# Patient Record
Sex: MALE | Race: WHITE | NOT HISPANIC OR LATINO | Employment: OTHER | URBAN - METROPOLITAN AREA
[De-identification: names, ages, dates, MRNs, and addresses within clinical notes are randomized per-mention and may not be internally consistent; named-entity substitution may affect disease eponyms.]

---

## 2017-01-18 ENCOUNTER — OFFICE VISIT (OUTPATIENT)
Dept: FAMILY MEDICINE | Facility: CLINIC | Age: 82
End: 2017-01-18
Payer: COMMERCIAL

## 2017-01-18 ENCOUNTER — ANTICOAGULATION THERAPY VISIT (OUTPATIENT)
Dept: NURSING | Facility: CLINIC | Age: 82
End: 2017-01-18
Payer: COMMERCIAL

## 2017-01-18 VITALS
HEART RATE: 50 BPM | RESPIRATION RATE: 18 BRPM | TEMPERATURE: 98 F | DIASTOLIC BLOOD PRESSURE: 70 MMHG | SYSTOLIC BLOOD PRESSURE: 120 MMHG | BODY MASS INDEX: 36 KG/M2 | WEIGHT: 258 LBS

## 2017-01-18 DIAGNOSIS — M1A.0710 CHRONIC IDIOPATHIC GOUT INVOLVING TOE OF RIGHT FOOT WITHOUT TOPHUS: ICD-10-CM

## 2017-01-18 DIAGNOSIS — I10 HYPERTENSION GOAL BP (BLOOD PRESSURE) < 140/90: Primary | ICD-10-CM

## 2017-01-18 DIAGNOSIS — Z12.5 SCREENING FOR PROSTATE CANCER: ICD-10-CM

## 2017-01-18 DIAGNOSIS — E78.2 MIXED HYPERLIPIDEMIA: ICD-10-CM

## 2017-01-18 DIAGNOSIS — D62 ANEMIA DUE TO BLOOD LOSS, ACUTE: ICD-10-CM

## 2017-01-18 LAB — INR POINT OF CARE: 2.8 (ref 2–3)

## 2017-01-18 PROCEDURE — 99207 ZZC NO CHARGE NURSE ONLY: CPT

## 2017-01-18 PROCEDURE — 99213 OFFICE O/P EST LOW 20 MIN: CPT | Performed by: FAMILY MEDICINE

## 2017-01-18 PROCEDURE — 36416 COLLJ CAPILLARY BLOOD SPEC: CPT

## 2017-01-18 PROCEDURE — 85610 PROTHROMBIN TIME: CPT | Mod: QW

## 2017-01-18 ASSESSMENT — ANXIETY QUESTIONNAIRES
GAD7 TOTAL SCORE: 0
3. WORRYING TOO MUCH ABOUT DIFFERENT THINGS: NOT AT ALL
1. FEELING NERVOUS, ANXIOUS, OR ON EDGE: NOT AT ALL
6. BECOMING EASILY ANNOYED OR IRRITABLE: NOT AT ALL
IF YOU CHECKED OFF ANY PROBLEMS ON THIS QUESTIONNAIRE, HOW DIFFICULT HAVE THESE PROBLEMS MADE IT FOR YOU TO DO YOUR WORK, TAKE CARE OF THINGS AT HOME, OR GET ALONG WITH OTHER PEOPLE: NOT DIFFICULT AT ALL
2. NOT BEING ABLE TO STOP OR CONTROL WORRYING: NOT AT ALL
7. FEELING AFRAID AS IF SOMETHING AWFUL MIGHT HAPPEN: NOT AT ALL
5. BEING SO RESTLESS THAT IT IS HARD TO SIT STILL: NOT AT ALL

## 2017-01-18 ASSESSMENT — PATIENT HEALTH QUESTIONNAIRE - PHQ9: 5. POOR APPETITE OR OVEREATING: NOT AT ALL

## 2017-01-18 NOTE — PROGRESS NOTES
ANTICOAGULATION FOLLOW-UP CLINIC VISIT    Patient Name:  Alessio Teixeira  Date:  1/18/2017  Contact Type:  Face to Face    SUBJECTIVE:     Patient Findings     Positives No Problem Findings           OBJECTIVE    INR PROTIME   Date Value Ref Range Status   01/18/2017 2.8 2.0 - 3.0 Final       ASSESSMENT / PLAN  INR assessment THER    Recheck INR In: 6 WEEKS    INR Location Clinic      Anticoagulation Summary as of 1/18/2017     INR goal 2.0-3.0   Selected INR 2.8 (1/18/2017)   Maintenance plan 7 mg (2 mg x 3.5) every day   Full instructions 7 mg every day   Weekly total 49 mg   No change documented Anastasiia Pham, RN   Plan last modified Kerrie Morgan RN (6/3/2016)   Next INR check 3/1/2017   Target end date Indefinite    Indications   Chronic atrial fibrillation (H) [I48.2]  Long-term (current) use of anticoagulants [Z79.01] [Z79.01]         Anticoagulation Episode Summary     INR check location [facility]    Preferred lab     Send INR reminders to [facility] INR/PROTIME    Comments       Anticoagulation Care Providers     Provider Role Specialty Phone number    Zac Salgado MD [facility] [phone]            See the Encounter Report to view Anticoagulation Flowsheet and Dosing Calendar (Go to Encounters tab in chart review, and find the Anticoagulation Therapy Visit)        Anastasiia Pham, RN

## 2017-01-18 NOTE — PATIENT INSTRUCTIONS
The patient misunderstood the instructions from last visit.  He will make an appointment at his convenience in the next month or so for a physical.  He will get his blood tests done within a day or 2 before the exam date.  He has not had any problems with gout since stopping his Uloric.  He will make his appointment after returning from [place] where he will be seeing his new grandchild.  He does not need any new medications today.  He will get his INR done today.

## 2017-01-18 NOTE — PROGRESS NOTES
SUBJECTIVE:                                                    Alessio Teixeira is a 81 year old male who presents to clinic today for the following health issues:      Medication Followup of Uloric    Taking Medication as prescribed: NO-pt discontinued medication     Side Effects:  None    Medication Helping Symptoms:  yes       Hyperlipidemia Follow-Up      Rate your low fat/cholesterol diet?: fair    Taking statin?  Yes, no muscle aches from statin    Other lipid medications/supplements?:  none     Hypertension Follow-up      Outpatient blood pressures are not being checked.    Low Salt Diet: no added salt       Problem list and histories reviewed & adjusted, as indicated.  Additional history: as documented    Problem list, Medication list, Allergies, and Medical/Social/Surgical histories reviewed in EPIC and updated as appropriate.    ROS:  Constitutional, HEENT, cardiovascular, pulmonary, gi and gu systems are negative, except as otherwise noted.    OBJECTIVE:                                                    /70 mmHg  Pulse 50  Temp(Src) 98  F (36.7  C) (Tympanic)  Resp 18  Wt 258 lb (117.028 kg)  Body mass index is 36 kg/(m^2).  GENERAL APPEARANCE: healthy, alert, no distress and over weight         ASSESSMENT/PLAN:                                                        ICD-10-CM    1. Hypertension goal BP (blood pressure) < 140/90 I10 UA with Microscopic     CBC with platelets differential     Comprehensive metabolic panel   2. Chronic 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 without tophus [id number].0710 Uric acid   3. Anemia due to blood loss, acute D62 CBC with platelets differential   4. Screening for prostate cancer Z12.5 Prostate spec antigen screen   5. Mixed hyperlipidemia E78.2 Lipid Profile       Patient Instructions   The patient misunderstood the instructions from last visit.  He will make an appointment at his convenience in the next month or so for a physical.  He will get his  blood tests done within a day or 2 before the exam date.  He has not had any problems with gout since stopping his Uloric.  He will make his appointment after returning from [place] where he will be seeing his new grandchild.  He does not need any new medications today.  He will get his INR done today.        Zac Salgado MD  [facility]

## 2017-01-18 NOTE — NURSING NOTE
"Chief Complaint   Patient presents with     Arthritis     gout       Initial /70 mmHg  Pulse 50  Temp(Src) 98  F (36.7  C) (Tympanic)  Resp 18  Wt 258 lb (117.028 kg) Estimated body mass index is 36 kg/(m^2) as calculated from the following:    Height as of 11/9/16: 5' 11\" (1.803 m).    Weight as of this encounter: 258 lb (117.028 kg).  BP completed using cuff size: mary Mondragon CMA      "

## 2017-01-19 ASSESSMENT — ANXIETY QUESTIONNAIRES: GAD7 TOTAL SCORE: 0

## 2017-01-19 ASSESSMENT — PATIENT HEALTH QUESTIONNAIRE - PHQ9: SUM OF ALL RESPONSES TO PHQ QUESTIONS 1-9: 2

## 2017-01-24 DIAGNOSIS — I10 HYPERTENSION GOAL BP (BLOOD PRESSURE) < 140/90: Primary | ICD-10-CM

## 2017-01-25 RX ORDER — POTASSIUM CHLORIDE 1500 MG/1
TABLET, EXTENDED RELEASE ORAL
Qty: 90 TABLET | Refills: 0 | Status: SHIPPED | OUTPATIENT
Start: 2017-01-25 | End: 2017-04-28

## 2017-01-25 NOTE — TELEPHONE ENCOUNTER
Prescription approved per [facility] Refill Protocol.  Alisia Hatfield RN- Triage [employer]

## 2017-01-25 NOTE — TELEPHONE ENCOUNTER
POTASSIUM CL ER 20 MEQ      Last Written Prescription Date: 7/20/16  Last Fill Quantity: 90, # refills: 1  Last Office Visit with [facility], [facility] or [facility] prescribing provider: 1/18/17   Next 5 appointments (look out 90 days)     Feb 08, 2017 10:00 AM   PHYSICAL with Zac Salgado MD   [facility]    [address] [place] [address] [id number] [phone]                   POTASSIUM   Date Value Ref Range Status   04/28/2016 4.0 3.4 - 5.3 mmol/L Final     CREATININE   Date Value Ref Range Status   04/28/2016 0.87 0.66 - 1.25 mg/dL Final     BP Readings from Last 3 Encounters:   01/18/17 120/70   11/09/16 128/60   09/07/16 129/71

## 2017-02-07 ENCOUNTER — OFFICE VISIT (OUTPATIENT)
Dept: FAMILY MEDICINE | Facility: CLINIC | Age: 82
End: 2017-02-07
Payer: COMMERCIAL

## 2017-02-07 VITALS
HEART RATE: 75 BPM | WEIGHT: 254 LBS | OXYGEN SATURATION: 97 % | SYSTOLIC BLOOD PRESSURE: 138 MMHG | DIASTOLIC BLOOD PRESSURE: 80 MMHG | BODY MASS INDEX: 35.44 KG/M2 | TEMPERATURE: 98.8 F | RESPIRATION RATE: 17 BRPM

## 2017-02-07 DIAGNOSIS — M1A.0710 CHRONIC IDIOPATHIC GOUT INVOLVING TOE OF RIGHT FOOT WITHOUT TOPHUS: ICD-10-CM

## 2017-02-07 DIAGNOSIS — E78.2 MIXED HYPERLIPIDEMIA: ICD-10-CM

## 2017-02-07 DIAGNOSIS — I10 HYPERTENSION GOAL BP (BLOOD PRESSURE) < 140/90: ICD-10-CM

## 2017-02-07 DIAGNOSIS — Z12.5 SCREENING FOR PROSTATE CANCER: ICD-10-CM

## 2017-02-07 DIAGNOSIS — J20.9 ACUTE BRONCHITIS, UNSPECIFIED ORGANISM: Primary | ICD-10-CM

## 2017-02-07 DIAGNOSIS — D62 ANEMIA DUE TO BLOOD LOSS, ACUTE: ICD-10-CM

## 2017-02-07 LAB
ALBUMIN SERPL-MCNC: 2.6 G/DL (ref 3.4–5)
ALBUMIN UR-MCNC: 30 MG/DL
ALP SERPL-CCNC: 64 U/L (ref 40–150)
ALT SERPL W P-5'-P-CCNC: 76 U/L (ref 0–70)
AMORPH CRY #/AREA URNS HPF: ABNORMAL /HPF
ANION GAP SERPL CALCULATED.3IONS-SCNC: 7 MMOL/L (ref 3–14)
APPEARANCE UR: CLEAR
AST SERPL W P-5'-P-CCNC: 45 U/L (ref 0–45)
BASOPHILS # BLD AUTO: 0 10E9/L (ref 0–0.2)
BASOPHILS NFR BLD AUTO: 0.2 %
BILIRUB SERPL-MCNC: 0.5 MG/DL (ref 0.2–1.3)
BILIRUB UR QL STRIP: ABNORMAL
BUN SERPL-MCNC: 13 MG/DL (ref 7–30)
CALCIUM SERPL-MCNC: 8.6 MG/DL (ref 8.5–10.1)
CHLORIDE SERPL-SCNC: 105 MMOL/L (ref 94–109)
CHOLEST SERPL-MCNC: 106 MG/DL
CO2 SERPL-SCNC: 30 MMOL/L (ref 20–32)
COLOR UR AUTO: YELLOW
CREAT SERPL-MCNC: 1.02 MG/DL (ref 0.66–1.25)
DIFFERENTIAL METHOD BLD: ABNORMAL
EOSINOPHIL # BLD AUTO: 0.2 10E9/L (ref 0–0.7)
EOSINOPHIL NFR BLD AUTO: 1.7 %
ERYTHROCYTE [DISTWIDTH] IN BLOOD BY AUTOMATED COUNT: 14.6 % (ref 10–15)
GFR SERPL CREATININE-BSD FRML MDRD: 70 ML/MIN/1.7M2
GLUCOSE SERPL-MCNC: 93 MG/DL (ref 70–99)
GLUCOSE UR STRIP-MCNC: NEGATIVE MG/DL
HCT VFR BLD AUTO: 43 % (ref 40–53)
HDLC SERPL-MCNC: 26 MG/DL
HGB BLD-MCNC: 13.9 G/DL (ref 13.3–17.7)
HGB UR QL STRIP: NEGATIVE
KETONES UR STRIP-MCNC: NEGATIVE MG/DL
LDLC SERPL CALC-MCNC: 61 MG/DL
LEUKOCYTE ESTERASE UR QL STRIP: NEGATIVE
LYMPHOCYTES # BLD AUTO: 1.5 10E9/L (ref 0.8–5.3)
LYMPHOCYTES NFR BLD AUTO: 12.5 %
MCH RBC QN AUTO: 30 PG (ref 26.5–33)
MCHC RBC AUTO-ENTMCNC: 32.3 G/DL (ref 31.5–36.5)
MCV RBC AUTO: 93 FL (ref 78–100)
MONOCYTES # BLD AUTO: 0.8 10E9/L (ref 0–1.3)
MONOCYTES NFR BLD AUTO: 6.3 %
MUCOUS THREADS #/AREA URNS LPF: PRESENT /LPF
NEUTROPHILS # BLD AUTO: 9.6 10E9/L (ref 1.6–8.3)
NEUTROPHILS NFR BLD AUTO: 79.3 %
NITRATE UR QL: NEGATIVE
NONHDLC SERPL-MCNC: 80 MG/DL
PH UR STRIP: 7 PH (ref 5–7)
PLATELET # BLD AUTO: 330 10E9/L (ref 150–450)
POTASSIUM SERPL-SCNC: 3.9 MMOL/L (ref 3.4–5.3)
PROT SERPL-MCNC: 6.5 G/DL (ref 6.8–8.8)
PSA SERPL-ACNC: 6.01 UG/L (ref 0–4)
RBC # BLD AUTO: 4.63 10E12/L (ref 4.4–5.9)
RBC #/AREA URNS AUTO: ABNORMAL /HPF (ref 0–2)
SODIUM SERPL-SCNC: 142 MMOL/L (ref 133–144)
SP GR UR STRIP: 1.01 (ref 1–1.03)
TRIGL SERPL-MCNC: 94 MG/DL
URATE SERPL-MCNC: 5 MG/DL (ref 3.5–7.2)
URN SPEC COLLECT METH UR: ABNORMAL
UROBILINOGEN UR STRIP-ACNC: 1 EU/DL (ref 0.2–1)
WBC # BLD AUTO: 12.1 10E9/L (ref 4–11)
WBC #/AREA URNS AUTO: ABNORMAL /HPF (ref 0–2)

## 2017-02-07 PROCEDURE — 80061 LIPID PANEL: CPT | Performed by: FAMILY MEDICINE

## 2017-02-07 PROCEDURE — G0103 PSA SCREENING: HCPCS | Performed by: FAMILY MEDICINE

## 2017-02-07 PROCEDURE — 80053 COMPREHEN METABOLIC PANEL: CPT | Performed by: FAMILY MEDICINE

## 2017-02-07 PROCEDURE — 99213 OFFICE O/P EST LOW 20 MIN: CPT | Performed by: PHYSICIAN ASSISTANT

## 2017-02-07 PROCEDURE — 81001 URINALYSIS AUTO W/SCOPE: CPT | Performed by: FAMILY MEDICINE

## 2017-02-07 PROCEDURE — 84550 ASSAY OF BLOOD/URIC ACID: CPT | Performed by: FAMILY MEDICINE

## 2017-02-07 PROCEDURE — 36415 COLL VENOUS BLD VENIPUNCTURE: CPT | Performed by: FAMILY MEDICINE

## 2017-02-07 PROCEDURE — 85025 COMPLETE CBC W/AUTO DIFF WBC: CPT | Performed by: FAMILY MEDICINE

## 2017-02-07 RX ORDER — AZITHROMYCIN 250 MG/1
TABLET, FILM COATED ORAL
Qty: 6 TABLET | Refills: 0 | Status: SHIPPED | OUTPATIENT
Start: 2017-02-07 | End: 2017-08-07

## 2017-02-07 NOTE — PATIENT INSTRUCTIONS
What Is Acute Bronchitis?    Acute or short-term bronchitis last for days or weeks. It occurs when the bronchial tubes (airways in the lungs) are irritated by a virus, bacteria, or allergen. This causes a cough that produces yellow or greenish mucus.  Inside Healthy Lungs  Air travels in and out of the lungs through the airways. The linings of these airways produce sticky mucus. This mucus traps particles that enter the lungs. Tiny structures called cilia then sweep the particles out of the airways.    Healthy Airway: Airways are normally open. Air moves in and out easily.   Healthy Cilia: Tiny, hairlike cilia sweep mucus and particles up and out of the airways.   Lings with Bronchitis  Bronchitis often occurs when a cold or the flu virus. The airways become inflamed (red and swollen). There is a deep  hacking  cough from the extra mucus. Other symptoms may include:    Wheezin    Chest discomfort    Shortness of breath    Mild fever  A second infection, this time due to bacteria, may then occur. And, airways irritated by allergens or smoke are more likely to get infected.    Inflamed Airway: Inflammation and excess mucus narrow the airway, causing shortness of breath.   Impaired Cilia: Excess mucus impairs cilia, causing congestion and wheezing. Smoking worsens the problem.   Making a Diagnosis  A physical exam, medical history, and certain tests help your health care provider make the diagnosis.  Medical History  Your health care provider will ask you about your symptoms.  The Exam  Your provider listens to your chest for signs of congestion. He or she may also check your ears, nose, and throat.  Possible Tests    A sputum test for bacteria. This requires a sample of mucus from the lungs.    A nasal or throat swab for bacterial infection.    A chest X-ray if your health care provider suspects pneumonia.    Tests to check for an underlying condition, such as allergies, asthma, or COPD. You may be referred to a  specialist for further lung function testing.  Treating a Cough  The main treatment for bronchitis is easing symptoms. Avoiding smoke, allergens, and other things that trigger coughing can often help. If the infection is bacterial, antibiotics may be used. During the illness, it's important to get plenty of sleep. To ease symptoms:    Don t smoke, and avoid secondhand smoke.    Use a humidifier, or breathe in steam from a hot shower. This may help loosen mucus.    Drink a lot of water and juice. They can soothe the throat and may help thin mucus.    Sit up or use extra pillows when in bed to help lessen coughing and congestion.    Ask your provider about using cough medicine, pain and fever medication, or a decongestant.  Antibiotics  Most cases of bronchitis are caused by cold or flu viruses. Antibiotics don t treat viral illness. Taking antibiotics when they are not needed increases your risk of getting an infection later that is antibiotic-resistant. Your provider will prescribe antibiotics if the infection is caused by bacteria. If they are prescribed:    Take the medication until it is used up, even if symptoms have improved. If you don t, the bronchitis may come back.    Take them as directed. For instance, some medications should be taken with food.    Ask your provider or pharmacist what side effects are common, and what to do about them.  Follow-Up  You should go see your provider again in 2-3 weeks. By this time, symptoms should have improved. An infection that lasts longer may signal a more serious problem.  Prevention    Avoid tobacco smoke. If you smoke, quit. Stay away from smoky places. Ask friends and family not to smoke around you, or in your home or car.    Get checked for allergies.    Ask your provider about getting a yearly flu shot, and possibly a pneumoccocal or pneumonia shot.    Wash your hands often. This helps reduce the chance of picking up viruses that cause colds and flu.  Call Your  Health Care Provider If:    Symptoms worsen, or new symptoms develop.    Breathing problems worsen or  become severe.    Symptoms don t improve within a week, or within 3 days of taking antibiotics.        2924-5214 [employer]. [address] [place] [address], [place] [address]. All rights reserved. This information is not intended as a substitute for professional medical care. Always follow your healthcare professional's instructions.

## 2017-02-07 NOTE — NURSING NOTE
"Chief Complaint   Patient presents with     Shortness of Breath       Initial /80 mmHg  Pulse 75  Temp(Src) 98.8  F (37.1  C) (Tympanic)  Resp 17  Wt 254 lb (115.214 kg)  SpO2 97% Estimated body mass index is 35.44 kg/(m^2) as calculated from the following:    Height as of 11/9/16: 5' 11\" (1.803 m).    Weight as of this encounter: 254 lb (115.214 kg).  Medication Reconciliation: complete    "

## 2017-02-07 NOTE — PROGRESS NOTES
SUBJECTIVE:                                                    Alessio Teixeira is a 81 year old male who presents to clinic today for the following health issues:      Shortness of breath      Duration: ongoing for about 2 weeks.    Description (location/character/radiation): patient states that he recently has been having a lot of s.o.b, with coughing. He states that he also feels like his ribs shattered-very painful, hard to sleep    Intensity:  moderate, severe    Accompanying signs and symptoms: see above-hard to take a deep breath without having pain    History (similar episodes/previous evaluation): None    Precipitating or alleviating factors: None    Therapies tried and outcome: nyquil with mild improvement     Additional complaints: None    HPI additional notes: Alessio presents today with   Chief Complaint   Patient presents with     Shortness of Breath   No significant history of tobacco abuse.  No history of asthma.         ROS:  C: Negative for fever, chills, recent change in weight  Skin: Negative for worrisome rashes or lesions  ENT/MOUTH:POSITIVE for congestion, ear pain in right when flying and sore throat.  Negative for sinus pressure.  Resp: POSITIVE for cough non-productive with SOB and wheezing  MS: Positive for rib pain secondary to coughing.  NEURO: Negative  for headaches or dizziness.  P: Negative for changes in mood or affect  ROS otherwise negative.    Chart Review:  History   Smoking status     Former Smoker -- 1.00 packs/day for 3 years     Types: Cigarettes     Quit date: 01/01/1963   Smokeless tobacco     Never Used     PHQ-9 SCORE 1/18/2017   Total Score 2     JAMES-7 SCORE 1/18/2017   Total Score 0     Patient Active Problem List   Diagnosis     Hyperlipidemia LDL goal <100     Edema of both legs     Varicose veins of legs     BMI > 35     Stasis dermatitis     Chronic atrial fibrillation (H)     Chronic 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     Vitamin D deficiency disease      Hypertension goal BP (blood pressure) < 140/90     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)     Diplopia     MARTHA (obstructive sleep apnea)     Long-term (current) use of anticoagulants [Z79.01]     ACP (advance care planning)     Degenerative localized arthritis of hip     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 by Miguel Johnson MD     Aftercare following right hip joint replacement surgery     Bradycardia     History of pulmonary embolism- bilateral in 2007      Atrial fibrillation (H)     Anemia due to blood loss, acute     Myasthenia gravis (H)     Edema of lower extremity, unspecified laterality     Chronic left-sided thoracic back pain     Past Surgical History   Procedure Laterality Date     Genitourinary surgery       vasectomy     Cataract iol, rt/lt       Knee surgery  2010     left knee replacement     C rad resec tonsil/pillars       Appendectomy       Brain surgery  2007     partial resection meningioma     Rectal surgery  1985     Phacoemulsification clear cornea with toric intraocular lens implant  7/30/2012     Procedure: PHACOEMULSIFICATION CLEAR CORNEA WITH TORIC INTRAOCULAR LENS IMPLANT;  COMPLEX RIGHT PHACOEMULSIFICATION CLEAR CORNEA WITH TORIC INTRAOCULAR LENS IMPLANT WITH MALYUGIN RING;  Surgeon: Ronald Cortez MD;  Location:  [facility]     Colectomy  2007     bowel perforation due to steroids     Orthopedic surgery       left TKR     Head & neck surgery  2007     meningioma removed     Arthroplasty hip anterior Right 4/26/2016     Procedure: ARTHROPLASTY HIP ANTERIOR;  Surgeon: Miguel Johnson MD;  Location:  OR     Problem list, Medication list, Allergies, Medical/Social/Surg hx reviewed in [facility], updated as appropriate.   OBJECTIVE:                                                    /80 mmHg  Pulse 75  Temp(Src) 98.8  F (37.1  C) (Tympanic)  Resp 17  Wt 254 lb (115.214 kg)  SpO2 97%  Body mass index is 35.44 kg/(m^2).  GENERAL:  healthy, alert, in no acute distress  EYES: Grossly normal to inspection, EOMI, PERRL  HENT: Ear canals normal bilateral. TM dull gray  bulging with serous effusion bilateral.  Nasal mucosa mildly edematous with clear rhinorrhea.  Mouth- mucous membranes moist, no lesions or ulcerations. Pharynx pink. and No tonsillary hypertrophy. Uvula midline, no  post-nasal drainage.  Maxillary and frontal sinuses nontender to palpation bilaterally.  NECK: Non-tender, no adenopathy.  RESP: no rales or rhonchi, expiratory wheezes bilateral, prolonged expiratory phase and cough with deep inspiration   CV: regular rate and rhythm, normal S1 S2.  No peripheral edema.  SKIN: no suspicious lesions, no rashes  PSYCH: Alert and oriented times 3;  Able to articulate logical thoughts. Affect is normal.    Diagnostic test results: none      ASSESSMENT/PLAN:                                                          ICD-10-CM    1. Acute bronchitis, unspecified organism J20.9 azithromycin (ZITHROMAX) 250 MG tablet     Continue OTC medications.  Discussed antibiotic may alter his INR.  I do not feel he needs prednisone or an inhaler at this time but he can request one if his breathing is getting worse.    Please see patient instructions for treatment details.    Follow up in 7-10 days if not improving as anticipated, sooner PRN.    Mariza Burrell PA-C  [facility]

## 2017-02-08 ENCOUNTER — OFFICE VISIT (OUTPATIENT)
Dept: FAMILY MEDICINE | Facility: CLINIC | Age: 82
End: 2017-02-08
Payer: COMMERCIAL

## 2017-02-08 VITALS
TEMPERATURE: 98.7 F | WEIGHT: 255.5 LBS | BODY MASS INDEX: 35.77 KG/M2 | DIASTOLIC BLOOD PRESSURE: 62 MMHG | HEART RATE: 87 BPM | HEIGHT: 71 IN | RESPIRATION RATE: 16 BRPM | SYSTOLIC BLOOD PRESSURE: 132 MMHG | OXYGEN SATURATION: 94 %

## 2017-02-08 DIAGNOSIS — E55.9 VITAMIN D DEFICIENCY DISEASE: ICD-10-CM

## 2017-02-08 DIAGNOSIS — I48.20 CHRONIC ATRIAL FIBRILLATION (H): ICD-10-CM

## 2017-02-08 DIAGNOSIS — Z00.00 ROUTINE MEDICAL EXAM: Primary | ICD-10-CM

## 2017-02-08 DIAGNOSIS — E78.5 HYPERLIPIDEMIA LDL GOAL <100: ICD-10-CM

## 2017-02-08 DIAGNOSIS — M16.9 DEGENERATIVE LOCALIZED ARTHRITIS OF HIP: ICD-10-CM

## 2017-02-08 DIAGNOSIS — G47.33 OSA (OBSTRUCTIVE SLEEP APNEA): ICD-10-CM

## 2017-02-08 DIAGNOSIS — I10 HYPERTENSION GOAL BP (BLOOD PRESSURE) < 140/90: ICD-10-CM

## 2017-02-08 PROCEDURE — G0439 PPPS, SUBSEQ VISIT: HCPCS | Performed by: FAMILY MEDICINE

## 2017-02-08 NOTE — NURSING NOTE
"Chief Complaint   Patient presents with     Medicare Visit     81 year old presents for annual wellness exam       Initial /62 mmHg  Pulse 87  Temp(Src) 98.7  F (37.1  C) (Tympanic)  Resp 16  Ht 5' 11\" (1.803 m)  Wt 255 lb 8 oz (115.894 kg)  BMI 35.65 kg/m2  SpO2 94% Estimated body mass index is 35.65 kg/(m^2) as calculated from the following:    Height as of this encounter: 5' 11\" (1.803 m).    Weight as of this encounter: 255 lb 8 oz (115.894 kg).  Medication Reconciliation: complete     Olivia Paredes LPN  "

## 2017-02-08 NOTE — PROGRESS NOTES
SUBJECTIVE:                                                            Alessio Teixeira is a 81 year old male who presents for Preventive Visit.  click delete button to remove this line now  click delete button to remove this line now  Are you in the first 12 months of your Medicare Part B coverage?  No    Healthy Habits:    Do you get at least three servings of calcium containing foods daily (dairy, green leafy vegetables, etc.)? yes    Amount of exercise or daily activities, outside of work: 2 day(s) per week    Problems taking medications regularly No    Medication side effects: No    Have you had an eye exam in the past two years? no    Do you see a dentist twice per year? yes    Do you have sleep apnea, excessive snoring or daytime drowsiness?yes--use C-Pap    COGNITIVE SCREEN  1) Repeat 3 items (Banana, Sunrise, Chair)      2) Clock draw: NORMAL  3) 3 item recall: Recalls 2 objects   Results: NORMAL clock, 1-2 items recalled: COGNITIVE IMPAIRMENT LESS LIKELY    Mini-CogTM Copyright EVE Awan. Licensed by the author for use in [facility]; reprinted with permission (swati@[facility]). All rights reserved.                All Histories reviewed and updated in [facility] as appropriate.  Social History   Substance Use Topics     Smoking status: Former Smoker -- 1.00 packs/day for 3 years     Types: Cigarettes     Quit date: 01/01/1963     Smokeless tobacco: Never Used     Alcohol Use: Yes      Comment: 2-4 glasses of wine per week       The patient does not drink >3 drinks per day nor >7 drinks per week.    Today's PHQ-2 Score:   PHQ-2 ( 1999 Pfizer) 2/8/2017 2/7/2017   Q1: Little interest or pleasure in doing things 0 0   Q2: Feeling down, depressed or hopeless 0 0   PHQ-2 Score 0 0       Do you feel safe in your environment - Yes    Do you have a Health Care Directive?: Yes: Advance Directive has been received and scanned.    Current providers sharing in care for this patient include:   Patient Care  "Team:  Zac Salgado MD as PCP - General (Family Practice)  Joleen Perez, RN as Nurse Coordinator (Neurosurgery)      Hearing impairment: No    Ability to successfully perform activities of daily living: Yes, no assistance needed     Fall risk:  Fallen 2 or more times in the past year?: Yes  Any fall with injury in the past year?: Yes    Home safety:  none identified  click delete button to remove this line now    The following health maintenance items are reviewed in Epic and correct as of today:  Health Maintenance   Topic Date Due     OP ANNUAL INR REFERRAL  07/08/2016     INFLUENZA VACCINE (SYSTEM ASSIGNED)  09/01/2016     FALL RISK ASSESSMENT  01/18/2018     JAMES QUESTIONNAIRE 1 YEAR  01/18/2018     PHQ-9 Q1YR (NO INBASKET)  01/18/2018     TETANUS IMMUNIZATION (SYSTEM ASSIGNED)  01/07/2020     ADVANCE DIRECTIVE PLANNING Q5 YRS (NO INBASKET)  04/19/2021     PNEUMOCOCCAL  Completed              ROS:  C: NEGATIVE for fever, chills, change in weight  I: NEGATIVE for worrisome rashes, moles or lesions  E: NEGATIVE for vision changes or irritation  E/M: NEGATIVE for ear, mouth and throat problems  RESP:POSITIVE for cough-productive  B: NEGATIVE for masses, tenderness or discharge  CV: NEGATIVE for chest pain, palpitations or peripheral edema  GI: NEGATIVE for nausea, abdominal pain, heartburn, or change in bowel habits  : NEGATIVE for frequency, dysuria, or hematuria  M: NEGATIVE for significant arthralgias or myalgia  N: NEGATIVE for weakness, dizziness or paresthesias  E: NEGATIVE for temperature intolerance, skin/hair changes  H: NEGATIVE for bleeding problems  P: NEGATIVE for changes in mood or affect    Problem list, Medication list, Allergies, and Medical/Social/Surgical histories reviewed in [facility] and updated as appropriate.  OBJECTIVE:                                                            /62 mmHg  Pulse 87  Temp(Src) 98.7  F (37.1  C) (Tympanic)  Resp 16  Ht 5' 11\" (1.803 m)  Wt " "255 lb 8 oz (115.894 kg)  BMI 35.65 kg/m2  SpO2 94% Estimated body mass index is 35.65 kg/(m^2) as calculated from the following:    Height as of this encounter: 5' 11\" (1.803 m).    Weight as of this encounter: 255 lb 8 oz (115.894 kg).  EXAM:   GENERAL: healthy, alert and no distress  EYES: Eyes grossly normal to inspection, PERRL and conjunctivae and sclerae normal  HENT: ear canals and TM's normal, nose and mouth without ulcers or lesions  NECK: no adenopathy, no asymmetry, masses, or scars and thyroid normal to palpation  RESP: lungs clear to auscultation - no rales, rhonchi or wheezes  CV: regular rate and rhythm, normal S1 S2, no S3 or S4, no murmur, click or rub, no peripheral edema and peripheral pulses strong  ABDOMEN: soft, nontender, no hepatosplenomegaly, no masses and bowel sounds normal   (male): normal male genitalia without lesions or urethral discharge, no hernia  RECTAL: normal sphincter tone, no rectal masses, prostate normal size, smooth, nontender without nodules or masses  MS: no gross musculoskeletal defects noted, no edema  SKIN: no suspicious lesions or rashes  NEURO: Normal strength and tone, mentation intact and speech normal  PSYCH: mentation appears normal, affect normal/bright  LYMPH: no cervical, supraclavicular, axillary, or inguinal adenopathy    ASSESSMENT / PLAN:                                                                ICD-10-CM    1. Routine medical exam Z00.00    2. Hyperlipidemia LDL goal <100 E78.5    3. Chronic atrial fibrillation (H) I48.2    4. Vitamin D deficiency disease E55.9    5. Hypertension goal BP (blood pressure) < 140/90 I10    6. Degenerative localized arthritis of hip M16.9    7. MARTHA (obstructive sleep apnea) G47.33        End of Life Planning:  Patient currently has an advanced directive: Yes.  Practitioner is supportive of decision.    COUNSELING:  Reviewed preventive health counseling, as reflected in patient instructions        Estimated body mass " "index is 35.65 kg/(m^2) as calculated from the following:    Height as of this encounter: 5' 11\" (1.803 m).    Weight as of this encounter: 255 lb 8 oz (115.894 kg).  Weight management plan: Discussed healthy diet and exercise guidelines and patient will follow up in 6 months in clinic to re-evaluate.   reports that he quit smoking about 54 years ago. His smoking use included Cigarettes. He has a 3 pack-year smoking history. He has never used smokeless tobacco.      Appropriate preventive services were discussed with this patient, including applicable screening as appropriate for cardiovascular disease, diabetes, osteopenia/osteoporosis, and glaucoma.  As appropriate for age/gender, discussed screening for colorectal cancer, prostate cancer, breast cancer, and cervical cancer. Checklist reviewing preventive services available has been given to the patient.    Reviewed patients plan of care and provided an AVS. The Basic Care Plan (routine screening as documented in Health Maintenance) for Alessio meets the Care Plan requirement. This Care Plan has been established and reviewed with the Patient.    Counseling Resources:  ATP IV Guidelines  Pooled Cohorts Equation Calculator  Breast Cancer Risk Calculator  FRAX Risk Assessment  ICSI Preventive Guidelines  Dietary Guidelines for Americans, 2010  USDA's MyPlate  ASA Prophylaxis  Lung CA Screening    Zac Salgado MD  [facility]  "

## 2017-02-26 DIAGNOSIS — Z86.711 HISTORY OF PULMONARY EMBOLISM: ICD-10-CM

## 2017-02-26 DIAGNOSIS — E78.5 HYPERLIPIDEMIA LDL GOAL <100: Primary | ICD-10-CM

## 2017-02-26 DIAGNOSIS — I48.0 PAROXYSMAL ATRIAL FIBRILLATION (H): ICD-10-CM

## 2017-02-28 RX ORDER — LOVASTATIN 40 MG
TABLET ORAL
Qty: 180 TABLET | Refills: 3 | Status: SHIPPED | OUTPATIENT
Start: 2017-02-28 | End: 2017-04-28

## 2017-02-28 RX ORDER — WARFARIN SODIUM 2 MG/1
TABLET ORAL
Qty: 360 TABLET | Refills: 1 | Status: SHIPPED | OUTPATIENT
Start: 2017-02-28 | End: 2017-09-14

## 2017-02-28 NOTE — TELEPHONE ENCOUNTER
Lovastatin 40 mg     Last Written Prescription Date: 2/9/16  Last Fill Quantity: 180, # refills: 3  [place] [facility] [place] [facility] or [facility] prescribing provider: 2/8/17  This medication was reported by the patient       Lab Results   Component Value Date    CHOL 106 02/07/2017     Lab Results   Component Value Date    HDL 26 02/07/2017     Lab Results   Component Value Date    LDL 61 02/07/2017    LDL 78.2 08/24/2012     Lab Results   Component Value Date    TRIG 94 02/07/2017     Lab Results   Component Value Date    CHOLHDLRATIO 3.5 01/07/2015         Warfarin 2 mg    Last Written Prescription Date: 8/15/16  Last Fill Qty: 360, # refills: 1  [place] [facility] [place] [facility] [place] [facility] prescribing provider: 2/8/17       Date and Result of Last PT/INR:   Lab Results   Component Value Date    INR 2.8 01/18/2017    INR 3.1 12/09/2016    INR 1.89 05/06/2016    INR 1.74 05/02/2016    PT 18.7 08/24/2012    PT 20.1 07/25/2012

## 2017-02-28 NOTE — TELEPHONE ENCOUNTER
Prescription approved per [facility], [facility] or [facility] refill protocol.  Patient Nasreen was old order on med list.  Patient has completed therapy.  Peggy Oseguera RN  [employer]

## 2017-03-01 ENCOUNTER — ANTICOAGULATION THERAPY VISIT (OUTPATIENT)
Dept: NURSING | Facility: CLINIC | Age: 82
End: 2017-03-01
Payer: COMMERCIAL

## 2017-03-01 DIAGNOSIS — Z79.01 LONG-TERM (CURRENT) USE OF ANTICOAGULANTS: ICD-10-CM

## 2017-03-01 DIAGNOSIS — I48.20 CHRONIC ATRIAL FIBRILLATION (H): ICD-10-CM

## 2017-03-01 LAB — INR POINT OF CARE: 2.1 (ref 0.86–1.14)

## 2017-03-01 PROCEDURE — 99207 ZZC NO CHARGE NURSE ONLY: CPT

## 2017-03-01 PROCEDURE — 36416 COLLJ CAPILLARY BLOOD SPEC: CPT

## 2017-03-01 PROCEDURE — 85610 PROTHROMBIN TIME: CPT | Mod: QW

## 2017-03-01 NOTE — MR AVS SNAPSHOT
Alessio Teixeira   3/1/2017 9:30 AM   Anticoagulation Therapy Visit    Description:  81 year old male   Provider:  [facility]   Department:  Bx Nurse           INR as of 3/1/2017     Today's INR 2.1      Anticoagulation Summary as of 3/1/2017     INR goal 2.0-3.0   Today's INR 2.1   Full instructions 7 mg every day   Next INR check 4/12/2017    Indications   Chronic atrial fibrillation (H) [I48.2]  Long-term (current) use of anticoagulants [Z79.01] [Z79.01]         Contact Numbers     [facility]  Please call  [phone] to cancel and/or reschedule your appointment   The direct line to the anticoagulant nurse is [phone] on Monday, Wednesday, and Friday. On Thursday, the anticoagulant nurse can be reached directly at [phone].         March 2017 Details    Sun Mon Tue Wed Thu Fri Sat        1      7 mg   See details      2      7 mg         3      7 mg         4      7 mg           5      7 mg         6      7 mg         7      7 mg         8      7 mg         9      7 mg         10      7 mg         11      7 mg           12      7 mg         13      7 mg         14      7 mg         15      7 mg         16      7 mg         17      7 mg         18      7 mg           19      7 mg         20      7 mg         21      7 mg         22      7 mg         23      7 mg         24      7 mg         25      7 mg           26      7 mg         27      7 mg         28      7 mg         29      7 mg         30      7 mg         31      7 mg           Date Details   03/01 This INR check               How to take your warfarin dose     To take:  7 mg Take 3.5 of the 2 mg tablets.           April 2017 Details    Sun Mon Tue Wed Thu Fri Sat           1      7 mg           2      7 mg         3      7 mg         4      7 mg         5      7 mg         6      7 mg         7      7 mg         8      7 mg           9      7 mg         10      7 mg         11      7 mg         12            13                14               15                 16               17               18               19               20               21               22                 23               24               25               26               27               28               29                 30                      Date Details   No additional details    Date of next INR:  4/12/2017         How to take your warfarin dose     To take:  7 mg Take 3.5 of the 2 mg tablets.

## 2017-03-01 NOTE — PROGRESS NOTES
ANTICOAGULATION FOLLOW-UP CLINIC VISIT    Patient Name:  Alessio Teixeira  Date:  3/1/2017  Contact Type:  Face to Face    SUBJECTIVE:     Patient Findings     Positives No Problem Findings           OBJECTIVE    INR Protime   Date Value Ref Range Status   03/01/2017 2.1 (A) 0.86 - 1.14 Final       ASSESSMENT / PLAN  INR assessment THER    Recheck INR In: 6 WEEKS    INR Location Clinic      Anticoagulation Summary as of 3/1/2017     INR goal 2.0-3.0   Today's INR 2.1   Maintenance plan 7 mg (2 mg x 3.5) every day   Full instructions 7 mg every day   Weekly total 49 mg   Plan last modified Kerrie Morgan RN (6/3/2016)   Next INR check 4/12/2017   Target end date Indefinite    Indications   Chronic atrial fibrillation (H) [I48.2]  Long-term (current) use of anticoagulants [Z79.01] [Z79.01]         Anticoagulation Episode Summary     [facility]    Preferred lab     Send INR reminders to [facility] INR/PROTIME    Comments       Anticoagulation Care Providers     Provider Role Specialty Phone number    Zac Salgado MD [facility] [phone]            See the Encounter Report to view Anticoagulation Flowsheet and Dosing Calendar (Go to Encounters tab in chart review, and find the Anticoagulation Therapy Visit)        Kerrie Morgan RN

## 2017-04-12 ENCOUNTER — ANTICOAGULATION THERAPY VISIT (OUTPATIENT)
Dept: NURSING | Facility: CLINIC | Age: 82
End: 2017-04-12
Payer: COMMERCIAL

## 2017-04-12 DIAGNOSIS — Z79.01 LONG-TERM (CURRENT) USE OF ANTICOAGULANTS: ICD-10-CM

## 2017-04-12 DIAGNOSIS — I48.20 CHRONIC ATRIAL FIBRILLATION (H): ICD-10-CM

## 2017-04-12 LAB — INR POINT OF CARE: 2.8 (ref 2–3)

## 2017-04-12 PROCEDURE — 36416 COLLJ CAPILLARY BLOOD SPEC: CPT

## 2017-04-12 PROCEDURE — 85610 PROTHROMBIN TIME: CPT | Mod: QW

## 2017-04-12 PROCEDURE — 99207 ZZC NO CHARGE NURSE ONLY: CPT

## 2017-04-12 NOTE — MR AVS SNAPSHOT
Alessio Teixeira   4/12/2017 10:30 AM   Anticoagulation Therapy Visit    Description:  81 year old male   Provider:   ANTICOAGULATION CLINIC   Department:  Bx Nurse           INR as of 4/12/2017     Today's INR 2.8      Anticoagulation Summary as of 4/12/2017     INR goal 2.0-3.0   Today's INR 2.8   Full instructions 7 mg every day   Next INR check 5/25/2017    Indications   Chronic atrial fibrillation (H) [I48.2]  Long-term (current) use of anticoagulants [Z79.01] [Z79.01]         Your next Anticoagulation Clinic appointment(s)     Apr 12, 2017 10:30 AM CDT   Anticoagulation Visit with  ANTICOAGULATION CLINIC   [facility]    [address] [place] [id number] [phone]            May 25, 2017  1:00 PM CDT   Anticoagulation Visit with  ANTICOAGULATION CLINIC   [facility]    [address] [place] [id number] [phone]              Contact Numbers     [facility]  Please call  [phone] to cancel and/or reschedule your appointment   The direct line to the anticoagulant nurse is [phone] on Monday, Wednesday, and Friday. On Thursday, the anticoagulant nurse can be reached directly at [phone].         April 2017 Details    Sun Mon Tue Wed Thu Fri Sat           1                 2               3               4               5               6               7               8                 9               10               11               12      7 mg   See details      13      7 mg         14      7 mg         15      7 mg           16      7 mg         17      7 mg         18      7 mg         19      7 mg         20      7 mg         21      7 mg         22      7 mg           23      7 mg         24      7 mg         25      7 mg         26      7 mg         27      7 mg          28      7 mg         29      7 mg           30      7 mg                Date Details   04/12 This INR check               How to take your warfarin dose     To take:  7 mg Take 3.5 of the 2 mg tablets.           May 2017 Details    Sun Mon Tue Wed Thu Fri Sat      1      7 mg         2      7 mg         3      7 mg         4      7 mg         5      7 mg         6      7 mg           7      7 mg         8      7 mg         9      7 mg         10      7 mg         11      7 mg         12      7 mg         13      7 mg           14      7 mg         15      7 mg         16      7 mg         17      7 mg         18      7 mg         19      7 mg         20      7 mg           21      7 mg         22      7 mg         23      7 mg         24      7 mg         25            26               27                 28               29               30               31                   Date Details   No additional details    Date of next INR:  5/25/2017         How to take your warfarin dose     To take:  7 mg Take 3.5 of the 2 mg tablets.

## 2017-04-12 NOTE — PROGRESS NOTES
ANTICOAGULATION FOLLOW-UP CLINIC VISIT    Patient Name:  Alessio Teixeira  Date:  4/12/2017  Contact Type:  Face to Face    SUBJECTIVE:     Patient Findings     Positives No Problem Findings           OBJECTIVE    INR Protime   Date Value Ref Range Status   04/12/2017 2.8 2.0 - 3.0 Final       ASSESSMENT / PLAN  INR assessment THER    Recheck INR In: 6 WEEKS    INR Location Clinic      Anticoagulation Summary as of 4/12/2017     INR goal 2.0-3.0   Today's INR 2.8   Maintenance plan 7 mg (2 mg x 3.5) every day   Full instructions 7 mg every day   Weekly total 49 mg   No change documented Anastasiia Pham, RN   Plan last modified Kerrie Morgan RN (6/3/2016)   Next INR check 5/25/2017   Target end date Indefinite    Indications   Chronic atrial fibrillation (H) [I48.2]  Long-term (current) use of anticoagulants [Z79.01] [Z79.01]         Anticoagulation Episode Summary     IN[facility] check [facility]    Preferred lab     Send INR reminders to [facility] INR/PROTIME    Comments       Anticoagulation Care Providers     Provider Role Specialty Phone number    Zac Salgado MD [facility] [phone]            See the Encounter Report to view Anticoagulation Flowsheet and Dosing Calendar (Go to Encounters tab in chart review, and find the Anticoagulation Therapy Visit)        Anastasiia Pham RN

## 2017-04-28 ENCOUNTER — TELEPHONE (OUTPATIENT)
Dept: FAMILY MEDICINE | Facility: CLINIC | Age: 82
End: 2017-04-28

## 2017-04-28 DIAGNOSIS — I10 HYPERTENSION GOAL BP (BLOOD PRESSURE) < 140/90: ICD-10-CM

## 2017-04-28 DIAGNOSIS — E78.5 HYPERLIPIDEMIA LDL GOAL <100: ICD-10-CM

## 2017-04-28 DIAGNOSIS — H91.13 PRESBYACUSIS, BILATERAL: Primary | ICD-10-CM

## 2017-04-28 NOTE — TELEPHONE ENCOUNTER
I completed a referral to [facility] [place].  I sent him to the [place] office.  I discussed this with the patient.

## 2017-04-28 NOTE — TELEPHONE ENCOUNTER
lovastatin (MEVACOR) 40 MG tablet   Last Written Prescription Date: 2/28/17  Last Fill Quantity: 180, # refills: 3  Last Office Visit with [facility] or [facility] prescribing provider: 2/8/17       Lab Results   Component Value Date    CHOL 106 02/07/2017     Lab Results   Component Value Date    HDL 26 02/07/2017     Lab Results   Component Value Date    LDL 61 02/07/2017     Lab Results   Component Value Date    TRIG 94 02/07/2017     Lab Results   Component Value Date    CHOLHDLRATIO 3.5 01/07/2015

## 2017-04-28 NOTE — TELEPHONE ENCOUNTER
Reason for Call:  Medication or medication refill:  Do you use a [facility]?  Name of the pharmacy and phone number for the current request:  [facility] [address] [place] [address]  Name of the medication requested: lovastatin (MEVACOR) 40 MG tablet  Other request: none  Can we leave a detailed message on this number? YES  Phone number patient can be reached at: Home number on file [phone] (home)  Best Time: any  Call taken on 4/28/2017 at 8:27 AM by ADOLPH MURILLO

## 2017-05-01 RX ORDER — LOVASTATIN 40 MG
80 TABLET ORAL AT BEDTIME
Qty: 180 TABLET | Refills: 2 | Status: SHIPPED | OUTPATIENT
Start: 2017-05-01 | End: 2017-12-01

## 2017-05-01 RX ORDER — POTASSIUM CHLORIDE 1500 MG/1
TABLET, EXTENDED RELEASE ORAL
Qty: 90 TABLET | Refills: 2 | Status: SHIPPED | OUTPATIENT
Start: 2017-05-01 | End: 2017-12-05

## 2017-05-11 ENCOUNTER — TRANSFERRED RECORDS (OUTPATIENT)
Dept: HEALTH INFORMATION MANAGEMENT | Facility: CLINIC | Age: 82
End: 2017-05-11

## 2017-05-16 DIAGNOSIS — I10 HYPERTENSION GOAL BP (BLOOD PRESSURE) < 140/90: ICD-10-CM

## 2017-05-16 NOTE — TELEPHONE ENCOUNTER
Furosemide 20 mg      Last Written Prescription Date: 11/8/16  Last Fill Quantity: 90, # refills: 1  Last Office Visit with [facility], [facility] or [facility] prescribing provider: 2/8/17  Next 5 appointments (look out 90 days)     Aug 07, 2017  9:30 AM CDT   SHORT with Zac Salgado MD   [facility] ([facility]    [address] [place] [id number]   [phone]                   Potassium   Date Value Ref Range Status   02/07/2017 3.9 3.4 - 5.3 mmol/L Final     Creatinine   Date Value Ref Range Status   02/07/2017 1.02 0.66 - 1.25 mg/dL Final     BP Readings from Last 3 Encounters:   02/08/17 132/62   02/07/17 138/80   01/18/17 120/70

## 2017-05-17 RX ORDER — FUROSEMIDE 20 MG
TABLET ORAL
Qty: 90 TABLET | Refills: 2 | Status: SHIPPED | OUTPATIENT
Start: 2017-05-17 | End: 2017-11-16

## 2017-05-25 ENCOUNTER — ANTICOAGULATION THERAPY VISIT (OUTPATIENT)
Dept: NURSING | Facility: CLINIC | Age: 82
End: 2017-05-25
Payer: COMMERCIAL

## 2017-05-25 DIAGNOSIS — Z79.01 LONG-TERM (CURRENT) USE OF ANTICOAGULANTS: ICD-10-CM

## 2017-05-25 DIAGNOSIS — I48.20 CHRONIC ATRIAL FIBRILLATION (H): ICD-10-CM

## 2017-05-25 LAB — INR POINT OF CARE: 2.6 (ref 2–3)

## 2017-05-25 PROCEDURE — 99207 ZZC NO CHARGE NURSE ONLY: CPT

## 2017-05-25 PROCEDURE — 85610 PROTHROMBIN TIME: CPT | Mod: QW

## 2017-05-25 PROCEDURE — 36416 COLLJ CAPILLARY BLOOD SPEC: CPT

## 2017-05-25 NOTE — PROGRESS NOTES
ANTICOAGULATION FOLLOW-UP CLINIC VISIT    Patient Name:  Alessio Teixeira  Date:  5/25/2017  Contact Type:  Face to Face    SUBJECTIVE:     Patient Findings     Positives No Problem Findings           OBJECTIVE    INR Protime   Date Value Ref Range Status   05/25/2017 2.6 2.0 - 3.0 Final       ASSESSMENT / PLAN  INR assessment THER    Recheck INR In: 6 WEEKS    INR Location Clinic      Anticoagulation Summary as of 5/25/2017     INR goal 2.0-3.0   Today's INR 2.6   Maintenance plan 7 mg (2 mg x 3.5) every day   Full instructions 7 mg every day   Weekly total 49 mg   No change documented Anastasiia Pham, RN   Plan last modified Kerrie Morgan RN (6/3/2016)   Next INR check 7/12/2017   Target end date Indefinite    Indications   Chronic atrial fibrillation (H) [I48.2]  Long-term (current) use of anticoagulants [Z79.01] [Z79.01]         Anticoagulation Episode Summary     INR check location [facility]    Preferred lab     Send INR reminders to [facility] INR/PROTIME    Comments       Anticoagulation Care Providers     Provider Role Specialty Phone number    Zac Salgado MD [facility] [phone]            See the Encounter Report to view Anticoagulation Flowsheet and Dosing Calendar (Go to Encounters tab in chart review, and find the Anticoagulation Therapy Visit)        Anastasiia Pham RN

## 2017-05-25 NOTE — MR AVS SNAPSHOT
Alessio Teixeira   5/25/2017 1:00 PM   Anticoagulation Therapy Visit    Description:  81 year old male   Provider:   ANTICOAGULATION CLINIC   Department:  Bx Nurse           INR as of 5/25/2017     Today's INR 2.6      Anticoagulation Summary as of 5/25/2017     INR goal 2.0-3.0   Today's INR 2.6   Full instructions 7 mg every day   Next INR check 7/12/2017    Indications   Chronic atrial fibrillation (H) [I48.2]  Long-term (current) use of anticoagulants [Z79.01] [Z79.01]         Your next Anticoagulation Clinic appointment(s)     May 25, 2017  1:00 PM CDT   Anticoagulation Visit with  ANTICOAGULATION CLINIC   [facility] ([facility]    [address] [place] [id number] [phone]            Jul 12, 2017  9:45 AM CDT   Anticoagulation Visit with  ANTICOAGULATION CLINIC   [facility] ([facility])    [address] [place] [id number] [phone]              Contact Numbers     [facility]  Please call  [phone] to cancel and/or reschedule your appointment   The direct line to the anticoagulant nurse is [phone] on Monday, Wednesday, and Friday. On Thursday, the anticoagulant nurse can be reached directly at [phone].         May 2017 Details    Sun Mon Tue Wed Thu Fri Sat      1               2               3               4               5               6                 7               8               9               10               11               12               13                 14               15               16               17               18               19               20                 21               22               23               24               25      7 mg   See details      26      7 mg         27      7 mg           28      7 mg         29      7 mg         30      7  mg         31      7 mg             Date Details   05/25 This INR check               How to take your warfarin dose     To take:  7 mg Take 3.5 of the 2 mg tablets.           June 2017 Details    Sun Mon Tue Wed Thu Fri Sat         1      7 mg         2      7 mg         3      7 mg           4      7 mg         5      7 mg         6      7 mg         7      7 mg         8      7 mg         9      7 mg         10      7 mg           11      7 mg         12      7 mg         13      7 mg         14      7 mg         15      7 mg         16      7 mg         17      7 mg           18      7 mg         19      7 mg         20      7 mg         21      7 mg         22      7 mg         23      7 mg         24      7 mg           25      7 mg         26      7 mg         27      7 mg         28      7 mg         29      7 mg         30      7 mg           Date Details   No additional details            How to take your warfarin dose     To take:  7 mg Take 3.5 of the 2 mg tablets.           July 2017 Details    Sun Mon Tue Wed Thu Fri Sat           1      7 mg           2      7 mg         3      7 mg         4      7 mg         5      7 mg         6      7 mg         7      7 mg         8      7 mg           9      7 mg         10      7 mg         11      7 mg         12            13               14               15                 16               17               18               19               20               21               22                 23               24               25               26               27               28               29                 30               31                     Date Details   No additional details    Date of next INR:  7/12/2017         How to take your warfarin dose     To take:  7 mg Take 3.5 of the 2 mg tablets.

## 2017-07-12 ENCOUNTER — ANTICOAGULATION THERAPY VISIT (OUTPATIENT)
Dept: NURSING | Facility: CLINIC | Age: 82
End: 2017-07-12
Payer: COMMERCIAL

## 2017-07-12 DIAGNOSIS — Z79.01 LONG-TERM (CURRENT) USE OF ANTICOAGULANTS: ICD-10-CM

## 2017-07-12 DIAGNOSIS — I48.20 CHRONIC ATRIAL FIBRILLATION (H): ICD-10-CM

## 2017-07-12 LAB — INR POINT OF CARE: 3.1 (ref 2–3)

## 2017-07-12 PROCEDURE — 99207 ZZC NO CHARGE NURSE ONLY: CPT

## 2017-07-12 PROCEDURE — 36416 COLLJ CAPILLARY BLOOD SPEC: CPT

## 2017-07-12 PROCEDURE — 85610 PROTHROMBIN TIME: CPT | Mod: QW

## 2017-07-12 NOTE — MR AVS SNAPSHOT
Alessio Teixeira   7/12/2017 9:45 AM   Anticoagulation Therapy Visit    Description:  81 year old male   Provider:   ANTICOAGULATION CLINIC   Department:  Bx Nurse           INR as of 7/12/2017     Today's INR 3.1!      Anticoagulation Summary as of 7/12/2017     INR goal 2.0-3.0   Today's INR 3.1!   Full instructions 7 mg every day   Next INR check 9/7/2017    Indications   Chronic atrial fibrillation (H) [I48.2]  Long-term (current) use of anticoagulants [Z79.01] [Z79.01]         Your next Anticoagulation Clinic appointment(s)     Sep 07, 2017  1:00 PM CDT   Anticoagulation Visit with  ANTICOAGULATION CLINIC   [facility]    [address] [place] [id number] [phone]              Contact Numbers     [facility]  Please call  [phone] to cancel and/or reschedule your appointment   The direct line to the anticoagulant nurse is [phone] on Monday, Wednesday, and Friday. On Thursday, the anticoagulant nurse can be reached directly at [phone].         July 2017 Details    Sun Mon Tue Wed Thu Fri Sat           1                 2               3               4               5               6               7               8                 9               10               11               12      7 mg   See details      13      7 mg         14      7 mg         15      7 mg           16      7 mg         17      7 mg         18      7 mg         19      7 mg         20      7 mg         21      7 mg         22      7 mg           23      7 mg         24      7 mg         25      7 mg         26      7 mg         27      7 mg         28      7 mg         29      7 mg           30      7 mg         31      7 mg               Date Details   07/12 This INR check               How to take your warfarin dose     To take:  7 mg Take 3.5 of the 2 mg tablets.           August 2017 Details     Sun Mon Tue Wed Thu Fri Sat       1      7 mg         2      7 mg         3      7 mg         4      7 mg         5      7 mg           6      7 mg         7      7 mg         8      7 mg         9      7 mg         10      7 mg         11      7 mg         12      7 mg           13      7 mg         14      7 mg         15      7 mg         16      7 mg         17      7 mg         18      7 mg         19      7 mg           20      7 mg         21      7 mg         22      7 mg         23      7 mg         24      7 mg         25      7 mg         26      7 mg           27      7 mg         28      7 mg         29      7 mg         30      7 mg         31      7 mg            Date Details   No additional details            How to take your warfarin dose     To take:  7 mg Take 3.5 of the 2 mg tablets.           September 2017 Details    Sun Mon Tue Wed Thu Fri Sat          1      7 mg         2      7 mg           3      7 mg         4      7 mg         5      7 mg         6      7 mg         7            8               9                 10               11               12               13               14               15               16                 17               18               19               20               21               22               23                 24               25               26               27               28               29               30                Date Details   No additional details    Date of next INR:  9/7/2017         How to take your warfarin dose     To take:  7 mg Take 3.5 of the 2 mg tablets.

## 2017-07-12 NOTE — PROGRESS NOTES
ANTICOAGULATION FOLLOW-UP CLINIC VISIT    Patient Name:  Alessio Teixeira  Date:  7/12/2017  Contact Type:  Face to Face    SUBJECTIVE:     Patient Findings     Positives No Problem Findings           OBJECTIVE    INR Protime   Date Value Ref Range Status   07/12/2017 3.1 (A) 2.0 - 3.0 Final       ASSESSMENT / PLAN  INR assessment THER    Recheck INR In: 6 WEEKS    INR Location Clinic      Anticoagulation Summary as of 7/12/2017     INR goal 2.0-3.0   Today's INR 3.1!   Maintenance plan 7 mg (2 mg x 3.5) every day   Full instructions 7 mg every day   Weekly total 49 mg   No change documented Anastasiia Pham, RN   Plan last modified Kerrie Morgan RN (6/3/2016)   Next INR check 9/7/2017   Target end date Indefinite    Indications   Chronic atrial fibrillation (H) [I48.2]  Long-term (current) use of anticoagulants [Z79.01] [Z79.01]         Anticoagulation Episode Summary     INR check location [facility]    Preferred lab     Send INR reminders to [facility] INR/PROTIME    Comments       Anticoagulation Care Providers     Provider Role Specialty Phone number    Zac Salgado MD [facility] [phone]            See the Encounter Report to view Anticoagulation Flowsheet and Dosing Calendar (Go to Encounters tab in chart review, and find the Anticoagulation Therapy Visit)        Anastasiia Pham, RN

## 2017-08-07 ENCOUNTER — OFFICE VISIT (OUTPATIENT)
Dept: FAMILY MEDICINE | Facility: CLINIC | Age: 82
End: 2017-08-07
Payer: COMMERCIAL

## 2017-08-07 VITALS
TEMPERATURE: 97 F | DIASTOLIC BLOOD PRESSURE: 70 MMHG | RESPIRATION RATE: 16 BRPM | HEART RATE: 60 BPM | BODY MASS INDEX: 35.7 KG/M2 | WEIGHT: 256 LBS | SYSTOLIC BLOOD PRESSURE: 126 MMHG

## 2017-08-07 DIAGNOSIS — R97.20 ELEVATED PROSTATE SPECIFIC ANTIGEN (PSA): ICD-10-CM

## 2017-08-07 DIAGNOSIS — E78.2 MIXED HYPERLIPIDEMIA: ICD-10-CM

## 2017-08-07 DIAGNOSIS — I10 HYPERTENSION GOAL BP (BLOOD PRESSURE) < 140/90: Primary | ICD-10-CM

## 2017-08-07 DIAGNOSIS — R60.0 EDEMA OF BOTH LEGS: ICD-10-CM

## 2017-08-07 LAB
ANION GAP SERPL CALCULATED.3IONS-SCNC: 8 MMOL/L (ref 3–14)
BUN SERPL-MCNC: 18 MG/DL (ref 7–30)
CALCIUM SERPL-MCNC: 8.8 MG/DL (ref 8.5–10.1)
CHLORIDE SERPL-SCNC: 105 MMOL/L (ref 94–109)
CO2 SERPL-SCNC: 29 MMOL/L (ref 20–32)
CREAT SERPL-MCNC: 1.02 MG/DL (ref 0.66–1.25)
GFR SERPL CREATININE-BSD FRML MDRD: 70 ML/MIN/1.7M2
GLUCOSE SERPL-MCNC: 90 MG/DL (ref 70–99)
POTASSIUM SERPL-SCNC: 3.8 MMOL/L (ref 3.4–5.3)
SODIUM SERPL-SCNC: 142 MMOL/L (ref 133–144)

## 2017-08-07 PROCEDURE — 80048 BASIC METABOLIC PNL TOTAL CA: CPT | Performed by: FAMILY MEDICINE

## 2017-08-07 PROCEDURE — 99214 OFFICE O/P EST MOD 30 MIN: CPT | Performed by: FAMILY MEDICINE

## 2017-08-07 PROCEDURE — 36415 COLL VENOUS BLD VENIPUNCTURE: CPT | Performed by: FAMILY MEDICINE

## 2017-08-07 NOTE — NURSING NOTE
"Chief Complaint   Patient presents with     Lipids     Hypertension       Initial /70  Pulse 60  Temp 97  F (36.1  C) (Tympanic)  Resp 16  Wt 256 lb (116.1 kg)  BMI 35.7 kg/m2 Estimated body mass index is 35.7 kg/(m^2) as calculated from the following:    Height as of 2/8/17: 5' 11\" (1.803 m).    Weight as of this encounter: 256 lb (116.1 kg).  Medication Reconciliation: complete     Kirsty Mondragon CMA      "

## 2017-08-07 NOTE — PROGRESS NOTES
SUBJECTIVE:                                                    Alessio Teixeira is a 81 year old male who presents to clinic today for the following health issues:      Hyperlipidemia Follow-Up      Rate your low fat/cholesterol diet?: good    Taking statin?  Yes, no muscle aches from statin    Other lipid medications/supplements?:  none    Hypertension Follow-up      Outpatient blood pressures are not being checked.    Low Salt Diet: no added salt          Amount of exercise or physical activity: None    Problems taking medications regularly: No    Medication side effects: none  Diet: regular (no restrictions)      Elevated PSA      Duration: 6 months    Description (location/character/radiation): N/A    Intensity:  mild    Accompanying signs and symptoms: none    History (similar episodes/previous evaluation): None    Precipitating or alleviating factors: None    Therapies tried and outcome: None         Problem list and histories reviewed & adjusted, as indicated.  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 by Miguel Johnson MD     Aftercare following right hip joint replacement surgery     Bradycardia     History of pulmonary embolism- bilateral in 2007      Atrial fibrillation (H)     Anemia due to blood loss, acute     Edema of lower extremity, unspecified laterality     Chronic left-sided thoracic back pain     Presbyacusis, bilateral     Mixed  hyperlipidemia     Past Surgical History:   Procedure Laterality Date     APPENDECTOMY       ARTHROPLASTY HIP ANTERIOR Right 4/26/2016    Procedure: ARTHROPLASTY HIP ANTERIOR;  Surgeon: Miguel Johnson MD;  Location:  OR     BRAIN SURGERY  2007    partial resection meningioma     C RAD RESEC TONSIL/PILLARS       CATARACT IOL, RT/LT       COLECTOMY  2007    bowel perforation due to steroids     GENITOURINARY SURGERY      vasectomy     HEAD & NECK SURGERY  2007    meningioma removed     KNEE SURGERY  2010    left knee replacement     ORTHOPEDIC SURGERY      left TKR     PHACOEMULSIFICATION CLEAR CORNEA WITH TORIC INTRAOCULAR LENS IMPLANT  7/30/2012    Procedure: PHACOEMULSIFICATION CLEAR CORNEA WITH TORIC INTRAOCULAR LENS IMPLANT;  COMPLEX RIGHT PHACOEMULSIFICATION CLEAR CORNEA WITH TORIC INTRAOCULAR LENS IMPLANT WITH MALYUGIN RING;  Surgeon: Ronald Cortez MD;  Location:  [facility]     RECTAL SURGERY  1985       Social History   Substance Use Topics     Smoking status: Former Smoker     Packs/day: 1.00     Years: 3.00     Types: Cigarettes     Quit date: 1/1/1963     Smokeless tobacco: Never Used     Alcohol use Yes      Comment: 2-4 glasses of wine per week     Family History   Problem Relation Age of Onset     Glaucoma Father      Macular Degeneration No family hx of              Reviewed and updated as needed this visit by clinical staffTobacco  Allergies  Meds  Med Hx  Surg Hx  Fam Hx  Soc Hx      Reviewed and updated as needed this visit by Provider         ROS:  Constitutional, HEENT, cardiovascular, pulmonary, gi and gu systems are negative, except as otherwise noted.  CONSTITUTIONAL:NEGATIVE for fever, chills, change in weight  RESP:NEGATIVE for significant cough or SOB  CV: NEGATIVE for chest pain, palpitations or peripheral edema  : negative for dysuria, hematuria, decreased urinary stream, erectile dysfunction      OBJECTIVE:                                                    /70  Pulse  60  Temp 97  F (36.1  C) (Tympanic)  Resp 16  Wt 256 lb (116.1 kg)  BMI 35.7 kg/m2  Body mass index is 35.7 kg/(m^2).  GENERAL APPEARANCE: healthy, alert and no distress  CV: no edema today         ASSESSMENT/PLAN:                                                        ICD-10-CM    1. Hypertension goal BP (blood pressure) < 140/90 I10 Basic metabolic panel     CBC with platelets   2. Mixed hyperlipidemia E78.2 AST     ALT     Lipid panel reflex to direct LDL   3. Edema of both legs R60.0    4. Elevated prostate specific antigen (PSA) R97.20 Prostate spec antigen screen       Patient Instructions   We'll follow-up his abnormal lab tests today.  Tests as noted.  Follow-up will be set up pending review of those lab tests.  Patient is otherwise feeling well at present.  Medications are as noted.  No refills are needed today.      Zac Salgado MD  [facility]

## 2017-08-07 NOTE — MR AVS SNAPSHOT
After Visit Summary   8/7/2017    Alessio Teixeira    MRN: [id number]           Patient Information     Date Of Birth          1935        Visit Information        Provider Department      8/7/2017 9:30 AM Zac Salgado MD [facility]        Today's Diagnoses     Hypertension goal BP (blood pressure) < 140/90    -  1    Mixed hyperlipidemia        Edema of both legs        Elevated prostate specific antigen (PSA)          Care Instructions    We'll follow-up his abnormal lab tests today.  Tests as noted.  Follow-up will be set up pending review of those lab tests.  Patient is otherwise feeling well at present.  Medications are as noted.  No refills are needed today.          Follow-ups after your visit        Your next 10 appointments already scheduled     Sep 07, 2017  1:00 PM CDT   Anticoagulation Visit with [facility]   [facility] ([facility])    [address] [place] [id number] [phone]              Future tests that were ordered for you today     Open Future Orders        Priority Expected Expires Ordered    AST Routine  11/5/2017 8/7/2017    ALT Routine  11/5/2017 8/7/2017    Lipid panel reflex to direct LDL Routine  11/5/2017 8/7/2017    Prostate spec antigen screen Routine  11/5/2017 8/7/2017    CBC with platelets Routine  11/5/2017 8/7/2017            Who to contact     If you have questions or need follow up information about today's clinic visit or your schedule please contact [facility] directly at [phone].  Normal or non-critical lab and imaging results will be communicated to you by MyChart, letter or phone within 4 business days after the clinic has received the results. If you do not hear from us within 7 days, please contact the clinic through MyChart or phone. If you have a critical or abnormal  lab result, we will notify you by phone as soon as possible.  Submit refill requests through [facility] or call your pharmacy and they will forward the refill request to us. Please allow 3 business days for your refill to be completed.          Additional Information About Your Visit        [facility] Information     [facility] gives you secure access to your electronic health record. If you see a primary care provider, you can also send messages to your care team and make appointments. If you have questions, please call your primary care clinic.  If you do not have a primary care provider, please call [phone] and they will assist you.        Care EveryWhere ID     This is your Care EveryWhere ID. This could be used by other organizations to access your [facility] medical records  [id number]        Your Vitals Were     Pulse Temperature Respirations BMI (Body Mass Index)          60 97  F (36.1  C) (Tympanic) 16 35.7 kg/m2         Blood Pressure from Last 3 Encounters:   08/07/17 126/70   02/08/17 132/62   02/07/17 138/80    Weight from Last 3 Encounters:   08/07/17 256 lb (116.1 kg)   02/08/17 255 lb 8 oz (115.9 kg)   02/07/17 254 lb (115.2 kg)              We Performed the Following     Basic metabolic panel        Primary Care Provider Office Phone # Fax #    Zac Salgado [phone]       [facility] [address]  [facility] [address]        Equal Access to Services     ORLIN HYLTON : Hadii aad ku brisao Sorick, waaxda luqadaha, qaybta kaalmada adeegyada, anabella willoughby. So [facility] [phone].    ATENCIÓN: Si habla español, tiene a mathew disposición servicios gratuitos de asistencia lingüística. Llame al [phone].    We comply with applicable federal civil rights laws and [place] laws. We do not discriminate on the basis of race, color, national origin, age, disability sex, sexual orientation or gender identity.            Thank you!      Thank you for choosing [facility]  for your care. Our goal is always to provide you with excellent care. Hearing back from our patients is one way we can continue to improve our services. Please take a few minutes to complete the written survey that you may receive in the mail after your visit with us. Thank you!             Your Updated Medication List - Protect others around you: Learn how to safely use, store and throw away your medicines at www.disposemymeds.org.          This list is accurate as of: 8/7/17  9:55 AM.  Always use your most recent med list.                   Brand Name Dispense Instructions for use Diagnosis    CALCIUM PO      Take 600 mg by mouth 2 times daily        furosemide 20 MG tablet    LASIX    90 tablet    TAKE ONE TABLET BY MOUTH ONE TIME DAILY    Hypertension goal BP (blood pressure) < 140/90       lovastatin 40 MG tablet    MEVACOR    180 tablet    Take 2 tablets (80 mg) by mouth At Bedtime    Hyperlipidemia LDL goal <100       multivitamin, therapeutic with minerals Tabs tablet      Take 1 tablet by mouth daily        NIACIN CR PO      Take 500 mg by mouth 2 times daily (with meals) 2 x 500 mg slow niacin        order for DME     1 Device    CPAP supplies with variable pressure control    MARTHA (obstructive sleep apnea)       potassium chloride SA 20 MEQ CR tablet    K-DUR/KLOR-CON M    90 tablet    TAKE ONE TABLET BY MOUTH ONE TIME DAILY    Hypertension goal BP (blood pressure) < 140/90       VITAMIN D (CHOLECALCIFEROL) PO      Take 1,000 Units by mouth daily.        warfarin 2 MG tablet    COUMADIN    360 tablet    TAKE 3.5 TABLETS (7 MG) BY MOUTH DAILY    History of pulmonary embolism, Paroxysmal atrial fibrillation (H)

## 2017-08-07 NOTE — PATIENT INSTRUCTIONS
We'll follow-up his abnormal lab tests today.  Tests as noted.  Follow-up will be set up pending review of those lab tests.  Patient is otherwise feeling well at present.  Medications are as noted.  No refills are needed today.

## 2017-08-07 NOTE — LETTER
[facility]  [address]  [place] [address]  [phone]        November 17, 2017    Alessio Teixeira  [address]  [place]  [place] [address]              Dear Alessio Teixeira    This is to remind you that you have multiple labs due.    You may call our office at [phone] to schedule a lab appointment or please do when you come in for your next INR.    Please disregard this notice if you have already had your labs drawn or made an appointment.        Sincerely,        Zac Salgado MD

## 2017-08-10 NOTE — PROGRESS NOTES
Dear Alessio,    Your tests were all normal. A copy of your tests are available in My Chart.    Glad to see you at your appointment.  If you have any questions feel free to call.      Sincerely,      BECKY Mcelroy.

## 2017-09-07 ENCOUNTER — ANTICOAGULATION THERAPY VISIT (OUTPATIENT)
Dept: NURSING | Facility: CLINIC | Age: 82
End: 2017-09-07
Payer: COMMERCIAL

## 2017-09-07 DIAGNOSIS — Z79.01 LONG-TERM (CURRENT) USE OF ANTICOAGULANTS: ICD-10-CM

## 2017-09-07 DIAGNOSIS — I48.20 CHRONIC ATRIAL FIBRILLATION (H): ICD-10-CM

## 2017-09-07 LAB — INR POINT OF CARE: 4 (ref 2–3)

## 2017-09-07 PROCEDURE — 85610 PROTHROMBIN TIME: CPT | Mod: QW

## 2017-09-07 PROCEDURE — 99207 ZZC NO CHARGE NURSE ONLY: CPT

## 2017-09-07 PROCEDURE — 36416 COLLJ CAPILLARY BLOOD SPEC: CPT

## 2017-09-07 NOTE — MR AVS SNAPSHOT
Alessio Teixeira   9/7/2017 9:45 AM   Anticoagulation Therapy Visit    Description:  81 year old male   Provider:  [facility]   Department:  Bx Nurse           INR as of 9/7/2017     Today's INR 4.0!      Anticoagulation Summary as of 9/7/2017     INR goal 2.0-3.0   Today's INR 4.0!   Full instructions 9/7: Hold; Otherwise 7 mg every day   Next INR check 10/12/2017    Indications   Chronic atrial fibrillation (H) [I48.2]  Long-term (current) use of anticoagulants [Z79.01] [Z79.01]         Your next Anticoagulation Clinic appointment(s)     Oct 12, 2017  1:00 PM CDT   Anticoagulation Visit with  ANTICOAGULATION CLINIC   [facility] ([facility]    [address] [place] [id number] [phone]              Contact Numbers     [facility]  Please call  [phone] to cancel and/or reschedule your appointment   The direct line to the anticoagulant nurse is [phone] on Monday, Wednesday, and Friday. On Thursday, the anticoagulant nurse can be reached directly at [phone].         September 2017 Details    Sun Mon Tue Wed Thu Fri Sat          1               2                 3               4               5               6               7      Hold   See details      8      7 mg         9      7 mg           10      7 mg         11      7 mg         12      7 mg         13      7 mg         14      7 mg         15      7 mg         16      7 mg           17      7 mg         18      7 mg         19      7 mg         20      7 mg         21      7 mg         22      7 mg         23      7 mg           24      7 mg         25      7 mg         26      7 mg         27      7 mg         28      7 mg         29      7 mg         30      7 mg          Date Details   09/07 This INR check               How to take your warfarin dose     To take:  7 mg Take 3.5 of the 2 mg tablets.    Hold Do not  take your warfarin dose. See the Details table to the right for additional instructions.                October 2017 Details    Sun Mon Tue Wed Thu Fri Sat     1      7 mg         2      7 mg         3      7 mg         4      7 mg         5      7 mg         6      7 mg         7      7 mg           8      7 mg         9      7 mg         10      7 mg         11      7 mg         12            13               14                 15               16               17               18               19               20               21                 22               23               24               25               26               27               28                 29               30               31                    Date Details   No additional details    Date of next INR:  10/12/2017         How to take your warfarin dose     To take:  7 mg Take 3.5 of the 2 mg tablets.

## 2017-09-07 NOTE — PROGRESS NOTES
ANTICOAGULATION FOLLOW-UP CLINIC VISIT    Patient Name:  Alessio Teixeira  Date:  9/7/2017  Contact Type:  Face to Face    SUBJECTIVE:     Patient Findings     Positives Change in diet/appetite    Comments Advised to recheck in 2 wks patient totally against that will come back in 6 wks.Advised to come in sooner if noted increased bruising,dizziness,lightheadedness.           OBJECTIVE    INR Protime   Date Value Ref Range Status   09/07/2017 4.0 (A) 2.0 - 3.0 Final       ASSESSMENT / PLAN  INR assessment SUPRA    Recheck INR In: 6 WEEKS    INR Location Clinic      Anticoagulation Summary as of 9/7/2017     INR goal 2.0-3.0   Today's INR 4.0!   Maintenance plan 7 mg (2 mg x 3.5) every day   Full instructions 9/7: Hold; Otherwise 7 mg every day   Weekly total 49 mg   Plan last modified Kerrie Morgan RN (6/3/2016)   Next INR check 10/12/2017   Target end date Indefinite    Indications   Chronic atrial fibrillation (H) [I48.2]  Long-term (current) use of anticoagulants [Z79.01] [Z79.01]         Anticoagulation Episode Summary     INR check location [facility]    Preferred lab     Send INR reminders to [facility] INR/PROTIME    Comments       Anticoagulation Care Providers     Provider Role Specialty Phone number    Zac Salgado MD [facility] [phone]            See the Encounter Report to view Anticoagulation Flowsheet and Dosing Calendar (Go to Encounters tab in chart review, and find the Anticoagulation Therapy Visit)        Anastasiia Pham RN

## 2017-09-14 DIAGNOSIS — Z86.711 HISTORY OF PULMONARY EMBOLISM: ICD-10-CM

## 2017-09-14 DIAGNOSIS — I48.0 PAROXYSMAL ATRIAL FIBRILLATION (H): ICD-10-CM

## 2017-09-14 RX ORDER — WARFARIN SODIUM 2 MG/1
TABLET ORAL
Qty: 360 TABLET | Refills: 1 | Status: SHIPPED | OUTPATIENT
Start: 2017-09-14 | End: 2018-04-06

## 2017-09-14 NOTE — TELEPHONE ENCOUNTER
Warfarin 2 mg    Last Written Prescription Date: 2/28/17  Last Fill Qty: 360, # refills: 1  Last Office Visit with G, P or [facility] prescribing provider: 8/7/17       Date and Result of Last PT/INR:   Lab Results   Component Value Date    INR 4.0 09/07/2017    INR 3.1 07/12/2017    INR 1.89 05/06/2016    INR 1.74 05/02/2016    PT 18.7 08/24/2012    PT 20.1 07/25/2012

## 2017-09-28 ENCOUNTER — TRANSFERRED RECORDS (OUTPATIENT)
Dept: HEALTH INFORMATION MANAGEMENT | Facility: CLINIC | Age: 82
End: 2017-09-28

## 2017-10-12 ENCOUNTER — ANTICOAGULATION THERAPY VISIT (OUTPATIENT)
Dept: NURSING | Facility: CLINIC | Age: 82
End: 2017-10-12
Payer: COMMERCIAL

## 2017-10-12 DIAGNOSIS — Z79.01 LONG-TERM (CURRENT) USE OF ANTICOAGULANTS: ICD-10-CM

## 2017-10-12 DIAGNOSIS — I48.20 CHRONIC ATRIAL FIBRILLATION (H): ICD-10-CM

## 2017-10-12 LAB — INR POINT OF CARE: 3.4 (ref 2–3)

## 2017-10-12 PROCEDURE — 36416 COLLJ CAPILLARY BLOOD SPEC: CPT

## 2017-10-12 PROCEDURE — 99207 ZZC NO CHARGE NURSE ONLY: CPT

## 2017-10-12 PROCEDURE — 85610 PROTHROMBIN TIME: CPT | Mod: QW

## 2017-10-12 NOTE — PROGRESS NOTES
ANTICOAGULATION FOLLOW-UP CLINIC VISIT    Patient Name:  Alessio Teixeira  Date:  10/12/2017  Contact Type:  Face to Face    SUBJECTIVE:     Patient Findings     Positives No Problem Findings           OBJECTIVE    INR Protime   Date Value Ref Range Status   10/12/2017 3.4 (A) 2.0 - 3.0 Final       ASSESSMENT / PLAN  INR assessment SUPRA    Recheck INR In: 5 WEEKS    INR Location Clinic      Anticoagulation Summary as of 10/12/2017     INR goal 2.0-3.0   Today's INR 3.4!   Maintenance plan 7 mg (2 mg x 3.5) every day   Full instructions 10/12: 5 mg; Otherwise 7 mg every day   Weekly total 49 mg   Plan last modified Kerrie Morgan RN (6/3/2016)   Next INR check 11/16/2017   Target end date Indefinite    Indications   Chronic atrial fibrillation (H) [I48.2]  Long-term (current) use of anticoagulants [Z79.01] [Z79.01]         Anticoagulation Episode Summary     INR check location [facility]    Preferred lab     Send INR reminders to [facility] INR/PROTIME    Comments       Anticoagulation Care Providers     Provider Role Specialty Phone number    Zac Salgado MD [facility] [phone]            See the Encounter Report to view Anticoagulation Flowsheet and Dosing Calendar (Go to Encounters tab in chart review, and find the Anticoagulation Therapy Visit)        Anastasiia Pham RN

## 2017-10-12 NOTE — MR AVS SNAPSHOT
Alessio Teixeira   10/12/2017 1:00 PM   Anticoagulation Therapy Visit    Description:  81 year old male   Provider:  [facility]   Department:  Bx Nurse           INR as of 10/12/2017     Today's INR 3.4!      Anticoagulation Summary as of 10/12/2017     INR goal 2.0-3.0   Today's INR 3.4!   Full instructions 10/12: 5 mg; Otherwise 7 mg every day   Next INR check 11/16/2017    Indications   Chronic atrial fibrillation (H) [I48.2]  Long-term (current) use of anticoagulants [Z79.01] [Z79.01]         Your next Anticoagulation Clinic appointment(s)     Oct 12, 2017  1:00 PM CDT   Anticoagulation Visit with  ANTICOAGULATION CLINIC   [facility]    [address] [place] [id number] [phone]            Nov 16, 2017  1:00 PM CST   Anticoagulation Visit with  ANTICOAGULATION CLINIC   [facility] ([facility])    [address] [place] [id number] [phone]              Contact Numbers     [facility]  Please call  [phone] to cancel and/or reschedule your appointment   The direct line to the anticoagulant nurse is [phone] on Monday, Wednesday, and Friday. On Thursday, the anticoagulant nurse can be reached directly at [phone].         October 2017 Details    Sun Mon Tue Wed Thu Fri Sat     1               2               3               4               5               6               7                 8               9               10               11               12      5 mg   See details      13      7 mg         14      7 mg           15      7 mg         16      7 mg         17      7 mg         18      7 mg         19      7 mg         20      7 mg         21      7 mg           22      7 mg         23      7 mg         24      7 mg         25      7 mg         26      7 mg          27      7 mg         28      7 mg           29      7 mg         30      7 mg         31      7 mg              Date Details   10/12 This INR check               How to take your warfarin dose     To take:  5 mg Take 2.5 of the 2 mg tablets.    To take:  7 mg Take 3.5 of the 2 mg tablets.           November 2017 Details    Sun Mon Tue Wed Thu Fri Sat        1      7 mg         2      7 mg         3      7 mg         4      7 mg           5      7 mg         6      7 mg         7      7 mg         8      7 mg         9      7 mg         10      7 mg         11      7 mg           12      7 mg         13      7 mg         14      7 mg         15      7 mg         16            17               18                 19               20               21               22               23               24               25                 26               27               28               29               30                  Date Details   No additional details    Date of next INR:  11/16/2017         How to take your warfarin dose     To take:  7 mg Take 3.5 of the 2 mg tablets.

## 2017-11-16 ENCOUNTER — ANTICOAGULATION THERAPY VISIT (OUTPATIENT)
Dept: NURSING | Facility: CLINIC | Age: 82
End: 2017-11-16
Payer: COMMERCIAL

## 2017-11-16 DIAGNOSIS — I48.20 CHRONIC ATRIAL FIBRILLATION (H): ICD-10-CM

## 2017-11-16 DIAGNOSIS — I10 HYPERTENSION GOAL BP (BLOOD PRESSURE) < 140/90: ICD-10-CM

## 2017-11-16 DIAGNOSIS — Z79.01 LONG-TERM (CURRENT) USE OF ANTICOAGULANTS: ICD-10-CM

## 2017-11-16 LAB — INR POINT OF CARE: 2.3 (ref 2–3)

## 2017-11-16 PROCEDURE — 36416 COLLJ CAPILLARY BLOOD SPEC: CPT

## 2017-11-16 PROCEDURE — 99207 ZZC NO CHARGE NURSE ONLY: CPT

## 2017-11-16 PROCEDURE — 85610 PROTHROMBIN TIME: CPT | Mod: QW

## 2017-11-16 RX ORDER — FUROSEMIDE 20 MG
20 TABLET ORAL DAILY
Qty: 90 TABLET | Refills: 2 | Status: SHIPPED | OUTPATIENT
Start: 2017-11-16 | End: 2018-08-15

## 2017-11-16 NOTE — PROGRESS NOTES
ANTICOAGULATION FOLLOW-UP CLINIC VISIT    Patient Name:  Alessio Teixeira  Date:  11/16/2017  Contact Type:  Face to Face    SUBJECTIVE:     Patient Findings     Positives No Problem Findings           OBJECTIVE    INR Protime   Date Value Ref Range Status   11/16/2017 2.3 2.0 - 3.0 Final       ASSESSMENT / PLAN  INR assessment THER    Recheck INR In: 6 WEEKS    INR Location Clinic      Anticoagulation Summary as of 11/16/2017     INR goal 2.0-3.0   Today's INR 2.3   Maintenance plan 7 mg (2 mg x 3.5) every day   Full instructions 7 mg every day   Weekly total 49 mg   No change documented Anastasiia Pham, RN   Plan last modified Kerrie Morgan RN (6/3/2016)   Next INR check 1/4/2018   Target end date Indefinite    Indications   Chronic atrial fibrillation (H) [I48.2]  Long-term (current) use of anticoagulants [Z79.01] [Z79.01]         Anticoagulation Episode Summary     INR check location [facility]    Preferred lab     Send INR reminders to [facility] INR/PROTIME    Comments       Anticoagulation Care Providers     Provider Role Specialty Phone number    Zac Salgado MD [facility] [phone]            See the Encounter Report to view Anticoagulation Flowsheet and Dosing Calendar (Go to Encounters tab in chart review, and find the Anticoagulation Therapy Visit)        Anastasiia Pham RN

## 2017-11-16 NOTE — MR AVS SNAPSHOT
Alessio Teixeira   11/16/2017 1:00 PM   Anticoagulation Therapy Visit    Description:  81 year old male   Provider:   ANTICOAGULATION CLINIC   Department:  Bx Nurse           INR as of 11/16/2017     Today's INR 2.3      Anticoagulation Summary as of 11/16/2017     INR goal 2.0-3.0   Today's INR 2.3   Full instructions 7 mg every day   Next INR check 1/4/2018    Indications   Chronic atrial fibrillation (H) [I48.2]  Long-term (current) use of anticoagulants [Z79.01] [Z79.01]         Your next Anticoagulation Clinic appointment(s)     Jan 04, 2018  1:00 PM CST   Anticoagulation Visit with  ANTICOAGULATION CLINIC   [facility] ([facility])    [address] [place] [address] [phone]              Contact Numbers     [facility]  Please call  [phone] to cancel and/or reschedule your appointment   The direct line to the anticoagulant nurse is [phone] on Monday, Wednesday, and Friday. On Thursday, the anticoagulant nurse can be reached directly at [phone].         November 2017 Details    Sun Mon Tue Wed Thu Fri Sat        1               2               3               4                 5               6               7               8               9               10               11                 12               13               14               15               16      7 mg   See details      17      7 mg         18      7 mg           19      7 mg         20      7 mg         21      7 mg         22      7 mg         23      7 mg         24      7 mg         25      7 mg           26      7 mg         27      7 mg         28      7 mg         29      7 mg         30      7 mg            Date Details   11/16 This INR check               How to take your warfarin dose     To take:  7 mg Take 3.5 of the 2 mg tablets.           December 2017 Details    Sun Mon Tue Wed Thu Fri Sat           1      7 mg         2      7 mg           3      7 mg         4      7 mg         5      7 mg         6      7 mg         7      7 mg         8      7 mg         9      7 mg           10      7 mg         11      7 mg         12      7 mg         13      7 mg         14      7 mg         15      7 mg         16      7 mg           17      7 mg         18      7 mg         19      7 mg         20      7 mg         21      7 mg         22      7 mg         23      7 mg           24      7 mg         25      7 mg         26      7 mg         27      7 mg         28      7 mg         29      7 mg         30      7 mg           31      7 mg                Date Details   No additional details            How to take your warfarin dose     To take:  7 mg Take 3.5 of the 2 mg tablets.           January 2018 Details    Sun Mon Tue Wed Thu Fri Sat      1      7 mg         2      7 mg         3      7 mg         4            5               6                 7               8               9               10               11               12               13                 14               15               16               17               18               19               20                 21               22               23               24               25               26               27                 28               29               30               31                   Date Details   No additional details    Date of next INR:  1/4/2018         How to take your warfarin dose     To take:  7 mg Take 3.5 of the 2 mg tablets.

## 2017-12-01 DIAGNOSIS — E78.5 HYPERLIPIDEMIA LDL GOAL <100: ICD-10-CM

## 2017-12-01 NOTE — TELEPHONE ENCOUNTER
Reason for Call:  Medication or medication refill:  Do you use a [facility]?  Name of the pharmacy and phone number for the current request:  [id number] - [place], [place] - [address]  Name of the medication requested: lovastatin (MEVACOR) 40 MG tablet  Other request: none  Can we leave a detailed message on this number? YES  Phone number patient can be reached at: Home number on file [phone] (home)  Best Time: any  Call taken on 12/1/2017 at 11:41 AM by ADOLPH MURILLO

## 2017-12-05 DIAGNOSIS — I10 HYPERTENSION GOAL BP (BLOOD PRESSURE) < 140/90: ICD-10-CM

## 2017-12-06 RX ORDER — POTASSIUM CHLORIDE 1500 MG/1
20 TABLET, EXTENDED RELEASE ORAL DAILY
Qty: 90 TABLET | Refills: 2 | Status: SHIPPED | OUTPATIENT
Start: 2017-12-06 | End: 2018-10-30

## 2017-12-06 RX ORDER — LOVASTATIN 40 MG
80 TABLET ORAL AT BEDTIME
Qty: 180 TABLET | Refills: 0 | Status: SHIPPED | OUTPATIENT
Start: 2017-12-06 | End: 2018-02-24

## 2017-12-06 NOTE — TELEPHONE ENCOUNTER
8-7-17 OV  Requested Prescriptions   Pending Prescriptions Disp Refills     lovastatin (MEVACOR) 40 MG tablet 180 tablet 2     Sig: Take 2 tablets (80 mg) by mouth At Bedtime    Statins Protocol Passed    12/1/2017 11:42 AM       Passed - LDL on file in past 12 months    Recent Labs   Lab Test  02/07/17   0953   LDL  61            Passed - No abnormal creatine kinase in past 12 months    No lab results found.         Passed - Recent or future visit with authorizing provider    Patient had office visit in the last year or has a visit in the next 30 days with authorizing provider.  See chart review.              Passed - Patient is age 18 or older        Prescription approved per [facility].

## 2018-01-05 ENCOUNTER — ANTICOAGULATION THERAPY VISIT (OUTPATIENT)
Dept: NURSING | Facility: CLINIC | Age: 83
End: 2018-01-05
Payer: COMMERCIAL

## 2018-01-05 DIAGNOSIS — I48.20 CHRONIC ATRIAL FIBRILLATION (H): ICD-10-CM

## 2018-01-05 DIAGNOSIS — E78.2 MIXED HYPERLIPIDEMIA: ICD-10-CM

## 2018-01-05 DIAGNOSIS — R97.20 ELEVATED PROSTATE SPECIFIC ANTIGEN (PSA): ICD-10-CM

## 2018-01-05 DIAGNOSIS — Z79.01 LONG-TERM (CURRENT) USE OF ANTICOAGULANTS: ICD-10-CM

## 2018-01-05 DIAGNOSIS — I10 HYPERTENSION GOAL BP (BLOOD PRESSURE) < 140/90: ICD-10-CM

## 2018-01-05 LAB
ALT SERPL W P-5'-P-CCNC: 27 U/L (ref 0–70)
AST SERPL W P-5'-P-CCNC: 27 U/L (ref 0–45)
CHOLEST SERPL-MCNC: 135 MG/DL
ERYTHROCYTE [DISTWIDTH] IN BLOOD BY AUTOMATED COUNT: 15.6 % (ref 10–15)
HCT VFR BLD AUTO: 47.9 % (ref 40–53)
HDLC SERPL-MCNC: 44 MG/DL
HGB BLD-MCNC: 15.7 G/DL (ref 13.3–17.7)
INR POINT OF CARE: 3.5 (ref 0.86–1.14)
LDLC SERPL CALC-MCNC: 69 MG/DL
MCH RBC QN AUTO: 30.5 PG (ref 26.5–33)
MCHC RBC AUTO-ENTMCNC: 32.8 G/DL (ref 31.5–36.5)
MCV RBC AUTO: 93 FL (ref 78–100)
NONHDLC SERPL-MCNC: 91 MG/DL
PLATELET # BLD AUTO: 253 10E9/L (ref 150–450)
PSA SERPL-ACNC: 4.67 UG/L (ref 0–4)
RBC # BLD AUTO: 5.15 10E12/L (ref 4.4–5.9)
TRIGL SERPL-MCNC: 108 MG/DL
WBC # BLD AUTO: 8.1 10E9/L (ref 4–11)

## 2018-01-05 PROCEDURE — 84460 ALANINE AMINO (ALT) (SGPT): CPT | Performed by: FAMILY MEDICINE

## 2018-01-05 PROCEDURE — 85027 COMPLETE CBC AUTOMATED: CPT | Performed by: FAMILY MEDICINE

## 2018-01-05 PROCEDURE — 85610 PROTHROMBIN TIME: CPT | Mod: QW

## 2018-01-05 PROCEDURE — G0103 PSA SCREENING: HCPCS | Performed by: FAMILY MEDICINE

## 2018-01-05 PROCEDURE — 99207 ZZC NO CHARGE NURSE ONLY: CPT

## 2018-01-05 PROCEDURE — 84450 TRANSFERASE (AST) (SGOT): CPT | Performed by: FAMILY MEDICINE

## 2018-01-05 PROCEDURE — 36416 COLLJ CAPILLARY BLOOD SPEC: CPT

## 2018-01-05 PROCEDURE — 80061 LIPID PANEL: CPT | Performed by: FAMILY MEDICINE

## 2018-01-05 NOTE — PROGRESS NOTES
ANTICOAGULATION FOLLOW-UP CLINIC VISIT    Patient Name:  Alessio Teixeira  Date:  1/5/2018  Contact Type:  Face to Face    SUBJECTIVE:     Patient Findings     Positives No Problem Findings           OBJECTIVE    INR Protime   Date Value Ref Range Status   01/05/2018 3.5 (A) 0.86 - 1.14 Final       ASSESSMENT / PLAN  INR assessment SUPRA    Recheck INR In: 6 WEEKS    INR Location Clinic      Anticoagulation Summary as of 1/5/2018     INR goal 2.0-3.0   Today's INR 3.5!   Maintenance plan 7 mg (2 mg x 3.5) every day   Full instructions 7 mg every day   Weekly total 49 mg   Plan last modified Kerrie Morgan RN (6/3/2016)   Next INR check 2/16/2018   Target end date Indefinite    Indications   Chronic atrial fibrillation (H) [I48.2]  Long-term (current) use of anticoagulants [Z79.01] [Z79.01]         Anticoagulation Episode Summary     [facility] check [facility]    Preferred lab     Send INR reminders to [facility] INR/PROTIME    Comments       Anticoagulation Care Providers     Provider Role Specialty Phone number    Zac Salgado MD [facility] [phone]            See the Encounter Report to view Anticoagulation Flowsheet and Dosing Calendar (Go to Encounters tab in chart review, and find the Anticoagulation Therapy Visit)        Kerrie Morgan, RN

## 2018-01-05 NOTE — MR AVS SNAPSHOT
Alessio Teixeira   1/5/2018 9:00 AM   Anticoagulation Therapy Visit    Description:  82 year old male   Provider:   ANTICOAGULATION CLINIC   Department:  Bx Nurse           INR as of 1/5/2018     Today's INR 3.5!      Anticoagulation Summary as of 1/5/2018     INR goal 2.0-3.0   Today's INR 3.5!   Full instructions 7 mg every day   Next INR check 2/16/2018    Indications   Chronic atrial fibrillation (H) [I48.2]  Long-term (current) use of anticoagulants [Z79.01] [Z79.01]         Your next Anticoagulation Clinic appointment(s)     Feb 16, 2018  9:45 AM CST   Anticoagulation Visit with  ANTICOAGULATION CLINIC   [facility]    [address] [place] [address] [phone]              Contact Numbers     [facility]  Please call  [phone] to cancel and/or reschedule your appointment   The direct line to the anticoagulant nurse is [phone] on Monday, Wednesday, and Friday. On Thursday, the anticoagulant nurse can be reached directly at [phone].         January 2018 Details    Sun Mon Tue Wed Thu Fri Sat      1               2               3               4               5      7 mg   See details      6      7 mg           7      7 mg         8      7 mg         9      7 mg         10      7 mg         11      7 mg         12      7 mg         13      7 mg           14      7 mg         15      7 mg         16      7 mg         17      7 mg         18      7 mg         19      7 mg         20      7 mg           21      7 mg         22      7 mg         23      7 mg         24      7 mg         25      7 mg         26      7 mg         27      7 mg           28      7 mg         29      7 mg         30      7 mg         31      7 mg             Date Details   01/05 This INR check               How to take your warfarin dose     To take:  7 mg Take 3.5 of the 2 mg tablets.            February 2018 Details    Sun Mon Tue Wed Thu Fri Sat         1      7 mg         2      7 mg         3      7 mg           4      7 mg         5      7 mg         6      7 mg         7      7 mg         8      7 mg         9      7 mg         10      7 mg           11      7 mg         12      7 mg         13      7 mg         14      7 mg         15      7 mg         16            17                 18               19               20               21               22               23               24                 25               26               27               28                   Date Details   No additional details    Date of next INR:  2/16/2018         How to take your warfarin dose     To take:  7 mg Take 3.5 of the 2 mg tablets.

## 2018-01-07 NOTE — PROGRESS NOTES
Dear Alessio,    Your tests were all normal. A copy of your tests are available in My Chart.    I should see you in February.  If you have any questions feel free to call.      Sincerely,      BECKY Mcelroy.

## 2018-02-16 ENCOUNTER — ANTICOAGULATION THERAPY VISIT (OUTPATIENT)
Dept: NURSING | Facility: CLINIC | Age: 83
End: 2018-02-16
Payer: COMMERCIAL

## 2018-02-16 DIAGNOSIS — Z79.01 LONG-TERM (CURRENT) USE OF ANTICOAGULANTS: ICD-10-CM

## 2018-02-16 DIAGNOSIS — I48.20 CHRONIC ATRIAL FIBRILLATION (H): ICD-10-CM

## 2018-02-16 LAB — INR POINT OF CARE: 2.3 (ref 0.86–1.14)

## 2018-02-16 PROCEDURE — 85610 PROTHROMBIN TIME: CPT | Mod: QW

## 2018-02-16 PROCEDURE — 99207 ZZC NO CHARGE NURSE ONLY: CPT

## 2018-02-16 PROCEDURE — 36416 COLLJ CAPILLARY BLOOD SPEC: CPT

## 2018-02-16 NOTE — PROGRESS NOTES
ANTICOAGULATION FOLLOW-UP CLINIC VISIT    Patient Name:  Alessio Teixeira  Date:  2/16/2018  Contact Type:  Face to Face    SUBJECTIVE:     Patient Findings     Positives No Problem Findings           OBJECTIVE    INR Protime   Date Value Ref Range Status   02/16/2018 2.3 (A) 0.86 - 1.14 Final       ASSESSMENT / PLAN  INR assessment THER    Recheck INR In: 6 WEEKS    INR Location Clinic      Anticoagulation Summary as of 2/16/2018     INR goal 2.0-3.0   Today's INR 2.3   Maintenance plan 7 mg (2 mg x 3.5) every day   Full instructions 7 mg every day   Weekly total 49 mg   No change documented Kerrie Morgan RN   Plan last modified Kerrie Morgan RN (6/3/2016)   Next INR check 3/30/2018   Target end date Indefinite    Indications   Chronic atrial fibrillation (H) [I48.2]  Long-term (current) use of anticoagulants [Z79.01] [Z79.01]         Anticoagulation Episode Summary     INR check location [facility]    Preferred lab     Send INR reminders to [facility] INR/PROTIME    Comments       Anticoagulation Care Providers     Provider Role Specialty Phone number    Zac Salgado MD [facility] [phone]            See the Encounter Report to view Anticoagulation Flowsheet and Dosing Calendar (Go to Encounters tab in chart review, and find the Anticoagulation Therapy Visit)        Kerrie Morgan RN

## 2018-02-16 NOTE — MR AVS SNAPSHOT
Alessio Teixeira   2/16/2018 9:45 AM   Anticoagulation Therapy Visit    Description:  82 year old male   Provider:   ANTICOAGULATION CLINIC   Department:  Bx Nurse           INR as of 2/16/2018     Today's INR 2.3      Anticoagulation Summary as of 2/16/2018     INR goal 2.0-3.0   Today's INR 2.3   Full instructions 7 mg every day   Next INR check 3/30/2018    Indications   Chronic atrial fibrillation (H) [I48.2]  Long-term (current) use of anticoagulants [Z79.01] [Z79.01]         Your next Anticoagulation Clinic appointment(s)     Mar 30, 2018  9:45 AM CDT   Anticoagulation Visit with  ANTICOAGULATION CLINIC   [facility]    [address] [place] [address] [phone]              Contact Numbers     [facility]  Please call  [phone] to cancel and/or reschedule your appointment   The direct line to the anticoagulant nurse is [phone] on Monday, Wednesday, and Friday. On Thursday, the anticoagulant nurse can be reached directly at [phone].         February 2018 Details    Sun Mon Tue Wed Thu Fri Sat         1               2               3                 4               5               6               7               8               9               10                 11               12               13               14               15               16      7 mg   See details      17      7 mg           18      7 mg         19      7 mg         20      7 mg         21      7 mg         22      7 mg         23      7 mg         24      7 mg           25      7 mg         26      7 mg         27      7 mg         28      7 mg             Date Details   02/16 This INR check               How to take your warfarin dose     To take:  7 mg Take 3.5 of the 2 mg tablets.           March 2018 Details    Sun Mon Tue Wed Thu Fri Sat         1      7 mg         2      7 mg         3       7 mg           4      7 mg         5      7 mg         6      7 mg         7      7 mg         8      7 mg         9      7 mg         10      7 mg           11      7 mg         12      7 mg         13      7 mg         14      7 mg         15      7 mg         16      7 mg         17      7 mg           18      7 mg         19      7 mg         20      7 mg         21      7 mg         22      7 mg         23      7 mg         24      7 mg           25      7 mg         26      7 mg         27      7 mg         28      7 mg         29      7 mg         30            31                Date Details   No additional details    Date of next INR:  3/30/2018         How to take your warfarin dose     To take:  7 mg Take 3.5 of the 2 mg tablets.

## 2018-03-15 ENCOUNTER — TRANSFERRED RECORDS (OUTPATIENT)
Dept: HEALTH INFORMATION MANAGEMENT | Facility: CLINIC | Age: 83
End: 2018-03-15

## 2018-03-30 ENCOUNTER — ANTICOAGULATION THERAPY VISIT (OUTPATIENT)
Dept: NURSING | Facility: CLINIC | Age: 83
End: 2018-03-30
Payer: COMMERCIAL

## 2018-03-30 DIAGNOSIS — I48.20 CHRONIC ATRIAL FIBRILLATION (H): ICD-10-CM

## 2018-03-30 DIAGNOSIS — Z79.01 LONG-TERM (CURRENT) USE OF ANTICOAGULANTS: ICD-10-CM

## 2018-03-30 LAB — INR POINT OF CARE: 2.5 (ref 2–3)

## 2018-03-30 PROCEDURE — 36416 COLLJ CAPILLARY BLOOD SPEC: CPT

## 2018-03-30 PROCEDURE — 85610 PROTHROMBIN TIME: CPT | Mod: QW

## 2018-03-30 PROCEDURE — 99207 ZZC NO CHARGE NURSE ONLY: CPT

## 2018-03-30 NOTE — MR AVS SNAPSHOT
Alessio Teixeira   3/30/2018 9:45 AM   Anticoagulation Therapy Visit    Description:  82 year old male   Provider:   ANTICOAGULATION CLINIC   Department:  Bx Nurse           INR as of 3/30/2018     Today's INR 2.5      Anticoagulation Summary as of 3/30/2018     INR goal 2.0-3.0   Today's INR 2.5   Full instructions 7 mg every day   Next INR check 5/11/2018    Indications   Chronic atrial fibrillation (H) [I48.2]  Long-term (current) use of anticoagulants [Z79.01] [Z79.01]         Your next Anticoagulation Clinic appointment(s)     May 09, 2018 11:45 AM CDT   Anticoagulation Visit with  ANTICOAGULATION CLINIC   [facility]    [address] [place] [address] [phone]              Contact Numbers     [facility]  Please call  [phone] to cancel and/or reschedule your appointment   The direct line to the anticoagulant nurse is [phone] on Monday, Wednesday, and Friday. On Thursday, the anticoagulant nurse can be reached directly at [phone].         March 2018 Details    Sun Mon Tue Wed Thu Fri Sat         1               2               3                 4               5               6               7               8               9               10                 11               12               13               14               15               16               17                 18               19               20               21               22               23               24                 25               26               27               28               29               30      7 mg   See details      31      7 mg          Date Details   03/30 This INR check               How to take your warfarin dose     To take:  7 mg Take 3.5 of the 2 mg tablets.           April 2018 Details    Sun Mon Tue Wed Thu Fri Sat     1      7 mg         2      7 mg         3      7  mg         4      7 mg         5      7 mg         6      7 mg         7      7 mg           8      7 mg         9      7 mg         10      7 mg         11      7 mg         12      7 mg         13      7 mg         14      7 mg           15      7 mg         16      7 mg         17      7 mg         18      7 mg         19      7 mg         20      7 mg         21      7 mg           22      7 mg         23      7 mg         24      7 mg         25      7 mg         26      7 mg         27      7 mg         28      7 mg           29      7 mg         30      7 mg               Date Details   No additional details            How to take your warfarin dose     To take:  7 mg Take 3.5 of the 2 mg tablets.           May 2018 Details    Sun Mon Tue Wed Thu Fri Sat       1      7 mg         2      7 mg         3      7 mg         4      7 mg         5      7 mg           6      7 mg         7      7 mg         8      7 mg         9      7 mg         10      7 mg         11            12                 13               14               15               16               17               18               19                 20               21               22               23               24               25               26                 27               28               29               30               31                  Date Details   No additional details    Date of next INR:  5/11/2018         How to take your warfarin dose     To take:  7 mg Take 3.5 of the 2 mg tablets.

## 2018-03-30 NOTE — PROGRESS NOTES
ANTICOAGULATION FOLLOW-UP CLINIC VISIT    Patient Name:  Alessio Teixeira  Date:  3/30/2018  Contact Type:  Face to Face    SUBJECTIVE:        OBJECTIVE    INR Protime   Date Value Ref Range Status   03/30/2018 2.5 2 - 3 Final       ASSESSMENT / PLAN  INR assessment THER    Recheck INR In: 6 WEEKS    INR Location Clinic      Anticoagulation Summary as of 3/30/2018     INR goal 2.0-3.0   Today's INR 2.5   Maintenance plan 7 mg (2 mg x 3.5) every day   Full instructions 7 mg every day   Weekly total 49 mg   No change documented Eileen Ma RN   Plan last modified Kerrie Morgan RN (6/3/2016)   Next INR check 5/11/2018   Target end date Indefinite    Indications   Chronic atrial fibrillation (H) [I48.2]  Long-term (current) use of anticoagulants [Z79.01] [Z79.01]         Anticoagulation Episode Summary     IN[facility] check location [facility]    Preferred lab     Send INR reminders to [facility] INR/PROTIME    Comments       Anticoagulation Care Providers     Provider Role Specialty Phone number    DamianZac weiss MD [facility] [phone]            See the Encounter Report to view Anticoagulation Flowsheet and Dosing Calendar (Go to Encounters tab in chart review, and find the Anticoagulation Therapy Visit)    Therapeutic, recheck in 6 weeks. patient aware if signs of clotting (pain, tenderness, swelling, color change in leg or arm, SOB) and bleeding occur (blood in stool, urine, large bruising, bleeding gums, nosebleeds) to have INR check sooner. If sx severe report to ER or concerned for stroke call 911. If general questions or concerns arise, call clinic.         Eileen Ma RN

## 2018-05-09 ENCOUNTER — ANTICOAGULATION THERAPY VISIT (OUTPATIENT)
Dept: NURSING | Facility: CLINIC | Age: 83
End: 2018-05-09
Payer: COMMERCIAL

## 2018-05-09 DIAGNOSIS — I48.20 CHRONIC ATRIAL FIBRILLATION (H): ICD-10-CM

## 2018-05-09 DIAGNOSIS — Z79.01 LONG-TERM (CURRENT) USE OF ANTICOAGULANTS: ICD-10-CM

## 2018-05-09 LAB — INR POINT OF CARE: 3.3 (ref 2–3)

## 2018-05-09 PROCEDURE — 36416 COLLJ CAPILLARY BLOOD SPEC: CPT

## 2018-05-09 PROCEDURE — 99207 ZZC NO CHARGE NURSE ONLY: CPT

## 2018-05-09 PROCEDURE — 85610 PROTHROMBIN TIME: CPT | Mod: QW

## 2018-05-09 NOTE — PROGRESS NOTES
ANTICOAGULATION FOLLOW-UP CLINIC VISIT    Patient Name:  Alessio Teixeira  Date:  5/9/2018  Contact Type:  Face to Face    SUBJECTIVE:        OBJECTIVE    INR Protime   Date Value Ref Range Status   05/09/2018 3.3 (A) 2.0 - 3.0 Final       ASSESSMENT / PLAN  INR assessment THER    Recheck INR In: 6 WEEKS    INR Location Clinic      Anticoagulation Summary as of 5/9/2018     INR goal 2.0-3.0   Today's INR 3.3!   Maintenance plan 7 mg (2 mg x 3.5) every day   Full instructions 7 mg every day   Weekly total 49 mg   No change documented Anastasiia Pham, RN   Plan last modified Kerrie Morgan RN (6/3/2016)   Next INR check 6/20/2018   Target end date Indefinite    Indications   Chronic atrial fibrillation (H) [I48.2]  Long-term (current) use of anticoagulants [Z79.01] [Z79.01]         Anticoagulation Episode Summary     [facility]    Preferred lab     Send INR reminders to [facility] INR/PROTIME    Comments       Anticoagulation Care Providers     Provider Role Specialty Phone number    Zac Salgado MD [facility] [phone]            See the Encounter Report to view Anticoagulation Flowsheet and Dosing Calendar (Go to Encounters tab in chart review, and find the Anticoagulation Therapy Visit)        Anastasiia Pham RN

## 2018-05-09 NOTE — MR AVS SNAPSHOT
Alessio Teixeira   5/9/2018 11:45 AM   Anticoagulation Therapy Visit    Description:  82 year old male   Provider:   ANTICOAGULATION CLINIC   Department:  Bx Nurse           INR as of 5/9/2018     Today's INR 3.3!      Anticoagulation Summary as of 5/9/2018     INR goal 2.0-3.0   Today's INR 3.3!   Full instructions 7 mg every day   Next INR check 6/20/2018    Indications   Chronic atrial fibrillation (H) [I48.2]  Long-term (current) use of anticoagulants [Z79.01] [Z79.01]         Your next Anticoagulation Clinic appointment(s)     Jun 20, 2018 11:45 AM CDT   Anticoagulation Visit with  ANTICOAGULATION CLINIC   [facility]    [address] [place] [address] [phone]              Contact Numbers     [facility]  Please call  [phone] to cancel and/or reschedule your appointment   The direct line to the anticoagulant nurse is [phone] on Monday, Wednesday, and Friday. On Thursday, the anticoagulant nurse can be reached directly at [phone].         May 2018 Details    Sun Mon Tue Wed Thu Fri Sat       1               2               3               4               5                 6               7               8               9      7 mg   See details      10      7 mg         11      7 mg         12      7 mg           13      7 mg         14      7 mg         15      7 mg         16      7 mg         17      7 mg         18      7 mg         19      7 mg           20      7 mg         21      7 mg         22      7 mg         23      7 mg         24      7 mg         25      7 mg         26      7 mg           27      7 mg         28      7 mg         29      7 mg         30      7 mg         31      7 mg            Date Details   05/09 This INR check               How to take your warfarin dose     To take:  7 mg Take 3.5 of the 2 mg tablets.           June 2018 Details     Sun Mon Tue Wed Thu Fri Sat          1      7 mg         2      7 mg           3      7 mg         4      7 mg         5      7 mg         6      7 mg         7      7 mg         8      7 mg         9      7 mg           10      7 mg         11      7 mg         12      7 mg         13      7 mg         14      7 mg         15      7 mg         16      7 mg           17      7 mg         18      7 mg         19      7 mg         20            21               22               23                 24               25               26               27               28               29               30                Date Details   No additional details    Date of next INR:  6/20/2018         How to take your warfarin dose     To take:  7 mg Take 3.5 of the 2 mg tablets.

## 2018-06-20 ENCOUNTER — ANTICOAGULATION THERAPY VISIT (OUTPATIENT)
Dept: NURSING | Facility: CLINIC | Age: 83
End: 2018-06-20
Payer: COMMERCIAL

## 2018-06-20 DIAGNOSIS — Z79.01 LONG-TERM (CURRENT) USE OF ANTICOAGULANTS: ICD-10-CM

## 2018-06-20 DIAGNOSIS — I48.20 CHRONIC ATRIAL FIBRILLATION (H): ICD-10-CM

## 2018-06-20 LAB — INR POINT OF CARE: 2.9 (ref 2–3)

## 2018-06-20 PROCEDURE — 36416 COLLJ CAPILLARY BLOOD SPEC: CPT

## 2018-06-20 PROCEDURE — 85610 PROTHROMBIN TIME: CPT | Mod: QW

## 2018-06-20 PROCEDURE — 99207 ZZC NO CHARGE NURSE ONLY: CPT

## 2018-06-20 NOTE — MR AVS SNAPSHOT
Alessio Teixeira   6/20/2018 11:45 AM   Anticoagulation Therapy Visit    Description:  82 year old male   Provider:   ANTICOAGULATION CLINIC   Department:  Bx Nurse           INR as of 6/20/2018     Today's INR 2.9      Anticoagulation Summary as of 6/20/2018     INR goal 2.0-3.0   Today's INR 2.9   Full warfarin instructions 7 mg every day   Next INR check 8/1/2018    Indications   Chronic atrial fibrillation (H) [I48.2]  Long-term (current) use of anticoagulants [Z79.01] [Z79.01]         Your next Anticoagulation Clinic appointment(s)     Aug 01, 2018 11:45 AM CDT   Anticoagulation [facility] with  [facility] CLINIC   [facility]    [address] [place] [address] [phone]              Contact Numbers     [facility]  Please call  [phone] to cancel and/or reschedule your appointment   The direct line to the anticoagulant nurse is [phone] on Monday, Wednesday, and Friday. On Thursday, the anticoagulant nurse can be reached directly at [phone].         June 2018 Details    Sun Mon Tue Wed Thu Fri Sat          1               2                 3               4               5               6               7               8               9                 10               11               12               13               14               15               16                 17               18               19               20      7 mg   See details      21      7 mg         22      7 mg         23      7 mg           24      7 mg         25      7 mg         26      7 mg         27      7 mg         28      7 mg         29      7 mg         30      7 mg          Date Details   06/20 This INR check               How to take your warfarin dose     To take:  7 mg Take 3.5 of the 2 mg tablets.           July 2018 Details    Sun Mon Tue Wed Thu Fri Sat     1      7 mg         2       7 mg         3      7 mg         4      7 mg         5      7 mg         6      7 mg         7      7 mg           8      7 mg         9      7 mg         10      7 mg         11      7 mg         12      7 mg         13      7 mg         14      7 mg           15      7 mg         16      7 mg         17      7 mg         18      7 mg         19      7 mg         20      7 mg         21      7 mg           22      7 mg         23      7 mg         24      7 mg         25      7 mg         26      7 mg         27      7 mg         28      7 mg           29      7 mg         30      7 mg         31      7 mg              Date Details   No additional details            How to take your warfarin dose     To take:  7 mg Take 3.5 of the 2 mg tablets.           August 2018 Details    Sun Mon Tue Wed Thu Fri Sat        1            2               3               4                 5               6               7               8               9               10               11                 12               13               14               15               16               17               18                 19               20               21               22               23               24               25                 26               27               28               29               30               31                 Date Details   No additional details    Date of next INR:  8/1/2018         How to take your warfarin dose     To take:  7 mg Take 3.5 of the 2 mg tablets.

## 2018-06-20 NOTE — PROGRESS NOTES
ANTICOAGULATION FOLLOW-UP CLINIC VISIT    Patient Name:  Alessio Teixeira  Date:  6/20/2018  Contact Type:  Face to Face    SUBJECTIVE:     Patient Findings     Positives Bruising           OBJECTIVE    INR Protime   Date Value Ref Range Status   06/20/2018 2.9 2.0 - 3.0 Final       ASSESSMENT / PLAN  INR assessment THER    Recheck INR In: 6 WEEKS    INR Location Clinic      Anticoagulation Summary as of 6/20/2018     INR goal 2.0-3.0   Today's INR 2.9   Warfarin maintenance plan 7 mg (2 mg x 3.5) every day   Full warfarin instructions 7 mg every day   Weekly warfarin total 49 mg   No change documented Anastasiia Pham, RN   Plan last modified Kerrie Morgan RN (6/3/2016)   Next INR check 8/1/2018   Target end date Indefinite    Indications   Chronic atrial fibrillation (H) [I48.2]  Long-term (current) use of anticoagulants [Z79.01] [Z79.01]         Anticoagulation Episode Summary     [facility] check location [facility]    Preferred lab     Send INR reminders to [facility] INR/PROTIME    Comments       Anticoagulation Care Providers     Provider Role Specialty Phone number    Zac Salgado MD [facility] [phone]            See the Encounter Report to view Anticoagulation Flowsheet and Dosing Calendar (Go to Encounters tab in chart review, and find the Anticoagulation Therapy Visit)        Anastasiia Pham RN

## 2018-07-17 DIAGNOSIS — Z86.711 HISTORY OF PULMONARY EMBOLISM: ICD-10-CM

## 2018-07-17 DIAGNOSIS — I48.0 PAROXYSMAL ATRIAL FIBRILLATION (H): ICD-10-CM

## 2018-07-17 NOTE — TELEPHONE ENCOUNTER
"Requested Prescriptions   Pending Prescriptions Disp Refills     warfarin (COUMADIN) 2 MG tablet [Pharmacy Med Name: Warfarin Sodium Oral Tablet 2 MG]  Last Written Prescription Date:  4/6/2018  Last Fill Quantity: 315 tablet,  # refills: 0   Last Office Visit  8/7/2017        with  [facility], P or [facility] prescribing provider:     Future Office Visit:        315 tablet 0     Sig: TAKE 3 AND 1/2 TABLETS (7MG) BY MOUTH DAILY    Vitamin K Antagonists Failed    7/17/2018  2:02 PM       Failed - INR is within goal in the past 6 weeks    Confirm INR is within goal in the past 6 weeks.     Recent Labs   Lab Test 06/20/18   INR  2.9              Passed - Recent (12 mo) or future (30 days) visit within the authorizing provider's specialty    Patient had office visit in the last 12 months or has a visit in the next 30 days with authorizing provider or within the authorizing provider's specialty.  See \"Patient Info\" tab in inbasket, or \"Choose Columns\" in Meds & Orders section of the refill encounter.           Passed - Patient is 18 years of age or older          "

## 2018-07-17 NOTE — TELEPHONE ENCOUNTER
"Requested Prescriptions   Pending Prescriptions Disp Refills     warfarin (COUMADIN) 2 MG tablet [Pharmacy Med Name: Warfarin Sodium Oral Tablet 2 MG] 315 tablet 0     Sig: TAKE 3 AND 1/2 TABLETS (7MG) BY MOUTH DAILY    Vitamin K Antagonists Failed    7/17/2018  2:08 PM       Failed - INR is within goal in the past 6 weeks    Confirm INR is within goal in the past 6 weeks.     Recent Labs   Lab Test 06/20/18   INR  2.9                      Passed - Recent (12 mo) or future (30 days) visit within the authorizing provider's specialty    Patient had office visit in the last 12 months or has a visit in the next 30 days with authorizing provider or within the authorizing provider's specialty.  See \"Patient Info\" tab in inbasket, or \"Choose Columns\" in Meds & Orders section of the refill encounter.           Passed - Patient is 18 years of age or older      Warfarin 2mg      Last Written Prescription Date: 4/6/2018  Last Fill Quantity: 315,   # refills: 0  Last Office Visit: 8/7/2017  Future Office visit:  8/1/2018 INR appt.         "

## 2018-07-18 RX ORDER — WARFARIN SODIUM 2 MG/1
TABLET ORAL
Qty: 315 TABLET | Refills: 1 | Status: ON HOLD | OUTPATIENT
Start: 2018-07-18 | End: 2018-12-24

## 2018-08-01 ENCOUNTER — ANTICOAGULATION THERAPY VISIT (OUTPATIENT)
Dept: NURSING | Facility: CLINIC | Age: 83
End: 2018-08-01
Payer: COMMERCIAL

## 2018-08-01 LAB — INR POINT OF CARE: 3.3 (ref 0.86–1.14)

## 2018-08-01 PROCEDURE — 85610 PROTHROMBIN TIME: CPT | Mod: QW

## 2018-08-01 PROCEDURE — 36416 COLLJ CAPILLARY BLOOD SPEC: CPT

## 2018-08-01 PROCEDURE — 99207 ZZC NO CHARGE NURSE ONLY: CPT

## 2018-08-01 NOTE — PROGRESS NOTES
ANTICOAGULATION FOLLOW-UP CLINIC VISIT    Patient Name:  Alessio Teixeira  Date:  8/1/2018  Contact Type:  Face to Face    SUBJECTIVE:     Patient Findings     Positives Change in diet/appetite (Has been eating out more lately and eating less greens, plans to eat more ), Bruising (several open bruises on forearms, well healed )           OBJECTIVE    INR Protime   Date Value Ref Range Status   08/01/2018 3.3 (A) 0.86 - 1.14 Final       ASSESSMENT / PLAN  INR assessment SUPRA    Recheck INR In: 6 WEEKS    INR Location Clinic      Anticoagulation Summary as of 8/1/2018     INR goal 2.0-3.0   Today's INR 3.3!   Warfarin maintenance plan 7 mg (2 mg x 3.5) every day   Full warfarin instructions 7 mg every day   Weekly warfarin total 49 mg   No change documented Maco Fontanez RN   Plan last modified Kerrie Morgan RN (6/3/2016)   Next INR check 9/12/2018   Target end date Indefinite    Indications   Chronic atrial fibrillation (H) [I48.2]  Long-term (current) use of anticoagulants [Z79.01] [Z79.01]         Anticoagulation Episode Summary     [facility] check location [facility]    Preferred lab     Send INR reminders to [facility] INR/PROTIME    Comments       Anticoagulation Care Providers     Provider Role Specialty Phone number    Zac Salgado MD [facility] [phone]            See the Encounter Report to view Anticoagulation Flowsheet and Dosing Calendar (Go to Encounters tab in chart review, and find the Anticoagulation Therapy Visit)    Dosage adjustment made based on physician directed care plan.    Maco Fontanez RN

## 2018-08-01 NOTE — MR AVS SNAPSHOT
Alessio Teixeira   8/1/2018 11:45 AM   Anticoagulation Therapy Visit    Description:  82 year old male   Provider:   ANTICOAGULATION CLINIC   Department:  Bx Nurse           INR as of 8/1/2018     Today's INR 3.3!      Anticoagulation Summary as of 8/1/2018     INR goal 2.0-3.0   Today's INR 3.3!   Full warfarin instructions 7 mg every day   Next INR check 9/12/2018    Indications   Chronic atrial fibrillation (H) [I48.2]  Long-term (current) use of anticoagulants [Z79.01] [Z79.01]         Your next Anticoagulation Clinic appointment(s)     Sep 12, 2018 11:45 AM CDT   Anticoagulation Visit with  ANTICOAGULATION CLINIC   [facility]    [address] [place] [address] [phone]              Contact Numbers     [facility]  Please call  [phone] to cancel and/or reschedule your appointment   The direct line to the anticoagulant nurse is [phone] on Monday, Wednesday, and Friday. On Thursday, the anticoagulant nurse can be reached directly at [phone].         August 2018 Details    Sun Mon Tue Wed Thu Fri Sat        1      7 mg   See details      2      7 mg         3      7 mg         4      7 mg           5      7 mg         6      7 mg         7      7 mg         8      7 mg         9      7 mg         10      7 mg         11      7 mg           12      7 mg         13      7 mg         14      7 mg         15      7 mg         16      7 mg         17      7 mg         18      7 mg           19      7 mg         20      7 mg         21      7 mg         22      7 mg         23      7 mg         24      7 mg         25      7 mg           26      7 mg         27      7 mg         28      7 mg         29      7 mg         30      7 mg         31      7 mg           Date Details   08/01 This INR check               How to take your warfarin dose     To take:  7 mg Take 3.5 of the  2 mg tablets.           September 2018 Details    Sun Mon Tue Wed Thu Fri Sat           1      7 mg           2      7 mg         3      7 mg         4      7 mg         5      7 mg         6      7 mg         7      7 mg         8      7 mg           9      7 mg         10      7 mg         11      7 mg         12            13               14               15                 16               17               18               19               20               21               22                 23               24               25               26               27               28               29                 30                      Date Details   No additional details    Date of next INR:  9/12/2018         How to take your warfarin dose     To take:  7 mg Take 3.5 of the 2 mg tablets.

## 2018-08-15 DIAGNOSIS — I10 HYPERTENSION GOAL BP (BLOOD PRESSURE) < 140/90: ICD-10-CM

## 2018-08-15 NOTE — TELEPHONE ENCOUNTER
"Requested Prescriptions   Pending Prescriptions Disp Refills     furosemide (LASIX) 20 MG tablet [Pharmacy Med Name: Furosemide Oral Tablet 20 MG] 90 tablet 1    Last Written Prescription Date:  11/16/2017  Last Fill Quantity: 90,  # refills: 2   Last office visit: 8/7/17 with prescribing provider:  Dr. Salgado   Future Office Visit:   Sig: Take 1 tablet (20 mg) by mouth daily    Diuretics (Including Combos) Protocol Failed    8/15/2018  7:00 AM       Failed - Blood pressure under 140/90 in past 12 months    BP Readings from Last 3 Encounters:   08/07/17 126/70   02/08/17 132/62   02/07/17 138/80                Failed - Normal serum creatinine on file in past 12 months    Recent Labs   Lab Test  08/07/17   0959   CR  1.02             Failed - Normal serum potassium on file in past 12 months    Recent Labs   Lab Test  08/07/17   0959   POTASSIUM  3.8                   Failed - Normal serum sodium on file in past 12 months    Recent Labs   Lab Test  08/07/17   0959   NA  142             Passed - Recent (12 mo) or future (30 days) visit within the authorizing provider's specialty    Patient had office visit in the last 12 months or has a visit in the next 30 days with authorizing provider or within the authorizing provider's specialty.  See \"Patient Info\" tab in inbasket, or \"Choose Columns\" in Meds & Orders section of the refill encounter.           Passed - Patient is age 18 or older        "

## 2018-08-16 RX ORDER — FUROSEMIDE 20 MG
TABLET ORAL
Qty: 90 TABLET | Refills: 0 | Status: SHIPPED | OUTPATIENT
Start: 2018-08-16 | End: 2018-11-14

## 2018-09-12 ENCOUNTER — ANTICOAGULATION THERAPY VISIT (OUTPATIENT)
Dept: NURSING | Facility: CLINIC | Age: 83
End: 2018-09-12
Payer: COMMERCIAL

## 2018-09-12 LAB — INR POINT OF CARE: 2.2 (ref 2–3)

## 2018-09-12 PROCEDURE — 36416 COLLJ CAPILLARY BLOOD SPEC: CPT

## 2018-09-12 PROCEDURE — 99207 ZZC NO CHARGE NURSE ONLY: CPT

## 2018-09-12 PROCEDURE — 85610 PROTHROMBIN TIME: CPT | Mod: QW

## 2018-09-12 NOTE — MR AVS SNAPSHOT
Alessio Teixeira   9/12/2018 11:45 AM   Anticoagulation Therapy Visit    Description:  82 year old male   Provider:   ANTICOAGULATION CLINIC   Department:  Bx Nurse           INR as of 9/12/2018     Today's INR 2.2      Anticoagulation Summary as of 9/12/2018     INR goal 2.0-3.0   Today's INR 2.2   Full warfarin instructions 7 mg every day   Next INR check 10/24/2018    Indications   Chronic atrial fibrillation (H) [I48.2]  Long-term (current) use of anticoagulants [Z79.01] [Z79.01]         Your next Anticoagulation Clinic appointment(s)     Oct 24, 2018 10:30 AM CDT   Anticoagulation Visit with  ANTICOAGULATION CLINIC   [facility] ([facility])    [address] [place] [address] [phone]              Contact Numbers     [facility]  Please call  [phone] to cancel and/or reschedule your appointment   The direct line to the anticoagulant nurse is [phone] on Monday, Wednesday, and Friday. On Thursday, the anticoagulant nurse can be reached directly at [phone].         September 2018 Details    Sun Mon Tue Wed Thu Fri Sat           1                 2               3               4               5               6               7               8                 9               10               11               12      7 mg   See details      13      7 mg         14      7 mg         15      7 mg           16      7 mg         17      7 mg         18      7 mg         19      7 mg         20      7 mg         21      7 mg         22      7 mg           23      7 mg         24      7 mg         25      7 mg         26      7 mg         27      7 mg         28      7 mg         29      7 mg           30      7 mg                Date Details   09/12 This INR check               How to take your warfarin dose     To take:  7 mg Take 3.5 of the 2 mg tablets.           October 2018 Details     Sun Mon Tue Wed Thu Fri Sat      1      7 mg         2      7 mg         3      7 mg         4      7 mg         5      7 mg         6      7 mg           7      7 mg         8      7 mg         9      7 mg         10      7 mg         11      7 mg         12      7 mg         13      7 mg           14      7 mg         15      7 mg         16      7 mg         17      7 mg         18      7 mg         19      7 mg         20      7 mg           21      7 mg         22      7 mg         23      7 mg         24            25               26               27                 28               29               30               31                   Date Details   No additional details    Date of next INR:  10/24/2018         How to take your warfarin dose     To take:  7 mg Take 3.5 of the 2 mg tablets.

## 2018-09-12 NOTE — PROGRESS NOTES
ANTICOAGULATION FOLLOW-UP CLINIC VISIT    Patient Name:  Alessio Teixeira  Date:  9/12/2018  Contact Type:  Face to Face    SUBJECTIVE:     Patient Findings     Positives No Problem Findings           OBJECTIVE    INR Protime   Date Value Ref Range Status   09/12/2018 2.2 2.0 - 3.0 Final       ASSESSMENT / PLAN  INR assessment THER    Recheck INR In: 6 WEEKS    INR Location Clinic      Anticoagulation Summary as of 9/12/2018     INR goal 2.0-3.0   Today's INR 2.2   Warfarin maintenance plan 7 mg (2 mg x 3.5) every day   Full warfarin instructions 7 mg every day   Weekly warfarin total 49 mg   No change documented Anastasiia Pham, RN   Plan last modified Kerrie Morgan RN (6/3/2016)   Next INR check 10/24/2018   Target end date Indefinite    Indications   Chronic atrial fibrillation (H) [I48.2]  Long-term (current) use of anticoagulants [Z79.01] [Z79.01]         Anticoagulation Episode Summary     INR check location [facility]    Preferred lab     Send INR reminders to [facility] INR/PROTIME    Comments       Anticoagulation Care Providers     Provider Role Specialty Phone number    Zac Salgado MD [facility] [phone]            See the Encounter Report to view Anticoagulation Flowsheet and Dosing Calendar (Go to Encounters tab in chart review, and find the Anticoagulation Therapy Visit)        Anastasiia Pham RN

## 2018-10-24 ENCOUNTER — ANTICOAGULATION THERAPY VISIT (OUTPATIENT)
Dept: NURSING | Facility: CLINIC | Age: 83
End: 2018-10-24
Payer: COMMERCIAL

## 2018-10-24 LAB — INR POINT OF CARE: 3.1 (ref 0.86–1.14)

## 2018-10-24 PROCEDURE — 36416 COLLJ CAPILLARY BLOOD SPEC: CPT

## 2018-10-24 PROCEDURE — 85610 PROTHROMBIN TIME: CPT | Mod: QW

## 2018-10-24 PROCEDURE — 99207 ZZC NO CHARGE NURSE ONLY: CPT

## 2018-10-24 NOTE — PROGRESS NOTES
ANTICOAGULATION FOLLOW-UP CLINIC VISIT    Patient Name:  Alessio Teixeira  Date:  10/24/2018  Contact Type:  Face to Face    SUBJECTIVE:     Patient Findings     Positives Other complaints (Right knee is aching, seeing Orthopedic for it )           OBJECTIVE    INR Protime   Date Value Ref Range Status   10/24/2018 3.1 (A) 0.86 - 1.14 Final       ASSESSMENT / PLAN  INR assessment THER    Recheck INR In: 6 WEEKS    INR Location Clinic      Anticoagulation Summary as of 10/24/2018     INR goal 2.0-3.0   Today's INR 3.1!   Warfarin maintenance plan 7 mg (2 mg x 3.5) every day   Full warfarin instructions 7 mg every day   Weekly warfarin total 49 mg   No change documented Maco Fontanez RN   Plan last modified Kerrie Morgan RN (6/3/2016)   Next INR check 12/5/2018   Target end date Indefinite    Indications   Chronic atrial fibrillation (H) [I48.2]  Long-term (current) use of anticoagulants [Z79.01] [Z79.01]         Anticoagulation Episode Summary     INR check location [facility]    Preferred lab     Send INR reminders to [facility] INR/PROTIME    Comments       Anticoagulation Care Providers     Provider Role Specialty Phone number    Zac Salgado MD [facility] [phone]            See the Encounter Report to view Anticoagulation Flowsheet and Dosing Calendar (Go to Encounters tab in chart review, and find the Anticoagulation Therapy Visit)    Dosage adjustment made based on physician directed care plan.    Maco Fontanez RN

## 2018-10-24 NOTE — MR AVS SNAPSHOT
Alessio Teixeira   10/24/2018 10:30 AM   Anticoagulation Therapy Visit    Description:  82 year old male   Provider:   ANTICOAGULATION CLINIC   Department:  Bx Nurse           INR as of 10/24/2018     Today's INR 3.1!      Anticoagulation Summary as of 10/24/2018     INR goal 2.0-3.0   Today's INR 3.1!   Full warfarin instructions 7 mg every day   Next INR check 12/5/2018    Indications   Chronic atrial fibrillation (H) [I48.2]  Long-term (current) use of anticoagulants [Z79.01] [Z79.01]         Your next Anticoagulation Clinic appointment(s)     Dec 05, 2018 10:30 AM CST   Anticoagulation Visit with  ANTICOAGULATION CLINIC   [facility] ([facility])    [address] [place] [address] [phone]              Contact Numbers     [facility]  Please call  [phone] to cancel and/or reschedule your appointment   The direct line to the anticoagulant nurse is [phone] on Monday, Wednesday, and Friday. On Thursday, the anticoagulant nurse can be reached directly at [phone].         October 2018 Details    Sun Mon Tue Wed Thu Fri Sat      1               2               3               4               5               6                 7               8               9               10               11               12               13                 14               15               16               17               18               19               20                 21               22               23               24      7 mg   See details      25      7 mg         26      7 mg         27      7 mg           28      7 mg         29      7 mg         30      7 mg         31      7 mg             Date Details   10/24 This INR check               How to take your warfarin dose     To take:  7 mg Take 3.5 of the 2 mg tablets.           November 2018 Details    Sun Mon Tue Wed Thu Fri Sat          1      7 mg         2      7 mg         3      7 mg           4      7 mg         5      7 mg         6      7 mg         7      7 mg         8      7 mg         9      7 mg         10      7 mg           11      7 mg         12      7 mg         13      7 mg         14      7 mg         15      7 mg         16      7 mg         17      7 mg           18      7 mg         19      7 mg         20      7 mg         21      7 mg         22      7 mg         23      7 mg         24      7 mg           25      7 mg         26      7 mg         27      7 mg         28      7 mg         29      7 mg         30      7 mg           Date Details   No additional details            How to take your warfarin dose     To take:  7 mg Take 3.5 of the 2 mg tablets.           December 2018 Details    Sun Mon Tue Wed Thu Fri Sat           1      7 mg           2      7 mg         3      7 mg         4      7 mg         5            6               7               8                 9               10               11               12               13               14               15                 16               17               18               19               20               21               22                 23               24               25               26               27               28               29                 30               31                     Date Details   No additional details    Date of next INR:  12/5/2018         How to take your warfarin dose     To take:  7 mg Take 3.5 of the 2 mg tablets.

## 2018-10-30 DIAGNOSIS — I10 HYPERTENSION GOAL BP (BLOOD PRESSURE) < 140/90: ICD-10-CM

## 2018-10-30 DIAGNOSIS — E78.5 HYPERLIPIDEMIA LDL GOAL <100: ICD-10-CM

## 2018-10-30 NOTE — TELEPHONE ENCOUNTER
"Requested Prescriptions   Pending Prescriptions Disp Refills     KLOR-CON 20 MEQ CR tablet [Pharmacy Med Name: Klor-Con M20 Oral Tablet Extended Release 20 MEQ] 90 tablet 1      Last Written Prescription Date:  12/6/17  Last Fill Quantity: 90,  # refills: 2   Last office visit: 8/7/2017 with prescribing provider:     Future Office Visit:     Sig: Take 1 tablet (20 mEq) by mouth daily    Potassium Supplements Protocol Failed    10/30/2018  7:00 AM       Failed - Recent (12 mo) or future (30 days) visit within the authorizing provider's specialty    Patient had office visit in the last 12 months or has a visit in the next 30 days with authorizing provider or within the authorizing provider's specialty.  See \"Patient Info\" tab in inbasket, or \"Choose Columns\" in Meds & Orders section of the refill encounter.             Failed - Normal serum potassium in past 12 months    Recent Labs   Lab Test  08/07/17   0959   POTASSIUM  3.8                   Passed - Patient is age 18 or older        lovastatin (MEVACOR) 40 MG tablet [Pharmacy Med Name: Lovastatin Oral Tablet 40 MG] 180 tablet 0      Last Written Prescription Date:  2/27/18  Last Fill Quantity: 180,  # refills: 1   Last office visit: 8/7/2017 with prescribing provider:     Future Office Visit:     Sig: TAKE TWO TABLETS (80MG) BY MOUTH DAILY AT BEDTIME    Statins Protocol Failed    10/30/2018  7:00 AM       Failed - Recent (12 mo) or future (30 days) visit within the authorizing provider's specialty    Patient had office visit in the last 12 months or has a visit in the next 30 days with authorizing provider or within the authorizing provider's specialty.  See \"Patient Info\" tab in inbasket, or \"Choose Columns\" in Meds & Orders section of the refill encounter.             Passed - LDL on file in past 12 months    Recent Labs   Lab Test  01/05/18   0919   LDL  69            Passed - No abnormal creatine kinase in past 12 months    Recent Labs   Lab Test  01/26/12   " 1348   CKT  70.0               Passed - Patient is age 18 or older

## 2018-10-31 RX ORDER — POTASSIUM CHLORIDE 1500 MG/1
TABLET, EXTENDED RELEASE ORAL
Qty: 30 TABLET | Refills: 0 | Status: SHIPPED | OUTPATIENT
Start: 2018-10-31 | End: 2018-11-27

## 2018-10-31 RX ORDER — LOVASTATIN 40 MG
TABLET ORAL
Qty: 60 TABLET | Refills: 0 | Status: ON HOLD | OUTPATIENT
Start: 2018-10-31 | End: 2019-01-28

## 2018-11-14 DIAGNOSIS — I10 HYPERTENSION GOAL BP (BLOOD PRESSURE) < 140/90: ICD-10-CM

## 2018-11-14 NOTE — TELEPHONE ENCOUNTER
"Requested Prescriptions   Pending Prescriptions Disp Refills     furosemide (LASIX) 20 MG tablet [Pharmacy Med Name: Furosemide Oral Tablet 20 MG]  Last Written Prescription Date:  8/16/2018  Last Fill Quantity: 90 TABLET,  # refills: 0   Last office visit: 10/24/2018 with prescribing provider:  8/17/2017 SHASHA   Future Office Visit:     90 tablet 0     Sig: TAKE ONE TABLET BY MOUTH ONE TIME DAILY - NEEDS TO BE SEEN FOR FURTHER REFILLS    Diuretics (Including Combos) Protocol Failed    11/14/2018  7:01 AM       Failed - Blood pressure under 140/90 in past 12 months    BP Readings from Last 3 Encounters:   08/07/17 126/70   02/08/17 132/62   02/07/17 138/80                Failed - Normal serum creatinine on file in past 12 months    Recent Labs   Lab Test  08/07/17   0959   CR  1.02             Failed - Normal serum potassium on file in past 12 months    Recent Labs   Lab Test  08/07/17   0959   POTASSIUM  3.8                   Failed - Normal serum sodium on file in past 12 months    Recent Labs   Lab Test  08/07/17   0959   NA  142             Passed - Recent (12 mo) or future (30 days) visit within the authorizing provider's specialty    Patient had office visit in the last 12 months or has a visit in the next 30 days with authorizing provider or within the authorizing provider's specialty.  See \"Patient Info\" tab in inbasket, or \"Choose Columns\" in Meds & Orders section of the refill encounter.             Passed - Patient is age 18 or older           "

## 2018-11-15 RX ORDER — FUROSEMIDE 20 MG
TABLET ORAL
Qty: 90 TABLET | Refills: 0 | Status: SHIPPED | OUTPATIENT
Start: 2018-11-15 | End: 2018-11-21

## 2018-11-15 NOTE — TELEPHONE ENCOUNTER
Routing refill request to provider for review/approval because:  Labs not current:  Related to medication  Patient needs to be seen because it has been more than 1 year since last office visit.

## 2018-11-19 ASSESSMENT — ACTIVITIES OF DAILY LIVING (ADL): CURRENT_FUNCTION: NO ASSISTANCE NEEDED

## 2018-11-21 ENCOUNTER — OFFICE VISIT (OUTPATIENT)
Dept: FAMILY MEDICINE | Facility: CLINIC | Age: 83
End: 2018-11-21
Payer: COMMERCIAL

## 2018-11-21 VITALS
BODY MASS INDEX: 36.4 KG/M2 | WEIGHT: 260 LBS | HEIGHT: 71 IN | DIASTOLIC BLOOD PRESSURE: 80 MMHG | HEART RATE: 69 BPM | RESPIRATION RATE: 12 BRPM | TEMPERATURE: 96.9 F | SYSTOLIC BLOOD PRESSURE: 126 MMHG | OXYGEN SATURATION: 96 %

## 2018-11-21 DIAGNOSIS — Z23 NEED FOR PROPHYLACTIC VACCINATION AND INOCULATION AGAINST INFLUENZA: ICD-10-CM

## 2018-11-21 DIAGNOSIS — E66.01 MORBID OBESITY DUE TO EXCESS CALORIES (H): ICD-10-CM

## 2018-11-21 DIAGNOSIS — Z12.5 SCREENING FOR PROSTATE CANCER: ICD-10-CM

## 2018-11-21 DIAGNOSIS — E78.2 MIXED HYPERLIPIDEMIA: ICD-10-CM

## 2018-11-21 DIAGNOSIS — Z00.00 MEDICARE ANNUAL WELLNESS VISIT, INITIAL: Primary | ICD-10-CM

## 2018-11-21 DIAGNOSIS — H91.13 PRESBYCUSIS OF BOTH EARS: ICD-10-CM

## 2018-11-21 DIAGNOSIS — I10 HYPERTENSION GOAL BP (BLOOD PRESSURE) < 140/90: ICD-10-CM

## 2018-11-21 DIAGNOSIS — I48.0 PAROXYSMAL ATRIAL FIBRILLATION (H): ICD-10-CM

## 2018-11-21 DIAGNOSIS — M17.11 PRIMARY OSTEOARTHRITIS OF RIGHT KNEE: ICD-10-CM

## 2018-11-21 DIAGNOSIS — Z79.01 LONG TERM CURRENT USE OF ANTICOAGULANT THERAPY: ICD-10-CM

## 2018-11-21 LAB
ALBUMIN SERPL-MCNC: 3.6 G/DL (ref 3.4–5)
ALBUMIN UR-MCNC: 30 MG/DL
ALP SERPL-CCNC: 52 U/L (ref 40–150)
ALT SERPL W P-5'-P-CCNC: 27 U/L (ref 0–70)
ANION GAP SERPL CALCULATED.3IONS-SCNC: 5 MMOL/L (ref 3–14)
APPEARANCE UR: CLEAR
AST SERPL W P-5'-P-CCNC: 24 U/L (ref 0–45)
BASOPHILS # BLD AUTO: 0.2 10E9/L (ref 0–0.2)
BASOPHILS NFR BLD AUTO: 1.4 %
BILIRUB SERPL-MCNC: 0.4 MG/DL (ref 0.2–1.3)
BILIRUB UR QL STRIP: ABNORMAL
BUN SERPL-MCNC: 19 MG/DL (ref 7–30)
CALCIUM SERPL-MCNC: 9.2 MG/DL (ref 8.5–10.1)
CHLORIDE SERPL-SCNC: 106 MMOL/L (ref 94–109)
CHOLEST SERPL-MCNC: 122 MG/DL
CO2 SERPL-SCNC: 30 MMOL/L (ref 20–32)
COLOR UR AUTO: YELLOW
CREAT SERPL-MCNC: 1.09 MG/DL (ref 0.66–1.25)
DIFFERENTIAL METHOD BLD: ABNORMAL
EOSINOPHIL # BLD AUTO: 0.3 10E9/L (ref 0–0.7)
EOSINOPHIL NFR BLD AUTO: 2.7 %
ERYTHROCYTE [DISTWIDTH] IN BLOOD BY AUTOMATED COUNT: 16.5 % (ref 10–15)
GFR SERPL CREATININE-BSD FRML MDRD: 65 ML/MIN/1.7M2
GLUCOSE SERPL-MCNC: 87 MG/DL (ref 70–99)
GLUCOSE UR STRIP-MCNC: NEGATIVE MG/DL
HCT VFR BLD AUTO: 50.1 % (ref 40–53)
HDLC SERPL-MCNC: 38 MG/DL
HGB BLD-MCNC: 16.7 G/DL (ref 13.3–17.7)
HGB UR QL STRIP: NEGATIVE
KETONES UR STRIP-MCNC: NEGATIVE MG/DL
LDLC SERPL CALC-MCNC: 55 MG/DL
LEUKOCYTE ESTERASE UR QL STRIP: NEGATIVE
LYMPHOCYTES # BLD AUTO: 2.2 10E9/L (ref 0.8–5.3)
LYMPHOCYTES NFR BLD AUTO: 20.9 %
MCH RBC QN AUTO: 30.3 PG (ref 26.5–33)
MCHC RBC AUTO-ENTMCNC: 33.3 G/DL (ref 31.5–36.5)
MCV RBC AUTO: 91 FL (ref 78–100)
MONOCYTES # BLD AUTO: 0.7 10E9/L (ref 0–1.3)
MONOCYTES NFR BLD AUTO: 7 %
NEUTROPHILS # BLD AUTO: 7 10E9/L (ref 1.6–8.3)
NEUTROPHILS NFR BLD AUTO: 68 %
NITRATE UR QL: NEGATIVE
NONHDLC SERPL-MCNC: 84 MG/DL
PH UR STRIP: 7 PH (ref 5–7)
PLATELET # BLD AUTO: 405 10E9/L (ref 150–450)
POTASSIUM SERPL-SCNC: 3.9 MMOL/L (ref 3.4–5.3)
PROT SERPL-MCNC: 6.9 G/DL (ref 6.8–8.8)
PSA SERPL-ACNC: 3.66 UG/L (ref 0–4)
RBC # BLD AUTO: 5.51 10E12/L (ref 4.4–5.9)
RBC #/AREA URNS AUTO: ABNORMAL /HPF
SODIUM SERPL-SCNC: 141 MMOL/L (ref 133–144)
SOURCE: ABNORMAL
SP GR UR STRIP: 1.02 (ref 1–1.03)
TRIGL SERPL-MCNC: 145 MG/DL
UROBILINOGEN UR STRIP-ACNC: 0.2 EU/DL (ref 0.2–1)
WBC # BLD AUTO: 10.4 10E9/L (ref 4–11)
WBC #/AREA URNS AUTO: ABNORMAL /HPF

## 2018-11-21 PROCEDURE — 80061 LIPID PANEL: CPT | Performed by: FAMILY MEDICINE

## 2018-11-21 PROCEDURE — 90662 IIV NO PRSV INCREASED AG IM: CPT | Performed by: FAMILY MEDICINE

## 2018-11-21 PROCEDURE — G0103 PSA SCREENING: HCPCS | Performed by: FAMILY MEDICINE

## 2018-11-21 PROCEDURE — 81001 URINALYSIS AUTO W/SCOPE: CPT | Performed by: FAMILY MEDICINE

## 2018-11-21 PROCEDURE — 80053 COMPREHEN METABOLIC PANEL: CPT | Performed by: FAMILY MEDICINE

## 2018-11-21 PROCEDURE — 36415 COLL VENOUS BLD VENIPUNCTURE: CPT | Performed by: FAMILY MEDICINE

## 2018-11-21 PROCEDURE — 85025 COMPLETE CBC W/AUTO DIFF WBC: CPT | Performed by: FAMILY MEDICINE

## 2018-11-21 PROCEDURE — 90471 IMMUNIZATION ADMIN: CPT | Performed by: FAMILY MEDICINE

## 2018-11-21 PROCEDURE — 99397 PER PM REEVAL EST PAT 65+ YR: CPT | Mod: 25 | Performed by: FAMILY MEDICINE

## 2018-11-21 RX ORDER — FUROSEMIDE 20 MG
TABLET ORAL
Qty: 90 TABLET | Refills: 3 | Status: ON HOLD | OUTPATIENT
Start: 2018-11-21 | End: 2019-01-31

## 2018-11-21 ASSESSMENT — PATIENT HEALTH QUESTIONNAIRE - PHQ9
SUM OF ALL RESPONSES TO PHQ QUESTIONS 1-9: 2
5. POOR APPETITE OR OVEREATING: NOT AT ALL

## 2018-11-21 ASSESSMENT — ANXIETY QUESTIONNAIRES
GAD7 TOTAL SCORE: 0
2. NOT BEING ABLE TO STOP OR CONTROL WORRYING: NOT AT ALL
7. FEELING AFRAID AS IF SOMETHING AWFUL MIGHT HAPPEN: NOT AT ALL
6. BECOMING EASILY ANNOYED OR IRRITABLE: NOT AT ALL
IF YOU CHECKED OFF ANY PROBLEMS ON THIS QUESTIONNAIRE, HOW DIFFICULT HAVE THESE PROBLEMS MADE IT FOR YOU TO DO YOUR WORK, TAKE CARE OF THINGS AT HOME, OR GET ALONG WITH OTHER PEOPLE: NOT DIFFICULT AT ALL
5. BEING SO RESTLESS THAT IT IS HARD TO SIT STILL: NOT AT ALL
3. WORRYING TOO MUCH ABOUT DIFFERENT THINGS: NOT AT ALL
1. FEELING NERVOUS, ANXIOUS, OR ON EDGE: NOT AT ALL

## 2018-11-21 ASSESSMENT — ACTIVITIES OF DAILY LIVING (ADL): CURRENT_FUNCTION: NO ASSISTANCE NEEDED

## 2018-11-21 NOTE — PATIENT INSTRUCTIONS
Services Typically covered by Medicare Recommended Completed   Vaccines    Pneumonoccol    Influenza    Hepatitis B (if medium/high risk)     Once for patients after age 65    Yearly  Medium/high risk factors:    End Stage Kidney Disease    Hemophiliacs who received Factor XIII or IX concentrates    Clients of institutions for developmentally disabled    Persons who live in same house as a Hepatitis B carrier    Homosexual men    Illicit injectable drug users    Health care workers     Mammogram Covered: One-time screen between age 35-39, annually for age 40+     Pap and Pelvic Exam Covered: Annually if  high risk,  or childbearing age with abnormal Pap in last 3 years.  Q24 months for all other women     Prostate Cancer Screening    Digital rectal exam    PSA Covered: Annually for all men > age 50     Corolrectal Cancer Screening Screening colonoscopy every 10 years, more often for high risk patients     Diabetes Self-Management Training Requires referral by treating physician for patient with diabetes     Diabetes Screening    Fasting blood sugar or glucose tolerance test   Once yearly, twice yearly if prediabetic     Cardiovascular Screening Blood Tests    Total Cholesterol    HDL    Triglycerides Every 5 years     Medical Nutrition Therapy for Diabetes or Renal Disease Requires referral by treating physician for patient with diabetes or kidney disease     Glaucoma Screening Annually for patients with one of the following risk factors:    Diabetes Mellitus    Family history of Glaucoma    -American age 50 and over    -American age 65 and over     Bone Mass Measurement Every 24 months if one of the following risk factors:    Estrogen deficiency    Vertebral abnormalities on x-ray indicative of Osteoporosis, Osteopenia, or Vertebral fracture    Receiving/expected to receive the equivalent of at least 5 mg of Prednisone per day for > 3 months    Hyperparathyroidism    Patient being monitored for  response to Osteoporosis Therapy     One-time AAA screen  Must be ordered as part of Medicare IPPE   Any patient with a family history of AAA    Males Age 65-75, with history of smoking at least 100 cigarettes in lifetime     Smoking Cessation Counseling Beneficiaries who use tobacco are eligible to receive 2 cessation attempts per year; each attempt includes maximum of 4 sessions     HIV Screening Annually for beneficiaries at increased risk:       Increased risk for HIV infection is defined in the  National Coverage Determinations (NCD) Manual,  Publication 100-03 Sections 190.14 (diagnostic) and 210.7 (screening). See http://www.cms.gov/manuals/downloads/mug782g4_Bcxw9.pdf and http://www.cms.gov/manuals/downloads/vtt338h0_Zgyi0.pdf on the Internet.  Three times per pregnancy for beneficiaries who are pregnant.     Future Annual Wellness Visit Annually, for all beneficiaries.       Preventive Health Recommendations:     See your health care provider every year to    Review health changes.     Discuss preventive care.      Review your medicines if your doctor has prescribed any.    Talk with your health care provider about whether you should have a test to screen for prostate cancer (PSA).    Every 3 years, have a diabetes test (fasting glucose). If you are at risk for diabetes, you should have this test more often.    Every 5 years, have a cholesterol test. Have this test more often if you are at risk for high cholesterol or heart disease.     Every 10 years, have a colonoscopy. Or, have a yearly FIT test (stool test). These exams will check for colon cancer.    Talk to with your health care provider about screening for Abdominal Aortic Aneurysm if you have a family history of AAA or have a history of smoking.  Shots:     Get a flu shot each year.     Get a tetanus shot every 10 years.     Talk to your doctor about your pneumonia vaccines. There are now two you should receive - Pneumovax (PPSV 23) and Prevnar (PCV  13).    Talk to your pharmacist about a shingles vaccine.     Talk to your doctor about the hepatitis B vaccine.  Nutrition:     Eat at least 5 servings of fruits and vegetables each day.     Eat whole-grain bread, whole-wheat pasta and brown rice instead of white grains and rice.     Get adequate Calcium and Vitamin D.   Lifestyle    Exercise for at least 150 minutes a week (30 minutes a day, 5 days a week). This will help you control your weight and prevent disease.     Limit alcohol to one drink per day.     No smoking.     Wear sunscreen to prevent skin cancer.     See your dentist every six months for an exam and cleaning.     See your eye doctor every 1 to 2 years to screen for conditions such as glaucoma, macular degeneration and cataracts.    Personalized Prevention Plan  You are due for the preventive services outlined below.  Your care team is available to assist you in scheduling these services.  If you have already completed any of these items, please share that information with your care team to update in your medical record.    Health Maintenance Due   Topic Date Due     INR CLINIC REFERRAL - yearly  07/08/2016     AJMES QUESTIONNAIRE 1 YEAR  01/18/2018     Depression Assessment - yearly  01/18/2018     FALL RISK ASSESSMENT  08/07/2018     Flu Vaccine (1) 09/01/2018

## 2018-11-21 NOTE — PROGRESS NOTES
"SUBJECTIVE:   Alessio Teixeira is a 82 year old male who presents for Preventive Visit.    Are you in the first 12 months of your Medicare coverage?  No    Annual Wellness Visit     In general, how would you rate your overall health?  Good    Frequency of exercise:  1 day/week    Duration of exercise:  Less than 15 minutes    Do you usually eat at least 4 servings of fruit and vegetables a day, include whole grains    & fiber and avoid regularly eating high fat or \"junk\" foods?  Yes    Taking medications regularly:  Yes    Medication side effects:  Significant flushing    Ability to successfully perform activities of daily living:  No assistance needed    Home Safety:  No safety concerns identified    Hearing Impairment:  Difficulty following a conversation in a noisy restaurant or crowded room, feel that people are mumbling or not speaking clearly, difficulty following dialogue in the theater, need to ask people to speak up or repeat themselves, find that men's voices are easier to understand than woman's and difficulty understanding soft or whispered speech    In the past 6 months, have you been bothered by leaking of urine? Yes    In general, how would you rate your overall mental or emotional health?  Excellent    PHQ-2 Total Score: 0    Additional concerns today:  No      Ability to successfully perform activities of daily living: Yes, no assistance needed  Home safety:  none identified   Hearing impairment: Yes, has aids    Fall risk:  Fallen 2 or more times in the past year?: No  Any fall with injury in the past year?: No    COGNITIVE SCREEN  1) Repeat 3 items (Leader, Season, Table)    2) Clock draw: NORMAL  3) 3 item recall: Recalls 3 objects  Results: 3 items recalled: COGNITIVE IMPAIRMENT LESS LIKELY    Mini-CogTM Copyright EVE Awan. Licensed by the author for use in [facility]; reprinted with permission (swati@.[facility]). All rights reserved.        Reviewed and updated as needed this visit " by clinical staff  Tobacco  Allergies  Meds  Med Hx  Surg Hx  Fam Hx  Soc Hx        Reviewed and updated as needed this visit by Provider        Social History   Substance Use Topics     Smoking status: Former Smoker     Packs/day: 1.00     Years: 3.00     Types: Cigarettes     Quit date: 1/1/1963     Smokeless tobacco: Never Used     Alcohol use Yes      Comment: 2-4 glasses of wine per week       Alcohol Use 11/19/2018   If you drink alcohol do you typically have greater than 3 drinks per day OR greater than 7 drinks per week? No   No flowsheet data found.            Do you feel safe in your environment - Yes    Do you have a Health Care Directive?: Yes: Advance Directive has been received and scanned.    Current providers sharing in care for this patient include:   Patient Care Team:  Zac Salgado MD as PCP - General (Family Practice)    The following health maintenance items are reviewed in Epic and correct as of today:  Health Maintenance   Topic Date Due     OP ANNUAL INR REFERRAL  07/08/2016     JAMES QUESTIONNAIRE 1 YEAR  01/18/2018     PHQ-9 Q1YR  01/18/2018     FALL RISK ASSESSMENT  08/07/2018     INFLUENZA VACCINE (1) 09/01/2018     TETANUS IMMUNIZATION (SYSTEM ASSIGNED)  01/07/2020     ADVANCE DIRECTIVE PLANNING Q5 YRS  04/19/2021     PNEUMOCOCCAL  Completed     Patient Active Problem List   Diagnosis     Hyperlipidemia LDL goal <100     Edema of both legs     Varicose veins of legs     BMI > 35     Stasis dermatitis     Chronic atrial fibrillation (H)     Chronic 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     Vitamin D deficiency disease     Hypertension goal BP (blood pressure) < 140/90     Diplopia     MARTHA (obstructive sleep apnea)     Long term current use of anticoagulant therapy     ACP (advance care planning)     Degenerative localized arthritis of hip     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 by Miguel Johnson MD     Aftercare following right hip joint  replacement surgery     Bradycardia     History of pulmonary embolism- bilateral in 2007      Atrial fibrillation (H)     Anemia due to blood loss, acute     Edema of lower extremity, unspecified laterality     Chronic left-sided thoracic back pain     Presbycusis of both ears     Mixed hyperlipidemia     Primary osteoarthritis of right knee     Screening for prostate cancer     Past Surgical History:   Procedure Laterality Date     APPENDECTOMY       ARTHROPLASTY HIP Right 2016     ARTHROPLASTY HIP ANTERIOR Right 4/26/2016    Procedure: ARTHROPLASTY HIP ANTERIOR;  Surgeon: Miguel Johnson MD;  Location: [facility] OR     BRAIN SURGERY  2007    partial resection meningioma     C RAD RESEC TONSIL/PILLARS       CATARACT IOL, RT/LT       COLECTOMY  2007    bowel perforation due to steroids     GENITOURINARY SURGERY      vasectomy     HEAD & NECK SURGERY  2007    meningioma removed     KNEE SURGERY  2010    left knee replacement     ORTHOPEDIC SURGERY      left TKR     PHACOEMULSIFICATION CLEAR CORNEA WITH TORIC INTRAOCULAR LENS IMPLANT  7/30/2012    Procedure: PHACOEMULSIFICATION CLEAR CORNEA WITH TORIC INTRAOCULAR LENS IMPLANT;  COMPLEX RIGHT PHACOEMULSIFICATION CLEAR CORNEA WITH TORIC INTRAOCULAR LENS IMPLANT WITH MALYUGIN RING;  Surgeon: Ronald Cortez MD;  Location:  [facility]     RECTAL SURGERY  1985       Social History   Substance Use Topics     Smoking status: Former Smoker     Packs/day: 1.00     Years: 3.00     Types: Cigarettes     Quit date: 1/1/1963     Smokeless tobacco: Never Used     Alcohol use Yes      Comment: 2-4 glasses of wine per week     Family History   Problem Relation Age of Onset     Glaucoma Father      Unknown/Adopted Mother      Macular Degeneration No family hx of                Review of Systems  Constitutional, HEENT, cardiovascular, pulmonary, GI, , musculoskeletal, neuro, skin, endocrine and psych systems are negative, except as otherwise noted.    OBJECTIVE:   /80  Pulse 69   "Temp 96.9  F (36.1  C) (Tympanic)  Resp 12  Ht 5' 11\" (1.803 m)  Wt 260 lb (117.9 kg)  SpO2 96%  BMI 36.26 kg/m2 Estimated body mass index is 36.26 kg/(m^2) as calculated from the following:    Height as of this encounter: 5' 11\" (1.803 m).    Weight as of this encounter: 260 lb (117.9 kg).  Physical Exam  GENERAL: healthy, alert and no distress  EYES: Eyes grossly normal to inspection, PERRL and conjunctivae and sclerae normal  HENT: ear canals and TM's normal, nose and mouth without ulcers or lesions  NECK: no adenopathy, no asymmetry, masses, or scars and thyroid normal to palpation  RESP: lungs clear to auscultation - no rales, rhonchi or wheezes  CV: regular rate and rhythm, normal S1 S2, no S3 or S4, no murmur, click or rub, no peripheral edema and peripheral pulses strong  ABDOMEN: soft, nontender, no hepatosplenomegaly, no masses and bowel sounds normal   (male): normal male genitalia without lesions or urethral discharge, no hernia  RECTAL: normal sphincter tone, no rectal masses, prostate normal size, smooth, nontender without nodules or masses  MS: no gross musculoskeletal defects noted, no edema  SKIN: no suspicious lesions or rashes  NEURO: Normal strength and tone, mentation intact and speech normal  PSYCH: mentation appears normal, affect normal/bright        ASSESSMENT / PLAN:       ICD-10-CM    1. Medicare annual wellness visit, initial Z00.00    2. Need for prophylactic vaccination and inoculation against influenza Z23    3. Hypertension goal BP (blood pressure) < 140/90 I10 furosemide (LASIX) 20 MG tablet     UA with Microscopic     CBC with platelets differential     Comprehensive metabolic panel   4. Mixed hyperlipidemia E78.2 Lipid Profile   5. Presbycusis of both ears H91.13    6. Paroxysmal atrial fibrillation (H) I48.0    7. Long term current use of anticoagulant therapy Z79.01    8. Primary osteoarthritis of right knee M17.11    9. Screening for prostate cancer Z12.5 Prostate spec " "antigen screen       End of Life Planning:  Patient currently has an advanced directive: Yes.  Practitioner is supportive of decision.    COUNSELING:  Reviewed preventive health counseling, as reflected in patient instructions       Regular exercise       Healthy diet/nutrition       Immunizations    Vaccinated for: Influenza          BP Readings from Last 1 Encounters:   11/21/18 126/80     Estimated body mass index is 36.26 kg/(m^2) as calculated from the following:    Height as of this encounter: 5' 11\" (1.803 m).    Weight as of this encounter: 260 lb (117.9 kg).      Weight management plan: Discussed healthy diet and exercise guidelines and patient will follow up in 6 months in clinic to re-evaluate.     reports that he quit smoking about 55 years ago. His smoking use included Cigarettes. He has a 3.00 pack-year smoking history. He has never used smokeless tobacco.      Appropriate preventive services were discussed with this patient, including applicable screening as appropriate for cardiovascular disease, diabetes, osteopenia/osteoporosis, and glaucoma.  As appropriate for age/gender, discussed screening for colorectal cancer, prostate cancer, breast cancer, and cervical cancer. Checklist reviewing preventive services available has been given to the patient.    Reviewed patients plan of care and provided an AVS. The Basic Care Plan (routine screening as documented in Health Maintenance) for Alessio meets the Care Plan requirement. This Care Plan has been established and reviewed with the Patient.    Counseling Resources:  ATP IV Guidelines  Pooled Cohorts Equation Calculator  Breast Cancer Risk Calculator  FRAX Risk Assessment  ICSI Preventive Guidelines  Dietary Guidelines for Americans, 2010  USDA's MyPlate  ASA Prophylaxis  Lung CA Screening    Zac Salgado MD  [facility]  "

## 2018-11-21 NOTE — NURSING NOTE
"Chief Complaint   Patient presents with     Wellness Visit     /80  Pulse 69  Temp 96.9  F (36.1  C) (Tympanic)  Resp 12  Ht 5' 11\" (1.803 m)  Wt 260 lb (117.9 kg)  SpO2 96%  BMI 36.26 kg/m2 Estimated body mass index is 36.26 kg/(m^2) as calculated from the following:    Height as of this encounter: 5' 11\" (1.803 m).    Weight as of this encounter: 260 lb (117.9 kg).  BP completed using cuff size: alex Barker CMA    Health Maintenance Due   Topic Date Due     OP ANNUAL INR REFERRAL  07/08/2016     JAMES QUESTIONNAIRE 1 YEAR  01/18/2018     PHQ-9 Q1YR  01/18/2018     FALL RISK ASSESSMENT  08/07/2018     INFLUENZA VACCINE (1) 09/01/2018     Health Maintenance reviewed at today's visit patient asked to schedule/complete:   Depression:  Patient agrees to schedule    "

## 2018-11-21 NOTE — MR AVS SNAPSHOT
After Visit Summary   11/21/2018    Alessio Teixeira    MRN: [id number]           Patient Information     Date Of Birth          1935        Visit Information        Provider Department      11/21/2018 8:30 AM [facility]gado [facility]        Today's Diagnoses     Medicare annual wellness visit, initial    -  1    Need for prophylactic vaccination and inoculation against influenza        Hypertension goal BP (blood pressure) < 140/90        Mixed hyperlipidemia        Presbycusis of both ears        Paroxysmal atrial fibrillation (H)        Long term current use of anticoagulant therapy        Primary osteoarthritis of right knee        Screening for prostate cancer          Care Instructions          Services Typically covered by Medicare Recommended Completed   Vaccines    Pneumonoccol    Influenza    Hepatitis B (if medium/high risk)     Once for patients after age 65    Yearly  Medium/high risk factors:    End Stage Kidney Disease    Hemophiliacs who received Factor XIII or IX concentrates    Clients of institutions for developmentally disabled    Persons who live in same house as a Hepatitis B carrier    Homosexual men    Illicit injectable drug users    Health care workers     Mammogram Covered: One-time screen between age 35-39, annually for age 40+     Pap and Pelvic Exam Covered: Annually if  high risk,  or childbearing age with abnormal Pap in last 3 years.  Q24 months for all other women     Prostate Cancer Screening    Digital rectal exam    PSA Covered: Annually for all men > age 50     Corolrectal Cancer Screening Screening colonoscopy every 10 years, more often for high risk patients     Diabetes Self-Management Training Requires referral by treating physician for patient with diabetes     Diabetes Screening    Fasting blood sugar or glucose tolerance test   Once yearly, twice yearly if prediabetic     Cardiovascular Screening Blood  Tests    Total Cholesterol    HDL    Triglycerides Every 5 years     Medical Nutrition Therapy for Diabetes or Renal Disease Requires referral by treating physician for patient with diabetes or kidney disease     Glaucoma Screening Annually for patients with one of the following risk factors:    Diabetes Mellitus    Family history of Glaucoma    -American age 50 and over    -American age 65 and over     Bone Mass Measurement Every 24 months if one of the following risk factors:    Estrogen deficiency    Vertebral abnormalities on x-ray indicative of Osteoporosis, Osteopenia, or Vertebral fracture    Receiving/expected to receive the equivalent of at least 5 mg of Prednisone per day for > 3 months    Hyperparathyroidism    Patient being monitored for response to Osteoporosis Therapy     One-time AAA screen  Must be ordered as part of Medicare IPPE   Any patient with a family history of AAA    Males Age 65-75, with history of smoking at least 100 cigarettes in lifetime     Smoking Cessation Counseling Beneficiaries who use tobacco are eligible to receive 2 cessation attempts per year; each attempt includes maximum of 4 sessions     HIV Screening Annually for beneficiaries at increased risk:       Increased risk for HIV infection is defined in the  National Coverage Determinations (NCD) Manual,  Publication 100-03 Sections 190.14 (diagnostic) and 210.7 (screening). See http://www.cms.gov/manuals/downloads/prp805c2_Kywj8.pdf and http://www.cms.gov/manuals/downloads/axi040n3_Dtdc3.pdf on the Internet.  Three times per pregnancy for beneficiaries who are pregnant.     Future Annual Wellness Visit Annually, for all beneficiaries.       Preventive Health Recommendations:     See your health care provider every year to    Review health changes.     Discuss preventive care.      Review your medicines if your doctor has prescribed any.    Talk with your health care provider about whether you should have a test  to screen for prostate cancer (PSA).    Every 3 years, have a diabetes test (fasting glucose). If you are at risk for diabetes, you should have this test more often.    Every 5 years, have a cholesterol test. Have this test more often if you are at risk for high cholesterol or heart disease.     Every 10 years, have a colonoscopy. Or, have a yearly FIT test (stool test). These exams will check for colon cancer.    Talk to with your health care provider about screening for Abdominal Aortic Aneurysm if you have a family history of AAA or have a history of smoking.  Shots:     Get a flu shot each year.     Get a tetanus shot every 10 years.     Talk to your doctor about your pneumonia vaccines. There are now two you should receive - Pneumovax (PPSV 23) and Prevnar (PCV 13).    Talk to your pharmacist about a shingles vaccine.     Talk to your doctor about the hepatitis B vaccine.  Nutrition:     Eat at least 5 servings of fruits and vegetables each day.     Eat whole-grain bread, whole-wheat pasta and brown rice instead of white grains and rice.     Get adequate Calcium and Vitamin D.   Lifestyle    Exercise for at least 150 minutes a week (30 minutes a day, 5 days a week). This will help you control your weight and prevent disease.     Limit alcohol to one drink per day.     No smoking.     Wear sunscreen to prevent skin cancer.     See your dentist every six months for an exam and cleaning.     See your eye doctor every 1 to 2 years to screen for conditions such as glaucoma, macular degeneration and cataracts.    Personalized Prevention Plan  You are due for the preventive services outlined below.  Your care team is available to assist you in scheduling these services.  If you have already completed any of these items, please share that information with your care team to update in your medical record.    Health Maintenance Due   Topic Date Due     INR CLINIC REFERRAL - yearly  07/08/2016     JAMES QUESTIONNAIRE 1 YEAR   01/18/2018     Depression Assessment - yearly  01/18/2018     FALL RISK ASSESSMENT  08/07/2018     Flu Vaccine (1) 09/01/2018             Follow-ups after your visit        Additional Services     ORTHOPEDICS ADULT REFERRAL       Your provider has referred you to: [facility] [place] [phone]   [email or web address]    Please be aware that coverage of these services is subject to the terms and limitations of your health insurance plan.  Call member services at your health plan with any benefit or coverage questions.      Please bring the following to your appointment:    >>   Any x-rays, CTs or MRIs which have been performed.  Contact the facility where they were done to arrange for  prior to your scheduled appointment.    >>   List of current medications   >>   This referral request   >>   Any documents/labs given to you for this referral                  Follow-up notes from your care team     Return in about 6 months (around 5/21/2019) for dyslipidemia, hypertension.      Your next 10 appointments already scheduled     Dec 05, 2018 10:30 AM CST   Anticoagulation Visit with [facility]   [facility] ([facility]    [address] [place] [id number] [phone]              Who to contact     If you have questions or need follow up information about today's clinic visit or your schedule please contact [facility] directly at [phone].  Normal or non-critical lab and imaging results will be communicated to you by MyChart, letter or phone within 4 business days after the clinic has received the results. If you do not hear from us within 7 days, please contact the clinic through MyChart or phone. If you have a critical or abnormal lab result, we will notify you by phone as soon as possible.  Submit refill requests through MyChart or call your  "pharmacy and they will forward the refill request to us. Please allow 3 business days for your refill to be completed.          Additional Information About Your Visit        [employer] Information     [employer] gives you secure access to your electronic health record. If you see a primary care provider, you can also send messages to your care team and make appointments. If you have questions, please call your primary care clinic.  If you do not have a primary care provider, please call [phone] and they will assist you.        Care EveryWhere ID     This is your Care EveryWhere ID. This could be used by other organizations to access your [place] medical records  [id number]        Your Vitals Were     Pulse Temperature Respirations Height Pulse Oximetry BMI (Body Mass Index)    69 96.9  F (36.1  C) (Tympanic) 12 5' 11\" (1.803 m) 96% 36.26 kg/m2       Blood Pressure from Last 3 Encounters:   11/21/18 126/80   08/07/17 126/70   02/08/17 132/62    Weight from Last 3 Encounters:   11/21/18 260 lb (117.9 kg)   08/07/17 256 lb (116.1 kg)   02/08/17 255 lb 8 oz (115.9 kg)              We Performed the Following     CBC with platelets differential     Comprehensive metabolic panel     Lipid Profile     ORTHOPEDICS ADULT REFERRAL     Prostate spec antigen screen     UA with Microscopic          Where to get your medicines      These medications were sent to [id number] - [place], [place] - [address]  [address] [address]     Phone:  [phone]     furosemide 20 MG tablet          Primary Care Provider Office Phone # Fax #    Zac Salgado [phone] [address]  [facility] [address]        Equal Access to Services     [facility] CONCETTA : Hadii jere Maxwell, wachalinoda lusangeethaadaha, qaybta kaalmada phil, anabella willoughby. So [facility] [phone].    ATENCIÓN: Si habla español, tiene a mathew disposición servicios gratuitos de " asistencia lingüística. Jonathan al [phone].    We comply with applicable federal civil rights laws and [place] laws. We do not discriminate on the basis of race, color, national origin, age, disability, sex, sexual orientation, or gender identity.            Thank you!     Thank you for choosing [facility]  for your care. Our goal is always to provide you with excellent care. Hearing back from our patients is one way we can continue to improve our services. Please take a few minutes to complete the written survey that you may receive in the mail after your visit with us. Thank you!             Your Updated Medication List - Protect others around you: Learn how to safely use, store and throw away your medicines at www.disposemymeds.org.          This list is accurate as of 11/21/18  9:14 AM.  Always use your most recent med list.                   Brand Name Dispense Instructions for use Diagnosis    CALCIUM PO      Take 600 mg by mouth 2 times daily        furosemide 20 MG tablet    LASIX    90 tablet    TAKE ONE TABLET BY MOUTH ONE TIME DAILY - NEEDS TO BE SEEN FOR FURTHER REFILLS    Hypertension goal BP (blood pressure) < 140/90       KLOR-CON 20 MEQ CR tablet   Generic drug:  potassium chloride SA     30 tablet    Take 1 tablet (20 mEq) by mouth daily    Hypertension goal BP (blood pressure) < 140/90       lovastatin 40 MG tablet    MEVACOR    60 tablet    TAKE TWO TABLETS (80MG) BY MOUTH DAILY AT BEDTIME    Hyperlipidemia LDL goal <100       multivitamin, therapeutic with minerals Tabs tablet      Take 1 tablet by mouth daily        NIACIN CR PO      Take 500 mg by mouth 2 times daily (with meals) 2 x 500 mg slow niacin        order for DME     1 Device    CPAP supplies with variable pressure control    MARTHA (obstructive sleep apnea)       VITAMIN D (CHOLECALCIFEROL) PO      Take 1,000 Units by mouth daily.        warfarin 2 MG tablet    COUMADIN    315 tablet    TAKE 3 AND 1/2  TABLETS (7MG) BY MOUTH DAILY    History of pulmonary embolism, Paroxysmal atrial fibrillation (H)

## 2018-11-21 NOTE — PROGRESS NOTES

## 2018-11-22 ASSESSMENT — ANXIETY QUESTIONNAIRES: GAD7 TOTAL SCORE: 0

## 2018-11-27 DIAGNOSIS — I10 HYPERTENSION GOAL BP (BLOOD PRESSURE) < 140/90: ICD-10-CM

## 2018-11-27 RX ORDER — POTASSIUM CHLORIDE 1500 MG/1
TABLET, EXTENDED RELEASE ORAL
Qty: 90 TABLET | Refills: 3 | Status: SHIPPED | OUTPATIENT
Start: 2018-11-27 | End: 2019-12-09

## 2018-11-27 NOTE — TELEPHONE ENCOUNTER
"Klor-Con M20 Oral Tablet Extended Release 20 MEQ  Last Written Prescription Date:  10/31/18  Last Fill Quantity: 30,  # refills: 0   Last office visit: 11/21/2018 with prescribing provider:  11/21/18   Future Office Visit:    Requested Prescriptions   Pending Prescriptions Disp Refills     KLOR-CON 20 MEQ CR tablet [Pharmacy Med Name: Klor-Con M20 Oral Tablet Extended Release 20 MEQ] 30 tablet 0     Sig: Take 1 tablet (20 mEq) by mouth daily    Potassium Supplements Protocol Passed    11/27/2018  2:47 PM       Passed - Recent (12 mo) or future (30 days) visit within the authorizing provider's specialty    Patient had office visit in the last 12 months or has a visit in the next 30 days with authorizing provider or within the authorizing provider's specialty.  See \"Patient Info\" tab in inbasket, or \"Choose Columns\" in Meds & Orders section of the refill encounter.             Passed - Patient is age 18 or older       Passed - Normal serum potassium in past 12 months    Recent Labs   Lab Test  11/21/18   0912   POTASSIUM  3.9                      "

## 2018-11-28 ENCOUNTER — TELEPHONE (OUTPATIENT)
Dept: FAMILY MEDICINE | Facility: CLINIC | Age: 83
End: 2018-11-28

## 2018-11-28 DIAGNOSIS — M1A.0710 CHRONIC IDIOPATHIC GOUT INVOLVING TOE OF RIGHT FOOT WITHOUT TOPHUS: Primary | ICD-10-CM

## 2018-11-28 DIAGNOSIS — Z79.01 LONG TERM CURRENT USE OF ANTICOAGULANT THERAPY: ICD-10-CM

## 2018-11-28 DIAGNOSIS — B35.1 ONYCHOMYCOSIS: Primary | ICD-10-CM

## 2018-11-28 NOTE — TELEPHONE ENCOUNTER
Pt reports he had seen a podiatrist at [facility]. His insurance changed and that provider is no longer covered. Pt is not aware were his current insurance covers but asked if PCP would refer him to a new podiatry. Please advice.

## 2018-11-28 NOTE — TELEPHONE ENCOUNTER
Reason for Call: Request for an order or referral:    Order or referral being requested: Needs a referral for a podiatrist at [facility]. States insurance requires a named provider.     Date needed: as soon as possible    Has the patient been seen by the PCP for this problem? YES    Additional comments:     Phone number Patient can be reached at:  Home number on file [phone] (home)    Best Time:  Anytime     Can we leave a detailed message on this number?  YES    Call taken on 11/28/2018 at 1:09 PM by Kelsey Leos

## 2018-11-29 NOTE — TELEPHONE ENCOUNTER
Called pt and asked if he could call his insurance company to see where they would cover. Left VM. Told him I would still send this message to Dr. Salgado.

## 2018-11-29 NOTE — PROGRESS NOTES
Dear Alessio,    Your tests were all normal. A copy of your tests are available in My Chart.    We can repeat these tests in 6 months.  No change in your medications.    Glad to see you at your appointment.  If you have any questions feel free to call.      Sincerely,      BECKY Mcelroy.

## 2018-11-30 ENCOUNTER — TRANSFERRED RECORDS (OUTPATIENT)
Dept: HEALTH INFORMATION MANAGEMENT | Facility: CLINIC | Age: 83
End: 2018-11-30

## 2018-12-05 ENCOUNTER — ANTICOAGULATION THERAPY VISIT (OUTPATIENT)
Dept: NURSING | Facility: CLINIC | Age: 83
End: 2018-12-05
Payer: COMMERCIAL

## 2018-12-05 LAB — INR POINT OF CARE: 3.2 (ref 2–3)

## 2018-12-05 PROCEDURE — 36416 COLLJ CAPILLARY BLOOD SPEC: CPT

## 2018-12-05 PROCEDURE — 85610 PROTHROMBIN TIME: CPT | Mod: QW

## 2018-12-05 NOTE — MR AVS SNAPSHOT
Alessio Teixeira   12/5/2018 10:30 AM   Anticoagulation Therapy Visit    Description:  83 year old male   Provider:  [facility]   Department:  Bx Nurse           INR as of 12/5/2018     Today's INR 3.2!      Anticoagulation Summary as of 12/5/2018     INR goal 2.0-3.0   Today's INR 3.2!   Full warfarin instructions 7 mg every day   Next INR check 1/16/2019    Indications   Chronic atrial fibrillation (H) [I48.2]  Long term current use of anticoagulant therapy [Z79.01]         Contact Numbers     [facility]  Please call  [phone] to cancel and/or reschedule your appointment   The direct line to the anticoagulant nurse is [phone] on Monday, Wednesday, and Friday. On Thursday, the anticoagulant nurse can be reached directly at [phone].         December 2018 Details    Sun Mon Tue Wed Thu Fri Sat           1                 2               3               4               5      7 mg   See details      6      7 mg         7      7 mg         8      7 mg           9      7 mg         10      7 mg         11      7 mg         12      7 mg         13      7 mg         14      7 mg         15      7 mg           16      7 mg         17      7 mg         18      7 mg         19      7 mg         20      7 mg         21      7 mg         22      7 mg           23      7 mg         24      7 mg         25      7 mg         26      7 mg         27      7 mg         28      7 mg         29      7 mg           30      7 mg         31      7 mg               Date Details   12/05 This INR check               How to take your warfarin dose     To take:  7 mg Take 3.5 of the 2 mg tablets.           January 2019 Details    Sun Mon Tue Wed Thu Fri Sat       1      7 mg         2      7 mg         3      7 mg         4      7 mg         5      7 mg           6      7 mg         7      7 mg         8      7 mg         9      7 mg         10      7 mg         11      7 mg         12      7 mg            13      7 mg         14      7 mg         15      7 mg         16            17               18               19                 20               21               22               23               24               25               26                 27               28               29               30               31                  Date Details   No additional details    Date of next INR:  1/16/2019         How to take your warfarin dose     To take:  7 mg Take 3.5 of the 2 mg tablets.

## 2018-12-05 NOTE — PROGRESS NOTES
ANTICOAGULATION FOLLOW-UP CLINIC VISIT    Patient Name:  Alessio Teixeira  Date:  12/5/2018  Contact Type:  Face to Face    SUBJECTIVE:     Patient Findings     Positives No Problem Findings           OBJECTIVE    INR Protime   Date Value Ref Range Status   12/05/2018 3.2 (A) 2.0 - 3.0 Final       ASSESSMENT / PLAN  INR assessment THER    Recheck INR In: 6 WEEKS    INR Location Clinic      Anticoagulation Summary as of 12/5/2018     INR goal 2.0-3.0   Today's INR 3.2!   Warfarin maintenance plan 7 mg (2 mg x 3.5) every day   Full warfarin instructions 7 mg every day   Weekly warfarin total 49 mg   No change documented Anastasiia Pham, RN   Plan last modified Kerrie Morgan RN (6/3/2016)   Next INR check 1/16/2019   Target end date Indefinite    Indications   Chronic atrial fibrillation (H) [I48.2]  Long term current use of anticoagulant therapy [Z79.01]         Anticoagulation Episode Summary     INR check location [facility]    Preferred lab     Send INR reminders to [facility] INR/PROTIME    Comments       Anticoagulation Care Providers     Provider Role Specialty Phone number    Zac Salgado MD [facility] [phone]            See the Encounter Report to view Anticoagulation Flowsheet and Dosing Calendar (Go to Encounters tab in chart review, and find the Anticoagulation Therapy Visit)        Anastasiia Pham RN

## 2018-12-13 ENCOUNTER — OFFICE VISIT (OUTPATIENT)
Dept: PODIATRY | Facility: CLINIC | Age: 83
End: 2018-12-13
Payer: COMMERCIAL

## 2018-12-13 VITALS
HEIGHT: 71 IN | WEIGHT: 260 LBS | SYSTOLIC BLOOD PRESSURE: 136 MMHG | DIASTOLIC BLOOD PRESSURE: 79 MMHG | BODY MASS INDEX: 36.4 KG/M2

## 2018-12-13 DIAGNOSIS — M79.675 TOE PAIN, LEFT: ICD-10-CM

## 2018-12-13 DIAGNOSIS — B35.1 ONYCHOMYCOSIS: Primary | ICD-10-CM

## 2018-12-13 DIAGNOSIS — M79.674 TOE PAIN, RIGHT: ICD-10-CM

## 2018-12-13 PROCEDURE — 99213 OFFICE O/P EST LOW 20 MIN: CPT | Mod: 25 | Performed by: PODIATRIST

## 2018-12-13 PROCEDURE — 11720 DEBRIDE NAIL 1-5: CPT | Performed by: PODIATRIST

## 2018-12-13 ASSESSMENT — MIFFLIN-ST. JEOR: SCORE: 1901.48

## 2018-12-13 NOTE — LETTER
"    12/13/2018         RE: Alessio Teixeira  [address]  [place] [address]        Dear Colleague,    Thank you for referring your patient, Alessio Teixeira, to the [facility]. Please see a copy of my visit note below.    Foot & Ankle Surgery   December 13, 2018    S:  Pt is seen today for evaluation of painful left hallux nail.  He was seen by Dr Benoit for this issue march 2016.  The nail is quite long and thick, unable to trim the nail, and it can be \"irritating\" with pressure, such as shoe gear and his bed sheets.  \"ache\" described, 3/10.  He notices the nail \"most of the time\".  Pain worse if \"kick something, tightness of bed sheet\".      Vitals:    12/13/18 0912   BP: 136/79   Weight: 117.9 kg (260 lb)   Height: 1.803 m (5' 11\")   '      ROS - Pos for CC.  Patient denies current nausea, vomiting, chills, fevers, belly pain, calf pain, chest pain or SOB.  Complete remainder of ROS it otherwise neg.      PE:  Gen:   No apparent distress  Eye:    Visual scanning without deficit  Ear:    Response to auditory stimuli wnl  Lung:    Non-labored breathing on RA noted  Abd:    NTND per patient report  Lymph:    Neg for pitting/non-pitting edema BLE  Vasc:    Pulses palpable, CFT minimally delayed  Neuro:    Light touch sensation intact to all sensory nerve distributions without paresthesias  Derm:    L hallux nail is mycotic, very long and thick, tender with pressure but no paronychia  MSK:    ROM, strength wnl without limitation, no pain on palpation noted.  Calf:    Neg for redness, swelling or tenderness    Assessment:  83 year old male with painful mycotic L hallux nail      Plan:  Discussed etiologies, anatomy and options  1.  Painful mycotic L hallux nail  -nail debrided in length and thickness for pain relief  -nail care handout dispensed for future routine debridement.  If this alleviates his discomfort, continue with this plan  -we discussed avulsion options, including total " temp vs total permanent, as the next option should symtos persist despite debridement.  Discussed post-procedure instructions/course for patient information    Follow up:  prn or sooner with acute issues      Body mass index is 36.26 kg/m .  Weight management plan: Patient was referred to their PCP to discuss a diet and exercise plan.         Pramod Cadena DPM FACFAS FACFAOM  Podiatric Foot & Ankle Surgeon  [facility]  [phone]    Again, thank you for allowing me to participate in the care of your patient.        Sincerely,        Pramod Cadena DPM, DPM

## 2018-12-13 NOTE — PROGRESS NOTES
"Foot & Ankle Surgery   December 13, 2018    S:  Pt is seen today for evaluation of painful left hallux nail.  He was seen by Dr Benoit for this issue march 2016.  The nail is quite long and thick, unable to trim the nail, and it can be \"irritating\" with pressure, such as shoe gear and his bed sheets.  \"ache\" described, 3/10.  He notices the nail \"most of the time\".  Pain worse if \"kick something, tightness of bed sheet\".      Vitals:    12/13/18 0912   BP: 136/79   Weight: 117.9 kg (260 lb)   Height: 1.803 m (5' 11\")   '      ROS - Pos for CC.  Patient denies current nausea, vomiting, chills, fevers, belly pain, calf pain, chest pain or SOB.  Complete remainder of ROS it otherwise neg.      PE:  Gen:   No apparent distress  Eye:    Visual scanning without deficit  Ear:    Response to auditory stimuli wnl  Lung:    Non-labored breathing on RA noted  Abd:    NTND per patient report  Lymph:    Neg for pitting/non-pitting edema BLE  Vasc:    Pulses palpable, CFT minimally delayed  Neuro:    Light touch sensation intact to all sensory nerve distributions without paresthesias  Derm:    L hallux nail is mycotic, very long and thick, tender with pressure but no paronychia  MSK:    ROM, strength wnl without limitation, no pain on palpation noted.  Calf:    Neg for redness, swelling or tenderness    Assessment:  83 year old male with painful mycotic L hallux nail      Plan:  Discussed etiologies, anatomy and options  1.  Painful mycotic L hallux nail  -nail debrided in length and thickness for pain relief  -nail care handout dispensed for future routine debridement.  If this alleviates his discomfort, continue with this plan  -we discussed avulsion options, including total temp vs total permanent, as the next option should symtos persist despite debridement.  Discussed post-procedure instructions/course for patient information    Follow up:  prn or sooner with acute issues      Body mass index is 36.26 kg/m .  Weight management " plan: Patient was referred to their PCP to discuss a diet and exercise plan.         Pramod Cadena DPM FACFAS FACFAOM  Podiatric Foot & Ankle Surgeon  [facility]  [phone]

## 2018-12-13 NOTE — PATIENT INSTRUCTIONS
Thank you for choosing [facility]!    DR. BATES'S CLINIC LOCATIONS:   MONDAY - [place] TUESDAY - [place]   [address] [facility]  [address]   [place] [address] [place], [place] [id number] [phone]       THURSDAY AM - [place] THURSDAY PM - [place]   [address] [facility] [address]   [place] [address] [place], [place] [id number] [phone]       FRIDAY AM - [place] SET UP SURGERY: [phone] [address] APPOINTMENTS: [phone]   [place] [address] BILLING QUESTIONS: [phone] FAX NUMBER: [phone]     Follow Up:  As needed    FOOT CARE NURSES  If you are interested in having a foot care nurse come out to your   home, please call one of these contacts for more information:    [facility]  Shirley Villarreala  [phone]  (Travels to your home) [facility]  [phone]  (Travels to your home)   Zac Lambert DPM  [address] [place] [address] [phone] [facility]  [phone]  (Travels to your home)   [facility]  [address] [place], [place] [id number] [phone] [facility]  [phone]  [facility]   Radha Pacheco, KINGAM  [address], [place], [place] [address] [phone] [facility]   [phone]  [facility]   [facility]  [phone]  [facility]  (does not take BCBS) [facility]   [phone]             FYI: Body Mass Index (BMI)  Many things can cause foot and ankle problems. Foot structure, activity level, foot mechanics and injuries are common causes of pain. One very important issue that often goes unmentioned, is body weight. Extra weight can cause increased stress on muscles, ligaments, bones and tendons. Sometimes just a few extra pounds is all it takes to put one over her/his threshold. Without reducing that stress, it can be difficult to alleviate pain. Some people  are uncomfortable addressing this issue, but we feel it is important for you to think about it. As Foot &  Ankle specialists, our job is addressing the lower extremity problem and possible causes. Regarding extra body weight, we encourage patients to discuss diet and weight management plans with their primary care doctors. It is this team approach that gives you the best opportunity for pain relief and getting you back on your feet.

## 2018-12-21 ENCOUNTER — TELEPHONE (OUTPATIENT)
Dept: FAMILY MEDICINE | Facility: CLINIC | Age: 83
End: 2018-12-21

## 2018-12-21 NOTE — TELEPHONE ENCOUNTER
Patient having black stools for a week. Huddled with provider and gave patient ok to wait until tomorrow for appointment. Advised to seek immediate medical care if he develops fever, headache, abdominal pain, nausea, or diarrhea.

## 2018-12-22 ENCOUNTER — APPOINTMENT (OUTPATIENT)
Dept: CARDIOLOGY | Facility: CLINIC | Age: 83
DRG: 378 | End: 2018-12-22
Attending: INTERNAL MEDICINE
Payer: COMMERCIAL

## 2018-12-22 ENCOUNTER — APPOINTMENT (OUTPATIENT)
Dept: GENERAL RADIOLOGY | Facility: CLINIC | Age: 83
DRG: 378 | End: 2018-12-22
Attending: EMERGENCY MEDICINE
Payer: COMMERCIAL

## 2018-12-22 ENCOUNTER — HOSPITAL ENCOUNTER (INPATIENT)
Facility: CLINIC | Age: 83
LOS: 2 days | Discharge: HOME | DRG: 378 | End: 2018-12-24
Attending: EMERGENCY MEDICINE | Admitting: INTERNAL MEDICINE
Payer: COMMERCIAL

## 2018-12-22 ENCOUNTER — APPOINTMENT (OUTPATIENT)
Dept: CT IMAGING | Facility: CLINIC | Age: 83
DRG: 378 | End: 2018-12-22
Attending: EMERGENCY MEDICINE
Payer: COMMERCIAL

## 2018-12-22 DIAGNOSIS — Z86.711 HISTORY OF PULMONARY EMBOLISM: ICD-10-CM

## 2018-12-22 DIAGNOSIS — K92.2 GASTROINTESTINAL HEMORRHAGE, UNSPECIFIED GASTROINTESTINAL HEMORRHAGE TYPE: Primary | ICD-10-CM

## 2018-12-22 DIAGNOSIS — I48.0 PAROXYSMAL ATRIAL FIBRILLATION (H): ICD-10-CM

## 2018-12-22 DIAGNOSIS — K25.4 GASTROINTESTINAL HEMORRHAGE ASSOCIATED WITH GASTRIC ULCER: ICD-10-CM

## 2018-12-22 DIAGNOSIS — R55 SYNCOPE, UNSPECIFIED SYNCOPE TYPE: ICD-10-CM

## 2018-12-22 DIAGNOSIS — R79.89 ELEVATED TROPONIN: ICD-10-CM

## 2018-12-22 LAB
ABO + RH BLD: NORMAL
ABO + RH BLD: NORMAL
ALBUMIN SERPL-MCNC: 2.4 G/DL (ref 3.4–5)
ALP SERPL-CCNC: 35 U/L (ref 40–150)
ALT SERPL W P-5'-P-CCNC: 26 U/L (ref 0–70)
ANION GAP SERPL CALCULATED.3IONS-SCNC: 9 MMOL/L (ref 3–14)
AST SERPL W P-5'-P-CCNC: 18 U/L (ref 0–45)
BASOPHILS # BLD AUTO: 0.2 10E9/L (ref 0–0.2)
BASOPHILS NFR BLD AUTO: 1.2 %
BILIRUB SERPL-MCNC: 0.2 MG/DL (ref 0.2–1.3)
BLD GP AB SCN SERPL QL: NORMAL
BLD PROD TYP BPU: NORMAL
BLD PROD TYP BPU: NORMAL
BLD UNIT ID BPU: 0
BLOOD BANK CMNT PATIENT-IMP: NORMAL
BLOOD PRODUCT CODE: NORMAL
BPU ID: NORMAL
BUN SERPL-MCNC: 72 MG/DL (ref 7–30)
CALCIUM SERPL-MCNC: 8 MG/DL (ref 8.5–10.1)
CHLORIDE SERPL-SCNC: 114 MMOL/L (ref 94–109)
CO2 SERPL-SCNC: 25 MMOL/L (ref 20–32)
CREAT SERPL-MCNC: 1.11 MG/DL (ref 0.66–1.25)
DIFFERENTIAL METHOD BLD: ABNORMAL
EOSINOPHIL # BLD AUTO: 0.2 10E9/L (ref 0–0.7)
EOSINOPHIL NFR BLD AUTO: 1.2 %
ERYTHROCYTE [DISTWIDTH] IN BLOOD BY AUTOMATED COUNT: 16.8 % (ref 10–15)
GFR SERPL CREATININE-BSD FRML MDRD: 61 ML/MIN/{1.73_M2}
GLUCOSE SERPL-MCNC: 133 MG/DL (ref 70–99)
HCT VFR BLD AUTO: 25.5 % (ref 40–53)
HGB BLD-MCNC: 6.5 G/DL (ref 13.3–17.7)
HGB BLD-MCNC: 7.4 G/DL (ref 13.3–17.7)
HGB BLD-MCNC: 7.6 G/DL (ref 13.3–17.7)
HGB BLD-MCNC: 8.4 G/DL (ref 13.3–17.7)
IMM GRANULOCYTES # BLD: 0.2 10E9/L (ref 0–0.4)
IMM GRANULOCYTES NFR BLD: 1.2 %
INR PPP: 1.57 (ref 0.86–1.14)
INR PPP: 3.99 (ref 0.86–1.14)
INTERPRETATION ECG - MUSE: NORMAL
LYMPHOCYTES # BLD AUTO: 3.6 10E9/L (ref 0.8–5.3)
LYMPHOCYTES NFR BLD AUTO: 26.1 %
MCH RBC QN AUTO: 30.5 PG (ref 26.5–33)
MCHC RBC AUTO-ENTMCNC: 32.9 G/DL (ref 31.5–36.5)
MCV RBC AUTO: 93 FL (ref 78–100)
MONOCYTES # BLD AUTO: 0.5 10E9/L (ref 0–1.3)
MONOCYTES NFR BLD AUTO: 3.5 %
NEUTROPHILS # BLD AUTO: 9.3 10E9/L (ref 1.6–8.3)
NEUTROPHILS NFR BLD AUTO: 66.8 %
NRBC # BLD AUTO: 0.1 10*3/UL
NRBC BLD AUTO-RTO: 1 /100
NUM BPU REQUESTED: 2
PLATELET # BLD AUTO: 500 10E9/L (ref 150–450)
POTASSIUM SERPL-SCNC: 4 MMOL/L (ref 3.4–5.3)
PROT SERPL-MCNC: 4.7 G/DL (ref 6.8–8.8)
RBC # BLD AUTO: 2.75 10E12/L (ref 4.4–5.9)
SODIUM SERPL-SCNC: 148 MMOL/L (ref 133–144)
SPECIMEN EXP DATE BLD: NORMAL
TRANSFUSION STATUS PATIENT QL: NORMAL
TRANSFUSION STATUS PATIENT QL: NORMAL
TROPONIN I SERPL-MCNC: 0.08 UG/L (ref 0–0.04)
TROPONIN I SERPL-MCNC: 0.09 UG/L (ref 0–0.04)
UPPER GI ENDOSCOPY: NORMAL
WBC # BLD AUTO: 13.9 10E9/L (ref 4–11)

## 2018-12-22 PROCEDURE — 25000125 ZZHC RX 250: Performed by: EMERGENCY MEDICINE

## 2018-12-22 PROCEDURE — 74177 CT ABD & PELVIS W/CONTRAST: CPT

## 2018-12-22 PROCEDURE — C9113 INJ PANTOPRAZOLE SODIUM, VIA: HCPCS | Performed by: INTERNAL MEDICINE

## 2018-12-22 PROCEDURE — 86900 BLOOD TYPING SEROLOGIC ABO: CPT | Performed by: EMERGENCY MEDICINE

## 2018-12-22 PROCEDURE — 25000128 H RX IP 250 OP 636: Performed by: EMERGENCY MEDICINE

## 2018-12-22 PROCEDURE — C9113 INJ PANTOPRAZOLE SODIUM, VIA: HCPCS | Performed by: EMERGENCY MEDICINE

## 2018-12-22 PROCEDURE — 36415 COLL VENOUS BLD VENIPUNCTURE: CPT | Performed by: INTERNAL MEDICINE

## 2018-12-22 PROCEDURE — 88305 TISSUE EXAM BY PATHOLOGIST: CPT | Performed by: INTERNAL MEDICINE

## 2018-12-22 PROCEDURE — 84484 ASSAY OF TROPONIN QUANT: CPT | Performed by: EMERGENCY MEDICINE

## 2018-12-22 PROCEDURE — 99291 CRITICAL CARE FIRST HOUR: CPT

## 2018-12-22 PROCEDURE — 80053 COMPREHEN METABOLIC PANEL: CPT | Performed by: EMERGENCY MEDICINE

## 2018-12-22 PROCEDURE — 0DB68ZX EXCISION OF STOMACH, VIA NATURAL OR ARTIFICIAL OPENING ENDOSCOPIC, DIAGNOSTIC: ICD-10-PCS | Performed by: INTERNAL MEDICINE

## 2018-12-22 PROCEDURE — 96374 THER/PROPH/DIAG INJ IV PUSH: CPT

## 2018-12-22 PROCEDURE — C9132 KCENTRA, PER I.U.: HCPCS

## 2018-12-22 PROCEDURE — 25000128 H RX IP 250 OP 636: Performed by: INTERNAL MEDICINE

## 2018-12-22 PROCEDURE — 96375 TX/PRO/DX INJ NEW DRUG ADDON: CPT

## 2018-12-22 PROCEDURE — 25000555 ZZHC RX FACTOR IP 250 OP 636

## 2018-12-22 PROCEDURE — 85018 HEMOGLOBIN: CPT | Performed by: INTERNAL MEDICINE

## 2018-12-22 PROCEDURE — 27210995 ZZH RX 272: Performed by: INTERNAL MEDICINE

## 2018-12-22 PROCEDURE — 85610 PROTHROMBIN TIME: CPT | Performed by: EMERGENCY MEDICINE

## 2018-12-22 PROCEDURE — 85025 COMPLETE CBC W/AUTO DIFF WBC: CPT | Performed by: EMERGENCY MEDICINE

## 2018-12-22 PROCEDURE — 43239 EGD BIOPSY SINGLE/MULTIPLE: CPT | Performed by: INTERNAL MEDICINE

## 2018-12-22 PROCEDURE — 99223 1ST HOSP IP/OBS HIGH 75: CPT | Mod: AI | Performed by: INTERNAL MEDICINE

## 2018-12-22 PROCEDURE — 85610 PROTHROMBIN TIME: CPT | Performed by: INTERNAL MEDICINE

## 2018-12-22 PROCEDURE — 93306 TTE W/DOPPLER COMPLETE: CPT | Mod: 26 | Performed by: INTERNAL MEDICINE

## 2018-12-22 PROCEDURE — 86901 BLOOD TYPING SEROLOGIC RH(D): CPT | Performed by: EMERGENCY MEDICINE

## 2018-12-22 PROCEDURE — G0500 MOD SEDAT ENDO SERVICE >5YRS: HCPCS | Performed by: INTERNAL MEDICINE

## 2018-12-22 PROCEDURE — 12000000 ZZH R&B MED SURG/OB

## 2018-12-22 PROCEDURE — 25500064 ZZH RX 255 OP 636: Performed by: INTERNAL MEDICINE

## 2018-12-22 PROCEDURE — 86850 RBC ANTIBODY SCREEN: CPT | Performed by: EMERGENCY MEDICINE

## 2018-12-22 PROCEDURE — 88342 IMHCHEM/IMCYTCHM 1ST ANTB: CPT | Mod: 26 | Performed by: INTERNAL MEDICINE

## 2018-12-22 PROCEDURE — 88341 IMHCHEM/IMCYTCHM EA ADD ANTB: CPT | Mod: 26 | Performed by: INTERNAL MEDICINE

## 2018-12-22 PROCEDURE — 86923 COMPATIBILITY TEST ELECTRIC: CPT | Performed by: EMERGENCY MEDICINE

## 2018-12-22 PROCEDURE — 25000128 H RX IP 250 OP 636

## 2018-12-22 PROCEDURE — 25000125 ZZHC RX 250: Performed by: INTERNAL MEDICINE

## 2018-12-22 PROCEDURE — 88305 TISSUE EXAM BY PATHOLOGIST: CPT | Mod: 26 | Performed by: INTERNAL MEDICINE

## 2018-12-22 PROCEDURE — 84484 ASSAY OF TROPONIN QUANT: CPT | Performed by: INTERNAL MEDICINE

## 2018-12-22 PROCEDURE — 71045 X-RAY EXAM CHEST 1 VIEW: CPT

## 2018-12-22 PROCEDURE — 88342 IMHCHEM/IMCYTCHM 1ST ANTB: CPT | Performed by: INTERNAL MEDICINE

## 2018-12-22 PROCEDURE — 88341 IMHCHEM/IMCYTCHM EA ADD ANTB: CPT | Performed by: INTERNAL MEDICINE

## 2018-12-22 PROCEDURE — P9016 RBC LEUKOCYTES REDUCED: HCPCS | Performed by: EMERGENCY MEDICINE

## 2018-12-22 PROCEDURE — 40000264 ECHOCARDIOGRAM COMPLETE

## 2018-12-22 RX ORDER — POTASSIUM CHLORIDE 29.8 MG/ML
20 INJECTION INTRAVENOUS
Status: DISCONTINUED | OUTPATIENT
Start: 2018-12-22 | End: 2018-12-24 | Stop reason: HOSPADM

## 2018-12-22 RX ORDER — POTASSIUM CHLORIDE 1.5 G/1.58G
20-40 POWDER, FOR SOLUTION ORAL
Status: DISCONTINUED | OUTPATIENT
Start: 2018-12-22 | End: 2018-12-24 | Stop reason: HOSPADM

## 2018-12-22 RX ORDER — POTASSIUM CHLORIDE 1500 MG/1
20-40 TABLET, EXTENDED RELEASE ORAL
Status: DISCONTINUED | OUTPATIENT
Start: 2018-12-22 | End: 2018-12-24 | Stop reason: HOSPADM

## 2018-12-22 RX ORDER — ACETAMINOPHEN 650 MG/1
650 SUPPOSITORY RECTAL EVERY 4 HOURS PRN
Status: DISCONTINUED | OUTPATIENT
Start: 2018-12-22 | End: 2018-12-24 | Stop reason: HOSPADM

## 2018-12-22 RX ORDER — ACETAMINOPHEN 325 MG/1
650 TABLET ORAL EVERY 4 HOURS PRN
Status: DISCONTINUED | OUTPATIENT
Start: 2018-12-22 | End: 2018-12-24 | Stop reason: HOSPADM

## 2018-12-22 RX ORDER — NALOXONE HYDROCHLORIDE 0.4 MG/ML
.1-.4 INJECTION, SOLUTION INTRAMUSCULAR; INTRAVENOUS; SUBCUTANEOUS
Status: DISCONTINUED | OUTPATIENT
Start: 2018-12-22 | End: 2018-12-22

## 2018-12-22 RX ORDER — FLUMAZENIL 0.1 MG/ML
0.2 INJECTION, SOLUTION INTRAVENOUS
Status: DISCONTINUED | OUTPATIENT
Start: 2018-12-22 | End: 2018-12-22

## 2018-12-22 RX ORDER — SODIUM CHLORIDE 450 MG/100ML
INJECTION, SOLUTION INTRAVENOUS CONTINUOUS
Status: DISCONTINUED | OUTPATIENT
Start: 2018-12-22 | End: 2018-12-23

## 2018-12-22 RX ORDER — NALOXONE HYDROCHLORIDE 0.4 MG/ML
.1-.4 INJECTION, SOLUTION INTRAMUSCULAR; INTRAVENOUS; SUBCUTANEOUS
Status: ACTIVE | OUTPATIENT
Start: 2018-12-22 | End: 2018-12-23

## 2018-12-22 RX ORDER — LIDOCAINE 40 MG/G
CREAM TOPICAL
Status: DISCONTINUED | OUTPATIENT
Start: 2018-12-22 | End: 2018-12-24 | Stop reason: HOSPADM

## 2018-12-22 RX ORDER — LIDOCAINE 40 MG/G
CREAM TOPICAL
Status: DISCONTINUED | OUTPATIENT
Start: 2018-12-22 | End: 2018-12-22 | Stop reason: HOSPADM

## 2018-12-22 RX ORDER — FLUMAZENIL 0.1 MG/ML
0.2 INJECTION, SOLUTION INTRAVENOUS
Status: ACTIVE | OUTPATIENT
Start: 2018-12-22 | End: 2018-12-23

## 2018-12-22 RX ORDER — METOCLOPRAMIDE HYDROCHLORIDE 5 MG/ML
5 INJECTION INTRAMUSCULAR; INTRAVENOUS EVERY 6 HOURS PRN
Status: DISCONTINUED | OUTPATIENT
Start: 2018-12-22 | End: 2018-12-24 | Stop reason: HOSPADM

## 2018-12-22 RX ORDER — PROCHLORPERAZINE MALEATE 5 MG
5 TABLET ORAL EVERY 6 HOURS PRN
Status: DISCONTINUED | OUTPATIENT
Start: 2018-12-22 | End: 2018-12-24 | Stop reason: HOSPADM

## 2018-12-22 RX ORDER — PROCHLORPERAZINE 25 MG
12.5 SUPPOSITORY, RECTAL RECTAL EVERY 12 HOURS PRN
Status: DISCONTINUED | OUTPATIENT
Start: 2018-12-22 | End: 2018-12-24 | Stop reason: HOSPADM

## 2018-12-22 RX ORDER — ONDANSETRON 2 MG/ML
4 INJECTION INTRAMUSCULAR; INTRAVENOUS EVERY 6 HOURS PRN
Status: DISCONTINUED | OUTPATIENT
Start: 2018-12-22 | End: 2018-12-24 | Stop reason: HOSPADM

## 2018-12-22 RX ORDER — IOPAMIDOL 755 MG/ML
131 INJECTION, SOLUTION INTRAVASCULAR ONCE
Status: COMPLETED | OUTPATIENT
Start: 2018-12-22 | End: 2018-12-22

## 2018-12-22 RX ORDER — NALOXONE HYDROCHLORIDE 0.4 MG/ML
.1-.4 INJECTION, SOLUTION INTRAMUSCULAR; INTRAVENOUS; SUBCUTANEOUS
Status: DISCONTINUED | OUTPATIENT
Start: 2018-12-22 | End: 2018-12-24 | Stop reason: HOSPADM

## 2018-12-22 RX ORDER — MAGNESIUM SULFATE HEPTAHYDRATE 40 MG/ML
4 INJECTION, SOLUTION INTRAVENOUS EVERY 4 HOURS PRN
Status: DISCONTINUED | OUTPATIENT
Start: 2018-12-22 | End: 2018-12-24 | Stop reason: HOSPADM

## 2018-12-22 RX ORDER — ONDANSETRON 2 MG/ML
4 INJECTION INTRAMUSCULAR; INTRAVENOUS ONCE
Status: COMPLETED | OUTPATIENT
Start: 2018-12-22 | End: 2018-12-22

## 2018-12-22 RX ORDER — FENTANYL CITRATE 50 UG/ML
50 INJECTION, SOLUTION INTRAMUSCULAR; INTRAVENOUS
Status: ACTIVE | OUTPATIENT
Start: 2018-12-22 | End: 2018-12-23

## 2018-12-22 RX ORDER — ONDANSETRON 4 MG/1
4 TABLET, ORALLY DISINTEGRATING ORAL EVERY 6 HOURS PRN
Status: DISCONTINUED | OUTPATIENT
Start: 2018-12-22 | End: 2018-12-24 | Stop reason: HOSPADM

## 2018-12-22 RX ORDER — HYDROMORPHONE HYDROCHLORIDE 1 MG/ML
0.2 INJECTION, SOLUTION INTRAMUSCULAR; INTRAVENOUS; SUBCUTANEOUS
Status: DISCONTINUED | OUTPATIENT
Start: 2018-12-22 | End: 2018-12-24 | Stop reason: HOSPADM

## 2018-12-22 RX ORDER — ONDANSETRON 2 MG/ML
INJECTION INTRAMUSCULAR; INTRAVENOUS
Status: COMPLETED
Start: 2018-12-22 | End: 2018-12-22

## 2018-12-22 RX ORDER — NITROGLYCERIN 0.4 MG/1
0.4 TABLET SUBLINGUAL EVERY 5 MIN PRN
Status: DISCONTINUED | OUTPATIENT
Start: 2018-12-22 | End: 2018-12-24 | Stop reason: HOSPADM

## 2018-12-22 RX ORDER — FENTANYL CITRATE 50 UG/ML
INJECTION, SOLUTION INTRAMUSCULAR; INTRAVENOUS PRN
Status: DISCONTINUED | OUTPATIENT
Start: 2018-12-22 | End: 2018-12-22 | Stop reason: HOSPADM

## 2018-12-22 RX ORDER — FENTANYL CITRATE 50 UG/ML
25 INJECTION, SOLUTION INTRAMUSCULAR; INTRAVENOUS EVERY 5 MIN PRN
Status: ACTIVE | OUTPATIENT
Start: 2018-12-22 | End: 2018-12-23

## 2018-12-22 RX ORDER — POTASSIUM CHLORIDE 7.45 MG/ML
10 INJECTION INTRAVENOUS
Status: DISCONTINUED | OUTPATIENT
Start: 2018-12-22 | End: 2018-12-24 | Stop reason: HOSPADM

## 2018-12-22 RX ORDER — POTASSIUM CL/LIDO/0.9 % NACL 10MEQ/0.1L
10 INTRAVENOUS SOLUTION, PIGGYBACK (ML) INTRAVENOUS
Status: DISCONTINUED | OUTPATIENT
Start: 2018-12-22 | End: 2018-12-24 | Stop reason: HOSPADM

## 2018-12-22 RX ADMIN — ONDANSETRON 4 MG: 2 INJECTION INTRAMUSCULAR; INTRAVENOUS at 05:14

## 2018-12-22 RX ADMIN — IOPAMIDOL 131 ML: 755 INJECTION, SOLUTION INTRAVENOUS at 06:30

## 2018-12-22 RX ADMIN — PHYTONADIONE 2 MG: 2 INJECTION, EMULSION INTRAMUSCULAR; INTRAVENOUS; SUBCUTANEOUS at 07:19

## 2018-12-22 RX ADMIN — HUMAN ALBUMIN MICROSPHERES AND PERFLUTREN 9 ML: 10; .22 INJECTION, SOLUTION INTRAVENOUS at 11:19

## 2018-12-22 RX ADMIN — PANTOPRAZOLE SODIUM 40 MG: 40 INJECTION, POWDER, FOR SOLUTION INTRAVENOUS at 05:19

## 2018-12-22 RX ADMIN — SODIUM CHLORIDE: 4.5 INJECTION, SOLUTION INTRAVENOUS at 09:45

## 2018-12-22 RX ADMIN — SODIUM CHLORIDE: 4.5 INJECTION, SOLUTION INTRAVENOUS at 19:21

## 2018-12-22 RX ADMIN — SODIUM CHLORIDE 80 ML: 9 INJECTION, SOLUTION INTRAVENOUS at 06:30

## 2018-12-22 RX ADMIN — PANTOPRAZOLE SODIUM 40 MG: 40 INJECTION, POWDER, FOR SOLUTION INTRAVENOUS at 20:53

## 2018-12-22 ASSESSMENT — ACTIVITIES OF DAILY LIVING (ADL)
ADLS_ACUITY_SCORE: 12
ADLS_ACUITY_SCORE: 17
ADLS_ACUITY_SCORE: 16

## 2018-12-22 ASSESSMENT — MIFFLIN-ST. JEOR
SCORE: 1901.48
SCORE: 1887.87

## 2018-12-22 ASSESSMENT — ENCOUNTER SYMPTOMS
WEAKNESS: 1
BLOOD IN STOOL: 1
NAUSEA: 1

## 2018-12-22 NOTE — H&P
Admitted:     12/22/2018      PRIMARY CARE PHYSICIAN:  Zac Salgado MD      CODE STATUS:  Full code, discussed with the patient and his family at bedside.      CHIEF COMPLAINT:  Weakness and black stool.      HISTORY OF PRESENT ILLNESS:  Mr. 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  History is obtained through discussion with the ED physician, the patient, and his family at bedside.        The patient notes that he has been feeling unwell for about 1 week now and during that same time has been having dark black stools which are formed and fairly hard.  He has not had any bright red blood in his stool.  He notes that during the same time, he has been getting progressively weaker and has not felt himself.  Today, he also had some shortness of breath, though wife noted that he was talking in complete sentences and denied any chest pain.  They called their daughter, who is a pediatrician, and also summoned EMS to bring him to the Emergency Department.  The patient notes that while he has had dark black stools, he has also had some nausea, though has not had any emesis.  He has had poor p.o. intake recently because of this.  He denies any specific abdominal pain, but felt like he may have had some discomfort because he has not eaten much for the past couple of days.  He denies any abdominal pain associated with eating over the past couple of weeks.  He is on long-term Coumadin because of the atrial fibrillation.  He did have a retroperitoneal bleed a number of years ago when he first started Coumadin, but has otherwise not had any other substantial bleeding issues other than easy bruising.  No other GI bleed that he is aware of.  He did have a partial colectomy for a perforated bowel a number of years ago as  well.  This was thought due to excessive prednisone use for his myasthenia gravis.      After EMS was summoned, the patient was reported to have a syncopal episode in the EMS rig.  He was brought to the Emergency Department where he was initially hypotensive, but this improved with IV fluids.  Hemoglobin was checked and noted to be 8.4, down from 11 just 1 month ago.  Troponin also elevated to 0.094.  He was given 2.5 liters of IV fluids, a dose of Protonix, and typed and screened.  GI was consulted, who saw him down in the Emergency Department.  When I saw him up on the sixth floor, he was resting fairly comfortably and no longer had any shortness of breath.  He denies any chest discomfort, abdominal discomfort, nausea at this time.  Of note, he did have some right flank discomfort in the ED which prompted a CT of the abdomen and pelvis, given his history of retroperitoneal bleed.  There was no evidence of bleed on this imaging test.      PAST MEDICAL HISTORY:   1.  Atrial fibrillation.   2.  PE approximately 10 years ago.   3.  Hypertension.   4.  Partial colectomy due to perforated bowel from prednisone use.   5.  Retroperitoneal bleed while on Coumadin.   6.  Dyslipidemia.   7.  Gout.   8.  Vitamin D deficiency.   9.  Osteoarthritis.   10.  MARTHA.   11.  Actinic keratosis.   12.  Myasthenia gravis.   13.  Meningioma.   14.  Status post right hip replacement.      MEDICATIONS:  Reviewed in [facility]     SOCIAL HISTORY:  The patient quit smoking in the 1960s and drinks 2-4 alcoholic drinks per week.      FAMILY HISTORY:  Father had glaucoma and heart disease, having a bypass surgery.      ALLERGIES:  NO KNOWN DRUG ALLERGIES.      REVIEW OF SYSTEMS:  A complete review of systems was reviewed and negative except for the pertinent positives which are recorded in the HPI.      PHYSICAL EXAMINATION:   VITAL SIGNS:  Blood pressure of 141/55, heart rate 81, respirations 16, satting 96% on room air, temperature 98 degrees  Fahrenheit.   GENERAL:  The patient is lying in bed and appears comfortable.   HEENT:  Pupils equal and reactive to light, extraocular muscle function intact.  No scleral icterus.  Oropharynx is clear.   NECK:  No lymphadenopathy or thyromegaly.   CARDIOVASCULAR:  Heart is irregular but a normal rate.  I do not appreciate any murmurs, rubs, or gallops.   PULMONARY:  Lungs are clear to auscultation bilaterally without wheeze or rhonchi.   GASTROINTESTINAL:  Positive bowel sounds, soft, nontender, and nondistended.   SKIN:  No rashes or lesions.   LYMPHATICS:  No peripheral edema.   PSYCHIATRIC:  Alert, oriented x 3, normal affect.   NEUROLOGIC:  Cranial nerves II-XII grossly intact.  No focal deficits.      LABORATORY DATA:  WBC count 13.9, hemoglobin 8.4, and platelets of 500.  Sodium 148, potassium 4.0, chloride 114, CO2 of 25, BUN 72, creatinine 1.11, with unremarkable LFTs.  Troponin 0.094 with an INR of 3.99.        An abdominal CT shows equivocal gastric cardia wall thickening of undetermined etiology.  There is also a 1.5 cm nodule in the right lung base with recommendation for a 6-month followup CT.      I personally reviewed his EKG, which shows what appears to be atrial fibrillation.  There are some nonspecific T-wave changes, which on the previous EKG looks to still be present, though the waveform is fairly flat, so somewhat difficult to interpret.      ASSESSMENT AND PLAN:  Mr. Teixeira is an 83-year-old male with a past medical history significant for atrial fibrillation on Coumadin, previous PE, myasthenia gravis, previous retroperitoneal bleed, and a prior partial colectomy, who presents to the Emergency Department with black stool times a week, weakness, and syncope.   1.  Likely acute upper gastrointestinal bleed with acute blood loss anemia:  With the gastric wall thickening on CT and the melena, I do wonder about a gastric ulcer.  INR is elevated, so Coumadin has been held, and he received 2 mg IV  vitamin K and Kcentra in the ED.  We are going to repeat an INR at 10, and I will follow q.6 hour hemoglobins as well.  He has been typed and screened and consented with plans to transfuse for hemoglobin of 7 or less.  We will continue with IV fluids, make him n.p.o., and consult GI for likely endoscopy.  Will continue with b.i.d. Protonix IV.   2.  Syncope:  Likely due to his acute blood loss anemia and hypovolemia.  He has been volume resuscitated and blood pressure is now in the normal range.  Will continue to treat GI bleed as above, transfusing as needed, and also obtain echocardiogram.   3.  Mildly elevated troponin:  In the setting of acute blood loss anemia and atrial fibrillation.  He is anticoagulated with Coumadin.  He denies any chest discomfort, and his EKG has some nonspecific changes.  I do think this is probably a demand ischemia due to his underlying medical issues and does not represent an acute MI.  I am going to continue on telemetry, trend his troponins, and obtain an echocardiogram.   4.  Incidental right lung nodule of 1.5 cm:  Patient does have a distant history of smoking about 50 years ago.  Recommendation for repeat CT scan in 6 months, which can be done with his PCP.   5.  Mild thrombocytosis:  Patient has had high normal platelet counts most recently.  Suspect there is some delusional components here.  Will follow.   6.  Mild leukocytosis:  In the setting of GI bleed.  He does not have any infectious type symptoms at this point, but will monitor for any fever and plan to repeat CBC tomorrow.   7.  Dyslipidemia:  Will hold PTA statin.   8.  Deep venous thrombosis prophylaxis:  SCDs.      DISPOSITION:  Brought in under inpatient status, as I anticipate at least 2 nights in the hospital.         ANN DUFFY DO             D: 2018   T: 2018   MT: SUMANTH      Name:     JOSHUA MELTON   MRN:      [id number]        Account:      [id number]   :      1935        Admitted:      12/22/2018                   Document: [id number]

## 2018-12-22 NOTE — ED NOTES
"[facility]  ED Nurse Handoff Report    ED Chief complaint: Rectal Bleeding (Patient reports tarry stools over past week. Tonight found on toilet by Medics, weak, witnessed syncope with medics. Patient on Warfarin )      ED Diagnosis:   Final diagnoses:   None       Code Status: Full Code----according to prior visits    Allergies: No Known Allergies    Activity level - Baseline/Home:  Independent    Activity Level - Current:  With assist 1-2     Needed?: No    Isolation: No  Infection: Not Applicable  Bariatric?: No    Vital Signs:   Vitals:    12/22/18 0500 12/22/18 0503   BP: 114/68    Resp: 18    Temp: 96.8  F (36  C)    TempSrc: Temporal    SpO2:  93%   Weight: 117.9 kg (260 lb)    Height: 1.803 m (5' 11\")        Cardiac Rhythm: ,   Cardiac  Cardiac Rhythm: Atrial fibrillation    Pain level: 0-10 Pain Scale: 0    Is this patient confused?: No   Does this patient have a guardian?  No         If yes, is there guardianship documents in the Epic \"Code/ACP\" activity?  N/A         Guardian Notified?  N/A  [place] - Suicide Severity Rating Scale Completed?  Yes  If yes, what color did the patient score?  White    Patient Report: Initial Complaint: rectal bleeding  Focused Assessment: pt. Reports having black stools over past week. He called EMS and tonight found on toilet by medics who witnessed syncope.  Pt. On warfarin.  History of GI bleeds.    Tests Performed: labs, ekg, cxr, CT abdomen/pelvis  Abnormal Results:   Results for orders placed or performed during the hospital encounter of 12/22/18   Chest  XR, 1 view PORTABLE    Narrative    CHEST SINGLE VIEW PORTABLE  12/22/2018 5:53 AM     HISTORY: Gastrointestinal bleeding. Evaluate for free air.    COMPARISON: None.    FINDINGS: A band-like opacity in the mid left lung likely represents  scarring or atelectasis. The lungs are otherwise clear. Normal-sized  cardiac silhouette. Tortuous or ectatic thoracic aorta. No free  intraperitoneal " gas is visualized under the diaphragm.      Impression    IMPRESSION:  1. No free intraperitoneal gas is visualized under the diaphragm.  2. No evidence of active cardiopulmonary disease.    BRAN FRANCE MD   CBC with platelets differential   Result Value Ref Range    WBC 13.9 (H) 4.0 - 11.0 10e9/L    RBC Count 2.75 (L) 4.4 - 5.9 10e12/L    Hemoglobin 8.4 (L) 13.3 - 17.7 g/dL    Hematocrit 25.5 (L) 40.0 - 53.0 %    MCV 93 78 - 100 fl    MCH 30.5 26.5 - 33.0 pg    MCHC 32.9 31.5 - 36.5 g/dL    RDW 16.8 (H) 10.0 - 15.0 %    Platelet Count 500 (H) 150 - 450 10e9/L    Diff Method Automated Method     % Neutrophils 66.8 %    % Lymphocytes 26.1 %    % Monocytes 3.5 %    % Eosinophils 1.2 %    % Basophils 1.2 %    % Immature Granulocytes 1.2 %    Nucleated RBCs 1 (H) 0 /100    Absolute Neutrophil 9.3 (H) 1.6 - 8.3 10e9/L    Absolute Lymphocytes 3.6 0.8 - 5.3 10e9/L    Absolute Monocytes 0.5 0.0 - 1.3 10e9/L    Absolute Eosinophils 0.2 0.0 - 0.7 10e9/L    Absolute Basophils 0.2 0.0 - 0.2 10e9/L    Abs Immature Granulocytes 0.2 0 - 0.4 10e9/L    Absolute Nucleated RBC 0.1    Comprehensive metabolic panel   Result Value Ref Range    Sodium 148 (H) 133 - 144 mmol/L    Potassium 4.0 3.4 - 5.3 mmol/L    Chloride 114 (H) 94 - 109 mmol/L    Carbon Dioxide 25 20 - 32 mmol/L    Anion Gap 9 3 - 14 mmol/L    Glucose 133 (H) 70 - 99 mg/dL    Urea Nitrogen 72 (H) 7 - 30 mg/dL    Creatinine 1.11 0.66 - 1.25 mg/dL    GFR Estimate 61 >60 mL/min/[1.73_m2]    GFR Estimate If Black 71 >60 mL/min/[1.73_m2]    Calcium 8.0 (L) 8.5 - 10.1 mg/dL    Bilirubin Total 0.2 0.2 - 1.3 mg/dL    Albumin 2.4 (L) 3.4 - 5.0 g/dL    Protein Total 4.7 (L) 6.8 - 8.8 g/dL    Alkaline Phosphatase 35 (L) 40 - 150 U/L    ALT 26 0 - 70 U/L    AST 18 0 - 45 U/L   Troponin I   Result Value Ref Range    Troponin I ES 0.094 (H) 0.000 - 0.045 ug/L   INR   Result Value Ref Range    INR 3.99 (H) 0.86 - 1.14   ABO/Rh type and screen   Result Value Ref Range    Units  Ordered 1     ABO A     RH(D) Pos     Antibody Screen Neg     Test Valid Only At [facility]        Specimen Expires 12/25/2018     Crossmatch Red Blood Cells    Blood component   Result Value Ref Range    Unit Number [id number]     Blood Component Type Red Blood Cells Leukocyte Reduced     Division Number 00     Status of Unit Ready for patient 12/22/2018 0621     Blood Product Code I7426P03     Unit Status JENNIFER    Blood component   Result Value Ref Range    Unit Number [id number]     Blood Component Type Red Blood Cells Leukocyte Reduced     Division Number 00     Status of Unit Ready for patient 12/22/2018 0621     Blood Product Code X7845K17     Unit Status JENNIFER        Treatments provided: IV protonix, IV zofran, IV saline, and IV aqua-mephyton and IV kcentra pending.      Family Comments: wife and daughter at bedside    OBS brochure/video discussed/provided to patient/family: N/A              Name of person given brochure if not patient: na              Relationship to patient: na    ED Medications:   Medications   iopamidol (ISOVUE-370) solution 131 mL (not administered)   Saline flush (not administered)   phytonadione (AQUA-MEPHYTON) 2 mg in sodium chloride 0.9 % 50 mL intermittent infusion (not administered)   prothrombin 4 factor complex concentrate (KCENTRA) infusion 2,300 Units (not administered)   pantoprazole (PROTONIX) 40 mg IV push injection (40 mg Intravenous Given 12/22/18 0519)   ondansetron (ZOFRAN) injection 4 mg (4 mg Intravenous Given 12/22/18 0514)       Drips infusing?:  Yes    For the majority of the shift this patient was Green.   Interventions performed were routine cares and interventions.    Severe Sepsis OR Septic Shock Diagnosis Present: No    To be done/followed up on inpatient unit:      ED NURSE PHONE NUMBER: *[phone]

## 2018-12-22 NOTE — PROGRESS NOTES
RECEIVING UNIT ED HANDOFF REVIEW    ED Nurse Handoff Report was reviewed by: Ahsan Ugalde on December 22, 2018 at 8:00 AM

## 2018-12-22 NOTE — ED NOTES
Bed: [facility]  Expected date: 12/22/18  Expected time: 4:50 AM  Means of arrival: Ambulance  Comments:  HEMS 426 83M GI Bleed

## 2018-12-22 NOTE — PLAN OF CARE
Admit from ED this AM.  AOx4, VSS on r/a.  Denies pain.  Up with 1 for generalized weakness.  No BM this shift.  hgb 7.6 now, serial checks Q6.  INR 1.57.  Regular diet after endo today.  Endo reports no active bleed, per phone report. Wife and family at bedside, supportive in care, and updated by writer and MD.  IVF infusing at 100 mL/hr.  Na 148 this AM.      Admission    Patient arrives to room 614-1 via cart from the ED.    Inpatient nursing criteria listed below were met:    PCD's Documented: yes  Skin issues/needs documented: n/a  Isolation education started/completed: n/a  Patient allergies verified with patient: yes  Verified completion of [place] Risk Assessment Tool:  yes  Verified completion of Guardianship screening tool: yes  Fall Prevention: Care plan updated, Education given and documented yes  Care Plan initiated: YES   Home medications documented in belongings flowsheet: NA  Patient belongings documented in belongings flowsheet: no, admission to be completed  Reminder note (belongings/ medications) placed in discharge instructions:n/a  Admission profile/ required documentation complete: no, patient prefers to sleep.

## 2018-12-22 NOTE — ED PROVIDER NOTES
History     Chief Complaint:  Syncopal episode    HPI   Alessio Teixeira is a 83 year old male with a history of a GI bleed and atrial fibrillation on Coumadin who presents with a syncopal episode and black stools. The patient states that for the last week or so he has had black and tarry stools and this evening he called EMS because he was feeling very weak. He also notes some chest heaviness at that time which has since resolved. EMS arrived and the patient had a syncopal episode while on the toilet which lasted 2 minutes. He was not actively bearing down when he fainted. Afterwards, EMS found the patient to be slightly hypotensive which improved and his most recent blood pressure measurement was 108/60 after 500 mL of saline was administered. Here, in the ED he states he still feels weak, but better than he did when EMS arrived. He denies any chest pain. The patient notes that he had partial bowel resection due to a perforated bowel caused by his long term steroid use for myasthenia gravis and then he had a post-op GI bleed a few days after his colectomy.    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   Marital Status:        Review of Systems   Cardiovascular: Negative for chest pain.   Gastrointestinal: Positive for blood in stool and nausea.   Neurological: Positive for  "weakness.   All other systems reviewed and are negative.      Physical Exam     Patient Vitals for the past 24 hrs:   BP Temp Temp src Pulse Heart Rate Resp SpO2 Height Weight   12/22/18 0800 108/87 -- -- 91 90 10 97 % -- --   12/22/18 0730 (!) 84/50 -- -- 79 80 (!) 7 93 % -- --   12/22/18 0715 92/50 -- -- 80 88 8 92 % -- --   12/22/18 0700 105/56 -- -- 79 80 10 97 % -- --   12/22/18 0645 118/57 -- -- 79 87 9 98 % -- --   12/22/18 0545 100/79 -- -- 82 87 11 98 % -- --   12/22/18 0530 99/61 -- -- 101 81 9 97 % -- --   12/22/18 0515 (!) 87/57 -- -- 93 98 12 100 % -- --   12/22/18 0511 104/63 -- -- 91 98 13 99 % -- --   12/22/18 0503 -- -- -- -- -- -- 93 % -- --   12/22/18 0500 114/68 96.8  F (36  C) Temporal -- 88 18 -- 1.803 m (5' 11\") 117.9 kg (260 lb)         Physical Exam  General: Alert and cooperative with exam. Patient in mild distress. Normal mentation.  Head:  Scalp is NC/AT  Eyes:  No scleral icterus, PERRL  ENT:  The external nose and ears are normal. The oropharynx is normal and without erythema; mucus membranes are moist. Uvula midline, no evidence of deep space infection.  Neck:  Normal range of motion without rigidity.  CV:  Irregularly irregular   Resp:  Breath sounds are clear bilaterally    Non-labored, no retractions or accessory muscle use  GI:  Abdomen is soft, no distension, no tenderness. No peritoneal signs. Obese  MS:  No lower extremity edema. Mild tenderness to posterior flank without overlying skin change.  Skin:  Warm and dry, No rash or lesions noted. Pale complexion  Neuro: Oriented x 3. No gross motor deficits.    Emergency Department Course   ECG:  Time: 0458  Vent. Rate 99 bpm. MI interval *. QRS duration 78. QT/QTc 416/533. P-R-T axis * 46 94. Atrial fibrillation. Nonspecific T wave abnormality. Abnormal ECG. Read time: 0459      Imaging:  Radiographic findings were communicated with the patient who voiced understanding of the findings.  X-ray Chest, 2 views:  1. No free " intraperitoneal gas is visualized under the diaphragm.  2. No evidence of active cardiopulmonary disease  Result per radiology.    CT Abdomen/Pelvis with IV contrast:   1. Equivocal wall thickening of the gastric cardia. Additionally,  there is a mildly enlarged gastrohepatic ligament lymph node. A  gastric ulcer or neoplasm cannot be excluded. If clinically relevant,  an upper endoscopy could be considered for further evaluation.  2. No other cause of acute pain identified in the abdomen or pelvis.  1.5 cm indeterminate right lung base nodule. This contains a small  calcification and could relate to prior granulomatous infection. It is  new since 9/25/2007. A follow-up chest CT could be considered in six  months for reevaluation.  per radiology.       Laboratory:  CBC: WBC: 13.9 (H), HGB: 8.4 (L), PLT: 500 (H)  CMP: Glucose 133 (H), Na: 148 (H), Chloride: 114 (H), BUN: 72 (H), Calcium: 8.0 (L), Albumin: 2.4 (L), Protein total: 4.7 (L), ALKPHOS: 35 (L), o/w WNL (Creatinine: 1.11)  Troponin: 0.094  ABO/Rh: A +  INR: 3.99 (H)    Interventions:  0514 - Zofran 4 mg IV  0514 - NS 1L IV  0519 - Protonix 40 mg IV  0644 - K centra 2300 units IV   Aqua-mephyton 2 mg IV    Emergency Department Course:  Nursing notes and vitals reviewed.  0455: I performed an exam of the patient as documented above.     0539: I rechecked the patient. Explained findings to patient.    0545: I rechecked the patient.    0611: Findings and plan explained to the Patient who consents to admission.     0638: I discussed the case with Dr. Jones of [facility] regarding the patient.    0705: I rechecked the patient. Explained findings to patient.    Discussed the patient with Mesfin Porter DO, who will admit the patient to a medical bed for further monitoring, evaluation, and treatment.     Impression & Plan    Medical Decision Making:  Patient is a 83-year-old male presents status post a syncopal episode and report of black/tarry stools for 1  week; anticoagulated on warfarin secondary to A. fib and history of PE.  Patient's medical history and records were reviewed.  Initial consideration for, not limited to, arrhythmia, GI bleed, electrolyte abnormality, metabolic disturbance, intra-abdominal pathology, among others.  Patient was seen initially in the stabilization room.  EKG obtained and demonstrates known atrial fibrillation with nonspecific T wave inversion.  Patient did report an episode of mild chest discomfort prior to ED evaluation; now resolved.  Labs notable for significant drop in patient's hemoglobin (8.4, down from 16, 1 month ago), supratherapeutic INR (3.99), mildly elevated white count (13.9), and mildly elevated troponin (0.094; likely demand ischemia).  Rectal exam performed and demonstrates hard black stool; Hemoccult positive.  Patient's blood pressures were mildly hypotensive at one point in the upper 80s systolic; responded to IV fluids.  Received a total of 2.5 L NS from EMS/ED.  Patient later complained of increasing right flank pain and does note history of retroperitoneal bleed. Chest x-ray obtained and was negative for free air.  CT abdomen pelvis obtained and demonstrates incidental findings as noted above without acute pathology; patient informed.  Patient was provided 40 mg IV Protonix as well as Zofran for nausea.  Given ongoing bleeding and significant drop in patient's hemoglobin and supratherapeutic INR, after risks and benefits discussion with the patient and family the patient was reversed with K Centra and 2 mg IV vitamin K, per [place] warfarin reversal protocol for moderate risk bleeding.  Case was discussed with GI Dr. Jones.  Patient be admitted to the hospital service for further evaluation and care.     Critical Care time:  was 35 minutes for this patient excluding procedures.    Diagnosis:    ICD-10-CM    1. Gastrointestinal hemorrhage, unspecified gastrointestinal hemorrhage type K92.2    2. Syncope,  unspecified syncope type R55    3. Elevated troponin R74.8      Disposition:  Admitted to hospitalist with GI consultation  I, Usman Vaz, am serving as a scribe on 12/22/2018 at 4:58 AM to personally document services performed by Hakan Grace DO based on my observations and the provider's statements to me.       Usman Vaz  12/22/2018    EMERGENCY DEPARTMENT       Hakan Grace DO  12/22/18 0805

## 2018-12-22 NOTE — PHARMACY-ADMISSION MEDICATION HISTORY
Admission medication history interview status for the 12/22/2018  admission is complete. See EPIC admission navigator for prior to admission medications     Medication history source reliability:Good    Actions taken by pharmacist (provider contacted, etc): Interviewed patient, patient's family had medication list with them     Additional medication history information not noted on PTA med list :None    Medication reconciliation/reorder completed by provider prior to medication history? No    Time spent in this activity: 15 minutes    Prior to Admission medications    Medication Sig Last Dose Taking? Auth Provider   CALCIUM PO Take 600 mg by mouth 2 times daily  12/21/2018 at Unknown time Yes Reported, Patient   furosemide (LASIX) 20 MG tablet TAKE ONE TABLET BY MOUTH ONE TIME DAILY - NEEDS TO BE SEEN FOR FURTHER REFILLS 12/21/2018 at Unknown time Yes Zac Salgado MD   KLOR-CON 20 MEQ CR tablet Take 1 tablet (20 mEq) by mouth daily 12/21/2018 at Unknown time Yes Zac Salgado MD   lovastatin (MEVACOR) 40 MG tablet TAKE TWO TABLETS (80MG) BY MOUTH DAILY AT BEDTIME 12/21/2018 at Unknown time Yes Zac Salgado MD   multivitamin, therapeutic with minerals (THERA-VIT-M) TABS Take 1 tablet by mouth daily 12/21/2018 at Unknown time Yes Reported, Patient   NIACIN CR PO Take 500 mg by mouth 2 times daily (with meals) 2 x 500 mg slow niacin 12/21/2018 at Unknown time Yes Unknown, Entered By History   VITAMIN D, CHOLECALCIFEROL, PO Take 1,000 Units by mouth daily.   12/21/2018 at Unknown time Yes Reported, Patient   warfarin (COUMADIN) 2 MG tablet TAKE 3 AND 1/2 TABLETS (7MG) BY MOUTH DAILY  12/21/2018 at Unknown time Yes Zac Salgado MD   ORDER FOR DME CPAP supplies with variable pressure control   Zac Salgado MD

## 2018-12-22 NOTE — ED NOTES
Patient reporting aching discomfort in right flank area, ongoing for 2-3 days, feels discomfort more when sitting up. Hx of retroperitoneal bleeds. Dr. Elkins advised. CT scan forthcoming to investigate

## 2018-12-23 LAB
ANION GAP SERPL CALCULATED.3IONS-SCNC: 6 MMOL/L (ref 3–14)
BUN SERPL-MCNC: 44 MG/DL (ref 7–30)
CALCIUM SERPL-MCNC: 7.3 MG/DL (ref 8.5–10.1)
CHLORIDE SERPL-SCNC: 117 MMOL/L (ref 94–109)
CO2 SERPL-SCNC: 25 MMOL/L (ref 20–32)
CREAT SERPL-MCNC: 1.02 MG/DL (ref 0.66–1.25)
ERYTHROCYTE [DISTWIDTH] IN BLOOD BY AUTOMATED COUNT: 17.1 % (ref 10–15)
GFR SERPL CREATININE-BSD FRML MDRD: 68 ML/MIN/{1.73_M2}
GLUCOSE SERPL-MCNC: 97 MG/DL (ref 70–99)
HCT VFR BLD AUTO: 21.7 % (ref 40–53)
HGB BLD-MCNC: 7.1 G/DL (ref 13.3–17.7)
HGB BLD-MCNC: 7.1 G/DL (ref 13.3–17.7)
HGB BLD-MCNC: 7.2 G/DL (ref 13.3–17.7)
HGB BLD-MCNC: 7.2 G/DL (ref 13.3–17.7)
MCH RBC QN AUTO: 30.1 PG (ref 26.5–33)
MCHC RBC AUTO-ENTMCNC: 32.7 G/DL (ref 31.5–36.5)
MCV RBC AUTO: 92 FL (ref 78–100)
PLATELET # BLD AUTO: 387 10E9/L (ref 150–450)
POTASSIUM SERPL-SCNC: 3.6 MMOL/L (ref 3.4–5.3)
RBC # BLD AUTO: 2.36 10E12/L (ref 4.4–5.9)
SODIUM SERPL-SCNC: 148 MMOL/L (ref 133–144)
WBC # BLD AUTO: 13.8 10E9/L (ref 4–11)

## 2018-12-23 PROCEDURE — 27210995 ZZH RX 272: Performed by: INTERNAL MEDICINE

## 2018-12-23 PROCEDURE — 36415 COLL VENOUS BLD VENIPUNCTURE: CPT | Performed by: INTERNAL MEDICINE

## 2018-12-23 PROCEDURE — 25000132 ZZH RX MED GY IP 250 OP 250 PS 637: Performed by: INTERNAL MEDICINE

## 2018-12-23 PROCEDURE — 85018 HEMOGLOBIN: CPT | Performed by: INTERNAL MEDICINE

## 2018-12-23 PROCEDURE — 99233 SBSQ HOSP IP/OBS HIGH 50: CPT | Performed by: INTERNAL MEDICINE

## 2018-12-23 PROCEDURE — 12000000 ZZH R&B MED SURG/OB

## 2018-12-23 PROCEDURE — 85027 COMPLETE CBC AUTOMATED: CPT | Performed by: INTERNAL MEDICINE

## 2018-12-23 PROCEDURE — C9113 INJ PANTOPRAZOLE SODIUM, VIA: HCPCS | Performed by: INTERNAL MEDICINE

## 2018-12-23 PROCEDURE — 80048 BASIC METABOLIC PNL TOTAL CA: CPT | Performed by: INTERNAL MEDICINE

## 2018-12-23 PROCEDURE — 25000128 H RX IP 250 OP 636: Performed by: INTERNAL MEDICINE

## 2018-12-23 RX ORDER — POTASSIUM CHLORIDE 1.5 G/1.58G
20 POWDER, FOR SOLUTION ORAL 2 TIMES DAILY
Status: DISCONTINUED | OUTPATIENT
Start: 2018-12-23 | End: 2018-12-24 | Stop reason: HOSPADM

## 2018-12-23 RX ORDER — FUROSEMIDE 20 MG
20 TABLET ORAL DAILY
Status: DISCONTINUED | OUTPATIENT
Start: 2018-12-23 | End: 2018-12-24 | Stop reason: HOSPADM

## 2018-12-23 RX ORDER — SIMVASTATIN 20 MG
20 TABLET ORAL AT BEDTIME
Status: DISCONTINUED | OUTPATIENT
Start: 2018-12-23 | End: 2018-12-24 | Stop reason: HOSPADM

## 2018-12-23 RX ORDER — MULTIVITAMIN,THERAPEUTIC
1 TABLET ORAL DAILY
Status: DISCONTINUED | OUTPATIENT
Start: 2018-12-23 | End: 2018-12-24 | Stop reason: HOSPADM

## 2018-12-23 RX ADMIN — SIMVASTATIN 20 MG: 20 TABLET, FILM COATED ORAL at 21:23

## 2018-12-23 RX ADMIN — PANTOPRAZOLE SODIUM 40 MG: 40 INJECTION, POWDER, FOR SOLUTION INTRAVENOUS at 08:01

## 2018-12-23 RX ADMIN — OYSTER SHELL CALCIUM WITH VITAMIN D 1 TABLET: 500; 200 TABLET, FILM COATED ORAL at 19:08

## 2018-12-23 RX ADMIN — PANTOPRAZOLE SODIUM 40 MG: 40 INJECTION, POWDER, FOR SOLUTION INTRAVENOUS at 21:24

## 2018-12-23 RX ADMIN — POTASSIUM CHLORIDE 20 MEQ: 1.5 POWDER, FOR SOLUTION ORAL at 11:40

## 2018-12-23 RX ADMIN — FUROSEMIDE 20 MG: 20 TABLET ORAL at 11:40

## 2018-12-23 RX ADMIN — POTASSIUM CHLORIDE 20 MEQ: 1.5 POWDER, FOR SOLUTION ORAL at 21:24

## 2018-12-23 RX ADMIN — THERA TABS 1 TABLET: TAB at 11:40

## 2018-12-23 RX ADMIN — SODIUM CHLORIDE: 4.5 INJECTION, SOLUTION INTRAVENOUS at 08:09

## 2018-12-23 RX ADMIN — ACETAMINOPHEN 650 MG: 325 TABLET, FILM COATED ORAL at 22:38

## 2018-12-23 RX ADMIN — OYSTER SHELL CALCIUM WITH VITAMIN D 1 TABLET: 500; 200 TABLET, FILM COATED ORAL at 11:40

## 2018-12-23 ASSESSMENT — ACTIVITIES OF DAILY LIVING (ADL)
DRESS: 0-->INDEPENDENT
BATHING: 0-->INDEPENDENT
ADLS_ACUITY_SCORE: 17
ADLS_ACUITY_SCORE: 16
COGNITION: 0 - NO COGNITION ISSUES REPORTED
FALL_HISTORY_WITHIN_LAST_SIX_MONTHS: NO
AMBULATION: 0-->INDEPENDENT
ADLS_ACUITY_SCORE: 16
ADLS_ACUITY_SCORE: 16
RETIRED_EATING: 0-->INDEPENDENT
TOILETING: 0-->INDEPENDENT
RETIRED_COMMUNICATION: 0-->UNDERSTANDS/COMMUNICATES WITHOUT DIFFICULTY
SWALLOWING: 0-->SWALLOWS FOODS/LIQUIDS WITHOUT DIFFICULTY
ADLS_ACUITY_SCORE: 17
TRANSFERRING: 0-->INDEPENDENT
ADLS_ACUITY_SCORE: 16

## 2018-12-23 NOTE — PLAN OF CARE
VSS. RA. A&Ox4. Denies pain. Tele: A fib with CVR. Denies dizziness/lightheadedness. Up SBA. Tolerating regular diet. Hgb check 7.2. Will continue to monitor. Possible discharge tomorrow.

## 2018-12-23 NOTE — PLAN OF CARE
Patient is A+OX4, VSS on RA. Tele Afib CVR. Blood transfusion completed, rechecked for 7.1 at 0400. Next recheck at 1000. Up with SBA. Denies dizziness/lightheadedness when OOB. Voiding adequately. No bowel movements this shift. Regular diet. Lung sounds clear. Discharge pending.

## 2018-12-23 NOTE — PLAN OF CARE
A&Ox4. VSS on RA. Up with SBA, bed alarm on. Regular diet, tolerating well. Denies pain, nausea, dizziness. Tele afib CVR. LS clear. 1 large BM this shift, dark (brown/black, no red noted). Hgb trending down, 7.4 then 6.5 at 2200, blood transfusion in progress. Nursing will continue to monitor.

## 2018-12-23 NOTE — PROGRESS NOTES
"GASTROENTEROLOGY PROGRESS NOTE    CC:     SUBJECTIVE:  One black stool last PM none today. No pain    OBJECTIVE:  General Appearance:  Brighter awake alert  /71 (BP Location: Right arm)   Pulse 80   Temp 98.4  F (36.9  C) (Oral)   Resp 16   Ht 1.803 m (5' 11\")   Wt 116.6 kg (257 lb)   SpO2 98%   BMI 35.84 kg/m    Temp (24hrs), Av.5  F (36.9  C), Min:97.6  F (36.4  C), Max:99.2  F (37.3  C)    Patient Vitals for the past 72 hrs:   Weight   18 0828 116.6 kg (257 lb)   18 0500 117.9 kg (260 lb)       Intake/Output Summary (Last 24 hours) at 2018 1130  Last data filed at 2018 0632  Gross per 24 hour   Intake 1846 ml   Output --   Net 1846 ml       PHYSICAL EXAM    Lungs: CTA denice  Abd: S ND NT +bs      Additional Comments:  ROS, FH, SH: See initial GI consult for details.    I have reviewed the patient's new clinical lab results:    Recent Labs   Lab Test 18  1042 18  0400 18  2141  18  1127 18  09   WBC  --  13.8*  --   --   --  13.9*  --  10.4   HGB 7.2* 7.1* 6.5*   < > 7.6* 8.4*  --  16.7   MCV  --  92  --   --   --  93  --  91   PLT  --  387  --   --   --  500*  --  405   INR  --   --   --   --  1.57* 3.99* 3.2*  --     < > = values in this interval not displayed.     Recent Labs   Lab Test 18  0400 18  09   POTASSIUM 3.6 4.0 3.9   CHLORIDE 117* 114* 106   CO2 25 25 30   BUN 44* 72* 19   ANIONGAP 6 9 5     Recent Labs   Lab Test 18  04518  0944 18  0912 18  0919 17  0953 16  1106   ALBUMIN 2.4*  --  3.6  --  2.6*  --    BILITOTAL 0.2  --  0.4  --  0.5  --    ALT 26  --  27 27 76*  --    AST 18  --  24 27 45  --    PROTEIN  --  30*  --   --  30* 30*         Active Problems:    GI bleed    Assessment: Improving    Plan:   -Follow hgb, transfusions per  hospitalist  -Hold warfarin total 3-5 days  -await path  -BID PPI x 2 months  -Repeat EGD in 2 " months  -Likely discharge in am        Elizabeth Jones MD  [facility]  Pager: [phone]  Office: [phone]

## 2018-12-23 NOTE — PROGRESS NOTES
[facility]  Hospitalist Progress Note  Date of Service (when I saw the patient): 12/23/2018    Miguel Wellington MD  [facility]  Pager [phone]    Assessment & Plan   83-year-old male with a past medical history significant for atrial fibrillation on Coumadin, previous PE, myasthenia gravis, previous retroperitoneal bleed, and a prior partial colectomy, who presents to the Emergency Department with black stool times a week, weakness, and syncope.     1.  Acute upper gastrointestinal bleed with acute blood loss anemia 2/2 Gastric Ulcer:    - non-bleeding ulcer found on EGD on 12/22  - warfarin on hold since admission, given IV Vitamin K for reversal  - Keep on IV protonix BID for today  - HGB dropped <7 early this AM, received 1U PRBCs, following HGB q6, at present is 7.1  - Conditional transfusion orders in for HGB<7  - Can stop IVFs, has been ok'd for return to regular diet by GI last night  - tolerated diet well, will resume home  meds except for warfarin  - hold warfarin for 5 more days, 12/28 resume    2.  Syncope:    - Likely due to his acute blood loss anemia and hypovolemia.  He has been volume resuscitated and blood pressure is now in the normal range.    - Will continue to treat GI bleed as above, transfusing as needed   - Echo showed no valve concerns    3.  Mildly elevated troponin:   - secondary to acute blood loss anemia, causing demand ischemia  - echo looks good, no WMA  - trop leveled off, do not follow unless symptomatic    4.  Incidental right lung nodule of 1.5 cm:    - Patient does have a distant history of smoking about 50 years ago.  Recommendation for repeat CT scan in 6 months, which can be done with his PCP.     5.  Mild thrombocytosis:    - Patient has had high normal platelet counts most recently.  Suspect there is some delusional components here.  Will follow.     6.  Mild leukocytosis:    - In the setting of GI bleed.  He does not have any  infectious type symptoms at this point, but will monitor for any fever and plan to repeat CBC tomorrow.     7.  Dyslipidemia:    - Resume PTA statin today    Deep venous thrombosis prophylaxis:  SCDs.   Code: FULL  DISPOSITION:  Brought in under inpatient status, as I anticipate at least 2 nights in the hospital.       Miguel Wellington    Interval History   Doing well, required 1u PRBC this AM for HGB <7 but no other issues, BP and HR stable, resumed regular diet and tolerating.      -Data reviewed today: I reviewed all new labs and imaging results over the last 24 hours. I personally reviewed no images or EKG's today.    Physical Exam   Temp: 98.4  F (36.9  C) Temp src: Oral BP: 127/71 Pulse: 80 Heart Rate: 74 Resp: 16 SpO2: 98 % O2 Device: None (Room air) Oxygen Delivery: 3 LPM  Vitals:    12/22/18 0500 12/22/18 0828   Weight: 117.9 kg (260 lb) 116.6 kg (257 lb)     Vital Signs with Ranges  Temp:  [97.6  F (36.4  C)-99.2  F (37.3  C)] 98.4  F (36.9  C)  Pulse:  [80-98] 80  Heart Rate:  [74-95] 74  Resp:  [7-18] 16  BP: ()/(51-71) 127/71  SpO2:  [92 %-100 %] 98 %  I/O last 3 completed shifts:  In: 1846 [P.O.:960; I.V.:886]  Out: -     Constitutional: NAD, afebrile, A+Ox4  Respiratory: CTA B  Cardiovascular: RRR, no murmur  GI: S, NT, ND, normal BS  Skin/Integumen: Dry, warm, no edema, pale      Medications     - MEDICATION INSTRUCTIONS -         calcium carbonate 600 mg-vitamin D 400 units  1 tablet Oral BID w/meals     furosemide  20 mg Oral Daily     lovastatin  40 mg Oral At Bedtime     multivitamin, therapeutic  1 tablet Oral Daily     pantoprazole (PROTONIX) IV  40 mg Intravenous BID     potassium chloride  20 mEq Oral BID     sodium chloride (PF)  3 mL Intracatheter Q8H       Data   Recent Labs   Lab 12/23/18  1042 12/23/18  0400 12/22/18  2141 12/22/18  1610 12/22/18  1127 12/22/18  0459   WBC  --  13.8*  --   --   --  13.9*   HGB 7.2* 7.1* 6.5* 7.4* 7.6* 8.4*   MCV  --  92  --   --   --  93   PLT   --  387  --   --   --  500*   INR  --   --   --   --  1.57* 3.99*   NA  --  148*  --   --   --  148*   POTASSIUM  --  3.6  --   --   --  4.0   CHLORIDE  --  117*  --   --   --  114*   CO2  --  25  --   --   --  25   BUN  --  44*  --   --   --  72*   CR  --  1.02  --   --   --  1.11   ANIONGAP  --  6  --   --   --  9   TATE  --  7.3*  --   --   --  8.0*   GLC  --  97  --   --   --  133*   ALBUMIN  --   --   --   --   --  2.4*   PROTTOTAL  --   --   --   --   --  4.7*   BILITOTAL  --   --   --   --   --  0.2   ALKPHOS  --   --   --   --   --  35*   ALT  --   --   --   --   --  26   AST  --   --   --   --   --  18   TROPI  --   --  0.083* 0.092* 0.092* 0.094*       No results found for this or any previous visit (from the past 24 hour(s)).

## 2018-12-24 VITALS
HEART RATE: 68 BPM | HEIGHT: 71 IN | RESPIRATION RATE: 16 BRPM | SYSTOLIC BLOOD PRESSURE: 125 MMHG | DIASTOLIC BLOOD PRESSURE: 54 MMHG | WEIGHT: 257 LBS | OXYGEN SATURATION: 100 % | TEMPERATURE: 97.9 F | BODY MASS INDEX: 35.98 KG/M2

## 2018-12-24 LAB
BLD PROD TYP BPU: NORMAL
BLD UNIT ID BPU: 0
BLOOD PRODUCT CODE: NORMAL
BPU ID: NORMAL
HGB BLD-MCNC: 6.8 G/DL (ref 13.3–17.7)
HGB BLD-MCNC: 8.5 G/DL (ref 13.3–17.7)
TRANSFUSION STATUS PATIENT QL: NORMAL
TRANSFUSION STATUS PATIENT QL: NORMAL

## 2018-12-24 PROCEDURE — C9113 INJ PANTOPRAZOLE SODIUM, VIA: HCPCS | Performed by: INTERNAL MEDICINE

## 2018-12-24 PROCEDURE — 25000132 ZZH RX MED GY IP 250 OP 250 PS 637: Performed by: INTERNAL MEDICINE

## 2018-12-24 PROCEDURE — 25000128 H RX IP 250 OP 636: Performed by: INTERNAL MEDICINE

## 2018-12-24 PROCEDURE — 36415 COLL VENOUS BLD VENIPUNCTURE: CPT | Performed by: INTERNAL MEDICINE

## 2018-12-24 PROCEDURE — 99239 HOSP IP/OBS DSCHRG MGMT >30: CPT | Performed by: INTERNAL MEDICINE

## 2018-12-24 PROCEDURE — 85018 HEMOGLOBIN: CPT | Performed by: INTERNAL MEDICINE

## 2018-12-24 PROCEDURE — P9016 RBC LEUKOCYTES REDUCED: HCPCS | Performed by: EMERGENCY MEDICINE

## 2018-12-24 RX ORDER — WARFARIN SODIUM 2 MG/1
TABLET ORAL
Qty: 315 TABLET | Refills: 1
Start: 2018-12-24 | End: 2019-02-01

## 2018-12-24 RX ORDER — SUCRALFATE ORAL 1 G/10ML
1 SUSPENSION ORAL 4 TIMES DAILY
Status: DISCONTINUED | OUTPATIENT
Start: 2018-12-24 | End: 2018-12-24 | Stop reason: HOSPADM

## 2018-12-24 RX ORDER — SUCRALFATE ORAL 1 G/10ML
1 SUSPENSION ORAL 4 TIMES DAILY
Qty: 560 ML | Refills: 0 | Status: SHIPPED | OUTPATIENT
Start: 2018-12-24 | End: 2019-02-01

## 2018-12-24 RX ORDER — PANTOPRAZOLE SODIUM 40 MG/1
40 TABLET, DELAYED RELEASE ORAL 2 TIMES DAILY
Qty: 60 TABLET | Refills: 1 | Status: SHIPPED | OUTPATIENT
Start: 2018-12-24 | End: 2019-01-23

## 2018-12-24 RX ADMIN — SUCRALFATE 1 G: 1 SUSPENSION ORAL at 13:41

## 2018-12-24 RX ADMIN — THERA TABS 1 TABLET: TAB at 08:18

## 2018-12-24 RX ADMIN — PANTOPRAZOLE SODIUM 40 MG: 40 INJECTION, POWDER, FOR SOLUTION INTRAVENOUS at 08:18

## 2018-12-24 RX ADMIN — OYSTER SHELL CALCIUM WITH VITAMIN D 1 TABLET: 500; 200 TABLET, FILM COATED ORAL at 08:18

## 2018-12-24 RX ADMIN — POTASSIUM CHLORIDE 20 MEQ: 1.5 POWDER, FOR SOLUTION ORAL at 08:18

## 2018-12-24 RX ADMIN — FUROSEMIDE 20 MG: 20 TABLET ORAL at 08:18

## 2018-12-24 ASSESSMENT — ACTIVITIES OF DAILY LIVING (ADL)
ADLS_ACUITY_SCORE: 16
ADLS_ACUITY_SCORE: 15
ADLS_ACUITY_SCORE: 16
ADLS_ACUITY_SCORE: 16

## 2018-12-24 NOTE — PLAN OF CARE
AOX4, VSS on r/a.  Declines pain medication for lower back pain of 4/10.  Reports 7/10 lower back pain at discharge.  MD paged, reviewed wit patient at bedside.  Tolerating regular diet.  Up SBA to bathroom and around room and unit.  Tele afib with CVR.    AVS reviewed. Wife will make own appointment for 12/31 INR and CBC.  Medications reviewed; protonix and carafate added.  Restarting coumadin on 12/28 as recommended by MD.      Home with wife this afternoon.

## 2018-12-24 NOTE — DISCHARGE SUMMARY
[facility]    Discharge Summary  Hospitalist    Date of Admission:  12/22/2018  Date of Discharge:  12/24/2018  Discharging Provider: Mesfin Porter  Date of Service (when I saw the patient): 12/24/18      History of Present Illness  From admission H&P  Mr. 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  History is obtained through discussion with the ED physician, the patient, and his family at bedside.        The patient notes that he has been feeling unwell for about 1 week now and during that same time has been having dark black stools which are formed and fairly hard.  He has not had any bright red blood in his stool.  He notes that during the same time, he has been getting progressively weaker and has not felt himself.  Today, he also had some shortness of breath, though wife noted that he was talking in complete sentences and denied any chest pain.  They called their daughter, who is a pediatrician, and also summoned EMS to bring him to the Emergency Department.  The patient notes that while he has had dark black stools, he has also had some nausea, though has not had any emesis.  He has had poor p.o. intake recently because of this.  He denies any specific abdominal pain, but felt like he may have had some discomfort because he has not eaten much for the past couple of days.  He denies any abdominal pain associated with eating over the past couple of weeks.  He is on long-term Coumadin because of the atrial fibrillation.  He did have a retroperitoneal bleed a number of years ago when he first started Coumadin, but has otherwise not had any other substantial bleeding issues other than easy bruising.  No other GI bleed that he is aware of.  He did have a partial colectomy for a  perforated bowel a number of years ago as well.  This was thought due to excessive prednisone use for his myasthenia gravis.      After EMS was summoned, the patient was reported to have a syncopal episode in the EMS rig.  He was brought to the Emergency Department where he was initially hypotensive, but this improved with IV fluids.  Hemoglobin was checked and noted to be 8.4, down from 11 just 1 month ago.  Troponin also elevated to 0.094.  He was given 2.5 liters of IV fluids, a dose of Protonix, and typed and screened.  GI was consulted, who saw him down in the Emergency Department.  When I saw him up on the sixth floor, he was resting fairly comfortably and no longer had any shortness of breath.  He denies any chest discomfort, abdominal discomfort, nausea at this time.  Of note, he did have some right flank discomfort in the ED which prompted a CT of the abdomen and pelvis, given his history of retroperitoneal bleed.  There was no evidence of bleed on this imaging test.         Hospital Course   Alessio Teixeira was admitted on 12/22/2018.  The following problems were addressed during his hospitalization:    Acute upper gastrointestinal hemorrhage with acute blood loss anemia secondary to Gastric Ulcer:  hgb dropped from 16 PTA to a bal of 6.5  He was transfused 1 unit PRBCs and sang to 8.5.  EGD confirms gastric ulcer which was not bleeding at the time of inspection.  Prior to discharge pt had no further nausea and was tolerating a regular diet.  He had one dark stool prior to discharge.   -continue BID PPI x 8 weeks with further plan per GI after repeat EGD  -repeat EGD in 8 weeks  -carafate x 2 weeks  -discussed he may have 1-3 more dark stools with old blood, will need re-eval if this continues or stops and then restarts  -will hold coumadin until 12/28  -repeat CBC in one week     Syncope:    - Likely due to his acute blood loss anemia and hypovolemia.  He has been volume resuscitated and blood  pressure is now in the normal range. Echo showed no valve concerns    Atrial fibrillation: rate controlled.  Holding coumadin until 12/28 given his GI bleed.   -resume coumadin on 12/28 with repeat INR the following Monday 12/31.       Mildly elevated troponin: secondary to acute blood loss anemia, causing demand ischemia.  Troponin trend was flat and echo with preserved EF and no WMA.      Incidental right lung nodule of 1.5 cm:    - Patient does have a distant history of smoking about 50 years ago.  Recommendation for repeat CT scan in 6 months, which can be done with his PCP.  Discussed with patient and wife.      Mild thrombocytosis:    - Patient has had high normal platelet counts most recently.  likely some concentration given volume loss and a response to bleeding.  Resolved to normal.      Mild leukocytosis:    - In the setting of GI bleed.  He does not have any infectious type symptoms and no fever.  Stable on repeat.       Dyslipidemia:    - continue PTA statin     MSK back pain: has been going on for weeks, exacerbation by beds and lack of mobility for hospital stay.  Discussed staying active and can discuss with PCP.      Mesfin Porter        Pending Results   These results will be followed up by [facility] team  Unresulted Labs Ordered in the Past 30 Days of this Admission     Date and Time Order Name Status Description    12/22/2018 1252 Surgical pathology exam In process           Code Status   Full Code       Primary Care Physician   Zac Salgado    General: sitting in bed, resting comfortably  Cardiovascular: RRR, no m/r/g  Pulmonary: CTAB  GI: +BS, soft, NT/D  Lymphatics: trace edema  Skin: no rash  MSK: mild tenderness to palpation in right lower back lateral.  No tenderness over spine.     Discharge Disposition   Discharged to home  Condition at discharge: Stable    Consultations This Hospital Stay   GASTROENTEROLOGY IP CONSULT    Time Spent on this Encounter   I, Mesfin Porter,  personally saw the patient today and spent greater than 30 minutes discharging this patient.    Discharge Orders      Reason for your hospital stay    You were admitted with low hemoglobin (blood level) and black stool due to bleeding from a gastric ulcer which was found on EGD (camera test).  This ulcer was no longer bleeding at the time of EGD.  You will be on an acid blocking medication (protonix) twice daily for two months and have repeat EGD at that time to ensure complete healing.  You will not take any coumadin for the next several days but can resume your typical dose on Friday 12/28/18.     When to contact your care team    If you start having black or blood stool again or start throwing up blood ...of note you may have 1-2 more stools that are dark as the blood from earlier works it way out of your system.     Follow-up and recommended labs and tests     Follow up with PCP within 1 week ++ will need INR check and CBC on Monday 12/31/18 ++ repeat EGD in two months which GI will arrange with you ++ need repeat CT imaging in 6 months to follow up pulmonary nodule     Activity    Your activity upon discharge: activity as tolerated     Full Code     Diet    Follow this diet upon discharge: Orders Placed This Encounter      Regular Diet Adult     Discharge Medications   Current Discharge Medication List      START taking these medications    Details   pantoprazole (PROTONIX) 40 MG EC tablet Take 1 tablet (40 mg) by mouth 2 times daily  Qty: 60 tablet, Refills: 1    Associated Diagnoses: Gastrointestinal hemorrhage associated with gastric ulcer      sucralfate (CARAFATE) 1 GM/10ML suspension Take 10 mLs (1 g) by mouth 4 times daily for 14 days  Qty: 560 mL, Refills: 0    Associated Diagnoses: Gastrointestinal hemorrhage associated with gastric ulcer         CONTINUE these medications which have CHANGED    Details   warfarin (COUMADIN) 2 MG tablet Do not take this medication until 12/28/18 but then resume typical  dose of 3 AND 1/2 TABLETS (7MG) BY MOUTH DAILY  Qty: 315 tablet, Refills: 1    Associated Diagnoses: History of pulmonary embolism; Paroxysmal atrial fibrillation (H)         CONTINUE these medications which have NOT CHANGED    Details   CALCIUM PO Take 600 mg by mouth 2 times daily       furosemide (LASIX) 20 MG tablet TAKE ONE TABLET BY MOUTH ONE TIME DAILY - NEEDS TO BE SEEN FOR FURTHER REFILLS  Qty: 90 tablet, Refills: 3    Associated Diagnoses: Hypertension goal BP (blood pressure) < 140/90      KLOR-CON 20 MEQ CR tablet Take 1 tablet (20 mEq) by mouth daily  Qty: 90 tablet, Refills: 3    Associated Diagnoses: Hypertension goal BP (blood pressure) < 140/90      lovastatin (MEVACOR) 40 MG tablet TAKE TWO TABLETS (80MG) BY MOUTH DAILY AT BEDTIME  Qty: 60 tablet, Refills: 0    Comments: Medication is being filled for 1 time refill only due to:  Patient needs to be seen because it has been more than one year since last visit.  Associated Diagnoses: Hyperlipidemia LDL goal <100      multivitamin, therapeutic with minerals (THERA-VIT-M) TABS Take 1 tablet by mouth daily      NIACIN CR PO Take 500 mg by mouth 2 times daily (with meals) 2 x 500 mg slow niacin      VITAMIN D, CHOLECALCIFEROL, PO Take 1,000 Units by mouth daily.        ORDER FOR DME CPAP supplies with variable pressure control  Qty: 1 Device, Refills: 0    Associated Diagnoses: MARTHA (obstructive sleep apnea)           Allergies   No Known Allergies  Data   Most Recent 3 CBC's:  Recent Labs   Lab Test 12/24/18  0400 12/23/18  2220 12/23/18  1605  12/23/18  0400  12/22/18  0459 11/21/18  0912   WBC  --   --   --   --  13.8*  --  13.9* 10.4   HGB 6.8* 7.1* 7.2*   < > 7.1*   < > 8.4* 16.7   MCV  --   --   --   --  92  --  93 91   PLT  --   --   --   --  387  --  500* 405    < > = values in this interval not displayed.      Most Recent 3 BMP's:  Recent Labs   Lab Test 12/23/18  0400 12/22/18  0459 11/21/18  0912   * 148* 141   POTASSIUM 3.6 4.0 3.9    CHLORIDE 117* 114* 106   CO2 25 25 30   BUN 44* 72* 19   CR 1.02 1.11 1.09   ANIONGAP 6 9 5   TATE 7.3* 8.0* 9.2   GLC 97 133* 87     Most Recent 2 LFT's:  Recent Labs   Lab Test 12/22/18  0459 11/21/18  0912   AST 18 24   ALT 26 27   ALKPHOS 35* 52   BILITOTAL 0.2 0.4     Most Recent INR's and Anticoagulation Dosing History:  Anticoagulation Dose History     Recent Dosing and Labs Latest Ref Rng & Units 6/20/2018 8/1/2018 9/12/2018 10/24/2018 12/5/2018 12/22/2018 12/22/2018    INR 0.86 - 1.14 - - - - - 3.99(H) 1.57(H)    INR 2.0 - 3.0 2.9 3.3(A) 2.2 3.1(A) 3.2(A) - -        Most Recent 3 Troponin's:  Recent Labs   Lab Test 12/22/18  2141 12/22/18  1610 12/22/18  1127   TROPI 0.083* 0.092* 0.092*     Most Recent Cholesterol Panel:  Recent Labs   Lab Test 11/21/18  0912   CHOL 122   LDL 55   HDL 38*   TRIG 145     Most Recent 6 Bacteria Isolates From Any Culture (See EPIC Reports for Culture Details):No lab results found.  Most Recent TSH, T4 and A1c Labs:No lab results found.  Results for orders placed or performed during the hospital encounter of 12/22/18   Chest  XR, 1 view PORTABLE    Narrative    CHEST SINGLE VIEW PORTABLE  12/22/2018 5:53 AM     HISTORY: Gastrointestinal bleeding. Evaluate for free air.    COMPARISON: None.    FINDINGS: A band-like opacity in the mid left lung likely represents  scarring or atelectasis. The lungs are otherwise clear. Normal-sized  cardiac silhouette. Tortuous or ectatic thoracic aorta. No free  intraperitoneal gas is visualized under the diaphragm.      Impression    IMPRESSION:  1. No free intraperitoneal gas is visualized under the diaphragm.  2. No evidence of active cardiopulmonary disease.    BRAN FRANCE MD   CT Abdomen Pelvis w Contrast    Narrative    CT ABDOMEN AND PELVIS WITH CONTRAST  12/22/2018 6:38 AM     HISTORY: Anticoagulated. Right flank pain. Gastrointestinal bleeding.    COMPARISON: 9/25/2007.    TECHNIQUE: Following the uneventful administration of 131  mL  Isovue-370 intravenous contrast, helical sections were acquired from  the top of the diaphragm through the pubic symphysis. Coronal  reconstructions were generated. Radiation dose for this scan was  reduced using automated exposure control, adjustment of the mA and/or  kV according to the patient's size, or iterative reconstruction  technique.    FINDINGS:  Abdomen: The liver, spleen, pancreas and adrenal glands are  unremarkable. 1.8 cm cyst in the inferior pole of the right kidney. A  few small low attenuation lesions in the kidneys, too small to  characterize. The gallbladder is present. Equivocal mild wall  thickening of the gastric cardia (series 3 image 14). Mildly enlarged  1.5 x 1.2 cm gastrohepatic ligament lymph node, adjacent to the  gastric cardia (series 3 image 20). No free fluid in the upper  abdomen. Atherosclerotic calcification in the abdominal aorta.    Scan through the lower chest is significant for a few linear opacities  in the lung bases that likely represent scarring or atelectasis. 1.5 x  1.3 cm nodule in the posteromedial aspect of the right lung base  (series 3 image 9), new since 9/25/2007. This nodule contains a  slightly eccentric calcification.    Pelvis: The small and large bowel are normal in caliber. A few colonic  diverticula are present without evidence of diverticulitis. The  appendix is not visualized. No bowel wall thickening, pneumatosis or  free intraperitoneal gas. Mild enlargement of the prostate gland. No  enlarged lymph nodes or free fluid in the pelvis. Partial  visualization of a right hip arthroplasty.      Impression    IMPRESSION:  1. Equivocal wall thickening of the gastric cardia. Additionally,  there is a mildly enlarged gastrohepatic ligament lymph node. A  gastric ulcer or neoplasm is possible. If clinically relevant, an  upper endoscopy could be considered for further evaluation.  2. No other cause of acute pain identified in the abdomen or pelvis.   3. 1.5  cm indeterminate right lung base nodule, new since 9/25/2007.  This contains a small calcification and could relate to prior  granulomatous infection. A follow-up chest CT could be considered in  six months for reevaluation.    BRAN FRANCE MD     EGD 12/22/18    Findings:        The examined esophagus was normal. GE junction at 45 cm        One non-bleeding cratered gastric ulcer with pigmented material was        found in the gastric body and on the anterior wall of the stomach. The        lesion was 10 mm in largest dimension. Biopsies were taken with a cold        forceps for histology.        No gross lesions were noted in the entire examined duodenum.        Total sedation time 12 minutes.                                                                                     Impression:               - Normal esophagus.                             - Non-bleeding gastric ulcer with pigmented                             material. Biopsied. Likely cause of bleeding. Low                             risk of rebleeding with reversal of anticoagulation.                             - No gross lesions in the entire examined duodenum.   Recommendation:           - Return patient to hospital peace for ongoing care.                             - Resume regular diet today.                             - Await pathology results.                             - Repeat upper endoscopy in 8 weeks to check                             healing.                             - No NSAIDS                             - High dose PPI                             - Follow hemoglobin                             - Recommend holding warfarin 3-5 days if able

## 2018-12-24 NOTE — PROGRESS NOTES
"GASTROENTEROLOGY PROGRESS NOTE    CC: Melena     SUBJECTIVE:  Had two formed stools this morning. Continues to complain of right \"kidney\" pain, 4/10 with movement - this is not associated with eating, bowel movements, or urinating.     OBJECTIVE:  General Appearance: Resting comfortably   /54   Pulse 68   Temp 97.9  F (36.6  C) (Oral)   Resp 16   Ht 1.803 m (5' 11\")   Wt 116.6 kg (257 lb)   SpO2 100%   BMI 35.84 kg/m    Temp (24hrs), Av.5  F (36.9  C), Min:97.6  F (36.4  C), Max:99.2  F (37.3  C)    Patient Vitals for the past 72 hrs:   Weight   18 0828 116.6 kg (257 lb)   18 0500 117.9 kg (260 lb)       Intake/Output Summary (Last 24 hours) at 2018 1130  Last data filed at 2018 0632  Gross per 24 hour   Intake 1846 ml   Output --   Net 1846 ml       PHYSICAL EXAM    Lungs: CTA denice  Abd: S ND NT +bs      Additional Comments:  ROS, FH, SH: See initial GI consult for details.    I have reviewed the patient's new clinical lab results:    Recent Labs   Lab Test 18  0400 18  2220 18  1605  18  0400  18  1127 18  0459 18  0912   WBC  --   --   --   --  13.8*  --   --  13.9*  --  10.4   HGB 6.8* 7.1* 7.2*   < > 7.1*   < > 7.6* 8.4*  --  16.7   MCV  --   --   --   --  92  --   --  93  --  91   PLT  --   --   --   --  387  --   --  500*  --  405   INR  --   --   --   --   --   --  1.57* 3.99* 3.2*  --     < > = values in this interval not displayed.     Recent Labs   Lab Test 18  0400 18  0459 11/21/18  0912   POTASSIUM 3.6 4.0 3.9   CHLORIDE 117* 114* 106   CO2 25 25 30   BUN 44* 72* 19   ANIONGAP 6 9 5     Recent Labs   Lab Test 18  0459 18  0944 18  0912 18  0919 17  0953 16  1106   ALBUMIN 2.4*  --  3.6  --  2.6*  --    BILITOTAL 0.2  --  0.4  --  0.5  --    ALT 26  --  27 27 76*  --    AST 18  --  24 27 45  --    PROTEIN  --  30*  --   --  30* 30*     CT Abdomen  "   FINDINGS:  Abdomen: The liver, spleen, pancreas and adrenal glands are  unremarkable. 1.8 cm cyst in the inferior pole of the right kidney. A  few small low attenuation lesions in the kidneys, too small to  characterize. The gallbladder is present. Equivocal mild wall  thickening of the gastric cardia (series 3 image 14). Mildly enlarged  1.5 x 1.2 cm gastrohepatic ligament lymph node, adjacent to the  gastric cardia (series 3 image 20). No free fluid in the upper  abdomen. Atherosclerotic calcification in the abdominal aorta.     Scan through the lower chest is significant for a few linear opacities  in the lung bases that likely represent scarring or atelectasis. 1.5 x  1.3 cm nodule in the posteromedial aspect of the right lung base  (series 3 image 9), new since 9/25/2007. This nodule contains a  slightly eccentric calcification.     Pelvis: The small and large bowel are normal in caliber. A few colonic  diverticula are present without evidence of diverticulitis. The  appendix is not visualized. No bowel wall thickening, pneumatosis or  free intraperitoneal gas. Mild enlargement of the prostate gland. No  enlarged lymph nodes or free fluid in the pelvis. Partial  visualization of a right hip arthroplasty.                                                                      IMPRESSION:  1. Equivocal wall thickening of the gastric cardia. Additionally,  there is a mildly enlarged gastrohepatic ligament lymph node. A  gastric ulcer or neoplasm is possible. If clinically relevant, an  upper endoscopy could be considered for further evaluation.  2. No other cause of acute pain identified in the abdomen or pelvis.   3. 1.5 cm indeterminate right lung base nodule, new since 9/25/2007.  This contains a small calcification and could relate to prior  granulomatous infection. A follow-up chest CT could be considered in  six months for reevaluation.      EGD 12/22/2018   Findings:        The examined esophagus was normal.  GE junction at 45 cm        One non-bleeding cratered gastric ulcer with pigmented material was        found in the gastric body and on the anterior wall of the stomach. The        lesion was 10 mm in largest dimension. Biopsies were taken with a cold        forceps for histology.        No gross lesions were noted in the entire examined duodenum.                                                                                       Impression:         - Normal esophagus.                             - Non-bleeding gastric ulcer with pigmented                             material. Biopsied. Likely cause of bleeding. Low                             risk of rebleeding with reversal of anticoagulation.                             - No gross lesions in the entire examined duodenum.       Active Problems:  -GI bleed due to gastric ulcer (10mm). Biopsies pending. Had two black stools this morning. Hgb 7.1 yesterday -->6.8 today, receiving one unit of blood. Baseline Hgb 16.7 (11/21/2018).    -Right flank pain -CT showed a 1.8cm cyst and a few small lesions in the kidneys too small to characterize, 1.5cm indeterminate right lung base nodule with no other abnormalities. F/U per hospitalist/PCP.      Plan:   -Follow hgb, transfusions per  hospitalist  -Hold warfarin total 3-5 days  -Await path  -High dose PPI x 2 months - Protonix 40mg BID   -Repeat EGD in 2 months    Adri Rodrigues CNP  [facility]  Cell: [phone]  Office: [phone]    Addendum: Repeat Hgb was 8.5. Discussed with Dr. Jones. OK to discharge from GI perspective with Protonix 40mg BID x2 months. Repeat EGD in 2 months. Continue Carafate QID for 14 days. Follow up in clinic outpatient in 2 months after EGD, sooner if needed. Will likely need daily PPI protection indefinitely.

## 2018-12-24 NOTE — PLAN OF CARE
A&Ox4. VSS on RA. Up with SBA. Regular diet, tolerating well. Denies pain at rest, but does c/o mild back pain with movement, declines intervention. Denies nausea, dizziness. Tele afib, CVR. LS clear. No BMs this shift. Hgb 7.2 & 7.1. Will continue to monitor.

## 2018-12-24 NOTE — PLAN OF CARE
A/O x4. AVSS on RA except soft BPs. Tele a-fib w/CVR.  Hgb 6.8. Blood transfusing, tolerating well.  Denies dizziness and n/v. C/o mild kidney pain with movement, rest and reposition promoted. Up w/SBA to BR. Will continue to monitor.

## 2018-12-26 ENCOUNTER — TELEPHONE (OUTPATIENT)
Dept: FAMILY MEDICINE | Facility: CLINIC | Age: 83
End: 2018-12-26

## 2018-12-26 NOTE — TELEPHONE ENCOUNTER
Patient has an Dental appointment tomorrow he would like to know if he should keep appt since he is off coumadin now. FYI- Pt has hospital follow up visit 12/27/2018 at 11:10 AM. Routing message to INR pool for nurse to direct further on dental appt and help schedule INR follow up visit.

## 2018-12-26 NOTE — TELEPHONE ENCOUNTER
"Hospital/TCU/ED for chronic condition Discharge Protocol    \"Hi, my name is Star Hall, a registered nurse, and I am calling from [facility].  I am calling to follow up and see how things are going for you after your recent emergency visit/hospital/TCU stay.\"    Tell me how you are doing now that you are home?\" weak      Discharge Instructions    \"Let's review your discharge instructions.  What is/are the follow-up recommendations?  Pt. Response: follow up with PCP for CBC and INR    \"Has an appointment with your primary care provider been scheduled?\"   Yes. (confirm) Patient needs INR appt    \"When you see the provider, I would recommend that you bring your medications with you.\"    Medications    \"Tell me what changed about your medicines when you discharged?\"    Changes to chronic meds?    2 or more - Epic MTM referral needed    \"What questions do you have about your medications?\"    Yes he would like to know how soon should wait to eat after taking carafate. Advised wait 1 -2 hours     New diagnoses of heart failure, COPD, diabetes, or MI?    No          On warfarin: \"Were you given any recommendations for follow-up with the anticoagulation clinic?\" Patient needs to schedule INR follow up appt.     Medication reconciliation completed? Yes  Was MTM referral placed (*Make sure to put transitions as reason for referral)?   No    Call Summary    \"What questions or concerns do you have about your recent visit and your follow-up care?\"     Would like directives for dental work since he is off Coumadin. Routing message to INR nurse for directives.     \"If you have questions or things don't continue to improve, we encourage you contact us through the main clinic number (give number).  Even if the clinic is not open, triage nurses are available 24/7 to help you.     We would like you to know that our clinic has extended hours (provide information).  We also have urgent care (provide details on closest location " "and hours/contact info)\"      \"Thank you for your time and take care!\"               "

## 2018-12-26 NOTE — TELEPHONE ENCOUNTER
Patient was called back, he is holding his coumadin per Mesfin Sanchez until Friday and taking carafate. He has cancelled his dental appt because he is not feeling well enough to go through the procedure. He will call back for further directions when he schedules again.     Notes from 12/22/18 Hospital Admission: Holding coumadin until 12/28 given his GI bleed. Resume coumadin on 12/28 with repeat INR the following Monday 12/31.      OV on 12/27/18 - note left on OV notes to have INR checked then.

## 2018-12-27 ENCOUNTER — OFFICE VISIT (OUTPATIENT)
Dept: FAMILY MEDICINE | Facility: CLINIC | Age: 83
End: 2018-12-27
Payer: COMMERCIAL

## 2018-12-27 VITALS
BODY MASS INDEX: 36.96 KG/M2 | OXYGEN SATURATION: 99 % | SYSTOLIC BLOOD PRESSURE: 132 MMHG | DIASTOLIC BLOOD PRESSURE: 68 MMHG | TEMPERATURE: 98.1 F | RESPIRATION RATE: 16 BRPM | HEART RATE: 97 BPM | WEIGHT: 265 LBS

## 2018-12-27 DIAGNOSIS — D62 ANEMIA DUE TO BLOOD LOSS, ACUTE: Primary | ICD-10-CM

## 2018-12-27 DIAGNOSIS — R10.9 FLANK PAIN: ICD-10-CM

## 2018-12-27 DIAGNOSIS — K25.4 GASTROINTESTINAL HEMORRHAGE ASSOCIATED WITH GASTRIC ULCER: ICD-10-CM

## 2018-12-27 PROBLEM — Z12.5 SCREENING FOR PROSTATE CANCER: Status: RESOLVED | Noted: 2018-11-21 | Resolved: 2018-12-27

## 2018-12-27 LAB
ALBUMIN UR-MCNC: NEGATIVE MG/DL
APPEARANCE UR: CLEAR
BASOPHILS # BLD AUTO: 0.2 10E9/L (ref 0–0.2)
BASOPHILS NFR BLD AUTO: 1.3 %
BILIRUB UR QL STRIP: NEGATIVE
COLOR UR AUTO: YELLOW
DIFFERENTIAL METHOD BLD: ABNORMAL
EOSINOPHIL # BLD AUTO: 0.2 10E9/L (ref 0–0.7)
EOSINOPHIL NFR BLD AUTO: 1.5 %
ERYTHROCYTE [DISTWIDTH] IN BLOOD BY AUTOMATED COUNT: 16.2 % (ref 10–15)
GLUCOSE UR STRIP-MCNC: NEGATIVE MG/DL
HCT VFR BLD AUTO: 28.1 % (ref 40–53)
HGB BLD-MCNC: 8.8 G/DL (ref 13.3–17.7)
HGB UR QL STRIP: NEGATIVE
INR PPP: 1.22 (ref 0.86–1.14)
KETONES UR STRIP-MCNC: NEGATIVE MG/DL
LEUKOCYTE ESTERASE UR QL STRIP: NEGATIVE
LYMPHOCYTES # BLD AUTO: 1.9 10E9/L (ref 0.8–5.3)
LYMPHOCYTES NFR BLD AUTO: 12.8 %
MCH RBC QN AUTO: 29.7 PG (ref 26.5–33)
MCHC RBC AUTO-ENTMCNC: 31.3 G/DL (ref 31.5–36.5)
MCV RBC AUTO: 95 FL (ref 78–100)
MONOCYTES # BLD AUTO: 1 10E9/L (ref 0–1.3)
MONOCYTES NFR BLD AUTO: 6.3 %
NEUTROPHILS # BLD AUTO: 11.7 10E9/L (ref 1.6–8.3)
NEUTROPHILS NFR BLD AUTO: 78.1 %
NITRATE UR QL: NEGATIVE
PH UR STRIP: 6 PH (ref 5–7)
PLATELET # BLD AUTO: 572 10E9/L (ref 150–450)
RBC # BLD AUTO: 2.96 10E12/L (ref 4.4–5.9)
SOURCE: NORMAL
SP GR UR STRIP: 1.01 (ref 1–1.03)
UROBILINOGEN UR STRIP-ACNC: 0.2 EU/DL (ref 0.2–1)
WBC # BLD AUTO: 15 10E9/L (ref 4–11)

## 2018-12-27 PROCEDURE — 85610 PROTHROMBIN TIME: CPT | Performed by: PHYSICIAN ASSISTANT

## 2018-12-27 PROCEDURE — 99495 TRANSJ CARE MGMT MOD F2F 14D: CPT | Performed by: PHYSICIAN ASSISTANT

## 2018-12-27 PROCEDURE — 81003 URINALYSIS AUTO W/O SCOPE: CPT | Performed by: PHYSICIAN ASSISTANT

## 2018-12-27 PROCEDURE — 36415 COLL VENOUS BLD VENIPUNCTURE: CPT | Performed by: PHYSICIAN ASSISTANT

## 2018-12-27 PROCEDURE — 85025 COMPLETE CBC W/AUTO DIFF WBC: CPT | Performed by: PHYSICIAN ASSISTANT

## 2018-12-27 NOTE — PROGRESS NOTES
SUBJECTIVE:   Alessio Teixeira is a 83 year old male who presents to clinic today for the following health issues:    Hospital Follow-up Visit:    Hospital/Nursing Home/IP Rehab Facility: [facility]  Date of Admission: 12/22/18  Date of Discharge: 12/24/18  Reason(s) for Admission: dark stools, weakness, possible GI bleed            Problems taking medications regularly:  None       Medication changes since discharge: protonix and carafate added to med list       Problems adhering to non-medication therapy:  None    Summary of hospitalization:  [facility] discharge summary reviewed  Diagnostic Tests/Treatments reviewed.  Follow up needed: labs  Other Healthcare Providers Involved in Patient s Care:         None  Update since discharge: improved.     Post Discharge Medication Reconciliation: discharge medications reconciled, continue medications without change.  Plan of care communicated with patient and wife     Coding guidelines for this visit:  Type of Medical   Decision Making Face-to-Face Visit       within 7 Days of discharge Face-to-Face Visit        within 14 days of discharge   Moderate Complexity [id number]   High Complexity [id number]        Reviewed and updated as needed this visit by clinical staff  Tobacco  Allergies  Meds  Problems  Med Hx  Surg Hx  Fam Hx  Soc Hx        Reviewed and updated as needed this visit by Provider  Tobacco  Allergies  Meds  Problems  Med Hx  Surg Hx  Fam Hx  Soc Hx        Additional complaints: None    HPI additional notes: Alessio presents today with   Chief Complaint   Patient presents with     Hospital F/U   Has been having minimal stools but the ones he has had are dark.  Having lbp on the right side which started prior to hospitalization and has worsening since he was home.         ROS:  C: Negative for fever, chills, recent change in weight  Skin: Negative for worrisome rashes or lesions  ENT: Negative for ear, mouth and  throat problems  Resp: Negative for significant cough or SOB  CV: Negative for chest pain or peripheral edema  GI: Negative for nausea, abdominal pain, heartburn, or change in bowel habits  MS: Positive for right lower back pain  PSYCHIATRIC: POSITIVE for insomnia or fatigue. Negative for depressed mood  ROS all other systems negative.    Chart Review:  History   Smoking Status     Former Smoker     Packs/day: 1.00     Years: 3.00     Types: Cigarettes     Quit date: 1/1/1963   Smokeless Tobacco     Never Used     Patient Active Problem List   Diagnosis     Hyperlipidemia LDL goal <100     Edema of both legs     Varicose veins of legs     BMI > 35     Stasis dermatitis     Chronic atrial fibrillation (H)     Chronic 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     Vitamin D deficiency disease     Hypertension goal BP (blood pressure) < 140/90     Diplopia     MARTHA (obstructive sleep apnea)     Long term current use of anticoagulant therapy     ACP (advance care planning)     Degenerative localized arthritis of hip     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 by Miguel Johnson MD     Aftercare following right hip joint replacement surgery     Bradycardia     History of pulmonary embolism- bilateral in 2007      Atrial fibrillation (H)     Anemia due to blood loss, acute     Chronic left-sided thoracic back pain     Presbycusis of both ears     Mixed hyperlipidemia     Primary osteoarthritis of right knee     GI bleed     Past Surgical History:   Procedure Laterality Date     APPENDECTOMY       ARTHROPLASTY HIP Right 2016     ARTHROPLASTY HIP ANTERIOR Right 4/26/2016    Procedure: ARTHROPLASTY HIP ANTERIOR;  Surgeon: Miguel Johnson MD;  Location: [facility] OR     BRAIN SURGERY  2007    partial resection meningioma     C RAD RESEC TONSIL/PILLARS       CATARACT IOL, RT/LT       COLECTOMY  2007    bowel perforation due to steroids     ESOPHAGOSCOPY, GASTROSCOPY, DUODENOSCOPY (EGD), COMBINED N/A 12/22/2018     Procedure: COMBINED ESOPHAGOSCOPY, GASTROSCOPY, DUODENOSCOPY (EGD), BIOPSY SINGLE OR MULTIPLE;  Surgeon: Elizabeth Jones MD;  Location:  GI     GENITOURINARY SURGERY      vasectomy     HEAD & NECK SURGERY  2007    meningioma removed     KNEE SURGERY  2010    left knee replacement     ORTHOPEDIC SURGERY      left TKR     PHACOEMULSIFICATION CLEAR CORNEA WITH TORIC INTRAOCULAR LENS IMPLANT  7/30/2012    Procedure: PHACOEMULSIFICATION CLEAR CORNEA WITH TORIC INTRAOCULAR LENS IMPLANT;  COMPLEX RIGHT PHACOEMULSIFICATION CLEAR CORNEA WITH TORIC INTRAOCULAR LENS IMPLANT WITH MALYUGIN RING;  Surgeon: Ronald Cortez MD;  Location:  [facility]     RECTAL SURGERY  1985     Problem list, Medication list, Allergies, Medical/Social/Surg hx reviewed in [facility], updated as appropriate.   OBJECTIVE:                                                    /68   Pulse 97   Temp 98.1  F (36.7  C) (Tympanic)   Resp 16   Wt 120.2 kg (265 lb)   SpO2 99%   BMI 36.96 kg/m    Body mass index is 36.96 kg/m .  GENERAL: healthy, alert, in no acute distress, pale appearing  EYES: Grossly normal to inspection, EOMI, PERRL  HENT: Mucous mebranes moist.  NECK: Non-tender, no adenopathy.  RESP: lungs clear to auscultation - no rales, no rhonchi, no wheezes  CV: irregularly irregular rhythm  ABDOMEN: soft, nontender, no hepatosplenomegaly or masses. Normal bowel sounds in all four quadrants.  MS: no gross deformities noted.  MIld CVA tenderness on right/  No paralumbar tenderness.  SKIN: no suspicious lesions, no rashes  PSYCH: Alert and oriented times 3;  Able to articulate logical thoughts. Affect is normal.    Diagnostic test results: No results found for this or any previous visit (from the past 24 hour(s)).     ASSESSMENT/PLAN:                                                          ICD-10-CM    1. Anemia due to blood loss, acute D62 INR     CBC with platelets differential   2. Gastrointestinal hemorrhage associated with gastric  ulcer K25.4 INR     CBC with platelets differential   3. Flank pain R10.9 UA reflex to Microscopic and Culture     Labs checked today as specified in discharge instructions.  Discussed new medications of protonix and carafate at length.  H pylori result still pending.  Changed [facility] password but will call pt with hgb results.  Will check urine today due to right sided flank pain that worsened while in hospital.  Will follow up with GI for repeat EGD in 2 months.  Needs repeat CT scan for lung nodule in 6 months.  Reminder placed in his chart.      Please see patient instructions for treatment details.    Return in about 1 week (around 1/3/2019) for if not improving.    Mariza Burrell PA-C  [facility]

## 2018-12-28 LAB — COPATH REPORT: NORMAL

## 2019-01-07 ENCOUNTER — HOSPITAL ENCOUNTER (OUTPATIENT)
Dept: LAB | Facility: CLINIC | Age: 84
Discharge: HOME | End: 2019-01-07
Attending: ORTHOPAEDIC SURGERY | Admitting: ORTHOPAEDIC SURGERY
Payer: COMMERCIAL

## 2019-01-07 DIAGNOSIS — Z01.812 PRE-OPERATIVE LABORATORY EXAMINATION: ICD-10-CM

## 2019-01-07 LAB
MRSA DNA SPEC QL NAA+PROBE: NEGATIVE
SPECIMEN SOURCE: NORMAL

## 2019-01-07 PROCEDURE — 87640 STAPH A DNA AMP PROBE: CPT | Performed by: ORTHOPAEDIC SURGERY

## 2019-01-07 PROCEDURE — 87641 MR-STAPH DNA AMP PROBE: CPT | Performed by: ORTHOPAEDIC SURGERY

## 2019-01-07 PROCEDURE — 40000829 ZZHCL STATISTIC STAPH AUREUS SUSCEPT SCREEN PCR: Performed by: ORTHOPAEDIC SURGERY

## 2019-01-07 PROCEDURE — 40000830 ZZHCL STATISTIC STAPH AUREUS METH RESIST SCREEN PCR: Performed by: ORTHOPAEDIC SURGERY

## 2019-01-09 ENCOUNTER — ANTICOAGULATION THERAPY VISIT (OUTPATIENT)
Dept: NURSING | Facility: CLINIC | Age: 84
End: 2019-01-09
Payer: COMMERCIAL

## 2019-01-09 LAB — INR POINT OF CARE: 2.3 (ref 0.86–1.14)

## 2019-01-09 PROCEDURE — 85610 PROTHROMBIN TIME: CPT | Mod: QW

## 2019-01-09 PROCEDURE — 36416 COLLJ CAPILLARY BLOOD SPEC: CPT

## 2019-01-09 PROCEDURE — 99207 ZZC NO CHARGE NURSE ONLY: CPT

## 2019-01-09 NOTE — PROGRESS NOTES
ANTICOAGULATION FOLLOW-UP CLINIC VISIT    Patient Name:  Alessio Teixeira  Date:  2019  Contact Type:  Face to Face    SUBJECTIVE:     Patient Findings     Positives:   Other complaints (19 Scheduled right knee replacement, pre-op next week), Bleed (ER/Hosp visit) (18 GI Bleed Hosp Admission ), Intentional hold of therapy (Coumadin held from 18 - 27, resumed on 17)           OBJECTIVE    INR Protime   Date Value Ref Range Status   2019 2.3 (A) 0.86 - 1.14 Final       ASSESSMENT / PLAN  INR assessment THER    Recheck INR In: 1 WEEK    INR Location Clinic      Anticoagulation Summary  As of 2019    INR goal:   2.0-3.0   TTR:   63.6 % (2.8 y)   INR used for dosin.3 (2019)   Warfarin maintenance plan:   7 mg (2 mg x 3.5) every day   Full warfarin instructions:   7 mg every day   Weekly warfarin total:   49 mg   No change documented:   Maco Fontanez RN   Plan last modified:   Kerrie Morgan RN (6/3/2016)   Next INR check:   2019   Target end date:   Indefinite    Indications    Chronic atrial fibrillation (H) [I48.2]  Long term current use of anticoagulant therapy [Z79.01]             Anticoagulation Episode Summary     INR check location:   [facility]    Preferred lab:       Send INR reminders to:   [facility] INR/PROTIME    Comments:         Anticoagulation Care Providers     Provider Role Specialty Phone number    Zac Salgado MD [facility] [phone]            See the Encounter Report to view Anticoagulation Flowsheet and Dosing Calendar (Go to Encounters tab in chart review, and find the Anticoagulation Therapy Visit)    Dosage adjustment made based on physician directed care plan.    Maco Fontanez RN

## 2019-01-12 DIAGNOSIS — E78.5 HYPERLIPIDEMIA LDL GOAL <100: ICD-10-CM

## 2019-01-14 RX ORDER — LOVASTATIN 40 MG
TABLET ORAL
Qty: 180 TABLET | Refills: 3 | Status: SHIPPED | OUTPATIENT
Start: 2019-01-14 | End: 2020-01-08

## 2019-01-14 NOTE — TELEPHONE ENCOUNTER
"Requested Prescriptions   Pending Prescriptions Disp Refills     lovastatin (MEVACOR) 40 MG tablet [Pharmacy Med Name: Lovastatin Oral Tablet 40 MG] 180 tablet 0    Last Written Prescription Date:  10/31/2018  Last Fill Quantity: 60,  # refills: 0   Last office visit: 12/27/2018 with prescribing provider:  Indra  Future Office Visit:   Next 5 appointments (look out 90 days)    Jan 16, 2019  1:30 PM CST  Pre-Op physical with Zac Salgado MD  [facility] ([facility]) [address] [place] [id number]  [phone]          Sig: TAKE TWO TABLETS (80MG) BY MOUTH DAILY AT BEDTIME    [facility] Protocol Passed - 1/14/2019  3:36 PM       Passed - LDL on file in past 12 months    Recent Labs   Lab Test 11/21/18  0912   LDL 55            Passed - No abnormal creatine kinase in past 12 months    Recent Labs   Lab Test 01/26/12  1348   CKT 70.0               Passed - Recent (12 mo) or future (30 days) visit within the authorizing provider's specialty    Patient had office visit in the last 12 months or has a visit in the next 30 days with authorizing provider or within the authorizing provider's specialty.  See \"Patient Info\" tab in inbasket, or \"Choose Columns\" in Meds & Orders section of the refill encounter.             Passed - Medication is active on med list       Passed - Patient is age 18 or older        Filled per [facility] protocol.     NICHELLE Ivory, RN  [employer] [facility]    "

## 2019-01-14 NOTE — TELEPHONE ENCOUNTER
Last Written Prescription Date:  12/27/2018  Last Fill Quantity: 60,  # refills: 0   Last office visit: 12/27/2018 with prescribing provider:  Damian   Future Office Visit:   Next 5 appointments (look out 90 days)    Jan 16, 2019  1:30 PM CST  Pre-Op physical with Zac Salgado MD  [facility] [address] [place] [id number]  [phone]

## 2019-01-16 ENCOUNTER — OFFICE VISIT (OUTPATIENT)
Dept: FAMILY MEDICINE | Facility: CLINIC | Age: 84
End: 2019-01-16
Payer: COMMERCIAL

## 2019-01-16 VITALS
WEIGHT: 258 LBS | BODY MASS INDEX: 36.12 KG/M2 | HEART RATE: 108 BPM | OXYGEN SATURATION: 95 % | DIASTOLIC BLOOD PRESSURE: 60 MMHG | SYSTOLIC BLOOD PRESSURE: 138 MMHG | HEIGHT: 71 IN | TEMPERATURE: 97.5 F | RESPIRATION RATE: 14 BRPM

## 2019-01-16 DIAGNOSIS — K25.4 GASTROINTESTINAL HEMORRHAGE ASSOCIATED WITH GASTRIC ULCER: ICD-10-CM

## 2019-01-16 DIAGNOSIS — Z01.818 PREOP GENERAL PHYSICAL EXAM: Primary | ICD-10-CM

## 2019-01-16 DIAGNOSIS — I48.0 PAROXYSMAL ATRIAL FIBRILLATION (H): ICD-10-CM

## 2019-01-16 DIAGNOSIS — G47.33 OSA (OBSTRUCTIVE SLEEP APNEA): ICD-10-CM

## 2019-01-16 DIAGNOSIS — D62 ANEMIA DUE TO BLOOD LOSS, ACUTE: ICD-10-CM

## 2019-01-16 DIAGNOSIS — M17.11 PRIMARY OSTEOARTHRITIS OF RIGHT KNEE: ICD-10-CM

## 2019-01-16 DIAGNOSIS — E78.2 MIXED HYPERLIPIDEMIA: ICD-10-CM

## 2019-01-16 DIAGNOSIS — R60.0 EDEMA OF BOTH LEGS: ICD-10-CM

## 2019-01-16 LAB
ALBUMIN UR-MCNC: NEGATIVE MG/DL
APPEARANCE UR: CLEAR
BACTERIA #/AREA URNS HPF: ABNORMAL /HPF
BASOPHILS # BLD AUTO: 0.1 10E9/L (ref 0–0.2)
BASOPHILS NFR BLD AUTO: 1.4 %
BILIRUB UR QL STRIP: NEGATIVE
COLOR UR AUTO: YELLOW
DIFFERENTIAL METHOD BLD: ABNORMAL
EOSINOPHIL # BLD AUTO: 0.2 10E9/L (ref 0–0.7)
EOSINOPHIL NFR BLD AUTO: 1.9 %
ERYTHROCYTE [DISTWIDTH] IN BLOOD BY AUTOMATED COUNT: 18.5 % (ref 10–15)
GLUCOSE UR STRIP-MCNC: NEGATIVE MG/DL
HCT VFR BLD AUTO: 32.3 % (ref 40–53)
HGB BLD-MCNC: 9.5 G/DL (ref 13.3–17.7)
HGB UR QL STRIP: NEGATIVE
KETONES UR STRIP-MCNC: NEGATIVE MG/DL
LEUKOCYTE ESTERASE UR QL STRIP: NEGATIVE
LYMPHOCYTES # BLD AUTO: 1.8 10E9/L (ref 0.8–5.3)
LYMPHOCYTES NFR BLD AUTO: 18.3 %
MCH RBC QN AUTO: 25 PG (ref 26.5–33)
MCHC RBC AUTO-ENTMCNC: 29.4 G/DL (ref 31.5–36.5)
MCV RBC AUTO: 85 FL (ref 78–100)
MONOCYTES # BLD AUTO: 0.6 10E9/L (ref 0–1.3)
MONOCYTES NFR BLD AUTO: 5.5 %
NEUTROPHILS # BLD AUTO: 7.3 10E9/L (ref 1.6–8.3)
NEUTROPHILS NFR BLD AUTO: 72.9 %
NITRATE UR QL: POSITIVE
PH UR STRIP: 6 PH (ref 5–7)
PLATELET # BLD AUTO: 408 10E9/L (ref 150–450)
RBC # BLD AUTO: 3.8 10E12/L (ref 4.4–5.9)
RBC #/AREA URNS AUTO: ABNORMAL /HPF
SOURCE: ABNORMAL
SP GR UR STRIP: 1.01 (ref 1–1.03)
UROBILINOGEN UR STRIP-ACNC: 0.2 EU/DL (ref 0.2–1)
WBC # BLD AUTO: 10 10E9/L (ref 4–11)
WBC #/AREA URNS AUTO: ABNORMAL /HPF

## 2019-01-16 PROCEDURE — 36415 COLL VENOUS BLD VENIPUNCTURE: CPT | Performed by: FAMILY MEDICINE

## 2019-01-16 PROCEDURE — 99214 OFFICE O/P EST MOD 30 MIN: CPT | Performed by: FAMILY MEDICINE

## 2019-01-16 PROCEDURE — 84520 ASSAY OF UREA NITROGEN: CPT | Performed by: FAMILY MEDICINE

## 2019-01-16 PROCEDURE — 81001 URINALYSIS AUTO W/SCOPE: CPT | Performed by: FAMILY MEDICINE

## 2019-01-16 PROCEDURE — 85025 COMPLETE CBC W/AUTO DIFF WBC: CPT | Performed by: FAMILY MEDICINE

## 2019-01-16 PROCEDURE — 80051 ELECTROLYTE PANEL: CPT | Performed by: FAMILY MEDICINE

## 2019-01-16 PROCEDURE — 82565 ASSAY OF CREATININE: CPT | Performed by: FAMILY MEDICINE

## 2019-01-16 ASSESSMENT — MIFFLIN-ST. JEOR: SCORE: 1892.41

## 2019-01-16 NOTE — PROGRESS NOTES
[facility]  [address]  [facility] [address]  [phone]  Dept: [phone]    PRE-OP EVALUATION:  Today's date: 2019    Alessio Teixeira (: 1935) presents for pre-operative evaluation assessment as requested by Dr. Miguel Johnson.  He requires evaluation and anesthesia risk assessment prior to undergoing surgery/procedure for treatment of Right Knee Replacement .    Primary Physician: Zac Salgado  Type of Anesthesia Anticipated: to be determined    Patient has a Health Care Directive or Living Will:  YES     Preop Questions 2019   Who is doing your surgery? Dr Miguel Johnson   What are you having done? right knee replacement   Date of Surgery/Procedure:    Facility or Hospital where procedure/surgery will be performed: [facility]   1.  Do you have a history of Heart attack, stroke, stent, coronary bypass surgery, or other heart surgery? No   2.  Do you ever have any pain or discomfort in your chest? No   3.  Do you have a history of  Heart Failure? No   4.   Are you troubled by shortness of breath when:  walking on a level surface, or up a slight hill, or at night? No   5.  Do you currently have a cold, bronchitis or other respiratory infection? No   6.  Do you have a cough, shortness of breath, or wheezing? No   7.  Do you sometimes get pains in the calves of your legs when you walk? No   8. Do you or anyone in your family have previous history of blood clots? YES with brain surgery   9.  Do you or does anyone in your family have a serious bleeding problem such as prolonged bleeding following surgeries or cuts? No   10. Have you ever had problems with anemia or been told to take iron pills? UNKNOWN    11. Have you had any abnormal blood loss such as black, tarry or bloody stools? YES due to GI bleed from gastric ulcer   12. Have you ever had a blood transfusion? YES 2 units 2018   13. Have  you or any of your relatives ever had problems with anesthesia? No   14. Do you have sleep apnea, excessive snoring or daytime drowsiness? YES sleep apnea   15. Do you have any prosthetic heart valves? No   16. Do you have prosthetic joints? Left knee and right hip     HPI:     HPI related to upcoming procedure: increasing pain in the right knee due to OA      See problem list for active medical problems.  Problems all longstanding and stable, except as noted/documented.  See ROS for pertinent symptoms related to these conditions.                                                                                                                                                          .    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 by Miguel Johnson MD 05/02/2016     Priority: Medium     Aftercare following right hip joint replacement surgery 05/02/2016     Priority: Medium     Bradycardia 05/02/2016     Priority: Medium     History of pulmonary embolism- bilateral in 2007  05/02/2016     Priority: Medium     Atrial fibrillation (H) 05/02/2016     Priority: Medium     Anemia due to blood loss, acute 05/02/2016     Priority: Medium     Degenerative localized arthritis of hip 04/26/2016     Priority: Medium     ACP (advance care planning) 04/19/2016     Priority: Medium     Advance Care Planning 4/19/2016: Receipt of ACP document:  Received: Health Care Directive which was witnessed or notarized on 4/15/16.  Document not previously scanned.  Validation form completed and sent with document to be scanned.  Code Status needs to  be updated to reflect choices in most recent ACP document. Notification sent to Dr. Zac Salgado for followup.  Confirmed/documented designated decision maker(s).  Added by Alena Chan             Long term current use of anticoagulant therapy 03/11/2016     Priority: Medium     MARTHA (obstructive sleep apnea) 12/08/2014     Priority: Medium     Diplopia 03/11/2014     Priority: Medium     Hypertension goal BP (blood pressure) < 140/90 12/04/2013     Priority: Medium     Vitamin D deficiency disease 11/25/2013     Priority: Medium     Chronic atrial fibrillation (H) 06/12/2013     Priority: Medium     Chronic 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 06/12/2013     Priority: Medium     BMI > 35 04/16/2013     Priority: Medium     Stasis dermatitis 04/16/2013     Priority: Medium     Varicose veins of legs 04/13/2013     Priority: Medium     Hyperlipidemia LDL goal <100 02/23/2013     Priority: Medium     Edema of both legs 02/23/2013     Priority: Medium      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     Actinic keratosis      Atrial fibrillation (H)      Edema of leg      Gout, unspecified      H/O diplopia      Hypertension      Meningioma (H)     sinus     Myalgia and myositis, unspecified      Myasthenia gravis (H)     high dose steroids 2007,      Obesity      Other and unspecified hyperlipidemia      Other seborrheic keratosis      Presbyacusis      Primary localized osteoarthrosis, lower leg      Primary localized osteoarthrosis, pelvic region and thigh      Sleep apnea     doesn't use his CPAP)     Spinal stenosis      Squamous cell cancer of scalp and skin of neck     Dr Sanchez, multiple procedures, Mohs scalp and chest     Vitamin D deficiency      Past Surgical History:   Procedure Laterality Date     APPENDECTOMY       ARTHROPLASTY HIP Right 2016     ARTHROPLASTY HIP ANTERIOR Right 4/26/2016    Procedure: ARTHROPLASTY HIP ANTERIOR;  Surgeon: Miguel Johnson MD;   Location:  OR     BRAIN SURGERY  2007    partial resection meningioma     C RAD RESEC TONSIL/PILLARS       CATARACT IOL, RT/LT       COLECTOMY  2007    bowel perforation due to steroids     ESOPHAGOSCOPY, GASTROSCOPY, DUODENOSCOPY (EGD), COMBINED N/A 12/22/2018    Procedure: COMBINED ESOPHAGOSCOPY, GASTROSCOPY, DUODENOSCOPY (EGD), BIOPSY SINGLE OR MULTIPLE;  Surgeon: Elizabeth Jones MD;  Location:  GI     GENITOURINARY SURGERY      vasectomy     HEAD & NECK SURGERY  2007    meningioma removed     KNEE SURGERY  2010    left knee replacement     ORTHOPEDIC SURGERY      left TKR     PHACOEMULSIFICATION CLEAR CORNEA WITH TORIC INTRAOCULAR LENS IMPLANT  7/30/2012    Procedure: PHACOEMULSIFICATION CLEAR CORNEA WITH TORIC INTRAOCULAR LENS IMPLANT;  COMPLEX RIGHT PHACOEMULSIFICATION CLEAR CORNEA WITH TORIC INTRAOCULAR LENS IMPLANT WITH MALYUGIN RING;  Surgeon: Ronald Cortez MD;  Location:  [facility]     RECTAL SURGERY  1985     Current Outpatient Medications   Medication Sig Dispense Refill     CALCIUM PO Take 600 mg by mouth 2 times daily        furosemide (LASIX) 20 MG tablet TAKE ONE TABLET BY MOUTH ONE TIME DAILY - NEEDS TO BE SEEN FOR FURTHER REFILLS 90 tablet 3     KLOR-CON 20 MEQ CR tablet Take 1 tablet (20 mEq) by mouth daily 90 tablet 3     lovastatin (MEVACOR) 40 MG tablet TAKE TWO TABLETS (80MG) BY MOUTH DAILY AT BEDTIME 180 tablet 3     lovastatin (MEVACOR) 40 MG tablet TAKE TWO TABLETS (80MG) BY MOUTH DAILY AT BEDTIME 60 tablet 0     multivitamin, therapeutic with minerals (THERA-VIT-M) TABS Take 1 tablet by mouth daily       NIACIN CR PO Take 500 mg by mouth 2 times daily (with meals) 2 x 500 mg slow niacin       ORDER FOR DME CPAP supplies with variable pressure control 1 Device 0     pantoprazole (PROTONIX) 40 MG [facility] tablet Take 1 tablet (40 mg) by mouth 2 times daily 60 tablet 1     VITAMIN D, CHOLECALCIFEROL, PO Take 1,000 Units by mouth daily.         warfarin (COUMADIN) 2 MG tablet Do not take  "this medication until 18 but then resume typical dose of 3 AND 1/2 TABLETS (7MG) BY MOUTH DAILY 315 tablet 1     OTC products: None, except as noted above    No Known Allergies   Latex Allergy: NO    Social History     Tobacco Use     Smoking status: Former Smoker     Packs/day: 1.00     Years: 3.00     Pack years: 3.00     Types: Cigarettes     Last attempt to quit: 1963     Years since quittin.0     Smokeless tobacco: Never Used   Substance Use Topics     Alcohol use: Yes     Comment: 2-4 glasses of wine per week     History   Drug Use No       REVIEW OF SYSTEMS:   CONSTITUTIONAL: NEGATIVE for fever, chills, change in weight  INTEGUMENTARY/SKIN: NEGATIVE for worrisome rashes, moles or lesions  EYES: NEGATIVE for vision changes or irritation  ENT/MOUTH: NEGATIVE for ear, mouth and throat problems  RESP: NEGATIVE for significant cough or SOB  BREAST: NEGATIVE for masses, tenderness or discharge  CV: NEGATIVE for chest pain, palpitations or peripheral edema  GI: NEGATIVE for nausea, abdominal pain, heartburn, or change in bowel habits  : NEGATIVE for frequency, dysuria, or hematuria  MUSCULOSKELETAL: NEGATIVE for significant arthralgias or myalgia  NEURO: NEGATIVE for weakness, dizziness or paresthesias  ENDOCRINE: NEGATIVE for temperature intolerance, skin/hair changes  HEME: NEGATIVE for bleeding problems  PSYCHIATRIC: NEGATIVE for changes in mood or affect    EXAM:   /60   Pulse 108   Temp 97.5  F (36.4  C) (Tympanic)   Resp 14   Ht 1.803 m (5' 11\")   Wt 117 kg (258 lb)   SpO2 95%   BMI 35.98 kg/m      GENERAL APPEARANCE: healthy, alert and no distress     EYES: EOMI,  PERRL     HENT: ear canals and TM's normal and nose and mouth without ulcers or lesions     NECK: no adenopathy, no asymmetry, masses, or scars and thyroid normal to palpation     RESP: lungs clear to auscultation - no rales, rhonchi or wheezes     CV: regular rates and rhythm, normal S1 S2, no S3 or S4 and no murmur, " click or rub     ABDOMEN:  soft, nontender, no HSM or masses and bowel sounds normal     MS: extremities normal- no gross deformities noted, no evidence of inflammation in joints, FROM in all extremities.     SKIN: no suspicious lesions or rashes     NEURO: Normal strength and tone, sensory exam grossly normal, mentation intact and speech normal     PSYCH: mentation appears normal. and affect normal/bright     LYMPHATICS: No cervical adenopathy    DIAGNOSTICS:       Recent Labs   Lab Test 01/09/19 12/27/18  1135 12/24/18  1054  12/23/18  0400  12/22/18  0459   HGB  --  8.8* 8.5*   < > 7.1*   < > 8.4*   PLT  --  572*  --   --  387  --  500*   INR 2.3* 1.22*  --   --   --    < > 3.99*   NA  --   --   --   --  148*  --  148*   POTASSIUM  --   --   --   --  3.6  --  4.0   CR  --   --   --   --  1.02  --  1.11    < > = values in this interval not displayed.        IMPRESSION:   Reason for surgery/procedure: right knee pain due to OA    The proposed surgical procedure is considered INTERMEDIATE risk.    REVISED CARDIAC RISK INDEX  The patient has the following serious cardiovascular risks for perioperative complications such as (MI, PE, VFib and 3  AV Block):  No serious cardiac risks  INTERPRETATION: 0 risks: Class I (very low risk - 0.4% complication rate)    The patient has the following additional risks for perioperative complications:  No identified additional risks      ICD-10-CM    1. Preop general physical exam Z01.818 UA with Microscopic     CBC with platelets differential     Electrolyte panel (Na, K, Cl, CO2, Anion gap)     Creatinine     Urea nitrogen   2. Primary osteoarthritis of right knee M17.11    3. Gastrointestinal hemorrhage associated with gastric ulcer K25.4 CBC with platelets differential   4. Paroxysmal atrial fibrillation (H) I48.0    5. Mixed hyperlipidemia E78.2    6. MARTHA (obstructive sleep apnea) G47.33    7. Anemia due to blood loss, acute D62    8. Edema of both legs R60.0         RECOMMENDATIONS:     --Consult hospital rounder / IM to assist post-op medical management    I will have the patient start taking iron supplementation 3 times daily.  He will check his hemoglobin next week and we will then make a decision at that point as to whether or not he should have his surgery delayed.  I will convey that to .     Signed Electronically by: Zac Salgado MD    Copy of this evaluation report is provided to requesting physician.    [facility] Preop Guidelines    Revised Cardiac Risk Index

## 2019-01-16 NOTE — H&P (VIEW-ONLY)
[facility]  [address]  [place] [place] [address]  [phone]  Dept: [phone]    PRE-OP EVALUATION:  Today's date: 2019    Alessio Teixeira (: 1935) presents for pre-operative evaluation assessment as requested by Dr. Miguel Johnson.  He requires evaluation and anesthesia risk assessment prior to undergoing surgery/procedure for treatment of Right Knee Replacement .    Primary Physician: Zac Salgado  Type of Anesthesia Anticipated: to be determined    Patient has a Health Care Directive or Living Will:  YES     Preop Questions 2019   Who is doing your surgery? Dr Miguel Johnson   What are you having done? right knee replacement   Date of Surgery/Procedure:    Facility or Hospital where procedure/surgery will be performed: [facility]   1.  Do you have a history of Heart attack, stroke, stent, coronary bypass surgery, or other heart surgery? No   2.  Do you ever have any pain or discomfort in your chest? No   3.  Do you have a history of  Heart Failure? No   4.   Are you troubled by shortness of breath when:  walking on a level surface, or up a slight hill, or at night? No   5.  Do you currently have a cold, bronchitis or other respiratory infection? No   6.  Do you have a cough, shortness of breath, or wheezing? No   7.  Do you sometimes get pains in the calves of your legs when you walk? No   8. Do you or anyone in your family have previous history of blood clots? YES with brain surgery   9.  Do you or does anyone in your family have a serious bleeding problem such as prolonged bleeding following surgeries or cuts? No   10. Have you ever had problems with anemia or been told to take iron pills? UNKNOWN    11. Have you had any abnormal blood loss such as black, tarry or bloody stools? YES due to GI bleed from gastric ulcer   12. Have you ever had a blood transfusion? YES 2 units 2018   13. Have  you or any of your relatives ever had problems with anesthesia? No   14. Do you have sleep apnea, excessive snoring or daytime drowsiness? YES sleep apnea   15. Do you have any prosthetic heart valves? No   16. Do you have prosthetic joints? Left knee and right hip     HPI:     HPI related to upcoming procedure: increasing pain in the right knee due to OA      See problem list for active medical problems.  Problems all longstanding and stable, except as noted/documented.  See ROS for pertinent symptoms related to these conditions.                                                                                                                                                          .    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 by Miguel Johnson MD 05/02/2016     Priority: Medium     Aftercare following right hip joint replacement surgery 05/02/2016     Priority: Medium     Bradycardia 05/02/2016     Priority: Medium     History of pulmonary embolism- bilateral in 2007  05/02/2016     Priority: Medium     Atrial fibrillation (H) 05/02/2016     Priority: Medium     Anemia due to blood loss, acute 05/02/2016     Priority: Medium     Degenerative localized arthritis of hip 04/26/2016     Priority: Medium     ACP (advance care planning) 04/19/2016     Priority: Medium     Advance Care Planning 4/19/2016: Receipt of ACP document:  Received: Health Care Directive which was witnessed or notarized on 4/15/16.  Document not previously scanned.  Validation form completed and sent with document to be scanned.  Code Status needs to  be updated to reflect choices in most recent ACP document. Notification sent to Dr. Zac Salgado for followup.  Confirmed/documented designated decision maker(s).  Added by Alena Chan             Long term current use of anticoagulant therapy 03/11/2016     Priority: Medium     MARTHA (obstructive sleep apnea) 12/08/2014     Priority: Medium     Diplopia 03/11/2014     Priority: Medium     Hypertension goal BP (blood pressure) < 140/90 12/04/2013     Priority: Medium     Vitamin D deficiency disease 11/25/2013     Priority: Medium     Chronic atrial fibrillation (H) 06/12/2013     Priority: Medium     Chronic 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 06/12/2013     Priority: Medium     BMI > 35 04/16/2013     Priority: Medium     Stasis dermatitis 04/16/2013     Priority: Medium     Varicose veins of legs 04/13/2013     Priority: Medium     Hyperlipidemia LDL goal <100 02/23/2013     Priority: Medium     Edema of both legs 02/23/2013     Priority: Medium      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     Actinic keratosis      Atrial fibrillation (H)      Edema of leg      Gout, unspecified      H/O diplopia      Hypertension      Meningioma (H)     sinus     Myalgia and myositis, unspecified      Myasthenia gravis (H)     high dose steroids 2007,      Obesity      Other and unspecified hyperlipidemia      Other seborrheic keratosis      Presbyacusis      Primary localized osteoarthrosis, lower leg      Primary localized osteoarthrosis, pelvic region and thigh      Sleep apnea     doesn't use his CPAP)     Spinal stenosis      Squamous cell cancer of scalp and skin of neck     Dr Sanchez, multiple procedures, Mohs scalp and chest     Vitamin D deficiency      Past Surgical History:   Procedure Laterality Date     APPENDECTOMY       ARTHROPLASTY HIP Right 2016     ARTHROPLASTY HIP ANTERIOR Right 4/26/2016    Procedure: ARTHROPLASTY HIP ANTERIOR;  Surgeon: Miguel Johnson MD;   Location:  OR     BRAIN SURGERY  2007    partial resection meningioma     C RAD RESEC TONSIL/PILLARS       CATARACT IOL, RT/LT       COLECTOMY  2007    bowel perforation due to steroids     ESOPHAGOSCOPY, GASTROSCOPY, DUODENOSCOPY (EGD), COMBINED N/A 12/22/2018    Procedure: COMBINED ESOPHAGOSCOPY, GASTROSCOPY, DUODENOSCOPY (EGD), BIOPSY SINGLE OR MULTIPLE;  Surgeon: Elizabeth Jones MD;  Location:  GI     GENITOURINARY SURGERY      vasectomy     HEAD & NECK SURGERY  2007    meningioma removed     KNEE SURGERY  2010    left knee replacement     ORTHOPEDIC SURGERY      left TKR     PHACOEMULSIFICATION CLEAR CORNEA WITH TORIC INTRAOCULAR LENS IMPLANT  7/30/2012    Procedure: PHACOEMULSIFICATION CLEAR CORNEA WITH TORIC INTRAOCULAR LENS IMPLANT;  COMPLEX RIGHT PHACOEMULSIFICATION CLEAR CORNEA WITH TORIC INTRAOCULAR LENS IMPLANT WITH MALYUGIN RING;  Surgeon: Ronald Cortez MD;  Location:  [facility]     RECTAL SURGERY  1985     Current Outpatient Medications   Medication Sig Dispense Refill     CALCIUM PO Take 600 mg by mouth 2 times daily        furosemide (LASIX) 20 MG tablet TAKE ONE TABLET BY MOUTH ONE TIME DAILY - NEEDS TO BE SEEN FOR FURTHER REFILLS 90 tablet 3     KLOR-CON 20 MEQ CR tablet Take 1 tablet (20 mEq) by mouth daily 90 tablet 3     lovastatin (MEVACOR) 40 MG tablet TAKE TWO TABLETS (80MG) BY MOUTH DAILY AT BEDTIME 180 tablet 3     lovastatin (MEVACOR) 40 MG tablet TAKE TWO TABLETS (80MG) BY MOUTH DAILY AT BEDTIME 60 tablet 0     multivitamin, therapeutic with minerals (THERA-VIT-M) TABS Take 1 tablet by mouth daily       NIACIN CR PO Take 500 mg by mouth 2 times daily (with meals) 2 x 500 mg slow niacin       ORDER FOR DME CPAP supplies with variable pressure control 1 Device 0     pantoprazole (PROTONIX) 40 MG [facility] tablet Take 1 tablet (40 mg) by mouth 2 times daily 60 tablet 1     VITAMIN D, CHOLECALCIFEROL, PO Take 1,000 Units by mouth daily.         warfarin (COUMADIN) 2 MG tablet Do not take  "this medication until 18 but then resume typical dose of 3 AND 1/2 TABLETS (7MG) BY MOUTH DAILY 315 tablet 1     OTC products: None, except as noted above    No Known Allergies   Latex Allergy: NO    Social History     Tobacco Use     Smoking status: Former Smoker     Packs/day: 1.00     Years: 3.00     Pack years: 3.00     Types: Cigarettes     Last attempt to quit: 1963     Years since quittin.0     Smokeless tobacco: Never Used   Substance Use Topics     Alcohol use: Yes     Comment: 2-4 glasses of wine per week     History   Drug Use No       REVIEW OF SYSTEMS:   CONSTITUTIONAL: NEGATIVE for fever, chills, change in weight  INTEGUMENTARY/SKIN: NEGATIVE for worrisome rashes, moles or lesions  EYES: NEGATIVE for vision changes or irritation  ENT/MOUTH: NEGATIVE for ear, mouth and throat problems  RESP: NEGATIVE for significant cough or SOB  BREAST: NEGATIVE for masses, tenderness or discharge  CV: NEGATIVE for chest pain, palpitations or peripheral edema  GI: NEGATIVE for nausea, abdominal pain, heartburn, or change in bowel habits  : NEGATIVE for frequency, dysuria, or hematuria  MUSCULOSKELETAL: NEGATIVE for significant arthralgias or myalgia  NEURO: NEGATIVE for weakness, dizziness or paresthesias  ENDOCRINE: NEGATIVE for temperature intolerance, skin/hair changes  HEME: NEGATIVE for bleeding problems  PSYCHIATRIC: NEGATIVE for changes in mood or affect    EXAM:   /60   Pulse 108   Temp 97.5  F (36.4  C) (Tympanic)   Resp 14   Ht 1.803 m (5' 11\")   Wt 117 kg (258 lb)   SpO2 95%   BMI 35.98 kg/m      GENERAL APPEARANCE: healthy, alert and no distress     EYES: EOMI,  PERRL     HENT: ear canals and TM's normal and nose and mouth without ulcers or lesions     NECK: no adenopathy, no asymmetry, masses, or scars and thyroid normal to palpation     RESP: lungs clear to auscultation - no rales, rhonchi or wheezes     CV: regular rates and rhythm, normal S1 S2, no S3 or S4 and no murmur, " click or rub     ABDOMEN:  soft, nontender, no HSM or masses and bowel sounds normal     MS: extremities normal- no gross deformities noted, no evidence of inflammation in joints, FROM in all extremities.     SKIN: no suspicious lesions or rashes     NEURO: Normal strength and tone, sensory exam grossly normal, mentation intact and speech normal     PSYCH: mentation appears normal. and affect normal/bright     LYMPHATICS: No cervical adenopathy    DIAGNOSTICS:       Recent Labs   Lab Test 01/09/19 12/27/18  1135 12/24/18  1054  12/23/18  0400  12/22/18  0459   HGB  --  8.8* 8.5*   < > 7.1*   < > 8.4*   PLT  --  572*  --   --  387  --  500*   INR 2.3* 1.22*  --   --   --    < > 3.99*   NA  --   --   --   --  148*  --  148*   POTASSIUM  --   --   --   --  3.6  --  4.0   CR  --   --   --   --  1.02  --  1.11    < > = values in this interval not displayed.        IMPRESSION:   Reason for surgery/procedure: right knee pain due to OA    The proposed surgical procedure is considered INTERMEDIATE risk.    REVISED CARDIAC RISK INDEX  The patient has the following serious cardiovascular risks for perioperative complications such as (MI, PE, VFib and 3  AV Block):  No serious cardiac risks  INTERPRETATION: 0 risks: Class I (very low risk - 0.4% complication rate)    The patient has the following additional risks for perioperative complications:  No identified additional risks      ICD-10-CM    1. Preop general physical exam Z01.818 UA with Microscopic     CBC with platelets differential     Electrolyte panel (Na, K, Cl, CO2, Anion gap)     Creatinine     Urea nitrogen   2. Primary osteoarthritis of right knee M17.11    3. Gastrointestinal hemorrhage associated with gastric ulcer K25.4 CBC with platelets differential   4. Paroxysmal atrial fibrillation (H) I48.0    5. Mixed hyperlipidemia E78.2    6. MARTHA (obstructive sleep apnea) G47.33    7. Anemia due to blood loss, acute D62    8. Edema of both legs R60.0         RECOMMENDATIONS:     --Consult hospital rounder / IM to assist post-op medical management    I will have the patient start taking iron supplementation 3 times daily.  He will check his hemoglobin next week and we will then make a decision at that point as to whether or not he should have his surgery delayed.  I will convey that to .     Signed Electronically by: Zac Salgado MD    Copy of this evaluation report is provided to requesting physician.    [facility] Preop Guidelines    Revised Cardiac Risk Index

## 2019-01-16 NOTE — NURSING NOTE
"Chief Complaint   Patient presents with     Pre-Op Exam     /60   Pulse 108   Temp 97.5  F (36.4  C) (Tympanic)   Resp 14   Ht 1.803 m (5' 11\")   Wt 117 kg (258 lb)   SpO2 95%   BMI 35.98 kg/m   Estimated body mass index is 35.98 kg/m  as calculated from the following:    Height as of this encounter: 1.803 m (5' 11\").    Weight as of this encounter: 117 kg (258 lb).  BP completed using cuff size: alex Barker CMA    Health Maintenance Due   Topic Date Due     ZOSTER IMMUNIZATION (1 of 2) 12/02/1986     OP ANNUAL INR REFERRAL  07/08/2016     Health Maintenance reviewed at today's visit patient asked to schedule/complete:   Immunizations:  Patient agrees to schedule    "

## 2019-01-17 LAB
ANION GAP SERPL CALCULATED.3IONS-SCNC: 3 MMOL/L (ref 3–14)
BUN SERPL-MCNC: 27 MG/DL (ref 7–30)
CHLORIDE SERPL-SCNC: 110 MMOL/L (ref 94–109)
CO2 SERPL-SCNC: 28 MMOL/L (ref 20–32)
CREAT SERPL-MCNC: 1.4 MG/DL (ref 0.66–1.25)
GFR SERPL CREATININE-BSD FRML MDRD: 46 ML/MIN/{1.73_M2}
POTASSIUM SERPL-SCNC: 3.9 MMOL/L (ref 3.4–5.3)
SODIUM SERPL-SCNC: 141 MMOL/L (ref 133–144)

## 2019-01-21 DIAGNOSIS — D62 ANEMIA DUE TO BLOOD LOSS, ACUTE: Primary | ICD-10-CM

## 2019-01-23 ENCOUNTER — TELEPHONE (OUTPATIENT)
Dept: FAMILY MEDICINE | Facility: CLINIC | Age: 84
End: 2019-01-23

## 2019-01-23 DIAGNOSIS — Z86.711 HISTORY OF PULMONARY EMBOLISM: ICD-10-CM

## 2019-01-23 DIAGNOSIS — I48.0 PAROXYSMAL ATRIAL FIBRILLATION (H): ICD-10-CM

## 2019-01-23 DIAGNOSIS — K25.4 GASTROINTESTINAL HEMORRHAGE ASSOCIATED WITH GASTRIC ULCER: ICD-10-CM

## 2019-01-23 RX ORDER — WARFARIN SODIUM 2 MG/1
TABLET ORAL
Qty: 315 TABLET | Refills: 1 | Status: CANCELLED | OUTPATIENT
Start: 2019-01-23

## 2019-01-23 RX ORDER — PANTOPRAZOLE SODIUM 40 MG/1
40 TABLET, DELAYED RELEASE ORAL 2 TIMES DAILY
Qty: 60 TABLET | Refills: 1 | Status: SHIPPED | OUTPATIENT
Start: 2019-01-23 | End: 2019-04-22

## 2019-01-23 NOTE — TELEPHONE ENCOUNTER
Routing refill request to provider for review/approval because:  Please advice. Writer is unclear of length of treatment.

## 2019-01-23 NOTE — TELEPHONE ENCOUNTER
"Requested Prescriptions   Pending Prescriptions Disp Refills     warfarin (COUMADIN) 2 MG tablet [Pharmacy Med Name: Warfarin Sodium Oral Tablet 2 MG]  Last Written Prescription Date:  12/24/2018  Last Fill Quantity: 315 tablet,  # refills: 1   Last office visit: 1/16/2019 with prescribing provider:  Damian   Future Office Visit:     315 tablet 0     Sig: TAKE 3 AND 1/2 TABLETS (7MG) BY MOUTH DAILY    Vitamin K Antagonists Failed - 1/23/2019  9:23 AM       Failed - INR is within goal in the past 6 weeks    Confirm INR is within goal in the past 6 weeks.     Recent Labs   Lab Test 01/09/19   INR 2.3*                      Passed - Recent (12 mo) or future (30 days) visit within the authorizing provider's specialty    Patient had office visit in the last 12 months or has a visit in the next 30 days with authorizing provider or within the authorizing provider's specialty.  See \"Patient Info\" tab in inbasket, or \"Choose Columns\" in Meds & Orders section of the refill encounter.             Passed - Medication is active on med list       Passed - Patient is 18 years of age or older           "

## 2019-01-23 NOTE — TELEPHONE ENCOUNTER
Spoke with patient and let him know he should have plenty of refills of the warfarin. He will check with the pharmacy.    Radha Flores RN

## 2019-01-23 NOTE — TELEPHONE ENCOUNTER
PEr when I saw him in Dec, he was supposed to follow up with GI in 2 months, they should be able to let him know how long he should be on it.  He should stay on it until then.

## 2019-01-23 NOTE — TELEPHONE ENCOUNTER
Pt was called with providers message. Advised he schedule GI follow up. He verbalized agreement with plan.

## 2019-01-23 NOTE — TELEPHONE ENCOUNTER
Reason for Call:  Medication or medication refill:    Do you use a [facility]?  Name of the pharmacy and phone number for the current request:  [id number] - [place], [place] - [address]      Name of the medication requested: Warfarin 2 mg    Other request: Please send to pharmacy. Patient is out of medication. If any questions please call    Can we leave a detailed message on this number? YES    Phone number patient can be reached at: Home number on file [phone] (home)    Best Time: any    Call taken on 1/23/2019 at 8:27 AM by ERIBERTO ROMAN

## 2019-01-23 NOTE — TELEPHONE ENCOUNTER
"Requested Prescriptions   Pending Prescriptions Disp Refills     warfarin (COUMADIN) 2 MG tablet  Last Written Prescription Date:  12/24/18  Last Fill Quantity: 315,  # refills: 1   Last office visit: 1/16/2019 with prescribing provider:  terry   Future Office Visit:     315 tablet 1     Sig: Do not take this medication until 12/28/18 but then resume typical dose of 3 AND 1/2 TABLETS (7MG) BY MOUTH DAILY    Vitamin K Antagonists Failed - 1/23/2019  8:29 AM       Failed - INR is within goal in the past 6 weeks    Confirm INR is within goal in the past 6 weeks.     Recent Labs   Lab Test 01/09/19   INR 2.3*                      Passed - Recent (12 mo) or future (30 days) visit within the authorizing provider's specialty    Patient had office visit in the last 12 months or has a visit in the next 30 days with authorizing provider or within the authorizing provider's specialty.  See \"Patient Info\" tab in inbasket, or \"Choose Columns\" in Meds & Orders section of the refill encounter.             Passed - Medication is active on med list       Passed - Patient is 18 years of age or older          "

## 2019-01-23 NOTE — TELEPHONE ENCOUNTER
Reason for Call:  Other call back    Detailed comments: Question if he should continue to take Pantoprazole sodium. Patient states he was given this medication in the hospital but is now out of them. Please call to advise. If he does need them he is out of the medication and will need it to be sent to his pharmacy as soon as possible    Phone Number Patient can be reached at: Home number on file [phone] (home)    Best Time: any    Can we leave a detailed message on this number? YES    Call taken on 1/23/2019 at 8:24 AM by ERIBERTO ROMAN

## 2019-01-24 DIAGNOSIS — D62 ANEMIA DUE TO BLOOD LOSS, ACUTE: ICD-10-CM

## 2019-01-24 LAB
BASOPHILS # BLD AUTO: 0.2 10E9/L (ref 0–0.2)
BASOPHILS NFR BLD AUTO: 1.6 %
DIFFERENTIAL METHOD BLD: ABNORMAL
EOSINOPHIL # BLD AUTO: 0.2 10E9/L (ref 0–0.7)
EOSINOPHIL NFR BLD AUTO: 2.2 %
ERYTHROCYTE [DISTWIDTH] IN BLOOD BY AUTOMATED COUNT: 19.8 % (ref 10–15)
HCT VFR BLD AUTO: 36.3 % (ref 40–53)
HGB BLD-MCNC: 10.5 G/DL (ref 13.3–17.7)
LYMPHOCYTES # BLD AUTO: 1.9 10E9/L (ref 0.8–5.3)
LYMPHOCYTES NFR BLD AUTO: 17.4 %
MCH RBC QN AUTO: 24.7 PG (ref 26.5–33)
MCHC RBC AUTO-ENTMCNC: 28.9 G/DL (ref 31.5–36.5)
MCV RBC AUTO: 85 FL (ref 78–100)
MONOCYTES # BLD AUTO: 0.7 10E9/L (ref 0–1.3)
MONOCYTES NFR BLD AUTO: 6 %
NEUTROPHILS # BLD AUTO: 8 10E9/L (ref 1.6–8.3)
NEUTROPHILS NFR BLD AUTO: 72.8 %
PLATELET # BLD AUTO: 444 10E9/L (ref 150–450)
RBC # BLD AUTO: 4.25 10E12/L (ref 4.4–5.9)
WBC # BLD AUTO: 10.9 10E9/L (ref 4–11)

## 2019-01-24 PROCEDURE — 36415 COLL VENOUS BLD VENIPUNCTURE: CPT | Performed by: FAMILY MEDICINE

## 2019-01-24 PROCEDURE — 85025 COMPLETE CBC W/AUTO DIFF WBC: CPT | Performed by: FAMILY MEDICINE

## 2019-01-25 ENCOUNTER — TELEPHONE (OUTPATIENT)
Dept: FAMILY MEDICINE | Facility: CLINIC | Age: 84
End: 2019-01-25

## 2019-01-25 RX ORDER — WARFARIN SODIUM 2 MG/1
TABLET ORAL
Qty: 315 TABLET | Refills: 2 | Status: ON HOLD | OUTPATIENT
Start: 2019-01-25 | End: 2019-01-28

## 2019-01-25 NOTE — TELEPHONE ENCOUNTER
Alessio called. He is scheduled for rt total knee arthroplasty on 1/28.  His CBC is back from lab and his hgb is 10.5.  He is having black stools but he is on Iron tid. However he did have that gastro bleed on 12/22/2018.  He would like a phone call with is CBC results and to know if he is OK for surgery.

## 2019-01-25 NOTE — TELEPHONE ENCOUNTER
The Hemoglobin of 10.5 is up from 9.5 on 1/16/19  Stools will be black from the iron supplement.  He should be ok for surgery as planned

## 2019-01-28 ENCOUNTER — ANESTHESIA (OUTPATIENT)
Dept: SURGERY | Facility: CLINIC | Age: 84
DRG: 470 | End: 2019-01-28
Payer: COMMERCIAL

## 2019-01-28 ENCOUNTER — APPOINTMENT (OUTPATIENT)
Dept: PHYSICAL THERAPY | Facility: CLINIC | Age: 84
DRG: 470 | End: 2019-01-28
Attending: ORTHOPAEDIC SURGERY
Payer: COMMERCIAL

## 2019-01-28 ENCOUNTER — HOSPITAL ENCOUNTER (INPATIENT)
Facility: CLINIC | Age: 84
LOS: 3 days | Discharge: NURSING, REHAB OR LONG-TERM CARE | DRG: 470 | End: 2019-01-31
Attending: ORTHOPAEDIC SURGERY | Admitting: ORTHOPAEDIC SURGERY
Payer: COMMERCIAL

## 2019-01-28 ENCOUNTER — APPOINTMENT (OUTPATIENT)
Dept: GENERAL RADIOLOGY | Facility: CLINIC | Age: 84
DRG: 470 | End: 2019-01-28
Attending: ORTHOPAEDIC SURGERY
Payer: COMMERCIAL

## 2019-01-28 ENCOUNTER — ANESTHESIA EVENT (OUTPATIENT)
Dept: SURGERY | Facility: CLINIC | Age: 84
DRG: 470 | End: 2019-01-28
Payer: COMMERCIAL

## 2019-01-28 DIAGNOSIS — M17.11 PRIMARY OSTEOARTHRITIS OF RIGHT KNEE: Primary | ICD-10-CM

## 2019-01-28 DIAGNOSIS — I10 HYPERTENSION GOAL BP (BLOOD PRESSURE) < 140/90: ICD-10-CM

## 2019-01-28 PROBLEM — M17.9 DEGENERATIVE ARTHRITIS OF KNEE: Status: ACTIVE | Noted: 2019-01-28

## 2019-01-28 LAB
HGB BLD-MCNC: 10.4 G/DL (ref 13.3–17.7)
INR PPP: 1.61 (ref 0.86–1.14)
POTASSIUM SERPL-SCNC: 3.9 MMOL/L (ref 3.4–5.3)

## 2019-01-28 PROCEDURE — 25000128 H RX IP 250 OP 636: Performed by: ORTHOPAEDIC SURGERY

## 2019-01-28 PROCEDURE — 25000125 ZZHC RX 250: Performed by: NURSE ANESTHETIST, CERTIFIED REGISTERED

## 2019-01-28 PROCEDURE — C1713 ANCHOR/SCREW BN/BN,TIS/BN: HCPCS | Performed by: ORTHOPAEDIC SURGERY

## 2019-01-28 PROCEDURE — 85610 PROTHROMBIN TIME: CPT | Performed by: ORTHOPAEDIC SURGERY

## 2019-01-28 PROCEDURE — 0SRC0J9 REPLACEMENT OF RIGHT KNEE JOINT WITH SYNTHETIC SUBSTITUTE, CEMENTED, OPEN APPROACH: ICD-10-PCS | Performed by: ORTHOPAEDIC SURGERY

## 2019-01-28 PROCEDURE — 12000000 ZZH R&B MED SURG/OB

## 2019-01-28 PROCEDURE — 40000170 ZZH STATISTIC PRE-PROCEDURE ASSESSMENT II: Performed by: ORTHOPAEDIC SURGERY

## 2019-01-28 PROCEDURE — 25000128 H RX IP 250 OP 636: Performed by: ANESTHESIOLOGY

## 2019-01-28 PROCEDURE — 99222 1ST HOSP IP/OBS MODERATE 55: CPT | Performed by: PHYSICIAN ASSISTANT

## 2019-01-28 PROCEDURE — 25000128 H RX IP 250 OP 636: Performed by: NURSE ANESTHETIST, CERTIFIED REGISTERED

## 2019-01-28 PROCEDURE — 27810169 ZZH OR IMPLANT GENERAL: Performed by: ORTHOPAEDIC SURGERY

## 2019-01-28 PROCEDURE — 36000063 ZZH SURGERY LEVEL 4 EA 15 ADDTL MIN: Performed by: ORTHOPAEDIC SURGERY

## 2019-01-28 PROCEDURE — 36415 COLL VENOUS BLD VENIPUNCTURE: CPT | Performed by: ORTHOPAEDIC SURGERY

## 2019-01-28 PROCEDURE — 97161 PT EVAL LOW COMPLEX 20 MIN: CPT | Mod: GP | Performed by: PHYSICAL THERAPIST

## 2019-01-28 PROCEDURE — 37000008 ZZH ANESTHESIA TECHNICAL FEE, 1ST 30 MIN: Performed by: ORTHOPAEDIC SURGERY

## 2019-01-28 PROCEDURE — 40000193 ZZH STATISTIC PT WARD VISIT: Performed by: PHYSICAL THERAPIST

## 2019-01-28 PROCEDURE — 25000125 ZZHC RX 250: Performed by: ORTHOPAEDIC SURGERY

## 2019-01-28 PROCEDURE — 85018 HEMOGLOBIN: CPT | Performed by: ORTHOPAEDIC SURGERY

## 2019-01-28 PROCEDURE — 36000093 ZZH SURGERY LEVEL 4 1ST 30 MIN: Performed by: ORTHOPAEDIC SURGERY

## 2019-01-28 PROCEDURE — 25800025 ZZH RX 258: Performed by: ORTHOPAEDIC SURGERY

## 2019-01-28 PROCEDURE — 27110028 ZZH OR GENERAL SUPPLY NON-STERILE: Performed by: ORTHOPAEDIC SURGERY

## 2019-01-28 PROCEDURE — 27210794 ZZH OR GENERAL SUPPLY STERILE: Performed by: ORTHOPAEDIC SURGERY

## 2019-01-28 PROCEDURE — 40000986 XR KNEE PORT RT 1/2 VW: Mod: RT

## 2019-01-28 PROCEDURE — 97530 THERAPEUTIC ACTIVITIES: CPT | Mod: GP | Performed by: PHYSICAL THERAPIST

## 2019-01-28 PROCEDURE — 99207 ZZC CONSULT E&M CHANGED TO INITIAL LEVEL: CPT | Performed by: PHYSICIAN ASSISTANT

## 2019-01-28 PROCEDURE — C1776 JOINT DEVICE (IMPLANTABLE): HCPCS | Performed by: ORTHOPAEDIC SURGERY

## 2019-01-28 PROCEDURE — 25000132 ZZH RX MED GY IP 250 OP 250 PS 637: Performed by: ORTHOPAEDIC SURGERY

## 2019-01-28 PROCEDURE — 71000012 ZZH RECOVERY PHASE 1 LEVEL 1 FIRST HR: Performed by: ORTHOPAEDIC SURGERY

## 2019-01-28 PROCEDURE — 37000009 ZZH ANESTHESIA TECHNICAL FEE, EACH ADDTL 15 MIN: Performed by: ORTHOPAEDIC SURGERY

## 2019-01-28 PROCEDURE — 84132 ASSAY OF SERUM POTASSIUM: CPT | Performed by: ORTHOPAEDIC SURGERY

## 2019-01-28 PROCEDURE — 25000566 ZZH SEVOFLURANE, EA 15 MIN: Performed by: ORTHOPAEDIC SURGERY

## 2019-01-28 PROCEDURE — 97110 THERAPEUTIC EXERCISES: CPT | Mod: GP | Performed by: PHYSICAL THERAPIST

## 2019-01-28 DEVICE — IMP COMP PATELLA ZIM NEXGEN 9.0X35MM: Type: IMPLANTABLE DEVICE | Site: KNEE | Status: FUNCTIONAL

## 2019-01-28 DEVICE — IMPLANTABLE DEVICE: Type: IMPLANTABLE DEVICE | Site: KNEE | Status: FUNCTIONAL

## 2019-01-28 DEVICE — BONE CEMENT RADIOPAQUE SIMPLEX HV FULL DOSE 6194-1-001: Type: IMPLANTABLE DEVICE | Site: KNEE | Status: FUNCTIONAL

## 2019-01-28 DEVICE — IMP PLUG TAPER NEXGEN ZIM 00-5960-099-00: Type: IMPLANTABLE DEVICE | Site: KNEE | Status: FUNCTIONAL

## 2019-01-28 DEVICE — IMP PLATE TIBIAL ZIM NEXGEN SIZE 7: Type: IMPLANTABLE DEVICE | Site: KNEE | Status: FUNCTIONAL

## 2019-01-28 RX ORDER — SIMVASTATIN 20 MG
20 TABLET ORAL AT BEDTIME
Status: DISCONTINUED | OUTPATIENT
Start: 2019-01-28 | End: 2019-01-31 | Stop reason: HOSPADM

## 2019-01-28 RX ORDER — PROCHLORPERAZINE MALEATE 5 MG
5 TABLET ORAL EVERY 6 HOURS PRN
Status: DISCONTINUED | OUTPATIENT
Start: 2019-01-28 | End: 2019-01-31 | Stop reason: HOSPADM

## 2019-01-28 RX ORDER — LIDOCAINE 40 MG/G
CREAM TOPICAL
Status: DISCONTINUED | OUTPATIENT
Start: 2019-01-28 | End: 2019-01-31 | Stop reason: HOSPADM

## 2019-01-28 RX ORDER — HYDROMORPHONE HYDROCHLORIDE 1 MG/ML
.3-.5 INJECTION, SOLUTION INTRAMUSCULAR; INTRAVENOUS; SUBCUTANEOUS EVERY 30 MIN PRN
Status: DISCONTINUED | OUTPATIENT
Start: 2019-01-28 | End: 2019-01-31 | Stop reason: HOSPADM

## 2019-01-28 RX ORDER — METOPROLOL TARTRATE 1 MG/ML
2.5-5 INJECTION, SOLUTION INTRAVENOUS EVERY 4 HOURS PRN
Status: DISCONTINUED | OUTPATIENT
Start: 2019-01-28 | End: 2019-01-31 | Stop reason: HOSPADM

## 2019-01-28 RX ORDER — AMOXICILLIN 250 MG
2 CAPSULE ORAL 2 TIMES DAILY
Status: DISCONTINUED | OUTPATIENT
Start: 2019-01-28 | End: 2019-01-30

## 2019-01-28 RX ORDER — MULTIPLE VITAMINS W/ MINERALS TAB 9MG-400MCG
1 TAB ORAL DAILY
Status: DISCONTINUED | OUTPATIENT
Start: 2019-01-28 | End: 2019-01-31 | Stop reason: HOSPADM

## 2019-01-28 RX ORDER — CEFAZOLIN SODIUM 2 G/100ML
2 INJECTION, SOLUTION INTRAVENOUS EVERY 8 HOURS
Status: COMPLETED | OUTPATIENT
Start: 2019-01-28 | End: 2019-01-29

## 2019-01-28 RX ORDER — OXYCODONE HYDROCHLORIDE 5 MG/1
5-10 TABLET ORAL EVERY 4 HOURS PRN
Status: DISCONTINUED | OUTPATIENT
Start: 2019-01-28 | End: 2019-01-30

## 2019-01-28 RX ORDER — NALOXONE HYDROCHLORIDE 0.4 MG/ML
.1-.4 INJECTION, SOLUTION INTRAMUSCULAR; INTRAVENOUS; SUBCUTANEOUS
Status: DISCONTINUED | OUTPATIENT
Start: 2019-01-28 | End: 2019-01-31 | Stop reason: HOSPADM

## 2019-01-28 RX ORDER — ACETAMINOPHEN 325 MG/1
975 TABLET ORAL EVERY 8 HOURS
Status: COMPLETED | OUTPATIENT
Start: 2019-01-28 | End: 2019-01-31

## 2019-01-28 RX ORDER — LIDOCAINE HYDROCHLORIDE 20 MG/ML
INJECTION, SOLUTION INFILTRATION; PERINEURAL PRN
Status: DISCONTINUED | OUTPATIENT
Start: 2019-01-28 | End: 2019-01-28

## 2019-01-28 RX ORDER — DEXAMETHASONE SODIUM PHOSPHATE 4 MG/ML
INJECTION, SOLUTION INTRA-ARTICULAR; INTRALESIONAL; INTRAMUSCULAR; INTRAVENOUS; SOFT TISSUE PRN
Status: DISCONTINUED | OUTPATIENT
Start: 2019-01-28 | End: 2019-01-28

## 2019-01-28 RX ORDER — HYDROMORPHONE HYDROCHLORIDE 1 MG/ML
.3-.5 INJECTION, SOLUTION INTRAMUSCULAR; INTRAVENOUS; SUBCUTANEOUS EVERY 5 MIN PRN
Status: DISCONTINUED | OUTPATIENT
Start: 2019-01-28 | End: 2019-01-28 | Stop reason: HOSPADM

## 2019-01-28 RX ORDER — AMOXICILLIN 250 MG
1 CAPSULE ORAL 2 TIMES DAILY
Status: DISCONTINUED | OUTPATIENT
Start: 2019-01-28 | End: 2019-01-30

## 2019-01-28 RX ORDER — CEFAZOLIN SODIUM 2 G/100ML
2 INJECTION, SOLUTION INTRAVENOUS
Status: COMPLETED | OUTPATIENT
Start: 2019-01-28 | End: 2019-01-28

## 2019-01-28 RX ORDER — ONDANSETRON 2 MG/ML
4 INJECTION INTRAMUSCULAR; INTRAVENOUS EVERY 30 MIN PRN
Status: DISCONTINUED | OUTPATIENT
Start: 2019-01-28 | End: 2019-01-28 | Stop reason: HOSPADM

## 2019-01-28 RX ORDER — PANTOPRAZOLE SODIUM 40 MG/1
40 TABLET, DELAYED RELEASE ORAL 2 TIMES DAILY
Status: DISCONTINUED | OUTPATIENT
Start: 2019-01-28 | End: 2019-01-31 | Stop reason: HOSPADM

## 2019-01-28 RX ORDER — ACETAMINOPHEN 325 MG/1
650 TABLET ORAL EVERY 4 HOURS PRN
Status: DISCONTINUED | OUTPATIENT
Start: 2019-01-31 | End: 2019-01-31 | Stop reason: HOSPADM

## 2019-01-28 RX ORDER — POTASSIUM CHLORIDE 1500 MG/1
20 TABLET, EXTENDED RELEASE ORAL DAILY
Status: DISCONTINUED | OUTPATIENT
Start: 2019-01-28 | End: 2019-01-28

## 2019-01-28 RX ORDER — FENTANYL CITRATE 50 UG/ML
25-50 INJECTION, SOLUTION INTRAMUSCULAR; INTRAVENOUS
Status: DISCONTINUED | OUTPATIENT
Start: 2019-01-28 | End: 2019-01-28 | Stop reason: HOSPADM

## 2019-01-28 RX ORDER — FENTANYL CITRATE 50 UG/ML
INJECTION, SOLUTION INTRAMUSCULAR; INTRAVENOUS PRN
Status: DISCONTINUED | OUTPATIENT
Start: 2019-01-28 | End: 2019-01-28

## 2019-01-28 RX ORDER — SODIUM CHLORIDE, SODIUM LACTATE, POTASSIUM CHLORIDE, CALCIUM CHLORIDE 600; 310; 30; 20 MG/100ML; MG/100ML; MG/100ML; MG/100ML
INJECTION, SOLUTION INTRAVENOUS CONTINUOUS
Status: DISCONTINUED | OUTPATIENT
Start: 2019-01-28 | End: 2019-01-28 | Stop reason: HOSPADM

## 2019-01-28 RX ORDER — HYDROXYZINE HYDROCHLORIDE 10 MG/1
10 TABLET, FILM COATED ORAL EVERY 6 HOURS PRN
Status: DISCONTINUED | OUTPATIENT
Start: 2019-01-28 | End: 2019-01-31 | Stop reason: HOSPADM

## 2019-01-28 RX ORDER — PROPOFOL 10 MG/ML
INJECTION, EMULSION INTRAVENOUS CONTINUOUS PRN
Status: DISCONTINUED | OUTPATIENT
Start: 2019-01-28 | End: 2019-01-28

## 2019-01-28 RX ORDER — DEXTROSE MONOHYDRATE, SODIUM CHLORIDE, AND POTASSIUM CHLORIDE 50; 1.49; 4.5 G/1000ML; G/1000ML; G/1000ML
INJECTION, SOLUTION INTRAVENOUS CONTINUOUS
Status: DISCONTINUED | OUTPATIENT
Start: 2019-01-28 | End: 2019-01-31 | Stop reason: HOSPADM

## 2019-01-28 RX ORDER — CEFAZOLIN SODIUM 1 G/3ML
1 INJECTION, POWDER, FOR SOLUTION INTRAMUSCULAR; INTRAVENOUS SEE ADMIN INSTRUCTIONS
Status: DISCONTINUED | OUTPATIENT
Start: 2019-01-28 | End: 2019-01-28 | Stop reason: HOSPADM

## 2019-01-28 RX ORDER — ONDANSETRON 2 MG/ML
INJECTION INTRAMUSCULAR; INTRAVENOUS PRN
Status: DISCONTINUED | OUTPATIENT
Start: 2019-01-28 | End: 2019-01-28

## 2019-01-28 RX ORDER — ONDANSETRON 4 MG/1
4 TABLET, ORALLY DISINTEGRATING ORAL EVERY 6 HOURS PRN
Status: DISCONTINUED | OUTPATIENT
Start: 2019-01-28 | End: 2019-01-31 | Stop reason: HOSPADM

## 2019-01-28 RX ORDER — ONDANSETRON 2 MG/ML
4 INJECTION INTRAMUSCULAR; INTRAVENOUS EVERY 6 HOURS PRN
Status: DISCONTINUED | OUTPATIENT
Start: 2019-01-28 | End: 2019-01-31 | Stop reason: HOSPADM

## 2019-01-28 RX ORDER — ONDANSETRON 4 MG/1
4 TABLET, ORALLY DISINTEGRATING ORAL EVERY 30 MIN PRN
Status: DISCONTINUED | OUTPATIENT
Start: 2019-01-28 | End: 2019-01-28 | Stop reason: HOSPADM

## 2019-01-28 RX ORDER — FENTANYL CITRATE 50 UG/ML
50 INJECTION, SOLUTION INTRAMUSCULAR; INTRAVENOUS
Status: COMPLETED | OUTPATIENT
Start: 2019-01-28 | End: 2019-01-28

## 2019-01-28 RX ORDER — MEPERIDINE HYDROCHLORIDE 25 MG/ML
12.5 INJECTION INTRAMUSCULAR; INTRAVENOUS; SUBCUTANEOUS EVERY 5 MIN PRN
Status: DISCONTINUED | OUTPATIENT
Start: 2019-01-28 | End: 2019-01-28 | Stop reason: HOSPADM

## 2019-01-28 RX ORDER — PROPOFOL 10 MG/ML
INJECTION, EMULSION INTRAVENOUS PRN
Status: DISCONTINUED | OUTPATIENT
Start: 2019-01-28 | End: 2019-01-28

## 2019-01-28 RX ORDER — NIACIN 500 MG/1
1000 TABLET, EXTENDED RELEASE ORAL 2 TIMES DAILY WITH MEALS
Status: DISCONTINUED | OUTPATIENT
Start: 2019-01-28 | End: 2019-01-31 | Stop reason: HOSPADM

## 2019-01-28 RX ORDER — NALOXONE HYDROCHLORIDE 0.4 MG/ML
.1-.4 INJECTION, SOLUTION INTRAMUSCULAR; INTRAVENOUS; SUBCUTANEOUS
Status: DISCONTINUED | OUTPATIENT
Start: 2019-01-28 | End: 2019-01-28

## 2019-01-28 RX ORDER — EPHEDRINE SULFATE 50 MG/ML
INJECTION, SOLUTION INTRAMUSCULAR; INTRAVENOUS; SUBCUTANEOUS PRN
Status: DISCONTINUED | OUTPATIENT
Start: 2019-01-28 | End: 2019-01-28

## 2019-01-28 RX ORDER — MAGNESIUM HYDROXIDE 1200 MG/15ML
LIQUID ORAL PRN
Status: DISCONTINUED | OUTPATIENT
Start: 2019-01-28 | End: 2019-01-28 | Stop reason: HOSPADM

## 2019-01-28 RX ADMIN — FENTANYL CITRATE 50 MCG: 50 INJECTION, SOLUTION INTRAMUSCULAR; INTRAVENOUS at 06:55

## 2019-01-28 RX ADMIN — ACETAMINOPHEN 975 MG: 325 TABLET, FILM COATED ORAL at 13:45

## 2019-01-28 RX ADMIN — SENNOSIDES AND DOCUSATE SODIUM 2 TABLET: 8.6; 5 TABLET ORAL at 20:06

## 2019-01-28 RX ADMIN — HYDROMORPHONE HYDROCHLORIDE 0.5 MG: 1 INJECTION, SOLUTION INTRAMUSCULAR; INTRAVENOUS; SUBCUTANEOUS at 10:27

## 2019-01-28 RX ADMIN — VITAMIN D, TAB 1000IU (100/BT) 1000 UNITS: 25 TAB at 13:45

## 2019-01-28 RX ADMIN — DEXMEDETOMIDINE HYDROCHLORIDE 8 MCG: 100 INJECTION, SOLUTION INTRAVENOUS at 08:50

## 2019-01-28 RX ADMIN — LIDOCAINE HYDROCHLORIDE 100 MG: 20 INJECTION, SOLUTION INFILTRATION; PERINEURAL at 07:37

## 2019-01-28 RX ADMIN — MIDAZOLAM HYDROCHLORIDE 1 MG: 1 INJECTION, SOLUTION INTRAMUSCULAR; INTRAVENOUS at 06:55

## 2019-01-28 RX ADMIN — PROPOFOL 25 MCG/KG/MIN: 10 INJECTION, EMULSION INTRAVENOUS at 08:14

## 2019-01-28 RX ADMIN — OXYCODONE HYDROCHLORIDE 5 MG: 5 TABLET ORAL at 21:36

## 2019-01-28 RX ADMIN — Medication 600 MG: at 20:07

## 2019-01-28 RX ADMIN — ONDANSETRON 4 MG: 2 INJECTION INTRAMUSCULAR; INTRAVENOUS at 09:15

## 2019-01-28 RX ADMIN — PROPOFOL 200 MG: 10 INJECTION, EMULSION INTRAVENOUS at 07:37

## 2019-01-28 RX ADMIN — Medication 140 MG: at 18:21

## 2019-01-28 RX ADMIN — MULTIPLE VITAMINS W/ MINERALS TAB 1 TABLET: TAB at 13:45

## 2019-01-28 RX ADMIN — PROPOFOL 20 MG: 10 INJECTION, EMULSION INTRAVENOUS at 09:37

## 2019-01-28 RX ADMIN — NIACIN 1000 MG: 500 TABLET, EXTENDED RELEASE ORAL at 18:21

## 2019-01-28 RX ADMIN — WARFARIN SODIUM 7 MG: 6 TABLET ORAL at 18:21

## 2019-01-28 RX ADMIN — SODIUM CHLORIDE, POTASSIUM CHLORIDE, SODIUM LACTATE AND CALCIUM CHLORIDE: 600; 310; 30; 20 INJECTION, SOLUTION INTRAVENOUS at 07:12

## 2019-01-28 RX ADMIN — SIMVASTATIN 20 MG: 20 TABLET, FILM COATED ORAL at 21:36

## 2019-01-28 RX ADMIN — FENTANYL CITRATE 50 MCG: 50 INJECTION, SOLUTION INTRAMUSCULAR; INTRAVENOUS at 07:58

## 2019-01-28 RX ADMIN — Medication 5 MG: at 07:41

## 2019-01-28 RX ADMIN — POTASSIUM CHLORIDE, DEXTROSE MONOHYDRATE AND SODIUM CHLORIDE: 150; 5; 450 INJECTION, SOLUTION INTRAVENOUS at 12:11

## 2019-01-28 RX ADMIN — CEFAZOLIN SODIUM 2 G: 2 INJECTION, SOLUTION INTRAVENOUS at 16:30

## 2019-01-28 RX ADMIN — SODIUM CHLORIDE, POTASSIUM CHLORIDE, SODIUM LACTATE AND CALCIUM CHLORIDE: 600; 310; 30; 20 INJECTION, SOLUTION INTRAVENOUS at 09:18

## 2019-01-28 RX ADMIN — PANTOPRAZOLE SODIUM 40 MG: 40 TABLET, DELAYED RELEASE ORAL at 20:07

## 2019-01-28 RX ADMIN — CEFAZOLIN SODIUM 2 G: 2 INJECTION, SOLUTION INTRAVENOUS at 07:46

## 2019-01-28 RX ADMIN — HYDROMORPHONE HYDROCHLORIDE 0.5 MG: 1 INJECTION, SOLUTION INTRAMUSCULAR; INTRAVENOUS; SUBCUTANEOUS at 10:12

## 2019-01-28 RX ADMIN — ACETAMINOPHEN 975 MG: 325 TABLET, FILM COATED ORAL at 21:36

## 2019-01-28 RX ADMIN — Medication 5 MG: at 07:58

## 2019-01-28 RX ADMIN — POTASSIUM CHLORIDE, DEXTROSE MONOHYDRATE AND SODIUM CHLORIDE: 150; 5; 450 INJECTION, SOLUTION INTRAVENOUS at 20:11

## 2019-01-28 RX ADMIN — FENTANYL CITRATE 50 MCG: 50 INJECTION, SOLUTION INTRAMUSCULAR; INTRAVENOUS at 09:37

## 2019-01-28 RX ADMIN — DEXAMETHASONE SODIUM PHOSPHATE 4 MG: 4 INJECTION, SOLUTION INTRA-ARTICULAR; INTRALESIONAL; INTRAMUSCULAR; INTRAVENOUS; SOFT TISSUE at 08:05

## 2019-01-28 RX ADMIN — Medication 5 MG: at 09:07

## 2019-01-28 RX ADMIN — PROPOFOL 60 MG: 10 INJECTION, EMULSION INTRAVENOUS at 08:11

## 2019-01-28 ASSESSMENT — ACTIVITIES OF DAILY LIVING (ADL)
ADLS_ACUITY_SCORE: 13
ADLS_ACUITY_SCORE: 13
ADLS_ACUITY_SCORE: 15

## 2019-01-28 ASSESSMENT — ENCOUNTER SYMPTOMS: DYSRHYTHMIAS: 1

## 2019-01-28 ASSESSMENT — MIFFLIN-ST. JEOR: SCORE: 1882.88

## 2019-01-28 NOTE — ANESTHESIA PROCEDURE NOTES
Peripheral nerve/Neuraxial procedure note : Adductor canal  Pre-Procedure  Performed by Albert Gonzalez DO  Location: pre-op      Pre-Anesthestic Checklist: patient identified, IV checked, site marked, risks and benefits discussed, informed consent, monitors and equipment checked, pre-op evaluation, at physician/surgeon's request and post-op pain management    Timeout  Correct Patient: Yes   Correct Procedure: Yes   Correct Site: Yes   Correct Laterality: Yes   Correct Position: Yes   Site Marked: Yes   .   Procedure Documentation    .    Procedure:  right  Adductor canal.  Local skin infiltrated with 3 mL of 1% lidocaine.     Ultrasound used to identify targeted nerve, plexus, or vascular marker and placed a needle adjacent to it., Ultrasound was used to visualize the spread of the anesthetic in close proximity to the above stated nerve. A permanent image is entered into the patient's record.  Patient Prep;mask, sterile gloves, chlorhexidine gluconate and isopropyl alcohol, patient draped.  .  Needle: insulated, short bevel Needle Gauge: 21.    Needle Length (Inches) 4  Insertion Method: Single Shot.       Assessment/Narrative  Paresthesias: No.  .  The placement was negative for: blood aspirated, painful injection and site bleeding.  Bolus given via needle..   Secured via.   Complications: none. Test dose of mL at. Test dose negative for signs of intravascular, subdural or intrathecal injection. Comments:  The surgeon has given a verbal order transferring care of this patient to me for the performance of a regional analgesia block for post-op pain control. It is requested of me because I am uniquely trained and qualified to perform this block and the surgeon is neither trained nor qualified to perform this procedure

## 2019-01-28 NOTE — PLAN OF CARE
Discharge Planner PT   Patient plan for discharge: TCU prior to return home home with spouse   Current status: Orders received, evaluation completed, and treatment initiated. Patient is a 82 y/o male POD # 0 R TKA. Pt lives with spouse in a Townhouse with 3 stairs to enter/exit and all needs are met on the main level. Pt independent at baseline, will have access to a SEC and FWW at time of discharge. Pt participated in supine strengthening/ROM exercises. Pt performed supine <> sit with SBA and tolerated sitting EOB x 10 minutes. Deferred further mobility due to numbness in R LE and pt unable to complete dorsiflexion of R foot. R knee ROM: 8-84 degrees.   Barriers to return to prior living situation: Level of assist, Unable to assess OOB mobility, pain, Stairs-not yet assessed  Recommendations for discharge: Will make formal discharge recommendations after OOB mobility has been assessed.   Rationale for recommendations: See above, unable to make discharge recommendations at this time.       Entered by: Amanda Traore 01/28/2019 4:55 PM

## 2019-01-28 NOTE — ANESTHESIA PREPROCEDURE EVALUATION
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     Actinic keratosis      Atrial fibrillation (H)      Edema of leg      Gout, unspecified      H/O diplopia      Hypertension      Meningioma (H)     sinus     Myalgia and myositis, unspecified      Myasthenia gravis (H)     high dose steroids 2007,      Obesity      Other and unspecified hyperlipidemia      Other seborrheic keratosis      Presbyacusis      Primary localized osteoarthrosis, lower leg      Primary localized osteoarthrosis, pelvic region and thigh      Sleep apnea     doesn't use his CPAP)     Spinal stenosis      Squamous cell cancer of scalp and skin of neck     Dr Sanchez, multiple procedures, Mohs scalp and chest     Vitamin D deficiency      Past Surgical History:   Procedure Laterality Date     APPENDECTOMY       ARTHROPLASTY HIP Right 2016     ARTHROPLASTY HIP ANTERIOR Right 2016    Procedure: ARTHROPLASTY HIP ANTERIOR;  Surgeon: Miguel Johnson MD;  Location:  OR     BRAIN SURGERY  2007    partial resection meningioma     C RAD RESEC TONSIL/PILLARS       CATARACT IOL, RT/LT       COLECTOMY  2007    bowel perforation due to steroids     ESOPHAGOSCOPY, GASTROSCOPY, DUODENOSCOPY (EGD), COMBINED N/A 2018    Procedure: COMBINED ESOPHAGOSCOPY, GASTROSCOPY, DUODENOSCOPY (EGD), BIOPSY SINGLE OR MULTIPLE;  Surgeon: Elizabeth Jones MD;  Location:  GI     GENITOURINARY SURGERY      vasectomy     HEAD & NECK SURGERY      meningioma removed     KNEE SURGERY  2010    left knee replacement     ORTHOPEDIC SURGERY      left TKR     PHACOEMULSIFICATION CLEAR CORNEA WITH TORIC INTRAOCULAR LENS IMPLANT  2012    Procedure: PHACOEMULSIFICATION CLEAR CORNEA WITH TORIC INTRAOCULAR LENS IMPLANT;  COMPLEX  RIGHT PHACOEMULSIFICATION CLEAR CORNEA WITH TORIC INTRAOCULAR LENS IMPLANT WITH MALYUGIN RING;  Surgeon: Ronald Cortez MD;  Location:  [facility]     RECTAL SURGERY  1985       Anesthesia Evaluation     .             ROS/MED HX    ENT/Pulmonary:     (+)sleep apnea, doesn't use CPAP , . .    Neurologic: Comment: H/o meningioma s/p partial resection 2017    (+)other neuro Myasthenia Gravis    Cardiovascular:     (+) Dyslipidemia, hypertension----. Taking blood thinners : . . . :. dysrhythmias a-fib, .       METS/Exercise Tolerance:     Hematologic:     (+) History of blood clots pt is anticoagulated, History of Transfusion no previous transfusion reaction -      Musculoskeletal:         GI/Hepatic:     (+) GERD Other GI/Hepatic GI Bleed 12/2018 req transfusion      Renal/Genitourinary:  - ROS Renal section negative       Endo:  - neg endo ROS   (+) Obesity, .      Psychiatric:         Infectious Disease:         Malignancy:         Other:                          Physical Exam  Normal systems: pulmonary    Airway   Mallampati: III  TM distance: >3 FB  Neck ROM: full    Dental     Cardiovascular   Rhythm and rate: irregular and normal      Pulmonary             Lab Results   Component Value Date    WBC 10.9 01/24/2019    HGB 10.4 (L) 01/28/2019    HCT 36.3 (L) 01/24/2019     01/24/2019    SED 1.0 03/27/2012     01/16/2019    POTASSIUM 3.9 01/16/2019    CHLORIDE 110 (H) 01/16/2019    CO2 28 01/16/2019    BUN 27 01/16/2019    CR 1.40 (H) 01/16/2019    GLC 97 12/23/2018    TATE 7.3 (L) 12/23/2018    PHOS 3.6 09/25/2007    MAG 2.1 09/25/2007    ALBUMIN 2.4 (L) 12/22/2018    PROTTOTAL 4.7 (L) 12/22/2018    ALT 26 12/22/2018    AST 18 12/22/2018    ALKPHOS 35 (L) 12/22/2018    BILITOTAL 0.2 12/22/2018    LIPASE 689 (H) 09/07/2007    AMYLASE 346 (H) 09/07/2007    PTT 27 01/12/2010    INR 2.3 (A) 01/09/2019    FIBR 0.0 03/01/2011    TSH 4.49 09/18/2007       Preop Vitals  BP Readings from Last 3 Encounters:   01/28/19  "124/67   01/16/19 138/60   12/27/18 132/68    Pulse Readings from Last 3 Encounters:   01/16/19 108   12/27/18 97   12/24/18 68      Resp Readings from Last 3 Encounters:   01/28/19 14   01/16/19 14   12/27/18 16    SpO2 Readings from Last 3 Encounters:   01/28/19 97%   01/16/19 95%   12/27/18 99%      Temp Readings from Last 1 Encounters:   01/28/19 36.4  C (97.6  F) (Temporal)    Ht Readings from Last 1 Encounters:   01/28/19 1.803 m (5' 11\")      Wt Readings from Last 1 Encounters:   01/28/19 116.1 kg (255 lb 14.4 oz)    Estimated body mass index is 35.69 kg/m  as calculated from the following:    Height as of this encounter: 1.803 m (5' 11\").    Weight as of this encounter: 116.1 kg (255 lb 14.4 oz).       Anesthesia Plan      History & Physical Review  History and physical reviewed and following examination; no interval change.    ASA Status:  3 .    NPO Status:  > 8 hours    Plan for General, Peripheral Nerve Block and LMA with Intravenous induction. Maintenance will be Balanced.    PONV prophylaxis:  Ondansetron (or other 5HT-3) and Dexamethasone or Solumedrol       Postoperative Care  Postoperative pain management:  IV analgesics and Peripheral nerve block (Single Shot).      Consents  Anesthetic plan, risks, benefits and alternatives discussed with:  Patient.  Use of blood products discussed: Yes.   Use of blood products discussed with Patient.  Consented to blood products.  .                 Albert Gonzalez, DO, DO  "

## 2019-01-28 NOTE — ANESTHESIA POSTPROCEDURE EVALUATION
Patient: Alessio Teixeira    Procedure(s):  RIGHT TOTAL KNEE ARTROPLASTY    Diagnosis:RIGHT KNEE DA  Diagnosis Additional Information: No value filed.    Anesthesia Type:  General, Peripheral Nerve Block, LMA    Note:  Anesthesia Post Evaluation    Patient location during evaluation: PACU  Patient participation: Able to fully participate in evaluation  Level of consciousness: awake  Pain management: adequate  Airway patency: patent  Cardiovascular status: acceptable  Respiratory status: acceptable  Hydration status: acceptable  PONV: none     Anesthetic complications: None          Last vitals:  Vitals:    01/28/19 1138 01/28/19 1142 01/28/19 1211   BP: 139/82 128/61 128/73   Pulse:  (!) 46    Resp:   8   Temp:      SpO2: 100%  100%         Electronically Signed By: Britton Rangel MD  January 28, 2019  1:25 PM

## 2019-01-28 NOTE — PROGRESS NOTES
01/28/19 1605   Quick Adds   Type of Visit Initial PT Evaluation   Living Environment   Lives With spouse  (Claudia)   Living Arrangements house  ([facility])   Living Environment Comment Pt reports 3 stairs to enter/exit and all needs are met on the main level. Pt drives at baseline.    Self-Care   Usual Activity Tolerance good   Current Activity Tolerance fair   Equipment Currently Used at Home none   Activity/Exercise/Self-Care Comment Pt owns FWW, SEC x 2 and crutches.    Functional Level Prior   Ambulation 0-->independent   Transferring 0-->independent   Fall history within last six months yes   Number of times patient has fallen within last six months 1   General Information   Onset of Illness/Injury or Date of Surgery - Date 01/28/19   Referring Physician Miguel Johnson MD   Patient/Family Goals Statement Pt reports plan to go to TCU prior to returning home.    Pertinent History of Current Problem (include personal factors and/or comorbidities that impact the POC) 84 y/o male POD # 0 R TKA.    Precautions/Limitations fall precautions   Weight-Bearing Status - RLE weight-bearing as tolerated   General Observations Pt in supine upon arrival of therapist.    General Info Comments KI until SLR.    Cognitive Status Examination   Orientation orientation to person, place and time   Level of Consciousness alert   Follows Commands and Answers Questions 100% of the time   Personal Safety and Judgment intact   Pain Assessment   Patient Currently in Pain (R denies pain at rest. )   Integumentary/Edema   Integumentary/Edema Comments R knee covered with dressing, hemovac intact.    Posture    Posture Comments Noted no deficits with pt sitting EOB.    Range of Motion (ROM)   ROM Comment R knee ROM: 8-84 degrees.    Strength   Strength Comments Not formally assessed, pt demonstrated sufficient R LE strength to complete SLR independently. Noted pt unable to dorsiflex R ankle in sitting or supine.   Bed Mobility   Bed  "Mobility Comments Supine <> sit, SBA.   Transfer Skills   Transfer Comments NT d/t pt report of numbness/tingling and R LE weakness.    Gait   Gait Comments NT d/t pt report of numbness/tingling and R LE weakness.    Balance   Balance Comments Noted good sitting balance.   Sensory Examination   Sensory Perception Comments Pt reports tingling/numbness in R LE although reports light touch.    Modality Interventions   Planned Modality Interventions Cryotherapy   Planned Modality Interventions Comments PRN.   General Therapy Interventions   Planned Therapy Interventions bed mobility training;gait training;ROM;strengthening;transfer training   Clinical Impression   Criteria for Skilled Therapeutic Intervention yes, treatment indicated   PT Diagnosis Difficulty with functional mobility.   Influenced by the following impairments Numbness, Generalized weakness, Decreased activity tolerance, Impaired R knee ROM   Functional limitations due to impairments Limited functional mobility, requiring assist and use of FWW.    Clinical Presentation Stable/Uncomplicated   Clinical Presentation Rationale Based on PMH, current presentation, and social support.    Clinical Decision Making (Complexity) Low complexity   Therapy Frequency` 2 times/day   Predicted Duration of Therapy Intervention (days/wks) 4 days   Anticipated Discharge Disposition Transitional Care Facility   Risk & Benefits of therapy have been explained Yes   Patient, Family & other staff in agreement with plan of care Yes   [facility] [employer]-PAC TM \"6 Clicks\"   2016, Trustees of [facility], under license to [employer].  All rights reserved.   6 Clicks Short Forms Basic Mobility Inpatient Short Form   [facility] AM-PAC  \"6 Clicks\" V.2 Basic Mobility Inpatient Short Form   1. Turning from your back to your side while in a flat bed without using bedrails? 4 - None   2. Moving from lying on your back to sitting on the side of a flat bed without using " bedrails? 3 - A Little   3. Moving to and from a bed to a chair (including a wheelchair)? 2 - A Lot   4. Standing up from a chair using your arms (e.g., wheelchair, or bedside chair)? 2 - A Lot   5. To walk in hospital room? 1 - Total   6. Climbing 3-5 steps with a railing? 1 - Total   Basic Mobility Raw Score (Score out of 24.Lower scores equate to lower levels of function) 13   Total Evaluation Time   Total Evaluation Time (Minutes) 10

## 2019-01-28 NOTE — PROGRESS NOTES
Hospitalist admission note dictated. Confirmation #: [id number].    JoAnna Barthell, PA-C  1/28/2019   12:15 PM

## 2019-01-28 NOTE — ANESTHESIA PROCEDURE NOTES
"  Peripheral nerve/Neuraxial procedure note : femoral and sciatic  Pre-Procedure    Location: pre-op      Pre-Anesthestic Checklist: patient identified, IV checked, risks and benefits discussed, informed consent, pre-op evaluation and at physician/surgeon's request    Timeout  Correct Patient: Yes   Correct Procedure: Yes   Correct Site: Yes   Correct Laterality: Yes   Correct Position: Yes     .   Procedure Documentation    .    Procedure:    Femoral and sciatic.  Local skin infiltrated with 5 mL of 1% lidocaine.     Ultrasound used to identify targeted nerve, plexus, or vascular marker and placed a needle adjacent to it., Ultrasound was used to visualize the spread of the anesthetic in close proximity to the above stated nerve. A permanent image is entered into the patient's record.  Patient Prep;mask, sterile gloves, chlorhexidine gluconate and isopropyl alcohol, patient draped.  .   . .  Catheter threaded easily  .  .   .    Assessment/Narrative  .  .  . Comments:  Femoral Catheter - Ultrasound used to see femoral nerve on top of iliacus muscle, 2\" tuohy needle placed adjacent, and 0.5% Ropivacaine + 1:400k epi injected intermittently around the nerve with low pressure, no pain on injection, and intermittently aspirating throughout.  Catheter then threaded >3cm in the space, and 5cc of above local injected through catheter to visualize good spread around the nerve, along with a negative test dose.  No blood aspirated from catheter.  Catheter secured with adhesive spray and silver impregnated tegaderm.  Ultrasound image saved in the chart.    Sciatic Nerve Block - Patient in frog-legged position for anterior sciatic approach.  Ultrasound used to see the nerve, 4 3/4\" stimulating needle placed adjacent until +plantar flexion response which was lost at >0.4mA.  Local then deposited around the nerve with low pressure, no pain on injection, and intermittent aspiration throughout.  Ultrasound image saved in the " chart.

## 2019-01-28 NOTE — ANESTHESIA CARE TRANSFER NOTE
Patient: Alessio Teixeira    Procedure(s):  RIGHT TOTAL KNEE ARTROPLASTY    Diagnosis: RIGHT KNEE DA  Diagnosis Additional Information: No value filed.    Anesthesia Type:   General, Peripheral Nerve Block, LMA     Note:  Airway :Face Mask  Patient transferred to:PACU  Handoff Report: Identifed the Patient, Identified the Reponsible Provider, Reviewed the pertinent medical history, Discussed the surgical course, Reviewed Intra-OP anesthesia mangement and issues during anesthesia, Set expectations for post-procedure period and Allowed opportunity for questions and acknowledgement of understanding      Vitals: (Last set prior to Anesthesia Care Transfer)    CRNA VITALS  1/28/2019 0913 - 1/28/2019 0951      1/28/2019             Pulse:  71    SpO2:  98 %    Resp Rate (observed):  4  (Abnormal)     Resp Rate (set):  10                Electronically Signed By: ANNA Rangel CRNA  January 28, 2019  9:51 AM

## 2019-01-28 NOTE — PLAN OF CARE
A/OX4,cms intact except with numbness on RLE.IVF infusing.Tolerating regular  diet well,no nausea.Sit at the edge of the bed with therapy.HV in place,dreg CDI 2L oxygen sating at 100%. Will continue to monitor.

## 2019-01-28 NOTE — PROGRESS NOTES
Admission medication history interview status for the 1/28/2019  admission is complete. See EPIC admission navigator for prior to admission medications     Medication history source reliability:Moderate    Medication history interview source(s):Patient    Medication history resources (including written lists, pill bottles, clinic record):None    Primary [facility]    Additional medication history information not noted on PTA med list :None    Time spent in this activity: 25 minutes    Prior to Admission medications    Medication Sig Last Dose Taking? Auth Provider   CALCIUM PO Take 600 mg by mouth 2 times daily  1/27/2019 at 2000 Yes Reported, Patient   diphenhydrAMINE-acetaminophen (TYLENOL PM)  MG tablet Take 1 tablet by mouth nightly as needed for sleep Past Month at Unknown time Yes Reported, Patient   ferrous sulfate ER (SLO-FE) 142 (45 Fe) MG CR tablet Take 142 mg by mouth 3 times daily (with meals) 1/27/2019 at 2000 Yes Reported, Patient   furosemide (LASIX) 20 MG tablet TAKE ONE TABLET BY MOUTH ONE TIME DAILY - NEEDS TO BE SEEN FOR FURTHER REFILLS 1/27/2019 at AM Yes Zac Salgado MD   KLOR-CON 20 MEQ CR tablet Take 1 tablet (20 mEq) by mouth daily 1/27/2019 at AM Yes Zac Salgado MD   lovastatin (MEVACOR) 40 MG tablet TAKE TWO TABLETS (80MG) BY MOUTH DAILY AT BEDTIME 1/27/2019 at 2000 Yes Zac Salgado MD   multivitamin, therapeutic with minerals (THERA-VIT-M) TABS Take 1 tablet by mouth daily 1/27/2019 at AM Yes Reported, Patient   NIACIN CR PO Take 500 mg by mouth 2 times daily (with meals) 2 x 500 mg slow niacin 1/27/2019 at 2000 Yes Unknown, Entered By History   pantoprazole (PROTONIX) 40 MG EC tablet Take 1 tablet (40 mg) by mouth 2 times daily 1/28/2019 at 0430 Yes Mariza Burrell PA-C   Vitamin D, Cholecalciferol, 1000 units TABS Take 1,000 Units by mouth daily.   1/27/2019 at AM Yes Reported, Patient   ORDER FOR DME CPAP supplies with variable  pressure control   Zac Salgado MD   warfarin (COUMADIN) 2 MG tablet Do not take this medication until 12/28/18 but then resume typical dose of 3 AND 1/2 TABLETS (7MG) BY MOUTH DAILY  Patient taking differently: Take 7 mg by mouth every evening Do not take this medication until 12/28/18 but then resume typical dose of (3.5 x 2mg = 7mg) BY MOUTH DAILY 1/24/2019 at PM  Mesfin Porter, DO         Prior to Admission medications    Medication Sig Last Dose Taking? Auth Provider   CALCIUM PO Take 600 mg by mouth 2 times daily  1/27/2019 at 2000 Yes Reported, Patient   diphenhydrAMINE-acetaminophen (TYLENOL PM)  MG tablet Take 1 tablet by mouth nightly as needed for sleep Past Month at Unknown time Yes Reported, Patient   ferrous sulfate ER (SLO-FE) 142 (45 Fe) MG CR tablet Take 142 mg by mouth 3 times daily (with meals) 1/27/2019 at 2000 Yes Reported, Patient   furosemide (LASIX) 20 MG tablet TAKE ONE TABLET BY MOUTH ONE TIME DAILY - NEEDS TO BE SEEN FOR FURTHER REFILLS 1/27/2019 at AM Yes Zac Salgado MD   KLOR-CON 20 MEQ CR tablet Take 1 tablet (20 mEq) by mouth daily 1/27/2019 at AM Yes Zac Salgado MD   lovastatin (MEVACOR) 40 MG tablet TAKE TWO TABLETS (80MG) BY MOUTH DAILY AT BEDTIME 1/27/2019 at 2000 Yes Zac Salgado MD   multivitamin, therapeutic with minerals (THERA-VIT-M) TABS Take 1 tablet by mouth daily 1/27/2019 at AM Yes Reported, Patient   NIACIN CR PO Take 500 mg by mouth 2 times daily (with meals) 2 x 500 mg slow niacin 1/27/2019 at 2000 Yes Unknown, Entered By History   pantoprazole (PROTONIX) 40 MG EC tablet Take 1 tablet (40 mg) by mouth 2 times daily 1/28/2019 at 0430 Yes Mariza Burrell PA-C   VITAMIN D, CHOLECALCIFEROL, PO Take 1,000 Units by mouth daily.   1/27/2019 at AM Yes Reported, Patient   ORDER FOR DME CPAP supplies with variable pressure control   Zac Salgado MD   warfarin (COUMADIN) 2 MG tablet TAKE 3 AND 1/2  TABLETS (7MG) BY MOUTH DAILY    Zac Salgado MD   warfarin (COUMADIN) 2 MG tablet Do not take this medication until 12/28/18 but then resume typical dose of 3 AND 1/2 TABLETS (7MG) BY MOUTH DAILY  Patient taking differently: Take 7 mg by mouth every evening Do not take this medication until 12/28/18 but then resume typical dose of (3.5 x 2mg = 7mg) BY MOUTH DAILY 1/24/2019 at PM  Mesfin Porter,

## 2019-01-28 NOTE — PHARMACY-ANTICOAGULATION SERVICE
Clinical Pharmacy - Warfarin Dosing Consult     Pharmacy has been consulted to manage this patient s warfarin therapy.  Indication: Atrial Fibrillation  Therapy Goal: INR 2-3  Warfarin Prior to Admission: Yes  Warfarin PTA Regimen: 7mg daily     INR   Date Value Ref Range Status   01/28/2019 1.61 (H) 0.86 - 1.14 Final     INR Protime   Date Value Ref Range Status   01/09/2019 2.3 (A) 0.86 - 1.14 Final       Recommend warfarin 7 mg today. MD has ordered 1st dose.   Pharmacy will monitor Alessio Teixeira daily and order warfarin doses to achieve specified goal.      Please contact pharmacy as soon as possible if the warfarin needs to be held for a procedure or if the warfarin goals change.

## 2019-01-28 NOTE — BRIEF OP NOTE
[facility]    Brief Operative Note    Pre-operative diagnosis: RIGHT KNEE DA  Post-operative diagnosis * No post-op diagnosis entered *  Procedure: Procedure(s):  RIGHT TOTAL KNEE ARTROPLASTY  Surgeon: Surgeon(s) and Role:     * Miguel Johnson MD - Primary     * Cheyanne Stern PA-C - Assisting  Anesthesia: General   Estimated blood loss: Less than 50 ml  Drains: Hemovac  Specimens: * No specimens in log *  Findings:   Advanced DJD.  Complications: None.  Implants: Materials Involved:  [employer] Nex[employer] TKA: Size G femur, 7 Tibia, 35 patella, 14 tibial insert  Grafts:  None.

## 2019-01-28 NOTE — CONSULTS
Consult Date:  01/28/2019      HOSPITALIST CONSULTATION      PRIMARY CARE PROVIDER:  Zac Salgado.      REQUESTING PHYSICIAN:  Dr. Johnson.      REASON FOR CONSULTATION:  Medical comanagement.      History is provided by the patient.      HISTORY OF PRESENT ILLNESS:  Alessio Teixeira is a pleasant 83-year-old  male with remote history of pulmonary embolism, bowel perforation, sleep apnea, HLD, HTN, paroxysmal atrial fibrillation, and recent gastric ulcer hemorrhage.  He was admitted under the care of the Orthopedic Service for routine postoperative management following a right total knee arthroplasty performed under general anesthesia.  Estimated blood loss was 50 mL.  The patient tolerated the procedure well.      Presently, the patient is evaluated at the bedside and he denies recent fevers, URI symptoms, chest pain, difficulty breathing, heart palpitations, nausea, vomiting, changes in urination or bowel functioning.  Notably, the patient was admitted at [facility] from 12/22-12/24/2018 with melena, found to have a gastric ulcer.  He was started on b.i.d. Protonix.  The patient has felt generally weak and lightheaded since his discharge from the hospital approximately 1 month ago.  He has been on t.i.d. iron supplementation for approximately 1-2 weeks.  His stools are currently black.  His preoperative hemoglobin was 10.5 and he started holding his warfarin on 01/24.      PAST MEDICAL HISTORY:   1.  Paroxysmal atrial fibrillation, on warfarin.   2.  Hypertension.   3.  Dyslipidemia.   4.  Gastric ulcer with hemorrhage 12/2018.   5.  Obstructive sleep apnea; mostly noncompliant with his CPAP.   6.  Meningioma s/p removal.   7.  Myasthenia gravis.   8.  Gout.   9.  Vitamin D deficiency.   10.  Osteoarthritis.   11.  Right lung nodule, 1.5 cm.   12.  History of perforated bowel in the setting of prednisone use, status post colectomy.   13.  History of pulmonary embolism around 2008.   14.  History of  "retroperitoneal bleed while on Coumadin.      PAST SURGICAL HISTORY:   1.  Rectal surgery, 1985.   2.  Corneal transplant.   3.  Left TKA.   4.  Meningioma removal, 2007.   5.  EGD.   6.  Colectomy, 2007.   7.  Cataract repair.   8.  Tonsillectomy.   9.  Appendectomy.   10.  Right hip VIKY via anterior approach.      FAMILY HISTORY:  Father:  CAD and glaucoma.      SOCIAL HISTORY:  The patient is a former smoker, having quit in the 1960s.  He consumes about 2-4 alcoholic beverages per week.  He does not use illicit drugs.  He lives in a \"double\" with 3 steps up to his front door with his wife.  They have 3 adult children together.      PRIOR TO ADMISSION MEDICATIONS:   1.  Calcium 600 mg b.i.d.   2.  Tylenol PM  mg tablets 1 tablet at bedtime p.r.n.   3.  Ferrous sulfate 142 mg t.i.d. a.c.   4.  Furosemide 20 mg daily.   5.  K-Josey 20 mEq daily.   6.  Lovastatin 80 mg at bedtime.   7.  Multivitamin daily.   8.  Niacin 500 mg b.i.d.   9.  Protonix 40 mg b.i.d.   10.  Vitamin D cholecalciferol 1000 units daily.   11.  Warfarin 7 mg at bedtime.      ALLERGIES:  NO KNOWN DRUG ALLERGIES.      REVIEW OF SYSTEMS:  A complete review of systems was performed except for that noted in the HPI.      PHYSICAL EXAMINATION:   VITAL SIGNS:  Blood pressure 128/61, heart rate is 46, temperature 98.1, respirations are 14, oxygen saturation is 100%.   GENERAL:  Pleasant male who appears stated age and looks comfortable lying flat in bed.   SKIN:  Warm, dry.  No rash or lesions on exposed skin.   HEENT:  Normocephalic, atraumatic.  EOMs grossly intact and pupils are pinpoint.  Moist mucous membranes.  Crowded oropharynx.   NECK:  Supple.  No cervical lymphadenopathy or JVD.   LUNGS:  Breath sounds clear to auscultation anteriorly and no increased work of breathing on 3 liters O2 via nasal cannula.   CARDIOVASCULAR:  Regular rate and rhythm.  No rub or murmur.  No peripheral edema.   ABDOMEN:  Soft, obese, nontender and nondistended. "   GENITOURINARY:  Amos catheter in place with clear yellow urine.   MUSCULOSKELETAL:  Moves all 4  extremities.  Right lower extremity is Ace wrapped and in a knee immobilizer.  Distal CMS is intact.   NEUROLOGIC:  Cranial nerves II-XII grossly intact.      LABORATORY DATA:  Potassium 3.9, hemoglobin 10.4.  INR 1.61.      ASSESSMENT AND PLAN:  Alessio Teixeira is a pleasant 83-year-old  male with history of obstructive sleep apnea, chronic kidney disease stage II-III, hyperlipidemia, hypertension, paroxysmal atrial fibrillation, recent bleeding gastric ulcer who is admitted under the care of the Orthopedic Service for routine postoperative management following a right-sided total hip arthroplasty.  The Hospitalist Service was consulted for medical comanagement.      Right knee osteoarthritis, status post right TKA.   -- Preoperative hemoglobin 10.5.  EBL less than 50.   -- Postop management deferred to Orthopedic Service.   -- He has been restarted on his PTA warfarin.   -- Bowel regimen as ordered.   -- Currently on D5 half normal saline with potassium supplement.     Recent bleeding gastric ulcer.   Admitted [facility] 12/22-12/24/2018.  Has felt generally weak and lightheaded since his hospital discharge.  Continues to have abdominal pain that is slowly getting better.  His stools are black, likely related to his iron therapy, though cannot rule out melena.   -- Continue PTA Protonix b.i.d.   -- Repeat EGD in approximately 1 month.     Paroxysmal atrial fibrillation.     Rate controlled and not on any AV judith blocking agents prior to admission.   -- Coumadin has been resumed by the primary service and Pharmacy is ordered to dose.   -- IV Lopressor available for heart rate greater than 120.     Hypertension, hyperlipidemia.   -- Continues on prior to admission statin.   -- Holding prior to admission Lasix while on IV fluids and likely resume p.o. day #1 if blood pressure and kidney functioning allows.     CKD,  stage II-III.     -- Baseline creatinine appears1-1.1.  Note, preop creatinine was 1.40.   -- Continues on IV fluids as above.   -- Holding PTA Lasix. Discontinue potassium supplement while holding diuretic.   -- Check BMP in the a.m.     Obstructive sleep apnea.  The patient is minimally compliant with CPAP at home, but is agreeable to use while hospitalized.     Incidental right lung nodule, 1.5 cm.  This was an incidental finding on CT abdomen and pelvis 2018 at the right lung base.  -- Serial CT imaging in 6 months given his history of smoking.     Deep venous thrombosis prophylaxis.  PCDs and warfarin.      CODE STATUS:  Full code, confirmed on time of consultation.      DISPOSITION:  Discharged per primary service.      The Hospitalist Service thanks you for this consultation.  We will continue to follow along with you.      This patient was staffed with Dr. Ilan Deluca of the Hospitalist Service, who is in agreement with my assessment and plan of care as outlined above.         ILAN DELUCA MD       As dictated by JOANNA ULMEN BARTHELL, PA-C            D: 2019   T: 2019   MT: RITA      Name:     JOSHUA MELTON   MRN:      [id number]        Account:       [id number]   :      1935           Consult Date:  2019      Document: [id number]       cc: Zac Johnson MD

## 2019-01-29 ENCOUNTER — APPOINTMENT (OUTPATIENT)
Dept: PHYSICAL THERAPY | Facility: CLINIC | Age: 84
DRG: 470 | End: 2019-01-29
Attending: ORTHOPAEDIC SURGERY
Payer: COMMERCIAL

## 2019-01-29 LAB
ANION GAP SERPL CALCULATED.3IONS-SCNC: 3 MMOL/L (ref 3–14)
BUN SERPL-MCNC: 24 MG/DL (ref 7–30)
CALCIUM SERPL-MCNC: 7.7 MG/DL (ref 8.5–10.1)
CHLORIDE SERPL-SCNC: 110 MMOL/L (ref 94–109)
CO2 SERPL-SCNC: 27 MMOL/L (ref 20–32)
CREAT SERPL-MCNC: 1.42 MG/DL (ref 0.66–1.25)
GFR SERPL CREATININE-BSD FRML MDRD: 46 ML/MIN/{1.73_M2}
GLUCOSE SERPL-MCNC: 116 MG/DL (ref 70–99)
HGB BLD-MCNC: 8 G/DL (ref 13.3–17.7)
HGB BLD-MCNC: 8.4 G/DL (ref 13.3–17.7)
INR PPP: 1.71 (ref 0.86–1.14)
POTASSIUM SERPL-SCNC: 4.2 MMOL/L (ref 3.4–5.3)
SODIUM SERPL-SCNC: 140 MMOL/L (ref 133–144)

## 2019-01-29 PROCEDURE — 12000000 ZZH R&B MED SURG/OB

## 2019-01-29 PROCEDURE — 85610 PROTHROMBIN TIME: CPT | Performed by: ORTHOPAEDIC SURGERY

## 2019-01-29 PROCEDURE — 99232 SBSQ HOSP IP/OBS MODERATE 35: CPT | Performed by: INTERNAL MEDICINE

## 2019-01-29 PROCEDURE — 40000193 ZZH STATISTIC PT WARD VISIT: Performed by: PHYSICAL THERAPIST

## 2019-01-29 PROCEDURE — 85018 HEMOGLOBIN: CPT | Performed by: ORTHOPAEDIC SURGERY

## 2019-01-29 PROCEDURE — 25000128 H RX IP 250 OP 636: Performed by: ORTHOPAEDIC SURGERY

## 2019-01-29 PROCEDURE — 97110 THERAPEUTIC EXERCISES: CPT | Mod: GP | Performed by: PHYSICAL THERAPIST

## 2019-01-29 PROCEDURE — 97116 GAIT TRAINING THERAPY: CPT | Mod: GP | Performed by: PHYSICAL THERAPIST

## 2019-01-29 PROCEDURE — 36415 COLL VENOUS BLD VENIPUNCTURE: CPT | Performed by: ORTHOPAEDIC SURGERY

## 2019-01-29 PROCEDURE — 25000132 ZZH RX MED GY IP 250 OP 250 PS 637: Performed by: ORTHOPAEDIC SURGERY

## 2019-01-29 PROCEDURE — 40000894 ZZH STATISTIC OT IP EVAL DEFER

## 2019-01-29 PROCEDURE — 80048 BASIC METABOLIC PNL TOTAL CA: CPT | Performed by: ORTHOPAEDIC SURGERY

## 2019-01-29 PROCEDURE — 97530 THERAPEUTIC ACTIVITIES: CPT | Mod: GP | Performed by: PHYSICAL THERAPIST

## 2019-01-29 RX ORDER — HYDRALAZINE HYDROCHLORIDE 20 MG/ML
10 INJECTION INTRAMUSCULAR; INTRAVENOUS EVERY 4 HOURS PRN
Status: DISCONTINUED | OUTPATIENT
Start: 2019-01-29 | End: 2019-01-31 | Stop reason: HOSPADM

## 2019-01-29 RX ORDER — WARFARIN SODIUM 7.5 MG/1
7.5 TABLET ORAL
Status: COMPLETED | OUTPATIENT
Start: 2019-01-29 | End: 2019-01-29

## 2019-01-29 RX ADMIN — OXYCODONE HYDROCHLORIDE 10 MG: 5 TABLET ORAL at 13:13

## 2019-01-29 RX ADMIN — OXYCODONE HYDROCHLORIDE 10 MG: 5 TABLET ORAL at 03:34

## 2019-01-29 RX ADMIN — SENNOSIDES AND DOCUSATE SODIUM 1 TABLET: 8.6; 5 TABLET ORAL at 09:31

## 2019-01-29 RX ADMIN — Medication 600 MG: at 09:31

## 2019-01-29 RX ADMIN — ACETAMINOPHEN 975 MG: 325 TABLET, FILM COATED ORAL at 13:10

## 2019-01-29 RX ADMIN — ACETAMINOPHEN 975 MG: 325 TABLET, FILM COATED ORAL at 05:30

## 2019-01-29 RX ADMIN — NIACIN 1000 MG: 500 TABLET, EXTENDED RELEASE ORAL at 18:57

## 2019-01-29 RX ADMIN — PANTOPRAZOLE SODIUM 40 MG: 40 TABLET, DELAYED RELEASE ORAL at 09:31

## 2019-01-29 RX ADMIN — SIMVASTATIN 20 MG: 20 TABLET, FILM COATED ORAL at 21:33

## 2019-01-29 RX ADMIN — OXYCODONE HYDROCHLORIDE 10 MG: 5 TABLET ORAL at 09:30

## 2019-01-29 RX ADMIN — Medication 140 MG: at 18:57

## 2019-01-29 RX ADMIN — Medication 140 MG: at 13:10

## 2019-01-29 RX ADMIN — Medication 140 MG: at 09:31

## 2019-01-29 RX ADMIN — MULTIPLE VITAMINS W/ MINERALS TAB 1 TABLET: TAB at 09:31

## 2019-01-29 RX ADMIN — OXYCODONE HYDROCHLORIDE 10 MG: 5 TABLET ORAL at 17:01

## 2019-01-29 RX ADMIN — SENNOSIDES AND DOCUSATE SODIUM 2 TABLET: 8.6; 5 TABLET ORAL at 20:16

## 2019-01-29 RX ADMIN — Medication 600 MG: at 20:15

## 2019-01-29 RX ADMIN — CEFAZOLIN SODIUM 2 G: 2 INJECTION, SOLUTION INTRAVENOUS at 00:22

## 2019-01-29 RX ADMIN — ACETAMINOPHEN 975 MG: 325 TABLET, FILM COATED ORAL at 21:33

## 2019-01-29 RX ADMIN — WARFARIN SODIUM 7.5 MG: 7.5 TABLET ORAL at 18:57

## 2019-01-29 RX ADMIN — PANTOPRAZOLE SODIUM 40 MG: 40 TABLET, DELAYED RELEASE ORAL at 20:16

## 2019-01-29 RX ADMIN — VITAMIN D, TAB 1000IU (100/BT) 1000 UNITS: 25 TAB at 09:31

## 2019-01-29 RX ADMIN — OXYCODONE HYDROCHLORIDE 10 MG: 5 TABLET ORAL at 21:33

## 2019-01-29 RX ADMIN — NIACIN 1000 MG: 500 TABLET, EXTENDED RELEASE ORAL at 09:31

## 2019-01-29 ASSESSMENT — ACTIVITIES OF DAILY LIVING (ADL)
ADLS_ACUITY_SCORE: 13

## 2019-01-29 NOTE — CONSULTS
Care Transition Initial Assessment - SW     Met with: Patient and Family    Active Problems:    Degenerative arthritis of knee       DATA  Lives With: spouse   Living Arrangements: house([facility])  Quality of Family Relationships: supportive, involved  Description of Support System: Supportive, Involved  Who is your support system?: Wife, Children  Identified issues/concerns regarding health management: discharge planning, TCU.      Quality of Family Relationships: supportive, involved  Transportation Anticipated: family or friend will provide    ASSESSMENT  Cognitive Status:  awake, alert and oriented    Concerns to be addressed: Per SW consult for discharge planning. Pt was admitted 1/28/19 for Degenerative arthritis of knee M17.10. Discharge likely Thursday per MD note. Pt lives at home with wife. SW met with pt and wife at bedside, pt and wife aware of and agree with TCU recommendation. Pt wants a private room at TCU, understands and agrees to cost. Pt preferences for TCU are [facility], [facility], [facility]. SW will send referrals as requested. Family will transport.     ADDENDUM:    D: CECILIO received email from [facility] indicating [facility] will start on an authorization for Mr. Teixeira and should have a bed for him Thursday if [facility] can get auth.       PLAN  Financial costs for the patient includes: Private room  Patient given options and choices for discharge: TCU   Patient/family is agreeable to the plan?  Yes   Patient Goals and Preferences: TCU private room  Patient anticipates discharging to:  TCU    BALJIT Wilkinson

## 2019-01-29 NOTE — OP NOTE
Procedure Date: 01/28/2019      PREOPERATIVE DIAGNOSIS:  Advanced degenerative arthritis of the right knee.      POSTOPERATIVE DIAGNOSIS:  Advanced degenerative arthritis of the right knee.      PROCEDURE:  Right total knee arthroplasty.      SURGEON:  Miguel Johnson MD      FIRST ASSISTANT:  Cheyanne BRIDGES, surgical tech.      INDICATIONS:  The patient is an 83-year-old gentleman who we have been following for advanced degenerative arthritis of his right knee for a number of years now.  He is status post left total knee arthroplasty and has done very well with that.  He has failed nonsurgical treatment and we discussed a total knee arthroplasty, its risks, benefits, expected postoperative course.  He understood and wished to proceed.  A month ago though he was admitted with a GI bleed.  He does take Coumadin for atrial fibrillation.  He was quite anemic following this, but his hemoglobin has improved to 10.5 with iron supplementation.  He stopped his Coumadin a few days ago, but his INR this morning was still 1.6.  We talked about the fact that he is at some increased risk of bleeding into his knee.  Because of this, we talked about possible issues with wound healing and such.  In the end, he was fairly anxious to proceed and I think the risks are reasonable enough to proceed with surgery.  We will plan to place a drain in the knee to make sure he does not get too large of a hemarthrosis.      DESCRIPTION OF PROCEDURE:  The patient was brought to the operating room, given an adductor canal block in his right lower extremity.  He was then given a general anesthetic.  His right lower extremity was prepped and draped in the usual sterile fashion.  Right lower extremity was exsanguinated.  Tourniquet was inflated.  An incision was made over the anterior aspect of the knee.  This was carried down through subcutaneous tissues down to the fascia.  A subvastus approach was taken to the knee joint.  This revealed  advanced degenerative arthritis involving all 3 compartments, but worst in the medial and patellofemoral compartments.  The fat pad was excised, the menisci were excised and the ACL was excised.  A drill hole was then made in the distal femur and using the intramedullary guide set to 5 degrees from the anatomic axis, the femur was cut to accept a size G cruciate substituting femoral component.  The tibia was cut using the external alignment guide to accept a size 7 tibial component and the patella was cut to accept a size 35 patella.  The knee was then trialed.  The alignment appeared satisfactory and the ligaments were stable and balanced.  The trial components were removed.  The tibia was punched.  We then performed our periarticular analgesic injection, concentrating primarily within the posterior capsule.  The knee was then thoroughly irrigated and dried.  The real components were then cemented into place.  While the cement was hardening, we irrigated the knee with a dilute solution of Betadine which was allowed to sit within the knee for 3 minutes and then was thoroughly irrigated from the knee with a liter of normal saline.  Once the cement had hardened, all extraneous cement was removed.  We trialed the knee, it appeared that a 14-mm insert gave the best fit.  A real 14 mm tibial polyethylene insert was then snapped into the tibial component.  The knee had full range of motion, the patella tracked well.  The wound was then thoroughly irrigated once again.  The fascia was closed over a drain with interrupted 0 Vicryl sutures.  The skin was closed with 2-0 Vicryl subcutaneous sutures and staples.  The knee was then injected with a mixture of 3 grams of tranexamic acid in 100 mL of normal saline.  A sterile compressive dressing was applied to the knee.  The tourniquet was deflated and the patient was then awakened from anesthesia and transferred to postanesthesia recovery in satisfactory condition.  It should be  noted that my assistant was necessary throughout the entire procedure to assist with retraction and positioning.         KOREY CH MD             D: 2019   T: 2019   MT: JOSUÉ      Name:     JOSHUA MELTON   MRN:      [id number]        Account:        [id number]   :      1935           Procedure Date: 2019      Document: [id number]

## 2019-01-29 NOTE — PROGRESS NOTES
"[facility]    Medicine Progress Note - Hospitalist Service       Date of Admission:  1/28/2019  Assessment & Plan   Alessio Teixeira is an 83-year-old male with history of obstructive sleep apnea, chronic kidney disease stage II-III, hyperlipidemia, hypertension, paroxysmal atrial fibrillation, recent bleeding gastric ulcer who is admitted 1/28/2019 under the care of the Orthopedic Service following a routine right-sided total hip arthroplasty. The Hospitalist Service was consulted for medical co-management.     Right knee osteoarthritis, status post right TKA 1/28/19  Surgery performed by Dr. Johnson, EBL less than 50.   - Routine post-operative cares per orthopedic surgery service     Recent bleeding gastric ulcer  Admitted [facility] 12/22-12/24/2018.  Has felt generally weak and lightheaded since his hospital discharge.  Continues to have abdominal pain that is slowly getting better.  His stools are black, likely related to his iron therapy, though cannot rule out melena. Hemoglobin ~9 g/dl following bleed.  - Continue PPI BID  - Plan for EGD in approximately 1 month per previous recommendations   - Monitor hemoglobin. Some drop expected post-op from surgery.      Paroxysmal atrial fibrillation  Rate controlled and not on any AV judith blocking agents prior to admission.   - Continue warfarin with pharmacy to dose   - Metoprolol IV PRN for HR >120     Hyperlipidemia  - Continue prior to admission lovastatin     Acute kidney injury on chronic kidney disease, stage II-III    Baseline creatinine 1-1.1, although preop creatinine was 1.40 on 1/16.  - Creatinine stable from most recent, but remains higher than previous baseline  - Continue holding prior to admission furosemide   - Amos in place so less likely obstructive.  - Avoid nephrotoxins   - \"No NSAIDs\" order placed  - [facility] in AM  - Consider renal US if not improving     Hypertension  - Blood pressure adequately controlled   - Holding prior to admission " furosemide as above  - Hydralazine PRN for severe elevations     Obstructive sleep apnea  Minimally compliant with CPAP at home, but is agreeable to use while hospitalized.   - CPAP per home settings     Incidental right lung nodule, 1.5 cm  Incidental finding on CT abdomen and pelvis 12/2018 at the right lung base.  - Outpatient follow-up CT in 6 months given his history of smoking.     Diet: Advance Diet as Tolerated: Regular Diet Adult    DVT Prophylaxis: Defer to primary service  Amos Catheter: in place, indication: Anesthesia  Code Status: Full Code      Disposition Plan   Expected discharge: per orthopedic surgery   Entered: Brayden Khanna MD 01/29/2019, 1:32 PM       The patient's care was discussed with the Patient and Patient's Family.    Brayden Khanna MD  Hospitalist Service  [facility]    ______________________________________________________________________    Interval History   No acute events overnight. Reports overall he is feeling better with improved pain control compared to his past knee replacement. Eating/drinking well. Denies any chest pain or shortness of breath. Prior to admission denies any NSAID use. Reports he urinates frequently while on furosemide, no significant change in urination quality. Sometimes has weak stream, but no specific inability to empty.     Data reviewed today: I reviewed all medications, new labs and imaging results over the last 24 hours. I personally reviewed no images or EKG's today.    Physical Exam   Vital Signs: Temp: 97.9  F (36.6  C) Temp src: Oral BP: (!) 133/35 Pulse: 56 Heart Rate: 60 Resp: 16 SpO2: 94 % O2 Device: None (Room air) Oxygen Delivery: 2 LPM  Weight: 255 lbs 14.4 oz    Constitutional: Well-appearing, NAD  Respiratory: Clear to auscultation bilaterally, good air movement bilaterally  Cardiovascular: RRR, no m/r/g.  GI: Soft, non-tender, non-distended.   Back: No CVA tenderness  Skin/Integumen: Warm, dry  Other:     Data    Recent Labs   Lab 01/29/19  0627 01/28/19  0614 01/24/19  1127   WBC  --   --  10.9   HGB 8.0* 10.4* 10.5*   MCV  --   --  85   PLT  --   --  444   INR 1.71* 1.61*  --      --   --    POTASSIUM 4.2 3.9  --    CHLORIDE 110*  --   --    CO2 27  --   --    BUN 24  --   --    CR 1.42*  --   --    ANIONGAP 3  --   --    TATE 7.7*  --   --    *  --   --        No results found for this or any previous visit (from the past 24 hour(s)).  Medications     dextrose 5% and 0.45% NaCl + KCl 20 mEq/L 125 mL/hr at 01/28/19 2011     Warfarin Therapy Reminder         acetaminophen  975 mg Oral Q8H     calcium carbonate  600 mg Oral BID     ferrous sulfate  140 mg Oral TID w/meals     multivitamin w/minerals  1 tablet Oral Daily     niacin ER  1,000 mg Oral BID w/meals     pantoprazole  40 mg Oral BID     senna-docusate  1 tablet Oral BID    Or     senna-docusate  2 tablet Oral BID     simvastatin  20 mg Oral At Bedtime     sodium chloride (PF)  3 mL Intracatheter Q8H     vitamin D3  1,000 Units Oral Daily     warfarin  7.5 mg Oral ONCE at 18:00

## 2019-01-29 NOTE — PROGRESS NOTES
"[facility] Orthopedic Post-Op Progress Note    Alessio Teixeira MRN# [id number]   YOB: 1935 Age: 83 year old            Interval History:   1 Day Post-Op from Total knee arthoplasty (Right)  Doing well.  Continues to improve.  Pain is well-controlled currently but increased overnight.  No fevers.  X-rays reviewed.  Continuing physical therapy this AM, limited progress yesterday.            Physical Exam:   /56 (BP Location: Left arm)   Pulse (!) 46   Temp 98.4  F (36.9  C) (Oral)   Resp 11   Ht 1.803 m (5' 11\")   Wt 116.1 kg (255 lb 14.4 oz)   SpO2 99%   BMI 35.69 kg/m      Dressing and drain currently in place.  Mild swelling.  Movement and sensation intact distal to surgical site.  Calves non-tender.           Data:     Hemoglobin   Date Value Ref Range Status   01/29/2019 8.0 (L) 13.3 - 17.7 g/dL Final   01/28/2019 10.4 (L) 13.3 - 17.7 g/dL Final            Assessment and Plan:    Assessment:   Post-operative day #1  Total knee arthoplasty (Right)     Doing well.  No excessive bleeding  Pain well-controlled.           Plan:   Continue current medical management  Continue working with physical therapy  Continue to monitor Hgb  Plan on discharge to TCU on Thursday, 1/31/2019  Continue current DVT prophylaxis           Gabriele Romeo PA-C    "

## 2019-01-29 NOTE — PLAN OF CARE
Discharge Planner PT   Patient plan for discharge: TCU   Current status: Pt in supine upon arrival of therapist, R knee pain of 2/10 at rest. Pt performed supine-sit with SBA. Sit <> stand and ambulation of 125 feet with FWW and CGA, noted no R knee buckling with no KI donned. R knee ROM: 9-75 degrees.   Barriers to return to prior living situation: Level of assist, Pain, Stairs-not yet assessed  Recommendations for discharge: TCU  Rationale for recommendations: Pt will benefit from continued skilled PT intervention in order to increase independence and safety with mobility prior to returning home with spouse.       Entered by: Amanda Traore 01/29/2019 12:27 PM

## 2019-01-29 NOTE — PLAN OF CARE
Pt A&Ox4, VSS, CMS intact, up with assist of one and walker, burt discontinue at 1415, DTV, IV SL, tolerating regular diet, oxycodone/tylenol for pain management, dressing C,D,I, hemovac patent.

## 2019-01-29 NOTE — PLAN OF CARE
Discharge Planner OT   Patient plan for discharge: TCU  Current status: OT order received and chart reviewed. Pt is s/p R TKA, w/ plan already in place to discharge to TCU. Will defer OT evaluation to next level of care at TCU and complete OT order.  Barriers to return to prior living situation: A required, pain, stairs, decreased indep w/ mobility  Recommendations for discharge: TCU  Rationale for recommendations: Pt will benefit from ongoing skilled therapy to improve deficits and facilitate safe return home.        Entered by: Fern Hilliard 01/29/2019 1:06 PM

## 2019-01-30 ENCOUNTER — APPOINTMENT (OUTPATIENT)
Dept: PHYSICAL THERAPY | Facility: CLINIC | Age: 84
DRG: 470 | End: 2019-01-30
Attending: ORTHOPAEDIC SURGERY
Payer: COMMERCIAL

## 2019-01-30 LAB
ANION GAP SERPL CALCULATED.3IONS-SCNC: 3 MMOL/L (ref 3–14)
BUN SERPL-MCNC: 19 MG/DL (ref 7–30)
CALCIUM SERPL-MCNC: 8 MG/DL (ref 8.5–10.1)
CHLORIDE SERPL-SCNC: 112 MMOL/L (ref 94–109)
CO2 SERPL-SCNC: 29 MMOL/L (ref 20–32)
CREAT SERPL-MCNC: 1.16 MG/DL (ref 0.66–1.25)
GFR SERPL CREATININE-BSD FRML MDRD: 58 ML/MIN/{1.73_M2}
GLUCOSE SERPL-MCNC: 96 MG/DL (ref 70–99)
HGB BLD-MCNC: 8.6 G/DL (ref 13.3–17.7)
INR PPP: 1.63 (ref 0.86–1.14)
POTASSIUM SERPL-SCNC: 4.2 MMOL/L (ref 3.4–5.3)
SODIUM SERPL-SCNC: 144 MMOL/L (ref 133–144)

## 2019-01-30 PROCEDURE — 85018 HEMOGLOBIN: CPT | Performed by: ORTHOPAEDIC SURGERY

## 2019-01-30 PROCEDURE — 12000000 ZZH R&B MED SURG/OB

## 2019-01-30 PROCEDURE — 99232 SBSQ HOSP IP/OBS MODERATE 35: CPT | Performed by: INTERNAL MEDICINE

## 2019-01-30 PROCEDURE — 97110 THERAPEUTIC EXERCISES: CPT | Mod: GP | Performed by: PHYSICAL THERAPY ASSISTANT

## 2019-01-30 PROCEDURE — 97530 THERAPEUTIC ACTIVITIES: CPT | Mod: GP | Performed by: PHYSICAL THERAPY ASSISTANT

## 2019-01-30 PROCEDURE — 25000132 ZZH RX MED GY IP 250 OP 250 PS 637: Performed by: ORTHOPAEDIC SURGERY

## 2019-01-30 PROCEDURE — 36415 COLL VENOUS BLD VENIPUNCTURE: CPT | Performed by: ORTHOPAEDIC SURGERY

## 2019-01-30 PROCEDURE — 40000193 ZZH STATISTIC PT WARD VISIT

## 2019-01-30 PROCEDURE — 97110 THERAPEUTIC EXERCISES: CPT | Mod: GP

## 2019-01-30 PROCEDURE — 80048 BASIC METABOLIC PNL TOTAL CA: CPT | Performed by: ORTHOPAEDIC SURGERY

## 2019-01-30 PROCEDURE — 40000193 ZZH STATISTIC PT WARD VISIT: Performed by: PHYSICAL THERAPY ASSISTANT

## 2019-01-30 PROCEDURE — 97116 GAIT TRAINING THERAPY: CPT | Mod: GP

## 2019-01-30 PROCEDURE — 97116 GAIT TRAINING THERAPY: CPT | Mod: GP | Performed by: PHYSICAL THERAPY ASSISTANT

## 2019-01-30 PROCEDURE — 85610 PROTHROMBIN TIME: CPT | Performed by: ORTHOPAEDIC SURGERY

## 2019-01-30 RX ORDER — WARFARIN SODIUM 10 MG/1
10 TABLET ORAL
Status: COMPLETED | OUTPATIENT
Start: 2019-01-30 | End: 2019-01-30

## 2019-01-30 RX ORDER — SENNOSIDES 8.6 MG
1-2 TABLET ORAL 2 TIMES DAILY PRN
Status: DISCONTINUED | OUTPATIENT
Start: 2019-01-30 | End: 2019-01-31 | Stop reason: HOSPADM

## 2019-01-30 RX ORDER — OXYCODONE HYDROCHLORIDE 5 MG/1
5-10 TABLET ORAL
Status: DISCONTINUED | OUTPATIENT
Start: 2019-01-30 | End: 2019-01-31 | Stop reason: HOSPADM

## 2019-01-30 RX ORDER — WARFARIN SODIUM 7.5 MG/1
7.5 TABLET ORAL
Status: DISCONTINUED | OUTPATIENT
Start: 2019-01-30 | End: 2019-01-30

## 2019-01-30 RX ADMIN — MULTIPLE VITAMINS W/ MINERALS TAB 1 TABLET: TAB at 08:29

## 2019-01-30 RX ADMIN — PANTOPRAZOLE SODIUM 40 MG: 40 TABLET, DELAYED RELEASE ORAL at 08:30

## 2019-01-30 RX ADMIN — OXYCODONE HYDROCHLORIDE 10 MG: 5 TABLET ORAL at 18:30

## 2019-01-30 RX ADMIN — OXYCODONE HYDROCHLORIDE 10 MG: 5 TABLET ORAL at 13:03

## 2019-01-30 RX ADMIN — NIACIN 1000 MG: 500 TABLET, EXTENDED RELEASE ORAL at 13:03

## 2019-01-30 RX ADMIN — ACETAMINOPHEN 975 MG: 325 TABLET, FILM COATED ORAL at 23:58

## 2019-01-30 RX ADMIN — Medication 140 MG: at 13:03

## 2019-01-30 RX ADMIN — WARFARIN SODIUM 10 MG: 10 TABLET ORAL at 18:30

## 2019-01-30 RX ADMIN — SIMVASTATIN 20 MG: 20 TABLET, FILM COATED ORAL at 23:58

## 2019-01-30 RX ADMIN — OXYCODONE HYDROCHLORIDE 10 MG: 5 TABLET ORAL at 08:32

## 2019-01-30 RX ADMIN — ACETAMINOPHEN 975 MG: 325 TABLET, FILM COATED ORAL at 05:32

## 2019-01-30 RX ADMIN — PANTOPRAZOLE SODIUM 40 MG: 40 TABLET, DELAYED RELEASE ORAL at 19:57

## 2019-01-30 RX ADMIN — OXYCODONE HYDROCHLORIDE 10 MG: 5 TABLET ORAL at 01:39

## 2019-01-30 RX ADMIN — Medication 600 MG: at 19:57

## 2019-01-30 RX ADMIN — Medication 140 MG: at 18:30

## 2019-01-30 RX ADMIN — OXYCODONE HYDROCHLORIDE 5 MG: 5 TABLET ORAL at 23:58

## 2019-01-30 RX ADMIN — SENNOSIDES AND DOCUSATE SODIUM 1 TABLET: 8.6; 5 TABLET ORAL at 08:30

## 2019-01-30 RX ADMIN — Medication 600 MG: at 08:29

## 2019-01-30 RX ADMIN — Medication 140 MG: at 08:30

## 2019-01-30 RX ADMIN — NIACIN 1000 MG: 500 TABLET, EXTENDED RELEASE ORAL at 18:30

## 2019-01-30 RX ADMIN — ACETAMINOPHEN 975 MG: 325 TABLET, FILM COATED ORAL at 13:03

## 2019-01-30 RX ADMIN — VITAMIN D, TAB 1000IU (100/BT) 1000 UNITS: 25 TAB at 08:29

## 2019-01-30 RX ADMIN — OXYCODONE HYDROCHLORIDE 10 MG: 5 TABLET ORAL at 05:32

## 2019-01-30 ASSESSMENT — ACTIVITIES OF DAILY LIVING (ADL)
ADLS_ACUITY_SCORE: 13

## 2019-01-30 NOTE — PROGRESS NOTES
CECILIO  D: Called and left VM for [facility] to check on auth process. Called and left VM for [facility] to provide update that [facility] has started auth process for pt.   P: Will continue to follow and support a safe discharge plan    Samantha DARBY, [facility]

## 2019-01-30 NOTE — PROGRESS NOTES
"[facility]    Medicine Progress Note - Hospitalist Service       Date of Admission:  1/28/2019  Assessment & Plan   Alessio Teixeira is an 83-year-old male with history of obstructive sleep apnea, chronic kidney disease stage II-III, hyperlipidemia, hypertension, paroxysmal atrial fibrillation, recent bleeding gastric ulcer who is admitted 1/28/2019 under the care of the Orthopedic Service following a routine right-sided total hip arthroplasty. The Hospitalist Service was consulted for medical co-management.     Right knee osteoarthritis, status post right TKA 1/28/19  Surgery performed by Dr. Johnson, EBL less than 50.   - Routine post-operative cares per orthopedic surgery service     Diarrhea  Has had frequent watery stools since surgery. Has been receiving scheduled senna, likely etiology  - Change scheduled Senna to PRN    Acute kidney injury on chronic kidney disease, stage II-III    Baseline creatinine 1-1.1, although preop creatinine was 1.40 on 1/16.  - Creatinine improved to near baseline  - Amos out; monitor and record PVR x2 to evaluate for retention  - Continue to hold furosemide  - Avoid nephrotoxins including NSAIDs (\"No NSAIDs\" order in place)     Recent bleeding gastric ulcer  Admitted [facility] 12/22-12/24/2018.  Has felt generally weak and lightheaded since his hospital discharge.  Continues to have abdominal pain that is slowly getting better.  His stools are black, likely related to his iron therapy, though cannot rule out melena. Hemoglobin ~9 g/dl following bleed.  - Continue PPI BID  - Plan for EGD in approximately 1 month per previous recommendations   - Hemoglobin stable     Paroxysmal atrial fibrillation  Rate controlled and not on any AV judith blocking agents prior to admission.   - Continue warfarin with pharmacy to dose   - Metoprolol IV PRN for HR >120     Hyperlipidemia  Continue prior to admission lovastatin    Hypertension  - Blood pressure adequately controlled   - Holding " prior to admission furosemide as above  - Hydralazine PRN for severe elevations     Obstructive sleep apnea  Minimally compliant with CPAP at home, but is agreeable to use while hospitalized.   - CPAP per home settings     Incidental right lung nodule, 1.5 cm  Incidental finding on CT abdomen and pelvis 12/2018 at the right lung base.  - Outpatient follow-up CT in 6 months given his history of smoking.     Diet: Advance Diet as Tolerated: Regular Diet Adult    DVT Prophylaxis: Defer to primary service  Amos Catheter: not present  Code Status: Full Code      Disposition Plan   Expected discharge: per orthopedic surgery   Entered: Brayden Khanna MD 01/30/2019, 9:12 AM       The patient's care was discussed with the Patient and Patient's Family.    Brayden Khanna MD  Hospitalist Service  [facility]    ______________________________________________________________________    Interval History   No acute events overnight. Felt some expected discomfort working with physical therapy this morning. Having frequent watery stools with urgency. Denies any chest pain or shortness of breath.     Data reviewed today: I reviewed all medications, new labs and imaging results over the last 24 hours. I personally reviewed no images or EKG's today.    Physical Exam   Vital Signs: Temp: 98.4  F (36.9  C) Temp src: Oral BP: 120/71 Pulse: 53 Heart Rate: 65 Resp: 16 SpO2: 96 % O2 Device: None (Room air)    Weight: 255 lbs 14.4 oz    Constitutional: Well-appearing, NAD  Respiratory: Clear to auscultation bilaterally, good air movement bilaterally  Cardiovascular: RRR, no m/r/g.  GI: Soft, non-tender, non-distended.   Skin/Integumen: Warm, dry  Other:     Data   Recent Labs   Lab 01/30/19  0712 01/29/19  1435 01/29/19  0627 01/28/19  0614 01/24/19  1127   WBC  --   --   --   --  10.9   HGB 8.6* 8.4* 8.0* 10.4* 10.5*   MCV  --   --   --   --  85   PLT  --   --   --   --  444   INR 1.63*  --  1.71* 1.61*  --      --   140  --   --    POTASSIUM 4.2  --  4.2 3.9  --    CHLORIDE 112*  --  110*  --   --    CO2 29  --  27  --   --    BUN 19  --  24  --   --    CR 1.16  --  1.42*  --   --    ANIONGAP 3  --  3  --   --    TATE 8.0*  --  7.7*  --   --    GLC 96  --  116*  --   --        No results found for this or any previous visit (from the past 24 hour(s)).  Medications     dextrose 5% and 0.45% NaCl + KCl 20 mEq/L 125 mL/hr at 01/28/19 2011     Warfarin Therapy Reminder         acetaminophen  975 mg Oral Q8H     calcium carbonate  600 mg Oral BID     ferrous sulfate  140 mg Oral TID w/meals     multivitamin w/minerals  1 tablet Oral Daily     niacin ER  1,000 mg Oral BID w/meals     pantoprazole  40 mg Oral BID     senna-docusate  1 tablet Oral BID    Or     senna-docusate  2 tablet Oral BID     simvastatin  20 mg Oral At Bedtime     sodium chloride (PF)  3 mL Intracatheter Q8H     vitamin D3  1,000 Units Oral Daily     warfarin  7.5 mg Oral ONCE at 18:00

## 2019-01-30 NOTE — PLAN OF CARE
Discharge Planner PT   Patient plan for discharge: TCU   Current status: Pt performed bed mobility with min assist and sit to/from stand transfers with CGA.  Gait training 100 ft x 2 using wheeled walker and CGA.  Slow pace.  Good stability without use of KI.  Knee AAROM is 11-91 degrees.  Barriers to return to prior living situation: Level of assist, Pain, Stairs, alone during the day  Recommendations for discharge: TCU per plan established by the PT.   Rationale for recommendations: Pt will benefit from continued skilled PT intervention in order to increase independence and safety with mobility prior to returning home with spouse.           Entered by: Key Samano 01/30/2019 12:02 PM

## 2019-01-30 NOTE — PLAN OF CARE
A&Ox4. Ax1 w/gb. AUO with urinal and commode. Pain managed with PO pain meds. Tolerating reg diet. CMS intact. Dressing CDI. Hemovac patent and draining. CPAP on. VSS on RA.

## 2019-01-31 ENCOUNTER — APPOINTMENT (OUTPATIENT)
Dept: PHYSICAL THERAPY | Facility: CLINIC | Age: 84
DRG: 470 | End: 2019-01-31
Attending: ORTHOPAEDIC SURGERY
Payer: COMMERCIAL

## 2019-01-31 VITALS
OXYGEN SATURATION: 100 % | WEIGHT: 255.9 LBS | SYSTOLIC BLOOD PRESSURE: 121 MMHG | HEIGHT: 71 IN | HEART RATE: 62 BPM | BODY MASS INDEX: 35.82 KG/M2 | RESPIRATION RATE: 16 BRPM | DIASTOLIC BLOOD PRESSURE: 67 MMHG | TEMPERATURE: 99.3 F

## 2019-01-31 LAB — INR PPP: 1.57 (ref 0.86–1.14)

## 2019-01-31 PROCEDURE — 85610 PROTHROMBIN TIME: CPT | Performed by: ORTHOPAEDIC SURGERY

## 2019-01-31 PROCEDURE — 97110 THERAPEUTIC EXERCISES: CPT | Mod: GP | Performed by: PHYSICAL THERAPY ASSISTANT

## 2019-01-31 PROCEDURE — 99232 SBSQ HOSP IP/OBS MODERATE 35: CPT | Performed by: INTERNAL MEDICINE

## 2019-01-31 PROCEDURE — 36415 COLL VENOUS BLD VENIPUNCTURE: CPT | Performed by: ORTHOPAEDIC SURGERY

## 2019-01-31 PROCEDURE — 25000132 ZZH RX MED GY IP 250 OP 250 PS 637

## 2019-01-31 PROCEDURE — 97116 GAIT TRAINING THERAPY: CPT | Mod: GP | Performed by: PHYSICAL THERAPY ASSISTANT

## 2019-01-31 PROCEDURE — 25000132 ZZH RX MED GY IP 250 OP 250 PS 637: Performed by: ORTHOPAEDIC SURGERY

## 2019-01-31 PROCEDURE — 40000193 ZZH STATISTIC PT WARD VISIT: Performed by: PHYSICAL THERAPY ASSISTANT

## 2019-01-31 RX ORDER — LISINOPRIL 10 MG/1
10 TABLET ORAL DAILY
Qty: 30 TABLET | Refills: 0 | DISCHARGE
Start: 2019-01-31 | End: 2019-04-23

## 2019-01-31 RX ORDER — OXYCODONE HYDROCHLORIDE 5 MG/1
5-10 TABLET ORAL
Status: SHIPPED | DISCHARGE
Start: 2019-01-31 | End: 2019-01-31

## 2019-01-31 RX ORDER — SENNOSIDES 8.6 MG
1-2 TABLET ORAL 2 TIMES DAILY PRN
DISCHARGE
Start: 2019-01-31 | End: 2019-02-01

## 2019-01-31 RX ORDER — OXYCODONE HYDROCHLORIDE 5 MG/1
5-10 TABLET ORAL
Qty: 100 TABLET | Refills: 0 | Status: SHIPPED | OUTPATIENT
Start: 2019-01-31 | End: 2019-02-01

## 2019-01-31 RX ORDER — WARFARIN SODIUM 10 MG/1
10 TABLET ORAL
Status: COMPLETED | OUTPATIENT
Start: 2019-01-31 | End: 2019-01-31

## 2019-01-31 RX ADMIN — OXYCODONE HYDROCHLORIDE 5 MG: 5 TABLET ORAL at 14:04

## 2019-01-31 RX ADMIN — VITAMIN D, TAB 1000IU (100/BT) 1000 UNITS: 25 TAB at 08:35

## 2019-01-31 RX ADMIN — OXYCODONE HYDROCHLORIDE 10 MG: 5 TABLET ORAL at 04:39

## 2019-01-31 RX ADMIN — Medication 140 MG: at 08:35

## 2019-01-31 RX ADMIN — ACETAMINOPHEN 975 MG: 325 TABLET, FILM COATED ORAL at 06:24

## 2019-01-31 RX ADMIN — Medication 600 MG: at 08:34

## 2019-01-31 RX ADMIN — NIACIN 1000 MG: 500 TABLET, EXTENDED RELEASE ORAL at 17:03

## 2019-01-31 RX ADMIN — OXYCODONE HYDROCHLORIDE 10 MG: 5 TABLET ORAL at 08:35

## 2019-01-31 RX ADMIN — Medication 140 MG: at 17:03

## 2019-01-31 RX ADMIN — Medication 140 MG: at 12:51

## 2019-01-31 RX ADMIN — NIACIN 1000 MG: 500 TABLET, EXTENDED RELEASE ORAL at 08:35

## 2019-01-31 RX ADMIN — PANTOPRAZOLE SODIUM 40 MG: 40 TABLET, DELAYED RELEASE ORAL at 08:34

## 2019-01-31 RX ADMIN — WARFARIN SODIUM 10 MG: 10 TABLET ORAL at 17:03

## 2019-01-31 RX ADMIN — MULTIPLE VITAMINS W/ MINERALS TAB 1 TABLET: TAB at 08:34

## 2019-01-31 RX ADMIN — OXYCODONE HYDROCHLORIDE 5 MG: 5 TABLET ORAL at 17:05

## 2019-01-31 ASSESSMENT — ACTIVITIES OF DAILY LIVING (ADL)
ADLS_ACUITY_SCORE: 13

## 2019-01-31 NOTE — PROGRESS NOTES
SW:    D: PAS completed. Need to fax orders / scripts in AM.    PAS-RR     D: Per DHS regulation, CECILIO completed and submitted PAS-RR to [place] [employer]  [employer] via the Senior LinkAge Line.  PAS-RR confirmation # is :  [id number]    P: Further questions may be directed to [facility] LinkAge Line at #[phone], option #4 for PAS-RR staff.    P: SW will continue to coordinate discharge as needed.    BALJIT Wilkinson

## 2019-01-31 NOTE — PLAN OF CARE
Pt A&Ox4, VSS, CMS intact, dressing changed, incision C,D,I with staples, IV SL, voiding well, up with assist of one and walker, oxycodone/tylenol for pain management, tolerating regular diet. Pt discharged to [facility] via wheelchair and spouse.

## 2019-01-31 NOTE — PLAN OF CARE
Patient A&Ox4. VSS on RA. CMS intact. Dressing clean, dry, and intact. Pain managed with PRN Oxycodone. Up with the assist of one, voiding adequately. Patient slept between cares. Plan for possible discharge to TCU today. Will continue to monitor.

## 2019-01-31 NOTE — PROGRESS NOTES
[facility]    Medicine Progress Note - Hospitalist Service       Date of Admission:  1/28/2019  Assessment & Plan   Alessio Teixeira is an 83-year-old male with history of obstructive sleep apnea, chronic kidney disease stage II-III, hyperlipidemia, hypertension, paroxysmal atrial fibrillation, recent bleeding gastric ulcer who is admitted 1/28/2019 under the care of the Orthopedic Service following a routine right-sided total hip arthroplasty. The Hospitalist Service was consulted for medical co-management.     Right knee osteoarthritis, status post right TKA 1/28/19  Surgery performed by Dr. Johnson, EBL less than 50.   - Routine post-operative cares per orthopedic surgery service     Diarrhea  Has had frequent watery stools since surgery. Has been receiving scheduled senna, likely etiology  - Improving with discontinuation of senna    Acute kidney injury on chronic kidney disease, stage II-III    Baseline creatinine 1-1.1, although preop creatinine was 1.40 on 1/16.  - Creatinine improved to near baseline  - PVRs have been acceptable   - Discontinue PTA furosemide  - Start lisinopril as below; will need BMP in 3-5 days to ensure stable renal function   - Avoid nephrotoxins including NSAIDs     Recent bleeding gastric ulcer  Admitted [facility] 12/22-12/24/2018.  Has felt generally weak and lightheaded since his hospital discharge. His stools are black, likely related to his iron therapy, though cannot rule out melena. Hemoglobin ~9 g/dl following bleed.  - Continue PPI BID  - Plan for EGD in approximately 1 month per previous recommendations   - Hemoglobin stable     Paroxysmal atrial fibrillation  Rate controlled and not on any AV judith blocking agents prior to admission.   - Continue warfarin with pharmacy to dose   - Metoprolol IV PRN for HR >120     Hyperlipidemia  Continue prior to admission lovastatin    Hypertension  - Blood pressure adequately controlled   - Given JAVIER, will discontinue PTA  furosemide. He denies any bothersome lower extremity edema or history of CHF so would prefer alternative blood pressure agent. Given his underlying chronic kidney disease, ACE-I would be indicated. Start lisinopril 10 mg daily (ordered for discharge). Will need repeat BMP in 3-5 days to ensure stable renal function and potassium (added to discharge navigator)   - Hydralazine PRN for severe elevations     Obstructive sleep apnea  Minimally compliant with CPAP at home, but is agreeable to use while hospitalized.   - CPAP per home settings     Incidental right lung nodule, 1.5 cm  Incidental finding on CT abdomen and pelvis 12/2018 at the right lung base.  - Outpatient follow-up CT in 6 months given his history of smoking.     Diet: Advance Diet as Tolerated: Regular Diet Adult  Advance Diet as Tolerated    DVT Prophylaxis: Defer to primary service  Amos Catheter: not present  Code Status: Full Code      Disposition Plan   Expected discharge: per orthopedic surgery . Okay for discharge today from medical standpoint.   Entered: Brayden Khanna MD 01/31/2019, 9:10 AM       The patient's care was discussed with the Bedside Nurse and Patient.    Brayden Khanna MD  Hospitalist Service  [facility]    ______________________________________________________________________    Interval History   No acute events overnight. Stools improving since scheduled senna stopped. Urinating well. Denies any chest pain or shortness of breath.     Data reviewed today: I reviewed all medications, new labs and imaging results over the last 24 hours. I personally reviewed no images or EKG's today.    Physical Exam   Vital Signs: Temp: 98.3  F (36.8  C) Temp src: Oral BP: 135/66 Pulse: 62 Heart Rate: 62 Resp: 16 SpO2: 99 % O2 Device: BiPAP/CPAP    Weight: 255 lbs 14.4 oz    Constitutional: Well-appearing, NAD  Respiratory: Clear to auscultation bilaterally, good air movement bilaterally  Cardiovascular: RRR, no m/r/g.  GI:  Soft, non-tender, non-distended.   Skin/Integumen: Warm, dry  Other:     Data   Recent Labs   Lab 01/31/19  0702 01/30/19  0712 01/29/19  1435 01/29/19  0627 01/28/19  0614  01/24/19  1127   WBC  --   --   --   --   --   --  10.9   HGB  --  8.6* 8.4* 8.0* 10.4*  --  10.5*   MCV  --   --   --   --   --   --  85   PLT  --   --   --   --   --   --  444   INR 1.57* 1.63*  --  1.71* 1.61*   < >  --    NA  --  144  --  140  --   --   --    POTASSIUM  --  4.2  --  4.2 3.9  --   --    CHLORIDE  --  112*  --  110*  --   --   --    CO2  --  29  --  27  --   --   --    BUN  --  19  --  24  --   --   --    CR  --  1.16  --  1.42*  --   --   --    ANIONGAP  --  3  --  3  --   --   --    TATE  --  8.0*  --  7.7*  --   --   --    GLC  --  96  --  116*  --   --   --     < > = values in this interval not displayed.       No results found for this or any previous visit (from the past 24 hour(s)).  Medications     dextrose 5% and 0.45% NaCl + KCl 20 mEq/L 125 mL/hr at 01/28/19 2011     Warfarin Therapy Reminder         calcium carbonate  600 mg Oral BID     ferrous sulfate  140 mg Oral TID w/meals     multivitamin w/minerals  1 tablet Oral Daily     niacin ER  1,000 mg Oral BID w/meals     pantoprazole  40 mg Oral BID     simvastatin  20 mg Oral At Bedtime     sodium chloride (PF)  3 mL Intracatheter Q8H     vitamin D3  1,000 Units Oral Daily

## 2019-01-31 NOTE — PLAN OF CARE
Discharge Planner PT   Patient plan for discharge: TCU   Current status: Pt performed bed mobility with SBA and sit to/from stand transfers with CGA.  Gait training x 100 ft using wheeled walker and CGA. Slow pace.  Good stability without use of KI.  Knee AAROM is  degrees.  Barriers to return to prior living situation: Level of assist, Pain, Stairs, alone during the day  Recommendations for discharge: TCU per plan established by the PT.   Rationale for recommendations: Pt will benefit from continued skilled PT intervention in order to increase independence and safety with mobility prior to returning home with spouse.           Entered by: Key Samano 01/31/2019 12:17 PM

## 2019-01-31 NOTE — PROGRESS NOTES
CECILIO  D: Received VM from [facility]-they do not have any beds available today. Called [facility] and asked that auth try to be obtained from pt's insurance for today. Faxed over new referral.   P: Will continue to follow and support a safe discharge plan    Samantha Tompkins MSW, [facility]     ADDENDUM @1410: Received VM-[facility] was able to get auth from pt's insurance. [facility] can accept pt for admission tomorrow.     ADDENDUM @2595: Received call from Billie, admissions at [facility]. Per Billie they had a patient not admit today so they could accept pt for admission. CECILIO met with pt and pt's wife, Kelli to discuss pt admitting today. Pt and Kelli are in agreement with pt going to [facility] today. Kelli will plan on bringing pt over to [facility] at 1700. Faxed orders, PAS, and scripts.

## 2019-01-31 NOTE — PLAN OF CARE
Pt A&Ox4, VSS, CMS intact, dressing changed, incision C,D,I with staples, up with assist of one and walker to BR, voiding well, oxycodone/tylenol for pain management, IV SL, CPAP at night, tolerating regular diet.

## 2019-01-31 NOTE — PROGRESS NOTES
"[facility] Orthopedic Post-Op Progress Note    Alessio Teixeira MRN# [id number]   YOB: 1935 Age: 83 year old            Interval History:   3 Days Post-Op from Total knee arthoplasty (Right)  Doing well.  Continues to improve.  Pain is well-controlled.  No fevers.  Tolerating physical therapy well but slow progress.            Physical Exam:   /66 (BP Location: Right arm)   Pulse 62   Temp 98.3  F (36.8  C) (Oral)   Resp 16   Ht 1.803 m (5' 11\")   Wt 116.1 kg (255 lb 14.4 oz)   SpO2 99%   BMI 35.69 kg/m      Dressing currently in place.  Mild swelling.  Movement and sensation intact distal to surgical site.  Calves non-tender.           Data:     Hemoglobin   Date Value Ref Range Status   01/30/2019 8.6 (L) 13.3 - 17.7 g/dL Final   01/29/2019 8.4 (L) 13.3 - 17.7 g/dL Final            Assessment and Plan:    Assessment:   Post-operative day #3  Total knee arthoplasty (Right)     Doing well.  No excessive bleeding  Pain well-controlled.  Tolerating physical therapy and rehabilitation well.  Hgb stable           Plan:   Continue current medical management  Continue working with physical therapy  Plan on discharge to TCU later today  Continue current DVT prophylaxis           Gabriele Romeo PA-C    "

## 2019-02-01 ENCOUNTER — NURSING HOME VISIT (OUTPATIENT)
Dept: GERIATRICS | Facility: CLINIC | Age: 84
End: 2019-02-01
Payer: COMMERCIAL

## 2019-02-01 VITALS
SYSTOLIC BLOOD PRESSURE: 130 MMHG | RESPIRATION RATE: 16 BRPM | OXYGEN SATURATION: 95 % | HEART RATE: 72 BPM | BODY MASS INDEX: 35.7 KG/M2 | TEMPERATURE: 98.7 F | DIASTOLIC BLOOD PRESSURE: 63 MMHG | WEIGHT: 255 LBS | HEIGHT: 71 IN

## 2019-02-01 DIAGNOSIS — D62 ANEMIA DUE TO BLOOD LOSS, ACUTE: ICD-10-CM

## 2019-02-01 DIAGNOSIS — Z86.711 HISTORY OF PULMONARY EMBOLISM: ICD-10-CM

## 2019-02-01 DIAGNOSIS — R91.8 PULMONARY NODULES: ICD-10-CM

## 2019-02-01 DIAGNOSIS — K25.4 GASTROINTESTINAL HEMORRHAGE ASSOCIATED WITH GASTRIC ULCER: ICD-10-CM

## 2019-02-01 DIAGNOSIS — G47.33 OSA (OBSTRUCTIVE SLEEP APNEA): ICD-10-CM

## 2019-02-01 DIAGNOSIS — Z96.651 STATUS POST TOTAL RIGHT KNEE REPLACEMENT: ICD-10-CM

## 2019-02-01 DIAGNOSIS — R52 ACUTE PAIN: ICD-10-CM

## 2019-02-01 DIAGNOSIS — N18.2 CKD (CHRONIC KIDNEY DISEASE) STAGE 2, GFR 60-89 ML/MIN: ICD-10-CM

## 2019-02-01 DIAGNOSIS — N17.9 AKI (ACUTE KIDNEY INJURY) (H): ICD-10-CM

## 2019-02-01 DIAGNOSIS — M17.11 PRIMARY OSTEOARTHRITIS OF RIGHT KNEE: Primary | ICD-10-CM

## 2019-02-01 DIAGNOSIS — R53.81 DEBILITY: ICD-10-CM

## 2019-02-01 DIAGNOSIS — I48.20 CHRONIC ATRIAL FIBRILLATION (H): ICD-10-CM

## 2019-02-01 DIAGNOSIS — I10 HYPERTENSION GOAL BP (BLOOD PRESSURE) < 140/90: ICD-10-CM

## 2019-02-01 PROCEDURE — 99310 SBSQ NF CARE HIGH MDM 45: CPT | Performed by: NURSE PRACTITIONER

## 2019-02-01 RX ORDER — POLYETHYLENE GLYCOL 3350 17 G/17G
17 POWDER, FOR SOLUTION ORAL DAILY PRN
Qty: 90 PACKET | Refills: 3
Start: 2019-02-01 | End: 2019-09-13

## 2019-02-01 RX ORDER — SENNOSIDES 8.6 MG
1 TABLET ORAL 2 TIMES DAILY PRN
Start: 2019-02-01 | End: 2019-04-23

## 2019-02-01 RX ORDER — OXYCODONE HYDROCHLORIDE 5 MG/1
5-10 TABLET ORAL
Qty: 60 TABLET | Refills: 0 | Status: SHIPPED | OUTPATIENT
Start: 2019-02-01 | End: 2019-04-23

## 2019-02-01 RX ORDER — ACETAMINOPHEN 500 MG
1000 TABLET ORAL 3 TIMES DAILY
Qty: 540 TABLET | Refills: 3
Start: 2019-02-01 | End: 2019-09-13

## 2019-02-01 ASSESSMENT — MIFFLIN-ST. JEOR: SCORE: 1878.8

## 2019-02-01 NOTE — PROGRESS NOTES
[facility]  PRIMARY CARE PROVIDER AND CLINIC:  [facility] [address] / [facility] [id number]  Chief Complaint   Patient presents with     Hospital F/U     [facility] Record Number:  [id number]  Place of Service where encounter took place:  [facility] [[id number]]    HPI:    Alessio Teixeira is a 83 year old  (1935),admitted to the above facility from  [facility].  Hospital stay 1/28/2019 through 1/31/2019.  Admitted to this facility for  rehab, medical management and nursing care.  HPI information obtained from: facility chart records, facility staff, patient report and [facility] chart review.      Mr. Teixeira is a 82 y/o with a history of prior L TKA, primary knee OA, A-fib, MARTHA, prior PE, and recent GIB due to gastric ulcer whom underwent a elective R TKA on 1/28.  He has now transitioned to the TCU for ongoing management and PT/OT.     In meeting Mr. Teixeira he is slightly tearful with pain.  He was just finishing PT/OT but did not get any pre-medications thus noting significant R knee pain.  Besides the pain he has no other complaints.  Does endorse he was not using CPAP at home but started again with hospital and now here.      CODE STATUS/ADVANCE DIRECTIVES DISCUSSION:   CPR/Full code   Patient's living condition: lives with spouse    ALLERGIES:Patient has no known allergies.  PAST MEDICAL HISTORY:  has a past medical history of 6th nerve palsy, Actinic keratosis, Atrial fibrillation (H), Edema of leg, Gout, unspecified, H/O diplopia, Hypertension, Meningioma (H), Myalgia and myositis, unspecified, Myasthenia gravis (H), Obesity, Other and unspecified hyperlipidemia, Other seborrheic keratosis, Presbyacusis, Primary localized osteoarthrosis, lower leg, Primary localized osteoarthrosis, pelvic region and thigh, Sleep apnea, Spinal stenosis, Squamous cell cancer of scalp and skin of neck, and Vitamin D deficiency.  PAST  SURGICAL HISTORY:  has a past surgical history that includes Abdomen surgery; cataract iol, rt/lt; knee surgery (2010); RAD RESEC TONSIL/PILLARS; appendectomy; brain surgery (2007); Rectal surgery (1985); Phacoemulsification clear cornea with toric intraocular lens implant (7/30/2012); colectomy (2007); Head and neck surgery (2007); Arthroplasty Hip Anterior (Right, 4/26/2016); orthopedic surgery; Arthroplasty hip (Right, 2016); Esophagoscopy, gastroscopy, duodenoscopy (EGD), combined (N/A, 12/22/2018); and Arthroplasty knee (Right, 1/28/2019).  FAMILY HISTORY: family history includes Glaucoma in his father; Unknown/Adopted in his mother.  SOCIAL HISTORY:  reports that he quit smoking about 56 years ago. His smoking use included cigarettes. He has a 3.00 pack-year smoking history. he has never used smokeless tobacco. He reports that he drinks alcohol. He reports that he does not use drugs.    Post Discharge Medication Reconciliation Status: discharge medications reconciled and changed, per note/orders (see AVS).  Current Outpatient Medications   Medication Sig Dispense Refill     acetaminophen (TYLENOL) 500 MG tablet Take 2 tablets (1,000 mg) by mouth 3 times daily 540 tablet 3     CALCIUM PO Take 600 mg by mouth 2 times daily        diphenhydrAMINE-acetaminophen (TYLENOL PM)  MG tablet Take 1 tablet by mouth nightly as needed for sleep       ferrous sulfate ER (SLO-FE) 142 (45 Fe) MG CR tablet Take 142 mg by mouth daily       KLOR-CON 20 MEQ CR tablet Take 1 tablet (20 mEq) by mouth daily 90 tablet 3     lisinopril (PRINIVIL/ZESTRIL) 10 MG tablet Take 1 tablet (10 mg) by mouth daily 30 tablet 0     lovastatin (MEVACOR) 40 MG tablet TAKE TWO TABLETS (80MG) BY MOUTH DAILY AT BEDTIME 180 tablet 3     multivitamin, therapeutic with minerals (THERA-VIT-M) TABS Take 1 tablet by mouth daily       NIACIN CR PO Take 500 mg by mouth 2 times daily (with meals) 2 x 500 mg slow niacin       ORDER FOR DME CPAP supplies  "with variable pressure control 1 Device 0     oxyCODONE (ROXICODONE) 5 MG tablet Take 1-2 tablets (5-10 mg) by mouth every 3 hours as needed for pain (1 tab for pain rated 1-5, 2 tab for pain rated 6-10) 60 tablet 0     pantoprazole (PROTONIX) 40 MG EC tablet Take 1 tablet (40 mg) by mouth 2 times daily 60 tablet 1     polyethylene glycol (MIRALAX/GLYCOLAX) packet Take 17 g by mouth daily as needed for constipation 90 packet 3     sennosides (SENOKOT) 8.6 MG tablet Take 1 tablet by mouth 2 times daily as needed for constipation       Vitamin D, Cholecalciferol, 1000 units TABS Take 1,000 Units by mouth daily.         Warfarin Sodium (COUMADIN PO) Recheck 2/1/19         ROS:  7 point ROS done including, light headedness/dizziness, fever/chills, pain, Resp, CV, GI, and  and is negative other than noted in HPI.      Exam:  /63   Pulse 72   Temp 98.7  F (37.1  C)   Resp 16   Ht 1.803 m (5' 11\")   Wt 115.7 kg (255 lb)   SpO2 95%   BMI 35.57 kg/m       GENERAL APPEARANCE:  Elderly male resting in bed, slightly tearful but NAD, non-toxic.  ENT:  Mouth and oropharynx normal, moist mucous membranes.   RESP:  Lungs CTA.  Regular relaxed breathing effort.  No cough.   CV: S1/S2 no murmur or rubs.  Irregular-irregular rhythm and rate.  ABDOMEN:   Obese but, soft, non-tender with active bowel sounds.  No guarding, rigidity, or rebound tenderness.  EXTREMITIES:  1+ RLE edema about Right knee, no LLE lower extremity edema, no calf tenderness.   PSYCH: Alert and orientated, pleasant and cooperative.   WOUND: Right knee has midline incision approximated with staples.  No drainage, no overt s/s of infection.     Lab/Diagnostic data: I have personally reviewed labs, which are in facility chart.     CBC RESULTS:   Recent Labs   Lab Test 01/30/19  0712 01/29/19  1435  01/24/19  1127 01/16/19  1404   WBC  --   --   --  10.9 10.0   RBC  --   --   --  4.25* 3.80*   HGB 8.6* 8.4*   < > 10.5* 9.5*   HCT  --   --   --  36.3* " 32.3*   MCV  --   --   --  85 85   MCH  --   --   --  24.7* 25.0*   MCHC  --   --   --  28.9* 29.4*   RDW  --   --   --  19.8* 18.5*   PLT  --   --   --  444 408    < > = values in this interval not displayed.       Last Basic Metabolic Panel:  Recent Labs   Lab Test 01/30/19  0712 01/29/19  0627    140   POTASSIUM 4.2 4.2   CHLORIDE 112* 110*   TATE 8.0* 7.7*   CO2 29 27   BUN 19 24   CR 1.16 1.42*   GLC 96 116*       Liver Function Studies -   Recent Labs   Lab Test 12/22/18  0459 11/21/18  0912   PROTTOTAL 4.7* 6.9   ALBUMIN 2.4* 3.6   BILITOTAL 0.2 0.4   ALKPHOS 35* 52   AST 18 24   ALT 26 27       TSH   Date Value Ref Range Status   09/18/2007 4.49 0.4 - 5.0 mU/L Final   09/10/2007 2.37 0.4 - 5.0 mU/L Final     Comment:     previous spec lost redraw     ASSESSMENT/PLAN:  Primary osteoarthritis of right knee  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  Acute pain  Is currently noting 6/10 pain, just finished PT with out pre-medications, pain all in Right knee.  Incision appears approximated, no s/s of infection.    WBAT  Resumed on Warfarin for prophylaxis  -Started Acetaminophen 1000 mg's TID scheduled.   -Oxy 5-10 mg's q3h PRN.   -Started BM regimen.   -PT/OT to eval and treat.     MARTHA (obstructive sleep apnea)  Chronic atrial fibrillation (H)  Hypertension goal BP (blood pressure) < 140/90  History of pulmonary embolism  Noted SBP's were 160's a few days ago on arrival to TCU, now down to 95's today, asymptomatic.  Did have recent GIB.  Did not use CPAP at home, resumed using in hospital and has it here.   -Continue CPAP use.   -No changes, continue to clinically monitor, if remain low, may need to reduce Lisinopril.   -Resumed on Warfarin.     Gastrointestinal hemorrhage associated with gastric ulcer, Dec 2018  Anemia due to blood loss, acute  GIB in Dec due to gastric ulcer, is on BID PPI.  Hgb last 8.6 and slowly increasing.   -Continue BID PPI.   -Continue to clinically monitor.   -Reduced his TID  Fe+ down to q daily.     JAVIER (acute kidney injury) (H)  CKD (chronic kidney disease) stage 2, GFR 60-89 ml/min  Creatine baseline 1-1.1 on review.  Was up to 1.4 in hospital, now 1.16 on last check.  Still on ACEI.   CrCl around 79.    -Will check next week when he is due for INR check.     Pulmonary nodules  Noted to have a 1.5 cm lung nodule.   -f//u CT in 6 months recommended.   -Will leave further monitoring to PCP's discretion.     Debility  -PT/OT to eval and treat.      Orders:  -INR check 4 Feb.   -Will get CBC/BMP with INR after that.     Total time spent with patient visit at the skilled nursing facility was 45 min including patient visit and review of past records. Greater than 50% of total time spent with counseling and coordinating care due to admission/establish new care, review of HPI, development of POC, patient education and review of POC with pt.     Electronically signed by:  ANNA Daniel CNP

## 2019-02-01 NOTE — LETTER
2/1/2019        RE: Alessio Teixeira  [address]  [place] [address]        [facility]  PRIMARY CARE PROVIDER AND CLINIC:  Zac Salgado [address] [facility] [address]  Chief Complaint   Patient presents with     Hospital F/U     [facility] Medical Record Number:  [id number]  Place of Service where encounter took place:  [facility] - [facility] ([facility]) [[id number]]    HPI:    Alessio Teixeira is a 83 year old  (1935),admitted to the above facility from  [facility].  Hospital stay 1/28/2019 through 1/31/2019.  Admitted to this facility for  rehab, medical management and nursing care.  HPI information obtained from: facility chart records, facility staff, patient report and [facility] chart review.      Mr. Teixeira is a 82 y/o with a history of prior L TKA, primary knee OA, A-fib, MARTHA, prior PE, and recent GIB due to gastric ulcer whom underwent a elective R TKA on 1/28.  He has now transitioned to the TCU for ongoing management and PT/OT.     In meeting Mr. Teixeira he is slightly tearful with pain.  He was just finishing PT/OT but did not get any pre-medications thus noting significant R knee pain.  Besides the pain he has no other complaints.  Does endorse he was not using CPAP at home but started again with hospital and now here.      CODE STATUS/ADVANCE DIRECTIVES DISCUSSION:   CPR/Full code   Patient's living condition: lives with spouse    ALLERGIES:Patient has no known allergies.  PAST MEDICAL HISTORY:  has a past medical history of 6th nerve palsy, Actinic keratosis, Atrial fibrillation (H), Edema of leg, Gout, unspecified, H/O diplopia, Hypertension, Meningioma (H), Myalgia and myositis, unspecified, Myasthenia gravis (H), Obesity, Other and unspecified hyperlipidemia, Other seborrheic keratosis, Presbyacusis, Primary localized osteoarthrosis, lower leg, Primary localized osteoarthrosis, pelvic region and thigh, Sleep apnea,  Spinal stenosis, Squamous cell cancer of scalp and skin of neck, and Vitamin D deficiency.  PAST SURGICAL HISTORY:  has a past surgical history that includes Abdomen surgery; cataract iol, rt/lt; knee surgery (2010); RAD RESEC TONSIL/PILLARS; appendectomy; brain surgery (2007); Rectal surgery (1985); Phacoemulsification clear cornea with toric intraocular lens implant (7/30/2012); colectomy (2007); Head and neck surgery (2007); Arthroplasty Hip Anterior (Right, 4/26/2016); orthopedic surgery; Arthroplasty hip (Right, 2016); Esophagoscopy, gastroscopy, duodenoscopy (EGD), combined (N/A, 12/22/2018); and Arthroplasty knee (Right, 1/28/2019).  FAMILY HISTORY: family history includes Glaucoma in his father; Unknown/Adopted in his mother.  SOCIAL HISTORY:  reports that he quit smoking about 56 years ago. His smoking use included cigarettes. He has a 3.00 pack-year smoking history. he has never used smokeless tobacco. He reports that he drinks alcohol. He reports that he does not use drugs.    Post Discharge Medication Reconciliation Status: discharge medications reconciled and changed, per note/orders (see AVS).  Current Outpatient Medications   Medication Sig Dispense Refill     acetaminophen (TYLENOL) 500 MG tablet Take 2 tablets (1,000 mg) by mouth 3 times daily 540 tablet 3     CALCIUM PO Take 600 mg by mouth 2 times daily        diphenhydrAMINE-acetaminophen (TYLENOL PM)  MG tablet Take 1 tablet by mouth nightly as needed for sleep       ferrous sulfate ER (SLO-FE) 142 (45 Fe) MG CR tablet Take 142 mg by mouth daily       KLOR-CON 20 MEQ CR tablet Take 1 tablet (20 mEq) by mouth daily 90 tablet 3     lisinopril (PRINIVIL/ZESTRIL) 10 MG tablet Take 1 tablet (10 mg) by mouth daily 30 tablet 0     lovastatin (MEVACOR) 40 MG tablet TAKE TWO TABLETS (80MG) BY MOUTH DAILY AT BEDTIME 180 tablet 3     multivitamin, therapeutic with minerals (THERA-VIT-M) TABS Take 1 tablet by mouth daily       NIACIN CR PO Take 500 mg  "by mouth 2 times daily (with meals) 2 x 500 mg slow niacin       ORDER FOR DME CPAP supplies with variable pressure control 1 Device 0     oxyCODONE (ROXICODONE) 5 MG tablet Take 1-2 tablets (5-10 mg) by mouth every 3 hours as needed for pain (1 tab for pain rated 1-5, 2 tab for pain rated 6-10) 60 tablet 0     pantoprazole (PROTONIX) 40 MG EC tablet Take 1 tablet (40 mg) by mouth 2 times daily 60 tablet 1     polyethylene glycol (MIRALAX/GLYCOLAX) packet Take 17 g by mouth daily as needed for constipation 90 packet 3     sennosides (SENOKOT) 8.6 MG tablet Take 1 tablet by mouth 2 times daily as needed for constipation       Vitamin D, Cholecalciferol, 1000 units TABS Take 1,000 Units by mouth daily.         Warfarin Sodium (COUMADIN PO) Recheck 2/1/19         ROS:  7 point ROS done including, light headedness/dizziness, fever/chills, pain, Resp, CV, GI, and  and is negative other than noted in HPI.      Exam:  /63   Pulse 72   Temp 98.7  F (37.1  C)   Resp 16   Ht 1.803 m (5' 11\")   Wt 115.7 kg (255 lb)   SpO2 95%   BMI 35.57 kg/m        GENERAL APPEARANCE:  Elderly male resting in bed, slightly tearful but NAD, non-toxic.  ENT:  Mouth and oropharynx normal, moist mucous membranes.   RESP:  Lungs CTA.  Regular relaxed breathing effort.  No cough.   CV: S1/S2 no murmur or rubs.  Irregular-irregular rhythm and rate.  ABDOMEN:   Obese but, soft, non-tender with active bowel sounds.  No guarding, rigidity, or rebound tenderness.  EXTREMITIES:  1+ RLE edema about Right knee, no LLE lower extremity edema, no calf tenderness.   PSYCH: Alert and orientated, pleasant and cooperative.   WOUND: Right knee has midline incision approximated with staples.  No drainage, no overt s/s of infection.     Lab/Diagnostic data: I have personally reviewed labs, which are in facility chart.     CBC RESULTS:   Recent Labs   Lab Test 01/30/19  0712 01/29/19  1435  01/24/19  1127 01/16/19  1404   WBC  --   --   --  10.9 10.0 "   RBC  --   --   --  4.25* 3.80*   HGB 8.6* 8.4*   < > 10.5* 9.5*   HCT  --   --   --  36.3* 32.3*   MCV  --   --   --  85 85   MCH  --   --   --  24.7* 25.0*   MCHC  --   --   --  28.9* 29.4*   RDW  --   --   --  19.8* 18.5*   PLT  --   --   --  444 408    < > = values in this interval not displayed.       Last Basic Metabolic Panel:  Recent Labs   Lab Test 01/30/19  0712 01/29/19  0627    140   POTASSIUM 4.2 4.2   CHLORIDE 112* 110*   TATE 8.0* 7.7*   CO2 29 27   BUN 19 24   CR 1.16 1.42*   GLC 96 116*       Liver Function Studies -   Recent Labs   Lab Test 12/22/18  0459 11/21/18  0912   PROTTOTAL 4.7* 6.9   ALBUMIN 2.4* 3.6   BILITOTAL 0.2 0.4   ALKPHOS 35* 52   AST 18 24   ALT 26 27       TSH   Date Value Ref Range Status   09/18/2007 4.49 0.4 - 5.0 mU/L Final   09/10/2007 2.37 0.4 - 5.0 mU/L Final     Comment:     previous spec lost redraw     ASSESSMENT/PLAN:  Primary osteoarthritis of right knee  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  Acute pain  Is currently noting 6/10 pain, just finished PT with out pre-medications, pain all in Right knee.  Incision appears approximated, no s/s of infection.    WBAT  Resumed on Warfarin for prophylaxis  -Started Acetaminophen 1000 mg's TID scheduled.   -Oxy 5-10 mg's q3h PRN.   -Started BM regimen.   -PT/OT to eval and treat.     MARTHA (obstructive sleep apnea)  Chronic atrial fibrillation (H)  Hypertension goal BP (blood pressure) < 140/90  History of pulmonary embolism  Noted SBP's were 160's a few days ago on arrival to TCU, now down to 95's today, asymptomatic.  Did have recent GIB.  Did not use CPAP at home, resumed using in hospital and has it here.   -Continue CPAP use.   -No changes, continue to clinically monitor, if remain low, may need to reduce Lisinopril.   -Resumed on Warfarin.     Gastrointestinal hemorrhage associated with gastric ulcer, Dec 2018  Anemia due to blood loss, acute  GIB in Dec due to gastric ulcer, is on BID PPI.  Hgb last 8.6 and  slowly increasing.   -Continue BID PPI.   -Continue to clinically monitor.   -Reduced his TID Fe+ down to q daily.     JAVIER (acute kidney injury) (H)  CKD (chronic kidney disease) stage 2, GFR 60-89 ml/min  Creatine baseline 1-1.1 on review.  Was up to 1.4 in hospital, now 1.16 on last check.  Still on ACEI.   CrCl around 79.    -Will check next week when he is due for INR check.     Pulmonary nodules  Noted to have a 1.5 cm lung nodule.   -f//u CT in 6 months recommended.   -Will leave further monitoring to PCP's discretion.     Debility  -PT/OT to eval and treat.      Orders:  -INR check 4 Feb.   -Will get CBC/BMP with INR after that.     Total time spent with patient visit at the skilled nursing facility was 45 min including patient visit and review of past records. Greater than 50% of total time spent with counseling and coordinating care due to admission/establish new care, review of HPI, development of POC, patient education and review of POC with pt.     Electronically signed by:  ANNA Daniel CNP                    Sincerely,        ANNA Daniel CNP

## 2019-02-01 NOTE — PLAN OF CARE
Physical Therapy Discharge Summary    Reason for therapy discharge:    Discharged to transitional care facility.    Progress towards therapy goal(s). See goals on Care Plan in [facility] electronic health record for goal details.  Goals not met.  Barriers to achieving goals:   discharge from facility.    Therapy recommendation(s):    Continued therapy is recommended.  Rationale/Recommendations:  Pt will benefit from continued skilled PT intervention at TCU in order to improve knee ROM/strength and increase independence with functional mobility.

## 2019-02-02 NOTE — DISCHARGE SUMMARY
[facility]    Discharge Summary  Orthopedics    Date of Admission:  1/28/2019  Date of Discharge:  1/31/2019  5:38 PM  Discharging Provider: Marquis Romeo PA-C  Date of Service: 1/31/2019    Discharge Diagnoses      Primary osteoarthritis of right knee    Procedure/Surgery Information   Procedure: Procedure(s):  RIGHT TOTAL KNEE ARTROPLASTY   Surgeon(s): Surgeon(s) and Role:     * Miguel Johnson MD - Primary     * Cheyanne Stern PA-C - Assisting           History of Present Illness   Alessio Teixeira is a 83 year old male who presented with significant pain and degenerative changes in his right knee.    Hospital Course   Alessio Teixeira was admitted on 1/28/2019.  The following problems were addressed during his hospitalization:  Active Problems:    Degenerative arthritis of knee      Post-operative pain control: included oxycodone and will be oxycodone on discharge.     Medications discontinued or adjusted during this hospitalization:  Resume normal coumadin dosing.    Antibiotics prescribed at discharge: None prescribed     Marquis Romeo PA-C    Discharge Disposition   Discharged to rehabilitation facility   Condition at discharge: Good    Unresulted Labs Ordered in the Past 30 Days of this Admission     No orders found from 11/29/2018 to 1/29/2019.          Primary Care Physician   Zac Salgado    Consultations This Hospital Stay   OCCUPATIONAL THERAPY ADULT IP CONSULT  PHYSICAL THERAPY ADULT IP CONSULT  RESPIRATORY CARE IP CONSULT  HOSPITALIST IP CONSULT  SOCIAL WORK IP CONSULT  PHARMACY TO DOSE WARFARIN  PHYSICAL THERAPY ADULT IP CONSULT    Time Spent on this Encounter   I have spent less than 30 minutes on this discharge.    Discharge Orders      General info for SNF    Length of Stay Estimate: Short Term Care: Estimated # of Days <30  Condition at Discharge: Improving  Level of care:skilled   Rehabilitation Potential: Excellent  Admission H&P  remains valid and up-to-date: Yes  Recent Chemotherapy: N/A  Use Nursing Home Standing Orders: Yes     Mantoux instructions    Give two-step Mantoux (PPD) Per Facility Policy Yes     Reason for your hospital stay    Diagnosis: DJD knee  Procedure: TKR  Surgeon:  Miguel Johnson MD  Patient underwent total knee replacement without complication. Patient's hospital stay included physical therapy and DVT prophylaxis. Patient will follow-up in the office 10-14 days post-op for wound check. Please refer to chart for any other specifics of this hospital stay.    Gabriele Romeo PA-C     Wound care    Your dressing will be changed before you leave the hospital.  Leave that dressing in place for 48 hours.  You may shower at that time, allowing water to run over the incision.  You may re-apply a clean dressing after the shower.  You may perform daily dressing changes at that point, keeping the incision clean and dry.    Do not allow the dressing to soak in water.  If the dressing becomes completely saturated, you may remove it and perform basic wound management by keeping the wound clean, dry, and covered.    Notify Dr. Johnson's office if the dressing becomes completely saturated from drainage or blood from the wound, or if the wound or dressing is leaking.     Activity - Up ad calista     Weight bearing status    Weight bear as tolerated.     Follow Up and recommended labs and tests    Follow-up with nursing home physician. Recommend BMP in 3-5 days after starting lisinopril.     You will follow up with Dr. Johnson or his physician assistant Gabriele Romeo in 2 weeks after surgery.  Your recovery process and incision will be checked.     Full Code     Physical Therapy Adult Consult    Evaluate and treat as clinically indicated.    Reason:  S/p right TKA     Advance Diet as Tolerated    Follow this diet upon discharge: Orders Placed This Encounter      Advance Diet as Tolerated: Regular Diet Adult     Discharge Medications    Resume normal coumadin dosing.    Discharge Medication List as of 1/31/2019  5:07 PM      START taking these medications    Details   lisinopril (PRINIVIL/ZESTRIL) 10 MG tablet Take 1 tablet (10 mg) by mouth daily, Disp-30 tablet, R-0, Transitional      sennosides (SENOKOT) 8.6 MG tablet Take 1-2 tablets by mouth 2 times daily as needed for constipation, Transitional         CONTINUE these medications which have CHANGED    Details   oxyCODONE (ROXICODONE) 5 MG tablet Take 1-2 tablets (5-10 mg) by mouth every 3 hours as needed for moderate to severe pain, Disp-100 tablet, R-0, Local Print         CONTINUE these medications which have NOT CHANGED    Details   CALCIUM PO Take 600 mg by mouth 2 times daily , Historical      diphenhydrAMINE-acetaminophen (TYLENOL PM)  MG tablet Take 1 tablet by mouth nightly as needed for sleep, Historical      ferrous sulfate ER (SLO-FE) 142 (45 Fe) MG CR tablet Take 142 mg by mouth 3 times daily (with meals), Historical      KLOR-CON 20 MEQ CR tablet Take 1 tablet (20 mEq) by mouth daily, Disp-90 tablet, R-3, E-Prescribe      lovastatin (MEVACOR) 40 MG tablet TAKE TWO TABLETS (80MG) BY MOUTH DAILY AT BEDTIME, Disp-180 tablet, R-3, E-Prescribe      multivitamin, therapeutic with minerals (THERA-VIT-M) TABS Take 1 tablet by mouth daily, Historical      NIACIN CR PO Take 500 mg by mouth 2 times daily (with meals) 2 x 500 mg slow niacin, Historical      pantoprazole (PROTONIX) 40 MG EC tablet Take 1 tablet (40 mg) by mouth 2 times daily, Disp-60 tablet, R-1, E-Prescribe      Vitamin D, Cholecalciferol, 1000 units TABS Take 1,000 Units by mouth daily.  , Historical      ORDER FOR DME CPAP supplies with variable pressure controlDisp-1 Device, R-0, Local Print      warfarin (COUMADIN) 2 MG tablet Do not take this medication until 12/28/18 but then resume typical dose of 3 AND 1/2 TABLETS (7MG) BY MOUTH DAILY, Disp-315 tablet, R-1, No Print Out         STOP taking these medications        furosemide (LASIX) 20 MG tablet Comments:   Reason for Stopping:             Allergies   No Known Allergies  Data   Results for orders placed or performed during the hospital encounter of 01/28/19   XR Knee Port Right 1/2 Views    Narrative    KNEE ONE-TWO VIEWS RIGHT 1/28/2019 10:43 AM     HISTORY: Knee arthroplasty    COMPARISON: None.    FINDINGS:    There is total knee arthroplasty in anatomic alignment  with well-seated components.      Impression    IMPRESSION:    Knee arthroplasty in anatomic alignment.     IFRAH CORREIA MD     Hemoglobin   Date Value Ref Range Status   01/30/2019 8.6 (L) 13.3 - 17.7 g/dL Final   01/29/2019 8.4 (L) 13.3 - 17.7 g/dL Final   ]  Last Basic Metabolic Panel:  Lab Results   Component Value Date     01/30/2019      Lab Results   Component Value Date    POTASSIUM 4.2 01/30/2019     Lab Results   Component Value Date    CHLORIDE 112 01/30/2019     Lab Results   Component Value Date    TATE 8.0 01/30/2019     Lab Results   Component Value Date    CO2 29 01/30/2019     Lab Results   Component Value Date    BUN 19 01/30/2019     Lab Results   Component Value Date    CR 1.16 01/30/2019     Lab Results   Component Value Date    GLC 96 01/30/2019

## 2019-02-04 ENCOUNTER — HOSPITAL LABORATORY (OUTPATIENT)
Dept: OTHER | Facility: CLINIC | Age: 84
End: 2019-02-04

## 2019-02-04 LAB
ANION GAP SERPL CALCULATED.3IONS-SCNC: 7 MMOL/L (ref 3–14)
BUN SERPL-MCNC: 17 MG/DL (ref 7–30)
CALCIUM SERPL-MCNC: 8.4 MG/DL (ref 8.5–10.1)
CHLORIDE SERPL-SCNC: 105 MMOL/L (ref 94–109)
CO2 SERPL-SCNC: 25 MMOL/L (ref 20–32)
CREAT SERPL-MCNC: 1.16 MG/DL (ref 0.66–1.25)
GFR SERPL CREATININE-BSD FRML MDRD: 58 ML/MIN/{1.73_M2}
GLUCOSE SERPL-MCNC: 96 MG/DL (ref 70–99)
POTASSIUM SERPL-SCNC: 4.9 MMOL/L (ref 3.4–5.3)
SODIUM SERPL-SCNC: 137 MMOL/L (ref 133–144)

## 2019-02-05 ENCOUNTER — NURSING HOME VISIT (OUTPATIENT)
Dept: GERIATRICS | Facility: CLINIC | Age: 84
End: 2019-02-05
Payer: COMMERCIAL

## 2019-02-05 VITALS
OXYGEN SATURATION: 98 % | WEIGHT: 267.6 LBS | HEIGHT: 71 IN | RESPIRATION RATE: 16 BRPM | TEMPERATURE: 98 F | SYSTOLIC BLOOD PRESSURE: 128 MMHG | DIASTOLIC BLOOD PRESSURE: 75 MMHG | HEART RATE: 83 BPM | BODY MASS INDEX: 37.46 KG/M2

## 2019-02-05 DIAGNOSIS — I10 HYPERTENSION GOAL BP (BLOOD PRESSURE) < 140/90: ICD-10-CM

## 2019-02-05 DIAGNOSIS — N18.2 CKD (CHRONIC KIDNEY DISEASE) STAGE 2, GFR 60-89 ML/MIN: ICD-10-CM

## 2019-02-05 DIAGNOSIS — Z86.711 HISTORY OF PULMONARY EMBOLISM: ICD-10-CM

## 2019-02-05 DIAGNOSIS — R53.81 DEBILITY: ICD-10-CM

## 2019-02-05 DIAGNOSIS — R52 ACUTE PAIN: ICD-10-CM

## 2019-02-05 DIAGNOSIS — Z96.651 STATUS POST TOTAL RIGHT KNEE REPLACEMENT: ICD-10-CM

## 2019-02-05 DIAGNOSIS — I48.20 CHRONIC ATRIAL FIBRILLATION (H): ICD-10-CM

## 2019-02-05 DIAGNOSIS — K25.4 GASTROINTESTINAL HEMORRHAGE ASSOCIATED WITH GASTRIC ULCER: ICD-10-CM

## 2019-02-05 DIAGNOSIS — G47.33 OSA (OBSTRUCTIVE SLEEP APNEA): ICD-10-CM

## 2019-02-05 DIAGNOSIS — R63.5 WEIGHT GAIN: ICD-10-CM

## 2019-02-05 PROCEDURE — 99309 SBSQ NF CARE MODERATE MDM 30: CPT | Performed by: NURSE PRACTITIONER

## 2019-02-05 RX ORDER — FUROSEMIDE 20 MG
20 TABLET ORAL DAILY
Qty: 90 TABLET | Refills: 3
Start: 2019-02-05 | End: 2019-03-14

## 2019-02-05 ASSESSMENT — MIFFLIN-ST. JEOR: SCORE: 1935.96

## 2019-02-07 ENCOUNTER — DISCHARGE SUMMARY NURSING HOME (OUTPATIENT)
Dept: GERIATRICS | Facility: CLINIC | Age: 84
End: 2019-02-07
Payer: COMMERCIAL

## 2019-02-07 VITALS
SYSTOLIC BLOOD PRESSURE: 103 MMHG | TEMPERATURE: 98.2 F | WEIGHT: 267.6 LBS | BODY MASS INDEX: 37.46 KG/M2 | HEART RATE: 59 BPM | DIASTOLIC BLOOD PRESSURE: 59 MMHG | HEIGHT: 71 IN | OXYGEN SATURATION: 100 % | RESPIRATION RATE: 16 BRPM

## 2019-02-07 DIAGNOSIS — M17.11 PRIMARY OSTEOARTHRITIS OF RIGHT KNEE: ICD-10-CM

## 2019-02-07 DIAGNOSIS — R91.8 PULMONARY NODULES: ICD-10-CM

## 2019-02-07 DIAGNOSIS — G47.33 OSA (OBSTRUCTIVE SLEEP APNEA): ICD-10-CM

## 2019-02-07 DIAGNOSIS — Z86.711 HISTORY OF PULMONARY EMBOLISM: ICD-10-CM

## 2019-02-07 DIAGNOSIS — K25.4 GASTROINTESTINAL HEMORRHAGE ASSOCIATED WITH GASTRIC ULCER: ICD-10-CM

## 2019-02-07 DIAGNOSIS — I10 HYPERTENSION GOAL BP (BLOOD PRESSURE) < 140/90: ICD-10-CM

## 2019-02-07 DIAGNOSIS — N18.2 CKD (CHRONIC KIDNEY DISEASE) STAGE 2, GFR 60-89 ML/MIN: ICD-10-CM

## 2019-02-07 DIAGNOSIS — D64.9 ANEMIA, UNSPECIFIED TYPE: ICD-10-CM

## 2019-02-07 DIAGNOSIS — I48.20 CHRONIC ATRIAL FIBRILLATION (H): ICD-10-CM

## 2019-02-07 DIAGNOSIS — R52 ACUTE PAIN: ICD-10-CM

## 2019-02-07 DIAGNOSIS — R53.81 DEBILITY: ICD-10-CM

## 2019-02-07 DIAGNOSIS — R63.5 WEIGHT GAIN: ICD-10-CM

## 2019-02-07 DIAGNOSIS — Z96.651 STATUS POST TOTAL RIGHT KNEE REPLACEMENT: ICD-10-CM

## 2019-02-07 PROCEDURE — 99316 NF DSCHRG MGMT 30 MIN+: CPT | Performed by: NURSE PRACTITIONER

## 2019-02-07 ASSESSMENT — MIFFLIN-ST. JEOR: SCORE: 1935.96

## 2019-02-07 NOTE — PROGRESS NOTES
[place] [facility] DISCHARGE SUMMARY    PATIENT'S NAME: Alessio Teixeira  YOB: 1935  MEDICAL RECORD NUMBER:  [id number]  Place of Service where encounter took place:  [facility] [[id number]]    PRIMARY CARE PROVIDER AND CLINIC RESPONSIBLE AFTER TRANSFER: Zac Salgado Albert [address] [facility] / [facility] [address]     TRANSFERRING PROVIDERS: ANNA Daniel CNP, Dr. Keegan MD  DATE OF SNF ADMISSION:  January / 31 / 2019  DATE OF SNF (anticipated) DISCHARGE: February / 09 / 2019  DISCHARGE DISPOSITION: Home with home care.    RECENT HOSPITALIZATION/ED:  [facility] stay 1/28/19 to 1/31/19.     CODE STATUS/ADVANCE DIRECTIVES DISCUSSION:   CPR/Full code    No Known Allergies     Condition on Discharge:  Stable.  Function:  Transfers Mod I and Amb 250 ft   Cognitive Scores: SLUMS 27/30    Equipment: walker    DISCHARGE DIAGNOSIS:   1. Primary osteoarthritis of right knee    2. 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 Acute pain    4. Chronic atrial fibrillation (H)    5. Hypertension goal BP (blood pressure) < 140/90    6. MARTHA (obstructive sleep apnea)    7. History of pulmonary embolism    8. Weight gain    9. CKD (chronic kidney disease) stage 2, GFR 60-89 ml/min    10. Gastrointestinal hemorrhage associated with gastric ulcer, Dec 2018    11. Anemia, unspecified type    12. Pulmonary nodules    13. Debility        HPI Nursing Facility Course:  HPI information obtained from: facility chart records, facility staff, patient report and [facility] chart review.    Mr. Teixeira is a 84 y/o with a history of prior L TKA, primary knee OA, A-fib, MARTHA, prior PE, and recent GIB due to gastric ulcer whom underwent a elective R TKA on 1/28.  After he transitioned to the TCU for ongoing management and PT/OT.      While at the TCU Mr. Teixeira did well.  Initially pain was an issue, and he continues to have pain but it has become  tolerable.  He did well with PT/OT.  He was off his Furosemide a few days prior/during surgery, but due to worsening RLE swelling and increased weight, we have resumed it.      In meeting Mr. Teixeira he reports overall he is doing well.  Endorses ongoing intermittent pain in his Left knee, no other complaints.  Reports feeling ready for home.     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   -Continues on Acetaminophen 1000 mg's TID scheduled.  -Continues on PRN Oxycodone for pain.     Chronic atrial fibrillation (H)  Hypertension goal BP (blood pressure) < 140/90  MARTHA (obstructive sleep apnea)  History of pulmonary embolism  Weight gain  Review of VS's show VSS and within acceptable range.   RLE swelling has been ongoing, weight was 255 on arrival to the TCU 1 Feb, was just now trending down to 266.2 on 2/7.  SBP's are on the soft side 110's, thus we did not use more Furosemide that PTA.   -Resumed and continues Furosemide at 20 mg's q daily.   -Continues KCL, Lisinopril, Statin, Niacin.   -Continues Warfarin.     Reports he was non-compliant with his CPAP at home, was compliant for surgery and while at the TCU.     CKD (chronic kidney disease) stage 2, GFR 60-89 ml/min  Creatine baseline is 1-1.1, was 1.16 when last checked 2/4.     Gastrointestinal hemorrhage associated with gastric ulcer, Dec 2018  Anemia, unspecified type  Continues on BID PPI.   Continues on Iron supplementation.     Pulmonary nodules  Noted incidentally on CT 1.5x1.3 cm RLL, slightly eccentric calcification.   --Repeat CT in 6 months recommended.   --Left to PCP's discretion.     Debility  -Will 'DC with home care.   -See Epic order for F2F.     PAST MEDICAL HISTORY:  has a past medical history of 6th nerve palsy, Actinic keratosis, Atrial fibrillation (H), Edema of leg, Gout, unspecified, H/O diplopia, Hypertension, Meningioma (H), Myalgia and myositis,  unspecified, Myasthenia gravis (H), Obesity, Other and unspecified hyperlipidemia, Other seborrheic keratosis, Presbyacusis, Primary localized osteoarthrosis, lower leg, Primary localized osteoarthrosis, pelvic region and thigh, Sleep apnea, Spinal stenosis, Squamous cell cancer of scalp and skin of neck, and Vitamin D deficiency.    DISCHARGE MEDICATIONS:  Current Outpatient Medications   Medication Sig Dispense Refill     acetaminophen (TYLENOL) 500 MG tablet Take 2 tablets (1,000 mg) by mouth 3 times daily 540 tablet 3     CALCIUM PO Take 600 mg by mouth 2 times daily        diphenhydrAMINE-acetaminophen (TYLENOL PM)  MG tablet Take 1 tablet by mouth nightly as needed for sleep       ferrous sulfate ER (SLO-FE) 142 (45 Fe) MG CR tablet Take 142 mg by mouth daily       furosemide (LASIX) 20 MG tablet Take 1 tablet (20 mg) by mouth daily 90 tablet 3     KLOR-CON 20 MEQ CR tablet Take 1 tablet (20 mEq) by mouth daily 90 tablet 3     lisinopril (PRINIVIL/ZESTRIL) 10 MG tablet Take 1 tablet (10 mg) by mouth daily 30 tablet 0     lovastatin (MEVACOR) 40 MG tablet TAKE TWO TABLETS (80MG) BY MOUTH DAILY AT BEDTIME 180 tablet 3     multivitamin, therapeutic with minerals (THERA-VIT-M) TABS Take 1 tablet by mouth daily       NIACIN CR PO Take 500 mg by mouth 2 times daily (with meals) 2 x 500 mg slow niacin       ORDER FOR DME CPAP supplies with variable pressure control 1 Device 0     oxyCODONE (ROXICODONE) 5 MG tablet Take 1-2 tablets (5-10 mg) by mouth every 3 hours as needed for pain (1 tab for pain rated 1-5, 2 tab for pain rated 6-10) 60 tablet 0     pantoprazole (PROTONIX) 40 MG EC tablet Take 1 tablet (40 mg) by mouth 2 times daily 60 tablet 1     polyethylene glycol (MIRALAX/GLYCOLAX) packet Take 17 g by mouth daily as needed for constipation 90 packet 3     sennosides (SENOKOT) 8.6 MG tablet Take 1 tablet by mouth 2 times daily as needed for constipation       Vitamin D, Cholecalciferol, 1000 units TABS  "Take 1,000 Units by mouth daily.         Warfarin Sodium (COUMADIN PO) Take 7 mg by mouth daily Currently 7 mg q daily, next check 2/8.          MEDICATION CHANGES/RATIONALE:   As noted above.     Controlled medications sent with patient:   Script for PRN Oxycodone medication for 60 tabs and 0 refills given to patient at dischage to have them fill at their out patient pharmacy     ROS:    7 point ROS done including, light headedness/dizziness, fever/chills, pain, Resp, CV, GI, and  and is negative other than noted in HPI.     Physical Exam:   Vitals: /59   Pulse 59   Temp 98.2  F (36.8  C)   Resp 16   Ht 1.803 m (5' 11\")   Wt 121.4 kg (267 lb 9.6 oz)   SpO2 100%   BMI 37.32 kg/m    BMI= Body mass index is 37.32 kg/m .    GENERAL APPEARANCE:  Elderly male resting in bed, NAD, non-toxic.  ENT:  Mouth and oropharynx normal, moist mucous membranes.   RESP:  Lungs CTA.  Regular relaxed breathing effort.  No cough.   CV: S1/S2 no murmur or rubs.  Irregular-irregular rhythm and rate.  ABDOMEN:   Obese but, soft, non-tender with active bowel sounds.  No guarding, rigidity, or rebound tenderness.  EXTREMITIES:  2-3+ RLE edema, no LLE lower extremity edema, no calf tenderness.   PSYCH: Alert and orientated, pleasant and cooperative.   WOUND: Right knee has midline incision approximated with staples.  No drainage, no overt s/s of infection.     DISCHARGE PLAN:  Home with home care  Patient instructed to follow-up with:    -PCP in 7 days    -Ortho as indicated    Current [place] scheduled appointments:  No future appointments.    MTM referral needed and placed by this provider: No    Pending labs: INR, CBC, CMP  Ordered for 2/8.     [facility] labs   CBC RESULTS:   Recent Labs   Lab Test 01/30/19  0712 01/29/19  1435  01/24/19  1127 01/16/19  1404   WBC  --   --   --  10.9 10.0   RBC  --   --   --  4.25* 3.80*   HGB 8.6* 8.4*   < > 10.5* 9.5*   HCT  --   --   --  36.3* 32.3*   MCV  --   --   --  85 85   MCH  --   --   " --  24.7* 25.0*   MCHC  --   --   --  28.9* 29.4*   RDW  --   --   --  19.8* 18.5*   PLT  --   --   --  444 408    < > = values in this interval not displayed.       Last Basic Metabolic Panel:  Recent Labs   Lab Test 02/04/19  0825 01/30/19  0712    144   POTASSIUM 4.9 4.2   CHLORIDE 105 112*   TATE 8.4* 8.0*   CO2 25 29   BUN 17 19   CR 1.16 1.16   GLC 96 96       Liver Function Studies -   Recent Labs   Lab Test 12/22/18  0459 11/21/18  0912   PROTTOTAL 4.7* 6.9   ALBUMIN 2.4* 3.6   BILITOTAL 0.2 0.4   ALKPHOS 35* 52   AST 18 24   ALT 26 27       TSH   Date Value Ref Range Status   09/18/2007 4.49 0.4 - 5.0 mU/L Final   09/10/2007 2.37 0.4 - 5.0 mU/L Final     Comment:     previous spec lost redraw       Discharge Treatments:  None    TOTAL DISCHARGE TIME:   Greater than 30 minutes  Electronically signed by:  ANNA Daniel CNP

## 2019-02-07 NOTE — LETTER
2/7/2019        RE: Alessio Teixeira  [address]  [facility] [address]          [place] [facility] DISCHARGE SUMMARY    PATIENT'S NAME: Alessio Teixeira  YOB: 1935  MEDICAL RECORD NUMBER:  [id number]  Place of Service where encounter took place:  [facility] ([facility]) [[id number]]    PRIMARY CARE PROVIDER AND CLINIC RESPONSIBLE AFTER TRANSFER: [facility] [address] [facility] [address]     TRANSFERRING PROVIDERS: ANNA Daniel CNP, Dr. Keegan MD  DATE OF SNF ADMISSION:  January / 31 / 2019  DATE OF SNF (anticipated) DISCHARGE: February / 09 / 2019  DISCHARGE DISPOSITION: Home with home care.    RECENT HOSPITALIZATION/ED:  [facility] stay 1/28/19 to 1/31/19.     CODE STATUS/ADVANCE DIRECTIVES DISCUSSION:   CPR/Full code    No Known Allergies     Condition on Discharge:  Stable.  Function:  Transfers Mod I and Amb 250 ft   Cognitive Scores: SLUMS 27/30    Equipment: walker    DISCHARGE DIAGNOSIS:   1. Primary osteoarthritis of right knee    2. 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 Acute pain    4. Chronic atrial fibrillation (H)    5. Hypertension goal BP (blood pressure) < 140/90    6. MARTHA (obstructive sleep apnea)    7. History of pulmonary embolism    8. Weight gain    9. CKD (chronic kidney disease) stage 2, GFR 60-89 ml/min    10. Gastrointestinal hemorrhage associated with gastric ulcer, Dec 2018    11. Anemia, unspecified type    12. Pulmonary nodules    13. Debility        HPI Nursing Facility Course:  HPI information obtained from: facility chart records, facility staff, patient report and [facility] chart review.    Mr. Teixeira is a 82 y/o with a history of prior L TKA, primary knee OA, A-fib, MARTHA, prior PE, and recent GIB due to gastric ulcer whom underwent a elective R TKA on 1/28.   After he transitioned to the TCU for ongoing management and PT/OT.      While at the TCU   Puneet did well.  Initially pain was an issue, and he continues to have pain but it has become tolerable.  He did well with PT/OT.  He was off his Furosemide a few days prior/during surgery, but due to worsening RLE swelling and increased weight, we have resumed it.      In meeting Mr. Teixeira he reports overall he is doing well.  Endorses ongoing intermittent pain in his Left knee, no other complaints.  Reports feeling ready for home.     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   -Continues on Acetaminophen 1000 mg's TID scheduled.  -Continues on PRN Oxycodone for pain.     Chronic atrial fibrillation (H)  Hypertension goal BP (blood pressure) < 140/90  MARTHA (obstructive sleep apnea)  History of pulmonary embolism  Weight gain  Review of VS's show VSS and within acceptable range.   RLE swelling has been ongoing, weight was 255 on arrival to the TCU 1 Feb, was just now trending down to 266.2 on 2/7.  SBP's are on the soft side 110's, thus we did not use more Furosemide that PTA.   -Resumed and continues Furosemide at 20 mg's q daily.   -Continues KCL, Lisinopril, Statin, Niacin.   -Continues Warfarin.     Reports he was non-compliant with his CPAP at home, was compliant for surgery and while at the TCU.     CKD (chronic kidney disease) stage 2, GFR 60-89 ml/min  Creatine baseline is 1-1.1, was 1.16 when last checked 2/4.     Gastrointestinal hemorrhage associated with gastric ulcer, Dec 2018  Anemia, unspecified type  Continues on BID PPI.   Continues on Iron supplementation.     Pulmonary nodules  Noted incidentally on CT 1.5x1.3 cm RLL, slightly eccentric calcification.   --Repeat CT in 6 months recommended.   --Left to PCP's discretion.     Debility  -Will 'DC with home care.   -See Epic order for F2F.     PAST MEDICAL HISTORY:  has a past medical history of 6th nerve palsy, Actinic keratosis, Atrial fibrillation (H), Edema of leg,  Gout, unspecified, H/O diplopia, Hypertension, Meningioma (H), Myalgia and myositis, unspecified, Myasthenia gravis (H), Obesity, Other and unspecified hyperlipidemia, Other seborrheic keratosis, Presbyacusis, Primary localized osteoarthrosis, lower leg, Primary localized osteoarthrosis, pelvic region and thigh, Sleep apnea, Spinal stenosis, Squamous cell cancer of scalp and skin of neck, and Vitamin D deficiency.    DISCHARGE MEDICATIONS:  Current Outpatient Medications   Medication Sig Dispense Refill     acetaminophen (TYLENOL) 500 MG tablet Take 2 tablets (1,000 mg) by mouth 3 times daily 540 tablet 3     CALCIUM PO Take 600 mg by mouth 2 times daily        diphenhydrAMINE-acetaminophen (TYLENOL PM)  MG tablet Take 1 tablet by mouth nightly as needed for sleep       ferrous sulfate ER (SLO-FE) 142 (45 Fe) MG CR tablet Take 142 mg by mouth daily       furosemide (LASIX) 20 MG tablet Take 1 tablet (20 mg) by mouth daily 90 tablet 3     KLOR-CON 20 MEQ CR tablet Take 1 tablet (20 mEq) by mouth daily 90 tablet 3     lisinopril (PRINIVIL/ZESTRIL) 10 MG tablet Take 1 tablet (10 mg) by mouth daily 30 tablet 0     lovastatin (MEVACOR) 40 MG tablet TAKE TWO TABLETS (80MG) BY MOUTH DAILY AT BEDTIME 180 tablet 3     multivitamin, therapeutic with minerals (THERA-VIT-M) TABS Take 1 tablet by mouth daily       NIACIN CR PO Take 500 mg by mouth 2 times daily (with meals) 2 x 500 mg slow niacin       ORDER FOR DME CPAP supplies with variable pressure control 1 Device 0     oxyCODONE (ROXICODONE) 5 MG tablet Take 1-2 tablets (5-10 mg) by mouth every 3 hours as needed for pain (1 tab for pain rated 1-5, 2 tab for pain rated 6-10) 60 tablet 0     pantoprazole (PROTONIX) 40 MG EC tablet Take 1 tablet (40 mg) by mouth 2 times daily 60 tablet 1     polyethylene glycol (MIRALAX/GLYCOLAX) packet Take 17 g by mouth daily as needed for constipation 90 packet 3     sennosides (SENOKOT) 8.6 MG tablet Take 1 tablet by mouth 2 times  "daily as needed for constipation       Vitamin D, Cholecalciferol, 1000 units TABS Take 1,000 Units by mouth daily.         Warfarin Sodium (COUMADIN PO) Take 7 mg by mouth daily Currently 7 mg q daily, next check 2/8.          MEDICATION CHANGES/RATIONALE:   As noted above.     Controlled medications sent with patient:   Script for PRN Oxycodone medication for 60 tabs and 0 refills given to patient at dischage to have them fill at their out patient pharmacy     ROS:    7 point ROS done including, light headedness/dizziness, fever/chills, pain, Resp, CV, GI, and  and is negative other than noted in HPI.     Physical Exam:   Vitals: /59   Pulse 59   Temp 98.2  F (36.8  C)   Resp 16   Ht 1.803 m (5' 11\")   Wt 121.4 kg (267 lb 9.6 oz)   SpO2 100%   BMI 37.32 kg/m     BMI= Body mass index is 37.32 kg/m .    GENERAL APPEARANCE:  Elderly male resting in bed, NAD, non-toxic.  ENT:  Mouth and oropharynx normal, moist mucous membranes.   RESP:  Lungs CTA.  Regular relaxed breathing effort.  No cough.   CV: S1/S2 no murmur or rubs.  Irregular-irregular rhythm and rate.  ABDOMEN:   Obese but, soft, non-tender with active bowel sounds.  No guarding, rigidity, or rebound tenderness.  EXTREMITIES:  2-3+ RLE edema, no LLE lower extremity edema, no calf tenderness.   PSYCH: Alert and orientated, pleasant and cooperative.   WOUND: Right knee has midline incision approximated with staples.  No drainage, no overt s/s of infection.     DISCHARGE PLAN:  Home with home care  Patient instructed to follow-up with:    -PCP in 7 days    -Ortho as indicated    Current [place] scheduled appointments:  No future appointments.    MTM referral needed and placed by this provider: No    Pending labs: INR, CBC, CMP  Ordered for 2/8.     [facility] labs   CBC RESULTS:   Recent Labs   Lab Test 01/30/19  0712 01/29/19  1435  01/24/19  1127 01/16/19  1404   WBC  --   --   --  10.9 10.0   RBC  --   --   --  4.25* 3.80*   HGB 8.6* 8.4*   < > " 10.5* 9.5*   HCT  --   --   --  36.3* 32.3*   MCV  --   --   --  85 85   MCH  --   --   --  24.7* 25.0*   MCHC  --   --   --  28.9* 29.4*   RDW  --   --   --  19.8* 18.5*   PLT  --   --   --  444 408    < > = values in this interval not displayed.       Last Basic Metabolic Panel:  Recent Labs   Lab Test 02/04/19  0825 01/30/19  0712    144   POTASSIUM 4.9 4.2   CHLORIDE 105 112*   TATE 8.4* 8.0*   CO2 25 29   BUN 17 19   CR 1.16 1.16   GLC 96 96       Liver Function Studies -   Recent Labs   Lab Test 12/22/18  0459 11/21/18  0912   PROTTOTAL 4.7* 6.9   ALBUMIN 2.4* 3.6   BILITOTAL 0.2 0.4   ALKPHOS 35* 52   AST 18 24   ALT 26 27       TSH   Date Value Ref Range Status   09/18/2007 4.49 0.4 - 5.0 mU/L Final   09/10/2007 2.37 0.4 - 5.0 mU/L Final     Comment:     previous spec lost redraw       Discharge Treatments:  None    TOTAL DISCHARGE TIME:   Greater than 30 minutes  Electronically signed by:  ANNA Daniel CNP      Sincerely,        ANNA Daniel CNP

## 2019-02-08 ENCOUNTER — HOSPITAL LABORATORY (OUTPATIENT)
Dept: OTHER | Facility: CLINIC | Age: 84
End: 2019-02-08

## 2019-02-08 ENCOUNTER — NURSING HOME VISIT (OUTPATIENT)
Dept: GERIATRICS | Facility: CLINIC | Age: 84
End: 2019-02-08
Payer: COMMERCIAL

## 2019-02-08 VITALS
WEIGHT: 266.2 LBS | SYSTOLIC BLOOD PRESSURE: 103 MMHG | BODY MASS INDEX: 37.27 KG/M2 | RESPIRATION RATE: 16 BRPM | OXYGEN SATURATION: 100 % | HEIGHT: 71 IN | DIASTOLIC BLOOD PRESSURE: 59 MMHG | HEART RATE: 59 BPM | TEMPERATURE: 98.2 F

## 2019-02-08 DIAGNOSIS — Z79.01 LONG TERM (CURRENT) USE OF ANTICOAGULANTS: ICD-10-CM

## 2019-02-08 DIAGNOSIS — N18.2 CKD (CHRONIC KIDNEY DISEASE) STAGE 2, GFR 60-89 ML/MIN: ICD-10-CM

## 2019-02-08 DIAGNOSIS — Z51.81 ENCOUNTER FOR THERAPEUTIC DRUG MONITORING: ICD-10-CM

## 2019-02-08 DIAGNOSIS — Z96.651 STATUS POST TOTAL RIGHT KNEE REPLACEMENT: Primary | ICD-10-CM

## 2019-02-08 DIAGNOSIS — I48.20 CHRONIC ATRIAL FIBRILLATION (H): ICD-10-CM

## 2019-02-08 DIAGNOSIS — Z86.711 HISTORY OF PULMONARY EMBOLISM: ICD-10-CM

## 2019-02-08 DIAGNOSIS — D64.9 ANEMIA, UNSPECIFIED TYPE: ICD-10-CM

## 2019-02-08 LAB
ANION GAP SERPL CALCULATED.3IONS-SCNC: 6 MMOL/L (ref 3–14)
BUN SERPL-MCNC: 17 MG/DL (ref 7–30)
CALCIUM SERPL-MCNC: 8.4 MG/DL (ref 8.5–10.1)
CHLORIDE SERPL-SCNC: 105 MMOL/L (ref 94–109)
CO2 SERPL-SCNC: 25 MMOL/L (ref 20–32)
CREAT SERPL-MCNC: 1.19 MG/DL (ref 0.66–1.25)
ERYTHROCYTE [DISTWIDTH] IN BLOOD BY AUTOMATED COUNT: 19.9 % (ref 10–15)
GFR SERPL CREATININE-BSD FRML MDRD: 56 ML/MIN/{1.73_M2}
GLUCOSE SERPL-MCNC: 109 MG/DL (ref 70–99)
HCT VFR BLD AUTO: 29.6 % (ref 40–53)
HGB BLD-MCNC: 8.7 G/DL (ref 13.3–17.7)
MCH RBC QN AUTO: 23.9 PG (ref 26.5–33)
MCHC RBC AUTO-ENTMCNC: 29.4 G/DL (ref 31.5–36.5)
MCV RBC AUTO: 81 FL (ref 78–100)
PLATELET # BLD AUTO: 510 10E9/L (ref 150–450)
POTASSIUM SERPL-SCNC: 3.9 MMOL/L (ref 3.4–5.3)
RBC # BLD AUTO: 3.64 10E12/L (ref 4.4–5.9)
SODIUM SERPL-SCNC: 136 MMOL/L (ref 133–144)
WBC # BLD AUTO: 9.3 10E9/L (ref 4–11)

## 2019-02-08 PROCEDURE — 99308 SBSQ NF CARE LOW MDM 20: CPT | Performed by: NURSE PRACTITIONER

## 2019-02-08 ASSESSMENT — MIFFLIN-ST. JEOR: SCORE: 1929.61

## 2019-02-08 NOTE — LETTER
2/8/2019        RE: Alessio Teixeira  [address]  [place] [address]          [place] [facility]    [place] Medical Record Number:  [id number]  Place of Service where encounter took place:  [facility] [[id number]]    HPI:    Alessio Teixeira is a 83 year old  (1935), who is being seen today for an episodic care visit at the above location.   HPI information obtained from: facility chart records, facility staff, patient report and [facility] chart review. Today's concern is INR/Coumadin management for A-fib and h/o PE.     Bleeding Signs/Symptoms:  None  Thromboembolic Signs/Symptoms:  None    Medication Changes:  No  Dietary Changes:  No  Activity Changes: Yes  Bacterial/Viral Infection:  No    Missed Coumadin Doses:  None    On ASA: No    Other Concerns:  No    OBJECTIVE:    INR Today:  2.2  Current Dose:  Warfarin 7 mg's q daily.     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  No s/s of overt bleeding.    -Continues Warfarin 7 mg's q daily for now.   -Next INR 15 Feb if still at TCU.   -If discharges as planned, told him to get checked in roughly 1 week at his prior INR clinic.     CKD (chronic kidney disease) stage 2, GFR 60-89 ml/min  Creatine stable today at 1.19    Anemia, unspecified type  Hgb stable at 8.7 today.     Therapeutic INR for goal of 2-3    PLAN:    New Dose: As noted above.       Next INR: As noted above.     Electronically signed by:  ANNA Daniel CNP            Sincerely,        ANNA Daniel CNP

## 2019-02-08 NOTE — PROGRESS NOTES
[place] [facility]    [place] Medical Record Number:  [id number]  Place of Service where encounter took place:  [facility] [[id number]]    HPI:    Alessio Teixeira is a 83 year old  (1935), who is being seen today for an episodic care visit at the above location.   HPI information obtained from: facility chart records, facility staff, patient report and [facility] chart review. Today's concern is INR/Coumadin management for A-fib and h/o PE.     Bleeding Signs/Symptoms:  None  Thromboembolic Signs/Symptoms:  None    Medication Changes:  No  Dietary Changes:  No  Activity Changes: Yes  Bacterial/Viral Infection:  No    Missed Coumadin Doses:  None    On ASA: No    Other Concerns:  No    OBJECTIVE:    INR Today:  2.2  Current Dose:  Warfarin 7 mg's q daily.     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  No s/s of overt bleeding.    -Continues Warfarin 7 mg's q daily for now.   -Next INR 15 Feb if still at TCU.   -If discharges as planned, told him to get checked in roughly 1 week at his prior INR clinic.     CKD (chronic kidney disease) stage 2, GFR 60-89 ml/min  Creatine stable today at 1.19    Anemia, unspecified type  Hgb stable at 8.7 today.     Therapeutic INR for goal of 2-3    PLAN:    New Dose: As noted above.       Next INR: As noted above.     Electronically signed by:  ANNA Daniel CNP

## 2019-02-10 ENCOUNTER — TELEPHONE (OUTPATIENT)
Dept: CARE COORDINATION | Facility: CLINIC | Age: 84
End: 2019-02-10

## 2019-02-10 NOTE — TELEPHONE ENCOUNTER
[facility] now requests orders and shares plan of care/discharge summaries for some patients through WriteOn.  Please REPLY TO THIS MESSAGE OR ROUTE BACK TO THE AUTHOR in order to give authorization for orders when needed.  This is considered a verbal order, you will still receive a faxed copy of orders for signature.  Thank you for your assistance in improving collaboration for our patients.    ORDER  SN 1 x week x 4, 3 PRN  PT and OT 1 day to eval and treat    ------   Pt scheduled for INR chick in clinic 2/15, would it be ok to cancel this appt and have home care complete? ----      MD SUMMARY/PLAN OF CARE    SUMMARY TO MD  SITUATION   Pt is a 83 year old male returning to home care following hospital stay from 1/28/19 to 1/31/19 then TCU until 2/9/19 related to right TKA secondary to osteoarthritis. He has 5/10 pain worse and 2/10 best. He is icing and taking pain medications appropriatly. He denies concerns with bowel and nausea r/t narcotics.     VS...120/78 BP, 16 R, 66P  WOUND DESCRIPTION AND MEASUREMENTS  right knee surgical incision, dressing in place, staples reported  PHYSICAL PSYCHOSOCIAL IMPAIRMENT OR LIMITATIONS   limited mobility, pain  NUTRITION CONCERNS... good appetite  HOME ENVIRONMENT AFFECTING CARE... twin home/rented and unable to install permanant support/bars  CAREGIVER SUPPORT... spouse supportive  BACKGROUND ... hx myasthenia gravis, Obesity, Presbyacusis, osteoarthrosis, MARTHA, Spinal stenosis, Squamous cell cancer of scalp and skin of neck, and Vitamin D deficiency, HTN, Hx of pulmonary embolism , Weight gain , CKD 2, hx of Gl hemorrhage associated with gastric ulcer 2018 , anemia, Pulmonary nodules , Debility.    ANALYSIS at risk for falls, hospitalization, skin integrity, nutrition/hydration, infection  RECOMMENDATION SN for disease/medication assessments and education, pain management, skin integrity, s/s of infection, PT for ambulation, strengthening, pain, exercise,  balance, OT to eval and treat endurance, ADL/IADL performance, equipment needs.

## 2019-02-12 ENCOUNTER — OFFICE VISIT (OUTPATIENT)
Dept: FAMILY MEDICINE | Facility: CLINIC | Age: 84
End: 2019-02-12
Payer: COMMERCIAL

## 2019-02-12 ENCOUNTER — TRANSFERRED RECORDS (OUTPATIENT)
Dept: HEALTH INFORMATION MANAGEMENT | Facility: CLINIC | Age: 84
End: 2019-02-12

## 2019-02-12 VITALS
BODY MASS INDEX: 36.54 KG/M2 | HEART RATE: 72 BPM | RESPIRATION RATE: 18 BRPM | DIASTOLIC BLOOD PRESSURE: 60 MMHG | OXYGEN SATURATION: 95 % | WEIGHT: 262 LBS | SYSTOLIC BLOOD PRESSURE: 112 MMHG

## 2019-02-12 DIAGNOSIS — K25.4 GASTROINTESTINAL HEMORRHAGE ASSOCIATED WITH GASTRIC ULCER: ICD-10-CM

## 2019-02-12 DIAGNOSIS — Z96.651 STATUS POST TOTAL RIGHT KNEE REPLACEMENT: ICD-10-CM

## 2019-02-12 DIAGNOSIS — D62 ANEMIA DUE TO BLOOD LOSS, ACUTE: Primary | ICD-10-CM

## 2019-02-12 PROCEDURE — 99214 OFFICE O/P EST MOD 30 MIN: CPT | Performed by: FAMILY MEDICINE

## 2019-02-12 RX ORDER — ACETAMINOPHEN 325 MG/1
650 TABLET ORAL ONCE
Status: DISCONTINUED | OUTPATIENT
Start: 2019-02-12 | End: 2019-09-13

## 2019-02-12 NOTE — PROGRESS NOTES
SUBJECTIVE:   Alessio Teixeira is a 83 year old male who presents to clinic today for the following health issues:          Hospital Follow-up Visit:    Hospital/Nursing Home/IP Rehab Facility: [facility]  Date of Admission: 1/28/2019  Date of Discharge: 2/9/2019  Reason(s) for Admission: right knee replacement            Problems taking medications regularly:  None       Medication changes since discharge: None       Problems adhering to non-medication therapy:  None    Summary of hospitalization:  [facility] discharge summary reviewed  Diagnostic Tests/Treatments reviewed.  Follow up needed: Patient will need his hemoglobin checked later this week.  He will continue on iron 3 times daily.  Other Healthcare Providers Involved in Patient s Care:         Homecare and Physical Therapy  Update since discharge: improved.     Post Discharge Medication Reconciliation: unable to reconcile discharge medications due to The patient does not recognize lisinopril as a medicine he is taking..  Plan of care communicated with patient and family     Coding guidelines for this visit:  Type of Medical   Decision Making Face-to-Face Visit       within 7 Days of discharge Face-to-Face Visit        within 14 days of discharge   Moderate Complexity [id number]   High Complexity [id number]              Gastrointestinal symptoms      Duration: Couple of months    Description:           BLEEDING -       Intensity:  mild    Accompanying signs and symptoms:  Low hemoglobin    History  Previous {similar problem: no   Previous evaluation:  EGD    Aggravating factors: NSAIDs/ASA    Alleviating factors: proton pump inhibitor -pantoprazole twice daily    Other Therapies tried: None      Problem list and histories reviewed & adjusted, as indicated.  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 by Miguel Johnson MD     Aftercare following right hip joint replacement surgery     Bradycardia     History of pulmonary embolism- bilateral in 2007      Atrial fibrillation (H)     Anemia due to blood loss, acute     Chronic left-sided thoracic back pain     Presbycusis of both ears     Mixed hyperlipidemia     Primary osteoarthritis of right knee     GI bleed     Degenerative arthritis of knee     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     Acute pain     Gastrointestinal hemorrhage associated with gastric ulcer     JAVIER (acute kidney injury) (H)     CKD (chronic kidney disease) stage 2, GFR 60-89 ml/min     Pulmonary nodules     Debility     Weight gain     Anemia, unspecified type     Encounter for therapeutic drug monitoring     Long term (current) use of anticoagulants     Past Surgical History:   Procedure Laterality Date     APPENDECTOMY       ARTHROPLASTY HIP Right 2016     ARTHROPLASTY HIP ANTERIOR Right 4/26/2016    Procedure: ARTHROPLASTY HIP ANTERIOR;  Surgeon: Miguel Johnson MD;  Location:  OR     ARTHROPLASTY KNEE Right 1/28/2019    Procedure: RIGHT TOTAL KNEE ARTROPLASTY;  Surgeon: Miguel Johnson MD;  Location:  OR     BRAIN SURGERY  2007    partial resection meningioma     C RAD RESEC TONSIL/PILLARS       CATARACT IOL, RT/LT       COLECTOMY  2007    bowel perforation due to steroids     ESOPHAGOSCOPY, GASTROSCOPY, DUODENOSCOPY (EGD), COMBINED N/A 12/22/2018    Procedure: COMBINED ESOPHAGOSCOPY, GASTROSCOPY, DUODENOSCOPY (EGD), BIOPSY SINGLE OR MULTIPLE;  Surgeon: Elizabeth Jones MD;  Location:  [facility]     GENITOURINARY SURGERY      vasectomy     HEAD & NECK SURGERY  2007    meningioma removed     KNEE SURGERY  2010    left knee replacement     ORTHOPEDIC SURGERY      left TKR     PHACOEMULSIFICATION CLEAR CORNEA WITH TORIC INTRAOCULAR LENS IMPLANT  2012    Procedure: PHACOEMULSIFICATION CLEAR CORNEA WITH TORIC INTRAOCULAR LENS IMPLANT;  COMPLEX RIGHT PHACOEMULSIFICATION CLEAR CORNEA WITH TORIC INTRAOCULAR LENS IMPLANT WITH MALYUGIN RING;  Surgeon: Ronald Cortez MD;  Location:  [facility]     RECTAL SURGERY         Social History     Tobacco Use     Smoking status: Former Smoker     Packs/day: 1.00     Years: 3.00     Pack years: 3.00     Types: Cigarettes     Last attempt to quit: 1963     Years since quittin.1     Smokeless tobacco: Never Used   Substance Use Topics     Alcohol use: Yes     Comment: 2-4 glasses of wine per week     Family History   Problem Relation Age of Onset     Glaucoma Father      Unknown/Adopted Mother      Macular Degeneration No family hx of            Reviewed and updated as needed this visit by clinical staff  Tobacco  Allergies  Meds  Med Hx  Surg Hx  Fam Hx  Soc Hx      Reviewed and updated as needed this visit by Provider         ROS:  Constitutional, HEENT, cardiovascular, pulmonary, gi and gu systems are negative, except as otherwise noted.    OBJECTIVE:                                                    /60   Pulse 72   Resp 18   Wt 118.8 kg (262 lb)   SpO2 95%   BMI 36.54 kg/m    Body mass index is 36.54 kg/m .  GENERAL APPEARANCE: healthy, alert, no distress and over weight         ASSESSMENT/PLAN:                                                        ICD-10-CM    1. Anemia due to blood loss, acute D62 CBC with platelets differential   2. Status post total right knee replacement Z96.651 acetaminophen (TYLENOL) tablet 650 mg   3. Gastrointestinal hemorrhage associated with gastric ulcer K25.4        Patient Instructions   We will check  hemoglobin on Friday when he gets his INR done by home care.  I encouraged the patient to be very diligent with his exercise regimen.  Home physical therapy will be started but has not begun as of yet.  Patient is finding that pantoprazole is very expensive at $180 per month.  He will check with his pharmacy and with his insurance to see if that is the true cost or if he needs a different medication for his gastric ulcer.  He will let us know the answers to those questions when he checks into that.  Follow-up will be pending review of the rest of his tests.      Zac Salgado MD  [facility]

## 2019-02-12 NOTE — PATIENT INSTRUCTIONS
We will check hemoglobin on Friday when he gets his INR done by home care.  I encouraged the patient to be very diligent with his exercise regimen.  Home physical therapy will be started but has not begun as of yet.  Patient is finding that pantoprazole is very expensive at $180 per month.  He will check with his pharmacy and with his insurance to see if that is the true cost or if he needs a different medication for his gastric ulcer.  He will let us know the answers to those questions when he checks into that.  Follow-up will be pending review of the rest of his tests.

## 2019-02-14 ENCOUNTER — TELEPHONE (OUTPATIENT)
Dept: FAMILY MEDICINE | Facility: CLINIC | Age: 84
End: 2019-02-14

## 2019-02-14 DIAGNOSIS — D62 ANEMIA DUE TO BLOOD LOSS, ACUTE: ICD-10-CM

## 2019-02-14 LAB
BASOPHILS # BLD AUTO: 0.1 10E9/L (ref 0–0.2)
BASOPHILS NFR BLD AUTO: 0.9 %
DIFFERENTIAL METHOD BLD: ABNORMAL
EOSINOPHIL # BLD AUTO: 0.3 10E9/L (ref 0–0.7)
EOSINOPHIL NFR BLD AUTO: 2.4 %
ERYTHROCYTE [DISTWIDTH] IN BLOOD BY AUTOMATED COUNT: 19.7 % (ref 10–15)
HCT VFR BLD AUTO: 34.2 % (ref 40–53)
HGB BLD-MCNC: 9.8 G/DL (ref 13.3–17.7)
LYMPHOCYTES # BLD AUTO: 1.7 10E9/L (ref 0.8–5.3)
LYMPHOCYTES NFR BLD AUTO: 14 %
MCH RBC QN AUTO: 23.9 PG (ref 26.5–33)
MCHC RBC AUTO-ENTMCNC: 28.7 G/DL (ref 31.5–36.5)
MCV RBC AUTO: 83 FL (ref 78–100)
MONOCYTES # BLD AUTO: 0.8 10E9/L (ref 0–1.3)
MONOCYTES NFR BLD AUTO: 6.8 %
NEUTROPHILS # BLD AUTO: 9 10E9/L (ref 1.6–8.3)
NEUTROPHILS NFR BLD AUTO: 75.9 %
PLATELET # BLD AUTO: 658 10E9/L (ref 150–450)
RBC # BLD AUTO: 4.1 10E12/L (ref 4.4–5.9)
WBC # BLD AUTO: 11.9 10E9/L (ref 4–11)

## 2019-02-14 PROCEDURE — 36415 COLL VENOUS BLD VENIPUNCTURE: CPT | Performed by: FAMILY MEDICINE

## 2019-02-14 PROCEDURE — 85025 COMPLETE CBC W/AUTO DIFF WBC: CPT | Performed by: FAMILY MEDICINE

## 2019-02-14 NOTE — TELEPHONE ENCOUNTER
Reason for Call:  INR    Who is calling?  Home Care: [facility]     Phone number:  [phone]    Fax number:      Name of caller: Danni     INR Value:  3.1    Are there any other concerns:  Yes: Hemoglobin     Can we leave a detailed message on this number? YES    Phone number patient can be reached at: Home number on file [phone] (home)      Call taken on 2/14/2019 at 12:28 PM by Kelsey Leos

## 2019-02-18 ENCOUNTER — TELEPHONE (OUTPATIENT)
Dept: FAMILY MEDICINE | Facility: CLINIC | Age: 84
End: 2019-02-18

## 2019-02-18 NOTE — TELEPHONE ENCOUNTER
Our goal is to have forms completed within 72 hours, however some forms may require a visit or additional information.    What clinic location was the form placed at [facility] or [place]     Who is the form from?   Where did the form come from? Faxed to clinic   The form was placed in the inbox of Zac Salgado MD      Please fax to [phone]  Phone number:      Additional comments: [facility] INR 3.1 from 2/14/19    Call take on 2/18/2019 at 4:05 PM by Radha Dai

## 2019-02-27 ENCOUNTER — TELEPHONE (OUTPATIENT)
Dept: FAMILY MEDICINE | Facility: CLINIC | Age: 84
End: 2019-02-27

## 2019-02-27 DIAGNOSIS — Z53.9 DIAGNOSIS NOT YET DEFINED: Primary | ICD-10-CM

## 2019-02-27 PROCEDURE — G0180 MD CERTIFICATION HHA PATIENT: HCPCS | Performed by: FAMILY MEDICINE

## 2019-02-27 NOTE — TELEPHONE ENCOUNTER
Our goal is to have forms completed within 72 hours, however some forms may require a visit or additional information.    What clinic location was the form placed at [facility] or [place].?     Who is the form from?   Where did the form come from? Faxed to clinic   The form was placed in the inbox of Zac Salgado MD      Please fax to [phone]  Phone number:      Additional comments:  [facility] INR 3.2 from 2/21/19    Call take on 2/27/2019 at 10:45 AM by Radha Dai

## 2019-02-27 NOTE — TELEPHONE ENCOUNTER
Our goal is to have forms completed within 72 hours, however some forms may require a visit or additional information.    What clinic location was the form placed at [facility] or [facility].?     Who is the form from?   Where did the form come from? Faxed to clinic   The form was placed in the inbox of Zac Salgado MD      Please fax to [phone]   Phone number:      Additional comments: [facility] 2/10/19 to 4/10/19    Call take on 2/27/2019 at 4:32 PM by Radha Dai

## 2019-03-04 ENCOUNTER — OFFICE VISIT (OUTPATIENT)
Dept: FAMILY MEDICINE | Facility: CLINIC | Age: 84
End: 2019-03-04
Payer: COMMERCIAL

## 2019-03-04 VITALS
TEMPERATURE: 97.2 F | SYSTOLIC BLOOD PRESSURE: 120 MMHG | BODY MASS INDEX: 35.15 KG/M2 | HEART RATE: 70 BPM | OXYGEN SATURATION: 99 % | RESPIRATION RATE: 16 BRPM | WEIGHT: 252 LBS | DIASTOLIC BLOOD PRESSURE: 78 MMHG

## 2019-03-04 DIAGNOSIS — D62 ANEMIA DUE TO BLOOD LOSS, ACUTE: Primary | ICD-10-CM

## 2019-03-04 LAB
BASOPHILS # BLD AUTO: 0.2 10E9/L (ref 0–0.2)
BASOPHILS NFR BLD AUTO: 1.7 %
DIFFERENTIAL METHOD BLD: ABNORMAL
EOSINOPHIL # BLD AUTO: 0.2 10E9/L (ref 0–0.7)
EOSINOPHIL NFR BLD AUTO: 1.9 %
ERYTHROCYTE [DISTWIDTH] IN BLOOD BY AUTOMATED COUNT: 21 % (ref 10–15)
HCT VFR BLD AUTO: 42.8 % (ref 40–53)
HGB BLD-MCNC: 12.4 G/DL (ref 13.3–17.7)
LYMPHOCYTES # BLD AUTO: 1.6 10E9/L (ref 0.8–5.3)
LYMPHOCYTES NFR BLD AUTO: 13.3 %
MCH RBC QN AUTO: 24.3 PG (ref 26.5–33)
MCHC RBC AUTO-ENTMCNC: 29 G/DL (ref 31.5–36.5)
MCV RBC AUTO: 84 FL (ref 78–100)
MONOCYTES # BLD AUTO: 0.8 10E9/L (ref 0–1.3)
MONOCYTES NFR BLD AUTO: 6.9 %
NEUTROPHILS # BLD AUTO: 9.1 10E9/L (ref 1.6–8.3)
NEUTROPHILS NFR BLD AUTO: 76.2 %
PLATELET # BLD AUTO: 511 10E9/L (ref 150–450)
RBC # BLD AUTO: 5.11 10E12/L (ref 4.4–5.9)
WBC # BLD AUTO: 12 10E9/L (ref 4–11)

## 2019-03-04 PROCEDURE — 99213 OFFICE O/P EST LOW 20 MIN: CPT | Performed by: FAMILY MEDICINE

## 2019-03-04 PROCEDURE — 85025 COMPLETE CBC W/AUTO DIFF WBC: CPT | Performed by: FAMILY MEDICINE

## 2019-03-04 PROCEDURE — 36415 COLL VENOUS BLD VENIPUNCTURE: CPT | Performed by: FAMILY MEDICINE

## 2019-03-04 NOTE — PROGRESS NOTES
SUBJECTIVE:   Alessio Teixeira is a 83 year old male who presents to clinic today for the following health issues:      Medication Followup of Iron    Taking Medication as prescribed: yes    Side Effects:  None    Medication Helping Symptoms:  NO-will recheck hgb today           Problem list and histories reviewed & adjusted, as indicated.  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 by Miguel Johnson MD     Aftercare following right hip joint replacement surgery     Bradycardia     History of pulmonary embolism- bilateral in 2007      Atrial fibrillation (H)     Anemia due to blood loss, acute     Chronic left-sided thoracic back pain     Presbycusis of both ears     Mixed hyperlipidemia     Primary osteoarthritis of right knee     GI bleed     Degenerative arthritis of knee     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     Acute pain     Gastrointestinal hemorrhage associated with gastric ulcer     JAVIER (acute kidney injury) (H)     CKD (chronic kidney disease) stage 2, GFR 60-89 ml/min     Pulmonary nodules     Debility     Weight gain     Anemia, unspecified type     Encounter for therapeutic drug monitoring     Long term (current) use of anticoagulants     Past Surgical History:   Procedure Laterality Date     APPENDECTOMY       ARTHROPLASTY HIP Right 2016     ARTHROPLASTY HIP ANTERIOR Right 4/26/2016    Procedure: ARTHROPLASTY HIP ANTERIOR;  Surgeon: Miguel Johnson MD;   Location:  OR     ARTHROPLASTY KNEE Right 2019    Procedure: RIGHT TOTAL KNEE ARTROPLASTY;  Surgeon: Miguel Johnson MD;  Location:  OR     BRAIN SURGERY  2007    partial resection meningioma     C RAD RESEC TONSIL/PILLARS       CATARACT IOL, RT/LT       COLECTOMY      bowel perforation due to steroids     ESOPHAGOSCOPY, GASTROSCOPY, DUODENOSCOPY (EGD), COMBINED N/A 2018    Procedure: COMBINED ESOPHAGOSCOPY, GASTROSCOPY, DUODENOSCOPY (EGD), BIOPSY SINGLE OR MULTIPLE;  Surgeon: Elizabeth Jones MD;  Location:  GI     GENITOURINARY SURGERY      vasectomy     HEAD & NECK SURGERY      meningioma removed     KNEE SURGERY  2010    left knee replacement     ORTHOPEDIC SURGERY      left TKR     PHACOEMULSIFICATION CLEAR CORNEA WITH TORIC INTRAOCULAR LENS IMPLANT  2012    Procedure: PHACOEMULSIFICATION CLEAR CORNEA WITH TORIC INTRAOCULAR LENS IMPLANT;  COMPLEX RIGHT PHACOEMULSIFICATION CLEAR CORNEA WITH TORIC INTRAOCULAR LENS IMPLANT WITH MALYUGIN RING;  Surgeon: Ronald Cortez MD;  Location:  [facility]     RECTAL SURGERY         Social History     Tobacco Use     Smoking status: Former Smoker     Packs/day: 1.00     Years: 3.00     Pack years: 3.00     Types: Cigarettes     Last attempt to quit: 1963     Years since quittin.2     Smokeless tobacco: Never Used   Substance Use Topics     Alcohol use: Yes     Comment: 2-4 glasses of wine per week     Family History   Problem Relation Age of Onset     Glaucoma Father      Unknown/Adopted Mother      Macular Degeneration No family hx of            Reviewed and updated as needed this visit by clinical staff  Tobacco  Allergies  Meds       Reviewed and updated as needed this visit by Provider         ROS:  Constitutional, HEENT, cardiovascular, pulmonary, gi and gu systems are negative, except as otherwise noted.    OBJECTIVE:                                                    /78 (Cuff Size: Adult Large)   Pulse 70    Temp 97.2  F (36.2  C) (Tympanic)   Resp 16   Wt 114.3 kg (252 lb)   SpO2 99%   BMI 35.15 kg/m    Body mass index is 35.15 kg/m .  GENERAL APPEARANCE: healthy, alert and no distress    Diagnostic test results:  No results found for this or any previous visit (from the past 24 hour(s)).     ASSESSMENT/PLAN:                                                        ICD-10-CM    1. Anemia due to blood loss, acute D62 CBC with platelets differential       Patient Instructions   We will continue with iron supplementation and see back in about a month for repeat CBC.  Follow-up otherwise will be as needed.      Zac Salgado MD  [facility]

## 2019-03-04 NOTE — PATIENT INSTRUCTIONS
We will continue with iron supplementation and see back in about a month for repeat CBC.  Follow-up otherwise will be as needed.

## 2019-03-05 ENCOUNTER — MEDICAL CORRESPONDENCE (OUTPATIENT)
Dept: HEALTH INFORMATION MANAGEMENT | Facility: CLINIC | Age: 84
End: 2019-03-05

## 2019-03-12 ENCOUNTER — ANTICOAGULATION THERAPY VISIT (OUTPATIENT)
Dept: FAMILY MEDICINE | Facility: CLINIC | Age: 84
End: 2019-03-12

## 2019-03-12 ENCOUNTER — TRANSFERRED RECORDS (OUTPATIENT)
Dept: HEALTH INFORMATION MANAGEMENT | Facility: CLINIC | Age: 84
End: 2019-03-12

## 2019-03-12 DIAGNOSIS — I48.20 CHRONIC ATRIAL FIBRILLATION (H): ICD-10-CM

## 2019-03-12 DIAGNOSIS — Z79.01 LONG TERM CURRENT USE OF ANTICOAGULANT THERAPY: ICD-10-CM

## 2019-03-12 LAB — INR PPP: 2.25 (ref 0.86–1.14)

## 2019-03-12 PROCEDURE — 85610 PROTHROMBIN TIME: CPT | Performed by: FAMILY MEDICINE

## 2019-03-12 PROCEDURE — 36415 COLL VENOUS BLD VENIPUNCTURE: CPT | Performed by: FAMILY MEDICINE

## 2019-03-14 DIAGNOSIS — R63.5 WEIGHT GAIN: ICD-10-CM

## 2019-03-14 RX ORDER — FUROSEMIDE 20 MG
20 TABLET ORAL DAILY
Qty: 90 TABLET | Refills: 3 | Status: SHIPPED | OUTPATIENT
Start: 2019-03-14 | End: 2020-02-21

## 2019-03-14 NOTE — TELEPHONE ENCOUNTER
Reason for Call:  Medication or medication refill:    Do you use a [facility]?  Name of the pharmacy and phone number for the current request:     [id number] - [place], [place] - [address]    Name of the medication requested: furosemide (LASIX) 20 MG tablet    Other request: Please send to pharmacy. If any questions please call. Patient only has a few pills left. Please send as soon as possible so spouse can  as patient had knee surgery    Can we leave a detailed message on this number? YES    Phone number patient can be reached at: Home number on file [phone] (home)    Best Time: any    Call taken on 3/14/2019 at 11:18 AM by ERIBERTO ROMAN

## 2019-03-14 NOTE — TELEPHONE ENCOUNTER
"Requested Prescriptions   Pending Prescriptions Disp Refills     furosemide (LASIX) 20 MG tablet 90 tablet 3    Last Written Prescription Date:  2/5/19  Last Fill Quantity: 90,  # refills: 3   Last office visit: 3/4/2019 with prescribing provider:  Damian   Future Office Visit:   Next 5 appointments (look out 90 days)    Apr 22, 2019  2:00 PM CDT  Pre-Op physical with Zac Salgado MD  [facility] ([facility] [address] [place] [id number]  [phone]          Sig: Take 1 tablet (20 mg) by mouth daily    Diuretics (Including Combos) Protocol Passed - 3/14/2019 11:20 AM       Passed - Blood pressure under 140/90 in past 12 months    BP Readings from Last 3 Encounters:   03/04/19 120/78   02/12/19 112/60   02/08/19 103/59                Passed - Recent (12 mo) or future (30 days) visit within the authorizing provider's specialty    Patient had office visit in the last 12 months or has a visit in the next 30 days with authorizing provider or within the authorizing provider's specialty.  See \"Patient Info\" tab in inbasket, or \"Choose Columns\" in Meds & Orders section of the refill encounter.             Passed - Medication is active on med list       Passed - Patient is age 18 or older       Passed - Normal serum creatinine on file in past 12 months    Recent Labs   Lab Test 02/08/19  0815   CR 1.19             Passed - Normal serum potassium on file in past 12 months    Recent Labs   Lab Test 02/08/19  0815   POTASSIUM 3.9                   Passed - Normal serum sodium on file in past 12 months    Recent Labs   Lab Test 02/08/19  0815                   "

## 2019-03-19 ENCOUNTER — OFFICE VISIT (OUTPATIENT)
Dept: FAMILY MEDICINE | Facility: CLINIC | Age: 84
End: 2019-03-19
Payer: COMMERCIAL

## 2019-03-19 VITALS
TEMPERATURE: 97.5 F | SYSTOLIC BLOOD PRESSURE: 120 MMHG | RESPIRATION RATE: 16 BRPM | BODY MASS INDEX: 34.73 KG/M2 | HEART RATE: 83 BPM | DIASTOLIC BLOOD PRESSURE: 70 MMHG | WEIGHT: 249 LBS

## 2019-03-19 DIAGNOSIS — L03.031 CELLULITIS OF TOE OF RIGHT FOOT: Primary | ICD-10-CM

## 2019-03-19 PROCEDURE — 99214 OFFICE O/P EST MOD 30 MIN: CPT | Performed by: PHYSICIAN ASSISTANT

## 2019-03-19 RX ORDER — CEPHALEXIN 500 MG/1
500 CAPSULE ORAL 4 TIMES DAILY
Qty: 28 CAPSULE | Refills: 0 | Status: SHIPPED | OUTPATIENT
Start: 2019-03-19 | End: 2019-04-22

## 2019-03-19 ASSESSMENT — ENCOUNTER SYMPTOMS
CARDIOVASCULAR NEGATIVE: 1
GASTROINTESTINAL NEGATIVE: 1
NEUROLOGICAL NEGATIVE: 1
RESPIRATORY NEGATIVE: 1
CONSTITUTIONAL NEGATIVE: 1
EYES NEGATIVE: 1
PSYCHIATRIC NEGATIVE: 1

## 2019-03-19 NOTE — PROGRESS NOTES
SUBJECTIVE:   Alessio Teixeira is a 83 year old male who presents to clinic today for the following health issues:      Toe problem/pain      Duration: 5 days    Description  Location: RT big toe    Intensity:  moderate    Accompanying signs and symptoms: painful, warmth, swelling and redness    History  Previous similar problem: no   Previous evaluation:  none    Precipitating or alleviating factors:  Trauma or overuse: no   Aggravating factors include: none    Therapies tried and outcome: nothing    Tried to trim toenail last week  Redness now starting and it is tender  He has a history of pseudogout   It is painful to walk or drive      Problem list and histories reviewed & adjusted, as indicated.  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 by Miguel Johnson MD     Aftercare following right hip joint replacement surgery     Bradycardia     History of pulmonary embolism- bilateral in 2007      Atrial fibrillation (H)     Anemia due to blood loss, acute     Chronic left-sided thoracic back pain     Presbycusis of both ears     Mixed hyperlipidemia     Primary osteoarthritis of right knee     GI bleed     Degenerative arthritis of knee     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     Acute pain     Gastrointestinal hemorrhage associated with gastric ulcer     JAVIER (acute kidney injury) (H)     CKD (chronic kidney disease) stage 2, GFR 60-89 ml/min     Pulmonary nodules     Debility      Weight gain     Anemia, unspecified type     Encounter for therapeutic drug monitoring     Long term (current) use of anticoagulants     Past Surgical History:   Procedure Laterality Date     APPENDECTOMY       ARTHROPLASTY HIP Right 2016     ARTHROPLASTY HIP ANTERIOR Right 2016    Procedure: ARTHROPLASTY HIP ANTERIOR;  Surgeon: Miguel Johnson MD;  Location:  OR     ARTHROPLASTY KNEE Right 2019    Procedure: RIGHT TOTAL KNEE ARTROPLASTY;  Surgeon: Miguel Johnson MD;  Location:  OR     BRAIN SURGERY  2007    partial resection meningioma     C RAD RESEC TONSIL/PILLARS       CATARACT IOL, RT/LT       COLECTOMY      bowel perforation due to steroids     ESOPHAGOSCOPY, GASTROSCOPY, DUODENOSCOPY (EGD), COMBINED N/A 2018    Procedure: COMBINED ESOPHAGOSCOPY, GASTROSCOPY, DUODENOSCOPY (EGD), BIOPSY SINGLE OR MULTIPLE;  Surgeon: Elizabeth Jones MD;  Location:  GI     GENITOURINARY SURGERY      vasectomy     HEAD & NECK SURGERY      meningioma removed     KNEE SURGERY  2010    left knee replacement     ORTHOPEDIC SURGERY      left TKR     PHACOEMULSIFICATION CLEAR CORNEA WITH TORIC INTRAOCULAR LENS IMPLANT  2012    Procedure: PHACOEMULSIFICATION CLEAR CORNEA WITH TORIC INTRAOCULAR LENS IMPLANT;  COMPLEX RIGHT PHACOEMULSIFICATION CLEAR CORNEA WITH TORIC INTRAOCULAR LENS IMPLANT WITH MALYUGIN RING;  Surgeon: Ronald Cortez MD;  Location:  [facility]     RECTAL SURGERY         Social History     Tobacco Use     Smoking status: Former Smoker     Packs/day: 1.00     Years: 3.00     Pack years: 3.00     Types: Cigarettes     Last attempt to quit: 1963     Years since quittin.2     Smokeless tobacco: Never Used   Substance Use Topics     Alcohol use: Yes     Comment: 2-4 glasses of wine per week     Family History   Problem Relation Age of Onset     Glaucoma Father      Unknown/Adopted Mother      Macular Degeneration No family hx of          Current Outpatient  Medications   Medication Sig Dispense Refill     acetaminophen (TYLENOL) 500 MG tablet Take 2 tablets (1,000 mg) by mouth 3 times daily 540 tablet 3     CALCIUM PO Take 600 mg by mouth 2 times daily        cephALEXin (KEFLEX) 500 MG capsule Take 1 capsule (500 mg) by mouth 4 times daily for 7 days 28 capsule 0     diphenhydrAMINE-acetaminophen (TYLENOL PM)  MG tablet Take 1 tablet by mouth nightly as needed for sleep       Esomeprazole Magnesium (NEXIUM PO) Take by mouth every morning (before breakfast)       ferrous sulfate ER (SLO-FE) 142 (45 Fe) MG CR tablet Take 142 mg by mouth daily       furosemide (LASIX) 20 MG tablet Take 1 tablet (20 mg) by mouth daily 90 tablet 3     KLOR-CON 20 MEQ CR tablet Take 1 tablet (20 mEq) by mouth daily 90 tablet 3     lovastatin (MEVACOR) 40 MG tablet TAKE TWO TABLETS (80MG) BY MOUTH DAILY AT BEDTIME 180 tablet 3     multivitamin, therapeutic with minerals (THERA-VIT-M) TABS Take 1 tablet by mouth daily       NIACIN CR PO Take 500 mg by mouth 2 times daily (with meals) 2 x 500 mg slow niacin       ORDER FOR DME CPAP supplies with variable pressure control 1 Device 0     oxyCODONE (ROXICODONE) 5 MG tablet Take 1-2 tablets (5-10 mg) by mouth every 3 hours as needed for pain (1 tab for pain rated 1-5, 2 tab for pain rated 6-10) 60 tablet 0     polyethylene glycol (MIRALAX/GLYCOLAX) packet Take 17 g by mouth daily as needed for constipation 90 packet 3     sennosides (SENOKOT) 8.6 MG tablet Take 1 tablet by mouth 2 times daily as needed for constipation       Vitamin D, Cholecalciferol, 1000 units TABS Take 1,000 Units by mouth daily.         Warfarin Sodium (COUMADIN PO) Take 7 mg by mouth daily 5 mg sun, thur & 7 mg row       lisinopril (PRINIVIL/ZESTRIL) 10 MG tablet Take 1 tablet (10 mg) by mouth daily 30 tablet 0     pantoprazole (PROTONIX) 40 MG EC tablet Take 1 tablet (40 mg) by mouth 2 times daily (Patient not taking: Reported on 3/19/2019) 60 tablet 1     No Known  Allergies    Reviewed and updated as needed this visit by clinical staff  Tobacco  Allergies  Meds       Reviewed and updated as needed this visit by Provider            Review of Systems   Constitutional: Negative.    HENT: Negative.    Eyes: Negative.    Respiratory: Negative.    Cardiovascular: Negative.    Gastrointestinal: Negative.    Genitourinary: Negative.    Musculoskeletal:        As in HPI   Skin: Negative.    Neurological: Negative.    Psychiatric/Behavioral: Negative.        OBJECTIVE:     /70 (Cuff Size: Adult Large)   Pulse 83   Temp 97.5  F (36.4  C) (Tympanic)   Resp 16   Wt 112.9 kg (249 lb)   BMI 34.73 kg/m    Body mass index is 34.73 kg/m .    Physical Exam   Constitutional: He is oriented to person, place, and time. He appears well-developed and well-nourished. No distress.   HENT:   Head: Normocephalic.   Right Ear: External ear normal.   Left Ear: External ear normal.   Nose: Nose normal.   Eyes: Conjunctivae and EOM are normal.   Neck: Normal range of motion.   Pulmonary/Chest: Effort normal.   Feet:   Right Foot:   Skin Integrity: Positive for erythema (over the IP joint - no pain with IP joint motion), warmth and dry skin. Negative for skin breakdown.   Neurological: He is alert and oriented to person, place, and time.   Skin: He is not diaphoretic.   Psychiatric: He has a normal mood and affect. Judgment normal.                     No results found for this or any previous visit (from the past 24 hour(s)).    ASSESSMENT/PLAN:       ICD-10-CM    1. Cellulitis of toe of right foot L03.031 cephALEXin (KEFLEX) 500 MG capsule      This is most consistent with cellulitis (course is the abrasion from cutting the nail last week).  He has a right TKA (7 week s/p surgery) so I want to cover for infection.    Recheck later this week to monitor for improvement.     Return in about 3 days (around 3/22/2019) for Musculoskeletal Recheck.    There are no Patient Instructions on file for  this visit.    Frankie Steiner PA-C  [facility]

## 2019-03-23 ENCOUNTER — OFFICE VISIT (OUTPATIENT)
Dept: FAMILY MEDICINE | Facility: CLINIC | Age: 84
End: 2019-03-23
Payer: COMMERCIAL

## 2019-03-23 VITALS
RESPIRATION RATE: 16 BRPM | SYSTOLIC BLOOD PRESSURE: 104 MMHG | BODY MASS INDEX: 34.73 KG/M2 | WEIGHT: 249 LBS | HEART RATE: 82 BPM | DIASTOLIC BLOOD PRESSURE: 62 MMHG | OXYGEN SATURATION: 99 % | TEMPERATURE: 97.7 F

## 2019-03-23 DIAGNOSIS — Z96.651 STATUS POST TOTAL RIGHT KNEE REPLACEMENT: ICD-10-CM

## 2019-03-23 DIAGNOSIS — L03.031 CELLULITIS OF TOE OF RIGHT FOOT: Primary | ICD-10-CM

## 2019-03-23 PROCEDURE — 99213 OFFICE O/P EST LOW 20 MIN: CPT | Performed by: PHYSICIAN ASSISTANT

## 2019-03-23 ASSESSMENT — ENCOUNTER SYMPTOMS
PSYCHIATRIC NEGATIVE: 1
RESPIRATORY NEGATIVE: 1
EYES NEGATIVE: 1
MUSCULOSKELETAL NEGATIVE: 1
CARDIOVASCULAR NEGATIVE: 1
NEUROLOGICAL NEGATIVE: 1
CONSTITUTIONAL NEGATIVE: 1
GASTROINTESTINAL NEGATIVE: 1
ROS SKIN COMMENTS: AS IN HPI

## 2019-03-23 NOTE — PROGRESS NOTES
SUBJECTIVE:   Alessio Teixeira is a 83 year old male who presents to clinic today for the following health issues:    Concern - Cellulitis follow up  Onset: 1 week    Description:   RT great toe    Intensity: mild    Progression of Symptoms:  improving    Accompanying Signs & Symptoms:  Pain, warmth, redness, decrease in swelling    Previous history of similar problem:   No    Precipitating factors:   Worsened by: walking    Alleviating factors:  Improved by: Antibiotics    Therapies Tried and outcome: Cephalexin - slightly improved    Alessio does not have any worsening symptoms, no fever, no change in knee pain (from his baseline postoperative pain)    Problem list and histories reviewed & adjusted, as indicated.  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 by Miguel Johnson MD     Aftercare following right hip joint replacement surgery     Bradycardia     History of pulmonary embolism- bilateral in 2007      Atrial fibrillation (H)     Anemia due to blood loss, acute     Chronic left-sided thoracic back pain     Presbycusis of both ears     Mixed hyperlipidemia     Primary osteoarthritis of right knee     GI bleed     Degenerative arthritis of knee     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     Acute pain     Gastrointestinal hemorrhage associated with gastric ulcer     JAVIER (acute kidney injury) (H)     CKD (chronic kidney disease) stage 2, GFR 60-89 ml/min     Pulmonary nodules      Debility     Weight gain     Anemia, unspecified type     Encounter for therapeutic drug monitoring     Long term (current) use of anticoagulants     Past Surgical History:   Procedure Laterality Date     APPENDECTOMY       ARTHROPLASTY HIP Right 2016     ARTHROPLASTY HIP ANTERIOR Right 2016    Procedure: ARTHROPLASTY HIP ANTERIOR;  Surgeon: Miguel Johnson MD;  Location:  OR     ARTHROPLASTY KNEE Right 2019    Procedure: RIGHT TOTAL KNEE ARTROPLASTY;  Surgeon: Miguel Johnson MD;  Location:  OR     BRAIN SURGERY  2007    partial resection meningioma     C RAD RESEC TONSIL/PILLARS       CATARACT IOL, RT/LT       COLECTOMY  2007    bowel perforation due to steroids     ESOPHAGOSCOPY, GASTROSCOPY, DUODENOSCOPY (EGD), COMBINED N/A 2018    Procedure: COMBINED ESOPHAGOSCOPY, GASTROSCOPY, DUODENOSCOPY (EGD), BIOPSY SINGLE OR MULTIPLE;  Surgeon: Elizabeth Jones MD;  Location:  GI     GENITOURINARY SURGERY      vasectomy     HEAD & NECK SURGERY  2007    meningioma removed     KNEE SURGERY  2010    left knee replacement     ORTHOPEDIC SURGERY      left TKR     PHACOEMULSIFICATION CLEAR CORNEA WITH TORIC INTRAOCULAR LENS IMPLANT  2012    Procedure: PHACOEMULSIFICATION CLEAR CORNEA WITH TORIC INTRAOCULAR LENS IMPLANT;  COMPLEX RIGHT PHACOEMULSIFICATION CLEAR CORNEA WITH TORIC INTRAOCULAR LENS IMPLANT WITH MALYUGIN RING;  Surgeon: Ronald Cortez MD;  Location:  [facility]     RECTAL SURGERY         Social History     Tobacco Use     Smoking status: Former Smoker     Packs/day: 1.00     Years: 3.00     Pack years: 3.00     Types: Cigarettes     Last attempt to quit: 1963     Years since quittin.2     Smokeless tobacco: Never Used   Substance Use Topics     Alcohol use: Yes     Comment: 2-4 glasses of wine per week     Family History   Problem Relation Age of Onset     Glaucoma Father      Unknown/Adopted Mother      Macular Degeneration No family hx of          Current  Outpatient Medications   Medication Sig Dispense Refill     acetaminophen (TYLENOL) 500 MG tablet Take 2 tablets (1,000 mg) by mouth 3 times daily 540 tablet 3     CALCIUM PO Take 600 mg by mouth 2 times daily        cephALEXin (KEFLEX) 500 MG capsule Take 1 capsule (500 mg) by mouth 4 times daily for 7 days 28 capsule 0     diphenhydrAMINE-acetaminophen (TYLENOL PM)  MG tablet Take 1 tablet by mouth nightly as needed for sleep       Esomeprazole Magnesium (NEXIUM PO) Take by mouth every morning (before breakfast)       ferrous sulfate ER (SLO-FE) 142 (45 Fe) MG CR tablet Take 142 mg by mouth daily       furosemide (LASIX) 20 MG tablet Take 1 tablet (20 mg) by mouth daily 90 tablet 3     KLOR-CON 20 MEQ CR tablet Take 1 tablet (20 mEq) by mouth daily 90 tablet 3     lovastatin (MEVACOR) 40 MG tablet TAKE TWO TABLETS (80MG) BY MOUTH DAILY AT BEDTIME 180 tablet 3     multivitamin, therapeutic with minerals (THERA-VIT-M) TABS Take 1 tablet by mouth daily       NIACIN CR PO Take 500 mg by mouth 2 times daily (with meals) 2 x 500 mg slow niacin       ORDER FOR DME CPAP supplies with variable pressure control 1 Device 0     oxyCODONE (ROXICODONE) 5 MG tablet Take 1-2 tablets (5-10 mg) by mouth every 3 hours as needed for pain (1 tab for pain rated 1-5, 2 tab for pain rated 6-10) 60 tablet 0     pantoprazole (PROTONIX) 40 MG EC tablet Take 1 tablet (40 mg) by mouth 2 times daily 60 tablet 1     polyethylene glycol (MIRALAX/GLYCOLAX) packet Take 17 g by mouth daily as needed for constipation 90 packet 3     sennosides (SENOKOT) 8.6 MG tablet Take 1 tablet by mouth 2 times daily as needed for constipation       Vitamin D, Cholecalciferol, 1000 units TABS Take 1,000 Units by mouth daily.         Warfarin Sodium (COUMADIN PO) Take 7 mg by mouth daily 5 mg sun, thur & 7 mg row       lisinopril (PRINIVIL/ZESTRIL) 10 MG tablet Take 1 tablet (10 mg) by mouth daily 30 tablet 0     No Known Allergies    Reviewed and updated as  needed this visit by clinical staff  Tobacco  Allergies  Meds  Med Hx  Surg Hx  Fam Hx  Soc Hx      Reviewed and updated as needed this visit by Provider         Review of Systems   Constitutional: Negative.    HENT: Negative.    Eyes: Negative.    Respiratory: Negative.    Cardiovascular: Negative.    Gastrointestinal: Negative.    Genitourinary: Negative.    Musculoskeletal: Negative.    Skin:        As in HPI   Neurological: Negative.    Psychiatric/Behavioral: Negative.        OBJECTIVE:     /62 (BP Location: Left arm, Patient Position: Sitting, Cuff Size: Adult Large)   Pulse 82   Temp 97.7  F (36.5  C) (Tympanic)   Resp 16   Wt 112.9 kg (249 lb)   SpO2 99%   BMI 34.73 kg/m    Body mass index is 34.73 kg/m .    Physical Exam   Constitutional: He is oriented to person, place, and time. He appears well-developed and well-nourished. No distress.   HENT:   Head: Normocephalic.   Right Ear: External ear normal.   Left Ear: External ear normal.   Nose: Nose normal.   Eyes: Conjunctivae and EOM are normal.   Neck: Normal range of motion.   Pulmonary/Chest: Effort normal.   Musculoskeletal:   Right great toe redness is slightly improved.   No drainage from the distal toe wound  No pain with IP and MTP joint palpation/motion   Neurological: He is alert and oriented to person, place, and time.   Skin: He is not diaphoretic.   Psychiatric: He has a normal mood and affect. Judgment normal.       No results found for this or any previous visit (from the past 24 hour(s)).    ASSESSMENT/PLAN:       ICD-10-CM    1. Cellulitis of toe of right foot L03.031    2. Status post total right knee replacement Z96.651       Toe appears to have improved, and is not getting worse.   He should watch the toe closely as he comes off antibiotics to make sure it does not get worse.     Return in about 1 week (around 3/30/2019) for a recheck if you are not improved.    There are no Patient Instructions on file for this  visit.    Frankie Steiner PA-C  [facility]

## 2019-04-02 ENCOUNTER — TRANSFERRED RECORDS (OUTPATIENT)
Dept: HEALTH INFORMATION MANAGEMENT | Facility: CLINIC | Age: 84
End: 2019-04-02

## 2019-04-04 DIAGNOSIS — I48.20 CHRONIC ATRIAL FIBRILLATION (H): Primary | ICD-10-CM

## 2019-04-04 LAB — INR PPP: 2.49 (ref 0.86–1.14)

## 2019-04-04 PROCEDURE — 36415 COLL VENOUS BLD VENIPUNCTURE: CPT | Performed by: FAMILY MEDICINE

## 2019-04-04 PROCEDURE — 85610 PROTHROMBIN TIME: CPT | Performed by: FAMILY MEDICINE

## 2019-04-22 ENCOUNTER — ANTICOAGULATION THERAPY VISIT (OUTPATIENT)
Dept: NURSING | Facility: CLINIC | Age: 84
End: 2019-04-22
Payer: COMMERCIAL

## 2019-04-22 ENCOUNTER — OFFICE VISIT (OUTPATIENT)
Dept: FAMILY MEDICINE | Facility: CLINIC | Age: 84
End: 2019-04-22
Payer: COMMERCIAL

## 2019-04-22 ENCOUNTER — TELEPHONE (OUTPATIENT)
Dept: FAMILY MEDICINE | Facility: CLINIC | Age: 84
End: 2019-04-22

## 2019-04-22 VITALS
DIASTOLIC BLOOD PRESSURE: 70 MMHG | HEART RATE: 66 BPM | WEIGHT: 248 LBS | OXYGEN SATURATION: 98 % | TEMPERATURE: 97.7 F | SYSTOLIC BLOOD PRESSURE: 122 MMHG | BODY MASS INDEX: 34.59 KG/M2

## 2019-04-22 DIAGNOSIS — I48.20 CHRONIC ATRIAL FIBRILLATION (H): ICD-10-CM

## 2019-04-22 DIAGNOSIS — Z79.01 LONG TERM CURRENT USE OF ANTICOAGULANT THERAPY: ICD-10-CM

## 2019-04-22 DIAGNOSIS — I10 HYPERTENSION GOAL BP (BLOOD PRESSURE) < 140/90: ICD-10-CM

## 2019-04-22 DIAGNOSIS — Z01.818 PREOP GENERAL PHYSICAL EXAM: Primary | ICD-10-CM

## 2019-04-22 DIAGNOSIS — I48.0 PAROXYSMAL ATRIAL FIBRILLATION (H): ICD-10-CM

## 2019-04-22 DIAGNOSIS — Z79.01 LONG TERM (CURRENT) USE OF ANTICOAGULANTS: ICD-10-CM

## 2019-04-22 DIAGNOSIS — K25.4 GASTROINTESTINAL HEMORRHAGE ASSOCIATED WITH GASTRIC ULCER: ICD-10-CM

## 2019-04-22 LAB
ALBUMIN UR-MCNC: NEGATIVE MG/DL
APPEARANCE UR: CLEAR
BASOPHILS # BLD AUTO: 0.2 10E9/L (ref 0–0.2)
BASOPHILS NFR BLD AUTO: 1.5 %
BILIRUB UR QL STRIP: NEGATIVE
COLOR UR AUTO: YELLOW
DIFFERENTIAL METHOD BLD: ABNORMAL
EOSINOPHIL # BLD AUTO: 0.3 10E9/L (ref 0–0.7)
EOSINOPHIL NFR BLD AUTO: 2.2 %
ERYTHROCYTE [DISTWIDTH] IN BLOOD BY AUTOMATED COUNT: 22.4 % (ref 10–15)
GLUCOSE UR STRIP-MCNC: NEGATIVE MG/DL
HCT VFR BLD AUTO: 48.1 % (ref 40–53)
HGB BLD-MCNC: 15.4 G/DL (ref 13.3–17.7)
HGB UR QL STRIP: NEGATIVE
INR POINT OF CARE: 2.5 (ref 0.86–1.14)
KETONES UR STRIP-MCNC: NEGATIVE MG/DL
LEUKOCYTE ESTERASE UR QL STRIP: NEGATIVE
LYMPHOCYTES # BLD AUTO: 2 10E9/L (ref 0.8–5.3)
LYMPHOCYTES NFR BLD AUTO: 15.7 %
MCH RBC QN AUTO: 26.5 PG (ref 26.5–33)
MCHC RBC AUTO-ENTMCNC: 32 G/DL (ref 31.5–36.5)
MCV RBC AUTO: 83 FL (ref 78–100)
MONOCYTES # BLD AUTO: 0.8 10E9/L (ref 0–1.3)
MONOCYTES NFR BLD AUTO: 6.3 %
NEUTROPHILS # BLD AUTO: 9.5 10E9/L (ref 1.6–8.3)
NEUTROPHILS NFR BLD AUTO: 74.3 %
NITRATE UR QL: NEGATIVE
PH UR STRIP: 5 PH (ref 5–7)
PLATELET # BLD AUTO: 616 10E9/L (ref 150–450)
RBC # BLD AUTO: 5.82 10E12/L (ref 4.4–5.9)
RBC #/AREA URNS AUTO: NORMAL /HPF
SOURCE: NORMAL
SP GR UR STRIP: 1.01 (ref 1–1.03)
UROBILINOGEN UR STRIP-ACNC: 0.2 EU/DL (ref 0.2–1)
WBC # BLD AUTO: 12.7 10E9/L (ref 4–11)
WBC #/AREA URNS AUTO: NORMAL /HPF

## 2019-04-22 PROCEDURE — 99214 OFFICE O/P EST MOD 30 MIN: CPT | Performed by: FAMILY MEDICINE

## 2019-04-22 PROCEDURE — 85610 PROTHROMBIN TIME: CPT | Mod: QW

## 2019-04-22 PROCEDURE — 81001 URINALYSIS AUTO W/SCOPE: CPT | Performed by: FAMILY MEDICINE

## 2019-04-22 PROCEDURE — 85025 COMPLETE CBC W/AUTO DIFF WBC: CPT | Performed by: FAMILY MEDICINE

## 2019-04-22 PROCEDURE — 36416 COLLJ CAPILLARY BLOOD SPEC: CPT

## 2019-04-22 PROCEDURE — 99207 ZZC NO CHARGE NURSE ONLY: CPT

## 2019-04-22 PROCEDURE — 80051 ELECTROLYTE PANEL: CPT | Performed by: FAMILY MEDICINE

## 2019-04-22 PROCEDURE — 82565 ASSAY OF CREATININE: CPT | Performed by: FAMILY MEDICINE

## 2019-04-22 PROCEDURE — 84520 ASSAY OF UREA NITROGEN: CPT | Performed by: FAMILY MEDICINE

## 2019-04-22 NOTE — TELEPHONE ENCOUNTER
Patient was in to see PCP this afternoon. Unsure of current medications, procedure, and date of procedure.     When gets home from appointment provider would patient to look this information, call triage back, and update this information.     After call received please sent to provider for review.

## 2019-04-22 NOTE — PROGRESS NOTES
ANTICOAGULATION FOLLOW-UP CLINIC VISIT    Patient Name:  Alessio Teixeira  Date:  2019  Contact Type:  Face to Face    SUBJECTIVE:        OBJECTIVE    INR Protime   Date Value Ref Range Status   2019 2.5 (A) 0.86 - 1.14 Final       ASSESSMENT / PLAN  INR assessment THER    Recheck INR In: 5 WEEKS    INR Location Clinic      Anticoagulation Summary  As of 2019    INR goal:   2.0-3.0   TTR:   64.5 % (2.9 y)   INR used for dosin.5 (2019)   Warfarin maintenance plan:   5 mg (2 mg x 2.5) every Sun, Thu; 7 mg (2 mg x 3.5) all other days   Full warfarin instructions:   5 mg every Sun, Thu; 7 mg all other days   Weekly warfarin total:   45 mg   Plan last modified:   Kerrie Morgan RN (2019)   Next INR check:   2019   Target end date:   Indefinite    Indications    Chronic atrial fibrillation (H) [I48.2]  Long term current use of anticoagulant therapy [Z79.01]             Anticoagulation Episode Summary     INR check location:   Anticoagulation Clinic    Preferred lab:       Send INR reminders to:   [facility] INR/PROTIME    Comments:         Anticoagulation Care Providers     Provider Role Specialty Phone number    Zac Salgado MD [facility] [phone]            See the Encounter Report to view Anticoagulation Flowsheet and Dosing Calendar (Go to Encounters tab in chart review, and find the Anticoagulation Therapy Visit)        Kerrei Morgan RN

## 2019-04-22 NOTE — TELEPHONE ENCOUNTER
FYI to provider:     Call back from patient:     Dr. Garcia May 10th [facility], endoscopy    Currently taking:   Klor-CON 20meq  Lovastatin: 2 tabs  40mg  furosmide: 1 20 mg   Warfarin: 7mg depending on his schedule from INR nurse    Also takes OTC:  Calcium  Vit D  Niacin  Iron  nexium

## 2019-04-22 NOTE — PROGRESS NOTES
[facility]  [address]  [place] [address]  [phone]  Dept: [phone]    PRE-OP EVALUATION:  Today's date: 2019    Alessio Teixeira (: 1935) presents for pre-operative evaluation assessment as requested by gastroenterology.  He requires evaluation and anesthesia risk assessment prior to undergoing surgery/procedure for treatment of endoscopy .    Fax number for surgical facility: [facility]  Primary Physician: Zac Salgado  Type of Anesthesia Anticipated: to be determined    Patient has a Health Care Directive or Living Will:  YES     Preop Questions 2019   Who is doing your surgery? [facility]   What are you having done? endoscopy   Date of Surgery/Procedure: may 15   Facility or Hospital where procedure/surgery will be performed: [facility]   1.  Do you have a history of Heart attack, stroke, stent, coronary bypass surgery, or other heart surgery? No   2.  Do you ever have any pain or discomfort in your chest? No   3.  Do you have a history of  Heart Failure? No   4.   Are you troubled by shortness of breath when:  walking on a level surface, or up a slight hill, or at night? No   5.  Do you currently have a cold, bronchitis or other respiratory infection? No   6.  Do you have a cough, shortness of breath, or wheezing? No   7.  Do you sometimes get pains in the calves of your legs when you walk? No   8. Do you or anyone in your family have previous history of blood clots? YES - patient   9.  Do you or does anyone in your family have a serious bleeding problem such as prolonged bleeding following surgeries or cuts? No   10. Have you ever had problems with anemia or been told to take iron pills? YES - due to bleeding gastric ulcer   11. Have you had any abnormal blood loss such as black, tarry or bloody stools? YES - see above   12. Have you ever had a blood transfusion? YES - see above   13. Have  you or any of your relatives ever had problems with anesthesia? No   14. Do you have sleep apnea, excessive snoring or daytime drowsiness? YES - MARTHA on CPAP   15. Do you have any prosthetic heart valves? No   16. Do you have prosthetic joints? YES - right hip and both knees         HPI:     HPI related to upcoming procedure: History of bleeding gastric ulcer and significant anemia      See problem list for active medical problems.  Problems all longstanding and stable, except as noted/documented.  See ROS for pertinent symptoms related to these conditions.                                                                                                                                                          .    MEDICAL HISTORY:     Patient Active Problem List    Diagnosis Date Noted     Encounter for therapeutic drug monitoring 02/08/2019     Priority: Medium     Long term (current) use of anticoagulants 02/08/2019     Priority: Medium     Anemia, unspecified type 02/07/2019     Priority: Medium     Weight gain 02/05/2019     Priority: Medium     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 02/01/2019     Priority: Medium     Acute pain 02/01/2019     Priority: Medium     Gastrointestinal hemorrhage associated with gastric ulcer 02/01/2019     Priority: Medium     JAVIER (acute kidney injury) (H) 02/01/2019     Priority: Medium     CKD (chronic kidney disease) stage 2, GFR 60-89 ml/min 02/01/2019     Priority: Medium     Pulmonary nodules 02/01/2019     Priority: Medium     Debility 02/01/2019     Priority: Medium     Degenerative arthritis of knee 01/28/2019     Priority: Medium     GI bleed 12/22/2018     Priority: Medium     Primary osteoarthritis of right knee 11/21/2018     Priority: Medium     Mixed hyperlipidemia 08/07/2017     Priority: Medium     Presbycusis of both ears 04/28/2017     Priority: Medium     Chronic left-sided thoracic back pain 11/09/2016     Priority: Medium     Status post total replacement  of right hip on 4/26/16 by Miguel Johnson MD 05/02/2016     Priority: Medium     Aftercare following right hip joint replacement surgery 05/02/2016     Priority: Medium     Bradycardia 05/02/2016     Priority: Medium     History of pulmonary embolism- bilateral in 2007  05/02/2016     Priority: Medium     Atrial fibrillation (H) 05/02/2016     Priority: Medium     Anemia due to blood loss, acute 05/02/2016     Priority: Medium     Degenerative localized arthritis of hip 04/26/2016     Priority: Medium     Long term current use of anticoagulant therapy 03/11/2016     Priority: Medium     MARTHA (obstructive sleep apnea) 12/08/2014     Priority: Medium     Diplopia 03/11/2014     Priority: Medium     Hypertension goal BP (blood pressure) < 140/90 12/04/2013     Priority: Medium     Vitamin D deficiency disease 11/25/2013     Priority: Medium     Chronic atrial fibrillation (H) 06/12/2013     Priority: Medium     Chronic 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 06/12/2013     Priority: Medium     BMI > 35 04/16/2013     Priority: Medium     Stasis dermatitis 04/16/2013     Priority: Medium     Varicose veins of legs 04/13/2013     Priority: Medium     Hyperlipidemia LDL goal <100 02/23/2013     Priority: Medium     Edema of both legs 02/23/2013     Priority: Medium      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     Actinic keratosis      Atrial fibrillation (H)      Edema of leg      Gout, unspecified      H/O diplopia      Hypertension      Meningioma (H)     sinus     Myalgia and myositis, unspecified      Myasthenia gravis (H)     high dose steroids 2007,      Obesity      Other and unspecified hyperlipidemia      Other seborrheic keratosis      Presbyacusis      Primary localized osteoarthrosis, lower leg      Primary localized osteoarthrosis, pelvic region and thigh      Sleep apnea     doesn't use his CPAP)     Spinal stenosis      Squamous cell cancer of scalp and skin of neck      Dr Sanchez, multiple procedures, Mohs scalp and chest     Vitamin D deficiency      Past Surgical History:   Procedure Laterality Date     APPENDECTOMY       ARTHROPLASTY HIP Right 2016     ARTHROPLASTY HIP ANTERIOR Right 4/26/2016    Procedure: ARTHROPLASTY HIP ANTERIOR;  Surgeon: Miguel Johnson MD;  Location:  OR     ARTHROPLASTY KNEE Right 1/28/2019    Procedure: RIGHT TOTAL KNEE ARTROPLASTY;  Surgeon: Miguel Johnson MD;  Location:  OR     BRAIN SURGERY  2007    partial resection meningioma     C RAD RESEC TONSIL/PILLARS       CATARACT IOL, RT/LT       COLECTOMY  2007    bowel perforation due to steroids     ESOPHAGOSCOPY, GASTROSCOPY, DUODENOSCOPY (EGD), COMBINED N/A 12/22/2018    Procedure: COMBINED ESOPHAGOSCOPY, GASTROSCOPY, DUODENOSCOPY (EGD), BIOPSY SINGLE OR MULTIPLE;  Surgeon: Elizabeth Jones MD;  Location:  GI     GENITOURINARY SURGERY      vasectomy     HEAD & NECK SURGERY  2007    meningioma removed     KNEE SURGERY  2010    left knee replacement     ORTHOPEDIC SURGERY      left TKR     PHACOEMULSIFICATION CLEAR CORNEA WITH TORIC INTRAOCULAR LENS IMPLANT  7/30/2012    Procedure: PHACOEMULSIFICATION CLEAR CORNEA WITH TORIC INTRAOCULAR LENS IMPLANT;  COMPLEX RIGHT PHACOEMULSIFICATION CLEAR CORNEA WITH TORIC INTRAOCULAR LENS IMPLANT WITH MALYUGIN RING;  Surgeon: Ronald Cortez MD;  Location:  [facility]     RECTAL SURGERY  1985     Current Outpatient Medications   Medication Sig Dispense Refill     acetaminophen (TYLENOL) 500 MG tablet Take 2 tablets (1,000 mg) by mouth 3 times daily 540 tablet 3     CALCIUM PO Take 600 mg by mouth 2 times daily        diphenhydrAMINE-acetaminophen (TYLENOL PM)  MG tablet Take 1 tablet by mouth nightly as needed for sleep       Esomeprazole Magnesium (NEXIUM PO) Take by mouth every morning (before breakfast)       ferrous sulfate ER (SLO-FE) 142 (45 Fe) MG CR tablet Take 142 mg by mouth daily       furosemide (LASIX) 20 MG tablet Take 1  tablet (20 mg) by mouth daily 90 tablet 3     KLOR-CON 20 MEQ CR tablet Take 1 tablet (20 mEq) by mouth daily 90 tablet 3     lovastatin (MEVACOR) 40 MG tablet TAKE TWO TABLETS (80MG) BY MOUTH DAILY AT BEDTIME 180 tablet 3     multivitamin, therapeutic with minerals (THERA-VIT-M) TABS Take 1 tablet by mouth daily       NIACIN CR PO Take 500 mg by mouth 2 times daily (with meals) 2 x 500 mg slow niacin       ORDER FOR DME CPAP supplies with variable pressure control 1 Device 0     oxyCODONE (ROXICODONE) 5 MG tablet Take 1-2 tablets (5-10 mg) by mouth every 3 hours as needed for pain (1 tab for pain rated 1-5, 2 tab for pain rated 6-10) 60 tablet 0     polyethylene glycol (MIRALAX/GLYCOLAX) packet Take 17 g by mouth daily as needed for constipation 90 packet 3     sennosides (SENOKOT) 8.6 MG tablet Take 1 tablet by mouth 2 times daily as needed for constipation       Vitamin D, Cholecalciferol, 1000 units TABS Take 1,000 Units by mouth daily.         Warfarin Sodium (COUMADIN PO) Take 7 mg by mouth daily 5 mg sun, thur & 7 mg row       lisinopril (PRINIVIL/ZESTRIL) 10 MG tablet Take 1 tablet (10 mg) by mouth daily 30 tablet 0     OTC products: None, except as noted above    No Known Allergies   Latex Allergy: NO    Social History     Tobacco Use     Smoking status: Former Smoker     Packs/day: 1.00     Years: 3.00     Pack years: 3.00     Types: Cigarettes     Last attempt to quit: 1963     Years since quittin.3     Smokeless tobacco: Never Used   Substance Use Topics     Alcohol use: Yes     Comment: 2-4 glasses of wine per week     History   Drug Use No       REVIEW OF SYSTEMS:   CONSTITUTIONAL: NEGATIVE for fever, chills, change in weight  INTEGUMENTARY/SKIN: NEGATIVE for worrisome rashes, moles or lesions  EYES: NEGATIVE for vision changes or irritation  ENT/MOUTH: NEGATIVE for ear, mouth and throat problems  RESP: NEGATIVE for significant cough or SOB  BREAST: NEGATIVE for masses, tenderness or  discharge  CV: NEGATIVE for chest pain, palpitations or peripheral edema  GI: NEGATIVE for nausea, abdominal pain, heartburn, or change in bowel habits  : NEGATIVE for frequency, dysuria, or hematuria  MUSCULOSKELETAL: NEGATIVE for significant arthralgias or myalgia  NEURO: NEGATIVE for weakness, dizziness or paresthesias  ENDOCRINE: NEGATIVE for temperature intolerance, skin/hair changes  HEME: NEGATIVE for bleeding problems  PSYCHIATRIC: NEGATIVE for changes in mood or affect    EXAM:   /70 (Cuff Size: Adult Large)   Pulse 66   Temp 97.7  F (36.5  C) (Tympanic)   Wt 112.5 kg (248 lb)   SpO2 98%   BMI 34.59 kg/m      GENERAL APPEARANCE: healthy, alert and no distress     EYES: EOMI,  PERRL     HENT: ear canals and TM's normal and nose and mouth without ulcers or lesions     NECK: no adenopathy, no asymmetry, masses, or scars and thyroid normal to palpation     RESP: lungs clear to auscultation - no rales, rhonchi or wheezes     CV: regular rates and rhythm, normal S1 S2, no S3 or S4 and no murmur, click or rub     ABDOMEN:  soft, nontender, no HSM or masses and bowel sounds normal     MS: extremities normal- no gross deformities noted, no evidence of inflammation in joints, FROM in all extremities.     SKIN: no suspicious lesions or rashes     NEURO: Normal strength and tone, sensory exam grossly normal, mentation intact and speech normal     PSYCH: mentation appears normal. and affect normal/bright     LYMPHATICS: No cervical adenopathy    DIAGNOSTICS:       Recent Labs   Lab Test 04/22/19 04/04/19  1256  03/04/19  1025  02/14/19  1306  02/08/19  0815 02/04/19  0825   HGB  --   --   --  12.4*  --  9.8*  --  8.7*  --    PLT  --   --   --  511*  --  658*  --  510*  --    INR 2.5* 2.49*   < >  --    < >  --    < >  --   --    NA  --   --   --   --   --   --   --  136 137   POTASSIUM  --   --   --   --   --   --   --  3.9 4.9   CR  --   --   --   --   --   --   --  1.19 1.16    < > = values in this  interval not displayed.        IMPRESSION:   Reason for surgery/procedure: history of bleeding gastric ulcer needing follow up    The proposed surgical procedure is considered LOW risk.    REVISED CARDIAC RISK INDEX  The patient has the following serious cardiovascular risks for perioperative complications such as (MI, PE, VFib and 3  AV Block):  No serious cardiac risks  INTERPRETATION: 0 risks: Class I (very low risk - 0.4% complication rate)    The patient has the following additional risks for perioperative complications:  No identified additional risks      ICD-10-CM    1. Preop general physical exam Z01.818 UA with Microscopic     CBC with platelets differential     Electrolyte panel (Na, K, Cl, CO2, Anion gap)     Creatinine     Urea nitrogen   2. Gastrointestinal hemorrhage associated with gastric ulcer K25.4    3. Paroxysmal atrial fibrillation (H) I48.0    4. Long term (current) use of anticoagulants Z79.01    5. Hypertension goal BP (blood pressure) < 140/90 I10        RECOMMENDATIONS:             APPROVAL GIVEN to proceed with proposed procedure, without further diagnostic evaluation       Signed Electronically by: Zac Salgado MD    Copy of this evaluation report is provided to requesting physician.    [facility] Preop Guidelines    Revised Cardiac Risk Index

## 2019-04-23 ENCOUNTER — TRANSFERRED RECORDS (OUTPATIENT)
Dept: HEALTH INFORMATION MANAGEMENT | Facility: CLINIC | Age: 84
End: 2019-04-23

## 2019-04-23 LAB
ANION GAP SERPL CALCULATED.3IONS-SCNC: 5 MMOL/L (ref 3–14)
BUN SERPL-MCNC: 19 MG/DL (ref 7–30)
CHLORIDE SERPL-SCNC: 109 MMOL/L (ref 94–109)
CO2 SERPL-SCNC: 28 MMOL/L (ref 20–32)
CREAT SERPL-MCNC: 1.09 MG/DL (ref 0.66–1.25)
GFR SERPL CREATININE-BSD FRML MDRD: 63 ML/MIN/{1.73_M2}
POTASSIUM SERPL-SCNC: 4.4 MMOL/L (ref 3.4–5.3)
SODIUM SERPL-SCNC: 142 MMOL/L (ref 133–144)

## 2019-04-23 RX ORDER — FERROUS SULFATE 325(65) MG
TABLET ORAL
Status: ON HOLD | COMMUNITY
Start: 2019-04-23 | End: 2019-05-10

## 2019-04-24 ENCOUNTER — TELEPHONE (OUTPATIENT)
Dept: FAMILY MEDICINE | Facility: CLINIC | Age: 84
End: 2019-04-24

## 2019-04-24 NOTE — TELEPHONE ENCOUNTER
Reason for Call:  Request for results:    Name of test or procedure: urea, creatinine, electrolyte panel    Date of test of procedure: 4-22    Location of the test or procedure: [facility]    OK to leave the result message on voice mail or with a family member? YES    Phone number Patient can be reached at:  Home number on file [phone] (home)    Additional comments: none    Call taken on 4/24/2019 at 11:26 AM by JULIANE VILLA

## 2019-05-10 ENCOUNTER — ANESTHESIA EVENT (OUTPATIENT)
Dept: GASTROENTEROLOGY | Facility: CLINIC | Age: 84
End: 2019-05-10
Payer: COMMERCIAL

## 2019-05-10 ENCOUNTER — ANESTHESIA (OUTPATIENT)
Dept: GASTROENTEROLOGY | Facility: CLINIC | Age: 84
End: 2019-05-10
Payer: COMMERCIAL

## 2019-05-10 ENCOUNTER — HOSPITAL ENCOUNTER (OUTPATIENT)
Facility: CLINIC | Age: 84
Discharge: HOME | End: 2019-05-10
Attending: INTERNAL MEDICINE | Admitting: INTERNAL MEDICINE
Payer: COMMERCIAL

## 2019-05-10 VITALS
SYSTOLIC BLOOD PRESSURE: 118 MMHG | BODY MASS INDEX: 35 KG/M2 | DIASTOLIC BLOOD PRESSURE: 75 MMHG | OXYGEN SATURATION: 99 % | RESPIRATION RATE: 16 BRPM | WEIGHT: 250 LBS | HEIGHT: 71 IN | HEART RATE: 58 BPM

## 2019-05-10 LAB — UPPER GI ENDOSCOPY: NORMAL

## 2019-05-10 PROCEDURE — 25000128 H RX IP 250 OP 636: Performed by: NURSE ANESTHETIST, CERTIFIED REGISTERED

## 2019-05-10 PROCEDURE — 40000010 ZZH STATISTIC ANES STAT CODE-CRNA PER MINUTE: Performed by: INTERNAL MEDICINE

## 2019-05-10 PROCEDURE — 25800030 ZZH RX IP 258 OP 636: Performed by: NURSE ANESTHETIST, CERTIFIED REGISTERED

## 2019-05-10 PROCEDURE — 25000125 ZZHC RX 250: Performed by: NURSE ANESTHETIST, CERTIFIED REGISTERED

## 2019-05-10 PROCEDURE — 43235 EGD DIAGNOSTIC BRUSH WASH: CPT | Performed by: INTERNAL MEDICINE

## 2019-05-10 PROCEDURE — 37000008 ZZH ANESTHESIA TECHNICAL FEE, 1ST 30 MIN: Performed by: INTERNAL MEDICINE

## 2019-05-10 RX ORDER — ONDANSETRON 2 MG/ML
INJECTION INTRAMUSCULAR; INTRAVENOUS PRN
Status: DISCONTINUED | OUTPATIENT
Start: 2019-05-10 | End: 2019-05-10

## 2019-05-10 RX ORDER — LIDOCAINE HYDROCHLORIDE 20 MG/ML
INJECTION, SOLUTION INFILTRATION; PERINEURAL PRN
Status: DISCONTINUED | OUTPATIENT
Start: 2019-05-10 | End: 2019-05-10

## 2019-05-10 RX ORDER — NALOXONE HYDROCHLORIDE 0.4 MG/ML
.1-.4 INJECTION, SOLUTION INTRAMUSCULAR; INTRAVENOUS; SUBCUTANEOUS
Status: CANCELLED | OUTPATIENT
Start: 2019-05-10 | End: 2019-05-11

## 2019-05-10 RX ORDER — FENTANYL CITRATE 50 UG/ML
INJECTION, SOLUTION INTRAMUSCULAR; INTRAVENOUS PRN
Status: DISCONTINUED | OUTPATIENT
Start: 2019-05-10 | End: 2019-05-10

## 2019-05-10 RX ORDER — PROPOFOL 10 MG/ML
INJECTION, EMULSION INTRAVENOUS CONTINUOUS PRN
Status: DISCONTINUED | OUTPATIENT
Start: 2019-05-10 | End: 2019-05-10

## 2019-05-10 RX ORDER — PROPOFOL 10 MG/ML
INJECTION, EMULSION INTRAVENOUS PRN
Status: DISCONTINUED | OUTPATIENT
Start: 2019-05-10 | End: 2019-05-10

## 2019-05-10 RX ORDER — ONDANSETRON 2 MG/ML
4 INJECTION INTRAMUSCULAR; INTRAVENOUS EVERY 6 HOURS PRN
Status: CANCELLED | OUTPATIENT
Start: 2019-05-10

## 2019-05-10 RX ORDER — SODIUM CHLORIDE, SODIUM LACTATE, POTASSIUM CHLORIDE, CALCIUM CHLORIDE 600; 310; 30; 20 MG/100ML; MG/100ML; MG/100ML; MG/100ML
INJECTION, SOLUTION INTRAVENOUS CONTINUOUS PRN
Status: DISCONTINUED | OUTPATIENT
Start: 2019-05-10 | End: 2019-05-10

## 2019-05-10 RX ORDER — FLUMAZENIL 0.1 MG/ML
0.2 INJECTION, SOLUTION INTRAVENOUS
Status: CANCELLED | OUTPATIENT
Start: 2019-05-10 | End: 2019-05-10

## 2019-05-10 RX ORDER — ONDANSETRON 4 MG/1
4 TABLET, ORALLY DISINTEGRATING ORAL EVERY 6 HOURS PRN
Status: CANCELLED | OUTPATIENT
Start: 2019-05-10

## 2019-05-10 RX ORDER — LIDOCAINE 40 MG/G
CREAM TOPICAL
Status: CANCELLED | OUTPATIENT
Start: 2019-05-10

## 2019-05-10 RX ORDER — ONDANSETRON 2 MG/ML
4 INJECTION INTRAMUSCULAR; INTRAVENOUS
Status: CANCELLED | OUTPATIENT
Start: 2019-05-10

## 2019-05-10 RX ADMIN — DEXMEDETOMIDINE HYDROCHLORIDE 4 MCG: 100 INJECTION, SOLUTION INTRAVENOUS at 11:15

## 2019-05-10 RX ADMIN — FENTANYL CITRATE 50 MCG: 50 INJECTION, SOLUTION INTRAMUSCULAR; INTRAVENOUS at 11:12

## 2019-05-10 RX ADMIN — ONDANSETRON 4 MG: 2 INJECTION INTRAMUSCULAR; INTRAVENOUS at 11:25

## 2019-05-10 RX ADMIN — PROPOFOL 20 MG: 10 INJECTION, EMULSION INTRAVENOUS at 11:19

## 2019-05-10 RX ADMIN — PROPOFOL 75 MCG/KG/MIN: 10 INJECTION, EMULSION INTRAVENOUS at 11:18

## 2019-05-10 RX ADMIN — LIDOCAINE HYDROCHLORIDE 60 MG: 20 INJECTION, SOLUTION INFILTRATION; PERINEURAL at 11:17

## 2019-05-10 RX ADMIN — SODIUM CHLORIDE, POTASSIUM CHLORIDE, SODIUM LACTATE AND CALCIUM CHLORIDE: 600; 310; 30; 20 INJECTION, SOLUTION INTRAVENOUS at 11:10

## 2019-05-10 RX ADMIN — PROPOFOL 20 MG: 10 INJECTION, EMULSION INTRAVENOUS at 11:18

## 2019-05-10 ASSESSMENT — ENCOUNTER SYMPTOMS
DYSRHYTHMIAS: 1
SEIZURES: 0

## 2019-05-10 ASSESSMENT — LIFESTYLE VARIABLES: TOBACCO_USE: 0

## 2019-05-10 ASSESSMENT — MIFFLIN-ST. JEOR: SCORE: 1856.12

## 2019-05-10 NOTE — ANESTHESIA PREPROCEDURE EVALUATION
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     Actinic keratosis      Atrial fibrillation (H)      Edema of leg      Gout, unspecified      H/O diplopia      Hypertension      Meningioma (H)     sinus     Myalgia and myositis, unspecified      Myasthenia gravis (H)     high dose steroids 2007,      Obesity      Other and unspecified hyperlipidemia      Other seborrheic keratosis      Presbyacusis      Primary localized osteoarthrosis, lower leg      Primary localized osteoarthrosis, pelvic region and thigh      Sleep apnea     doesn't use his CPAP)     Spinal stenosis      Squamous cell cancer of scalp and skin of neck     Dr Sanchez, multiple procedures, Mohs scalp and chest     Vitamin D deficiency      Past Surgical History:   Procedure Laterality Date     APPENDECTOMY       ARTHROPLASTY HIP Right 2016     ARTHROPLASTY HIP ANTERIOR Right 2016    Procedure: ARTHROPLASTY HIP ANTERIOR;  Surgeon: Miguel Johnson MD;  Location:  OR     ARTHROPLASTY KNEE Right 2019    Procedure: RIGHT TOTAL KNEE ARTROPLASTY;  Surgeon: Miguel Johnson MD;  Location:  OR     BRAIN SURGERY  2007    partial resection meningioma     C RAD RESEC TONSIL/PILLARS       CATARACT IOL, RT/LT       COLECTOMY      bowel perforation due to steroids     ESOPHAGOSCOPY, GASTROSCOPY, DUODENOSCOPY (EGD), COMBINED N/A 2018    Procedure: COMBINED ESOPHAGOSCOPY, GASTROSCOPY, DUODENOSCOPY (EGD), BIOPSY SINGLE OR MULTIPLE;  Surgeon: Elizabeth Jones MD;  Location:  GI     GENITOURINARY SURGERY      vasectomy     HEAD & NECK SURGERY  2007    meningioma removed     KNEE SURGERY  2010    left knee replacement     ORTHOPEDIC SURGERY      left TKR      PHACOEMULSIFICATION CLEAR CORNEA WITH TORIC INTRAOCULAR LENS IMPLANT  7/30/2012    Procedure: PHACOEMULSIFICATION CLEAR CORNEA WITH TORIC INTRAOCULAR LENS IMPLANT;  COMPLEX RIGHT PHACOEMULSIFICATION CLEAR CORNEA WITH TORIC INTRAOCULAR LENS IMPLANT WITH MALYUGIN RING;  Surgeon: Ronald Cortez MD;  Location: [facility]     RECTAL SURGERY  1985     No Known Allergies  Prior to Admission medications    Medication Sig Start Date End Date Taking? Authorizing Provider   acetaminophen (TYLENOL) 500 MG tablet Take 2 tablets (1,000 mg) by mouth 3 times daily 2/1/19 2/1/20  Bhanu Hills APRN CNP   CALCIUM PO Take 600 mg by mouth 2 times daily     Reported, Patient   diphenhydrAMINE-acetaminophen (TYLENOL PM)  MG tablet Take 1 tablet by mouth nightly as needed for sleep    Reported, Patient   Esomeprazole Magnesium (NEXIUM PO) Take by mouth every morning (before breakfast)    Reported, Patient   ferrous sulfate (FEROSUL) 325 (65 Fe) MG tablet Patient is taking 6 daily 4/23/19   Zac Salgado MD   furosemide (LASIX) 20 MG tablet Take 1 tablet (20 mg) by mouth daily 3/14/19   Zac Salgado MD   KLOR-CON 20 MEQ CR tablet Take 1 tablet (20 mEq) by mouth daily 11/27/18   Zac Salgado MD   lovastatin (MEVACOR) 40 MG tablet TAKE TWO TABLETS (80MG) BY MOUTH DAILY AT BEDTIME 1/14/19   aZc Salgado MD   multivitamin, therapeutic with minerals (THERA-VIT-M) TABS Take 1 tablet by mouth daily    Reported, Patient   NIACIN CR PO Take 500 mg by mouth 2 times daily (with meals) 2 x 500 mg slow niacin    Unknown, Entered By History   ORDER FOR DME CPAP supplies with variable pressure control 12/8/14   Zac Salgado MD   polyethylene glycol (MIRALAX/GLYCOLAX) packet Take 17 g by mouth daily as needed for constipation 2/1/19 2/1/20  Bhanu Hills APRN CNP   Vitamin D, Cholecalciferol, 1000 units TABS Take 1,000 Units by mouth daily.      Reported, Patient   Warfarin Sodium  (COUMADIN PO) Take 7 mg by mouth daily 5 mg sun, thur & 7 mg row    Reported, Patient     ECG: Atrial fibrillation  Nonspecific T wave abnormality  Abnormal ECG  When compared with ECG of 09-NOV-2007 10:34,  Previous ECG has undetermined rhythm, needs review  QT has lengthened  ECHO: Interpretation Summary     Technically very difficult imaging.  A cardiac structural cause for syncope was not identified.  Sinus rhythm was noted.  Left ventricular systolic function is normal.  The visual ejection fraction is estimated at 60-65%.  The left ventricle is normal in size.  Doppler interrogaiton does not demonstrate significant stenosis or  insufficiency involving cardiac valves.     A direct comparison with the last study 8/27/2007 is very difficult because  imaging has worsened significantly since then. Grossly RV systolic performance  appears to have improved        _____________________________________________________________________________  __        Left Ventricle  The left ventricle is normal in size. There is normal left ventricular wall  thickness. Left ventricular systolic function is normal. The visual ejection  fraction is estimated at 60-65%. Grade I or early diastolic dysfunction. No  regional wall motion abnormalities noted. There is no thrombus seen in the  left ventricle.     Right Ventricle  The right ventricle is not well visualized. There is no mass or thrombus in  the right ventricle.     Atria  The left atrium is borderline dilated. Right atrial size is normal. There is  no atrial shunt seen. The left atrial appendage is not well visualized.     Mitral Valve  The mitral valve leaflets appear normal. There is no evidence of stenosis,  fluttering, or prolapse. There is no mitral regurgitation noted. There is no  mitral valve stenosis.        Tricuspid Valve  The tricuspid valve is not well visualized. Right ventricular systolic  pressure could not be approximated due to inadequate tricuspid  regurgitation.  There is no tricuspid stenosis.     Aortic Valve  The aortic valve is not well visualized. No aortic regurgitation is present.  No aortic stenosis is present.     Pulmonic Valve  The pulmonic valve is not well visualized. There is no pulmonic valvular  regurgitation. There is no pulmonic valvular stenosis.     Vessels  The aortic root is normal size. Normal size ascending aorta. Inferior vena  cava not well visualized for estimation of right atrial pressure. The  pulmonary artery is not well visualized, but is probably normal size.     Pericardium  The pericardium appears normal.        Rhythm  Sinus rhythm was noted      Anesthesia Evaluation     .             ROS/MED HX    ENT/Pulmonary:     (+)sleep apnea, uses CPAP , . .   (-) tobacco use and asthma   Neurologic: Comment: H/o meningioma s/p partial resection 2017     (-) seizures, CVA and migraines   Cardiovascular:     (+) Dyslipidemia, hypertension----. Taking blood thinners : . . . :. dysrhythmias a-fib, .       METS/Exercise Tolerance:  >4 METS   Hematologic:     (+) History of blood clots pt is anticoagulated, History of Transfusion no previous transfusion reaction -      Musculoskeletal:   (+) arthritis,  other musculoskeletal- myasthenia gravis, gout      GI/Hepatic:     (+) GERD Other GI/Hepatic GI Bleed 12/2018 req transfusion     (-) liver disease   Renal/Genitourinary:     (+) chronic renal disease, type: CRI,    (-) nephrolithiasis   Endo:     (+) Obesity, .   (-) Type I DM, Type II DM and thyroid disease   Psychiatric:        (-) psychiatric history   Infectious Disease:        (-) Recent Fever   Malignancy:         Other:                          Physical Exam  Normal systems: cardiovascular, pulmonary and dental    Airway   Mallampati: III  TM distance: >3 FB  Neck ROM: full    Dental     Cardiovascular   Rhythm and rate: regular and normal      Pulmonary    breath sounds clear to auscultation            Lab Results   Component  "Value Date    WBC 12.7 (H) 04/22/2019    HGB 15.4 04/22/2019    HCT 48.1 04/22/2019     (H) 04/22/2019    SED 1.0 03/27/2012     04/22/2019    POTASSIUM 4.4 04/22/2019    CHLORIDE 109 04/22/2019    CO2 28 04/22/2019    BUN 19 04/22/2019    CR 1.09 04/22/2019     (H) 02/08/2019    TATE 8.4 (L) 02/08/2019    PHOS 3.6 09/25/2007    MAG 2.1 09/25/2007    ALBUMIN 2.4 (L) 12/22/2018    PROTTOTAL 4.7 (L) 12/22/2018    ALT 26 12/22/2018    AST 18 12/22/2018    ALKPHOS 35 (L) 12/22/2018    BILITOTAL 0.2 12/22/2018    LIPASE 689 (H) 09/07/2007    AMYLASE 346 (H) 09/07/2007    PTT 27 01/12/2010    INR 2.5 (A) 04/22/2019    FIBR 0.0 03/01/2011    TSH 4.49 09/18/2007       Preop Vitals  BP Readings from Last 3 Encounters:   04/22/19 122/70   03/23/19 104/62   03/19/19 120/70    Pulse Readings from Last 3 Encounters:   04/22/19 66   03/23/19 82   03/19/19 83      Resp Readings from Last 3 Encounters:   03/23/19 16   03/19/19 16   03/04/19 16    SpO2 Readings from Last 3 Encounters:   04/22/19 98%   03/23/19 99%   03/04/19 99%      Temp Readings from Last 1 Encounters:   04/22/19 36.5  C (97.7  F) (Tympanic)    Ht Readings from Last 1 Encounters:   02/08/19 1.803 m (5' 11\")      Wt Readings from Last 1 Encounters:   04/22/19 112.5 kg (248 lb)    Estimated body mass index is 34.59 kg/m  as calculated from the following:    Height as of 2/8/19: 1.803 m (5' 11\").    Weight as of 4/22/19: 112.5 kg (248 lb).       Anesthesia Plan      History & Physical Review      ASA Status:  2 .    NPO Status:  > 8 hours    Plan for MAC Reason for MAC:  Deep or markedly invasive procedure (G8)  PONV prophylaxis:  Ondansetron (or other 5HT-3)       Postoperative Care  Postoperative pain management:  Multi-modal analgesia.      Consents  Anesthetic plan, risks, benefits and alternatives discussed with:  Patient..                 Tala Posey MD  "

## 2019-05-10 NOTE — ANESTHESIA CARE TRANSFER NOTE
Patient: Alessio Teixeira    Procedure(s):  ESOPHAGOGASTRODUODENOSCOPY (EGD)    Diagnosis: GASTRIC ULCER WITH HEMMORRAGE ; UNSPECIFIED CHRONICITY  Diagnosis Additional Information: No value filed.    Anesthesia Type:   MAC     Note:  Airway :Nasal Cannula  Patient transferred to:PACU  Comments: Pt exhibits spont resps, all monitors and alarms on in pacu, report given to RN, vss.Handoff Report: Identifed the Patient, Identified the Reponsible Provider, Reviewed the pertinent medical history, Discussed the surgical course, Reviewed Intra-OP anesthesia mangement and issues during anesthesia, Set expectations for post-procedure period and Allowed opportunity for questions and acknowledgement of understanding      Vitals: (Last set prior to Anesthesia Care Transfer)    CRNA VITALS  5/10/2019 1100 - 5/10/2019 1133      5/10/2019             Pulse:  59    Ht Rate:  60    SpO2:  94 %    Resp Rate (set):  10                Electronically Signed By: ANNA Mccarthy CRNA  May 10, 2019  11:33 AM

## 2019-05-10 NOTE — ANESTHESIA POSTPROCEDURE EVALUATION
Patient: Alessio Teixeira    Procedure(s):  ESOPHAGOGASTRODUODENOSCOPY (EGD)    Diagnosis:GASTRIC ULCER WITH HEMMORRAGE ; UNSPECIFIED CHRONICITY  Diagnosis Additional Information: No value filed.    Anesthesia Type:  MAC    Note:  Anesthesia Post Evaluation    Patient location during evaluation: Endoscopy Recovery  Patient participation: Able to fully participate in evaluation  Level of consciousness: awake and alert  Pain management: adequate  Airway patency: patent  Cardiovascular status: acceptable  Respiratory status: acceptable  Hydration status: acceptable  PONV: none     Anesthetic complications: None          Last vitals:  Vitals:    05/10/19 1200 05/10/19 1210 05/10/19 1220   BP: 109/67 114/70 118/75   Pulse: 54 57 58   Resp:      SpO2: 98% 97% 99%         Electronically Signed By: Tala Posey MD  May 10, 2019  1:11 PM

## 2019-05-29 ENCOUNTER — ANTICOAGULATION THERAPY VISIT (OUTPATIENT)
Dept: NURSING | Facility: CLINIC | Age: 84
End: 2019-05-29
Payer: COMMERCIAL

## 2019-05-29 DIAGNOSIS — I48.20 CHRONIC ATRIAL FIBRILLATION (H): ICD-10-CM

## 2019-05-29 DIAGNOSIS — Z79.01 LONG TERM CURRENT USE OF ANTICOAGULANT THERAPY: ICD-10-CM

## 2019-05-29 LAB — INR POINT OF CARE: 2.4 (ref 0.86–1.14)

## 2019-05-29 PROCEDURE — 85610 PROTHROMBIN TIME: CPT | Mod: QW

## 2019-05-29 PROCEDURE — 36416 COLLJ CAPILLARY BLOOD SPEC: CPT

## 2019-05-29 PROCEDURE — 99207 ZZC NO CHARGE NURSE ONLY: CPT

## 2019-05-29 NOTE — PROGRESS NOTES
ANTICOAGULATION FOLLOW-UP CLINIC VISIT    Patient Name:  Alessio Teixeira  Date:  2019  Contact Type:  Face to Face    SUBJECTIVE:  Patient Findings     Positives:   Other complaints (Right knee has been more sore )             OBJECTIVE    INR Protime   Date Value Ref Range Status   2019 2.4 (A) 0.86 - 1.14 Final       ASSESSMENT / PLAN  INR assessment THER    Recheck INR In: 6 WEEKS    INR Location Clinic      Anticoagulation Summary  As of 2019    INR goal:   2.0-3.0   TTR:   65.7 % (3 y)   INR used for dosin.4 (2019)   Warfarin maintenance plan:   5 mg (2 mg x 2.5) every Sun, Thu; 7 mg (2 mg x 3.5) all other days   Full warfarin instructions:   5 mg every Sun, Thu; 7 mg all other days   Weekly warfarin total:   45 mg   No change documented:   Maco Fontanez RN   Plan last modified:   Kerrie Morgan RN (2019)   Next INR check:   7/10/2019   Target end date:   Indefinite    Indications    Chronic atrial fibrillation (H) [I48.2]  Long term current use of anticoagulant therapy [Z79.01]             Anticoagulation Episode Summary     INR check location:   Anticoagulation Clinic    Preferred lab:       Send INR reminders to:   [facility] INR/PROTIME    Comments:         Anticoagulation Care Providers     Provider Role Specialty Phone number    Zac Salgado MD [facility] [phone]            See the Encounter Report to view Anticoagulation Flowsheet and Dosing Calendar (Go to Encounters tab in chart review, and find the Anticoagulation Therapy Visit)    Dosage adjustment made based on physician directed care plan.    The patient was assessed for diet, medication, and activity level changes, missed or extra doses, bruising or bleeding, with no problem findings.      Maco Fontanez RN

## 2019-06-05 ENCOUNTER — TRANSFERRED RECORDS (OUTPATIENT)
Dept: HEALTH INFORMATION MANAGEMENT | Facility: CLINIC | Age: 84
End: 2019-06-05

## 2019-06-27 ENCOUNTER — TELEPHONE (OUTPATIENT)
Dept: FAMILY MEDICINE | Facility: CLINIC | Age: 84
End: 2019-06-27

## 2019-06-27 DIAGNOSIS — R91.8 PULMONARY NODULES: Primary | ICD-10-CM

## 2019-06-27 NOTE — TELEPHONE ENCOUNTER
Please call pt and let him know that he is due for a recheck on the lung nodule found on his abdominal CT last Dec.  I have placed an order and he can call to schedule:    Call to schedule your imaging:    [place] [facility] [phone] or [phone]    [facility]     [phone] or [phone]

## 2019-06-28 ENCOUNTER — HOSPITAL ENCOUNTER (OUTPATIENT)
Dept: CT IMAGING | Facility: CLINIC | Age: 84
Discharge: HOME | End: 2019-06-28
Attending: PHYSICIAN ASSISTANT | Admitting: PHYSICIAN ASSISTANT
Payer: COMMERCIAL

## 2019-06-28 DIAGNOSIS — R91.8 PULMONARY NODULES: ICD-10-CM

## 2019-06-28 PROCEDURE — 71250 CT THORAX DX C-: CPT

## 2019-07-05 ENCOUNTER — TELEPHONE (OUTPATIENT)
Dept: FAMILY MEDICINE | Facility: CLINIC | Age: 84
End: 2019-07-05

## 2019-07-05 NOTE — TELEPHONE ENCOUNTER
"Patient calling regarding recent CT results - \"2. Significant Incidental Finding(s):  Category S: Yes. Coronary artery calcium moderate or severe.\"   Patient is questioning if he should continue taking calcium. He is wondering if there are any changes to his current plan/follow-up. Routing to provider to advise.  "

## 2019-07-08 NOTE — TELEPHONE ENCOUNTER
Calcium intake does not need to change.  We can discuss the other incidental findings on his CT scan when he comes in for his next visit.

## 2019-07-10 ENCOUNTER — ANTICOAGULATION THERAPY VISIT (OUTPATIENT)
Dept: NURSING | Facility: CLINIC | Age: 84
End: 2019-07-10
Payer: COMMERCIAL

## 2019-07-10 ENCOUNTER — OFFICE VISIT (OUTPATIENT)
Dept: FAMILY MEDICINE | Facility: CLINIC | Age: 84
End: 2019-07-10
Payer: COMMERCIAL

## 2019-07-10 VITALS
RESPIRATION RATE: 14 BRPM | HEART RATE: 74 BPM | TEMPERATURE: 95.6 F | SYSTOLIC BLOOD PRESSURE: 130 MMHG | DIASTOLIC BLOOD PRESSURE: 60 MMHG | OXYGEN SATURATION: 96 % | HEIGHT: 71 IN | BODY MASS INDEX: 34.02 KG/M2 | WEIGHT: 243 LBS

## 2019-07-10 DIAGNOSIS — I25.84 CORONARY ARTERY DISEASE DUE TO CALCIFIED CORONARY LESION: ICD-10-CM

## 2019-07-10 DIAGNOSIS — I48.20 CHRONIC ATRIAL FIBRILLATION (H): Primary | ICD-10-CM

## 2019-07-10 DIAGNOSIS — I48.20 CHRONIC ATRIAL FIBRILLATION (H): ICD-10-CM

## 2019-07-10 DIAGNOSIS — I25.10 CORONARY ARTERY DISEASE DUE TO CALCIFIED CORONARY LESION: ICD-10-CM

## 2019-07-10 DIAGNOSIS — Z79.01 LONG TERM CURRENT USE OF ANTICOAGULANT THERAPY: ICD-10-CM

## 2019-07-10 LAB — INR POINT OF CARE: 2.2 (ref 0.86–1.14)

## 2019-07-10 PROCEDURE — 99207 ZZC NO CHARGE NURSE ONLY: CPT

## 2019-07-10 PROCEDURE — 80061 LIPID PANEL: CPT | Performed by: FAMILY MEDICINE

## 2019-07-10 PROCEDURE — 99213 OFFICE O/P EST LOW 20 MIN: CPT | Performed by: FAMILY MEDICINE

## 2019-07-10 PROCEDURE — 36416 COLLJ CAPILLARY BLOOD SPEC: CPT

## 2019-07-10 PROCEDURE — 85610 PROTHROMBIN TIME: CPT | Mod: QW

## 2019-07-10 RX ORDER — PANTOPRAZOLE SODIUM 40 MG/1
TABLET, DELAYED RELEASE ORAL
Refills: 1 | COMMUNITY
Start: 2019-01-24 | End: 2019-09-13

## 2019-07-10 RX ORDER — OXYCODONE HYDROCHLORIDE 5 MG/1
TABLET ORAL
Refills: 0 | COMMUNITY
Start: 2019-02-09 | End: 2019-09-13

## 2019-07-10 ASSESSMENT — MIFFLIN-ST. JEOR: SCORE: 1824.37

## 2019-07-10 NOTE — NURSING NOTE
"Chief Complaint   Patient presents with     RECHECK     /60   Pulse 74   Temp 95.6  F (35.3  C) (Tympanic)   Resp 14   Ht 1.803 m (5' 11\")   Wt 110.2 kg (243 lb)   SpO2 96%   BMI 33.89 kg/m   Estimated body mass index is 33.89 kg/m  as calculated from the following:    Height as of this encounter: 1.803 m (5' 11\").    Weight as of this encounter: 110.2 kg (243 lb).  BP completed using cuff size: mary Barker CMA    Health Maintenance Due   Topic Date Due     MICROALBUMIN  1935     URINE DRUG SCREEN  1935     ZOSTER IMMUNIZATION (1 of 2) 12/02/1986     OP ANNUAL INR REFERRAL  07/08/2016     Health Maintenance reviewed at today's visit patient asked to schedule/complete:   Immunizations:  Patient agrees to schedule    "

## 2019-07-10 NOTE — PROGRESS NOTES
Subjective     Alessio Teixeira is a 83 year old male who presents to clinic today for the following health issues:    HPI      CT Results    Patient recently had a low-dose CT scan done screening for lung cancer.  An additional finding on that test was that he had calcifications of his coronary arteries.  He is here to get that more definitively worked up.  He denies any chest pains or shortness of breath.  He does have atrial fibrillation and is on anticoagulation.  Patient Active Problem List   Diagnosis     Hyperlipidemia LDL goal <100     Edema of both legs     Varicose veins of legs     BMI > 35     Stasis dermatitis     Chronic atrial fibrillation (H)     Chronic 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     Vitamin D deficiency disease     Hypertension goal BP (blood pressure) < 140/90     Diplopia     MARTHA (obstructive sleep apnea)     Long term current use of anticoagulant therapy     Degenerative localized arthritis of hip     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 by Miguel Johnson MD     Aftercare following right hip joint replacement surgery     Bradycardia     History of pulmonary embolism- bilateral in 2007      Atrial fibrillation (H)     Anemia due to blood loss, acute     Chronic left-sided thoracic back pain     Presbycusis of both ears     Mixed hyperlipidemia     Primary osteoarthritis of right knee     GI bleed     Degenerative arthritis of knee     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     Acute pain     Gastrointestinal hemorrhage associated with gastric ulcer     JAVIER (acute kidney injury) (H)     CKD (chronic kidney disease) stage 2, GFR 60-89 ml/min     Pulmonary nodules     Debility     Weight gain     Anemia, unspecified type     Encounter for therapeutic drug monitoring     Long term (current) use of anticoagulants     Past Surgical History:   Procedure Laterality Date     APPENDECTOMY       ARTHROPLASTY HIP Right 2016     ARTHROPLASTY HIP ANTERIOR  Right 2016    Procedure: ARTHROPLASTY HIP ANTERIOR;  Surgeon: Miguel Johnson MD;  Location:  OR     ARTHROPLASTY KNEE Right 2019    Procedure: RIGHT TOTAL KNEE ARTROPLASTY;  Surgeon: Miguel Johnson MD;  Location:  OR     BRAIN SURGERY  2007    partial resection meningioma     C RAD RESEC TONSIL/PILLARS       CATARACT IOL, RT/LT       COLECTOMY      bowel perforation due to steroids     ESOPHAGOSCOPY, GASTROSCOPY, DUODENOSCOPY (EGD), COMBINED N/A 2018    Procedure: COMBINED ESOPHAGOSCOPY, GASTROSCOPY, DUODENOSCOPY (EGD), BIOPSY SINGLE OR MULTIPLE;  Surgeon: Elizbaeth Jones MD;  Location:  GI     ESOPHAGOSCOPY, GASTROSCOPY, DUODENOSCOPY (EGD), COMBINED N/A 5/10/2019    Procedure: ESOPHAGOGASTRODUODENOSCOPY (EGD);  Surgeon: Ahsan Garcia MD;  Location:  GI     GENITOURINARY SURGERY      vasectomy     HEAD & NECK SURGERY      meningioma removed     KNEE SURGERY  2010    left knee replacement     ORTHOPEDIC SURGERY      left TKR     PHACOEMULSIFICATION CLEAR CORNEA WITH TORIC INTRAOCULAR LENS IMPLANT  2012    Procedure: PHACOEMULSIFICATION CLEAR CORNEA WITH TORIC INTRAOCULAR LENS IMPLANT;  COMPLEX RIGHT PHACOEMULSIFICATION CLEAR CORNEA WITH TORIC INTRAOCULAR LENS IMPLANT WITH MALYUGIN RING;  Surgeon: Ronald Cortez MD;  Location:  [facility]     RECTAL SURGERY         Social History     Tobacco Use     Smoking status: Former Smoker     Packs/day: 1.00     Years: 3.00     Pack years: 3.00     Types: Cigarettes     Last attempt to quit: 1963     Years since quittin.5     Smokeless tobacco: Former User   Substance Use Topics     Alcohol use: Yes     Comment: 2-4 glasses of wine per week     Family History   Problem Relation Age of Onset     Glaucoma Father      Unknown/Adopted Mother      Macular Degeneration No family hx of              Reviewed and updated as needed this visit by Provider         Review of Systems   ROS COMP: Constitutional, HEENT,  "cardiovascular, pulmonary, gi and gu systems are negative, except as otherwise noted.      Objective    /60   Pulse 74   Temp 95.6  F (35.3  C) (Tympanic)   Resp 14   Ht 1.803 m (5' 11\")   Wt 110.2 kg (243 lb)   SpO2 96%   BMI 33.89 kg/m    Body mass index is 33.89 kg/m .  Physical Exam   GENERAL APPEARANCE: healthy, alert and no distress  RESP: lungs clear to auscultation - no rales, rhonchi or wheezes  CV: normal S1 S2, no S3 or S4, no murmur, click or rub and irregularly irregular rhythm    Results for orders placed or performed in visit on 07/10/19   INR point of care   Result Value Ref Range    INR Protime 2.2 (A) 0.86 - 1.14           Assessment & Plan       ICD-10-CM    1. Chronic atrial fibrillation (H) I48.2 CARDIOLOGY EVAL ADULT REFERRAL   2. Coronary artery disease due to calcified coronary lesion I25.10 CARDIOLOGY EVAL ADULT REFERRAL    I25.84 Lipid Profile        BMI:   Estimated body mass index is 33.89 kg/m  as calculated from the following:    Height as of this encounter: 1.803 m (5' 11\").    Weight as of this encounter: 110.2 kg (243 lb).   Weight management plan: Discussed healthy diet and exercise guidelines        Patient Instructions   The patient gives a distant history of having had a couple of stress tests done the last one being a nuclear stress test.  We do not have any records of that that I could find.  He has had numerous echocardiograms due to his atrial fibrillation.  I think it would be prudent to get him a consultation with cardiology to see if there is further testing that we should be doing.  We will also check his lipid profile as that has not been done in quite some time.  I did not change any of his medications at present.      No follow-ups on file.    Zac Salgado MD  [facility]        "

## 2019-07-10 NOTE — LETTER
July 12, 2019      Alessio Diaz Puneet  [address]  [place] [address]        Dear ,    We are writing to inform you of your test results.    This all looks very good and can be repeated in 6 months.    Resulted Orders   Lipid Profile   Result Value Ref Range    Cholesterol 126 <200 mg/dL    Triglycerides 78 <150 mg/dL      Comment:      Non Fasting    HDL Cholesterol 46 >39 mg/dL    LDL Cholesterol Calculated 64 <100 mg/dL      Comment:      Desirable:       <100 mg/dl    Non HDL Cholesterol 80 <130 mg/dL       If you have any questions or concerns, please call the clinic at the number listed above.       Sincerely,        Zac Salgado MD

## 2019-07-10 NOTE — PATIENT INSTRUCTIONS
The patient gives a distant history of having had a couple of stress tests done the last one being a nuclear stress test.  We do not have any records of that that I could find.  He has had numerous echocardiograms due to his atrial fibrillation.  I think it would be prudent to get him a consultation with cardiology to see if there is further testing that we should be doing.  We will also check his lipid profile as that has not been done in quite some time.  I did not change any of his medications at present.

## 2019-07-10 NOTE — PROGRESS NOTES
ANTICOAGULATION FOLLOW-UP CLINIC VISIT    Patient Name:  Alessio Teixeira  Date:  7/10/2019  Contact Type:  Face to Face    SUBJECTIVE:  Patient Findings     Comments:   The patient was assessed for diet, medication, and activity level changes, missed or extra doses, bruising or bleeding, with no problem findings.          Clinical Outcomes     Comments:   The patient was assessed for diet, medication, and activity level changes, missed or extra doses, bruising or bleeding, with no problem findings.             OBJECTIVE    INR Protime   Date Value Ref Range Status   07/10/2019 2.2 (A) 0.86 - 1.14 Final       ASSESSMENT / PLAN  INR assessment THER    Recheck INR In: 6 WEEKS    INR Location Clinic      Anticoagulation Summary  As of 7/10/2019    INR goal:   2.0-3.0   TTR:   67.0 % (3.2 y)   INR used for dosin.2 (7/10/2019)   Warfarin maintenance plan:   5 mg (2 mg x 2.5) every Sun, Thu; 7 mg (2 mg x 3.5) all other days   Full warfarin instructions:   5 mg every Sun, Thu; 7 mg all other days   Weekly warfarin total:   45 mg   Plan last modified:   Kerrie Morgan RN (2019)   Next INR check:   2019   Target end date:   Indefinite    Indications    Chronic atrial fibrillation (H) [I48.2]  Long term current use of anticoagulant therapy [Z79.01]             Anticoagulation Episode Summary     INR check location:   Anticoagulation Clinic    Preferred lab:       Send INR reminders to:   [facility]    Comments:         Anticoagulation Care Providers     Provider Role Specialty Phone number    Zac Salgado MD [facility] [phone]            See the Encounter Report to view Anticoagulation Flowsheet and Dosing Calendar (Go to Encounters tab in chart review, and find the Anticoagulation Therapy Visit)        Kerrie Morgan RN

## 2019-07-11 LAB
CHOLEST SERPL-MCNC: 126 MG/DL
HDLC SERPL-MCNC: 46 MG/DL
LDLC SERPL CALC-MCNC: 64 MG/DL
NONHDLC SERPL-MCNC: 80 MG/DL
TRIGL SERPL-MCNC: 78 MG/DL

## 2019-07-15 ENCOUNTER — TELEPHONE (OUTPATIENT)
Dept: FAMILY MEDICINE | Facility: CLINIC | Age: 84
End: 2019-07-15

## 2019-07-15 NOTE — TELEPHONE ENCOUNTER
Patient was called, and he said he would complete a stress test prior to the Cardiology consult.   Routing to provider for review.     Triage, okay to leave detailed VM.

## 2019-07-15 NOTE — TELEPHONE ENCOUNTER
Reason for Call:  Other call back    Detailed comments: Alessio called saying the next available cardiologist appointment for his referral is on 9/13/2019.     Alessio is concerned if this is OK for him to wait this long and would appreciate insight from Dr. Salgado or his nurse.    Phone Number Patient can be reached at: Cell number on file:    Telephone Information:   Mobile [phone]       Best Time: any    Can we leave a detailed message on this number? YES    Call taken on 7/15/2019 at 9:39 AM by Ricarda Rojas

## 2019-07-15 NOTE — TELEPHONE ENCOUNTER
Routing to provider to review if new referral needed so patient can be seen sooner than 9/13 by Cardiology

## 2019-07-15 NOTE — TELEPHONE ENCOUNTER
I would keep his appointment for his cardiac consultation.  We could, however, set him up for a stress test prior to the consult.  The type of stress test would be dictated by whether or not he thinks he could walk on a treadmill.

## 2019-07-16 DIAGNOSIS — E78.5 HYPERLIPIDEMIA LDL GOAL <100: Primary | ICD-10-CM

## 2019-07-16 DIAGNOSIS — I10 HYPERTENSION GOAL BP (BLOOD PRESSURE) < 140/90: ICD-10-CM

## 2019-07-16 PROBLEM — K92.2 GI BLEED: Status: RESOLVED | Noted: 2018-12-22 | Resolved: 2019-07-16

## 2019-07-16 PROBLEM — M17.11 PRIMARY OSTEOARTHRITIS OF RIGHT KNEE: Status: RESOLVED | Noted: 2018-11-21 | Resolved: 2019-07-16

## 2019-07-16 PROBLEM — M17.9 DEGENERATIVE ARTHRITIS OF KNEE: Status: RESOLVED | Noted: 2019-01-28 | Resolved: 2019-07-16

## 2019-07-17 ENCOUNTER — TELEPHONE (OUTPATIENT)
Dept: FAMILY MEDICINE | Facility: CLINIC | Age: 84
End: 2019-07-17

## 2019-07-17 DIAGNOSIS — E78.5 HYPERLIPIDEMIA LDL GOAL <100: ICD-10-CM

## 2019-07-17 DIAGNOSIS — I48.20 CHRONIC ATRIAL FIBRILLATION (H): Primary | ICD-10-CM

## 2019-07-17 DIAGNOSIS — I10 HYPERTENSION GOAL BP (BLOOD PRESSURE) < 140/90: ICD-10-CM

## 2019-07-17 DIAGNOSIS — I48.0 PAROXYSMAL ATRIAL FIBRILLATION (H): Primary | ICD-10-CM

## 2019-07-17 NOTE — TELEPHONE ENCOUNTER
Carla from [facility] called to clarify order for stress test. Pt has Hx of atrial fib and cardiology typically recommends nuclear medicine lexascan test with this. Would provider consider order for nuclear test lexascan instead of echo stress test ? If yes can this be ordered?

## 2019-07-22 ENCOUNTER — HOSPITAL ENCOUNTER (OUTPATIENT)
Dept: CARDIOLOGY | Facility: CLINIC | Age: 84
Discharge: HOME | End: 2019-07-22
Attending: FAMILY MEDICINE | Admitting: FAMILY MEDICINE
Payer: COMMERCIAL

## 2019-07-22 VITALS — DIASTOLIC BLOOD PRESSURE: 62 MMHG | SYSTOLIC BLOOD PRESSURE: 112 MMHG

## 2019-07-22 DIAGNOSIS — I10 HYPERTENSION GOAL BP (BLOOD PRESSURE) < 140/90: ICD-10-CM

## 2019-07-22 DIAGNOSIS — I48.0 PAROXYSMAL ATRIAL FIBRILLATION (H): ICD-10-CM

## 2019-07-22 PROCEDURE — 93016 CV STRESS TEST SUPVJ ONLY: CPT | Performed by: INTERNAL MEDICINE

## 2019-07-22 PROCEDURE — 93017 CV STRESS TEST TRACING ONLY: CPT

## 2019-07-22 PROCEDURE — 34300033 ZZH RX 343: Performed by: FAMILY MEDICINE

## 2019-07-22 PROCEDURE — 93018 CV STRESS TEST I&R ONLY: CPT | Performed by: INTERNAL MEDICINE

## 2019-07-22 PROCEDURE — A9502 TC99M TETROFOSMIN: HCPCS | Performed by: FAMILY MEDICINE

## 2019-07-22 PROCEDURE — 25000128 H RX IP 250 OP 636: Performed by: INTERNAL MEDICINE

## 2019-07-22 PROCEDURE — 78452 HT MUSCLE IMAGE SPECT MULT: CPT | Mod: 26 | Performed by: INTERNAL MEDICINE

## 2019-07-22 RX ORDER — ALBUTEROL SULFATE 90 UG/1
2 AEROSOL, METERED RESPIRATORY (INHALATION) EVERY 5 MIN PRN
Status: DISCONTINUED | OUTPATIENT
Start: 2019-07-22 | End: 2019-07-23 | Stop reason: HOSPADM

## 2019-07-22 RX ORDER — REGADENOSON 0.08 MG/ML
0.4 INJECTION, SOLUTION INTRAVENOUS ONCE
Status: COMPLETED | OUTPATIENT
Start: 2019-07-22 | End: 2019-07-22

## 2019-07-22 RX ORDER — AMINOPHYLLINE 25 MG/ML
50-100 INJECTION, SOLUTION INTRAVENOUS
Status: DISCONTINUED | OUTPATIENT
Start: 2019-07-22 | End: 2019-07-23 | Stop reason: HOSPADM

## 2019-07-22 RX ORDER — ACYCLOVIR 200 MG/1
0-1 CAPSULE ORAL
Status: DISCONTINUED | OUTPATIENT
Start: 2019-07-22 | End: 2019-07-23 | Stop reason: HOSPADM

## 2019-07-22 RX ADMIN — TETROFOSMIN 13.18 MCI.: 1.38 INJECTION, POWDER, LYOPHILIZED, FOR SOLUTION INTRAVENOUS at 15:12

## 2019-07-22 RX ADMIN — REGADENOSON 0.4 MG: 0.08 INJECTION, SOLUTION INTRAVENOUS at 15:08

## 2019-07-22 RX ADMIN — TETROFOSMIN 4.12 MCI.: 1.38 INJECTION, POWDER, LYOPHILIZED, FOR SOLUTION INTRAVENOUS at 13:26

## 2019-08-21 ENCOUNTER — ANTICOAGULATION THERAPY VISIT (OUTPATIENT)
Dept: NURSING | Facility: CLINIC | Age: 84
End: 2019-08-21
Payer: COMMERCIAL

## 2019-08-21 DIAGNOSIS — I48.20 CHRONIC ATRIAL FIBRILLATION (H): ICD-10-CM

## 2019-08-21 DIAGNOSIS — Z79.01 LONG TERM CURRENT USE OF ANTICOAGULANT THERAPY: ICD-10-CM

## 2019-08-21 LAB — INR POINT OF CARE: 2.5 (ref 0.86–1.14)

## 2019-08-21 PROCEDURE — 36416 COLLJ CAPILLARY BLOOD SPEC: CPT

## 2019-08-21 PROCEDURE — 85610 PROTHROMBIN TIME: CPT | Mod: QW

## 2019-08-21 PROCEDURE — 99207 ZZC NO CHARGE NURSE ONLY: CPT

## 2019-08-21 NOTE — PROGRESS NOTES
ANTICOAGULATION FOLLOW-UP CLINIC VISIT    Patient Name:  Alessio Teixeira  Date:  2019  Contact Type:  Face to Face    SUBJECTIVE:  Patient Findings     Comments:     The patient was assessed for diet, medication, and activity level changes, missed or extra doses, bruising or bleeding, with no problem findings.          Clinical Outcomes     Comments:     The patient was assessed for diet, medication, and activity level changes, missed or extra doses, bruising or bleeding, with no problem findings.             OBJECTIVE    INR Protime   Date Value Ref Range Status   2019 2.5 (A) 0.86 - 1.14 Final       ASSESSMENT / PLAN  INR assessment THER    Recheck INR In: 6 WEEKS    INR Location Clinic      Anticoagulation Summary  As of 2019    INR goal:   2.0-3.0   TTR:   68.1 % (3.3 y)   INR used for dosin.5 (2019)   Warfarin maintenance plan:   5 mg (2 mg x 2.5) every Sun, Thu; 7 mg (2 mg x 3.5) all other days   Full warfarin instructions:   5 mg every Sun, Thu; 7 mg all other days   Weekly warfarin total:   45 mg   Plan last modified:   Kerrie Morgan RN (2019)   Next INR check:   10/2/2019   Target end date:   Indefinite    Indications    Chronic atrial fibrillation (H) [I48.2]  Long term current use of anticoagulant therapy [Z79.01]             Anticoagulation Episode Summary     INR check location:   Anticoagulation Clinic    Preferred lab:       Send INR reminders to:   [facility]    Comments:         Anticoagulation Care Providers     Provider Role Specialty Phone number    Zac Salgado MD [facility] [phone]            See the Encounter Report to view Anticoagulation Flowsheet and Dosing Calendar (Go to Encounters tab in chart review, and find the Anticoagulation Therapy Visit)        Kerrie Morgan RN

## 2019-09-13 ENCOUNTER — OFFICE VISIT (OUTPATIENT)
Dept: CARDIOLOGY | Facility: CLINIC | Age: 84
End: 2019-09-13
Attending: FAMILY MEDICINE
Payer: COMMERCIAL

## 2019-09-13 VITALS
HEIGHT: 71 IN | HEART RATE: 72 BPM | DIASTOLIC BLOOD PRESSURE: 68 MMHG | BODY MASS INDEX: 35.13 KG/M2 | WEIGHT: 250.9 LBS | SYSTOLIC BLOOD PRESSURE: 110 MMHG

## 2019-09-13 DIAGNOSIS — I48.0 PAROXYSMAL ATRIAL FIBRILLATION (H): Primary | ICD-10-CM

## 2019-09-13 DIAGNOSIS — I25.10 CORONARY ARTERY DISEASE INVOLVING NATIVE CORONARY ARTERY OF NATIVE HEART WITHOUT ANGINA PECTORIS: ICD-10-CM

## 2019-09-13 DIAGNOSIS — E78.2 MIXED HYPERLIPIDEMIA: ICD-10-CM

## 2019-09-13 PROCEDURE — 99203 OFFICE O/P NEW LOW 30 MIN: CPT | Performed by: INTERNAL MEDICINE

## 2019-09-13 ASSESSMENT — MIFFLIN-ST. JEOR: SCORE: 1860.2

## 2019-09-13 NOTE — PROGRESS NOTES
HPI and Plan:   See dictation    No orders of the defined types were placed in this encounter.      No orders of the defined types were placed in this encounter.      Medications Discontinued During This Encounter   Medication Reason     acetaminophen (TYLENOL) 500 MG tablet Therapy completed     acetaminophen (TYLENOL) tablet 650 mg Stopped by Patient     diphenhydrAMINE-acetaminophen (TYLENOL PM)  MG tablet Stopped by Patient     oxyCODONE (ROXICODONE) 5 MG tablet Stopped by Patient     polyethylene glycol (MIRALAX/GLYCOLAX) packet Stopped by Patient     pantoprazole (PROTONIX) 40 MG EC tablet Stopped by Patient     NIACIN CR PO          Encounter Diagnoses   Name Primary?     Paroxysmal atrial fibrillation (H) Yes     Hypertension goal BP (blood pressure) < 140/90      Mixed hyperlipidemia      Coronary artery disease involving native coronary artery of native heart without angina pectoris        CURRENT MEDICATIONS:  Current Outpatient Medications   Medication Sig Dispense Refill     CALCIUM PO Take 600 mg by mouth 2 times daily        Esomeprazole Magnesium (NEXIUM PO) Take by mouth every morning (before breakfast)       furosemide (LASIX) 20 MG tablet Take 1 tablet (20 mg) by mouth daily 90 tablet 3     KLOR-CON 20 MEQ CR tablet Take 1 tablet (20 mEq) by mouth daily 90 tablet 3     lovastatin (MEVACOR) 40 MG tablet TAKE TWO TABLETS (80MG) BY MOUTH DAILY AT BEDTIME 180 tablet 3     multivitamin, therapeutic with minerals (THERA-VIT-M) TABS Take 1 tablet by mouth daily       Vitamin D, Cholecalciferol, 1000 units TABS Take 1,000 Units by mouth daily.         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     Actinic keratosis       Atrial fibrillation (H)      Edema of leg      Gout, unspecified      H/O diplopia      Hypertension      Meningioma (H)     sinus     Myalgia and myositis, unspecified      Myasthenia gravis (H)     high dose steroids 2007,      Obesity      Other seborrheic keratosis      Presbyacusis      Primary localized osteoarthrosis, lower leg      Primary localized osteoarthrosis, pelvic region and thigh      Sleep apnea     doesn't use his CPAP)     Spinal stenosis      Squamous cell cancer of scalp and skin of neck     Dr Sanchez, multiple procedures, Mohs scalp and chest     Vitamin D deficiency        PAST SURGICAL HISTORY:  Past Surgical History:   Procedure Laterality Date     APPENDECTOMY       ARTHROPLASTY HIP Right 2016     ARTHROPLASTY HIP ANTERIOR Right 4/26/2016    Procedure: ARTHROPLASTY HIP ANTERIOR;  Surgeon: Miguel Johnson MD;  Location:  OR     ARTHROPLASTY KNEE Right 1/28/2019    Procedure: RIGHT TOTAL KNEE ARTROPLASTY;  Surgeon: Miguel Johnson MD;  Location:  OR     BRAIN SURGERY  2007    partial resection meningioma     C RAD RESEC TONSIL/PILLARS       CATARACT IOL, RT/LT       COLECTOMY  2007    bowel perforation due to steroids     ESOPHAGOSCOPY, GASTROSCOPY, DUODENOSCOPY (EGD), COMBINED N/A 12/22/2018    Procedure: COMBINED ESOPHAGOSCOPY, GASTROSCOPY, DUODENOSCOPY (EGD), BIOPSY SINGLE OR MULTIPLE;  Surgeon: Elizabeth Jones MD;  Location:  GI     ESOPHAGOSCOPY, GASTROSCOPY, DUODENOSCOPY (EGD), COMBINED N/A 5/10/2019    Procedure: ESOPHAGOGASTRODUODENOSCOPY (EGD);  Surgeon: Ahsan Garcia MD;  Location:  GI     GENITOURINARY SURGERY      vasectomy     HEAD & NECK SURGERY  2007    meningioma removed     KNEE SURGERY  2010    left knee replacement     ORTHOPEDIC SURGERY      left TKR     PHACOEMULSIFICATION CLEAR CORNEA WITH TORIC INTRAOCULAR LENS IMPLANT  7/30/2012    Procedure: PHACOEMULSIFICATION CLEAR CORNEA WITH TORIC INTRAOCULAR LENS IMPLANT;  COMPLEX RIGHT  PHACOEMULSIFICATION CLEAR CORNEA WITH TORIC INTRAOCULAR LENS IMPLANT WITH MALYUGIN RING;  Surgeon: Ronald Coretz MD;  Location: [facility]     RECTAL SURGERY         FAMILY HISTORY:  Family History   Problem Relation Age of Onset     Glaucoma Father      Unknown/Adopted Mother      Macular Degeneration No family hx of        SOCIAL HISTORY:  Social History     Socioeconomic History     Marital status:      Spouse name: None     Number of children: None     Years of education: None     Highest education level: None   Occupational History     None   Social Needs     Financial resource strain: None     Food insecurity:     Worry: None     Inability: None     Transportation needs:     Medical: None     Non-medical: None   Tobacco Use     Smoking status: Former Smoker     Packs/day: 1.00     Years: 3.00     Pack years: 3.00     Types: Cigarettes     Start date:      Last attempt to quit: 1963     Years since quittin.7     Smokeless tobacco: Former User   Substance and Sexual Activity     Alcohol use: Yes     Comment: 2-4 glasses of wine per week     Drug use: No     Sexual activity: Not Currently     Partners: Female   Lifestyle     Physical activity:     Days per week: None     Minutes per session: None     Stress: None   Relationships     Social connections:     Talks on phone: None     Gets together: None     Attends [employer] service: None     Active member of club or organization: None     Attends meetings of clubs or organizations: None     Relationship status: None     Intimate partner violence:     Fear of current or ex partner: None     Emotionally abused: None     Physically abused: None     Forced sexual activity: None   Other Topics Concern     Parent/sibling w/ CABG, MI or angioplasty before 65F 55M? No   Social History Narrative     None       Review of Systems:  Skin:  Positive for nail changes flaking   Eyes:  Positive for glasses reading  ENT:  Negative      Respiratory:  Positive  "for sleep apnea does not wear CPAP   Cardiovascular:    palpitations;Positive for;edema;dizziness palpitations are seldom, lightheaded when stands on occasion  Gastroenterology: Positive for   GERD  Genitourinary:  Negative      Musculoskeletal:  Positive for joint pain;back pain    Neurologic:  Negative      Psychiatric:  Negative      Heme/Lymph/Imm:  Positive for allergies many foods due to stomach problems  Endocrine:  Negative        Physical Exam:  Vitals: /68   Pulse 72   Ht 1.803 m (5' 11\")   Wt 113.8 kg (250 lb 14.4 oz)   BMI 34.99 kg/m      Constitutional:  cooperative, alert and oriented, well developed, well nourished, in no acute distress        Skin:  warm and dry to the touch, no apparent skin lesions or masses noted          Head:  normocephalic, no masses or lesions        Eyes:  pupils equal and round, conjunctivae and lids unremarkable, sclera white, no xanthalasma, EOMS intact, no nystagmus        Lymph:      ENT:  no pallor or cyanosis, dentition good        Neck:  carotid pulses are full and equal bilaterally, JVP normal, no carotid bruit        Respiratory:  normal breath sounds, clear to auscultation, normal A-P diameter, normal symmetry, normal respiratory excursion, no use of accessory muscles         Cardiac: regular rhythm, normal S1/S2, no S3 or S4, apical impulse not displaced, no murmurs, gallops or rubs                                                         GI:  abdomen soft;BS normoactive        Extremities and Muscular Skeletal:  no deformities, clubbing, cyanosis, erythema observed;no edema              Neurological:  no gross motor deficits;affect appropriate        Psych:  oriented to time, person and place        CC  Zac Salgado MD  [id number] [place] [address]              "

## 2019-09-13 NOTE — LETTER
"2019      Zac Salgado MD  [address] [facility] [address]      RE: Alessio Diaz Puneet       Dear Colleague,    I had the pleasure of seeing Alessio Teixeira in the [facility].    Service Date: 2019      Zac Salgado MD   [facility] [place]    [address] [place] [address]      RE: Alessio Teixeira    MRN: [id number]   : 1935      Dear Zac:       I had the opportunity to see Mr. Alessio Teixeira in Cardiology Clinic today at the [facility] in [place] for consultation regarding coronary artery disease identified on a CT scan.  Mr. Teixeira was apparently undergoing a CT scan for lung cancer screening recently when he was found to have significant coronary calcifications identified on that study.  The radiologist reported, \"Coronary artery calcium moderate or severe.\"  For that reason, he then underwent a Lexiscan nuclear perfusion imaging study, which identified no evidence of myocardial ischemia or infarction.  His ejection fraction was normal at 68%.  I reviewed the images of that study and agree that the inferior and inferolateral defect is most likely caused by attenuation artifact.  Mr. Teixeira tells me that he has no concerning symptoms of chest discomfort.  He specifically denies pressure, tightness and heaviness in his chest, left arm, neck, jaw, back and shoulder.  He is somewhat limited by knee and hip pain, but is still able to get around on his own slowly and walks throughout the store at [employer] on a regular basis without any concerning symptoms of chest discomfort or shortness of breath.      His father had coronary artery disease, including a bypass at age 65 and again at age 85.  Mr. Teixeira has dyslipidemia, which has been treated with a combination of lovastatin and niacin for many years.  He does not appear to have hypertension.  He does not have diabetes and quit " smoking many years ago.  He has paroxysmal atrial fibrillation and is on warfarin for stroke prevention.  His last EKG at the time of his stress test on 2019 showed normal sinus rhythm with minor nonspecific T-wave abnormalities.      PHYSICAL EXAMINATION:  His blood pressure is 110/68, heart rate 72 and weight 250 pounds.  His lungs are clear.  Heart rhythm is regular.  He has no cardiac murmurs or carotid bruits and no edema.      IMPRESSIONS:  Mr. Joshua Teixeira is an 83-year-old gentleman with a history of paroxysmal atrial fibrillation, which has remained asymptomatic and is treated with warfarin for stroke prevention.  He also has dyslipidemia on medical therapy.  A recent CT scan demonstrated significant coronary artery calcifications, but there appears to be no evidence of significant coronary artery blockages based on his recent Lexiscan nuclear perfusion imaging stress test.  In addition, he has no concerning cardiac symptoms at this time.      I suggested that niacin may provide more harm than benefit and suggested he try going without that until his upcoming primary care visit in late fall.  He can recheck his numbers at that time and consider whether additional medication might be beneficial for his cholesterol treatment.  I will have him continue his lovastatin.  I do not think any additional evaluation is necessary for coronary artery disease, and his risk factors are otherwise well treated.      I have not arranged any followup in Cardiology for Mr. Teixeira, but would be happy to see him again in the future if needed.      Sincerely,      Feliciano Ortega MD, FACC         FELICIANO ORTEGA MD, FACC             D: 2019   T: 2019   MT: willy      Name:     JOSHUA TEIXEIRA   MRN:      [id number]        Account:      [id number]   :      1935           Service Date: 2019      Document: [id number]         Outpatient Encounter Medications as of 2019   Medication Sig Dispense Refill      CALCIUM PO Take 600 mg by mouth 2 times daily        Esomeprazole Magnesium (NEXIUM PO) Take by mouth every morning (before breakfast)       furosemide (LASIX) 20 MG tablet Take 1 tablet (20 mg) by mouth daily 90 tablet 3     KLOR-CON 20 MEQ CR tablet Take 1 tablet (20 mEq) by mouth daily 90 tablet 3     lovastatin (MEVACOR) 40 MG tablet TAKE TWO TABLETS (80MG) BY MOUTH DAILY AT BEDTIME 180 tablet 3     multivitamin, therapeutic with minerals (THERA-VIT-M) TABS Take 1 tablet by mouth daily       Vitamin D, Cholecalciferol, 1000 units TABS Take 1,000 Units by mouth daily.         Warfarin Sodium (COUMADIN PO) Take 7 mg by mouth daily 5 mg sun, thur & 7 mg row       ORDER FOR DME CPAP supplies with variable pressure control (Patient not taking: Reported on 9/13/2019) 1 Device 0     [DISCONTINUED] acetaminophen (TYLENOL) 500 MG tablet Take 2 tablets (1,000 mg) by mouth 3 times daily 540 tablet 3     [DISCONTINUED] diphenhydrAMINE-acetaminophen (TYLENOL PM)  MG tablet Take 1 tablet by mouth nightly as needed for sleep       [DISCONTINUED] NIACIN CR PO Take 500 mg by mouth 2 times daily (with meals) 2 x 500 mg slow niacin       [DISCONTINUED] oxyCODONE (ROXICODONE) 5 MG tablet   0     [DISCONTINUED] pantoprazole (PROTONIX) 40 MG EC tablet   1     [DISCONTINUED] polyethylene glycol (MIRALAX/GLYCOLAX) packet Take 17 g by mouth daily as needed for constipation 90 packet 3     [DISCONTINUED] acetaminophen (TYLENOL) tablet 650 mg        No facility-administered encounter medications on file as of 9/13/2019.        Again, thank you for allowing me to participate in the care of your patient.      Sincerely,    FELICIANO ORTEGA MD     [facility]

## 2019-09-13 NOTE — PROGRESS NOTES
"Service Date: 2019      [place] [address]      RE: Alessio Teixeira    MRN: [id number]   : 1935      Dear Zac:       I had the opportunity to see Mr. Alessio Teixeira in Cardiology Clinic today at the [facility] in [place] for consultation regarding coronary artery disease identified on a CT scan.  Mr. Teixeira was apparently undergoing a CT scan for lung cancer screening recently when he was found to have significant coronary calcifications identified on that study.  The radiologist reported, \"Coronary artery calcium moderate or severe.\"  For that reason, he then underwent a Lexiscan nuclear perfusion imaging study, which identified no evidence of myocardial ischemia or infarction.  His ejection fraction was normal at 68%.  I reviewed the images of that study and agree that the inferior and inferolateral defect is most likely caused by attenuation artifact.  Mr. Teixeira tells me that he has no concerning symptoms of chest discomfort.  He specifically denies pressure, tightness and heaviness in his chest, left arm, neck, jaw, back and shoulder.  He is somewhat limited by knee and hip pain, but is still able to get around on his own slowly and walks throughout the store at [employer] on a regular basis without any concerning symptoms of chest discomfort or shortness of breath.      His father had coronary artery disease, including a bypass at age 65 and again at age 85.  Mr. Teixeira has dyslipidemia, which has been treated with a combination of lovastatin and niacin for many years.  He does not appear to have hypertension.  He does not have diabetes and quit smoking many years ago.  He has paroxysmal atrial fibrillation and is on warfarin for stroke prevention.  His last EKG at the time of his stress test on 2019 showed normal sinus rhythm with minor nonspecific T-wave abnormalities.    "   PHYSICAL EXAMINATION:  His blood pressure is 110/68, heart rate 72 and weight 250 pounds.  His lungs are clear.  Heart rhythm is regular.  He has no cardiac murmurs or carotid bruits and no edema.      IMPRESSIONS:  Mr. Joshua Teixeira is an 83-year-old gentleman with a history of paroxysmal atrial fibrillation, which has remained asymptomatic and is treated with warfarin for stroke prevention.  He also has dyslipidemia on medical therapy.  A recent CT scan demonstrated significant coronary artery calcifications, but there appears to be no evidence of significant coronary artery blockages based on his recent Lexiscan nuclear perfusion imaging stress test.  In addition, he has no concerning cardiac symptoms at this time.      I suggested that niacin may provide more harm than benefit and suggested he try going without that until his upcoming primary care visit in late fall.  He can recheck his numbers at that time and consider whether additional medication might be beneficial for his cholesterol treatment.  I will have him continue his lovastatin.  I do not think any additional evaluation is necessary for coronary artery disease, and his risk factors are otherwise well treated.      I have not arranged any followup in Cardiology for Mr. Teixeira, but would be happy to see him again in the future if needed.      Sincerely,      Feliciano Ortega MD, FACC         FELICIANO ORTEGA MD, FACC             D: 2019   T: 2019   MT: willy      Name:     JOSHUA TEIXEIRA   MRN:      [id number]        Account:      [id number]   :      1935           Service Date: 2019      Document: [id number]

## 2019-09-13 NOTE — LETTER
9/13/2019    Zac Salgado MD  [address]  [facility] [address]    RE: Alessio Teixeira       Dear Colleague,    I had the pleasure of seeing Alessio Teixeira in the [facility].    HPI and Plan:   See dictation    No orders of the defined types were placed in this encounter.      No orders of the defined types were placed in this encounter.      Medications Discontinued During This Encounter   Medication Reason     acetaminophen (TYLENOL) 500 MG tablet Therapy completed     acetaminophen (TYLENOL) tablet 650 mg Stopped by Patient     diphenhydrAMINE-acetaminophen (TYLENOL PM)  MG tablet Stopped by Patient     oxyCODONE (ROXICODONE) 5 MG tablet Stopped by Patient     polyethylene glycol (MIRALAX/GLYCOLAX) packet Stopped by Patient     pantoprazole (PROTONIX) 40 MG EC tablet Stopped by Patient     NIACIN CR PO          Encounter Diagnoses   Name Primary?     Paroxysmal atrial fibrillation (H) Yes     Hypertension goal BP (blood pressure) < 140/90      Mixed hyperlipidemia      Coronary artery disease involving native coronary artery of native heart without angina pectoris        CURRENT MEDICATIONS:  Current Outpatient Medications   Medication Sig Dispense Refill     CALCIUM PO Take 600 mg by mouth 2 times daily        Esomeprazole Magnesium (NEXIUM PO) Take by mouth every morning (before breakfast)       furosemide (LASIX) 20 MG tablet Take 1 tablet (20 mg) by mouth daily 90 tablet 3     KLOR-CON 20 MEQ CR tablet Take 1 tablet (20 mEq) by mouth daily 90 tablet 3     lovastatin (MEVACOR) 40 MG tablet TAKE TWO TABLETS (80MG) BY MOUTH DAILY AT BEDTIME 180 tablet 3     multivitamin, therapeutic with minerals (THERA-VIT-M) TABS Take 1 tablet by mouth daily       Vitamin D, Cholecalciferol, 1000 units TABS Take 1,000 Units by mouth daily.         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     Actinic keratosis      Atrial fibrillation (H)      Edema of leg      Gout, unspecified      H/O diplopia      Hypertension      Meningioma (H)     sinus     Myalgia and myositis, unspecified      Myasthenia gravis (H)     high dose steroids 2007,      Obesity      Other seborrheic keratosis      Presbyacusis      Primary localized osteoarthrosis, lower leg      Primary localized osteoarthrosis, pelvic region and thigh      Sleep apnea     doesn't use his CPAP)     Spinal stenosis      Squamous cell cancer of scalp and skin of neck     Dr Sanchez, multiple procedures, Mohs scalp and chest     Vitamin D deficiency        PAST SURGICAL HISTORY:  Past Surgical History:   Procedure Laterality Date     APPENDECTOMY       ARTHROPLASTY HIP Right 2016     ARTHROPLASTY HIP ANTERIOR Right 4/26/2016    Procedure: ARTHROPLASTY HIP ANTERIOR;  Surgeon: Miguel Johnson MD;  Location:  OR     ARTHROPLASTY KNEE Right 1/28/2019    Procedure: RIGHT TOTAL KNEE ARTROPLASTY;  Surgeon: Miguel Johnson MD;  Location:  OR     BRAIN SURGERY  2007    partial resection meningioma     C RAD RESEC TONSIL/PILLARS       CATARACT IOL, RT/LT       COLECTOMY  2007    bowel perforation due to steroids     ESOPHAGOSCOPY, GASTROSCOPY, DUODENOSCOPY (EGD), COMBINED N/A 12/22/2018    Procedure: COMBINED ESOPHAGOSCOPY, GASTROSCOPY, DUODENOSCOPY (EGD), BIOPSY SINGLE OR MULTIPLE;  Surgeon: Elizabeth Jones MD;  Location:  GI     ESOPHAGOSCOPY, GASTROSCOPY, DUODENOSCOPY (EGD), COMBINED N/A 5/10/2019    Procedure: ESOPHAGOGASTRODUODENOSCOPY (EGD);  Surgeon: Ahsan Garcia MD;  Location:  GI     GENITOURINARY SURGERY      vasectomy     HEAD & NECK SURGERY  2007    meningioma removed     KNEE SURGERY  2010    left  knee replacement     ORTHOPEDIC SURGERY      left TKR     PHACOEMULSIFICATION CLEAR CORNEA WITH TORIC INTRAOCULAR LENS IMPLANT  2012    Procedure: PHACOEMULSIFICATION CLEAR CORNEA WITH TORIC INTRAOCULAR LENS IMPLANT;  COMPLEX RIGHT PHACOEMULSIFICATION CLEAR CORNEA WITH TORIC INTRAOCULAR LENS IMPLANT WITH MALYUGIN RING;  Surgeon: Ronald Cortez MD;  Location: [facility]     RECTAL SURGERY         FAMILY HISTORY:  Family History   Problem Relation Age of Onset     Glaucoma Father      Unknown/Adopted Mother      Macular Degeneration No family hx of        SOCIAL HISTORY:  Social History     Socioeconomic History     Marital status:      Spouse name: None     Number of children: None     Years of education: None     Highest education level: None   Occupational History     None   Social Needs     Financial resource strain: None     Food insecurity:     Worry: None     Inability: None     Transportation needs:     Medical: None     Non-medical: None   Tobacco Use     Smoking status: Former Smoker     Packs/day: 1.00     Years: 3.00     Pack years: 3.00     Types: Cigarettes     Start date:      Last attempt to quit: 1963     Years since quittin.7     Smokeless tobacco: Former User   Substance and Sexual Activity     Alcohol use: Yes     Comment: 2-4 glasses of wine per week     Drug use: No     Sexual activity: Not Currently     Partners: Female   Lifestyle     Physical activity:     Days per week: None     Minutes per session: None     Stress: None   Relationships     Social connections:     Talks on phone: None     Gets together: None     Attends Mandaen service: None     Active member of club or organization: None     Attends meetings of clubs or organizations: None     Relationship status: None     Intimate partner violence:     Fear of current or ex partner: None     Emotionally abused: None     Physically abused: None     Forced sexual activity: None   Other Topics Concern      "Parent/sibling w/ CABG, MI or angioplasty before 65F 55M? No   Social History Narrative     None       Review of Systems:  Skin:  Positive for nail changes flaking   Eyes:  Positive for glasses reading  ENT:  Negative      Respiratory:  Positive for sleep apnea does not wear CPAP   Cardiovascular:    palpitations;Positive for;edema;dizziness palpitations are seldom, lightheaded when stands on occasion  Gastroenterology: Positive for   GERD  Genitourinary:  Negative      Musculoskeletal:  Positive for joint pain;back pain    Neurologic:  Negative      Psychiatric:  Negative      Heme/Lymph/Imm:  Positive for allergies many foods due to stomach problems  Endocrine:  Negative        Physical Exam:  Vitals: /68   Pulse 72   Ht 1.803 m (5' 11\")   Wt 113.8 kg (250 lb 14.4 oz)   BMI 34.99 kg/m       Constitutional:  cooperative, alert and oriented, well developed, well nourished, in no acute distress        Skin:  warm and dry to the touch, no apparent skin lesions or masses noted          Head:  normocephalic, no masses or lesions        Eyes:  pupils equal and round, conjunctivae and lids unremarkable, sclera white, no xanthalasma, EOMS intact, no nystagmus        Lymph:      ENT:  no pallor or cyanosis, dentition good        Neck:  carotid pulses are full and equal bilaterally, JVP normal, no carotid bruit        Respiratory:  normal breath sounds, clear to auscultation, normal A-P diameter, normal symmetry, normal respiratory excursion, no use of accessory muscles         Cardiac: regular rhythm, normal S1/S2, no S3 or S4, apical impulse not displaced, no murmurs, gallops or rubs                                                         GI:  abdomen soft;BS normoactive        Extremities and Muscular Skeletal:  no deformities, clubbing, cyanosis, erythema observed;no edema              Neurological:  no gross motor deficits;affect appropriate        Psych:  oriented to time, person and place        AMAURY Frank" Albert Salgado MD  [address] [place] [address]                Thank you for allowing me to participate in the care of your patient.      Sincerely,     FELICIANO ORTEGA MD     [facility]    cc:   Zac Salgado MD  [address] [place] [address]

## 2019-10-16 ENCOUNTER — ANTICOAGULATION THERAPY VISIT (OUTPATIENT)
Dept: NURSING | Facility: CLINIC | Age: 84
End: 2019-10-16
Payer: COMMERCIAL

## 2019-10-16 DIAGNOSIS — Z79.01 LONG TERM CURRENT USE OF ANTICOAGULANT THERAPY: ICD-10-CM

## 2019-10-16 DIAGNOSIS — I48.20 CHRONIC ATRIAL FIBRILLATION (H): ICD-10-CM

## 2019-10-16 LAB — INR POINT OF CARE: 1.9 (ref 0.86–1.14)

## 2019-10-16 PROCEDURE — 99207 ZZC NO CHARGE NURSE ONLY: CPT

## 2019-10-16 PROCEDURE — 36416 COLLJ CAPILLARY BLOOD SPEC: CPT

## 2019-10-16 PROCEDURE — 85610 PROTHROMBIN TIME: CPT | Mod: QW

## 2019-10-16 NOTE — PROGRESS NOTES
ANTICOAGULATION FOLLOW-UP CLINIC VISIT    Patient Name:  Alessio Teixeira  Date:  10/16/2019  Contact Type:  Face to Face    SUBJECTIVE:  Patient Findings     Comments:   The patient was assessed for diet, medication, and activity level changes, missed or extra doses, bruising or bleeding, with no problem findings.          Clinical Outcomes     Comments:   The patient was assessed for diet, medication, and activity level changes, missed or extra doses, bruising or bleeding, with no problem findings.             OBJECTIVE    INR Protime   Date Value Ref Range Status   10/16/2019 1.9 (A) 0.86 - 1.14 Final       ASSESSMENT / PLAN  INR assessment SUB    Recheck INR In: 5 WEEKS    INR Location Clinic      Anticoagulation Summary  As of 10/16/2019    INR goal:   2.0-3.0   TTR:   68.8 % (3.4 y)   INR used for dosin.9! (10/16/2019)   Warfarin maintenance plan:   5 mg (2 mg x 2.5) every Sun, Thu; 7 mg (2 mg x 3.5) all other days   Full warfarin instructions:   10/16: 9 mg; Otherwise 5 mg every Sun, Thu; 7 mg all other days   Weekly warfarin total:   45 mg   Plan last modified:   Kerrie Morgan RN (2019)   Next INR check:   2019   Target end date:   Indefinite    Indications    Chronic atrial fibrillation [I48.20]  Long term current use of anticoagulant therapy [Z79.01]             Anticoagulation Episode Summary     INR check location:   Anticoagulation Clinic    Preferred lab:       Send INR reminders to:   JOLEEN DOMINGUEZ    Comments:         Anticoagulation Care Providers     Provider Role Specialty Phone number    DamianZac levy MD [facility] [phone]            See the Encounter Report to view Anticoagulation Flowsheet and Dosing Calendar (Go to Encounters tab in chart review, and find the Anticoagulation Therapy Visit)        Kerrie Morgan RN

## 2019-10-23 NOTE — PROGRESS NOTES
"Subjective     Alessio Teixeiar is a 83 year old male who presents to clinic today for the following health issues:    HPI   Musculoskeletal problem/pain      Duration: Increased pain over the last week    Description  Location: Entire right leg, knee and hip pain    Intensity:  7/10    Accompanying signs and symptoms: Right foot 1st and 2nd toes red swollen    History  Previous similar problem: YES  Previous evaluation:  Has had knee surgery    Precipitating or alleviating factors:  Trauma or overuse: no   Aggravating factors include: sitting, standing, walking and climbing stairs    Therapies tried and outcome: acetaminophen      Takes Warfarin for afib.  Last INR was subtherapeutic at 1.9.    Has re-check in Nov with Warfarin clinic.    Right big toe very painful, red and swollen.  Then right nee started to swell and become painful--feels fluid in the joint.  Right hip has been painful since he fell last year--but not any worse.    Admits that he has been drinking wine, beer, and eating ham recently.    Reviewed and updated as needed this visit by Provider  Tobacco  Allergies  Meds  Problems  Med Hx  Surg Hx  Fam Hx         Review of Systems   ROS COMP: CONSTITUTIONAL: NEGATIVE for fever, chills, change in weight  EYES: NEGATIVE for vision changes or irritation  ENT/MOUTH: NEGATIVE for ear, mouth and throat problems  RESP: NEGATIVE for significant cough or SOB  CV: NEGATIVE for chest pain, palpitations or peripheral edema  GI: NEGATIVE for nausea, abdominal pain, heartburn, or change in bowel habits  NEURO: No paresthesias or any weakness.  : NEGATIVE for frequency, dysuria, or hematuria      Objective    /74 (Patient Position: Sitting, Cuff Size: Adult Regular)   Pulse 69   Temp 97.3  F (36.3  C) (Tympanic)   Resp 14   Ht 1.803 m (5' 11\")   Wt 112.5 kg (248 lb)   SpO2 100%   BMI 34.59 kg/m    Body mass index is 34.59 kg/m .  Physical Exam   GENERAL: Alert and no distress; appears " healthy  EYES: Eyes grossly normal to inspection, PERRL and conjunctivae and sclerae normal  HENT: ear canals and TM's normal, nose and mouth without ulcers or lesions  NECK: no adenopathy, no asymmetry, masses, or scars and thyroid normal to palpation  RESP: lungs clear to auscultation - no rales, rhonchi or wheezes  CV: regular rate and rhythm, normal S1 S2, no S3 or S4, no murmur, click or rub, no peripheral edema and peripheral pulses strong  ABDOMEN: soft, nontender, no hepatosplenomegaly, no masses and bowel sounds normal  MS/SKIN: right great toe is swollen, red and painful at the 1st metacarpal joint.  No streaking present or any discharge.  Right knee with palpable fluid--suprapatellar bursitis.  Able to bear weight on both LE's.  PSYCH: mentation appears normal, affect normal/bright    Diagnostic Test Results:  Labs reviewed in Epic        Assessment & Plan   Problem List Items Addressed This Visit     None      Visit Diagnoses     Gout of big toe    -  Primary    Relevant Medications    predniSONE (DELTASONE) 20 MG tablet    Other Relevant Orders    Uric acid (Completed)    CBC with platelets and differential (Completed)    Suprapatellar bursitis of right knee        Relevant Medications    predniSONE (DELTASONE) 20 MG tablet    Need for prophylactic vaccination and inoculation against influenza        Relevant Orders    INFLUENZA (HIGH DOSE) 3 VALENT VACCINE [56091] (Completed)    ADMIN INFLUENZA (For MEDICARE Patients ONLY) [] (Completed)         --Right great toe appears to be gouty flare:  Rx Prednisone taper.  Monitor INR closely.  Will get INR checked sooner due to Prednisone use.  Uric acid, CBC  Gout diet (low purine) discussed and encouraged.  RTC in 1 week to re-check or sooner if needed.    --Right knee suprapatellar bursitis:  Prednisone may help decrease swelling a bit but likely needs to get fluid drained.  Will go to [facility]  to get this examined and drained.  RTC for follow up.    --Flu  "shot administered by my nurse.      BMI:   Estimated body mass index is 34.59 kg/m  as calculated from the following:    Height as of this encounter: 1.803 m (5' 11\").    Weight as of this encounter: 112.5 kg (248 lb).             Return in about 1 week (around 10/31/2019) for re-check / follow-up.    Shannon Talley,   [facility]      "

## 2019-10-24 ENCOUNTER — OFFICE VISIT (OUTPATIENT)
Dept: FAMILY MEDICINE | Facility: CLINIC | Age: 84
End: 2019-10-24
Payer: COMMERCIAL

## 2019-10-24 ENCOUNTER — TRANSFERRED RECORDS (OUTPATIENT)
Dept: HEALTH INFORMATION MANAGEMENT | Facility: CLINIC | Age: 84
End: 2019-10-24

## 2019-10-24 ENCOUNTER — HOSPITAL LABORATORY (OUTPATIENT)
Dept: OTHER | Facility: CLINIC | Age: 84
End: 2019-10-24

## 2019-10-24 VITALS
RESPIRATION RATE: 14 BRPM | OXYGEN SATURATION: 100 % | HEIGHT: 71 IN | TEMPERATURE: 97.3 F | WEIGHT: 248 LBS | DIASTOLIC BLOOD PRESSURE: 74 MMHG | BODY MASS INDEX: 34.72 KG/M2 | HEART RATE: 69 BPM | SYSTOLIC BLOOD PRESSURE: 130 MMHG

## 2019-10-24 DIAGNOSIS — M10.9 GOUT OF BIG TOE: Primary | ICD-10-CM

## 2019-10-24 DIAGNOSIS — M70.51 SUPRAPATELLAR BURSITIS OF RIGHT KNEE: ICD-10-CM

## 2019-10-24 DIAGNOSIS — Z23 NEED FOR PROPHYLACTIC VACCINATION AND INOCULATION AGAINST INFLUENZA: ICD-10-CM

## 2019-10-24 LAB
APPEARANCE FLD: NORMAL
BASOPHILS # BLD AUTO: 0.2 10E9/L (ref 0–0.2)
BASOPHILS NFR BLD AUTO: 1.6 %
COLOR FLD: YELLOW
CRYSTALS SNV MICRO: ABNORMAL
DIFFERENTIAL METHOD BLD: ABNORMAL
EOSINOPHIL # BLD AUTO: 0.3 10E9/L (ref 0–0.7)
EOSINOPHIL NFR BLD AUTO: 2.4 %
ERYTHROCYTE [DISTWIDTH] IN BLOOD BY AUTOMATED COUNT: 18.9 % (ref 10–15)
GRAM STN SPEC: NORMAL
GRAM STN SPEC: NORMAL
HCT VFR BLD AUTO: 49.7 % (ref 40–53)
HGB BLD-MCNC: 16.1 G/DL (ref 13.3–17.7)
LYMPHOCYTES # BLD AUTO: 2 10E9/L (ref 0.8–5.3)
LYMPHOCYTES NFR BLD AUTO: 13.9 %
MCH RBC QN AUTO: 24.7 PG (ref 26.5–33)
MCHC RBC AUTO-ENTMCNC: 32.4 G/DL (ref 31.5–36.5)
MCV RBC AUTO: 76 FL (ref 78–100)
MONOCYTES # BLD AUTO: 1 10E9/L (ref 0–1.3)
MONOCYTES NFR BLD AUTO: 7 %
NEUTROPHILS # BLD AUTO: 10.6 10E9/L (ref 1.6–8.3)
NEUTROPHILS NFR BLD AUTO: 75.1 %
PLATELET # BLD AUTO: 642 10E9/L (ref 150–450)
RBC # BLD AUTO: 6.53 10E12/L (ref 4.4–5.9)
SPECIMEN SOURCE FLD: NORMAL
SPECIMEN SOURCE SNV: ABNORMAL
SPECIMEN SOURCE: NORMAL
WBC # BLD AUTO: 14.1 10E9/L (ref 4–11)
WBC # FLD AUTO: NORMAL /UL

## 2019-10-24 PROCEDURE — 90662 IIV NO PRSV INCREASED AG IM: CPT | Performed by: FAMILY MEDICINE

## 2019-10-24 PROCEDURE — 36415 COLL VENOUS BLD VENIPUNCTURE: CPT | Performed by: FAMILY MEDICINE

## 2019-10-24 PROCEDURE — 84550 ASSAY OF BLOOD/URIC ACID: CPT | Performed by: FAMILY MEDICINE

## 2019-10-24 PROCEDURE — 85025 COMPLETE CBC W/AUTO DIFF WBC: CPT | Performed by: FAMILY MEDICINE

## 2019-10-24 PROCEDURE — 90471 IMMUNIZATION ADMIN: CPT | Performed by: FAMILY MEDICINE

## 2019-10-24 PROCEDURE — 99214 OFFICE O/P EST MOD 30 MIN: CPT | Mod: 25 | Performed by: FAMILY MEDICINE

## 2019-10-24 RX ORDER — PREDNISONE 20 MG/1
TABLET ORAL
Qty: 13 TABLET | Refills: 0 | Status: SHIPPED | OUTPATIENT
Start: 2019-10-24 | End: 2020-07-09

## 2019-10-24 ASSESSMENT — MIFFLIN-ST. JEOR: SCORE: 1847.05

## 2019-10-24 NOTE — LETTER
October 25, 2019      Alessio Teixeira  [address]  [place] [address]          We are writing to inform you of your test results.    - Shannon Talley DO is currently out of the office.  - Your Uric Acid level is high, consistent with your current attack of gout.    Resulted Orders   Uric acid   Result Value Ref Range    Uric Acid 8.2 (H) 3.5 - 7.2 mg/dL       If you have any questions or concerns, please call the clinic at the number listed above.       Sincerely,        Julissa Burrell PA-C    Family Medicine  [facility]

## 2019-10-24 NOTE — LETTER
October 25, 2019      Alessio Diaz Puneet  [address]  [place] [address]        Dear ,    We are writing to inform you of your test results.    Uric acid level is high, confirms gout   Complete the course of Prednisone     Schedule follow up appointment to discuss further treatment for prevention of reoccurrence     Resulted Orders   Uric acid   Result Value Ref Range    Uric Acid 8.2 (H) 3.5 - 7.2 mg/dL       If you have any questions or concerns, please call the clinic at the number listed above.       Sincerely,    Dr. Prosper Talley, DO

## 2019-10-25 ENCOUNTER — TELEPHONE (OUTPATIENT)
Dept: FAMILY MEDICINE | Facility: CLINIC | Age: 84
End: 2019-10-25

## 2019-10-25 ENCOUNTER — TRANSFERRED RECORDS (OUTPATIENT)
Dept: HEALTH INFORMATION MANAGEMENT | Facility: CLINIC | Age: 84
End: 2019-10-25

## 2019-10-25 LAB — URATE SERPL-MCNC: 8.2 MG/DL (ref 3.5–7.2)

## 2019-10-25 NOTE — RESULT ENCOUNTER NOTE
Lab letter printed and signed.  Message comments below:  - Shannon Talley DO is currently out of the office.  - Your Uric Acid level is high, consistent with your current attack of gout.

## 2019-10-25 NOTE — TELEPHONE ENCOUNTER
Patient Contact    Attempt # 1    Was call answered?  No.  Unable to leave a message for patient. VM would not allow.       On call back: Please share PCP note below.

## 2019-10-25 NOTE — TELEPHONE ENCOUNTER
Call from [facility]  Spoke with Dr. Miguel Johnson    He was the MD who did patients knee replacement in January    Did a follow up visit with him recently and found there was gout in patients knee. Also noted patient has had gout flare ups in the past and would like to check in with PCP to see if patient should be on a long term Allopurinol for gout to decrease the flare ups for the patient.     Dr. Johnson talked with patient about this during follow up appointment with him today so patient is aware this is a possibility.     Sending to PCP for review.

## 2019-10-28 NOTE — TELEPHONE ENCOUNTER
Patient was called. [employer] account activated for patient. He will work on submitting an E-visit for this. [employer] assistance number provided.

## 2019-10-29 LAB
BACTERIA SPEC CULT: NO GROWTH
Lab: NORMAL
SPECIMEN SOURCE: NORMAL

## 2019-11-04 ENCOUNTER — TELEPHONE (OUTPATIENT)
Dept: FAMILY MEDICINE | Facility: CLINIC | Age: 84
End: 2019-11-04

## 2019-11-04 NOTE — TELEPHONE ENCOUNTER
Patient called back with questions about starting eVisit vs. office visit. He said he is due for physical after 11/21 and is wondering if he can wait to discuss with PCP at that visit? Routing to provider to advise.

## 2019-11-04 NOTE — TELEPHONE ENCOUNTER
Reason for Call:  Other call back    Detailed comments: The patient called and stated that he had gotten a call last week stating that he needed to come in to see Dr. Salgado regarding his gout. He was told he can set up mychart and set up an e-visit if he didn't want to come in for an actual appointment and he was told he may have to start taking another medication for his chronic gout. He has further questions about this and would just like to clarify a few things. He would like a call back to discuss these questions.     Phone Number Patient can be reached at: Cell number on file:    Telephone Information:   Mobile [phone]       Best Time: Any    Can we leave a detailed message on this number? YES    Call taken on 11/4/2019 at 8:16 AM by Amanda Bennett

## 2019-11-10 DIAGNOSIS — Z86.711 HISTORY OF PULMONARY EMBOLISM: ICD-10-CM

## 2019-11-10 DIAGNOSIS — I48.0 PAROXYSMAL ATRIAL FIBRILLATION (H): ICD-10-CM

## 2019-11-11 NOTE — TELEPHONE ENCOUNTER
"Requested Prescriptions   Pending Prescriptions Disp Refills     warfarin ANTICOAGULANT (COUMADIN) 2 MG tablet [Pharmacy Med Name: Warfarin Sodium Oral Tablet 2 MG]  DISCONTINUED  Last Written Prescription Date:  1/25/2019 END: 1/28/2019  Last Fill Quantity: 315 tablet,  # refills: 2   Last office visit: 10/24/2019 with prescribing provider:  Mayank   Future Office Visit:   Next 5 appointments (look out 90 days)    Nov 25, 2019 10:45 AM CST  PHYSICAL [facility]ac [facility]gado [facility] [address] [place] [id number]  [phone]          315 tablet 1     Sig: TAKE 3 AND 1/2 TABLETS (7MG) BY MOUTH DAILY       Vitamin K Antagonists Failed - 11/10/2019  7:01 AM        Failed - INR is within goal in the past 6 weeks     Confirm INR is within goal in the past 6 weeks.     Recent Labs   Lab Test 10/16/19   INR 1.9*                       Failed - Medication is active on med list        Passed - Recent (12 mo) or future (30 days) visit within the authorizing provider's specialty     Patient has had an office visit with the authorizing provider or a provider within the authorizing providers department within the previous 12 mos or has a future within next 30 days. See \"Patient Info\" tab in inbasket, or \"Choose Columns\" in Meds & Orders section of the refill encounter.              Passed - Patient is 18 years of age or older           "

## 2019-11-12 ENCOUNTER — TELEPHONE (OUTPATIENT)
Dept: FAMILY MEDICINE | Facility: CLINIC | Age: 84
End: 2019-11-12

## 2019-11-12 RX ORDER — WARFARIN SODIUM 2 MG/1
2 TABLET ORAL DAILY
Qty: 315 TABLET | Refills: 1 | Status: SHIPPED | OUTPATIENT
Start: 2019-11-12 | End: 2020-06-16

## 2019-11-12 NOTE — TELEPHONE ENCOUNTER
Reason for Call:  Other call back    Detailed comments: Rufina with [facility] pharmacy called with questions about the directions for the prescription for warfarin ANTICOAGULANT (COUMADIN) 2 MG tablet that Dr. Salgado sent in for the patient. She would like a call back to discuss this.     Phone Number Patient can be reached at: Other phone number: [phone]    Best Time: Any    Can we leave a detailed message on this number? Not Applicable    Call taken on 11/12/2019 at 3:39 PM by Amanda Bennett

## 2019-11-20 ENCOUNTER — ANTICOAGULATION THERAPY VISIT (OUTPATIENT)
Dept: NURSING | Facility: CLINIC | Age: 84
End: 2019-11-20
Payer: COMMERCIAL

## 2019-11-20 DIAGNOSIS — Z12.5 SCREENING FOR PROSTATE CANCER: ICD-10-CM

## 2019-11-20 DIAGNOSIS — Z79.01 LONG TERM CURRENT USE OF ANTICOAGULANT THERAPY: ICD-10-CM

## 2019-11-20 DIAGNOSIS — E78.5 HYPERLIPIDEMIA LDL GOAL <100: ICD-10-CM

## 2019-11-20 DIAGNOSIS — N18.2 CKD (CHRONIC KIDNEY DISEASE) STAGE 2, GFR 60-89 ML/MIN: Primary | ICD-10-CM

## 2019-11-20 DIAGNOSIS — I48.20 CHRONIC ATRIAL FIBRILLATION (H): ICD-10-CM

## 2019-11-20 DIAGNOSIS — I10 HYPERTENSION GOAL BP (BLOOD PRESSURE) < 140/90: ICD-10-CM

## 2019-11-20 LAB — INR POINT OF CARE: 2.4 (ref 0.86–1.14)

## 2019-11-20 PROCEDURE — 36416 COLLJ CAPILLARY BLOOD SPEC: CPT

## 2019-11-20 PROCEDURE — 99207 ZZC NO CHARGE NURSE ONLY: CPT

## 2019-11-20 PROCEDURE — 85610 PROTHROMBIN TIME: CPT | Mod: QW

## 2019-11-20 NOTE — PROGRESS NOTES
ANTICOAGULATION FOLLOW-UP CLINIC VISIT    Patient Name:  Alessio Teixeira  Date:  2019  Contact Type:  Face to Face    SUBJECTIVE:  Patient Findings     Comments:   The patient was assessed for diet, medication, and activity level changes, missed or extra doses, bruising or bleeding, with no problem findings.          Clinical Outcomes     Comments:   The patient was assessed for diet, medication, and activity level changes, missed or extra doses, bruising or bleeding, with no problem findings.             OBJECTIVE    INR Protime   Date Value Ref Range Status   2019 2.4 (A) 0.86 - 1.14 Final       ASSESSMENT / PLAN  INR assessment THER    Recheck INR In: 6 WEEKS    INR Location Clinic      Anticoagulation Summary  As of 2019    INR goal:   2.0-3.0   TTR:   69.1 % (3.5 y)   INR used for dosin.4 (2019)   Warfarin maintenance plan:   5 mg (2 mg x 2.5) every Sun, Thu; 7 mg (2 mg x 3.5) all other days   Full warfarin instructions:   5 mg every Sun, Thu; 7 mg all other days   Weekly warfarin total:   45 mg   Plan last modified:   Kerrie Morgan RN (2019)   Next INR check:   2020   Target end date:   Indefinite    Indications    Chronic atrial fibrillation [I48.20]  Long term current use of anticoagulant therapy [Z79.01]             Anticoagulation Episode Summary     INR check location:   Anticoagulation Clinic    Preferred lab:       Send INR reminders to:   [facility]    Comments:         Anticoagulation Care Providers     Provider Role Specialty Phone number    DamianZac MD [facility] [phone]            See the Encounter Report to view Anticoagulation Flowsheet and Dosing Calendar (Go to Encounters tab in chart review, and find the Anticoagulation Therapy Visit)        Kerrie Morgan RN

## 2019-11-25 ENCOUNTER — OFFICE VISIT (OUTPATIENT)
Dept: FAMILY MEDICINE | Facility: CLINIC | Age: 84
End: 2019-11-25
Payer: COMMERCIAL

## 2019-11-25 VITALS
SYSTOLIC BLOOD PRESSURE: 128 MMHG | HEART RATE: 63 BPM | RESPIRATION RATE: 14 BRPM | TEMPERATURE: 96.4 F | OXYGEN SATURATION: 98 % | DIASTOLIC BLOOD PRESSURE: 58 MMHG | BODY MASS INDEX: 35 KG/M2 | HEIGHT: 70 IN | WEIGHT: 244.5 LBS

## 2019-11-25 DIAGNOSIS — I83.893 VARICOSE VEINS OF BILATERAL LOWER EXTREMITIES WITH OTHER COMPLICATIONS: ICD-10-CM

## 2019-11-25 DIAGNOSIS — I87.2 VENOUS STASIS DERMATITIS, UNSPECIFIED LATERALITY: ICD-10-CM

## 2019-11-25 DIAGNOSIS — N18.2 CKD (CHRONIC KIDNEY DISEASE) STAGE 2, GFR 60-89 ML/MIN: ICD-10-CM

## 2019-11-25 DIAGNOSIS — R60.0 EDEMA OF BOTH LEGS: ICD-10-CM

## 2019-11-25 DIAGNOSIS — D64.9 ANEMIA, UNSPECIFIED TYPE: ICD-10-CM

## 2019-11-25 DIAGNOSIS — Z12.5 SCREENING FOR PROSTATE CANCER: ICD-10-CM

## 2019-11-25 DIAGNOSIS — M1A.0710 CHRONIC IDIOPATHIC GOUT INVOLVING TOE OF RIGHT FOOT WITHOUT TOPHUS: ICD-10-CM

## 2019-11-25 DIAGNOSIS — I48.0 PAROXYSMAL ATRIAL FIBRILLATION (H): ICD-10-CM

## 2019-11-25 DIAGNOSIS — H91.13 PRESBYCUSIS OF BOTH EARS: ICD-10-CM

## 2019-11-25 DIAGNOSIS — Z79.01 LONG TERM (CURRENT) USE OF ANTICOAGULANTS: ICD-10-CM

## 2019-11-25 DIAGNOSIS — I10 HYPERTENSION GOAL BP (BLOOD PRESSURE) < 140/90: ICD-10-CM

## 2019-11-25 DIAGNOSIS — E78.2 MIXED HYPERLIPIDEMIA: ICD-10-CM

## 2019-11-25 DIAGNOSIS — G47.33 OSA (OBSTRUCTIVE SLEEP APNEA): ICD-10-CM

## 2019-11-25 DIAGNOSIS — Z00.00 ROUTINE GENERAL MEDICAL EXAMINATION AT A HEALTH CARE FACILITY: Primary | ICD-10-CM

## 2019-11-25 LAB
ALBUMIN UR-MCNC: 30 MG/DL
APPEARANCE UR: CLEAR
BASOPHILS # BLD AUTO: 0.2 10E9/L (ref 0–0.2)
BASOPHILS NFR BLD AUTO: 1.4 %
BILIRUB UR QL STRIP: ABNORMAL
COLOR UR AUTO: YELLOW
DIFFERENTIAL METHOD BLD: ABNORMAL
EOSINOPHIL # BLD AUTO: 0.4 10E9/L (ref 0–0.7)
EOSINOPHIL NFR BLD AUTO: 3.4 %
ERYTHROCYTE [DISTWIDTH] IN BLOOD BY AUTOMATED COUNT: 19.7 % (ref 10–15)
GLUCOSE UR STRIP-MCNC: NEGATIVE MG/DL
HCT VFR BLD AUTO: 50.1 % (ref 40–53)
HGB BLD-MCNC: 15.6 G/DL (ref 13.3–17.7)
HGB UR QL STRIP: NEGATIVE
KETONES UR STRIP-MCNC: NEGATIVE MG/DL
LEUKOCYTE ESTERASE UR QL STRIP: NEGATIVE
LYMPHOCYTES # BLD AUTO: 1.7 10E9/L (ref 0.8–5.3)
LYMPHOCYTES NFR BLD AUTO: 13.5 %
MCH RBC QN AUTO: 24.2 PG (ref 26.5–33)
MCHC RBC AUTO-ENTMCNC: 31.1 G/DL (ref 31.5–36.5)
MCV RBC AUTO: 78 FL (ref 78–100)
MONOCYTES # BLD AUTO: 0.9 10E9/L (ref 0–1.3)
MONOCYTES NFR BLD AUTO: 6.8 %
NEUTROPHILS # BLD AUTO: 9.4 10E9/L (ref 1.6–8.3)
NEUTROPHILS NFR BLD AUTO: 74.9 %
NITRATE UR QL: NEGATIVE
PH UR STRIP: 7.5 PH (ref 5–7)
PLATELET # BLD AUTO: 750 10E9/L (ref 150–450)
RBC # BLD AUTO: 6.44 10E12/L (ref 4.4–5.9)
RBC #/AREA URNS AUTO: ABNORMAL /HPF
SOURCE: ABNORMAL
SP GR UR STRIP: 1.01 (ref 1–1.03)
UROBILINOGEN UR STRIP-ACNC: 1 EU/DL (ref 0.2–1)
WBC # BLD AUTO: 12.5 10E9/L (ref 4–11)
WBC #/AREA URNS AUTO: ABNORMAL /HPF

## 2019-11-25 PROCEDURE — 90714 TD VACC NO PRESV 7 YRS+ IM: CPT | Performed by: FAMILY MEDICINE

## 2019-11-25 PROCEDURE — 81001 URINALYSIS AUTO W/SCOPE: CPT | Performed by: FAMILY MEDICINE

## 2019-11-25 PROCEDURE — G0103 PSA SCREENING: HCPCS | Performed by: FAMILY MEDICINE

## 2019-11-25 PROCEDURE — 36415 COLL VENOUS BLD VENIPUNCTURE: CPT | Performed by: FAMILY MEDICINE

## 2019-11-25 PROCEDURE — 85025 COMPLETE CBC W/AUTO DIFF WBC: CPT | Performed by: FAMILY MEDICINE

## 2019-11-25 PROCEDURE — 80053 COMPREHEN METABOLIC PANEL: CPT | Performed by: FAMILY MEDICINE

## 2019-11-25 PROCEDURE — 90471 IMMUNIZATION ADMIN: CPT | Performed by: FAMILY MEDICINE

## 2019-11-25 PROCEDURE — 99397 PER PM REEVAL EST PAT 65+ YR: CPT | Mod: 25 | Performed by: FAMILY MEDICINE

## 2019-11-25 PROCEDURE — 80061 LIPID PANEL: CPT | Performed by: FAMILY MEDICINE

## 2019-11-25 ASSESSMENT — ANXIETY QUESTIONNAIRES
7. FEELING AFRAID AS IF SOMETHING AWFUL MIGHT HAPPEN: NOT AT ALL
1. FEELING NERVOUS, ANXIOUS, OR ON EDGE: NOT AT ALL
GAD7 TOTAL SCORE: 0
4. TROUBLE RELAXING: NOT AT ALL
7. FEELING AFRAID AS IF SOMETHING AWFUL MIGHT HAPPEN: NOT AT ALL
3. WORRYING TOO MUCH ABOUT DIFFERENT THINGS: NOT AT ALL
2. NOT BEING ABLE TO STOP OR CONTROL WORRYING: NOT AT ALL
5. BEING SO RESTLESS THAT IT IS HARD TO SIT STILL: NOT AT ALL
GAD7 TOTAL SCORE: 0
6. BECOMING EASILY ANNOYED OR IRRITABLE: NOT AT ALL
GAD7 TOTAL SCORE: 0

## 2019-11-25 ASSESSMENT — ACTIVITIES OF DAILY LIVING (ADL): CURRENT_FUNCTION: NO ASSISTANCE NEEDED

## 2019-11-25 ASSESSMENT — PATIENT HEALTH QUESTIONNAIRE - PHQ9
SUM OF ALL RESPONSES TO PHQ QUESTIONS 1-9: 3
10. IF YOU CHECKED OFF ANY PROBLEMS, HOW DIFFICULT HAVE THESE PROBLEMS MADE IT FOR YOU TO DO YOUR WORK, TAKE CARE OF THINGS AT HOME, OR GET ALONG WITH OTHER PEOPLE: NOT DIFFICULT AT ALL
SUM OF ALL RESPONSES TO PHQ QUESTIONS 1-9: 3

## 2019-11-25 ASSESSMENT — MIFFLIN-ST. JEOR: SCORE: 1815.29

## 2019-11-25 NOTE — PROGRESS NOTES
"SUBJECTIVE:   Alessio Teixeira is a 83 year old male who presents for Preventive Visit.      Are you in the first 12 months of your Medicare coverage?  No    Healthy Habits:     In general, how would you rate your overall health?  Fair    Frequency of exercise:  4-5 days/week    Duration of exercise:  Less than 15 minutes    Do you usually eat at least 4 servings of fruit and vegetables a day, include whole grains    & fiber and avoid regularly eating high fat or \"junk\" foods?  Yes    Taking medications regularly:  Yes    Medication side effects:  None    Ability to successfully perform activities of daily living:  No assistance needed    Home Safety:  No safety concerns identified    Hearing Impairment:  Difficulty following a conversation in a noisy restaurant or crowded room, feel that people are mumbling or not speaking clearly, difficulty following dialogue in the theater, difficult to understand a speaker at a public meeting or [employer] service, need to ask people to speak up or repeat themselves and difficulty understanding soft or whispered speech    In the past 6 months, have you been bothered by leaking of urine? Yes    In general, how would you rate your overall mental or emotional health?  Excellent      PHQ-2 Total Score: 1    Additional concerns today:  No    Do you feel safe in your environment? Yes    Have you ever done Advance Care Planning? (For example, a Health Directive, POLST, or a discussion with a medical provider or your loved ones about your wishes): Yes, patient states has an Advance Care Planning document and will bring a copy to the clinic.      Fall risk  Fallen 2 or more times in the past year?: No  Any fall with injury in the past year?: No  click delete button to remove this line now  Cognitive Screening   1) Repeat 3 items (Leader, Season, Table)    2) Clock draw: NORMAL  3) 3 item recall: Recalls 2 objects   Results: NORMAL clock, 1-2 items recalled: COGNITIVE IMPAIRMENT LESS " LIKELY    Mini-CogTM Copyright EVE Awan. Licensed by the author for use in [facility]; reprinted with permission (swati@.[email or web address]). All rights reserved.      Do you have sleep apnea, excessive snoring or daytime drowsiness?: yes    Reviewed and updated as needed this visit by clinical staff  Tobacco  Allergies  Meds  Med Hx  Surg Hx  Fam Hx  Soc Hx        Reviewed and updated as needed this visit by Provider        Social History     Tobacco Use     Smoking status: Former Smoker     Packs/day: 1.00     Years: 3.00     Pack years: 3.00     Types: Cigarettes     Start date:      Last attempt to quit: 1963     Years since quittin.9     Smokeless tobacco: Former User   Substance Use Topics     Alcohol use: Yes     Comment: 2-4 glasses of wine per week     If you drink alcohol do you typically have >3 drinks per day or >7 drinks per week? No    Alcohol Use 2019   Prescreen: >3 drinks/day or >7 drinks/week? No   Prescreen: >3 drinks/day or >7 drinks/week? -               Current providers sharing in care for this patient include:   Patient Care Team:  Zac Salgado MD as PCP - General (Family Practice)  Zac Salgado MD as Assigned PCP    The following health maintenance items are reviewed in Epic and correct as of today:  Health Maintenance   Topic Date Due     MICROALBUMIN  1935     URINE DRUG SCREEN  1935     ZOSTER IMMUNIZATION (1 of 2) 1986     OP ANNUAL INR REFERRAL  2016     MEDICARE ANNUAL WELLNESS VISIT  2019     JAMES ASSESSMENT  2019     PHQ-9  2019     DTAP/TDAP/TD IMMUNIZATION (2 - Td) 2020     BMP  2020     LIPID  07/10/2020     FALL RISK ASSESSMENT  10/24/2020     ADVANCE CARE PLANNING  2024     INFLUENZA VACCINE  Completed     PNEUMOCOCCAL IMMUNIZATION 65+ LOW/MEDIUM RISK  Completed     IPV IMMUNIZATION  Aged Out     MENINGITIS IMMUNIZATION  Aged Out     Patient Active Problem List   Diagnosis      Hyperlipidemia LDL goal <100     Edema of both legs     Varicose veins of legs     BMI > 35     Stasis dermatitis     Chronic atrial fibrillation     Chronic 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     Vitamin D deficiency disease     Hypertension goal BP (blood pressure) < 140/90     Diplopia     MARTHA (obstructive sleep apnea)     Long term current use of anticoagulant therapy     Degenerative localized arthritis of hip     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 by Miguel Johnson MD     Aftercare following right hip joint replacement surgery     Bradycardia     History of pulmonary embolism- bilateral in 2007      Atrial fibrillation (H)     Anemia due to blood loss, acute     Chronic left-sided thoracic back pain     Presbycusis of both ears     Mixed hyperlipidemia     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     Acute pain     Gastrointestinal hemorrhage associated with gastric ulcer     JAVIER (acute kidney injury) (H)     CKD (chronic kidney disease) stage 2, GFR 60-89 ml/min     Pulmonary nodules     Debility     Weight gain     Anemia, unspecified type     Encounter for therapeutic drug monitoring     Long term (current) use of anticoagulants     Past Surgical History:   Procedure Laterality Date     APPENDECTOMY       ARTHROPLASTY HIP Right 2016     ARTHROPLASTY HIP ANTERIOR Right 4/26/2016    Procedure: ARTHROPLASTY HIP ANTERIOR;  Surgeon: Miguel Johnson MD;  Location:  OR     ARTHROPLASTY KNEE Right 1/28/2019    Procedure: RIGHT TOTAL KNEE ARTROPLASTY;  Surgeon: Miguel Johnson MD;  Location:  OR     BRAIN SURGERY  2007    partial resection meningioma     C RAD RESEC TONSIL/PILLARS       CATARACT IOL, RT/LT       COLECTOMY  2007    bowel perforation due to steroids     ESOPHAGOSCOPY, GASTROSCOPY, DUODENOSCOPY (EGD), COMBINED N/A 12/22/2018    Procedure: COMBINED ESOPHAGOSCOPY, GASTROSCOPY, DUODENOSCOPY (EGD), BIOPSY SINGLE OR MULTIPLE;  Surgeon: Robert  "Elizabeth Fung MD;  Location:  GI     ESOPHAGOSCOPY, GASTROSCOPY, DUODENOSCOPY (EGD), COMBINED N/A 5/10/2019    Procedure: ESOPHAGOGASTRODUODENOSCOPY (EGD);  Surgeon: Ahsan Garcia MD;  Location:  GI     GENITOURINARY SURGERY      vasectomy     HEAD & NECK SURGERY      meningioma removed     KNEE SURGERY  2010    left knee replacement     ORTHOPEDIC SURGERY      left TKR     PHACOEMULSIFICATION CLEAR CORNEA WITH TORIC INTRAOCULAR LENS IMPLANT  2012    Procedure: PHACOEMULSIFICATION CLEAR CORNEA WITH TORIC INTRAOCULAR LENS IMPLANT;  COMPLEX RIGHT PHACOEMULSIFICATION CLEAR CORNEA WITH TORIC INTRAOCULAR LENS IMPLANT WITH MALYUGIN RING;  Surgeon: Ronald Cortez MD;  Location:  [facility]     RECTAL SURGERY         Social History     Tobacco Use     Smoking status: Former Smoker     Packs/day: 1.00     Years: 3.00     Pack years: 3.00     Types: Cigarettes     Start date:      Last attempt to quit: 1963     Years since quittin.9     Smokeless tobacco: Former User   Substance Use Topics     Alcohol use: Yes     Comment: 2-4 glasses of wine per week     Family History   Problem Relation Age of Onset     Glaucoma Father      Unknown/Adopted Mother      Diabetes Brother      Macular Degeneration No family hx of              Review of Systems  Constitutional, HEENT, cardiovascular, pulmonary, gi and gu systems are negative, except as otherwise noted.    OBJECTIVE:   /58 (Patient Position: Sitting, Cuff Size: Adult Regular)   Pulse 63   Temp 96.4  F (35.8  C) (Tympanic)   Resp 14   Ht 1.778 m (5' 10\")   Wt 110.9 kg (244 lb 8 oz)   SpO2 98%   BMI 35.08 kg/m   Estimated body mass index is 35.08 kg/m  as calculated from the following:    Height as of this encounter: 1.778 m (5' 10\").    Weight as of this encounter: 110.9 kg (244 lb 8 oz).  Physical Exam  GENERAL: healthy, alert and no distress  EYES: Eyes grossly normal to inspection, PERRL and conjunctivae and sclerae normal  HENT: " ear canals and TM's normal, nose and mouth without ulcers or lesions  NECK: no adenopathy, no asymmetry, masses, or scars and thyroid normal to palpation  RESP: lungs clear to auscultation - no rales, rhonchi or wheezes  CV: regular rate and rhythm, normal S1 S2, no S3 or S4, no murmur, click or rub, no peripheral edema and peripheral pulses strong  ABDOMEN: soft, nontender, no hepatosplenomegaly, no masses and bowel sounds normal   (male): normal male genitalia without lesions or urethral discharge, no hernia  RECTAL: normal sphincter tone, no rectal masses, prostate normal size, smooth, nontender without nodules or masses  MS: no gross musculoskeletal defects noted, no edema  SKIN: no suspicious lesions or rashes  NEURO: Normal strength and tone, mentation intact and speech normal  PSYCH: mentation appears normal, affect normal/bright  LYMPH: no cervical, supraclavicular, axillary, or inguinal adenopathy        ASSESSMENT / PLAN:       ICD-10-CM    1. Routine general medical examination at a health care facility Z00.00 TD, PRESERV FREE, 7+   2. Hypertension goal BP (blood pressure) < 140/90 I10 Comprehensive metabolic panel     CBC with platelets differential     UA with Microscopic   3. Screening for prostate cancer Z12.5 Prostate spec antigen screen   4. Anemia, unspecified type D64.9    5. Long term (current) use of anticoagulants Z79.01    6. CKD (chronic kidney disease) stage 2, GFR 60-89 ml/min N18.2    7. Mixed hyperlipidemia E78.2    8. Presbycusis of both ears H91.13    9. Paroxysmal atrial fibrillation (H) I48.0    10. MARTHA (obstructive sleep apnea) G47.33    11. Chronic 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 without tophus M1A.0710    12. Venous stasis dermatitis, unspecified laterality I87.2    13. Varicose veins of bilateral lower extremities with other complications I83.893    14. Edema of both legs R60.0        COUNSELING:  Reviewed preventive health counseling, as reflected in patient  "instructions       Regular exercise       Healthy diet/nutrition       Prostate cancer screening  I will see the patient back in 3 months to follow-up on his myriad health care issues.  Labs were ordered as noted.  Medications refilled as noted.  Further follow-up will be if he has a return to previous problems.  Estimated body mass index is 35.08 kg/m  as calculated from the following:    Height as of this encounter: 1.778 m (5' 10\").    Weight as of this encounter: 110.9 kg (244 lb 8 oz).    Weight management plan: Discussed healthy diet and exercise guidelines     reports that he quit smoking about 56 years ago. His smoking use included cigarettes. He started smoking about 59 years ago. He has a 3.00 pack-year smoking history. He has quit using smokeless tobacco.      Appropriate preventive services were discussed with this patient, including applicable screening as appropriate for cardiovascular disease, diabetes, osteopenia/osteoporosis, and glaucoma.  As appropriate for age/gender, discussed screening for colorectal cancer, prostate cancer, breast cancer, and cervical cancer. Checklist reviewing preventive services available has been given to the patient.    Reviewed patients plan of care and provided an AVS. The Basic Care Plan (routine screening as documented in Health Maintenance) for Alessio meets the Care Plan requirement. This Care Plan has been established and reviewed with the Patient.    Counseling Resources:  ATP IV Guidelines  Pooled Cohorts Equation Calculator  Breast Cancer Risk Calculator  FRAX Risk Assessment  ICSI Preventive Guidelines  Dietary Guidelines for Americans, 2010  USDA's MyPlate  ASA Prophylaxis  Lung CA Screening    Zac Salgado MD  [facility]    Identified Health Risks:  Answers for HPI/ROS submitted by the patient on 11/25/2019   Annual Exam:  If you checked off any problems, how difficult have these problems made it for you to do your work, " take care of things at home, or get along with other people?: Not difficult at all  PHQ9 TOTAL SCORE: 3  JAMES 7 TOTAL SCORE: 0

## 2019-11-25 NOTE — PATIENT INSTRUCTIONS
Preventive Health Recommendations:     See your health care provider every year to    Review health changes.     Discuss preventive care.      Review your medicines if your doctor has prescribed any.      Talk with your health care provider about whether you should have a test to screen for prostate cancer (PSA).    Every 3 years, have a diabetes test (fasting glucose). If you are at risk for diabetes, you should have this test more often.    Every 5 years, have a cholesterol test. Have this test more often if you are at risk for high cholesterol or heart disease.     Every 10 years, have a colonoscopy. Or, have a yearly FIT test (stool test). These exams will check for colon cancer.    Talk to with your health care provider about screening for Abdominal Aortic Aneurysm if you have a family history of AAA or have a history of smoking.    Shots:     Get a flu shot each year.     Get a tetanus shot every 10 years.     Talk to your doctor about your pneumonia vaccines. There are now two you should receive - Pneumovax (PPSV 23) and Prevnar (PCV 13).     Talk to your pharmacist about a shingles vaccine.     Talk to your doctor about the hepatitis B vaccine.  Nutrition:     Eat at least 5 servings of fruits and vegetables each day.     Eat whole-grain bread, whole-wheat pasta and brown rice instead of white grains and rice.     Get adequate Calcium and Vitamin D.   Lifestyle    Exercise for at least 150 minutes a week (30 minutes a day, 5 days a week). This will help you control your weight and prevent disease.     Limit alcohol to one drink per day.     No smoking.     Wear sunscreen to prevent skin cancer.    See your dentist every six months for an exam and cleaning.    See your eye doctor every 1 to 2 years to screen for conditions such as glaucoma, macular degeneration, cataracts, etc.    Personalized Prevention Plan  You are due for the preventive services outlined below.  Your care team is available to assist you  in scheduling these services.  If you have already completed any of these items, please share that information with your care team to update in your medical record.  Health Maintenance Due   Topic Date Due     Kidney Microalbumin Urine Test  1935     URINE DRUG SCREEN  1935     Zoster (Shingles) Vaccine (1 of 2) 12/02/1986     INR CLINIC REFERRAL - yearly  07/08/2016     Annual Wellness Visit  11/21/2019     Anxiety Assessment  11/21/2019     Depression Assessment  11/21/2019

## 2019-11-26 LAB
ALBUMIN SERPL-MCNC: 3.2 G/DL (ref 3.4–5)
ALP SERPL-CCNC: 75 U/L (ref 40–150)
ALT SERPL W P-5'-P-CCNC: 25 U/L (ref 0–70)
ANION GAP SERPL CALCULATED.3IONS-SCNC: 4 MMOL/L (ref 3–14)
AST SERPL W P-5'-P-CCNC: 23 U/L (ref 0–45)
BILIRUB SERPL-MCNC: 0.4 MG/DL (ref 0.2–1.3)
BUN SERPL-MCNC: 16 MG/DL (ref 7–30)
CALCIUM SERPL-MCNC: 8.4 MG/DL (ref 8.5–10.1)
CHLORIDE SERPL-SCNC: 110 MMOL/L (ref 94–109)
CHOLEST SERPL-MCNC: 123 MG/DL
CO2 SERPL-SCNC: 29 MMOL/L (ref 20–32)
CREAT SERPL-MCNC: 1.07 MG/DL (ref 0.66–1.25)
GFR SERPL CREATININE-BSD FRML MDRD: 64 ML/MIN/{1.73_M2}
GLUCOSE SERPL-MCNC: 80 MG/DL (ref 70–99)
HDLC SERPL-MCNC: 28 MG/DL
LDLC SERPL CALC-MCNC: 61 MG/DL
NONHDLC SERPL-MCNC: 95 MG/DL
POTASSIUM SERPL-SCNC: 4.3 MMOL/L (ref 3.4–5.3)
PROT SERPL-MCNC: 6.3 G/DL (ref 6.8–8.8)
PSA SERPL-ACNC: 4.22 UG/L (ref 0–4)
SODIUM SERPL-SCNC: 143 MMOL/L (ref 133–144)
TRIGL SERPL-MCNC: 170 MG/DL

## 2019-11-26 ASSESSMENT — PATIENT HEALTH QUESTIONNAIRE - PHQ9: SUM OF ALL RESPONSES TO PHQ QUESTIONS 1-9: 3

## 2019-11-26 ASSESSMENT — ANXIETY QUESTIONNAIRES: GAD7 TOTAL SCORE: 0

## 2019-12-09 DIAGNOSIS — I10 HYPERTENSION GOAL BP (BLOOD PRESSURE) < 140/90: ICD-10-CM

## 2019-12-09 NOTE — TELEPHONE ENCOUNTER
"Requested Prescriptions   Pending Prescriptions Disp Refills     KLOR-CON 20 MEQ CR tablet [Pharmacy Med Name: Klor-Con M20 Oral Tablet Extended Release 20 MEQ]  Last Written Prescription Date:  11/27/2018  Last Fill Quantity: 90 tablet,  # refills: 3   Last office visit:11/25/2019 with prescribing provider:  Damian   Future Office Visit:     90 tablet 2     Sig: TAKE ONE TABLET BY MOUTH ONE TIME DAILY       Potassium Supplements Protocol Passed - 12/9/2019  7:02 AM        Passed - Recent (12 mo) or future (30 days) visit within the authorizing provider's department     Patient has had an office visit with the authorizing provider or a provider within the authorizing providers department within the previous 12 mos or has a future within next 30 days. See \"Patient Info\" tab in inbasket, or \"Choose Columns\" in Meds & Orders section of the refill encounter.              Passed - Medication is active on med list        Passed - Patient is age 18 or older        Passed - Normal serum potassium in past 12 months     Recent Labs   Lab Test 11/25/19  1045   POTASSIUM 4.3                       "

## 2019-12-11 RX ORDER — POTASSIUM CHLORIDE 1500 MG/1
TABLET, EXTENDED RELEASE ORAL
Qty: 90 TABLET | Refills: 2 | Status: SHIPPED | OUTPATIENT
Start: 2019-12-11 | End: 2020-09-08

## 2020-01-06 ENCOUNTER — ANTICOAGULATION THERAPY VISIT (OUTPATIENT)
Dept: NURSING | Facility: CLINIC | Age: 85
End: 2020-01-06
Payer: COMMERCIAL

## 2020-01-06 DIAGNOSIS — I48.20 CHRONIC ATRIAL FIBRILLATION (H): ICD-10-CM

## 2020-01-06 DIAGNOSIS — Z79.01 LONG TERM CURRENT USE OF ANTICOAGULANT THERAPY: ICD-10-CM

## 2020-01-06 LAB — INR POINT OF CARE: 2 (ref 0.86–1.14)

## 2020-01-06 PROCEDURE — 36416 COLLJ CAPILLARY BLOOD SPEC: CPT

## 2020-01-06 PROCEDURE — 99207 ZZC NO CHARGE NURSE ONLY: CPT

## 2020-01-06 PROCEDURE — 85610 PROTHROMBIN TIME: CPT | Mod: QW

## 2020-01-06 NOTE — PROGRESS NOTES
ANTICOAGULATION FOLLOW-UP CLINIC VISIT    Patient Name:  Alessio Teixeira  Date:  2020  Contact Type:  Face to Face    SUBJECTIVE:  Patient Findings     Comments:   The patient was assessed for diet, medication, and activity level changes, missed or extra doses, bruising or bleeding, with no problem findings.          Clinical Outcomes     Comments:   The patient was assessed for diet, medication, and activity level changes, missed or extra doses, bruising or bleeding, with no problem findings.             OBJECTIVE    INR Protime   Date Value Ref Range Status   2020 2.0 (A) 0.86 - 1.14 Final       ASSESSMENT / PLAN  No question data found.  Anticoagulation Summary  As of 2020    INR goal:   2.0-3.0   TTR:   90.8 % (11 mo)   INR used for dosin.0 (2020)   Warfarin maintenance plan:   5 mg (2 mg x 2.5) every Sun, Thu; 7 mg (2 mg x 3.5) all other days   Full warfarin instructions:   5 mg every Sun, Thu; 7 mg all other days   Weekly warfarin total:   45 mg   Plan last modified:   Kerrie Morgan RN (2019)   Next INR check:   2020   Target end date:   Indefinite    Indications    Chronic atrial fibrillation [I48.20]  Long term current use of anticoagulant therapy [Z79.01]             Anticoagulation Episode Summary     INR check location:   Anticoagulation Clinic    Preferred lab:       Send INR reminders to:   JOLEEN DOMINGUEZ    Comments:         Anticoagulation Care Providers     Provider Role Specialty Phone number    Zac Salgado MD [facility] [phone]            See the Encounter Report to view Anticoagulation Flowsheet and Dosing Calendar (Go to Encounters tab in chart review, and find the Anticoagulation Therapy Visit)        Kerrie Morgan RN

## 2020-01-08 DIAGNOSIS — E78.5 HYPERLIPIDEMIA LDL GOAL <100: ICD-10-CM

## 2020-01-08 RX ORDER — LOVASTATIN 40 MG
TABLET ORAL
Qty: 180 TABLET | Refills: 2 | Status: SHIPPED | OUTPATIENT
Start: 2020-01-08 | End: 2020-10-30

## 2020-01-08 NOTE — TELEPHONE ENCOUNTER
"Requested Prescriptions   Pending Prescriptions Disp Refills     lovastatin (MEVACOR) 40 MG tablet [Pharmacy Med Name: Lovastatin Oral Tablet 40 MG]  Last Written Prescription Date:  1/14/2019  Last Fill Quantity: 180 tablet,  # refills: 3   Last office visit: 11/25/2019 with prescribing provider:  Damian   Future Office Visit:     180 tablet 2     Sig: TAKE TWO TABLETS BY MOUTH DAILY AT BEDTIME       Statins Protocol Passed - 1/8/2020  7:01 AM        Passed - LDL on file in past 12 months     Recent Labs   Lab Test 11/25/19  1045   LDL 61             Passed - No abnormal creatine kinase in past 12 months     Recent Labs   Lab Test 01/26/12  1348   CKT 70.0                Passed - Recent (12 mo) or future (30 days) visit within the authorizing provider's specialty     Patient has had an office visit with the authorizing provider or a provider within the authorizing providers department within the previous 12 mos or has a future within next 30 days. See \"Patient Info\" tab in inbasket, or \"Choose Columns\" in Meds & Orders section of the refill encounter.              Passed - Medication is active on med list        Passed - Patient is age 18 or older           "

## 2020-02-19 ENCOUNTER — ANTICOAGULATION THERAPY VISIT (OUTPATIENT)
Dept: NURSING | Facility: CLINIC | Age: 85
End: 2020-02-19
Payer: COMMERCIAL

## 2020-02-19 DIAGNOSIS — Z79.01 LONG TERM CURRENT USE OF ANTICOAGULANT THERAPY: Primary | ICD-10-CM

## 2020-02-19 DIAGNOSIS — I48.20 CHRONIC ATRIAL FIBRILLATION (H): ICD-10-CM

## 2020-02-19 LAB — INR POINT OF CARE: 2.1 (ref 0.86–1.14)

## 2020-02-19 PROCEDURE — 36416 COLLJ CAPILLARY BLOOD SPEC: CPT

## 2020-02-19 PROCEDURE — 85610 PROTHROMBIN TIME: CPT | Mod: QW

## 2020-02-19 PROCEDURE — 99207 ZZC NO CHARGE NURSE ONLY: CPT

## 2020-02-19 NOTE — PROGRESS NOTES
ANTICOAGULATION FOLLOW-UP CLINIC VISIT    Patient Name:  Alessio Teixeira  Date:  2020  Contact Type:  Face to Face    SUBJECTIVE:  Patient Findings     Comments:   Backache. The patient was assessed for diet, medication, and activity level changes, missed or extra doses, bruising or bleeding,         Clinical Outcomes     Comments:   Backache. The patient was assessed for diet, medication, and activity level changes, missed or extra doses, bruising or bleeding,            OBJECTIVE    INR Protime   Date Value Ref Range Status   2020 2.1 (A) 0.86 - 1.14 Final       ASSESSMENT / PLAN  INR assessment THER    Recheck INR In: 6 WEEKS    INR Location Clinic      Anticoagulation Summary  As of 2020    INR goal:   2.0-3.0   TTR:   93.1 % (1 y)   INR used for dosin.1 (2020)   Warfarin maintenance plan:   5 mg (2 mg x 2.5) every Mon, Thu; 7 mg (2 mg x 3.5) all other days   Full warfarin instructions:   5 mg every Mon, Thu; 7 mg all other days   Weekly warfarin total:   45 mg   Plan last modified:   Kerrie Morgan RN (2020)   Next INR check:   2020   Target end date:   Indefinite    Indications    Chronic atrial fibrillation [I48.20]  Long term current use of anticoagulant therapy [Z79.01]             Anticoagulation Episode Summary     INR check location:   Anticoagulation Clinic    Preferred lab:       Send INR reminders to:   JOLEEN DOMIGNUEZ    Comments:         Anticoagulation Care Providers     Provider Role Specialty Phone number    DamianZac weiss MD Referring [facility] [phone]            See the Encounter Report to view Anticoagulation Flowsheet and Dosing Calendar (Go to Encounters tab in chart review, and find the Anticoagulation Therapy Visit)        Kerrie Morgan RN

## 2020-02-21 DIAGNOSIS — R63.5 WEIGHT GAIN: ICD-10-CM

## 2020-02-21 RX ORDER — FUROSEMIDE 20 MG
TABLET ORAL
Qty: 90 TABLET | Refills: 2 | Status: SHIPPED | OUTPATIENT
Start: 2020-02-21 | End: 2020-12-29

## 2020-02-21 NOTE — TELEPHONE ENCOUNTER
"Requested Prescriptions   Pending Prescriptions Disp Refills     FUROSEMIDE 20 MG PO tablet [Pharmacy Med Name: Furosemide Oral Tablet 20 MG] 90 tablet 2     Sig: TAKE ONE TABLET BY MOUTH ONE TIME DAILY  Last Written Prescription Date:  3-14-19  Last Fill Quantity: 90tab,  # refills: 3   Last office visit: 11/25/2019 with prescribing provider:  terry   Future Office Visit:           Diuretics (Including Combos) Protocol Passed - 2/21/2020 10:21 AM        Passed - Blood pressure under 140/90 in past 12 months     BP Readings from Last 3 Encounters:   11/25/19 128/58   10/24/19 130/74   09/13/19 110/68                 Passed - Recent (12 mo) or future (30 days) visit within the authorizing provider's specialty     Patient has had an office visit with the authorizing provider or a provider within the authorizing providers department within the previous 12 mos or has a future within next 30 days. See \"Patient Info\" tab in inbasket, or \"Choose Columns\" in Meds & Orders section of the refill encounter.              Passed - Medication is active on med list        Passed - Patient is age 18 or older        Passed - Normal serum creatinine on file in past 12 months     Recent Labs   Lab Test 11/25/19  1045   CR 1.07              Passed - Normal serum potassium on file in past 12 months     Recent Labs   Lab Test 11/25/19  1045   POTASSIUM 4.3                    Passed - Normal serum sodium on file in past 12 months     Recent Labs   Lab Test 11/25/19  1045                   "

## 2020-02-21 NOTE — TELEPHONE ENCOUNTER
Prescription approved per [facility] Protocol.     not applicable (Male)

## 2020-03-20 DIAGNOSIS — I48.20 CHRONIC ATRIAL FIBRILLATION (H): Primary | ICD-10-CM

## 2020-03-20 DIAGNOSIS — Z79.01 LONG TERM (CURRENT) USE OF ANTICOAGULANTS: ICD-10-CM

## 2020-03-20 DIAGNOSIS — I48.91 ATRIAL FIBRILLATION, UNSPECIFIED TYPE (H): ICD-10-CM

## 2020-03-31 ENCOUNTER — TELEPHONE (OUTPATIENT)
Dept: BEHAVIORAL HEALTH | Facility: CLINIC | Age: 85
End: 2020-03-31

## 2020-03-31 SDOH — ECONOMIC STABILITY: FOOD INSECURITY: WITHIN THE PAST 12 MONTHS, YOU WORRIED THAT YOUR FOOD WOULD RUN OUT BEFORE YOU GOT THE MONEY TO BUY MORE.: NEVER TRUE

## 2020-03-31 SDOH — ECONOMIC STABILITY: FOOD INSECURITY: HOW HARD IS IT FOR YOU TO PAY FOR THE VERY BASICS LIKE FOOD, HOUSING, MEDICAL CARE, AND HEATING?: NOT HARD AT ALL

## 2020-03-31 SDOH — SOCIAL STABILITY: SOCIAL NETWORK: IN A TYPICAL WEEK, HOW MANY TIMES DO YOU TALK ON THE PHONE WITH FAMILY, FRIENDS, OR NEIGHBORS?: ONCE A WEEK

## 2020-03-31 SDOH — HEALTH STABILITY: MENTAL HEALTH
DO YOU FEEL STRESS - TENSE, RESTLESS, NERVOUS, OR ANXIOUS, OR UNABLE TO SLEEP AT NIGHT BECAUSE YOUR MIND IS TROUBLED ALL THE TIME - THESE DAYS?: NOT AT ALL

## 2020-03-31 SDOH — SOCIAL STABILITY: SOCIAL INSECURITY: WITHIN THE LAST YEAR, HAVE YOU BEEN AFRAID OF YOUR PARTNER OR EX-PARTNER?: NO

## 2020-03-31 SDOH — ECONOMIC STABILITY: TRANSPORTATION INSECURITY: IN THE PAST 12 MONTHS, HAS LACK OF TRANSPORTATION KEPT YOU FROM MEDICAL APPOINTMENTS OR FROM GETTING MEDICATIONS?: NO

## 2020-03-31 NOTE — TELEPHONE ENCOUNTER
Community Paramedic Social Needs Assessment    Spoke with: Alessio Teixeira     Refer to Social Determinants of Health tab for information obtained    Concerns/Barriers Noted During Call: Wanted to know what he needs to wear for PPE barriers when he goes to the Dr on 4/1. Explained using gloves if he has any and wash his hands or use  upon entering clinic and follow clinic guidelines    Referrals: None.

## 2020-04-01 ENCOUNTER — ANTICOAGULATION THERAPY VISIT (OUTPATIENT)
Dept: FAMILY MEDICINE | Facility: CLINIC | Age: 85
End: 2020-04-01

## 2020-04-01 DIAGNOSIS — Z79.01 LONG TERM (CURRENT) USE OF ANTICOAGULANTS: ICD-10-CM

## 2020-04-01 DIAGNOSIS — I48.20 CHRONIC ATRIAL FIBRILLATION (H): ICD-10-CM

## 2020-04-01 DIAGNOSIS — I48.91 ATRIAL FIBRILLATION, UNSPECIFIED TYPE (H): ICD-10-CM

## 2020-04-01 DIAGNOSIS — Z79.01 LONG TERM CURRENT USE OF ANTICOAGULANT THERAPY: ICD-10-CM

## 2020-04-01 LAB — INR PPP: 1.9 (ref 0.86–1.14)

## 2020-04-01 PROCEDURE — 36415 COLL VENOUS BLD VENIPUNCTURE: CPT | Performed by: FAMILY MEDICINE

## 2020-04-01 PROCEDURE — 85610 PROTHROMBIN TIME: CPT | Performed by: FAMILY MEDICINE

## 2020-04-01 PROCEDURE — 99207 ZZC NO CHARGE NURSE ONLY: CPT | Performed by: FAMILY MEDICINE

## 2020-04-01 NOTE — PROGRESS NOTES
ANTICOAGULATION FOLLOW-UP CLINIC VISIT    Patient Name:  Alessio Teixeira  Date:  2020  Contact Type:  Telephone    SUBJECTIVE:  Patient Findings     Positives:   Change in diet/appetite (extra greens last week)    Comments:   The patient was assessed for diet, medication, and activity level changes, missed or extra doses, bruising or bleeding, with no problem findings.          Clinical Outcomes     Comments:   The patient was assessed for diet, medication, and activity level changes, missed or extra doses, bruising or bleeding, with no problem findings.             OBJECTIVE    INR   Date Value Ref Range Status   2020 1.90 (H) 0.86 - 1.14 Final       ASSESSMENT / PLAN  INR assessment SUB    Recheck INR In: 4 WEEKS    INR Location Clinic      Anticoagulation Summary  As of 2020    INR goal:   2.0-3.0   TTR:   89.7 % (1 y)   INR used for dosin.90! (2020)   Warfarin maintenance plan:   5 mg (2 mg x 2.5) every Mon, Thu; 7 mg (2 mg x 3.5) all other days   Full warfarin instructions:   : 8 mg; Otherwise 5 mg every Mon, Thu; 7 mg all other days   Weekly warfarin total:   45 mg   Plan last modified:   Kerrie Morgan RN (2020)   Next INR check:   2020   Priority:   Maintenance   Target end date:   Indefinite    Indications    Chronic atrial fibrillation [I48.20]  Long term current use of anticoagulant therapy [Z79.01]             Anticoagulation Episode Summary     INR check location:   Anticoagulation Clinic    Preferred lab:       Send INR reminders to:   JOLEEN DOMINGUEZ    Comments:         Anticoagulation Care Providers     Provider Role Specialty Phone number    Zac Salgado MD Referring [facility] [phone]            See the Encounter Report to view Anticoagulation Flowsheet and Dosing Calendar (Go to Encounters tab in chart review, and find the Anticoagulation Therapy Visit)        Kerrie Morgan RN

## 2020-04-27 ENCOUNTER — VIRTUAL VISIT (OUTPATIENT)
Dept: FAMILY MEDICINE | Facility: CLINIC | Age: 85
End: 2020-04-27
Payer: COMMERCIAL

## 2020-04-27 DIAGNOSIS — M1A.0710 CHRONIC IDIOPATHIC GOUT INVOLVING TOE OF RIGHT FOOT WITHOUT TOPHUS: ICD-10-CM

## 2020-04-27 DIAGNOSIS — M10.9 GOUT OF BIG TOE: Primary | ICD-10-CM

## 2020-04-27 DIAGNOSIS — N18.2 CKD (CHRONIC KIDNEY DISEASE) STAGE 2, GFR 60-89 ML/MIN: ICD-10-CM

## 2020-04-27 PROCEDURE — 99213 OFFICE O/P EST LOW 20 MIN: CPT | Mod: 95 | Performed by: FAMILY MEDICINE

## 2020-04-27 RX ORDER — METHYLPREDNISOLONE 4 MG
TABLET, DOSE PACK ORAL
Qty: 21 TABLET | Refills: 0 | Status: SHIPPED | OUTPATIENT
Start: 2020-04-27 | End: 2020-07-09

## 2020-04-27 NOTE — PROGRESS NOTES
"Alessio Teixeira is a 84 year old male who is being evaluated via a billable telephone visit.      The patient has been notified of following:     \"This telephone visit will be conducted via a call between you and your physician/provider. We have found that certain health care needs can be provided without the need for a physical exam.  This service lets us provide the care you need with a short phone conversation.  If a prescription is necessary we can send it directly to your pharmacy.  If lab work is needed we can place an order for that and you can then stop by our lab to have the test done at a later time.    Telephone visits are billed at different rates depending on your insurance coverage. During this emergency period, for some insurers they may be billed the same as an in-person visit.  Please reach out to your insurance provider with any questions.    If during the course of the call the physician/provider feels a telephone visit is not appropriate, you will not be charged for this service.\"    Patient has given verbal consent for Telephone visit?  Yes    How would you like to obtain your AVS? MyChart    Subjective     Alessio Teixeira is a 84 year old male who presents to clinic today for the following health issues:    HPI  Musculoskeletal problem/pain      Duration: Started last week    Description  Location: Left foot    Intensity:  severe    Accompanying signs and symptoms: swelling and redness    History  Previous similar problem: Yes and has been on allopurinol in the past.  He has not taken any of that for quite some time now.  Previous evaluation: History of elevated uric acid with the last one in our records being 8.2.    Precipitating or alleviating factors:  Trauma or overuse: no   Aggravating factors include: none    Therapies tried and outcome: acetaminophen             Patient Active Problem List   Diagnosis     Hyperlipidemia LDL goal <100     Edema of both legs     Varicose veins " of legs     BMI > 35     Stasis dermatitis     Chronic atrial fibrillation     Chronic 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     Vitamin D deficiency disease     Hypertension goal BP (blood pressure) < 140/90     Diplopia     MARTHA (obstructive sleep apnea)     Long term current use of anticoagulant therapy     Degenerative localized arthritis of hip     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 by Miguel Johnson MD     Aftercare following right hip joint replacement surgery     Bradycardia     History of pulmonary embolism- bilateral in 2007      Atrial fibrillation (H)     Anemia due to blood loss, acute     Chronic left-sided thoracic back pain     Presbycusis of both ears     Mixed hyperlipidemia     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     Acute pain     Gastrointestinal hemorrhage associated with gastric ulcer     JAVIER (acute kidney injury) (H)     CKD (chronic kidney disease) stage 2, GFR 60-89 ml/min     Pulmonary nodules     Debility     Weight gain     Anemia, unspecified type     Encounter for therapeutic drug monitoring     Long term (current) use of anticoagulants     Past Surgical History:   Procedure Laterality Date     APPENDECTOMY       ARTHROPLASTY HIP Right 2016     ARTHROPLASTY HIP ANTERIOR Right 4/26/2016    Procedure: ARTHROPLASTY HIP ANTERIOR;  Surgeon: Miguel Johnson MD;  Location:  OR     ARTHROPLASTY KNEE Right 1/28/2019    Procedure: RIGHT TOTAL KNEE ARTROPLASTY;  Surgeon: Miguel Johnson MD;  Location:  OR     BRAIN SURGERY  2007    partial resection meningioma     C RAD RESEC TONSIL/PILLARS       CATARACT IOL, RT/LT       COLECTOMY  2007    bowel perforation due to steroids     ESOPHAGOSCOPY, GASTROSCOPY, DUODENOSCOPY (EGD), COMBINED N/A 12/22/2018    Procedure: COMBINED ESOPHAGOSCOPY, GASTROSCOPY, DUODENOSCOPY (EGD), BIOPSY SINGLE OR MULTIPLE;  Surgeon: Elizabeth Jones MD;  Location:  GI     ESOPHAGOSCOPY, GASTROSCOPY,  DUODENOSCOPY (EGD), COMBINED N/A 5/10/2019    Procedure: ESOPHAGOGASTRODUODENOSCOPY (EGD);  Surgeon: Ahsan Garcia MD;  Location:  GI     GENITOURINARY SURGERY      vasectomy     HEAD & NECK SURGERY      meningioma removed     KNEE SURGERY  2010    left knee replacement     ORTHOPEDIC SURGERY      left TKR     PHACOEMULSIFICATION CLEAR CORNEA WITH TORIC INTRAOCULAR LENS IMPLANT  2012    Procedure: PHACOEMULSIFICATION CLEAR CORNEA WITH TORIC INTRAOCULAR LENS IMPLANT;  COMPLEX RIGHT PHACOEMULSIFICATION CLEAR CORNEA WITH TORIC INTRAOCULAR LENS IMPLANT WITH MALYUGIN RING;  Surgeon: Ronald Cortez MD;  Location:  [facility]     RECTAL SURGERY         Social History     Tobacco Use     Smoking status: Former Smoker     Packs/day: 1.00     Years: 3.00     Pack years: 3.00     Types: Cigarettes     Start date:      Last attempt to quit: 1963     Years since quittin.3     Smokeless tobacco: Former User   Substance Use Topics     Alcohol use: Yes     Comment: 2-4 glasses of wine per week     Family History   Problem Relation Age of Onset     Glaucoma Father      Unknown/Adopted Mother      Diabetes Brother      Macular Degeneration No family hx of            Reviewed and updated as needed this visit by Provider         Review of Systems   ROS COMP: Constitutional, HEENT, cardiovascular, pulmonary, gi and gu systems are negative, except as otherwise noted.       Objective   Reported vitals:  There were no vitals taken for this visit.   alert  PSYCH: Alert and oriented times 3; coherent speech, normal   rate and volume, able to articulate logical thoughts, able   to abstract reason, no tangential thoughts, no hallucinations   or delusions  His affect is normal and pleasant  RESP: No cough, no audible wheezing, able to talk in full sentences  Remainder of exam unable to be completed due to telephone visits            Assessment/Plan:  1. Gout of big toe  I placed the patient on a Medrol dose pack.   He can continue to take extra strength Tylenol up to 8 per 24 hours.  We did briefly discuss going back on allopurinol.  I will also send him a copy of dietary restrictions for gout.  When he comes in next week for his INR we will add in a uric acid level.  - methylPREDNISolone (MEDROL DOSEPAK) 4 MG tablet therapy pack; Follow Package Directions  Dispense: 21 tablet; Refill: 0  - **Uric acid FUTURE 2mo; Future    2. Chronic 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 without tophus      3. CKD (chronic kidney disease) stage 2, GFR 60-89 ml/min        No follow-ups on file.      Phone call duration:  8 minutes    Zac Salgado MD

## 2020-04-29 ENCOUNTER — ANTICOAGULATION THERAPY VISIT (OUTPATIENT)
Dept: NURSING | Facility: CLINIC | Age: 85
End: 2020-04-29

## 2020-04-29 DIAGNOSIS — M10.9 GOUT OF BIG TOE: ICD-10-CM

## 2020-04-29 DIAGNOSIS — Z79.01 LONG TERM (CURRENT) USE OF ANTICOAGULANTS: ICD-10-CM

## 2020-04-29 DIAGNOSIS — Z79.01 LONG TERM CURRENT USE OF ANTICOAGULANT THERAPY: ICD-10-CM

## 2020-04-29 DIAGNOSIS — I48.20 CHRONIC ATRIAL FIBRILLATION (H): ICD-10-CM

## 2020-04-29 DIAGNOSIS — I48.91 ATRIAL FIBRILLATION, UNSPECIFIED TYPE (H): ICD-10-CM

## 2020-04-29 LAB — INR PPP: 2 (ref 0.86–1.14)

## 2020-04-29 PROCEDURE — 84550 ASSAY OF BLOOD/URIC ACID: CPT | Performed by: FAMILY MEDICINE

## 2020-04-29 PROCEDURE — 85610 PROTHROMBIN TIME: CPT | Performed by: FAMILY MEDICINE

## 2020-04-29 PROCEDURE — 36416 COLLJ CAPILLARY BLOOD SPEC: CPT | Performed by: FAMILY MEDICINE

## 2020-04-29 NOTE — PROGRESS NOTES
Patient states was started on Medrol dose pack and has 3 doses left.  He was advised to recheck INR next week due to possible interaction with warfarin.  He wants to come back in 6 weeks.    Bebe Vega RN  [facility]

## 2020-04-30 LAB — URATE SERPL-MCNC: 8.6 MG/DL (ref 3.5–7.2)

## 2020-05-01 DIAGNOSIS — M10.9 GOUT OF BIG TOE: Primary | ICD-10-CM

## 2020-05-01 DIAGNOSIS — N18.2 CKD (CHRONIC KIDNEY DISEASE) STAGE 2, GFR 60-89 ML/MIN: ICD-10-CM

## 2020-05-01 RX ORDER — FEBUXOSTAT 40 MG/1
40 TABLET, FILM COATED ORAL DAILY
Qty: 30 TABLET | Refills: 5 | Status: SHIPPED | OUTPATIENT
Start: 2020-05-01 | End: 2021-03-04

## 2020-05-07 ENCOUNTER — TELEPHONE (OUTPATIENT)
Dept: FAMILY MEDICINE | Facility: CLINIC | Age: 85
End: 2020-05-07

## 2020-05-07 NOTE — TELEPHONE ENCOUNTER
Reason for Call:  Other call back and please sent food information     Detailed comments: Alessio called to say he is still waiting for a list of foods that Dr. Salgado at the 4/27/2020 appointment, said would sent.  The list is foods to avoid due to gout.    The list can be emailed: [email or web address]    Or mailed to the home address in the chart.    Phone Number Patient can be reached at: Cell number on file:    Telephone Information:   Mobile [phone]       Best Time: no need to phone as long as the list is sent.    Can we leave a detailed message on this number? YES    Call taken on 5/7/2020 at 11:14 AM by Ricarda Rojas

## 2020-05-09 ENCOUNTER — NURSE TRIAGE (OUTPATIENT)
Dept: NURSING | Facility: CLINIC | Age: 85
End: 2020-05-09

## 2020-05-09 NOTE — TELEPHONE ENCOUNTER
"  Call from pt       CC:  Back pain - no injury    Not new pain but worse than has been in the past       \"shoots\"  when trying to move     Really unable to stand / walk with any comfort      Pain 8/10     Not had pain like this       A/P:    > If really that bad, would direct to ED for eval and pain mgt     Take mask, phone and  with          Wife will drive him to [facility]       Maxime Baez RN           Reason for Disposition    Unable to walk    Additional Information    Negative: Shock suspected (e.g., cold/pale/clammy skin, too weak to stand, low BP, rapid pulse)    Negative: Sounds like a life-threatening emergency to the triager    Negative: Passed out (i.e., lost consciousness, collapsed and was not responding)    Negative: Major injury to the back (e.g., MVA, fall > 10 feet or 3 meters, penetrating injury, etc.)    Negative: [1] Pain in the upper back over the ribs (rib cage) AND [2] radiates (travels, goes) into chest    Negative: Followed a tailbone injury    Negative: [1] Pain in the upper back over the ribs (rib cage) AND [2] worsened by coughing (or clearly increases with breathing)    Negative: Back pain during pregnancy    Negative: Pain mainly in flank (i.e., in the side, over the lower ribs or just below the ribs)    Negative: [1] SEVERE back pain (e.g., excruciating) AND [2] sudden onset AND [3] age > 60    Negative: [1] Unable to urinate (or only a few drops) > 4 hours AND     [2] bladder feels very full (e.g., palpable bladder or strong urge to urinate)    Negative: [1] Urinary or bowel incontinence (i.e., loss of bladder or bowel control) AND [2] new onset    Negative: Numbness in groin or rectal area (i.e., loss of sensation)    Negative: Weakness of a leg or foot (e.g., unable to bear weight, dragging foot)    Negative: [1] Abdominal pain AND [2] age > 60    Negative: [1] SEVERE abdominal pain AND [2] present > 1 hour    Protocols used: BACK PAIN-A-AH      "

## 2020-06-10 ENCOUNTER — ANTICOAGULATION THERAPY VISIT (OUTPATIENT)
Dept: NURSING | Facility: CLINIC | Age: 85
End: 2020-06-10

## 2020-06-10 DIAGNOSIS — I48.20 CHRONIC ATRIAL FIBRILLATION (H): ICD-10-CM

## 2020-06-10 DIAGNOSIS — I48.91 ATRIAL FIBRILLATION, UNSPECIFIED TYPE (H): ICD-10-CM

## 2020-06-10 DIAGNOSIS — Z79.01 LONG TERM CURRENT USE OF ANTICOAGULANT THERAPY: ICD-10-CM

## 2020-06-10 DIAGNOSIS — Z79.01 LONG TERM (CURRENT) USE OF ANTICOAGULANTS: ICD-10-CM

## 2020-06-10 LAB — INR PPP: 2.1 (ref 0.86–1.14)

## 2020-06-10 PROCEDURE — 85610 PROTHROMBIN TIME: CPT | Performed by: FAMILY MEDICINE

## 2020-06-10 PROCEDURE — 36416 COLLJ CAPILLARY BLOOD SPEC: CPT | Performed by: FAMILY MEDICINE

## 2020-06-10 NOTE — PROGRESS NOTES
Pt denies any changes in diet,health or activity. Patient to take 7 mg daily except 5 mg M/TH and recheck INR in 6 weeks . Alessio is aware if signs of clotting (pain, tenderness, swelling, color change in leg or arm, SOB) and bleeding occur (blood in stool, urine, large bruising, bleeding gums, nosebleeds) to have INR check sooner. If sx severe report to ER or concerned for stroke call 911. If general questions or concerns arise, call clinic.  Bebe Vega RN  [facility]

## 2020-06-16 DIAGNOSIS — I48.0 PAROXYSMAL ATRIAL FIBRILLATION (H): ICD-10-CM

## 2020-06-16 DIAGNOSIS — Z86.711 HISTORY OF PULMONARY EMBOLISM: ICD-10-CM

## 2020-06-16 RX ORDER — WARFARIN SODIUM 2 MG/1
TABLET ORAL
Qty: 315 TABLET | Refills: 0 | Status: SHIPPED | OUTPATIENT
Start: 2020-06-16 | End: 2020-09-11

## 2020-07-09 ENCOUNTER — VIRTUAL VISIT (OUTPATIENT)
Dept: FAMILY MEDICINE | Facility: CLINIC | Age: 85
End: 2020-07-09
Payer: COMMERCIAL

## 2020-07-09 ENCOUNTER — NURSE TRIAGE (OUTPATIENT)
Dept: FAMILY MEDICINE | Facility: CLINIC | Age: 85
End: 2020-07-09

## 2020-07-09 DIAGNOSIS — M10.079 ACUTE IDIOPATHIC GOUT OF FOOT, UNSPECIFIED LATERALITY: Primary | ICD-10-CM

## 2020-07-09 PROCEDURE — 99213 OFFICE O/P EST LOW 20 MIN: CPT | Mod: 95 | Performed by: PHYSICIAN ASSISTANT

## 2020-07-09 RX ORDER — METHYLPREDNISOLONE 4 MG
TABLET, DOSE PACK ORAL
Qty: 21 TABLET | Refills: 0 | Status: SHIPPED | OUTPATIENT
Start: 2020-07-09 | End: 2021-03-04

## 2020-07-09 NOTE — TELEPHONE ENCOUNTER
Additional Information    Negative: Entire foot is cool or blue in comparison to other foot    Negative: Purple or black skin on foot or toe    Negative: Red area or streak and fever    Negative: Swollen foot and fever    Negative: Patient sounds very sick or weak to the triager    SEVERE pain (e.g., excruciating, unable to do any normal activities)    Negative: Ankle pain is the main symptom    Negative: Followed an ankle or foot injury    Protocols used: FOOT PAIN-A-OH

## 2020-07-09 NOTE — PATIENT INSTRUCTIONS
Patient Education     Gout Diet  Gout is a painful condition caused by an excess of uric acid, a waste product made by the body. Uric acid forms crystals that collect in the joints. The immune response to these crystals brings on symptoms of joint pain and swelling. This is called a gout attack. Often, medications and diet changes are combined to manage gout. Below are some guidelines for changing your diet to help you manage gout and prevent attacks. Your healthcare provider will help you determine the best eating plan for you.  Eating to manage gout  Weight loss for those who are overweight may help reduce gout attacks.  Eat less of these foods  Eating too many foods containing purines may raise the levels of uric acid in your body. This raises your risk for a gout attack. Try to limit these foods and drinks:    Alcohol, such as beer and red wine. You may be told to avoid alcohol completely.    Soft drinks that contain sugar or high fructose corn syrup    Certain fish, including anchovies, sardines, fish eggs, and herring    Shellfish    Certain meats, such as red meat, hot dogs, luncheon meats, and turkey    Organ meats, such as liver, kidneys, and sweetbreads    Legumes, such as dried beans and peas    Other high fat foods such as gravy, whole milk, and high fat cheeses    Vegetables such as asparagus, cauliflower, spinach, and mushrooms used to be thought to contribute to an increased risk for a gout attack, but recent studies show that high purine vegetables don't increase the risk for a gout attack.  Eat more of these foods  Other foods may be helpful for people with gout. Add some of these foods to your diet:    Cherries contain chemicals that may lower uric acid.    Omega fatty acids. These are found in some fatty fish such as salmon, certain oils (flax, olive, or nut), and nuts themselves. Omega fatty acids may help prevent inflammation due to gout.    Dairy products that are low-fat or fat-free, such as  cheese and yogurt    Complex carbohydrate foods, including whole grains, brown rice, oats, and beans    Coffee, in moderation    Water, approximately 64 ounces per day  Follow-up care  Follow up with your healthcare provider as advised.  When to seek medical advice  Call your healthcare provider right away if any of these occur:    Return of gout symptoms, usually at night:    Severe pain, swelling, and heat in a joint, especially the base of the big toe    Affected joint is hard to move    Skin of the affected joint is purple or red    Fever of 100.4 F (38 C) or higher    Pain that doesn't get better even with prescribed medicine   Date Last Reviewed: 6/1/2018 2000-2019 [employer]. [address] [place] [address], [place] [address]. All rights reserved. This information is not intended as a substitute for professional medical care. Always follow your healthcare professional's instructions.

## 2020-07-09 NOTE — PROGRESS NOTES
"Alessio Teixeira is a 84 year old male who is being evaluated via a billable video visit.      The patient has been notified of following:     \"This video visit will be conducted via a call between you and your physician/provider. We have found that certain health care needs can be provided without the need for an in-person physical exam.  This service lets us provide the care you need with a video conversation.  If a prescription is necessary we can send it directly to your pharmacy.  If lab work is needed we can place an order for that and you can then stop by our lab to have the test done at a later time.    Video visits are billed at different rates depending on your insurance coverage.  Please reach out to your insurance provider with any questions.    If during the course of the call the physician/provider feels a video visit is not appropriate, you will not be charged for this service.\"    Patient has given verbal consent for Video visit? Yes  How would you like to obtain your AVS? Ellynharkeyshawn Weaver     Alessio Teixeira is a 84 year old male who presents today via video visit for the following health issues:    HPI  Gout/ single inflamed joint   Onset: 3-4 days    Description:   Location: big toe and foot - left  Joint Swelling: YES  Redness: YES  Pain: YES    Intensity: moderate, severe    Progression of Symptoms:  worsening    Accompanying Signs & Symptoms:  Fevers: no     History:   Trauma to the area: no   Previous history of gout: YES   Recent illness:  no     Precipitating factors:   Diet-rich in purine: no  Alcohol use: no   Diuretic use: no     Alleviating factors:      Therapies Tried and outcome: none          Video Start Time: 10:44 AM      Recent Labs   Lab Test 11/25/19  1045 07/10/19  1216 04/22/19  1438  12/22/18  0459 11/21/18  0912   LDL 61 64  --   --   --  55   HDL 28* 46  --   --   --  38*   TRIG 170* 78  --   --   --  145   ALT 25  --   --   --  26 27   CR 1.07  --  1.09   < " > 1.11 1.09   GFRESTIMATED 64  --  63   < > 61 65   GFRESTBLACK 74  --  73   < > 71 78   POTASSIUM 4.3  --  4.4   < > 4.0 3.9    < > = values in this interval not displayed.       BP Readings from Last 3 Encounters:   11/25/19 128/58   10/24/19 130/74   09/13/19 110/68    Wt Readings from Last 3 Encounters:   11/25/19 110.9 kg (244 lb 8 oz)   10/24/19 112.5 kg (248 lb)   09/13/19 113.8 kg (250 lb 14.4 oz)                    Reviewed and updated as needed this visit by Provider  Tobacco  Meds  Problems  Med Hx  Surg Hx  Fam Hx         Review of Systems   Constitutional, HEENT, cardiovascular, pulmonary, GI, , musculoskeletal, neuro, skin, endocrine and psych systems are negative, except as otherwise noted.      Objective             Physical Exam     GENERAL: Healthy, alert and no distress  EYES: Eyes grossly normal to inspection.  No discharge or erythema, or obvious scleral/conjunctival abnormalities.  RESP: No audible wheeze, cough, or visible cyanosis.  No visible retractions or increased work of breathing.    SKIN: Visible skin clear. No significant rash, abnormal pigmentation or lesions.  NEURO: Cranial nerves grossly intact.  Mentation and speech appropriate for age.  PSYCH: Mentation appears normal, affect normal/bright, judgement and insight intact, normal speech and appearance well-groomed.      Diagnostic Test Results:  Labs reviewed in Epic        Assessment & Plan       ICD-10-CM    1. Acute idiopathic gout of foot, unspecified laterality  M10.079 methylPREDNISolone (MEDROL DOSEPAK) 4 MG tablet therapy pack      Pt on warfarin, cannot take nsaids.   Put on medrol dose pack.  If injury, it would not be red and warm.  Cellulitis possible but less likely due to pt history.  If not improving in 2 days will put on antibiotic.    If has another episode in the next 2 months, may need his uloric increased.    Video-Visit Details    Type of service:  Video Visit    Video End Time:10:51 AM    Originating  Location (pt. Location): Home    Distant Location (provider location):  [facility]     Platform used for Video Visit: Tahnh    No follow-ups on file.       Mariza Burrell PA-C

## 2020-07-09 NOTE — TELEPHONE ENCOUNTER
Patient calling. Left foot is swollen and painful. Started 3-4 days ago. Balance and walking is more difficult. Hx gout. Says he was treated for gout a few weeks ago. Foot is red. Unsure if warm or hot. This is also the foot that has toenail that is going to come off, says Dr. Salgado recommended it get removed. He is wondering about getting another round of steroids. Currently in  [place]. Denies having a fever himself. Taking tylenol for pain. Cannot lie on right side, painful to have foot touch bed. Grandson did step on foot a few days ago and that was very painful. No known injury to foot. Patient does not think he is in on any maintenance meds for gout.    Virtual appt made for this AM with partner. Can you also address if any labs are needed? Think he is overdue for uric acid ?

## 2020-07-22 ENCOUNTER — ANTICOAGULATION THERAPY VISIT (OUTPATIENT)
Dept: NURSING | Facility: CLINIC | Age: 85
End: 2020-07-22

## 2020-07-22 DIAGNOSIS — I48.91 ATRIAL FIBRILLATION, UNSPECIFIED TYPE (H): ICD-10-CM

## 2020-07-22 DIAGNOSIS — I48.20 CHRONIC ATRIAL FIBRILLATION (H): ICD-10-CM

## 2020-07-22 DIAGNOSIS — Z79.01 LONG TERM (CURRENT) USE OF ANTICOAGULANTS: ICD-10-CM

## 2020-07-22 DIAGNOSIS — Z79.01 LONG TERM CURRENT USE OF ANTICOAGULANT THERAPY: ICD-10-CM

## 2020-07-22 LAB
CAPILLARY BLOOD COLLECTION: NORMAL
INR PPP: 2.2 (ref 0.86–1.14)

## 2020-07-22 PROCEDURE — 36416 COLLJ CAPILLARY BLOOD SPEC: CPT | Performed by: FAMILY MEDICINE

## 2020-07-22 PROCEDURE — 85610 PROTHROMBIN TIME: CPT | Performed by: FAMILY MEDICINE

## 2020-07-22 NOTE — PROGRESS NOTES
ANTICOAGULATION MANAGEMENT     Patient Name:  Alessio Teixeira  Date:  7/22/2020    ASSESSMENT /SUBJECTIVE:    Today's INR result of 2.2 is therapeutic. Goal INR of 2.0-3.0      Warfarin dose taken: Warfarin taken as previously instructed    Diet: No new diet changes affecting INR    Medication changes/ interactions: No new medications/supplements affecting INR    Previous INR: Therapeutic     S/S of bleeding or thromboembolism: No    New injury or illness: No    Upcoming surgery, procedure or cardioversion: No    Additional findings: None      PLAN:    Spoke with Alessio regarding INR result and instructed:     Warfarin Dosing Instructions: Continue your current warfarin dose 5mg M/TH and 7 mg AOD    Instructed patient to follow up no later than: 6 weeks  Lab visit scheduled    Education provided: Monitoring for bleeding signs and symptoms and Monitoring for clotting signs and symptoms      Alessio verbalizes understanding and agrees to warfarin dosing plan.    Instructed to call the Anticoagulation Clinic for any changes, questions or concerns. (#[phone])        Bebe Vega RN      OBJECTIVE:  Recent labs: (last 7 days)     07/22/20  1000   INR 2.20*         No question data found.  Anticoagulation Summary  As of 7/22/2020    INR goal:   2.0-3.0   TTR:   82.1 % (1 y)   INR used for dosing:   No new INR was available at the time of this encounter.   Warfarin maintenance plan:   5 mg (2 mg x 2.5) every Mon, Thu; 7 mg (2 mg x 3.5) all other days   Full warfarin instructions:   5 mg every Mon, Thu; 7 mg all other days   Weekly warfarin total:   45 mg   No change documented:   Bebe Vega RN   Plan last modified:   Kerrie Morgan RN (2/19/2020)   Next INR check:   9/2/2020   Priority:   Maintenance   Target end date:   Indefinite    Indications    Chronic atrial fibrillation (H) [I48.20]  Long term current use of anticoagulant therapy [Z79.01]             Anticoagulation Episode Summary     INR  check location:   Anticoagulation Clinic    Preferred lab:       Send INR reminders to:   JOLEEN VARGAS    Comments:         Anticoagulation Care Providers     Provider Role Specialty Phone number    Zac Salgado MD Referring Family Practice [phone]

## 2020-07-31 ENCOUNTER — TELEPHONE (OUTPATIENT)
Dept: FAMILY MEDICINE | Facility: CLINIC | Age: 85
End: 2020-07-31

## 2020-07-31 NOTE — TELEPHONE ENCOUNTER
[facility] received a call from a  but they hang up before the call was transferred to INR nurse. Called and spoke to the pt. Pt reports that he is scheduled for a toenail removal by a podiatrist on Monday 8/3/20. Pt could not remember the name of the podiatrist.He was not in a place to look for it at home. It is after 4:30 pm on a Friday afternoon. Pt does not remember if he was supposed to HOLD warfarin or not for the procedure.Unable to contact the podiatrist to discuss. Pt advised to continue taking warfarin and tell the podiatrist on Monday 8/3/20 that you could not remember whether advised by podiatry to stop warfarin or not so continued to take it. Pt advised that his procedure may need to be rescheduled at the podiatrist's recommendation. Pt advised to call [facility] if the procedure is rescheduled and to discuss if a HOLD plan is needed.

## 2020-08-21 ENCOUNTER — OFFICE VISIT (OUTPATIENT)
Dept: URGENT CARE | Facility: URGENT CARE | Age: 85
End: 2020-08-21
Payer: COMMERCIAL

## 2020-08-21 ENCOUNTER — NURSE TRIAGE (OUTPATIENT)
Dept: FAMILY MEDICINE | Facility: CLINIC | Age: 85
End: 2020-08-21

## 2020-08-21 VITALS
SYSTOLIC BLOOD PRESSURE: 125 MMHG | TEMPERATURE: 97.7 F | RESPIRATION RATE: 16 BRPM | OXYGEN SATURATION: 98 % | WEIGHT: 232 LBS | DIASTOLIC BLOOD PRESSURE: 72 MMHG | BODY MASS INDEX: 33.29 KG/M2 | HEART RATE: 61 BPM

## 2020-08-21 DIAGNOSIS — D72.829 LEUKOCYTOSIS, UNSPECIFIED TYPE: ICD-10-CM

## 2020-08-21 DIAGNOSIS — H53.8 BLURRED VISION: ICD-10-CM

## 2020-08-21 DIAGNOSIS — R11.0 NAUSEA: Primary | ICD-10-CM

## 2020-08-21 LAB
ALBUMIN SERPL-MCNC: 3.5 G/DL (ref 3.4–5)
ALBUMIN UR-MCNC: NEGATIVE MG/DL
ALP SERPL-CCNC: 83 U/L (ref 40–150)
ALT SERPL W P-5'-P-CCNC: 22 U/L (ref 0–70)
ANION GAP SERPL CALCULATED.3IONS-SCNC: 5 MMOL/L (ref 3–14)
APPEARANCE UR: CLEAR
AST SERPL W P-5'-P-CCNC: 24 U/L (ref 0–45)
BASOPHILS # BLD AUTO: 0.3 10E9/L (ref 0–0.2)
BASOPHILS NFR BLD AUTO: 1.8 %
BILIRUB SERPL-MCNC: 0.4 MG/DL (ref 0.2–1.3)
BILIRUB UR QL STRIP: NEGATIVE
BUN SERPL-MCNC: 15 MG/DL (ref 7–30)
CALCIUM SERPL-MCNC: 9.7 MG/DL (ref 8.5–10.1)
CHLORIDE SERPL-SCNC: 100 MMOL/L (ref 94–109)
CO2 SERPL-SCNC: 29 MMOL/L (ref 20–32)
COLOR UR AUTO: YELLOW
CREAT SERPL-MCNC: 1.14 MG/DL (ref 0.66–1.25)
DIFFERENTIAL METHOD BLD: ABNORMAL
EOSINOPHIL # BLD AUTO: 0.2 10E9/L (ref 0–0.7)
EOSINOPHIL NFR BLD AUTO: 1.5 %
ERYTHROCYTE [DISTWIDTH] IN BLOOD BY AUTOMATED COUNT: 22.9 % (ref 10–15)
GFR SERPL CREATININE-BSD FRML MDRD: 59 ML/MIN/{1.73_M2}
GLUCOSE SERPL-MCNC: 84 MG/DL (ref 70–99)
GLUCOSE UR STRIP-MCNC: NEGATIVE MG/DL
HCT VFR BLD AUTO: 52.4 % (ref 40–53)
HGB BLD-MCNC: 16.5 G/DL (ref 13.3–17.7)
HGB UR QL STRIP: NEGATIVE
KETONES UR STRIP-MCNC: NEGATIVE MG/DL
LEUKOCYTE ESTERASE UR QL STRIP: NEGATIVE
LYMPHOCYTES # BLD AUTO: 1.8 10E9/L (ref 0.8–5.3)
LYMPHOCYTES NFR BLD AUTO: 13 %
MCH RBC QN AUTO: 24.1 PG (ref 26.5–33)
MCHC RBC AUTO-ENTMCNC: 31.5 G/DL (ref 31.5–36.5)
MCV RBC AUTO: 77 FL (ref 78–100)
MONOCYTES # BLD AUTO: 0.6 10E9/L (ref 0–1.3)
MONOCYTES NFR BLD AUTO: 4.5 %
NEUTROPHILS # BLD AUTO: 11.2 10E9/L (ref 1.6–8.3)
NEUTROPHILS NFR BLD AUTO: 79.2 %
NITRATE UR QL: NEGATIVE
PH UR STRIP: 6 PH (ref 5–7)
PLATELET # BLD AUTO: 618 10E9/L (ref 150–450)
POTASSIUM SERPL-SCNC: 4 MMOL/L (ref 3.4–5.3)
PROT SERPL-MCNC: 7.2 G/DL (ref 6.8–8.8)
RBC # BLD AUTO: 6.84 10E12/L (ref 4.4–5.9)
SODIUM SERPL-SCNC: 134 MMOL/L (ref 133–144)
SOURCE: NORMAL
SP GR UR STRIP: 1.01 (ref 1–1.03)
UROBILINOGEN UR STRIP-ACNC: 0.2 EU/DL (ref 0.2–1)
WBC # BLD AUTO: 13.5 10E9/L (ref 4–11)

## 2020-08-21 PROCEDURE — 85025 COMPLETE CBC W/AUTO DIFF WBC: CPT | Performed by: FAMILY MEDICINE

## 2020-08-21 PROCEDURE — 36415 COLL VENOUS BLD VENIPUNCTURE: CPT | Performed by: FAMILY MEDICINE

## 2020-08-21 PROCEDURE — 99214 OFFICE O/P EST MOD 30 MIN: CPT | Performed by: FAMILY MEDICINE

## 2020-08-21 PROCEDURE — 81003 URINALYSIS AUTO W/O SCOPE: CPT | Performed by: FAMILY MEDICINE

## 2020-08-21 PROCEDURE — 80053 COMPREHEN METABOLIC PANEL: CPT | Performed by: FAMILY MEDICINE

## 2020-08-21 RX ORDER — ONDANSETRON 4 MG/1
4 TABLET, ORALLY DISINTEGRATING ORAL EVERY 8 HOURS PRN
Qty: 10 TABLET | Refills: 0 | Status: SHIPPED | OUTPATIENT
Start: 2020-08-21 | End: 2021-03-04

## 2020-08-21 NOTE — PROGRESS NOTES
Chief Complaint   Patient presents with     Dizziness     Pt states he is having dizziness and blurry vision shortly after eating X 2 days         SUBJECTIVE:   Alessio Teixeira is a pleasant  84 year old male with history of A. fib, edema of the legs, hypertension, meningioma, GERD.     myasthenia gravis not on steroids currently presenting with a chief complaint of noticing nausea symptoms along with some dizziness and couple of episodes of vomiting for last 2 days this all started 2 days back 3 hours following eating into pH he threw up after half an hour following eating a peanut butter jelly sandwich.  He denies any acute abdominal pain.  . He is an established patient of [facility].  He describes difficult to focus.  Denies any acute dizzy symptoms.  Has no worsening of the symptoms with movement of the head.  Onset of symptoms was 2 day(s) ago.  Course of illness is worsening.    Severity moderate  Current and Associated symptoms: nausea, vomiting and dizziness  Treatment measures tried include None tried.  Predisposing factors include possible bad food exposure .  He denies any urinary symptoms or acute abdominal pain.    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     Actinic keratosis      Atrial fibrillation (H)      Edema of leg      Gout, unspecified      H/O diplopia      Hypertension      Meningioma (H)     sinus     Myalgia and myositis, unspecified      Myasthenia gravis (H)     high dose steroids 2007,      Obesity      Other seborrheic keratosis      Presbyacusis      Primary localized osteoarthrosis, lower leg      Primary localized osteoarthrosis, pelvic region and thigh      Sleep apnea     doesn't use his CPAP)     Spinal stenosis      Squamous cell cancer of scalp and skin of neck     Dr Sanchez, multiple procedures, Mohs scalp and chest     Vitamin D deficiency      Current Outpatient Medications   Medication Sig Dispense Refill     CALCIUM PO Take 600 mg by mouth 2  times daily        Esomeprazole Magnesium (NEXIUM PO) Take by mouth every morning (before breakfast)       febuxostat (ULORIC) 40 MG TABS tablet Take 1 tablet (40 mg) by mouth daily 30 tablet 5     FUROSEMIDE 20 MG PO tablet TAKE ONE TABLET BY MOUTH ONE TIME DAILY  90 tablet 2     KLOR-CON 20 MEQ CR tablet TAKE ONE TABLET BY MOUTH ONE TIME DAILY  90 tablet 2     lovastatin (MEVACOR) 40 MG tablet TAKE TWO TABLETS BY MOUTH DAILY AT BEDTIME  180 tablet 2     methylPREDNISolone (MEDROL DOSEPAK) 4 MG tablet therapy pack Follow Package Directions 21 tablet 0     multivitamin, therapeutic with minerals (THERA-VIT-M) TABS Take 1 tablet by mouth daily       ondansetron (ZOFRAN-ODT) 4 MG ODT tab Take 1 tablet (4 mg) by mouth every 8 hours as needed for nausea 10 tablet 0     ORDER FOR DME CPAP supplies with variable pressure control 1 Device 0     Vitamin D, Cholecalciferol, 1000 units TABS Take 1,000 Units by mouth daily.         warfarin ANTICOAGULANT (COUMADIN) 2 MG tablet TAKE MOUTH DAILY. TAKING 2.5 TABLETS  (5 MG) ON SUN AND THUR AND TAKE 3.5 TABLETS (7 MG) ALL OTHER DAYS. 315 tablet 0     Warfarin Sodium (COUMADIN PO) Take 7 mg by mouth daily 5 mg sun, thur & 7 mg row       Social History     Tobacco Use     Smoking status: Former Smoker     Packs/day: 1.00     Years: 3.00     Pack years: 3.00     Types: Cigarettes     Start date:      Last attempt to quit: 1963     Years since quittin.6     Smokeless tobacco: Former User   Substance Use Topics     Alcohol use: Yes     Comment: 2-4 glasses of wine per week     Family History   Problem Relation Age of Onset     Glaucoma Father      Unknown/Adopted Mother      Diabetes Brother      Macular Degeneration No family hx of          ROS:    10 point ROS of systems including Constitutional, Eyes, Respiratory, Cardiovascular, Gastroenterology, Genitourinary, Integumentary, Muscularskeletal, Psychiatric were all negative except for pertinent positives noted in my HPI          OBJECTIVE:  /72   Pulse 61   Temp 97.7  F (36.5  C) (Oral)   Resp 16   Wt 105.2 kg (232 lb)   SpO2 98%   BMI 33.29 kg/m    GENERAL APPEARANCE: healthy, alert and no distress  EYES: EOMI,  PERRL, conjunctiva clear  HENT: ear canals and TM's normal.  Nose and mouth without ulcers, erythema or lesions  NECK: supple, nontender, no lymphadenopathy  RESP: lungs clear to auscultation - no rales, rhonchi or wheezes  CV: regular rates and rhythm, normal S1 S2, no murmur noted  ABDOMEN:  soft, nontender, no HSM or masses and bowel sounds normal  NEURO: Normal strength and tone, sensory exam grossly normal,  normal speech and mentation  Finger nose test intact , rapid alternating movements within normal limits    SKIN: no suspicious lesions or rashes  PSYCH: mentation appears normal  Physical Exam          Medical Decision Making:    Differential Diagnosis:  Labyrinthitis/gastritis/gastroenteritis/electrolyte imbalance/UTI      ASSESSMENT:  Alessio was seen today for dizziness.    Diagnoses and all orders for this visit:    Nausea  -     *UA reflex to Microscopic and Culture ([facility] (except [place] and [place])  -     Comprehensive metabolic panel (BMP + Alb, Alk Phos, ALT, AST, Total. Bili, TP)  -     CBC with platelets and differential  -     Symptomatic COVID-19 Virus (Coronavirus) by PCR; Future  -     ondansetron (ZOFRAN-ODT) 4 MG ODT tab; Take 1 tablet (4 mg) by mouth every 8 hours as needed for nausea    Blurred vision    Leukocytosis, unspecified type          PLAN:  Discussed with pt symptoms seem to be from viral from slightly elevated wbc count   Advised to watch for any worsening signs   If noticed any worsening feeling should follow up   Continue monitoring the temperature regular basis   Do zofran prn basis for the nausea symptoms   Follow up if  symptoms fail to improve or worsens   Pt understood and agreed with plan     Ruthy Gibson MD       did spent>35 minutes with  patient and > 50% of the time was for answering questions, discussing findings, counseling and coordination of care     See orders in Epic

## 2020-08-21 NOTE — TELEPHONE ENCOUNTER
When trying to eat anything feels dizzy and weak and loss of balance.    Started Wednesday, had usually diet. 1.5 hours later, felt dizzy and woozy, was able to walk up stairs to bed, slept for 4 hours.   Thursday, went to have breakfast and felt dizzy again. So slept 10 hours. Last night woke up to try and eat dinner but episode happened again.     This morning took nexium and water. Afraid to eat. Not able to check BP at home. Temp was 96.9      Marlena STEWARD, RN, PHN      Additional Information    Negative: Shock suspected (e.g., cold/pale/clammy skin, too weak to stand, low BP, rapid pulse)    Negative: Difficult to awaken or acting confused (e.g., disoriented, slurred speech)    Negative: Fainted, and still feels dizzy afterwards    Negative: Severe difficulty breathing (e.g., struggling for each breath, speaks in single words)    Negative: Overdose (accidental or intentional) of medications    Negative: New neurologic deficit that is present now: * Weakness of the face, arm, or leg on one side of the body * Numbness of the face, arm, or leg on one side of the body * Loss of speech or garbled speech    Negative: Heart beating < 50 beats per minute OR > 140 beats per minute    Negative: Sounds like a life-threatening emergency to the triager    Negative: Chest pain    Negative: Rectal bleeding, bloody stool, or tarry-black stool    Negative: Vomiting is the main symptom    Negative: Diarrhea is the main symptom    Negative: Headache is the main symptom    Negative: Heat exhaustion suspected (i.e., dehydration from heat exposure)    Negative: Patient states that he/she is having an anxiety/panic attack    Negative: Extra heart beats OR irregular heart beating (i.e., 'palpitations')    Negative: Difficulty breathing    Negative: Drinking very little and has signs of dehydration (e.g., no urine > 12 hours, very dry mouth, very lightheaded)    Negative: Follows bleeding (e.g., stomach, rectum, vagina) (Exception:  "became dizzy from sight of small amount blood)    Patient sounds very sick or weak to the triager    Negative: SEVERE headache or neck pain    Negative: SEVERE dizziness (e.g., unable to stand, requires support to walk, feels like passing out now)    Spinning or tilting sensation (vertigo) present now and one or more stroke risk factors (i.e., hypertension, diabetes, prior stroke/TIA, heart attack, age over 60) (Exception: prior physician evaluation for this AND no different/worse than usual)    Negative: Loss of vision or double vision     Blurry vision occurred yesterday and day before but not present now.    Answer Assessment - Initial Assessment Questions  1. DESCRIPTION: \"Describe your dizziness.\"      Dizzy and light headed right after eating  2. LIGHTHEADED: \"Do you feel lightheaded?\" (e.g., somewhat faint, woozy, weak upon standing)      Woozy, loss balance  3. VERTIGO: \"Do you feel like either you or the room is spinning or tilting?\" (i.e. vertigo)      Room was moving  4. SEVERITY: \"How bad is it?\"  \"Do you feel like you are going to faint?\" \"Can you stand and walk?\"    - MILD - walking normally    - MODERATE - interferes with normal activities (e.g., work, school)     - SEVERE - unable to stand, requires support to walk, feels like passing out now.       Moderate, had to use walker and hold on to wall  5. ONSET:  \"When did the dizziness begin?\"      Wednesday   6. AGGRAVATING FACTORS: \"Does anything make it worse?\" (e.g., standing, change in head position)      Closing eyes helped a little bit. Went to bed but still felt nausea  7. HEART RATE: \"Can you tell me your heart rate?\" \"How many beats in 15 seconds?\"  (Note: not all patients can do this)        No  8. CAUSE: \"What do you think is causing the dizziness?\"      unsure  9. RECURRENT SYMPTOM: \"Have you had dizziness before?\" If so, ask: \"When was the last time?\" \"What happened that time?\"      No  10. OTHER SYMPTOMS: \"Do you have any other symptoms?\" " "(e.g., fever, chest pain, vomiting, diarrhea, bleeding)        No diarrhea. Vomiting x 1 yesterday morning. No fever. No chest pain. Slight headache. Hx of stomach ulcer  11. PREGNANCY: \"Is there any chance you are pregnant?\" \"When was your last menstrual period?\"        n/a    Protocols used: DIZZINESS-A-OH      "

## 2020-09-02 ENCOUNTER — ANTICOAGULATION THERAPY VISIT (OUTPATIENT)
Dept: NURSING | Facility: CLINIC | Age: 85
End: 2020-09-02

## 2020-09-02 ENCOUNTER — ALLIED HEALTH/NURSE VISIT (OUTPATIENT)
Dept: NURSING | Facility: CLINIC | Age: 85
End: 2020-09-02

## 2020-09-02 DIAGNOSIS — I48.91 ATRIAL FIBRILLATION, UNSPECIFIED TYPE (H): ICD-10-CM

## 2020-09-02 DIAGNOSIS — Z53.9 ERRONEOUS ENCOUNTER--DISREGARD: Primary | ICD-10-CM

## 2020-09-02 DIAGNOSIS — I48.20 CHRONIC ATRIAL FIBRILLATION (H): ICD-10-CM

## 2020-09-02 DIAGNOSIS — Z79.01 LONG TERM CURRENT USE OF ANTICOAGULANT THERAPY: ICD-10-CM

## 2020-09-02 DIAGNOSIS — Z79.01 LONG TERM (CURRENT) USE OF ANTICOAGULANTS: ICD-10-CM

## 2020-09-02 LAB
CAPILLARY BLOOD COLLECTION: NORMAL
INR PPP: 2.2 (ref 0.86–1.14)

## 2020-09-02 PROCEDURE — 85610 PROTHROMBIN TIME: CPT | Performed by: FAMILY MEDICINE

## 2020-09-02 PROCEDURE — 36416 COLLJ CAPILLARY BLOOD SPEC: CPT | Performed by: FAMILY MEDICINE

## 2020-09-02 NOTE — PROGRESS NOTES
ANTICOAGULATION MANAGEMENT     Patient Name:  Alessio Teixeira  Date:  9/2/2020    ASSESSMENT /SUBJECTIVE:    Today's INR result of 2.2 is therapeutic. Goal INR of 2.0-3.0      Warfarin dose taken: Warfarin taken as previously instructed    Diet: No new diet changes affecting INR    Medication changes/ interactions: No new medications/supplements affecting INR    Previous INR: Therapeutic     S/S of bleeding or thromboembolism: No    New injury or illness: No    Upcoming surgery, procedure or cardioversion: No    Additional findings: None      PLAN:    Spoke with Alessio regarding INR result and instructed:     Warfarin Dosing Instructions: Continue your current warfarin dose 5mg Mon/Thu and 7 mg AOD    Instructed patient to follow up no later than: 6 weeks  Lab visit scheduled    Education provided: None required      Alessio verbalizes understanding and agrees to warfarin dosing plan.    Instructed to call the Anticoagulation Clinic for any changes, questions or concerns. (#[phone])        Bebe Vega RN      OBJECTIVE:  Recent labs: (last 7 days)     09/02/20  0955   INR 2.20*         INR assessment THER    Recheck INR In: 6 WEEKS    INR Location Clinic      Anticoagulation Summary  As of 9/2/2020    INR goal:   2.0-3.0   TTR:   82.1 % (1 y)   INR used for dosing:   No new INR was available at the time of this encounter.   Warfarin maintenance plan:   5 mg (2 mg x 2.5) every Mon, Thu; 7 mg (2 mg x 3.5) all other days   Full warfarin instructions:   5 mg every Mon, Thu; 7 mg all other days   Weekly warfarin total:   45 mg   No change documented:   Bebe Vega RN   Plan last modified:   Kerrie Morgan RN (2/19/2020)   Next INR check:   10/14/2020   Priority:   Maintenance   Target end date:   Indefinite    Indications    Chronic atrial fibrillation (H) [I48.20]  Long term current use of anticoagulant therapy [Z79.01]             Anticoagulation Episode Summary     INR check location:    Anticoagulation Clinic    Preferred lab:       Send INR reminders to:   JOLEEN VARGAS    Comments:         Anticoagulation Care Providers     Provider Role Specialty Phone number    Zac Salgado MD Referring Family Practice [phone]

## 2020-09-11 DIAGNOSIS — Z86.711 HISTORY OF PULMONARY EMBOLISM: ICD-10-CM

## 2020-09-11 DIAGNOSIS — I48.0 PAROXYSMAL ATRIAL FIBRILLATION (H): ICD-10-CM

## 2020-09-11 RX ORDER — WARFARIN SODIUM 2 MG/1
TABLET ORAL
Qty: 315 TABLET | Refills: 0 | Status: SHIPPED | OUTPATIENT
Start: 2020-09-11 | End: 2020-12-01

## 2020-09-11 NOTE — TELEPHONE ENCOUNTER
Warfarin 2 mg  Current Dosing Instructions: 5 mg Mon/Thurs and 7 mg ROW  Tabs: 315  Refills: 0  Last INR: 9/2  Next INR due: 10/14/20  Last OV with responsible provider: 4/27/20

## 2020-09-22 ENCOUNTER — TELEPHONE (OUTPATIENT)
Dept: FAMILY MEDICINE | Facility: CLINIC | Age: 85
End: 2020-09-22

## 2020-09-22 NOTE — TELEPHONE ENCOUNTER
Pt wondering if he should continue the Uloric. He has one weeks worth left. He states it very expensive and if he doesn't need it anymore he doesn't want to refill it. He has not had anymore gout flare ups since his last appt in July. Please advise.

## 2020-10-14 ENCOUNTER — ANTICOAGULATION THERAPY VISIT (OUTPATIENT)
Dept: NURSING | Facility: CLINIC | Age: 85
End: 2020-10-14
Payer: COMMERCIAL

## 2020-10-14 DIAGNOSIS — I48.91 ATRIAL FIBRILLATION, UNSPECIFIED TYPE (H): ICD-10-CM

## 2020-10-14 DIAGNOSIS — I48.20 CHRONIC ATRIAL FIBRILLATION (H): ICD-10-CM

## 2020-10-14 DIAGNOSIS — Z79.01 LONG TERM (CURRENT) USE OF ANTICOAGULANTS: ICD-10-CM

## 2020-10-14 LAB — INR PPP: 2.1 (ref 0.86–1.14)

## 2020-10-14 PROCEDURE — 85610 PROTHROMBIN TIME: CPT | Performed by: FAMILY MEDICINE

## 2020-10-14 PROCEDURE — 36415 COLL VENOUS BLD VENIPUNCTURE: CPT | Performed by: FAMILY MEDICINE

## 2020-10-14 PROCEDURE — 99207 PR NO CHARGE NURSE ONLY: CPT

## 2020-10-14 NOTE — PROGRESS NOTES
ANTICOAGULATION FOLLOW-UP CLINIC VISIT    Patient Name:  Alessio Teixeira  Date:  10/14/2020  Contact Type:  Telephone/ Patient    SUBJECTIVE:  Patient Findings     Comments:  The patient was assessed for diet, medication, and activity level changes, missed or extra doses, bruising or bleeding, with no problem findings. Pt mentioned that once [facility] closes, he may want to start going to the [facility] for his future INR's.         Clinical Outcomes     Negatives:  Major bleeding event, Thromboembolic event, Anticoagulation-related hospital admission, Anticoagulation-related ED visit, Anticoagulation-related fatality    Comments:  The patient was assessed for diet, medication, and activity level changes, missed or extra doses, bruising or bleeding, with no problem findings. Pt mentioned that once [facility] closes, he may want to start going to the [facility] for his future INR's.            OBJECTIVE    Recent labs: (last 7 days)     10/14/20  0959   INR 2.10*       ASSESSMENT / PLAN  INR assessment THER    Recheck INR In: 6 WEEKS    INR Location Outside lab      Anticoagulation Summary  As of 10/14/2020    INR goal:  2.0-3.0   TTR:  84.3 % (1 y)   INR used for dosin.10 (10/14/2020)   Warfarin maintenance plan:  5 mg (2 mg x 2.5) every Mon, Thu; 7 mg (2 mg x 3.5) all other days   Full warfarin instructions:  5 mg every Mon, Thu; 7 mg all other days   Weekly warfarin total:  45 mg   No change documented:  Radha Wilkes RN   Plan last modified:  Kerrie Morgan RN (2020)   Next INR check:  2020   Priority:  Maintenance   Target end date:  Indefinite    Indications    Chronic atrial fibrillation (H) [I48.20]  Long term current use of anticoagulant therapy [Z79.01]             Anticoagulation Episode Summary     INR check location:  Anticoagulation Clinic    Preferred lab:      Send INR reminders to:  JOLEEN VARGAS    Comments:        Anticoagulation  Care Providers     Provider Role Specialty Phone number    Zac Salgado MD Referring [facility] [phone]            See the Encounter Report to view Anticoagulation Flowsheet and Dosing Calendar (Go to Encounters tab in chart review, and find the Anticoagulation Therapy Visit)    Pt INR is 2.1 today. Pt advised to continue taking 5 mg on Mondays, Thursdays and 7 mg all the other days. Recheck INR in 6 weeks or sooner as needed.Devin aware to get his INR checked sooner if he has a change in health. Pt scheduled on 11/25/20 at 10 am at [facility] for next INR check. Devin aware if signs of clotting (pain, tenderness, swelling, color change in leg or arm, SOB) and bleeding occur (blood in stool, urine, large bruising, bleeding gums, nosebleeds) to have INR check sooner. If sx severe report to ER or concerned for stroke call 911. If general questions or concerns arise, call clinic.         Radha Wilkes RN

## 2020-10-29 DIAGNOSIS — E78.5 HYPERLIPIDEMIA LDL GOAL <100: ICD-10-CM

## 2020-10-30 RX ORDER — LOVASTATIN 40 MG
TABLET ORAL
Qty: 180 TABLET | Refills: 2 | Status: SHIPPED | OUTPATIENT
Start: 2020-10-30 | End: 2021-08-02

## 2020-11-07 ENCOUNTER — HEALTH MAINTENANCE LETTER (OUTPATIENT)
Age: 85
End: 2020-11-07

## 2020-11-25 ENCOUNTER — ANTICOAGULATION THERAPY VISIT (OUTPATIENT)
Dept: NURSING | Facility: CLINIC | Age: 85
End: 2020-11-25

## 2020-11-25 DIAGNOSIS — I48.20 CHRONIC ATRIAL FIBRILLATION (H): ICD-10-CM

## 2020-11-25 DIAGNOSIS — Z79.01 LONG TERM (CURRENT) USE OF ANTICOAGULANTS: ICD-10-CM

## 2020-11-25 DIAGNOSIS — I48.91 ATRIAL FIBRILLATION, UNSPECIFIED TYPE (H): ICD-10-CM

## 2020-11-25 LAB
CAPILLARY BLOOD COLLECTION: NORMAL
INR PPP: 2.1 (ref 0.86–1.14)

## 2020-11-25 PROCEDURE — 99207 PR NO CHARGE NURSE ONLY: CPT

## 2020-11-25 PROCEDURE — 36416 COLLJ CAPILLARY BLOOD SPEC: CPT | Performed by: FAMILY MEDICINE

## 2020-11-25 PROCEDURE — 85610 PROTHROMBIN TIME: CPT | Performed by: FAMILY MEDICINE

## 2020-11-25 NOTE — PROGRESS NOTES
Attempt #3 to reach pt. No answer. Unable to leave voicemail  ANTICOAGULATION FOLLOW-UP CLINIC VISIT    Patient Name:  Alessio Teixeira  Date:  2020  Contact Type:  Telephone/ Pt's wife Claudia (CTC on file)    SUBJECTIVE:  Patient Findings     Comments:  The patient was assessed for diet, medication, and activity level changes, missed or extra doses, bruising or bleeding, with no problem findings.        Clinical Outcomes     Negatives:  Major bleeding event, Thromboembolic event, Anticoagulation-related hospital admission, Anticoagulation-related ED visit, Anticoagulation-related fatality    Comments:  The patient was assessed for diet, medication, and activity level changes, missed or extra doses, bruising or bleeding, with no problem findings.           OBJECTIVE    Recent labs: (last 7 days)     20  0950   INR 2.10*       ASSESSMENT / PLAN  INR assessment THER    Recheck INR In: 6 WEEKS    INR Location Outside lab      Anticoagulation Summary  As of 2020    INR goal:  2.0-3.0   TTR:  86.5 % (1 y)   INR used for dosin.10 (2020)   Warfarin maintenance plan:  5 mg (2 mg x 2.5) every Mon, Thu; 7 mg (2 mg x 3.5) all other days   Full warfarin instructions:  5 mg every Mon, Thu; 7 mg all other days   Weekly warfarin total:  45 mg   No change documented:  Radha Wilkes RN   Plan last modified:  Kerrie Morgan RN (2020)   Next INR check:  2021   Priority:  Maintenance   Target end date:  Indefinite    Indications    Chronic atrial fibrillation (H) [I48.20]  Long term current use of anticoagulant therapy [Z79.01]             Anticoagulation Episode Summary     INR check location:  Anticoagulation Clinic    Preferred lab:      Send INR reminders to:  JOLEEN York JULIUSXCLOTILDE    Comments:        Anticoagulation Care Providers     Provider Role Specialty Phone number    Zac Salgado MD Referring Family Medicine [phone]            See the Encounter Report  to view Anticoagulation Flowsheet and Dosing Calendar (Go to Encounters tab in chart review, and find the Anticoagulation Therapy Visit)    Pt INR is 2.1 today. Claudia (pt's wife) advised to have pt continue current warfarin maintenance dose of 5 mg on Mondays, Thursdays and 7 mg all the other days. Recheck INR in 6 weeks or sooner as needed if he has a change in health. Pt scheduled at the [facility] on 1/6/2021 at 10 am. Claudia given the central INR number to call if questions,updates or concerns.Devin aware if signs of clotting (pain, tenderness, swelling, color change in leg or arm, SOB) and bleeding occur (blood in stool, urine, large bruising, bleeding gums, nosebleeds) to have INR check sooner. If sx severe report to ER or concerned for stroke call 911. If general questions or concerns arise, call clinic.         Radha Wilkes RN

## 2020-12-01 DIAGNOSIS — Z86.711 HISTORY OF PULMONARY EMBOLISM: ICD-10-CM

## 2020-12-01 DIAGNOSIS — I48.0 PAROXYSMAL ATRIAL FIBRILLATION (H): ICD-10-CM

## 2020-12-02 RX ORDER — WARFARIN SODIUM 2 MG/1
TABLET ORAL
Qty: 315 TABLET | Refills: 1 | Status: SHIPPED | OUTPATIENT
Start: 2020-12-02 | End: 2021-06-02

## 2021-01-06 ENCOUNTER — ANTICOAGULATION THERAPY VISIT (OUTPATIENT)
Dept: NURSING | Facility: CLINIC | Age: 86
End: 2021-01-06
Payer: COMMERCIAL

## 2021-01-06 ENCOUNTER — TELEPHONE (OUTPATIENT)
Dept: FAMILY MEDICINE | Facility: CLINIC | Age: 86
End: 2021-01-06

## 2021-01-06 DIAGNOSIS — I48.91 ATRIAL FIBRILLATION, UNSPECIFIED TYPE (H): ICD-10-CM

## 2021-01-06 DIAGNOSIS — I48.0 PAROXYSMAL ATRIAL FIBRILLATION (H): Primary | ICD-10-CM

## 2021-01-06 DIAGNOSIS — Z79.01 LONG TERM (CURRENT) USE OF ANTICOAGULANTS: ICD-10-CM

## 2021-01-06 DIAGNOSIS — I48.20 CHRONIC ATRIAL FIBRILLATION (H): ICD-10-CM

## 2021-01-06 LAB
CAPILLARY BLOOD COLLECTION: NORMAL
INR PPP: 2.5 (ref 0.86–1.14)

## 2021-01-06 PROCEDURE — 36416 COLLJ CAPILLARY BLOOD SPEC: CPT | Performed by: FAMILY MEDICINE

## 2021-01-06 PROCEDURE — 99207 PR NO CHARGE NURSE ONLY: CPT

## 2021-01-06 PROCEDURE — 85610 PROTHROMBIN TIME: CPT | Performed by: FAMILY MEDICINE

## 2021-01-06 NOTE — PROGRESS NOTES
ANTICOAGULATION FOLLOW-UP CLINIC VISIT    Patient Name:  Alessio Teixeira  Date:  2021  Contact Type:  Telephone/ Patient    SUBJECTIVE:  Patient Findings     Comments:  The patient was assessed for diet, medication, and activity level changes, missed or extra doses, bruising or bleeding, with no problem findings.        Clinical Outcomes     Negatives:  Major bleeding event, Thromboembolic event, Anticoagulation-related hospital admission, Anticoagulation-related ED visit, Anticoagulation-related fatality    Comments:  The patient was assessed for diet, medication, and activity level changes, missed or extra doses, bruising or bleeding, with no problem findings.           OBJECTIVE    Recent labs: (last 7 days)     21  1005   INR 2.50*       ASSESSMENT / PLAN  INR assessment THER    Recheck INR In: 6 WEEKS    INR Location Outside lab      Anticoagulation Summary  As of 2021    INR goal:  2.0-3.0   TTR:  86.5 % (1 y)   INR used for dosin.50 (2021)   Warfarin maintenance plan:  5 mg (2 mg x 2.5) every Mon, Thu; 7 mg (2 mg x 3.5) all other days   Full warfarin instructions:  5 mg every Mon, Thu; 7 mg all other days   Weekly warfarin total:  45 mg   No change documented:  Radha Wilkes RN   Plan last modified:  Kerrie Morgan RN (2020)   Next INR check:  2021   Priority:  Maintenance   Target end date:  Indefinite    Indications    Chronic atrial fibrillation (H) [I48.20]  Long term current use of anticoagulant therapy [Z79.01]             Anticoagulation Episode Summary     INR check location:  Anticoagulation Clinic    Preferred lab:      [facility] [place]    Comments:        Anticoagulation Care Providers     Provider Role Specialty Phone number    Zac Salgado MD Referring Family Medicine [phone]            See the Encounter Report to view Anticoagulation Flowsheet and Dosing Calendar (Go to Encounters tab in chart review, and  find the Anticoagulation Therapy Visit)    Pt INR is 2.5 today. Pt advised to continue taking 5 mg on Mondays, Thursdays and 7 mg all the other days. Recheck INR in 6 weeks or sooner if he has a change in health. The next INR is scheduled on 2/17/21 at 10 am in [place]. Devin aware if signs of clotting (pain, tenderness, swelling, color change in leg or arm, SOB) and bleeding occur (blood in stool, urine, large bruising, bleeding gums, nosebleeds) to have INR check sooner. If sx severe report to ER or concerned for stroke call 911. If general questions or concerns arise, call clinic.         Radha Wilkes RN

## 2021-01-06 NOTE — TELEPHONE ENCOUNTER
ANTICOAGULATION MANAGEMENT      Alessio Teixeira due for annual renewal of referral to anticoagulation monitoring. Order pended for your review and signature.      ANTICOAGULATION SUMMARY      Warfarin indication(s)     Atrial fibrillation    Heart valve present?  NO       Current goal range   INR: 2.0-3.0     Goal appropriate for indication? Yes, INR 2-3 appropriate for hx of DVT, PE, hypercoagulable state, Afib, LVAD, or bileaflet AVR without risk factors     Current duration of therapy Indefinite/long term therapy   Time in Therapeutic Range (TTR)  (Goal > 60%) 86.5 %       Office visit with referring provider's group within last year yes on 7/9/20       Radha Wilkes, RN

## 2021-01-15 ENCOUNTER — HEALTH MAINTENANCE LETTER (OUTPATIENT)
Age: 86
End: 2021-01-15

## 2021-02-17 ENCOUNTER — ANTICOAGULATION THERAPY VISIT (OUTPATIENT)
Dept: INTERNAL MEDICINE | Facility: CLINIC | Age: 86
End: 2021-02-17

## 2021-02-17 DIAGNOSIS — Z79.01 LONG TERM CURRENT USE OF ANTICOAGULANT THERAPY: ICD-10-CM

## 2021-02-17 DIAGNOSIS — I48.20 CHRONIC ATRIAL FIBRILLATION (H): ICD-10-CM

## 2021-02-17 DIAGNOSIS — I48.91 ATRIAL FIBRILLATION, UNSPECIFIED TYPE (H): ICD-10-CM

## 2021-02-17 DIAGNOSIS — Z79.01 LONG TERM (CURRENT) USE OF ANTICOAGULANTS: ICD-10-CM

## 2021-02-17 DIAGNOSIS — I48.0 PAROXYSMAL ATRIAL FIBRILLATION (H): ICD-10-CM

## 2021-02-17 LAB
CAPILLARY BLOOD COLLECTION: NORMAL
INR PPP: 2.1 (ref 0.86–1.14)

## 2021-02-17 PROCEDURE — 36416 COLLJ CAPILLARY BLOOD SPEC: CPT | Performed by: FAMILY MEDICINE

## 2021-02-17 PROCEDURE — 85610 PROTHROMBIN TIME: CPT | Performed by: FAMILY MEDICINE

## 2021-02-17 NOTE — PROGRESS NOTES
ANTICOAGULATION MANAGEMENT     Patient Name:  Alessio Teixeira  Date:  2/17/2021    ASSESSMENT /SUBJECTIVE:    Today's INR result of 2.1 is therapeutic. Goal INR of 2.0-3.0      Warfarin dose taken: Warfarin taken as instructed    Diet: No new diet changes affecting INR    Medication changes/ interactions: No new medications/supplements affecting INR    Previous INR: Therapeutic     S/S of bleeding or thromboembolism: No    New injury or illness: No    Upcoming surgery, procedure or cardioversion: No    Additional findings: None      PLAN:    Telephone call with Alessio regarding INR result and instructed:     Warfarin Dosing Instructions: Continue your current warfarin dose 5mg M/Th and 7mg AOD    Instructed patient to follow up no later than: 6 weeks  Lab visit scheduled    Education provided: None required      Alessio verbalizes understanding and agrees to warfarin dosing plan.    Instructed to call the Anticoagulation Clinic for any changes, questions or concerns. (#[phone])        Bebe Vega RN      OBJECTIVE:  Recent labs: (last 7 days)     02/17/21  0959   INR 2.10*         No question data found.  Anticoagulation Summary  As of 2/17/2021    INR goal:  2.0-3.0   TTR:  86.5 % (1 y)   Prior goal:  2.0-3.0   INR used for dosing:  No new INR was available at the time of this encounter.   Warfarin maintenance plan:  5 mg (2 mg x 2.5) every Mon, Thu; 7 mg (2 mg x 3.5) all other days   Full warfarin instructions:  5 mg every Mon, Thu; 7 mg all other days   Weekly warfarin total:  45 mg   No change documented:  Bebe Vega RN   Plan last modified:  Kerrie Morgan RN (2/19/2020)   Next INR check:  3/31/2021   Priority:  Maintenance   Target end date:  Indefinite    Indications    Chronic atrial fibrillation (H) [I48.20]  Long term current use of anticoagulant therapy [Z79.01]  Paroxysmal atrial fibrillation (H) [I48.0]             Anticoagulation Episode Summary     INR check location:   Anticoagulation Clinic    Preferred lab:      Send INR reminders to:  [facility]    Comments:        Anticoagulation Care Providers     Provider Role Specialty Phone number    Zac Salgado MD Referring Family Medicine [phone]

## 2021-03-01 ENCOUNTER — IMMUNIZATION (OUTPATIENT)
Dept: NURSING | Facility: CLINIC | Age: 86
End: 2021-03-01
Payer: COMMERCIAL

## 2021-03-01 PROCEDURE — 0001A PR COVID VAC PFIZER DIL RECON 30 MCG/0.3 ML IM: CPT

## 2021-03-01 PROCEDURE — 91300 PR COVID VAC PFIZER DIL RECON 30 MCG/0.3 ML IM: CPT

## 2021-03-04 ENCOUNTER — OFFICE VISIT (OUTPATIENT)
Dept: INTERNAL MEDICINE | Facility: CLINIC | Age: 86
End: 2021-03-04
Payer: COMMERCIAL

## 2021-03-04 VITALS
TEMPERATURE: 95.5 F | WEIGHT: 232.3 LBS | HEART RATE: 66 BPM | RESPIRATION RATE: 16 BRPM | SYSTOLIC BLOOD PRESSURE: 125 MMHG | HEIGHT: 71 IN | BODY MASS INDEX: 32.52 KG/M2 | OXYGEN SATURATION: 99 % | DIASTOLIC BLOOD PRESSURE: 60 MMHG

## 2021-03-04 DIAGNOSIS — I10 HYPERTENSION GOAL BP (BLOOD PRESSURE) < 140/90: ICD-10-CM

## 2021-03-04 DIAGNOSIS — Z00.00 ROUTINE MEDICAL EXAM: Primary | ICD-10-CM

## 2021-03-04 DIAGNOSIS — I48.20 CHRONIC ATRIAL FIBRILLATION (H): ICD-10-CM

## 2021-03-04 DIAGNOSIS — M10.9 GOUT OF BIG TOE: ICD-10-CM

## 2021-03-04 DIAGNOSIS — E55.9 VITAMIN D DEFICIENCY DISEASE: ICD-10-CM

## 2021-03-04 DIAGNOSIS — E78.2 MIXED HYPERLIPIDEMIA: ICD-10-CM

## 2021-03-04 DIAGNOSIS — N18.2 CKD (CHRONIC KIDNEY DISEASE) STAGE 2, GFR 60-89 ML/MIN: ICD-10-CM

## 2021-03-04 DIAGNOSIS — Z12.5 SCREENING FOR PROSTATE CANCER: ICD-10-CM

## 2021-03-04 PROBLEM — Z96.651 STATUS POST TOTAL RIGHT KNEE REPLACEMENT: Status: RESOLVED | Noted: 2019-02-01 | Resolved: 2021-03-04

## 2021-03-04 PROBLEM — R63.5 WEIGHT GAIN: Status: RESOLVED | Noted: 2019-02-05 | Resolved: 2021-03-04

## 2021-03-04 PROBLEM — K25.4 GASTROINTESTINAL HEMORRHAGE ASSOCIATED WITH GASTRIC ULCER: Status: RESOLVED | Noted: 2019-02-01 | Resolved: 2021-03-04

## 2021-03-04 PROBLEM — N17.9 AKI (ACUTE KIDNEY INJURY) (H): Status: RESOLVED | Noted: 2019-02-01 | Resolved: 2021-03-04

## 2021-03-04 LAB
ALBUMIN SERPL-MCNC: 3.5 G/DL (ref 3.4–5)
ALBUMIN UR-MCNC: 30 MG/DL
ALP SERPL-CCNC: 72 U/L (ref 40–150)
ALT SERPL W P-5'-P-CCNC: 27 U/L (ref 0–70)
ANION GAP SERPL CALCULATED.3IONS-SCNC: 3 MMOL/L (ref 3–14)
APPEARANCE UR: CLEAR
AST SERPL W P-5'-P-CCNC: 28 U/L (ref 0–45)
BASOPHILS # BLD AUTO: 0.2 10E9/L (ref 0–0.2)
BASOPHILS NFR BLD AUTO: 1.7 %
BILIRUB SERPL-MCNC: 0.4 MG/DL (ref 0.2–1.3)
BILIRUB UR QL STRIP: NEGATIVE
BUN SERPL-MCNC: 25 MG/DL (ref 7–30)
CALCIUM SERPL-MCNC: 9.7 MG/DL (ref 8.5–10.1)
CHLORIDE SERPL-SCNC: 109 MMOL/L (ref 94–109)
CHOLEST SERPL-MCNC: 134 MG/DL
CO2 SERPL-SCNC: 30 MMOL/L (ref 20–32)
COLOR UR AUTO: YELLOW
CREAT SERPL-MCNC: 1.24 MG/DL (ref 0.66–1.25)
DEPRECATED CALCIDIOL+CALCIFEROL SERPL-MC: 46 UG/L (ref 20–75)
DIFFERENTIAL METHOD BLD: ABNORMAL
EOSINOPHIL # BLD AUTO: 0.3 10E9/L (ref 0–0.7)
EOSINOPHIL NFR BLD AUTO: 2.1 %
ERYTHROCYTE [DISTWIDTH] IN BLOOD BY AUTOMATED COUNT: 23.2 % (ref 10–15)
GFR SERPL CREATININE-BSD FRML MDRD: 53 ML/MIN/{1.73_M2}
GLUCOSE SERPL-MCNC: 82 MG/DL (ref 70–99)
GLUCOSE UR STRIP-MCNC: NEGATIVE MG/DL
HCT VFR BLD AUTO: 52.2 % (ref 40–53)
HDLC SERPL-MCNC: 35 MG/DL
HGB BLD-MCNC: 16 G/DL (ref 13.3–17.7)
HGB UR QL STRIP: NEGATIVE
HYALINE CASTS #/AREA URNS LPF: ABNORMAL /LPF
KETONES UR STRIP-MCNC: NEGATIVE MG/DL
LDLC SERPL CALC-MCNC: 75 MG/DL
LEUKOCYTE ESTERASE UR QL STRIP: NEGATIVE
LYMPHOCYTES # BLD AUTO: 1.9 10E9/L (ref 0.8–5.3)
LYMPHOCYTES NFR BLD AUTO: 13.9 %
MCH RBC QN AUTO: 25.8 PG (ref 26.5–33)
MCHC RBC AUTO-ENTMCNC: 30.7 G/DL (ref 31.5–36.5)
MCV RBC AUTO: 84 FL (ref 78–100)
MONOCYTES # BLD AUTO: 0.7 10E9/L (ref 0–1.3)
MONOCYTES NFR BLD AUTO: 4.9 %
MUCOUS THREADS #/AREA URNS LPF: PRESENT /LPF
NEUTROPHILS # BLD AUTO: 10.6 10E9/L (ref 1.6–8.3)
NEUTROPHILS NFR BLD AUTO: 77.4 %
NITRATE UR QL: NEGATIVE
NONHDLC SERPL-MCNC: 99 MG/DL
PH UR STRIP: 6 PH (ref 5–7)
PLATELET # BLD AUTO: 416 10E9/L (ref 150–450)
POTASSIUM SERPL-SCNC: 4.4 MMOL/L (ref 3.4–5.3)
PROT SERPL-MCNC: 7 G/DL (ref 6.8–8.8)
PSA SERPL-ACNC: 4.26 UG/L (ref 0–4)
RBC # BLD AUTO: 6.2 10E12/L (ref 4.4–5.9)
RBC #/AREA URNS AUTO: ABNORMAL /HPF
SODIUM SERPL-SCNC: 142 MMOL/L (ref 133–144)
SOURCE: ABNORMAL
SP GR UR STRIP: 1.02 (ref 1–1.03)
TRIGL SERPL-MCNC: 120 MG/DL
URATE SERPL-MCNC: 10.9 MG/DL (ref 3.5–7.2)
UROBILINOGEN UR STRIP-ACNC: 0.2 EU/DL (ref 0.2–1)
WBC # BLD AUTO: 13.7 10E9/L (ref 4–11)
WBC #/AREA URNS AUTO: ABNORMAL /HPF

## 2021-03-04 PROCEDURE — 36415 COLL VENOUS BLD VENIPUNCTURE: CPT | Performed by: FAMILY MEDICINE

## 2021-03-04 PROCEDURE — G0103 PSA SCREENING: HCPCS | Performed by: FAMILY MEDICINE

## 2021-03-04 PROCEDURE — 84550 ASSAY OF BLOOD/URIC ACID: CPT | Performed by: FAMILY MEDICINE

## 2021-03-04 PROCEDURE — 80053 COMPREHEN METABOLIC PANEL: CPT | Performed by: FAMILY MEDICINE

## 2021-03-04 PROCEDURE — 85025 COMPLETE CBC W/AUTO DIFF WBC: CPT | Performed by: FAMILY MEDICINE

## 2021-03-04 PROCEDURE — 82306 VITAMIN D 25 HYDROXY: CPT | Performed by: FAMILY MEDICINE

## 2021-03-04 PROCEDURE — 99397 PER PM REEVAL EST PAT 65+ YR: CPT | Performed by: FAMILY MEDICINE

## 2021-03-04 PROCEDURE — 99214 OFFICE O/P EST MOD 30 MIN: CPT | Mod: 25 | Performed by: FAMILY MEDICINE

## 2021-03-04 PROCEDURE — 80061 LIPID PANEL: CPT | Performed by: FAMILY MEDICINE

## 2021-03-04 PROCEDURE — 82043 UR ALBUMIN QUANTITATIVE: CPT | Performed by: FAMILY MEDICINE

## 2021-03-04 PROCEDURE — 81001 URINALYSIS AUTO W/SCOPE: CPT | Performed by: FAMILY MEDICINE

## 2021-03-04 ASSESSMENT — MIFFLIN-ST. JEOR: SCORE: 1765.84

## 2021-03-04 NOTE — PATIENT INSTRUCTIONS
Patient Education   Personalized Prevention Plan  You are due for the preventive services outlined below.  Your care team is available to assist you in scheduling these services.  If you have already completed any of these items, please share that information with your care team to update in your medical record.  Health Maintenance Due   Topic Date Due     Kidney Microalbumin Urine Test  1935     URINE DRUG SCREEN  1935     Zoster (Shingles) Vaccine (1 of 2) 12/02/1986     Flu Vaccine (1) 09/01/2020     Cholesterol Lab  11/25/2020     FALL RISK ASSESSMENT  11/25/2020     PHQ-2  01/01/2021

## 2021-03-04 NOTE — PROGRESS NOTES
"  SUBJECTIVE:   Alessio Teixeira is a 85 year old male who presents for Preventive Visit.      Patient has been advised of split billing requirements and indicates understanding: Yes  Are you in the first 12 months of your Medicare Part B coverage?  No    Physical Health:    In general, how would you rate your overall physical health? Fair to good    Outside of work, how many days during the week do you exercise? none    Outside of work, approximately how many minutes a day do you exercise?not applicable    If you drink alcohol do you typically have >3 drinks per day or >7 drinks per week? No    Do you usually eat at least 4 servings of fruit and vegetables a day, include whole grains & fiber and avoid regularly eating high fat or \"junk\" foods? Yes    Do you have any problems taking medications regularly?  No    Do you have any side effects from medications? none    Needs assistance for the following daily activities: no assistance needed    Which of the following safety concerns are present in your home?  none identified     Hearing impairment: Yes, wears hearing aids    In the past 6 months, have you been bothered by leaking of urine? yes    Mental Health:    In general, how would you rate your overall mental or emotional health? excellent  PHQ-2 Score:      Do you feel safe in your environment? Yes    Have you ever done Advance Care Planning? (For example, a Health Directive, POLST, or a discussion with a medical provider or your loved ones about your wishes): Yes, advance care planning is on file.    Additional concerns to address?  No    Fall risk:  Fallen 2 or more times in the past year?: No  Any fall with injury in the past year?: No    Cognitive Screenin) Repeat 3 items (Leader, Season, Table)    2) Clock draw: NORMAL  3) 3 item recall: Recalls 2 objects   Results: NORMAL clock, 1-2 items recalled: COGNITIVE IMPAIRMENT LESS LIKELY    Mini-CogTM Copyright EVE Awan. Licensed by the author for use in " [facility]; reprinted with permission (swati@[facility]). All rights reserved.      Do you have sleep apnea, excessive snoring or daytime drowsiness?: yes        Hyperlipidemia Follow-Up      Are you regularly taking any medication or supplement to lower your cholesterol?   Yes- statin    Are you having muscle aches or other side effects that you think could be caused by your cholesterol lowering medication?  No    Hypertension Follow-up      Do you check your blood pressure regularly outside of the clinic? No     Are you following a low salt diet? Yes    Are your blood pressures ever more than 140 on the top number (systolic) OR more   than 90 on the bottom number (diastolic), for example 140/90? n/a      Reviewed and updated as needed this visit by clinical staff  Tobacco  Allergies  Meds              Reviewed and updated as needed this visit by Provider                Social History     Tobacco Use     Smoking status: Former Smoker     Packs/day: 1.00     Years: 3.00     Pack years: 3.00     Types: Cigarettes     Start date:      Quit date: 1963     Years since quittin.2     Smokeless tobacco: Former User   Substance Use Topics     Alcohol use: Yes     Comment: 2-4 glasses of wine per week                           Current providers sharing in care for this patient include:   Patient Care Team:  Zac Salgado MD as PCP - General (Family Practice)  Zac Salgado MD as Assigned PCP  Kan Hoffman MD as Assigned Heart and Vascular Provider    The following health maintenance items are reviewed in Epic and correct as of today:  Health Maintenance   Topic Date Due     MICROALBUMIN  1935     URINE DRUG SCREEN  1935     ZOSTER IMMUNIZATION (1 of 2) 1986     INFLUENZA VACCINE (1) 2020     LIPID  2020     FALL RISK ASSESSMENT  2020     COVID-19 Vaccine (2 of 2 - Pfizer series) 2021     BMP  2021     MEDICARE ANNUAL WELLNESS  VISIT  03/04/2022     ADVANCE CARE PLANNING  03/04/2026     DTAP/TDAP/TD IMMUNIZATION (3 - Td) 11/25/2029     PHQ-2  Completed     Pneumococcal Vaccine: 65+ Years  Completed     Pneumococcal Vaccine: Pediatrics (0 to 5 Years) and At-Risk Patients (6 to 64 Years)  Aged Out     IPV IMMUNIZATION  Aged Out     MENINGITIS IMMUNIZATION  Aged Out     HEPATITIS B IMMUNIZATION  Aged Out     Patient Active Problem List   Diagnosis     Hyperlipidemia LDL goal <100     Edema of both legs     Varicose veins of legs     Stasis dermatitis     Chronic atrial fibrillation (H)     Vitamin D deficiency disease     Hypertension goal BP (blood pressure) < 140/90     Diplopia     MARTHA (obstructive sleep apnea)     Long term current use of anticoagulant therapy     Bradycardia     Chronic left-sided thoracic back pain     Presbycusis of both ears     Mixed hyperlipidemia     Screening for prostate cancer     CKD (chronic kidney disease) stage 2, GFR 60-89 ml/min     Pulmonary nodules     Debility     Gout of big toe     Routine medical exam     Past Surgical History:   Procedure Laterality Date     APPENDECTOMY       ARTHROPLASTY HIP Right 2016     ARTHROPLASTY HIP ANTERIOR Right 4/26/2016    Procedure: ARTHROPLASTY HIP ANTERIOR;  Surgeon: Miguel Johnson MD;  Location:  OR     ARTHROPLASTY KNEE Right 1/28/2019    Procedure: RIGHT TOTAL KNEE ARTROPLASTY;  Surgeon: Miguel Johnson MD;  Location:  OR     BRAIN SURGERY  2007    partial resection meningioma     C RAD RESEC TONSIL/PILLARS       CATARACT IOL, RT/LT       COLECTOMY  2007    bowel perforation due to steroids     ESOPHAGOSCOPY, GASTROSCOPY, DUODENOSCOPY (EGD), COMBINED N/A 12/22/2018    Procedure: COMBINED ESOPHAGOSCOPY, GASTROSCOPY, DUODENOSCOPY (EGD), BIOPSY SINGLE OR MULTIPLE;  Surgeon: Elizabeth Jones MD;  Location:  GI     ESOPHAGOSCOPY, GASTROSCOPY, DUODENOSCOPY (EGD), COMBINED N/A 5/10/2019    Procedure: ESOPHAGOGASTRODUODENOSCOPY (EGD);  Surgeon:  "Ahsan Garcia MD;  Location:  GI     GENITOURINARY SURGERY      vasectomy     HEAD & NECK SURGERY  2007    meningioma removed     KNEE SURGERY  2010    left knee replacement     ORTHOPEDIC SURGERY      left TKR     PHACOEMULSIFICATION CLEAR CORNEA WITH TORIC INTRAOCULAR LENS IMPLANT  2012    Procedure: PHACOEMULSIFICATION CLEAR CORNEA WITH TORIC INTRAOCULAR LENS IMPLANT;  COMPLEX RIGHT PHACOEMULSIFICATION CLEAR CORNEA WITH TORIC INTRAOCULAR LENS IMPLANT WITH MALYUGIN RING;  Surgeon: Ronald Cortez MD;  Location:  [facility]     RECTAL SURGERY         Social History     Tobacco Use     Smoking status: Former Smoker     Packs/day: 1.00     Years: 3.00     Pack years: 3.00     Types: Cigarettes     Start date:      Quit date: 1963     Years since quittin.2     Smokeless tobacco: Former User   Substance Use Topics     Alcohol use: Yes     Comment: 2-4 glasses of wine per week     Family History   Problem Relation Age of Onset     Glaucoma Father      Unknown/Adopted Mother      Diabetes Brother      Leukemia Brother      Macular Degeneration No family hx of              ROS:  Constitutional, HEENT, cardiovascular, pulmonary, gi and gu systems are negative, except as otherwise noted.    OBJECTIVE:   /60 (BP Location: Right arm, Patient Position: Chair, Cuff Size: Adult Large)   Pulse 66   Temp 95.5  F (35.3  C) (Temporal)   Resp 16   Ht 1.803 m (5' 11\")   Wt 105.4 kg (232 lb 4.8 oz)   SpO2 99%   BMI 32.40 kg/m   Estimated body mass index is 32.4 kg/m  as calculated from the following:    Height as of this encounter: 1.803 m (5' 11\").    Weight as of this encounter: 105.4 kg (232 lb 4.8 oz).  EXAM:   GENERAL: healthy, alert and no distress  EYES: Eyes grossly normal to inspection, PERRL and conjunctivae and sclerae normal  HENT: ear canals and TM's normal, nose and mouth without ulcers or lesions  NECK: no adenopathy, no asymmetry, masses, or scars and thyroid normal to " palpation  RESP: lungs clear to auscultation - no rales, rhonchi or wheezes  CV: irregularly irregular rhythm, normal S1 S2, no S3 or S4, no murmur, click or rub, peripheral pulses strong and no peripheral edema  ABDOMEN: soft, nontender, no hepatosplenomegaly, no masses and bowel sounds normal   (male): normal male genitalia without lesions or urethral discharge, no hernia  MS: no gross musculoskeletal defects noted, no edema  SKIN: no suspicious lesions or rashes  NEURO: Normal strength and tone, mentation intact and speech normal  PSYCH: mentation appears normal, affect normal/bright        ASSESSMENT / PLAN:       ICD-10-CM    1. Routine medical exam  Z00.00    2. Vitamin D deficiency disease  E55.9 Vitamin D Deficiency   3. Mixed hyperlipidemia  E78.2 Lipid panel reflex to direct LDL Fasting   4. Hypertension goal BP (blood pressure) < 140/90  I10 UA with Microscopic     CBC with platelets differential     Comprehensive metabolic panel   5. CKD (chronic kidney disease) stage 2, GFR 60-89 ml/min  N18.2 Albumin Random Urine Quantitative with Creat Ratio   6. Gout of big toe  M10.9 Uric acid   7. Screening for prostate cancer  Z12.5 Prostate spec antigen screen   8. Chronic atrial fibrillation (H)  I48.20        Patient has been advised of split billing requirements and indicates understanding: Yes    COUNSELING:  Reviewed preventive health counseling, as reflected in patient instructions       Regular exercise       Healthy diet/nutrition       Prostate cancer screening       The patient has been off his Uloric since he says it he was seen by a female practitioner in our office in the told him to go ahead and stop the medication.  I could not find any reference to that in the chart.  We will check his uric acid today and see if we need to restart that medication.  He said it was pretty expensive.  However he has had a couple episodes of gout where he is needed him Medrol Dosepak.    Estimated body mass index is  "32.4 kg/m  as calculated from the following:    Height as of this encounter: 1.803 m (5' 11\").    Weight as of this encounter: 105.4 kg (232 lb 4.8 oz).    Weight management plan: Discussed healthy diet and exercise guidelines    He reports that he quit smoking about 58 years ago. His smoking use included cigarettes. He started smoking about 61 years ago. He has a 3.00 pack-year smoking history. He has quit using smokeless tobacco.    Appropriate preventive services were discussed with this patient, including applicable screening as appropriate for cardiovascular disease, diabetes, osteopenia/osteoporosis, and glaucoma.  As appropriate for age/gender, discussed screening for colorectal cancer, prostate cancer, breast cancer, and cervical cancer. Checklist reviewing preventive services available has been given to the patient.    Reviewed patients plan of care and provided an AVS. The Basic Care Plan (routine screening as documented in Health Maintenance) for Alessio meets the Care Plan requirement. This Care Plan has been established and reviewed with the Patient.    Counseling Resources:  ATP IV Guidelines  Pooled Cohorts Equation Calculator  Breast Cancer Risk Calculator  BRCA-Related Cancer Risk Assessment: FHS-7 Tool  FRAX Risk Assessment  ICSI Preventive Guidelines  Dietary Guidelines for Americans, 2010  [employer]'s MyPlate  ASA Prophylaxis  Lung CA Screening    Zac Salgado MD  [facility]  "

## 2021-03-05 LAB
CREAT UR-MCNC: 184 MG/DL
MICROALBUMIN UR-MCNC: 317 MG/L
MICROALBUMIN/CREAT UR: 172.28 MG/G CR (ref 0–17)

## 2021-03-05 RX ORDER — ALLOPURINOL 100 MG/1
100 TABLET ORAL DAILY
Qty: 30 TABLET | Refills: 1 | Status: SHIPPED | OUTPATIENT
Start: 2021-03-05 | End: 2021-04-29

## 2021-03-22 ENCOUNTER — IMMUNIZATION (OUTPATIENT)
Dept: NURSING | Facility: CLINIC | Age: 86
End: 2021-03-22
Attending: NURSE PRACTITIONER
Payer: COMMERCIAL

## 2021-03-22 PROCEDURE — 91300 PR COVID VAC PFIZER DIL RECON 30 MCG/0.3 ML IM: CPT

## 2021-03-22 PROCEDURE — 0002A PR COVID VAC PFIZER DIL RECON 30 MCG/0.3 ML IM: CPT

## 2021-03-30 DIAGNOSIS — R63.5 WEIGHT GAIN: ICD-10-CM

## 2021-03-31 ENCOUNTER — ANTICOAGULATION THERAPY VISIT (OUTPATIENT)
Dept: NURSING | Facility: CLINIC | Age: 86
End: 2021-03-31

## 2021-03-31 DIAGNOSIS — Z79.01 LONG TERM CURRENT USE OF ANTICOAGULANT THERAPY: ICD-10-CM

## 2021-03-31 DIAGNOSIS — I48.20 CHRONIC ATRIAL FIBRILLATION (H): ICD-10-CM

## 2021-03-31 DIAGNOSIS — Z79.01 LONG TERM (CURRENT) USE OF ANTICOAGULANTS: ICD-10-CM

## 2021-03-31 DIAGNOSIS — I48.91 ATRIAL FIBRILLATION, UNSPECIFIED TYPE (H): ICD-10-CM

## 2021-03-31 DIAGNOSIS — I48.20 CHRONIC ATRIAL FIBRILLATION (H): Primary | ICD-10-CM

## 2021-03-31 LAB
CAPILLARY BLOOD COLLECTION: NORMAL
INR PPP: 1.8 (ref 0.86–1.14)

## 2021-03-31 PROCEDURE — 85610 PROTHROMBIN TIME: CPT | Performed by: FAMILY MEDICINE

## 2021-03-31 PROCEDURE — 36416 COLLJ CAPILLARY BLOOD SPEC: CPT | Performed by: FAMILY MEDICINE

## 2021-03-31 RX ORDER — FUROSEMIDE 20 MG
TABLET ORAL
Qty: 90 TABLET | Refills: 3 | Status: SHIPPED | OUTPATIENT
Start: 2021-03-31 | End: 2022-04-12

## 2021-03-31 NOTE — PROGRESS NOTES
ANTICOAGULATION MANAGEMENT     Patient Name:  Alessio Teixeira  Date:  3/31/2021    ASSESSMENT /SUBJECTIVE:    Today's INR result of 1.8 is subtherapeutic. Goal INR of 2.0-3.0      Warfarin dose taken: Warfarin taken as instructed    Diet: No new diet changes affecting INR    Medication changes/ interactions: No new medications/supplements affecting INR    Previous INR: Therapeutic     S/S of bleeding or thromboembolism: No    New injury or illness: No    Upcoming surgery, procedure or cardioversion: No    Additional findings: does not identify any changes, last 2 readings in range, will keep dosing the same and recheck in 2 weeks.       PLAN:    Telephone call with Alessio regarding INR result and instructed:     Warfarin Dosing Instructions: Continue your current warfarin dose 5mg Mon/Thu and 7 mg AOD    Instructed patient to follow up no later than: 2 weeks  Lab visit scheduled    Education provided: Target INR goal and significance of current INR result      Alessio verbalizes understanding and agrees to warfarin dosing plan.    Instructed to call the Anticoagulation Clinic for any changes, questions or concerns. (#[phone])        Bebe Vega RN      OBJECTIVE:  Recent labs: (last 7 days)     03/31/21  1400   INR 1.80*         No question data found.  Anticoagulation Summary  As of 3/31/2021    INR goal:  2.0-3.0   TTR:  84.3 % (1 y)   INR used for dosing:  No new INR was available at the time of this encounter.   Warfarin maintenance plan:  5 mg (2 mg x 2.5) every Mon, Thu; 7 mg (2 mg x 3.5) all other days   Full warfarin instructions:  5 mg every Mon, Thu; 7 mg all other days   Weekly warfarin total:  45 mg   No change documented:  Bebe Vega RN   Plan last modified:  Kerrie Morgan RN (2/19/2020)   Next INR check:  4/14/2021   Priority:  Maintenance   Target end date:  Indefinite    Indications    Chronic atrial fibrillation (H) [I48.20]  Long term current use of anticoagulant therapy  [Z79.01]             Anticoagulation Episode Summary     INR check location:  Anticoagulation Clinic    Preferred lab:      Send INR reminders to:  [facility]    Comments:        Anticoagulation Care Providers     Provider Role Specialty Phone number    Zac Salgado MD Referring Family Medicine [phone]

## 2021-04-14 ENCOUNTER — ANTICOAGULATION THERAPY VISIT (OUTPATIENT)
Dept: NURSING | Facility: CLINIC | Age: 86
End: 2021-04-14

## 2021-04-14 DIAGNOSIS — I48.20 CHRONIC ATRIAL FIBRILLATION (H): ICD-10-CM

## 2021-04-14 LAB
CAPILLARY BLOOD COLLECTION: NORMAL
INR PPP: 1.9 (ref 0.86–1.14)

## 2021-04-14 PROCEDURE — 36416 COLLJ CAPILLARY BLOOD SPEC: CPT | Performed by: FAMILY MEDICINE

## 2021-04-14 PROCEDURE — 85610 PROTHROMBIN TIME: CPT | Performed by: FAMILY MEDICINE

## 2021-04-14 NOTE — PROGRESS NOTES
ANTICOAGULATION FOLLOW-UP CLINIC VISIT    Patient Name:  Alessio Teixeira  Date:  2021  Contact Type:  Telephone/ Patient    SUBJECTIVE:  Patient Findings     Positives:  Change in medications, Change in diet/appetite    Comments:  Pt started allopurinol in March. According to micromedex, this can elevate the INR level however pt's INR has been trending down. Pt reports that he has been eating more salads and plans to continue doing this. Maintenance dose increased by 4.4%        Clinical Outcomes     Comments:  Pt started allopurinol in March. According to micromedex, this can elevate the INR level however pt's INR has been trending down. Pt reports that he has been eating more salads and plans to continue doing this. Maintenance dose increased by 4.4%           OBJECTIVE    Recent labs: (last 7 days)     21  0952   INR 1.90*       ASSESSMENT / PLAN  INR assessment SUB    Recheck INR In: 2 WEEKS    INR Location Outside lab      Anticoagulation Summary  As of 2021    INR goal:  2.0-3.0   TTR:  84.3 % (1 y)   INR used for dosin.90 (2021)   Warfarin maintenance plan:  5 mg (2 mg x 2.5) every Wed; 7 mg (2 mg x 3.5) all other days   Full warfarin instructions:  : 7 mg; Otherwise 5 mg every Wed; 7 mg all other days   Weekly warfarin total:  47 mg   Plan last modified:  Radha Wilkes RN (2021)   Next INR check:  5/3/2021   Priority:  Maintenance   Target end date:  Indefinite    Indications    Chronic atrial fibrillation (H) [I48.20]  Long term current use of anticoagulant therapy [Z79.01]             Anticoagulation Episode Summary     INR check location:  Anticoagulation Clinic    Preferred lab:      Send INR reminders to:  [facility]    Comments:        Anticoagulation Care Providers     Provider Role Specialty Phone number    Zac Salgado MD Referring Family Medicine [phone]            See the Encounter Report to view Anticoagulation Flowsheet and  Dosing Calendar (Go to Encounters tab in chart review, and find the Anticoagulation Therapy Visit)    Pt INR is 1.9 today. See findings. Pt advised to take 7 mg of warfarin today 4/1/4/21. Maintenance dose increased. Pt advised to take 5 mg of warfarin on Wednesdays and 7 mg all other days. Recheck the INR in 2 weeks scheduled on 5/3/21 in [place]. Devin aware if signs of clotting (pain, tenderness, swelling, color change in leg or arm, SOB) and bleeding occur (blood in stool, urine, large bruising, bleeding gums, nosebleeds) to have INR check sooner. If sx severe report to ER or concerned for stroke call 911. If general questions or concerns arise, call clinic.         Radha Wilkes RN

## 2021-04-28 DIAGNOSIS — M10.9 GOUT OF BIG TOE: ICD-10-CM

## 2021-04-28 NOTE — TELEPHONE ENCOUNTER
Routing refill request to provider for review/approval because:  Labs out of range:  Uric Acid    Uric Acid   Date Value Ref Range Status   03/04/2021 10.9 (H) 3.5 - 7.2 mg/dL Final

## 2021-04-29 RX ORDER — ALLOPURINOL 100 MG/1
100 TABLET ORAL DAILY
Qty: 30 TABLET | Refills: 0 | Status: SHIPPED | OUTPATIENT
Start: 2021-04-29 | End: 2021-05-06

## 2021-04-29 NOTE — TELEPHONE ENCOUNTER
Pt is aware wants to do both Monday - any other orders needed beside INR and URIC ACID? .Marlene Sanon RN

## 2021-04-29 NOTE — TELEPHONE ENCOUNTER
Zac Salgado MD  [facility] 5 days ago     Labs due before more refills        please also draw uric acid with INR.Marlene Sanon RN

## 2021-05-03 ENCOUNTER — ANTICOAGULATION THERAPY VISIT (OUTPATIENT)
Dept: ANTICOAGULATION | Facility: CLINIC | Age: 86
End: 2021-05-03

## 2021-05-03 DIAGNOSIS — I48.20 CHRONIC ATRIAL FIBRILLATION (H): ICD-10-CM

## 2021-05-03 DIAGNOSIS — Z79.01 LONG TERM CURRENT USE OF ANTICOAGULANT THERAPY: ICD-10-CM

## 2021-05-03 DIAGNOSIS — M10.9 GOUT OF BIG TOE: ICD-10-CM

## 2021-05-03 LAB
CAPILLARY BLOOD COLLECTION: NORMAL
INR PPP: 2 (ref 0.86–1.14)
URATE SERPL-MCNC: 9.1 MG/DL (ref 3.5–7.2)

## 2021-05-03 PROCEDURE — 84550 ASSAY OF BLOOD/URIC ACID: CPT | Performed by: FAMILY MEDICINE

## 2021-05-03 PROCEDURE — 36416 COLLJ CAPILLARY BLOOD SPEC: CPT | Performed by: FAMILY MEDICINE

## 2021-05-03 PROCEDURE — 85610 PROTHROMBIN TIME: CPT | Performed by: FAMILY MEDICINE

## 2021-05-03 PROCEDURE — 99207 PR NO CHARGE NURSE ONLY: CPT

## 2021-05-06 DIAGNOSIS — M10.9 GOUT OF BIG TOE: ICD-10-CM

## 2021-05-06 RX ORDER — ALLOPURINOL 300 MG/1
300 TABLET ORAL DAILY
Qty: 30 TABLET | Refills: 3 | Status: SHIPPED | OUTPATIENT
Start: 2021-05-06 | End: 2021-08-30

## 2021-06-02 ENCOUNTER — ANTICOAGULATION THERAPY VISIT (OUTPATIENT)
Dept: NURSING | Facility: CLINIC | Age: 86
End: 2021-06-02

## 2021-06-02 DIAGNOSIS — I48.0 PAROXYSMAL ATRIAL FIBRILLATION (H): ICD-10-CM

## 2021-06-02 DIAGNOSIS — I48.20 CHRONIC ATRIAL FIBRILLATION (H): ICD-10-CM

## 2021-06-02 DIAGNOSIS — Z86.711 HISTORY OF PULMONARY EMBOLISM: ICD-10-CM

## 2021-06-02 LAB
CAPILLARY BLOOD COLLECTION: NORMAL
INR PPP: 1.8 (ref 0.86–1.14)

## 2021-06-02 PROCEDURE — 85610 PROTHROMBIN TIME: CPT | Performed by: FAMILY MEDICINE

## 2021-06-02 PROCEDURE — 36416 COLLJ CAPILLARY BLOOD SPEC: CPT | Performed by: FAMILY MEDICINE

## 2021-06-02 RX ORDER — WARFARIN SODIUM 2 MG/1
TABLET ORAL
Qty: 350 TABLET | Refills: 0 | Status: SHIPPED | OUTPATIENT
Start: 2021-06-02 | End: 2021-09-07

## 2021-06-02 NOTE — PROGRESS NOTES
ANTICOAGULATION FOLLOW-UP CLINIC VISIT    Patient Name:  Alessio Teixeira  Date:  2021  Contact Type:  Telephone/ Patient    SUBJECTIVE:  Patient Findings     Comments:  Pt INR seems to be hovering slightly sub-therapeutic. Maintenance dose increased by 4.3%. Pt's PCP is retiring soon. Pt reports that he plans to find a new doctor in the [facility].        Clinical Outcomes     Comments:  Pt INR seems to be hovering slightly sub-therapeutic. Maintenance dose increased by 4.3%. Pt's PCP is retiring soon. Pt reports that he plans to find a new doctor in the [facility].           OBJECTIVE    Recent labs: (last 7 days)     21  1005   INR 1.80*       ASSESSMENT / PLAN  INR assessment SUB    Recheck INR In: 2 WEEKS    INR Location Outside lab      Anticoagulation Summary  As of 2021    INR goal:  2.0-3.0   TTR:  75.1 % (1 y)   INR used for dosin.80 (2021)   Warfarin maintenance plan:  7 mg (2 mg x 3.5) every day   Full warfarin instructions:  7 mg every day   Weekly warfarin total:  49 mg   Plan last modified:  Radha Wilkes, RN (2021)   Next INR check:     Priority:  Maintenance   Target end date:  Indefinite    Indications    Chronic atrial fibrillation (H) [I48.20]  Long term current use of anticoagulant therapy [Z79.01]             Anticoagulation Episode Summary     INR check location:  Anticoagulation Clinic    Preferred lab:      Send INR reminders to:  [facility]    Comments:        Anticoagulation Care Providers     Provider Role Specialty Phone number    Zac Salgado MD Referring Family Medicine [phone]            See the Encounter Report to view Anticoagulation Flowsheet and Dosing Calendar (Go to Encounters tab in chart review, and find the Anticoagulation Therapy Visit)    Pt INR is 1.8 today. See findings. Maintenance dose increased. Pt advised to take 7 mg of warfarin daily. Recheck INR in 2 weeks scheduled on 21 in [place]. Rx refilled per request. Devin aware if signs of clotting (pain, tenderness, swelling, color change in leg or arm, SOB) and bleeding occur (blood in stool, urine, large bruising, bleeding gums, nosebleeds) to have INR check sooner. If sx severe report to ER or concerned for stroke call 911. If general questions or concerns arise, call clinic.         Radha Wilkes RN

## 2021-06-08 DIAGNOSIS — I10 HYPERTENSION GOAL BP (BLOOD PRESSURE) < 140/90: ICD-10-CM

## 2021-06-08 RX ORDER — POTASSIUM CHLORIDE 1500 MG/1
TABLET, EXTENDED RELEASE ORAL
Qty: 90 TABLET | Refills: 2 | Status: SHIPPED | OUTPATIENT
Start: 2021-06-08 | End: 2022-03-02

## 2021-06-09 ENCOUNTER — TELEPHONE (OUTPATIENT)
Dept: INTERNAL MEDICINE | Facility: CLINIC | Age: 86
End: 2021-06-09

## 2021-06-09 NOTE — TELEPHONE ENCOUNTER
Pt has a dental appt on 6/17 and needs a letter saying he does Not need to take antibiotics before the appt.    Fax letter to [phone]  Dr. Teo Baker

## 2021-06-16 ENCOUNTER — ANTICOAGULATION THERAPY VISIT (OUTPATIENT)
Dept: ANTICOAGULATION | Facility: CLINIC | Age: 86
End: 2021-06-16

## 2021-06-16 DIAGNOSIS — Z79.01 LONG TERM CURRENT USE OF ANTICOAGULANT THERAPY: ICD-10-CM

## 2021-06-16 DIAGNOSIS — I48.20 CHRONIC ATRIAL FIBRILLATION (H): ICD-10-CM

## 2021-06-16 LAB
CAPILLARY BLOOD COLLECTION: NORMAL
INR PPP: 2.1 (ref 0.86–1.14)

## 2021-06-16 PROCEDURE — 85610 PROTHROMBIN TIME: CPT | Performed by: FAMILY MEDICINE

## 2021-06-16 PROCEDURE — 36416 COLLJ CAPILLARY BLOOD SPEC: CPT | Performed by: FAMILY MEDICINE

## 2021-06-16 NOTE — PROGRESS NOTES
ANTICOAGULATION MANAGEMENT     Patient Name:  Alessio Teixeira  Date:  6/16/2021    ASSESSMENT /SUBJECTIVE:    Today's INR result of 2.1 is therapeutic. Goal INR of 2.0-3.0      Warfarin dose taken: Warfarin taken as instructed    Diet: No new diet changes affecting INR    Medication changes/ interactions: No new medications/supplements affecting INR    Previous INR: Subtherapeutic     S/S of bleeding or thromboembolism: No    New injury or illness: No    Upcoming surgery, procedure or cardioversion: No    Additional findings: None      PLAN:    Warfarin Dosing Instructions: Continue your current warfarin dose 7mg daily     Instructed patient to follow up no later than: 3 weeks  Lab visit scheduled    Education provided: Monitoring for bleeding signs and symptoms and Monitoring for clotting signs and symptoms    Telephone call with Alessio whom verbalizes understanding and agrees to plan    Instructed to call the Anticoagulation Clinic for any changes, questions or concerns. (#[phone])        Bebe Vega RN      OBJECTIVE:  Recent labs: (last 7 days)     06/16/21  0956   INR 2.10*         No question data found.  Anticoagulation Summary  As of 6/16/2021    INR goal:  2.0-3.0   TTR:  72.6 % (1 y)   INR used for dosing:  No new INR was available at the time of this encounter.   Warfarin maintenance plan:  7 mg (2 mg x 3.5) every day   Full warfarin instructions:  7 mg every day   Weekly warfarin total:  49 mg   No change documented:  Bebe Vega RN   Plan last modified:  Radha Wilkes RN (6/2/2021)   Next INR check:  7/7/2021   Priority:  Maintenance   Target end date:  Indefinite    Indications    Chronic atrial fibrillation (H) [I48.20]  Long term current use of anticoagulant therapy [Z79.01]             Anticoagulation Episode Summary     INR check location:  Anticoagulation Clinic    Preferred lab:      [facility]    Comments:        Anticoagulation Care  Providers     Provider Role Specialty Phone number    Zac Salgado MD Referring Family Medicine [phone]

## 2021-06-21 ENCOUNTER — OFFICE VISIT (OUTPATIENT)
Dept: FAMILY MEDICINE | Facility: CLINIC | Age: 86
End: 2021-06-21
Payer: COMMERCIAL

## 2021-06-21 VITALS
OXYGEN SATURATION: 94 % | DIASTOLIC BLOOD PRESSURE: 72 MMHG | HEART RATE: 85 BPM | WEIGHT: 226 LBS | TEMPERATURE: 97.6 F | BODY MASS INDEX: 31.64 KG/M2 | SYSTOLIC BLOOD PRESSURE: 130 MMHG | HEIGHT: 71 IN | RESPIRATION RATE: 10 BRPM

## 2021-06-21 DIAGNOSIS — I10 HYPERTENSION GOAL BP (BLOOD PRESSURE) < 140/90: Primary | ICD-10-CM

## 2021-06-21 DIAGNOSIS — R42 DIZZINESS: ICD-10-CM

## 2021-06-21 DIAGNOSIS — N18.31 STAGE 3A CHRONIC KIDNEY DISEASE (H): ICD-10-CM

## 2021-06-21 DIAGNOSIS — M10.9 GOUT OF BIG TOE: ICD-10-CM

## 2021-06-21 DIAGNOSIS — I48.0 PAROXYSMAL ATRIAL FIBRILLATION (H): ICD-10-CM

## 2021-06-21 PROBLEM — N18.30 CHRONIC KIDNEY DISEASE, STAGE 3 (H): Status: ACTIVE | Noted: 2021-06-21

## 2021-06-21 PROCEDURE — 99214 OFFICE O/P EST MOD 30 MIN: CPT | Performed by: FAMILY MEDICINE

## 2021-06-21 ASSESSMENT — MIFFLIN-ST. JEOR: SCORE: 1737.26

## 2021-06-21 ASSESSMENT — PAIN SCALES - GENERAL: PAINLEVEL: NO PAIN (0)

## 2021-06-21 NOTE — PROGRESS NOTES
"    Assessment & Plan     Stage 3a chronic kidney disease  Stable, keep on monitoring with hydration to prevent worsening renal function     Hypertension goal BP (blood pressure) < 140/90  Stable, keep monitoring     Paroxysmal atrial fibrillation (H)  Stable, currently on coumadin, will keep monitoring     Gout of big toe  Stable, has no frequent flares, encouraged hydration to prevent flares     Dizziness  Has with positional change, will have him to try B complex to prevent                FUTURE APPOINTMENTS:       - Follow-up visit in 1 year for CPE and med check     No follow-ups on file.    Jose Squires MD  [facility] DANDY Mccallum is a 85 year old who presents for the following health issues     HPI     New Patient/Transfer of Care    Patient would like to discuss dizziness (episodic)   Duration: 3-4 times in the last month  Symptoms: Dizziness and nausea  - tries to go to the bathroom and then to bed and that normally helps  Medications: none  - wondering if medications could have something to do with it        Review of Systems   Constitutional, HEENT, cardiovascular, pulmonary, gi and gu systems are negative, except as otherwise noted.      Objective    /72   Pulse 85   Temp 97.6  F (36.4  C) (Tympanic)   Resp 10   Ht 1.803 m (5' 11\")   Wt 102.5 kg (226 lb)   SpO2 94%   BMI 31.52 kg/m    Body mass index is 31.52 kg/m .  Physical Exam   GENERAL: healthy, alert and no distress  NECK: no adenopathy, no asymmetry, masses, or scars and thyroid normal to palpation  RESP: lungs clear to auscultation - no rales, rhonchi or wheezes  CV: regular rate and rhythm, normal S1 S2, no S3 or S4, no murmur, click or rub, no peripheral edema and peripheral pulses strong  ABDOMEN: soft, nontender, no hepatosplenomegaly, no masses and bowel sounds normal  MS: no gross musculoskeletal defects noted, no edema                "

## 2021-07-07 ENCOUNTER — TELEPHONE (OUTPATIENT)
Dept: FAMILY MEDICINE | Facility: CLINIC | Age: 86
End: 2021-07-07

## 2021-07-07 ENCOUNTER — ANTICOAGULATION THERAPY VISIT (OUTPATIENT)
Dept: NURSING | Facility: CLINIC | Age: 86
End: 2021-07-07

## 2021-07-07 DIAGNOSIS — I48.20 CHRONIC ATRIAL FIBRILLATION (H): ICD-10-CM

## 2021-07-07 DIAGNOSIS — I48.20 CHRONIC ATRIAL FIBRILLATION (H): Primary | ICD-10-CM

## 2021-07-07 LAB
CAPILLARY BLOOD COLLECTION: NORMAL
INR PPP: 2 (ref 0.86–1.14)

## 2021-07-07 PROCEDURE — 36416 COLLJ CAPILLARY BLOOD SPEC: CPT | Performed by: FAMILY MEDICINE

## 2021-07-07 PROCEDURE — 85610 PROTHROMBIN TIME: CPT | Performed by: FAMILY MEDICINE

## 2021-07-07 NOTE — PROGRESS NOTES
ANTICOAGULATION MANAGEMENT     Alessio Teixeira 85 year old male is on warfarin with therapeutic INR result. (Goal INR 2.0-3.0)    Recent labs: (last 7 days)     07/07/21  1043   INR 2.00*       ASSESSMENT     Source(s): Patient/Caregiver Call       Warfarin doses taken: Warfarin taken as instructed    Diet: No new diet changes identified    New illness, injury, or hospitalization: No    Medication/supplement changes: None noted    Signs or symptoms of bleeding or clotting: No    Previous INR: Therapeutic last 2(+) visits    Additional findings: Transfer of care from  ([facility]) to  ([facility])     PLAN     Recommended plan for no diet, medication or health factor changes affecting INR     Dosing Instructions: Continue your current warfarin dose with next INR in 5 weeks   Recommend 4 weeks but pt declined He agreed to schedule the next INR in 5 weeks.    Summary  As of 7/7/2021    Full warfarin instructions:  7 mg every day   Next INR check:  8/11/2021             Telephone call with Alessio who agrees to plan and repeated back plan correctly    Lab visit scheduled    Education provided: Contact [phone]  with any changes, questions or concerns.     Plan made per [facility] anticoagulation protocol    Radha Wilkes RN  Anticoagulation Clinic  7/7/2021    _______________________________________________________________________     Anticoagulation Episode Summary     Current INR goal:  2.0-3.0   TTR:  72.6 % (1 y)   Target end date:  Indefinite   Send INR reminders to:  [facility]    Indications    Chronic atrial fibrillation (H) [I48.20]  Long term current use of anticoagulant therapy [Z79.01]           Comments:           Anticoagulation Care Providers     Provider Role Specialty Phone number    Zac Salgado MD Referring Family Medicine [phone]

## 2021-07-07 NOTE — TELEPHONE ENCOUNTER
ANTICOAGULATION MANAGEMENT  Transfer of care from  to Dr.Choi Alessio Teixeira due for annual renewal of referral to anticoagulation monitoring. Order pended for your review and signature.      ANTICOAGULATION SUMMARY      Warfarin indication(s)     Atrial fibrillation    Heart valve present?  NO       Current goal range   INR: 2.0-3.0     Goal appropriate for indication? Yes, INR 2-3 appropriate for hx of DVT, PE, hypercoagulable state, Afib, LVAD, or bileaflet AVR without risk factors     Current duration of therapy Indefinite/long term therapy   Time in Therapeutic Range (TTR)  (Goal > 60%) 72.6%       Office visit with referring provider's group within last year yes on 6/21/21       Radha Wilkes RN

## 2021-08-02 DIAGNOSIS — E78.5 HYPERLIPIDEMIA LDL GOAL <100: ICD-10-CM

## 2021-08-02 RX ORDER — LOVASTATIN 40 MG
TABLET ORAL
Qty: 180 TABLET | Refills: 3 | Status: SHIPPED | OUTPATIENT
Start: 2021-08-02 | End: 2022-04-12

## 2021-08-19 ENCOUNTER — TELEPHONE (OUTPATIENT)
Dept: ANTICOAGULATION | Facility: CLINIC | Age: 86
End: 2021-08-19

## 2021-08-19 NOTE — TELEPHONE ENCOUNTER
ANTICOAGULATION     Alessio Teixeira is overdue for INR check.      Left message for patient to call and schedule lab appointment as soon as possible. If returning call, please schedule.     Ashkan Sales RN

## 2021-08-29 DIAGNOSIS — M10.9 GOUT OF BIG TOE: Primary | ICD-10-CM

## 2021-08-30 RX ORDER — ALLOPURINOL 300 MG/1
300 TABLET ORAL DAILY
Qty: 30 TABLET | Refills: 1 | Status: SHIPPED | OUTPATIENT
Start: 2021-08-30 | End: 2021-11-29

## 2021-08-30 NOTE — TELEPHONE ENCOUNTER
I placed future uric acid order, please have him to schedule lab only visit.  The med was filled anyhow, please let him know  thx

## 2021-08-30 NOTE — TELEPHONE ENCOUNTER
Failed protocol.  please route to  team if patient needs an appointment     Eusebia MAGAÑARN BSN  [facility]  [phone]

## 2021-09-05 ENCOUNTER — HEALTH MAINTENANCE LETTER (OUTPATIENT)
Age: 86
End: 2021-09-05

## 2021-09-07 ENCOUNTER — ANTICOAGULATION THERAPY VISIT (OUTPATIENT)
Dept: ANTICOAGULATION | Facility: CLINIC | Age: 86
End: 2021-09-07

## 2021-09-07 ENCOUNTER — LAB (OUTPATIENT)
Dept: LAB | Facility: CLINIC | Age: 86
End: 2021-09-07
Payer: MEDICARE

## 2021-09-07 DIAGNOSIS — I48.20 CHRONIC ATRIAL FIBRILLATION (H): ICD-10-CM

## 2021-09-07 DIAGNOSIS — Z86.711 HISTORY OF PULMONARY EMBOLISM: ICD-10-CM

## 2021-09-07 DIAGNOSIS — Z79.01 LONG TERM CURRENT USE OF ANTICOAGULANT THERAPY: ICD-10-CM

## 2021-09-07 DIAGNOSIS — M10.9 GOUT OF BIG TOE: ICD-10-CM

## 2021-09-07 DIAGNOSIS — I48.20 CHRONIC ATRIAL FIBRILLATION (H): Primary | ICD-10-CM

## 2021-09-07 DIAGNOSIS — I48.0 PAROXYSMAL ATRIAL FIBRILLATION (H): ICD-10-CM

## 2021-09-07 LAB — INR BLD: 2.2 (ref 0.9–1.1)

## 2021-09-07 PROCEDURE — 84550 ASSAY OF BLOOD/URIC ACID: CPT

## 2021-09-07 PROCEDURE — 85610 PROTHROMBIN TIME: CPT

## 2021-09-07 PROCEDURE — 36415 COLL VENOUS BLD VENIPUNCTURE: CPT

## 2021-09-07 RX ORDER — WARFARIN SODIUM 2 MG/1
TABLET ORAL
Qty: 325 TABLET | Refills: 1 | Status: SHIPPED | OUTPATIENT
Start: 2021-09-07 | End: 2022-03-01

## 2021-09-07 NOTE — PROGRESS NOTES
ANTICOAGULATION MANAGEMENT     Alessio Teixeira 85 year old male is on warfarin with therapeutic INR result. (Goal INR 2.0-3.0)    Recent labs: (last 7 days)     09/07/21  1147   INR 2.2*       ASSESSMENT     Source(s): Chart Review and Patient/Caregiver Call       Warfarin doses taken: Warfarin taken as instructed    Diet: No new diet changes identified    New illness, injury, or hospitalization: Occasional bumps, bruises, and scrapes.    Medication/supplement changes: None noted    Signs or symptoms of bleeding or clotting: Yes: intermittent bruising    Previous INR: Therapeutic last 2 visits    Additional findings: Refill needed today     PLAN     Recommended plan for no diet, medication or health factor changes affecting INR     Dosing Instructions: Continue your current warfarin dose with next INR in 6 weeks       Summary  As of 9/7/2021    Full warfarin instructions:  7 mg every day   Next INR check:  10/19/2021             Telephone call with Alessio who verbalizes understanding and agrees to plan    Lab visit scheduled    Education provided: Please call back if any changes to your diet, medications or how you've been taking warfarin, Monitoring for bleeding signs and symptoms, Monitoring for clotting signs and symptoms and Importance of notifying clinic for changes in medications; a sooner lab recheck maybe needed.    Plan made per [facility] anticoagulation protocol    Karla Monzon RN  Anticoagulation Clinic  9/7/2021    _______________________________________________________________________     Anticoagulation Episode Summary     Current INR goal:  2.0-3.0   TTR:  72.6 % (1 y)   Target end date:  Indefinite   Send INR reminders to:  [facility]    Indications    Chronic atrial fibrillation (H) [I48.20]  Long term current use of anticoagulant therapy [Z79.01]           Comments:           Anticoagulation Care Providers     Provider Role Specialty Phone number    Zac Salgado MD Referring  Family Medicine [phone]    Jose Squires MD Referring Family Medicine [phone]

## 2021-09-08 LAB — URATE SERPL-MCNC: 6.4 MG/DL (ref 3.5–7.2)

## 2021-10-20 ENCOUNTER — LAB (OUTPATIENT)
Dept: LAB | Facility: CLINIC | Age: 86
End: 2021-10-20
Payer: MEDICARE

## 2021-10-20 ENCOUNTER — ANTICOAGULATION THERAPY VISIT (OUTPATIENT)
Dept: ANTICOAGULATION | Facility: CLINIC | Age: 86
End: 2021-10-20

## 2021-10-20 DIAGNOSIS — I48.20 CHRONIC ATRIAL FIBRILLATION (H): ICD-10-CM

## 2021-10-20 DIAGNOSIS — I48.20 CHRONIC ATRIAL FIBRILLATION (H): Primary | ICD-10-CM

## 2021-10-20 DIAGNOSIS — Z79.01 LONG TERM CURRENT USE OF ANTICOAGULANT THERAPY: ICD-10-CM

## 2021-10-20 LAB — INR BLD: 2.3 (ref 0.9–1.1)

## 2021-10-20 PROCEDURE — 36416 COLLJ CAPILLARY BLOOD SPEC: CPT

## 2021-10-20 PROCEDURE — 85610 PROTHROMBIN TIME: CPT

## 2021-10-20 NOTE — PROGRESS NOTES
ANTICOAGULATION MANAGEMENT     Alessio Teixeira 85 year old male is on warfarin with therapeutic INR result. (Goal INR 2.0-3.0)    Recent labs: (last 7 days)     10/20/21  1121   INR 2.3*       ASSESSMENT     Source(s): Chart Review and Patient/Caregiver Call       Warfarin doses taken: Warfarin taken as instructed    Diet: No new diet changes identified    New illness, injury, or hospitalization: No    Medication/supplement changes: None noted    Signs or symptoms of bleeding or clotting: No    Previous INR: Therapeutic last 2(+) visits    Additional findings: None     PLAN     Recommended plan for no diet, medication or health factor changes affecting INR     Dosing Instructions: Continue your current warfarin dose with next INR in 6 weeks       Summary  As of 10/20/2021    Full warfarin instructions:  7 mg every day   Next INR check:  12/1/2021             Telephone call with Alessio who verbalizes understanding and agrees to plan    Lab visit scheduled    Education provided: Please call back if any changes to your diet, medications or how you've been taking warfarin    Plan made per [facility] anticoagulation protocol    Ashkan Sales RN  Anticoagulation Clinic  10/20/2021    _______________________________________________________________________     Anticoagulation Episode Summary     Current INR goal:  2.0-3.0   TTR:  72.6 % (1 y)   Target end date:  Indefinite   Send INR reminders to:  [facility]    Indications    Chronic atrial fibrillation (H) [I48.20]  Long term current use of anticoagulant therapy [Z79.01]           Comments:           Anticoagulation Care Providers     Provider Role Specialty Phone number    Zac Salgado MD Referring Family Medicine [phone]    Jose Squires MD Referring Family Medicine [phone]

## 2021-11-27 DIAGNOSIS — M10.9 GOUT OF BIG TOE: ICD-10-CM

## 2021-11-29 RX ORDER — ALLOPURINOL 300 MG/1
300 TABLET ORAL DAILY
Qty: 90 TABLET | Refills: 1 | Status: SHIPPED | OUTPATIENT
Start: 2021-11-29 | End: 2022-04-12

## 2021-11-29 NOTE — TELEPHONE ENCOUNTER
Routing refill request to provider for review/approval because:  Failed protocol    Latoya MYERS RN  EP Triage

## 2021-12-01 ENCOUNTER — ANTICOAGULATION THERAPY VISIT (OUTPATIENT)
Dept: ANTICOAGULATION | Facility: CLINIC | Age: 86
End: 2021-12-01

## 2021-12-01 ENCOUNTER — LAB (OUTPATIENT)
Dept: LAB | Facility: CLINIC | Age: 86
End: 2021-12-01
Payer: MEDICARE

## 2021-12-01 DIAGNOSIS — I48.20 CHRONIC ATRIAL FIBRILLATION (H): Primary | ICD-10-CM

## 2021-12-01 DIAGNOSIS — I48.20 CHRONIC ATRIAL FIBRILLATION (H): ICD-10-CM

## 2021-12-01 DIAGNOSIS — Z79.01 LONG TERM CURRENT USE OF ANTICOAGULANT THERAPY: ICD-10-CM

## 2021-12-01 LAB — INR BLD: 2.1 (ref 0.9–1.1)

## 2021-12-01 PROCEDURE — 36415 COLL VENOUS BLD VENIPUNCTURE: CPT

## 2021-12-01 PROCEDURE — 85610 PROTHROMBIN TIME: CPT

## 2021-12-01 NOTE — PROGRESS NOTES
ANTICOAGULATION MANAGEMENT     Alessio Teixeira 85 year old male is on warfarin with therapeutic INR result. (Goal INR 2.0-3.0)    Recent labs: (last 7 days)     12/01/21  1030   INR 2.1*       ASSESSMENT     Source(s): Chart Review and Patient/Caregiver Call       Warfarin doses taken: Warfarin taken as instructed    Diet: No new diet changes identified    New illness, injury, or hospitalization: No    Medication/supplement changes: None noted    Signs or symptoms of bleeding or clotting: No    Previous INR: Therapeutic last 2(+) visits    Additional findings: None     PLAN     Recommended plan for no diet, medication or health factor changes affecting INR     Dosing Instructions: Continue your current warfarin dose with next INR in 6 weeks       Summary  As of 12/1/2021    Full warfarin instructions:  7 mg every day   Next INR check:  1/12/2022             Telephone call with Alessio who verbalizes understanding and agrees to plan    Lab visit scheduled    Education provided: Please call back if any changes to your diet, medications or how you've been taking warfarin    Plan made per [facility] anticoagulation protocol    Ashkan Sales RN  Anticoagulation Clinic  12/1/2021    _______________________________________________________________________     Anticoagulation Episode Summary     Current INR goal:  2.0-3.0   TTR:  72.6 % (1 y)   Target end date:  Indefinite   Send INR reminders to:  [facility]    Indications    Chronic atrial fibrillation (H) [I48.20]  Long term current use of anticoagulant therapy [Z79.01]           Comments:           Anticoagulation Care Providers     Provider Role Specialty Phone number    Zac Salgado MD Referring Family Medicine [phone]    Jose Squires MD Referring Family Medicine [phone]

## 2022-01-12 ENCOUNTER — LAB (OUTPATIENT)
Dept: LAB | Facility: CLINIC | Age: 87
End: 2022-01-12
Payer: MEDICARE

## 2022-01-12 ENCOUNTER — ANTICOAGULATION THERAPY VISIT (OUTPATIENT)
Dept: ANTICOAGULATION | Facility: CLINIC | Age: 87
End: 2022-01-12

## 2022-01-12 DIAGNOSIS — I48.20 CHRONIC ATRIAL FIBRILLATION (H): Primary | ICD-10-CM

## 2022-01-12 DIAGNOSIS — I48.20 CHRONIC ATRIAL FIBRILLATION (H): ICD-10-CM

## 2022-01-12 DIAGNOSIS — Z79.01 LONG TERM CURRENT USE OF ANTICOAGULANT THERAPY: ICD-10-CM

## 2022-01-12 LAB — INR BLD: 2.3 (ref 0.9–1.1)

## 2022-01-12 PROCEDURE — 36416 COLLJ CAPILLARY BLOOD SPEC: CPT

## 2022-01-12 PROCEDURE — 85610 PROTHROMBIN TIME: CPT

## 2022-01-12 NOTE — PROGRESS NOTES
ANTICOAGULATION MANAGEMENT     Alessio Teixeira 86 year old male is on warfarin with therapeutic INR result. (Goal INR 2.0-3.0)    Recent labs: (last 7 days)     01/12/22  1030   INR 2.3*       ASSESSMENT     Source(s): Chart Review and Patient/Caregiver Call       Warfarin doses taken: Warfarin taken as instructed    Diet: No new diet changes identified    New illness, injury, or hospitalization: No    Medication/supplement changes: None noted    Signs or symptoms of bleeding or clotting: No    Previous INR: Therapeutic last 2(+) visits    Additional findings: None     PLAN     Recommended plan for no diet, medication or health factor changes affecting INR     Dosing Instructions: Continue your current warfarin dose with next INR in 6 weeks       Summary  As of 1/12/2022    Full warfarin instructions:  7 mg every day   Next INR check:  2/23/2022             Telephone call with Alessio who verbalizes understanding and agrees to plan    Lab visit scheduled    Education provided: Please call back if any changes to your diet, medications or how you've been taking warfarin    Plan made per [facility] anticoagulation protocol    Ashkan Sales RN  Anticoagulation Clinic  1/12/2022    _______________________________________________________________________     Anticoagulation Episode Summary     Current INR goal:  2.0-3.0   TTR:  72.6 % (1 y)   Target end date:  Indefinite   Send INR reminders to:  [facility]    Indications    Chronic atrial fibrillation (H) [I48.20]  Long term current use of anticoagulant therapy [Z79.01]           Comments:           Anticoagulation Care Providers     Provider Role Specialty Phone number    Zac Salgado MD Referring Family Medicine [phone]    Jose Squires MD Referring Family Medicine [phone]

## 2022-02-23 ENCOUNTER — TELEPHONE (OUTPATIENT)
Dept: FAMILY MEDICINE | Facility: CLINIC | Age: 87
End: 2022-02-23

## 2022-02-23 ENCOUNTER — ANTICOAGULATION THERAPY VISIT (OUTPATIENT)
Dept: ANTICOAGULATION | Facility: CLINIC | Age: 87
End: 2022-02-23

## 2022-02-23 ENCOUNTER — LAB (OUTPATIENT)
Dept: LAB | Facility: CLINIC | Age: 87
End: 2022-02-23
Payer: MEDICARE

## 2022-02-23 DIAGNOSIS — I48.20 CHRONIC ATRIAL FIBRILLATION (H): ICD-10-CM

## 2022-02-23 LAB — INR BLD: 2.1 (ref 0.9–1.1)

## 2022-02-23 PROCEDURE — 36416 COLLJ CAPILLARY BLOOD SPEC: CPT

## 2022-02-23 PROCEDURE — 85610 PROTHROMBIN TIME: CPT

## 2022-02-23 NOTE — PROGRESS NOTES
Pt has approval to recheck INR every 6-8 weeks if INR is stable. Pt rescheduled to 4/20/22 for next INR check

## 2022-02-23 NOTE — TELEPHONE ENCOUNTER
Pt is requesting approval to check INR longer than 6 weeks if the INR level is stable. Will send to [facility] for review and advisement.

## 2022-02-23 NOTE — PROGRESS NOTES
ANTICOAGULATION MANAGEMENT     Alessio Teixeira 86 year old male is on warfarin with therapeutic INR result. (Goal INR 2.0-3.0)    Recent labs: (last 7 days)     02/23/22  1043   INR 2.1*       ASSESSMENT     Source(s): Chart Review and Patient/Caregiver Call       Warfarin doses taken: Warfarin taken as instructed    Diet: No new diet changes identified    New illness, injury, or hospitalization: Yes: Pt c/o intermittent neck pain X several months. Pt reports that it is relieved with Tylenol PM PRN.    Medication/supplement changes: See above    Signs or symptoms of bleeding or clotting: No    Previous INR: Therapeutic last 2(+) visits    Additional findings: Pt requesting approval to have INR checked longer than 6 weeks if the INR is stable. Separate note sent.     PLAN     Recommended plan for no diet, medication or health factor changes affecting INR     Dosing Instructions: Continue your current warfarin dose with next INR in 6 weeks       Summary  As of 2/23/2022    Full warfarin instructions:  7 mg every day   Next INR check:  4/6/2022             Telephone call with Alessio who verbalizes understanding and agrees to plan    Lab visit scheduled    Education provided: Importance of notifying clinic for changes in medications; a sooner lab recheck maybe needed. and Contact [phone]  with any changes, questions or concerns.     Plan made per [facility] anticoagulation protocol    Radha Wilkes RN  Anticoagulation Clinic  2/23/2022    _______________________________________________________________________     Anticoagulation Episode Summary     Current INR goal:  2.0-3.0   TTR:  72.6 % (1 y)   Target end date:  Indefinite   Send INR reminders to:  [facility]    Indications    Chronic atrial fibrillation (H) [I48.20]  Long term current use of anticoagulant therapy [Z79.01]           Comments:           Anticoagulation Care Providers     Provider Role Specialty Phone number    Zac Salgado MD  Referring Family Medicine [phone]    Jose Squires MD Referring Family Medicine [phone]

## 2022-02-23 NOTE — TELEPHONE ENCOUNTER
It has been stable at the  therapeutic range for last 5-6 months, he is ok f/u every 6-8 weeks per our INR clinic protocol for stable patient

## 2022-03-01 DIAGNOSIS — Z86.711 HISTORY OF PULMONARY EMBOLISM: ICD-10-CM

## 2022-03-01 DIAGNOSIS — I48.0 PAROXYSMAL ATRIAL FIBRILLATION (H): ICD-10-CM

## 2022-03-01 DIAGNOSIS — Z79.01 LONG TERM CURRENT USE OF ANTICOAGULANT THERAPY: ICD-10-CM

## 2022-03-01 RX ORDER — WARFARIN SODIUM 2 MG/1
TABLET ORAL
Qty: 325 TABLET | Refills: 0 | Status: SHIPPED | OUTPATIENT
Start: 2022-03-01 | End: 2022-06-09

## 2022-03-01 NOTE — TELEPHONE ENCOUNTER
Warfarin - Prescription approved per [facility] Refill Protocol.    Kathryn Sales RN   [facility]

## 2022-03-02 DIAGNOSIS — I10 HYPERTENSION GOAL BP (BLOOD PRESSURE) < 140/90: ICD-10-CM

## 2022-03-03 RX ORDER — POTASSIUM CHLORIDE 1500 MG/1
20 TABLET, EXTENDED RELEASE ORAL DAILY
Qty: 90 TABLET | Refills: 1 | Status: SHIPPED | OUTPATIENT
Start: 2022-03-03 | End: 2022-08-30

## 2022-03-03 NOTE — TELEPHONE ENCOUNTER
Prescription approved per [facility] Refill Protocol.    Abbey ARMENDARIZ RN  EP Triage

## 2022-03-07 ENCOUNTER — OFFICE VISIT (OUTPATIENT)
Dept: FAMILY MEDICINE | Facility: CLINIC | Age: 87
End: 2022-03-07
Payer: MEDICARE

## 2022-03-07 VITALS
WEIGHT: 222.8 LBS | TEMPERATURE: 96.7 F | DIASTOLIC BLOOD PRESSURE: 66 MMHG | HEART RATE: 78 BPM | SYSTOLIC BLOOD PRESSURE: 126 MMHG | OXYGEN SATURATION: 99 % | BODY MASS INDEX: 31.07 KG/M2 | RESPIRATION RATE: 14 BRPM

## 2022-03-07 DIAGNOSIS — Z23 NEED FOR VACCINATION FOR ZOSTER: ICD-10-CM

## 2022-03-07 DIAGNOSIS — I48.0 PAROXYSMAL ATRIAL FIBRILLATION (H): ICD-10-CM

## 2022-03-07 DIAGNOSIS — M54.2 NECK PAIN: Primary | ICD-10-CM

## 2022-03-07 DIAGNOSIS — N18.31 STAGE 3A CHRONIC KIDNEY DISEASE (H): ICD-10-CM

## 2022-03-07 DIAGNOSIS — Z23 NEED FOR IMMUNIZATION AGAINST INFLUENZA: ICD-10-CM

## 2022-03-07 PROCEDURE — 90472 IMMUNIZATION ADMIN EACH ADD: CPT | Performed by: PHYSICIAN ASSISTANT

## 2022-03-07 PROCEDURE — 99213 OFFICE O/P EST LOW 20 MIN: CPT | Mod: 25 | Performed by: PHYSICIAN ASSISTANT

## 2022-03-07 PROCEDURE — 90750 HZV VACC RECOMBINANT IM: CPT | Performed by: PHYSICIAN ASSISTANT

## 2022-03-07 PROCEDURE — G0008 ADMIN INFLUENZA VIRUS VAC: HCPCS | Performed by: PHYSICIAN ASSISTANT

## 2022-03-07 PROCEDURE — 90662 IIV NO PRSV INCREASED AG IM: CPT | Performed by: PHYSICIAN ASSISTANT

## 2022-03-07 ASSESSMENT — PAIN SCALES - GENERAL: PAINLEVEL: MILD PAIN (3)

## 2022-03-07 NOTE — PROGRESS NOTES
"  Assessment & Plan   Problem List Items Addressed This Visit        Urinary    Chronic kidney disease, stage 3 (H)      Other Visit Diagnoses     Neck pain    -  Primary    Paroxysmal atrial fibrillation (H)        Need for immunization against influenza        Relevant Orders    [place] FLU VACCINE, INCREASED ANTIGEN, PRESV FREE ([id number]) (Completed)    Need for vaccination for zoster        Relevant Orders    ZOSTER VACCINE RECOMBINANT (Shingrix) (Completed)         Patient Instructions   Tylenol - take 1000 mg at a time.  You can take this up to 3 times a day.     Use heat or ice as needed for pain.     Try to set up your reading area so your book is eye level.     Do some gentle neck stretches    Try some new pillows            If all of these fail - then we will consider injections.             [facility] updates:  A fib: he is on warfarin  CKD - GFR 53 12 mo ago.  Annual wellness scheduled with Dr. Squires in a few weeks.                  BMI:   Estimated body mass index is 31.07 kg/m  as calculated from the following:    Height as of 6/21/21: 1.803 m (5' 11\").    Weight as of this encounter: 101.1 kg (222 lb 12.8 oz).           Return in about 6 weeks (around 4/18/2022).    TERRELL Rosa [facility] DANDY Mccallum is a 86 year old who presents for the following health issues     History of Present Illness       Back Pain:  He presents for follow up of back pain. Patient's back pain is a recurring problem.  Location of back pain:  Left side of neck  Description of back pain: dull ache, gnawing and shooting  Back pain spreads: left side of neck    Since patient first noticed back pain, pain is: always present, but gets better and worse  Does back pain interfere with his job:  Not applicable      He eats 4 or more servings of fruits and vegetables daily.He consumes 0 sweetened beverage(s) daily.He exercises with enough effort to increase his heart rate 9 or less minutes per " day.  He exercises with enough effort to increase his heart rate 3 or less days per week.   He is taking medications regularly.         Takes Tylenol or Tylenol PM as needed  500-1000 mg at a time    Pain with neck movement.   Turning to the right is harder - always stiff that way     No radiation or N/T in the arms  Sometimes pain between the shoulder blades  Has not tried any new pillows  Ongoing for 4-6 months      Review of Systems         Objective    /66   Pulse 78   Temp (!) 96.7  F (35.9  C) (Tympanic)   Resp 14   Wt 101.1 kg (222 lb 12.8 oz)   SpO2 99%   BMI 31.07 kg/m    Body mass index is 31.07 kg/m .  Physical Exam  Constitutional:       General: He is not in acute distress.     Appearance: He is well-developed. He is not diaphoretic.   HENT:      Head: Normocephalic.      Right Ear: External ear normal.      Left Ear: External ear normal.      Nose: Nose normal.   Eyes:      Conjunctiva/sclera: Conjunctivae normal.   Pulmonary:      Effort: Pulmonary effort is normal.   Musculoskeletal:      Cervical back: Pain with movement present. No spinous process tenderness or muscular tenderness. Decreased range of motion (flexion-30, extension-5, rotation right-15, left-25).   Neurological:      Mental Status: He is alert and oriented to person, place, and time.   Psychiatric:         Judgment: Judgment normal.

## 2022-03-07 NOTE — PATIENT INSTRUCTIONS
Tylenol - take 1000 mg at a time.  You can take this up to 3 times a day.     Use heat or ice as needed for pain.     Try to set up your reading area so your book is eye level.     Do some gentle neck stretches    Try some new pillows            If all of these fail - then we will consider injections.

## 2022-04-12 ENCOUNTER — OFFICE VISIT (OUTPATIENT)
Dept: FAMILY MEDICINE | Facility: CLINIC | Age: 87
End: 2022-04-12
Payer: MEDICARE

## 2022-04-12 ENCOUNTER — TELEPHONE (OUTPATIENT)
Dept: FAMILY MEDICINE | Facility: CLINIC | Age: 87
End: 2022-04-12

## 2022-04-12 VITALS
TEMPERATURE: 95.4 F | HEART RATE: 70 BPM | DIASTOLIC BLOOD PRESSURE: 68 MMHG | BODY MASS INDEX: 31.36 KG/M2 | SYSTOLIC BLOOD PRESSURE: 128 MMHG | HEIGHT: 71 IN | WEIGHT: 224 LBS | RESPIRATION RATE: 16 BRPM | OXYGEN SATURATION: 98 %

## 2022-04-12 DIAGNOSIS — N18.31 STAGE 3A CHRONIC KIDNEY DISEASE (H): ICD-10-CM

## 2022-04-12 DIAGNOSIS — I10 HYPERTENSION GOAL BP (BLOOD PRESSURE) < 140/90: ICD-10-CM

## 2022-04-12 DIAGNOSIS — Z00.00 HEALTHCARE MAINTENANCE: Primary | ICD-10-CM

## 2022-04-12 DIAGNOSIS — R63.5 WEIGHT GAIN: ICD-10-CM

## 2022-04-12 DIAGNOSIS — Z12.5 SCREENING FOR PROSTATE CANCER: ICD-10-CM

## 2022-04-12 DIAGNOSIS — Z13.220 SCREENING FOR HYPERLIPIDEMIA: ICD-10-CM

## 2022-04-12 DIAGNOSIS — M10.9 GOUT OF BIG TOE: ICD-10-CM

## 2022-04-12 DIAGNOSIS — E78.5 HYPERLIPIDEMIA LDL GOAL <100: ICD-10-CM

## 2022-04-12 DIAGNOSIS — M47.892 OTHER OSTEOARTHRITIS OF SPINE, CERVICAL REGION: ICD-10-CM

## 2022-04-12 DIAGNOSIS — I48.0 PAROXYSMAL ATRIAL FIBRILLATION (H): ICD-10-CM

## 2022-04-12 LAB
ALT SERPL W P-5'-P-CCNC: 34 U/L (ref 0–70)
ANION GAP SERPL CALCULATED.3IONS-SCNC: 6 MMOL/L (ref 3–14)
BUN SERPL-MCNC: 29 MG/DL (ref 7–30)
CALCIUM SERPL-MCNC: 9.4 MG/DL (ref 8.5–10.1)
CHLORIDE BLD-SCNC: 106 MMOL/L (ref 94–109)
CHOLEST SERPL-MCNC: 124 MG/DL
CO2 SERPL-SCNC: 27 MMOL/L (ref 20–32)
CREAT SERPL-MCNC: 1.36 MG/DL (ref 0.66–1.25)
CREAT UR-MCNC: 165 MG/DL
ERYTHROCYTE [SEDIMENTATION RATE] IN BLOOD BY WESTERGREN METHOD: 10 MM/HR (ref 0–20)
FASTING STATUS PATIENT QL REPORTED: YES
GFR SERPL CREATININE-BSD FRML MDRD: 51 ML/MIN/1.73M2
GLUCOSE BLD-MCNC: 84 MG/DL (ref 70–99)
HDLC SERPL-MCNC: 29 MG/DL
HGB BLD-MCNC: 13.4 G/DL (ref 13.3–17.7)
LDLC SERPL CALC-MCNC: 61 MG/DL
MICROALBUMIN UR-MCNC: 431 MG/L
MICROALBUMIN/CREAT UR: 261.21 MG/G CR (ref 0–17)
NONHDLC SERPL-MCNC: 95 MG/DL
POTASSIUM BLD-SCNC: 4.3 MMOL/L (ref 3.4–5.3)
PSA SERPL-MCNC: 4.03 UG/L (ref 0–4)
SODIUM SERPL-SCNC: 139 MMOL/L (ref 133–144)
TRIGL SERPL-MCNC: 168 MG/DL
URATE SERPL-MCNC: 7.3 MG/DL (ref 3.5–7.2)

## 2022-04-12 PROCEDURE — G0103 PSA SCREENING: HCPCS | Performed by: FAMILY MEDICINE

## 2022-04-12 PROCEDURE — 85652 RBC SED RATE AUTOMATED: CPT | Performed by: FAMILY MEDICINE

## 2022-04-12 PROCEDURE — 85018 HEMOGLOBIN: CPT | Performed by: FAMILY MEDICINE

## 2022-04-12 PROCEDURE — 80048 BASIC METABOLIC PNL TOTAL CA: CPT | Performed by: FAMILY MEDICINE

## 2022-04-12 PROCEDURE — 86431 RHEUMATOID FACTOR QUANT: CPT | Performed by: FAMILY MEDICINE

## 2022-04-12 PROCEDURE — 84460 ALANINE AMINO (ALT) (SGPT): CPT | Performed by: FAMILY MEDICINE

## 2022-04-12 PROCEDURE — 84550 ASSAY OF BLOOD/URIC ACID: CPT | Performed by: FAMILY MEDICINE

## 2022-04-12 PROCEDURE — G0439 PPPS, SUBSEQ VISIT: HCPCS | Performed by: FAMILY MEDICINE

## 2022-04-12 PROCEDURE — 82043 UR ALBUMIN QUANTITATIVE: CPT | Performed by: FAMILY MEDICINE

## 2022-04-12 PROCEDURE — 36415 COLL VENOUS BLD VENIPUNCTURE: CPT | Performed by: FAMILY MEDICINE

## 2022-04-12 PROCEDURE — 80061 LIPID PANEL: CPT | Performed by: FAMILY MEDICINE

## 2022-04-12 RX ORDER — LOVASTATIN 40 MG
TABLET ORAL
Qty: 180 TABLET | Refills: 3 | Status: SHIPPED | OUTPATIENT
Start: 2022-04-12 | End: 2023-04-12

## 2022-04-12 RX ORDER — ALLOPURINOL 300 MG/1
300 TABLET ORAL DAILY
Qty: 90 TABLET | Refills: 1 | Status: SHIPPED | OUTPATIENT
Start: 2022-04-12 | End: 2022-11-23

## 2022-04-12 RX ORDER — FUROSEMIDE 20 MG
20 TABLET ORAL DAILY
Qty: 90 TABLET | Refills: 3 | Status: SHIPPED | OUTPATIENT
Start: 2022-04-12 | End: 2023-04-12

## 2022-04-12 ASSESSMENT — ENCOUNTER SYMPTOMS
HEMATOCHEZIA: 0
NERVOUS/ANXIOUS: 0
CHILLS: 0
FREQUENCY: 1
EYE PAIN: 0
PARESTHESIAS: 1
JOINT SWELLING: 0
HEADACHES: 0
DYSURIA: 0
PALPITATIONS: 0
MYALGIAS: 1
ABDOMINAL PAIN: 0
DIZZINESS: 0
CONSTIPATION: 0
FEVER: 0
HEMATURIA: 0
SHORTNESS OF BREATH: 0
NAUSEA: 0
WEAKNESS: 0
DIARRHEA: 1
ARTHRALGIAS: 1
SORE THROAT: 0
COUGH: 0
HEARTBURN: 0

## 2022-04-12 ASSESSMENT — PAIN SCALES - GENERAL: PAINLEVEL: MODERATE PAIN (4)

## 2022-04-12 ASSESSMENT — ACTIVITIES OF DAILY LIVING (ADL): CURRENT_FUNCTION: NO ASSISTANCE NEEDED

## 2022-04-12 NOTE — PROGRESS NOTES
"SUBJECTIVE:   Alessio Teixeira is a 86 year old male who presents for Preventive Visit.        Are you in the first 12 months of your Medicare coverage?  No    Healthy Habits:     In general, how would you rate your overall health?  Fair    Frequency of exercise:  2-3 days/week    Duration of exercise:  Less than 15 minutes    Do you usually eat at least 4 servings of fruit and vegetables a day, include whole grains    & fiber and avoid regularly eating high fat or \"junk\" foods?  Yes    Taking medications regularly:  Yes    Medication side effects:  No muscle aches and No significant flushing    Ability to successfully perform activities of daily living:  No assistance needed    Home Safety:  Throw rugs in the hallway    Hearing Impairment:  Difficulty following a conversation in a noisy restaurant or crowded room, feel that people are mumbling or not speaking clearly, difficulty following dialogue in the theater, difficult to understand a speaker at a public meeting or Gnosticism service, need to ask people to speak up or repeat themselves and difficulty understanding soft or whispered speech    In the past 6 months, have you been bothered by leaking of urine? Yes    In general, how would you rate your overall mental or emotional health?  Excellent      PHQ-2 Total Score: 0    Do you feel safe in your environment? Yes    Have you ever done Advance Care Planning? (For example, a Health Directive, POLST, or a discussion with a medical provider or your loved ones about your wishes): Yes, advance care planning is on file.     Fall risk  Fallen 2 or more times in the past year?: No  Any fall with injury in the past year?: No    Cognitive Screening   1) Repeat 3 items (Leader, Season, Table)    2) Clock draw: NORMAL  3) 3 item recall: Recalls 2 objects   Results: NORMAL clock, 1-2 items recalled: COGNITIVE IMPAIRMENT LESS LIKELY    Mini-CogTM Copyright EVE Awan. Licensed by the author for use in [facility]; " reprinted with permission ([email or web address]). All rights reserved.      Do you have sleep apnea, excessive snoring or daytime drowsiness?: no    Reviewed and updated as needed this visit by clinical staff                    Reviewed and updated as needed this visit by Provider                   Social History     Tobacco Use     Smoking status: Former Smoker     Packs/day: 1.00     Years: 3.00     Pack years: 3.00     Types: Cigarettes     Start date:      Quit date: 1963     Years since quittin.3     Smokeless tobacco: Former User   Substance Use Topics     Alcohol use: Yes     Comment: 2-4 glasses of wine per week     If you drink alcohol do you typically have >3 drinks per day or >7 drinks per week? No    Alcohol Use 2022   Prescreen: >3 drinks/day or >7 drinks/week? No   Prescreen: >3 drinks/day or >7 drinks/week? -       Current providers sharing in care for this patient include:   Patient Care Team:  Jose Squires MD as PCP - General (Family Medicine)  Jose Squires MD as Assigned PCP    The following health maintenance items are reviewed in Epic and correct as of today:  Health Maintenance Due   Topic Date Due     MEDICARE ANNUAL WELLNESS VISIT  2022     BMP  2022     LIPID  2022     MICROALBUMIN  2022     HEMOGLOBIN  2022     BP Readings from Last 3 Encounters:   22 128/68   22 126/66   21 130/72    Wt Readings from Last 3 Encounters:   22 101.6 kg (224 lb)   22 101.1 kg (222 lb 12.8 oz)   21 102.5 kg (226 lb)                  Patient Active Problem List   Diagnosis     Hyperlipidemia LDL goal <100     Edema of both legs     Varicose veins of legs     Stasis dermatitis     Chronic atrial fibrillation (H)     Vitamin D deficiency disease     Hypertension goal BP (blood pressure) < 140/90     Diplopia     MARTHA (obstructive sleep apnea)     Long term current use of anticoagulant therapy     Bradycardia     Chronic left-sided thoracic  back pain     Presbycusis of both ears     Mixed hyperlipidemia     Screening for prostate cancer     CKD (chronic kidney disease) stage 2, GFR 60-89 ml/min     Pulmonary nodules     Debility     Gout of big toe     Routine medical exam     Chronic kidney disease, stage 3 (H)     Past Surgical History:   Procedure Laterality Date     APPENDECTOMY       ARTHROPLASTY HIP Right 2016     ARTHROPLASTY HIP ANTERIOR Right 4/26/2016    Procedure: ARTHROPLASTY HIP ANTERIOR;  Surgeon: Miguel Johnson MD;  Location:  OR     ARTHROPLASTY KNEE Right 1/28/2019    Procedure: RIGHT TOTAL KNEE ARTROPLASTY;  Surgeon: Miguel Johnson MD;  Location:  OR     BRAIN SURGERY  2007    partial resection meningioma     CATARACT IOL, RT/LT       COLECTOMY  2007    bowel perforation due to steroids     ESOPHAGOSCOPY, GASTROSCOPY, DUODENOSCOPY (EGD), COMBINED N/A 12/22/2018    Procedure: COMBINED ESOPHAGOSCOPY, GASTROSCOPY, DUODENOSCOPY (EGD), BIOPSY SINGLE OR MULTIPLE;  Surgeon: Elizabeth Jones MD;  Location:  GI     ESOPHAGOSCOPY, GASTROSCOPY, DUODENOSCOPY (EGD), COMBINED N/A 5/10/2019    Procedure: ESOPHAGOGASTRODUODENOSCOPY (EGD);  Surgeon: Ahsan Garica MD;  Location:  GI     GENITOURINARY SURGERY      vasectomy     HEAD & NECK SURGERY  2007    meningioma removed     KNEE SURGERY  2010    left knee replacement     ORTHOPEDIC SURGERY      left TKR     PHACOEMULSIFICATION CLEAR CORNEA WITH TORIC INTRAOCULAR LENS IMPLANT  7/30/2012    Procedure: PHACOEMULSIFICATION CLEAR CORNEA WITH TORIC INTRAOCULAR LENS IMPLANT;  COMPLEX RIGHT PHACOEMULSIFICATION CLEAR CORNEA WITH TORIC INTRAOCULAR LENS IMPLANT WITH MALYUGIN RING;  Surgeon: Ronald Cortez MD;  Location:  [facility]     RECTAL SURGERY  1985     Z RAD RESEC TONSIL/PILLARS         Social History     Tobacco Use     Smoking status: Former Smoker     Packs/day: 1.00     Years: 3.00     Pack years: 3.00     Types: Cigarettes     Start date: 1960     Quit date:  1963     Years since quittin.3     Smokeless tobacco: Former User   Substance Use Topics     Alcohol use: Yes     Comment: 2-4 glasses of wine per week     Family History   Problem Relation Age of Onset     Glaucoma Father      Unknown/Adopted Mother      Diabetes Brother      Leukemia Brother      Macular Degeneration No family hx of          Current Outpatient Medications   Medication Sig Dispense Refill     allopurinol (ZYLOPRIM) 300 MG tablet Take 1 tablet (300 mg) by mouth in the morning. 90 tablet 1     CALCIUM PO Take 600 mg by mouth 2 times daily        diclofenac (VOLTAREN) 1 % topical gel Apply 1 g topically in the morning and 1 g in the evening. 150 g 3     Esomeprazole Magnesium (NEXIUM PO) Take by mouth every morning (before breakfast)       furosemide (LASIX) 20 MG tablet Take 1 tablet (20 mg) by mouth in the morning. 90 tablet 3     lovastatin (MEVACOR) 40 MG tablet TAKE TWO TABLETS BY MOUTH DAILY AT BEDTIME 180 tablet 3     multivitamin, therapeutic with minerals (THERA-VIT-M) TABS Take 1 tablet by mouth daily       ORDER FOR DME CPAP supplies with variable pressure control 1 Device 0     potassium chloride ER (KLOR-CON) 20 MEQ CR tablet Take 1 tablet (20 mEq) by mouth daily 90 tablet 1     Vitamin D, Cholecalciferol, 1000 units TABS Take 1,000 Units by mouth in the morning.       warfarin ANTICOAGULANT (COUMADIN) 2 MG tablet Take 3.5 tablets (7 mg) daily or as directed by the INR clinic 325 tablet 0     No Known Allergies  Recent Labs   Lab Test 21  1028 20  1059 19  1045 07/10/19  1216   LDL 75  --  61 64   HDL 35*  --  28* 46   TRIG 120  --  170* 78   ALT 27 22 25  --    CR 1.24 1.14 1.07  --    GFRESTIMATED 53* 59* 64  --    GFRESTBLACK 61 68 74  --    POTASSIUM 4.4 4.0 4.3  --       Pneumonia Vaccine:Adults age 65+ who received Pneumovax (PPSV23) at 65 years or older: Should be given PCV13 > 1 year after their most recent PPSV23        Review of Systems  "  Constitutional: Negative for chills and fever.   HENT: Positive for hearing loss. Negative for congestion, ear pain and sore throat.    Eyes: Negative for pain and visual disturbance.   Respiratory: Negative for cough and shortness of breath.    Cardiovascular: Negative for chest pain, palpitations and peripheral edema.   Gastrointestinal: Positive for diarrhea. Negative for abdominal pain, constipation, heartburn, hematochezia and nausea.   Genitourinary: Positive for frequency, impotence and urgency. Negative for dysuria, genital sores, hematuria and penile discharge.   Musculoskeletal: Positive for arthralgias and myalgias. Negative for joint swelling.   Skin: Negative for rash.   Neurological: Positive for paresthesias. Negative for dizziness, weakness and headaches.   Psychiatric/Behavioral: Negative for mood changes. The patient is not nervous/anxious.          OBJECTIVE:   /68   Pulse 70   Temp (!) 95.4  F (35.2  C) (Tympanic)   Resp 16   Ht 1.791 m (5' 10.5\")   Wt 101.6 kg (224 lb)   SpO2 98%   BMI 31.69 kg/m   Estimated body mass index is 31.07 kg/m  as calculated from the following:    Height as of 6/21/21: 1.803 m (5' 11\").    Weight as of 3/7/22: 101.1 kg (222 lb 12.8 oz).  Physical Exam  GENERAL: healthy, alert and no distress  EYES: Eyes grossly normal to inspection, PERRL and conjunctivae and sclerae normal  HENT: ear canals and TM's normal, nose and mouth without ulcers or lesions  NECK: no adenopathy, no asymmetry, masses, or scars and thyroid normal to palpation  RESP: lungs clear to auscultation - no rales, rhonchi or wheezes  CV: regular rate and rhythm, normal S1 S2, no S3 or S4, no murmur, click or rub, no peripheral edema and peripheral pulses strong  ABDOMEN: soft, nontender, no hepatosplenomegaly, no masses and bowel sounds normal  MS: no gross musculoskeletal defects noted, no edema        ASSESSMENT / PLAN:       ICD-10-CM    1. Healthcare maintenance  Z00.00 BASIC METABOLIC " "PANEL     Lipid panel reflex to direct LDL Fasting     Albumin Random Urine Quantitative with Creat Ratio     Hemoglobin     ALT     PSA, screen   2. Screening for hyperlipidemia  Z13.220 Lipid panel reflex to direct LDL Fasting   3. Screening for prostate cancer  Z12.5 PSA, screen   4. Stage 3a chronic kidney disease (H)  N18.31 BASIC METABOLIC PANEL     Albumin Random Urine Quantitative with Creat Ratio   5. Paroxysmal atrial fibrillation (H)  I48.0 BASIC METABOLIC PANEL     Lipid panel reflex to direct LDL Fasting     Albumin Random Urine Quantitative with Creat Ratio   6. Hypertension goal BP (blood pressure) < 140/90  I10 BASIC METABOLIC PANEL     Lipid panel reflex to direct LDL Fasting     Albumin Random Urine Quantitative with Creat Ratio   7. Hyperlipidemia LDL goal <100  E78.5 BASIC METABOLIC PANEL     Lipid panel reflex to direct LDL Fasting     Albumin Random Urine Quantitative with Creat Ratio     lovastatin (MEVACOR) 40 MG tablet   8. Other osteoarthritis of spine, cervical region  M47.892 Rheumatoid factor     ESR: Erythrocyte sedimentation rate     diclofenac (VOLTAREN) 1 % topical gel   9. Weight gain  R63.5 furosemide (LASIX) 20 MG tablet   10. Gout of big toe  M10.9 allopurinol (ZYLOPRIM) 300 MG tablet     Uric acid       Patient has been advised of split billing requirements and indicates understanding: Yes    COUNSELING:  Reviewed preventive health counseling, as reflected in patient instructions    Estimated body mass index is 31.07 kg/m  as calculated from the following:    Height as of 6/21/21: 1.803 m (5' 11\").    Weight as of 3/7/22: 101.1 kg (222 lb 12.8 oz).    Weight management plan: Discussed healthy diet and exercise guidelines    He reports that he quit smoking about 59 years ago. His smoking use included cigarettes. He started smoking about 62 years ago. He has a 3.00 pack-year smoking history. He has quit using smokeless tobacco.      Appropriate preventive services were discussed " with this patient, including applicable screening as appropriate for cardiovascular disease, diabetes, osteopenia/osteoporosis, and glaucoma.  As appropriate for age/gender, discussed screening for colorectal cancer, prostate cancer, breast cancer, and cervical cancer. Checklist reviewing preventive services available has been given to the patient.    Reviewed patients plan of care and provided an AVS. The Basic Care Plan (routine screening as documented in Health Maintenance) for Alessio meets the Care Plan requirement. This Care Plan has been established and reviewed with the Patient.    Counseling Resources:  ATP IV Guidelines  Pooled Cohorts Equation Calculator  Breast Cancer Risk Calculator  Breast Cancer: Medication to Reduce Risk  FRAX Risk Assessment  ICSI Preventive Guidelines  Dietary Guidelines for Americans, 2010  USDA's MyPlate  ASA Prophylaxis  Lung CA Screening    Jose Squires MD  [facility]    Identified Health Risks:

## 2022-04-13 LAB — RHEUMATOID FACT SER NEPH-ACNC: 7 IU/ML

## 2022-04-14 NOTE — TELEPHONE ENCOUNTER
Central Prior Authorization Team   Phone: [phone]    PA Initiation    Medication: diclofenac (VOLTAREN) 1 % topical gel  Insurance Company: [employer] - Phone [phone] Fax [phone]  Pharmacy Filling the Rx: [id number] - [place], [place] - [address]  Filling Pharmacy Phone: [phone]  Filling Pharmacy Fax:    Start Date: 4/14/2022      
Faxed additional questions back to insurance.     
Prior Authorization Approval    Authorization Effective Date: 1/1/2022  Authorization Expiration Date: 12/31/2022  Medication: diclofenac (VOLTAREN) 1 % topical gel  Approved Dose/Quantity:    Reference #:     Insurance Company: [employer] - Phone [phone] Fax [phone]  Expected CoPay:       CoPay Card Available:      Foundation Assistance Needed:    Which Pharmacy is filling the prescription (Not needed for infusion/clinic administered): [id number] - [place], [place] - [address]  Pharmacy Notified: Yes  Patient Notified: Yes  **Instructed pharmacy to notify patient when script is ready to /ship.**          
Prior Authorization Retail Medication Request    Medication/Dose: diclofenac (VOLTAREN) 1 % topical gel  ICD code (if different than what is on RX):    Previously Tried and Failed:    Rationale:      Insurance Name:  Na  Insurance ID:  [id number]      Pharmacy Information (if different than what is on RX)  Name:  same  Phone:  same    
Breathing spontaneous and unlabored. Breath sounds clear and equal bilaterally with regular rhythm.

## 2022-04-20 ENCOUNTER — ANTICOAGULATION THERAPY VISIT (OUTPATIENT)
Dept: ANTICOAGULATION | Facility: CLINIC | Age: 87
End: 2022-04-20

## 2022-04-20 ENCOUNTER — LAB (OUTPATIENT)
Dept: LAB | Facility: CLINIC | Age: 87
End: 2022-04-20
Payer: MEDICARE

## 2022-04-20 DIAGNOSIS — I48.20 CHRONIC ATRIAL FIBRILLATION (H): ICD-10-CM

## 2022-04-20 DIAGNOSIS — I48.20 CHRONIC ATRIAL FIBRILLATION (H): Primary | ICD-10-CM

## 2022-04-20 DIAGNOSIS — Z79.01 LONG TERM CURRENT USE OF ANTICOAGULANT THERAPY: ICD-10-CM

## 2022-04-20 LAB — INR BLD: 1.9 (ref 0.9–1.1)

## 2022-04-20 PROCEDURE — 85610 PROTHROMBIN TIME: CPT

## 2022-04-20 PROCEDURE — 36416 COLLJ CAPILLARY BLOOD SPEC: CPT

## 2022-04-20 NOTE — PROGRESS NOTES
ANTICOAGULATION MANAGEMENT     Alessio Teixeira 86 year old male is on warfarin with subtherapeutic INR result. (Goal INR 2.0-3.0)    Recent labs: (last 7 days)     04/20/22  1036   INR 1.9*       ASSESSMENT       Source(s): Chart Review and Patient/Caregiver Call       Warfarin doses taken: Warfarin taken as instructed    Diet: No new diet changes identified    New illness, injury, or hospitalization: No    Medication/supplement changes: None noted    Signs or symptoms of bleeding or clotting: No    Previous INR: Therapeutic last 2(+) visits    Additional findings: None       PLAN     Recommended plan for temporary change(s) affecting INR     Dosing Instructions: continue your current warfarin dose with next INR in 2 weeks       Summary  As of 4/20/2022    Full warfarin instructions:  7 mg every day   Next INR check:  5/4/2022             Telephone call with Alessio who verbalizes understanding and agrees to plan    Lab visit scheduled    Education provided: Please call back if any changes to your diet, medications or how you've been taking warfarin    Plan made per [facility] anticoagulation protocol    Ashkan Sales RN  Anticoagulation Clinic  4/20/2022    _______________________________________________________________________     Anticoagulation Episode Summary     Current INR goal:  2.0-3.0   TTR:  77.9 % (1 y)   Target end date:  Indefinite   Send INR reminders to:  [facility]    Indications    Chronic atrial fibrillation (H) [I48.20]  Long term current use of anticoagulant therapy [Z79.01]           Comments:           Anticoagulation Care Providers     Provider Role Specialty Phone number    Zac Salgado MD Referring Family Medicine [phone]    Jose Squires MD Referring Family Medicine [phone]

## 2022-05-04 ENCOUNTER — ANTICOAGULATION THERAPY VISIT (OUTPATIENT)
Dept: ANTICOAGULATION | Facility: CLINIC | Age: 87
End: 2022-05-04

## 2022-05-04 ENCOUNTER — LAB (OUTPATIENT)
Dept: LAB | Facility: CLINIC | Age: 87
End: 2022-05-04
Payer: MEDICARE

## 2022-05-04 DIAGNOSIS — Z79.01 LONG TERM CURRENT USE OF ANTICOAGULANT THERAPY: ICD-10-CM

## 2022-05-04 DIAGNOSIS — I48.20 CHRONIC ATRIAL FIBRILLATION (H): Primary | ICD-10-CM

## 2022-05-04 DIAGNOSIS — I48.20 CHRONIC ATRIAL FIBRILLATION (H): ICD-10-CM

## 2022-05-04 LAB — INR BLD: 2.1 (ref 0.9–1.1)

## 2022-05-04 PROCEDURE — 36416 COLLJ CAPILLARY BLOOD SPEC: CPT

## 2022-05-04 PROCEDURE — 85610 PROTHROMBIN TIME: CPT

## 2022-05-04 NOTE — PROGRESS NOTES
ANTICOAGULATION MANAGEMENT     Alessio Teixeira 86 year old male is on warfarin with therapeutic INR result. (Goal INR 2.0-3.0)    Recent labs: (last 7 days)     05/04/22  1031   INR 2.1*       ASSESSMENT       Source(s): Chart Review and Patient/Caregiver Call       Warfarin doses taken: Warfarin taken as instructed    Diet: No new diet changes identified    New illness, injury, or hospitalization: No    Medication/supplement changes: None noted    Signs or symptoms of bleeding or clotting: No    Previous INR: Subtherapeutic    Additional findings: None       PLAN     Recommended plan for no diet, medication or health factor changes affecting INR     Dosing Instructions: continue your current warfarin dose with next INR in 6 weeks. I advised recheck in 3 weeks, patient declined and scheduled for 6 weeks.       Summary  As of 5/4/2022    Full warfarin instructions:  7 mg every day   Next INR check:  6/15/2022             Telephone call with Alessio who verbalizes understanding and agrees to plan    Patient elected to schedule next visit 6/15/22    Education provided: Please call back if any changes to your diet, medications or how you've been taking warfarin    Plan made per [facility] anticoagulation protocol    Ashkan Sales RN  Anticoagulation Clinic  5/4/2022    _______________________________________________________________________     Anticoagulation Episode Summary     Current INR goal:  2.0-3.0   TTR:  79.8 % (1 y)   Target end date:  Indefinite   Send INR reminders to:  [facility]    Indications    Chronic atrial fibrillation (H) [I48.20]  Long term current use of anticoagulant therapy [Z79.01]           Comments:           Anticoagulation Care Providers     Provider Role Specialty Phone number    Zac Salgado MD Referring Family Medicine [phone]    Jose Squires MD Referring Family Medicine [phone]

## 2022-06-07 DIAGNOSIS — Z86.711 HISTORY OF PULMONARY EMBOLISM: ICD-10-CM

## 2022-06-07 DIAGNOSIS — Z79.01 LONG TERM CURRENT USE OF ANTICOAGULANT THERAPY: ICD-10-CM

## 2022-06-07 DIAGNOSIS — I48.0 PAROXYSMAL ATRIAL FIBRILLATION (H): ICD-10-CM

## 2022-06-09 RX ORDER — WARFARIN SODIUM 2 MG/1
TABLET ORAL
Qty: 315 TABLET | Refills: 1 | Status: SHIPPED | OUTPATIENT
Start: 2022-06-09 | End: 2022-11-10

## 2022-06-09 NOTE — TELEPHONE ENCOUNTER
ANTICOAGULATION MANAGEMENT:  Medication Refill    Anticoagulation Summary  As of 5/4/2022    Warfarin maintenance plan:  7 mg (2 mg x 3.5) every day   Next INR check:  6/15/2022   Target end date:  Indefinite    Indications    Chronic atrial fibrillation (H) [I48.20]  Long term current use of anticoagulant therapy [Z79.01]             Anticoagulation Care Providers     Provider Role Specialty Phone number    Zac Salgado MD Referring Family Medicine [phone]    Jose Squires MD Referring Family Medicine [phone]          Visit with referring provider/group within last year: Yes    [facility] referral signed within last year: Yes    Alessio meets all criteria for refill (current [facility] referral, office visit with referring provider/group in last year, lab monitoring up to date or not exceeding 2 weeks overdue). Rx instructions and quantity supplied updated to match patient's current dosing plan. Warfarin 90 day supply with 1 refill granted per [facility] protocol     Ashkan Sales RN  Anticoagulation Clinic

## 2022-06-15 ENCOUNTER — LAB (OUTPATIENT)
Dept: LAB | Facility: CLINIC | Age: 87
End: 2022-06-15
Payer: MEDICARE

## 2022-06-15 ENCOUNTER — ANTICOAGULATION THERAPY VISIT (OUTPATIENT)
Dept: ANTICOAGULATION | Facility: CLINIC | Age: 87
End: 2022-06-15

## 2022-06-15 DIAGNOSIS — I48.20 CHRONIC ATRIAL FIBRILLATION (H): ICD-10-CM

## 2022-06-15 DIAGNOSIS — Z79.01 LONG TERM CURRENT USE OF ANTICOAGULANT THERAPY: ICD-10-CM

## 2022-06-15 DIAGNOSIS — I48.20 CHRONIC ATRIAL FIBRILLATION (H): Primary | ICD-10-CM

## 2022-06-15 LAB — INR BLD: 1.8 (ref 0.9–1.1)

## 2022-06-15 PROCEDURE — 85610 PROTHROMBIN TIME: CPT

## 2022-06-15 PROCEDURE — 36415 COLL VENOUS BLD VENIPUNCTURE: CPT

## 2022-06-15 NOTE — PROGRESS NOTES
ANTICOAGULATION MANAGEMENT     Alessio Teixeira 86 year old male is on warfarin with therapeutic INR result. (Goal INR 2.0-3.0)    Recent labs: (last 7 days)     06/15/22  1032   INR 1.8*       ASSESSMENT       Source(s): Chart Review and Patient/Caregiver Call       Warfarin doses taken: Warfarin taken as instructed    Diet: Decreased greens/vitamin K in diet; plans to resume previous intake - patient is not always consistent with salad intake    New illness, injury, or hospitalization: Yes: arthritis in neck - worsening sx - plans to f/u with PCP soon    Medication/supplement changes: Voltaren & Tylenol prn    Signs or symptoms of bleeding or clotting: Yes, occasional bruising    Previous INR: Therapeutic last visit; previously outside of goal range    Additional findings: None       PLAN     Recommended plan for temporary change(s) and ongoing change(s) affecting INR     Dosing Instructions: Increase your warfarin dose (4.1% change) with next INR in 2 weeks       Summary  As of 6/15/2022    Full warfarin instructions:  8 mg every Wed, Sat; 7 mg all other days   Next INR check:  6/29/2022             Telephone call with Alessio who agrees to plan and repeated back plan correctly    Lab visit scheduled    Education provided: Please call back if any changes to your diet, medications or how you've been taking warfarin, Monitoring for bleeding signs and symptoms, Monitoring for clotting signs and symptoms and Importance of notifying clinic for changes in medications; a sooner lab recheck maybe needed.    Plan made per [facility] anticoagulation protocol    Karla Monzon, RN  Anticoagulation Clinic  6/15/2022    _______________________________________________________________________     Anticoagulation Episode Summary     Current INR goal:  2.0-3.0   TTR:  82.8 % (1 y)   Target end date:  Indefinite   Send INR reminders to:  [facility]    Indications    Chronic atrial fibrillation (H) [I48.20]  Long term current  use of anticoagulant therapy [Z79.01]           Comments:           Anticoagulation Care Providers     Provider Role Specialty Phone number    Zac Salgado MD Referring Family Medicine [phone]    Jose Squires MD Referring Family Medicine [phone]

## 2022-06-29 ENCOUNTER — LAB (OUTPATIENT)
Dept: LAB | Facility: CLINIC | Age: 87
End: 2022-06-29
Payer: MEDICARE

## 2022-06-29 ENCOUNTER — ANTICOAGULATION THERAPY VISIT (OUTPATIENT)
Dept: ANTICOAGULATION | Facility: CLINIC | Age: 87
End: 2022-06-29

## 2022-06-29 DIAGNOSIS — I48.20 CHRONIC ATRIAL FIBRILLATION (H): Primary | ICD-10-CM

## 2022-06-29 DIAGNOSIS — Z79.01 LONG TERM CURRENT USE OF ANTICOAGULANT THERAPY: ICD-10-CM

## 2022-06-29 DIAGNOSIS — I48.20 CHRONIC ATRIAL FIBRILLATION (H): ICD-10-CM

## 2022-06-29 LAB — INR BLD: 1.7 (ref 0.9–1.1)

## 2022-06-29 PROCEDURE — 36416 COLLJ CAPILLARY BLOOD SPEC: CPT

## 2022-06-29 PROCEDURE — 85610 PROTHROMBIN TIME: CPT

## 2022-06-29 NOTE — PROGRESS NOTES
ANTICOAGULATION MANAGEMENT     Alessio Teixeira 86 year old male is on warfarin with subtherapeutic INR result. (Goal INR 2.0-3.0)    Recent labs: (last 7 days)     06/29/22  0959   INR 1.7*       ASSESSMENT       Source(s): Chart Review and Patient/Caregiver Call       Warfarin doses taken: Warfarin taken as instructed    Diet: Increased greens/vitamin K in diet; ongoing change    New illness, injury, or hospitalization: No    Medication/supplement changes: None noted    Signs or symptoms of bleeding or clotting: No    Previous INR: Subtherapeutic    Additional findings: Ongoing sub despite dose increase at last check. More greens during summer months. Will make another increase today       PLAN     Recommended plan for ongoing change(s) affecting INR     Dosing Instructions: Increase your warfarin dose (9.8% change) with next INR in 2 weeks       Summary  As of 6/29/2022    Full warfarin instructions:  8 mg every Wed, Sat; 7 mg all other days   Next INR check:               Telephone call with Alessio who verbalizes understanding and agrees to plan    Lab visit scheduled    Education provided: Please call back if any changes to your diet, medications or how you've been taking warfarin    Plan made per [facility] anticoagulation protocol    Ashkan Sales RN  Anticoagulation Clinic  6/29/2022    _______________________________________________________________________     Anticoagulation Episode Summary     Current INR goal:  2.0-3.0   TTR:  78.9 % (1 y)   Target end date:  Indefinite   Send INR reminders to:  [facility]    Indications    Chronic atrial fibrillation (H) [I48.20]  Long term current use of anticoagulant therapy [Z79.01]           Comments:           Anticoagulation Care Providers     Provider Role Specialty Phone number    Zac Salgado MD Referring Family Medicine [phone]    Jose Squires MD Referring Family Medicine [phone]

## 2022-07-11 ENCOUNTER — DOCUMENTATION ONLY (OUTPATIENT)
Dept: ANTICOAGULATION | Facility: CLINIC | Age: 87
End: 2022-07-11

## 2022-07-11 DIAGNOSIS — I48.20 CHRONIC ATRIAL FIBRILLATION (H): Primary | ICD-10-CM

## 2022-07-11 NOTE — PROGRESS NOTES
ANTICOAGULATION CLINIC REFERRAL RENEWAL REQUEST       An annual renewal order is required for all patients referred to [facility] Anticoagulation [facility].?  Please review and sign the pended referral order for Alessio Teixeira.       ANTICOAGULATION SUMMARY      Warfarin indication(s)   Atrial Fibrillation    Mechanical heart valve present?  NO       Current goal range   INR: 2.0-3.0     Goal appropriate for indication? Goal INR 2-3, standard for indication(s) above     Time in Therapeutic Range (TTR)  (Goal > 60%) 78.9%       Office visit with referring provider's group within last year yes on 4/12/22       Nathalia Ramirez RN  [facility]

## 2022-07-13 ENCOUNTER — ANTICOAGULATION THERAPY VISIT (OUTPATIENT)
Dept: ANTICOAGULATION | Facility: CLINIC | Age: 87
End: 2022-07-13

## 2022-07-13 ENCOUNTER — LAB (OUTPATIENT)
Dept: LAB | Facility: CLINIC | Age: 87
End: 2022-07-13
Payer: MEDICARE

## 2022-07-13 DIAGNOSIS — Z79.01 LONG TERM CURRENT USE OF ANTICOAGULANT THERAPY: ICD-10-CM

## 2022-07-13 DIAGNOSIS — I48.20 CHRONIC ATRIAL FIBRILLATION (H): ICD-10-CM

## 2022-07-13 DIAGNOSIS — Z79.01 LONG TERM CURRENT USE OF ANTICOAGULANT THERAPY: Primary | ICD-10-CM

## 2022-07-13 DIAGNOSIS — I48.20 CHRONIC ATRIAL FIBRILLATION (H): Primary | ICD-10-CM

## 2022-07-13 DIAGNOSIS — I48.0 PAROXYSMAL ATRIAL FIBRILLATION (H): ICD-10-CM

## 2022-07-13 LAB — INR BLD: 2.3 (ref 0.9–1.1)

## 2022-07-13 PROCEDURE — 85610 PROTHROMBIN TIME: CPT

## 2022-07-13 PROCEDURE — 36415 COLL VENOUS BLD VENIPUNCTURE: CPT

## 2022-07-13 NOTE — PROGRESS NOTES
ANTICOAGULATION MANAGEMENT     Alessio Teixeira 86 year old male is on warfarin with therapeutic INR result. (Goal INR 2.0-3.0)    Recent labs: (last 7 days)     07/13/22  1003   INR 2.3*       ASSESSMENT       Source(s): Chart Review and Patient/Caregiver Call       Warfarin doses taken: Warfarin taken as instructed    Diet: No new diet changes identified    New illness, injury, or hospitalization: No    Medication/supplement changes: None noted    Signs or symptoms of bleeding or clotting: No    Previous INR: Subtherapeutic    Additional findings: None       PLAN     Recommended plan for no diet, medication or health factor changes affecting INR     Dosing Instructions: continue your current warfarin dose with next INR in 3 weeks       Summary  As of 7/13/2022    Full warfarin instructions:  8 mg every day   Next INR check:  8/3/2022             Telephone call with Alessio who verbalizes understanding and agrees to plan    Lab visit scheduled    Education provided: Please call back if any changes to your diet, medications or how you've been taking warfarin    Plan made per [facility] anticoagulation protocol    Ashkan Sales RN  Anticoagulation Clinic  7/13/2022    _______________________________________________________________________     Anticoagulation Episode Summary     Current INR goal:  2.0-3.0   TTR:  77.0 % (1 y)   Target end date:  Indefinite   Send INR reminders to:  [facility]    Indications    Chronic atrial fibrillation (H) [I48.20]  Long term current use of anticoagulant therapy [Z79.01]           Comments:           Anticoagulation Care Providers     Provider Role Specialty Phone number    Zac Salgado MD Referring Family Medicine [phone]    Jose Squires MD Referring Family Medicine [phone]

## 2022-08-10 ENCOUNTER — ANTICOAGULATION THERAPY VISIT (OUTPATIENT)
Dept: ANTICOAGULATION | Facility: CLINIC | Age: 87
End: 2022-08-10

## 2022-08-10 ENCOUNTER — LAB (OUTPATIENT)
Dept: LAB | Facility: CLINIC | Age: 87
End: 2022-08-10
Payer: MEDICARE

## 2022-08-10 DIAGNOSIS — Z79.01 LONG TERM CURRENT USE OF ANTICOAGULANT THERAPY: ICD-10-CM

## 2022-08-10 DIAGNOSIS — I48.20 CHRONIC ATRIAL FIBRILLATION (H): ICD-10-CM

## 2022-08-10 DIAGNOSIS — I48.20 CHRONIC ATRIAL FIBRILLATION (H): Primary | ICD-10-CM

## 2022-08-10 LAB — INR BLD: 2.1 (ref 0.9–1.1)

## 2022-08-10 PROCEDURE — 36415 COLL VENOUS BLD VENIPUNCTURE: CPT

## 2022-08-10 PROCEDURE — 85610 PROTHROMBIN TIME: CPT

## 2022-08-10 NOTE — PROGRESS NOTES
ANTICOAGULATION MANAGEMENT     Alessio Teixeira 86 year old male is on warfarin with therapeutic INR result. (Goal INR 2.0-3.0)    Recent labs: (last 7 days)     08/10/22  1145   INR 2.1*       ASSESSMENT       Source(s): Patient/Caregiver Call       Warfarin doses taken: Warfarin taken as instructed    Diet: No new diet changes identified    New illness, injury, or hospitalization: No    Medication/supplement changes: None noted    Signs or symptoms of bleeding or clotting: No    Previous INR: Therapeutic last visit; previously outside of goal range    Additional findings: None       PLAN     Recommended plan for no diet, medication or health factor changes affecting INR     Dosing Instructions: Continue your current warfarin dose with next INR in 5 weeks       Summary  As of 8/10/2022    Full warfarin instructions:  8 mg every day   Next INR check:  9/14/2022             Telephone call with Alessio who verbalizes understanding and agrees to plan    Lab visit scheduled    Education provided: Please call back if any changes to your diet, medications or how you've been taking warfarin    Plan made per [facility] anticoagulation protocol    Shabana Sage RN  Anticoagulation Clinic  8/10/2022    _______________________________________________________________________     Anticoagulation Episode Summary     Current INR goal:  2.0-3.0   TTR:  77.0 % (1 y)   Target end date:  Indefinite   Send INR reminders to:  [facility]    Indications    Chronic atrial fibrillation (H) [I48.20]  Long term current use of anticoagulant therapy [Z79.01]           Comments:           Anticoagulation Care Providers     Provider Role Specialty Phone number    Zac Salgado MD Referring Family Medicine [phone]    Jose Squires MD Referring Family Medicine [phone]

## 2022-08-10 NOTE — PROGRESS NOTES
ANTICOAGULATION MANAGEMENT     Alessio Teixeira 86 year old male is on warfarin with therapeutic INR result. (Goal INR 2.0-3.0)    Recent labs: (last 7 days)     08/10/22  1145   INR 2.1*       ASSESSMENT       Source(s): Chart Review    Previous INR was Therapeutic last visit; previously outside of goal range    Medication, diet, health changes since last INR chart reviewed; none identified           PLAN     Unable to reach Alessio today.    Tried calling patient, but no answer or voicemail.  Will try back again later as patient does not appear to utilize MyChart.    Follow up required to confirm warfarin dose taken and assess for changes    Shabana Sage RN  Anticoagulation Clinic  8/10/2022

## 2022-08-27 DIAGNOSIS — I10 HYPERTENSION GOAL BP (BLOOD PRESSURE) < 140/90: ICD-10-CM

## 2022-08-30 RX ORDER — POTASSIUM CHLORIDE 1500 MG/1
TABLET, EXTENDED RELEASE ORAL
Qty: 90 TABLET | Refills: 1 | Status: SHIPPED | OUTPATIENT
Start: 2022-08-30 | End: 2023-02-17

## 2022-08-30 NOTE — TELEPHONE ENCOUNTER
Prescription approved per [facility] Refill Protocol.    Leonora Varela RN  [facility] Triage Team

## 2022-09-14 ENCOUNTER — LAB (OUTPATIENT)
Dept: LAB | Facility: CLINIC | Age: 87
End: 2022-09-14
Payer: MEDICARE

## 2022-09-14 ENCOUNTER — ANTICOAGULATION THERAPY VISIT (OUTPATIENT)
Dept: ANTICOAGULATION | Facility: CLINIC | Age: 87
End: 2022-09-14

## 2022-09-14 DIAGNOSIS — Z79.01 LONG TERM CURRENT USE OF ANTICOAGULANT THERAPY: ICD-10-CM

## 2022-09-14 DIAGNOSIS — I48.20 CHRONIC ATRIAL FIBRILLATION (H): ICD-10-CM

## 2022-09-14 DIAGNOSIS — I48.20 CHRONIC ATRIAL FIBRILLATION (H): Primary | ICD-10-CM

## 2022-09-14 LAB — INR BLD: 2.1 (ref 0.9–1.1)

## 2022-09-14 PROCEDURE — 85610 PROTHROMBIN TIME: CPT

## 2022-09-14 PROCEDURE — 36415 COLL VENOUS BLD VENIPUNCTURE: CPT

## 2022-09-14 NOTE — PROGRESS NOTES
ANTICOAGULATION MANAGEMENT     Alessio Teixeira 86 year old male is on warfarin with therapeutic INR result. 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 Dr. Squires has advised use of Voltaren gel which provides minimal relief.    Medication/supplement changes: None noted    Signs or symptoms of bleeding or clotting: No    Previous INR: Therapeutic last 2(+) visits    Additional findings: None       PLAN     Recommended plan for no diet, medication or health factor changes affecting INR     Dosing Instructions: Continue your current warfarin dose with next INR in 6 weeks       Summary  As of 9/14/2022    Full warfarin instructions:  8 mg every day   Next INR check:  10/26/2022             Telephone call with Alessio who verbalizes understanding and agrees to plan    Lab visit scheduled    Education provided: Please call back if any changes to your diet, medications or how you've been taking warfarin and Contact [phone]  with any changes, questions or concerns.     Plan made per [facility] anticoagulation protocol    Peggy Sullivan RN  Anticoagulation Clinic  9/14/2022    _______________________________________________________________________     Anticoagulation Episode Summary     Current INR goal:  2.0-3.0   TTR:  77.0 % (1 y)   Target end date:  Indefinite   Send INR reminders to:  [facility]    Indications    Chronic atrial fibrillation (H) [I48.20]  Long term current use of anticoagulant therapy [Z79.01]           Comments:           Anticoagulation Care Providers     Provider Role Specialty Phone number    Zac Salgado MD Referring Family Medicine [phone]    Jose Squires MD Referring Family  Medicine [phone]

## 2022-10-26 ENCOUNTER — ANTICOAGULATION THERAPY VISIT (OUTPATIENT)
Dept: ANTICOAGULATION | Facility: CLINIC | Age: 87
End: 2022-10-26

## 2022-10-26 ENCOUNTER — TELEPHONE (OUTPATIENT)
Dept: FAMILY MEDICINE | Facility: CLINIC | Age: 87
End: 2022-10-26

## 2022-10-26 ENCOUNTER — LAB (OUTPATIENT)
Dept: LAB | Facility: CLINIC | Age: 87
End: 2022-10-26
Payer: MEDICARE

## 2022-10-26 DIAGNOSIS — I48.20 CHRONIC ATRIAL FIBRILLATION (H): Primary | ICD-10-CM

## 2022-10-26 DIAGNOSIS — I48.20 CHRONIC ATRIAL FIBRILLATION (H): ICD-10-CM

## 2022-10-26 DIAGNOSIS — Z79.01 LONG TERM CURRENT USE OF ANTICOAGULANT THERAPY: ICD-10-CM

## 2022-10-26 LAB — INR BLD: 2.3 (ref 0.9–1.1)

## 2022-10-26 PROCEDURE — 36416 COLLJ CAPILLARY BLOOD SPEC: CPT

## 2022-10-26 PROCEDURE — 85610 PROTHROMBIN TIME: CPT

## 2022-10-26 NOTE — TELEPHONE ENCOUNTER
Reason for Call:  Returning the call to Zainab/no answer/please contact patient  For next route of care    Detailed comments: return call to patient    Phone Number Patient can be reached at: Home number on file [phone] (home)    Best Time: any    Can we leave a detailed message on this number? YES    Call taken on 10/26/2022 at 11:50 AM by Yandy Sigala

## 2022-10-26 NOTE — PROGRESS NOTES
ANTICOAGULATION MANAGEMENT     Alessio Teixeira 86 year old male is on warfarin with therapeutic INR result. (Goal INR 2.0-3.0)    Recent labs: (last 7 days)     10/26/22  0942   INR 2.3*       ASSESSMENT       Source(s): Chart Review and Patient/Caregiver Call       Warfarin doses taken: Warfarin taken as instructed    Diet: No new diet changes identified    New illness, injury, or hospitalization: No    Medication/supplement changes: None noted    Signs or symptoms of bleeding or clotting: No    Previous INR: Therapeutic last 2(+) visits    Additional findings: None     PLAN     Recommended plan for no diet, medication or health factor changes affecting INR     Dosing Instructions: Continue your current warfarin dose with next INR in 6 weeks       Summary  As of 10/26/2022    Full warfarin instructions:  8 mg every day; Starting 10/26/2022   Next INR check:  12/7/2022             Telephone call with Alessio who verbalizes understanding and agrees to plan    Lab visit scheduled    Education provided:     Please call back if any changes to your diet, medications or how you've been taking warfarin    Plan made per [facility] anticoagulation protocol    Zainab Baker RN  Anticoagulation Clinic  10/26/2022    _______________________________________________________________________     Anticoagulation Episode Summary     Current INR goal:  2.0-3.0   TTR:  77.0 % (1 y)   Target end date:  Indefinite   Send INR reminders to:  [facility]    Indications    Chronic atrial fibrillation (H) [I48.20]  Long term current use of anticoagulant therapy [Z79.01]           Comments:           Anticoagulation Care Providers     Provider Role Specialty Phone number    Zac Salgado MD Referring Family Medicine [phone]    Jose Squires MD Referring Family Medicine [phone]

## 2022-10-29 ENCOUNTER — HEALTH MAINTENANCE LETTER (OUTPATIENT)
Age: 87
End: 2022-10-29

## 2022-11-09 DIAGNOSIS — Z86.711 HISTORY OF PULMONARY EMBOLISM: ICD-10-CM

## 2022-11-09 DIAGNOSIS — Z79.01 LONG TERM CURRENT USE OF ANTICOAGULANT THERAPY: ICD-10-CM

## 2022-11-09 DIAGNOSIS — I48.0 PAROXYSMAL ATRIAL FIBRILLATION (H): ICD-10-CM

## 2022-11-10 RX ORDER — WARFARIN SODIUM 4 MG/1
TABLET ORAL
Qty: 182 TABLET | Refills: 1 | Status: SHIPPED | OUTPATIENT
Start: 2022-11-10 | End: 2023-05-11

## 2022-11-10 NOTE — TELEPHONE ENCOUNTER
ANTICOAGULATION MANAGEMENT:  Medication Refill    Anticoagulation Summary  As of 10/26/2022    Warfarin maintenance plan:  8 mg (2 mg x 4) every day; Starting 10/26/2022   Next INR check:  12/7/2022   Target end date:  Indefinite    Indications    Chronic atrial fibrillation (H) [I48.20]  Long term current use of anticoagulant therapy [Z79.01]             Anticoagulation Care Providers     Provider Role Specialty Phone number    Zac Salgado MD Referring Family Medicine [phone]    Jose Squires MD Referring Family Medicine [phone]          Visit with referring provider/group within last year: Yes    [facility] referral signed within last year: Yes    Alessio meets all criteria for refill (current [facility] referral, office visit with referring provider/group in last year, lab monitoring up to date or not exceeding 2 weeks overdue). Rx instructions and quantity supplied updated to match patient's current dosing plan. Warfarin 90 day supply with 1 refill granted per [facility] protocol     Writer spoke with patient and offered to change rx to 4 mg tablets instead of 2 mg tablets - patient agreed and appreciates taking less pills.     Karla Monzon, RN  Anticoagulation Clinic

## 2022-11-14 DIAGNOSIS — Z79.01 LONG TERM CURRENT USE OF ANTICOAGULANT THERAPY: ICD-10-CM

## 2022-11-14 DIAGNOSIS — I48.0 PAROXYSMAL ATRIAL FIBRILLATION (H): ICD-10-CM

## 2022-11-14 DIAGNOSIS — Z86.711 HISTORY OF PULMONARY EMBOLISM: ICD-10-CM

## 2022-11-17 RX ORDER — WARFARIN SODIUM 2 MG/1
TABLET ORAL
Qty: 360 TABLET | Refills: 1 | Status: SHIPPED | OUTPATIENT
Start: 2022-11-17 | End: 2023-11-01 | Stop reason: DRUGHIGH

## 2022-11-17 NOTE — TELEPHONE ENCOUNTER
ANTICOAGULATION MANAGEMENT:  Medication Refill    Anticoagulation Summary  As of 10/26/2022    Warfarin maintenance plan:  8 mg (2 mg x 4) every day; Starting 10/26/2022   Next INR check:  12/7/2022   Target end date:  Indefinite    Indications    Chronic atrial fibrillation (H) [I48.20]  Long term current use of anticoagulant therapy [Z79.01]             Anticoagulation Care Providers     Provider Role Specialty Phone number    Zac Salgado MD Referring Family Medicine [phone]    Jose Squires MD Referring Family Medicine [phone]          Visit with referring provider/group within last year: Yes    [facility] referral signed within last year: Yes    Alessio meets all criteria for refill (current [facility] referral, office visit with referring provider/group in last year, lab monitoring up to date or not exceeding 2 weeks overdue). Rx instructions and quantity supplied updated to match patient's current dosing plan. Warfarin 90 day supply with 1 refill granted per [facility] protocol     Ashkan Sales RN  Anticoagulation Clinic

## 2022-11-17 NOTE — TELEPHONE ENCOUNTER
[facility]   Thank you,  Krissy ZAMORA RN,BSN

## 2022-11-21 DIAGNOSIS — M10.9 GOUT OF BIG TOE: ICD-10-CM

## 2022-11-23 RX ORDER — ALLOPURINOL 300 MG/1
300 TABLET ORAL DAILY
Qty: 90 TABLET | Refills: 0 | Status: SHIPPED | OUTPATIENT
Start: 2022-11-23 | End: 2023-02-28

## 2022-11-23 NOTE — TELEPHONE ENCOUNTER
Routing refill request to provider for review/approval because:  Labs out of range:    Uric Acid   Date Value Ref Range Status   04/12/2022 7.3 (H) 3.5 - 7.2 mg/dL Final   05/03/2021 9.1 (H) 3.5 - 7.2 mg/dL Final        Creatinine   Date Value Ref Range Status   04/12/2022 1.36 (H) 0.66 - 1.25 mg/dL Final   03/04/2021 1.24 0.66 - 1.25 mg/dL Final        Has follow up lab appointment 12/07/2022.     Kelli Del Real RN  [facility] Triage

## 2022-12-07 ENCOUNTER — LAB (OUTPATIENT)
Dept: LAB | Facility: CLINIC | Age: 87
End: 2022-12-07
Payer: MEDICARE

## 2022-12-07 ENCOUNTER — ANTICOAGULATION THERAPY VISIT (OUTPATIENT)
Dept: ANTICOAGULATION | Facility: CLINIC | Age: 87
End: 2022-12-07

## 2022-12-07 DIAGNOSIS — I48.20 CHRONIC ATRIAL FIBRILLATION (H): ICD-10-CM

## 2022-12-07 DIAGNOSIS — I48.20 CHRONIC ATRIAL FIBRILLATION (H): Primary | ICD-10-CM

## 2022-12-07 DIAGNOSIS — Z79.01 LONG TERM CURRENT USE OF ANTICOAGULANT THERAPY: ICD-10-CM

## 2022-12-07 LAB — INR BLD: 2.8 (ref 0.9–1.1)

## 2022-12-07 PROCEDURE — 85610 PROTHROMBIN TIME: CPT

## 2022-12-07 PROCEDURE — 36416 COLLJ CAPILLARY BLOOD SPEC: CPT

## 2022-12-07 NOTE — PROGRESS NOTES
ANTICOAGULATION MANAGEMENT     Alessio Teixeira 87 year old male is on warfarin with therapeutic INR result. (Goal INR 2.0-3.0)    Recent labs: (last 7 days)     12/07/22  1001   INR 2.8*       ASSESSMENT       Source(s): Chart Review and Patient/Caregiver Call       Warfarin doses taken: Warfarin taken as instructed    Diet: No new diet changes identified    New illness, injury, or hospitalization: No    Medication/supplement changes: None noted    Signs or symptoms of bleeding or clotting: No    Previous INR: Therapeutic last 2(+) visits    Additional findings: None       PLAN     Recommended plan for no diet, medication or health factor changes affecting INR     Dosing Instructions: Continue your current warfarin dose with next INR in 8 weeks       Summary  As of 12/7/2022    Full warfarin instructions:  8 mg every day; Starting 12/7/2022   Next INR check:  2/1/2023             Telephone call with Alessio who verbalizes understanding and agrees to plan    Lab visit scheduled    Education provided:     Please call back if any changes to your diet, medications or how you've been taking warfarin    Contact [phone]  with any changes, questions or concerns.     Plan made per [facility] anticoagulation protocol    Peggy Sullivan RN  Anticoagulation Clinic  12/7/2022    _______________________________________________________________________     Anticoagulation Episode Summary     Current INR goal:  2.0-3.0   TTR:  77.0 % (1 y)   Target end date:  Indefinite   Send INR reminders to:  [facility]    Indications    Chronic atrial fibrillation (H) [I48.20]  Long term current use of anticoagulant therapy [Z79.01]           Comments:           Anticoagulation Care Providers     Provider Role Specialty Phone number    Zac Salgado MD Referring Family Medicine [phone]    Jose Squires MD Referring Family Medicine [phone]

## 2023-01-01 ENCOUNTER — ANTICOAGULATION THERAPY VISIT (OUTPATIENT)
Dept: ANTICOAGULATION | Facility: CLINIC | Age: 88
End: 2023-01-01

## 2023-01-01 ENCOUNTER — LAB (OUTPATIENT)
Dept: LAB | Facility: CLINIC | Age: 88
End: 2023-01-01
Payer: MEDICARE

## 2023-01-01 DIAGNOSIS — Z86.711 HISTORY OF PULMONARY EMBOLISM: ICD-10-CM

## 2023-01-01 DIAGNOSIS — I48.20 CHRONIC ATRIAL FIBRILLATION (H): ICD-10-CM

## 2023-01-01 DIAGNOSIS — Z79.01 LONG TERM CURRENT USE OF ANTICOAGULANT THERAPY: ICD-10-CM

## 2023-01-01 DIAGNOSIS — I48.20 CHRONIC ATRIAL FIBRILLATION (H): Primary | ICD-10-CM

## 2023-01-01 LAB — INR BLD: 2.3 (ref 0.9–1.1)

## 2023-01-01 PROCEDURE — 36416 COLLJ CAPILLARY BLOOD SPEC: CPT

## 2023-01-01 PROCEDURE — 85610 PROTHROMBIN TIME: CPT

## 2023-02-01 ENCOUNTER — ANTICOAGULATION THERAPY VISIT (OUTPATIENT)
Dept: ANTICOAGULATION | Facility: CLINIC | Age: 88
End: 2023-02-01

## 2023-02-01 ENCOUNTER — LAB (OUTPATIENT)
Dept: LAB | Facility: CLINIC | Age: 88
End: 2023-02-01
Payer: MEDICARE

## 2023-02-01 DIAGNOSIS — I48.20 CHRONIC ATRIAL FIBRILLATION (H): Primary | ICD-10-CM

## 2023-02-01 DIAGNOSIS — Z79.01 LONG TERM CURRENT USE OF ANTICOAGULANT THERAPY: ICD-10-CM

## 2023-02-01 DIAGNOSIS — I48.20 CHRONIC ATRIAL FIBRILLATION (H): ICD-10-CM

## 2023-02-01 LAB — INR BLD: 2.4 (ref 0.9–1.1)

## 2023-02-01 PROCEDURE — 85610 PROTHROMBIN TIME: CPT

## 2023-02-01 PROCEDURE — 36416 COLLJ CAPILLARY BLOOD SPEC: CPT

## 2023-02-01 NOTE — PROGRESS NOTES
ANTICOAGULATION MANAGEMENT     Alessio Teiexira 87 year old male is on warfarin with therapeutic INR result. (Goal INR 2.0-3.0)    Recent labs: (last 7 days)     02/01/23  1009   INR 2.4*       ASSESSMENT       Source(s): Chart Review and Patient/Caregiver Call       Warfarin doses taken: Warfarin taken as instructed    Diet: No new diet changes identified    New illness, injury, or hospitalization: No    Medication/supplement changes: None noted    Signs or symptoms of bleeding or clotting: No    Previous INR: Therapeutic last 2(+) visits    Additional findings: None       PLAN     Recommended plan for no diet, medication or health factor changes affecting INR     Dosing Instructions: Continue your current warfarin dose with next INR in 8 weeks       Summary  As of 2/1/2023    Full warfarin instructions:  8 mg every day   Next INR check:  3/29/2023             Telephone call with Devin who verbalizes understanding and agrees to plan    Lab visit scheduled    Education provided:     Please call back if any changes to your diet, medications or how you've been taking warfarin    Plan made per [facility] anticoagulation protocol    Ashkan Sales RN  Anticoagulation Clinic  2/1/2023    _______________________________________________________________________     Anticoagulation Episode Summary     Current INR goal:  2.0-3.0   TTR:  77.0 % (1 y)   Target end date:  Indefinite   Send INR reminders to:  [facility]    Indications    Chronic atrial fibrillation (H) [I48.20]  Long term current use of anticoagulant therapy [Z79.01]           Comments:           Anticoagulation Care Providers     Provider Role Specialty Phone number    Zac Salgado MD Referring Family Medicine [phone]    Jose Squires MD Referring Family Medicine [phone]

## 2023-02-17 DIAGNOSIS — I10 HYPERTENSION GOAL BP (BLOOD PRESSURE) < 140/90: ICD-10-CM

## 2023-02-17 RX ORDER — POTASSIUM CHLORIDE 1500 MG/1
TABLET, EXTENDED RELEASE ORAL
Qty: 90 TABLET | Refills: 0 | Status: SHIPPED | OUTPATIENT
Start: 2023-02-17 | End: 2023-05-30

## 2023-02-25 DIAGNOSIS — M10.9 GOUT OF BIG TOE: ICD-10-CM

## 2023-02-28 RX ORDER — ALLOPURINOL 300 MG/1
TABLET ORAL
Qty: 90 TABLET | Refills: 0 | Status: SHIPPED | OUTPATIENT
Start: 2023-02-28 | End: 2023-05-23

## 2023-03-29 ENCOUNTER — LAB (OUTPATIENT)
Dept: LAB | Facility: CLINIC | Age: 88
End: 2023-03-29
Payer: MEDICARE

## 2023-03-29 ENCOUNTER — ANTICOAGULATION THERAPY VISIT (OUTPATIENT)
Dept: ANTICOAGULATION | Facility: CLINIC | Age: 88
End: 2023-03-29

## 2023-03-29 DIAGNOSIS — I48.20 CHRONIC ATRIAL FIBRILLATION (H): ICD-10-CM

## 2023-03-29 DIAGNOSIS — Z79.01 LONG TERM CURRENT USE OF ANTICOAGULANT THERAPY: ICD-10-CM

## 2023-03-29 DIAGNOSIS — I48.20 CHRONIC ATRIAL FIBRILLATION (H): Primary | ICD-10-CM

## 2023-03-29 LAB — INR BLD: 2.1 (ref 0.9–1.1)

## 2023-03-29 PROCEDURE — 36416 COLLJ CAPILLARY BLOOD SPEC: CPT

## 2023-03-29 PROCEDURE — 85610 PROTHROMBIN TIME: CPT

## 2023-03-29 NOTE — PROGRESS NOTES
ANTICOAGULATION MANAGEMENT     Alessio Teixeira 87 year old male is on warfarin with therapeutic INR result. (Goal INR 2.0-3.0)    Recent labs: (last 7 days)     03/29/23  1045   INR 2.1*       ASSESSMENT     Warfarin Lab Questionnaire    Dose in Tablet or Mg 3/29/2023   TAB or MG? milligram (mg)     Pt Rptd Dose JONES MONDAY TUESDAY WEDNESDAY THURSDAY FRIDAY SATURDAY   3/29/2023 8 8 8 8 8 8 8     Warfarin Lab Questionnaire 3/29/2023   Missed doses? No   Medication changes? No   Abnormal bleeding? Yes   If yes, please explain: finger nd heel   Shortness of breath? No   Injuries or illness since last INR? No   Changes in diet or alcohol? No   Upcoming surgery, procedure? No   Best number to call with results? [phone]       Additional findings: None       PLAN     Recommended plan for no diet, medication or health factor changes affecting INR     Dosing Instructions: Continue your current warfarin dose with next INR in 6 weeks       Summary  As of 3/29/2023    Full warfarin instructions:  8 mg every day   Next INR check:  5/10/2023             Telephone call with Devin who verbalizes understanding and agrees to plan    Lab visit scheduled    Education provided:     Contact [phone]  with any changes, questions or concerns.     Plan made per [facility] anticoagulation protocol    Anna Schreiber RN  Anticoagulation Clinic  3/29/2023    _______________________________________________________________________     Anticoagulation Episode Summary     Current INR goal:  2.0-3.0   TTR:  78.5 % (1 y)   Target end date:  Indefinite   Send INR reminders to:  [facility]    Indications    Chronic atrial fibrillation (H) [I48.20]  Long term current use of anticoagulant therapy [Z79.01]           Comments:           Anticoagulation Care Providers     Provider Role Specialty Phone number    Zac Salgado MD Referring Family Medicine [phone]    Jose Squires MD Referring Family Medicine [phone]

## 2023-04-12 DIAGNOSIS — R63.5 WEIGHT GAIN: ICD-10-CM

## 2023-04-12 DIAGNOSIS — E78.5 HYPERLIPIDEMIA LDL GOAL <100: ICD-10-CM

## 2023-04-12 RX ORDER — LOVASTATIN 40 MG
TABLET ORAL
Qty: 180 TABLET | Refills: 0 | Status: SHIPPED | OUTPATIENT
Start: 2023-04-12 | End: 2023-08-02

## 2023-04-12 RX ORDER — FUROSEMIDE 20 MG
20 TABLET ORAL DAILY
Qty: 90 TABLET | Refills: 0 | Status: SHIPPED | OUTPATIENT
Start: 2023-04-12 | End: 2023-07-21

## 2023-05-10 ENCOUNTER — ANTICOAGULATION THERAPY VISIT (OUTPATIENT)
Dept: ANTICOAGULATION | Facility: CLINIC | Age: 88
End: 2023-05-10

## 2023-05-10 ENCOUNTER — LAB (OUTPATIENT)
Dept: LAB | Facility: CLINIC | Age: 88
End: 2023-05-10
Payer: MEDICARE

## 2023-05-10 DIAGNOSIS — I48.20 CHRONIC ATRIAL FIBRILLATION (H): Primary | ICD-10-CM

## 2023-05-10 DIAGNOSIS — Z79.01 LONG TERM CURRENT USE OF ANTICOAGULANT THERAPY: ICD-10-CM

## 2023-05-10 DIAGNOSIS — I48.0 PAROXYSMAL ATRIAL FIBRILLATION (H): ICD-10-CM

## 2023-05-10 DIAGNOSIS — Z86.711 HISTORY OF PULMONARY EMBOLISM: ICD-10-CM

## 2023-05-10 DIAGNOSIS — I48.20 CHRONIC ATRIAL FIBRILLATION (H): ICD-10-CM

## 2023-05-10 LAB — INR BLD: 2 (ref 0.9–1.1)

## 2023-05-10 PROCEDURE — 85610 PROTHROMBIN TIME: CPT

## 2023-05-10 PROCEDURE — 36416 COLLJ CAPILLARY BLOOD SPEC: CPT

## 2023-05-10 NOTE — PROGRESS NOTES
ANTICOAGULATION MANAGEMENT     Alessio Teixeira 87 year old male is on warfarin with therapeutic INR result. (Goal INR 2.0-3.0)    Recent labs: (last 7 days)     05/10/23  1034   INR 2.0*       ASSESSMENT     Warfarin Lab Questionnaire    Warfarin Doses Last 7 Days          5/10/2023   Warfarin Lab Questionnaire   Missed doses within past 14 days? No   Changes in diet or alcohol within past 14 days? No   Medication changes since last result? No   Injuries or illness since last result? No   New shortness of breath, severe headaches or sudden changes in vision since last result? No   Abnormal bleeding since last result? Yes   If yes, please explain: just from bumps or scratches   Upcoming surgery, procedure? No   Best number to call with results? [phone]        Previous result: Therapeutic last 2(+) visits  Additional findings: None       PLAN     Recommended plan for no diet, medication or health factor changes affecting INR     Dosing Instructions: Continue your current warfarin dose with next INR in 6 weeks       Summary  As of 5/10/2023    Full warfarin instructions:  8 mg every day   Next INR check:  6/21/2023             Telephone call with Devin who verbalizes understanding and agrees to plan    Lab visit scheduled    Education provided:     Please call back if any changes to your diet, medications or how you've been taking warfarin    Contact [phone]  with any changes, questions or concerns.     Plan made per [facility] anticoagulation protocol    Peggy Sullivan RN  Anticoagulation Clinic  5/10/2023    _______________________________________________________________________     Anticoagulation Episode Summary     Current INR goal:  2.0-3.0   TTR:  86.6 % (1 y)   Target end date:  Indefinite   Send INR reminders to:  [facility]    Indications    Chronic atrial fibrillation (H) [I48.20]  Long term current use of anticoagulant therapy [Z79.01]           Comments:           Anticoagulation Care  Providers     Provider Role Specialty Phone number    Zac Salgado MD Referring Family Medicine [phone]    Jose Squires MD Referring Family Medicine [phone]

## 2023-05-11 RX ORDER — WARFARIN SODIUM 4 MG/1
TABLET ORAL
Qty: 180 TABLET | Refills: 1 | Status: SHIPPED | OUTPATIENT
Start: 2023-05-11 | End: 2023-10-30

## 2023-05-11 NOTE — TELEPHONE ENCOUNTER
ANTICOAGULATION MANAGEMENT:  Medication Refill    Anticoagulation Summary  As of 5/10/2023    Warfarin maintenance plan:  8 mg (2 mg x 4) every day   Next INR check:  6/21/2023   Target end date:  Indefinite    Indications    Chronic atrial fibrillation (H) [I48.20]  Long term current use of anticoagulant therapy [Z79.01]             Anticoagulation Care Providers     Provider Role Specialty Phone number    Zac Salgado MD Referring Family Medicine [phone]    Jose Squires MD Referring Family Medicine [phone]          Visit with referring provider/group within last year: Yes    [facility] referral signed within last year: Yes    Alessio meets all criteria for refill (current [facility] referral, office visit with referring provider/group in last year, lab monitoring up to date or not exceeding 2 weeks overdue). Rx instructions and quantity supplied updated to match patient's current dosing plan. Warfarin 90 day supply with 1 refill granted per [facility] protocol     Ashkan Sales RN  Anticoagulation Clinic

## 2023-05-17 ENCOUNTER — TELEPHONE (OUTPATIENT)
Dept: FAMILY MEDICINE | Facility: CLINIC | Age: 88
End: 2023-05-17

## 2023-05-17 ENCOUNTER — ANCILLARY PROCEDURE (OUTPATIENT)
Dept: GENERAL RADIOLOGY | Facility: CLINIC | Age: 88
End: 2023-05-17
Attending: FAMILY MEDICINE
Payer: MEDICARE

## 2023-05-17 ENCOUNTER — OFFICE VISIT (OUTPATIENT)
Dept: FAMILY MEDICINE | Facility: CLINIC | Age: 88
End: 2023-05-17
Payer: MEDICARE

## 2023-05-17 VITALS
DIASTOLIC BLOOD PRESSURE: 72 MMHG | SYSTOLIC BLOOD PRESSURE: 120 MMHG | HEIGHT: 71 IN | TEMPERATURE: 97.4 F | BODY MASS INDEX: 30.8 KG/M2 | HEART RATE: 74 BPM | OXYGEN SATURATION: 97 % | WEIGHT: 220 LBS | RESPIRATION RATE: 18 BRPM

## 2023-05-17 DIAGNOSIS — L20.89 FLEXURAL ATOPIC DERMATITIS: ICD-10-CM

## 2023-05-17 DIAGNOSIS — N18.30 STAGE 3 CHRONIC KIDNEY DISEASE, UNSPECIFIED WHETHER STAGE 3A OR 3B CKD (H): ICD-10-CM

## 2023-05-17 DIAGNOSIS — L21.9 SEBORRHEIC DERMATITIS: ICD-10-CM

## 2023-05-17 DIAGNOSIS — I48.0 PAROXYSMAL ATRIAL FIBRILLATION (H): ICD-10-CM

## 2023-05-17 DIAGNOSIS — G25.81 RESTLESS LEGS SYNDROME (RLS): ICD-10-CM

## 2023-05-17 DIAGNOSIS — L20.9 XEROSIS DUE TO ATOPIC DERMATITIS: ICD-10-CM

## 2023-05-17 DIAGNOSIS — E61.1 IRON DEFICIENCY: ICD-10-CM

## 2023-05-17 DIAGNOSIS — J30.0 VASOMOTOR RHINITIS: ICD-10-CM

## 2023-05-17 DIAGNOSIS — M47.22 OSTEOARTHRITIS OF SPINE WITH RADICULOPATHY, CERVICAL REGION: ICD-10-CM

## 2023-05-17 DIAGNOSIS — E78.2 MIXED HYPERLIPIDEMIA: ICD-10-CM

## 2023-05-17 DIAGNOSIS — M43.12 SPONDYLOLISTHESIS OF CERVICAL REGION: ICD-10-CM

## 2023-05-17 DIAGNOSIS — M47.22 OSTEOARTHRITIS OF SPINE WITH RADICULOPATHY, CERVICAL REGION: Primary | ICD-10-CM

## 2023-05-17 DIAGNOSIS — E55.9 VITAMIN D DEFICIENCY: ICD-10-CM

## 2023-05-17 LAB
IRON BINDING CAPACITY (ROCHE): 213 UG/DL (ref 240–430)
IRON SATN MFR SERPL: 20 % (ref 15–46)
IRON SERPL-MCNC: 43 UG/DL (ref 61–157)

## 2023-05-17 PROCEDURE — 36415 COLL VENOUS BLD VENIPUNCTURE: CPT | Performed by: FAMILY MEDICINE

## 2023-05-17 PROCEDURE — 72040 X-RAY EXAM NECK SPINE 2-3 VW: CPT | Mod: TC | Performed by: RADIOLOGY

## 2023-05-17 PROCEDURE — 82728 ASSAY OF FERRITIN: CPT | Performed by: FAMILY MEDICINE

## 2023-05-17 PROCEDURE — 82306 VITAMIN D 25 HYDROXY: CPT | Performed by: FAMILY MEDICINE

## 2023-05-17 PROCEDURE — 99215 OFFICE O/P EST HI 40 MIN: CPT | Performed by: FAMILY MEDICINE

## 2023-05-17 PROCEDURE — 83540 ASSAY OF IRON: CPT | Performed by: FAMILY MEDICINE

## 2023-05-17 PROCEDURE — 83550 IRON BINDING TEST: CPT | Performed by: FAMILY MEDICINE

## 2023-05-17 RX ORDER — IPRATROPIUM BROMIDE 21 UG/1
2 SPRAY, METERED NASAL EVERY 12 HOURS
Qty: 30 ML | Refills: 0 | Status: SHIPPED | OUTPATIENT
Start: 2023-05-17 | End: 2023-08-18

## 2023-05-17 RX ORDER — FLUOCINOLONE ACETONIDE 0.1 MG/ML
SOLUTION TOPICAL 2 TIMES DAILY
Qty: 90 ML | Refills: 3 | Status: SHIPPED | OUTPATIENT
Start: 2023-05-17 | End: 2024-01-01

## 2023-05-17 ASSESSMENT — PAIN SCALES - GENERAL: PAINLEVEL: MODERATE PAIN (4)

## 2023-05-17 NOTE — TELEPHONE ENCOUNTER
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 We will have him to check MRI for further evaluation, the order is placed.  Please call him and let him know  thx

## 2023-05-17 NOTE — PROGRESS NOTES
Assessment & Plan     Stage 3 chronic kidney disease, unspecified whether stage 3a or 3b CKD (H)  Has been stable, keep monitoring     Mixed hyperlipidemia  Has been fluctuating, will review the lab and update pt     Osteoarthritis of spine with radiculopathy, cervical region  Has been worsening with radiculopathy and bilateral  LE weakness and muscle wasting,   xr showed multilevel C spine DJD with C2 spondylolisthesis, will have him to check MRI for further evaluation   - XR Cervical Spine 2/3 Views; Future  - MR Cervical Spine w/o Contrast; Future    Seborrheic dermatitis  Worsening on scalp for last 4 month, will have him to try topical synalar   - fluocinolone (SYNALAR) 0.01 % solution; Apply topically 2 times daily    Vasomotor rhinitis  Has continuous nasal drainage without sign of infection   Will have him to try atrovent nasal spray   - ipratropium (ATROVENT) 0.03 % nasal spray; Spray 2 sprays into both nostrils every 12 hours    Xerosis due to atopic dermatitis  Has change and abd with extremities with worsening during winter for last 3 months,  will have him to try moisturizer and topical steroid cream   - fluocinolone (SYNALAR) 0.01 % solution; Apply topically 2 times daily    Flexural atopic dermatitis  Worsening, will have him to try topical steroid as  Prescribed   - fluocinolone (SYNALAR) 0.01 % solution; Apply topically 2 times daily    Restless legs syndrome (RLS)  Worsening with pain at night, will have him to check lab for further evaluation   - Ferritin; Future  - Ferritin    Vitamin D deficiency  Worsening, not currently taking Vit D, will review the lab results and update pt   - Vitamin D Deficiency; Future  - Vitamin D Deficiency    Iron deficiency  Has been fluctuating, currently not on iron supplement,  Will have him to check iron level and will decide if he needs iron supplement   - Iron and iron binding capacity; Future  - Ferritin; Future  - Iron and iron binding capacity  -  "Ferritin    Paroxysmal atrial fibrillation (H)      Spondylolisthesis of cervical region  mentioned above, he has worsening radiculopathy and bilateral UE muscle wasting sign  - MR Cervical Spine w/o Contrast; Future           BMI:   Estimated body mass index is 31.12 kg/m  as calculated from the following:    Height as of this encounter: 1.791 m (5' 10.5\").    Weight as of this encounter: 99.8 kg (220 lb).   Weight management plan: Discussed healthy diet and exercise guidelines    FUTURE APPOINTMENTS:       - Follow-up visit in 2 months for CPE    Jose Squires MD  [facility] DANDY Beltran is a 87 year old, presenting for the following health issues:  Neck Pain and Back Pain         View : No data to display.              History of Present Illness       Back Pain:  He presents for follow up of back pain. Patient's back pain is a recurring problem.  Location of back pain:  Left side of neck  Description of back pain: sharp and shooting  Back pain spreads: nowhere    Since patient first noticed back pain, pain is: always present, but gets better and worse  Does back pain interfere with his job:  Not applicable      He eats 2-3 servings of fruits and vegetables daily.He consumes 0 sweetened beverage(s) daily.He exercises with enough effort to increase his heart rate 9 or less minutes per day.  He exercises with enough effort to increase his heart rate 3 or less days per week.   He is taking medications regularly.         Review of Systems   Constitutional, HEENT, cardiovascular, pulmonary, GI, , musculoskeletal, neuro, skin, endocrine and psych systems are negative, except as otherwise noted.      Objective    /72   Pulse 74   Temp 97.4  F (36.3  C) (Temporal)   Resp 18   Ht 1.791 m (5' 10.5\")   Wt 99.8 kg (220 lb)   SpO2 97%   BMI 31.12 kg/m    Body mass index is 31.12 kg/m .  Physical Exam   GENERAL: healthy, alert and no distress  EYES: Eyes grossly normal to " inspection, PERRL and conjunctivae and sclerae normal  HENT: ear canals and TM's normal, nose and mouth without ulcers or lesions  NECK: no adenopathy, no asymmetry, masses, or scars and thyroid normal to palpation  RESP: lungs clear to auscultation - no rales, rhonchi or wheezes  CV: regular rate and rhythm, normal S1 S2, no S3 or S4, no murmur, click or rub, no peripheral edema and peripheral pulses strong  ABDOMEN: soft, nontender, no hepatosplenomegaly, no masses and bowel sounds normal  MS: bilateral UE weakness (2/5), FROM  SKIN: no suspicious lesions or rashes  NEURO: Normal strength and tone, mentation intact and speech normal  BACK: no CVA tenderness, no paralumbar tenderness  PSYCH: mentation appears normal, affect normal/bright  LYMPH: no cervical, supraclavicular, axillary, or inguinal adenopathy

## 2023-05-17 NOTE — TELEPHONE ENCOUNTER
S/w pt and updated him on PCP result note below.    Abbey ARMENDARIZ RN  [facility] Triage Team

## 2023-05-18 LAB
DEPRECATED CALCIDIOL+CALCIFEROL SERPL-MC: 49 UG/L (ref 20–75)
FERRITIN SERPL-MCNC: 93 NG/ML (ref 31–409)

## 2023-05-23 DIAGNOSIS — M10.9 GOUT OF BIG TOE: ICD-10-CM

## 2023-05-23 RX ORDER — ALLOPURINOL 300 MG/1
TABLET ORAL
Qty: 90 TABLET | Refills: 0 | Status: SHIPPED | OUTPATIENT
Start: 2023-05-23 | End: 2023-08-07

## 2023-05-25 ENCOUNTER — ANCILLARY PROCEDURE (OUTPATIENT)
Dept: MRI IMAGING | Facility: CLINIC | Age: 88
End: 2023-05-25
Attending: FAMILY MEDICINE
Payer: MEDICARE

## 2023-05-25 DIAGNOSIS — M43.12 SPONDYLOLISTHESIS OF CERVICAL REGION: ICD-10-CM

## 2023-05-25 DIAGNOSIS — M47.22 OSTEOARTHRITIS OF SPINE WITH RADICULOPATHY, CERVICAL REGION: ICD-10-CM

## 2023-05-25 PROCEDURE — 72141 MRI NECK SPINE W/O DYE: CPT | Mod: MF

## 2023-05-30 DIAGNOSIS — I10 HYPERTENSION GOAL BP (BLOOD PRESSURE) < 140/90: ICD-10-CM

## 2023-05-30 RX ORDER — POTASSIUM CHLORIDE 1500 MG/1
TABLET, EXTENDED RELEASE ORAL
Qty: 90 TABLET | Refills: 0 | Status: SHIPPED | OUTPATIENT
Start: 2023-05-30 | End: 2023-08-31

## 2023-06-01 ENCOUNTER — HEALTH MAINTENANCE LETTER (OUTPATIENT)
Age: 88
End: 2023-06-01

## 2023-06-21 ENCOUNTER — LAB (OUTPATIENT)
Dept: LAB | Facility: CLINIC | Age: 88
End: 2023-06-21
Payer: MEDICARE

## 2023-06-21 ENCOUNTER — ANTICOAGULATION THERAPY VISIT (OUTPATIENT)
Dept: ANTICOAGULATION | Facility: CLINIC | Age: 88
End: 2023-06-21

## 2023-06-21 DIAGNOSIS — I48.20 CHRONIC ATRIAL FIBRILLATION (H): ICD-10-CM

## 2023-06-21 DIAGNOSIS — Z79.01 LONG TERM CURRENT USE OF ANTICOAGULANT THERAPY: ICD-10-CM

## 2023-06-21 DIAGNOSIS — I48.20 CHRONIC ATRIAL FIBRILLATION (H): Primary | ICD-10-CM

## 2023-06-21 LAB — INR BLD: 1.6 (ref 0.9–1.1)

## 2023-06-21 PROCEDURE — 36416 COLLJ CAPILLARY BLOOD SPEC: CPT

## 2023-06-21 PROCEDURE — 85610 PROTHROMBIN TIME: CPT

## 2023-06-21 NOTE — PROGRESS NOTES
ANTICOAGULATION MANAGEMENT     Alessio Teixeira 87 year old male is on warfarin with subtherapeutic INR result. (Goal INR 2.0-3.0)    Recent labs: (last 7 days)     06/21/23  1059   INR 1.6*       ASSESSMENT     Warfarin Lab Questionnaire    Warfarin Doses Last 7 Days          6/21/2023   Warfarin Lab Questionnaire   Missed doses within past 14 days? No   Changes in diet or alcohol within past 14 days? No   Medication changes since last result? No   Injuries or illness since last result? No   New shortness of breath, severe headaches or sudden changes in vision since last result? No   Abnormal bleeding since last result? No   Upcoming surgery, procedure? No   Best number to call with results? [phone]     Previous result: Therapeutic last 2(+) visits  Additional findings: Had a couple of big salads the last couple days       PLAN     Recommended plan for temporary change(s) affecting INR     Dosing Instructions: booster dose then continue your current warfarin dose with next INR in 2 weeks       Summary  As of 6/21/2023    Full warfarin instructions:  6/21: 12 mg; Otherwise 8 mg every day   Next INR check:  7/5/2023             Telephone call with Devin who verbalizes understanding and agrees to plan    Lab visit scheduled    Education provided:     Please call back if any changes to your diet, medications or how you've been taking warfarin    Plan made per [facility] anticoagulation protocol    Ashkan Sales RN  Anticoagulation Clinic  6/21/2023    _______________________________________________________________________     Anticoagulation Episode Summary     Current INR goal:  2.0-3.0   TTR:  84.3 % (1 y)   Target end date:  Indefinite   Send INR reminders to:  [facility]LE[facility]    Indications    Chronic atrial fibrillation (H) [I48.20]  Long term current use of anticoagulant therapy [Z79.01]           Comments:           Anticoagulation Care Providers     Provider Role Specialty Phone number    Zac Salgado  MD [facility] Referring Family Medicine [phone]    Jose Squires MD Referring Family Medicine [phone]

## 2023-07-05 ENCOUNTER — ANTICOAGULATION THERAPY VISIT (OUTPATIENT)
Dept: ANTICOAGULATION | Facility: CLINIC | Age: 88
End: 2023-07-05

## 2023-07-05 ENCOUNTER — LAB (OUTPATIENT)
Dept: LAB | Facility: CLINIC | Age: 88
End: 2023-07-05
Payer: MEDICARE

## 2023-07-05 DIAGNOSIS — I48.20 CHRONIC ATRIAL FIBRILLATION (H): Primary | ICD-10-CM

## 2023-07-05 DIAGNOSIS — Z79.01 LONG TERM CURRENT USE OF ANTICOAGULANT THERAPY: ICD-10-CM

## 2023-07-05 DIAGNOSIS — I48.20 CHRONIC ATRIAL FIBRILLATION (H): ICD-10-CM

## 2023-07-05 LAB — INR BLD: 1.6 (ref 0.9–1.1)

## 2023-07-05 PROCEDURE — 36416 COLLJ CAPILLARY BLOOD SPEC: CPT

## 2023-07-05 PROCEDURE — 85610 PROTHROMBIN TIME: CPT

## 2023-07-05 NOTE — PROGRESS NOTES
ANTICOAGULATION MANAGEMENT     Alessio Teixeira 87 year old male is on warfarin with subtherapeutic INR result. (Goal INR 2.0-3.0)    Recent labs: (last 7 days)     07/05/23  1125   INR 1.6*       ASSESSMENT     Warfarin Lab Questionnaire    Warfarin Doses Last 7 Days      7/5/2023    11:21 AM   Dose in Tablet or Mg   TAB or MG? tablet (tab)           7/5/2023   Warfarin Lab Questionnaire   Missed doses within past 14 days? No   Changes in diet or alcohol within past 14 days? No   Medication changes since last result? No   Injuries or illness since last result? No   New shortness of breath, severe headaches or sudden changes in vision since last result? No   Abnormal bleeding since last result? No   Upcoming surgery, procedure? No   Best number to call with results? [phone]     Previous result: Subtherapeutic  Additional findings: None       PLAN     Recommended plan for ongoing change(s) affecting INR     Dosing Instructions: Increase your warfarin dose (10.7% change) with next INR in 2 weeks       Summary  As of 7/5/2023    Full warfarin instructions:  10 mg every Mon, Wed, Fri; 8 mg all other days   Next INR check:  7/19/2023             Telephone call with Devin who verbalizes understanding and agrees to plan    Lab visit scheduled    Education provided:     Please call back if any changes to your diet, medications or how you've been taking warfarin    Plan made per [facility] anticoagulation protocol    Ashkan Sales RN  Anticoagulation Clinic  7/5/2023    _______________________________________________________________________     Anticoagulation Episode Summary     Current INR goal:  2.0-3.0   TTR:  84.3 % (1 y)   Target end date:  Indefinite   Send INR reminders to:  [facility]    Indications    Chronic atrial fibrillation (H) [I48.20]  Long term current use of anticoagulant therapy [Z79.01]           Comments:           Anticoagulation Care Providers     Provider Role Specialty Phone number     Jose Squires MD [facility] Medicine [phone]

## 2023-07-17 ENCOUNTER — DOCUMENTATION ONLY (OUTPATIENT)
Dept: ANTICOAGULATION | Facility: CLINIC | Age: 88
End: 2023-07-17
Payer: MEDICARE

## 2023-07-17 DIAGNOSIS — I48.20 CHRONIC ATRIAL FIBRILLATION (H): Primary | ICD-10-CM

## 2023-07-17 DIAGNOSIS — Z79.01 LONG TERM CURRENT USE OF ANTICOAGULANT THERAPY: ICD-10-CM

## 2023-07-17 DIAGNOSIS — Z86.711 HISTORY OF PULMONARY EMBOLISM: ICD-10-CM

## 2023-07-17 NOTE — PROGRESS NOTES
ANTICOAGULATION CLINIC REFERRAL RENEWAL REQUEST       An annual renewal order is required for all patients referred to [facility].?  Please review and sign the pended referral order for Alessio Teixeira.       ANTICOAGULATION SUMMARY      Warfarin indication(s)   Atrial Fibrillation and PE    Mechanical heart valve present?  NO       Current goal range   INR: 2.0-3.0     Goal appropriate for indication? Goal INR 2-3, standard for indication(s) above     Time in Therapeutic Range (TTR)  (Goal > 60%) 84.3%       Office visit with referring provider's group within last year yes on 5/1/23       Peggy Sullivan RN  [facility]

## 2023-07-19 ENCOUNTER — ANTICOAGULATION THERAPY VISIT (OUTPATIENT)
Dept: ANTICOAGULATION | Facility: CLINIC | Age: 88
End: 2023-07-19

## 2023-07-19 ENCOUNTER — LAB (OUTPATIENT)
Dept: LAB | Facility: CLINIC | Age: 88
End: 2023-07-19
Payer: MEDICARE

## 2023-07-19 DIAGNOSIS — I48.20 CHRONIC ATRIAL FIBRILLATION (H): Primary | ICD-10-CM

## 2023-07-19 DIAGNOSIS — Z79.01 LONG TERM CURRENT USE OF ANTICOAGULANT THERAPY: ICD-10-CM

## 2023-07-19 DIAGNOSIS — Z86.711 HISTORY OF PULMONARY EMBOLISM: ICD-10-CM

## 2023-07-19 DIAGNOSIS — I48.20 CHRONIC ATRIAL FIBRILLATION (H): ICD-10-CM

## 2023-07-19 LAB — INR BLD: 1.9 (ref 0.9–1.1)

## 2023-07-19 PROCEDURE — 36416 COLLJ CAPILLARY BLOOD SPEC: CPT

## 2023-07-19 PROCEDURE — 85610 PROTHROMBIN TIME: CPT

## 2023-07-19 NOTE — PROGRESS NOTES
ANTICOAGULATION MANAGEMENT     Alessio Teixeira 87 year old male is on warfarin with subtherapeutic INR result. (Goal INR 2.0-3.0)    Recent labs: (last 7 days)     07/19/23  0957   INR 1.9*       ASSESSMENT     Source(s): Chart Review and Patient/Caregiver Call     Warfarin doses taken: Warfarin taken as instructed  Diet: Increased greens/vitamin K in diet; ongoing change  Medication/supplement changes: None noted  New illness, injury, or hospitalization: No  Signs or symptoms of bleeding or clotting: No  Previous result: Subtherapeutic  Additional findings: None       PLAN     Recommended plan for ongoing change(s) affecting INR     Dosing Instructions: Increase your warfarin dose (3.2% change) with next INR in 1-2 weeks       Summary  As of 7/19/2023      Full warfarin instructions:  8 mg every Sun, Tue, Thu; 10 mg all other days   Next INR check:  8/2/2023               Telephone call with Devin who verbalizes understanding and agrees to plan    Check at provider office visit 8/7    Education provided:   Contact [phone] with any changes, questions or concerns.     Plan made per [facility] anticoagulation protocol    Anna Schreiber RN  Anticoagulation Clinic  7/19/2023    _______________________________________________________________________     Anticoagulation Episode Summary       Current INR goal:  2.0-3.0   TTR:  80.9 % (1 y)   Target end date:  Indefinite   Send INR reminders to:  [facility]    Indications    Chronic atrial fibrillation (H) [I48.20]  Long term current use of anticoagulant therapy [Z79.01]  History of pulmonary embolism [Z86.711]             Comments:               Anticoagulation Care Providers       Provider Role Specialty Phone number    Jose Squires MD Referring Family Medicine [phone]

## 2023-07-20 DIAGNOSIS — R63.5 WEIGHT GAIN: ICD-10-CM

## 2023-07-21 RX ORDER — FUROSEMIDE 20 MG
TABLET ORAL
Qty: 90 TABLET | Refills: 0 | Status: SHIPPED | OUTPATIENT
Start: 2023-07-21 | End: 2023-10-30

## 2023-08-02 DIAGNOSIS — E78.5 HYPERLIPIDEMIA LDL GOAL <100: ICD-10-CM

## 2023-08-02 RX ORDER — LOVASTATIN 40 MG
TABLET ORAL
Qty: 180 TABLET | Refills: 0 | Status: SHIPPED | OUTPATIENT
Start: 2023-08-02 | End: 2023-10-30

## 2023-08-02 NOTE — TELEPHONE ENCOUNTER
Covering for Squires    Has upcoming follow up appointment with PCP scheduled 8/7/23  90 day refill sent    Argelia Duarte PA-C on 8/2/2023 at 6:41 PM

## 2023-08-07 ENCOUNTER — OFFICE VISIT (OUTPATIENT)
Dept: FAMILY MEDICINE | Facility: CLINIC | Age: 88
End: 2023-08-07
Payer: MEDICARE

## 2023-08-07 ENCOUNTER — ANTICOAGULATION THERAPY VISIT (OUTPATIENT)
Dept: ANTICOAGULATION | Facility: CLINIC | Age: 88
End: 2023-08-07

## 2023-08-07 VITALS
SYSTOLIC BLOOD PRESSURE: 132 MMHG | DIASTOLIC BLOOD PRESSURE: 66 MMHG | WEIGHT: 217.38 LBS | RESPIRATION RATE: 16 BRPM | OXYGEN SATURATION: 99 % | TEMPERATURE: 97 F | BODY MASS INDEX: 30.43 KG/M2 | HEIGHT: 71 IN | HEART RATE: 56 BPM

## 2023-08-07 DIAGNOSIS — Z86.711 HISTORY OF PULMONARY EMBOLISM: ICD-10-CM

## 2023-08-07 DIAGNOSIS — I48.20 CHRONIC ATRIAL FIBRILLATION (H): ICD-10-CM

## 2023-08-07 DIAGNOSIS — I48.20 CHRONIC ATRIAL FIBRILLATION (H): Primary | ICD-10-CM

## 2023-08-07 DIAGNOSIS — Z12.5 SCREENING FOR PROSTATE CANCER: ICD-10-CM

## 2023-08-07 DIAGNOSIS — E78.2 MIXED HYPERLIPIDEMIA: ICD-10-CM

## 2023-08-07 DIAGNOSIS — N18.30 STAGE 3 CHRONIC KIDNEY DISEASE, UNSPECIFIED WHETHER STAGE 3A OR 3B CKD (H): ICD-10-CM

## 2023-08-07 DIAGNOSIS — Z00.01 ENCOUNTER FOR GENERAL ADULT MEDICAL EXAMINATION WITH ABNORMAL FINDINGS: Primary | ICD-10-CM

## 2023-08-07 DIAGNOSIS — Z79.01 LONG TERM CURRENT USE OF ANTICOAGULANT THERAPY: ICD-10-CM

## 2023-08-07 DIAGNOSIS — M10.9 GOUT OF BIG TOE: ICD-10-CM

## 2023-08-07 LAB
ALT SERPL W P-5'-P-CCNC: 20 U/L (ref 0–70)
ANION GAP SERPL CALCULATED.3IONS-SCNC: 12 MMOL/L (ref 7–15)
BUN SERPL-MCNC: 27.5 MG/DL (ref 8–23)
CALCIUM SERPL-MCNC: 9.4 MG/DL (ref 8.8–10.2)
CHLORIDE SERPL-SCNC: 98 MMOL/L (ref 98–107)
CHOLEST SERPL-MCNC: 109 MG/DL
CREAT SERPL-MCNC: 1.25 MG/DL (ref 0.67–1.17)
CREAT UR-MCNC: 72.7 MG/DL
DEPRECATED HCO3 PLAS-SCNC: 24 MMOL/L (ref 22–29)
GFR SERPL CREATININE-BSD FRML MDRD: 56 ML/MIN/1.73M2
GLUCOSE SERPL-MCNC: 81 MG/DL (ref 70–99)
HDLC SERPL-MCNC: 29 MG/DL
HGB BLD-MCNC: 12.3 G/DL (ref 13.3–17.7)
INR PPP: 2.9 (ref 0.85–1.15)
LDLC SERPL CALC-MCNC: 52 MG/DL
MICROALBUMIN UR-MCNC: 137 MG/L
MICROALBUMIN/CREAT UR: 188.45 MG/G CR (ref 0–17)
NONHDLC SERPL-MCNC: 80 MG/DL
POTASSIUM SERPL-SCNC: 5 MMOL/L (ref 3.4–5.3)
PSA SERPL DL<=0.01 NG/ML-MCNC: 2.79 NG/ML
SODIUM SERPL-SCNC: 134 MMOL/L (ref 136–145)
TRIGL SERPL-MCNC: 140 MG/DL
URATE SERPL-MCNC: 5.9 MG/DL (ref 3.4–7)

## 2023-08-07 PROCEDURE — G0103 PSA SCREENING: HCPCS | Performed by: FAMILY MEDICINE

## 2023-08-07 PROCEDURE — 82570 ASSAY OF URINE CREATININE: CPT | Performed by: FAMILY MEDICINE

## 2023-08-07 PROCEDURE — 84550 ASSAY OF BLOOD/URIC ACID: CPT | Performed by: FAMILY MEDICINE

## 2023-08-07 PROCEDURE — 85018 HEMOGLOBIN: CPT | Performed by: FAMILY MEDICINE

## 2023-08-07 PROCEDURE — 84460 ALANINE AMINO (ALT) (SGPT): CPT | Performed by: FAMILY MEDICINE

## 2023-08-07 PROCEDURE — G0439 PPPS, SUBSEQ VISIT: HCPCS | Performed by: FAMILY MEDICINE

## 2023-08-07 PROCEDURE — 36415 COLL VENOUS BLD VENIPUNCTURE: CPT | Performed by: FAMILY MEDICINE

## 2023-08-07 PROCEDURE — 80061 LIPID PANEL: CPT | Performed by: FAMILY MEDICINE

## 2023-08-07 PROCEDURE — 80048 BASIC METABOLIC PNL TOTAL CA: CPT | Performed by: FAMILY MEDICINE

## 2023-08-07 PROCEDURE — 82043 UR ALBUMIN QUANTITATIVE: CPT | Performed by: FAMILY MEDICINE

## 2023-08-07 PROCEDURE — 85610 PROTHROMBIN TIME: CPT | Performed by: FAMILY MEDICINE

## 2023-08-07 RX ORDER — ALLOPURINOL 300 MG/1
1 TABLET ORAL EVERY MORNING
Qty: 90 TABLET | Refills: 3 | Status: SHIPPED | OUTPATIENT
Start: 2023-08-07 | End: 2024-01-01

## 2023-08-07 ASSESSMENT — PAIN SCALES - GENERAL: PAINLEVEL: SEVERE PAIN (6)

## 2023-08-07 NOTE — PATIENT INSTRUCTIONS
Patient Education   Personalized Prevention Plan  You are due for the preventive services outlined below.  Your care team is available to assist you in scheduling these services.  If you have already completed any of these items, please share that information with your care team to update in your medical record.  Health Maintenance Due   Topic Date Due     COVID-19 Vaccine (4 - Pfizer series) 01/19/2022     Zoster (Shingles) Vaccine (2 of 2) 05/02/2022     Basic Metabolic Panel  04/12/2023     Cholesterol Lab  04/12/2023     Kidney Microalbumin Urine Test  04/12/2023     Hemoglobin  04/12/2023

## 2023-08-07 NOTE — PROGRESS NOTES
ANTICOAGULATION MANAGEMENT     Alessio Teixeira 87 year old male is on warfarin with therapeutic INR result. (Goal INR 2.0-3.0)    Recent labs: (last 7 days)     08/07/23  0956   INR 2.90*       ASSESSMENT     Source(s): Chart Review and Patient/Caregiver Call     Warfarin doses taken: Warfarin taken as instructed  Diet: No new diet changes identified  maybe less greens  and a couple of extra shots of alcohol  Medication/supplement changes: None noted  New illness, injury, or hospitalization: No  Signs or symptoms of bleeding or clotting: Yes: injury on hand and some bleeding. Stopped with presure  Previous result: Subtherapeutic  Additional findings: None       PLAN     Recommended plan for no diet, medication or health factor changes affecting INR     Dosing Instructions: Continue your current warfarin dose with next INR in 4 weeks       Summary  As of 8/7/2023      Full warfarin instructions:  8 mg every Sun, Tue, Thu; 10 mg all other days   Next INR check:  9/4/2023               Telephone call with Devin who verbalizes understanding and agrees to plan    Lab visit scheduled    Education provided:   None required    Plan made per [facility] anticoagulation protocol    Kerrie Morgan RN  Anticoagulation Clinic  8/7/2023    _______________________________________________________________________     Anticoagulation Episode Summary       Current INR goal:  2.0-3.0   TTR:  80.3 % (1 y)   Target end date:  Indefinite   Send INR reminders to:  [facility]    Indications    Chronic atrial fibrillation (H) [I48.20]  Long term current use of anticoagulant therapy [Z79.01]  History of pulmonary embolism [Z86.711]             Comments:               Anticoagulation Care Providers       Provider Role Specialty Phone number    Jose Squires MD Referring Family Medicine [phone]

## 2023-08-07 NOTE — PROGRESS NOTES
SUBJECTIVE:   Devin is a 87 year old who presents for Preventive Visit.       No data to display                Are you in the first 12 months of your Medicare coverage?  No    HPI      Have you ever done Advance Care Planning? (For example, a Health Directive, POLST, or a discussion with a medical provider or your loved ones about your wishes): No, advance care planning information given to patient to review.  Patient declined advance care planning discussion at this time.       Fall risk       Cognitive Screening   1) Repeat 3 items (Leader, Season, Table)    2) Clock draw: NORMAL  3) 3 item recall: Recalls 2 objects   Results: NORMAL clock, 1-2 items recalled: COGNITIVE IMPAIRMENT LESS LIKELY    Mini-CogTM Copyright S Lv. Licensed by the author for use in [facility]; reprinted with permission (swati@[facility]). All rights reserved.      Do you have sleep apnea, excessive snoring or daytime drowsiness? : Does not sleep well at night.  Maybe 2-3 hours.    Reviewed and updated as needed this visit by clinical staff   Tobacco  Allergies  Meds              Reviewed and updated as needed this visit by Provider                 Social History     Tobacco Use    Smoking status: Former     Packs/day: 1.00     Years: 3.00     Pack years: 3.00     Types: Cigarettes     Start date:      Quit date: 1963     Years since quittin.6    Smokeless tobacco: Former   Substance Use Topics    Alcohol use: Yes     Comment: 2-4 glasses of wine per week             2022     9:28 AM   Alcohol Use   Prescreen: >3 drinks/day or >7 drinks/week? No          No data to display              Do you have a current opioid prescription? No  Do you use any other controlled substances or medications that are not prescribed by a provider? None        Current providers sharing in care for this patient include:   Patient Care Team:  Jose Squires MD as PCP - General (Family Medicine)  Jose Squires MD as Assigned PCP    The  following health maintenance items are reviewed in Epic and correct as of today:  Health Maintenance   Topic Date Due    COVID-19 Vaccine (4 - Pfizer series) 01/19/2022    ZOSTER IMMUNIZATION (2 of 2) 05/02/2022    MEDICARE ANNUAL WELLNESS VISIT  04/12/2023    BMP  04/12/2023    LIPID  04/12/2023    MICROALBUMIN  04/12/2023    HEMOGLOBIN  04/12/2023    INFLUENZA VACCINE (1) 09/01/2023    ANNUAL REVIEW OF HM ORDERS  05/17/2024    FALL RISK ASSESSMENT  05/17/2024    ADVANCE CARE PLANNING  04/12/2027    DTAP/TDAP/TD IMMUNIZATION (4 - Td or Tdap) 11/25/2029    PHQ-2 (once per calendar year)  Completed    Pneumococcal Vaccine: 65+ Years  Completed    URINALYSIS  Completed    IPV IMMUNIZATION  Aged Out    MENINGITIS IMMUNIZATION  Aged Out    URINE DRUG SCREEN  Discontinued     BP Readings from Last 3 Encounters:   08/07/23 132/66   05/17/23 120/72   04/12/22 128/68    Wt Readings from Last 3 Encounters:   08/07/23 98.6 kg (217 lb 6 oz)   05/17/23 99.8 kg (220 lb)   04/12/22 101.6 kg (224 lb)                  Patient Active Problem List   Diagnosis    Hyperlipidemia LDL goal <100    Edema of both legs    Varicose veins of legs    Stasis dermatitis    Chronic atrial fibrillation (H)    Vitamin D deficiency disease    Hypertension goal BP (blood pressure) < 140/90    Diplopia    MARTHA (obstructive sleep apnea)    Long term current use of anticoagulant therapy    Bradycardia    Chronic left-sided thoracic back pain    Presbycusis of both ears    Mixed hyperlipidemia    Screening for prostate cancer    CKD (chronic kidney disease) stage 2, GFR 60-89 ml/min    Pulmonary nodules    Debility    Gout of big toe    Routine medical exam    Chronic kidney disease, stage 3 (H)    History of pulmonary embolism     Past Surgical History:   Procedure Laterality Date    APPENDECTOMY      ARTHROPLASTY HIP Right 2016    ARTHROPLASTY HIP ANTERIOR Right 4/26/2016    Procedure: ARTHROPLASTY HIP ANTERIOR;  Surgeon: Miguel Johnson MD;   Location:  OR    ARTHROPLASTY KNEE Right 2019    Procedure: RIGHT TOTAL KNEE ARTROPLASTY;  Surgeon: Miguel Johnson MD;  Location:  OR    BRAIN SURGERY      partial resection meningioma    CATARACT IOL, RT/LT      COLECTOMY      bowel perforation due to steroids    ESOPHAGOSCOPY, GASTROSCOPY, DUODENOSCOPY (EGD), COMBINED N/A 2018    Procedure: COMBINED ESOPHAGOSCOPY, GASTROSCOPY, DUODENOSCOPY (EGD), BIOPSY SINGLE OR MULTIPLE;  Surgeon: Elizabeth Jones MD;  Location:  GI    ESOPHAGOSCOPY, GASTROSCOPY, DUODENOSCOPY (EGD), COMBINED N/A 5/10/2019    Procedure: ESOPHAGOGASTRODUODENOSCOPY (EGD);  Surgeon: Ahsan Garcia MD;  Location:  GI    GENITOURINARY SURGERY      vasectomy    HEAD & NECK SURGERY      meningioma removed    KNEE SURGERY  2010    left knee replacement    ORTHOPEDIC SURGERY      left TKR    PHACOEMULSIFICATION CLEAR CORNEA WITH TORIC INTRAOCULAR LENS IMPLANT  2012    Procedure: PHACOEMULSIFICATION CLEAR CORNEA WITH TORIC INTRAOCULAR LENS IMPLANT;  COMPLEX RIGHT PHACOEMULSIFICATION CLEAR CORNEA WITH TORIC INTRAOCULAR LENS IMPLANT WITH MALYUGIN RING;  Surgeon: Ronald Cortez MD;  Location:  [facility]    RECTAL SURGERY      ZZ RAD RESEC TONSIL/PILLARS         Social History     Tobacco Use    Smoking status: Former     Packs/day: 1.00     Years: 3.00     Pack years: 3.00     Types: Cigarettes     Start date:      Quit date: 1963     Years since quittin.6    Smokeless tobacco: Former   Substance Use Topics    Alcohol use: Yes     Comment: 2-4 glasses of wine per week     Family History   Problem Relation Age of Onset    Glaucoma Father     Unknown/Adopted Mother     Diabetes Brother     Leukemia Brother     Macular Degeneration No family hx of          Current Outpatient Medications   Medication Sig Dispense Refill    allopurinol (ZYLOPRIM) 300 MG tablet Take 1 tablet (300 mg) by mouth every morning 90 tablet 3    CALCIUM PO Take 600 mg  "by mouth 2 times daily       diclofenac (VOLTAREN) 1 % topical gel Apply 1 g topically in the morning and 1 g in the evening. 150 g 3    Esomeprazole Magnesium (NEXIUM PO) Take by mouth every morning (before breakfast)      fluocinolone (SYNALAR) 0.01 % solution Apply topically 2 times daily 90 mL 3    furosemide (LASIX) 20 MG tablet TAKE ONE TABLET BY MOUTH IN THE MORNING 90 tablet 0    ipratropium (ATROVENT) 0.03 % nasal spray Spray 2 sprays into both nostrils every 12 hours 30 mL 0    KLOR-CON 20 MEQ CR tablet TAKE ONE TABLET BY MOUTH ONE TIME DAILY 90 tablet 0    lovastatin (MEVACOR) 40 MG tablet TAKE TWO TABLETS BY MOUTH DAILY AT BEDTIME 180 tablet 0    multivitamin, therapeutic with minerals (THERA-VIT-M) TABS Take 1 tablet by mouth daily      ORDER FOR DME CPAP supplies with variable pressure control 1 Device 0    Vitamin D, Cholecalciferol, 1000 units TABS Take 1,000 Units by mouth in the morning.      warfarin ANTICOAGULANT (COUMADIN) 2 MG tablet 8 MG DAILY OR AS DIRECTED BY THE INR CLINIC 360 tablet 1    warfarin ANTICOAGULANT (COUMADIN) 4 MG tablet Current dose: TAKE 2 TABLETS (8MG) BY MOUTH ONCE DAILY OR AS DIRECTED BY [facility] TEAM. 180 tablet 1     No Known Allergies  Recent Labs   Lab Test 04/12/22  1009 03/04/21  1028 08/21/20  1059 11/25/19  1045   LDL 61 75  --  61   HDL 29* 35*  --  28*   TRIG 168* 120  --  170*   ALT 34 27 22 25   CR 1.36* 1.24 1.14 1.07   GFRESTIMATED 51* 53* 59* 64   GFRESTBLACK  --  61 68 74   POTASSIUM 4.3 4.4 4.0 4.3              Review of Systems  Constitutional, HEENT, cardiovascular, pulmonary, gi and gu systems are negative, except as otherwise noted.    OBJECTIVE:   /66   Pulse 56   Temp 97  F (36.1  C) (Temporal)   Resp 16   Ht 1.791 m (5' 10.5\")   Wt 98.6 kg (217 lb 6 oz)   SpO2 99%   BMI 30.75 kg/m   Estimated body mass index is 30.75 kg/m  as calculated from the following:    Height as of this encounter: 1.791 m (5' 10.5\").    Weight as of this encounter: " 98.6 kg (217 lb 6 oz).  Physical Exam  GENERAL: healthy, alert and no distress  EYES: Eyes grossly normal to inspection, PERRL and conjunctivae and sclerae normal  HENT: ear canals and TM's normal, nose and mouth without ulcers or lesions  NECK: no adenopathy, no asymmetry, masses, or scars and thyroid normal to palpation  RESP: lungs clear to auscultation - no rales, rhonchi or wheezes  CV: regular rate and rhythm, normal S1 S2, no S3 or S4, no murmur, click or rub, no peripheral edema and peripheral pulses strong  ABDOMEN: soft, nontender, no hepatosplenomegaly, no masses and bowel sounds normal  MS: no gross musculoskeletal defects noted, no edema  SKIN: no suspicious lesions or rashes  NEURO: Normal strength and tone, mentation intact and speech normal  BACK: no CVA tenderness, no paralumbar tenderness        ASSESSMENT / PLAN:       ICD-10-CM    1. Encounter for general adult medical examination with abnormal findings  Z00.01 BASIC METABOLIC PANEL     Lipid panel reflex to direct LDL Non-fasting     Albumin Random Urine Quantitative with Creat Ratio     Hemoglobin     ALT     PSA, screen     Uric acid      2. Stage 3 chronic kidney disease, unspecified whether stage 3a or 3b CKD (H)  N18.30 BASIC METABOLIC PANEL     Albumin Random Urine Quantitative with Creat Ratio     Hemoglobin      3. Mixed hyperlipidemia  E78.2 Lipid panel reflex to direct LDL Non-fasting     ALT      4. Screening for prostate cancer  Z12.5 PSA, screen      5. Gout of big toe  M10.9 Uric acid     allopurinol (ZYLOPRIM) 300 MG tablet      6. Chronic atrial fibrillation (H)  I48.20 INR          Patient has been advised of split billing requirements and indicates understanding: Yes      COUNSELING:  Reviewed preventive health counseling, as reflected in patient instructions        He reports that he quit smoking about 60 years ago. His smoking use included cigarettes. He started smoking about 63 years ago. He has a 3.00 pack-year smoking  history. He has quit using smokeless tobacco.      Appropriate preventive services were discussed with this patient, including applicable screening as appropriate for cardiovascular disease, diabetes, osteopenia/osteoporosis, and glaucoma.  As appropriate for age/gender, discussed screening for colorectal cancer, prostate cancer, breast cancer, and cervical cancer. Checklist reviewing preventive services available has been given to the patient.    Reviewed patients plan of care and provided an AVS. The Basic Care Plan (routine screening as documented in Health Maintenance) for Alessio meets the Care Plan requirement. This Care Plan has been established and reviewed with the Patient.          Jose Squires MD  [facility]    Identified Health Risks:  I have reviewed Opioid Use Disorder and Substance Use Disorder risk factors and made any needed referrals.

## 2023-08-18 DIAGNOSIS — J30.0 VASOMOTOR RHINITIS: ICD-10-CM

## 2023-08-18 RX ORDER — IPRATROPIUM BROMIDE 21 UG/1
2 SPRAY, METERED NASAL EVERY 12 HOURS
Qty: 30 ML | Refills: 0 | Status: SHIPPED | OUTPATIENT
Start: 2023-08-18 | End: 2023-10-10

## 2023-08-30 DIAGNOSIS — I10 HYPERTENSION GOAL BP (BLOOD PRESSURE) < 140/90: ICD-10-CM

## 2023-08-31 RX ORDER — POTASSIUM CHLORIDE 1500 MG/1
TABLET, EXTENDED RELEASE ORAL
Qty: 90 TABLET | Refills: 3 | Status: SHIPPED | OUTPATIENT
Start: 2023-08-31 | End: 2024-01-01

## 2023-09-06 ENCOUNTER — LAB (OUTPATIENT)
Dept: LAB | Facility: CLINIC | Age: 88
End: 2023-09-06
Payer: MEDICARE

## 2023-09-06 ENCOUNTER — ANTICOAGULATION THERAPY VISIT (OUTPATIENT)
Dept: ANTICOAGULATION | Facility: CLINIC | Age: 88
End: 2023-09-06

## 2023-09-06 DIAGNOSIS — Z79.01 LONG TERM CURRENT USE OF ANTICOAGULANT THERAPY: ICD-10-CM

## 2023-09-06 DIAGNOSIS — Z86.711 HISTORY OF PULMONARY EMBOLISM: ICD-10-CM

## 2023-09-06 DIAGNOSIS — I48.20 CHRONIC ATRIAL FIBRILLATION (H): ICD-10-CM

## 2023-09-06 DIAGNOSIS — I48.20 CHRONIC ATRIAL FIBRILLATION (H): Primary | ICD-10-CM

## 2023-09-06 LAB — INR BLD: 1.7 (ref 0.9–1.1)

## 2023-09-06 PROCEDURE — 85610 PROTHROMBIN TIME: CPT

## 2023-09-06 PROCEDURE — 36416 COLLJ CAPILLARY BLOOD SPEC: CPT

## 2023-09-06 NOTE — PROGRESS NOTES
ANTICOAGULATION MANAGEMENT     Alessio Teixeira 87 year old male is on warfarin with subtherapeutic INR result. (Goal INR 2.0-3.0)    Recent labs: (last 7 days)     09/06/23  1038   INR 1.7*       ASSESSMENT     Warfarin Lab Questionnaire    Warfarin Doses Last 7 Days          9/6/2023   Warfarin Lab Questionnaire   Missed doses within past 14 days? No   Changes in diet or alcohol within past 14 days? No   Medication changes since last result? No   Injuries or illness since last result? No   New shortness of breath, severe headaches or sudden changes in vision since last result? No   Abnormal bleeding since last result? No   Upcoming surgery, procedure? No   Best number to call with results? [phone]     Previous result: Therapeutic last visit; previously outside of goal range  Additional findings:  After RN assessment, Devin feels that he probably did have more spinach in his salad mix. He plans to cut back. Prefers to try this before changing his warfarin dose.       PLAN     Recommended plan for temporary change(s) affecting INR     Dosing Instructions: booster dose then continue your current warfarin dose with next INR in 2 weeks       Summary  As of 9/6/2023      Full warfarin instructions:  9/6: 14 mg; Otherwise 8 mg every Sun, Tue, Thu; 10 mg all other days   Next INR check:  9/27/2023               Telephone call with Devin who verbalizes understanding and agrees to plan    Patient elected to schedule next visit in 3 weeks, rather than recommended two.    Education provided:   Please call back if any changes to your diet, medications or how you've been taking warfarin  Goal range and lab monitoring: Importance of following up at instructed interval  Dietary considerations: importance of consistent vitamin K intake, impact of vitamin K foods on INR, and vitamin K content of foods  Contact [phone]  with any changes, questions or concerns.     Plan made per [facility] anticoagulation protocol    Peggy Sullivan,  RN  Anticoagulation Clinic  9/6/2023    _______________________________________________________________________     Anticoagulation Episode Summary       Current INR goal:  2.0-3.0   TTR:  78.3 % (1 y)   Target end date:  Indefinite   Send INR reminders to:  [facility]    Indications    Chronic atrial fibrillation (H) [I48.20]  Long term current use of anticoagulant therapy [Z79.01]  History of pulmonary embolism [Z86.711]             Comments:               Anticoagulation Care Providers       Provider Role Specialty Phone number    Jose Squires MD Referring Family Medicine [phone]

## 2023-09-27 ENCOUNTER — ANTICOAGULATION THERAPY VISIT (OUTPATIENT)
Dept: ANTICOAGULATION | Facility: CLINIC | Age: 88
End: 2023-09-27

## 2023-09-27 ENCOUNTER — LAB (OUTPATIENT)
Dept: LAB | Facility: CLINIC | Age: 88
End: 2023-09-27
Payer: MEDICARE

## 2023-09-27 DIAGNOSIS — I48.20 CHRONIC ATRIAL FIBRILLATION (H): Primary | ICD-10-CM

## 2023-09-27 DIAGNOSIS — Z79.01 LONG TERM CURRENT USE OF ANTICOAGULANT THERAPY: ICD-10-CM

## 2023-09-27 DIAGNOSIS — I48.20 CHRONIC ATRIAL FIBRILLATION (H): ICD-10-CM

## 2023-09-27 DIAGNOSIS — Z86.711 HISTORY OF PULMONARY EMBOLISM: ICD-10-CM

## 2023-09-27 LAB — INR BLD: 2.2 (ref 0.9–1.1)

## 2023-09-27 PROCEDURE — 36416 COLLJ CAPILLARY BLOOD SPEC: CPT

## 2023-09-27 PROCEDURE — 85610 PROTHROMBIN TIME: CPT

## 2023-09-27 NOTE — PROGRESS NOTES
ANTICOAGULATION MANAGEMENT     Alessio Teixeira 87 year old male is on warfarin with therapeutic INR result. (Goal INR 2.0-3.0)    Recent labs: (last 7 days)     09/27/23  1041   INR 2.2*       ASSESSMENT     Warfarin Lab Questionnaire    Warfarin Doses Last 7 Days          9/27/2023   Warfarin Lab Questionnaire   Missed doses within past 14 days? No   Changes in diet or alcohol within past 14 days? No   Medication changes since last result? No   Injuries or illness since last result? No   New shortness of breath, severe headaches or sudden changes in vision since last result? No   Abnormal bleeding since last result? No   Upcoming surgery, procedure? No     Previous result: Subtherapeutic  Additional findings: None       PLAN     Recommended plan for no diet, medication or health factor changes affecting INR     Dosing Instructions: Continue your current warfarin dose with next INR in 4 weeks       Summary  As of 9/27/2023      Full warfarin instructions:  8 mg every Sun, Tue, Thu; 10 mg all other days   Next INR check:  11/1/2023               Telephone call with Devin who verbalizes understanding and agrees to plan    Patient elected to schedule next visit in 5 weeks    Education provided:   Please call back if any changes to your diet, medications or how you've been taking warfarin  Contact [phone]  with any changes, questions or concerns.     Plan made per [facility] anticoagulation protocol    Peggy Sullivan RN  Anticoagulation Clinic  9/27/2023    _______________________________________________________________________     Anticoagulation Episode Summary       Current INR goal:  2.0-3.0   TTR:  74.8 % (1 y)   Target end date:  Indefinite   Send INR reminders to:  [facility]    Indications    Chronic atrial fibrillation (H) [I48.20]  Long term current use of anticoagulant therapy [Z79.01]  History of pulmonary embolism [Z86.711]             Comments:               Anticoagulation Care Providers        Provider Role Specialty Phone number    Jose Squires MD Referring Family Medicine [phone]

## 2023-10-10 DIAGNOSIS — J30.0 VASOMOTOR RHINITIS: ICD-10-CM

## 2023-10-10 RX ORDER — IPRATROPIUM BROMIDE 21 UG/1
2 SPRAY, METERED NASAL EVERY 12 HOURS
Qty: 30 ML | Refills: 2 | Status: SHIPPED | OUTPATIENT
Start: 2023-10-10 | End: 2024-01-01

## 2023-10-10 NOTE — TELEPHONE ENCOUNTER
Prescription approved per [facility] Refill Protocol.  Lizy De La Rosa, RN  [facility] Triage Nurse

## 2023-10-29 DIAGNOSIS — E78.5 HYPERLIPIDEMIA LDL GOAL <100: ICD-10-CM

## 2023-10-29 DIAGNOSIS — R63.5 WEIGHT GAIN: ICD-10-CM

## 2023-10-29 DIAGNOSIS — Z79.01 LONG TERM CURRENT USE OF ANTICOAGULANT THERAPY: ICD-10-CM

## 2023-10-29 DIAGNOSIS — Z86.711 HISTORY OF PULMONARY EMBOLISM: ICD-10-CM

## 2023-10-29 DIAGNOSIS — I48.0 PAROXYSMAL ATRIAL FIBRILLATION (H): ICD-10-CM

## 2023-10-30 RX ORDER — WARFARIN SODIUM 4 MG/1
TABLET ORAL
Qty: 180 TABLET | Refills: 0 | Status: SHIPPED | OUTPATIENT
Start: 2023-10-30 | End: 2023-11-01

## 2023-10-30 RX ORDER — FUROSEMIDE 20 MG
TABLET ORAL
Qty: 90 TABLET | Refills: 0 | Status: SHIPPED | OUTPATIENT
Start: 2023-10-30 | End: 2024-01-01

## 2023-10-30 RX ORDER — LOVASTATIN 40 MG
TABLET ORAL
Qty: 180 TABLET | Refills: 2 | Status: SHIPPED | OUTPATIENT
Start: 2023-10-30 | End: 2024-01-01

## 2023-11-01 ENCOUNTER — LAB (OUTPATIENT)
Dept: LAB | Facility: CLINIC | Age: 88
End: 2023-11-01
Payer: MEDICARE

## 2023-11-01 ENCOUNTER — ANTICOAGULATION THERAPY VISIT (OUTPATIENT)
Dept: ANTICOAGULATION | Facility: CLINIC | Age: 88
End: 2023-11-01

## 2023-11-01 DIAGNOSIS — I48.20 CHRONIC ATRIAL FIBRILLATION (H): Primary | ICD-10-CM

## 2023-11-01 DIAGNOSIS — I48.20 CHRONIC ATRIAL FIBRILLATION (H): ICD-10-CM

## 2023-11-01 DIAGNOSIS — Z79.01 LONG TERM CURRENT USE OF ANTICOAGULANT THERAPY: ICD-10-CM

## 2023-11-01 DIAGNOSIS — Z86.711 HISTORY OF PULMONARY EMBOLISM: ICD-10-CM

## 2023-11-01 DIAGNOSIS — I48.0 PAROXYSMAL ATRIAL FIBRILLATION (H): ICD-10-CM

## 2023-11-01 LAB — INR BLD: 2.1 (ref 0.9–1.1)

## 2023-11-01 PROCEDURE — 36416 COLLJ CAPILLARY BLOOD SPEC: CPT

## 2023-11-01 PROCEDURE — 85610 PROTHROMBIN TIME: CPT

## 2023-11-01 RX ORDER — WARFARIN SODIUM 4 MG/1
TABLET ORAL
Qty: 208 TABLET | Refills: 1 | Status: SHIPPED | OUTPATIENT
Start: 2023-11-01 | End: 2024-01-01

## 2023-11-01 NOTE — PROGRESS NOTES
ANTICOAGULATION MANAGEMENT     Alessio Teixeira 87 year old male is on warfarin with therapeutic INR result. (Goal INR 2.0-3.0)    Recent labs: (last 7 days)     11/01/23  1052   INR 2.1*       ASSESSMENT     Warfarin Lab Questionnaire    Warfarin Doses Last 7 Days - Verified accurate MD with patient during telephone visit.          11/1/2023   Warfarin Lab Questionnaire   Missed doses within past 14 days? No   Changes in diet or alcohol within past 14 days? No   Medication changes since last result? No   Injuries or illness since last result? No   New shortness of breath, severe headaches or sudden changes in vision since last result? No   Abnormal bleeding since last result? Yes   If yes, please explain: just a drop when i blew my nose   Upcoming surgery, procedure? No   Best number to call with results? [phone]     Previous result: Therapeutic last visit; previously outside of goal range  Additional findings: Refill needed today. Alessio meets all criteria for refill (current [facility] referral, office visit with referring provider/group in last 1 year unless directed to return in 2 years in last referring provider visit note, lab monitoring up to date or not exceeding 2 weeks overdue). Rx instructions and quantity supplied updated to match patient's current dosing plan. Warfarin 90 day supply with 1 refill granted per [facility] protocol        PLAN     Recommended plan for no diet, medication or health factor changes affecting INR     Dosing Instructions: Continue your current warfarin dose with next INR in 6 weeks       Summary  As of 11/1/2023      Full warfarin instructions:  8 mg every Sun, Tue, Thu; 10 mg all other days   Next INR check:  12/27/2023               Telephone call with Devin who verbalizes understanding and agrees to plan    Patient elected to schedule next visit 12/27/23 - approved for 8 week checks when stable    Education provided:   Please call back if any changes to your diet, medications or how you've  been taking warfarin  Symptom monitoring: monitoring for bleeding signs and symptoms and monitoring for clotting signs and symptoms    Plan made per [facility] anticoagulation protocol    Karla Monzon RN  Anticoagulation Clinic  11/1/2023    _______________________________________________________________________     Anticoagulation Episode Summary       Current INR goal:  2.0-3.0   TTR:  74.8% (1 y)   Target end date:  Indefinite   Send INR reminders to:  [facility]    Indications    Chronic atrial fibrillation (H) [I48.20]  Long term current use of anticoagulant therapy [Z79.01]  History of pulmonary embolism [Z86.711]             Comments:               Anticoagulation Care Providers       Provider Role Specialty Phone number    Jose Squires MD Referring Family Medicine [phone]

## 2023-12-27 NOTE — PROGRESS NOTES
ANTICOAGULATION MANAGEMENT     Alessio Teixeira 88 year old male is on warfarin with therapeutic INR result. (Goal INR 2.0-3.0)    Recent labs: (last 7 days)     12/27/23  1038   INR 2.3*       ASSESSMENT     Source(s): Chart Review and Patient/Caregiver Call     Warfarin doses taken: Warfarin taken as instructed  Diet: No new diet changes identified  Medication/supplement changes:  Started taking iron and magnesium supplements  New illness, injury, or hospitalization: No  Signs or symptoms of bleeding or clotting: No  Previous result: Therapeutic last 2(+) visits  Additional findings: None       PLAN     Recommended plan for no diet, medication or health factor changes affecting INR     Dosing Instructions: Continue your current warfarin dose with next INR in 8 weeks       Summary  As of 12/27/2023      Full warfarin instructions:  10 mg every Mon, Wed, Fri; 8 mg all other days   Next INR check:  2/21/2024               Telephone call with Devin who verbalizes understanding and agrees to plan    Lab visit scheduled    Education provided:   Please call back if any changes to your diet, medications or how you've been taking warfarin    Plan made per [facility] anticoagulation protocol    Ashkan Sales RN  Anticoagulation Clinic  12/27/2023    _______________________________________________________________________     Anticoagulation Episode Summary       Current INR goal:  2.0-3.0   TTR:  74.8% (1 y)   Target end date:  Indefinite   Send INR reminders to:  [facility]    Indications    Chronic atrial fibrillation (H) [I48.20]  Long term current use of anticoagulant therapy [Z79.01]  History of pulmonary embolism [Z86.711]             Comments:               Anticoagulation Care Providers       Provider Role Specialty Phone number    Jose Squires MD Referring Family Medicine [phone]

## 2024-01-01 ENCOUNTER — PATIENT OUTREACH (OUTPATIENT)
Dept: CARE COORDINATION | Facility: CLINIC | Age: 89
End: 2024-01-01
Payer: MEDICARE

## 2024-01-01 ENCOUNTER — TELEPHONE (OUTPATIENT)
Dept: FAMILY MEDICINE | Facility: CLINIC | Age: 89
End: 2024-01-01
Payer: MEDICARE

## 2024-01-01 ENCOUNTER — APPOINTMENT (OUTPATIENT)
Dept: OCCUPATIONAL THERAPY | Facility: CLINIC | Age: 89
DRG: 280 | End: 2024-01-01
Payer: MEDICARE

## 2024-01-01 ENCOUNTER — ANESTHESIA EVENT (OUTPATIENT)
Dept: GASTROENTEROLOGY | Facility: CLINIC | Age: 89
DRG: 280 | End: 2024-01-01
Payer: MEDICARE

## 2024-01-01 ENCOUNTER — LAB (OUTPATIENT)
Dept: LAB | Facility: CLINIC | Age: 89
End: 2024-01-01
Payer: MEDICARE

## 2024-01-01 ENCOUNTER — APPOINTMENT (OUTPATIENT)
Dept: ULTRASOUND IMAGING | Facility: CLINIC | Age: 89
DRG: 280 | End: 2024-01-01
Attending: PHYSICIAN ASSISTANT
Payer: MEDICARE

## 2024-01-01 ENCOUNTER — TELEPHONE (OUTPATIENT)
Dept: FAMILY MEDICINE | Facility: CLINIC | Age: 89
End: 2024-01-01

## 2024-01-01 ENCOUNTER — PATIENT OUTREACH (OUTPATIENT)
Dept: ONCOLOGY | Facility: CLINIC | Age: 89
End: 2024-01-01
Payer: MEDICARE

## 2024-01-01 ENCOUNTER — INFUSION THERAPY VISIT (OUTPATIENT)
Dept: INFUSION THERAPY | Facility: CLINIC | Age: 89
End: 2024-01-01
Attending: FAMILY MEDICINE
Payer: MEDICARE

## 2024-01-01 ENCOUNTER — DOCUMENTATION ONLY (OUTPATIENT)
Dept: ANTICOAGULATION | Facility: CLINIC | Age: 89
End: 2024-01-01
Payer: MEDICARE

## 2024-01-01 ENCOUNTER — MYC MEDICAL ADVICE (OUTPATIENT)
Dept: FAMILY MEDICINE | Facility: CLINIC | Age: 89
End: 2024-01-01

## 2024-01-01 ENCOUNTER — ANTICOAGULATION THERAPY VISIT (OUTPATIENT)
Dept: ANTICOAGULATION | Facility: CLINIC | Age: 89
End: 2024-01-01

## 2024-01-01 ENCOUNTER — DOCUMENTATION ONLY (OUTPATIENT)
Dept: CARDIOLOGY | Facility: CLINIC | Age: 89
End: 2024-01-01
Payer: MEDICARE

## 2024-01-01 ENCOUNTER — APPOINTMENT (OUTPATIENT)
Dept: CARDIOLOGY | Facility: CLINIC | Age: 89
DRG: 280 | End: 2024-01-01
Attending: PHYSICIAN ASSISTANT
Payer: MEDICARE

## 2024-01-01 ENCOUNTER — APPOINTMENT (OUTPATIENT)
Dept: CT IMAGING | Facility: CLINIC | Age: 89
DRG: 280 | End: 2024-01-01
Attending: PHYSICIAN ASSISTANT
Payer: MEDICARE

## 2024-01-01 ENCOUNTER — TELEPHONE (OUTPATIENT)
Dept: CARDIOLOGY | Facility: CLINIC | Age: 89
End: 2024-01-01

## 2024-01-01 ENCOUNTER — HOSPITAL ENCOUNTER (INPATIENT)
Facility: CLINIC | Age: 89
LOS: 7 days | Discharge: HOME HEALTH | DRG: 280 | End: 2024-09-16
Attending: EMERGENCY MEDICINE | Admitting: INTERNAL MEDICINE
Payer: MEDICARE

## 2024-01-01 ENCOUNTER — OFFICE VISIT (OUTPATIENT)
Dept: FAMILY MEDICINE | Facility: CLINIC | Age: 89
End: 2024-01-01
Payer: MEDICARE

## 2024-01-01 ENCOUNTER — APPOINTMENT (OUTPATIENT)
Dept: OCCUPATIONAL THERAPY | Facility: CLINIC | Age: 89
DRG: 280 | End: 2024-01-01
Attending: PHYSICIAN ASSISTANT
Payer: MEDICARE

## 2024-01-01 ENCOUNTER — ONCOLOGY VISIT (OUTPATIENT)
Dept: ONCOLOGY | Facility: CLINIC | Age: 89
End: 2024-01-01
Attending: FAMILY MEDICINE
Payer: MEDICARE

## 2024-01-01 ENCOUNTER — TELEPHONE (OUTPATIENT)
Dept: ANTICOAGULATION | Facility: CLINIC | Age: 89
End: 2024-01-01
Payer: MEDICARE

## 2024-01-01 ENCOUNTER — TELEPHONE (OUTPATIENT)
Dept: ONCOLOGY | Facility: CLINIC | Age: 89
End: 2024-01-01
Payer: MEDICARE

## 2024-01-01 ENCOUNTER — PRE VISIT (OUTPATIENT)
Dept: ONCOLOGY | Facility: CLINIC | Age: 89
End: 2024-01-01
Payer: MEDICARE

## 2024-01-01 ENCOUNTER — ANESTHESIA (OUTPATIENT)
Dept: GASTROENTEROLOGY | Facility: CLINIC | Age: 89
DRG: 280 | End: 2024-01-01
Payer: MEDICARE

## 2024-01-01 ENCOUNTER — APPOINTMENT (OUTPATIENT)
Dept: GENERAL RADIOLOGY | Facility: CLINIC | Age: 89
DRG: 280 | End: 2024-01-01
Attending: PHYSICIAN ASSISTANT
Payer: MEDICARE

## 2024-01-01 VITALS
SYSTOLIC BLOOD PRESSURE: 104 MMHG | RESPIRATION RATE: 16 BRPM | DIASTOLIC BLOOD PRESSURE: 68 MMHG | BODY MASS INDEX: 33.98 KG/M2 | HEART RATE: 83 BPM | OXYGEN SATURATION: 98 % | WEIGHT: 236 LBS

## 2024-01-01 VITALS
DIASTOLIC BLOOD PRESSURE: 66 MMHG | SYSTOLIC BLOOD PRESSURE: 98 MMHG | HEART RATE: 83 BPM | BODY MASS INDEX: 33.36 KG/M2 | OXYGEN SATURATION: 98 % | RESPIRATION RATE: 16 BRPM | WEIGHT: 231.7 LBS | TEMPERATURE: 97.6 F

## 2024-01-01 VITALS
HEART RATE: 64 BPM | SYSTOLIC BLOOD PRESSURE: 124 MMHG | DIASTOLIC BLOOD PRESSURE: 58 MMHG | WEIGHT: 220 LBS | OXYGEN SATURATION: 97 % | RESPIRATION RATE: 15 BRPM | HEIGHT: 70 IN | TEMPERATURE: 97 F | BODY MASS INDEX: 31.5 KG/M2

## 2024-01-01 VITALS
HEART RATE: 98 BPM | SYSTOLIC BLOOD PRESSURE: 105 MMHG | BODY MASS INDEX: 33.18 KG/M2 | OXYGEN SATURATION: 93 % | DIASTOLIC BLOOD PRESSURE: 67 MMHG | WEIGHT: 231.8 LBS | RESPIRATION RATE: 22 BRPM | HEIGHT: 70 IN | TEMPERATURE: 95 F

## 2024-01-01 DIAGNOSIS — Z86.711 HISTORY OF PULMONARY EMBOLISM: ICD-10-CM

## 2024-01-01 DIAGNOSIS — L20.9 XEROSIS DUE TO ATOPIC DERMATITIS: ICD-10-CM

## 2024-01-01 DIAGNOSIS — I48.20 CHRONIC ATRIAL FIBRILLATION (H): ICD-10-CM

## 2024-01-01 DIAGNOSIS — R26.89 POOR BALANCE: ICD-10-CM

## 2024-01-01 DIAGNOSIS — K92.2 GASTROINTESTINAL HEMORRHAGE, UNSPECIFIED GASTROINTESTINAL HEMORRHAGE TYPE: ICD-10-CM

## 2024-01-01 DIAGNOSIS — Z79.01 LONG TERM CURRENT USE OF ANTICOAGULANT THERAPY: ICD-10-CM

## 2024-01-01 DIAGNOSIS — B35.6 TINEA CRURIS: ICD-10-CM

## 2024-01-01 DIAGNOSIS — C95.90 LEUKEMIA NOT HAVING ACHIEVED REMISSION, UNSPECIFIED LEUKEMIA TYPE (H): ICD-10-CM

## 2024-01-01 DIAGNOSIS — R63.5 WEIGHT GAIN: ICD-10-CM

## 2024-01-01 DIAGNOSIS — N18.30 STAGE 3 CHRONIC KIDNEY DISEASE, UNSPECIFIED WHETHER STAGE 3A OR 3B CKD (H): ICD-10-CM

## 2024-01-01 DIAGNOSIS — L98.9 SKIN LESION: ICD-10-CM

## 2024-01-01 DIAGNOSIS — I48.20 CHRONIC ATRIAL FIBRILLATION (H): Primary | ICD-10-CM

## 2024-01-01 DIAGNOSIS — Z00.01 ENCOUNTER FOR GENERAL ADULT MEDICAL EXAMINATION WITH ABNORMAL FINDINGS: Primary | ICD-10-CM

## 2024-01-01 DIAGNOSIS — E87.5 HYPERKALEMIA: ICD-10-CM

## 2024-01-01 DIAGNOSIS — R29.898 WEAKNESS OF BOTH LOWER EXTREMITIES: ICD-10-CM

## 2024-01-01 DIAGNOSIS — R60.9 FLUID RETENTION: ICD-10-CM

## 2024-01-01 DIAGNOSIS — I25.118 CORONARY ARTERY DISEASE OF NATIVE ARTERY OF NATIVE HEART WITH STABLE ANGINA PECTORIS (H): ICD-10-CM

## 2024-01-01 DIAGNOSIS — D72.829 LEUKOCYTOSIS, UNSPECIFIED TYPE: Primary | ICD-10-CM

## 2024-01-01 DIAGNOSIS — E03.9 ACQUIRED HYPOTHYROIDISM: ICD-10-CM

## 2024-01-01 DIAGNOSIS — D64.9 ANEMIA, UNSPECIFIED TYPE: Primary | ICD-10-CM

## 2024-01-01 DIAGNOSIS — D62 ANEMIA DUE TO BLOOD LOSS, ACUTE: ICD-10-CM

## 2024-01-01 DIAGNOSIS — E78.5 HYPERLIPIDEMIA LDL GOAL <100: ICD-10-CM

## 2024-01-01 DIAGNOSIS — C91.10 CLL (CHRONIC LYMPHOCYTIC LEUKEMIA) (H): ICD-10-CM

## 2024-01-01 DIAGNOSIS — D64.9 ANEMIA, UNSPECIFIED TYPE: ICD-10-CM

## 2024-01-01 DIAGNOSIS — I48.0 PAROXYSMAL ATRIAL FIBRILLATION (H): ICD-10-CM

## 2024-01-01 DIAGNOSIS — I42.9 SECONDARY CARDIOMYOPATHY (H): ICD-10-CM

## 2024-01-01 DIAGNOSIS — E78.2 MIXED HYPERLIPIDEMIA: ICD-10-CM

## 2024-01-01 DIAGNOSIS — I48.20 CHRONIC A-FIB (H): ICD-10-CM

## 2024-01-01 DIAGNOSIS — I10 HYPERTENSION GOAL BP (BLOOD PRESSURE) < 140/90: ICD-10-CM

## 2024-01-01 DIAGNOSIS — Z12.5 SCREENING FOR PROSTATE CANCER: ICD-10-CM

## 2024-01-01 DIAGNOSIS — Z79.01 LONG TERM (CURRENT) USE OF ANTICOAGULANTS: ICD-10-CM

## 2024-01-01 DIAGNOSIS — C91.10 CLL (CHRONIC LYMPHOCYTIC LEUKEMIA) (H): Primary | ICD-10-CM

## 2024-01-01 DIAGNOSIS — D72.829 LEUKOCYTOSIS, UNSPECIFIED TYPE: ICD-10-CM

## 2024-01-01 DIAGNOSIS — J30.0 VASOMOTOR RHINITIS: ICD-10-CM

## 2024-01-01 DIAGNOSIS — G89.29 CHRONIC LEFT-SIDED THORACIC BACK PAIN: ICD-10-CM

## 2024-01-01 DIAGNOSIS — D72.9: ICD-10-CM

## 2024-01-01 DIAGNOSIS — Z79.01 LONG TERM CURRENT USE OF ANTICOAGULANT THERAPY: Primary | ICD-10-CM

## 2024-01-01 DIAGNOSIS — R53.81 PHYSICAL DECONDITIONING: ICD-10-CM

## 2024-01-01 DIAGNOSIS — M10.9 GOUT OF BIG TOE: ICD-10-CM

## 2024-01-01 DIAGNOSIS — M54.6 CHRONIC LEFT-SIDED THORACIC BACK PAIN: ICD-10-CM

## 2024-01-01 DIAGNOSIS — N18.9 CHRONIC RENAL FAILURE, UNSPECIFIED CKD STAGE: ICD-10-CM

## 2024-01-01 DIAGNOSIS — Z91.81 AT HIGH RISK FOR FALLS: ICD-10-CM

## 2024-01-01 DIAGNOSIS — R53.81 PHYSICAL DECONDITIONING: Primary | ICD-10-CM

## 2024-01-01 DIAGNOSIS — L20.89 FLEXURAL ATOPIC DERMATITIS: ICD-10-CM

## 2024-01-01 DIAGNOSIS — L21.9 SEBORRHEIC DERMATITIS: ICD-10-CM

## 2024-01-01 DIAGNOSIS — R79.89 ELEVATED TROPONIN: Primary | ICD-10-CM

## 2024-01-01 LAB
ABO/RH(D): NORMAL
ALBUMIN SERPL BCG-MCNC: 3.4 G/DL (ref 3.5–5.2)
ALBUMIN SERPL BCG-MCNC: 3.4 G/DL (ref 3.5–5.2)
ALBUMIN SERPL BCG-MCNC: 3.9 G/DL (ref 3.5–5.2)
ALBUMIN UR-MCNC: 20 MG/DL
ALP SERPL-CCNC: 100 U/L (ref 40–150)
ALP SERPL-CCNC: 100 U/L (ref 40–150)
ALP SERPL-CCNC: 82 U/L (ref 40–150)
ALT SERPL W P-5'-P-CCNC: 19 U/L (ref 0–70)
ALT SERPL W P-5'-P-CCNC: 25 U/L (ref 0–70)
ALT SERPL W P-5'-P-CCNC: 26 U/L (ref 0–70)
ANION GAP SERPL CALCULATED.3IONS-SCNC: 12 MMOL/L (ref 7–15)
ANION GAP SERPL CALCULATED.3IONS-SCNC: 12 MMOL/L (ref 7–15)
ANION GAP SERPL CALCULATED.3IONS-SCNC: 13 MMOL/L (ref 7–15)
ANION GAP SERPL CALCULATED.3IONS-SCNC: 14 MMOL/L (ref 7–15)
ANION GAP SERPL CALCULATED.3IONS-SCNC: 14 MMOL/L (ref 7–15)
ANION GAP SERPL CALCULATED.3IONS-SCNC: 15 MMOL/L (ref 7–15)
ANION GAP SERPL CALCULATED.3IONS-SCNC: 16 MMOL/L (ref 7–15)
ANION GAP SERPL CALCULATED.3IONS-SCNC: 9 MMOL/L (ref 7–15)
ANTIBODY SCREEN: NEGATIVE
APPEARANCE UR: CLEAR
AST SERPL W P-5'-P-CCNC: 37 U/L (ref 0–45)
AST SERPL W P-5'-P-CCNC: 41 U/L (ref 0–45)
AST SERPL W P-5'-P-CCNC: 90 U/L (ref 0–45)
ATRIAL RATE - MUSE: 107 BPM
ATRIAL RATE - MUSE: 112 BPM
ATRIAL RATE - MUSE: 220 BPM
ATRIAL RATE - MUSE: 249 BPM
ATRIAL RATE - MUSE: 50 BPM
ATRIAL RATE - MUSE: 86 BPM
BACTERIA BLD CULT: NO GROWTH
BACTERIA SPT CULT: NORMAL
BASOPHILS # BLD MANUAL: 0.6 10E3/UL (ref 0–0.2)
BASOPHILS # BLD MANUAL: 0.7 10E3/UL (ref 0–0.2)
BASOPHILS # BLD MANUAL: 0.7 10E3/UL (ref 0–0.2)
BASOPHILS # BLD MANUAL: 0.9 10E3/UL (ref 0–0.2)
BASOPHILS # BLD MANUAL: 0.9 10E3/UL (ref 0–0.2)
BASOPHILS # BLD MANUAL: 1.1 10E3/UL (ref 0–0.2)
BASOPHILS NFR BLD MANUAL: 2 %
BASOPHILS NFR BLD MANUAL: 3 %
BCR/ABL1 KINASE DOMAIN MUT ANL BLD/T: NORMAL
BILIRUB SERPL-MCNC: 0.4 MG/DL
BILIRUB SERPL-MCNC: 0.5 MG/DL
BILIRUB SERPL-MCNC: 0.5 MG/DL
BILIRUB UR QL STRIP: NEGATIVE
BLASTS # BLD MANUAL: 0.7 10E3/UL
BLASTS NFR BLD MANUAL: 2 %
BLD PROD TYP BPU: NORMAL
BLOOD COMPONENT TYPE: NORMAL
BUN SERPL-MCNC: 30.8 MG/DL (ref 8–23)
BUN SERPL-MCNC: 32.5 MG/DL (ref 8–23)
BUN SERPL-MCNC: 48.7 MG/DL (ref 8–23)
BUN SERPL-MCNC: 62.6 MG/DL (ref 8–23)
BUN SERPL-MCNC: 63.4 MG/DL (ref 8–23)
BUN SERPL-MCNC: 64.9 MG/DL (ref 8–23)
BUN SERPL-MCNC: 65 MG/DL (ref 8–23)
BUN SERPL-MCNC: 65.9 MG/DL (ref 8–23)
BUN SERPL-MCNC: 68.1 MG/DL (ref 8–23)
BUN SERPL-MCNC: 73.1 MG/DL (ref 8–23)
CALCIUM SERPL-MCNC: 7.6 MG/DL (ref 8.8–10.4)
CALCIUM SERPL-MCNC: 7.9 MG/DL (ref 8.8–10.4)
CALCIUM SERPL-MCNC: 7.9 MG/DL (ref 8.8–10.4)
CALCIUM SERPL-MCNC: 8.3 MG/DL (ref 8.8–10.4)
CALCIUM SERPL-MCNC: 8.6 MG/DL (ref 8.8–10.4)
CALCIUM SERPL-MCNC: 8.9 MG/DL (ref 8.8–10.4)
CALCIUM SERPL-MCNC: 9.3 MG/DL (ref 8.8–10.4)
CHLORIDE SERPL-SCNC: 102 MMOL/L (ref 98–107)
CHLORIDE SERPL-SCNC: 103 MMOL/L (ref 98–107)
CHLORIDE SERPL-SCNC: 106 MMOL/L (ref 98–107)
CHLORIDE SERPL-SCNC: 106 MMOL/L (ref 98–107)
CHLORIDE SERPL-SCNC: 107 MMOL/L (ref 98–107)
CHLORIDE SERPL-SCNC: 109 MMOL/L (ref 98–107)
CHLORIDE SERPL-SCNC: 98 MMOL/L (ref 98–107)
CHOLEST SERPL-MCNC: 91 MG/DL
CODING SYSTEM: NORMAL
COLOR UR AUTO: YELLOW
CREAT SERPL-MCNC: 1.52 MG/DL (ref 0.67–1.17)
CREAT SERPL-MCNC: 1.54 MG/DL (ref 0.67–1.17)
CREAT SERPL-MCNC: 1.54 MG/DL (ref 0.67–1.17)
CREAT SERPL-MCNC: 1.59 MG/DL (ref 0.67–1.17)
CREAT SERPL-MCNC: 1.75 MG/DL (ref 0.67–1.17)
CREAT SERPL-MCNC: 1.77 MG/DL (ref 0.67–1.17)
CREAT SERPL-MCNC: 1.89 MG/DL (ref 0.67–1.17)
CREAT SERPL-MCNC: 1.91 MG/DL (ref 0.67–1.17)
CREAT SERPL-MCNC: 2.07 MG/DL (ref 0.67–1.17)
CREAT SERPL-MCNC: 2.24 MG/DL (ref 0.67–1.17)
CREAT UR-MCNC: 75.4 MG/DL
CROSSMATCH: NORMAL
DACRYOCYTES BLD QL SMEAR: SLIGHT
DIASTOLIC BLOOD PRESSURE - MUSE: NORMAL MMHG
EGFRCR SERPLBLD CKD-EPI 2021: 28 ML/MIN/1.73M2
EGFRCR SERPLBLD CKD-EPI 2021: 30 ML/MIN/1.73M2
EGFRCR SERPLBLD CKD-EPI 2021: 34 ML/MIN/1.73M2
EGFRCR SERPLBLD CKD-EPI 2021: 34 ML/MIN/1.73M2
EGFRCR SERPLBLD CKD-EPI 2021: 37 ML/MIN/1.73M2
EGFRCR SERPLBLD CKD-EPI 2021: 37 ML/MIN/1.73M2
EGFRCR SERPLBLD CKD-EPI 2021: 42 ML/MIN/1.73M2
EGFRCR SERPLBLD CKD-EPI 2021: 43 ML/MIN/1.73M2
EGFRCR SERPLBLD CKD-EPI 2021: 43 ML/MIN/1.73M2
EGFRCR SERPLBLD CKD-EPI 2021: 44 ML/MIN/1.73M2
ELLIPTOCYTES BLD QL SMEAR: SLIGHT
EOSINOPHIL # BLD MANUAL: 0 10E3/UL (ref 0–0.7)
EOSINOPHIL # BLD MANUAL: 0.3 10E3/UL (ref 0–0.7)
EOSINOPHIL # BLD MANUAL: 0.4 10E3/UL (ref 0–0.7)
EOSINOPHIL # BLD MANUAL: 0.4 10E3/UL (ref 0–0.7)
EOSINOPHIL # BLD MANUAL: 0.7 10E3/UL (ref 0–0.7)
EOSINOPHIL # BLD MANUAL: 1.5 10E3/UL (ref 0–0.7)
EOSINOPHIL NFR BLD MANUAL: 0 %
EOSINOPHIL NFR BLD MANUAL: 1 %
EOSINOPHIL NFR BLD MANUAL: 3 %
EOSINOPHIL NFR BLD MANUAL: 4 %
ERYTHROCYTE [DISTWIDTH] IN BLOOD BY AUTOMATED COUNT: 19.1 % (ref 10–15)
ERYTHROCYTE [DISTWIDTH] IN BLOOD BY AUTOMATED COUNT: 19.3 % (ref 10–15)
ERYTHROCYTE [DISTWIDTH] IN BLOOD BY AUTOMATED COUNT: 19.3 % (ref 10–15)
ERYTHROCYTE [DISTWIDTH] IN BLOOD BY AUTOMATED COUNT: 19.4 % (ref 10–15)
ERYTHROCYTE [DISTWIDTH] IN BLOOD BY AUTOMATED COUNT: 19.9 % (ref 10–15)
ERYTHROCYTE [DISTWIDTH] IN BLOOD BY AUTOMATED COUNT: 20.1 % (ref 10–15)
ERYTHROCYTE [DISTWIDTH] IN BLOOD BY AUTOMATED COUNT: 20.1 % (ref 10–15)
ERYTHROCYTE [DISTWIDTH] IN BLOOD BY AUTOMATED COUNT: 20.2 % (ref 10–15)
ERYTHROCYTE [DISTWIDTH] IN BLOOD BY AUTOMATED COUNT: 20.3 % (ref 10–15)
ERYTHROCYTE [DISTWIDTH] IN BLOOD BY AUTOMATED COUNT: 21.2 % (ref 10–15)
ERYTHROCYTE [DISTWIDTH] IN BLOOD BY AUTOMATED COUNT: 22.9 % (ref 10–15)
ERYTHROCYTE [DISTWIDTH] IN BLOOD BY AUTOMATED COUNT: 23.3 % (ref 10–15)
FASTING STATUS PATIENT QL REPORTED: NO
FASTING STATUS PATIENT QL REPORTED: NO
FERRITIN SERPL-MCNC: 316 NG/ML (ref 31–409)
FERRITIN SERPL-MCNC: 95 NG/ML (ref 31–409)
FLUAV RNA SPEC QL NAA+PROBE: NEGATIVE
FLUBV RNA RESP QL NAA+PROBE: NEGATIVE
FOLATE SERPL-MCNC: 27.6 NG/ML (ref 4.6–34.8)
GLUCOSE BLDC GLUCOMTR-MCNC: 97 MG/DL (ref 70–99)
GLUCOSE SERPL-MCNC: 104 MG/DL (ref 70–99)
GLUCOSE SERPL-MCNC: 105 MG/DL (ref 70–99)
GLUCOSE SERPL-MCNC: 108 MG/DL (ref 70–99)
GLUCOSE SERPL-MCNC: 114 MG/DL (ref 70–99)
GLUCOSE SERPL-MCNC: 118 MG/DL (ref 70–99)
GLUCOSE SERPL-MCNC: 85 MG/DL (ref 70–99)
GLUCOSE SERPL-MCNC: 88 MG/DL (ref 70–99)
GLUCOSE SERPL-MCNC: 88 MG/DL (ref 70–99)
GLUCOSE SERPL-MCNC: 94 MG/DL (ref 70–99)
GLUCOSE SERPL-MCNC: 98 MG/DL (ref 70–99)
GLUCOSE UR STRIP-MCNC: NEGATIVE MG/DL
GRAM STAIN RESULT: NORMAL
HCO3 SERPL-SCNC: 17 MMOL/L (ref 22–29)
HCO3 SERPL-SCNC: 19 MMOL/L (ref 22–29)
HCO3 SERPL-SCNC: 19 MMOL/L (ref 22–29)
HCO3 SERPL-SCNC: 20 MMOL/L (ref 22–29)
HCO3 SERPL-SCNC: 21 MMOL/L (ref 22–29)
HCO3 SERPL-SCNC: 22 MMOL/L (ref 22–29)
HCO3 SERPL-SCNC: 24 MMOL/L (ref 22–29)
HCT VFR BLD AUTO: 24.7 % (ref 40–53)
HCT VFR BLD AUTO: 26.9 % (ref 40–53)
HCT VFR BLD AUTO: 27.3 % (ref 40–53)
HCT VFR BLD AUTO: 27.8 % (ref 40–53)
HCT VFR BLD AUTO: 29.2 % (ref 40–53)
HCT VFR BLD AUTO: 29.4 % (ref 40–53)
HCT VFR BLD AUTO: 29.5 % (ref 40–53)
HCT VFR BLD AUTO: 29.7 % (ref 40–53)
HCT VFR BLD AUTO: 35.5 % (ref 40–53)
HCT VFR BLD AUTO: 37 % (ref 40–53)
HCT VFR BLD AUTO: 37.7 % (ref 40–53)
HCT VFR BLD AUTO: 37.8 % (ref 40–53)
HDLC SERPL-MCNC: 27 MG/DL
HGB BLD-MCNC: 10.8 G/DL (ref 13.3–17.7)
HGB BLD-MCNC: 11.3 G/DL (ref 13.3–17.7)
HGB BLD-MCNC: 11.3 G/DL (ref 13.3–17.7)
HGB BLD-MCNC: 11.7 G/DL (ref 13.3–17.7)
HGB BLD-MCNC: 7.6 G/DL (ref 13.3–17.7)
HGB BLD-MCNC: 7.6 G/DL (ref 13.3–17.7)
HGB BLD-MCNC: 7.8 G/DL (ref 13.3–17.7)
HGB BLD-MCNC: 8 G/DL (ref 13.3–17.7)
HGB BLD-MCNC: 8.1 G/DL (ref 13.3–17.7)
HGB BLD-MCNC: 8.4 G/DL (ref 13.3–17.7)
HGB BLD-MCNC: 8.4 G/DL (ref 13.3–17.7)
HGB BLD-MCNC: 8.5 G/DL (ref 13.3–17.7)
HGB BLD-MCNC: 8.5 G/DL (ref 13.3–17.7)
HGB BLD-MCNC: 8.7 G/DL (ref 13.3–17.7)
HGB BLD-MCNC: 8.7 G/DL (ref 13.3–17.7)
HGB BLD-MCNC: 8.9 G/DL (ref 13.3–17.7)
HGB BLD-MCNC: 9.2 G/DL (ref 13.3–17.7)
HGB BLD-MCNC: 9.3 G/DL (ref 13.3–17.7)
HGB BLD-MCNC: 9.4 G/DL (ref 13.3–17.7)
HGB BLD-MCNC: 9.4 G/DL (ref 13.3–17.7)
HGB BLD-MCNC: 9.5 G/DL (ref 13.3–17.7)
HGB BLD-MCNC: 9.5 G/DL (ref 13.3–17.7)
HGB UR QL STRIP: ABNORMAL
HOLD SPECIMEN: NORMAL
HYALINE CASTS: 1 /LPF
INR BLD: 2.1 (ref 0.9–1.1)
INR BLD: 2.5 (ref 0.9–1.1)
INR PPP: 1.63 (ref 0.85–1.15)
INR PPP: 1.71 (ref 0.85–1.15)
INR PPP: 1.83 (ref 0.85–1.15)
INR PPP: 1.85 (ref 0.85–1.15)
INR PPP: 1.87 (ref 0.85–1.15)
INR PPP: 1.88 (ref 0.85–1.15)
INR PPP: 1.88 (ref 0.85–1.15)
INR PPP: 1.9 (ref 0.85–1.15)
INR PPP: 2.1 (ref 0.85–1.15)
INR PPP: 2.18 (ref 0.85–1.15)
INR PPP: 3.59 (ref 0.85–1.15)
INTERPRETATION ECG - MUSE: NORMAL
INTERPRETATION: NORMAL
IRON BINDING CAPACITY (ROCHE): 196 UG/DL (ref 240–430)
IRON SATN MFR SERPL: 24 % (ref 15–46)
IRON SERPL-MCNC: 47 UG/DL (ref 61–157)
ISSUE DATE AND TIME: NORMAL
KETONES UR STRIP-MCNC: NEGATIVE MG/DL
LACTATE SERPL-SCNC: 1.1 MMOL/L (ref 0.7–2)
LACTATE SERPL-SCNC: 1.3 MMOL/L (ref 0.7–2)
LDH SERPL L TO P-CCNC: 835 U/L (ref 0–250)
LDLC SERPL CALC-MCNC: 38 MG/DL
LEUKOCYTE ESTERASE UR QL STRIP: NEGATIVE
LVEF ECHO: NORMAL
LYMPHOCYTES # BLD MANUAL: 0.6 10E3/UL (ref 0.8–5.3)
LYMPHOCYTES # BLD MANUAL: 0.9 10E3/UL (ref 0.8–5.3)
LYMPHOCYTES # BLD MANUAL: 1.5 10E3/UL (ref 0.8–5.3)
LYMPHOCYTES # BLD MANUAL: 2.7 10E3/UL (ref 0.8–5.3)
LYMPHOCYTES # BLD MANUAL: 4.1 10E3/UL (ref 0.8–5.3)
LYMPHOCYTES # BLD MANUAL: 6.1 10E3/UL (ref 0.8–5.3)
LYMPHOCYTES NFR BLD MANUAL: 11 %
LYMPHOCYTES NFR BLD MANUAL: 14 %
LYMPHOCYTES NFR BLD MANUAL: 2 %
LYMPHOCYTES NFR BLD MANUAL: 4 %
LYMPHOCYTES NFR BLD MANUAL: 4 %
LYMPHOCYTES NFR BLD MANUAL: 9 %
MAGNESIUM SERPL-MCNC: 1.8 MG/DL (ref 1.7–2.3)
MAGNESIUM SERPL-MCNC: 1.9 MG/DL (ref 1.7–2.3)
MAGNESIUM SERPL-MCNC: 1.9 MG/DL (ref 1.7–2.3)
MAGNESIUM SERPL-MCNC: 2.1 MG/DL (ref 1.7–2.3)
MAGNESIUM SERPL-MCNC: 2.2 MG/DL (ref 1.7–2.3)
MCH RBC QN AUTO: 25.9 PG (ref 26.5–33)
MCH RBC QN AUTO: 26.1 PG (ref 26.5–33)
MCH RBC QN AUTO: 26.5 PG (ref 26.5–33)
MCH RBC QN AUTO: 26.6 PG (ref 26.5–33)
MCH RBC QN AUTO: 26.7 PG (ref 26.5–33)
MCH RBC QN AUTO: 26.7 PG (ref 26.5–33)
MCH RBC QN AUTO: 26.9 PG (ref 26.5–33)
MCH RBC QN AUTO: 27.2 PG (ref 26.5–33)
MCH RBC QN AUTO: 27.5 PG (ref 26.5–33)
MCH RBC QN AUTO: 27.8 PG (ref 26.5–33)
MCH RBC QN AUTO: 27.9 PG (ref 26.5–33)
MCH RBC QN AUTO: 28.4 PG (ref 26.5–33)
MCHC RBC AUTO-ENTMCNC: 30 G/DL (ref 31.5–36.5)
MCHC RBC AUTO-ENTMCNC: 30.4 G/DL (ref 31.5–36.5)
MCHC RBC AUTO-ENTMCNC: 30.5 G/DL (ref 31.5–36.5)
MCHC RBC AUTO-ENTMCNC: 30.5 G/DL (ref 31.5–36.5)
MCHC RBC AUTO-ENTMCNC: 30.8 G/DL (ref 31.5–36.5)
MCHC RBC AUTO-ENTMCNC: 31 G/DL (ref 31.5–36.5)
MCHC RBC AUTO-ENTMCNC: 31.1 G/DL (ref 31.5–36.5)
MCHC RBC AUTO-ENTMCNC: 31.2 G/DL (ref 31.5–36.5)
MCHC RBC AUTO-ENTMCNC: 31.3 G/DL (ref 31.5–36.5)
MCHC RBC AUTO-ENTMCNC: 31.3 G/DL (ref 31.5–36.5)
MCHC RBC AUTO-ENTMCNC: 31.9 G/DL (ref 31.5–36.5)
MCHC RBC AUTO-ENTMCNC: 32 G/DL (ref 31.5–36.5)
MCV RBC AUTO: 84 FL (ref 78–100)
MCV RBC AUTO: 85 FL (ref 78–100)
MCV RBC AUTO: 86 FL (ref 78–100)
MCV RBC AUTO: 87 FL (ref 78–100)
MCV RBC AUTO: 87 FL (ref 78–100)
MCV RBC AUTO: 88 FL (ref 78–100)
MCV RBC AUTO: 89 FL (ref 78–100)
MCV RBC AUTO: 89 FL (ref 78–100)
MCV RBC AUTO: 93 FL (ref 78–100)
METAMYELOCYTES # BLD MANUAL: 0.9 10E3/UL
METAMYELOCYTES # BLD MANUAL: 1 10E3/UL
METAMYELOCYTES # BLD MANUAL: 1 10E3/UL
METAMYELOCYTES # BLD MANUAL: 1.1 10E3/UL
METAMYELOCYTES # BLD MANUAL: 2.2 10E3/UL
METAMYELOCYTES NFR BLD MANUAL: 3 %
METAMYELOCYTES NFR BLD MANUAL: 3 %
METAMYELOCYTES NFR BLD MANUAL: 4 %
METAMYELOCYTES NFR BLD MANUAL: 4 %
METAMYELOCYTES NFR BLD MANUAL: 6 %
MICROALBUMIN UR-MCNC: 105 MG/L
MICROALBUMIN/CREAT UR: 139.26 MG/G CR (ref 0–17)
MONOCYTES # BLD MANUAL: 0.6 10E3/UL (ref 0–1.3)
MONOCYTES # BLD MANUAL: 0.6 10E3/UL (ref 0–1.3)
MONOCYTES # BLD MANUAL: 1.3 10E3/UL (ref 0–1.3)
MONOCYTES # BLD MANUAL: 1.5 10E3/UL (ref 0–1.3)
MONOCYTES # BLD MANUAL: 1.5 10E3/UL (ref 0–1.3)
MONOCYTES # BLD MANUAL: 1.6 10E3/UL (ref 0–1.3)
MONOCYTES NFR BLD MANUAL: 2 %
MONOCYTES NFR BLD MANUAL: 2 %
MONOCYTES NFR BLD MANUAL: 3 %
MONOCYTES NFR BLD MANUAL: 4 %
MONOCYTES NFR BLD MANUAL: 4 %
MONOCYTES NFR BLD MANUAL: 7 %
MYELOCYTES # BLD MANUAL: 0.4 10E3/UL
MYELOCYTES # BLD MANUAL: 0.7 10E3/UL
MYELOCYTES # BLD MANUAL: 1 10E3/UL
MYELOCYTES NFR BLD MANUAL: 2 %
MYELOCYTES NFR BLD MANUAL: 2 %
MYELOCYTES NFR BLD MANUAL: 3 %
NEUTROPHILS # BLD MANUAL: 18.1 10E3/UL (ref 1.6–8.3)
NEUTROPHILS # BLD MANUAL: 23.9 10E3/UL (ref 1.6–8.3)
NEUTROPHILS # BLD MANUAL: 28.1 10E3/UL (ref 1.6–8.3)
NEUTROPHILS # BLD MANUAL: 28.4 10E3/UL (ref 1.6–8.3)
NEUTROPHILS # BLD MANUAL: 28.6 10E3/UL (ref 1.6–8.3)
NEUTROPHILS # BLD MANUAL: 34.7 10E3/UL (ref 1.6–8.3)
NEUTROPHILS NFR BLD MANUAL: 77 %
NEUTROPHILS NFR BLD MANUAL: 77 %
NEUTROPHILS NFR BLD MANUAL: 79 %
NEUTROPHILS NFR BLD MANUAL: 80 %
NEUTROPHILS NFR BLD MANUAL: 80 %
NEUTROPHILS NFR BLD MANUAL: 88 %
NITRATE UR QL: NEGATIVE
NONHDLC SERPL-MCNC: 64 MG/DL
NRBC # BLD AUTO: 0.3 10E3/UL
NRBC # BLD AUTO: 0.4 10E3/UL
NRBC # BLD AUTO: 0.9 10E3/UL
NRBC # BLD AUTO: 1 10E3/UL
NRBC # BLD AUTO: 1.1 10E3/UL
NRBC # BLD AUTO: 1.2 10E3/UL
NRBC # BLD AUTO: 1.3 10E3/UL
NRBC # BLD AUTO: 1.8 10E3/UL
NRBC # BLD AUTO: 1.8 10E3/UL
NRBC # BLD AUTO: 2.8 10E3/UL
NRBC # BLD AUTO: 3.3 10E3/UL
NRBC # BLD AUTO: 3.9 10E3/UL
NRBC BLD AUTO-RTO: 2 /100
NRBC BLD AUTO-RTO: 3 /100
NRBC BLD AUTO-RTO: 4 /100
NRBC BLD AUTO-RTO: 4 /100
NRBC BLD AUTO-RTO: 5 /100
NRBC BLD AUTO-RTO: 6 /100
NRBC BLD MANUAL-RTO: 1 %
NRBC BLD MANUAL-RTO: 3 %
NRBC BLD MANUAL-RTO: 5 %
NRBC BLD MANUAL-RTO: 5 %
NRBC BLD MANUAL-RTO: 9 %
NRBC BLD MANUAL-RTO: 9 %
P AXIS - MUSE: 263 DEGREES
P AXIS - MUSE: NORMAL DEGREES
PATH REPORT.COMMENTS IMP SPEC: NORMAL
PATH REPORT.FINAL DX SPEC: NORMAL
PATH REPORT.MICROSCOPIC SPEC OTHER STN: NORMAL
PATH REPORT.RELEVANT HX SPEC: NORMAL
PATH REV: ABNORMAL
PH UR STRIP: 5.5 [PH] (ref 5–7)
PHOSPHATE SERPL-MCNC: 5.1 MG/DL (ref 2.5–4.5)
PLAT MORPH BLD: ABNORMAL
PLATELET # BLD AUTO: 249 10E3/UL (ref 150–450)
PLATELET # BLD AUTO: 333 10E3/UL (ref 150–450)
PLATELET # BLD AUTO: 348 10E3/UL (ref 150–450)
PLATELET # BLD AUTO: 376 10E3/UL (ref 150–450)
PLATELET # BLD AUTO: 380 10E3/UL (ref 150–450)
PLATELET # BLD AUTO: 386 10E3/UL (ref 150–450)
PLATELET # BLD AUTO: 406 10E3/UL (ref 150–450)
PLATELET # BLD AUTO: 413 10E3/UL (ref 150–450)
PLATELET # BLD AUTO: 414 10E3/UL (ref 150–450)
PLATELET # BLD AUTO: 432 10E3/UL (ref 150–450)
PLATELET # BLD AUTO: 474 10E3/UL (ref 150–450)
PLATELET # BLD AUTO: 498 10E3/UL (ref 150–450)
POLYCHROMASIA BLD QL SMEAR: SLIGHT
POTASSIUM SERPL-SCNC: 3.3 MMOL/L (ref 3.4–5.3)
POTASSIUM SERPL-SCNC: 3.4 MMOL/L (ref 3.4–5.3)
POTASSIUM SERPL-SCNC: 3.6 MMOL/L (ref 3.4–5.3)
POTASSIUM SERPL-SCNC: 3.6 MMOL/L (ref 3.4–5.3)
POTASSIUM SERPL-SCNC: 3.7 MMOL/L (ref 3.4–5.3)
POTASSIUM SERPL-SCNC: 3.8 MMOL/L (ref 3.4–5.3)
POTASSIUM SERPL-SCNC: 4 MMOL/L (ref 3.4–5.3)
POTASSIUM SERPL-SCNC: 4.2 MMOL/L (ref 3.4–5.3)
POTASSIUM SERPL-SCNC: 4.7 MMOL/L (ref 3.4–5.3)
POTASSIUM SERPL-SCNC: 5.1 MMOL/L (ref 3.4–5.3)
POTASSIUM SERPL-SCNC: 5.2 MMOL/L (ref 3.4–5.3)
POTASSIUM SERPL-SCNC: 5.3 MMOL/L (ref 3.4–5.3)
POTASSIUM SERPL-SCNC: 5.6 MMOL/L (ref 3.4–5.3)
POTASSIUM SERPL-SCNC: 5.7 MMOL/L (ref 3.4–5.3)
POTASSIUM SERPL-SCNC: 5.9 MMOL/L (ref 3.4–5.3)
PR INTERVAL - MUSE: 128 MS
PR INTERVAL - MUSE: NORMAL MS
PROCALCITONIN SERPL IA-MCNC: 1.23 NG/ML
PROCALCITONIN SERPL IA-MCNC: 1.58 NG/ML
PROT SERPL-MCNC: 5.5 G/DL (ref 6.4–8.3)
PROT SERPL-MCNC: 5.7 G/DL (ref 6.4–8.3)
PROT SERPL-MCNC: 6.8 G/DL (ref 6.4–8.3)
PSA SERPL DL<=0.01 NG/ML-MCNC: 3.4 NG/ML
QRS DURATION - MUSE: 100 MS
QRS DURATION - MUSE: 104 MS
QRS DURATION - MUSE: 86 MS
QRS DURATION - MUSE: 88 MS
QRS DURATION - MUSE: 90 MS
QRS DURATION - MUSE: 96 MS
QT - MUSE: 346 MS
QT - MUSE: 352 MS
QT - MUSE: 360 MS
QT - MUSE: 388 MS
QT - MUSE: 412 MS
QT - MUSE: 418 MS
QTC - MUSE: 445 MS
QTC - MUSE: 462 MS
QTC - MUSE: 464 MS
QTC - MUSE: 472 MS
QTC - MUSE: 473 MS
QTC - MUSE: 481 MS
R AXIS - MUSE: 35 DEGREES
R AXIS - MUSE: 48 DEGREES
R AXIS - MUSE: 51 DEGREES
R AXIS - MUSE: 58 DEGREES
R AXIS - MUSE: 75 DEGREES
R AXIS - MUSE: 75 DEGREES
RBC # BLD AUTO: 2.93 10E6/UL (ref 4.4–5.9)
RBC # BLD AUTO: 3.17 10E6/UL (ref 4.4–5.9)
RBC # BLD AUTO: 3.18 10E6/UL (ref 4.4–5.9)
RBC # BLD AUTO: 3.23 10E6/UL (ref 4.4–5.9)
RBC # BLD AUTO: 3.34 10E6/UL (ref 4.4–5.9)
RBC # BLD AUTO: 3.35 10E6/UL (ref 4.4–5.9)
RBC # BLD AUTO: 3.38 10E6/UL (ref 4.4–5.9)
RBC # BLD AUTO: 3.41 10E6/UL (ref 4.4–5.9)
RBC # BLD AUTO: 3.97 10E6/UL (ref 4.4–5.9)
RBC # BLD AUTO: 4.05 10E6/UL (ref 4.4–5.9)
RBC # BLD AUTO: 4.25 10E6/UL (ref 4.4–5.9)
RBC # BLD AUTO: 4.39 10E6/UL (ref 4.4–5.9)
RBC MORPH BLD: ABNORMAL
RBC URINE: 8 /HPF
RETICS # AUTO: 0.19 10E6/UL (ref 0.03–0.1)
RETICS # AUTO: 0.27 10E6/UL (ref 0.03–0.1)
RETICS/RBC NFR AUTO: 6 % (ref 0.5–2)
RETICS/RBC NFR AUTO: 6.3 % (ref 0.5–2)
RSV RNA SPEC NAA+PROBE: NEGATIVE
SARS-COV-2 RNA RESP QL NAA+PROBE: NEGATIVE
SIGNIFICANT RESULTS: NORMAL
SODIUM SERPL-SCNC: 131 MMOL/L (ref 135–145)
SODIUM SERPL-SCNC: 135 MMOL/L (ref 135–145)
SODIUM SERPL-SCNC: 137 MMOL/L (ref 135–145)
SODIUM SERPL-SCNC: 138 MMOL/L (ref 135–145)
SODIUM SERPL-SCNC: 140 MMOL/L (ref 135–145)
SODIUM SERPL-SCNC: 141 MMOL/L (ref 135–145)
SP GR UR STRIP: 1.02 (ref 1–1.03)
SPECIMEN DESCRIPTION: NORMAL
SPECIMEN EXPIRATION DATE: NORMAL
SPECIMEN SOURCE: NORMAL
SYSTOLIC BLOOD PRESSURE - MUSE: NORMAL MMHG
T AXIS - MUSE: 191 DEGREES
T AXIS - MUSE: 199 DEGREES
T AXIS - MUSE: 209 DEGREES
T AXIS - MUSE: 219 DEGREES
T AXIS - MUSE: 227 DEGREES
T AXIS - MUSE: 232 DEGREES
T4 FREE SERPL-MCNC: 0.82 NG/DL (ref 0.9–1.7)
TEST DETAILS, MDL: NORMAL
TRIGL SERPL-MCNC: 132 MG/DL
TROPONIN T SERPL HS-MCNC: 13 NG/L
TROPONIN T SERPL HS-MCNC: 194 NG/L
TROPONIN T SERPL HS-MCNC: 200 NG/L
TROPONIN T SERPL HS-MCNC: 208 NG/L
TROPONIN T SERPL HS-MCNC: 567 NG/L
TROPONIN T SERPL HS-MCNC: 666 NG/L
TROPONIN T SERPL HS-MCNC: 712 NG/L
TSH SERPL DL<=0.005 MIU/L-ACNC: 4.66 UIU/ML (ref 0.3–4.2)
UNIT ABO/RH: NORMAL
UNIT NUMBER: NORMAL
UNIT STATUS: NORMAL
UNIT TYPE ISBT: 6200
UPPER GI ENDOSCOPY: NORMAL
URATE SERPL-MCNC: 5.8 MG/DL (ref 3.4–7)
URATE SERPL-MCNC: 8 MG/DL (ref 3.4–7)
UROBILINOGEN UR STRIP-MCNC: NORMAL MG/DL
VENTRICULAR RATE- MUSE: 104 BPM
VENTRICULAR RATE- MUSE: 112 BPM
VENTRICULAR RATE- MUSE: 77 BPM
VENTRICULAR RATE- MUSE: 82 BPM
VENTRICULAR RATE- MUSE: 86 BPM
VENTRICULAR RATE- MUSE: 92 BPM
VIT B12 SERPL-MCNC: 2556 PG/ML (ref 232–1245)
WBC # BLD AUTO: 22.9 10E3/UL (ref 4–11)
WBC # BLD AUTO: 28.3 10E3/UL (ref 4–11)
WBC # BLD AUTO: 29.9 10E3/UL (ref 4–11)
WBC # BLD AUTO: 32.3 10E3/UL (ref 4–11)
WBC # BLD AUTO: 33.1 10E3/UL (ref 4–11)
WBC # BLD AUTO: 35.3 10E3/UL (ref 4–11)
WBC # BLD AUTO: 35.5 10E3/UL (ref 4–11)
WBC # BLD AUTO: 36.5 10E3/UL (ref 4–11)
WBC # BLD AUTO: 36.9 10E3/UL (ref 4–11)
WBC # BLD AUTO: 37.2 10E3/UL (ref 4–11)
WBC # BLD AUTO: 38.6 10E3/UL (ref 4–11)
WBC # BLD AUTO: 43.4 10E3/UL (ref 4–11)
WBC URINE: 3 /HPF

## 2024-01-01 PROCEDURE — 85018 HEMOGLOBIN: CPT | Performed by: EMERGENCY MEDICINE

## 2024-01-01 PROCEDURE — 71045 X-RAY EXAM CHEST 1 VIEW: CPT

## 2024-01-01 PROCEDURE — 85610 PROTHROMBIN TIME: CPT | Performed by: PHYSICIAN ASSISTANT

## 2024-01-01 PROCEDURE — 250N000013 HC RX MED GY IP 250 OP 250 PS 637: Performed by: INTERNAL MEDICINE

## 2024-01-01 PROCEDURE — 85610 PROTHROMBIN TIME: CPT

## 2024-01-01 PROCEDURE — 85007 BL SMEAR W/DIFF WBC COUNT: CPT | Performed by: EMERGENCY MEDICINE

## 2024-01-01 PROCEDURE — 85018 HEMOGLOBIN: CPT | Performed by: INTERNAL MEDICINE

## 2024-01-01 PROCEDURE — 99232 SBSQ HOSP IP/OBS MODERATE 35: CPT | Performed by: INTERNAL MEDICINE

## 2024-01-01 PROCEDURE — 36415 COLL VENOUS BLD VENIPUNCTURE: CPT | Performed by: INTERNAL MEDICINE

## 2024-01-01 PROCEDURE — 80053 COMPREHEN METABOLIC PANEL: CPT | Performed by: FAMILY MEDICINE

## 2024-01-01 PROCEDURE — 250N000009 HC RX 250: Performed by: EMERGENCY MEDICINE

## 2024-01-01 PROCEDURE — 250N000013 HC RX MED GY IP 250 OP 250 PS 637: Performed by: PHYSICIAN ASSISTANT

## 2024-01-01 PROCEDURE — 86900 BLOOD TYPING SEROLOGIC ABO: CPT | Performed by: EMERGENCY MEDICINE

## 2024-01-01 PROCEDURE — 85007 BL SMEAR W/DIFF WBC COUNT: CPT

## 2024-01-01 PROCEDURE — 81339 MPL GENE SEQ ALYS EXON 10: CPT | Performed by: INTERNAL MEDICINE

## 2024-01-01 PROCEDURE — C1769 GUIDE WIRE: HCPCS | Performed by: INTERNAL MEDICINE

## 2024-01-01 PROCEDURE — 85007 BL SMEAR W/DIFF WBC COUNT: CPT | Performed by: INTERNAL MEDICINE

## 2024-01-01 PROCEDURE — 84484 ASSAY OF TROPONIN QUANT: CPT | Performed by: EMERGENCY MEDICINE

## 2024-01-01 PROCEDURE — 82728 ASSAY OF FERRITIN: CPT | Performed by: INTERNAL MEDICINE

## 2024-01-01 PROCEDURE — 36415 COLL VENOUS BLD VENIPUNCTURE: CPT | Performed by: EMERGENCY MEDICINE

## 2024-01-01 PROCEDURE — C1887 CATHETER, GUIDING: HCPCS | Performed by: INTERNAL MEDICINE

## 2024-01-01 PROCEDURE — 258N000003 HC RX IP 258 OP 636: Performed by: INTERNAL MEDICINE

## 2024-01-01 PROCEDURE — 43235 EGD DIAGNOSTIC BRUSH WASH: CPT | Performed by: INTERNAL MEDICINE

## 2024-01-01 PROCEDURE — 85027 COMPLETE CBC AUTOMATED: CPT | Performed by: HOSPITALIST

## 2024-01-01 PROCEDURE — 97165 OT EVAL LOW COMPLEX 30 MIN: CPT | Mod: GO | Performed by: OCCUPATIONAL THERAPIST

## 2024-01-01 PROCEDURE — 80048 BASIC METABOLIC PNL TOTAL CA: CPT | Performed by: INTERNAL MEDICINE

## 2024-01-01 PROCEDURE — 84439 ASSAY OF FREE THYROXINE: CPT | Performed by: INTERNAL MEDICINE

## 2024-01-01 PROCEDURE — 0DJ08ZZ INSPECTION OF UPPER INTESTINAL TRACT, VIA NATURAL OR ARTIFICIAL OPENING ENDOSCOPIC: ICD-10-PCS | Performed by: INTERNAL MEDICINE

## 2024-01-01 PROCEDURE — 85018 HEMOGLOBIN: CPT | Performed by: PHYSICIAN ASSISTANT

## 2024-01-01 PROCEDURE — 87040 BLOOD CULTURE FOR BACTERIA: CPT | Performed by: PHYSICIAN ASSISTANT

## 2024-01-01 PROCEDURE — 99152 MOD SED SAME PHYS/QHP 5/>YRS: CPT | Performed by: INTERNAL MEDICINE

## 2024-01-01 PROCEDURE — 82570 ASSAY OF URINE CREATININE: CPT | Performed by: FAMILY MEDICINE

## 2024-01-01 PROCEDURE — 250N000009 HC RX 250: Performed by: NURSE ANESTHETIST, CERTIFIED REGISTERED

## 2024-01-01 PROCEDURE — 120N000001 HC R&B MED SURG/OB

## 2024-01-01 PROCEDURE — 84132 ASSAY OF SERUM POTASSIUM: CPT | Performed by: INTERNAL MEDICINE

## 2024-01-01 PROCEDURE — 93005 ELECTROCARDIOGRAM TRACING: CPT

## 2024-01-01 PROCEDURE — 99153 MOD SED SAME PHYS/QHP EA: CPT | Performed by: INTERNAL MEDICINE

## 2024-01-01 PROCEDURE — 99232 SBSQ HOSP IP/OBS MODERATE 35: CPT | Mod: FS | Performed by: NURSE PRACTITIONER

## 2024-01-01 PROCEDURE — 120N000013 HC R&B IMCU

## 2024-01-01 PROCEDURE — 93010 ELECTROCARDIOGRAM REPORT: CPT | Performed by: INTERNAL MEDICINE

## 2024-01-01 PROCEDURE — 250N000011 HC RX IP 250 OP 636: Performed by: EMERGENCY MEDICINE

## 2024-01-01 PROCEDURE — 36416 COLLJ CAPILLARY BLOOD SPEC: CPT

## 2024-01-01 PROCEDURE — 81270 JAK2 GENE: CPT | Performed by: INTERNAL MEDICINE

## 2024-01-01 PROCEDURE — 250N000011 HC RX IP 250 OP 636: Performed by: PHYSICIAN ASSISTANT

## 2024-01-01 PROCEDURE — 99232 SBSQ HOSP IP/OBS MODERATE 35: CPT

## 2024-01-01 PROCEDURE — 85027 COMPLETE CBC AUTOMATED: CPT | Performed by: FAMILY MEDICINE

## 2024-01-01 PROCEDURE — 84145 PROCALCITONIN (PCT): CPT | Performed by: PHYSICIAN ASSISTANT

## 2024-01-01 PROCEDURE — 82746 ASSAY OF FOLIC ACID SERUM: CPT | Performed by: INTERNAL MEDICINE

## 2024-01-01 PROCEDURE — 96374 THER/PROPH/DIAG INJ IV PUSH: CPT

## 2024-01-01 PROCEDURE — 97110 THERAPEUTIC EXERCISES: CPT | Mod: GO

## 2024-01-01 PROCEDURE — 83605 ASSAY OF LACTIC ACID: CPT | Performed by: PHYSICIAN ASSISTANT

## 2024-01-01 PROCEDURE — 83735 ASSAY OF MAGNESIUM: CPT | Performed by: INTERNAL MEDICINE

## 2024-01-01 PROCEDURE — 250N000011 HC RX IP 250 OP 636: Performed by: INTERNAL MEDICINE

## 2024-01-01 PROCEDURE — 93970 EXTREMITY STUDY: CPT

## 2024-01-01 PROCEDURE — 97535 SELF CARE MNGMENT TRAINING: CPT | Mod: GO | Performed by: OCCUPATIONAL THERAPIST

## 2024-01-01 PROCEDURE — B2111ZZ FLUOROSCOPY OF MULTIPLE CORONARY ARTERIES USING LOW OSMOLAR CONTRAST: ICD-10-PCS | Performed by: INTERNAL MEDICINE

## 2024-01-01 PROCEDURE — 93010 ELECTROCARDIOGRAM REPORT: CPT | Mod: XU | Performed by: INTERNAL MEDICINE

## 2024-01-01 PROCEDURE — 36430 TRANSFUSION BLD/BLD COMPNT: CPT

## 2024-01-01 PROCEDURE — 258N000003 HC RX IP 258 OP 636: Performed by: EMERGENCY MEDICINE

## 2024-01-01 PROCEDURE — 99223 1ST HOSP IP/OBS HIGH 75: CPT | Performed by: INTERNAL MEDICINE

## 2024-01-01 PROCEDURE — 85027 COMPLETE CBC AUTOMATED: CPT

## 2024-01-01 PROCEDURE — P9016 RBC LEUKOCYTES REDUCED: HCPCS | Performed by: EMERGENCY MEDICINE

## 2024-01-01 PROCEDURE — G0103 PSA SCREENING: HCPCS | Performed by: FAMILY MEDICINE

## 2024-01-01 PROCEDURE — 36415 COLL VENOUS BLD VENIPUNCTURE: CPT | Performed by: FAMILY MEDICINE

## 2024-01-01 PROCEDURE — 272N000001 HC OR GENERAL SUPPLY STERILE: Performed by: INTERNAL MEDICINE

## 2024-01-01 PROCEDURE — 36415 COLL VENOUS BLD VENIPUNCTURE: CPT | Performed by: PHYSICIAN ASSISTANT

## 2024-01-01 PROCEDURE — C8929 TTE W OR WO FOL WCON,DOPPLER: HCPCS

## 2024-01-01 PROCEDURE — 84100 ASSAY OF PHOSPHORUS: CPT | Performed by: INTERNAL MEDICINE

## 2024-01-01 PROCEDURE — 81170 ABL1 GENE: CPT | Performed by: INTERNAL MEDICINE

## 2024-01-01 PROCEDURE — 96375 TX/PRO/DX INJ NEW DRUG ADDON: CPT

## 2024-01-01 PROCEDURE — 85610 PROTHROMBIN TIME: CPT | Performed by: INTERNAL MEDICINE

## 2024-01-01 PROCEDURE — 250N000013 HC RX MED GY IP 250 OP 250 PS 637: Performed by: HOSPITALIST

## 2024-01-01 PROCEDURE — 43235 EGD DIAGNOSTIC BRUSH WASH: CPT | Performed by: ANESTHESIOLOGY

## 2024-01-01 PROCEDURE — 97535 SELF CARE MNGMENT TRAINING: CPT | Mod: GO

## 2024-01-01 PROCEDURE — 99233 SBSQ HOSP IP/OBS HIGH 50: CPT | Performed by: INTERNAL MEDICINE

## 2024-01-01 PROCEDURE — 83605 ASSAY OF LACTIC ACID: CPT | Performed by: INTERNAL MEDICINE

## 2024-01-01 PROCEDURE — 80053 COMPREHEN METABOLIC PANEL: CPT | Performed by: PHYSICIAN ASSISTANT

## 2024-01-01 PROCEDURE — 93454 CORONARY ARTERY ANGIO S&I: CPT | Mod: 26 | Performed by: INTERNAL MEDICINE

## 2024-01-01 PROCEDURE — 93306 TTE W/DOPPLER COMPLETE: CPT | Mod: 26 | Performed by: INTERNAL MEDICINE

## 2024-01-01 PROCEDURE — 81001 URINALYSIS AUTO W/SCOPE: CPT | Performed by: INTERNAL MEDICINE

## 2024-01-01 PROCEDURE — 84132 ASSAY OF SERUM POTASSIUM: CPT | Performed by: PHYSICIAN ASSISTANT

## 2024-01-01 PROCEDURE — 99418 PROLNG IP/OBS E/M EA 15 MIN: CPT | Performed by: PHYSICIAN ASSISTANT

## 2024-01-01 PROCEDURE — 250N000011 HC RX IP 250 OP 636: Performed by: NURSE ANESTHETIST, CERTIFIED REGISTERED

## 2024-01-01 PROCEDURE — 86923 COMPATIBILITY TEST ELECTRIC: CPT | Performed by: EMERGENCY MEDICINE

## 2024-01-01 PROCEDURE — C1894 INTRO/SHEATH, NON-LASER: HCPCS | Performed by: INTERNAL MEDICINE

## 2024-01-01 PROCEDURE — 999N000010 HC STATISTIC ANES STAT CODE-CRNA PER MINUTE: Performed by: INTERNAL MEDICINE

## 2024-01-01 PROCEDURE — 258N000003 HC RX IP 258 OP 636: Performed by: NURSE ANESTHETIST, CERTIFIED REGISTERED

## 2024-01-01 PROCEDURE — 84550 ASSAY OF BLOOD/URIC ACID: CPT | Performed by: FAMILY MEDICINE

## 2024-01-01 PROCEDURE — 80048 BASIC METABOLIC PNL TOTAL CA: CPT | Performed by: FAMILY MEDICINE

## 2024-01-01 PROCEDURE — 88189 FLOWCYTOMETRY/READ 16 & >: CPT | Mod: GC | Performed by: STUDENT IN AN ORGANIZED HEALTH CARE EDUCATION/TRAINING PROGRAM

## 2024-01-01 PROCEDURE — 96361 HYDRATE IV INFUSION ADD-ON: CPT

## 2024-01-01 PROCEDURE — 85060 BLOOD SMEAR INTERPRETATION: CPT | Performed by: PATHOLOGY

## 2024-01-01 PROCEDURE — 99239 HOSP IP/OBS DSCHRG MGMT >30: CPT | Performed by: INTERNAL MEDICINE

## 2024-01-01 PROCEDURE — 99496 TRANSJ CARE MGMT HIGH F2F 7D: CPT | Performed by: FAMILY MEDICINE

## 2024-01-01 PROCEDURE — 87205 SMEAR GRAM STAIN: CPT | Performed by: PHYSICIAN ASSISTANT

## 2024-01-01 PROCEDURE — 88184 FLOWCYTOMETRY/ TC 1 MARKER: CPT | Performed by: STUDENT IN AN ORGANIZED HEALTH CARE EDUCATION/TRAINING PROGRAM

## 2024-01-01 PROCEDURE — 36415 COLL VENOUS BLD VENIPUNCTURE: CPT

## 2024-01-01 PROCEDURE — 258N000003 HC RX IP 258 OP 636: Performed by: PHYSICIAN ASSISTANT

## 2024-01-01 PROCEDURE — 99285 EMERGENCY DEPT VISIT HI MDM: CPT | Mod: 25

## 2024-01-01 PROCEDURE — 85045 AUTOMATED RETICULOCYTE COUNT: CPT

## 2024-01-01 PROCEDURE — 93880 EXTRACRANIAL BILAT STUDY: CPT

## 2024-01-01 PROCEDURE — 85045 AUTOMATED RETICULOCYTE COUNT: CPT | Performed by: INTERNAL MEDICINE

## 2024-01-01 PROCEDURE — 999N000208 ECHOCARDIOGRAM COMPLETE

## 2024-01-01 PROCEDURE — 99100 ANES PT EXTEME AGE<1 YR&>70: CPT | Performed by: NURSE ANESTHETIST, CERTIFIED REGISTERED

## 2024-01-01 PROCEDURE — G2211 COMPLEX E/M VISIT ADD ON: HCPCS | Performed by: INTERNAL MEDICINE

## 2024-01-01 PROCEDURE — 82607 VITAMIN B-12: CPT | Performed by: INTERNAL MEDICINE

## 2024-01-01 PROCEDURE — 83615 LACTATE (LD) (LDH) ENZYME: CPT | Performed by: INTERNAL MEDICINE

## 2024-01-01 PROCEDURE — 43235 EGD DIAGNOSTIC BRUSH WASH: CPT | Performed by: NURSE ANESTHETIST, CERTIFIED REGISTERED

## 2024-01-01 PROCEDURE — 85014 HEMATOCRIT: CPT | Performed by: INTERNAL MEDICINE

## 2024-01-01 PROCEDURE — 83735 ASSAY OF MAGNESIUM: CPT | Performed by: PHYSICIAN ASSISTANT

## 2024-01-01 PROCEDURE — 84132 ASSAY OF SERUM POTASSIUM: CPT | Performed by: EMERGENCY MEDICINE

## 2024-01-01 PROCEDURE — 85027 COMPLETE CBC AUTOMATED: CPT | Performed by: PHYSICIAN ASSISTANT

## 2024-01-01 PROCEDURE — 99215 OFFICE O/P EST HI 40 MIN: CPT | Performed by: INTERNAL MEDICINE

## 2024-01-01 PROCEDURE — 84484 ASSAY OF TROPONIN QUANT: CPT

## 2024-01-01 PROCEDURE — 87637 SARSCOV2&INF A&B&RSV AMP PRB: CPT | Performed by: PHYSICIAN ASSISTANT

## 2024-01-01 PROCEDURE — 80061 LIPID PANEL: CPT | Performed by: FAMILY MEDICINE

## 2024-01-01 PROCEDURE — G0463 HOSPITAL OUTPT CLINIC VISIT: HCPCS | Performed by: INTERNAL MEDICINE

## 2024-01-01 PROCEDURE — 99214 OFFICE O/P EST MOD 30 MIN: CPT | Mod: 25 | Performed by: FAMILY MEDICINE

## 2024-01-01 PROCEDURE — 80048 BASIC METABOLIC PNL TOTAL CA: CPT | Performed by: PHYSICIAN ASSISTANT

## 2024-01-01 PROCEDURE — 97530 THERAPEUTIC ACTIVITIES: CPT | Mod: GO

## 2024-01-01 PROCEDURE — 370N000017 HC ANESTHESIA TECHNICAL FEE, PER MIN: Performed by: INTERNAL MEDICINE

## 2024-01-01 PROCEDURE — 250N000009 HC RX 250: Performed by: INTERNAL MEDICINE

## 2024-01-01 PROCEDURE — 71250 CT THORAX DX C-: CPT | Mod: MG

## 2024-01-01 PROCEDURE — G0439 PPPS, SUBSEQ VISIT: HCPCS | Performed by: FAMILY MEDICINE

## 2024-01-01 PROCEDURE — P9016 RBC LEUKOCYTES REDUCED: HCPCS | Performed by: PHYSICIAN ASSISTANT

## 2024-01-01 PROCEDURE — 99223 1ST HOSP IP/OBS HIGH 75: CPT | Mod: 25 | Performed by: NURSE PRACTITIONER

## 2024-01-01 PROCEDURE — 255N000002 HC RX 255 OP 636: Performed by: PHYSICIAN ASSISTANT

## 2024-01-01 PROCEDURE — 80053 COMPREHEN METABOLIC PANEL: CPT | Performed by: EMERGENCY MEDICINE

## 2024-01-01 PROCEDURE — 86923 COMPATIBILITY TEST ELECTRIC: CPT | Performed by: PHYSICIAN ASSISTANT

## 2024-01-01 PROCEDURE — 99223 1ST HOSP IP/OBS HIGH 75: CPT | Mod: AI | Performed by: PHYSICIAN ASSISTANT

## 2024-01-01 PROCEDURE — 99222 1ST HOSP IP/OBS MODERATE 55: CPT | Mod: FS | Performed by: PHYSICIAN ASSISTANT

## 2024-01-01 PROCEDURE — 85610 PROTHROMBIN TIME: CPT | Performed by: EMERGENCY MEDICINE

## 2024-01-01 PROCEDURE — 88185 FLOWCYTOMETRY/TC ADD-ON: CPT | Performed by: INTERNAL MEDICINE

## 2024-01-01 PROCEDURE — 99233 SBSQ HOSP IP/OBS HIGH 50: CPT | Performed by: PHYSICIAN ASSISTANT

## 2024-01-01 PROCEDURE — 99207 PR NO BILLABLE SERVICE THIS VISIT: CPT

## 2024-01-01 PROCEDURE — 99207 BLOOD MORPHOLOGY PATHOLOGIST REVIEW: CPT | Performed by: PATHOLOGY

## 2024-01-01 PROCEDURE — 82043 UR ALBUMIN QUANTITATIVE: CPT | Performed by: FAMILY MEDICINE

## 2024-01-01 PROCEDURE — 83550 IRON BINDING TEST: CPT | Performed by: INTERNAL MEDICINE

## 2024-01-01 PROCEDURE — 250N000011 HC RX IP 250 OP 636: Performed by: HOSPITALIST

## 2024-01-01 PROCEDURE — 86901 BLOOD TYPING SEROLOGIC RH(D): CPT | Performed by: EMERGENCY MEDICINE

## 2024-01-01 PROCEDURE — 84550 ASSAY OF BLOOD/URIC ACID: CPT | Performed by: INTERNAL MEDICINE

## 2024-01-01 PROCEDURE — 99152 MOD SED SAME PHYS/QHP 5/>YRS: CPT | Mod: GC | Performed by: INTERNAL MEDICINE

## 2024-01-01 PROCEDURE — G0452 MOLECULAR PATHOLOGY INTERPR: HCPCS | Mod: 26 | Performed by: STUDENT IN AN ORGANIZED HEALTH CARE EDUCATION/TRAINING PROGRAM

## 2024-01-01 PROCEDURE — 250N000013 HC RX MED GY IP 250 OP 250 PS 637: Performed by: EMERGENCY MEDICINE

## 2024-01-01 PROCEDURE — 84443 ASSAY THYROID STIM HORMONE: CPT | Performed by: INTERNAL MEDICINE

## 2024-01-01 PROCEDURE — 93454 CORONARY ARTERY ANGIO S&I: CPT | Performed by: INTERNAL MEDICINE

## 2024-01-01 PROCEDURE — 84484 ASSAY OF TROPONIN QUANT: CPT | Performed by: PHYSICIAN ASSISTANT

## 2024-01-01 PROCEDURE — 85027 COMPLETE CBC AUTOMATED: CPT | Performed by: INTERNAL MEDICINE

## 2024-01-01 RX ORDER — POTASSIUM CHLORIDE 1500 MG/1
TABLET, EXTENDED RELEASE ORAL
Qty: 90 TABLET | Refills: 1 | Status: SHIPPED | OUTPATIENT
Start: 2024-01-01 | End: 2024-01-01

## 2024-01-01 RX ORDER — NALOXONE HYDROCHLORIDE 0.4 MG/ML
0.2 INJECTION, SOLUTION INTRAMUSCULAR; INTRAVENOUS; SUBCUTANEOUS
Status: DISCONTINUED | OUTPATIENT
Start: 2024-01-01 | End: 2024-01-01 | Stop reason: HOSPADM

## 2024-01-01 RX ORDER — WARFARIN SODIUM 4 MG/1
TABLET ORAL
Qty: 190 TABLET | Refills: 1 | Status: ON HOLD | OUTPATIENT
Start: 2024-01-01 | End: 2024-01-01

## 2024-01-01 RX ORDER — OXYCODONE HYDROCHLORIDE 5 MG/1
10 TABLET ORAL EVERY 4 HOURS PRN
Status: DISCONTINUED | OUTPATIENT
Start: 2024-01-01 | End: 2024-01-01 | Stop reason: HOSPADM

## 2024-01-01 RX ORDER — NITROGLYCERIN 0.4 MG/1
0.4 TABLET SUBLINGUAL EVERY 5 MIN PRN
Status: DISCONTINUED | OUTPATIENT
Start: 2024-01-01 | End: 2024-01-01

## 2024-01-01 RX ORDER — CEFDINIR 300 MG/1
300 CAPSULE ORAL EVERY 12 HOURS SCHEDULED
Status: COMPLETED | OUTPATIENT
Start: 2024-01-01 | End: 2024-01-01

## 2024-01-01 RX ORDER — NITROGLYCERIN 5 MG/ML
VIAL (ML) INTRAVENOUS
Status: DISCONTINUED | OUTPATIENT
Start: 2024-01-01 | End: 2024-01-01 | Stop reason: HOSPADM

## 2024-01-01 RX ORDER — PANTOPRAZOLE SODIUM 40 MG/1
40 TABLET, DELAYED RELEASE ORAL
Qty: 60 TABLET | Refills: 1 | Status: SHIPPED | OUTPATIENT
Start: 2024-01-01

## 2024-01-01 RX ORDER — FLUMAZENIL 0.1 MG/ML
0.2 INJECTION, SOLUTION INTRAVENOUS
Status: ACTIVE | OUTPATIENT
Start: 2024-01-01 | End: 2024-01-01

## 2024-01-01 RX ORDER — DEXTROSE MONOHYDRATE 25 G/50ML
25-50 INJECTION, SOLUTION INTRAVENOUS
Status: DISCONTINUED | OUTPATIENT
Start: 2024-01-01 | End: 2024-01-01 | Stop reason: HOSPADM

## 2024-01-01 RX ORDER — ONDANSETRON 4 MG/1
4 TABLET, ORALLY DISINTEGRATING ORAL EVERY 6 HOURS PRN
Status: DISCONTINUED | OUTPATIENT
Start: 2024-01-01 | End: 2024-01-01 | Stop reason: HOSPADM

## 2024-01-01 RX ORDER — LORAZEPAM 2 MG/ML
0.5 INJECTION INTRAMUSCULAR
Status: DISCONTINUED | OUTPATIENT
Start: 2024-01-01 | End: 2024-01-01 | Stop reason: HOSPADM

## 2024-01-01 RX ORDER — NICOTINE POLACRILEX 4 MG
15-30 LOZENGE BUCCAL
Status: DISCONTINUED | OUTPATIENT
Start: 2024-01-01 | End: 2024-01-01 | Stop reason: HOSPADM

## 2024-01-01 RX ORDER — FLUOCINOLONE ACETONIDE 0.1 MG/ML
SOLUTION TOPICAL 2 TIMES DAILY
Qty: 90 ML | Refills: 0 | Status: SHIPPED | OUTPATIENT
Start: 2024-01-01 | End: 2024-01-01

## 2024-01-01 RX ORDER — SODIUM CHLORIDE 9 MG/ML
INJECTION, SOLUTION INTRAVENOUS CONTINUOUS
Status: DISCONTINUED | OUTPATIENT
Start: 2024-01-01 | End: 2024-01-01

## 2024-01-01 RX ORDER — FERROUS SULFATE 325(65) MG
325 TABLET ORAL
COMMUNITY

## 2024-01-01 RX ORDER — CLOTRIMAZOLE AND BETAMETHASONE DIPROPIONATE 10; .64 MG/G; MG/G
CREAM TOPICAL 2 TIMES DAILY
Qty: 45 G | Refills: 1 | Status: SHIPPED | OUTPATIENT
Start: 2024-01-01

## 2024-01-01 RX ORDER — TORSEMIDE 20 MG/1
20 TABLET ORAL DAILY
Qty: 30 TABLET | Refills: 1 | Status: SHIPPED | OUTPATIENT
Start: 2024-01-01

## 2024-01-01 RX ORDER — HYDRALAZINE HYDROCHLORIDE 10 MG/1
10 TABLET, FILM COATED ORAL EVERY 4 HOURS PRN
Status: DISCONTINUED | OUTPATIENT
Start: 2024-01-01 | End: 2024-01-01 | Stop reason: HOSPADM

## 2024-01-01 RX ORDER — TORSEMIDE 10 MG/1
20 TABLET ORAL DAILY
Status: DISCONTINUED | OUTPATIENT
Start: 2024-01-01 | End: 2024-01-01 | Stop reason: HOSPADM

## 2024-01-01 RX ORDER — ONDANSETRON 2 MG/ML
4 INJECTION INTRAMUSCULAR; INTRAVENOUS EVERY 6 HOURS PRN
Status: DISCONTINUED | OUTPATIENT
Start: 2024-01-01 | End: 2024-01-01 | Stop reason: HOSPADM

## 2024-01-01 RX ORDER — LIDOCAINE 40 MG/G
CREAM TOPICAL
Status: DISCONTINUED | OUTPATIENT
Start: 2024-01-01 | End: 2024-01-01

## 2024-01-01 RX ORDER — AZITHROMYCIN 500 MG/1
500 INJECTION, POWDER, LYOPHILIZED, FOR SOLUTION INTRAVENOUS EVERY 24 HOURS
Status: DISCONTINUED | OUTPATIENT
Start: 2024-01-01 | End: 2024-01-01 | Stop reason: ALTCHOICE

## 2024-01-01 RX ORDER — NALOXONE HYDROCHLORIDE 0.4 MG/ML
0.2 INJECTION, SOLUTION INTRAMUSCULAR; INTRAVENOUS; SUBCUTANEOUS
Status: DISCONTINUED | OUTPATIENT
Start: 2024-01-01 | End: 2024-01-01

## 2024-01-01 RX ORDER — PANTOPRAZOLE SODIUM 40 MG/1
40 TABLET, DELAYED RELEASE ORAL
Status: DISCONTINUED | OUTPATIENT
Start: 2024-01-01 | End: 2024-01-01 | Stop reason: HOSPADM

## 2024-01-01 RX ORDER — HEPARIN SODIUM 10000 [USP'U]/100ML
0-5000 INJECTION, SOLUTION INTRAVENOUS CONTINUOUS
Status: DISCONTINUED | OUTPATIENT
Start: 2024-01-01 | End: 2024-01-01

## 2024-01-01 RX ORDER — ACETAMINOPHEN 650 MG/1
650 SUPPOSITORY RECTAL EVERY 4 HOURS PRN
Status: DISCONTINUED | OUTPATIENT
Start: 2024-01-01 | End: 2024-01-01 | Stop reason: HOSPADM

## 2024-01-01 RX ORDER — CEFTRIAXONE 2 G/1
2 INJECTION, POWDER, FOR SOLUTION INTRAMUSCULAR; INTRAVENOUS EVERY 24 HOURS
Status: DISCONTINUED | OUTPATIENT
Start: 2024-01-01 | End: 2024-01-01

## 2024-01-01 RX ORDER — ATORVASTATIN CALCIUM 10 MG/1
10 TABLET, FILM COATED ORAL EVERY EVENING
Status: DISCONTINUED | OUTPATIENT
Start: 2024-01-01 | End: 2024-01-01 | Stop reason: HOSPADM

## 2024-01-01 RX ORDER — ACETAMINOPHEN 325 MG/1
650 TABLET ORAL EVERY 4 HOURS PRN
Status: DISCONTINUED | OUTPATIENT
Start: 2024-01-01 | End: 2024-01-01

## 2024-01-01 RX ORDER — HYDROMORPHONE HYDROCHLORIDE 1 MG/ML
0.5 INJECTION, SOLUTION INTRAMUSCULAR; INTRAVENOUS; SUBCUTANEOUS ONCE
Status: COMPLETED | OUTPATIENT
Start: 2024-01-01 | End: 2024-01-01

## 2024-01-01 RX ORDER — ATROPINE SULFATE 0.1 MG/ML
0.5 INJECTION INTRAVENOUS
Status: ACTIVE | OUTPATIENT
Start: 2024-01-01 | End: 2024-01-01

## 2024-01-01 RX ORDER — PROCHLORPERAZINE MALEATE 5 MG
5 TABLET ORAL EVERY 6 HOURS PRN
Status: DISCONTINUED | OUTPATIENT
Start: 2024-01-01 | End: 2024-01-01 | Stop reason: HOSPADM

## 2024-01-01 RX ORDER — NALOXONE HYDROCHLORIDE 0.4 MG/ML
0.4 INJECTION, SOLUTION INTRAMUSCULAR; INTRAVENOUS; SUBCUTANEOUS
Status: DISCONTINUED | OUTPATIENT
Start: 2024-01-01 | End: 2024-01-01 | Stop reason: HOSPADM

## 2024-01-01 RX ORDER — FUROSEMIDE 20 MG
TABLET ORAL
Qty: 90 TABLET | Refills: 0 | Status: SHIPPED | OUTPATIENT
Start: 2024-01-01 | End: 2024-01-01

## 2024-01-01 RX ORDER — ACETAMINOPHEN 325 MG/1
650 TABLET ORAL EVERY 4 HOURS PRN
Status: DISCONTINUED | OUTPATIENT
Start: 2024-01-01 | End: 2024-01-01 | Stop reason: HOSPADM

## 2024-01-01 RX ORDER — HYDROMORPHONE HCL IN WATER/PF 6 MG/30 ML
0.2 PATIENT CONTROLLED ANALGESIA SYRINGE INTRAVENOUS
Status: DISCONTINUED | OUTPATIENT
Start: 2024-01-01 | End: 2024-01-01 | Stop reason: HOSPADM

## 2024-01-01 RX ORDER — FUROSEMIDE 20 MG
TABLET ORAL
Qty: 90 TABLET | Refills: 1 | Status: SHIPPED | OUTPATIENT
Start: 2024-01-01 | End: 2024-01-01

## 2024-01-01 RX ORDER — LIDOCAINE HYDROCHLORIDE 20 MG/ML
INJECTION, SOLUTION INFILTRATION; PERINEURAL PRN
Status: DISCONTINUED | OUTPATIENT
Start: 2024-01-01 | End: 2024-01-01

## 2024-01-01 RX ORDER — HEPARIN SODIUM 1000 [USP'U]/ML
INJECTION, SOLUTION INTRAVENOUS; SUBCUTANEOUS
Status: DISCONTINUED | OUTPATIENT
Start: 2024-01-01 | End: 2024-01-01 | Stop reason: HOSPADM

## 2024-01-01 RX ORDER — NITROGLYCERIN 0.4 MG/1
TABLET SUBLINGUAL
Qty: 30 TABLET | Refills: 0 | Status: SHIPPED | OUTPATIENT
Start: 2024-01-01

## 2024-01-01 RX ORDER — POTASSIUM CHLORIDE 1500 MG/1
40 TABLET, EXTENDED RELEASE ORAL ONCE
Status: COMPLETED | OUTPATIENT
Start: 2024-01-01 | End: 2024-01-01

## 2024-01-01 RX ORDER — PROCHLORPERAZINE 25 MG
12.5 SUPPOSITORY, RECTAL RECTAL EVERY 12 HOURS PRN
Status: DISCONTINUED | OUTPATIENT
Start: 2024-01-01 | End: 2024-01-01 | Stop reason: HOSPADM

## 2024-01-01 RX ORDER — FENTANYL CITRATE 50 UG/ML
25 INJECTION, SOLUTION INTRAMUSCULAR; INTRAVENOUS ONCE
Status: COMPLETED | OUTPATIENT
Start: 2024-01-01 | End: 2024-01-01

## 2024-01-01 RX ORDER — IOPAMIDOL 755 MG/ML
INJECTION, SOLUTION INTRAVASCULAR
Status: DISCONTINUED | OUTPATIENT
Start: 2024-01-01 | End: 2024-01-01 | Stop reason: HOSPADM

## 2024-01-01 RX ORDER — PROPOFOL 10 MG/ML
INJECTION, EMULSION INTRAVENOUS PRN
Status: DISCONTINUED | OUTPATIENT
Start: 2024-01-01 | End: 2024-01-01

## 2024-01-01 RX ORDER — ATORVASTATIN CALCIUM 10 MG/1
10 TABLET, FILM COATED ORAL EVERY EVENING
Qty: 30 TABLET | Refills: 1 | Status: SHIPPED | OUTPATIENT
Start: 2024-01-01

## 2024-01-01 RX ORDER — FENTANYL CITRATE 50 UG/ML
INJECTION, SOLUTION INTRAMUSCULAR; INTRAVENOUS
Status: DISCONTINUED | OUTPATIENT
Start: 2024-01-01 | End: 2024-01-01 | Stop reason: HOSPADM

## 2024-01-01 RX ORDER — LIDOCAINE 40 MG/G
CREAM TOPICAL
Status: DISCONTINUED | OUTPATIENT
Start: 2024-01-01 | End: 2024-01-01 | Stop reason: HOSPADM

## 2024-01-01 RX ORDER — NALOXONE HYDROCHLORIDE 0.4 MG/ML
0.4 INJECTION, SOLUTION INTRAMUSCULAR; INTRAVENOUS; SUBCUTANEOUS
Status: DISCONTINUED | OUTPATIENT
Start: 2024-01-01 | End: 2024-01-01

## 2024-01-01 RX ORDER — FENTANYL CITRATE 50 UG/ML
25 INJECTION, SOLUTION INTRAMUSCULAR; INTRAVENOUS
Status: DISCONTINUED | OUTPATIENT
Start: 2024-01-01 | End: 2024-01-01 | Stop reason: HOSPADM

## 2024-01-01 RX ORDER — IPRATROPIUM BROMIDE 21 UG/1
2 SPRAY, METERED NASAL EVERY 12 HOURS
Qty: 30 ML | Refills: 0 | Status: SHIPPED | OUTPATIENT
Start: 2024-01-01

## 2024-01-01 RX ORDER — SODIUM CHLORIDE, SODIUM LACTATE, POTASSIUM CHLORIDE, CALCIUM CHLORIDE 600; 310; 30; 20 MG/100ML; MG/100ML; MG/100ML; MG/100ML
INJECTION, SOLUTION INTRAVENOUS CONTINUOUS PRN
Status: DISCONTINUED | OUTPATIENT
Start: 2024-01-01 | End: 2024-01-01

## 2024-01-01 RX ORDER — DOXYCYCLINE 100 MG/10ML
100 INJECTION, POWDER, LYOPHILIZED, FOR SOLUTION INTRAVENOUS 2 TIMES DAILY
Status: DISCONTINUED | OUTPATIENT
Start: 2024-01-01 | End: 2024-01-01

## 2024-01-01 RX ORDER — POTASSIUM CHLORIDE 1500 MG/1
20 TABLET, EXTENDED RELEASE ORAL
Status: DISCONTINUED | OUTPATIENT
Start: 2024-01-01 | End: 2024-01-01 | Stop reason: HOSPADM

## 2024-01-01 RX ORDER — HYDRALAZINE HYDROCHLORIDE 20 MG/ML
10 INJECTION INTRAMUSCULAR; INTRAVENOUS EVERY 4 HOURS PRN
Status: DISCONTINUED | OUTPATIENT
Start: 2024-01-01 | End: 2024-01-01 | Stop reason: HOSPADM

## 2024-01-01 RX ORDER — SODIUM CHLORIDE 9 MG/ML
75 INJECTION, SOLUTION INTRAVENOUS CONTINUOUS
Status: ACTIVE | OUTPATIENT
Start: 2024-01-01 | End: 2024-01-01

## 2024-01-01 RX ORDER — VERAPAMIL HYDROCHLORIDE 2.5 MG/ML
INJECTION, SOLUTION INTRAVENOUS
Status: DISCONTINUED | OUTPATIENT
Start: 2024-01-01 | End: 2024-01-01 | Stop reason: HOSPADM

## 2024-01-01 RX ORDER — OXYCODONE HYDROCHLORIDE 5 MG/1
5 TABLET ORAL EVERY 4 HOURS PRN
Status: DISCONTINUED | OUTPATIENT
Start: 2024-01-01 | End: 2024-01-01 | Stop reason: HOSPADM

## 2024-01-01 RX ORDER — DOXYCYCLINE 100 MG/10ML
100 INJECTION, POWDER, LYOPHILIZED, FOR SOLUTION INTRAVENOUS 2 TIMES DAILY
Status: COMPLETED | OUTPATIENT
Start: 2024-01-01 | End: 2024-01-01

## 2024-01-01 RX ORDER — MULTIVITAMIN WITH IRON
1 TABLET ORAL DAILY
COMMUNITY

## 2024-01-01 RX ORDER — LOVASTATIN 40 MG
TABLET ORAL
Qty: 180 TABLET | Refills: 0 | Status: ON HOLD | OUTPATIENT
Start: 2024-01-01 | End: 2024-01-01

## 2024-01-01 RX ORDER — LORAZEPAM 0.5 MG/1
0.5 TABLET ORAL
Status: DISCONTINUED | OUTPATIENT
Start: 2024-01-01 | End: 2024-01-01 | Stop reason: HOSPADM

## 2024-01-01 RX ORDER — ONDANSETRON 2 MG/ML
INJECTION INTRAMUSCULAR; INTRAVENOUS PRN
Status: DISCONTINUED | OUTPATIENT
Start: 2024-01-01 | End: 2024-01-01

## 2024-01-01 RX ORDER — ATORVASTATIN CALCIUM 40 MG/1
40 TABLET, FILM COATED ORAL EVERY EVENING
Status: DISCONTINUED | OUTPATIENT
Start: 2024-01-01 | End: 2024-01-01

## 2024-01-01 RX ORDER — ALLOPURINOL 300 MG/1
1 TABLET ORAL EVERY MORNING
Qty: 90 TABLET | Refills: 1 | Status: SHIPPED | OUTPATIENT
Start: 2024-01-01

## 2024-01-01 RX ADMIN — POTASSIUM CHLORIDE 40 MEQ: 1500 TABLET, EXTENDED RELEASE ORAL at 16:16

## 2024-01-01 RX ADMIN — ONDANSETRON 4 MG: 2 INJECTION INTRAMUSCULAR; INTRAVENOUS at 08:16

## 2024-01-01 RX ADMIN — PANTOPRAZOLE SODIUM 40 MG: 40 INJECTION, POWDER, FOR SOLUTION INTRAVENOUS at 11:59

## 2024-01-01 RX ADMIN — SODIUM ZIRCONIUM CYCLOSILICATE 10 G: 5 POWDER, FOR SUSPENSION ORAL at 15:57

## 2024-01-01 RX ADMIN — LIDOCAINE HYDROCHLORIDE 100 MG: 20 INJECTION, SOLUTION INFILTRATION; PERINEURAL at 08:16

## 2024-01-01 RX ADMIN — ACETAMINOPHEN 650 MG: 325 TABLET ORAL at 22:08

## 2024-01-01 RX ADMIN — PANTOPRAZOLE SODIUM 40 MG: 40 TABLET, DELAYED RELEASE ORAL at 16:16

## 2024-01-01 RX ADMIN — DOXYCYCLINE 100 MG: 100 INJECTION, POWDER, LYOPHILIZED, FOR SOLUTION INTRAVENOUS at 08:02

## 2024-01-01 RX ADMIN — ACETAMINOPHEN 650 MG: 325 TABLET ORAL at 22:03

## 2024-01-01 RX ADMIN — IRON SUCROSE 300 MG: 20 INJECTION, SOLUTION INTRAVENOUS at 08:02

## 2024-01-01 RX ADMIN — PHYTONADIONE 2 MG: 2 INJECTION, EMULSION INTRAMUSCULAR; INTRAVENOUS; SUBCUTANEOUS at 16:40

## 2024-01-01 RX ADMIN — ATORVASTATIN CALCIUM 10 MG: 10 TABLET, FILM COATED ORAL at 22:00

## 2024-01-01 RX ADMIN — DOXYCYCLINE 100 MG: 100 INJECTION, POWDER, LYOPHILIZED, FOR SOLUTION INTRAVENOUS at 09:56

## 2024-01-01 RX ADMIN — ACETAMINOPHEN 650 MG: 325 TABLET ORAL at 10:59

## 2024-01-01 RX ADMIN — TORSEMIDE 20 MG: 10 TABLET ORAL at 18:17

## 2024-01-01 RX ADMIN — PANTOPRAZOLE SODIUM 80 MG: 40 INJECTION, POWDER, FOR SOLUTION INTRAVENOUS at 12:47

## 2024-01-01 RX ADMIN — PANTOPRAZOLE SODIUM 40 MG: 40 INJECTION, POWDER, FOR SOLUTION INTRAVENOUS at 09:57

## 2024-01-01 RX ADMIN — PANTOPRAZOLE SODIUM 40 MG: 40 INJECTION, POWDER, FOR SOLUTION INTRAVENOUS at 12:11

## 2024-01-01 RX ADMIN — DOXYCYCLINE 100 MG: 100 INJECTION, POWDER, LYOPHILIZED, FOR SOLUTION INTRAVENOUS at 01:02

## 2024-01-01 RX ADMIN — CEFDINIR 300 MG: 300 CAPSULE ORAL at 21:29

## 2024-01-01 RX ADMIN — SODIUM ZIRCONIUM CYCLOSILICATE 10 G: 5 POWDER, FOR SUSPENSION ORAL at 16:48

## 2024-01-01 RX ADMIN — Medication 5 MG: at 20:24

## 2024-01-01 RX ADMIN — APIXABAN 2.5 MG: 2.5 TABLET, FILM COATED ORAL at 08:45

## 2024-01-01 RX ADMIN — SODIUM CHLORIDE: 9 INJECTION, SOLUTION INTRAVENOUS at 15:02

## 2024-01-01 RX ADMIN — HUMAN ALBUMIN MICROSPHERES AND PERFLUTREN 9 ML: 10; .22 INJECTION, SOLUTION INTRAVENOUS at 16:47

## 2024-01-01 RX ADMIN — APIXABAN 2.5 MG: 2.5 TABLET, FILM COATED ORAL at 20:45

## 2024-01-01 RX ADMIN — CEFDINIR 300 MG: 300 CAPSULE ORAL at 08:04

## 2024-01-01 RX ADMIN — TORSEMIDE 20 MG: 10 TABLET ORAL at 08:02

## 2024-01-01 RX ADMIN — TORSEMIDE 20 MG: 10 TABLET ORAL at 08:37

## 2024-01-01 RX ADMIN — DOXYCYCLINE 100 MG: 100 INJECTION, POWDER, LYOPHILIZED, FOR SOLUTION INTRAVENOUS at 20:13

## 2024-01-01 RX ADMIN — ATORVASTATIN CALCIUM 10 MG: 10 TABLET, FILM COATED ORAL at 20:13

## 2024-01-01 RX ADMIN — PANTOPRAZOLE SODIUM 40 MG: 40 TABLET, DELAYED RELEASE ORAL at 06:43

## 2024-01-01 RX ADMIN — SODIUM CHLORIDE: 9 INJECTION, SOLUTION INTRAVENOUS at 17:02

## 2024-01-01 RX ADMIN — PROPOFOL 20 MG: 10 INJECTION, EMULSION INTRAVENOUS at 08:25

## 2024-01-01 RX ADMIN — DOXYCYCLINE 100 MG: 100 INJECTION, POWDER, LYOPHILIZED, FOR SOLUTION INTRAVENOUS at 10:10

## 2024-01-01 RX ADMIN — ATORVASTATIN CALCIUM 10 MG: 10 TABLET, FILM COATED ORAL at 20:24

## 2024-01-01 RX ADMIN — ACETAMINOPHEN 650 MG: 325 TABLET ORAL at 20:24

## 2024-01-01 RX ADMIN — DOXYCYCLINE 100 MG: 100 INJECTION, POWDER, LYOPHILIZED, FOR SOLUTION INTRAVENOUS at 20:24

## 2024-01-01 RX ADMIN — ATORVASTATIN CALCIUM 10 MG: 10 TABLET, FILM COATED ORAL at 21:19

## 2024-01-01 RX ADMIN — FENTANYL CITRATE 25 MCG: 50 INJECTION, SOLUTION INTRAMUSCULAR; INTRAVENOUS at 15:28

## 2024-01-01 RX ADMIN — DOXYCYCLINE 100 MG: 100 INJECTION, POWDER, LYOPHILIZED, FOR SOLUTION INTRAVENOUS at 20:42

## 2024-01-01 RX ADMIN — PANTOPRAZOLE SODIUM 40 MG: 40 TABLET, DELAYED RELEASE ORAL at 08:45

## 2024-01-01 RX ADMIN — PANTOPRAZOLE SODIUM 40 MG: 40 INJECTION, POWDER, FOR SOLUTION INTRAVENOUS at 23:15

## 2024-01-01 RX ADMIN — DOXYCYCLINE 100 MG: 100 INJECTION, POWDER, LYOPHILIZED, FOR SOLUTION INTRAVENOUS at 22:31

## 2024-01-01 RX ADMIN — CEFTRIAXONE SODIUM 2 G: 2 INJECTION, POWDER, FOR SOLUTION INTRAMUSCULAR; INTRAVENOUS at 00:14

## 2024-01-01 RX ADMIN — ACETAMINOPHEN 650 MG: 325 TABLET ORAL at 21:29

## 2024-01-01 RX ADMIN — SODIUM ZIRCONIUM CYCLOSILICATE 10 G: 5 POWDER, FOR SUSPENSION ORAL at 22:51

## 2024-01-01 RX ADMIN — CEFTRIAXONE SODIUM 2 G: 2 INJECTION, POWDER, FOR SOLUTION INTRAMUSCULAR; INTRAVENOUS at 22:31

## 2024-01-01 RX ADMIN — SODIUM ZIRCONIUM CYCLOSILICATE 10 G: 5 POWDER, FOR SUSPENSION ORAL at 22:02

## 2024-01-01 RX ADMIN — SODIUM CHLORIDE 150 ML: 9 INJECTION, SOLUTION INTRAVENOUS at 20:00

## 2024-01-01 RX ADMIN — PANTOPRAZOLE SODIUM 40 MG: 40 TABLET, DELAYED RELEASE ORAL at 15:50

## 2024-01-01 RX ADMIN — APIXABAN 2.5 MG: 2.5 TABLET, FILM COATED ORAL at 08:07

## 2024-01-01 RX ADMIN — IRON SUCROSE 300 MG: 20 INJECTION, SOLUTION INTRAVENOUS at 08:37

## 2024-01-01 RX ADMIN — CEFTRIAXONE SODIUM 2 G: 2 INJECTION, POWDER, FOR SOLUTION INTRAMUSCULAR; INTRAVENOUS at 00:13

## 2024-01-01 RX ADMIN — APIXABAN 2.5 MG: 2.5 TABLET, FILM COATED ORAL at 08:04

## 2024-01-01 RX ADMIN — PANTOPRAZOLE SODIUM 40 MG: 40 INJECTION, POWDER, FOR SOLUTION INTRAVENOUS at 22:51

## 2024-01-01 RX ADMIN — PANTOPRAZOLE SODIUM 40 MG: 40 INJECTION, POWDER, FOR SOLUTION INTRAVENOUS at 22:08

## 2024-01-01 RX ADMIN — OXYCODONE HYDROCHLORIDE 5 MG: 5 TABLET ORAL at 20:13

## 2024-01-01 RX ADMIN — ATORVASTATIN CALCIUM 10 MG: 10 TABLET, FILM COATED ORAL at 20:45

## 2024-01-01 RX ADMIN — ACETAMINOPHEN 650 MG: 325 TABLET ORAL at 20:45

## 2024-01-01 RX ADMIN — SODIUM CHLORIDE: 9 INJECTION, SOLUTION INTRAVENOUS at 09:57

## 2024-01-01 RX ADMIN — IRON SUCROSE 300 MG: 20 INJECTION, SOLUTION INTRAVENOUS at 12:08

## 2024-01-01 RX ADMIN — ACETAMINOPHEN 650 MG: 325 TABLET ORAL at 22:00

## 2024-01-01 RX ADMIN — PANTOPRAZOLE SODIUM 40 MG: 40 INJECTION, POWDER, FOR SOLUTION INTRAVENOUS at 23:10

## 2024-01-01 RX ADMIN — APIXABAN 2.5 MG: 2.5 TABLET, FILM COATED ORAL at 21:19

## 2024-01-01 RX ADMIN — CEFTRIAXONE SODIUM 2 G: 2 INJECTION, POWDER, FOR SOLUTION INTRAMUSCULAR; INTRAVENOUS at 23:11

## 2024-01-01 RX ADMIN — SODIUM CHLORIDE, POTASSIUM CHLORIDE, SODIUM LACTATE AND CALCIUM CHLORIDE: 600; 310; 30; 20 INJECTION, SOLUTION INTRAVENOUS at 08:12

## 2024-01-01 RX ADMIN — ACETAMINOPHEN 650 MG: 325 TABLET ORAL at 15:36

## 2024-01-01 RX ADMIN — APIXABAN 2.5 MG: 2.5 TABLET, FILM COATED ORAL at 22:01

## 2024-01-01 RX ADMIN — Medication 5 MG: at 22:08

## 2024-01-01 RX ADMIN — DOXYCYCLINE 100 MG: 100 INJECTION, POWDER, LYOPHILIZED, FOR SOLUTION INTRAVENOUS at 08:07

## 2024-01-01 RX ADMIN — CEFTRIAXONE SODIUM 2 G: 2 INJECTION, POWDER, FOR SOLUTION INTRAMUSCULAR; INTRAVENOUS at 23:15

## 2024-01-01 RX ADMIN — CEFDINIR 300 MG: 300 CAPSULE ORAL at 22:01

## 2024-01-01 RX ADMIN — SODIUM ZIRCONIUM CYCLOSILICATE 10 G: 5 POWDER, FOR SUSPENSION ORAL at 10:07

## 2024-01-01 RX ADMIN — PROPOFOL 20 MG: 10 INJECTION, EMULSION INTRAVENOUS at 08:22

## 2024-01-01 RX ADMIN — HYDROMORPHONE HYDROCHLORIDE 0.5 MG: 1 INJECTION, SOLUTION INTRAMUSCULAR; INTRAVENOUS; SUBCUTANEOUS at 21:29

## 2024-01-01 RX ADMIN — ATORVASTATIN CALCIUM 10 MG: 10 TABLET, FILM COATED ORAL at 20:42

## 2024-01-01 RX ADMIN — PANTOPRAZOLE SODIUM 40 MG: 40 INJECTION, POWDER, FOR SOLUTION INTRAVENOUS at 10:11

## 2024-01-01 RX ADMIN — PANTOPRAZOLE SODIUM 40 MG: 40 INJECTION, POWDER, FOR SOLUTION INTRAVENOUS at 00:13

## 2024-01-01 RX ADMIN — HYDROMORPHONE HYDROCHLORIDE 0.2 MG: 0.2 INJECTION, SOLUTION INTRAMUSCULAR; INTRAVENOUS; SUBCUTANEOUS at 07:07

## 2024-01-01 RX ADMIN — PANTOPRAZOLE SODIUM 40 MG: 40 INJECTION, POWDER, FOR SOLUTION INTRAVENOUS at 13:16

## 2024-01-01 SDOH — HEALTH STABILITY: PHYSICAL HEALTH: ON AVERAGE, HOW MANY MINUTES DO YOU ENGAGE IN EXERCISE AT THIS LEVEL?: 0 MIN

## 2024-01-01 SDOH — HEALTH STABILITY: PHYSICAL HEALTH: ON AVERAGE, HOW MANY DAYS PER WEEK DO YOU ENGAGE IN MODERATE TO STRENUOUS EXERCISE (LIKE A BRISK WALK)?: 0 DAYS

## 2024-01-01 ASSESSMENT — ACTIVITIES OF DAILY LIVING (ADL)
ADLS_ACUITY_SCORE: 34
ADLS_ACUITY_SCORE: 35
ADLS_ACUITY_SCORE: 34
ADLS_ACUITY_SCORE: 34
ADLS_ACUITY_SCORE: 36
ADLS_ACUITY_SCORE: 34
ADLS_ACUITY_SCORE: 38
ADLS_ACUITY_SCORE: 34
ADLS_ACUITY_SCORE: 36
ADLS_ACUITY_SCORE: 34
ADLS_ACUITY_SCORE: 34
ADLS_ACUITY_SCORE: 35
ADLS_ACUITY_SCORE: 34
ADLS_ACUITY_SCORE: 36
ADLS_ACUITY_SCORE: 36
ADLS_ACUITY_SCORE: 35
ADLS_ACUITY_SCORE: 35
ADLS_ACUITY_SCORE: 34
ADLS_ACUITY_SCORE: 38
ADLS_ACUITY_SCORE: 34
ADLS_ACUITY_SCORE: 35
ADLS_ACUITY_SCORE: 34
ADLS_ACUITY_SCORE: 34
ADLS_ACUITY_SCORE: 35
ADLS_ACUITY_SCORE: 35
ADLS_ACUITY_SCORE: 34
ADLS_ACUITY_SCORE: 34
ADLS_ACUITY_SCORE: 36
ADLS_ACUITY_SCORE: 34
ADLS_ACUITY_SCORE: 34
ADLS_ACUITY_SCORE: 35
ADLS_ACUITY_SCORE: 35
ADLS_ACUITY_SCORE: 34
ADLS_ACUITY_SCORE: 36
ADLS_ACUITY_SCORE: 34
ADLS_ACUITY_SCORE: 36
ADLS_ACUITY_SCORE: 34
ADLS_ACUITY_SCORE: 35
ADLS_ACUITY_SCORE: 47
ADLS_ACUITY_SCORE: 34
ADLS_ACUITY_SCORE: 35
ADLS_ACUITY_SCORE: 34
ADLS_ACUITY_SCORE: 35
ADLS_ACUITY_SCORE: 34
ADLS_ACUITY_SCORE: 38
ADLS_ACUITY_SCORE: 36
ADLS_ACUITY_SCORE: 38
ADLS_ACUITY_SCORE: 36
ADLS_ACUITY_SCORE: 34
ADLS_ACUITY_SCORE: 34
ADLS_ACUITY_SCORE: 36
ADLS_ACUITY_SCORE: 34
ADLS_ACUITY_SCORE: 36
ADLS_ACUITY_SCORE: 34
DEPENDENT_IADLS:: CLEANING;COOKING;LAUNDRY;SHOPPING;MEAL PREPARATION;TRANSPORTATION
ADLS_ACUITY_SCORE: 34
ADLS_ACUITY_SCORE: 34
ADLS_ACUITY_SCORE: 38
ADLS_ACUITY_SCORE: 34
ADLS_ACUITY_SCORE: 35
ADLS_ACUITY_SCORE: 34
ADLS_ACUITY_SCORE: 34
ADLS_ACUITY_SCORE: 36
ADLS_ACUITY_SCORE: 35
ADLS_ACUITY_SCORE: 35
ADLS_ACUITY_SCORE: 36
ADLS_ACUITY_SCORE: 34
ADLS_ACUITY_SCORE: 35
ADLS_ACUITY_SCORE: 36
ADLS_ACUITY_SCORE: 34
ADLS_ACUITY_SCORE: 34
ADLS_ACUITY_SCORE: 36
ADLS_ACUITY_SCORE: 36
ADLS_ACUITY_SCORE: 34
ADLS_ACUITY_SCORE: 38
ADLS_ACUITY_SCORE: 34
ADLS_ACUITY_SCORE: 36
ADLS_ACUITY_SCORE: 36
ADLS_ACUITY_SCORE: 38
ADLS_ACUITY_SCORE: 38
ADLS_ACUITY_SCORE: 34
ADLS_ACUITY_SCORE: 35
ADLS_ACUITY_SCORE: 34
ADLS_ACUITY_SCORE: 34
ADLS_ACUITY_SCORE: 35
ADLS_ACUITY_SCORE: 34
ADLS_ACUITY_SCORE: 35
ADLS_ACUITY_SCORE: 38
ADLS_ACUITY_SCORE: 34
ADLS_ACUITY_SCORE: 36
ADLS_ACUITY_SCORE: 36
ADLS_ACUITY_SCORE: 34
ADLS_ACUITY_SCORE: 35
ADLS_ACUITY_SCORE: 34
ADLS_ACUITY_SCORE: 36
ADLS_ACUITY_SCORE: 34
ADLS_ACUITY_SCORE: 38
ADLS_ACUITY_SCORE: 34
ADLS_ACUITY_SCORE: 47
ADLS_ACUITY_SCORE: 34

## 2024-01-01 ASSESSMENT — ENCOUNTER SYMPTOMS: DYSRHYTHMIAS: 1

## 2024-01-01 ASSESSMENT — PAIN SCALES - GENERAL
PAINLEVEL: SEVERE PAIN (7)
PAINLEVEL: NO PAIN (0)
PAINLEVEL: NO PAIN (0)

## 2024-01-01 ASSESSMENT — SOCIAL DETERMINANTS OF HEALTH (SDOH): HOW OFTEN DO YOU GET TOGETHER WITH FRIENDS OR RELATIVES?: TWICE A WEEK

## 2024-02-21 NOTE — PROGRESS NOTES
ANTICOAGULATION MANAGEMENT     Alessio Teixeira 88 year old male is on warfarin with therapeutic INR result. (Goal INR 2.0-3.0)    Recent labs: (last 7 days)     02/21/24  1033   INR 2.5*       ASSESSMENT     Source(s): Chart Review and Patient/Caregiver Call     Warfarin doses taken: Warfarin taken as instructed  Diet: No new diet changes identified  Medication/supplement changes: None noted  New illness, injury, or hospitalization: No  Signs or symptoms of bleeding or clotting: No  Previous result: Therapeutic last 2(+) visits  Additional findings: None       PLAN     Recommended plan for no diet, medication or health factor changes affecting INR     Dosing Instructions: Continue your current warfarin dose with next INR in 8 weeks       Summary  As of 2/21/2024      Full warfarin instructions:  10 mg every Mon, Wed, Fri; 8 mg all other days   Next INR check:  4/17/2024               Telephone call with Devin who verbalizes understanding and agrees to plan    Lab visit scheduled    Education provided:   Please call back if any changes to your diet, medications or how you've been taking warfarin  Symptom monitoring: monitoring for bleeding signs and symptoms and monitoring for clotting signs and symptoms  Importance of notifying anticoagulation clinic for: changes in medications; a sooner lab recheck maybe needed, diarrhea, nausea/vomiting, reduced intake, cold/flu, and/or infections; a sooner lab recheck maybe needed, and upcoming surgeries and procedures 2 weeks in advance  Contact [phone]  with any changes, questions or concerns.     Plan made per [facility] anticoagulation protocol    Peggy Sullivan RN  Anticoagulation Clinic  2/21/2024    _______________________________________________________________________     Anticoagulation Episode Summary       Current INR goal:  2.0-3.0   TTR:  74.8% (1 y)   Target end date:  Indefinite   Send INR reminders to:  [facility]    Indications    Chronic atrial  fibrillation (H) [I48.20]  Long term current use of anticoagulant therapy [Z79.01]  History of pulmonary embolism [Z86.711]             Comments:               Anticoagulation Care Providers       Provider Role Specialty Phone number    Jose Squires MD Referring Family Medicine [phone]

## 2024-04-17 NOTE — PROGRESS NOTES
ANTICOAGULATION MANAGEMENT     Alessio Teixeira 88 year old male is on warfarin with therapeutic INR result. (Goal INR 2.0-3.0)    Recent labs: (last 7 days)     04/17/24  1017   INR 2.5*       ASSESSMENT     Warfarin Lab Questionnaire    Warfarin Doses Last 7 Days      4/16/2024    12:17 PM   Dose in Tablet or Mg   TAB or MG? tablet (tab)     Pt Rptd Dose SUNDAY MONDAY TUESDAY WED THURS FRIDAY SATURDAY 4/16/2024  12:17 PM 2 2.5 2 2.5 2 2.5 2         4/16/2024   Warfarin Lab Questionnaire   Missed doses within past 14 days? No   Changes in diet or alcohol within past 14 days? No   Medication changes since last result? No   Injuries or illness since last result? No   New shortness of breath, severe headaches or sudden changes in vision since last result? No   Abnormal bleeding since last result? No   Upcoming surgery, procedure? Yes   Please explain, date scheduled? A p 23 balance of root canal. Already had half the procedure done (no bleeding issues), will have the 2nd half done in a couple weeks. No requirements from dental provider   Best number to call with results? [phone]     Previous result: Therapeutic last 2(+) visits  Additional findings: None       PLAN     Recommended plan for no diet, medication or health factor changes affecting INR     Dosing Instructions: Continue your current warfarin dose with next INR in 8 weeks       Summary  As of 4/17/2024      Full warfarin instructions:  10 mg every Mon, Wed, Fri; 8 mg all other days   Next INR check:  6/12/2024               Telephone call with Devin who verbalizes understanding and agrees to plan    Lab visit scheduled    Education provided:   Please call back if any changes to your diet, medications or how you've been taking warfarin  Contact [phone]  with any changes, questions or concerns.     Plan made per [facility] anticoagulation protocol    Peggy Sullivan, RN  Anticoagulation  Clinic  4/17/2024    _______________________________________________________________________     Anticoagulation Episode Summary       Current INR goal:  2.0-3.0   TTR:  74.8% (1 y)   Target end date:  Indefinite   Send INR reminders to:  [facility]    Indications    Chronic atrial fibrillation (H) [I48.20]  Long term current use of anticoagulant therapy [Z79.01]  History of pulmonary embolism [Z86.711]             Comments:               Anticoagulation Care Providers       Provider Role Specialty Phone number    Jose Squires MD Referring Family Medicine [phone]

## 2024-06-12 NOTE — PROGRESS NOTES
ANTICOAGULATION MANAGEMENT     Alessio Teixeira 88 year old male is on warfarin with therapeutic INR result. (Goal INR 2.0-3.0)    Recent labs: (last 7 days)     06/12/24  1027   INR 2.5*       ASSESSMENT     Source(s): Chart Review and Patient/Caregiver Call     Warfarin doses taken: Warfarin taken as instructed  Diet: Increased greens/vitamin K in diet; ongoing change  Medication/supplement changes: None noted  New illness, injury, or hospitalization: No  Signs or symptoms of bleeding or clotting: No  Previous result: Therapeutic last 2(+) visits  Additional findings: None       PLAN     Recommended plan for no diet, medication or health factor changes affecting INR     Dosing Instructions: Continue your current warfarin dose with next INR in 8 weeks       Summary  As of 6/12/2024      Full warfarin instructions:  10 mg every Mon, Wed, Fri; 8 mg all other days   Next INR check:  8/7/2024               Telephone call with Devin who verbalizes understanding and agrees to plan    Lab visit scheduled    Education provided:   Please call back if any changes to your diet, medications or how you've been taking warfarin    Plan made per [facility] anticoagulation protocol    Sonali Hartmann RN  Anticoagulation Clinic  6/12/2024    _______________________________________________________________________     Anticoagulation Episode Summary       Current INR goal:  2.0-3.0   TTR:  84.0% (1 y)   Target end date:  Indefinite   Send INR reminders to:  [facility]    Indications    Chronic atrial fibrillation (H) [I48.20]  Long term current use of anticoagulant therapy [Z79.01]  History of pulmonary embolism [Z86.711]             Comments:               Anticoagulation Care Providers       Provider Role Specialty Phone number    Jose Squires MD Referring Family Medicine [phone]

## 2024-07-19 NOTE — TELEPHONE ENCOUNTER
ANTICOAGULATION MANAGEMENT:  Medication Refill    Anticoagulation Summary  As of 6/12/2024      Warfarin maintenance plan:  10 mg (4 mg x 2.5) every Mon, Wed, Fri; 8 mg (4 mg x 2) all other days   Next INR check:  8/7/2024   Target end date:  Indefinite    Indications    Chronic atrial fibrillation (H) [I48.20]  Long term current use of anticoagulant therapy [Z79.01]  History of pulmonary embolism [Z86.711]                 Anticoagulation Care Providers       Provider Role Specialty Phone number    Jose Squires MD Referring Family Medicine [phone]            Refill Criteria    Visit with referring provider/group: Meets criteria: office visit within referring provider group in the last 1 year on 8/7/23    [facility] referral last signed: 07/17/2023; within last year: No  SENT NEW REFERRAL today  Lab monitoring not exceeding 2 weeks overdue: Yes    Alessio meets all criteria for refill. Rx instructions and quantity supplied updated to match patient's current dosing plan. Warfarin 90 day supply with 1 refill granted per [facility] protocol     Sonali Hartmann RN  Anticoagulation Clinic

## 2024-07-19 NOTE — TELEPHONE ENCOUNTER
ANTICOAGULATION CLINIC REFERRAL RENEWAL REQUEST       An annual renewal order is required for all patients referred to [facility].?  Please review and sign the pended referral order for Alessio Teixeira.       ANTICOAGULATION SUMMARY      Warfarin indication(s)   Atrial Fibrillation and PE    Mechanical heart valve present?  NO       Current goal range   INR: 2.0-3.0     Goal appropriate for indication? Goal INR 2-3, standard for indication(s) above     Time in Therapeutic Range (TTR)  (Goal > 60%) 84.0%       Office visit with referring provider's group within last year yes on 8/7/23       Sonali Hartmann RN  [facility]

## 2024-08-07 ENCOUNTER — TELEPHONE (OUTPATIENT)
Dept: ANTICOAGULATION | Facility: CLINIC | Age: 89
End: 2024-08-07

## 2024-08-07 NOTE — TELEPHONE ENCOUNTER
Anticoagulation-Extended Recheck Request    Under [facility] Anticoagulation protocol, Anticoagulation team may extend INR recheck interval + 1 week for each stable in range INR to max of 6 weeks. Extended interval rechecks between 8-12 weeks for patients on stable maintenance therapy require approval from referring provider.    Anticoagulation clinic suggests consideration of extended intervals in medically stable patients, with standard INR goals, and no change in warfarin dose for last 4-6 months    Alessio Teixeira, 88 year old, male has been on:    Current recheck interval:  8 weeks  Compliant: Yes  Stable warfarin dose since: 9/27/23  INR Goal: 2.0-3.0  Time in Therapeutic range: 94.5%    Lab Results   Component Value Date    INR 2.1 08/07/2024    INR 2.5 06/12/2024    INR 2.5 04/17/2024    INR 2.5 02/21/2024    INR 2.3 12/27/2023    INR 2.1 11/01/2023    INR 2.2 09/27/2023    INR 1.7 09/06/2023    INR 2.90 08/07/2023    INR 2.00 07/07/2021    INR 2.10 06/16/2021    INR 1.80 06/02/2021    INR 2.00 05/03/2021    INR 1.90 04/14/2021    INR 1.80 03/31/2021    INR 2.10 02/17/2021    INR 2.50 01/06/2021         Please advise if Alessio is approved to have extended interval rechecks every 8-12 weeks, extending + 2 weeks after each additional therapeutic result to max of 12 weeks while on stable maintenance therapy    Thank you,    Karla Monzon RN

## 2024-08-07 NOTE — PROGRESS NOTES
ANTICOAGULATION MANAGEMENT     Alessio Teixeira 88 year old male is on warfarin with therapeutic INR result. (Goal INR 2.0-3.0)    Recent labs: (last 7 days)     08/07/24  1047   INR 2.1*       ASSESSMENT     Source(s): Chart Review and Patient/Caregiver Call     Warfarin doses taken: Warfarin taken as instructed  Diet: No new diet changes identified  Medication/supplement changes: None noted  New illness, injury, or hospitalization: No  Signs or symptoms of bleeding or clotting: Has always bruised easily  Previous result: Therapeutic last 2(+) visits  Additional findings: Requested extension of INR intervals to 10-12 weeks - TE sent to PCP and encouraged discussion at upcoming annual       PLAN     Recommended plan for no diet, medication or health factor changes affecting INR     Dosing Instructions: Continue your current warfarin dose with next INR in 8 weeks       Summary  As of 8/7/2024      Full warfarin instructions:  10 mg every Mon, Wed, Fri; 8 mg all other days   Next INR check:  10/2/2024               Telephone call with Devin who verbalizes understanding and agrees to plan    Lab visit scheduled    Education provided: Please call back if any changes to your diet, medications or how you've been taking warfarin  Symptom monitoring: monitoring for bleeding signs and symptoms and monitoring for clotting signs and symptoms    Plan made per [facility] anticoagulation protocol    Karla Monzon RN  Anticoagulation Clinic  8/7/2024    _______________________________________________________________________     Anticoagulation Episode Summary       Current INR goal:  2.0-3.0   TTR:  94.5% (1 y)   Target end date:  Indefinite   Send INR reminders to:  [facility]    Indications    Chronic atrial fibrillation (H) [I48.20]  Long term current use of anticoagulant therapy [Z79.01]  History of pulmonary embolism [Z86.711]             Comments:               Anticoagulation Care Providers       Provider Role  Specialty Phone number    Jose Squires MD Referring Family Medicine [phone]

## 2024-08-29 NOTE — PROGRESS NOTES
"Preventive Care Visit  [facility] DANDY Squires MD, Family Medicine  Aug 29, 2024      Assessment & Plan     Stage 3 chronic kidney disease, unspecified whether stage 3a or 3b CKD (H)    - Albumin Random Urine Quantitative with Creat Ratio; Future    Mixed hyperlipidemia    - Lipid panel reflex to direct LDL Non-fasting; Future    Chronic atrial fibrillation (H)      Encounter for general adult medical examination with abnormal findings    - Lipid panel reflex to direct LDL Non-fasting; Future  - Albumin Random Urine Quantitative with Creat Ratio; Future  - CBC with platelets; Future  - Comprehensive metabolic panel (BMP + Alb, Alk Phos, ALT, AST, Total. Bili, TP); Future  - PSA, screen; Future  - Uric acid; Future    Gout of big toe  Stable with current dose, will keep monitoring   - Uric acid; Future    Screening for prostate cancer    - PSA, screen; Future    Skin lesion  Has been having growth of multiple keratosis on scalps, will have him to see dermatology for further evaluation   - Adult Dermatology  Referral; Future    Tinea cruris  Has been having rash on crural area over past 3-4 months with worsening, will have him to try Lotrisone   - clotrimazole-betamethasone (LOTRISONE) 1-0.05 % external cream; Apply topically 2 times daily.    Patient has been advised of split billing requirements and indicates understanding: Yes        BMI  Estimated body mass index is 31.67 kg/m  as calculated from the following:    Height as of this encounter: 1.775 m (5' 9.88\").    Weight as of this encounter: 99.8 kg (220 lb).   Weight management plan: Discussed healthy diet and exercise guidelines    Counseling  Appropriate preventive services were addressed with this patient via screening, questionnaire, or discussion as appropriate for fall prevention, nutrition, physical activity, Tobacco-use cessation, social engagement, weight loss and cognition.  Checklist reviewing preventive services " available has been given to the patient.  Reviewed patient's diet, addressing concerns and/or questions.   Discussed possible causes of fatigue. Patient reported safety concerns were addressed today.The patient was provided with written information regarding signs of hearing loss.   Information on urinary incontinence and treatment options given to patient.       FUTURE APPOINTMENTS:       - Follow-up visit in 1 year     Meg Bletran is a 88 year old, presenting for the following:  Wellness Visit and Recheck Medication (Allopurinol )        8/29/2024    10:27 AM   Additional Questions   Roomed by Kandice ARMENDARIZ         Health Care Directive  Patient has a Health Care Directive on file  Advance care planning document is on file and is current.    HPI      Pain History:  When did you first notice your pain? A few weeks    Have you seen anyone else for your pain? No  How has your pain affected your ability to work? Not applicable  Where in your body do you have pain? Back Pain  Onset/Duration: a few weeks   Description:   Location of pain: low back left  Character of pain: sharp, stabbing, and constant  Pain radiation: none  New numbness or weakness in legs, not attributed to pain: no   Intensity: Currently 7/10  Progression of Symptoms: worsening and constant  History:   Specific cause: none  Pain interferes with job: No  History of back problems: no prior back problems  Any previous MRI or X-rays: None  Sees a specialist for back pain: No  Alleviating factors:   Improved by: acetaminophen (Tylenol) and rest    Precipitating factors:  Worsened by: Coughing  Therapies tried and outcome: acetaminophen (Tylenol)    Accompanying Signs & Symptoms:  Risk of Fracture: None  Risk of Cauda Equina: None  Risk of Infection: None  Risk of Cancer: None  Risk of Ankylosing Spondylitis: Onset at age <35, male, AND morning back stiffness  no           8/28/2024   General Health   How would you rate your overall physical health? (!)  FAIR   Feel stress (tense, anxious, or unable to sleep) To some extent      (!) STRESS CONCERN      8/28/2024   Nutrition   Diet: I don't know            8/28/2024   Exercise   Days per week of moderate/strenous exercise 0 days   Average minutes spent exercising at this level 0 min      (!) EXERCISE CONCERN      8/28/2024   Social Factors   Frequency of gathering with friends or relatives Twice a week   Worry food won't last until get money to buy more No   Food not last or not have enough money for food? Yes   Do you have housing? (Housing is defined as stable permanent housing and does not include staying ouside in a car, in a tent, in an abandoned building, in an overnight shelter, or couch-surfing.) Yes   Are you worried about losing your housing? No   Lack of transportation? No   Unable to get utilities (heat,electricity)? No      (!) FOOD SECURITY CONCERN PRESENT      8/29/2024   Fall Risk   Gait Speed Test (Document in seconds) 4.8   Gait Speed Test Interpretation Less than or equal to 5.00 seconds - PASS             8/28/2024   Activities of Daily Living- Home Safety   Needs help with the following daily activites None of the above   Safety concerns in the home No grab bars in the bathroom            8/28/2024   Dental   Dentist two times every year? Yes            8/28/2024   Hearing Screening   Hearing concerns? (!) I NEED TO ASK PEOPLE TO SPEAK UP OR REPEAT THEMSELVES.    (!) IT'S HARDER TO UNDERSTAND WOMEN'S VOICES THAN MEN'S VOICES.    (!) IT'S HARD TO FOLLOW A CONVERSATION IN A NOISY RESTAURANT OR CROWDED ROOM.    (!) TROUBLE UNDERSTANDING SOFT OR WHISPERED SPEECH.       Multiple values from one day are sorted in reverse-chronological order         8/28/2024   Driving Risk Screening   Patient/family members have concerns about driving No            8/28/2024   General Alertness/Fatigue Screening   Have you been more tired than usual lately? (!) YES            8/28/2024   Urinary Incontinence Screening    Bothered by leaking urine in past 6 months Yes            2024   TB Screening   Were you born outside of the [place]? No            Today's PHQ-2 Score:       2024    11:23 AM   PHQ-2 (  [employer])   Q1: Little interest or pleasure in doing things 1   Q2: Feeling down, depressed or hopeless 0   PHQ-2 Score 1   Q1: Little interest or pleasure in doing things Several days   Q2: Feeling down, depressed or hopeless Not at all   PHQ-2 Score 1           2024   Substance Use   Alcohol more than 3/day or more than 7/wk No   Do you have a current opioid prescription? No   How severe/bad is pain from 1 to 10? 6/10   Do you use any other substances recreationally? 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    Actinic keratosis     JAVIER (acute kidney injury) (H24) 2019    Atrial fibrillation (H)     Chronic 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 2013    Edema of leg     Gastrointestinal hemorrhage associated with gastric ulcer 2019    Gout, unspecified     H/O diplopia     History of blood transfusion     History of pulmonary embolism- bilateral in 2016    Hypertension     Meningioma (H)     sinus    Myalgia and myositis, unspecified     Myasthenia gravis (H)     high dose steroids 2007,     Obesity     Other seborrheic keratosis     Presbyacusis     Primary localized osteoarthrosis, lower leg     Primary localized osteoarthrosis, pelvic region and  thigh     Sleep apnea     doesn't use his CPAP)    Spinal stenosis     Squamous cell cancer of scalp and skin of neck     Dr Sanchez, multiple procedures, Mohs scalp and chest    Vitamin D deficiency      Past Surgical History:   Procedure Laterality Date    ABDOMEN SURGERY  2007    APPENDECTOMY      ARTHROPLASTY HIP Right 2016    ARTHROPLASTY HIP ANTERIOR Right 04/26/2016    Procedure: ARTHROPLASTY HIP ANTERIOR;  Surgeon: Miguel Johnson MD;  Location:  OR    ARTHROPLASTY KNEE Right 01/28/2019    Procedure: RIGHT TOTAL KNEE ARTROPLASTY;  Surgeon: Miguel Johnson MD;  Location:  OR    BIOPSY      skin cancer    BRAIN SURGERY  2007    partial resection meningioma    CATARACT IOL, RT/LT      COLECTOMY  2007    bowel perforation due to steroids    COLONOSCOPY      ESOPHAGOSCOPY, GASTROSCOPY, DUODENOSCOPY (EGD), COMBINED N/A 12/22/2018    Procedure: COMBINED ESOPHAGOSCOPY, GASTROSCOPY, DUODENOSCOPY (EGD), BIOPSY SINGLE OR MULTIPLE;  Surgeon: Elizabeth Jones MD;  Location:  GI    ESOPHAGOSCOPY, GASTROSCOPY, DUODENOSCOPY (EGD), COMBINED N/A 05/10/2019    Procedure: ESOPHAGOGASTRODUODENOSCOPY (EGD);  Surgeon: Ahsan Garcia MD;  Location:  GI    GENITOURINARY SURGERY      vasectomy    HEAD & NECK SURGERY  2007    meningioma removed    KNEE SURGERY  2010    left knee replacement    ORTHOPEDIC SURGERY      left TKR    PHACOEMULSIFICATION CLEAR CORNEA WITH TORIC INTRAOCULAR LENS IMPLANT  07/30/2012    Procedure: PHACOEMULSIFICATION CLEAR CORNEA WITH TORIC INTRAOCULAR LENS IMPLANT;  COMPLEX RIGHT PHACOEMULSIFICATION CLEAR CORNEA WITH TORIC INTRAOCULAR LENS IMPLANT WITH MALYUGIN RING;  Surgeon: Ronlad Cortez MD;  Location:  [facility]    RECTAL SURGERY  1985    [facility] RAD RESEC TONSIL/PILLARS       Labs reviewed in EPIC  BP Readings from Last 3 Encounters:   08/29/24 124/58   08/07/23 132/66   05/17/23 120/72    Wt Readings from Last 3 Encounters:   08/29/24 99.8 kg (220 lb)   08/07/23 98.6 kg (217 lb 6  oz)   05/17/23 99.8 kg (220 lb)                  Patient Active Problem List   Diagnosis    Hyperlipidemia LDL goal <100    Edema of both legs    Varicose veins of legs    Stasis dermatitis    Chronic atrial fibrillation (H)    Vitamin D deficiency disease    Hypertension goal BP (blood pressure) < 140/90    Diplopia    MARTHA (obstructive sleep apnea)    Long term current use of anticoagulant therapy    Bradycardia    Chronic left-sided thoracic back pain    Presbycusis of both ears    Mixed hyperlipidemia    Screening for prostate cancer    CKD (chronic kidney disease) stage 2, GFR 60-89 ml/min    Pulmonary nodules    Debility    Gout of big toe    Routine medical exam    Chronic kidney disease, stage 3 (H)    History of pulmonary embolism     Past Surgical History:   Procedure Laterality Date    ABDOMEN SURGERY  2007    APPENDECTOMY      ARTHROPLASTY HIP Right 2016    ARTHROPLASTY HIP ANTERIOR Right 04/26/2016    Procedure: ARTHROPLASTY HIP ANTERIOR;  Surgeon: Miguel Johnson MD;  Location:  OR    ARTHROPLASTY KNEE Right 01/28/2019    Procedure: RIGHT TOTAL KNEE ARTROPLASTY;  Surgeon: Miguel Johnson MD;  Location:  OR    BIOPSY      skin cancer    BRAIN SURGERY  2007    partial resection meningioma    CATARACT IOL, RT/LT      COLECTOMY  2007    bowel perforation due to steroids    COLONOSCOPY      ESOPHAGOSCOPY, GASTROSCOPY, DUODENOSCOPY (EGD), COMBINED N/A 12/22/2018    Procedure: COMBINED ESOPHAGOSCOPY, GASTROSCOPY, DUODENOSCOPY (EGD), BIOPSY SINGLE OR MULTIPLE;  Surgeon: Elizabeth Jones MD;  Location:  GI    ESOPHAGOSCOPY, GASTROSCOPY, DUODENOSCOPY (EGD), COMBINED N/A 05/10/2019    Procedure: ESOPHAGOGASTRODUODENOSCOPY (EGD);  Surgeon: Ahsan Garcia MD;  Location:  GI    GENITOURINARY SURGERY      vasectomy    HEAD & NECK SURGERY  2007    meningioma removed    KNEE SURGERY  2010    left knee replacement    ORTHOPEDIC SURGERY      left TKR    PHACOEMULSIFICATION CLEAR CORNEA WITH  TORIC INTRAOCULAR LENS IMPLANT  2012    Procedure: PHACOEMULSIFICATION CLEAR CORNEA WITH TORIC INTRAOCULAR LENS IMPLANT;  COMPLEX RIGHT PHACOEMULSIFICATION CLEAR CORNEA WITH TORIC INTRAOCULAR LENS IMPLANT WITH MALYUGIN RING;  Surgeon: Ronald Cortez MD;  Location: [facility]    RECTAL SURGERY      [facility] RAD RESEC TONSIL/PILLARS         Social History     Tobacco Use    Smoking status: Former     Current packs/day: 0.00     Average packs/day: 1 pack/day for 3.9 years (3.9 ttl pk-yrs)     Types: Cigarettes     Start date: 1959     Quit date: 1963     Years since quittin.8    Smokeless tobacco: Never   Substance Use Topics    Alcohol use: Yes     Comment: 2-4 glasses of wine per week     Family History   Problem Relation Age of Onset    Glaucoma Father     Coronary Artery Disease Father     Hyperlipidemia Father     Unknown/Adopted Mother     Diabetes Brother     Leukemia Brother     Diabetes Brother     Hyperlipidemia Brother     Obesity Brother     Macular Degeneration No family hx of          Current Outpatient Medications   Medication Sig Dispense Refill    allopurinol (ZYLOPRIM) 300 MG tablet TAKE ONE TABLET BY MOUTH IN THE MORNING 90 tablet 1    CALCIUM PO Take 600 mg by mouth 2 times daily       clotrimazole-betamethasone (LOTRISONE) 1-0.05 % external cream Apply topically 2 times daily. 45 g 1    diclofenac (VOLTAREN) 1 % topical gel Apply 1 g topically in the morning and 1 g in the evening. 150 g 3    Esomeprazole Magnesium (NEXIUM PO) Take by mouth every morning (before breakfast)      fluocinolone (SYNALAR) 0.01 % solution Apply topically 2 times daily 90 mL 0    furosemide (LASIX) 20 MG tablet TAKE ONE TABLET BY MOUTH IN THE MORNING 90 tablet 0    ipratropium (ATROVENT) 0.03 % nasal spray Spray 2 sprays into both nostrils every 12 hours 30 mL 0    lovastatin (MEVACOR) 40 MG tablet TAKE TWO TABLETS BY MOUTH DAILY AT BEDTIME 180 tablet 0    multivitamin, therapeutic with minerals  (THERA-VIT-M) TABS Take 1 tablet by mouth daily      potassium chloride sol ER (KLOR-CON M20) 20 MEQ CR tablet TAKE ONE TABLET BY MOUTH ONE TIME DAILY 90 tablet 1    Vitamin D, Cholecalciferol, 1000 units TABS Take 1,000 Units by mouth in the morning.      warfarin ANTICOAGULANT (COUMADIN) 4 MG tablet Take 10 mg on Monday, Wednesday and Friday and 8 mg all other days of the week or as directed by inr clinc 190 tablet 1     No Known Allergies  Recent Labs   Lab Test 08/07/23  0956 04/12/22  1009 03/04/21  1028 08/21/20  1059   LDL 52 61 75  --    HDL 29* 29* 35*  --    TRIG 140 168* 120  --    ALT 20 34 27 22   CR 1.25* 1.36* 1.24 1.14   GFRESTIMATED 56* 51* 53* 59*   GFRESTBLACK  --   --  61 68   POTASSIUM 5.0 4.3 4.4 4.0      Current providers sharing in care for this patient include:  Patient Care Team:  Jose Squires MD as PCP - General (Family Medicine)  Jose Squires MD as Assigned PCP    The following health maintenance items are reviewed in Epic and correct as of today:  Health Maintenance   Topic Date Due    RSV VACCINE (Pregnancy & 60+) (1 - 1-dose 60+ series) Never done    ZOSTER IMMUNIZATION (2 of 2) 05/02/2022    COVID-19 Vaccine (4 - 2023-24 season) 09/01/2023    ANNUAL REVIEW OF HM ORDERS  05/17/2024    BMP  08/07/2024    LIPID  08/07/2024    MICROALBUMIN  08/07/2024    MEDICARE ANNUAL WELLNESS VISIT  08/07/2024    HEMOGLOBIN  08/07/2024    INFLUENZA VACCINE (1) 09/01/2024    FALL RISK ASSESSMENT  08/29/2025    ADVANCE CARE PLANNING  08/07/2028    DTAP/TDAP/TD IMMUNIZATION (3 - Td or Tdap) 11/25/2029    PHQ-2 (once per calendar year)  Completed    Pneumococcal Vaccine: 65+ Years  Completed    URINALYSIS  Completed    HPV IMMUNIZATION  Aged Out    MENINGITIS IMMUNIZATION  Aged Out    RSV MONOCLONAL ANTIBODY  Aged Out    URINE DRUG SCREEN  Discontinued         Review of Systems  Constitutional, HEENT, cardiovascular, pulmonary, GI, , musculoskeletal, neuro, skin, endocrine and psych systems are  "negative, except as otherwise noted.     Objective    Exam  /58 (BP Location: Right arm, Patient Position: Sitting, Cuff Size: Adult Regular)   Pulse 64   Temp 97  F (36.1  C) (Tympanic)   Resp 15   Ht 1.775 m (5' 9.88\")   Wt 99.8 kg (220 lb)   SpO2 97%   BMI 31.67 kg/m     Estimated body mass index is 31.67 kg/m  as calculated from the following:    Height as of this encounter: 1.775 m (5' 9.88\").    Weight as of this encounter: 99.8 kg (220 lb).    Physical Exam  GENERAL: alert and no distress  EYES: Eyes grossly normal to inspection, PERRL and conjunctivae and sclerae normal  HENT: ear canals and TM's normal, nose and mouth without ulcers or lesions  NECK: no adenopathy, no asymmetry, masses, or scars  RESP: lungs clear to auscultation - no rales, rhonchi or wheezes  CV: regular rate and rhythm, normal S1 S2, no S3 or S4, no murmur, click or rub, no peripheral edema  ABDOMEN: soft, nontender, no hepatosplenomegaly, no masses and bowel sounds normal  MS: no gross musculoskeletal defects noted, no edema  SKIN: no suspicious lesions or rashes  NEURO: Normal strength and tone, mentation intact and speech normal  BACK: no CVA tenderness, no paralumbar tenderness         8/29/2024   Mini Cog   Clock Draw Score 2 Normal   3 Item Recall 3 objects recalled   Mini Cog Total Score 5                 Signed Electronically by: Jose Squires MD    "

## 2024-08-29 NOTE — PATIENT INSTRUCTIONS
Patient Education   Preventive Care Advice   This is general advice given by our system to help you stay healthy. However, your care team may have specific advice just for you. Please talk to your care team about your preventive care needs.  Nutrition  Eat 5 or more servings of fruits and vegetables each day.  Try wheat bread, brown rice and whole grain pasta (instead of white bread, rice, and pasta).  Get enough calcium and vitamin D. Check the label on foods and aim for 100% of the RDA (recommended daily allowance).  Lifestyle  Exercise at least 150 minutes each week  (30 minutes a day, 5 days a week).  Do muscle strengthening activities 2 days a week. These help control your weight and prevent disease.  No smoking.  Wear sunscreen to prevent skin cancer.  Have a dental exam and cleaning every 6 months.  Yearly exams  See your health care team every year to talk about:  Any changes in your health.  Any medicines your care team has prescribed.  Preventive care, family planning, and ways to prevent chronic diseases.  Shots (vaccines)   HPV shots (up to age 26), if you've never had them before.  Hepatitis B shots (up to age 59), if you've never had them before.  COVID-19 shot: Get this shot when it's due.  Flu shot: Get a flu shot every year.  Tetanus shot: Get a tetanus shot every 10 years.  Pneumococcal, hepatitis A, and RSV shots: Ask your care team if you need these based on your risk.  Shingles shot (for age 50 and up)  General health tests  Diabetes screening:  Starting at age 35, Get screened for diabetes at least every 3 years.  If you are younger than age 35, ask your care team if you should be screened for diabetes.  Cholesterol test: At age 39, start having a cholesterol test every 5 years, or more often if advised.  Bone density scan (DEXA): At age 50, ask your care team if you should have this scan for osteoporosis (brittle bones).  Hepatitis C: Get tested at least once in your life.  STIs (sexually  transmitted infections)  Before age 24: Ask your care team if you should be screened for STIs.  After age 24: Get screened for STIs if you're at risk. You are at risk for STIs (including HIV) if:  You are sexually active with more than one person.  You don't use condoms every time.  You or a partner was diagnosed with a sexually transmitted infection.  If you are at risk for HIV, ask about PrEP medicine to prevent HIV.  Get tested for HIV at least once in your life, whether you are at risk for HIV or not.  Cancer screening tests  Cervical cancer screening: If you have a cervix, begin getting regular cervical cancer screening tests starting at age 21.  Breast cancer scan (mammogram): If you've ever had breasts, begin having regular mammograms starting at age 40. This is a scan to check for breast cancer.  Colon cancer screening: It is important to start screening for colon cancer at age 45.  Have a colonoscopy test every 10 years (or more often if you're at risk) Or, ask your provider about stool tests like a FIT test every year or Cologuard test every 3 years.  To learn more about your testing options, visit:   .  For help making a decision, visit:   https://bit.ly/uu92658.  Prostate cancer screening test: If you have a prostate, ask your care team if a prostate cancer screening test (PSA) at age 55 is right for you.  Lung cancer screening: If you are a current or former smoker ages 50 to 80, ask your care team if ongoing lung cancer screenings are right for you.  For informational purposes only. Not to replace the advice of your health care provider. Copyright   2023 [facility]. All rights reserved. Clinically reviewed by the [facility]. [employer] 779370 - REV 01/24.  Learning About Activities of Daily Living  What are activities of daily living?     Activities of daily living (ADLs) are the basic self-care tasks you do every day. These include eating, bathing, dressing,  and moving around.  As you age, and if you have health problems, you may find that it's harder to do some of these tasks. If so, your doctor can suggest ideas that may help.  To measure what kind of help you may need, your doctor will ask how well you are able to do ADLs. Let your doctor know if there are any tasks that you are having trouble doing. This is an important first step to getting help. And when you have the help you need, you can stay as independent as possible.  How will a doctor assess your ADLs?  Asking about ADLs is part of a routine health checkup your doctor will likely do as you age. Your health check might be done in a doctor's office, in your home, or at a hospital. The goal is to find out if you are having any problems that could make it hard to care for yourself or that make it unsafe for you to be on your own.  To measure your ADLs, your doctor will ask how hard it is for you to do routine tasks. Your doctor may also want to know if you have changed the way you do a task because of a health problem. Your doctor may watch how you:  Walk back and forth.  Keep your balance while you stand or walk.  Move from sitting to standing or from a bed to a chair.  Button or unbutton a shirt or sweater.  Remove and put on your shoes.  It's common to feel a little worried or anxious if you find you can't do all the things you used to be able to do. Talking with your doctor about ADLs is a way to make sure you're as safe as possible and able to care for yourself as well as you can. You may want to bring a caregiver, friend, or family member to your checkup. They can help you talk to your doctor.  Follow-up care is a key part of your treatment and safety. Be sure to make and go to all appointments, and call your doctor if you are having problems. It's also a good idea to know your test results and keep a list of the medicines you take.  Current as of: October 24, 2023  Content Version: 14.1    5023-9144  Healthwise, [employer].   Care instructions adapted under license by your healthcare professional. If you have questions about a medical condition or this instruction, always ask your healthcare professional. [employer] disclaims any warranty or liability for your use of this information.    Preventing Falls: Care Instructions  Injuries and health problems such as trouble walking or poor eyesight can increase your risk of falling. So can some medicines. But there are things you can do to help prevent falls. You can exercise to get stronger. You can also arrange your home to make it safer.    Talk to your doctor about the medicines you take. Ask if any of them increase the risk of falls and whether they can be changed or stopped.   Try to exercise regularly. It can help improve your strength and balance. This can help lower your risk of falling.     Practice fall safety and prevention.    Wear low-heeled shoes that fit well and give your feet good support. Talk to your doctor if you have foot problems that make this hard.  Carry a cellphone or wear a medical alert device that you can use to call for help.  Use stepladders instead of chairs to reach high objects. Don't climb if you're at risk for falls. Ask for help, if needed.  Wear the correct eyeglasses, if you need them.    Make your home safer.    Remove rugs, cords, clutter, and furniture from walkways.  Keep your house well lit. Use night-lights in hallways and bathrooms.  Install and use sturdy handrails on stairways.  Wear nonskid footwear, even inside. Don't walk barefoot or in socks without shoes.    Be safe outside.    Use handrails, curb cuts, and ramps whenever possible.  Keep your hands free by using a shoulder bag or backpack.  Try to walk in well-lit areas. Watch out for uneven ground, changes in pavement, and debris.  Be careful in the winter. Walk on the grass or gravel when sidewalks are slippery. Use de-icer on steps and walkways.  "Add non-slip devices to shoes.    Put grab bars and nonskid mats in your shower or tub and near the toilet. Try to use a shower chair or bath bench when bathing.   Get into a tub or shower by putting in your weaker leg first. Get out with your strong side first. Have a phone or medical alert device in the bathroom with you.   Where can you learn more?  Go to https://www.[employer]VX.[employer]/patiented  Enter G117 in the search box to learn more about \"Preventing Falls: Care Instructions.\"  Current as of: July 17, 2023               Content Version: 14.0    9603-4161 [employer].   Care instructions adapted under license by your healthcare professional. If you have questions about a medical condition or this instruction, always ask your healthcare professional. [employer] disclaims any warranty or liability for your use of this information.      Hearing Loss: Care Instructions  Overview     Hearing loss is a sudden or slow decrease in how well you hear. It can range from slight to profound. Permanent hearing loss can occur with aging. It also can happen when you are exposed long-term to loud noise. Examples include listening to loud music, riding motorcycles, or being around other loud machines.  Hearing loss can affect your work and home life. It can make you feel lonely or depressed. You may feel that you have lost your independence. But hearing aids and other devices can help you hear better and feel connected to others.  Follow-up care is a key part of your treatment and safety. Be sure to make and go to all appointments, and call your doctor if you are having problems. It's also a good idea to know your test results and keep a list of the medicines you take.  How can you care for yourself at home?  Avoid loud noises whenever possible. This helps keep your hearing from getting worse.  Always wear hearing protection around loud noises.  Wear a hearing aid as directed.  A professional can help " "you pick a hearing aid that will work best for you.  You can also get hearing aids over the counter for mild to moderate hearing loss.  Have hearing tests as your doctor suggests. They can show whether your hearing has changed. Your hearing aid may need to be adjusted.  Use other devices as needed. These may include:  Telephone amplifiers and hearing aids that can connect to a television, stereo, radio, or microphone.  Devices that use lights or vibrations. These alert you to the doorbell, a ringing telephone, or a baby monitor.  Television closed-captioning. This shows the words at the bottom of the screen. Most new TVs can do this.  TTY (text telephone). This lets you type messages back and forth on the telephone instead of talking or listening. These devices are also called TDD. When messages are typed on the keyboard, they are sent over the phone line to a receiving TTY. The message is shown on a monitor.  Use text messaging, social media, and email if it is hard for you to communicate by telephone.  Try to learn a listening technique called speechreading. It is not lipreading. You pay attention to people's gestures, expressions, posture, and tone of voice. These clues can help you understand what a person is saying. Face the person you are talking to, and have them face you. Make sure the lighting is good. You need to see the other person's face clearly.  Think about counseling if you need help to adjust to your hearing loss.  When should you call for help?  Watch closely for changes in your health, and be sure to contact your doctor if:    You think your hearing is getting worse.     You have new symptoms, such as dizziness or nausea.   Where can you learn more?  Go to https://www.[employer].net/patiented  Enter R798 in the search box to learn more about \"Hearing Loss: Care Instructions.\"  Current as of: September 27, 2023               Content Version: 14.0    5884-2555 Healthwise, Incorporated.   Care " instructions adapted under license by your healthcare professional. If you have questions about a medical condition or this instruction, always ask your healthcare professional. Healthwise, [facility] disclaims any warranty or liability for your use of this information.      Learning About Sleeping Well  What does sleeping well mean?     Sleeping well means getting enough sleep to feel good and stay healthy. How much sleep is enough varies among people.  The number of hours you sleep and how you feel when you wake up are both important. If you do not feel refreshed, you probably need more sleep. Another sign of not getting enough sleep is feeling tired during the day.  Experts recommend that adults get at least 7 or more hours of sleep per day. Children and older adults need more sleep.  Why is getting enough sleep important?  Getting enough quality sleep is a basic part of good health. When your sleep suffers, your physical health, mood, and your thoughts can suffer too. You may find yourself feeling more grumpy or stressed. Not getting enough sleep also can lead to serious problems, including injury, accidents, anxiety, and depression.  What might cause poor sleeping?  Many things can cause sleep problems, including:  Changes to your sleep schedule.  Stress. Stress can be caused by fear about a single event, such as giving a speech. Or you may have ongoing stress, such as worry about work or school.  Depression, anxiety, and other mental or emotional conditions.  Changes in your sleep habits or surroundings. This includes changes that happen where you sleep, such as noise, light, or sleeping in a different bed. It also includes changes in your sleep pattern, such as having jet lag or working a late shift.  Health problems, such as pain, breathing problems, and restless legs syndrome.  Lack of regular exercise.  Using alcohol, nicotine, or caffeine before bed.  How can you help yourself?  Here are some tips that  "may help you sleep more soundly and wake up feeling more refreshed.  Your sleeping area   Use your bedroom only for sleeping and sex. A bit of light reading may help you fall asleep. But if it doesn't, do your reading elsewhere in the house. Try not to use your TV, computer, smartphone, or tablet while you are in bed.  Be sure your bed is big enough to stretch out comfortably, especially if you have a sleep partner.  Keep your bedroom quiet, dark, and cool. Use curtains, blinds, or a sleep mask to block out light. To block out noise, use earplugs, soothing music, or a \"white noise\" machine.  Your evening and bedtime routine   Create a relaxing bedtime routine. You might want to take a warm shower or bath, or listen to soothing music.  Go to bed at the same time every night. And get up at the same time every morning, even if you feel tired.  What to avoid   Limit caffeine (coffee, tea, caffeinated sodas) during the day, and don't have any for at least 6 hours before bedtime.  Avoid drinking alcohol before bedtime. Alcohol can cause you to wake up more often during the night.  Try not to smoke or use tobacco, especially in the evening. Nicotine can keep you awake.  Limit naps during the day, especially close to bedtime.  Avoid lying in bed awake for too long. If you can't fall asleep or if you wake up in the middle of the night and can't get back to sleep within about 20 minutes, get out of bed and go to another room until you feel sleepy.  Avoid taking medicine right before bed that may keep you awake or make you feel hyper or energized. Your doctor can tell you if your medicine may do this and if you can take it earlier in the day.  If you can't sleep   Imagine yourself in a peaceful, pleasant scene. Focus on the details and feelings of being in a place that is relaxing.  Get up and do a quiet or boring activity until you feel sleepy.  Avoid drinking any liquids before going to bed to help prevent waking up often to " "use the bathroom.  Where can you learn more?  Go to https://www.[employer].net/patiented  Enter J942 in the search box to learn more about \"Learning About Sleeping Well.\"  Current as of: July 10, 2023  Content Version: 14.1 2006-2024 [employer].   Care instructions adapted under license by your healthcare professional. If you have questions about a medical condition or this instruction, always ask your healthcare professional. [employer] disclaims any warranty or liability for your use of this information.    Bladder Training: Care Instructions  Your Care Instructions     Bladder training is used to treat urge incontinence and stress incontinence. Urge incontinence means that the need to urinate comes on so fast that you can't get to a toilet in time. Stress incontinence means that you leak urine because of pressure on your bladder. For example, it may happen when you laugh, cough, or lift something heavy.  Bladder training can increase how long you can wait before you have to urinate. It can also help your bladder hold more urine. And it can give you better control over the urge to urinate.  It is important to remember that bladder training takes a few weeks to a few months to make a difference. You may not see results right away, but don't give up.  Follow-up care is a key part of your treatment and safety. Be sure to make and go to all appointments, and call your doctor if you are having problems. It's also a good idea to know your test results and keep a list of the medicines you take.  How can you care for yourself at home?  Work with your doctor to come up with a bladder training program that is right for you. You may use one or more of the following methods.  Delayed urination  In the beginning, try to keep from urinating for 5 minutes after you first feel the need to go.  While you wait, take deep, slow breaths to relax. Kegel exercises can also help you delay the need to go to " "the bathroom.  After some practice, when you can easily wait 5 minutes to urinate, try to wait 10 minutes before you urinate.  Slowly increase the waiting period until you are able to control when you have to urinate.  Scheduled urination  Empty your bladder when you first wake up in the morning.  Schedule times throughout the day when you will urinate.  Start by going to the bathroom every hour, even if you don't need to go.  Slowly increase the time between trips to the bathroom.  When you have found a schedule that works well for you, keep doing it.  If you wake up during the night and have to urinate, do it. Apply your schedule to waking hours only.  Kegel exercises  These tighten and strengthen pelvic muscles, which can help you control the flow of urine. (If doing these exercises causes pain, stop doing them and talk with your doctor.) To do Kegel exercises:  Squeeze your muscles as if you were trying not to pass gas. Or squeeze your muscles as if you were stopping the flow of urine. Your belly, legs, and buttocks shouldn't move.  Hold the squeeze for 3 seconds, then relax for 5 to 10 seconds.  Start with 3 seconds, then add 1 second each week until you are able to squeeze for 10 seconds.  Repeat the exercise 10 times a session. Do 3 to 8 sessions a day.  When should you call for help?  Watch closely for changes in your health, and be sure to contact your doctor if:    Your incontinence is getting worse.     You do not get better as expected.   Where can you learn more?  Go to https://www.[employer].net/patiented  Enter V684 in the search box to learn more about \"Bladder Training: Care Instructions.\"  Current as of: November 15, 2023               Content Version: 14.0    2058-2209 Healthwise, Incorporated.   Care instructions adapted under license by your healthcare professional. If you have questions about a medical condition or this instruction, always ask your healthcare professional. [employer] disclaims any warranty or liability for your use of this information.

## 2024-08-30 NOTE — TELEPHONE ENCOUNTER
Lab calls.     Critical lab result -  Preliminary WBC count - 30.9     Called on-call provider - Dr. Sidhu - he will review chart and call patient.     CHASTITY SHARP RN on 8/30/2024 at 5:19 PM   [facility]

## 2024-08-30 NOTE — PROGRESS NOTES
New Patient Oncology Nurse Navigator Note     Referring provider: Jose Squires MD      Referring Clinic/Organization: [facility]      Referred to (specialty:) Hematology     Requested provider (if applicable): D72.829 (ICD-10-CM) - Leukocytosis, unspecified type      Date Referral Received: August 30, 2024     Evaluation for:       Clinical History (per Nurse review of records provided):       Latest Reference Range & Units 08/29/24 11:06 08/29/24 11:21   Sodium 135 - 145 mmol/L 131 (L)    Potassium 3.4 - 5.3 mmol/L 5.2    Chloride 98 - 107 mmol/L 98    Carbon Dioxide (CO2) 22 - 29 mmol/L 24    Urea Nitrogen 8.0 - 23.0 mg/dL 30.8 (H)    Creatinine 0.67 - 1.17 mg/dL 1.54 (H)    GFR Estimate >60 mL/min/1.73m2 43 (L)    Calcium 8.8 - 10.4 mg/dL 9.3    Anion Gap 7 - 15 mmol/L 9    Albumin 3.5 - 5.2 g/dL 3.9    Protein Total 6.4 - 8.3 g/dL 6.8    Alkaline Phosphatase 40 - 150 U/L 100    ALT 0 - 70 U/L 19    AST 0 - 45 U/L 37    Albumin Urine mg/g Cr 0.00 - 17.00 mg/g Cr  139.26 (H)   Albumin Urine mg/L mg/L  105.0   Bilirubin Total <=1.2 mg/dL 0.5    Cholesterol <200 mg/dL 91    Creatinine Urine mg/dL  75.4   Patient Fasting?  No  No    Glucose 70 - 99 mg/dL 88    HDL Cholesterol >=40 mg/dL 27 (L)    LDL Cholesterol Calculated <=100 mg/dL 38    Non HDL Cholesterol <130 mg/dL 64    PSA Tumor Marker ng/mL 3.40    Triglycerides <150 mg/dL 132    Uric Acid 3.4 - 7.0 mg/dL 5.8    WBC 4.0 - 11.0 10e3/uL 28.3 (H)    Hemoglobin 13.3 - 17.7 g/dL 11.7 (L)    Hematocrit 40.0 - 53.0 % 37.8 (L)    Platelet Count 150 - 450 10e3/uL 414    RBC Count 4.40 - 5.90 10e6/uL 4.39 (L)    MCV 78 - 100 fL 86    MCH 26.5 - 33.0 pg 26.7    MCHC 31.5 - 36.5 g/dL 31.0 (L)    RDW 10.0 - 15.0 % 20.1 (H)    (L): Data is abnormally low  (H): Data is abnormally high     Records Location: See Bookmarked material     Records Needed: NA     Additional testing needed prior to consult:     Blood Morphology Ordered    Payor: MEDICARE  "/ Plan: MEDICARE / Product Type: Medicare /     August 30, 2024  Referral received and reviewed.   Sent Message to Dr. Cifuentes to review.   Recommendation is to get differential on labs that were drawn yesterday.   Message sent to [facility].     August 30, 2024  Message received back from [facility]. Lab orders in however at this time patient is scheduled for 9/4/24. Working on seeing if this can be moved up any sooner. Called [facility] lab at [phone] and spoke with De. Message was sent to Lab to see if patient can be added on to clinic today for a lab draw. Expressed that the concern is for leukemia as WBC is elevated at 28.3    Per patient at this time. He is feel ok. His biggest complaint is he back pain. He states that he feels like he has \"knots\" all over and that per Dr. Squires it is muscle related. He does express some fatigue at times. No c/o of SOB or fever. No known active infections going on. Explained to the patient that the concern is that he may have a malignant process going on called leukemia but that we don't have enough information at this time and that we need the additional blood work. Informed him that if this is leukemia his health status can change fast.  Reviewed with him signs and symptoms that would indicate the need to go to the ED to be seen and encouraged him to error on the side of caution and go in. Patient verbalized understanding and was grateful for the coordination of care. Provided him with call back information and encouraged him to call with any questions or concerns.     August 30, 2024  Patient scheduled for labs today at 5 PM [facility].   Updated [facility] in regards to lab appointment.     September 3, 2024  Follow up with patient today. He is feeling fine. Labs are still in process at this time.   Message sent to Dr. Raymundo as he would like to seen at [facility].    09/03/2024 4:07 PM:  Message back from Dr. Raymundo. Okay to schedule patient on 9/18. Called patient " updated them on plan. Transferred to NPS.         Niecy CROWN, RN   Oncology Nurse Navigator   [facility]   [phone] / [phone]

## 2024-08-30 NOTE — TELEPHONE ENCOUNTER
Covering for PCP, chart reviewed. Recheck labs due to elevated WBC, has referral out to hematology (no appt schedule yet). WBC remains elevated, remainder labs still processing. No further intervention needed, ok to wait for PCP review. Routing to PCP as FYI

## 2024-08-30 NOTE — TELEPHONE ENCOUNTER
Reason for Call:  Appointment Request    Patient requesting this type of appt:  Lab    Requested provider:  Lab    Reason patient unable to be scheduled: Needs to be scheduled by clinic    When does patient want to be seen/preferred time: Same day    Comments: CBC lab per PCP    Could we send this information to you in [facility] or would you prefer to receive a phone call?:   Patient would prefer a phone call   Okay to leave a detailed message?: Yes at Cell number on file:    Telephone Information:   Mobile [phone]       Call taken on 8/30/2024 at 3:23 PM by De Ritchie

## 2024-08-30 NOTE — TELEPHONE ENCOUNTER
Called by Jessie Pathak RN with critical lab result as I am on call for [facility]/Dr. Squires.  Below from FLAVIO Baeza is reviewed as is documentation from heme/onc from yesterday.  Patient is aware of possibility of leukemia diagnosis per oncology outreach.  Await morphology which has been ordered.  Will forward to PCP to ensure he has an upcoming oncology appointment arranged.    Fernando Sidhu MD

## 2024-09-09 PROBLEM — I48.20 CHRONIC A-FIB (H): Status: ACTIVE | Noted: 2024-01-01

## 2024-09-09 PROBLEM — D64.9 ANEMIA, UNSPECIFIED TYPE: Status: ACTIVE | Noted: 2024-01-01

## 2024-09-09 PROBLEM — K92.2 GASTROINTESTINAL HEMORRHAGE, UNSPECIFIED GASTROINTESTINAL HEMORRHAGE TYPE: Status: ACTIVE | Noted: 2024-01-01

## 2024-09-09 PROBLEM — Z79.01 LONG TERM (CURRENT) USE OF ANTICOAGULANTS: Status: ACTIVE | Noted: 2024-01-01

## 2024-09-09 PROBLEM — C91.10 CLL (CHRONIC LYMPHOCYTIC LEUKEMIA) (H): Status: ACTIVE | Noted: 2024-01-01

## 2024-09-09 PROBLEM — E87.5 HYPERKALEMIA: Status: ACTIVE | Noted: 2024-01-01

## 2024-09-09 PROBLEM — N18.9 CHRONIC RENAL FAILURE, UNSPECIFIED CKD STAGE: Status: ACTIVE | Noted: 2024-01-01

## 2024-09-09 NOTE — ED PROVIDER NOTES
Emergency Department Note      History of Present Illness     Chief Complaint   Nausea, Vomiting, & Diarrhea and Rectal Bleeding      HPI   Alessio Teixeira is a 88 year old male with a history of stomach ulcer who presents with 2 to 3 weeks of worsening fatigue and some dyspnea on exertion.  This morning he had a dark stool, and the last day or 2 he spit up some blood.  He has had some lower abdomen tenderness on the left side.  History of diverticulitis in the past.  He was on Nexium but stopped it in the past week per recommendation of his doctor.  He is also on Coumadin for A-fib.  He has been much more winded the last few days and gets fatigued very easily.  His wife and son think that he looks more pale 2.  His last scope was in 2018.  He did have a retroperitoneal bleed a number of years ago when he for started Coumadin for his atrial fibrillation.  He had a partial colectomy in the past as well for perforated bowel, thought to be secondary to excessive prednisone for myasthenia gravis.  Patient is currently being worked up for CLL and has a high white count.    Independent Historian   Wife and Son as detailed above.  Patient gave history as well    Review of External Notes   I reviewed the discharge summary from 12/24/2018.    Hospital Course  Alessio Teixeira was admitted on 12/22/2018.  The following problems were addressed during his hospitalization:     Acute upper gastrointestinal hemorrhage with acute blood loss anemia secondary to Gastric Ulcer:  hgb dropped from 16 PTA to a bal of 6.5  He was transfused 1 unit PRBCs and sang to 8.5.  EGD confirms gastric ulcer which was not bleeding at the time of inspection.  Prior to discharge pt had no further nausea and was tolerating a regular diet.  He had one dark stool prior to discharge.   -continue BID PPI x 8 weeks with further plan per GI after repeat EGD  -repeat EGD in 8 weeks  -carafate x 2 weeks  -discussed he may have 1-3 more dark stools with  old blood, will need re-eval if this continues or stops and then restarts  -will hold coumadin until 12/28  -repeat CBC in one week    Past Medical History     Medical History and Problem List   Past Medical History:   Diagnosis Date    6th nerve palsy     Actinic keratosis     JAVIER (acute kidney injury) (H24) 02/01/2019    Atrial fibrillation (H)     Chronic 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 06/12/2013    Edema of leg     Gastrointestinal hemorrhage associated with gastric ulcer 02/01/2019    Gout, unspecified     H/O diplopia     History of blood transfusion     History of pulmonary embolism- bilateral in 2007 05/02/2016    Hypertension     Meningioma (H)     Myalgia and myositis, unspecified     Myasthenia gravis (H)     Obesity     Other seborrheic keratosis     Presbyacusis     Primary localized osteoarthrosis, lower leg     Primary localized osteoarthrosis, pelvic region and thigh     Sleep apnea     Spinal stenosis     Squamous cell cancer of scalp and skin of neck     Vitamin D deficiency        Medications   allopurinol (ZYLOPRIM) 300 MG tablet  CALCIUM PO  clotrimazole-betamethasone (LOTRISONE) 1-0.05 % external cream  diclofenac (VOLTAREN) 1 % topical gel  ferrous sulfate (FEROSUL) 325 (65 Fe) MG tablet  ipratropium (ATROVENT) 0.03 % nasal spray  lovastatin (MEVACOR) 40 MG tablet  magnesium 250 MG tablet  multivitamin, therapeutic with minerals (THERA-VIT-M) TABS  potassium chloride sol ER (KLOR-CON M20) 20 MEQ CR tablet  Vitamin D, Cholecalciferol, 1000 units TABS  warfarin ANTICOAGULANT (COUMADIN) 4 MG tablet        Surgical History   Past Surgical History:   Procedure Laterality Date    ABDOMEN SURGERY  2007    APPENDECTOMY      ARTHROPLASTY HIP Right 2016    ARTHROPLASTY HIP ANTERIOR Right 04/26/2016    Procedure: ARTHROPLASTY HIP ANTERIOR;  Surgeon: Miguel Johnson MD;  Location: [facility] OR    ARTHROPLASTY KNEE Right 01/28/2019    Procedure: RIGHT TOTAL KNEE ARTROPLASTY;  Surgeon:  Miguel Johnson MD;  Location:  OR    BIOPSY      skin cancer    BRAIN SURGERY  2007    partial resection meningioma    CATARACT IOL, RT/LT      COLECTOMY  2007    bowel perforation due to steroids    COLONOSCOPY      ESOPHAGOSCOPY, GASTROSCOPY, DUODENOSCOPY (EGD), COMBINED N/A 12/22/2018    Procedure: COMBINED ESOPHAGOSCOPY, GASTROSCOPY, DUODENOSCOPY (EGD), BIOPSY SINGLE OR MULTIPLE;  Surgeon: Elizabeth Jones MD;  Location:  GI    ESOPHAGOSCOPY, GASTROSCOPY, DUODENOSCOPY (EGD), COMBINED N/A 05/10/2019    Procedure: ESOPHAGOGASTRODUODENOSCOPY (EGD);  Surgeon: Ahsan Garcia MD;  Location:  GI    GENITOURINARY SURGERY      vasectomy    HEAD & NECK SURGERY  2007    meningioma removed    KNEE SURGERY  2010    left knee replacement    ORTHOPEDIC SURGERY      left TKR    PHACOEMULSIFICATION CLEAR CORNEA WITH TORIC INTRAOCULAR LENS IMPLANT  07/30/2012    Procedure: PHACOEMULSIFICATION CLEAR CORNEA WITH TORIC INTRAOCULAR LENS IMPLANT;  COMPLEX RIGHT PHACOEMULSIFICATION CLEAR CORNEA WITH TORIC INTRAOCULAR LENS IMPLANT WITH MALYUGIN RING;  Surgeon: Ronald Cortez MD;  Location:  [facility]    RECTAL SURGERY  1985    ZC RAD RESEC TONSIL/PILLARS         Physical Exam     Patient Vitals for the past 24 hrs:   BP Temp Temp src Pulse Resp SpO2   09/09/24 1400 112/72 -- -- 99 19 --   09/09/24 1148 -- -- -- 86 -- --   09/09/24 1131 108/50 97.8  F (36.6  C) Temporal -- 20 98 %     Physical Exam  Nursing note and vitals reviewed.    Constitutional:  Appears comfortable.  Looks somewhat pale   HENT:                Nose normal.  No discharge.      Oral mucosa is moist.  Eyes:    Pupils are equal.  Conjunctiva are pale.  Cardiovascular:  Normal rate, irregular rhythm with normal S1 and S2.      Normal heart sounds and peripheral pulses 2+ and equal.    Pulmonary:  Effort normal and breath sounds clear to auscultation bilaterally.    No respiratory distress.  No stridor.     No wheezes. No rales.     GI:    Soft.   No distention.  He has some mild tenderness to left lower quadrant without rebound or guarding.  Musculoskeletal:  Normal range of motion. No extremity deformity.     No edema and no tenderness.    Neurological:   Alert and oriented. No focal weakness.  Skin:    Skin is warm and dry. No rash noted.  Somewhat pale.  Psychiatric:   Behavior is normal. Appropriate mood and affect.     Judgment and thought content normal.       Diagnostics     Lab Results   Labs Ordered and Resulted from Time of ED Arrival to Time of ED Departure   COMPREHENSIVE METABOLIC PANEL - Abnormal       Result Value    Sodium 135      Potassium 5.7 (*)     Carbon Dioxide (CO2) 21 (*)     Anion Gap 12      Urea Nitrogen 65.9 (*)     Creatinine 1.59 (*)     GFR Estimate 42 (*)     Calcium 8.9      Chloride 102      Glucose 114 (*)     Alkaline Phosphatase 100      AST 41      ALT 25      Protein Total 5.7 (*)     Albumin 3.4 (*)     Bilirubin Total 0.4     CBC WITH PLATELETS AND DIFFERENTIAL - Abnormal    WBC Count 43.4 (*)     RBC Count 3.41 (*)     Hemoglobin 8.9 (*)     Hematocrit 29.2 (*)     MCV 86      MCH 26.1 (*)     MCHC 30.5 (*)     RDW 20.3 (*)     Platelet Count 498 (*)     NRBCs per 100 WBC 6 (*)     Absolute NRBCs 2.8     MANUAL DIFFERENTIAL - Abnormal    % Neutrophils 80      % Lymphocytes 14      % Monocytes 3      % Eosinophils 1      % Basophils 2      Absolute Neutrophils 34.7 (*)     Absolute Lymphocytes 6.1 (*)     Absolute Monocytes 1.3      Absolute Eosinophils 0.4      Absolute Basophils 0.9 (*)     RBC Morphology Confirmed RBC Indices      Platelet Assessment        Value: Automated Count Confirmed. Platelet morphology is normal.    Polychromasia Slight (*)     NRBCs per 100 WBC 9      Absolute NRBCs 3.9     INR - Abnormal    INR 3.59 (*)    POTASSIUM - Abnormal    Potassium 5.9 (*)    HEMOGLOBIN - Abnormal    Hemoglobin 7.6 (*)    TROPONIN T, HIGH SENSITIVITY   POTASSIUM   TYPE AND SCREEN, ADULT    ABO/RH(D) A POS       Antibody Screen Negative      SPECIMEN EXPIRATION DATE [id number]     PREPARE RED BLOOD CELLS (UNIT)    Blood Component Type Red Blood Cells      Product Code S8638Y47      Unit Status Ready for issue      Unit Number [id number]      CROSSMATCH Compatible      CODING SYSTEM [id number]     PREPARE RED BLOOD CELLS (UNIT)    Blood Component Type Red Blood Cells      Product Code J9376V08      Unit Status Ready for issue      Unit Number [id number]      CROSSMATCH Compatible      CODING SYSTEM [id number]     PREPARE RED BLOOD CELLS (UNIT)   RED BLOOD CELLS HAVE AVAILABLE (UNIT)   ABO/RH TYPE AND SCREEN       Imaging   No orders to display       EKG   ECG results from 09/09/24   EKG 12-lead, tracing only     Value    Systolic Blood Pressure     Diastolic Blood Pressure     Ventricular Rate 86    Atrial Rate 86    TX Interval     QRS Duration 88        QTc 464    P Axis     R AXIS 35    T Axis 209    Interpretation ECG      Atrial fibrillation  Inferior-posterior infarct , age undetermined  ST & T wave abnormality, consider lateral ischemia  Abnormal ECG  When compared with ECG of 22-Dec-2018 04:58,  Current undetermined rhythm precludes rhythm comparison, needs review  Inferior-posterior infarct is now Present  Non-specific change in ST segment in Inferior leads  T wave inversion now evident in Anterior leads  QT has shortened       *Note: Due to a large number of results and/or encounters for the requested time period, some results have not been displayed. A complete set of results can be found in Results Review.   I reviewed the EKG and this is atrial fibrillation.      Independent Interpretation   None    ED Course      Medications Administered   Medications   sodium chloride 0.9 % infusion ( Intravenous $New Bag 9/9/24 8959)   fentaNYL (PF) (SUBLIMAZE) injection 25 mcg (has no administration in time range)   glucose gel 15-30 g (has no administration in time range)     Or   dextrose 50 % injection 25-50 mL  (has no administration in time range)     Or   glucagon injection 1 mg (has no administration in time range)   sodium zirconium cyclosilicate (LOKELMA) packet 10 g (has no administration in time range)     Followed by   sodium zirconium cyclosilicate (LOKELMA) packet 10 g (has no administration in time range)   lidocaine 1 % 0.1-1 mL (has no administration in time range)   lidocaine (LMX4) cream (has no administration in time range)   sodium chloride (PF) 0.9% PF flush 3 mL (has no administration in time range)   sodium chloride (PF) 0.9% PF flush 3 mL (has no administration in time range)   HOLD: warfarin (COUMADIN) therapy (has no administration in time range)   phytonadione (AQUA-MEPHYTON) 2 mg in sodium chloride 0.9 % 50 mL intermittent infusion (has no administration in time range)   pantoprazole (PROTONIX) IV push injection 80 mg (80 mg Intravenous $Given 9/9/24 1717)       Procedures   Procedures     Discussion of Management   None    ED Course        Additional Documentation  None    Medical Decision Making / Diagnosis     CMS Diagnoses: None    MIPS       None    MDM   Alessio Teixeira is a 88 year old male on Coumadin for atrial fibrillation and probable CLL being worked up for that comes in with what sounds like a GI bleed.  He is had this before but it was in 2018.  He has not had any chest pain, has some mild left sided abdominal pain.  He had a partial bowel resection in the past as well for a bowel perforation.  He does not have acute abdomen on exam.  His hemoglobin is dropped to 8.9 from 11.3, it is now down to 7.6 after correcting.  His blood pressures have remained in the low 100s and his pulse rate in the 90s.  His Coumadin is held at this point.  Will give him 2 mg IV vitamin K, now that his hemoglobin continues to drop, I had typed and crossed him for 2 units of blood earlier and will give him 1 unit now.  His potassium was elevated and I checked 1 a second time but make sure it was not hemolyzed  it went from 5.7-5.9.  Because of this, he will be started on hyperkalemia protocol per the hospitalist.  His EKG had some more diffuse T wave inversion laterally and across the precordial leads, he had some in the past but this is more but he denies any chest pain.  I have added a troponin.  The hospitalist will decide whether or not to get a CT.  I talked with Dr. Garcia from [facility] and she will stop and see the patient.  Likely will do endoscopy on the patient in the morning if he remains stable but would tonight if he changes status.  Could consider oncology consultation with his probable CLL.  Patient will be admitted to a monitored bed.  If ends up going to endoscopy, may need IMC depending upon blood pressure and vital sign stability.  At this point he is stable hemodynamically.  Coumadin will be held.  Vitamin K is given.  Will continue to monitor serial hemoglobins and keep you on the cardiac monitor.    Disposition   The patient was admitted to the hospital.     Diagnosis     ICD-10-CM    1. Gastrointestinal hemorrhage, unspecified gastrointestinal hemorrhage type  K92.2       2. Anemia, unspecified type  D64.9       3. CLL (chronic lymphocytic leukemia) (H)  C91.10       4. Chronic renal failure, unspecified CKD stage  N18.9       5. Hyperkalemia  E87.5       6. Long term (current) use of anticoagulants  Z79.01       7. Chronic a-fib (H)  I48.20            Discharge Medications   New Prescriptions    No medications on file         MD Emeka Ortiz Tracy Alan, MD  09/09/24 9245

## 2024-09-09 NOTE — CONSULTS
[facility] GASTROENTEROLOGY CONSULTATION     Alessio Teixeira  [address]  [place] [address]  88 year old male    Admission Date/Time: 9/9/2024  Primary Care Provider: Jose Squires were asked to see the patient in consultation by Dr. Sandoval for evaluation of dark stools and anemia.        HPI:  Alessio Teixeira is a 88 year old male who presented to [facility] today with dark stools and hemoglobin of 7.6.  It was 11.7 two weeks ago.    He is on warfarin for history of afib.  INR today 3.59.    Patient reports he noted dark stools starting this morning. It reminded him of 2018 when he had a bleeding gastric ulcer.  He does not take ASA or any NSAIDS for pain, he only uses tylenol. He does not smoke. He does drink alcohol several days per week.    He his having some abdominal discomfort on his left side.  He has history of a sigmoidectomy in 2007 for perforated diverticulitis.     Past history also includes afib (on Coumadin), CLL, HLP, HTN.    ROS: A comprehensive ten point review of systems was negative aside from those in mentioned in the HPI.      MEDICATIONS:   Reviewed.      ALLERGIES: No Known Allergies    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    Actinic keratosis     JAVIER (acute kidney injury) (H24) 02/01/2019    Atrial fibrillation (H)     Chronic 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 06/12/2013    Edema of leg     Gastrointestinal hemorrhage associated with gastric ulcer 02/01/2019    Gout, unspecified     H/O diplopia     History of blood transfusion     History of pulmonary embolism- bilateral in 2007 05/02/2016    Hypertension     Meningioma (H)     sinus    Myalgia and myositis, unspecified     Myasthenia gravis (H)     high dose steroids 2007,     Obesity     Other seborrheic keratosis     Presbyacusis     Primary localized osteoarthrosis, lower leg     Primary localized osteoarthrosis, pelvic region and thigh     Sleep apnea     doesn't use his CPAP)     Spinal stenosis     Squamous cell cancer of scalp and skin of neck     Dr Sanchez, multiple procedures, Mohs scalp and chest    Vitamin D deficiency              SOCIAL HISTORY:  Social History     Tobacco Use    Smoking status: Former     Current packs/day: 0.00     Average packs/day: 1 pack/day for 3.9 years (3.9 ttl pk-yrs)     Types: Cigarettes     Start date: 1959     Quit date: 1963     Years since quittin.8    Smokeless tobacco: Never   Vaping Use    Vaping status: Never Used   Substance Use Topics    Alcohol use: Yes     Comment: 2-4 glasses of wine per week    Drug use: No       FAMILY HISTORY:  Non contributory.    PHYSICAL EXAM:   BP 91/54   Pulse 112   Temp 97.8  F (36.6  C) (Temporal)   Resp 12   SpO2 98%     Constitutional: NAD, comfortable  Cardiovascular: irreg irreg  Respiratory: CTAB  Psychiatric: mentation appears normal and affect normal/bright  Head: Atraumatic.    Neck: Thyroid symmetric, normal size, trachea midline  Eyes:  no icterus  ENT: hard of hearing  Abdomen:   Auscultation: bowl sounds heard  Appearance: non distended  Palpation: non tender to palpation  NEURO: grossly negative  SKIN: pale          ADDITIONAL COMMENTS:   I reviewed the patient's new clinical lab test results.   Recent Labs   Lab Test 24  1504 24  1148 24  1643 24  1106 24  1047 24  1027   WBC  --  43.4* 29.9* 28.3*  --   --    HGB 7.6* 8.9* 11.3* 11.7*  --   --    MCV  --  86 87 86  --   --    PLT  --  498* 432 414  --   --    INR  --  3.59*  --   --  2.1* 2.5*     Recent Labs   Lab Test 24  1257 24  1148 24  1106 23  0956   NA  --  135 131* 134*   POTASSIUM 5.9* 5.7* 5.2 5.0   CHLORIDE  --  102 98 98   CO2  --  *  24   BUN  --  65.9* 30.8* 27.5*   CR  --  1.59* 1.54* 1.25*   ANIONGAP  --  12 9 12   TATE  --  8.9 9.3 9.4   GLC  --  114* 88 81     Recent Labs   Lab Test 24  1148 24  1106 23  0956 22  1009 21  1029  03/04/21  1028 08/21/20  1059 11/25/19  1045 11/25/19  1044   ALBUMIN 3.4* 3.9  --   --   --  3.5 3.5   < >  --    BILITOTAL 0.4 0.5  --   --   --  0.4 0.4   < >  --    ALT 25 19 20   < >  --  27 22   < >  --    AST 41 37  --   --   --  28 24   < >  --    ALKPHOS 100 100  --   --   --  72 83   < >  --    PROTEIN  --   --   --   --  30*  --  Negative  --  30*    < > = values in this interval not displayed.         No imaging.        CONSULTATION ASSESSMENT AND PLAN:    Active Problems:      Anemia    Assessment: Patient with 5 gram drop in hgb in past 2 weeks with elevated INR of 3.5 and dark stools. History of bleeding ulcer in 2018.  BUN up from baseline. Suspicious for upper GI bleeding.  INR is being reversed.  Last colonoscopy 2011 with several polyps.    Plan:   - agree with improving supratherapeutic INR  - IV PPI  - Ice chips  - EGD tomorrow 9/10/24, currently getting RBC transfusion.        Shannon Stephenson MD  [facility]  Office:  [phone]    Approximately 35 minutes of total time was spent providing patient care, including patient evaluation, reviewing documentation/test results, and .

## 2024-09-09 NOTE — PHARMACY-CONSULT NOTE
Patient is being treated for hyperkalemia. A pharmacy consult was initiated to review the patient's medication list for possible causes of hyperkalemia.  The following medications for this patient may cause or exacerbate hyperkalemia: Potassium chloride 20 mEq daily      Recommend holding supplementation of potassium

## 2024-09-09 NOTE — ED NOTES
[facility]  ED Nurse Handoff Report    ED Chief complaint: Nausea, Vomiting, & Diarrhea and Rectal Bleeding      ED Diagnosis:   Final diagnoses:   Gastrointestinal hemorrhage, unspecified gastrointestinal hemorrhage type   Anemia, unspecified type   CLL (chronic lymphocytic leukemia) (H)   Chronic renal failure, unspecified CKD stage   Hyperkalemia   Long term (current) use of anticoagulants   Chronic a-fib (H)       Code Status: see orders    Allergies: No Known Allergies    Patient Story: Bloody and black stools starting this morning, hx of ulcer, CLL  Focused Assessment:    Labs Ordered and Resulted from Time of ED Arrival to Time of ED Departure   COMPREHENSIVE METABOLIC PANEL - Abnormal       Result Value    Sodium 135      Potassium 5.7 (*)     Carbon Dioxide (CO2) 21 (*)     Anion Gap 12      Urea Nitrogen 65.9 (*)     Creatinine 1.59 (*)     GFR Estimate 42 (*)     Calcium 8.9      Chloride 102      Glucose 114 (*)     Alkaline Phosphatase 100      AST 41      ALT 25      Protein Total 5.7 (*)     Albumin 3.4 (*)     Bilirubin Total 0.4     CBC WITH PLATELETS AND DIFFERENTIAL - Abnormal    WBC Count 43.4 (*)     RBC Count 3.41 (*)     Hemoglobin 8.9 (*)     Hematocrit 29.2 (*)     MCV 86      MCH 26.1 (*)     MCHC 30.5 (*)     RDW 20.3 (*)     Platelet Count 498 (*)     NRBCs per 100 WBC 6 (*)     Absolute NRBCs 2.8     MANUAL DIFFERENTIAL - Abnormal    % Neutrophils 80      % Lymphocytes 14      % Monocytes 3      % Eosinophils 1      % Basophils 2      Absolute Neutrophils 34.7 (*)     Absolute Lymphocytes 6.1 (*)     Absolute Monocytes 1.3      Absolute Eosinophils 0.4      Absolute Basophils 0.9 (*)     RBC Morphology Confirmed RBC Indices      Platelet Assessment        Value: Automated Count Confirmed. Platelet morphology is normal.    Polychromasia Slight (*)     NRBCs per 100 WBC 9      Absolute NRBCs 3.9     INR - Abnormal    INR 3.59 (*)    POTASSIUM - Abnormal    Potassium 5.9  (*)    HEMOGLOBIN - Abnormal    Hemoglobin 7.6 (*)    TROPONIN T, HIGH SENSITIVITY - Abnormal    Troponin T, High Sensitivity 208 (*)    POTASSIUM - Abnormal    Potassium 5.6 (*)    TROPONIN T, HIGH SENSITIVITY - Abnormal    Troponin T, High Sensitivity 200 (*)    TROPONIN T, HIGH SENSITIVITY - Abnormal    Troponin T, High Sensitivity 194 (*)    POTASSIUM - Normal    Potassium 5.3     GLUCOSE MONITOR NURSING POCT   TYPE AND SCREEN, ADULT    ABO/RH(D) A POS      Antibody Screen Negative      SPECIMEN EXPIRATION DATE [id number]     PREPARE RED BLOOD CELLS (UNIT)    Blood Component Type Red Blood Cells      Product Code C3101C03      Unit Status Ready for issue      Unit Number [id number]      CROSSMATCH Compatible      CODING SYSTEM [id number]     PREPARE RED BLOOD CELLS (UNIT)    Blood Component Type Red Blood Cells      Product Code V6351X88      Unit Status Transfused      Unit Number [id number]      CROSSMATCH Compatible      CODING SYSTEM [id number]      ISSUE DATE AND TIME [id number]      UNIT ABO/RH A+      UNIT TYPE ISBT 6200     PREPARE RED BLOOD CELLS (UNIT)   RED BLOOD CELLS HAVE AVAILABLE (UNIT)   TRANSFUSE RED BLOOD CELLS (UNIT)   ABO/RH TYPE AND SCREEN     XR Chest Port 1 View   Final Result   IMPRESSION: Heart is mildly enlarged. Masslike opacity measuring 3 x 5.2 cm. Small right effusion. Atelectasis noted at the right lung base. Possible small patchy airspace opacity at the right lung base. Findings are concerning for pneumonia. Recommend a    follow-up chest x-ray in 4-6 weeks to assure complete resolution.      Echocardiogram Complete   Final Result            Treatments and/or interventions provided: protonix  Patient's response to treatments and/or interventions: no change    To be done/followed up on inpatient unit:  see orders    Does this patient have any cognitive concerns?:  none    Activity level - Baseline/Home:  Independent  Activity Level - Current:   Unknown    Patient's  Preferred language: English   Needed?: No    Isolation: None  Infection: Not Applicable  Patient tested for COVID 19 prior to admission: NO  Bariatric?: No    Vital Signs:   Vitals:    09/09/24 1700 09/09/24 1725 09/09/24 1803 09/09/24 1840   BP: 105/75  103/80 97/68   Pulse: (!) 122  105 103   Resp: 19  21 11   Temp:  99.2  F (37.3  C) 98.9  F (37.2  C)    TempSrc:   Temporal    SpO2: 96%  96% 96%       Cardiac Rhythm:     Was the PSS-3 completed:   Yes  What interventions are required if any?               Family Comments: son at bedside  OBS brochure/video discussed/provided to patient/family: No              Name of person given brochure if not patient:               Relationship to patient:     For the majority of the shift this patient's behavior was Green.   Behavioral interventions performed were none.    ED NURSE PHONE NUMBER: [id number]

## 2024-09-09 NOTE — PHARMACY-ADMISSION MEDICATION HISTORY
Pharmacy Intern Admission Medication History    Admission medication history is complete. The information provided in this note is only as accurate as the sources available at the time of the update.    Information Source(s): Patient via in-person    Pertinent Information: Patient's last INR was 2.2 on 08/07/24, with INR Goal of 2-3; INR was ran today 09/09.    Changes made to PTA medication list:  Added: magnesium, iron  Deleted: esomeprazole, fluocinolone 0.01% solution, furosemide 20 mg  Changed: None    Allergies reviewed with patient and updates made in EHR: yes    Medication History Completed By: Chiki Woody, PharmD Student 9/9/2024 2:37 PM    PTA Med List   Medication Sig Last Dose    allopurinol (ZYLOPRIM) 300 MG tablet TAKE ONE TABLET BY MOUTH IN THE MORNING 9/8/2024 at AM    CALCIUM PO Take 600 mg by mouth 2 times daily  9/8/2024 at PM    clotrimazole-betamethasone (LOTRISONE) 1-0.05 % external cream Apply topically 2 times daily. 9/8/2024 at PM    diclofenac (VOLTAREN) 1 % topical gel Apply 1 g topically in the morning and 1 g in the evening. 9/8/2024 at PM    ferrous sulfate (FEROSUL) 325 (65 Fe) MG tablet Take 325 mg by mouth daily (with breakfast). 9/8/2024 at AM    ipratropium (ATROVENT) 0.03 % nasal spray Spray 2 sprays into both nostrils every 12 hours 9/8/2024 at PM    lovastatin (MEVACOR) 40 MG tablet TAKE TWO TABLETS BY MOUTH DAILY AT BEDTIME 9/8/2024 at PM    magnesium 250 MG tablet Take 1 tablet by mouth daily. 9/8/2024 at AM    multivitamin, therapeutic with minerals (THERA-VIT-M) TABS Take 1 tablet by mouth daily 9/8/2024 at AM    potassium chloride sol ER (KLOR-CON M20) 20 MEQ CR tablet TAKE ONE TABLET BY MOUTH ONE TIME DAILY 9/8/2024 at AM    Vitamin D, Cholecalciferol, 1000 units TABS Take 1,000 Units by mouth in the morning. 9/8/2024 at AM    warfarin ANTICOAGULANT (COUMADIN) 4 MG tablet Take 10 mg on Monday, Wednesday and Friday and 8 mg all other days of the week or as directed by inr  clinc 9/8/2024 at PM with Supper

## 2024-09-09 NOTE — ED TRIAGE NOTES
Pt presents to triage with son; 3 weeks of blood in stool, vomiting blood, incontinence, and generalized weakness. Pt unable to exit car without help. Hx Afib and gastric ulcers.     Triage Assessment (Adult)       Row Name 09/09/24 1133          Triage Assessment    Airway WDL WDL        Respiratory WDL    Respiratory WDL WDL        Skin Circulation/Temperature WDL    Skin Circulation/Temperature WDL WDL        Cardiac WDL    Cardiac WDL WDL        Peripheral/Neurovascular WDL    Peripheral Neurovascular WDL WDL        Cognitive/Neuro/Behavioral WDL    Cognitive/Neuro/Behavioral WDL WDL

## 2024-09-09 NOTE — H&P
[facility]    History and Physical - Hospitalist Service       Date of Admission:  9/9/2024    Assessment & Plan   Alessio Teixeira is a 88 year old male with past medical history significant for atrial fibrillation, distant PE, previous colectomy due to perforated bowel, myasthenia gravis, and hypertension, who is being worked up for CLL but not formally diagnosed who was admitted with dark stools and concern of upper GI bleeding and elevated troponin.     Acute upper GI hemorrhage with acute anemia   In the setting of chronic anticoagulation and history of GI bleed due to gastric ulcer 2018  *Poor historian, dtr is pediatrician assists in interview. Hx dark stools for hours to 1 day, unclear per patient, spit up blood x1. Hx bleeding ulcer in 2018 requiring transfusion, due to ulcer. No formal chest pain. Reports SHOEMAKER and fatigue. No lightheadedness or dizziness.  No syncopal episodes.  No nausea or vomiting.  Has had some acute on chronic back pain, no history of AAA. Noted left lower abdominal tenderness, history of diverticulitis.  Previously on Nexium but reportedly stopped in the past week.    *On admission, mildly tachycardic 90s-110s and blood pressures in the . No hypoxia. Cr 1.59 which is at recent baseline.  Normal liver function.  INR 3.59. Recent hemoglobin 11-12 range.  8.9 in the ED and repeat 7.6.  Consented for blood, type and screen in the ED.  1 unit of blood ordered.  [facility] notified and plans to scope the patient on 9/10.  He will remain n.p.o. until then.  He was given 80 mg of IV Protonix and started on IV fluids 150 mL/h.  -[facility] admission  -[facility] consultation -tentative plan for endoscopy 9/10  -IV PPI  -Type and screen, consented in the ED, 1 unit of blood ordered, 1 on standby  -Trend hemoglobin  -Avoid NSAIDs, blood thinners  -INR supratherapeutic, give 2 mg of vitamin K  -Daily INR  -No heparin due to bleeding  -N.p.o. except for meds  -Monitor  vitals closely and hemodynamics  -Trend basic labs    Recent Labs   Lab 09/09/24  1504 09/09/24  1148   HGB 7.6* 8.9*       Elevated troponin concerning for Non ST elevation myocardial infarction (NSTEMI) type I versus type II  No formal chest pain however is T wave inversions on EKG to lateral leads, these appear new from previous.  Denies formal chest pain but notes has some dyspnea on exertion recently, some ongoing back pain.  No history of AAA. EKG A-fib, 86 bpm with T wave inversions in inferolateral leads including 2 3 aVF, [id number].  Troponin 206.  - Telemetry  - Trend troponins  - NO aspirin due to GIB  - NO Heparin gtt due to GIB  - No initiation of BB, ACEi due to active bleeding, tenuous BP  - Statin  - Repeat EKG and nitroglycerin sublingual available for recurrent chest pain  - Cardiology consultation  - STAT Transthoracic echocardiogram  - NPO for GI/Cardiology  - Check fasting lipid panel and HbA1c     Hypokalemia  Labs significant for initial potassium 5.7-5.9-5.6. Takes potassium PTA.  -Cardiac monitoring  -[facility]  -Trend potassium  -HOLD PTA potassium    Leukocytosis  Has been chronic and undergoing workup for CLL, not formally diagnosed.  No acute need for consultation during hospitalization.  -Follow-up as outpatient or sooner if needed    Atrial fibrillation on chronic anticoagulation  Supratherapeutic INR  No beta-blocker PTA. Anticoagulated with warfarin  - Telemetry  - Hold on initiating any beta-blockers due to borderline hypotension  - Hold PTA warfarin due to active bleeding, Daily INR  - Monitor K+/Mg++, replace PRN    Chronic kidney disease, stage 3  Baseline Cr 1.2-1.5. On admission, 1.59.  - Avoid nephrotoxins - contrast, NSAIDs  - Renally dose medications as able/needed  - Monitor    Chronic stable diagnoses and other pertinent medical history: Appropriate PTA medications will be resumed.    History of bowel perforation in the setting of excessive prednisone use for myasthenia gravis  "with status post partial colectomy  Dyslipidemia  Chronic back pain        Diet: NPO for Medical/Clinical Reasons Except for: MedsN.p.o.  DVT Prophylaxis: Pneumatic Compression Devices  Amos Catheter: Not present  Lines: None     Cardiac Monitoring: ACTIVE order. Indication: Electrolyte Imbalance (24 hours)- Magnesium <1.3 mg/ml; Potassium < =2.8 or > 5.5 mg/ml  Code Status:  Full code, discussed with patient and daughter on admission    Clinically Significant Risk Factors Present on Admission        # Hyperkalemia: Highest K = 5.9 mmol/L in last 2 days, will monitor as appropriate       # Hypoalbuminemia: Lowest albumin = 3.4 g/dL at 9/9/2024 11:48 AM, will monitor as appropriate    # Drug Induced Coagulation Defect: home medication list includes an anticoagulant medication    # Hypertension: Noted on problem list    # Anemia: based on hgb <11  # Anemia: based on hgb <11       # Obesity: Estimated body mass index is 31.67 kg/m  as calculated from the following:    Height as of 8/29/24: 1.775 m (5' 9.88\").    Weight as of 8/29/24: 99.8 kg (220 lb).                    Disposition Plan     Medically Ready for Discharge: Anticipated in 2-4 Days         The patient's care was discussed with the Attending Physician, Dr. Hermosillo, Bedside Nurse, Patient, Patient's Family, [facility] Consultant(s), and ED physician .    Amber Sandoval PA-C  Hospitalist Service  [facility]  Securely message with [employer] (more info)  Text page via [employer] Paging/Directory     ______________________________________________________________________    Chief Complaint   Dark stools    History is obtained from the patient, electronic health record, emergency department physician, and patient's daughter    History of Present Illness   Alessio Teixeira is a 88 year old male with past medical history significant for atrial fibrillation, distant PE, previous colectomy due to perforated bowel, myasthenia gravis, and hypertension, " who is being worked up for CLL but not formally diagnosed who presented to the emergency department concerns of dark stools.  Patient is a poor historian.  He reports he has had dark stools for the last 8 hours to 1 provider but 24 hours to another.  History of bleeding ulcer in 2018 requiring transfusion, due to ulcer.  He notes he spit up blood.  No formal chest pain.  Notes some dyspnea on exertion after questioning.  No lightheadedness or dizziness.  No syncopal episodes.  No nausea or vomiting.  Has had some acute on chronic back pain, no history of AAA.  Reports some worsening fatigue for the past 2 to 3 weeks.  Noted left lower abdominal tenderness, history of diverticulitis.  Previously on Nexium but reportedly stopped in the past week.  He is on Coumadin for A-fib.    In the ED he was mildly tachycardic 90s-110s and blood pressures in the 90s at times. No hypoxia.  EKG A-fib, 86 bpm with T wave inversions in inferolateral leads including 2 3 aVF, [id number].  Troponin 206.  Labs significant for initial potassium 5.7-5 0.9-5.6.  Creatinine 1.59 which is at recent baseline.  Normal liver function.  INR 3.59.  WBC is 43 which have been elevated chronically recently in setting of workup for CLL.  Recent hemoglobin 11-12 range.  8.9 in the ED and repeat 7.6.  Consented for blood, type and screen in the ED.  1 unit of blood ordered.  [facility] notified and plans to scope the patient on 9/10.  He will remain n.p.o. until then.  He was given 80 mg of IV Protonix and started on IV fluids 150 mL/h.      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    Actinic keratosis     JAVIER (acute kidney injury) (H24) 02/01/2019    Atrial fibrillation (H)     Chronic 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 06/12/2013    Edema of leg     Gastrointestinal hemorrhage associated with gastric ulcer 02/01/2019    Gout, unspecified     H/O diplopia     History of blood transfusion      History of pulmonary embolism- bilateral in 2007 05/02/2016    Hypertension     Meningioma (H)     sinus    Myalgia and myositis, unspecified     Myasthenia gravis (H)     high dose steroids 2007,     Obesity     Other seborrheic keratosis     Presbyacusis     Primary localized osteoarthrosis, lower leg     Primary localized osteoarthrosis, pelvic region and thigh     Sleep apnea     doesn't use his CPAP)    Spinal stenosis     Squamous cell cancer of scalp and skin of neck     Dr Sanchez, multiple procedures, Mohs scalp and chest    Vitamin D deficiency        Past Surgical History   Past Surgical History:   Procedure Laterality Date    ABDOMEN SURGERY  2007    APPENDECTOMY      ARTHROPLASTY HIP Right 2016    ARTHROPLASTY HIP ANTERIOR Right 04/26/2016    Procedure: ARTHROPLASTY HIP ANTERIOR;  Surgeon: Miguel Johnson MD;  Location:  OR    ARTHROPLASTY KNEE Right 01/28/2019    Procedure: RIGHT TOTAL KNEE ARTROPLASTY;  Surgeon: Miguel Johnson MD;  Location:  OR    BIOPSY      skin cancer    BRAIN SURGERY  2007    partial resection meningioma    CATARACT IOL, RT/LT      COLECTOMY  2007    bowel perforation due to steroids    COLONOSCOPY      ESOPHAGOSCOPY, GASTROSCOPY, DUODENOSCOPY (EGD), COMBINED N/A 12/22/2018    Procedure: COMBINED ESOPHAGOSCOPY, GASTROSCOPY, DUODENOSCOPY (EGD), BIOPSY SINGLE OR MULTIPLE;  Surgeon: Elizabeth Jones MD;  Location:  GI    ESOPHAGOSCOPY, GASTROSCOPY, DUODENOSCOPY (EGD), COMBINED N/A 05/10/2019    Procedure: ESOPHAGOGASTRODUODENOSCOPY (EGD);  Surgeon: Ahsan Garcia MD;  Location:  GI    GENITOURINARY SURGERY      vasectomy    HEAD & NECK SURGERY  2007    meningioma removed    KNEE SURGERY  2010    left knee replacement    ORTHOPEDIC SURGERY      left TKR    PHACOEMULSIFICATION CLEAR CORNEA WITH TORIC INTRAOCULAR LENS IMPLANT  07/30/2012    Procedure: PHACOEMULSIFICATION CLEAR CORNEA WITH TORIC INTRAOCULAR LENS IMPLANT;  COMPLEX RIGHT PHACOEMULSIFICATION  CLEAR CORNEA WITH TORIC INTRAOCULAR LENS IMPLANT WITH MALYUGIN RING;  Surgeon: Ronald Cortez MD;  Location: [facility]    RECTAL SURGERY  1985    ZZC RAD RESEC TONSIL/PILLARS         Prior to Admission Medications   Prior to Admission Medications   Prescriptions Last Dose Informant Patient Reported? Taking?   CALCIUM PO 9/8/2024 at PM Self Yes Yes   Sig: Take 600 mg by mouth 2 times daily    Vitamin D, Cholecalciferol, 1000 units TABS 9/8/2024 at AM Self Yes Yes   Sig: Take 1,000 Units by mouth in the morning.   allopurinol (ZYLOPRIM) 300 MG tablet 9/8/2024 at AM  No Yes   Sig: TAKE ONE TABLET BY MOUTH IN THE MORNING   clotrimazole-betamethasone (LOTRISONE) 1-0.05 % external cream 9/8/2024 at PM  No Yes   Sig: Apply topically 2 times daily.   diclofenac (VOLTAREN) 1 % topical gel 9/8/2024 at PM  No Yes   Sig: Apply 1 g topically in the morning and 1 g in the evening.   ferrous sulfate (FEROSUL) 325 (65 Fe) MG tablet 9/8/2024 at AM  Yes Yes   Sig: Take 325 mg by mouth daily (with breakfast).   ipratropium (ATROVENT) 0.03 % nasal spray 9/8/2024 at PM  No Yes   Sig: Spray 2 sprays into both nostrils every 12 hours   lovastatin (MEVACOR) 40 MG tablet 9/8/2024 at PM  No Yes   Sig: TAKE TWO TABLETS BY MOUTH DAILY AT BEDTIME   magnesium 250 MG tablet 9/8/2024 at AM  Yes Yes   Sig: Take 1 tablet by mouth daily.   multivitamin, therapeutic with minerals (THERA-VIT-M) TABS 9/8/2024 at AM Self Yes Yes   Sig: Take 1 tablet by mouth daily   potassium chloride sol ER (KLOR-CON M20) 20 MEQ CR tablet 9/8/2024 at AM  No Yes   Sig: TAKE ONE TABLET BY MOUTH ONE TIME DAILY   warfarin ANTICOAGULANT (COUMADIN) 4 MG tablet 9/8/2024 at PM with Supper  No Yes   Sig: Take 10 mg on Monday, Wednesday and Friday and 8 mg all other days of the week or as directed by inr clinc      Facility-Administered Medications: None        Review of Systems    The 10 point Review of Systems is negative other than noted in the HPI or here.     Social History    I have reviewed this patient's social history and updated it with pertinent information if needed.  Social History     Tobacco Use    Smoking status: Former     Current packs/day: 0.00     Average packs/day: 1 pack/day for 3.9 years (3.9 ttl pk-yrs)     Types: Cigarettes     Start date: 1959     Quit date: 1963     Years since quittin.8    Smokeless tobacco: Never   Vaping Use    Vaping status: Never Used   Substance Use Topics    Alcohol use: Yes     Comment: 2-4 glasses of wine per week    Drug use: No         Allergies   No Known Allergies     Physical Exam   Vital Signs: Temp: 99  F (37.2  C) Temp src: Temporal BP: 94/62 Pulse: 106   Resp: 16 SpO2: (!) 88 % O2 Device: Oxymask Oxygen Delivery: 5 LPM  Weight: 0 lbs 0 oz    Physical Exam    General: Awake, alert, pleasant man who appears stated age. Looks comfortable sitting up in bed. No acute distress.  HEENT: Normocephalic, atraumatic. Extraocular movements intact.   Respiratory: Clear to auscultation bilaterally, no rales, wheezing, or rhonchi.  Cardiovascular: Irregular rate and rhythm, HRs 110s +S1 and S2, no murmur auscultated. No peripheral edema.   Gastrointestinal: Soft, obese but non distended. Mild TTP to LLQ, minimal. No epigastric pain. Bowel sounds present.  Skin: Warm, dry. No obvious rashes or lesions on exposed skin. Dorsalis pedis pulses palpable bilaterally.  Musculoskeletal: No joint swelling, erythema or tenderness. Moves all extremities equally.  Neurologic: AAO x3. [place]. Mentating appropriately.  Psychiatric: Appropriate mood and affect. No obvious anxiety or depression.      Medical Decision Making       >90 MINUTES SPENT BY ME on the date of service doing chart review, history, exam, documentation & further activities per the note.      Data     I have personally reviewed the following data over the past 24 hrs:    43.4 (H)  \   7.6 (L)   / 498 (H)     135 102 65.9 (H) /  114 (H)   5.6 (H) 21 (L) 1.59 (H) \     ALT: 25 AST:  41 AP: 100 TBILI: 0.4   ALB: 3.4 (L) TOT PROTEIN: 5.7 (L) LIPASE: N/A     Trop: 208 (HH) BNP: N/A     INR:  3.59 (H) PTT:  N/A   D-dimer:  N/A Fibrinogen:  N/A       Imaging results reviewed over the past 24 hrs:   No results found for this or any previous visit (from the past 24 hour(s)).

## 2024-09-10 PROBLEM — I42.9 SECONDARY CARDIOMYOPATHY (H): Status: ACTIVE | Noted: 2024-01-01

## 2024-09-10 PROBLEM — R79.89 ELEVATED TROPONIN: Status: ACTIVE | Noted: 2024-01-01

## 2024-09-10 NOTE — PROGRESS NOTES
Asked by admitting provider to review follow-up EKG. Chart reviewed on patient with GI bleed and acute blood loss anemia. STAT TTE in ED revealed reduced EF 35-40% as well as moderate global hypokinesia of the left ventrical. TTE in 2018 showed EF of 60-65%. EKG done at 2341, I reviewed at 2350 read as the following:         EKG Interpretation:      Interpreted by ANNA Navas CNP  Time reviewed:2350   Symptoms at time of EKG: None   Rhythm: Normal sinus   Rate: Tachycardia  Axis: Normal  Ectopy: None  Conduction: Normal  ST Segments/ T Waves: Mild ST segment elevation in leads II, and III improved from 2101 EKG. ST Segment Depression V2, V3, V4, and V5  Q Waves: None  Comparison to prior: ST depression in lateral leads is more pronounced.  Clinical Impression: ST depressions in asymptomatic patient likely due to anemia but given ST elevations in leads II and III there is evidence for possible inferior infarct.    While I suspect this is due to GI bleed and acute blood loss anemia, the ST elevations were alarming so I paged on-call cardiologist. Dr. Carvajal responded quickly and reviewed EKGs. His recommendation is to correct anemia with blood products, serial troponins, and plan for coronary angiogram after patient's endoscopy later today 9/10.    Addendum 0600:  Serial troponins continue to trend upwards, patient remains asymptomatic. Continue with plan for angiogram after endoscopy. Page cardiology if patient becomes symptomatic.    ANNA Fair, CNP  Hospitalist - [facility]  Text me on the [employer] angie for a textback

## 2024-09-10 NOTE — PROGRESS NOTES
Pt is refusing CPAP at this time. Pt states he hasn't worn CPAP for over 15 years.    Radha Gomez, RRT

## 2024-09-10 NOTE — PROGRESS NOTES
[facility]    Medicine Progress Note - Hospitalist Service    Date of Admission:  9/9/2024    Assessment & Plan   Alessio Teixeira is a 88 year old male with past medical history significant for atrial fibrillation, distant PE, previous colectomy due to perforated bowel, myasthenia gravis, and hypertension, who is being worked up for CLL but not formally diagnosed who was admitted with dark stools and concern of upper GI bleeding and elevated troponin. Hospitalization complicated by worsening ST changes on EKG, concern for MI, new cardiomyopathy on ECHO, and possible pneumonia.    Acute upper GI hemorrhage with acute anemia   In the setting of chronic anticoagulation and history of GI bleed due to gastric ulcer 2018  *Poor historian, dtr is pediatrician assists in interview. Hx dark stools for hours to 1 day, unclear per patient, spit up blood x1. Hx bleeding ulcer in 2018 requiring transfusion, due to ulcer. No formal chest pain. Reports SHOEMAKER and fatigue. No lightheadedness or dizziness.  No syncopal episodes.  No nausea or vomiting.  Has had some acute on chronic back pain, no history of AAA. Noted left lower abdominal tenderness, history of diverticulitis.  Previously on Nexium but reportedly stopped in the past week.    *On admission, mildly tachycardic 90s-110s and blood pressures in the . No hypoxia. Cr 1.59 which is at recent baseline.  Normal liver function.  INR 3.59. Recent hemoglobin 11-12 range.  8.9 in the ED and repeat 7.6.  Consented for blood, type and screen in the ED.  1 unit of blood ordered.  [facility] notified and plans to scope the patient on 9/10.  He will remain n.p.o. until then.  He was given 80 mg of IV Protonix and IVF.   *Hgb stable after 2 units PRBC 9/9 and INR <2, no further dark stools.   -[facility] status  -[facility] following, appreciate assistance   -Unable to complete EGD due to concern for MI. [facility] and Cardiology discussed case and plan is for  diagnostic coronary angiogram followed by EGD (likely 9/11/24)   -IV PPI   -HOLD warfarin   -liquid diet after coronary angriogram, then NPO af [place] for EGD on 9/11/24  -IV PPI  -Type and screen, consented in the ED, 1 unit of blood ordered, 1 on standby  -Trend hemoglobin Q 4 hours while tenuous, if remains stable, could decrease to Q 6 or 8 hours  -Avoid NSAIDs, blood thinners  -INR supratherapeutic, give 2 mg of vitamin K  -Daily INR  -MIVF, gentle, watch fluid status closely  -No heparin due to bleeding  -N.p.o. except for meds  -Monitor vitals closely and hemodynamics  -Trend basic labs    Recent Labs   Lab 09/10/24  0608 09/10/24  0411 09/09/24  2214 09/09/24  1504 09/09/24  1148   HGB 8.4* 8.4* 8.0* 7.6*  7.6* 8.9*       Acute myocardial infarction  Elevated troponin concerning for Non ST elevation myocardial infarction (NSTEMI) type I versus type II  No formal chest pain however is T wave inversions on EKG to lateral leads, these appear new from previous.  Denies formal chest pain but notes has some dyspnea on exertion recently, some ongoing back pain.  No history of AAA. EKG A-fib, 86 bpm with T wave inversions in inferolateral leads including 2 3 aVF, [id number].  Troponin 206-->increased evening of admission to 712. Patient had worsening EKG changes with ST depression in leads V2-3, slight elevation in leads II, III. Cardiology called overnight who recommended coronary angiogram.   - Telemetry  - Trend troponins  - NO aspirin due to GIB  - NO Heparin gtt due to GIB  - No initiation of BB, ACEi due to active bleeding, tenuous BP  - Continue PTA Statin  - Repeat EKG and nitroglycerin sublingual available for recurrent chest pain  - Cardiology consultation, greatly appreciate assistance   -Diagnostic coronary angiogram 09/10/24   -Ok for EGD after angiogram   -Remainder of plan pending  - NPO for GI/Cardiology     New cardiomyopathy  ECHO on admission shws EF 35-40%, LV systolic function moderately reduced,  moderate global hypokinesia of LV, mild miltral regurgitation. Prior ECHO 12/2018 with EF 60-65%. Concern for ischemia as above though unclear timeline of EF drop.  -Cardiology consult as above  -Careful with excess volume  -Blood transfusions over 4 hours as able  -Unable to give lasix with tenuous blood pressures  -Intake/Output  -Daily weights      Intake/Output Summary (Last 24 hours) at 9/10/2024 1003  Last data filed at 9/10/2024 0900  Gross per 24 hour   Intake 2397.5 ml   Output 350 ml   Net 2047.5 ml     Vitals:    09/10/24 0400   Weight: 99.3 kg (219 lb)     Wt Readings from Last 4 Encounters:   09/10/24 99.3 kg (219 lb)   08/29/24 99.8 kg (220 lb)   08/07/23 98.6 kg (217 lb 6 oz)   05/17/23 99.8 kg (220 lb)       Possible community acquired pneumonia  Seen on CXR on admission. PCT 1.23. Started on abx.  -Continue Ceftriaxone, Doxycycline course (no azithromycin due to interaction on warfarin)  -Monitor  -No hypoxia thus far  -No cough or other pneumonia symptoms, but given tenuous status, Temp 99 (though received blood, UGIB), and hypotensive, tachycardic will cover with abx    Atrial fibrillation on chronic anticoagulation  Supratherapeutic INR, resolved  No beta-blocker PTA. Anticoagulated with warfarin. INR supratherapeutic on admission INR 3.59, given vit K with improvement to 1.8 range  - Telemetry  - Hold on initiating any beta-blockers due to borderline hypotension   - Hold PTA warfarin due to active bleeding, Daily INR  - Monitor K+/Mg++, replace PRN    Incidental finding, mass-like opacity on CXR  Masslike opacity measuring 3 x 5.2 cm. Avoid CT contrast in setting of cor angio/CKD for now. Updated patient and daughter on 9/10.  -Follow up as outpatient    Chronic kidney disease, stage 3, stable  Baseline Cr 1.2-1.5. On admission, 1.59.  - Avoid nephrotoxins - contrast, NSAIDs  - Renally dose medications as able/needed  - Will need contrast for coronary angiogram due to MI, monitor renal function  closely    Hyperkalemia, resolved  Labs significant for initial potassium 5.7-5.9-5.6. Takes potassium PTA.  -Cardiac monitoring  -Lokelma  -Trend potassium  -HOLD PTA potassium, likely discontinue on discharge or continue if discharged on diuretics    Leukocytosis, improving  Has been chronic and undergoing workup for CLL, not formally diagnosed.  No acute need for consultation during hospitalization. Has been 28-29 range. On admission 43 now improving, stable.  -Follow-up as outpatient or sooner if needed  -Trend WBC in setting of possible PNA    History of myasthenia gravis  Induced after prior prolonged hospitalization.  -Monitor closely for symptoms.     Chronic stable diagnoses and other pertinent medical history: Appropriate PTA medications will be resumed.    History of bowel perforation in the setting of excessive prednisone use for myasthenia gravis with status post partial colectomy  Dyslipidemia  Chronic back pain        Diet: NPO for Medical/Clinical Reasons Except for: Meds    DVT Prophylaxis: Pneumatic Compression Devices, HOLDING PTA warfarin given UGIB  Amos Catheter: Not present  Lines: None     Cardiac Monitoring: ACTIVE order. 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    Weight as of this encounter: 99.3 kg (219 lb).                    Disposition Plan     Medically Ready for Discharge: Anticipated in 2-4 Days           The patient's care was discussed with the Attending Physician, Dr. Hermosillo, Bedside Nurse, Patient, Patient's Family, and [facility] and Cardiology Consultant(s).    Amber Sandoval PA-C  Hospitalist Service  [facility]  Securely message with [employer] (more info)  Text page via [facility] Paging/Directory   ______________________________________________________________________    Interval History   Seen and examined. Events reviewed from last night - worsening EKG changes with mild ST elevation in inferior leads with anterolateral ST depression, and CXR concerning for possible pneumonia, also possible mass. ECHO with new cardiomyopathy and hypokinesia to LV. This morning HR improved, continues to have soft BPs. +SHOEMAKER. No further dark stools, received 2 units PRBC last night, Hgb 8.4. No lightheadedness or dizziness. No N/V. Discussed plan with [facility] and Cardiology today, difficult situation due to GIB and MI. Daughter at bedside, updated on POC, questions welcomed and answered to the best of my knowledge.    Physical Exam   Vital Signs: Temp: 98.3  F (36.8  C) Temp src: Oral BP: 93/58 Pulse: 103   Resp: 21 SpO2: 99 % O2 Device: None (Room air) Oxygen Delivery: 5 LPM  Weight: 219 lbs 0 oz    General: Awake, alert, pleasant man who appears stated age. Looks comfortable sitting up in bed. No acute distress.  HEENT: Normocephalic, atraumatic. Extraocular movements intact.   Respiratory: Clear to auscultation bilaterally, no rales, wheezing, or rhonchi to upper fields. No tachypnea or increased WOB. LLL course to auscultation.  Cardiovascular: Irregular rate and rhythm, HRs 80s +S1 and S2, no murmur auscultated. No peripheral edema.   Gastrointestinal: Soft, obese but non distended. Mild TTP to LLQ, minimal. No " epigastric pain. Bowel sounds present.  Skin: Warm, dry. No obvious rashes or lesions on exposed skin. Dorsalis pedis pulses palpable bilaterally. PALE.  Musculoskeletal: No joint swelling, erythema or tenderness. Moves all extremities equally.  Neurologic: AAO x3. [place]. Mentating appropriately.  Psychiatric: Appropriate mood and affect. No obvious anxiety or depression.      Medical Decision Making       >60 MINUTES SPENT BY ME on the date of service doing chart review, history, exam, documentation & further activities per the note.      Data     I have personally reviewed the following data over the past 24 hrs:    35.5 (H)  \   8.4 (L)   / 386     138 107 73.1 (H) /  104 (H)   4.7 17 (L) 1.54 (H) \     ALT: 26 AST: 90 (H) AP: 82 TBILI: 0.5   ALB: 3.4 (L) TOT PROTEIN: 5.5 (L) LIPASE: N/A     Trop: 712 (HH) BNP: N/A     Procal: 1.23 (H) CRP: N/A Lactic Acid: 1.1       INR:  1.83 (H) PTT:  N/A   D-dimer:  N/A Fibrinogen:  N/A       Imaging results reviewed over the past 24 hrs:   Recent Results (from the past 24 hour(s))   Echocardiogram Complete   Result Value    LVEF  35-40%    Narrative    [id number]  [id number]  [id number]  [id number]^SARI^MADDIE^ROGE     [facility]  Echocardiography Laboratory  [address] [place] [address]     Name: JOSHUA MELTON  MRN: [id number]  : 1935  Study Date: 2024 04:05 PM  Age: 88 yrs  Gender: Male  Patient Location: [facility]  Reason For Study: Chest Pain  Ordering Physician: MADDIE GUO  Performed By: Manda Castro     BSA: 2.2 m2  Height: 69 in  Weight: 220 lb  HR: 101  BP: 91/54 mmHg  ______________________________________________________________________________  Procedure  Complete Portable Echo Adult. Optison (NDC #[id number]) given intravenously.  Contrast Optison. Poor quality two-dimensional was performed and interpreted.  ______________________________________________________________________________  Interpretation  Summary     Technically very difficult study.     Left ventricular systolic function is moderately reduced.The visual ejection  fraction is 35-40%.There is moderate global hypokinesia of the left ventricle.  The right ventricular systolic function is normal.  There is mild (1+) mitral regurgitation.  IVC diameter <2.1 cm collapsing >50% with sniff suggests a normal RA pressure  of 3 mmHg.     Echo dated 12/22/2018 LVEF was noted 60-65%.  ______________________________________________________________________________  Left Ventricle  The left ventricle is normal in size. There is normal left ventricular wall  thickness. Diastolic Doppler findings (E/E' ratio and/or other parameters)  suggest left ventricular filling pressures are normal. Left ventricular  systolic function is moderately reduced. The visual ejection fraction is 35-  40%. There is moderate global hypokinesia of the left ventricle.     Right Ventricle  The right ventricle is normal size. The right ventricular systolic function is  normal.     Atria  Normal left atrial size. The right atrium is mildly dilated.     Mitral Valve  There is mild (1+) mitral regurgitation.     Tricuspid Valve  Right ventricular systolic pressure could not be approximated due to  inadequate tricuspid regurgitation. There is trace tricuspid regurgitation.     Aortic Valve  The aortic valve is not well visualized. No aortic regurgitation is present.  No aortic stenosis is present.     Pulmonic Valve  The pulmonic valve is not well visualized. There is no pulmonic valvular  stenosis.     Vessels  IVC diameter <2.1 cm collapsing >50% with sniff suggests a normal RA pressure  of 3 mmHg.     Pericardium  There is no pericardial effusion.     ______________________________________________________________________________  MMode/2D Measurements & Calculations  IVSd: 0.80 cm  LVIDd: 5.0 cm  LVIDs: 2.8 cm  LVPWd: 0.70 cm  FS: 43.8 %     LV mass(C)d: 125.1 grams  LV mass(C)dI: 58.2  grams/m2  EF Biplane: 19.8 %  LA Volume (BP): 41.9 ml  LA Volume Index (BP): 19.5 ml/m2  RWT: 0.28     Doppler Measurements & Calculations  MV E max riaz: 71.3 cm/sec  MV A max riaz: 59.2 cm/sec  MV E/A: 1.2  MV dec slope: 389.3 cm/sec2  MV dec time: 0.18 sec  E/E' av.6     Lateral E/e': 5.6  Medial E/e': 9.5  RV S Riaz: 12.2 cm/sec     ______________________________________________________________________________  Report approved by: Chetna Eldridge 2024 05:07 PM         XR Chest Port 1 View    Narrative    EXAM: XR CHEST PORT 1 VIEW  LOCATION: [facility]  DATE: 2024    INDICATION: hypoxia  COMPARISON: 2028      Impression    IMPRESSION: Heart is mildly enlarged. Masslike opacity measuring 3 x 5.2 cm. Small right effusion. Atelectasis noted at the right lung base. Possible small patchy airspace opacity at the right lung base. Findings are concerning for pneumonia. Recommend a   follow-up chest x-ray in 4-6 weeks to assure complete resolution.

## 2024-09-10 NOTE — PLAN OF CARE
"Patient Name: Mirta  MRN: [id number]  Date of Admission: 9/9/2024  Reason for Admission: GI Bleed and elevated Troponin  Level of Care: Intermediate    Vitals:   BP Readings from Last 1 Encounters:   09/10/24 109/69     Pulse Readings from Last 1 Encounters:   09/10/24 105     Wt Readings from Last 1 Encounters:   08/29/24 99.8 kg (220 lb)     Ht Readings from Last 1 Encounters:   08/29/24 1.775 m (5' 9.88\")     Estimated body mass index is 31.67 kg/m  as calculated from the following:    Height as of 8/29/24: 1.775 m (5' 9.88\").    Weight as of 8/29/24: 99.8 kg (220 lb).  Temp Readings from Last 1 Encounters:   09/10/24 98.4  F (36.9  C) (Oral)       Pain: Pain goal 0 Pain Rating 4 Effective pain medication/regimen Repositioning    CV Surgery Patient: No    Assessment    Resp: Clear lung sounds,on Room air.  Telemetry: A flutter RVR,on EKG Sinus Rhythm with ST elevation and depression.  Neuro: Alert and oriented x 4  GI/: No episodes of rectal bleed,normoactive bowel sounds.Continent of bladder with adequate urine output thru urinal.On NPO except med  Skin/Wounds: Redness blanchable on sacral and R and L Heel,R arm abrasion.  Lines/Drains: R and L PIV,infused with NS x 150 ml/hr,2 units PRBC given and with intermittent antibiotic.  Activity: Assist x 1, GB/W  Sleep: Poor  Abnormal Labs: Low hgb,latest 8.4,Elevated Troponin    Aggression Stop Light: Green          Patient Care Plan: For EGD this morning,plan to do coronary angiogram after.GI and Cardiology consulted.  "

## 2024-09-10 NOTE — PROVIDER NOTIFICATION
Hospitalist note    Notified to update code status. Appears patient up to IMC from ED. CXR showing possible pneumonia, questionable mass. Hgb status post 1 unit PRBC not as high as expected. He remains tachycardic with soft BP. Lactate Neg, however Trop went from 194 to 13, ? Accuracy, skewed by blood transfusion possibly. ECHO results noted, reduced EF from prior, hypokinesis of LV. INR downtrended after vitamin K. Per RN, no further dark stools noted. Repeat EKG from earlier concerning for ST depression to lateral leads and ? Mild ST elevation to lead II and III.     Recent Baseline Hemoglobin: 11-12 range.    Plan:  1 unit PRBC over 4 hours   STAT Blood culture  Ceftriaxone/Doxycycline (avoiding azithromycin in setting of warfarin/supratherapeutic INR)  Repeat EKG STAT  D/w House NP, who will follow up on repeat EKG. Aware of tenuous status.    Recent Labs   Lab 09/09/24  2214 09/09/24  1504 09/09/24  1148   HGB 8.0* 7.6*  7.6* 8.9*       SHAYNA Vazquez, PA-C  Hospitalist SADE  [facility]

## 2024-09-10 NOTE — CONSULTS
CARDIOTHORACIC SURGERY CONSULT NOTE  9/10/2024      Reason for Consult: multivessel coronary artery disease and poss CABG      ASSESSMENT/PLAN:   Alessio Teixeira is a 88 year old male with a history of stomach ulcer who presents with 2 to 3 weeks of worsening fatigue and some dyspnea on exertion.  This morning he had a dark stool, and the last day or 2 he has spit up some blood. He has had some lower abdomen tenderness on the left side. History of diverticulitis in the past. He was on Nexium but stopped it in the past week per recommendation of his doctor. He is also on Coumadin for A-fib. He has been much more winded the last few days and gets fatigued very easily. His wife and son think that he looks more pale. His last scope was in 2018. He did have a retroperitoneal bleed a few years ago when he was started on coumadin for his atrial fibrillation. He had a partial colectomy in the past as well for perforated bowel, thought to be secondary to excessive prednisone for myasthenia gravis.  Patient is currently being worked up for CLL and has a high white count. While awaiting an EGD this am he was noted to have T wave changes on EKG and elevated troponin. EGD was cancelled and pt was taken for a coronary angiogram which showed severe 3 vessel coronary artery disease. Angiogram showed an occluded RCA, ost LAD to mid LAD of 80% and mid circ to distal circ with 99%. Echocardiogram showed an EF of 35-40%. The RV systolic function was normal. There was also mild mitral regurg.     Has had chronic leukocytosis and undergoing workup for CLL with no formal diagnosis.     Being treated for community acquired pneumonia with rocephin and doxycycline.    CT surgery was consulted for multivessel coronary artery disease for a possible CABG.    - need to determine cause of bleeding from GI perspective  - Will obtain vein mapping, bilateral carotid duplex US, CT chest w/o contrast  - D/w was had with pt and his kids  - will plan to  discuss further after reviewing above tests  - Other cares per primary team  - Thank you for the opportunity to participate in the care of this patient.    STS Risk Score for     Procedure Type: Isolated CABG  Perioperative Outcome Estimate %  Operative Mortality 17.8%  Morbidity & Mortality 37.2%  Stroke 1.97%  Renal Failure 17.7%  Reoperation 5.54%  Prolonged Ventilation 25.6%  Deep Sternal Wound Infection 0.35%  Long Hospital Stay (>14 days) 42.4%  Short Hospital Stay (<6 days)* 4.41%    Patient and plan discussed with attending, Dr Mulvihill.    Orion Lua PA-C  Cardiothoracic Surgery  Pager [phone]    ________________________________________________________________________________________________    HPI:   Patient is a 88 year old male with a history of chronic atrial fibrillation on coumadin, distant PE, previous colectomy due to perforated bowel, myasthenia gravis, and hypertension, CKD stage 3, who is being worked up for CLL but not formally diagnosed who was admitted with dark stools and concern of upper GI bleeding and elevated troponin. Has a hx of a gastric ulcer in 2018.     He otherwise denies any chest pain, SOB, lightheadedness, dizziness, palpitations, LE edema.     PMH:  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    Actinic keratosis     JAVIER (acute kidney injury) (H24) 02/01/2019    Atrial fibrillation (H)     Chronic 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 06/12/2013    Edema of leg     Gastrointestinal hemorrhage associated with gastric ulcer 02/01/2019    Gout, unspecified     H/O diplopia     History of blood transfusion     History of pulmonary embolism- bilateral in 2007 05/02/2016    Hypertension     Meningioma (H)     sinus    Myalgia and myositis, unspecified     Myasthenia gravis (H)     high dose steroids 2007,     Obesity     Other seborrheic keratosis     Presbyacusis     Primary localized osteoarthrosis, lower leg     Primary localized  osteoarthrosis, pelvic region and thigh     Sleep apnea     doesn't use his CPAP)    Spinal stenosis     Squamous cell cancer of scalp and skin of neck     Dr Sanchez, multiple procedures, Mohs scalp and chest    Vitamin D deficiency        PSH:  Past Surgical History:   Procedure Laterality Date    ABDOMEN SURGERY  2007    APPENDECTOMY      ARTHROPLASTY HIP Right 2016    ARTHROPLASTY HIP ANTERIOR Right 04/26/2016    Procedure: ARTHROPLASTY HIP ANTERIOR;  Surgeon: Miguel Johnson MD;  Location:  OR    ARTHROPLASTY KNEE Right 01/28/2019    Procedure: RIGHT TOTAL KNEE ARTROPLASTY;  Surgeon: Miguel Johnson MD;  Location:  OR    BIOPSY      skin cancer    BRAIN SURGERY  2007    partial resection meningioma    CATARACT IOL, RT/LT      COLECTOMY  2007    bowel perforation due to steroids    COLONOSCOPY      CV CORONARY ANGIOGRAM N/A 9/10/2024    Procedure: Coronary Angiogram;  Surgeon: Vickey Monteiro MD;  Location:  [facility]    CV PCI N/A 9/10/2024    Procedure: Percutaneous Coronary Intervention;  Surgeon: Vickey Monteiro MD;  Location:  [facility]    ESOPHAGOSCOPY, GASTROSCOPY, DUODENOSCOPY (EGD), COMBINED N/A 12/22/2018    Procedure: COMBINED ESOPHAGOSCOPY, GASTROSCOPY, DUODENOSCOPY (EGD), BIOPSY SINGLE OR MULTIPLE;  Surgeon: Elizabeth Jones MD;  Location:  GI    ESOPHAGOSCOPY, GASTROSCOPY, DUODENOSCOPY (EGD), COMBINED N/A 05/10/2019    Procedure: ESOPHAGOGASTRODUODENOSCOPY (EGD);  Surgeon: Ahsan Garcia MD;  Location:  GI    GENITOURINARY SURGERY      vasectomy    HEAD & NECK SURGERY  2007    meningioma removed    KNEE SURGERY  2010    left knee replacement    ORTHOPEDIC SURGERY      left TKR    PHACOEMULSIFICATION CLEAR CORNEA WITH TORIC INTRAOCULAR LENS IMPLANT  07/30/2012    Procedure: PHACOEMULSIFICATION CLEAR CORNEA WITH TORIC INTRAOCULAR LENS IMPLANT;  COMPLEX RIGHT PHACOEMULSIFICATION CLEAR CORNEA WITH TORIC INTRAOCULAR LENS IMPLANT  WITH MALYUGIN RING;  Surgeon: Ronald Cortez MD;  Location:  [facility]    RECTAL SURGERY      ZZC RAD RESEC TONSIL/PILLARS         FH:  Family History   Problem Relation Age of Onset    Glaucoma Father     Coronary Artery Disease Father     Hyperlipidemia Father     Unknown/Adopted Mother     Diabetes Brother     Leukemia Brother     Diabetes Brother     Hyperlipidemia Brother     Obesity Brother     Macular Degeneration No family hx of        SH:  Social History     Socioeconomic History    Marital status:    Occupational History    Occupation: retired   Tobacco Use    Smoking status: Former     Current packs/day: 0.00     Average packs/day: 1 pack/day for 3.9 years (3.9 ttl pk-yrs)     Types: Cigarettes     Start date: 1959     Quit date: 1963     Years since quittin.9    Smokeless tobacco: Never   Vaping Use    Vaping status: Never Used   Substance and Sexual Activity    Alcohol use: Yes     Comment: 2-4 glasses of wine per week    Drug use: No    Sexual activity: Not Currently     Partners: Female   Other Topics Concern    Parent/sibling w/ CABG, MI or angioplasty before 65F 55M? No     Social Determinants of Health     Financial Resource Strain: Low Risk  (2024)    Financial Resource Strain     Within the past 12 months, have you or your family members you live with been unable to get utilities (heat, electricity) when it was really needed?: No   Food Insecurity: High Risk (2024)    Food Insecurity     Within the past 12 months, did you worry that your food would run out before you got money to buy more?: Yes     Within the past 12 months, did the food you bought just not last and you didn t have money to get more?: No   Transportation Needs: Low Risk  (2024)    Transportation Needs     Within the past 12 months, has lack of transportation kept you from medical appointments, getting your medicines, non-medical meetings or appointments, work, or from getting things that you  need?: No   Physical Activity: Inactive (8/28/2024)    Exercise Vital Sign     Days of Exercise per Week: 0 days     Minutes of Exercise per Session: 0 min   Stress: Stress Concern Present (8/28/2024)    [employer] [place] [employer] - Occupational Stress Questionnaire     Feeling of Stress : To some extent   Social Connections: Unknown (8/28/2024)    Social Connection and Isolation Panel [NHANES]     Frequency of Social Gatherings with Friends and Family: Twice a week   Interpersonal Safety: Low Risk  (9/10/2024)    Interpersonal Safety     Do you feel physically and emotionally safe where you currently live?: Yes     Within the past 12 months, have you been hit, slapped, kicked or otherwise physically hurt by someone?: No     Within the past 12 months, have you been humiliated or emotionally abused in other ways by your partner or ex-partner?: No   Housing Stability: Low Risk  (8/28/2024)    Housing Stability     Do you have housing? : Yes     Are you worried about losing your housing?: No       Home Meds:  Medications Prior to Admission   Medication Sig Dispense Refill Last Dose    allopurinol (ZYLOPRIM) 300 MG tablet TAKE ONE TABLET BY MOUTH IN THE MORNING 90 tablet 1 9/8/2024 at AM    CALCIUM PO Take 600 mg by mouth 2 times daily    9/8/2024 at PM    clotrimazole-betamethasone (LOTRISONE) 1-0.05 % external cream Apply topically 2 times daily. 45 g 1 9/8/2024 at PM    diclofenac (VOLTAREN) 1 % topical gel Apply 1 g topically in the morning and 1 g in the evening. 150 g 3 9/8/2024 at PM    ferrous sulfate (FEROSUL) 325 (65 Fe) MG tablet Take 325 mg by mouth daily (with breakfast).   9/8/2024 at AM    ipratropium (ATROVENT) 0.03 % nasal spray Spray 2 sprays into both nostrils every 12 hours 30 mL 0 9/8/2024 at PM    lovastatin (MEVACOR) 40 MG tablet TAKE TWO TABLETS BY MOUTH DAILY AT BEDTIME 180 tablet 0 9/8/2024 at PM    magnesium 250 MG tablet Take 1 tablet by mouth daily.   9/8/2024 at AM     multivitamin, therapeutic with minerals (THERA-VIT-M) TABS Take 1 tablet by mouth daily   9/8/2024 at AM    potassium chloride sol ER (KLOR-CON M20) 20 MEQ CR tablet TAKE ONE TABLET BY MOUTH ONE TIME DAILY 90 tablet 1 9/8/2024 at AM    Vitamin D, Cholecalciferol, 1000 units TABS Take 1,000 Units by mouth in the morning.   9/8/2024 at AM    warfarin ANTICOAGULANT (COUMADIN) 4 MG tablet Take 10 mg on Monday, Wednesday and Friday and 8 mg all other days of the week or as directed by inr clinc 190 tablet 1 9/8/2024 at PM with Supper     Allergies:  No Known Allergies  ROS: 10 point ROS neg other than the symptoms noted above in the HPI.      Physical Exam:  Temp:  [98.3  F (36.8  C)-99.4  F (37.4  C)] 98.3  F (36.8  C)  Pulse:  [] 93  Resp:  [10-27] 22  BP: ()/(56-80) 98/65  SpO2:  [87 %-100 %] 98 %  Gen: lying in bed, comfortable, -O2  CV: IRRR, telemetry afib 80s, -edema LE   Pulm: clear to auscultation bilaterally, no rhonchi or wheezes  Abd: soft, non-tender, no guarding, +BM  Ext: no lower extremity edema  Neuro: grossly normal  Psych: calm, cooperative       Labs:  ABG No lab results found in last 7 days.  CBC  Recent Labs   Lab 09/10/24  1343 09/10/24  1003 09/10/24  0608 09/10/24  0411 09/09/24  2214 09/09/24  1504 09/09/24  1148   WBC  --   --   --  35.5*  --  35.3* 43.4*   HGB 8.1* 8.0* 8.4* 8.4*   < > 7.6*  7.6* 8.9*   PLT  --   --   --  386  --  413 498*    < > = values in this interval not displayed.     BMP  Recent Labs   Lab 09/10/24  0411 09/09/24  2150 09/09/24  1809 09/09/24  1528 09/09/24  1257 09/09/24  1148     --   --   --   --  135   POTASSIUM 4.7  --  5.3 5.6* 5.9* 5.7*   CHLORIDE 107  --   --   --   --  102   CO2 17*  --   --   --   --  21*   BUN 73.1*  --   --   --   --  65.9*   CR 1.54*  --   --   --   --  1.59*   * 97  --   --   --  114*     LFT  Recent Labs   Lab 09/10/24  0608 09/10/24  0411 09/09/24 2214 09/09/24  1148   AST  --  90*  --  41   ALT  --  26  --   25   ALKPHOS  --  82  --  100   BILITOTAL  --  0.5  --  0.4   ALBUMIN  --  3.4*  --  3.4*   INR 1.83* 1.88* 2.18* 3.59*     PancreasNo lab results found in last 7 days.    Imaging:  Recent Results (from the past 24 hour(s))   Echocardiogram Complete   Result Value    LVEF  35-40%    [facility]    [id number]  [id number]  [id number]  [id number]^SARI^MADDIE^ROGE     [facility]  Echocardiography Laboratory  [address] [place] [id number]     Name: JOSHUA MELTON  MRN: [id number]  : 1935  Study Date: 2024 04:05 PM  Age: 88 yrs  Gender: Male  Patient Location: [facility]  Reason For Study: Chest Pain  Ordering Physician: MADDIE GUO  Performed By: Manda Castro     BSA: 2.2 m2  Height: 69 in  Weight: 220 lb  HR: 101  BP: 91/54 mmHg  ______________________________________________________________________________  Procedure  Complete Portable Echo Adult. Optison (NDC #7412-8191) given intravenously.  Contrast Optison. Poor quality two-dimensional was performed and interpreted.  ______________________________________________________________________________  Interpretation Summary     Technically very difficult study.     Left ventricular systolic function is moderately reduced.The visual ejection  fraction is 35-40%.There is moderate global hypokinesia of the left ventricle.  The right ventricular systolic function is normal.  There is mild (1+) mitral regurgitation.  IVC diameter <2.1 cm collapsing >50% with sniff suggests a normal RA pressure  of 3 mmHg.     Echo dated 2018 LVEF was noted 60-65%.  ______________________________________________________________________________  Left Ventricle  The left ventricle is normal in size. There is normal left ventricular wall  thickness. Diastolic Doppler findings (E/E' ratio and/or other parameters)  suggest left ventricular filling pressures are normal. Left ventricular  systolic function is moderately reduced. The visual  ejection fraction is 35-  40%. There is moderate global hypokinesia of the left ventricle.     Right Ventricle  The right ventricle is normal size. The right ventricular systolic function is  normal.     Atria  Normal left atrial size. The right atrium is mildly dilated.     Mitral Valve  There is mild (1+) mitral regurgitation.     Tricuspid Valve  Right ventricular systolic pressure could not be approximated due to  inadequate tricuspid regurgitation. There is trace tricuspid regurgitation.     Aortic Valve  The aortic valve is not well visualized. No aortic regurgitation is present.  No aortic stenosis is present.     Pulmonic Valve  The pulmonic valve is not well visualized. There is no pulmonic valvular  stenosis.     Vessels  IVC diameter <2.1 cm collapsing >50% with sniff suggests a normal RA pressure  of 3 mmHg.     Pericardium  There is no pericardial effusion.     ______________________________________________________________________________  MMode/2D Measurements & Calculations  IVSd: 0.80 cm  LVIDd: 5.0 cm  LVIDs: 2.8 cm  LVPWd: 0.70 cm  FS: 43.8 %     LV mass(C)d: 125.1 grams  LV mass(C)dI: 58.2 grams/m2  EF Biplane: 19.8 %  LA Volume (BP): 41.9 ml  LA Volume Index (BP): 19.5 ml/m2  RWT: 0.28     Doppler Measurements & Calculations  MV E max riaz: 71.3 cm/sec  MV A max riaz: 59.2 cm/sec  MV E/A: 1.2  MV dec slope: 389.3 cm/sec2  MV dec time: 0.18 sec  E/E' av.6     Lateral E/e': 5.6  Medial E/e': 9.5  RV S Riaz: 12.2 cm/sec     ______________________________________________________________________________  Report approved by: Chetna Eldridge 2024 05:07 PM         XR Chest Port 1 View    Narrative    EXAM: XR CHEST PORT 1 VIEW  LOCATION: [facility]  DATE: 2024    INDICATION: hypoxia  COMPARISON: 2028      Impression    IMPRESSION: Heart is mildly enlarged. Masslike opacity measuring 3 x 5.2 cm. Small right effusion. Atelectasis noted at the right lung base.  Possible small patchy airspace opacity at the right lung base. Findings are concerning for pneumonia. Recommend a   follow-up chest x-ray in 4-6 weeks to assure complete resolution.   Cardiac Catheterization    Narrative      Ost LAD to Mid LAD lesion is 80% stenosed.    Prox Cx to Mid Cx lesion is 70% stenosed.    Mid Cx to Dist Cx lesion is 99% stenosed.    Prox RCA lesion is 100% stenosed.    1st Mrg lesion is 90% stenosed.    1st Diag lesion is 99% stenosed.    - Severe triple vessel disease CAD  Consider CABG, vs medical mangement, vs, PCI to LAD

## 2024-09-10 NOTE — PLAN OF CARE
Problem: Adult Inpatient Plan of Care  Goal: Optimal Comfort and Wellbeing  Intervention: Monitor Pain and Promote Comfort  Recent Flowsheet Documentation  Taken 9/10/2024 1536 by Chloe Baker RN  Pain Management Interventions: medication (see MAR)     Problem: Adult Inpatient Plan of Care  Goal: Absence of Hospital-Acquired Illness or Injury  Intervention: Identify and Manage Fall Risk  Recent Flowsheet Documentation  Taken 9/10/2024 1230 by Chloe Baker RN  Safety Promotion/Fall Prevention: activity supervised  Taken 9/10/2024 0800 by Chloe Baker RN  Safety Promotion/Fall Prevention: activity supervised     Goal Outcome Evaluation:  EGD canceled this am. Pt sent to cath lab. R radial site C/D/I. Pulses dopplerable. Tylenol prn pain with good relief. CV surgery and Cardiology saw pt. Pt to U/S for vein mapping. CT chest done this evening. Pt to be NPO after midnight for EGD in AM. Hgb stable throughout the day. No stools this shift. Family updated at bedside.

## 2024-09-10 NOTE — CONSULTS
[facility]    Cardiology Consultation     Primary Cardiologist: Dr. Hoffman    Date of Admission: 9/9/2024  Service Date: 09/10/24    Summary:   Mr. Alessio Teixeira is a very pleasant 88 year old male with a past medical history of coronary calcification on CT scan with subsequent normal perfusion imaging, family history of coronary artery disease, dyslipidemia, former smoker who was admitted on 9/9/2024 for concerns for GI bleeding. I was asked to see the patient for NSTEMI.    Assessment & Plan   1. Acute upper gastrointestinal hemorrhage with acute blood loss anemia       History of gastric ulcer in 2018  - Hemoglobin 8.9->7.6-> 8.4 and stable this morning, baseline 11-12  -Denies further dark stools or hemoptysis  -Initiated on IV Protonix by GI and plan for EGD following cardiac evaluation    2. Elevated troponin concerning for NSTEMI  - EKG with T wave inversions   -Troponin trend [phone]  -Risks and benefits of coronary angiogram discussed today including, bleeding, bruising, infection, allergic reaction, kidney damage (including need for dialysis), stroke, heart attack, vascular damage, emergency open heart surgery, up to and including death.  Patient indicates understanding and is agreeable to proceed. Patient's daughter was present for the visit.     3. New cardiomyopathy  - TTE with EF 35-40%, moderate global hypokinesia, previously 55-60% in 2018    4. Atrial fibrillation on chronic anticoagulation      Supratherapeutic INR  -Rate controlled off beta-blocker  -INR 3.5 on admission, slowly drifting downward to 1.83 today    5.  Coronary artery calcification on CT scan  -No recent anginal symptoms    6.  JAVIER  -Baseline creatinine 1.0-1.1    Plan:   1. Proceed with coronary angiogram today, however only for definition of coronary anatomy/r/o obstructive disease given ongoing GI bleeding   2.  Avoid aspirin, NSAIDs, anticoagulation  3.  Cardiology will continue to  follow    Thank you for the opportunity to participate in this pleasant patient's care.     ANNA Dewey, CNP   Nurse Practitioner  [facility]  Pager: [phone]  (8am - 5pm, M-F)    Code Status    Full Code    Reason for Consult   Reason for consult: I was asked by the hospitalist to evaluate this patient for NSTEMI.    Primary Care Physician   Jose Squires    Chief Complaint   N/V/D, hemoptysis, and rectal bleeding     History is obtained from the patient    History of Present Illness   Mr. Alessio Teixeira is a very pleasant 88 year old male with a past medical history of atrial fibrillation, anticoagulated with coumadin, distance PE, previous colectomy due to perforated bowel, myasthenia gravis, and hypertension who presented to the emergency department with dark stools, nausea, vomiting and diarrhea on 9/9/2024.     He does carry a history of gastric ulcers.  His hemoglobin on admission was 8.9 then trended downward to 7.6 and 8.0, stable this morning at 8.4.  His INR was also superacute therapeutic at 3.59, this morning down to 1.83.  His EKG showed atrial fibrillation with controlled rate and lateral T wave inversions.  Initial troponin was 208 which trended downward then this morning sang significantly to 567 and now 712.    An echocardiogram done yesterday showed moderately reduced LV function with an ejection fraction of 35 to 40%, no regional wall motion abnormalities, rather global hypokinesia.    GI was planning for EGD today, however given cardiomyopathy and rising troponins recommendation was for cardiac evaluation.    He was seen by Dr. Kan Hoffman in 2019 following a PCP referral for coronary calcification.  He underwent an echo at that time which showed normal LV function and ejection fraction of 55 to 60.  He also had a Lexiscan nuclear stress test which revealed a small fixed basal inferior wall defect which was felt to be secondary to attenuation artifact.    Past  Medical History   I have reviewed this patient's medical history and updated it with pertinent information if needed.   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    Actinic keratosis     JAVIER (acute kidney injury) (H24) 02/01/2019    Atrial fibrillation (H)     Chronic 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 06/12/2013    Edema of leg     Gastrointestinal hemorrhage associated with gastric ulcer 02/01/2019    Gout, unspecified     H/O diplopia     History of blood transfusion     History of pulmonary embolism- bilateral in 2007 05/02/2016    Hypertension     Meningioma (H)     sinus    Myalgia and myositis, unspecified     Myasthenia gravis (H)     high dose steroids 2007,     Obesity     Other seborrheic keratosis     Presbyacusis     Primary localized osteoarthrosis, lower leg     Primary localized osteoarthrosis, pelvic region and thigh     Sleep apnea     doesn't use his CPAP)    Spinal stenosis     Squamous cell cancer of scalp and skin of neck     Dr Sanchez, multiple procedures, Mohs scalp and chest    Vitamin D deficiency        Past Surgical History   I have reviewed this patient's surgical history and updated it with pertinent information if needed.  Past Surgical History:   Procedure Laterality Date    ABDOMEN SURGERY  2007    APPENDECTOMY      ARTHROPLASTY HIP Right 2016    ARTHROPLASTY HIP ANTERIOR Right 04/26/2016    Procedure: ARTHROPLASTY HIP ANTERIOR;  Surgeon: Miguel Johnson MD;  Location: [facility] OR    ARTHROPLASTY KNEE Right 01/28/2019    Procedure: RIGHT TOTAL KNEE ARTROPLASTY;  Surgeon: Miguel Johnson MD;  Location:  OR    BIOPSY      skin cancer    BRAIN SURGERY  2007    partial resection meningioma    CATARACT IOL, RT/LT      COLECTOMY  2007    bowel perforation due to steroids    COLONOSCOPY      ESOPHAGOSCOPY, GASTROSCOPY, DUODENOSCOPY (EGD), COMBINED N/A 12/22/2018    Procedure: COMBINED ESOPHAGOSCOPY, GASTROSCOPY, DUODENOSCOPY (EGD), BIOPSY SINGLE OR  MULTIPLE;  Surgeon: Elizabeth Jones MD;  Location:  GI    ESOPHAGOSCOPY, GASTROSCOPY, DUODENOSCOPY (EGD), COMBINED N/A 05/10/2019    Procedure: ESOPHAGOGASTRODUODENOSCOPY (EGD);  Surgeon: Ahsan Garcia MD;  Location:  GI    GENITOURINARY SURGERY      vasectomy    HEAD & NECK SURGERY  2007    meningioma removed    KNEE SURGERY  2010    left knee replacement    ORTHOPEDIC SURGERY      left TKR    PHACOEMULSIFICATION CLEAR CORNEA WITH TORIC INTRAOCULAR LENS IMPLANT  07/30/2012    Procedure: PHACOEMULSIFICATION CLEAR CORNEA WITH TORIC INTRAOCULAR LENS IMPLANT;  COMPLEX RIGHT PHACOEMULSIFICATION CLEAR CORNEA WITH TORIC INTRAOCULAR LENS IMPLANT WITH MALYUGIN RING;  Surgeon: Ronald Cortez MD;  Location:  [facility]    RECTAL SURGERY  1985    Z RAD RESEC TONSIL/PILLARS         Prior to Admission Medications   Prior to Admission Medications   Prescriptions Last Dose Informant Patient Reported? Taking?   CALCIUM PO 9/8/2024 at PM Self Yes Yes   Sig: Take 600 mg by mouth 2 times daily    Vitamin D, Cholecalciferol, 1000 units TABS 9/8/2024 at AM Self Yes Yes   Sig: Take 1,000 Units by mouth in the morning.   allopurinol (ZYLOPRIM) 300 MG tablet 9/8/2024 at AM  No Yes   Sig: TAKE ONE TABLET BY MOUTH IN THE MORNING   clotrimazole-betamethasone (LOTRISONE) 1-0.05 % external cream 9/8/2024 at PM  No Yes   Sig: Apply topically 2 times daily.   diclofenac (VOLTAREN) 1 % topical gel 9/8/2024 at PM  No Yes   Sig: Apply 1 g topically in the morning and 1 g in the evening.   ferrous sulfate (FEROSUL) 325 (65 Fe) MG tablet 9/8/2024 at AM  Yes Yes   Sig: Take 325 mg by mouth daily (with breakfast).   ipratropium (ATROVENT) 0.03 % nasal spray 9/8/2024 at PM  No Yes   Sig: Spray 2 sprays into both nostrils every 12 hours   lovastatin (MEVACOR) 40 MG tablet 9/8/2024 at PM  No Yes   Sig: TAKE TWO TABLETS BY MOUTH DAILY AT BEDTIME   magnesium 250 MG tablet 9/8/2024 at AM  Yes Yes   Sig: Take 1 tablet by mouth daily.    multivitamin, therapeutic with minerals (THERA-VIT-M) TABS 9/8/2024 at AM Self Yes Yes   Sig: Take 1 tablet by mouth daily   potassium chloride sol ER (KLOR-CON M20) 20 MEQ CR tablet 9/8/2024 at AM  No Yes   Sig: TAKE ONE TABLET BY MOUTH ONE TIME DAILY   warfarin ANTICOAGULANT (COUMADIN) 4 MG tablet 9/8/2024 at PM with Supper  No Yes   Sig: Take 10 mg on Monday, Wednesday and Friday and 8 mg all other days of the week or as directed by inr clinc      Facility-Administered Medications: None     Allergies   No Known Allergies    Social History   I have reviewed this patient's social history and updated it with pertinent information if needed. Alessio Teixeira  reports that he quit smoking about 60 years ago. His smoking use included cigarettes. He started smoking about 65 years ago. He has a 3.9 pack-year smoking history. He has never used smokeless tobacco. He reports current alcohol use. He reports that he does not use drugs.    Family History   I have reviewed this patient's family history and updated it with pertinent information if needed.   Family History   Problem Relation Age of Onset    Glaucoma Father     Coronary Artery Disease Father     Hyperlipidemia Father     Unknown/Adopted Mother     Diabetes Brother     Leukemia Brother     Diabetes Brother     Hyperlipidemia Brother     Obesity Brother     Macular Degeneration No family hx of        Review of Systems   The 10 point Review of Systems is negative other than noted in the HPI or here.     Physical Exam   Temp: 98.8  F (37.1  C) Temp src: Oral BP: 93/58 Pulse: 103   Resp: 21 SpO2: 99 % O2 Device: None (Room air) Oxygen Delivery: 5 LPM  Vital Signs with Ranges  Temp:  [97.8  F (36.6  C)-99.4  F (37.4  C)] 98.8  F (37.1  C)  Pulse:  [] 103  Resp:  [10-27] 21  BP: ()/(50-80) 93/58  SpO2:  [87 %-99 %] 99 %  219 lbs 0 oz    Constitutional:  Appears his stated age, well nourished, and in no acute distress.  Eyes: Pupils equal, round. Sclerae  anicteric.   HEENT: Normocephalic, atraumatic.   Neck: Supple. No JVD appreciated.  Respiratory: Breathing non-labored. Lungs clear to auscultation bilaterally. No crackles, wheezes, rhonchi, or rales.  Cardiovascular: Irregularly irregular rate and rhythm, normal S1 and S2. No murmur, rub, or gallop.  GI: Soft, non-distended, non-tender, bowel sounds present in all four quadrants.  Skin: Warm, dry.   Musculoskeletal/Extremities: Moves all extremities well and symmetrically. No edema.  Neurologic: No gross focal deficits. Alert, awake, and oriented to person, place and time.  Psychiatric: Affect appropriate. Mentation normal.    Data   Results for orders placed or performed during the hospital encounter of 09/09/24 (from the past 24 hour(s))   CBC with platelets + differential    Narrative    The following orders were created for panel order CBC with platelets + differential.  Procedure                               Abnormality         Status                     ---------                               -----------         ------                     CBC with platelets and d...[[id number]]  Abnormal            Final result               Manual Differential[[id number]]          Abnormal            Final result                 Please view results for these tests on the individual orders.   Comprehensive metabolic panel   Result Value Ref Range    Sodium 135 135 - 145 mmol/L    Potassium 5.7 (H) 3.4 - 5.3 mmol/L    Carbon Dioxide (CO2) 21 (L) 22 - 29 mmol/L    Anion Gap 12 7 - 15 mmol/L    Urea Nitrogen 65.9 (H) 8.0 - 23.0 mg/dL    Creatinine 1.59 (H) 0.67 - 1.17 mg/dL    GFR Estimate 42 (L) >60 mL/min/1.73m2    Calcium 8.9 8.8 - 10.4 mg/dL    Chloride 102 98 - 107 mmol/L    Glucose 114 (H) 70 - 99 mg/dL    Alkaline Phosphatase 100 40 - 150 U/L    AST 41 0 - 45 U/L    ALT 25 0 - 70 U/L    Protein Total 5.7 (L) 6.4 - 8.3 g/dL    Albumin 3.4 (L) 3.5 - 5.2 g/dL    Bilirubin Total 0.4 <=1.2 mg/dL   ABO/Rh type and screen     Narrative    The following orders were created for panel order ABO/Rh type and screen.  Procedure                               Abnormality         Status                     ---------                               -----------         ------                     Adult Type and Screen[[id number]]                            Final result                 Please view results for these tests on the individual orders.   [place] Draw    Narrative    The following orders were created for panel order [place] Draw.  Procedure                               Abnormality         Status                     ---------                               -----------         ------                     Extra Blue Top Tube[[id number]]                              Final result               Extra Red Top Tube[[id number]]                                                            Please view results for these tests on the individual orders.   CBC with platelets and differential   Result Value Ref Range    WBC Count 43.4 (H) 4.0 - 11.0 10e3/uL    RBC Count 3.41 (L) 4.40 - 5.90 10e6/uL    Hemoglobin 8.9 (L) 13.3 - 17.7 g/dL    Hematocrit 29.2 (L) 40.0 - 53.0 %    MCV 86 78 - 100 fL    MCH 26.1 (L) 26.5 - 33.0 pg    MCHC 30.5 (L) 31.5 - 36.5 g/dL    RDW 20.3 (H) 10.0 - 15.0 %    Platelet Count 498 (H) 150 - 450 10e3/uL    NRBCs per 100 WBC 6 (H) <1 /100    Absolute NRBCs 2.8 10e3/uL   Adult Type and Screen   Result Value Ref Range    ABO/RH(D) A POS     Antibody Screen Negative Negative    SPECIMEN EXPIRATION DATE [id number]    Extra Blue Top Tube   Result Value Ref Range    Hold Specimen [facility]    Manual Differential   Result Value Ref Range    % Neutrophils 80 %    % Lymphocytes 14 %    % Monocytes 3 %    % Eosinophils 1 %    % Basophils 2 %    Absolute Neutrophils 34.7 (H) 1.6 - 8.3 10e3/uL    Absolute Lymphocytes 6.1 (H) 0.8 - 5.3 10e3/uL    Absolute Monocytes 1.3 0.0 - 1.3 10e3/uL    Absolute Eosinophils 0.4 0.0 - 0.7 10e3/uL    Absolute  Basophils 0.9 (H) 0.0 - 0.2 10e3/uL    RBC Morphology Confirmed RBC Indices     Platelet Assessment  Automated Count Confirmed. Platelet morphology is normal.     Automated Count Confirmed. Platelet morphology is normal.    Polychromasia Slight (A) None Seen    NRBCs per 100 WBC 9 %    Absolute NRBCs 3.9 10e3/uL    Pathologist Review Comments (Blood)     INR   Result Value Ref Range    INR 3.59 (H) 0.85 - 1.15   EKG 12-lead, tracing only   Result Value Ref Range    Systolic Blood Pressure  mmHg    Diastolic Blood Pressure  mmHg    Ventricular Rate 86 BPM    Atrial Rate 86 BPM    IN Interval  ms    QRS Duration 88 ms     ms    QTc 464 ms    P Axis  degrees    R AXIS 35 degrees    T Axis 209 degrees    Interpretation ECG       Undetermined rhythm  Inferior-posterior infarct , age undetermined  ST & T wave abnormality, consider lateral ischemia  Abnormal ECG  When compared with ECG of 22-Dec-2018 04:58,  Current undetermined rhythm precludes rhythm comparison, needs review  Inferior-posterior infarct is now Present  Non-specific change in ST segment in Inferior leads  T wave inversion now evident in Anterior leads  QT has shortened  Confirmed by GENERATED REPORT, COMPUTER ([id number]),  ROSS KATZ ([id number]) on 9/9/2024 4:24:21 PM     Prepare red blood cells (unit)   Result Value Ref Range    Blood Component Type Red Blood Cells     Product Code M5654R91     Unit Status Transfused     Unit Number [id number]     CROSSMATCH Compatible     CODING SYSTEM [id number]     ISSUE DATE AND TIME [id number]     UNIT ABO/RH A+     UNIT TYPE ISBT 6200    Prepare red blood cells (unit)   Result Value Ref Range    Blood Component Type Red Blood Cells     Product Code [id number]     Unit Status Transfused     Unit Number [id number]     CROSSMATCH Compatible     CODING SYSTEM [id number]     ISSUE DATE AND TIME [id number]     UNIT ABO/RH A+     UNIT TYPE ISBT 6200    Potassium   Result Value Ref Range    Potassium 5.9 (H)  3.4 - 5.3 mmol/L   Troponin T, High Sensitivity   Result Value Ref Range    Troponin T, High Sensitivity 208 (HH) <=22 ng/L   Hemoglobin   Result Value Ref Range    Hemoglobin 7.6 (L) 13.3 - 17.7 g/dL   CBC with platelets   Result Value Ref Range    WBC Count 35.3 (H) 4.0 - 11.0 10e3/uL    RBC Count 2.93 (L) 4.40 - 5.90 10e6/uL    Hemoglobin 7.6 (L) 13.3 - 17.7 g/dL    Hematocrit 24.7 (L) 40.0 - 53.0 %    MCV 84 78 - 100 fL    MCH 25.9 (L) 26.5 - 33.0 pg    MCHC 30.8 (L) 31.5 - 36.5 g/dL    RDW 20.1 (H) 10.0 - 15.0 %    Platelet Count 413 150 - 450 10e3/uL   Potassium   Result Value Ref Range    Potassium 5.6 (H) 3.4 - 5.3 mmol/L   Troponin T, High Sensitivity   Result Value Ref Range    Troponin T, High Sensitivity 200 (HH) <=22 ng/L   Echocardiogram Complete   Result Value Ref Range    LVEF  35-40%     [facility]    [id number]  [id number]  [id number]  [id number]^SARI^MADDIE^ROGE     [facility]  Echocardiography Laboratory  [address] [place] [address]     Name: JOSHUA MELTON  MRN: [id number]  : 1935  Study Date: 2024 04:05 PM  Age: 88 yrs  Gender: Male  Patient Location: [facility]  Reason For Study: Chest Pain  Ordering Physician: MADDIE GUO  Performed By: Manda Castro     BSA: 2.2 m2  Height: 69 in  Weight: 220 lb  HR: 101  BP: 91/54 mmHg  ______________________________________________________________________________  Procedure  Complete Portable Echo Adult. Optison (NDC #4561-7711) given intravenously.  Contrast Optison. Poor quality two-dimensional was performed and interpreted.  ______________________________________________________________________________  Interpretation Summary     Technically very difficult study.     Left ventricular systolic function is moderately reduced.The visual ejection  fraction is 35-40%.There is moderate global hypokinesia of the left ventricle.  The right ventricular systolic function is normal.  There is mild (1+) mitral  regurgitation.  IVC diameter <2.1 cm collapsing >50% with sniff suggests a normal RA pressure  of 3 mmHg.     Echo dated 2018 LVEF was noted 60-65%.  ______________________________________________________________________________  Left Ventricle  The left ventricle is normal in size. There is normal left ventricular wall  thickness. Diastolic Doppler findings (E/E' ratio and/or other parameters)  suggest left ventricular filling pressures are normal. Left ventricular  systolic function is moderately reduced. The visual ejection fraction is 35-  40%. There is moderate global hypokinesia of the left ventricle.     Right Ventricle  The right ventricle is normal size. The right ventricular systolic function is  normal.     Atria  Normal left atrial size. The right atrium is mildly dilated.     Mitral Valve  There is mild (1+) mitral regurgitation.     Tricuspid Valve  Right ventricular systolic pressure could not be approximated due to  inadequate tricuspid regurgitation. There is trace tricuspid regurgitation.     Aortic Valve  The aortic valve is not well visualized. No aortic regurgitation is present.  No aortic stenosis is present.     Pulmonic Valve  The pulmonic valve is not well visualized. There is no pulmonic valvular  stenosis.     Vessels  IVC diameter <2.1 cm collapsing >50% with sniff suggests a normal RA pressure  of 3 mmHg.     Pericardium  There is no pericardial effusion.     ______________________________________________________________________________  MMode/2D Measurements & Calculations  IVSd: 0.80 cm  LVIDd: 5.0 cm  LVIDs: 2.8 cm  LVPWd: 0.70 cm  FS: 43.8 %     LV mass(C)d: 125.1 grams  LV mass(C)dI: 58.2 grams/m2  EF Biplane: 19.8 %  LA Volume (BP): 41.9 ml  LA Volume Index (BP): 19.5 ml/m2  RWT: 0.28     Doppler Measurements & Calculations  MV E max valentino: 71.3 cm/sec  MV A max valentino: 59.2 cm/sec  MV E/A: 1.2  MV dec slope: 389.3 cm/sec2  MV dec time: 0.18 sec  E/E' av.6     Lateral E/e':  5.6  Medial E/e': 9.5  RV S Riaz: 12.2 cm/sec     ______________________________________________________________________________  Report approved by: Chetna [place] 09/09/2024 05:07 PM         XR Chest Port 1 View    Narrative    EXAM: XR CHEST PORT 1 VIEW  LOCATION: [facility]  DATE: 9/9/2024    INDICATION: hypoxia  COMPARISON: 12/22/2028      Impression    IMPRESSION: Heart is mildly enlarged. Masslike opacity measuring 3 x 5.2 cm. Small right effusion. Atelectasis noted at the right lung base. Possible small patchy airspace opacity at the right lung base. Findings are concerning for pneumonia. Recommend a   follow-up chest x-ray in 4-6 weeks to assure complete resolution.   Troponin T, High Sensitivity   Result Value Ref Range    Troponin T, High Sensitivity 194 (HH) <=22 ng/L   Potassium   Result Value Ref Range    Potassium 5.3 3.4 - 5.3 mmol/L   EKG 12-lead, tracing only   Result Value Ref Range    Systolic Blood Pressure  mmHg    Diastolic Blood Pressure  mmHg    Ventricular Rate 104 BPM    Atrial Rate 249 BPM    NJ Interval  ms    QRS Duration 100 ms     ms    QTc 462 ms    P Axis  degrees    R AXIS 58 degrees    T Axis 191 degrees    Interpretation ECG       Atrial flutter with variable A-V block  Possible Inferior infarct (cited on or before 09-Sep-2024)  Marked ST abnormality, possible anterior subendocardial injury  Abnormal ECG  When compared with ECG of 09-Sep-2024 11:49,  Previous ECG has undetermined rhythm, needs review  ST now depressed in Lateral leads     Glucose by meter   Result Value Ref Range    GLUCOSE BY METER POCT 97 70 - 99 mg/dL   INR   Result Value Ref Range    INR 2.18 (H) 0.85 - 1.15   Hemoglobin   Result Value Ref Range    Hemoglobin 8.0 (L) 13.3 - 17.7 g/dL   Troponin T, High Sensitivity   Result Value Ref Range    Troponin T, High Sensitivity 13 <=22 ng/L   Lactic Acid Whole Blood w/ 1x repeat in 2 hrs when >2   Result Value Ref Range    Lactic  Acid, Initial 1.3 0.7 - 2.0 mmol/L   Procalcitonin   Result Value Ref Range    Procalcitonin 1.23 (H) <0.50 ng/mL   Prepare red blood cells (unit)   Result Value Ref Range    Blood Component Type Red Blood Cells     Product Code [id number]     Unit Status Ready for issue     Unit Number [id number]     CROSSMATCH Compatible     CODING SYSTEM [id number]    EKG 12-lead, tracing only   Result Value Ref Range    Systolic Blood Pressure  mmHg    Diastolic Blood Pressure  mmHg    Ventricular Rate 112 BPM    Atrial Rate 112 BPM    MT Interval 128 ms    QRS Duration 96 ms     ms    QTc 472 ms    P Axis 263 degrees    R AXIS 51 degrees    T Axis 199 degrees    Interpretation ECG       Atrial tachycardia/flutter  ST & T abnormality, consider anterior ischemia  Abnormal ECG  When compared with ECG of 09-Sep-2024 19:01, (unconfirmed)  There have been no significant changes    Confirmed by MD ARIEL, GURMEET ([id number]) on 9/10/2024 8:32:27 AM     Respiratory Aerobic Bacterial Culture with Gram Stain    Specimen: Expectorate; Sputum   Result Value Ref Range    Culture       >10 Squamous epithelial cells/low power field indicates oral contamination. Please recollect.    Gram Stain Result >10 Squamous epithelial cells/low power field     Gram Stain Result <25 PMNs/low power field     Gram Stain Result 4+ Mixed paddy    Asymptomatic Influenza A/B, RSV, & SARS-CoV2 PCR (COVID-19) Nose    Specimen: Nose; Swab   Result Value Ref Range    Influenza A PCR Negative Negative    Influenza B PCR Negative Negative    RSV PCR Negative Negative    SARS CoV2 PCR Negative Negative    Narrative    Testing was performed using the Xpert Xpress CoV2/Flu/RSV Assay on the [employer] GeneXpert Instrument. This test should be ordered for the detection of SARS-CoV2, influenza, and RSV viruses in individuals with signs and symptoms of respiratory tract infection. This test is for in vitro diagnostic use under the [place] FDA for laboratories certified under CLIA to  perform high or moderate complexity testing. This test has been [place] FDA cleared. A negative result does not rule out the presence of PCR inhibitors in the specimen or target RNA in concentration below the limit of detection for the assay. If only one viral target is positive but coinfection with multiple targets is suspected, the sample should be re-tested with another FDA cleared, approved, or authorized test, if coninfection would change clinical management. This test was validated by the [facility]. These laboratories are certified under the Clinical Laboratory Improvement Amendments of 1988 (CLIA-88) as qualified to perfom high complexity laboratory testing.   Troponin T, High Sensitivity (STAT q2h x3)   Result Value Ref Range    Troponin T, High Sensitivity 567 (HH) <=22 ng/L   INR   Result Value Ref Range    INR 1.88 (H) 0.85 - 1.15   Comprehensive metabolic panel   Result Value Ref Range    Sodium 138 135 - 145 mmol/L    Potassium 4.7 3.4 - 5.3 mmol/L    Carbon Dioxide (CO2) 17 (L) 22 - 29 mmol/L    Anion Gap 14 7 - 15 mmol/L    Urea Nitrogen 73.1 (H) 8.0 - 23.0 mg/dL    Creatinine 1.54 (H) 0.67 - 1.17 mg/dL    GFR Estimate 43 (L) >60 mL/min/1.73m2    Calcium 8.3 (L) 8.8 - 10.4 mg/dL    Chloride 107 98 - 107 mmol/L    Glucose 104 (H) 70 - 99 mg/dL    Alkaline Phosphatase 82 40 - 150 U/L    AST 90 (H) 0 - 45 U/L    ALT 26 0 - 70 U/L    Protein Total 5.5 (L) 6.4 - 8.3 g/dL    Albumin 3.4 (L) 3.5 - 5.2 g/dL    Bilirubin Total 0.5 <=1.2 mg/dL   CBC with platelets   Result Value Ref Range    WBC Count 35.5 (H) 4.0 - 11.0 10e3/uL    RBC Count 3.17 (L) 4.40 - 5.90 10e6/uL    Hemoglobin 8.4 (L) 13.3 - 17.7 g/dL    Hematocrit 26.9 (L) 40.0 - 53.0 %    MCV 85 78 - 100 fL    MCH 26.5 26.5 - 33.0 pg    MCHC 31.2 (L) 31.5 - 36.5 g/dL    RDW 19.3 (H) 10.0 - 15.0 %    Platelet Count 386 150 - 450 10e3/uL   Magnesium Lab   Result Value Ref Range    Magnesium 2.1 1.7 - 2.3 mg/dL   Troponin T, High  Sensitivity (STAT q2h x3)   Result Value Ref Range    Troponin T, High Sensitivity 666 (HH) <=22 ng/L   Lactic acid   Result Value Ref Range    Lactic Acid 1.1 0.7 - 2.0 mmol/L   Troponin T, High Sensitivity (STAT q2h x3)   Result Value Ref Range    Troponin T, High Sensitivity 712 (HH) <=22 ng/L   Hemoglobin   Result Value Ref Range    Hemoglobin 8.4 (L) 13.3 - 17.7 g/dL   INR   Result Value Ref Range    INR 1.83 (H) 0.85 - 1.15   EKG 12-lead, tracing only   Result Value Ref Range    Systolic Blood Pressure  mmHg    Diastolic Blood Pressure  mmHg    Ventricular Rate 92 BPM    Atrial Rate 220 BPM    HI Interval  ms    QRS Duration 90 ms     ms    QTc 445 ms    P Axis  degrees    R AXIS 75 degrees    T Axis 232 degrees    Interpretation ECG       Atrial fibrillation  Possible Lateral infarct , age undetermined  Possible Inferior infarct (cited on or before 09-Sep-2024)  ST & T wave abnormality, consider anterior ischemia  Abnormal ECG  When compared with ECG of 09-Sep-2024 23:40, (unconfirmed)  Atrial fibrillation has replaced Junctional rhythm  ST no longer depressed in Anterior leads     Hemoglobin   Result Value Ref Range    Hemoglobin 8.0 (L) 13.3 - 17.7 g/dL     *Note: Due to a large number of results and/or encounters for the requested time period, some results have not been displayed. A complete set of results can be found in Results Review.       This note was completed in part using Dragon voice recognition software. Although reviewed after completion, some word and grammatical errors may occur.

## 2024-09-10 NOTE — PROGRESS NOTES
[facility] GASTROENTEROLOGY PROGRESS NOTE    SUBJECTIVE:  Patient was scheduled for EGD this morning.  Overnight troponins trended up and noted to have ST changes on EGD.  Cardiology on call was notified who suggested coronary angiogram after EGD this morning.    Discussed with today's Anesthesiologist who states proceeding with EGD prior to angiogram too risky, EGD this morning cancelled.  Of note, no further dark bowel movements and hemoglobin has been stable since INR improved to less than 2.    Patient denies abdominal pain, is hungry.    OBJECTIVE:    BP 93/58   Pulse 103   Temp 98.8  F (37.1  C) (Oral)   Resp 21   Wt 99.3 kg (219 lb)   SpO2 99%   BMI 31.53 kg/m    Temp (24hrs), Av.8  F (37.1  C), Min:97.8  F (36.6  C), Max:99.4  F (37.4  C)    Patient Vitals for the past 72 hrs:   Weight   09/10/24 0400 99.3 kg (219 lb)       Intake/Output Summary (Last 24 hours) at 9/10/2024 0923  Last data filed at 9/10/2024 0500  Gross per 24 hour   Intake 2147.5 ml   Output 350 ml   Net 1797.5 ml         PHYSICAL EXAM    Constitutional: NAD, comfortable  Abdomen: soft, non-tender, nondistended  Neuro: alert and oriented      Additional Comments:  ROS, FH, SH: See initial GI consult for details.    I have reviewed the patient's new clinical lab results:    Recent Labs   Lab Test 09/10/24  0608 09/10/24  0411 24  2214 24  1504 24  1148   WBC  --  35.5*  --  35.3* 43.4*   HGB 8.4* 8.4* 8.0* 7.6*  7.6* 8.9*   MCV  --  85  --  84 86   PLT  --  386  --  413 498*   INR 1.83* 1.88* 2.18*  --  3.59*     Recent Labs   Lab Test 09/10/24  0411 24  1809 24  1528 24  1257 24  1148 24  1106     --   --   --  135 131*   POTASSIUM 4.7 5.3 5.6*   < > 5.7* 5.2   CHLORIDE 107  --   --   --  102 98   CO2 17*  --   --   --  21* 24   BUN 73.1*  --   --   --  65.9* 30.8*   CR 1.54*  --   --   --  1.59* 1.54*   ANIONGAP 14  --   --   --  12 9   TATE 8.3*  --   --   --  8.9 9.3    < > =  values in this interval not displayed.     Recent Labs   Lab Test 09/10/24  0411 09/09/24  1148 08/29/24  1106 04/12/22  1009 03/04/21  1029 03/04/21  1028 08/21/20  1059 11/25/19  1045 11/25/19  1044   ALBUMIN 3.4* 3.4* 3.9  --   --    < > 3.5   < >  --    BILITOTAL 0.5 0.4 0.5  --   --    < > 0.4   < >  --    ALT 26 25 19   < >  --    < > 22   < >  --    AST 90* 41 37  --   --    < > 24   < >  --    ALKPHOS 82 100 100  --   --    < > 83   < >  --    PROTEIN  --   --   --   --  30*  --  Negative  --  30*    < > = values in this interval not displayed.     9/10/24 Chest Xray:  IMPRESSION: Heart is mildly enlarged. Masslike opacity measuring 3 x 5.2 cm. Small right effusion. Atelectasis noted at the right lung base. Possible small patchy airspace opacity at the right lung base. Findings are concerning for pneumonia. Recommend a   follow-up chest x-ray in 4-6 weeks to assure complete resolution.     Anemia    Assessment: Patient with 5 gram drop in hgb in past 2 weeks with elevated INR of 3.5 and dark stools. He is on coumadin for afib.  History of bleeding ulcer in 2018.  Suspicious for upper GI bleeding.  INR is being reversed.  Last colonoscopy 2011 with several polyps.    Overnight troponins trended up and ST changes, concern for MI.  Cardiology planning on cath lab today.  Discussed with anesthesia who cancelled EGD.    Hgb has been stable since admission, INR improved and patient on PPI.    Plan:   - tentative  plan for cath lab today - discussed with today's cardiologist who stated may not be able to place stents without EGD  - continue IV PPI today  - continue to hold coumadin today  - I am ok with liquid diet after angiogram, NPO at midnight  - EGD rescheduled for tomorrow morning at 8am, hopefully with information from angiogram we can safely proceed with EGD      Abnormal Chest XRay  Assessment: Possible mass v. Pneumonia.  No respiratory symptoms.  Plan:  - Follow up per internal medicine    Discussed with  patient and Dr. Antonio.    [facility]  Office:  [phone]    Approximately 30 minutes of total time was spent providing patient care, including patient evaluation, reviewing documentation/test results, and .

## 2024-09-11 NOTE — PROGRESS NOTES
[facility] Brief Note    EGD completed    Area of gastritis in the antrum - appears to be healing.  This may have been the source of the blood loss, especially with the supratherapeutic INR.  Thick phlegm in the esophagus and duodenum, suctioned as much as able.   No lesions seen to explain anemia in those areas.    - continue IV BID PPI while cardiac work up going on  - at discharge pantoprazole 40mg BID  - IV iron infusion  - from my perspective, he can eat today, but I will not order diet at this time in case cardiac proce    Shannon Stephenson MD  [facility]  [phone]

## 2024-09-11 NOTE — CONSULTS
[facility]    Hematology/Oncology Consult Note      Date of Admission:  9/9/2024  Date of Consult:  09/11/24  Reason for Consult: Neutrophilia, anemia, anticoagulation      Assessment & Plan .Alessio Teixeira 88 year old presenting with UGI bleed on coumadin for Afib and found to have significant multivessel cardiac disease. CBC during admission notable for significant neutrophilia with WBC peaking at 43K with some increase baso- myelocytes and mild tear drops. Review of previous labs notable for leukocytosis and thrombocytosis.     A chronic myeloproliferative neoplasm is in the differential such a CML, ET, and less likely MD or atypical CML. In the differential is a leukemoid reactive process in the setting of acute bleeding and anemia.     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 JAK2 with reflex CALR and MPL. BCR/ABL.  Once cleared by GI- would favor restarting anticoagulation with DOAC - he qualifies for the lower dose of 2.5 mg po bid (Age and Cr). IF f concomitant use of antiplt therapy is recommended: I would favor plavix over Asa due to gastritis.   Anemia- secondary to GI bleeding. Agree with PRBC and IV iron. Repeat ferritin pending.   We wuld recommend holding off on triple bypass surgery until we are comfortable that the patient can tolerate anticoagulation without bleeding, and ideally after MPN testing is back so we can best assess risk of thrombotic events during surgery.     HISTORY of elevated counts  Leukocytosis and Thrombocytosis.  PEAK: WBC 43.3K on 9/9/2024. Plt 498K at the time. Hgb 8.9 g/d. Diff with elevated Eos at 0.9, ALC at 6.1. ANC at 23.9. Metamyelocytes at 1.0. Slight tear drops.   CBC on 8/9 with WBC 28K, Hgb 11.7 g/dl and Plt 414K. In 2020 PLT was at high as 618K, 750K in 2019. Leucocytosis of 12-14K. Microcytosis at the time.       HISTORY OF PRESENT ILLNESS  Alessio Teixeira is a 88 presented with an acute upper GI bleed 9/9/2024, likely 2/2 gastritis, and likely passed  melena. The acute bleed resolved and happened in the setting of chronic anticoagulation. He has a history of GI bleed due to gastric ulcer 2018    Wife and daughter (pediatrician) supported his history and elaborated more. History confirmed per previous notes: Hx dark stools for hours to 1 day, unclear per patient, spit up blood x1.. No formal chest pain. Reports SHOEMAKER and fatigue. No lightheadedness or dizziness.  No syncopal episodes.  No nausea or vomiting.  On admission, mildly tachycardic 90s-110s and blood pressures in the . No hypoxia. Cr 1.59 which is at recent baseline.  Normal liver function.  INR 3.59. Recent hemoglobin 11-12 range.  8.9 in the ED and repeat 7.6.      Hgb stable after 2 units PRBC 9/9 and INR <2, no further dark stools. Anticoagulation held. EGD showed gastritis on 9/11/2024. gastritis in the antrum - appears to be healing.     (NSTEMI) type I versus type II Multivessel CAD with new cardiomyopathy   Atrial fibrillation on chronic anticoagulation     On abx for a potential PNA.    On 9/11/2024- feels better- still in bed and tired. Denies any active bleeding or bruising. Reports brother has myelofibrosis.      Past medical history  Afib on coumadin  History of bowel perforation  Thrombocytosis and leukocytosis  Coronary artery diease  History of PE  Myasthenia gravis      REVIEW OF SYSTEMS:   14 point ROS was reviewed and is negative other than as noted above in HPI.       MEDICATIONS:  Current Facility-Administered Medications   Medication Dose Route Frequency Provider Last Rate Last Admin    acetaminophen (TYLENOL) Suppository 650 mg  650 mg Rectal Q4H PRN Amber Sandoval PA-C        acetaminophen (TYLENOL) tablet 650 mg  650 mg Oral Q4H PRN Vickey Monteiro MD   650 mg at 09/10/24 2203    atorvastatin (LIPITOR) tablet 10 mg  10 mg Oral QPM Amber Sandoval PA-C   10 mg at 09/10/24 2013    cefTRIAXone (ROCEPHIN) 2 g vial to attach to  ml bag for ADULTS  or NS 50 ml bag for PEDS  2 g Intravenous Q24H Amber Sandoval PA-C   2 g at 09/10/24 2315    glucose gel 15-30 g  15-30 g Oral Q15 Min PRN Amber Sandoval PA-C        Or    dextrose 50 % injection 25-50 mL  25-50 mL Intravenous Q15 Min PRN Amber Sandoval PA-C        Or    glucagon injection 1 mg  1 mg Subcutaneous Q15 Min PRN Amber Sandoval PA-C        doxycycline (VIBRAMYCIN) 100 mg vial to attach to  mL bag  100 mg Intravenous BID Amber Sandoval PA-C   100 mg at 09/11/24 0956    fentaNYL (PF) (SUBLIMAZE) injection 25 mcg  25 mcg Intravenous Q15 Min PRN Vickey Monteiro MD        HOLD:  Metformin and metformin containing medications if patient received IV contrast with acute kidney injury or severe chronic kidney disease (stage IV or stage V; i.e., eGFR less than 30)   Does not apply HOLD Vickey Monteiro MD        HOLD: nitroGLYcerin IF   Does not apply HOLD Amber Sandoval PA-C        HOLD: warfarin (COUMADIN) therapy   Does not apply HOLD Amber Sandoval PA-C        hydrALAZINE (APRESOLINE) tablet 10 mg  10 mg Oral Q4H PRN Amber Sandoval PA-C        Or    hydrALAZINE (APRESOLINE) injection 10 mg  10 mg Intravenous Q4H PRN Amber Sandoval PA-C        HYDROmorphone (DILAUDID) injection 0.2 mg  0.2 mg Intravenous Q2H PRN Amber Sandoval PA-C   0.2 mg at 09/10/24 0707    iron sucrose (VENOFER) 300 mg in sodium chloride 0.9 % 290 mL intermittent infusion  300 mg Intravenous Daily Shannon Stephenson .3 mL/hr at 09/11/24 1208 300 mg at 09/11/24 1208    lidocaine (LMX4) cream   Topical Q1H PRN Amber Sandoval PA-C        lidocaine 1 % 0.1-1 mL  0.1-1 mL Other Q1H PRN Sandoval, Amber Cristy, PA-C        LORazepam (ATIVAN) injection 0.5 mg  0.5 mg Intravenous Q2H PRN Shannon Bennett NP        Or    LORazepam (ATIVAN) tablet 0.5 mg  0.5 mg Oral Q2H PRN Shannon Bennett NP         medication instruction   Does not apply Continuous PRN Amber Sandoval PA-C        midazolam (VERSED) injection 0.5 mg  0.5 mg Intravenous Q5 Min PRN Vickey Monteiro MD        naloxone (NARCAN) injection 0.2 mg  0.2 mg Intravenous Q2 Min PRN Camilo Hermosillo,         Or    naloxone (NARCAN) injection 0.4 mg  0.4 mg Intravenous Q2 Min PRN Camilo Hermosillo, DO        Or    naloxone (NARCAN) injection 0.2 mg  0.2 mg Intramuscular Q2 Min PRN Camilo Hermosillo, DO        Or    naloxone (NARCAN) injection 0.4 mg  0.4 mg Intramuscular Q2 Min PRN Camilo Hermosillo, DO        ondansetron (ZOFRAN ODT) ODT tab 4 mg  4 mg Oral Q6H PRN Amber Sandoval PA-C        Or    ondansetron (ZOFRAN) injection 4 mg  4 mg Intravenous Q6H PRN Amber Sandoval PA-C        oxyCODONE (ROXICODONE) tablet 5 mg  5 mg Oral Q4H PRN Vickey Monteiro MD   5 mg at 09/10/24 2013    Or    oxyCODONE (ROXICODONE) tablet 10 mg  10 mg Oral Q4H PRN Vickey Monteiro MD        pantoprazole (PROTONIX) IV push injection 40 mg  40 mg Intravenous Q12H Key Cavanaugh MD   40 mg at 09/11/24 0957    Patient is NOT allergic to contrast dye   Does not apply DOES NOT GO TO Shannon Nava NP        potassium chloride sol ER (KLOR-CON M20) CR tablet 20 mEq  20 mEq Oral Once PRN Shannon Bennett NP        prochlorperazine (COMPAZINE) injection 5 mg  5 mg Intravenous Q6H PRN Amber Sandoval PA-C        Or    prochlorperazine (COMPAZINE) tablet 5 mg  5 mg Oral Q6H PRN Amber Sandoval PA-C        Or    prochlorperazine (COMPAZINE) suppository 12.5 mg  12.5 mg Rectal Q12H PRN Amber Sandoval PA-C        Reason ACE/ARB/ARNI order not selected   Other DOES NOT GO TO Amber Calabrese PA-C        reason aspirin not prescribed (intentional)   Other DOES NOT GO TO Amber Calabrese PA-C        Reason beta blocker not prescribed   Does  not apply DOES NOT GO TO Amber Calabrese PA-C        sodium chloride (PF) 0.9% PF flush 3 mL  3 mL Intracatheter Q8H Amber Sandoval PA-C   3 mL at 09/11/24 1704    sodium chloride (PF) 0.9% PF flush 3 mL  3 mL Intracatheter q1 min prn Amber Sandoval PA-C        torsemide (DEMADEX) tablet 20 mg  20 mg Oral Daily Jonathan Antonio MD             ALLERGIES:  No Known Allergies      PAST MEDICAL HISTORY:  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    Actinic keratosis     JAVIER (acute kidney injury) (H24) 02/01/2019    Atrial fibrillation (H)     Chronic 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 06/12/2013    Edema of leg     Gastrointestinal hemorrhage associated with gastric ulcer 02/01/2019    Gout, unspecified     H/O diplopia     History of blood transfusion     History of pulmonary embolism- bilateral in 2007 05/02/2016    Hypertension     Meningioma (H)     sinus    Myalgia and myositis, unspecified     Myasthenia gravis (H)     high dose steroids 2007,     Obesity     Other seborrheic keratosis     Presbyacusis     Primary localized osteoarthrosis, lower leg     Primary localized osteoarthrosis, pelvic region and thigh     Sleep apnea     doesn't use his CPAP)    Spinal stenosis     Squamous cell cancer of scalp and skin of neck     Dr Sanchez, multiple procedures, Mohs scalp and chest    Vitamin D deficiency          PAST SURGICAL HISTORY:  Past Surgical History:   Procedure Laterality Date    ABDOMEN SURGERY  2007    APPENDECTOMY      ARTHROPLASTY HIP Right 2016    ARTHROPLASTY HIP ANTERIOR Right 04/26/2016    Procedure: ARTHROPLASTY HIP ANTERIOR;  Surgeon: Miguel Johnson MD;  Location:  OR    ARTHROPLASTY KNEE Right 01/28/2019    Procedure: RIGHT TOTAL KNEE ARTROPLASTY;  Surgeon: Miguel Johnson MD;  Location:  OR    BIOPSY      skin cancer    BRAIN SURGERY  2007    partial resection meningioma    CATARACT IOL, RT/LT      COLECTOMY   2007    bowel perforation due to steroids    COLONOSCOPY      CV CORONARY ANGIOGRAM N/A 9/10/2024    Procedure: Coronary Angiogram;  Surgeon: Vickey Monteiro MD;  Location:  HEART CARDIAC CATH LAB    CV PCI N/A 9/10/2024    Procedure: Percutaneous Coronary Intervention;  Surgeon: Vickey Monteiro MD;  Location:  HEART CARDIAC CATH LAB    ESOPHAGOSCOPY, GASTROSCOPY, DUODENOSCOPY (EGD), COMBINED N/A 12/22/2018    Procedure: COMBINED ESOPHAGOSCOPY, GASTROSCOPY, DUODENOSCOPY (EGD), BIOPSY SINGLE OR MULTIPLE;  Surgeon: Elizabeth Jones MD;  Location:  GI    ESOPHAGOSCOPY, GASTROSCOPY, DUODENOSCOPY (EGD), COMBINED N/A 05/10/2019    Procedure: ESOPHAGOGASTRODUODENOSCOPY (EGD);  Surgeon: Ahsan Garcia MD;  Location:  GI    ESOPHAGOSCOPY, GASTROSCOPY, DUODENOSCOPY (EGD), COMBINED N/A 9/11/2024    Procedure: Esophagoscopy, gastroscopy, duodenoscopy (EGD), combined;  Surgeon: Shannon Stephenson MD;  Location:  GI    GENITOURINARY SURGERY      vasectomy    HEAD & NECK SURGERY  2007    meningioma removed    KNEE SURGERY  2010    left knee replacement    ORTHOPEDIC SURGERY      left TKR    PHACOEMULSIFICATION CLEAR CORNEA WITH TORIC INTRAOCULAR LENS IMPLANT  07/30/2012    Procedure: PHACOEMULSIFICATION CLEAR CORNEA WITH TORIC INTRAOCULAR LENS IMPLANT;  COMPLEX RIGHT PHACOEMULSIFICATION CLEAR CORNEA WITH TORIC INTRAOCULAR LENS IMPLANT WITH MALYUGIN RING;  Surgeon: Ronald Cortez MD;  Location:  [facility]    RECTAL SURGERY  1985    ZZC RAD RESEC TONSIL/PILLARS           SOCIAL HISTORY:  Social History     Socioeconomic History    Marital status:      Spouse name: Not on file    Number of children: Not on file    Years of education: Not on file    Highest education level: Not on file   Occupational History    Occupation: retired   Tobacco Use    Smoking status: Former     Current packs/day: 0.00     Average packs/day: 1 pack/day for 3.9 years (3.9 ttl pk-yrs)     Types: Cigarettes      Start date: 1959     Quit date: 1963     Years since quittin.9    Smokeless tobacco: Never   Vaping Use    Vaping status: Never Used   Substance and Sexual Activity    Alcohol use: Yes     Comment: 2-4 glasses of wine per week    Drug use: No    Sexual activity: Not Currently     Partners: Female   Other Topics Concern    Parent/sibling w/ CABG, MI or angioplasty before 65F 55M? No   Social History Narrative    Not on file     Social Determinants of Health     Financial Resource Strain: Low Risk  (2024)    Financial Resource Strain     Within the past 12 months, have you or your family members you live with been unable to get utilities (heat, electricity) when it was really needed?: No   Food Insecurity: High Risk (2024)    Food Insecurity     Within the past 12 months, did you worry that your food would run out before you got money to buy more?: Yes     Within the past 12 months, did the food you bought just not last and you didn t have money to get more?: No   Transportation Needs: Low Risk  (2024)    Transportation Needs     Within the past 12 months, has lack of transportation kept you from medical appointments, getting your medicines, non-medical meetings or appointments, work, or from getting things that you need?: No   Physical Activity: Inactive (2024)    Exercise Vital Sign     Days of Exercise per Week: 0 days     Minutes of Exercise per Session: 0 min   Stress: Stress Concern Present (2024)    [place] [employer] - Occupational Stress Questionnaire     Feeling of Stress : To some extent   Social Connections: Unknown (2024)    Social Connection and Isolation Panel [NHANES]     Frequency of Communication with Friends and Family: Not on file     Frequency of Social Gatherings with Friends and Family: Twice a week     Attends Alevism Services: Not on file     Active Member of Clubs or Organizations: Not on file     Attends Club or Organization  "Meetings: Not on file     Marital Status: Not on file   Interpersonal Safety: Low Risk  (9/10/2024)    Interpersonal Safety     Do you feel physically and emotionally safe where you currently live?: Yes     Within the past 12 months, have you been hit, slapped, kicked or otherwise physically hurt by someone?: No     Within the past 12 months, have you been humiliated or emotionally abused in other ways by your partner or ex-partner?: No   Housing Stability: Low Risk  (2024)    Housing Stability     Do you have housing? : Yes     Are you worried about losing your housing?: No         FAMILY HISTORY:  Family History   Problem Relation Age of Onset    Glaucoma Father     Coronary Artery Disease Father     Hyperlipidemia Father     Unknown/Adopted Mother     Diabetes Brother     Leukemia Brother     Diabetes Brother     Hyperlipidemia Brother     Obesity Brother     Macular Degeneration No family hx of          PHYSICAL EXAM:  Vital signs:  Temp: 97.8  F (36.6  C) Temp src: Oral BP: 104/73 Pulse: 85   Resp: 16 SpO2: 97 % O2 Device: None (Room air) Oxygen Delivery: 2 LPM   Weight: 101.1 kg (222 lb 12.8 oz)  Estimated body mass index is 32.08 kg/m  as calculated from the following:    Height as of 24: 1.775 m (5' 9.88\").    Weight as of this encounter: 101.1 kg (222 lb 12.8 oz).    ECO  GENERAL/CONSTITUTIONAL: No acute distress.  RESPIRATORY:NO wheezing. .  GASTROINTESTINAL: Obese, distended  NEUROLOGIC: Alert, oriented, answers questions appropriately.  INTEGUMENTARY: No rashes or jaundice.  GAIT: In bed during visit.       LABS:  CBC RESULTS:   Recent Labs   Lab Test 24  1518   WBC 32.3*   RBC 3.18*   HGB 8.5*  8.5*   HCT 27.3*   MCV 86   MCH 26.7   MCHC 31.1*   RDW 19.1*          Recent Labs   Lab Test 24  0614 09/10/24  0411    138   POTASSIUM 4.2 4.7   CHLORIDE 109* 107   CO2 17* 17*   ANIONGAP 15 14   * 104*   BUN 65.0* 73.1*   CR 1.75* 1.54*   TATE 7.9* 8.3*     90 " minutes spent on the date of the encounter doing chart review, review of test results, interpretation of tests, patient visit, documentation, and discussion with family      Thank you for the opportunity to participate in this patient's care.  Please call with any questions.    Rebecca Medeiros MD  Hematology/Oncology  [facility]

## 2024-09-11 NOTE — ANESTHESIA PREPROCEDURE EVALUATION
Anesthesia Pre-Procedure Evaluation    Patient: Alessio Teixeira   MRN: [id number] : 1935        Procedure : Procedure(s):  Esophagoscopy, gastroscopy, duodenoscopy (EGD), combined          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    Actinic keratosis     JAVIER (acute kidney injury) (H24) 2019    Atrial fibrillation (H)     Chronic 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 2013    Edema of leg     Gastrointestinal hemorrhage associated with gastric ulcer 2019    Gout, unspecified     H/O diplopia     History of blood transfusion     History of pulmonary embolism- bilateral in 2016    Hypertension     Meningioma (H)     sinus    Myalgia and myositis, unspecified     Myasthenia gravis (H)     high dose steroids ,     Obesity     Other seborrheic keratosis     Presbyacusis     Primary localized osteoarthrosis, lower leg     Primary localized osteoarthrosis, pelvic region and thigh     Sleep apnea     doesn't use his CPAP)    Spinal stenosis     Squamous cell cancer of scalp and skin of neck     Dr Sanchez, multiple procedures, Mohs scalp and chest    Vitamin D deficiency       Past Surgical History:   Procedure Laterality Date    ABDOMEN SURGERY  2007    APPENDECTOMY      ARTHROPLASTY HIP Right 2016    ARTHROPLASTY HIP ANTERIOR Right 2016    Procedure: ARTHROPLASTY HIP ANTERIOR;  Surgeon: Miguel Johnson MD;  Location:  OR    ARTHROPLASTY KNEE Right 2019    Procedure: RIGHT TOTAL KNEE ARTROPLASTY;  Surgeon: Miguel Johnson MD;  Location:  OR    BIOPSY      skin cancer    BRAIN SURGERY      partial resection meningioma    CATARACT IOL, RT/LT      COLECTOMY  2007    bowel perforation due to steroids    COLONOSCOPY      CV CORONARY ANGIOGRAM N/A 9/10/2024    Procedure: Coronary Angiogram;  Surgeon: Vickey Monteiro MD;  Location:  [facility]    CV PCI N/A 9/10/2024    Procedure: Percutaneous Coronary  Intervention;  Surgeon: Vickey Monteiro MD;  Location:  [facility]    ESOPHAGOSCOPY, GASTROSCOPY, DUODENOSCOPY (EGD), COMBINED N/A 2018    Procedure: COMBINED ESOPHAGOSCOPY, GASTROSCOPY, DUODENOSCOPY (EGD), BIOPSY SINGLE OR MULTIPLE;  Surgeon: Elizabeth Jones MD;  Location:  GI    ESOPHAGOSCOPY, GASTROSCOPY, DUODENOSCOPY (EGD), COMBINED N/A 05/10/2019    Procedure: ESOPHAGOGASTRODUODENOSCOPY (EGD);  Surgeon: Ahsan Garcia MD;  Location:  GI    GENITOURINARY SURGERY      vasectomy    HEAD & NECK SURGERY      meningioma removed    KNEE SURGERY      left knee replacement    ORTHOPEDIC SURGERY      left TKR    PHACOEMULSIFICATION CLEAR CORNEA WITH TORIC INTRAOCULAR LENS IMPLANT  2012    Procedure: PHACOEMULSIFICATION CLEAR CORNEA WITH TORIC INTRAOCULAR LENS IMPLANT;  COMPLEX RIGHT PHACOEMULSIFICATION CLEAR CORNEA WITH TORIC INTRAOCULAR LENS IMPLANT WITH MALYUGIN RING;  Surgeon: Ronald Cortez MD;  Location:  [facility]    RECTAL SURGERY      ZZC RAD RESEC TONSIL/PILLARS        No Known Allergies   Social History     Tobacco Use    Smoking status: Former     Current packs/day: 0.00     Average packs/day: 1 pack/day for 3.9 years (3.9 ttl pk-yrs)     Types: Cigarettes     Start date: 1959     Quit date: 1963     Years since quittin.9    Smokeless tobacco: Never   Substance Use Topics    Alcohol use: Yes     Comment: 2-4 glasses of wine per week      Wt Readings from Last 1 Encounters:   24 101.1 kg (222 lb 12.8 oz)        Anesthesia Evaluation            ROS/MED HX  ENT/Pulmonary:     (+) sleep apnea,                                       Neurologic: Comment: Myasthenia gravis;      Cardiovascular:     (+) Dyslipidemia hypertension- -  CAD - past MI - -                        dysrhythmias, a-fib,        Previous cardiac testing   Echo: Date: 2024 Results:  Interpretation Summary     Technically very difficult study.     Left ventricular  systolic function is moderately reduced.The visual ejection  fraction is 35-40%.There is moderate global hypokinesia of the left ventricle.  The right ventricular systolic function is normal.  There is mild (1+) mitral regurgitation.  IVC diameter <2.1 cm collapsing >50% with sniff suggests a normal RA pressure  of 3 mmHg.     Echo dated 12/22/2018 LVEF was noted 60-65%.    Stress Test:  Date: Results:    ECG Reviewed:  Date: Results:    Cath:  Date: Results:      METS/Exercise Tolerance:     Hematologic:     (+) History of blood clots,               Musculoskeletal:       GI/Hepatic: Comment: Gastrointestinal hemorrhage associated with gastric ulcer;      Renal/Genitourinary:     (+) renal disease, type: CRI,            Endo:       Psychiatric/Substance Use:       Infectious Disease:       Malignancy:       Other:            Physical Exam    Airway        Mallampati: II   TM distance: > 3 FB   Neck ROM: full   Mouth opening: > 3 cm    Respiratory Devices and Support         Dental       (+) Minor Abnormalities - some fillings, tiny chips      Cardiovascular   cardiovascular exam normal          Pulmonary   pulmonary exam normal                OUTSIDE LABS:  CBC:   Lab Results   Component Value Date    WBC 35.5 (H) 09/10/2024    WBC 35.3 (H) 09/09/2024    HGB 7.8 (L) 09/11/2024    HGB 8.0 (L) 09/10/2024    HCT 26.9 (L) 09/10/2024    HCT 24.7 (L) 09/09/2024     09/10/2024     09/09/2024     BMP:   Lab Results   Component Value Date     09/10/2024     09/09/2024    POTASSIUM 4.7 09/10/2024    POTASSIUM 5.3 09/09/2024    CHLORIDE 107 09/10/2024    CHLORIDE 102 09/09/2024    CO2 17 (L) 09/10/2024    CO2 21 (L) 09/09/2024    BUN 73.1 (H) 09/10/2024    BUN 65.9 (H) 09/09/2024    CR 1.54 (H) 09/10/2024    CR 1.59 (H) 09/09/2024     (H) 09/10/2024    GLC 97 09/09/2024     COAGS:   Lab Results   Component Value Date    PTT 27 01/12/2010    INR 1.83 (H) 09/10/2024    FIBR 0.0 03/01/2011      POC:   Lab Results   Component Value Date     (H) 04/27/2016     HEPATIC:   Lab Results   Component Value Date    ALBUMIN 3.4 (L) 09/10/2024    PROTTOTAL 5.5 (L) 09/10/2024    ALT 26 09/10/2024    AST 90 (H) 09/10/2024    ALKPHOS 82 09/10/2024    BILITOTAL 0.5 09/10/2024     OTHER:   Lab Results   Component Value Date    PH 7.40 08/27/2007    LACT 1.1 09/10/2024    TATE 8.3 (L) 09/10/2024    PHOS 3.6 09/25/2007    MAG 2.1 09/10/2024    LIPASE 689 (H) 09/07/2007    AMYLASE 346 (H) 09/07/2007    TSH 4.49 09/18/2007    SED 10 04/12/2022       Anesthesia Plan    ASA Status:  3    NPO Status:  NPO Appropriate    Anesthesia Type: MAC.              Consents    Anesthesia Plan(s) and associated risks, benefits, and realistic alternatives discussed. Questions answered and patient/representative(s) expressed understanding.     - Discussed:     - Discussed with:  Patient            Postoperative Care       PONV prophylaxis: Ondansetron (or other 5HT-3)     Comments:               AD GODFREY MD    I have reviewed the pertinent notes and labs in the chart from the past 30 days and (re)examined the patient.  Any updates or changes from those notes are reflected in this note.

## 2024-09-11 NOTE — PLAN OF CARE
"Patient Name: Mirta  MRN: [id number]  Date of Admission: 9/9/2024  Reason for Admission: GIB/elevated trops/PNA  [place] [facility] [place]:   BP Readings from Last 1 Encounters:   09/11/24 104/73     Pulse Readings from Last 1 Encounters:   09/11/24 85     Wt Readings from Last 1 Encounters:   09/11/24 101.1 kg (222 lb 12.8 oz)     Ht Readings from Last 1 Encounters:   08/29/24 1.775 m (5' 9.88\")     Estimated body mass index is 32.08 kg/m  as calculated from the following:    Height as of 8/29/24: 1.775 m (5' 9.88\").    Weight as of this encounter: 101.1 kg (222 lb 12.8 oz).  Temp Readings from Last 1 Encounters:   09/11/24 97.8  F (36.6  C) (Oral)       Pain: Pain goal 0 Pain Rating 0 Effective pain medication/regimen N/A    CV Surgery Patient: No    Assessment    Resp: LS diminished w/ crackles in bases. Encouraging use of IS and flutter valve. On room air.   Telemetry: Afib CVR.   Neuro: A&Ox4.  GI/: Small black stools x3. AUO via urinal. Tolerating full liquid diet.   Skin/Wounds: Scattered bruises  Lines/Drains: L PIV SL. IVF discontinued. IV abx.   Activity: Ax1 GBW - up to chair and bedside commode  Sleep: Intermittent   Abnormal Labs: WBC 32.3. Hgb stable    Aggression Stop Light: Green          Patient Care Plan: CVS workup in progress. GI, cards, hemeonc following. POC ongoing. Monitor Hgb.                          "

## 2024-09-11 NOTE — ANESTHESIA POSTPROCEDURE EVALUATION
Patient: Alessio Teixeira    Procedure: Procedure(s):  Esophagoscopy, gastroscopy, duodenoscopy (EGD), combined       Anesthesia Type:  MAC    Note:  Disposition: Outpatient   Postop Pain Control: Uneventful            Sign Out: Well controlled pain   PONV: No   Neuro/Psych: Uneventful            Sign Out: Acceptable/Baseline neuro status   Airway/Respiratory: Uneventful            Sign Out: Acceptable/Baseline resp. status   CV/Hemodynamics: Uneventful            Sign Out: Acceptable CV status; No obvious hypovolemia; No obvious fluid overload   Other NRE: NONE   DID A NON-ROUTINE EVENT OCCUR? No           Last vitals:  Vitals Value Taken Time   /76 09/11/24 0928   Temp     Pulse 86 09/11/24 0951   Resp 14 09/11/24 0951   SpO2 67 % 09/11/24 0930   Vitals shown include unfiled device data.    Electronically Signed By: AD GODFREY MD  September 11, 2024  9:52 AM

## 2024-09-11 NOTE — PROGRESS NOTES
[facility]    Cardiology Progress Note    Primary Cardiologist: Dr. Hoffman    Date of Admission: 9/9/2024  Service Date: 09/11/24    Summary:  Mr. Alessio Teixeira is a very pleasant 88 year old male with a past medical history of coronary calcification on CT scan with subsequent normal perfusion imaging, family history of coronary artery disease, dyslipidemia, former smoker, possible CLL-notes indicate ongoing workup who was admitted on 9/9/2024 for concerns for GI bleeding. Cardiology was consulted for NSTEMI.    Interval History   Patient had a coronary angiogram yesterday which revealed severe multivessel disease.  CT surgery consultation was placed and workup is ongoing.  Creatinine this morning 1.75 in the setting of n.p.o. and receiving angiographic dye yesterday    Patient underwent an EGD today which did not reveal any acute bleeding, however did show gastritis in the antrum which appeared to be healing and felt to be possibly the source of blood loss considering his supratherapeutic INR.    Blood pressures soft this morning with systolic in the low 100 range.    Patient's wife and daughter were present at bedside today.  They note over the last month he generally felt more weak and was unable to participate in as many activities as he had been prior.    Telemetry: Atrial fibrillation, rate 80's     Assessment & Plan   1. Acute upper gastrointestinal hemorrhage with acute blood loss anemia       History of gastric ulcer in 2018  - Hemoglobin 8.9->7.6-> 8.4->7.8->8.7,baseline 11-12, received PRBCs x 2   -Denies further dark stools or hemoptysis  -Initiated on IV Protonix by GI   -EGD showed gastritis in the antrum, appeared to be healing     2. Elevated troponin concerning for NSTEMI  - EKG with T wave inversions   -Troponin trend [phone]  -Underwent coronary angiography on 9/10/2024 with results as outlined in #3    3.  Severe multivessel coronary artery disease  -9/10/24  coronary angiogram:     Ost LAD to Mid LAD lesion is 80% stenosed.    Prox Cx to Mid Cx lesion is 70% stenosed.    Mid Cx to Dist Cx lesion is 99% stenosed.    Prox RCA lesion is 100% stenosed.    1st Mrg lesion is 90% stenosed.    1st Diag lesion is 99% stenosed.     4. New cardiomyopathy  - TTE with EF 35-40%, moderate global hypokinesia, previously 55-60% in 2018     5. Atrial fibrillation on chronic anticoagulation      Supratherapeutic INR  -Rate controlled off beta-blocker  -INR 3.5 on admission, slowly drifting downward to 1.83 today     5.  New cardiomyopathy  -TTE with ejection fraction 35 to 40%, moderate global hypokinesia, new when compared to 2018 when EF was noted to be 60 to 65%    6.  CKD on JAVIER  -Baseline creatinine 1.3-1.5, was 1.59 on admission    Plan:   1. Avoid aspirin, NSAIDs, and anticoagulation  2. Ongoing workup for CABG vs PCI recommendations with CT surgery, discussed very close monitoring for any anginal type symptoms  3. Continue atorvastatin  4. Soft blood pressures and impaired renal function limiting up titration of GDMT  5.  Cardiology will continue to follow    Thank you for the opportunity to participate in this pleasant patient's care.     ANNA Dewey, CNP   Nurse Practitioner  [facility]  Pager: [phone]  (8am - 5pm, M-F)    Patient Active Problem List   Diagnosis    Hyperlipidemia LDL goal <100    Edema of both legs    Varicose veins of legs    Stasis dermatitis    Chronic atrial fibrillation (H)    Vitamin D deficiency disease    Hypertension goal BP (blood pressure) < 140/90    Diplopia    MARTHA (obstructive sleep apnea)    Long term current use of anticoagulant therapy    Bradycardia    Chronic left-sided thoracic back pain    Presbycusis of both ears    Mixed hyperlipidemia    Screening for prostate cancer    CKD (chronic kidney disease) stage 2, GFR 60-89 ml/min    Pulmonary nodules    Debility    Gout of big toe    Routine medical exam     Chronic kidney disease, stage 3 (H)    History of pulmonary embolism    Hyperkalemia    CLL (chronic lymphocytic leukemia) (H)    Gastrointestinal hemorrhage, unspecified gastrointestinal hemorrhage type    Anemia, unspecified type    Chronic renal failure, unspecified CKD stage    Long term (current) use of anticoagulants    Chronic a-fib (H)    Elevated troponin    Secondary cardiomyopathy (H)       Physical Exam   Temp: 98  F (36.7  C) Temp src: Oral BP: 105/76 Pulse: 92   Resp: 17 SpO2: 100 % O2 Device: None (Room air) Oxygen Delivery: 2 LPM  Vitals:    09/10/24 0400 09/11/24 0608   Weight: 99.3 kg (219 lb) 101.1 kg (222 lb 12.8 oz)     Vital Signs with Ranges  Temp:  [97.7  F (36.5  C)-98.4  F (36.9  C)] 98  F (36.7  C)  Pulse:  [80-94] 92  Resp:  [6-24] 17  BP: ()/(59-82) 105/76  SpO2:  [85 %-100 %] 100 %  I/O last 3 completed shifts:  In: 2746 [P.O.:360; I.V.:2086]  Out: 900 [Urine:900]    Constitutional:  Appears his stated age, well nourished, and in no acute distress.  Eyes: Pupils equal, round. Sclerae anicteric.   HEENT: Normocephalic, atraumatic.   Neck: Supple. No JVD appreciated.  Respiratory: Breathing non-labored. Lungs clear to auscultation bilaterally. No crackles, wheezes, rhonchi, or rales.  Cardiovascular: Irregularly irregular rate and rhythm, normal S1 and S2. No murmur, rub, or gallop.  GI: Soft, non-distended, non-tender, bowel sounds present in all four quadrants.  Skin: Warm, dry. Right radial arterial site with no bleeding or bruising.   Musculoskeletal/Extremities: Moves all extremities well and symmetrically. No edema.  Neurologic: No gross focal deficits. Alert, awake, and oriented to person, place and time.  Psychiatric: Affect appropriate. Mentation normal.    Medications   Current Facility-Administered Medications   Medication Dose Route Frequency Provider Last Rate Last Admin    medication instruction   Does not apply Continuous PRN Amber Sandoval PA-C         Patient is NOT allergic to contrast dye   Does not apply DOES NOT GO TO Shannon Nava NP        Reason ACE/ARB/ARNI order not selected   Other DOES NOT GO TO Amber Calabrese PA-C        reason aspirin not prescribed (intentional)   Other DOES NOT GO TO Amber Calabrese PA-C        Reason beta blocker not prescribed   Does not apply DOES NOT GO TO Amber Calabrese PA-C        sodium chloride 0.9 % infusion   Intravenous Continuous Amber Sandoval PA-C 75 mL/hr at 09/11/24 0957 New Bag at 09/11/24 0957     Current Facility-Administered Medications   Medication Dose Route Frequency Provider Last Rate Last Admin    atorvastatin (LIPITOR) tablet 10 mg  10 mg Oral QPM Amber Sandoval PA-C   10 mg at 09/10/24 2013    cefTRIAXone (ROCEPHIN) 2 g vial to attach to  ml bag for ADULTS or NS 50 ml bag for PEDS  2 g Intravenous Q24H Amber Sandoval PA-C   2 g at 09/10/24 2315    doxycycline (VIBRAMYCIN) 100 mg vial to attach to  mL bag  100 mg Intravenous BID Amber Sandoval PA-C   100 mg at 09/11/24 0956    iron sucrose (VENOFER) 300 mg in sodium chloride 0.9 % 290 mL intermittent infusion  300 mg Intravenous Daily Shannon Stephenson MD        pantoprazole (PROTONIX) IV push injection 40 mg  40 mg Intravenous Q12H Key Cavanaugh MD   40 mg at 09/11/24 0957    sodium chloride (PF) 0.9% PF flush 3 mL  3 mL Intracatheter Q8H Amber Sandoval PA-C   3 mL at 09/10/24 2315    sodium zirconium cyclosilicate (LOKELMA) packet 10 g  10 g Oral TID Amber Sandoval PA-C   10 g at 09/11/24 1007    Followed by    [START ON 9/12/2024] sodium zirconium cyclosilicate (LOKELMA) packet 10 g  10 g Oral Daily Amber Sandoval PA-C           Data   Recent Results (from the past 24 hour(s))   Cardiac Catheterization    Narrative      Ost LAD to Mid LAD lesion is 80% stenosed.    Prox Cx to Mid Cx lesion is 70%  stenosed.    Mid Cx to Dist Cx lesion is 99% stenosed.    Prox RCA lesion is 100% stenosed.    1st Mrg lesion is 90% stenosed.    1st Diag lesion is 99% stenosed.    - Severe triple vessel disease CAD  Consider CABG, vs medical mangement, vs, PCI to LAD     US Carotid Bilateral    Narrative    EXAM: US CAROTID BILATERAL  LOCATION: [facility]  DATE: 9/10/2024    INDICATION: Preop CABG. Coronary artery disease.    Additional history per [facility] ED NOTE: Patient is currently being worked up for CLL.     COMPARISON: None.  TECHNIQUE: Duplex exam performed utilizing 2D grayscale imaging, Doppler interrogation with color-flow and spectral waveform analysis. The percent diameter stenosis is determined using Updated Recommendations for Carotid Stenosis Interpretation Criteria   from [employer].    FINDINGS:    RIGHT: Mild plaque at the bifurcation. The peak systolic velocity in the ICA is less than 180 cm/sec, consistent with less than 50% stenosis. Normal velocities in the ECA. Antegrade flow within the vertebral artery.     LEFT: Mild plaque at the bifurcation. The peak systolic velocity in the ICA is less than 180 cm/sec, consistent with less than 50% stenosis. Normal velocities in the ECA. Antegrade flow within the vertebral artery.    VELOCITY CHART:  CCA   Right: 76 cm/s   Left: 98 cm/s  ICA   Right: 70 cm/s   Left: 101 cm/s  ECA   Right: 69 cm/s   Left: 109 cm/s  ICA/CCA PSV Ratio   Right: 1.1   Left: 1.57    Incidentally noted, there are enlarged abnormal-appearing lymph nodes in the right and left sides of the neck, underlying malignancy cannot be excluded.      Impression    IMPRESSION:  1.  Mild plaque formation, velocities consistent with less than 50% stenosis in the right internal carotid artery.  2.  Mild plaque formation, velocities consistent with less than 50% stenosis in the left internal carotid artery.  3.  Flow within the vertebral arteries is antegrade.  4.  Enlarged  abnormal-appearing lymph nodes in the right and left neck, underlying malignancy cannot be excluded. Per the ED provider's note, the patient is being worked up for CLL.   US Lower Extremity Venous Mapping Bilateral    Narrative    EXAM: US LOWER EXTREMITY VENOUS MAPPING BILATERAL  LOCATION: [facility]  DATE: 9/10/2024    INDICATION: Preop coronary artery bypass graft. Coronary artery disease.  COMPARISON: None.  TECHNIQUE: Ultrasound examination of the lower extremity veins was performed, including gray-scale and compression imaging.         Impression    FINDINGS /IMPRESSION:  1. Left great saphenous vein in the thigh ranges between 1.9 mm and 6.3 mm.  2. Left great saphenous vein in the calf ranges between 1.9 and 2.3 mm.  3. Right great saphenous vein in the thigh ranges between 2.5 and 4.7 mm.   4. Right great saphenous vein in the calf ranges between 2.1 and 3.2 mm.   CT Chest w/o Contrast    Narrative    EXAM: CT CHEST W/O CONTRAST  LOCATION: [facility]  DATE: 9/10/2024    INDICATION: pre op cabg, looking for calcium on ascending aorta    COMPARISON: 6/28/2019.    TECHNIQUE: CT chest without IV contrast. Multiplanar reformats were obtained. Dose reduction techniques were used. CONTRAST: None.    FINDINGS:   MEDIASTINUM/AXILLAE: Thoracic aorta normal in caliber. There is circumferential calcification at the aortic annulus. No significant calcification within the ascending aorta. Discontinuous calcification of the thoracic aortic arch and scattered along the   descending thoracic aorta.    Pulmonary arteries are mildly dilated. No pericardial effusion. Mild biatrial enlargement not well evaluated without contrast. There are shotty mediastinal lymph nodes, some calcified compatible with prior granulomatous disease.    LUNGS AND PLEURA: Mild interstitial pulmonary edema. Small bilateral pleural effusions. No pneumothorax. Calcified granuloma measuring  approximately one CM in the right lower lobe. No pulmonary mass or consolidation.    CORONARY ARTERY CALCIFICATION: Severe, three-vessel    UPPER ABDOMEN: Marked splenomegaly, incompletely imaged. Hyperdense material within the renal collecting systems may be excreted contrast from earlier examination, please correlate clinically.    MUSCULOSKELETAL: Severe multilevel degenerative change throughout the thoracic spine.        Impression    IMPRESSION:   1.  No significant calcification within the ascending aorta. There is mild calcification of the aortic annulus and aortic arch.  2.  Mild pulmonary edema. Small bilateral pleural effusions.  3.  Enlarged pulmonary arteries compatible with pulmonary arterial hypertension.         Recent Labs   Lab 09/11/24  0614 09/11/24  0008 09/10/24  1808 09/10/24  0608 09/10/24  0411 09/09/24  2214 09/09/24  1504   WBC 38.6*  --   --   --  35.5*  --  35.3*   HGB 8.7* 7.8* 8.0*   < > 8.4*   < > 7.6*  7.6*   HCT 27.8*  --   --   --  26.9*  --  24.7*   MCV 86  --   --   --  85  --  84     --   --   --  386  --  413    < > = values in this interval not displayed.     Recent Labs   Lab 09/11/24  0614 09/10/24  0411 09/09/24  2150 09/09/24  1809 09/09/24  1257 09/09/24  1148    138  --   --   --  135   POTASSIUM 4.2 4.7  --  5.3   < > 5.7*   CHLORIDE 109* 107  --   --   --  102   CO2 17* 17*  --   --   --  21*   ANIONGAP 15 14  --   --   --  12   * 104* 97  --   --  114*   BUN 65.0* 73.1*  --   --   --  65.9*   CR 1.75* 1.54*  --   --   --  1.59*   GFRESTIMATED 37* 43*  --   --   --  42*   TATE 7.9* 8.3*  --   --   --  8.9    < > = values in this interval not displayed.        This note was completed in part using Dragon voice recognition software. Although reviewed after completion, some word and grammatical errors may occur.

## 2024-09-11 NOTE — PROGRESS NOTES
CT surgery Progress Note    Pt seen after EGD. Results noted.  In chart states that leukocytosis is being worked up but according to family they have not seen Hem/Onc. They have appt.   Will asked Hem/Onc to see pt.     Reviewed CT scan chest, US carotids/lower extremities with family.  Will plan to meet with pt/family tomorrow to discuss options/plan.    Continue to monitor.     Orion Lua PA-C  [phone]

## 2024-09-11 NOTE — PROGRESS NOTES
[facility]    Medicine Progress Note - Hospitalist Service    Date of Admission:  9/9/2024    Assessment & Plan   Alessio Teixeira is a 88 year old male with past medical history significant for atrial fibrillation, distant PE, previous colectomy due to perforated bowel, myasthenia gravis, and hypertension, who is being worked up for CLL but not formally diagnosed who was admitted with dark stools and concern of upper GI bleeding and elevated troponin. Hospitalization complicated by worsening ST changes on EKG, concern for MI, new cardiomyopathy on ECHO, and possible pneumonia.     Acute upper GI bleed, likely 2/2 gastritis.  Resolved   Acute blood anemia   In the setting of chronic anticoagulation and history of GI bleed due to gastric ulcer 2018  *Poor historian, dtr is pediatrician assists in interview. Hx dark stools for hours to 1 day, unclear per patient, spit up blood x1. Hx bleeding ulcer in 2018 requiring transfusion, due to ulcer. No formal chest pain. Reports SHOEMAKER and fatigue. No lightheadedness or dizziness.  No syncopal episodes.  No nausea or vomiting.  Has had some acute on chronic back pain, no history of AAA. Noted left lower abdominal tenderness, history of diverticulitis.  Previously on Nexium but reportedly stopped in the past week.    *On admission, mildly tachycardic 90s-110s and blood pressures in the . No hypoxia. Cr 1.59 which is at recent baseline.  Normal liver function.  INR 3.59. Recent hemoglobin 11-12 range.  8.9 in the ED and repeat 7.6.  Consented for blood, type and screen in the ED.  1 unit of blood ordered.  [facility] notified and plans to scope the patient on 9/10.  He will remain n.p.o. until then.  He was given 80 mg of IV Protonix and IVF.   *Hgb stable after 2 units PRBC 9/9 and INR <2, no further dark stools.   - Cardiology discussed case and plan is for diagnostic coronary angiogram followed by EGD (likely 9/11/24)              -IV PPI               -HOLD warfarin  - Continue IV PPI  - Monitor hemoglobins   - Avoid NSAIDs, blood thinners  - INR supratherapeutic, give 2 mg of vitamin K  - GI following and appreciate their recommendations.  EGD on 9/11 showed gastritis     Elevated troponin concerning for Non ST elevation myocardial infarction (NSTEMI) type I versus type II  Multivessel CAD   New cardiomyopathy   Atrial fibrillation on chronic anticoagulation  Supratherapeutic INR, resolved  No formal chest pain however is T wave inversions on EKG to lateral leads, these appear new from previous.  Denies formal chest pain but notes has some dyspnea on exertion recently, some ongoing back pain.  No history of AAA.   * EKG on presentation A-fib, 86 bpm with T wave inversions in inferolateral leads including 2 3 aVF, [id number].    * Troponin 206-->increased evening of admission to 712.   * ECHO on admission shws EF 35-40%, LV systolic function moderately reduced, moderate global hypokinesia of LV, mild miltral regurgitation. Prior ECHO 12/2018 with EF 60-65%. Concern for ischemia as above though unclear timeline of EF drop.  * C showed multivessel CAD   - Telemetry  - NO aspirin due to GIB  - NO Heparin gtt due to GIB  - Hold PTA warfarin due to active bleeding, Daily INR  - Continue PTA Statin  - Monitor with strict I/Os and daily weights  - Avoid excess IVF as able   - Cardiology consultation, greatly appreciate assistance  - CV surgery now following due to multi-vessel CAD and undergoing evaluation for possible CABG     Leukocytosis, improving  Has been chronic and undergoing workup for CLL, not formally diagnosed.  No acute need for consultation during hospitalization. Has been 28-29 range. On admission 43 now improving, stable.  - Hem/onc consulted here and appreciate their recommendations     Possible community acquired pneumonia  Seen on CXR on admission. PCT 1.23. Started on abx.  - Continue Ceftriaxone, Doxycycline course (no azithromycin due to  "interaction on warfarin)     Incidental finding, mass-like opacity on CXR  Masslike opacity measuring 3 x 5.2 cm. Avoid CT contrast in setting of cor angio/CKD for now. Updated patient and daughter on 9/10.  - Follow up as outpatient     Chronic kidney disease, stage 3, stable  Hyperkalemia, resolved  Baseline Cr 1.2-1.5. On admission, 1.59.  - Renally dose medications as able/needed  - Lokelma stopped         Chronic stable diagnoses and other pertinent medical history: Appropriate PTA medications will be resumed.    History of bowel perforation in the setting of excessive prednisone use for myasthenia gravis with status post partial colectomy  Dyslipidemia  Chronic back pain  History of myasthenia gravis          Diet: Clear Liquid Diet    DVT Prophylaxis: Pneumatic Compression Devices  Amos Catheter: Not present  Lines: None     Cardiac Monitoring: ACTIVE order. Indication: Post- PCI/Angiogram (24 hours)  Code Status: Full Code      Clinically Significant Risk Factors        # Hyperkalemia: Highest K = 5.9 mmol/L in last 2 days, will monitor as appropriate   # Hypocalcemia: Lowest Ca = 7.9 mg/dL in last 2 days, will monitor and replace as appropriate     # Hypoalbuminemia: Lowest albumin = 3.4 g/dL at 9/10/2024  4:11 AM, will monitor as appropriate  # Coagulation Defect: INR = 1.63 (Ref range: 0.85 - 1.15) and/or PTT = N/A, will monitor for bleeding    # Hypertension: Noted on problem list  # Chronic heart failure with reduced ejection fraction: last echo with EF <40%          # Obesity: Estimated body mass index is 32.08 kg/m  as calculated from the following:    Height as of 8/29/24: 1.775 m (5' 9.88\").    Weight as of this encounter: 101.1 kg (222 lb 12.8 oz)., PRESENT ON ADMISSION                  Disposition Plan     Medically Ready for Discharge: Anticipated in 5+ Days.  Would be earlier if decide not to proceed with CABG           Camilo Hermosillo, DO  Hospitalist Service  [facility] " Hospital  Securely message with [a]list games (more info)  Text page via [facility] Paging/Directory   ______________________________________________________________________    Interval History   Patient seen and examined.  No acute events over night.  No fevers or hypoxia noted.  No chest pain.  Tolerated angiogram and EGD well.  Patient does not difficulty sleeping and this is primary complaint.  No nausea or vomiting.  Still undergoing evaluation for possible CABG and has not made a surgical decision yet      Family present at bedsided    Physical Exam   Vital Signs: Temp: 97.9  F (36.6  C) Temp src: Oral BP: 121/79 Pulse: 90   Resp: 17 SpO2: 98 % O2 Device: None (Room air) Oxygen Delivery: 2 LPM  Weight: 222 lbs 12.8 oz    General Appearance: Resting comfortably. NAD  Respiratory: No respiratory distress  Cardiovascular: Sounded RRR  GI: Non-distended  Skin: No obvious rashes or cyanosis  Other: Alert. No edema      Medical Decision Making       55 MINUTES SPENT BY ME on the date of service doing chart review, history, exam, documentation & further activities per the note.      Data   ------------------------- PAST 24 HR DATA REVIEWED -----------------------------------------------    I have personally reviewed the following data over the past 24 hrs:    38.6 (H)  \   9.4 (L)   / 376     141 109 (H) 65.0 (H) /  108 (H)   4.2 17 (L) 1.75 (H) \     Procal: 1.58 (H) CRP: N/A Lactic Acid: N/A       INR:  1.63 (H) PTT:  N/A   D-dimer:  N/A Fibrinogen:  N/A       Imaging results reviewed over the past 24 hrs:   Recent Results (from the past 24 hour(s))   US Carotid Bilateral    Narrative    EXAM: US CAROTID BILATERAL  LOCATION: [facility]  DATE: 9/10/2024    INDICATION: Preop CABG. Coronary artery disease.    Additional history per [facility] ED NOTE: Patient is currently being worked up for CLL.     COMPARISON: None.  TECHNIQUE: Duplex exam performed utilizing 2D grayscale imaging, Doppler interrogation with  color-flow and spectral waveform analysis. The percent diameter stenosis is determined using Updated Recommendations for Carotid Stenosis Interpretation Criteria   from [employer].    FINDINGS:    RIGHT: Mild plaque at the bifurcation. The peak systolic velocity in the ICA is less than 180 cm/sec, consistent with less than 50% stenosis. Normal velocities in the ECA. Antegrade flow within the vertebral artery.     LEFT: Mild plaque at the bifurcation. The peak systolic velocity in the ICA is less than 180 cm/sec, consistent with less than 50% stenosis. Normal velocities in the ECA. Antegrade flow within the vertebral artery.    VELOCITY CHART:  CCA   Right: 76 cm/s   Left: 98 cm/s  ICA   Right: 70 cm/s   Left: 101 cm/s  ECA   Right: 69 cm/s   Left: 109 cm/s  ICA/CCA PSV Ratio   Right: 1.1   Left: 1.57    Incidentally noted, there are enlarged abnormal-appearing lymph nodes in the right and left sides of the neck, underlying malignancy cannot be excluded.      Impression    IMPRESSION:  1.  Mild plaque formation, velocities consistent with less than 50% stenosis in the right internal carotid artery.  2.  Mild plaque formation, velocities consistent with less than 50% stenosis in the left internal carotid artery.  3.  Flow within the vertebral arteries is antegrade.  4.  Enlarged abnormal-appearing lymph nodes in the right and left neck, underlying malignancy cannot be excluded. Per the ED provider's note, the patient is being worked up for CLL.   US Lower Extremity Venous Mapping Bilateral    Narrative    EXAM: US LOWER EXTREMITY VENOUS MAPPING BILATERAL  LOCATION: [facility]  DATE: 9/10/2024    INDICATION: Preop coronary artery bypass graft. Coronary artery disease.  COMPARISON: None.  TECHNIQUE: Ultrasound examination of the lower extremity veins was performed, including gray-scale and compression imaging.         Impression    FINDINGS /IMPRESSION:  1. Left great saphenous vein  in the thigh ranges between 1.9 mm and 6.3 mm.  2. Left great saphenous vein in the calf ranges between 1.9 and 2.3 mm.  3. Right great saphenous vein in the thigh ranges between 2.5 and 4.7 mm.   4. Right great saphenous vein in the calf ranges between 2.1 and 3.2 mm.   CT Chest w/o Contrast    Narrative    EXAM: CT CHEST W/O CONTRAST  LOCATION: [facility]  DATE: 9/10/2024    INDICATION: pre op cabg, looking for calcium on ascending aorta    COMPARISON: 6/28/2019.    TECHNIQUE: CT chest without IV contrast. Multiplanar reformats were obtained. Dose reduction techniques were used. CONTRAST: None.    FINDINGS:   MEDIASTINUM/AXILLAE: Thoracic aorta normal in caliber. There is circumferential calcification at the aortic annulus. No significant calcification within the ascending aorta. Discontinuous calcification of the thoracic aortic arch and scattered along the   descending thoracic aorta.    Pulmonary arteries are mildly dilated. No pericardial effusion. Mild biatrial enlargement not well evaluated without contrast. There are shotty mediastinal lymph nodes, some calcified compatible with prior granulomatous disease.    LUNGS AND PLEURA: Mild interstitial pulmonary edema. Small bilateral pleural effusions. No pneumothorax. Calcified granuloma measuring approximately one CM in the right lower lobe. No pulmonary mass or consolidation.    CORONARY ARTERY CALCIFICATION: Severe, three-vessel    UPPER ABDOMEN: Marked splenomegaly, incompletely imaged. Hyperdense material within the renal collecting systems may be excreted contrast from earlier examination, please correlate clinically.    MUSCULOSKELETAL: Severe multilevel degenerative change throughout the thoracic spine.        Impression    IMPRESSION:   1.  No significant calcification within the ascending aorta. There is mild calcification of the aortic annulus and aortic arch.  2.  Mild pulmonary edema. Small bilateral pleural effusions.  3.   Enlarged pulmonary arteries compatible with pulmonary arterial hypertension.

## 2024-09-11 NOTE — ANESTHESIA CARE TRANSFER NOTE
Patient: Alesiso Teixeira    Procedure: Procedure(s):  Esophagoscopy, gastroscopy, duodenoscopy (EGD), combined       Diagnosis: Anemia [D64.9]  Diagnosis Additional Information: No value filed.    Anesthesia Type:   MAC     Note:    Oropharynx: oropharynx clear of all foreign objects and spontaneously breathing  Level of Consciousness: drowsy  Oxygen Supplementation: face mask  Level of Supplemental Oxygen (L/min / FiO2): 6  Independent Airway: airway patency satisfactory and stable  Dentition: dentition unchanged  Vital Signs Stable: post-procedure vital signs reviewed and stable  Report to RN Given: handoff report given  Patient transferred to: PACU    Handoff Report: Identifed the Patient, Identified the Reponsible Provider, Reviewed the pertinent medical history, Discussed the surgical course, Reviewed Intra-OP anesthesia mangement and issues during anesthesia, Set expectations for post-procedure period and Allowed opportunity for questions and acknowledgement of understanding      Vitals:  Vitals Value Taken Time   /64    Temp     Pulse 86    Resp 16    SpO2 98        Electronically Signed By: ANNA Barber CRNA  September 11, 2024  8:37 AM

## 2024-09-11 NOTE — PROGRESS NOTES
PRE-PROCEDURE NOTE    CHIEF COMPLAINT / REASON FOR PROCEDURE:  anemia    History and Physical Reviewed:  yes    PRE-SEDATION ASSESSMENT:    Lung Exam:  normal  Heart Exam:  faint heart sounds  Mallampati Airway Classification:  3  Previous reaction to anesthesia/sedation:   no  Sedation plan based on assessment:  MAC  Comment(s):  risks of procedure explained  ASA Classification:  4    IMPRESSION:  rule out upper  GI bleeding    PLAN:  egd       Shannon Stephenson MD, MD

## 2024-09-11 NOTE — PLAN OF CARE
"Goal Outcome Evaluation:      Plan of Care Reviewed With: patient    Overall Patient Progress: no changeOverall Patient Progress: no change    Patient Name: Mirta  MRN: [id number]  Date of Admission: 9/9/2024  Reason for Admission: GI Bleed and elevated Troponin  Level of Care: Intermediate    Vitals:   BP Readings from Last 1 Encounters:   09/11/24 105/71     Pulse Readings from Last 1 Encounters:   09/11/24 93     Wt Readings from Last 1 Encounters:   09/11/24 101.1 kg (222 lb 12.8 oz)     Ht Readings from Last 1 Encounters:   08/29/24 1.775 m (5' 9.88\")     Estimated body mass index is 32.08 kg/m  as calculated from the following:    Height as of 8/29/24: 1.775 m (5' 9.88\").    Weight as of this encounter: 101.1 kg (222 lb 12.8 oz).  Temp Readings from Last 1 Encounters:   09/11/24 98  F (36.7  C) (Oral)       Pain: Pain goal 0 Pain Rating 5 Effective pain medication/regimen : Oxycodone/Tylenol    CV Surgery Patient: Yes    Assessment    Resp: Diminished/Clear lung sounds,room air  Telemetry: Afib CVR  Neuro: Alert and oriented x 4  GI/: No BM on this shift,passing gas.Good urine output.On NPO Post MN  Skin/Wounds: Scattered bruises,L Arm tear  Lines/Drains: R/L PIV SL,intermittent antibiotic  Activity: Assist x 1, GB/W  Sleep: Poor,didn't sleep well overnight  Abnormal Labs: Low hgb,1 unit PRBC given    Aggression Stop Light: Green          Patient Care Plan: Plan for EGD today,continue plan of care.    "

## 2024-09-11 NOTE — PROVIDER NOTIFICATION
MD Notification    Notified Person: MD    Notified Person Name: Jony Cantrell    Notification Date/Time: Sept 10, 2024 @ 19:23    Notification Interaction: Belén    Purpose of Notification:  Hi Doc, just to clarify patient latest hgb is 8.0, no episodes of rectal bleeding and the protocol was to transfuse if 8 and below, Do we need give him Blood?     Orders Received:  No    Comments:

## 2024-09-12 NOTE — PROGRESS NOTES
"BRIEF NUTRITION ASSESSMENT    REASON FOR ASSESSMENT:  Alessio Teixeira is a 88 year old male seen by Registered Dietitian for Admission Nutrition Risk Screen for answering \"unsure\" to recently losing weight without trying.    PMH of atrial fibrillation, distant PE, previous colectomy due to perforated bowel, myasthenia gravis, and hypertension.    NUTRITION HISTORY:  - Spoke with patient at bedside. He said PTA his appetite was great, normal. He usually has breakfast, lunch, and dinner at home. He noted no recent unintentional weight loss.    CURRENT DIET AND INTAKE:  Diet:  Regular              - He said his appetite has been OK for the most part while on liquid diets the past few days. He said he's finally upgraded to solid food and is excited for that. Discussed importance of protein and patient accepting of Ensure Enlive at lunch.    ANTHROPOMETRICS:  Height: 5'9\"  Weight: 101.4 kg, 223 lbs 8.74 oz  Body mass index is 32.18 kg/m .   Weight Status: Obesity Grade I BMI 30-34.9  IBW:  72.7 kg  %IBW: 139%  Weight History: patient noted no recent unintentional weight loss.  Wt Readings from Last 10 Encounters:   09/12/24 101.4 kg (223 lb 8.7 oz)   08/29/24 99.8 kg (220 lb)   08/07/23 98.6 kg (217 lb 6 oz)   05/17/23 99.8 kg (220 lb)   04/12/22 101.6 kg (224 lb)   03/07/22 101.1 kg (222 lb 12.8 oz)   06/21/21 102.5 kg (226 lb)   03/04/21 105.4 kg (232 lb 4.8 oz)   08/21/20 105.2 kg (232 lb)   11/25/19 110.9 kg (244 lb 8 oz)     LABS:  Labs noted    MALNUTRITION:  Patient does not meet two of the criteria necessary for diagnosing malnutrition.     NUTRITION INTERVENTION:  Nutrition Diagnosis:  No nutrition diagnosis at this time.    Implementation:  Nutrition Education:  Per Provider order if indicated  Medical Food Supplement - ordered Ensure Enlive at lunch    FOLLOW UP/MONITORING:   Will re-evaluate in 7 - 10 days, or sooner, if re-consulted.    Brittni Nelson RD, LD  Clinical Dietitian - [facility]  "

## 2024-09-12 NOTE — PROGRESS NOTES
[facility]    Medicine Progress Note - Hospitalist Service    Date of Admission:  9/9/2024    Assessment & Plan   Alessio Teixeira is a 88 year old male with past medical history significant for atrial fibrillation, distant PE, previous colectomy due to perforated bowel, myasthenia gravis, and hypertension, who is being worked up for CLL but not formally diagnosed who was admitted with dark stools and concern of upper GI bleeding and elevated troponin. Hospitalization complicated by worsening ST changes on EKG, concern for MI, new cardiomyopathy on ECHO, and possible pneumonia.     Acute upper GI bleed, likely 2/2 gastritis.  Resolved   Acute blood anemia   In the setting of chronic anticoagulation and history of GI bleed due to gastric ulcer 2018  *Poor historian, dtr is pediatrician assists in interview. Hx dark stools for hours to 1 day, unclear per patient, spit up blood x1. Hx bleeding ulcer in 2018 requiring transfusion, due to ulcer. No formal chest pain. Reports SHOEMAKER and fatigue. No lightheadedness or dizziness.  No syncopal episodes.  No nausea or vomiting.  Has had some acute on chronic back pain, no history of AAA. Noted left lower abdominal tenderness, history of diverticulitis.  Previously on Nexium but reportedly stopped in the past week.    *On admission, mildly tachycardic 90s-110s and blood pressures in the . No hypoxia. Cr 1.59 which is at recent baseline.  Normal liver function.  INR 3.59. Recent hemoglobin 11-12 range.  8.9 in the ED and repeat 7.6.  Consented for blood, type and screen in the ED.  1 unit of blood ordered.  [facility] notified and plans to scope the patient on 9/10.  He will remain n.p.o. until then.  He was given 80 mg of IV Protonix and IVF.   *Hgb stable after 2 units PRBC 9/9 and INR <2, no further dark stools.   - Cardiology discussed case and plan is for diagnostic coronary angiogram followed by EGD (likely 9/11/24)              -IV PPI               -HOLD warfarin  - Continue IV PPI  - Monitor hemoglobins   - Avoid NSAIDs, blood thinners  - INR supratherapeutic, give 2 mg of vitamin K  - GI following and appreciate their recommendations.  EGD on 9/11 showed gastritis.  If remains stable can start anticoagulation on 9/13     Elevated troponin concerning for Non ST elevation myocardial infarction (NSTEMI) type I versus type II  Multivessel CAD   New cardiomyopathy   Atrial fibrillation on chronic anticoagulation  Supratherapeutic INR, resolved  No formal chest pain however is T wave inversions on EKG to lateral leads, these appear new from previous.  Denies formal chest pain but notes has some dyspnea on exertion recently, some ongoing back pain.  No history of AAA.   * EKG on presentation A-fib, 86 bpm with T wave inversions in inferolateral leads including 2 3 aVF, [id number].    * Troponin 206-->increased evening of admission to 712.   * ECHO on admission shws EF 35-40%, LV systolic function moderately reduced, moderate global hypokinesia of LV, mild miltral regurgitation. Prior ECHO 12/2018 with EF 60-65%. Concern for ischemia as above though unclear timeline of EF drop.  * LHC showed multivessel CAD   - Telemetry  - NO aspirin due to GIB  - NO Heparin gtt due to GIB  - Hold PTA warfarin due to active bleeding, Daily INR  - Continue PTA Statin  - Monitor with strict I/Os and daily weights  - Avoid excess IVF as able   - Cardiology consultation, greatly appreciate assistance  - CV surgery now following due to multi-vessel CAD and undergoing evaluation for possible CABG     Leukocytosis, improving  Has been chronic and undergoing workup for CLL, not formally diagnosed.  No acute need for consultation during hospitalization. Has been 28-29 range. On admission 43 now improving, stable.  - Hem/onc consulted here and appreciate their recommendations     Possible community acquired pneumonia  Seen on CXR on admission. PCT 1.23. Started on abx.  - Continue  "Ceftriaxone, Doxycycline course (no azithromycin due to interaction on warfarin)     Incidental finding, mass-like opacity on CXR  Masslike opacity measuring 3 x 5.2 cm. Avoid CT contrast in setting of cor angio/CKD for now. Updated patient and daughter on 9/10.  - Follow up as outpatient     Chronic kidney disease, stage 3, stable  Hyperkalemia, resolved  Baseline Cr 1.2-1.5. On admission, 1.59.  - Renally dose medications as able/needed  - Lokelma stopped         Chronic stable diagnoses and other pertinent medical history: Appropriate PTA medications will be resumed.    History of bowel perforation in the setting of excessive prednisone use for myasthenia gravis with status post partial colectomy  Dyslipidemia  Chronic back pain  History of myasthenia gravis          Diet: Regular Diet Adult  Snacks/Supplements Adult: Ensure Enlive; With Meals    DVT Prophylaxis: Pneumatic Compression Devices  Amos Catheter: Not present  Lines: None     Cardiac Monitoring: ACTIVE order. Indication: Post- PCI/Angiogram (24 hours)  Code Status: Full Code      Clinically Significant Risk Factors              # Hypoalbuminemia: Lowest albumin = 3.4 g/dL at 9/10/2024  4:11 AM, will monitor as appropriate  # Coagulation Defect: INR = 2.10 (Ref range: 0.85 - 1.15) and/or PTT = N/A, will monitor for bleeding    # Hypertension: Noted on problem list  # Chronic heart failure with reduced ejection fraction: last echo with EF <40%          # Obesity: Estimated body mass index is 32.18 kg/m  as calculated from the following:    Height as of 8/29/24: 1.775 m (5' 9.88\").    Weight as of this encounter: 101.4 kg (223 lb 8.7 oz)., PRESENT ON ADMISSION                  Disposition Plan     Medically Ready for Discharge: Anticipated in 5+ Days             Camilo Hermosillo DO  Hospitalist Service  [facility]  Securely message with [employer] (more info)  Text page via [employer] Paging/Directory "   ______________________________________________________________________    Interval History   Patient seen and examined.  No acute events over night.  Family present at bed side.  Patient notes trying to sleep when I evaluated him.  No fevers or hypoxia at this time.  No pain currently.  No nausea or vomiting.    Physical Exam   Vital Signs: Temp: 97.8  F (36.6  C) Temp src: Oral BP: 112/64 Pulse: 73   Resp: 10 SpO2: 97 % O2 Device: None (Room air)    Weight: 223 lbs 8.74 oz    General Appearance: Resting comfortably. NAD  Respiratory: No respiratory distress  Cardiovascular: RRR  GI: Non-distended  Skin: No obvious rashes or cyanosis  Other: Alert.  No edema     Medical Decision Making       40 MINUTES SPENT BY ME on the date of service doing chart review, history, exam, documentation & further activities per the note.      Data   ------------------------- PAST 24 HR DATA REVIEWED -----------------------------------------------    I have personally reviewed the following data over the past 24 hrs:    32.3 (H)  \   9.5 (L)   / 333     138 106 63.4 (H) /  105 (H)   4.0 17 (L) 1.89 (H) \     INR:  2.10 (H) PTT:  N/A   D-dimer:  N/A Fibrinogen:  N/A     Ferritin:  N/A % Retic:  6.0 (H) LDH:  835 (H)       Imaging results reviewed over the past 24 hrs:   No results found for this or any previous visit (from the past 24 hour(s)).

## 2024-09-12 NOTE — PROGRESS NOTES
[facility] GASTROENTEROLOGY PROGRESS NOTE    SUBJECTIVE:  Patient tired today.      OBJECTIVE:    /64 (BP Location: Right arm, Patient Position: Semi-Magaña's, Cuff Size: Adult Regular)   Pulse 73   Temp 97.8  F (36.6  C) (Oral)   Resp 10   Wt 101.4 kg (223 lb 8.7 oz)   SpO2 97%   BMI 32.18 kg/m    Temp (24hrs), Av.8  F (37.1  C), Min:97.8  F (36.6  C), Max:99.4  F (37.4  C)    Patient Vitals for the past 72 hrs:   Weight   24 0529 101.4 kg (223 lb 8.7 oz)   24 0608 101.1 kg (222 lb 12.8 oz)   09/10/24 0400 99.3 kg (219 lb)       Intake/Output Summary (Last 24 hours) at 9/10/2024 0923  Last data filed at 9/10/2024 0500  Gross per 24 hour   Intake 2147.5 ml   Output 350 ml   Net 1797.5 ml         PHYSICAL EXAM    Constitutional: NAD, comfortable  Abdomen: soft, non-tender, nondistended  Neuro: alert and oriented      Additional Comments:  ROS, FH, SH: See initial GI consult for details.    I have reviewed the patient's new clinical lab results:    Recent Labs   Lab Test 24  0619 24  0037 24  1518 24  1158 24  0614 09/10/24  1003 09/10/24  0608   WBC  --   --  32.3* 37.2* 38.6*  --   --    HGB 8.9* 8.9* 8.5*  8.5* 9.5*  9.4* 8.7*   < > 8.4*   MCV  --   --  86 89 86  --   --    PLT  --   --  333 406 376  --   --    INR 2.10*  --   --   --  1.63*  --  1.83*    < > = values in this interval not displayed.     Recent Labs   Lab Test 24  0037 24  0614 09/10/24  0411    141 138   POTASSIUM 4.0 4.2 4.7   CHLORIDE 106 109* 107   CO2 17* 17* 17*   BUN 63.4* 65.0* 73.1*   CR 1.89* 1.75* 1.54*   ANIONGAP 15 15 14   TATE 7.6* 7.9* 8.3*     Recent Labs   Lab Test 09/10/24  0411 24  1148 24  1106 22  1009 21  1029 21  1028 20  1059 19  1045 19  1044   ALBUMIN 3.4* 3.4* 3.9  --   --    < > 3.5   < >  --    BILITOTAL 0.5 0.4 0.5  --   --    < > 0.4   < >  --    ALT 26 25 19   < >  --    < > 22   < >  --    AST 90* 41  37  --   --    < > 24   < >  --    ALKPHOS 82 100 100  --   --    < > 83   < >  --    PROTEIN  --   --   --   --  30*  --  Negative  --  30*    < > = values in this interval not displayed.     9/10/24 Chest Xray:  IMPRESSION: Heart is mildly enlarged. Masslike opacity measuring 3 x 5.2 cm. Small right effusion. Atelectasis noted at the right lung base. Possible small patchy airspace opacity at the right lung base. Findings are concerning for pneumonia. Recommend a   follow-up chest x-ray in 4-6 weeks to assure complete resolution.     Anemia    Assessment: Patient with 5 gram drop in hgb in past 2 weeks with elevated INR of 3.5 and dark stools. He is on coumadin for afib.  History of bleeding ulcer in 2018.  Suspicious for upper GI bleeding.   Last colonoscopy 2011 with several polyps.    EGD 9/11/24 noted healing gastritis, no bleeding seen, no AVMs. With severe heart disease he is at risk of AVMs, in the small bowel as well.    Work up this admission with severe CAD, plan being discussed for possible CABG.    Plan:   - continue ppi  - if remains stable, to resume anticoagulation (such as plavix mentioned in notes) tomorrow      Abnormal Chest XRay  Assessment: Possible mass v. Pneumonia.  No respiratory symptoms.  Plan:  - Follow up per internal medicine      [facility] Gastroenterology  Office:  [phone]    15 minutes of total time was spent providing patient care, including patient evaluation, reviewing documentation/test results, and .

## 2024-09-12 NOTE — CONSULTS
Care Management Initial Consult    General Information  Assessment completed with: Patient,    Type of CM/SW Visit: Initial Assessment    Primary Care Provider verified and updated as needed:     Readmission within the last 30 days: no previous admission in last 30 days      Reason for Consult: discharge planning  Advance Care Planning: Advance Care Planning Reviewed: no concerns identified, present on chart          Communication Assessment  Patient's communication style: spoken language (English or Bilingual)    Hearing Difficulty or Deaf: no   Wear Glasses or Blind: yes    Cognitive  Cognitive/Neuro/Behavioral: WDL                      Living Environment:   People in home: spouse     Current living Arrangements: town home      Able to return to prior arrangements: yes       Family/Social Support:  Care provided by: self, spouse/significant other  Provides care for: no one  Marital Status:   Support system: Wife  Kelli       Description of Support System: Supportive         Current Resources:   Patient receiving home care services: No        Community Resources: None  Equipment currently used at home: none  Supplies currently used at home:      Employment/Financial:  Employment Status: retired        Financial Concerns:             Does the patient's insurance plan have a 3 day qualifying hospital stay waiver?  No    Lifestyle & Psychosocial Needs:  Social Determinants of Health     Food Insecurity: High Risk (8/28/2024)    Food Insecurity     Within the past 12 months, did you worry that your food would run out before you got money to buy more?: Yes     Within the past 12 months, did the food you bought just not last and you didn t have money to get more?: No   Depression: Not at risk (8/29/2024)    PHQ-2     PHQ-2 Score: 1   Housing Stability: Low Risk  (8/28/2024)    Housing Stability     Do you have housing? : Yes     Are you worried about losing your housing?: No   Tobacco Use: Medium Risk  (9/11/2024)    Patient History     Smoking Tobacco Use: Former     Smokeless Tobacco Use: Never     Passive Exposure: Not on file   Financial Resource Strain: Low Risk  (8/28/2024)    Financial Resource Strain     Within the past 12 months, have you or your family members you live with been unable to get utilities (heat, electricity) when it was really needed?: No   Alcohol Use: Not on file   Transportation Needs: Low Risk  (8/28/2024)    Transportation Needs     Within the past 12 months, has lack of transportation kept you from medical appointments, getting your medicines, non-medical meetings or appointments, work, or from getting things that you need?: No   Physical Activity: Inactive (8/28/2024)    Exercise Vital Sign     Days of Exercise per Week: 0 days     Minutes of Exercise per Session: 0 min   Interpersonal Safety: Low Risk  (9/10/2024)    Interpersonal Safety     Do you feel physically and emotionally safe where you currently live?: Yes     Within the past 12 months, have you been hit, slapped, kicked or otherwise physically hurt by someone?: No     Within the past 12 months, have you been humiliated or emotionally abused in other ways by your partner or ex-partner?: No   Stress: Stress Concern Present (8/28/2024)    [employer] - Occupational Stress Questionnaire     Feeling of Stress : To some extent   Social Connections: Unknown (8/28/2024)    Social Connection and Isolation Panel [NHANES]     Frequency of Communication with Friends and Family: Not on file     Frequency of Social Gatherings with Friends and Family: Twice a week     Attends Zoroastrianism Services: Not on file     Active Member of Clubs or Organizations: Not on file     Attends Club or Organization Meetings: Not on file     Marital Status: Not on file   Health Literacy: Not on file       Functional Status:  Prior to admission patient needed assistance:   Dependent ADLs:: Independent  Dependent IADLs:: Cleaning,  Cooking, Laundry, Shopping, Meal Preparation, Transportation       Mental Health Status:  Mental Health Status: No Current Concerns       Chemical Dependency Status:  Chemical Dependency Status: No Current Concerns             Values/Beliefs:  Spiritual, Cultural Beliefs, Muslim Practices, Values that affect care: yes  Description of Beliefs that Will Affect Care: Caodaism    Cultural/Muslim Practices Patient Routinely Participates In: prayer       Discussed  Partnership in Safe Discharge Planning  document with patient/family: Yes:     Additional Information:  Met with patient and spouse  Kelli in room to discuss plan of care and discharge plans.  Kelli states patient has been independent with ADLs up until recently where patient has been increasingly weak and  and felt unsteady. Spouse does the house chores.  Patient presently weak ,requires assist of 1 for mobility.  Patient will benefit from IP therapy to determine disposition and medical clearance for surgery.       Next Steps: Lab work results,hemonc following. GI following.     Kimmy Caicedo RN -[phone]

## 2024-09-12 NOTE — PROGRESS NOTES
[facility]    Cardiology Progress Note    Primary Cardiologist: Dr. Hoffman    Date of Admission: 9/9/2024  Service Date: 09/12/24    Summary:  Mr. Alessio Teixeira is a very pleasant 88 year old male with a past medical history of coronary calcification on CT scan with subsequent normal perfusion imaging, family history of coronary artery disease, dyslipidemia, former smoker, possible CLL-notes indicate ongoing workup who was admitted on 9/9/2024 for concerns for GI bleeding. Cardiology was consulted for NSTEMI.    Interval History   Patient without acute events overnight.  Hematology oncology completed initial consultation yesterday and recommended holding off on bypass until is known that anticoagulation can be tolerated and further testing completed.  Commend this challenge following clearance by GI was okay with starting anticoagulation tomorrow if hemoglobin remained stable.  Most recent hemoglobin this afternoon 9.5.    Blood pressures soft this morning with systolic in the 90-low 100 range.    He is out of bed today and feels short of breath with exertion. He notes frequent voiding since starting torsemide.     Telemetry: Atrial fibrillation, rate 70's     Assessment & Plan   1. Acute upper gastrointestinal hemorrhage with acute blood loss anemia       History of gastric ulcer in 2018  - Hemoglobin 8.9->7.6-> 8.4->7.8->8.7,baseline 11-12, received PRBCs x 2   -Denies further dark stools or hemoptysis  -Initiated on IV Protonix by GI   -EGD showed gastritis in the antrum, appeared to be healing     2. Elevated troponin concerning for NSTEMI  - EKG with T wave inversions   -Troponin trend [phone]  -Underwent coronary angiography on 9/10/2024 with results as outlined in #3    3.  Severe multivessel coronary artery disease  -9/10/24 coronary angiogram:     Ost LAD to Mid LAD lesion is 80% stenosed.    Prox Cx to Mid Cx lesion is 70% stenosed.    Mid Cx to Dist Cx lesion is 99%  stenosed.    Prox RCA lesion is 100% stenosed.    1st Mrg lesion is 90% stenosed.    1st Diag lesion is 99% stenosed.     4. New cardiomyopathy with mild heart failure  - TTE with EF 35-40%, moderate global hypokinesia, previously 55-60% in 2018  -Weight up 4 lbs since admission, 9/9/24 CT chest showing mild pulmonary edema and small bilateral pleural effusions      5. Atrial fibrillation on chronic anticoagulation      Supratherapeutic INR  -Rate controlled off beta-blocker  -INR 3.5 on admission, slowly drifting downward to 1.83 today     5.  Possible community acquired pneumonia   -On antibiotic therapy     6.  CKD on JAVIER  -Baseline creatinine 1.3-1.5, was 1.59 on admission    Plan:   1. Avoid aspirin, NSAIDs, and anticoagulation, consider starting tomorrow, will defer to heme/onc decision  2. Ongoing workup for CABG vs PCI recommendations with CT surgery, discussed very close monitoring for any anginal type symptoms  3. Continue atorvastatin  4. Soft blood pressures and impaired renal function limiting up titration of GDMT  5. Continue torsemide, will closely monitor renal function, worse cr today possibly late effect of angiogram dye  5. Cardiology will continue to follow    Thank you for the opportunity to participate in this pleasant patient's care.     ANNA Dewey, CNP   Nurse Practitioner  [facility]  Pager: [phone]  (8am - 5pm, M-F)    Patient Active Problem List   Diagnosis    Hyperlipidemia LDL goal <100    Edema of both legs    Varicose veins of legs    Stasis dermatitis    Chronic atrial fibrillation (H)    Vitamin D deficiency disease    Hypertension goal BP (blood pressure) < 140/90    Diplopia    MARTHA (obstructive sleep apnea)    Long term current use of anticoagulant therapy    Bradycardia    Chronic left-sided thoracic back pain    Presbycusis of both ears    Mixed hyperlipidemia    Screening for prostate cancer    CKD (chronic kidney disease) stage 2, GFR 60-89  ml/min    Pulmonary nodules    Debility    Gout of big toe    Routine medical exam    Chronic kidney disease, stage 3 (H)    History of pulmonary embolism    Hyperkalemia    CLL (chronic lymphocytic leukemia) (H)    Gastrointestinal hemorrhage, unspecified gastrointestinal hemorrhage type    Anemia, unspecified type    Chronic renal failure, unspecified CKD stage    Long term (current) use of anticoagulants    Chronic a-fib (H)    Elevated troponin    Secondary cardiomyopathy (H)       Physical Exam   Temp: 97.8  F (36.6  C) Temp src: Oral BP: 112/64 Pulse: 73   Resp: 10 SpO2: 97 % O2 Device: None (Room air)    Vitals:    09/10/24 0400 09/11/24 0608 09/12/24 0529   Weight: 99.3 kg (219 lb) 101.1 kg (222 lb 12.8 oz) 101.4 kg (223 lb 8.7 oz)     Vital Signs with Ranges  Temp:  [97.6  F (36.4  C)-97.8  F (36.6  C)] 97.8  F (36.6  C)  Pulse:  [73-99] 73  Resp:  [9-27] 10  BP: ()/(61-79) 112/64  SpO2:  [93 %-99 %] 97 %  I/O last 3 completed shifts:  In: 820 [P.O.:120; I.V.:700]  Out: -     Constitutional:  Appears his stated age, well nourished, and in no acute distress.  Eyes: Pupils equal, round. Sclerae anicteric.   HEENT: Normocephalic, atraumatic.   Neck: Supple. Faint JVD appreciated.  Respiratory: Breathing non-labored. Fine bibasilar crackles  Cardiovascular: Irregularly irregular rate and rhythm, normal S1 and S2. No murmur, rub, or gallop.  GI: Soft, non-distended  Skin: Warm, dry.   Musculoskeletal/Extremities: Moves all extremities well and symmetrically. No edema.  Neurologic: No gross focal deficits. Alert, awake, and oriented to person, place and time.  Psychiatric: Affect appropriate. Mentation normal.    Medications   Current Facility-Administered Medications   Medication Dose Route Frequency Provider Last Rate Last Admin    medication instruction   Does not apply Continuous PRN Amber Sandoval PA-C        Patient is NOT allergic to contrast dye   Does not apply DOES NOT GO TO RAMA Bennett  MEETA Ortega        Reason ACE/ARB/ARNI order not selected   Other DOES NOT GO TO Amber Calabrese PA-C        reason aspirin not prescribed (intentional)   Other DOES NOT GO TO Amber Calabrese PA-C        Reason beta blocker not prescribed   Does not apply DOES NOT GO TO Amber Calabrese PA-C         Current Facility-Administered Medications   Medication Dose Route Frequency Provider Last Rate Last Admin    atorvastatin (LIPITOR) tablet 10 mg  10 mg Oral QPM Amber Sandoval PA-C   10 mg at 09/11/24 2024    cefTRIAXone (ROCEPHIN) 2 g vial to attach to  ml bag for ADULTS or NS 50 ml bag for PEDS  2 g Intravenous Q24H Amber Sandoval PA-C   2 g at 09/11/24 2311    doxycycline (VIBRAMYCIN) 100 mg vial to attach to  mL bag  100 mg Intravenous BID Amber Sandoval PA-C   100 mg at 09/12/24 1010    iron sucrose (VENOFER) 300 mg in sodium chloride 0.9 % 290 mL intermittent infusion  300 mg Intravenous Daily Shannon Stephenson .3 mL/hr at 09/12/24 0837 300 mg at 09/12/24 0837    pantoprazole (PROTONIX) IV push injection 40 mg  40 mg Intravenous Q12H Key Cavanaugh MD   40 mg at 09/12/24 1011    sodium chloride (PF) 0.9% PF flush 3 mL  3 mL Intracatheter Q8H Amber Sandoval PA-C   3 mL at 09/11/24 2311    torsemide (DEMADEX) tablet 20 mg  20 mg Oral Daily Jonathan Antonio MD   20 mg at 09/12/24 0837       Data   No results found for this or any previous visit (from the past 24 hour(s)).      Recent Labs   Lab 09/12/24  1201 09/12/24  0619 09/12/24  0037 09/11/24  1518 09/11/24  1158 09/11/24  0614   WBC  --   --   --  32.3* 37.2* 38.6*   HGB 9.5* 8.9* 8.9* 8.5*  8.5* 9.5*  9.4* 8.7*   HCT  --   --   --  27.3* 29.7* 27.8*   MCV  --   --   --  86 89 86   PLT  --   --   --  333 406 376     Recent Labs   Lab 09/12/24  0037 09/11/24  0614 09/10/24  0411    141 138   POTASSIUM 4.0 4.2 4.7   CHLORIDE 106 109* 107    CO2 17* 17* 17*   ANIONGAP 15 15 14   * 108* 104*   BUN 63.4* 65.0* 73.1*   CR 1.89* 1.75* 1.54*   GFRESTIMATED 34* 37* 43*   TATE 7.6* 7.9* 8.3*        This note was completed in part using Dragon voice recognition software. Although reviewed after completion, some word and grammatical errors may occur.

## 2024-09-12 NOTE — PROGRESS NOTES
CT surgery Progress Note    Talked with pt and family.  Pt would be amenable to surgery if/when deemed necessary.  Hgb improving with poss plans to start anticoagulation (eliquis and plavix) tomorrow.  If plan is to proceed with surgery plavix would need to be stopped 5 days prior to surgery (eliquis 3 days).  Will continue to follow.  Awaiting lab results from Hematology.    Pt denies any sob or chest pain.  Will plan to bring back to clinic with Dr Mulvihill in about 2 weeks with a cbc prior to appt.    Orion Lua PA-C  [phone]

## 2024-09-12 NOTE — PROGRESS NOTES
CVTS    Chart check, patient not seen    Patient being evaluated by GI and heme/onc  Recommending anticoagulation challenge and completion of heme/onc work up prior to proceeding with conversation about possible bypass surgery    Will continue to follow peripherally for results for further risk stratification in regard to candidacy for CABG    Kelsey Pathak PA-C  CV surgery

## 2024-09-12 NOTE — PLAN OF CARE
"  Patient Name: Mirta  MRN: [id number]  Date of Admission: 9/9/2024  Reason for Admission: GI Bleed & Elevated Troponin  Level of Care: Intermediate    Vitals:   BP Readings from Last 1 Encounters:   09/12/24 112/64     Pulse Readings from Last 1 Encounters:   09/12/24 73     Wt Readings from Last 1 Encounters:   09/12/24 101.4 kg (223 lb 8.7 oz)     Ht Readings from Last 1 Encounters:   08/29/24 1.775 m (5' 9.88\")     Estimated body mass index is 32.18 kg/m  as calculated from the following:    Height as of 8/29/24: 1.775 m (5' 9.88\").    Weight as of this encounter: 101.4 kg (223 lb 8.7 oz).  Temp Readings from Last 1 Encounters:   09/12/24 97.8  F (36.6  C) (Oral)       Pain: Pain goal 0 Pain Rating 4 Effective pain medication/regimen: Tylenol    CV Surgery Patient: Yes    Assessment:    Resp: Diminished/Clear lung sounds,room air  Telemetry: Afib CVR  Neuro: Alert and oriented x 4  GI/: With BM on this shift,smear amount with dark/black color stools.Passing gas.Good urine output.Full Liquid diet.  Skin/Wounds: Scattered bruises  Lines/Drains: L PIV SL,intermittent antibiotic  Activity: Assist x 1, GB/W  Sleep: Fair,Melatonin given  Abnormal Labs: WBC and Hgb monitoring   Aggression Stop Light: Green          Patient Care Plan: CVS workup in progress for possible CABG. GI and Cardiology,Hem/Onc following.Continue antibiotic for pneumonia.           "

## 2024-09-13 NOTE — PROGRESS NOTES
[facility]    Medicine Progress Note - Hospitalist Service    Date of Admission:  9/9/2024    Assessment & Plan   Alessio Teixeira is a 88 year old male with past medical history significant for atrial fibrillation, distant PE, previous colectomy due to perforated bowel, myasthenia gravis, and hypertension, who is being worked up for CLL but not formally diagnosed who was admitted with dark stools and concern of upper GI bleeding and elevated troponin. Hospitalization complicated by worsening ST changes on EKG, concern for MI, new cardiomyopathy on ECHO, and possible pneumonia.     Acute upper GI bleed, likely 2/2 gastritis.  Resolved   Acute blood anemia   In the setting of chronic anticoagulation and history of GI bleed due to gastric ulcer 2018  *Poor historian, dtr is pediatrician assists in interview. Hx dark stools for hours to 1 day, unclear per patient, spit up blood x1. Hx bleeding ulcer in 2018 requiring transfusion, due to ulcer. No formal chest pain. Reports SHOEMAKER and fatigue. No lightheadedness or dizziness.  No syncopal episodes.  No nausea or vomiting.  Has had some acute on chronic back pain, no history of AAA. Noted left lower abdominal tenderness, history of diverticulitis.  Previously on Nexium but reportedly stopped in the past week.    *On admission, mildly tachycardic 90s-110s and blood pressures in the . No hypoxia. Cr 1.59 which is at recent baseline.  Normal liver function.  INR 3.59. Recent hemoglobin 11-12 range.  8.9 in the ED and repeat 7.6.  Consented for blood, type and screen in the ED.  1 unit of blood ordered.  [facility] notified and plans to scope the patient on 9/10.  He will remain n.p.o. until then.  He was given 80 mg of IV Protonix and IVF.   *Hgb stable after 2 units PRBC 9/9 and INR <2, no further dark stools.   - Cardiology discussed case and plan is for diagnostic coronary angiogram followed by EGD  - Continue IV PPI  - Monitor hemoglobins    - Avoid NSAIDs, blood thinners  - INR supratherapeutic, give 2 mg of vitamin K  - GI following and appreciate their recommendations.  EGD on 9/11 showed gastritis.  Okay with starting anticoagulation     Elevated troponin concerning for Non ST elevation myocardial infarction (NSTEMI) type I versus type II  Multivessel CAD   New cardiomyopathy   Atrial fibrillation on chronic anticoagulation  Supratherapeutic INR, resolved  No formal chest pain however is T wave inversions on EKG to lateral leads, these appear new from previous.  Denies formal chest pain but notes has some dyspnea on exertion recently, some ongoing back pain.  No history of AAA.   * EKG on presentation A-fib, 86 bpm with T wave inversions in inferolateral leads including 2 3 aVF, [id number].    * Troponin 206-->increased evening of admission to 712.   * ECHO on admission shws EF 35-40%, LV systolic function moderately reduced, moderate global hypokinesia of LV, mild miltral regurgitation. Prior ECHO 12/2018 with EF 60-65%. Concern for ischemia as above though unclear timeline of EF drop.  * LHC showed multivessel CAD   - Telemetry  - Coumadin stopped   - Continue PTA Statin  - Monitor with strict I/Os and daily weights  - Avoid excess IVF as able   - Cardiology consultation, greatly appreciate assistance  - CV surgery now following due to multi-vessel CAD and undergoing evaluation for possible CABG     Leukocytosis, improving  Has been chronic and undergoing workup for CLL, not formally diagnosed.  No acute need for consultation during hospitalization. Has been 28-29 range. On admission 43 now improving, stable.  - Hem/onc consulted here and appreciate their recommendations.  U     Possible community acquired pneumonia  Seen on CXR on admission. PCT 1.23. Started on abx.  - Continue Ceftriaxone, Doxycycline course (no azithromycin due to interaction on warfarin)     Incidental finding, mass-like opacity on CXR  Masslike opacity measuring 3 x 5.2 cm.  "Avoid CT contrast in setting of cor angio/CKD for now. Updated patient and daughter on 9/10.  - Follow up as outpatient     Chronic kidney disease, stage 3, stable  Hyperkalemia, resolved  Baseline Cr 1.2-1.5. On admission, 1.59.  - Renally dose medications as able/needed  - Lokelma stopped         Chronic stable diagnoses and other pertinent medical history: Appropriate PTA medications will be resumed.    History of bowel perforation in the setting of excessive prednisone use for myasthenia gravis with status post partial colectomy  Dyslipidemia  Chronic back pain  History of myasthenia gravis          Diet: Regular Diet Adult  Snacks/Supplements Adult: Ensure Enlive; With Meals    DVT Prophylaxis: Heparin   Amos Catheter: Not present  Lines: None     Cardiac Monitoring: ACTIVE order. Indication: Post- PCI/Angiogram (24 hours)  Code Status: Full Code      Clinically Significant Risk Factors              # Hypoalbuminemia: Lowest albumin = 3.4 g/dL at 9/10/2024  4:11 AM, will monitor as appropriate  # Coagulation Defect: INR = 1.90 (Ref range: 0.85 - 1.15) and/or PTT = N/A, will monitor for bleeding   # Acute Kidney Injury, unspecified: based on a >150% or 0.3 mg/dL increase in last creatinine compared to past 90 day average, will monitor renal function  # Hypertension: Noted on problem list  # Chronic heart failure with reduced ejection fraction: last echo with EF <40%          # Obesity: Estimated body mass index is 32.79 kg/m  as calculated from the following:    Height as of 8/29/24: 1.775 m (5' 9.88\").    Weight as of this encounter: 103.3 kg (227 lb 11.8 oz)., PRESENT ON ADMISSION                  Disposition Plan     Medically Ready for Discharge: Anticipated in 5+ Days         Camilo Hermosillo DO  Hospitalist Service  [facility]  Securely message with [employer] (more info)  Text page via [employer] Paging/Directory "   ______________________________________________________________________    Interval History   Patient seen and examined.  No acute events over night.  No fever or hypoxia.  No further bleeding noted.  Patient agreeable for CABG if recommended.  Tolerating diet without nausea or vomiting    Physical Exam   Vital Signs: Temp: 97.6  F (36.4  C) Temp src: Oral BP: 90/68 Pulse: 84   Resp: 14 SpO2: 97 % O2 Device: None (Room air) Oxygen Delivery: 1 LPM  Weight: 227 lbs 11.76 oz    General Appearance: Resting comfortably.  NAD  Respiratory: No respiratory distress  Cardiovascular: RRR  GI: Soft.  Non-distended  Skin: No obvious rashes or cyanosis  Other: Alert.  No edema      Medical Decision Making       43 MINUTES SPENT BY ME on the date of service doing chart review, history, exam, documentation & further activities per the note.      Data   ------------------------- PAST 24 HR DATA REVIEWED -----------------------------------------------    I have personally reviewed the following data over the past 24 hrs:    36.9 (H)  \   9.4 (L)   / 348     140 107 68.1 (H) /  98   3.7 20 (L) 2.24 (H) \     INR:  1.90 (H) PTT:  N/A   D-dimer:  N/A Fibrinogen:  N/A       Imaging results reviewed over the past 24 hrs:   No results found for this or any previous visit (from the past 24 hour(s)).

## 2024-09-13 NOTE — PLAN OF CARE
"Patient Name: Alessio \"Nohemi Teixeira  MRN: [id number]  Date of Admission: 2024  Reason for Admission: Acute GI bleed  Level of Care: [facility]    Vitals:   BP Readings from Last 1 Encounters:  24 : 90/56    Pulse Readings from Last 1 Encounters:  24 : 60    Wt Readings from Last 1 Encounters:  24 : 101.4 kg (223 lb 8.7 oz)    Ht Readings from Last 1 Encounters:  24 : 1.775 m (5' 9.88\")    Estimated body mass index is 32.18 kg/m  as calculated from the following:    Height as of 24: 1.775 m (5' 9.88\").    Weight as of this encounter: 101.4 kg (223 lb 8.7 oz).  Temp Readings from Last 1 Encounters:  24 : 98 F (36.7 C) (Axillary)      Pain: Pain goal: 0 Pain Ratin Effective pain medication/regimen: PRN tylenol    CV Surgery Patient: No    Assessment    Resp: RA. Intermittently required 1L O2 NC NOC while sleeping. LS diminished.   Telemetry: Afib CVR.  Neuro: A&Ox4.  GI/: Small dark stool x1. Voiding via bedside commode/urinal.   Skin/Wounds: Scattered bruises. Fragile skin.   Lines/Drains: L PIV SL. Pain at old IV insertion site.  Activity: A1 GBW. Up to chair and bedside commode.   Sleep: Able to rest intermittently throughout the shift.    Aggression Stop Light: Green          Patient Care Plan: Progressing     "

## 2024-09-13 NOTE — PROGRESS NOTES
CV Surgery Brief Progress Note    - Chart check today  - Patient scheduled to see Dr. Mulvihill in clinic on 09/26/2024 to further discuss CABG vs PCI after a trial of anticoagulation.   - CV surgery will sign off. Please call with any questions or concerns.     Arlin Hodge PA-C  Cardiothoracic Surgery  Available for paging [phone] (personal pager or CV Surgery Rounding Pager)  Personal Pager: [phone]  CV Surgery Rounding Pager: [phone]  After hours please page surgeon on-call

## 2024-09-13 NOTE — PROGRESS NOTES
"Hematology/Oncology Follow-up Note  [facility]    Today's Date: 09/12/24   Date of Admission:  9/9/2024   Reason for Consult: leukocytosis       ASSESSMENT/ PLAN : Alessio Teixeira is a 88 year old year old male who presented this hospitalization with dark stools and elevated troponin. They have a pertinent medical history of Afib (coumadin), PE, previous colectomy d/t perforated bowel, myasthenia gravis, HTN.     Leukocytosis   Admission WBC 43.4   Increase baso- myelocytes and mild tear drops   Today WBC 36.9  Myeloproliferative neoplasm work-up pending   Peripheral blood smear for morphology:  Leukoerythroblastic features with:    - Moderate normochromic, normocytic anemia with nucleated red blood cells (4/100 WBC) and reticulocytosis without morphologic evidence since of hemolysis    - Moderate leukocytosis with absolute basophilia and neutrophilia with neutrophilic left shift with rare circulating blasts (<1%) without dysplasia     Anemia   Likely related to GI bleed   Admission hemoglobin 7.6--  acute decline from 11-12   EGD 9/11/24 healing gastritis- no active bleed    PLAN:  Will start heparin infusion if no signs or symptoms of bleeding. Can start PO Eliquis 2.5 mg BID  Lab work-up pending  Will continue to follow       DISCUSSION:  Discussed with patient, wife.    Patient is up in the chair visiting with cardiology during my visit. Discussed starting on anticoagulation today while awaiting for labs to result.     PHYSICAL EXAM:  Vital signs:  Temp: 97.6  F (36.4  C) Temp src: Oral BP: 90/68 Pulse: 84   Resp: 14 SpO2: 97 % O2 Device: None (Room air) Oxygen Delivery: 1 LPM   Weight: 103.3 kg (227 lb 11.8 oz)  Estimated body mass index is 32.79 kg/m  as calculated from the following:    Height as of 8/29/24: 1.775 m (5' 9.88\").    Weight as of this encounter: 103.3 kg (227 lb 11.8 oz).  GENERAL/CONSTITUTIONAL: No acute distress.    Labs Reviewed: CBC, CMP             45 minutes spent on the date of the " encounter doing review of outside records, review of test results, interpretation of tests, patient visit, and documentation     Amarilys CASTILLO CNP  Hematology/Oncology  [facility]     Securely message with [employer]   Pager #[phone]

## 2024-09-13 NOTE — PROGRESS NOTES
[facility] GASTROENTEROLOGY PROGRESS NOTE    SUBJECTIVE:  Patient doing ok today, not feeling very hungry.      OBJECTIVE:    BP 90/56 (BP Location: Right arm, Patient Position: Semi-Magaña's, Cuff Size: Adult Regular)   Pulse 59   Temp 98  F (36.7  C) (Axillary)   Resp 13   Wt 103.3 kg (227 lb 11.8 oz)   SpO2 98%   BMI 32.79 kg/m    Temp (24hrs), Av.8  F (37.1  C), Min:97.8  F (36.6  C), Max:99.4  F (37.4  C)    Patient Vitals for the past 72 hrs:   Weight   24 0700 103.3 kg (227 lb 11.8 oz)   24 0529 101.4 kg (223 lb 8.7 oz)   24 0608 101.1 kg (222 lb 12.8 oz)       Intake/Output Summary (Last 24 hours) at 9/10/2024 0923  Last data filed at 9/10/2024 0500  Gross per 24 hour   Intake 2147.5 ml   Output 350 ml   Net 1797.5 ml         PHYSICAL EXAM    Constitutional: NAD, comfortable  Neuro: alert and oriented      Additional Comments:  ROS, FH, SH: See initial GI consult for details.    I have reviewed the patient's new clinical lab results:    Recent Labs   Lab Test 24  0715 24  1201 24  0619 24  0037 24  1518 24  1158 24  0614 09/10/24  1003 09/10/24  0608   WBC 36.9*  --   --   --  32.3* 37.2* 38.6*  --   --    HGB 9.4* 9.5* 8.9*   < > 8.5*  8.5* 9.5*  9.4* 8.7*   < > 8.4*   MCV 87  --   --   --  86 89 86  --   --      --   --   --  333 406 376  --   --    INR  --   --  2.10*  --   --   --  1.63*  --  1.83*    < > = values in this interval not displayed.     Recent Labs   Lab Test 24  0037 24  0614 09/10/24  0411    141 138   POTASSIUM 4.0 4.2 4.7   CHLORIDE 106 109* 107   CO2 17* 17* 17*   BUN 63.4* 65.0* 73.1*   CR 1.89* 1.75* 1.54*   ANIONGAP 15 15 14   TATE 7.6* 7.9* 8.3*     Recent Labs   Lab Test 09/10/24  0411 24  1148 24  1106 22  1009 21  1029 21  1028 20  1059 19  1045 19  1044   ALBUMIN 3.4* 3.4* 3.9  --   --    < > 3.5   < >  --    BILITOTAL 0.5 0.4 0.5  --   --     < > 0.4   < >  --    ALT 26 25 19   < >  --    < > 22   < >  --    AST 90* 41 37  --   --    < > 24   < >  --    ALKPHOS 82 100 100  --   --    < > 83   < >  --    PROTEIN  --   --   --   --  30*  --  Negative  --  30*    < > = values in this interval not displayed.     9/10/24 Chest Xray:  IMPRESSION: Heart is mildly enlarged. Masslike opacity measuring 3 x 5.2 cm. Small right effusion. Atelectasis noted at the right lung base. Possible small patchy airspace opacity at the right lung base. Findings are concerning for pneumonia. Recommend a   follow-up chest x-ray in 4-6 weeks to assure complete resolution.     Anemia    Assessment: Patient with 5 gram drop in hgb in past 2 weeks with elevated INR of 3.5 and dark stools. He is on coumadin for afib.  History of bleeding ulcer in 2018.  Suspicious for upper GI bleeding.   Last colonoscopy 2011 with several polyps.  Angiogram this admission showed severe CAD, bypass being discussed. Also has very elevated WBC - work up in progress with Hematology    EGD 9/11/24 noted healing gastritis, no bleeding seen, no AVMs. With severe heart disease he is at risk of AVMs, in the small bowel as well.        Plan:   - continue ppi, would continue IV while hospitalized given irregular meal timing  - can restart anticoagulant today given stable hgb and need for anticoagulant  - at discharge, recommend daily PPI such as Pantoprazole or Omeprazole 40mg daily 30-60 minutes prior to breakfast    GI will not actively follow, if we are needed please call and we would be happy to see the patient again.        Abnormal Chest XRay  Assessment: Possible mass v. Pneumonia.  No respiratory symptoms.  Plan:  - Follow up per internal medicine      [facility] Gastroenterology  Office:  [phone]    15 minutes of total time was spent providing patient care, including patient evaluation, reviewing documentation/test results, and .

## 2024-09-13 NOTE — PROGRESS NOTES
AOx4. [place]. Soft bps, 80s/50s this afternoon - hospitalist aware - focus on hydrating PO, bps came back up 90s/60s. Regular diet. Up A1, gb, walker. Voiding without issue. LBM today. Tele:a-fib CVR, had one 7 beat run of v tach today - EKG following with results of a-fib CVR. Denies pain. Skin intact with some blanchable redness to coccyx and scattered bruising. Mg protocol, no replacements needed, redraw in am. Plan pending CABG vs PCI. Continue to monitor and follow POC.

## 2024-09-13 NOTE — PROGRESS NOTES
[facility]    Cardiology Progress Note    Primary Cardiologist: Dr. Hoffman    Date of Admission: 9/9/2024  Service Date: 09/13/24    Summary:  Mr. Alessio Teixeira is a very pleasant 88 year old male with a past medical history of coronary calcification on CT scan with subsequent normal perfusion imaging, family history of coronary artery disease, dyslipidemia, former smoker, possible CLL-notes indicate ongoing workup who was admitted on 9/9/2024 for concerns for GI bleeding. Cardiology was consulted for NSTEMI.    Interval History   Patient without acute events overnight.  Most recent hemoglobin this morning stable at 9.4.  Notes from GI this morning indicate they are okay with starting anticoagulant but recommend continued PPI.    Blood pressures continue to be soft this morning with systolic in the 90-low 100 range.    He is out of bed today and appears more alert and conversant than previous days. BMP with continued decline in renal function.  Creatinine up to 2.24 today.    Telemetry: Atrial fibrillation, rate 70's     Assessment & Plan   1. Acute upper gastrointestinal hemorrhage with acute blood loss anemia       History of gastric ulcer in 2018  - Hemoglobin 8.9->7.6-> 8.4->7.8->8.7,baseline 11-12, received PRBCs x 2   -Denies further dark stools or hemoptysis  -Initiated on IV Protonix by GI   -EGD showed gastritis in the antrum, appeared to be healing  -Heme-onc consulted with ongoing anemia     2. Elevated troponin concerning for NSTEMI  - EKG with T wave inversions   -Troponin trend [phone]  -Underwent coronary angiography on 9/10/2024 with results as outlined in #3    3.  Severe multivessel coronary artery disease  -9/10/24 coronary angiogram:     Ost LAD to Mid LAD lesion is 80% stenosed.    Prox Cx to Mid Cx lesion is 70% stenosed.    Mid Cx to Dist Cx lesion is 99% stenosed.    Prox RCA lesion is 100% stenosed.    1st Mrg lesion is 90% stenosed.    1st Diag lesion is  99% stenosed.     4. New cardiomyopathy with mild heart failure  - TTE with EF 35-40%, moderate global hypokinesia, previously 55-60% in 2018  -Weight up 4 lbs since admission, 9/9/24 CT chest showing mild pulmonary edema and small bilateral pleural effusions   -JAVIER following initiation of torsemide      5. Atrial fibrillation on chronic anticoagulation      Supratherapeutic INR  -Rate controlled off beta-blocker  -INR 3.5 on admission     5.  Possible community acquired pneumonia   -On antibiotic therapy     6.  CKD on JAVIER  -Baseline creatinine 1.3-1.5, was 1.59 on admission, trending up following start of torsemide, angiogram, and multiple days of NPO    Plan:   1.  Hold torsemide today with decline in renal function  2. Ongoing workup for CABG vs PCI recommendations with CT surgery, discussed very close monitoring for any anginal type symptoms  3. Continue atorvastatin  4. Soft blood pressures and impaired renal function limiting up titration of GDMT  5.  Will defer final decision regarding resumption of anticoagulation to hematology  6. Cardiology will continue to follow    Thank you for the opportunity to participate in this pleasant patient's care.     ANNA Dewey, CNP   Nurse Practitioner  [facility]  Pager: [phone]  (8am - 5pm, M-F)    Patient Active Problem List   Diagnosis    Hyperlipidemia LDL goal <100    Edema of both legs    Varicose veins of legs    Stasis dermatitis    Chronic atrial fibrillation (H)    Vitamin D deficiency disease    Hypertension goal BP (blood pressure) < 140/90    Diplopia    MARTHA (obstructive sleep apnea)    Long term current use of anticoagulant therapy    Bradycardia    Chronic left-sided thoracic back pain    Presbycusis of both ears    Mixed hyperlipidemia    Screening for prostate cancer    CKD (chronic kidney disease) stage 2, GFR 60-89 ml/min    Pulmonary nodules    Debility    Gout of big toe    Routine medical exam    Chronic kidney  disease, stage 3 (H)    History of pulmonary embolism    Hyperkalemia    CLL (chronic lymphocytic leukemia) (H)    Gastrointestinal hemorrhage, unspecified gastrointestinal hemorrhage type    Anemia, unspecified type    Chronic renal failure, unspecified CKD stage    Long term (current) use of anticoagulants    Chronic a-fib (H)    Elevated troponin    Secondary cardiomyopathy (H)       Physical Exam   Temp: 98  F (36.7  C) Temp src: Axillary BP: 90/56 Pulse: 59   Resp: 13 SpO2: 98 % O2 Device: None (Room air) Oxygen Delivery: 1 LPM  Vitals:    09/11/24 0608 09/12/24 0529 09/13/24 0700   Weight: 101.1 kg (222 lb 12.8 oz) 101.4 kg (223 lb 8.7 oz) 103.3 kg (227 lb 11.8 oz)     Vital Signs with Ranges  Temp:  [97.5  F (36.4  C)-98  F (36.7  C)] 98  F (36.7  C)  Pulse:  [59-98] 59  Resp:  [11-26] 13  BP: ()/(56-76) 90/56  SpO2:  [90 %-100 %] 98 %  I/O last 3 completed shifts:  In: 200 [I.V.:200]  Out: 525 [Urine:525]    Constitutional:  Appears his stated age, well nourished, and in no acute distress.  Eyes: Pupils equal, round. Sclerae anicteric.   HEENT: Normocephalic, atraumatic.   Neck: Supple. Faint JVD appreciated.  Respiratory: Breathing non-labored. Fine bibasilar crackles  Cardiovascular: Irregularly irregular rate and rhythm, normal S1 and S2. No murmur, rub, or gallop.  GI: Soft, non-distended  Skin: Warm, dry.   Musculoskeletal/Extremities: Moves all extremities well and symmetrically. No edema.  Neurologic: No gross focal deficits. Alert, awake, and oriented to person, place and time.  Psychiatric: Affect appropriate. Mentation normal.    Medications   Current Facility-Administered Medications   Medication Dose Route Frequency Provider Last Rate Last Admin    medication instruction   Does not apply Continuous PRN Amber Sandoval PA-C        Patient is NOT allergic to contrast dye   Does not apply DOES NOT GO TO Shannon Nava NP        Reason ACE/ARB/ARNI order not selected   Other  DOES NOT GO TO Amber Calabrese PA-C        reason aspirin not prescribed (intentional)   Other DOES NOT GO TO Amber Calabrese PA-C        Reason beta blocker not prescribed   Does not apply DOES NOT GO TO Amber Calabrese PA-C         Current Facility-Administered Medications   Medication Dose Route Frequency Provider Last Rate Last Admin    atorvastatin (LIPITOR) tablet 10 mg  10 mg Oral QPM Amber Sandoval PA-C   10 mg at 09/12/24 2042    cefTRIAXone (ROCEPHIN) 2 g vial to attach to  ml bag for ADULTS or NS 50 ml bag for PEDS  2 g Intravenous Q24H Amber Sandoval PA-C   2 g at 09/12/24 2231    doxycycline (VIBRAMYCIN) 100 mg vial to attach to  mL bag  100 mg Intravenous BID Amber Sandoval PA-C   100 mg at 09/13/24 0802    iron sucrose (VENOFER) 300 mg in sodium chloride 0.9 % 290 mL intermittent infusion  300 mg Intravenous Daily Shannon Stephenson .3 mL/hr at 09/13/24 0802 300 mg at 09/13/24 0802    pantoprazole (PROTONIX) IV push injection 40 mg  40 mg Intravenous Q12H Key Cavanaugh MD   40 mg at 09/12/24 2208    sodium chloride (PF) 0.9% PF flush 3 mL  3 mL Intracatheter Q8H Amber Sandoval PA-C   3 mL at 09/12/24 2231    torsemide (DEMADEX) tablet 20 mg  20 mg Oral Daily Jonathan Antonio MD   20 mg at 09/13/24 0802       Data   No results found for this or any previous visit (from the past 24 hour(s)).      Recent Labs   Lab 09/13/24  0715 09/12/24  1201 09/12/24  0619 09/12/24  0037 09/11/24  1518 09/11/24  1158   WBC 36.9*  --   --   --  32.3* 37.2*   HGB 9.4* 9.5* 8.9*   < > 8.5*  8.5* 9.5*  9.4*   HCT 29.5*  --   --   --  27.3* 29.7*   MCV 87  --   --   --  86 89     --   --   --  333 406    < > = values in this interval not displayed.     Recent Labs   Lab 09/13/24  0715 09/12/24  0037 09/11/24  0614    138 141   POTASSIUM 3.7 4.0 4.2   CHLORIDE 107 106 109*   CO2 20* 17* 17*    ANIONGAP 13 15 15   GLC 98 105* 108*   BUN 68.1* 63.4* 65.0*   CR 2.24* 1.89* 1.75*   GFRESTIMATED 28* 34* 37*   TATE 7.9* 7.6* 7.9*        This note was completed in part using Dragon voice recognition software. Although reviewed after completion, some word and grammatical errors may occur.

## 2024-09-14 NOTE — PLAN OF CARE
AOx4. [place]. VSS on RA. Regular diet. Up A1, gb, walker. Voiding without issue. LBM yesterday. Tele:a-fib CVR. Denies pain. Skin intact with some blanchable redness to coccyx and scattered bruising. Mg & K protocol, replaced K, redraw in am. Plan pending CABG vs PCI. Worked with OT today. Continue to monitor and follow POC.

## 2024-09-14 NOTE — PROGRESS NOTES
[facility]    Medicine Progress Note - Hospitalist Service    Date of Admission:  9/9/2024    Assessment & Plan   Alessio Teixeira is a 88 year old male with past medical history significant for atrial fibrillation, distant PE, previous colectomy due to perforated bowel, myasthenia gravis, and hypertension, who is being worked up for CLL but not formally diagnosed who was admitted with dark stools and concern of upper GI bleeding and elevated troponin. Hospitalization complicated by worsening ST changes on EKG, concern for MI, new cardiomyopathy on ECHO, and possible pneumonia.     Acute upper GI bleed, likely 2/2 gastritis.  Resolved   Acute blood anemia   In the setting of chronic anticoagulation and history of GI bleed due to gastric ulcer 2018  *Poor historian, dtr is pediatrician assists in interview. Hx dark stools for hours to 1 day, unclear per patient, spit up blood x1. Hx bleeding ulcer in 2018 requiring transfusion, due to ulcer. No formal chest pain. Reports SHOEMAKER and fatigue. No lightheadedness or dizziness.  No syncopal episodes.  No nausea or vomiting.  Has had some acute on chronic back pain, no history of AAA. Noted left lower abdominal tenderness, history of diverticulitis.  Previously on Nexium but reportedly stopped in the past week.    *On admission, mildly tachycardic 90s-110s and blood pressures in the . No hypoxia. Cr 1.59 which is at recent baseline.  Normal liver function.  INR 3.59. Recent hemoglobin 11-12 range.  8.9 in the ED and repeat 7.6.  Consented for blood, type and screen in the ED.  1 unit of blood ordered.  [facility] notified and plans to scope the patient on 9/10.  He will remain n.p.o. until then.  He was given 80 mg of IV Protonix and IVF.   *Hgb stable after 2 units PRBC 9/9 and INR <2, no further dark stools.   - Cardiology discussed case and plan is for diagnostic coronary angiogram followed by EGD  - Continue BID PPI.  Switched to PO   -  Monitor hemoglobins   - Avoid NSAIDs, blood thinners  - INR supratherapeutic, give 2 mg of vitamin K  - GI following and appreciate their recommendations.  EGD on 9/11 showed gastritis.  Okay with starting anticoagulation     Elevated troponin concerning for Non ST elevation myocardial infarction (NSTEMI) type I versus type II  Multivessel CAD   New cardiomyopathy   Atrial fibrillation on chronic anticoagulation  Supratherapeutic INR, resolved  No formal chest pain however is T wave inversions on EKG to lateral leads, these appear new from previous.  Denies formal chest pain but notes has some dyspnea on exertion recently, some ongoing back pain.  No history of AAA.   * EKG on presentation A-fib, 86 bpm with T wave inversions in inferolateral leads including 2 3 aVF, [id number].    * Troponin 206-->increased evening of admission to 712.   * ECHO on admission shws EF 35-40%, LV systolic function moderately reduced, moderate global hypokinesia of LV, mild miltral regurgitation. Prior ECHO 12/2018 with EF 60-65%. Concern for ischemia as above though unclear timeline of EF drop.  * LHC showed multivessel CAD   - Telemetry  - Coumadin stopped   - Eliquis BID   - Continue PTA Statin  - Monitor with strict I/Os and daily weights  - Avoid excess IVF as able   - Cardiology consultation, greatly appreciate assistance  - CV surgery now following due to multi-vessel CAD and undergoing evaluation for possible CABG.  Plan for patient to follow up in clinic     Leukocytosis, improving  Has been chronic and undergoing workup for CLL, not formally diagnosed.  No acute need for consultation during hospitalization. Has been 28-29 range. On admission 43 now improving, stable.  - Hem/onc consulted here and appreciate their recommendations.  U     Possible community acquired pneumonia  Seen on CXR on admission. PCT 1.23. Started on abx.  - Finished course of Doxycycline  - Ceftriaxone switched to Cefdinir due to discomfort with  "administration      Incidental finding, mass-like opacity on CXR  Masslike opacity measuring 3 x 5.2 cm. Avoid CT contrast in setting of cor angio/CKD for now. Updated patient and daughter on 9/10.  - Follow up as outpatient     JAVIER on CKD Stage III   Hyperkalemia, resolved  Baseline Cr 1.2-1.5. On admission, 1.59.  Cr bumped with diuresis and improved with diuretic holiday  - Renally dose medications as able/needed  - Lokelma stopped   - Continue to monitor         Chronic stable diagnoses and other pertinent medical history: Appropriate PTA medications will be resumed.    History of bowel perforation in the setting of excessive prednisone use for myasthenia gravis with status post partial colectomy  Dyslipidemia  Chronic back pain  History of myasthenia gravis          Diet: Regular Diet Adult  Snacks/Supplements Adult: Ensure Enlive; With Meals    DVT Prophylaxis: DOAC  Amos Catheter: Not present  Lines: None     Cardiac Monitoring: ACTIVE order. Indication: Tachyarrhythmias, acute (48 hours)  Code Status: Full Code      Clinically Significant Risk Factors        # Hypokalemia: Lowest K = 3.3 mmol/L in last 2 days, will replace as needed       # Hypoalbuminemia: Lowest albumin = 3.4 g/dL at 9/10/2024  4:11 AM, will monitor as appropriate  # Coagulation Defect: INR = 1.88 (Ref range: 0.85 - 1.15) and/or PTT = N/A, will monitor for bleeding    # Hypertension: Noted on problem list  # Chronic heart failure with reduced ejection fraction: last echo with EF <40%          # Obesity: Estimated body mass index is 33.19 kg/m  as calculated from the following:    Height as of 8/29/24: 1.775 m (5' 9.88\").    Weight as of this encounter: 104.6 kg (230 lb 8 oz).                   Disposition Plan     Medically Ready for Discharge: Anticipated in 2-4 Days             Camlio Hermosillo DO  Hospitalist Service  [facility] [place]  Securely message with ESILLAGE (more info)  Text page via [facility] Paging/Directory "   ______________________________________________________________________    Interval History   Patient seen and examined.  Daughter present at bedside.  No acute events over night.  No pain currently.  Does have some burning pain at IV site with infusions and would like to switch to PO medications if able.  No nausea or vomiting. No further bloody stools.  No chest pain     Physical Exam   Vital Signs: Temp: 97.8  F (36.6  C) Temp src: Oral BP: 113/77 Pulse: 76   Resp: 20 SpO2: 97 % O2 Device: None (Room air)    Weight: 230 lbs 8 oz    General Appearance: Resting comfortably. NAD  Respiratory: No respiratory distress  Cardiovascular: Appears well perfused  GI: Non-distended  Skin: No obvious rashes or cyanosis  Other: Alert.  No edema      Medical Decision Making       45 MINUTES SPENT BY ME on the date of service doing chart review, history, exam, documentation & further activities per the note.      Data   ------------------------- PAST 24 HR DATA REVIEWED -----------------------------------------------    I have personally reviewed the following data over the past 24 hrs:    N/A  \   8.7 (L)   / N/A     141 107 62.6 (H) /  88   3.3 (L) 19 (L) 1.91 (H) \     INR:  1.88 (H) PTT:  N/A   D-dimer:  N/A Fibrinogen:  N/A       Imaging results reviewed over the past 24 hrs:   No results found for this or any previous visit (from the past 24 hour(s)).

## 2024-09-14 NOTE — PLAN OF CARE
"Patient Name: Mirta  MRN: [id number]  Date of Admission: 9/9/2024  Reason for Admission: Acute upper GI bleed; elevated troponin.  [place] [facility]    [place]itals:   BP Readings from Last 1 Encounters:   09/14/24 105/67     Pulse Readings from Last 1 Encounters:   09/14/24 71     Wt Readings from Last 1 Encounters:   09/14/24 104.6 kg (230 lb 8 oz)     Ht Readings from Last 1 Encounters:   08/29/24 1.775 m (5' 9.88\")     Estimated body mass index is 33.19 kg/m  as calculated from the following:    Height as of 8/29/24: 1.775 m (5' 9.88\").    Weight as of this encounter: 104.6 kg (230 lb 8 oz).  Temp Readings from Last 1 Encounters:   09/13/24 97.8  F (36.6  C) (Oral)     Pain: Pain goal 0 Pain Rating 4. Effective pain medication/regimen, PRN tylenol given 1x.    CV Surgery Patient: No    Assessment    Resp: On RA. LS clear. Denied SOB or CP.  Telemetry: Afib CVR, freq PACs.  EKG ordered d/t telemetry borderline showed aflutter CVR with variable conduction ratio and potential AV block. EKG showed Afib ST & T wave abnormality.  Neuro: Intact, A&Ox4.  GI/: Up to BSC. No BM overnight. Voiding adequately using urinal. Tolerating regular diet. Denied N/V. Encouraging PO fluids (d/t soft BPs).  Skin/Wounds: Scattered bruising  Lines/Drains: New PIV on R hand, SL with intermittent doxycyline and ceftriaxone.    Activity: A1 GB/W  Sleep: Fair.  Abnormal Labs: Hgb trending down 9.4->8.7. INR trending down 1.88. Cr trending down, 1.91. BUN trending down, 62.6.   K trending down... On Mg protocol, no replacement needed, recheck tomorrow AM.    Aggression Stop Light: Green          Patient Care Plan:   Problem: Adult Inpatient Plan of Care  Goal: Plan of Care Review  Outcome: Progressing  "

## 2024-09-14 NOTE — PROGRESS NOTES
[facility] Cardiology Progress Note    Date of Admission:  9/9/2024  Reason for Consult:   Query NSTEMI    Subjective   Mr. Teixeira is doing well and has no complaints. Daughter is at bedside     Objective   INTAKE / OUTPUT     Intake/Output Summary (Last 24 hours) at 9/14/2024 0716  Last data filed at 9/14/2024 0656  Gross per 24 hour   Intake 1479 ml   Output 1750 ml   Net -271 ml       VITAL SIGNS  Temp:  [97.6  F (36.4  C)-98.6  F (37  C)] 97.8  F (36.6  C)  Pulse:  [62-89] 71  Resp:  [8-22] 18  BP: ()/(48-77) 105/67  SpO2:  [92 %-100 %] 95 %  230 lbs 8 oz    PHYSICAL EXAM   Constitutional: Appears stated age, well nourished, NAD.    Neck: Supple.  JVD not visualized.   Respiratory: Non-labored. Lungs CTAB.  Cardiovascular: RRR. Bilateral lower extremities with trace edema.   GI: Soft, non-distended, non-tender.  Skin: Warm and dry.   Musculoskeletal/Extremities: Symmetrical movement.  Neurologic: No gross focal deficits. Alert, awake.  Psychiatric: Affect appropriate. Mentation normal.      Data   Most Recent 3 CBC's:  Recent Labs   Lab Test 09/14/24  0504 09/13/24  0715 09/12/24  1201 09/12/24  0037 09/11/24  1518 09/11/24  1158   WBC  --  36.9*  --   --  32.3* 37.2*   HGB 8.7* 9.4* 9.5*   < > 8.5*  8.5* 9.5*  9.4*   MCV  --  87  --   --  86 89   PLT  --  348  --   --  333 406    < > = values in this interval not displayed.     Most Recent 3  BMP's:  Recent Labs   Lab Test 09/14/24  0504 09/13/24  1705 09/13/24  0715    141 140   POTASSIUM 3.3* 3.4 3.7   CHLORIDE 107 106 107   CO2 19* 19* 20*   BUN 62.6* 64.9* 68.1*   CR 1.91* 2.07* 2.24*   ANIONGAP 15 16* 13   TATE 7.6* 7.6* 7.9*   GLC 88 118* 98     Most Recent 3 BNP's:No lab results found.   Most Recent Cholesterol Panel:  Recent Labs   Lab Test 08/29/24  1106   CHOL 91   LDL 38   HDL 27*   TRIG 132       Most Recent Transthoracic Echocardiogram:  Echo result w/o MOPS: Interpretation Summary  Technically very difficult study. Left ventricular systolic function is moderately reduced.The visual ejectionfraction is 35-40%.There is moderate global hypokinesia of the left ventricle.The right ventricular systolic function is normal.There is mild (1+) mitral regurgitation.IVC diameter <2.1 cm collapsing >50% with sniff suggests a normal RA pressureof 3 mmHg. Echo dated 12/22/2018 LVEF was noted 60-65%.        Most Recent Cardiac Catheterization:  Cardiac Catheterization    Result Date: 9/10/2024    Ost LAD to Mid LAD lesion is 80% stenosed.    Prox Cx to Mid Cx lesion is 70% stenosed.    Mid Cx to Dist Cx lesion is 99% stenosed.    Prox RCA lesion is 100% stenosed.    1st Mrg lesion is 90% stenosed.    1st Diag lesion is 99% stenosed.    - Severe triple vessel disease CAD  Consider CABG, vs medical mangement, vs, PCI to LAD          Assessment & Plan     Assessment     Acute GI bleed  Acute blood loss anemia  NSTEMI  AngioL multivessel CAD  HFrEF  New onset, ischemic cardiomyopathy  LVEF 35-40%  Afib on warfarin   JAVIER on CKD    Mr. Alessio Teixeira is a very pleasant 88 year old male with a past medical history of coronary calcification on CT scan with subsequent normal perfusion imaging, family history of coronary artery disease, dyslipidemia, former smoker, possible CLL-notes indicate ongoing workup who was admitted on 9/9/2024 for concerns for GI bleeding. Cardiology was consulted for NSTEMI. Coronary angiogram this admission showed multivessel CAD.     Hemoglobin 8.7 today     Plan     Monitor bleeding/Hgb over the weekend  Discharge on Monday and outpatient CV surgery follow up     Thank you for involving us in the care of this patient.  We will follow     Fabiana Broderick MD on 9/14/2024 at 7:16 AM

## 2024-09-15 NOTE — PLAN OF CARE
"Patient Name: Alessio Teixeira  MRN: [id number]  Date of Admission: 9/9/2024  Reason for Admission: Suspected upper GI bleed; elevated troponin  [place] [facility] [place]    Vitals:   BP Readings from Last 1 Encounters:   09/15/24 102/61     Pulse Readings from Last 1 Encounters:   09/15/24 79     Wt Readings from Last 1 Encounters:   09/15/24 104.7 kg (230 lb 14.4 oz)     Ht Readings from Last 1 Encounters:   08/29/24 1.775 m (5' 9.88\")     Estimated body mass index is 33.24 kg/m  as calculated from the following:    Height as of 8/29/24: 1.775 m (5' 9.88\").    Weight as of this encounter: 104.7 kg (230 lb 14.4 oz).  Temp Readings from Last 1 Encounters:   09/14/24 97.7  F (36.5  C) (Oral)     Pain:  Pain goal 0 Pain Rating 0-4. Effective pain medication/regimen PRN tylenol given at HS for chronic neck pain.    CV Surgery Patient: No    Assessment    Resp: On RA. LS clear. Denied SOB or CP.  Telemetry: Afib CVR, w BBB.  Borderline showing aflutter CVR with BBB.   Neuro: Intact, A&Ox4. Baseline [place], has bilat HA. Edema to BUE: LUE 2/3+ vs RUE 1+. Edema to BLE 2/3+.   GI/: Up to BSC. BM x1, dark brown, formed. Voiding adequately using urinal. Tolerating regular diet, not much of an appetite. Denied N/V. Encouraging PO fluids (soft BPs).  Skin/Wounds: Scattered bruising. Dry, flaky skin with peeling from shins to toes. Blanchable redness to cas and coccyx.   Lines/Drains: R hand PIV SL  Activity: A1 GB/W. Denied dizziness or lightheadedness. Up in chair for breakfast.  Sleep: Better last night.  Abnormal Labs: On K & Mg protocol; awaiting lab results.     UA obtained this evening (see provider notification note): showed small amt of blood in urine. Hgb resulted, stable at 9.3  [8pm 9/14].    Aggression Stop Light: Green          Patient Care Plan:   Problem: Adult Inpatient Plan of Care  Goal: Plan of Care Review  Outcome: Progressing  Overall Patient Progress: no change  "

## 2024-09-15 NOTE — PROVIDER NOTIFICATION
"MD Notification    Notified Person: MD    Notified Person Name: JONE HSU    Notification Date/Time: 9/14/24  8:03pm    Notification Interaction: Belén    Purpose of Notification: \"Pt started Eliquis 9/13 for new dx of severe multivessel CAD. Pt initially came in for acute upper GI bleed, likely gastritis & acute blood anemia. Pt just noted bleeding and burning when urinating. I released a Hgb, results pending. I'm wondering if a UA would be beneficial?\"  No recent burt, no skin tears noted, luigi blood noted on chux and around urinal.    Orders Received: UA with Microscopic reflex to Culture    "

## 2024-09-15 NOTE — PROGRESS NOTES
Hematology/Oncology Follow-up Note  [facility]    Today's Date: 09/14/24   Date of Admission:  9/9/2024   Reason for Consult: leukocytosis       ASSESSMENT/ PLAN : Alessio Teixeira is a 88 year old year old male who presented this hospitalization with dark stools and elevated troponin. They have a pertinent medical history of Afib (coumadin), PE, previous colectomy d/t perforated bowel, myasthenia gravis, HTN.     Leukocytosis   Admission WBC 43.4   Increase baso- myelocytes and mild tear drops   Today WBC 36.9  Myeloproliferative neoplasm work-up pending   Peripheral blood smear for morphology:  Leukoerythroblastic features with:    - Moderate normochromic, normocytic anemia with nucleated red blood cells (4/100 WBC) and reticulocytosis without morphologic evidence since of hemolysis    - Moderate leukocytosis with absolute basophilia and neutrophilia with neutrophilic left shift with rare circulating blasts (<1%) without dysplasia   I had a lengthy discussion with patient in presence of his daughter who is a nurse, his wife and his niece. Patients brother was diagnosed with rare form of myelofibrosis also at an advanced age. His family was quite concerned about a inherited familiar cancer. There is no genetic syndromes for myelofibrosis, CML or other myeloproliferative disorders that I am aware off. There have been families in which multiple members have been diagnosed with myeloproliferative neoplasm have been reported (Blood. 2006;108(1):346. Epub 2006 Mar 14) but this is only bad luck I would say and not a genetic disease.     I do not expect to have results available over the weekend. They already have a follow up scheduled with Dr. Raymundo. I will personally communicate with Dr. Raymundo and the results would be available by the time of clinic visit on Wednesday, hopefully.      Anemia   Likely related to GI bleed   Admission hemoglobin 7.6--  acute decline from 11-12   EGD 9/11/24 healing gastritis- no  "active bleed    PLAN:  Will start heparin infusion if no signs or symptoms of bleeding. Can start PO Eliquis 2.5 mg BID  Lab work-up pending  Will sign off    I have reviewed case with Dr. Hermosillo.     Over 50 min spent on day of visit including review of tests, obtaining/reviewing separately obtained history/physical exam, counseling patient, ordering medications/tests/procedures, communicating with PCP/consultants, and documenting in electronic medical record.    Damien Bauer  Hematologist and Medical Oncologist  [facility]        DISCUSSION:  Discussed with patient, wife, daughter and niece as above         PHYSICAL EXAM:  Vital signs:  Temp: 97.7  F (36.5  C) Temp src: Oral BP: 102/61 Pulse: 79   Resp: 18 SpO2: 94 % O2 Device: None (Room air) Oxygen Delivery: 1 LPM   Weight: 104.7 kg (230 lb 14.4 oz)  Estimated body mass index is 33.24 kg/m  as calculated from the following:    Height as of 8/29/24: 1.775 m (5' 9.88\").    Weight as of this encounter: 104.7 kg (230 lb 14.4 oz).  GENERAL/CONSTITUTIONAL: No acute distress.    Labs Reviewed: CBC, CMP            "

## 2024-09-15 NOTE — PROGRESS NOTES
[facility] Cardiology Progress Note    Date of Admission:  9/9/2024  Reason for Consult:   Query NSTEMI    Subjective   Mr. Teixeira is doing well and overall feels better today. Daughter is at bedside     Objective   INTAKE / OUTPUT     Intake/Output Summary (Last 24 hours) at 9/14/2024 0716  Last data filed at 9/14/2024 0656  Gross per 24 hour   Intake 1479 ml   Output 1750 ml   Net -271 ml       VITAL SIGNS  Temp:  [97.6  F (36.4  C)-97.7  F (36.5  C)] 97.7  F (36.5  C)  Pulse:  [71-92] 79  Resp:  [13-23] 18  BP: ()/(58-90) 102/61  SpO2:  [84 %-98 %] 94 %  230 lbs 14.4 oz    PHYSICAL EXAM   Constitutional: Appears stated age, well nourished, NAD.    Neck: Supple.  JVD not visualized.   Respiratory: Non-labored. Lungs CTAB.  Cardiovascular: RRR. Bilateral lower extremities with trace edema.   GI: Soft, non-distended, non-tender.  Skin: Warm and dry.   Musculoskeletal/Extremities: Symmetrical movement.  Neurologic: No gross focal deficits. Alert, awake.  Psychiatric: Affect appropriate. Mentation normal.      Data   Most Recent 3 CBC's:  Recent Labs   Lab Test 09/15/24  0636 09/14/24  1951 09/14/24  0504 09/13/24  0715 09/12/24  0037 09/11/24  1518   WBC 33.1*  --   --  36.9*  --  32.3*   HGB 9.2* 9.3* 8.7* 9.4*   < > 8.5*  8.5*   MCV 88  --   --  87  --  86     --   --  348  --  333    < > = values in this interval not displayed.     Most Recent 3  BMP's:  Recent Labs   Lab Test 09/15/24  0636 09/14/24  1951 09/14/24  0504 09/13/24  1705     --  141 141   POTASSIUM 3.6 3.6 3.3* 3.4   CHLORIDE 107  --  107 106   CO2 17*  --  19* 19*   BUN 48.7*  --  62.6* 64.9*   CR 1.52*  --  1.91* 2.07*   ANIONGAP 14  --  15 16*   TATE 7.6*  --  7.6* 7.6*   GLC 85  --  88 118*     Most Recent 3 BNP's:No lab results found.   Most Recent Cholesterol Panel:  Recent Labs   Lab Test 08/29/24  1106   CHOL 91   LDL 38   HDL 27*   TRIG 132       Most Recent Transthoracic  Echocardiogram:  Echo result w/o MOPS: Interpretation Summary Technically very difficult study. Left ventricular systolic function is moderately reduced.The visual ejectionfraction is 35-40%.There is moderate global hypokinesia of the left ventricle.The right ventricular systolic function is normal.There is mild (1+) mitral regurgitation.IVC diameter <2.1 cm collapsing >50% with sniff suggests a normal RA pressureof 3 mmHg. Echo dated 12/22/2018 LVEF was noted 60-65%.        Most Recent Cardiac Catheterization:  Cardiac Catheterization    Result Date: 9/10/2024    Ost LAD to Mid LAD lesion is 80% stenosed.    Prox Cx to Mid Cx lesion is 70% stenosed.    Mid Cx to Dist Cx lesion is 99% stenosed.    Prox RCA lesion is 100% stenosed.    1st Mrg lesion is 90% stenosed.    1st Diag lesion is 99% stenosed.    - Severe triple vessel disease CAD  Consider CABG, vs medical mangement, vs, PCI to LAD          Assessment & Plan     Assessment     Acute GI bleed  Acute blood loss anemia  NSTEMI  AngioL multivessel CAD  HFrEF  New onset, ischemic cardiomyopathy  LVEF 35-40%  Afib on warfarin   JAVIER on CKD    Mr. Alessio Teixeira is a very pleasant 88 year old male with a past medical history of coronary calcification on CT scan with subsequent normal perfusion imaging, family history of coronary artery disease, dyslipidemia, former smoker, possible CLL-notes indicate ongoing workup who was admitted on 9/9/2024 for concerns for GI bleeding. Cardiology was consulted for NSTEMI. Coronary angiogram this admission showed multivessel CAD. The plan is for outpatient CABG is few weeks.     Hemoglobin 9.8 today     Plan     Monitor bleeding/Hgb over the weekend  Discharge on Monday and outpatient CV surgery follow up     Thank you for involving us in the care of this patient.  We will sign off. Please call with any questions     Fabiana Broderick MD on 9/14/2024 at 7:16 AM

## 2024-09-15 NOTE — PROGRESS NOTES
[facility]    Medicine Progress Note - Hospitalist Service    Date of Admission:  9/9/2024    Assessment & Plan   Alessio eTixeira is a 88 year old male with past medical history significant for atrial fibrillation, distant PE, previous colectomy due to perforated bowel, myasthenia gravis, and hypertension, who is being worked up for CLL but not formally diagnosed who was admitted with dark stools and concern of upper GI bleeding and elevated troponin. Hospitalization complicated by worsening ST changes on EKG, concern for MI, new cardiomyopathy on ECHO, and possible pneumonia.     Acute upper GI bleed, likely 2/2 gastritis.  Resolved   Acute blood anemia   In the setting of chronic anticoagulation and history of GI bleed due to gastric ulcer 2018  *Poor historian, dtr is pediatrician assists in interview. Hx dark stools for hours to 1 day, unclear per patient, spit up blood x1. Hx bleeding ulcer in 2018 requiring transfusion, due to ulcer. No formal chest pain. Reports SHOEMAKER and fatigue. No lightheadedness or dizziness.  No syncopal episodes.  No nausea or vomiting.  Has had some acute on chronic back pain, no history of AAA. Noted left lower abdominal tenderness, history of diverticulitis.  Previously on Nexium but reportedly stopped in the past week.    *On admission, mildly tachycardic 90s-110s and blood pressures in the . No hypoxia. Cr 1.59 which is at recent baseline.  Normal liver function.  INR 3.59. Recent hemoglobin 11-12 range.  8.9 in the ED and repeat 7.6.  Consented for blood, type and screen in the ED.  1 unit of blood ordered.  [facility] notified and plans to scope the patient on 9/10.  He will remain n.p.o. until then.  He was given 80 mg of IV Protonix and IVF.   *Hgb stable after 2 units PRBC 9/9 and INR <2, no further dark stools.   - Cardiology discussed case and plan is for diagnostic coronary angiogram followed by EGD  - Continue BID PPI.  Switched to PO   -  Monitor hemoglobins   - Avoid NSAIDs, blood thinners  - INR supratherapeutic, give 2 mg of vitamin K  - GI following and appreciate their recommendations.  EGD on 9/11 showed gastritis.  Okay with starting anticoagulation     Elevated troponin concerning for Non ST elevation myocardial infarction (NSTEMI) type I versus type II  Multivessel CAD   New cardiomyopathy   Atrial fibrillation on chronic anticoagulation  Supratherapeutic INR, resolved  No formal chest pain however is T wave inversions on EKG to lateral leads, these appear new from previous.  Denies formal chest pain but notes has some dyspnea on exertion recently, some ongoing back pain.  No history of AAA.   * EKG on presentation A-fib, 86 bpm with T wave inversions in inferolateral leads including 2 3 aVF, [id number].    * Troponin 206-->increased evening of admission to 712.   * ECHO on admission shws EF 35-40%, LV systolic function moderately reduced, moderate global hypokinesia of LV, mild miltral regurgitation. Prior ECHO 12/2018 with EF 60-65%. Concern for ischemia as above though unclear timeline of EF drop.  * LHC showed multivessel CAD   - Coumadin stopped   - Eliquis BID   - Continue PTA Statin  - Monitor with strict I/Os and daily weights  - Avoid excess IVF as able   - Cardiology consultation, greatly appreciate assistance  - CV surgery now following due to multi-vessel CAD and undergoing evaluation for possible CABG.  Plan for patient to follow up in clinic    ?Hematuria?  ?Dysuria?  On 9/15 noted to have questionable light blood in urine and after had some pain when initiating urination.  Patient believes he may have scraped his penis on the urinal causing this.  No urinary frequency or urgency  - Will monitor.  If symptoms continue will obtain UA   - Already on what would be appropriate antibiotic coverage for a UTI so feel this is unlikely      Leukocytosis, improving  Has been chronic and undergoing workup for CLL, not formally diagnosed.  No  acute need for consultation during hospitalization. Has been 28-29 range. On admission 43 now improving, stable.  - Hem/onc consulted here and appreciate their recommendations.  U     Possible community acquired pneumonia  Seen on CXR on admission. PCT 1.23. Started on abx.  - Finished course of Doxycycline  - Ceftriaxone switched to Cefdinir due to discomfort with administration      Incidental finding, mass-like opacity on CXR  Masslike opacity measuring 3 x 5.2 cm. Avoid CT contrast in setting of cor angio/CKD for now. Updated patient and daughter on 9/10.  - Follow up as outpatient     JAVIER on CKD Stage III.  Improving   Hyperkalemia, resolved  Baseline Cr 1.2-1.5. On admission, 1.59.  Cr bumped with diuresis and improved with diuretic holiday  - Renally dose medications as able/needed  - Lokelma stopped   - Continue to monitor         Chronic stable diagnoses and other pertinent medical history: Appropriate PTA medications will be resumed.    History of bowel perforation in the setting of excessive prednisone use for myasthenia gravis with status post partial colectomy  Dyslipidemia  Chronic back pain  History of myasthenia gravis          Diet: Regular Diet Adult  Snacks/Supplements Adult: Ensure Enlive; With Meals    DVT Prophylaxis: DOAC  Amos Catheter: Not present  Lines: None     Cardiac Monitoring: ACTIVE order. Indication: Tachyarrhythmias, acute (48 hours)  Code Status: Full Code      Clinically Significant Risk Factors        # Hypokalemia: Lowest K = 3.3 mmol/L in last 2 days, will replace as needed       # Hypoalbuminemia: Lowest albumin = 3.4 g/dL at 9/10/2024  4:11 AM, will monitor as appropriate  # Coagulation Defect: INR = 1.87 (Ref range: 0.85 - 1.15) and/or PTT = N/A, will monitor for bleeding    # Hypertension: Noted on problem list  # Chronic heart failure with reduced ejection fraction: last echo with EF <40%          # Obesity: Estimated body mass index is 33.24 kg/m  as calculated from the  "following:    Height as of 8/29/24: 1.775 m (5' 9.88\").    Weight as of this encounter: 104.7 kg (230 lb 14.4 oz).                   Disposition Plan     Medically Ready for Discharge: Anticipated Tomorrow        Camilo Hermosillo DO  Hospitalist Service  [facility]  Securely message with [employer] (more info)  Text page via [employer] Paging/Directory   ______________________________________________________________________    Interval History   Patient seen and examined. No acute events over night.  Patient and wife report some possible hematuria and pain with initiating urination.  Patient believes he may have scraped his penis on the urinal.  No fevers or hypoxia.  No chest pain or SOB.     Physical Exam   Vital Signs: Temp: 97.7  F (36.5  C) Temp src: Oral BP: 120/81 Pulse: 76   Resp: 18 SpO2: 96 % O2 Device: None (Room air)    Weight: 230 lbs 14.4 oz    General Appearance: Resting comfortably. NAD  Respiratory: No respiratory distress  Cardiovascular: Irregular rhythm   GI: Non-distended  Skin: No obvious rashes or cyanosis  Other: Alert.  1+ lower extremity edema      Medical Decision Making       45 MINUTES SPENT BY ME on the date of service doing chart review, history, exam, documentation & further activities per the note.      Data   ------------------------- PAST 24 HR DATA REVIEWED -----------------------------------------------    I have personally reviewed the following data over the past 24 hrs:    33.1 (H)  \   9.2 (L)   / 249     138 107 48.7 (H) /  85   3.6 17 (L) 1.52 (H) \     INR:  1.87 (H) PTT:  N/A   D-dimer:  N/A Fibrinogen:  N/A       Imaging results reviewed over the past 24 hrs:   No results found for this or any previous visit (from the past 24 hour(s)).  "

## 2024-09-16 NOTE — PLAN OF CARE
"Patient Name: Alessio Teixeira  MRN: [id number]  Date of Admission: 9/9/2024  Reason for Admission: Suspected upper GI bleed; elevated troponin  Level of Care: Medical     Vitals:   BP Readings from Last 1 Encounters:   09/16/24 99/69     Pulse Readings from Last 1 Encounters:   09/16/24 85     Wt Readings from Last 1 Encounters:   09/15/24 104.7 kg (230 lb 14.4 oz)     Ht Readings from Last 1 Encounters:   08/29/24 1.775 m (5' 9.88\")     Estimated body mass index is 33.24 kg/m  as calculated from the following:    Height as of 8/29/24: 1.775 m (5' 9.88\").    Weight as of this encounter: 104.7 kg (230 lb 14.4 oz).  Temp Readings from Last 1 Encounters:   09/15/24 98.2  F (36.8  C)      Pain:  Pain goal 0 Pain Rating 0-3. Effective pain medication/regimen PRN tylenol given at HS for chronic neck pain. Also endorsed pain to L flank, described it as \"a knot above kidney\", radiating to L hip.     CV Surgery Patient: No     Assessment     Resp: On RA. LS clear. Denied SOB or CP.  Telemetry: Afib CVR, w occasional PVCs  Neuro: Intact, A&Ox4. Baseline [place], has bilat HA. Edema to BUE: LUE 2+ vs RUE 1+. Edema to BLE 2+.   GI/: Up to BSC. LBM 9/15, none overnight. Voiding adequately using urinal. Tolerating regular diet, not much of an appetite. Denied N/V.   Skin/Wounds: Scattered bruising. Dry, flaky skin with peeling from thighs to toes. Blanchable redness to cas and buttock/coccyx.   Lines/Drains: R hand PIV SL  Activity: A1 GB/W. Endorsed some dizziness. Fatigue and increased generalized weakness noted, rendering walk short. Up in chair for breakfast.  Sleep: Good  Abnormal Labs: Protocols- K [3.8] & Mg [2.1].  INR [1.85]. Last Hgb [9.2].Last Cr [1.52].    Aggression Stop Light: Green    Patient Care Plan:   Problem: Adult Inpatient Plan of Care  Goal: Plan of Care Review  Outcome: Progressing  Overall Patient Progress: no change  "

## 2024-09-16 NOTE — PROGRESS NOTES
ANTICOAGULATION  MANAGEMENT    Alessio Teixeira is being discharged from the [facility] Anticoagulation Management Program ([facility]).    Reason for discharge: warfarin replaced by alternate therapy, Apixaban  upon discharge from the hospital.     Anticoagulation episode resolved, ACC referral closed, and Standing order discontinued    If patient needs warfarin management in the future, please send a new referral    Destiny Fabian RN

## 2024-09-16 NOTE — PLAN OF CARE
Goal Outcome Evaluation:    Patient approved for discharge per Costa Ventura MD orders.  Reviewed the AVS and med list with the patient and his spouse. All questions were answered.  Discharge meds sent to [facility] in [place].  Reviewed medication changes, provided patient education on new meds, and answered all questions.      Transportation back to home provided by the patient's spouse.  All patient belongings were collected and sent home with the family member.

## 2024-09-16 NOTE — TELEPHONE ENCOUNTER
RECORDS STATUS - ALL OTHER DIAGNOSIS      RECORDS RECEIVED FROM: [facility] - Internal records   DATE RECEIVED: 9/16

## 2024-09-16 NOTE — DISCHARGE SUMMARY
[facility]    Discharge Summary  Hospitalist    Date of Admission:  9/9/2024  Date of Discharge:  9/16/2024  Discharging Provider: Costa Ventura MD    Discharge Diagnoses     Acute upper GI bleed, likely 2/2 gastritis.  Resolved   Acute blood anemia due to above and in the setting of chronic anticoagulation  Non ST elevation myocardial infarction (NSTEMI)  Multivessel CAD   New cardiomyopathy   Atrial fibrillation on chronic anticoagulation  Supratherapeutic INR, resolved  Leukocytosis, improving  Possible community acquired pneumonia  Lung mass ruled out  JAVIER on CKD Stage III.  Improving   Hyperkalemia, resolved  History of myasthenia gravis     Hospital Course:    Alessio Teixeira is a 88 year old male with past medical history significant for atrial fibrillation, distant PE, previous colectomy due to perforated bowel, myasthenia gravis, and hypertension, who is being worked up for CLL but not formally diagnosed who was admitted with dark stools and concern of upper GI bleeding and elevated troponin. Hospitalization complicated by worsening ST changes on EKG, concern for MI, new cardiomyopathy and possible pneumonia.     Acute upper GI bleed, likely 2/2 gastritis.  Resolved   Acute blood anemia   In the setting of chronic anticoagulation and history of GI bleed due to gastric ulcer 2018  *Hx dark stools for hours to 1 day, unclear per patient, spit up blood x1. Hx bleeding ulcer in 2018 requiring transfusion, due to ulcer.    *On admission, mildly tachycardic 90s-110s and blood pressures in the . No hypoxia. Cr 1.59 which is at recent baseline.  Normal liver function.  INR 3.59. Recent hemoglobin 11-12 range.  8.9 in the ED and repeat 7.6. He was given 80 mg of IV Protonix and IVF.   -Evaluated by GI, underwent upper GI endoscopy on 9/11 which showed gastritis   -Was initially on IV PPI which was changed to p.o.  -Hemoglobin has remained stable, no evidence of ongoing GI  bleed  -Anticoagulation has been resumed without any evidence of recurrence of bleeding.    Non ST elevation myocardial infarction  Multivessel CAD   New cardiomyopathy   Atrial fibrillation on chronic anticoagulation  Supratherapeutic INR, resolved  *No formal chest pain however is T wave inversions on EKG to lateral leads, these appear new from previous.  Denies formal chest pain but notes has some dyspnea on exertion recently, some ongoing back pain.  No history of AAA.   * EKG on presentation A-fib, 86 bpm with T wave inversions in inferolateral leads including 2 3 aVF, [id number].    * Troponin 206-->increased to 712.   * ECHO on admission shws EF 35-40%, LV systolic function moderately reduced, moderate global hypokinesia of LV, mild miltral regurgitation. Prior ECHO 12/2018 with EF 60-65%. Concern for ischemia as above though unclear timeline of EF drop.  *LHC showed multivessel CAD   -Coumadin stopped   -Initiated on Eliquis 2.5 mg BID place of Coumadin  -Continue PTA Statin, switch to atorvastatin  -Cardiology and cardiovascular surgery following.  Was on diuretics which was held due to worsening renal function, resumed on the day of discharge.  He will be discharged on torsemide 40 mg daily  -CV surgery follow-up for multivessel coronary artery disease.  Plan is for deferred CABG versus PCI as outpatient.  He will follow-up with Dr. Dr. Mulvihill in clinic on 09/26/2024 to further discuss CABG vs PCI after a trial of anticoagulation.      ?Hematuria  ?Dysuria?  On 9/15 noted to have questionable light blood in urine and after had some pain when initiating urination.  Patient believes he may have scraped his penis on the urinal causing this.  No urinary frequency or urgency  -Resolved  -Already on what would be appropriate antibiotic coverage for a UTI so feel this is unlikely       Leukocytosis, improving  Has been chronic and undergoing workup for CLL, not formally diagnosed.  No acute need for consultation  during hospitalization. Has been 28-29 range. On admission 43 now improving, stable.  -Hem/onc consulted, no new recommendation at this point.     Possible community acquired pneumonia  Seen on CXR on admission. PCT 1.23. Started on abx.  -Finished course of Doxycycline  -Ceftriaxone switched to Cefdinir-completed course on 9/16/2024.     Lung mass rule out  -Masslike opacity measuring 3 x 5.2 cm on initial chest x-ray.   -CT chest on 9/10/2024 did not reveal any mass.     JAVIER on CKD Stage III.  Improving   Hyperkalemia, resolved  Baseline Cr 1.2-1.5. On admission, 1.59.  Cr bumped with diuresis and improved with diuretic holiday  -Creatinine down to 1.52 on 9/15, resume torsemide today with close follow-up as     Physical deconditioning  -Patient slightly weak compared to baseline.  Was evaluated by OT and cleared for home.  Discussed with care coordinator and nurse on the day of discharge, patient was moving reasonably well and was standby assist with the help of walker.  He would like to go home.  Will arrange home, PT, OT and RN.       Costa Ventura MD    Significant Results and Procedures   See below    Pending Results     Unresulted Labs Ordered in the Past 30 Days of this Admission       Date and Time Order Name Status Description    9/11/2024  2:55 PM BCR ABL1 Mutation Analysis In process     9/11/2024 12:46 AM CONDITIONAL Prepare red blood cells (unit) Preliminary             Code Status   Full Code       Primary Care Physician   Jose Squires    Physical Exam   Temp: 97.6  F (36.4  C) Temp src: Oral BP: 98/66 Pulse: 83   Resp: 16 SpO2: 98 % O2 Device: None (Room air)      Constitutional: AAOX3, NAD  Respiratory: CTA B/L, Normal WOB  Cardiovascular: RRR, No murmur  GI: Soft, Non- tender, BS- normoactive  Skin/Integument: Warm and dry, no rashes  MSK: Trace-1+ edema  Neuro: CN- grossly intact, moving all 4 extremities.     Discharge Disposition   Discharged to home  Condition at discharge:  Stable    Consultations This Hospital Stay   PHARMACY IP CONSULT  GASTROENTEROLOGY IP CONSULT  CARDIAC REHAB IP CONSULT  CARDIOLOGY IP CONSULT  CARE MANAGEMENT / SOCIAL WORK IP CONSULT  PHARMACY IP CONSULT  CARDIOTHORACIC SURGERY IP CONSULT  HEMATOLOGY & ONCOLOGY IP CONSULT  VASCULAR ACCESS ADULT IP CONSULT  PHARMACY IP CONSULT  SMOKING CESSATION PROGRAM IP CONSULT  SMOKING CESSATION PROGRAM IP CONSULT    Time Spent on this Encounter   Costa PULIDO MD, personally saw the patient today and spent greater than 30 minutes discharging this patient.    Discharge Orders      CBC with platelets    Please schedule lab draw prior to appt with Dr Mulvihill.     Primary Care - Care Coordination Referral      Follow-Up with Cardiology [facility]      Home Care Referral      Reason for your hospital stay    Non-ST elevation MI, GI bleed     Follow-up and recommended labs and tests     Follow up with primary care provider, Jose Squires, within 7 days for hospital follow- up.  The following labs/tests are recommended: BMP in 1 week     Activity    Your activity upon discharge: activity as tolerated     Diet    Follow this diet upon discharge: Current Diet:Orders Placed This Encounter      Snacks/Supplements Adult: Ensure Enlive; With Meals      Regular Diet Adult     Discharge Medications   Current Discharge Medication List        START taking these medications    Details   apixaban ANTICOAGULANT (ELIQUIS) 2.5 MG tablet Take 1 tablet (2.5 mg) by mouth 2 times daily.  Qty: 60 tablet, Refills: 1    Comments: Future refills by PCP Dr. Jose Squires with phone number [phone].  Associated Diagnoses: Chronic atrial fibrillation (H)      atorvastatin (LIPITOR) 10 MG tablet Take 1 tablet (10 mg) by mouth every evening.  Qty: 30 tablet, Refills: 1    Associated Diagnoses: Coronary artery disease of native artery of native heart with stable angina pectoris (H24)      nitroGLYcerin (NITROSTAT) 0.4 MG sublingual tablet For chest pain place  1 tablet under the tongue every 5 minutes for 3 doses. If symptoms persist 5 minutes after 1st dose call 911.  Qty: 30 tablet, Refills: 0    Associated Diagnoses: Coronary artery disease of native artery of native heart with stable angina pectoris (H24)      pantoprazole (PROTONIX) 40 MG EC tablet Take 1 tablet (40 mg) by mouth 2 times daily (before meals).  Qty: 60 tablet, Refills: 1    Associated Diagnoses: Anemia, unspecified type      torsemide (DEMADEX) 20 MG tablet Take 1 tablet (20 mg) by mouth daily.  Qty: 30 tablet, Refills: 1    Associated Diagnoses: Secondary cardiomyopathy (H)           CONTINUE these medications which have NOT CHANGED    Details   allopurinol (ZYLOPRIM) 300 MG tablet TAKE ONE TABLET BY MOUTH IN THE MORNING  Qty: 90 tablet, Refills: 1    Associated Diagnoses: Gout of big toe      CALCIUM PO Take 600 mg by mouth 2 times daily       clotrimazole-betamethasone (LOTRISONE) 1-0.05 % external cream Apply topically 2 times daily.  Qty: 45 g, Refills: 1    Associated Diagnoses: Tinea cruris      diclofenac (VOLTAREN) 1 % topical gel Apply 1 g topically in the morning and 1 g in the evening.  Qty: 150 g, Refills: 3    Associated Diagnoses: Other osteoarthritis of spine, cervical region      ferrous sulfate (FEROSUL) 325 (65 Fe) MG tablet Take 325 mg by mouth daily (with breakfast).      ipratropium (ATROVENT) 0.03 % nasal spray Spray 2 sprays into both nostrils every 12 hours  Qty: 30 mL, Refills: 0    Associated Diagnoses: Vasomotor rhinitis      magnesium 250 MG tablet Take 1 tablet by mouth daily.      multivitamin, therapeutic with minerals (THERA-VIT-M) TABS Take 1 tablet by mouth daily      potassium chloride sol ER (KLOR-CON M20) 20 MEQ CR tablet TAKE ONE TABLET BY MOUTH ONE TIME DAILY  Qty: 90 tablet, Refills: 1    Associated Diagnoses: Hypertension goal BP (blood pressure) < 140/90      Vitamin D, Cholecalciferol, 1000 units TABS Take 1,000 Units by mouth in the morning.           STOP  taking these medications       lovastatin (MEVACOR) 40 MG tablet Comments:   Reason for Stopping:         warfarin ANTICOAGULANT (COUMADIN) 4 MG tablet Comments:   Reason for Stopping:             Allergies   No Known Allergies  Data   Most Recent 3 CBC's:  Recent Labs   Lab Test 09/15/24  0636 09/14/24  1951 09/14/24  0504 09/13/24  0715 09/12/24  0037 09/11/24  1518   WBC 33.1*  --   --  36.9*  --  32.3*   HGB 9.2* 9.3* 8.7* 9.4*   < > 8.5*  8.5*   MCV 88  --   --  87  --  86     --   --  348  --  333    < > = values in this interval not displayed.      Most Recent 3 BMP's:  Recent Labs   Lab Test 09/16/24 0644 09/15/24  0636 09/14/24  1951 09/14/24  0504 09/13/24  1705   NA  --  138  --  141 141   POTASSIUM 3.8 3.6 3.6 3.3* 3.4   CHLORIDE  --  107  --  107 106   CO2  --  17*  --  19* 19*   BUN  --  48.7*  --  62.6* 64.9*   CR  --  1.52*  --  1.91* 2.07*   ANIONGAP  --  14  --  15 16*   TATE  --  7.6*  --  7.6* 7.6*   GLC  --  85  --  88 118*     Most Recent 2 LFT's:  Recent Labs   Lab Test 09/10/24  0411 09/09/24  1148   AST 90* 41   ALT 26 25   ALKPHOS 82 100   BILITOTAL 0.5 0.4     Most Recent INR's and Anticoagulation Dosing History:  Anticoagulation Dose History  More data exists         Latest Ref Rng & Units 9/10/2024 9/11/2024 9/12/2024 9/13/2024 9/14/2024 9/15/2024 9/16/2024   Recent Dosing and Labs   INR 0.85 - 1.15 1.83  1.88  1.63  2.10  1.90  1.88  1.87  1.85       Details          Multiple values from one day are sorted in reverse-chronological order             Most Recent 3 Troponin's:  Recent Labs   Lab Test 12/22/18  2141 12/22/18  1610 12/22/18  1127   TROPI 0.083* 0.092* 0.092*     Most Recent Cholesterol Panel:  Recent Labs   Lab Test 08/29/24  1106   CHOL 91   LDL 38   HDL 27*   TRIG 132     Most Recent 6 Bacteria Isolates From Any Culture (See EPIC Reports for Culture Details):  Recent Labs   Lab Test 10/24/19  1315   CULT No growth     Most Recent TSH, T4 and A1c Labs:No lab results  found.    Results for orders placed or performed during the hospital encounter of 09/09/24   XR Chest Port 1 View    Narrative    EXAM: XR CHEST PORT 1 VIEW  LOCATION: [facility]  DATE: 9/9/2024    INDICATION: hypoxia  COMPARISON: 12/22/2028      Impression    IMPRESSION: Heart is mildly enlarged. Masslike opacity measuring 3 x 5.2 cm. Small right effusion. Atelectasis noted at the right lung base. Possible small patchy airspace opacity at the right lung base. Findings are concerning for pneumonia. Recommend a   follow-up chest x-ray in 4-6 weeks to assure complete resolution.   US Carotid Bilateral    Narrative    EXAM: US CAROTID BILATERAL  LOCATION: [facility]  DATE: 9/10/2024    INDICATION: Preop CABG. Coronary artery disease.    Additional history per [facility] ED NOTE: Patient is currently being worked up for CLL.     COMPARISON: None.  TECHNIQUE: Duplex exam performed utilizing 2D grayscale imaging, Doppler interrogation with color-flow and spectral waveform analysis. The percent diameter stenosis is determined using Updated Recommendations for Carotid Stenosis Interpretation Criteria   from [facility] Vascular Testing.    FINDINGS:    RIGHT: Mild plaque at the bifurcation. The peak systolic velocity in the ICA is less than 180 cm/sec, consistent with less than 50% stenosis. Normal velocities in the ECA. Antegrade flow within the vertebral artery.     LEFT: Mild plaque at the bifurcation. The peak systolic velocity in the ICA is less than 180 cm/sec, consistent with less than 50% stenosis. Normal velocities in the ECA. Antegrade flow within the vertebral artery.    VELOCITY CHART:  CCA   Right: 76 cm/s   Left: 98 cm/s  ICA   Right: 70 cm/s   Left: 101 cm/s  ECA   Right: 69 cm/s   Left: 109 cm/s  ICA/CCA PSV Ratio   Right: 1.1   Left: 1.57    Incidentally noted, there are enlarged abnormal-appearing lymph nodes in the right and left sides of the neck, underlying  malignancy cannot be excluded.      Impression    IMPRESSION:  1.  Mild plaque formation, velocities consistent with less than 50% stenosis in the right internal carotid artery.  2.  Mild plaque formation, velocities consistent with less than 50% stenosis in the left internal carotid artery.  3.  Flow within the vertebral arteries is antegrade.  4.  Enlarged abnormal-appearing lymph nodes in the right and left neck, underlying malignancy cannot be excluded. Per the ED provider's note, the patient is being worked up for CLL.   US Lower Extremity Venous Mapping Bilateral    Narrative    EXAM: US LOWER EXTREMITY VENOUS MAPPING BILATERAL  LOCATION: [facility]  DATE: 9/10/2024    INDICATION: Preop coronary artery bypass graft. Coronary artery disease.  COMPARISON: None.  TECHNIQUE: Ultrasound examination of the lower extremity veins was performed, including gray-scale and compression imaging.         Impression    FINDINGS /IMPRESSION:  1. Left great saphenous vein in the thigh ranges between 1.9 mm and 6.3 mm.  2. Left great saphenous vein in the calf ranges between 1.9 and 2.3 mm.  3. Right great saphenous vein in the thigh ranges between 2.5 and 4.7 mm.   4. Right great saphenous vein in the calf ranges between 2.1 and 3.2 mm.   CT Chest w/o Contrast    Narrative    EXAM: CT CHEST W/O CONTRAST  LOCATION: [facility]  DATE: 9/10/2024    INDICATION: pre op cabg, looking for calcium on ascending aorta    COMPARISON: 6/28/2019.    TECHNIQUE: CT chest without IV contrast. Multiplanar reformats were obtained. Dose reduction techniques were used. CONTRAST: None.    FINDINGS:   MEDIASTINUM/AXILLAE: Thoracic aorta normal in caliber. There is circumferential calcification at the aortic annulus. No significant calcification within the ascending aorta. Discontinuous calcification of the thoracic aortic arch and scattered along the   descending thoracic aorta.    Pulmonary  arteries are mildly dilated. No pericardial effusion. Mild biatrial enlargement not well evaluated without contrast. There are shotty mediastinal lymph nodes, some calcified compatible with prior granulomatous disease.    LUNGS AND PLEURA: Mild interstitial pulmonary edema. Small bilateral pleural effusions. No pneumothorax. Calcified granuloma measuring approximately one CM in the right lower lobe. No pulmonary mass or consolidation.    CORONARY ARTERY CALCIFICATION: Severe, three-vessel    UPPER ABDOMEN: Marked splenomegaly, incompletely imaged. Hyperdense material within the renal collecting systems may be excreted contrast from earlier examination, please correlate clinically.    MUSCULOSKELETAL: Severe multilevel degenerative change throughout the thoracic spine.        Impression    IMPRESSION:   1.  No significant calcification within the ascending aorta. There is mild calcification of the aortic annulus and aortic arch.  2.  Mild pulmonary edema. Small bilateral pleural effusions.  3.  Enlarged pulmonary arteries compatible with pulmonary arterial hypertension.     Echocardiogram Complete     Value    LVEF  35-40%    Narrative    [id number]  [id number]  [id number]  [id number]^SARI^MADDIE^ROGE     [facility]  Echocardiography Laboratory  [address] [place] [address]     Name: JOSHUA MELTON  MRN: [id number]  : 1935  Study Date: 2024 04:05 PM  Age: 88 yrs  Gender: Male  Patient Location: [facility]  Reason For Study: Chest Pain  Ordering Physician: MADDIE GUO  Performed By: Manda Castro     BSA: 2.2 m2  Height: 69 in  Weight: 220 lb  HR: 101  BP: 91/54 mmHg  ______________________________________________________________________________  Procedure  Complete Portable Echo Adult. Optison (NDC #0913-0854) given intravenously.  Contrast Optison. Poor quality two-dimensional was performed and  interpreted.  ______________________________________________________________________________  Interpretation Summary     Technically very difficult study.     Left ventricular systolic function is moderately reduced.The visual ejection  fraction is 35-40%.There is moderate global hypokinesia of the left ventricle.  The right ventricular systolic function is normal.  There is mild (1+) mitral regurgitation.  IVC diameter <2.1 cm collapsing >50% with sniff suggests a normal RA pressure  of 3 mmHg.     Echo dated 12/22/2018 LVEF was noted 60-65%.  ______________________________________________________________________________  Left Ventricle  The left ventricle is normal in size. There is normal left ventricular wall  thickness. Diastolic Doppler findings (E/E' ratio and/or other parameters)  suggest left ventricular filling pressures are normal. Left ventricular  systolic function is moderately reduced. The visual ejection fraction is 35-  40%. There is moderate global hypokinesia of the left ventricle.     Right Ventricle  The right ventricle is normal size. The right ventricular systolic function is  normal.     Atria  Normal left atrial size. The right atrium is mildly dilated.     Mitral Valve  There is mild (1+) mitral regurgitation.     Tricuspid Valve  Right ventricular systolic pressure could not be approximated due to  inadequate tricuspid regurgitation. There is trace tricuspid regurgitation.     Aortic Valve  The aortic valve is not well visualized. No aortic regurgitation is present.  No aortic stenosis is present.     Pulmonic Valve  The pulmonic valve is not well visualized. There is no pulmonic valvular  stenosis.     Vessels  IVC diameter <2.1 cm collapsing >50% with sniff suggests a normal RA pressure  of 3 mmHg.     Pericardium  There is no pericardial effusion.     ______________________________________________________________________________  MMode/2D Measurements & Calculations  IVSd: 0.80  cm  LVIDd: 5.0 cm  LVIDs: 2.8 cm  LVPWd: 0.70 cm  FS: 43.8 %     LV mass(C)d: 125.1 grams  LV mass(C)dI: 58.2 grams/m2  EF Biplane: 19.8 %  LA Volume (BP): 41.9 ml  LA Volume Index (BP): 19.5 ml/m2  RWT: 0.28     Doppler Measurements & Calculations  MV E max riaz: 71.3 cm/sec  MV A max riaz: 59.2 cm/sec  MV E/A: 1.2  MV dec slope: 389.3 cm/sec2  MV dec time: 0.18 sec  E/E' av.6     Lateral E/e': 5.6  Medial E/e': 9.5  RV S Riaz: 12.2 cm/sec     ______________________________________________________________________________  Report approved by: Chetna Eldridge 2024 05:07 PM         Cardiac Catheterization    Narrative      Ost LAD to Mid LAD lesion is 80% stenosed.    Prox Cx to Mid Cx lesion is 70% stenosed.    Mid Cx to Dist Cx lesion is 99% stenosed.    Prox RCA lesion is 100% stenosed.    1st Mrg lesion is 90% stenosed.    1st Diag lesion is 99% stenosed.    - Severe triple vessel disease CAD  Consider CABG, vs medical mangement, vs, PCI to LAD       *Note: Due to a large number of results and/or encounters for the requested time period, some results have not been displayed. A complete set of results can be found in Results Review.

## 2024-09-16 NOTE — PROGRESS NOTES
CHART CHECK    Alessio Teixeira was consulted for leukocytosis     PLAN:  Continue Eliquis   Lab work-pending  Will follow up lab work results as outpatient, has appointment scheduled  Will sign off    ANNA Salguero CNP   Pager #[phone]

## 2024-09-16 NOTE — PLAN OF CARE
Cardiac Rehab Discharge Summary    Reason for therapy discharge:    Discharged to home.    Progress towards therapy goal(s). See goals on Care Plan in [facility] electronic health record for goal details.  Goals met    Therapy recommendation(s):    Continue home exercise program. Recommend family assist w/ heavier IADL and strenuous tasks.

## 2024-09-17 NOTE — TELEPHONE ENCOUNTER
Routing to Dr. Squires to advise on DME for shower chair.   Patient will follow up with  Home Medical tomorrow.   Please have clinic staff call patient if DME not approved.       Care Coordination completed post-hospital call.   Patient would like a shower chair. Does not feel safe until he has a shower chair in the home.   He'd like to see if insurance would cover this through a DME.     FYI, home care going out today. Patient plans to schedule hospital follow up visit with Dr. Squires some time today.     Yandy Santana, RN Clinic Care Coordinator  [facility] Clinics: [place], [place] (on-site Wednesdays), [place] (on-site Thursdays) & [facility].  [email or web address]  Phone: [phone]

## 2024-09-17 NOTE — TELEPHONE ENCOUNTER
PT NEEDS A FOLLOW-UP APPT WITHIN THE WEEK FIRST AVAILABLE ISN'T UNTIL OCT PT REQUESTING CALL FROM O

## 2024-09-17 NOTE — PROGRESS NOTES
Clinic Care Coordination Contact  Transitions of Care Outreach  Chief Complaint   Patient presents with    Clinic Care Coordination - Post Hospital       Most Recent Admission Date: 9/9/2024   Most Recent Admission Diagnosis: Long term (current) use of anticoagulants - Z79.01  Hyperkalemia - E87.5  CLL (chronic lymphocytic leukemia) (H) - C91.10  Chronic a-fib (H) - I48.20  Gastrointestinal hemorrhage, unspecified gastrointestinal hemorrhage type - K92.2  Anemia, unspecified type - D64.9  Chronic renal failure, unspecified CKD stage - N18.9     Most Recent Discharge Date: 9/16/2024   Most Recent Discharge Diagnosis: Gastrointestinal hemorrhage, unspecified gastrointestinal hemorrhage type - K92.2  Anemia, unspecified type - D64.9  CLL (chronic lymphocytic leukemia) (H) - C91.10  Chronic renal failure, unspecified CKD stage - N18.9  Hyperkalemia - E87.5  Long term (current) use of anticoagulants - Z79.01  Chronic a-fib (H) - I48.20  Elevated troponin - R79.89  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  How are your symptoms? (Red Flag symptoms escalate to triage hotline per guidelines): Improved  Do you know how to contact your clinic care team if you have future questions or changes to your health status? : Yes  Does the patient have their discharge instructions? : Yes  Does the patient have questions regarding their discharge instructions? : No  Were you started on any new medications or were there changes to any of your previous medications? : Yes  Does the patient have all of their medications?: Yes  Do you have questions regarding any of your medications? : No  Do you have all of your needed medical supplies or equipment (DME)?  (i.e. oxygen tank, CPAP, cane, etc.):  No - What equipment or supplies are needed? (needs shower chair.)         Post-op (Clinicians Only)  Did the patient have surgery or a procedure: No  Fever: No  Chills: No  Eating & Drinking: eating and drinking without complaints/concerns  PO Intake: regular diet  Additional Symptoms:  (Denies)  Bowel Function: constipation  Date of last BM: 09/16/24  Urinary Status: voiding without complaint/concerns    Care Management   None    Follow up Plan     Patient declined Care Coordination services at this time.   Patient is aware of how to initiate Care Coordination services with the clinic in the future.     Patient declined to review medication list.     Discharge Follow-Up  Discharge follow up appointment scheduled in alignment with recommended follow up timeframe or Transitions of Risk Category? (Low = within 30 days; Moderate= within 14 days; High= within 7 days): Yes  Discharge Follow Up Appointment Date: 10/03/24  Discharge Follow Up Appointment Scheduled with?: Specialty Care Provider    Future Appointments   Date Time Provider Department Center   9/18/2024  8:20 AM Aleksandra Raymundo MD [facility]   9/30/2024  8:40 AM SH LAB ONLY [facility]   10/2/2024 10:30 AM EC LAB [facility]   10/3/2024  3:00 PM [facility]   4/10/2025 10:30 AM Opal Middleton PA-C [facility]       Outpatient Plan as outlined on AVS reviewed with patient.    For any urgent concerns, please contact our 24 hour nurse triage line: [phone])       Yandy Santana RN

## 2024-09-17 NOTE — TELEPHONE ENCOUNTER
Home Care is calling regarding an established patient with [facility].       Requesting orders from: Jose Squires  Provider is following patient: Yes  Is this a 60-day recertification request?  No    Orders Requested    Skilled Nursing  Request for initial certification (first set of orders)   Frequency:  1x/wk for 6 wks    Confirmed ok to leave a detailed message with call back.  Contact information confirmed and updated as needed.    Bhanu Nieves RN

## 2024-09-18 NOTE — LETTER
9/18/2024      Alessio Teixeira  [address]  [place] [address]      Dear Colleague,    Thank you for referring your patient, Alessio Teixeira, to the [facility]. Please see a copy of my visit note below.    HEMATOLOGY HISTORY: Mr. Teixeira is a gentleman with chronic leukocytosis.  1.  On 02/08/2019, WBC normal at 9.3.  -On 02/14/2019, WBC of 11.9.  Since then multiple CBC have revealed leukocytosis.  2.  Patient has chronic anemia.  -On 07/24/2007, hemoglobin of 11.7.  Since then CBC have revealed anemia.  3.  On 08/29/2024:  -WBC of 28.3, hemoglobin of 11.7 and platelet of 414.  MCV of 86.  -Reveals creatinine of 1.54.  Normal LFT.  4.  On 09/09/2024, patient admitted to hospital for GI bleed.  -Hemoglobin had decreased to 7.6.    -WBC remained in the 30s.  On 09/11/24, WBC of 32.3.  Neutrophil of 88% lymphocyte of 2%.  -On 09/11/2024, LDH of 835.  -Peripheral blood smear review on 09/11/2024 reveals leukoerythroblastic features with moderate normochromic, normocytic anemia with nucleated RBC.  5.  EGD on 09/11/2024 revealed gastritis.  No active bleeding.    Subjective:  Mr. Teixeira is a 88-year-old gentleman with chronic leukocytosis.  Patient was recently admitted to the hospital with GI bleed secondary to gastritis.  EGD had revealed healing gastritis.  There was no active bleeding.    In the hospital he received blood transfusion.  He also received IV iron.    CBC in the hospital had revealed persistent leukocytosis.  It is neutrophilia.  Multiple workup ordered for neutrophilia.  They are pending.    He is here for follow-up.  He feels better.  He has mild fatigue.  He is able to do all his activities.  No headache.  No dizziness.  No chest pain.  No shortness of breath at rest.  No abdominal pain.  No nausea or vomiting.  Denies bleeding from any site.    Patient is on Eliquis for cardiac indication.  Previously he was on warfarin    Exam:  He is alert oriented x 3 not in  distress  Vitals: Reviewed  Rest of system not examined.    Labs: Multiple labs done today.  -WBC of 36.5, hemoglobin of 10.8 and platelet of 474.  -Mildly low iron of 47.  Iron saturation index is normal.  Ferritin is normal.  -Folate normal  -TSH mildly elevated at 4.66.  -INR remains elevated at 1.71.    Assessment:  1.  A 88-year-old gentleman with chronic leukocytosis.  2.  Normocytic anemia.  This is multifactorial from renal disease, anemia of chronic disease/inflammation and recent GI bleed.  3.  Thrombocytosis, reactive.  4.  Hypothyroidism.  5.  Coagulopathy.  6.  Recent hospitalization for GI bleed.  7.  Iron deficiency from GI bleed.    Plan:  -Virtual visit next week to review the labs.    Discussion:  1.  Discussed with the patient and his wife regarding leukocytosis.  He has chronic leukocytosis which has recently gotten worse.  It is mainly the neutrophil that is elevated.  Different causes reviewed.  Multiple workup including JAK2 mutation and BCR-ABL is pending.  If those come back negative, will discuss regarding bone marrow biopsy.    2.  Patient has normocytic anemia.  This is multifactorial from renal disease, anemia of chronic disease/inflammation and recent GI bleeds.  Patient may also have primary bone marrow pathology.  His anemia is stable.  Will monitor it.    3.  Patient has thrombocytosis.  This is reactive.  It will improve as anemia improves.    4.  We checked a TSH today which is mildly elevated.  Will recheck free T4.  After that we will decide regarding levothyroxine.    5.  Patient's iron is mildly low.  Iron saturation index and ferritin are normal.  Patient recently received IV iron in the hospital.  He is not bleeding.  Mildly low iron should resolve with dietary iron intake.      6.  Patient's INR remains elevated.  He is no longer on warfarin.  Some of the elevation is from Eliquis.  Eliquis can cause mild elevation of PT/INR.  Patient is obese.  He may also have fatty liver  which can also cause some elevation of INR.  At this time we we will monitor it.  If it gets worse, will consider mixing studies.    7.  Patient and his wife had few questions which were all answered.  I will see him next week.    Total visit time of 40 minutes.  Time spent in today's visit, review of chart/investigations today and documentation today.      Again, thank you for allowing me to participate in the care of your patient.        Sincerely,        Aleksandra Raymundo MD

## 2024-09-18 NOTE — PROGRESS NOTES
Nursing Note:  Alessio Teixeira presents today for Peripheral labs.    Patient seen by provider today: Yes: Zackary   present during visit today: Not Applicable.    Note: N/A.    Intravenous Access:  Lab draw site right AC, Needle type butterfly, Gauge 23.  Labs drawn without difficulty.    Discharge Plan:   Patient was sent to home for future appointment.    Alicia Renee RN

## 2024-09-18 NOTE — TELEPHONE ENCOUNTER
Patient was admitted to [facility] on 9/9/24 who was admitted with dark stools and concern of upper GI bleeding and elevated troponin. Hospitalization complicated by worsening ST changes on EKG, concern for MI, new cardiomyopathy and possible pneumonia.     PMH: HLD, former smoker. atrial fibrillation, distant PE, previous colectomy due to perforated bowel, myasthenia gravis, and hypertension, who is being worked up for CLL but not formally diagnosed.     9/9/24: Echo showed EF of 35-40%, LV systolic function moderately reduced, moderate global hypokinesia of LV, mild miltral regurgitation. Prior ECHO 12/2018 with EF 60-65%.     9/10/24: Coronary angiogram via JUDIT showed:      Ost LAD to Mid LAD lesion is 80% stenosed.    Prox Cx to Mid Cx lesion is 70% stenosed.    Mid Cx to Dist Cx lesion is 99% stenosed.    Prox RCA lesion is 100% stenosed.    1st Mrg lesion is 90% stenosed.    1st Diag lesion is 99% stenosed.     - Severe triple vessel disease CAD.  -Consider CABG, vs medical mangement, vs, PCI to LAD.    9/11/24: Evaluated by GI, underwent upper GI endoscopy showed gastritis. Was initially on IV PPI which was changed to p.o.    Pt was started on Eliquis, Lipitor, NTG, Protonix, Demadex. PTA Losartan and Warfarin were discontinued.    Writer called pt to discuss any post hospital d/c questions he may have, review medication changes, and confirm f/u appts. Patient denied any questions regarding new medications or changes with their PTA medications. States pt and wife went through meds with [facility] RN. No questions.    Patient denied any SOB, chest pain or lightheadedness.     JUDIT cardiac cath site is without bleeding, swelling, redness or signs of infection.     RN confirmed with patient that he is scheduled for labs on 9/30/24 at 0840 and an OV on 10/3/24 at 1500 with Dr. Mulvihill at our [place] [facility]. Wife asking if lab appt can be scheduled at [facility]. Will route this note to CV surgery to make sure.    Patient  advised to call clinic with any cardiac related questions or concerns prior to this angie't. Patient verbalized understanding and agreed with plan. LILIA Felix RN.

## 2024-09-19 NOTE — TELEPHONE ENCOUNTER
Triage Patient Outreach    Attempt # 1    Was call answered?  No.   LDVM for Home care with approval of orders     Shabana Deluca RN

## 2024-09-19 NOTE — TELEPHONE ENCOUNTER
Home Care is calling regarding an established patient with [facility].       Requesting orders from: Jose Squires  Provider is following patient: Yes  Is this a 60-day recertification request?  No    Orders Requested    Occupational Therapy  Request for initial certification (first set of orders)   Frequency:  1x/wk for 3 wks  Energy conservation  Adaptive equipment recommendation  Home safety    Confirmed ok to leave a detailed message with call back.  Contact information confirmed and updated as needed.    Bhanu Nieves RN

## 2024-09-19 NOTE — PROGRESS NOTES
HEMATOLOGY HISTORY: Mr. Teixeira is a gentleman with chronic leukocytosis.  1.  On 02/08/2019, WBC normal at 9.3.  -On 02/14/2019, WBC of 11.9.  Since then multiple CBC have revealed leukocytosis.  2.  Patient has chronic anemia.  -On 07/24/2007, hemoglobin of 11.7.  Since then CBC have revealed anemia.  3.  On 08/29/2024:  -WBC of 28.3, hemoglobin of 11.7 and platelet of 414.  MCV of 86.  -Reveals creatinine of 1.54.  Normal LFT.  4.  On 09/09/2024, patient admitted to hospital for GI bleed.  -Hemoglobin had decreased to 7.6.    -WBC remained in the 30s.  On 09/11/24, WBC of 32.3.  Neutrophil of 88% lymphocyte of 2%.  -On 09/11/2024, LDH of 835.  -Peripheral blood smear review on 09/11/2024 reveals leukoerythroblastic features with moderate normochromic, normocytic anemia with nucleated RBC.  5.  EGD on 09/11/2024 revealed gastritis.  No active bleeding.    Subjective:  Mr. Teixeira is a 88-year-old gentleman with chronic leukocytosis.  Patient was recently admitted to the hospital with GI bleed secondary to gastritis.  EGD had revealed healing gastritis.  There was no active bleeding.    In the hospital he received blood transfusion.  He also received IV iron.    CBC in the hospital had revealed persistent leukocytosis.  It is neutrophilia.  Multiple workup ordered for neutrophilia.  They are pending.    He is here for follow-up.  He feels better.  He has mild fatigue.  He is able to do all his activities.  No headache.  No dizziness.  No chest pain.  No shortness of breath at rest.  No abdominal pain.  No nausea or vomiting.  Denies bleeding from any site.    Patient is on Eliquis for cardiac indication.  Previously he was on warfarin    Exam:  He is alert oriented x 3 not in distress  Vitals: Reviewed  Rest of system not examined.    Labs: Multiple labs done today.  -WBC of 36.5, hemoglobin of 10.8 and platelet of 474.  -Mildly low iron of 47.  Iron saturation index is normal.  Ferritin is normal.  -Folate  normal  -TSH mildly elevated at 4.66.  -INR remains elevated at 1.71.    Assessment:  1.  A 88-year-old gentleman with chronic leukocytosis.  2.  Normocytic anemia.  This is multifactorial from renal disease, anemia of chronic disease/inflammation and recent GI bleed.  3.  Thrombocytosis, reactive.  4.  Hypothyroidism.  5.  Coagulopathy.  6.  Recent hospitalization for GI bleed.  7.  Iron deficiency from GI bleed.    Plan:  -Virtual visit next week to review the labs.    Discussion:  1.  Discussed with the patient and his wife regarding leukocytosis.  He has chronic leukocytosis which has recently gotten worse.  It is mainly the neutrophil that is elevated.  Different causes reviewed.  Multiple workup including JAK2 mutation and BCR-ABL is pending.  If those come back negative, will discuss regarding bone marrow biopsy.    2.  Patient has normocytic anemia.  This is multifactorial from renal disease, anemia of chronic disease/inflammation and recent GI bleeds.  Patient may also have primary bone marrow pathology.  His anemia is stable.  Will monitor it.    3.  Patient has thrombocytosis.  This is reactive.  It will improve as anemia improves.    4.  We checked a TSH today which is mildly elevated.  Will recheck free T4.  After that we will decide regarding levothyroxine.    5.  Patient's iron is mildly low.  Iron saturation index and ferritin are normal.  Patient recently received IV iron in the hospital.  He is not bleeding.  Mildly low iron should resolve with dietary iron intake.      6.  Patient's INR remains elevated.  He is no longer on warfarin.  Some of the elevation is from Eliquis.  Eliquis can cause mild elevation of PT/INR.  Patient is obese.  He may also have fatty liver which can also cause some elevation of INR.  At this time we we will monitor it.  If it gets worse, will consider mixing studies.    7.  Patient and his wife had few questions which were all answered.  I will see him next week.    Total  visit time of 40 minutes.  Time spent in today's visit, review of chart/investigations today and documentation today.

## 2024-09-20 NOTE — RESULT ENCOUNTER NOTE
Dear Mr. Teixeira,    Blood test reveals few abnormalities. We will review it during appointment.    Please, call me with any questions.    Aleksandra Raymundo MD

## 2024-09-23 NOTE — PROGRESS NOTES
Assessment & Plan     Mixed hyperlipidemia    - Basic metabolic panel  (Ca, Cl, CO2, Creat, Gluc, K, Na, BUN); Future    Hyperkalemia  Will review the lab and update pt  - Basic metabolic panel  (Ca, Cl, CO2, Creat, Gluc, K, Na, BUN); Future    Hypertension goal BP (blood pressure) < 140/90  stable  - Basic metabolic panel  (Ca, Cl, CO2, Creat, Gluc, K, Na, BUN); Future    Chronic atrial fibrillation (H)  Seeing cardiology   - Basic metabolic panel  (Ca, Cl, CO2, Creat, Gluc, K, Na, BUN); Future    CLL (chronic lymphocytic leukemia) (H)  Seeing oncology   Currently stable, encouraged him to see at the follow up as scheduled   - Basic metabolic panel  (Ca, Cl, CO2, Creat, Gluc, K, Na, BUN); Future    Physical deconditioning  Improving   - Basic metabolic panel  (Ca, Cl, CO2, Creat, Gluc, K, Na, BUN); Future    Gastrointestinal hemorrhage, unspecified gastrointestinal hemorrhage type  Stable   - Basic metabolic panel  (Ca, Cl, CO2, Creat, Gluc, K, Na, BUN); Future    Secondary cardiomyopathy (H)  Seeing cardiology     Fluid retention  Has cramps on bilateral foot with swelling, will have him to try compression stocking   - Compression Sleeve/Stocking Order for DME - ONLY FOR DME        MED REC REQUIRED  Post Medication Reconciliation Status:  Discharge medications reconciled, continue medications without change        Meg Beltran is a 88 year old, presenting for the following health issues:  Hospital F/U (GI Bleed.)        9/23/2024    10:40 AM   Additional Questions   Roomed by Mansi REYNAGA   Accompanied by Wife- Kelli     History of Present Illness       Reason for visit:  Hospital stay follow up   He is taking medications regularly.          Hospital Follow-up Visit:    Hospital/Nursing Home/IP Rehab Facility: [facility]  Date of Admission: 09/09/2024  Date of Discharge: 09/16/2024   Reason(s) for Admission: GI Bleed  Was the patient in the ICU or did the patient experience delirium  "during hospitalization?  No  Do you have any other stressors you would like to discuss with your provider? Health Concerns    Problems taking medications regularly:  None  Medication changes since discharge: None  Problems adhering to non-medication therapy:  None    Summary of hospitalization:  [facility] discharge summary reviewed  Diagnostic Tests/Treatments reviewed.  Follow up needed: none  Other Healthcare Providers Involved in Patient s Care:         Specialist appointment - cardio/cardiothoracic surgery, oncology   Update since discharge: fluctuating course.         Plan of care communicated with patient                 Review of Systems  Constitutional, HEENT, cardiovascular, pulmonary, GI, , musculoskeletal, neuro, skin, endocrine and psych systems are negative, except as otherwise noted.      Objective    /67   Pulse 98   Temp (!) 95  F (35  C) (Oral)   Resp 22   Ht 1.78 m (5' 10.08\")   Wt 105.1 kg (231 lb 12.8 oz)   SpO2 (!) 88%   BMI 33.18 kg/m    Body mass index is 33.18 kg/m .  Physical Exam   GENERAL: alert and no distress  EYES: Eyes grossly normal to inspection, PERRL and conjunctivae and sclerae normal  HENT: ear canals and TM's normal, nose and mouth without ulcers or lesions  NECK: no adenopathy, no asymmetry, masses, or scars  RESP: lungs clear to auscultation - no rales, rhonchi or wheezes  CV: regular rate and rhythm, normal S1 S2, no S3 or S4, no murmur, click or rub, no peripheral edema  ABDOMEN: soft, nontender, no hepatosplenomegaly, no masses and bowel sounds normal  MS: no gross musculoskeletal defects noted, no edema            Signed Electronically by: Jose Squires MD    "

## 2024-09-26 NOTE — TELEPHONE ENCOUNTER
Forms/Letter Request    Type of form/letter: DMV/Handicap Parking      Do we have the form/letter: Yes: Red folder placed in Dr Squires's inbox        How would you like the form/letter returned: Redd

## 2024-09-26 NOTE — TELEPHONE ENCOUNTER
Picked up completed forms,Scanned to medical records, sent to patient via [employer] and filed in team 3 drawer.

## 2024-09-30 NOTE — TELEPHONE ENCOUNTER
Forms/Letter Request    Type of form/letter: [facility]- Order# [id number]    Do we have the form/letter: Yes: Red folder placed in Dr Squires's inbox        How would you like the form/letter returned: Fax : [phone]

## 2024-10-02 ENCOUNTER — VIRTUAL VISIT (OUTPATIENT)
Dept: ONCOLOGY | Facility: CLINIC | Age: 89
End: 2024-10-02
Attending: INTERNAL MEDICINE
Payer: MEDICARE

## 2024-10-02 ENCOUNTER — TELEPHONE (OUTPATIENT)
Dept: FAMILY MEDICINE | Facility: CLINIC | Age: 89
End: 2024-10-02

## 2024-10-02 VITALS — WEIGHT: 231 LBS | HEIGHT: 70 IN | BODY MASS INDEX: 33.07 KG/M2

## 2024-10-02 DIAGNOSIS — D47.1 MYELOPROLIFERATIVE DISORDER (H): Primary | ICD-10-CM

## 2024-10-02 PROCEDURE — G2211 COMPLEX E/M VISIT ADD ON: HCPCS | Mod: 95 | Performed by: INTERNAL MEDICINE

## 2024-10-02 PROCEDURE — 99214 OFFICE O/P EST MOD 30 MIN: CPT | Mod: 95 | Performed by: INTERNAL MEDICINE

## 2024-10-02 ASSESSMENT — PAIN SCALES - GENERAL: PAINLEVEL: NO PAIN (0)

## 2024-10-02 NOTE — TELEPHONE ENCOUNTER
Reason for Call:  Form, our goal is to have forms completed with 72 hours, however, some forms may require a visit or additional information.    Type of letter, form or note:  handicap    Who is the form from?: Patient    Where did the form come from: Patient or family brought in       What clinic location was the form placed at?: [facility]    Where the form was placed:  team 3    What number is listed as a contact on the form?: [phone]       Additional comments: see blue note for more details    Call taken on 10/2/2024 at 2:03 PM by Claudia Meckel

## 2024-10-02 NOTE — LETTER
10/2/2024      Alessio Teixeira  [address]  [place] [address]      Dear Colleague,    Thank you for referring your patient, Alessio Teixeira, to the [facility]. Please see a copy of my visit note below.    Virtual Visit Details    Type of service:  Video Visit     Originating Location (pt. Location): Home    Distant Location (provider location):  On-site  Platform used for Video Visit: [employer]    HEMATOLOGY HISTORY: Mr. Teixeira is a gentleman with chronic myeloproliferative disorder.  JAK2 mutation positive.    1.  On 02/08/2019, WBC normal at 9.3.  -On 02/14/2019, WBC of 11.9.  Since then multiple CBC have revealed leukocytosis.  2.  Patient has chronic anemia.  -On 07/24/2007, hemoglobin of 11.7.  Since then CBC have revealed anemia.  3.  On 08/29/2024:  -WBC of 28.3, hemoglobin of 11.7 and platelet of 414.  MCV of 86.  -Reveals creatinine of 1.54.  Normal LFT.  4.  On 09/09/2024, patient admitted to hospital for GI bleed.  -Hemoglobin had decreased to 7.6.    -WBC remained in the 30s.  On 09/11/24, WBC of 32.3.  Neutrophil of 88% lymphocyte of 2%.  -On 09/11/2024, LDH of 835.  -Peripheral blood smear review on 09/11/2024 reveals leukoerythroblastic features with moderate normochromic, normocytic anemia with nucleated RBC.  5.  EGD on 09/11/2024 revealed gastritis.  No active bleeding.  6.  On 09/11/2024, a pathogenic mutation was identified in JAK2: p.V617F at 53% VAF.  -On 09/11/2024, No BCR-ABL1 transcripts were detectable.   -Flow cytometry on 09/18/2024 reveals rare circulating myeloid blasts with no aberrant immunophenotype (0.6%). Polytypic B cells. No aberrant immunophenotype on T cells.     Subjective:  Mr. Teixeira is a 88-year-old gentleman with chronic leukocytosis.  Multiple investigations done.    -On 09/11/2024, a pathogenic mutation was identified in JAK2: p.V617F at 53% VAF.  -On 09/11/2024, No BCR-ABL1 transcripts were detectable.   -Flow cytometry on 09/18/2024  reveals rare circulating myeloid blasts with no aberrant immunophenotype (0.6%). Polytypic B cells. No aberrant immunophenotype on T cells.  -On 10/01/2024, WBC of 22.9, hemoglobin of 11.3 and platelet of 380.  Normal MCV.  Neutrophil of 79% lymphocyte of 4%.    I had a video visit with the patient.  His wife and daughter (who is a pediatrician) was also on the video.    Patient overall doing well. He has mild fatigue. No headache or dizziness.  No chest pain.  No shortness of breath at rest.     Patient is on Eliquis for cardiac indication.  No bleeding.     Exam:  He is alert and oriented x 3.  Not in distress  Rest of system not examined as this is a video visit.     Labs: Reviewed.     Assessment:  1.  An 88-year-old gentleman with chronic leukocytosis. Patient has chronic myeloproliferative disorder.  JAK2 mutation positive.  Leukocytosis has improved.  Some of the leukocytosis was reactive.  2.  Normocytic anemia.  Improving. This is multifactorial from renal disease and anemia of chronic disease/inflammation.  3.  Thrombocytosis, resolved.  It was reactive thrombocytosis.       Plan:  -Okay to proceed with cardiac procedure.  -Follow-up in 3 to 4 months with CBC.  -Consider bone marrow biopsy and CT scan in future if there is worsening of leukocytosis.     Discussion:  1.  Patient's overall condition is stable.  CBC reviewed with him.  Leukocytosis has improved.  Anemia is better.  Thrombocytosis has resolved.    Patient had multiple labs done for leukocytosis.  JAK2 mutation has come back positive.  On flow cytometry there are rare circulating blast.  Patient has myeloproliferative disorder.    2.  Discussed regarding myeloproliferative disorder.  This is causing mild leukocytosis.  Patient has anemia.  Anemia is due to other factors including renal disease and anemia of chronic disease.  I do not think it is due to myeloproliferative disorder.      We discussed regarding further investigation including bone  marrow biopsy and CT scan.  Given his age and other medical problems, we can wait on bone marrow biopsy and CT scan.  I would recommend doing bone marrow biopsy and CT scan when there is worsening of leukocytosis or worsening anemia or development of thrombocytopenia.    Patient/family had few questions which were all answered.    3.  Patient has coronary artery disease.  He is going to meet with cardiothoracic surgeon to discuss regarding CABG.  Patient can proceed with CABG from hematology side.    4.  Will see him in 3 to 4 months time with CBC.     Total video visit time of 20 minutes.  Time spent in today's visit, review of chart/investigations today and documentation.         Again, thank you for allowing me to participate in the care of your patient.        Sincerely,        Aelksandra Raymundo MD

## 2024-10-02 NOTE — NURSING NOTE
[place] [address] [address]  [place] [facility] [address]    Is the patient currently in the state of [place]? YES    Visit mode:VIDEO    If the visit is dropped, the patient can be reconnected by: VIDEO VISIT: Send to e-mail at: [email or web address]    Will anyone else be joining the visit? NO  (If patient encounters technical issues they should call [phone]    Are changes needed to the allergy or medication list? Pt stated no changes to allergies and Pt stated no med changes    Are refills needed on medications prescribed by this physician? NO    Rooming Documentation:  Unable to complete questionnaire(s) due to time    Reason for visit: KEL Junior LPN

## 2024-10-02 NOTE — LETTER
10/2/2024      Alessio Teixeira  [address]  [place] [address]      Dear Colleague,    Thank you for referring your patient, Alessio Teixeira, to the [facility]. Please see a copy of my visit note below.    Virtual Visit Details    Type of service:  Video Visit     Originating Location (pt. Location): Home    Distant Location (provider location):  On-site  Platform used for Video Visit: [employer]    HEMATOLOGY HISTORY: Mr. Teixeira is a gentleman with chronic myeloproliferative disorder.  JAK2 mutation positive.    1.  On 02/08/2019, WBC normal at 9.3.  -On 02/14/2019, WBC of 11.9.  Since then multiple CBC have revealed leukocytosis.  2.  Patient has chronic anemia.  -On 07/24/2007, hemoglobin of 11.7.  Since then CBC have revealed anemia.  3.  On 08/29/2024:  -WBC of 28.3, hemoglobin of 11.7 and platelet of 414.  MCV of 86.  -Reveals creatinine of 1.54.  Normal LFT.  4.  On 09/09/2024, patient admitted to hospital for GI bleed.  -Hemoglobin had decreased to 7.6.    -WBC remained in the 30s.  On 09/11/24, WBC of 32.3.  Neutrophil of 88% lymphocyte of 2%.  -On 09/11/2024, LDH of 835.  -Peripheral blood smear review on 09/11/2024 reveals leukoerythroblastic features with moderate normochromic, normocytic anemia with nucleated RBC.  5.  EGD on 09/11/2024 revealed gastritis.  No active bleeding.  6.  On 09/11/2024, a pathogenic mutation was identified in JAK2: p.V617F at 53% VAF.  -On 09/11/2024, No BCR-ABL1 transcripts were detectable.   -Flow cytometry on 09/18/2024 reveals rare circulating myeloid blasts with no aberrant immunophenotype (0.6%). Polytypic B cells. No aberrant immunophenotype on T cells.     Subjective:  Mr. Teixeira is a 88-year-old gentleman with chronic leukocytosis.  Multiple investigations done.    -On 09/11/2024, a pathogenic mutation was identified in JAK2: p.V617F at 53% VAF.  -On 09/11/2024, No BCR-ABL1 transcripts were detectable.   -Flow cytometry on 09/18/2024  reveals rare circulating myeloid blasts with no aberrant immunophenotype (0.6%). Polytypic B cells. No aberrant immunophenotype on T cells.  -On 10/01/2024, WBC of 22.9, hemoglobin of 11.3 and platelet of 380.  Normal MCV.  Neutrophil of 79% lymphocyte of 4%.    I had a video visit with the patient.  His wife and daughter (who is a pediatrician) was also on the video.    Patient overall doing well. He has mild fatigue. No headache or dizziness.  No chest pain.  No shortness of breath at rest.     Patient is on Eliquis for cardiac indication.  No bleeding.     Exam:  He is alert and oriented x 3.  Not in distress  Rest of system not examined as this is a video visit.     Labs: Reviewed.     Assessment:  1.  An 88-year-old gentleman with chronic leukocytosis. Patient has chronic myeloproliferative disorder.  JAK2 mutation positive.  Leukocytosis has improved.  Some of the leukocytosis was reactive.  2.  Normocytic anemia.  Improving. This is multifactorial from renal disease and anemia of chronic disease/inflammation.  3.  Thrombocytosis, resolved.  It was reactive thrombocytosis.       Plan:  -Okay to proceed with cardiac procedure.  -Follow-up in 3 to 4 months with CBC.  -Consider bone marrow biopsy and CT scan in future if there is worsening of leukocytosis.     Discussion:  1.  Patient's overall condition is stable.  CBC reviewed with him.  Leukocytosis has improved.  Anemia is better.  Thrombocytosis has resolved.    Patient had multiple labs done for leukocytosis.  JAK2 mutation has come back positive.  On flow cytometry there are rare circulating blast.  Patient has myeloproliferative disorder.    2.  Discussed regarding myeloproliferative disorder.  This is causing mild leukocytosis.  Patient has anemia.  Anemia is due to other factors including renal disease and anemia of chronic disease.  I do not think it is due to myeloproliferative disorder.      We discussed regarding further investigation including bone  marrow biopsy and CT scan.  Given his age and other medical problems, we can wait on bone marrow biopsy and CT scan.  I would recommend doing bone marrow biopsy and CT scan when there is worsening of leukocytosis or worsening anemia or development of thrombocytopenia.    Patient/family had few questions which were all answered.    3.  Patient has coronary artery disease.  He is going to meet with cardiothoracic surgeon to discuss regarding CABG.  Patient can proceed with CABG from hematology side.    4.  Will see him in 3 to 4 months time with CBC.     Total video visit time of 20 minutes.  Time spent in today's visit, review of chart/investigations today and documentation.         Again, thank you for allowing me to participate in the care of your patient.        Sincerely,        Aleksandra Raymundo MD

## 2024-10-02 NOTE — PROGRESS NOTES
Virtual Visit Details    Type of service:  Video Visit     Originating Location (pt. Location): Home    Distant Location (provider location):  On-site  Platform used for Video Visit: [employer]    HEMATOLOGY HISTORY: Mr. Teixeira is a gentleman with chronic myeloproliferative disorder.  JAK2 mutation positive.    1.  On 02/08/2019, WBC normal at 9.3.  -On 02/14/2019, WBC of 11.9.  Since then multiple CBC have revealed leukocytosis.  2.  Patient has chronic anemia.  -On 07/24/2007, hemoglobin of 11.7.  Since then CBC have revealed anemia.  3.  On 08/29/2024:  -WBC of 28.3, hemoglobin of 11.7 and platelet of 414.  MCV of 86.  -Reveals creatinine of 1.54.  Normal LFT.  4.  On 09/09/2024, patient admitted to hospital for GI bleed.  -Hemoglobin had decreased to 7.6.    -WBC remained in the 30s.  On 09/11/24, WBC of 32.3.  Neutrophil of 88% lymphocyte of 2%.  -On 09/11/2024, LDH of 835.  -Peripheral blood smear review on 09/11/2024 reveals leukoerythroblastic features with moderate normochromic, normocytic anemia with nucleated RBC.  5.  EGD on 09/11/2024 revealed gastritis.  No active bleeding.  6.  On 09/11/2024, a pathogenic mutation was identified in JAK2: p.V617F at 53% VAF.  -On 09/11/2024, No BCR-ABL1 transcripts were detectable.   -Flow cytometry on 09/18/2024 reveals rare circulating myeloid blasts with no aberrant immunophenotype (0.6%). Polytypic B cells. No aberrant immunophenotype on T cells.     Subjective:  Mr. Teixeira is a 88-year-old gentleman with chronic leukocytosis.  Multiple investigations done.    -On 09/11/2024, a pathogenic mutation was identified in JAK2: p.V617F at 53% VAF.  -On 09/11/2024, No BCR-ABL1 transcripts were detectable.   -Flow cytometry on 09/18/2024 reveals rare circulating myeloid blasts with no aberrant immunophenotype (0.6%). Polytypic B cells. No aberrant immunophenotype on T cells.  -On 10/01/2024, WBC of 22.9, hemoglobin of 11.3 and platelet of 380.  Normal MCV.  Neutrophil of 79%  lymphocyte of 4%.    I had a video visit with the patient.  His wife and daughter (who is a pediatrician) was also on the video.    Patient overall doing well. He has mild fatigue. No headache or dizziness.  No chest pain.  No shortness of breath at rest.     Patient is on Eliquis for cardiac indication.  No bleeding.     Exam:  He is alert and oriented x 3.  Not in distress  Rest of system not examined as this is a video visit.     Labs: Reviewed.     Assessment:  1.  An 88-year-old gentleman with chronic leukocytosis. Patient has chronic myeloproliferative disorder.  JAK2 mutation positive.  Leukocytosis has improved.  Some of the leukocytosis was reactive.  2.  Normocytic anemia.  Improving. This is multifactorial from renal disease and anemia of chronic disease/inflammation.  3.  Thrombocytosis, resolved.  It was reactive thrombocytosis.       Plan:  -Okay to proceed with cardiac procedure.  -Follow-up in 3 to 4 months with CBC.  -Consider bone marrow biopsy and CT scan in future if there is worsening of leukocytosis.     Discussion:  1.  Patient's overall condition is stable.  CBC reviewed with him.  Leukocytosis has improved.  Anemia is better.  Thrombocytosis has resolved.    Patient had multiple labs done for leukocytosis.  JAK2 mutation has come back positive.  On flow cytometry there are rare circulating blast.  Patient has myeloproliferative disorder.    2.  Discussed regarding myeloproliferative disorder.  This is causing mild leukocytosis.  Patient has anemia.  Anemia is due to other factors including renal disease and anemia of chronic disease.  I do not think it is due to myeloproliferative disorder.      We discussed regarding further investigation including bone marrow biopsy and CT scan.  Given his age and other medical problems, we can wait on bone marrow biopsy and CT scan.  I would recommend doing bone marrow biopsy and CT scan when there is worsening of leukocytosis or worsening anemia or  development of thrombocytopenia.    Patient/family had few questions which were all answered.    3.  Patient has coronary artery disease.  He is going to meet with cardiothoracic surgeon to discuss regarding CABG.  Patient can proceed with CABG from hematology side.    4.  Will see him in 3 to 4 months time with CBC.     Total video visit time of 20 minutes.  Time spent in today's visit, review of chart/investigations today and documentation.

## 2024-10-03 ENCOUNTER — OFFICE VISIT (OUTPATIENT)
Dept: CARDIOLOGY | Facility: CLINIC | Age: 89
End: 2024-10-03
Payer: MEDICARE

## 2024-10-03 VITALS
WEIGHT: 223 LBS | BODY MASS INDEX: 31.22 KG/M2 | SYSTOLIC BLOOD PRESSURE: 114 MMHG | HEIGHT: 71 IN | HEART RATE: 93 BPM | DIASTOLIC BLOOD PRESSURE: 72 MMHG

## 2024-10-03 DIAGNOSIS — I10 HYPERTENSION GOAL BP (BLOOD PRESSURE) < 140/90: ICD-10-CM

## 2024-10-03 DIAGNOSIS — E78.5 HYPERLIPIDEMIA LDL GOAL <100: Primary | ICD-10-CM

## 2024-10-03 DIAGNOSIS — I48.20 CHRONIC ATRIAL FIBRILLATION (H): ICD-10-CM

## 2024-10-03 PROCEDURE — 99214 OFFICE O/P EST MOD 30 MIN: CPT | Performed by: STUDENT IN AN ORGANIZED HEALTH CARE EDUCATION/TRAINING PROGRAM

## 2024-10-03 NOTE — PROGRESS NOTES
Cardiac Surgery - Follow Up Note    Mr Teixeira is an 88M known to me from the in-patient consultation service. I met with him today as an outpatient.    At the time of his previous / recent hospitalization,   Alessio Teixeira is a 88 year old male with a history of stomach ulcer who presents with 2 to 3 weeks of worsening fatigue and some dyspnea on exertion.  This morning he had a dark stool, and the last day or 2 he has spit up some blood. He has had some lower abdomen tenderness on the left side. History of diverticulitis in the past. He was on Nexium but stopped it in the past week per recommendation of his doctor. He is also on Coumadin for A-fib. He has been much more winded the last few days and gets fatigued very easily. His wife and son think that he looks more pale. His last scope was in 2018. He did have a retroperitoneal bleed a few years ago when he was started on coumadin for his atrial fibrillation. He had a partial colectomy in the past as well for perforated bowel, thought to be secondary to excessive prednisone for myasthenia gravis.  Patient is currently being worked up for CLL and has a high white count. While awaiting an EGD this am he was noted to have T wave changes on EKG and elevated troponin. EGD was cancelled and pt was taken for a coronary angiogram which showed severe 3 vessel coronary artery disease. Angiogram showed an occluded RCA, ost LAD to mid LAD of 80% and mid circ to distal circ with 99%. Echocardiogram showed an EF of 35-40%. The RV systolic function was normal. There was also mild mitral regurg.      In the interval, he reports that he is making good progress. He is getting stronger and keeping up his dietary intake. He has been more active, walking with assistance but he is covering more ground. He met with his hematology team. His blood work suggests that he does not have an active hematologic malignancy that requires treatment, but that he may have a more chronic  myeloproliferative disorder.    We had a lengthy discussion about options for his severe coronary disease. We reviewed that his percutaneous options are very limited on account of the diffuse nature of his disease and the complexity of his coronary lesions. This matches with their understanding of his disease following inpatient conversations with Shawn Le (covering consults) and Cayetano (performed angiography).    Ultimately, the patient and his family would like to proceed with surgical revascularization. In the elective, outpatient setting, I outlined that likely his risk profile - while still elevated on account of several comorbidities - is improved. Such an assessment reflects the following and is copied below.    We will plan for CABG - timing to be determined  Plan for CABG with LIMA and vein, and will perform modified left atrial MAZE + LAAO on account of his atrial fibrillation in attempt to minimize or potentially stop the need for anticoagulation.    Clinical Summary  Planned Surgery: Isolated CABG, Elective, First cardiovascular surgery  Demographics: 88 year old, male  Lab Values: Creatinine: 1.77 mg/dL, Hematocrit: 37.7%, WBC Count: 22.9 10 /?L, Platelet Count: 120500 cells/?L  Substance Abuse: Former smoker  Risk Factors / Comorbidities: Diabetes Mellitus , Hypertension  Cardiac Status: NYHA Class I, Ejection Fraction = 35%  Coronary Artery Disease: 3 vessels diseased, Proximal LAD Stenosis ? 70%, Unstable Angina  Arrhythmia: Remote A-fib    Procedure Type: Isolated CABG  Perioperative Outcome Estimate %  Operative Mortality 9.43%  Morbidity & Mortality 21.1%  Stroke 2.18%  Renal Failure 7.37%  Reoperation 3.72%  Prolonged Ventilation 11.5%  Deep Sternal Wound Infection 0.212%  Long Hospital Stay (>14 days) 17.6%  Short Hospital Stay (<6 days)* 13.2%    Previous inpatient workup includes the following:    Current Imaging:     TTE  Left ventricular systolic function is moderately reduced.The visual  ejection  fraction is 35-40%.There is moderate global hypokinesia of the left ventricle.  The right ventricular systolic function is normal.  There is mild (1+) mitral regurgitation.  IVC diameter <2.1 cm collapsing >50% with sniff suggests a normal RA pressure  of 3 mmHg.    LHC    Ost LAD to Mid LAD lesion is 80% stenosed.    Prox Cx to Mid Cx lesion is 70% stenosed.    Mid Cx to Dist Cx lesion is 99% stenosed.    Prox RCA lesion is 100% stenosed.    1st Mrg lesion is 90% stenosed.    1st Diag lesion is 99% stenosed.     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    Actinic keratosis     JAVIER (acute kidney injury) (H) 02/01/2019    Atrial fibrillation (H)     Chronic 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 06/12/2013    Edema of leg     Gastrointestinal hemorrhage associated with gastric ulcer 02/01/2019    Gout, unspecified     H/O diplopia     History of blood transfusion     History of pulmonary embolism- bilateral in 2007 05/02/2016    Hypertension     Meningioma (H)     sinus    Myalgia and myositis, unspecified     Myasthenia gravis (H)     high dose steroids 2007,     Obesity     Other seborrheic keratosis     Presbyacusis     Primary localized osteoarthrosis, lower leg     Primary localized osteoarthrosis, pelvic region and thigh     Sleep apnea     doesn't use his CPAP)    Spinal stenosis     Squamous cell cancer of scalp and skin of neck     Dr Sanchez, multiple procedures, Mohs scalp and chest    Vitamin D deficiency                Past Surgical History:     I have reviewed the patient's following past surgical history    No prior chest surgery         Social History:     I have reviewed the patient's following social history    Social History     Tobacco Use    Smoking status: Former      Current packs/day: 0.00     Average packs/day: 1 pack/day for 3.9 years (3.9 ttl pk-yrs)     Types: Cigarettes     Start date: 1959     Quit date: 1963     Years since quittin.9    Smokeless tobacco: Never   Vaping Use    Vaping status: Never Used   Substance Use Topics    Alcohol use: Yes     Comment: 2-4 glasses of wine per week    Drug use: No             Family History:     I have reviewed the patient's following past family history    I have reviewed this patient's family history and updated it with pertinent information if needed.  Family History   Problem Relation Age of Onset    Glaucoma Father     Coronary Artery Disease Father     Hyperlipidemia Father     Unknown/Adopted Mother     Diabetes Brother     Leukemia Brother     Diabetes Brother     Hyperlipidemia Brother     Obesity Brother     Macular Degeneration No family hx of                Allergies:     I have reviewed the patient's following documented allergies     No Known Allergies          Medications:     Prior documented medications    Current Outpatient Medications   Medication Instructions    allopurinol (ZYLOPRIM) 300 mg, Oral, EVERY MORNING    apixaban ANTICOAGULANT (ELIQUIS) 2.5 mg, Oral, 2 TIMES DAILY    atorvastatin (LIPITOR) 10 mg, Oral, EVERY EVENING    clotrimazole-betamethasone (LOTRISONE) 1-0.05 % external cream Topical, 2 TIMES DAILY    diclofenac (VOLTAREN) 1 g, Topical, 2 TIMES DAILY    ferrous sulfate (FEROSUL) 325 mg, Oral, DAILY WITH BREAKFAST    ipratropium (ATROVENT) 0.03 % nasal spray 2 sprays, Both Nostrils, EVERY 12 HOURS    magnesium 250 MG tablet 1 tablet, Oral, DAILY    multivitamin, therapeutic with minerals (THERA-VIT-M) TABS 1 tablet, Oral, DAILY    nitroGLYcerin (NITROSTAT) 0.4 MG sublingual tablet For chest pain place 1 tablet under the tongue every 5 minutes for 3 doses. If symptoms persist 5 minutes after 1st dose call 911.    pantoprazole (PROTONIX) 40 mg, Oral, 2 TIMES DAILY BEFORE MEALS     "torsemide (DEMADEX) 20 mg, Oral, DAILY    Vitamin D3 (CHOLECALCIFEROL) 1,000 Units, Oral, DAILY,               Review of Systems:      ROS: 10 point ROS neg other than the symptoms noted above in the HPI.          Physical Exam:     /72   Pulse 93   Ht 1.798 m (5' 10.8\")   Wt 101.2 kg (223 lb)   BMI 31.28 kg/m      Vitals reviewed  Alert, pleasant, in NAD  Sclera anicteric  Neck supple JVD flat  Trachea midline  No prior chest incisions  Breathing unlabored on room air  RRR  Abdomen soft  Ext warm no edema         Data:     Lab Results   Component Value Date    WBC 22.9 (H) 10/01/2024    HGB 11.3 (L) 10/01/2024    HCT 37.7 (L) 10/01/2024     10/01/2024     09/23/2024    POTASSIUM 5.1 09/23/2024    CHLORIDE 103 09/23/2024    CO2 22 09/23/2024    BUN 32.5 (H) 09/23/2024    CR 1.77 (H) 09/23/2024    GLC 94 09/23/2024    SED 10 04/12/2022    DD 1.6 (H) 11/09/2007    NTBNPI 26088 (H) 08/26/2007    TROPONIN <0.04 09/08/2007    TROPI 0.083 (H) 12/22/2018    AST 90 (H) 09/10/2024    ALT 26 09/10/2024    ALKPHOS 82 09/10/2024    BILITOTAL 0.5 09/10/2024    INR 1.71 (H) 09/18/2024       "

## 2024-10-08 ENCOUNTER — TELEPHONE (OUTPATIENT)
Dept: CARDIOLOGY | Facility: CLINIC | Age: 89
End: 2024-10-08
Payer: MEDICARE

## 2024-10-08 ENCOUNTER — PREP FOR PROCEDURE (OUTPATIENT)
Dept: CARDIOLOGY | Facility: CLINIC | Age: 89
End: 2024-10-08
Payer: MEDICARE

## 2024-10-08 DIAGNOSIS — I25.10 CORONARY ARTERY DISEASE: Primary | ICD-10-CM

## 2024-10-08 DIAGNOSIS — I25.10 CORONARY ARTERY DISEASE INVOLVING NATIVE HEART, UNSPECIFIED VESSEL OR LESION TYPE, UNSPECIFIED WHETHER ANGINA PRESENT: Primary | ICD-10-CM

## 2024-10-08 DIAGNOSIS — I25.85 CHRONIC CORONARY MICROVASCULAR DYSFUNCTION: ICD-10-CM

## 2024-10-08 DIAGNOSIS — I48.91 ATRIAL FIBRILLATION (H): ICD-10-CM

## 2024-10-08 LAB
ABO/RH(D): NORMAL
ANTIBODY SCREEN: NEGATIVE
SPECIMEN EXPIRATION DATE: NORMAL

## 2024-10-08 NOTE — TELEPHONE ENCOUNTER
Per task, pt needs to schedule surgery with Dr. Cabrera. Talked with pt and schedule surgery for 10/28. Will call if anything changes

## 2024-10-09 ENCOUNTER — LAB (OUTPATIENT)
Dept: LAB | Facility: CLINIC | Age: 89
End: 2024-10-09
Payer: MEDICARE

## 2024-10-09 DIAGNOSIS — I25.85 CHRONIC CORONARY MICROVASCULAR DYSFUNCTION: ICD-10-CM

## 2024-10-09 DIAGNOSIS — I25.10 CORONARY ARTERY DISEASE INVOLVING NATIVE HEART, UNSPECIFIED VESSEL OR LESION TYPE, UNSPECIFIED WHETHER ANGINA PRESENT: ICD-10-CM

## 2024-10-09 LAB
ALBUMIN SERPL BCG-MCNC: 3.7 G/DL (ref 3.5–5.2)
ALBUMIN UR-MCNC: 20 MG/DL
ALP SERPL-CCNC: 109 U/L (ref 40–150)
ALT SERPL W P-5'-P-CCNC: 15 U/L (ref 0–70)
ANION GAP SERPL CALCULATED.3IONS-SCNC: 13 MMOL/L (ref 7–15)
APPEARANCE UR: CLEAR
AST SERPL W P-5'-P-CCNC: 30 U/L (ref 0–45)
BILIRUB SERPL-MCNC: 0.6 MG/DL
BILIRUB UR QL STRIP: NEGATIVE
BUN SERPL-MCNC: 33.6 MG/DL (ref 8–23)
CALCIUM SERPL-MCNC: 9.3 MG/DL (ref 8.8–10.4)
CHLORIDE SERPL-SCNC: 99 MMOL/L (ref 98–107)
COLOR UR AUTO: YELLOW
CREAT SERPL-MCNC: 1.79 MG/DL (ref 0.67–1.17)
EGFRCR SERPLBLD CKD-EPI 2021: 36 ML/MIN/1.73M2
EST. AVERAGE GLUCOSE BLD GHB EST-MCNC: 82 MG/DL
GLUCOSE SERPL-MCNC: 84 MG/DL (ref 70–99)
GLUCOSE UR STRIP-MCNC: NEGATIVE MG/DL
HBA1C MFR BLD: 4.5 %
HCO3 SERPL-SCNC: 24 MMOL/L (ref 22–29)
HGB UR QL STRIP: NEGATIVE
HYALINE CASTS: 3 /LPF
KETONES UR STRIP-MCNC: NEGATIVE MG/DL
LEUKOCYTE ESTERASE UR QL STRIP: NEGATIVE
MUCOUS THREADS #/AREA URNS LPF: PRESENT /LPF
NITRATE UR QL: NEGATIVE
PH UR STRIP: 6.5 [PH] (ref 5–7)
POTASSIUM SERPL-SCNC: 4.1 MMOL/L (ref 3.4–5.3)
PROT SERPL-MCNC: 6.9 G/DL (ref 6.4–8.3)
RBC URINE: <1 /HPF
SODIUM SERPL-SCNC: 136 MMOL/L (ref 135–145)
SP GR UR STRIP: 1.02 (ref 1–1.03)
UROBILINOGEN UR STRIP-MCNC: NORMAL MG/DL
WBC URINE: 1 /HPF

## 2024-10-09 PROCEDURE — 83036 HEMOGLOBIN GLYCOSYLATED A1C: CPT

## 2024-10-09 PROCEDURE — 80053 COMPREHEN METABOLIC PANEL: CPT

## 2024-10-09 PROCEDURE — 86900 BLOOD TYPING SEROLOGIC ABO: CPT

## 2024-10-09 PROCEDURE — 36415 COLL VENOUS BLD VENIPUNCTURE: CPT

## 2024-10-09 PROCEDURE — 81001 URINALYSIS AUTO W/SCOPE: CPT

## 2024-10-09 RX ORDER — CHLORHEXIDINE GLUCONATE ORAL RINSE 1.2 MG/ML
10 SOLUTION DENTAL ONCE
Status: CANCELLED | OUTPATIENT
Start: 2024-10-09 | End: 2024-10-09

## 2024-10-09 RX ORDER — ASPIRIN 81 MG/1
162 TABLET, CHEWABLE ORAL
Status: CANCELLED | OUTPATIENT
Start: 2024-10-09

## 2024-10-09 RX ORDER — LIDOCAINE 40 MG/G
CREAM TOPICAL
Status: CANCELLED | OUTPATIENT
Start: 2024-10-09

## 2024-10-09 RX ORDER — ACETAMINOPHEN 325 MG/1
975 TABLET ORAL ONCE
Status: CANCELLED | OUTPATIENT
Start: 2024-10-09 | End: 2024-10-09

## 2024-10-09 RX ORDER — ASPIRIN 81 MG/1
81 TABLET, CHEWABLE ORAL
Status: CANCELLED | OUTPATIENT
Start: 2024-10-09

## 2024-10-09 RX ORDER — FAMOTIDINE 20 MG/1
20 TABLET, FILM COATED ORAL
Status: CANCELLED | OUTPATIENT
Start: 2024-10-09

## 2024-10-15 ENCOUNTER — TELEPHONE (OUTPATIENT)
Dept: FAMILY MEDICINE | Facility: CLINIC | Age: 89
End: 2024-10-15
Payer: MEDICARE

## 2024-10-15 DIAGNOSIS — Z79.01 LONG TERM CURRENT USE OF ANTICOAGULANT THERAPY: Primary | ICD-10-CM

## 2024-10-15 DIAGNOSIS — I25.10 CORONARY ARTERY DISEASE INVOLVING NATIVE HEART, UNSPECIFIED VESSEL OR LESION TYPE, UNSPECIFIED WHETHER ANGINA PRESENT: ICD-10-CM

## 2024-10-15 NOTE — TELEPHONE ENCOUNTER
Home Care is calling regarding an established patient with [facility].       Requesting orders from: Jose Squires  Provider is following patient: Yes  Is this a 60-day recertification request?  No    Orders Requested    Skilled Nursing  Request for continuation of care with no increase or decrease in frequency  Frequency:  1x/wk for 4 wks        Verbal orders given.  Home Care will send orders for provider to sign.  Confirmed ok to leave a detailed message with call back.  Contact information confirmed and updated as needed.    Shabana Deluca RN

## 2024-10-16 ENCOUNTER — TELEPHONE (OUTPATIENT)
Dept: FAMILY MEDICINE | Facility: CLINIC | Age: 89
End: 2024-10-16
Payer: MEDICARE

## 2024-10-16 ENCOUNTER — MEDICAL CORRESPONDENCE (OUTPATIENT)
Dept: HEALTH INFORMATION MANAGEMENT | Facility: CLINIC | Age: 89
End: 2024-10-16
Payer: MEDICARE

## 2024-10-16 NOTE — TELEPHONE ENCOUNTER
Forms/Letter Request    Type of form/letter: [facility]- order# [id number]    Do we have the form/letter: Yes: Red folder placed in Dr Squires's inbox        How would you like the form/letter returned: Fax : [phone]

## 2024-10-22 ENCOUNTER — TELEPHONE (OUTPATIENT)
Dept: FAMILY MEDICINE | Facility: CLINIC | Age: 89
End: 2024-10-22
Payer: MEDICARE

## 2024-10-22 ENCOUNTER — TELEPHONE (OUTPATIENT)
Dept: CARDIOLOGY | Facility: CLINIC | Age: 89
End: 2024-10-22
Payer: MEDICARE

## 2024-10-22 NOTE — TELEPHONE ENCOUNTER
Swollen gland per wife (consent to communicate on file). Home care nurse suggested visit as patient is having upcoming cardiac bypass surgery Monday. No other symptoms and patient feels well. Appointment scheduled Friday for preop exam with PCP. Provided 24/7 scheduling/triage nurse line ([address]) to patient. No further questions or concerns from patient at this time.     Radha Kraft RN

## 2024-10-22 NOTE — TELEPHONE ENCOUNTER
Patient's wife called to see if lab work 10/25 was necessary; explained that he needs an updated type and screen because he had a blood transfusion in September in case he developed antibodies so we can be prepared as that blood takes longer to make; patient's wife verbalized understanding.

## 2024-10-24 LAB
ABO/RH(D): NORMAL
ANTIBODY SCREEN: NEGATIVE
SPECIMEN EXPIRATION DATE: NORMAL

## 2024-10-25 ENCOUNTER — LAB (OUTPATIENT)
Dept: LAB | Facility: CLINIC | Age: 89
End: 2024-10-25
Payer: MEDICARE

## 2024-10-25 ENCOUNTER — OFFICE VISIT (OUTPATIENT)
Dept: FAMILY MEDICINE | Facility: CLINIC | Age: 89
End: 2024-10-25
Payer: MEDICARE

## 2024-10-25 ENCOUNTER — ANESTHESIA EVENT (OUTPATIENT)
Dept: SURGERY | Facility: CLINIC | Age: 89
End: 2024-10-25
Payer: MEDICARE

## 2024-10-25 VITALS
SYSTOLIC BLOOD PRESSURE: 98 MMHG | WEIGHT: 214 LBS | BODY MASS INDEX: 30.02 KG/M2 | RESPIRATION RATE: 20 BRPM | DIASTOLIC BLOOD PRESSURE: 60 MMHG | TEMPERATURE: 97 F | OXYGEN SATURATION: 97 % | HEART RATE: 90 BPM

## 2024-10-25 DIAGNOSIS — Z79.01 LONG TERM CURRENT USE OF ANTICOAGULANT THERAPY: ICD-10-CM

## 2024-10-25 DIAGNOSIS — I25.10 CORONARY ARTERY DISEASE INVOLVING NATIVE CORONARY ARTERY OF NATIVE HEART WITHOUT ANGINA PECTORIS: ICD-10-CM

## 2024-10-25 DIAGNOSIS — Z01.818 PREOP GENERAL PHYSICAL EXAM: Primary | ICD-10-CM

## 2024-10-25 DIAGNOSIS — I48.20 CHRONIC ATRIAL FIBRILLATION (H): ICD-10-CM

## 2024-10-25 DIAGNOSIS — C91.10 CLL (CHRONIC LYMPHOCYTIC LEUKEMIA) (H): ICD-10-CM

## 2024-10-25 DIAGNOSIS — E78.5 HYPERLIPIDEMIA LDL GOAL <100: ICD-10-CM

## 2024-10-25 DIAGNOSIS — I25.10 CORONARY ARTERY DISEASE INVOLVING NATIVE HEART, UNSPECIFIED VESSEL OR LESION TYPE, UNSPECIFIED WHETHER ANGINA PRESENT: ICD-10-CM

## 2024-10-25 DIAGNOSIS — K92.2 GASTROINTESTINAL HEMORRHAGE, UNSPECIFIED GASTROINTESTINAL HEMORRHAGE TYPE: ICD-10-CM

## 2024-10-25 LAB
ERYTHROCYTE [DISTWIDTH] IN BLOOD BY AUTOMATED COUNT: 21.3 % (ref 10–15)
HCT VFR BLD AUTO: 40.5 % (ref 40–53)
HGB BLD-MCNC: 12.2 G/DL (ref 13.3–17.7)
MCH RBC QN AUTO: 26.8 PG (ref 26.5–33)
MCHC RBC AUTO-ENTMCNC: 30.1 G/DL (ref 31.5–36.5)
MCV RBC AUTO: 89 FL (ref 78–100)
PLATELET # BLD AUTO: 428 10E3/UL (ref 150–450)
RBC # BLD AUTO: 4.56 10E6/UL (ref 4.4–5.9)
WBC # BLD AUTO: 25.5 10E3/UL (ref 4–11)

## 2024-10-25 PROCEDURE — 86900 BLOOD TYPING SEROLOGIC ABO: CPT

## 2024-10-25 PROCEDURE — 85014 HEMATOCRIT: CPT

## 2024-10-25 PROCEDURE — 99215 OFFICE O/P EST HI 40 MIN: CPT | Performed by: FAMILY MEDICINE

## 2024-10-25 PROCEDURE — 86901 BLOOD TYPING SEROLOGIC RH(D): CPT

## 2024-10-25 PROCEDURE — 36415 COLL VENOUS BLD VENIPUNCTURE: CPT

## 2024-10-25 RX ORDER — MULTIVITAMIN
1 TABLET ORAL DAILY
COMMUNITY

## 2024-10-25 RX ORDER — IPRATROPIUM BROMIDE 21 UG/1
2 SPRAY, METERED NASAL EVERY 12 HOURS
COMMUNITY

## 2024-10-25 RX ORDER — ACETAMINOPHEN 500 MG
500 TABLET ORAL AT BEDTIME
COMMUNITY

## 2024-10-25 RX ORDER — MULTIVITAMIN WITH IRON
1 TABLET ORAL DAILY
COMMUNITY

## 2024-10-25 RX ORDER — PHENOL 1.4 %
10 AEROSOL, SPRAY (ML) MUCOUS MEMBRANE
COMMUNITY

## 2024-10-25 ASSESSMENT — PAIN SCALES - GENERAL: PAINLEVEL_OUTOF10: MILD PAIN (3)

## 2024-10-25 NOTE — PROGRESS NOTES
Preoperative Evaluation  [address] [address]  Phone: [phone]  Primary Provider: Jose Squires MD  Pre-op Performing Provider: Jose Squires MD  Oct 25, 2024             10/23/2024   Surgical Information   What procedure is being done? heart surgery    Facility or Hospital where procedure/surgery will be performed: [facility]    Who is doing the procedure / surgery? Dr Mulvihill    Date of surgery / procedure: Oct 28    Time of surgery / procedure: 7:30 AM    Where do you plan to recover after surgery? at a TCU (Transitional Care Unit)        Patient-reported     Fax number for surgical facility: Note does not need to be faxed, will be available electronically in Epic.    Assessment & Plan     The proposed surgical procedure is considered HIGH risk.    Preop general physical exam      Hyperlipidemia LDL goal <100      Chronic atrial fibrillation (H)  stable    CLL (chronic lymphocytic leukemia) (H)  Stable for upcoming surgery     Coronary artery disease involving native coronary artery of native heart without angina pectoris           - No identified additional risk factors other than previously addressed    Antiplatelet or Anticoagulation Medication Instructions   - Patient is on no antiplatelet or anticoagulation medications.    Additional Medication Instructions   - Diuretics: May continue.   - Statins: Continue taking on the day of surgery.       Recommendation  Approval given to proceed with proposed procedure, without further diagnostic evaluation.    Meg Beltran is a 88 year old, presenting for the following:  Pre-Op Exam          10/25/2024    10:40 AM   Additional Questions   Roomed by Emir CAMPA   Accompanied by Wife           10/23/2024   Pre-Op Questionnaire   Have you ever had a heart attack or stroke? (!) UNKNOWN     Have you ever had surgery on your heart or blood vessels, such as a stent placement, a coronary artery  bypass, or surgery on an artery in your head, neck, heart, or legs? No    Do you have chest pain with activity? No    Do you have a history of heart failure? No    Do you currently have a cold, bronchitis or symptoms of other infection? No    Do you have a cough, shortness of breath, or wheezing? (!) YES     Do you or anyone in your family have previous history of blood clots? (!) YES     Do you or does anyone in your family have a serious bleeding problem such as prolonged bleeding following surgeries or cuts? (!) UNKNOWN     Have you ever had problems with anemia or been told to take iron pills? (!) YES     Have you had any abnormal blood loss such as black, tarry or bloody stools? (!) YES     Have you ever had a blood transfusion? (!) YES    Have you ever had a transfusion reaction? No    Are you willing to have a blood transfusion if it is medically needed before, during, or after your surgery? Yes    Have you or any of your relatives ever had problems with anesthesia? No    Do you have sleep apnea, excessive snoring or daytime drowsiness? (!) YES    Do you have a CPAP machine? (!) NO     Do you have any artifical heart valves or other implanted medical devices like a pacemaker, defibrillator, or continuous glucose monitor? No    Do you have artificial joints? (!) YES    Are you allergic to latex? No        Patient-reported     Health Care Directive  Patient has a Health Care Directive on file      Preoperative Review of    reviewed - no record of controlled substances prescribed.      Status of Chronic Conditions:  See problem list for active medical problems.  Problems all longstanding and stable, except as noted/documented.  See ROS for pertinent symptoms related to these conditions.    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     Priority: Medium    Hyperkalemia 09/09/2024     Priority: Medium     CLL (chronic lymphocytic leukemia) (H) 09/09/2024     Priority: Medium    Gastrointestinal hemorrhage, unspecified gastrointestinal hemorrhage type 09/09/2024     Priority: Medium    Anemia, unspecified type 09/09/2024     Priority: Medium    Chronic renal failure, unspecified CKD stage 09/09/2024     Priority: Medium    Long term (current) use of anticoagulants 09/09/2024     Priority: Medium    Chronic a-fib (H) 09/09/2024     Priority: Medium    History of pulmonary embolism 07/17/2023     Priority: Medium    Chronic kidney disease, stage 3 (H) 06/21/2021     Priority: Medium    Routine medical exam 03/04/2021     Priority: Medium    Gout of big toe 04/27/2020     Priority: Medium    CKD (chronic kidney disease) stage 2, GFR 60-89 ml/min 02/01/2019     Priority: Medium    Pulmonary nodules 02/01/2019     Priority: Medium    Debility 02/01/2019     Priority: Medium    Screening for prostate cancer 11/21/2018     Priority: Medium    Mixed hyperlipidemia 08/07/2017     Priority: Medium    Presbycusis of both ears 04/28/2017     Priority: Medium    Chronic left-sided thoracic back pain 11/09/2016     Priority: Medium    Bradycardia 05/02/2016     Priority: Medium    Long term current use of anticoagulant therapy 03/11/2016     Priority: Medium    MARTHA (obstructive sleep apnea) 12/08/2014     Priority: Medium    Diplopia 03/11/2014     Priority: Medium    Hypertension goal BP (blood pressure) < 140/90 12/04/2013     Priority: Medium    Vitamin D deficiency disease 11/25/2013     Priority: Medium    Chronic atrial fibrillation (H) 06/12/2013     Priority: Medium    Stasis dermatitis 04/16/2013     Priority: Medium    Varicose veins of legs 04/13/2013     Priority: Medium    Hyperlipidemia LDL goal <100 02/23/2013     Priority: Medium    Edema of both legs 02/23/2013     Priority: Medium      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    Actinic keratosis     JAVIER (acute kidney injury) (H)  02/01/2019    Atrial fibrillation (H)     Chronic 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 06/12/2013    Edema of leg     Gastrointestinal hemorrhage associated with gastric ulcer 02/01/2019    Gout, unspecified     H/O diplopia     History of blood transfusion     History of pulmonary embolism- bilateral in 2007 05/02/2016    Hypertension     Meningioma (H)     sinus    Myalgia and myositis, unspecified     Myasthenia gravis (H)     high dose steroids 2007,     Obesity     Other seborrheic keratosis     Presbyacusis     Primary localized osteoarthrosis, lower leg     Primary localized osteoarthrosis, pelvic region and thigh     Sleep apnea     doesn't use his CPAP)    Spinal stenosis     Squamous cell cancer of scalp and skin of neck     Dr Sanchez, multiple procedures, Mohs scalp and chest    Vitamin D deficiency      Past Surgical History:   Procedure Laterality Date    ABDOMEN SURGERY  2007    APPENDECTOMY      ARTHROPLASTY HIP Right 2016    ARTHROPLASTY HIP ANTERIOR Right 04/26/2016    Procedure: ARTHROPLASTY HIP ANTERIOR;  Surgeon: Miguel Johnson MD;  Location:  OR    ARTHROPLASTY KNEE Right 01/28/2019    Procedure: RIGHT TOTAL KNEE ARTROPLASTY;  Surgeon: Miguel Johnson MD;  Location:  OR    BIOPSY      skin cancer    BRAIN SURGERY  2007    partial resection meningioma    CATARACT IOL, RT/LT      COLECTOMY  2007    bowel perforation due to steroids    COLONOSCOPY      CV CORONARY ANGIOGRAM N/A 9/10/2024    Procedure: Coronary Angiogram;  Surgeon: Vickey Monteiro MD;  Location:  [facility]    CV PCI N/A 9/10/2024    Procedure: Percutaneous Coronary Intervention;  Surgeon: Vickey Monteiro MD;  Location:  [facility]    ESOPHAGOSCOPY, GASTROSCOPY, DUODENOSCOPY (EGD), COMBINED N/A 12/22/2018    Procedure: COMBINED ESOPHAGOSCOPY, GASTROSCOPY, DUODENOSCOPY (EGD), BIOPSY SINGLE OR MULTIPLE;  Surgeon: Elizabeth Jones MD;  Location:  GI     ESOPHAGOSCOPY, GASTROSCOPY, DUODENOSCOPY (EGD), COMBINED N/A 05/10/2019    Procedure: ESOPHAGOGASTRODUODENOSCOPY (EGD);  Surgeon: Ahsan Garcia MD;  Location:  GI    ESOPHAGOSCOPY, GASTROSCOPY, DUODENOSCOPY (EGD), COMBINED N/A 9/11/2024    Procedure: Esophagoscopy, gastroscopy, duodenoscopy (EGD), combined;  Surgeon: Shannon Stephenson MD;  Location:  GI    GENITOURINARY SURGERY      vasectomy    HEAD & NECK SURGERY  2007    meningioma removed    KNEE SURGERY  2010    left knee replacement    ORTHOPEDIC SURGERY      left TKR    PHACOEMULSIFICATION CLEAR CORNEA WITH TORIC INTRAOCULAR LENS IMPLANT  07/30/2012    Procedure: PHACOEMULSIFICATION CLEAR CORNEA WITH TORIC INTRAOCULAR LENS IMPLANT;  COMPLEX RIGHT PHACOEMULSIFICATION CLEAR CORNEA WITH TORIC INTRAOCULAR LENS IMPLANT WITH MALYUGIN RING;  Surgeon: Ronald Cortez MD;  Location:  [facility]    RECTAL SURGERY  1985    ZZC RAD RESEC TONSIL/PILLARS       Current Outpatient Medications   Medication Sig Dispense Refill    allopurinol (ZYLOPRIM) 300 MG tablet TAKE ONE TABLET BY MOUTH IN THE MORNING 90 tablet 1    atorvastatin (LIPITOR) 10 MG tablet Take 1 tablet (10 mg) by mouth every evening. 30 tablet 1    clotrimazole-betamethasone (LOTRISONE) 1-0.05 % external cream Apply topically 2 times daily. 45 g 1    diclofenac (VOLTAREN) 1 % topical gel Apply 1 g topically in the morning and 1 g in the evening. 150 g 3    pantoprazole (PROTONIX) 40 MG EC tablet Take 1 tablet (40 mg) by mouth 2 times daily (before meals). 60 tablet 1    torsemide (DEMADEX) 20 MG tablet Take 1 tablet (20 mg) by mouth daily. 30 tablet 1    apixaban ANTICOAGULANT (ELIQUIS) 2.5 MG tablet Take 1 tablet (2.5 mg) by mouth 2 times daily. 60 tablet 1    ferrous sulfate (FEROSUL) 325 (65 Fe) MG tablet Take 325 mg by mouth daily (with breakfast). (Patient not taking: Reported on 10/25/2024)      ipratropium (ATROVENT) 0.03 % nasal spray Spray 2 sprays into both nostrils every 12 hours 30 mL 0  "   magnesium 250 MG tablet Take 1 tablet by mouth daily.      multivitamin, therapeutic with minerals (THERA-VIT-M) TABS Take 1 tablet by mouth daily      nitroGLYcerin (NITROSTAT) 0.4 MG sublingual tablet For chest pain place 1 tablet under the tongue every 5 minutes for 3 doses. If symptoms persist 5 minutes after 1st dose call 911. 30 tablet 0    Vitamin D, Cholecalciferol, 1000 units TABS Take 1,000 Units by mouth in the morning. (Patient not taking: Reported on 10/25/2024)         No Known Allergies     Social History     Tobacco Use    Smoking status: Former     Current packs/day: 0.00     Average packs/day: 1 pack/day for 3.9 years (3.9 ttl pk-yrs)     Types: Cigarettes     Start date: 1959     Quit date: 1963     Years since quittin.0    Smokeless tobacco: Never   Substance Use Topics    Alcohol use: Yes     Comment: 2-4 glasses of wine per week     Family History   Problem Relation Age of Onset    Glaucoma Father     Coronary Artery Disease Father     Hyperlipidemia Father     Unknown/Adopted Mother     Diabetes Brother     Leukemia Brother     Diabetes Brother     Hyperlipidemia Brother     Obesity Brother     Macular Degeneration No family hx of      History   Drug Use No             Review of Systems  Constitutional, HEENT, cardiovascular, pulmonary, GI, , musculoskeletal, neuro, skin, endocrine and psych systems are negative, except as otherwise noted.    Objective    BP 98/60   Pulse 90   Temp 97  F (36.1  C) (Temporal)   Resp 20   Wt 97.1 kg (214 lb)   SpO2 97%   BMI 30.02 kg/m     Estimated body mass index is 30.02 kg/m  as calculated from the following:    Height as of 10/3/24: 1.798 m (5' 10.8\").    Weight as of this encounter: 97.1 kg (214 lb).  Physical Exam  GENERAL: alert and no distress  EYES: Eyes grossly normal to inspection, PERRL and conjunctivae and sclerae normal  HENT: ear canals and TM's normal, nose and mouth without ulcers or lesions  NECK: no adenopathy, no " asymmetry, masses, or scars  RESP: lungs clear to auscultation - no rales, rhonchi or wheezes  CV: regular rate and rhythm, no peripheral edema  ABDOMEN: soft, nontender, no hepatosplenomegaly, no masses and bowel sounds normal  MS: no gross musculoskeletal defects noted, no edema  SKIN: no suspicious lesions or rashes  NEURO: Normal strength and tone, mentation intact and speech normal    Recent Labs   Lab Test 10/25/24  0837 10/09/24  0859 10/01/24  1130 09/23/24  1121 09/18/24  0948 09/16/24  0644   HGB 12.2*  --  11.3*  --  10.8*  --      --  380  --  474*  --    INR  --   --   --   --  1.71* 1.85*   NA  --  136  --  137  --   --    POTASSIUM  --  4.1  --  5.1  --  3.8   CR  --  1.79*  --  1.77*  --   --    A1C  --  4.5  --   --   --   --         Diagnostics  Recent Results (from the past 240 hours)   CBC with platelets    Collection Time: 10/25/24  8:37 AM   Result Value Ref Range    WBC Count 25.5 (H) 4.0 - 11.0 10e3/uL    RBC Count 4.56 4.40 - 5.90 10e6/uL    Hemoglobin 12.2 (L) 13.3 - 17.7 g/dL    Hematocrit 40.5 40.0 - 53.0 %    MCV 89 78 - 100 fL    MCH 26.8 26.5 - 33.0 pg    MCHC 30.1 (L) 31.5 - 36.5 g/dL    RDW 21.3 (H) 10.0 - 15.0 %    Platelet Count 428 150 - 450 10e3/uL   Adult Type and Screen    Collection Time: 10/25/24  8:37 AM   Result Value Ref Range    ABO/RH(D) A POS     Antibody Screen Negative Negative    SPECIMEN EXPIRATION DATE [id number]       EKG(9-14-24): Sinus rhythm with runs of atrial tachycardia   Possible Inferior infarct (cited on or before 09-Sep-2024)   ST & T wave abnormality, consider anterolateral ischemia   Abnormal ECG   When compared with ECG of 13-Sep-2024 15:34, (unconfirmed)   ST abnormality has improved   Confirmed by MD ARIEL, GURMEET ([id number]) on 9/14/2024 7:31:44 AM     Revised Cardiac Risk Index (RCRI)  The patient has the following serious cardiovascular risks for perioperative complications:   - High risk surgery (>5% cardiac complication risk) = 1 point    - Coronary Artery Disease (MI, positive stress test, angina, Qs on EKG) = 1 point     RCRI Interpretation: 2 points: Class III (moderate risk - 6.6% complication rate)              Signed Electronically by: Jose Squires MD  A copy of this evaluation report is provided to the requesting physician.

## 2024-10-25 NOTE — PROGRESS NOTES
PTA medications updated by Medication Scribe prior to surgery via phone call with patient (last doses completed by Nurse)     Medication history sources: Patient, Spouse (Kelli), and Surescripts  In the past week, patient estimated taking medication this percent of the time: Greater than 90%      Significant changes made to the medication list:  None      Additional medication history information:   Patient was advised to bring: Lotrisone, Atrovent    Medication reconciliation completed by provider prior to medication history? No    Time spent in this activity: 30 minutes    The information provided in this note is only as accurate as the sources available at the time of update(s)      Prior to Admission medications    Medication Sig Last Dose Taking? Auth Provider Long Term End Date   acetaminophen (TYLENOL) 500 MG tablet Take 500 mg by mouth at bedtime. Bedtime Yes Reported, Patient     allopurinol (ZYLOPRIM) 300 MG tablet TAKE ONE TABLET BY MOUTH IN THE MORNING Morning Yes Jose Squires MD     apixaban ANTICOAGULANT (ELIQUIS) 2.5 MG tablet Take 1 tablet (2.5 mg) by mouth 2 times daily. 10/22/2024 Evening Yes Costa Ventura MD     atorvastatin (LIPITOR) 10 MG tablet Take 1 tablet (10 mg) by mouth every evening. Evening Yes Costa Ventura MD Yes    clotrimazole-betamethasone (LOTRISONE) 1-0.05 % external cream Apply topically 2 times daily.  Yes Jose Squires MD     diclofenac (VOLTAREN) 1 % topical gel Apply 1 g topically in the morning and 1 g in the evening.  Yes Jose Squires MD     ferrous sulfate (FEROSUL) 325 (65 Fe) MG tablet Take 325 mg by mouth daily (with breakfast). 10/20/2024 Morning Yes Unknown, Entered By History     ipratropium (ATROVENT) 0.03 % nasal spray Spray 2 sprays into both nostrils every 12 hours.  Yes Reported, Patient No    magnesium 250 MG tablet Take 1 tablet by mouth daily. 10/20/2024 Morning Yes Reported, Patient No    Melatonin 10 MG TABS tablet Take 10 mg by mouth nightly as  needed for sleep. 10/24/2024 Bedtime Yes Reported, Patient     multivitamin w/minerals (MULTI-VITAMIN) tablet Take 1 tablet by mouth daily. 10/20/2024 Morning Yes Reported, Patient     nitroGLYcerin (NITROSTAT) 0.4 MG sublingual tablet For chest pain place 1 tablet under the tongue every 5 minutes for 3 doses. If symptoms persist 5 minutes after 1st dose call 911.  Yes Costa Ventura MD Yes    pantoprazole (PROTONIX) 40 MG EC tablet Take 1 tablet (40 mg) by mouth 2 times daily (before meals). Evening Yes Costa Ventura MD     torsemide (DEMADEX) 20 MG tablet Take 1 tablet (20 mg) by mouth daily. Evening Yes Costa Ventura MD Yes        Medication history completed by: Rui Ramon

## 2024-10-28 ENCOUNTER — APPOINTMENT (OUTPATIENT)
Dept: GENERAL RADIOLOGY | Facility: CLINIC | Age: 89
DRG: 233 | End: 2024-10-28
Attending: PHYSICIAN ASSISTANT
Payer: MEDICARE

## 2024-10-28 ENCOUNTER — ANESTHESIA (OUTPATIENT)
Dept: SURGERY | Facility: CLINIC | Age: 89
End: 2024-10-28
Payer: MEDICARE

## 2024-10-28 ENCOUNTER — ANESTHESIA EVENT (OUTPATIENT)
Dept: SURGERY | Facility: CLINIC | Age: 89
End: 2024-10-28
Payer: MEDICARE

## 2024-10-28 ENCOUNTER — APPOINTMENT (OUTPATIENT)
Dept: GENERAL RADIOLOGY | Facility: CLINIC | Age: 89
DRG: 233 | End: 2024-10-28
Attending: SURGERY
Payer: MEDICARE

## 2024-10-28 ENCOUNTER — HOSPITAL ENCOUNTER (INPATIENT)
Facility: CLINIC | Age: 89
LOS: 18 days | DRG: 233 | End: 2024-11-15
Attending: STUDENT IN AN ORGANIZED HEALTH CARE EDUCATION/TRAINING PROGRAM | Admitting: STUDENT IN AN ORGANIZED HEALTH CARE EDUCATION/TRAINING PROGRAM
Payer: MEDICARE

## 2024-10-28 DIAGNOSIS — L89.159 DECUBITUS ULCER OF COCCYGEAL REGION, UNSPECIFIED ULCER STAGE: ICD-10-CM

## 2024-10-28 DIAGNOSIS — I25.112 CORONARY ARTERY DISEASE INVOLVING NATIVE CORONARY ARTERY OF NATIVE HEART WITH REFRACTORY ANGINA PECTORIS (H): Primary | ICD-10-CM

## 2024-10-28 PROBLEM — I25.10 CORONARY ARTERY DISEASE: Status: ACTIVE | Noted: 2024-10-28

## 2024-10-28 LAB
ABO/RH(D): NORMAL
ALBUMIN SERPL BCG-MCNC: 3 G/DL (ref 3.5–5.2)
ALBUMIN SERPL BCG-MCNC: 3.3 G/DL (ref 3.5–5.2)
ALLEN'S TEST: ABNORMAL
ALP SERPL-CCNC: 107 U/L (ref 40–150)
ALP SERPL-CCNC: 71 U/L (ref 40–150)
ALT SERPL W P-5'-P-CCNC: 13 U/L (ref 0–70)
ALT SERPL W P-5'-P-CCNC: 136 U/L (ref 0–70)
ANION GAP SERPL CALCULATED.3IONS-SCNC: 13 MMOL/L (ref 7–15)
ANION GAP SERPL CALCULATED.3IONS-SCNC: 13 MMOL/L (ref 7–15)
ANION GAP SERPL CALCULATED.3IONS-SCNC: 15 MMOL/L (ref 7–15)
ANION GAP SERPL CALCULATED.3IONS-SCNC: 18 MMOL/L (ref 7–15)
ANION GAP SERPL CALCULATED.3IONS-SCNC: 19 MMOL/L (ref 7–15)
ANTIBODY SCREEN: NEGATIVE
APTT PPP: 36 SECONDS (ref 22–38)
APTT PPP: 36 SECONDS (ref 22–38)
APTT PPP: 45 SECONDS (ref 22–38)
APTT PPP: 50 SECONDS (ref 22–38)
APTT PPP: 52 SECONDS (ref 22–38)
AST SERPL W P-5'-P-CCNC: 298 U/L (ref 0–45)
AST SERPL W P-5'-P-CCNC: ABNORMAL U/L
ATRIAL RATE - MUSE: 87 BPM
BASE EXCESS BLDA CALC-SCNC: -12 MMOL/L (ref -3–3)
BASE EXCESS BLDA CALC-SCNC: -2.2 MMOL/L (ref -3–3)
BASE EXCESS BLDA CALC-SCNC: -3.3 MMOL/L (ref -3–3)
BASE EXCESS BLDA CALC-SCNC: -3.7 MMOL/L (ref -3–3)
BASE EXCESS BLDA CALC-SCNC: -3.7 MMOL/L (ref -3–3)
BASE EXCESS BLDA CALC-SCNC: -3.8 MMOL/L (ref -3–3)
BASE EXCESS BLDA CALC-SCNC: -4.4 MMOL/L (ref -3–3)
BASE EXCESS BLDA CALC-SCNC: -4.6 MMOL/L (ref -3–3)
BASE EXCESS BLDA CALC-SCNC: -4.9 MMOL/L (ref -3–3)
BASE EXCESS BLDA CALC-SCNC: -5.1 MMOL/L (ref -3–3)
BASE EXCESS BLDA CALC-SCNC: -9 MMOL/L (ref -3–3)
BASE EXCESS BLDV CALC-SCNC: -2.6 MMOL/L (ref -3–3)
BASE EXCESS BLDV CALC-SCNC: -5.6 MMOL/L (ref -3–3)
BASE EXCESS BLDV CALC-SCNC: -7.1 MMOL/L (ref -3–3)
BILIRUB SERPL-MCNC: 0.4 MG/DL
BILIRUB SERPL-MCNC: 0.4 MG/DL
BLD PROD TYP BPU: NORMAL
BLOOD COMPONENT TYPE: NORMAL
BUN SERPL-MCNC: 46.3 MG/DL (ref 8–23)
BUN SERPL-MCNC: 46.8 MG/DL (ref 8–23)
BUN SERPL-MCNC: 47.3 MG/DL (ref 8–23)
BUN SERPL-MCNC: 47.9 MG/DL (ref 8–23)
BUN SERPL-MCNC: 48 MG/DL (ref 8–23)
CA-I BLD-MCNC: 4.1 MG/DL (ref 4.4–5.2)
CA-I BLD-MCNC: 4.2 MG/DL (ref 4.4–5.2)
CA-I BLD-MCNC: 4.2 MG/DL (ref 4.4–5.2)
CA-I BLD-MCNC: 4.3 MG/DL (ref 4.4–5.2)
CA-I BLD-MCNC: 4.4 MG/DL (ref 4.4–5.2)
CA-I BLD-MCNC: 4.6 MG/DL (ref 4.4–5.2)
CA-I BLD-MCNC: 4.6 MG/DL (ref 4.4–5.2)
CA-I BLD-MCNC: 4.7 MG/DL (ref 4.4–5.2)
CALCIUM SERPL-MCNC: 7.8 MG/DL (ref 8.8–10.4)
CALCIUM SERPL-MCNC: 8.1 MG/DL (ref 8.8–10.4)
CALCIUM SERPL-MCNC: 8.3 MG/DL (ref 8.8–10.4)
CALCIUM SERPL-MCNC: 8.3 MG/DL (ref 8.8–10.4)
CALCIUM SERPL-MCNC: 8.4 MG/DL (ref 8.8–10.4)
CHLORIDE SERPL-SCNC: 106 MMOL/L (ref 98–107)
CHLORIDE SERPL-SCNC: 108 MMOL/L (ref 98–107)
CHLORIDE SERPL-SCNC: 108 MMOL/L (ref 98–107)
CHLORIDE SERPL-SCNC: 110 MMOL/L (ref 98–107)
CHLORIDE SERPL-SCNC: 111 MMOL/L (ref 98–107)
CLOT INIT KAOL IND TO POST HEP NEUT TRTO: 1 {RATIO}
CLOT INIT KAOL IND TO POST HEP NEUT TRTO: 1 {RATIO}
CLOT INIT KAOL IND TO POST HEP NEUT TRTO: 1.1 {RATIO}
CLOT INIT KAOLIN IND BLD US: 139 SEC (ref 113–166)
CLOT INIT KAOLIN IND BLD US: 151 SEC (ref 113–166)
CLOT INIT KAOLIN IND BLD US: 173 SEC (ref 113–166)
CLOT INIT KAOLIN IND P HEP NEUT BLD US: 144 SEC (ref 103–153)
CLOT INIT KAOLIN IND P HEP NEUT BLD US: 148 SEC (ref 103–153)
CLOT INIT KAOLIN IND P HEP NEUT BLD US: 158 SEC (ref 103–153)
CLOT STIFF PLT CONT BLD CALC: 32.9 HPA (ref 11.9–29.8)
CLOT STIFF PLT CONT BLD CALC: 40.6 HPA (ref 11.9–29.8)
CLOT STIFF PLT CONT BLD CALC: 45.5 HPA (ref 11.9–29.8)
CLOT STIFF TF IND P HEP NEUT BLD US: 38.9 HPA (ref 13–33.2)
CLOT STIFF TF IND P HEP NEUT BLD US: 46.6 HPA (ref 13–33.2)
CLOT STIFF TF IND P HEP NEUT BLD US: 52.6 HPA (ref 13–33.2)
CLOT STIFF TF IND+IIB-IIIA INH P HEP NEU: 6 HPA (ref 1–3.7)
CLOT STIFF TF IND+IIB-IIIA INH P HEP NEU: 6 HPA (ref 1–3.7)
CLOT STIFF TF IND+IIB-IIIA INH P HEP NEU: 7.1 HPA (ref 1–3.7)
CODING SYSTEM: NORMAL
COHGB MFR BLD: 93.5 % (ref 95–96)
CPB POCT: NO
CPB POCT: NO
CREAT SERPL-MCNC: 1.8 MG/DL (ref 0.67–1.17)
CREAT SERPL-MCNC: 1.86 MG/DL (ref 0.67–1.17)
CREAT SERPL-MCNC: 2.17 MG/DL (ref 0.67–1.17)
CREAT SERPL-MCNC: 2.2 MG/DL (ref 0.67–1.17)
CREAT SERPL-MCNC: 2.28 MG/DL (ref 0.67–1.17)
CROSSMATCH: NORMAL
DIASTOLIC BLOOD PRESSURE - MUSE: NORMAL MMHG
EGFRCR SERPLBLD CKD-EPI 2021: 27 ML/MIN/1.73M2
EGFRCR SERPLBLD CKD-EPI 2021: 28 ML/MIN/1.73M2
EGFRCR SERPLBLD CKD-EPI 2021: 29 ML/MIN/1.73M2
EGFRCR SERPLBLD CKD-EPI 2021: 35 ML/MIN/1.73M2
EGFRCR SERPLBLD CKD-EPI 2021: 36 ML/MIN/1.73M2
ELLIPTOCYTES BLD QL SMEAR: SLIGHT
ERYTHROCYTE [DISTWIDTH] IN BLOOD BY AUTOMATED COUNT: 16.9 % (ref 10–15)
ERYTHROCYTE [DISTWIDTH] IN BLOOD BY AUTOMATED COUNT: 17 % (ref 10–15)
ERYTHROCYTE [DISTWIDTH] IN BLOOD BY AUTOMATED COUNT: 19 % (ref 10–15)
ERYTHROCYTE [DISTWIDTH] IN BLOOD BY AUTOMATED COUNT: 21.2 % (ref 10–15)
ERYTHROCYTE [DISTWIDTH] IN BLOOD BY AUTOMATED COUNT: 21.2 % (ref 10–15)
ERYTHROCYTE [DISTWIDTH] IN BLOOD BY AUTOMATED COUNT: 21.6 % (ref 10–15)
FIBRINOGEN PPP-MCNC: 197 MG/DL (ref 170–510)
FIBRINOGEN PPP-MCNC: 214 MG/DL (ref 170–510)
FIBRINOGEN PPP-MCNC: 254 MG/DL (ref 170–510)
FIBRINOGEN PPP-MCNC: 274 MG/DL (ref 170–510)
FIBRINOGEN PPP-MCNC: 322 MG/DL (ref 170–510)
FIBRINOGEN PPP-MCNC: 330 MG/DL (ref 170–510)
GLUCOSE BLD-MCNC: 104 MG/DL (ref 70–99)
GLUCOSE BLD-MCNC: 136 MG/DL (ref 70–99)
GLUCOSE BLD-MCNC: 150 MG/DL (ref 70–99)
GLUCOSE BLD-MCNC: 151 MG/DL (ref 70–99)
GLUCOSE BLD-MCNC: 161 MG/DL (ref 70–99)
GLUCOSE BLD-MCNC: 194 MG/DL (ref 70–99)
GLUCOSE BLD-MCNC: 199 MG/DL (ref 70–99)
GLUCOSE BLD-MCNC: 224 MG/DL (ref 70–99)
GLUCOSE BLD-MCNC: 235 MG/DL (ref 70–99)
GLUCOSE BLDC GLUCOMTR-MCNC: 116 MG/DL (ref 70–99)
GLUCOSE BLDC GLUCOMTR-MCNC: 126 MG/DL (ref 70–99)
GLUCOSE BLDC GLUCOMTR-MCNC: 128 MG/DL (ref 70–99)
GLUCOSE BLDC GLUCOMTR-MCNC: 134 MG/DL (ref 70–99)
GLUCOSE BLDC GLUCOMTR-MCNC: 150 MG/DL (ref 70–99)
GLUCOSE BLDC GLUCOMTR-MCNC: 161 MG/DL (ref 70–99)
GLUCOSE BLDC GLUCOMTR-MCNC: 76 MG/DL (ref 70–99)
GLUCOSE SERPL-MCNC: 112 MG/DL (ref 70–99)
GLUCOSE SERPL-MCNC: 151 MG/DL (ref 70–99)
GLUCOSE SERPL-MCNC: 162 MG/DL (ref 70–99)
GLUCOSE SERPL-MCNC: 244 MG/DL (ref 70–99)
GLUCOSE SERPL-MCNC: 80 MG/DL (ref 70–99)
GLUCOSE SERPL-MCNC: 93 MG/DL (ref 70–99)
HCO3 BLD-SCNC: 21 MMOL/L (ref 21–28)
HCO3 BLDA-SCNC: 18 MMOL/L (ref 21–28)
HCO3 BLDA-SCNC: 18 MMOL/L (ref 21–28)
HCO3 BLDA-SCNC: 20 MMOL/L (ref 21–28)
HCO3 BLDA-SCNC: 21 MMOL/L (ref 21–28)
HCO3 BLDA-SCNC: 22 MMOL/L (ref 21–28)
HCO3 BLDA-SCNC: 22 MMOL/L (ref 21–28)
HCO3 BLDA-SCNC: 23 MMOL/L (ref 21–28)
HCO3 BLDA-SCNC: 24 MMOL/L (ref 21–28)
HCO3 BLDV-SCNC: 20 MMOL/L (ref 21–28)
HCO3 BLDV-SCNC: 21 MMOL/L (ref 21–28)
HCO3 BLDV-SCNC: 24 MMOL/L (ref 21–28)
HCO3 SERPL-SCNC: 16 MMOL/L (ref 22–29)
HCO3 SERPL-SCNC: 19 MMOL/L (ref 22–29)
HCO3 SERPL-SCNC: 22 MMOL/L (ref 22–29)
HCO3 SERPL-SCNC: 23 MMOL/L (ref 22–29)
HCO3 SERPL-SCNC: 24 MMOL/L (ref 22–29)
HCT VFR BLD AUTO: 22.5 % (ref 40–53)
HCT VFR BLD AUTO: 23.6 % (ref 40–53)
HCT VFR BLD AUTO: 23.7 % (ref 40–53)
HCT VFR BLD AUTO: 26.4 % (ref 40–53)
HCT VFR BLD AUTO: 27.2 % (ref 40–53)
HCT VFR BLD AUTO: 29.2 % (ref 40–53)
HCT VFR BLD CALC: 25 % (ref 40–53)
HCT VFR BLD CALC: 27 % (ref 40–53)
HGB BLD-MCNC: 10.7 G/DL (ref 13.3–17.7)
HGB BLD-MCNC: 11.3 G/DL (ref 13.3–17.7)
HGB BLD-MCNC: 6.9 G/DL (ref 13.3–17.7)
HGB BLD-MCNC: 7.4 G/DL (ref 13.3–17.7)
HGB BLD-MCNC: 7.6 G/DL (ref 13.3–17.7)
HGB BLD-MCNC: 8.2 G/DL (ref 13.3–17.7)
HGB BLD-MCNC: 8.3 G/DL (ref 13.3–17.7)
HGB BLD-MCNC: 8.4 G/DL (ref 13.3–17.7)
HGB BLD-MCNC: 8.5 G/DL (ref 13.3–17.7)
HGB BLD-MCNC: 8.7 G/DL (ref 13.3–17.7)
HGB BLD-MCNC: 8.7 G/DL (ref 13.3–17.7)
HGB BLD-MCNC: 9 G/DL (ref 13.3–17.7)
HGB BLD-MCNC: 9.1 G/DL (ref 13.3–17.7)
HGB BLD-MCNC: 9.1 G/DL (ref 13.3–17.7)
HGB BLD-MCNC: 9.2 G/DL (ref 13.3–17.7)
HGB BLD-MCNC: 9.2 G/DL (ref 13.3–17.7)
HGB BLD-MCNC: 9.3 G/DL (ref 13.3–17.7)
INR PPP: 1.64 (ref 0.85–1.15)
INR PPP: 1.67 (ref 0.85–1.15)
INR PPP: 1.75 (ref 0.85–1.15)
INR PPP: 1.86 (ref 0.85–1.15)
INR PPP: 1.92 (ref 0.85–1.15)
INR PPP: 1.97 (ref 0.85–1.15)
INTERPRETATION ECG - MUSE: NORMAL
ISSUE DATE AND TIME: NORMAL
LACTATE BLD-SCNC: 0.9 MMOL/L (ref 0.7–2)
LACTATE BLD-SCNC: 1 MMOL/L (ref 0.7–2)
LACTATE BLD-SCNC: 1.1 MMOL/L (ref 0.7–2)
LACTATE BLD-SCNC: 1.1 MMOL/L (ref 0.7–2)
LACTATE BLD-SCNC: 1.2 MMOL/L (ref 0.7–2)
LACTATE BLD-SCNC: 1.3 MMOL/L (ref 0.7–2)
LACTATE BLD-SCNC: 1.6 MMOL/L (ref 0.7–2)
LACTATE BLD-SCNC: 4.1 MMOL/L (ref 0.7–2)
LACTATE BLD-SCNC: 4.1 MMOL/L (ref 0.7–2)
LACTATE SERPL-SCNC: 4.1 MMOL/L (ref 0.7–2)
LACTATE SERPL-SCNC: 5.6 MMOL/L (ref 0.7–2)
LACTATE SERPL-SCNC: 6.4 MMOL/L (ref 0.7–2)
LACTATE SERPL-SCNC: 7.5 MMOL/L (ref 0.7–2)
LACTATE SERPL-SCNC: 8.1 MMOL/L (ref 0.7–2)
MAGNESIUM SERPL-MCNC: 2.6 MG/DL (ref 1.7–2.3)
MCH RBC QN AUTO: 26.9 PG (ref 26.5–33)
MCH RBC QN AUTO: 27.5 PG (ref 26.5–33)
MCH RBC QN AUTO: 27.9 PG (ref 26.5–33)
MCH RBC QN AUTO: 29.5 PG (ref 26.5–33)
MCH RBC QN AUTO: 29.5 PG (ref 26.5–33)
MCH RBC QN AUTO: 29.7 PG (ref 26.5–33)
MCHC RBC AUTO-ENTMCNC: 29.8 G/DL (ref 31.5–36.5)
MCHC RBC AUTO-ENTMCNC: 30.5 G/DL (ref 31.5–36.5)
MCHC RBC AUTO-ENTMCNC: 30.7 G/DL (ref 31.5–36.5)
MCHC RBC AUTO-ENTMCNC: 31.2 G/DL (ref 31.5–36.5)
MCHC RBC AUTO-ENTMCNC: 31.8 G/DL (ref 31.5–36.5)
MCHC RBC AUTO-ENTMCNC: 32.2 G/DL (ref 31.5–36.5)
MCV RBC AUTO: 90 FL (ref 78–100)
MCV RBC AUTO: 90 FL (ref 78–100)
MCV RBC AUTO: 91 FL (ref 78–100)
MCV RBC AUTO: 92 FL (ref 78–100)
MCV RBC AUTO: 93 FL (ref 78–100)
MCV RBC AUTO: 94 FL (ref 78–100)
O2/TOTAL GAS SETTING VFR VENT: 100 %
O2/TOTAL GAS SETTING VFR VENT: 40 %
O2/TOTAL GAS SETTING VFR VENT: 80 %
O2/TOTAL GAS SETTING VFR VENT: 80 %
O2/TOTAL GAS SETTING VFR VENT: 90 %
OXYHGB MFR BLDA: 91 % (ref 92–100)
OXYHGB MFR BLDA: 94 % (ref 92–100)
OXYHGB MFR BLDA: 98 % (ref 92–100)
OXYHGB MFR BLDA: 99 % (ref 92–100)
OXYHGB MFR BLDV: 44 % (ref 70–75)
OXYHGB MFR BLDV: 70 % (ref 70–75)
OXYHGB MFR BLDV: 92 % (ref 70–75)
P AXIS - MUSE: NORMAL DEGREES
PCO2 BLD: 43 MM HG (ref 35–45)
PCO2 BLDA: 37 MM HG (ref 35–45)
PCO2 BLDA: 42 MM HG (ref 35–45)
PCO2 BLDA: 44 MM HG (ref 35–45)
PCO2 BLDA: 47 MM HG (ref 35–45)
PCO2 BLDA: 48 MM HG (ref 35–45)
PCO2 BLDA: 50 MM HG (ref 35–45)
PCO2 BLDA: 50 MM HG (ref 35–45)
PCO2 BLDA: 61 MM HG (ref 35–45)
PCO2 BLDV: 47 MM HG (ref 40–50)
PCO2 BLDV: 48 MM HG (ref 40–50)
PCO2 BLDV: 53 MM HG (ref 40–50)
PEEP: 5 CM H2O
PH BLD: 7.31 [PH] (ref 7.35–7.45)
PH BLDA: 7.06 [PH] (ref 7.35–7.45)
PH BLDA: 7.2 [PH] (ref 7.35–7.45)
PH BLDA: 7.28 [PH] (ref 7.35–7.45)
PH BLDA: 7.28 [PH] (ref 7.35–7.45)
PH BLDA: 7.29 [PH] (ref 7.35–7.45)
PH BLDA: 7.29 [PH] (ref 7.35–7.45)
PH BLDA: 7.32 [PH] (ref 7.35–7.45)
PH BLDA: 7.33 [PH] (ref 7.35–7.45)
PH BLDA: 7.35 [PH] (ref 7.35–7.45)
PH BLDA: 7.36 [PH] (ref 7.35–7.45)
PH BLDV: 7.24 [PH] (ref 7.32–7.43)
PH BLDV: 7.25 [PH] (ref 7.32–7.43)
PH BLDV: 7.27 [PH] (ref 7.32–7.43)
PHOSPHATE SERPL-MCNC: 4 MG/DL (ref 2.5–4.5)
PLAT MORPH BLD: ABNORMAL
PLAT MORPH BLD: NORMAL
PLATELET # BLD AUTO: 273 10E3/UL (ref 150–450)
PLATELET # BLD AUTO: 325 10E3/UL (ref 150–450)
PLATELET # BLD AUTO: 423 10E3/UL (ref 150–450)
PLATELET # BLD AUTO: 519 10E3/UL (ref 150–450)
PLATELET # BLD AUTO: 524 10E3/UL (ref 150–450)
PLATELET # BLD AUTO: 538 10E3/UL (ref 150–450)
PO2 BLD: 73 MM HG (ref 80–105)
PO2 BLDA: 160 MM HG (ref 80–105)
PO2 BLDA: 197 MM HG (ref 80–105)
PO2 BLDA: 242 MM HG (ref 80–105)
PO2 BLDA: 271 MM HG (ref 80–105)
PO2 BLDA: 274 MM HG (ref 80–105)
PO2 BLDA: 322 MM HG (ref 80–105)
PO2 BLDA: 326 MM HG (ref 80–105)
PO2 BLDA: 371 MM HG (ref 80–105)
PO2 BLDA: 84 MM HG (ref 80–105)
PO2 BLDA: 86 MM HG (ref 80–105)
PO2 BLDV: 31 MM HG (ref 25–47)
PO2 BLDV: 44 MM HG (ref 25–47)
PO2 BLDV: 78 MM HG (ref 25–47)
POLYCHROMASIA BLD QL SMEAR: SLIGHT
POTASSIUM BLD-SCNC: 3.5 MMOL/L (ref 3.4–5.3)
POTASSIUM BLD-SCNC: 3.5 MMOL/L (ref 3.4–5.3)
POTASSIUM BLD-SCNC: 3.6 MMOL/L (ref 3.4–5.3)
POTASSIUM BLD-SCNC: 3.9 MMOL/L (ref 3.4–5.3)
POTASSIUM BLD-SCNC: 4 MMOL/L (ref 3.4–5.3)
POTASSIUM BLD-SCNC: 4.2 MMOL/L (ref 3.4–5.3)
POTASSIUM BLD-SCNC: 4.3 MMOL/L (ref 3.4–5.3)
POTASSIUM SERPL-SCNC: 3.6 MMOL/L (ref 3.4–5.3)
POTASSIUM SERPL-SCNC: 4 MMOL/L (ref 3.4–5.3)
POTASSIUM SERPL-SCNC: 4.1 MMOL/L (ref 3.4–5.3)
POTASSIUM SERPL-SCNC: 5.2 MMOL/L (ref 3.4–5.3)
PR INTERVAL - MUSE: NORMAL MS
PROT SERPL-MCNC: 4.5 G/DL (ref 6.4–8.3)
PROT SERPL-MCNC: 5.5 G/DL (ref 6.4–8.3)
QRS DURATION - MUSE: 92 MS
QT - MUSE: 482 MS
QTC - MUSE: 555 MS
R AXIS - MUSE: 71 DEGREES
RBC # BLD AUTO: 2.47 10E6/UL (ref 4.4–5.9)
RBC # BLD AUTO: 2.51 10E6/UL (ref 4.4–5.9)
RBC # BLD AUTO: 2.58 10E6/UL (ref 4.4–5.9)
RBC # BLD AUTO: 2.83 10E6/UL (ref 4.4–5.9)
RBC # BLD AUTO: 3.02 10E6/UL (ref 4.4–5.9)
RBC # BLD AUTO: 3.23 10E6/UL (ref 4.4–5.9)
RBC MORPH BLD: ABNORMAL
RBC MORPH BLD: NORMAL
SAO2 % BLDA: 100 % (ref 95–96)
SAO2 % BLDA: 100 % (ref 95–96)
SAO2 % BLDA: 89 % (ref 92–100)
SAO2 % BLDA: 91 % (ref 92–100)
SAO2 % BLDA: 95 % (ref 95–96)
SAO2 % BLDA: 99 % (ref 92–100)
SAO2 % BLDA: >100 % (ref 95–96)
SAO2 % BLDV: 46.4 % (ref 70–75)
SAO2 % BLDV: 72 % (ref 70–75)
SAO2 % BLDV: 96 % (ref 70–75)
SODIUM BLD-SCNC: 144 MMOL/L (ref 135–145)
SODIUM BLD-SCNC: 145 MMOL/L (ref 135–145)
SODIUM BLD-SCNC: 146 MMOL/L (ref 135–145)
SODIUM BLD-SCNC: 146 MMOL/L (ref 135–145)
SODIUM SERPL-SCNC: 143 MMOL/L (ref 135–145)
SODIUM SERPL-SCNC: 143 MMOL/L (ref 135–145)
SODIUM SERPL-SCNC: 144 MMOL/L (ref 135–145)
SODIUM SERPL-SCNC: 147 MMOL/L (ref 135–145)
SODIUM SERPL-SCNC: 148 MMOL/L (ref 135–145)
SPECIMEN EXPIRATION DATE: NORMAL
SYSTOLIC BLOOD PRESSURE - MUSE: NORMAL MMHG
T AXIS - MUSE: 254 DEGREES
UNIT ABO/RH: NORMAL
UNIT NUMBER: NORMAL
UNIT STATUS: NORMAL
UNIT TYPE ISBT: 600
UNIT TYPE ISBT: 6200
VENTRICULAR RATE- MUSE: 80 BPM
WBC # BLD AUTO: 109.2 10E3/UL (ref 4–11)
WBC # BLD AUTO: 67.6 10E3/UL (ref 4–11)
WBC # BLD AUTO: 80.4 10E3/UL (ref 4–11)
WBC # BLD AUTO: 80.6 10E3/UL (ref 4–11)
WBC # BLD AUTO: 90.7 10E3/UL (ref 4–11)
WBC # BLD AUTO: 97.9 10E3/UL (ref 4–11)

## 2024-10-28 PROCEDURE — 82310 ASSAY OF CALCIUM: CPT | Performed by: STUDENT IN AN ORGANIZED HEALTH CARE EDUCATION/TRAINING PROGRAM

## 2024-10-28 PROCEDURE — 80051 ELECTROLYTE PANEL: CPT

## 2024-10-28 PROCEDURE — 93005 ELECTROCARDIOGRAM TRACING: CPT

## 2024-10-28 PROCEDURE — 120N000004 HC R&B MS OVERFLOW

## 2024-10-28 PROCEDURE — 258N000003 HC RX IP 258 OP 636: Performed by: NURSE ANESTHETIST, CERTIFIED REGISTERED

## 2024-10-28 PROCEDURE — 84295 ASSAY OF SERUM SODIUM: CPT

## 2024-10-28 PROCEDURE — 86923 COMPATIBILITY TEST ELECTRIC: CPT | Performed by: PHYSICIAN ASSISTANT

## 2024-10-28 PROCEDURE — 250N000009 HC RX 250: Performed by: STUDENT IN AN ORGANIZED HEALTH CARE EDUCATION/TRAINING PROGRAM

## 2024-10-28 PROCEDURE — 250N000009 HC RX 250: Performed by: SURGERY

## 2024-10-28 PROCEDURE — 84132 ASSAY OF SERUM POTASSIUM: CPT | Performed by: SURGERY

## 2024-10-28 PROCEDURE — 370N000017 HC ANESTHESIA TECHNICAL FEE, PER MIN: Performed by: STUDENT IN AN ORGANIZED HEALTH CARE EDUCATION/TRAINING PROGRAM

## 2024-10-28 PROCEDURE — 258N000003 HC RX IP 258 OP 636: Performed by: ANESTHESIOLOGY

## 2024-10-28 PROCEDURE — 33533 CABG ARTERIAL SINGLE: CPT | Performed by: NURSE ANESTHETIST, CERTIFIED REGISTERED

## 2024-10-28 PROCEDURE — 99291 CRITICAL CARE FIRST HOUR: CPT | Mod: GC | Performed by: INTERNAL MEDICINE

## 2024-10-28 PROCEDURE — 258N000003 HC RX IP 258 OP 636

## 2024-10-28 PROCEDURE — 5A1955Z RESPIRATORY VENTILATION, GREATER THAN 96 CONSECUTIVE HOURS: ICD-10-PCS | Performed by: INTERNAL MEDICINE

## 2024-10-28 PROCEDURE — P9016 RBC LEUKOCYTES REDUCED: HCPCS | Performed by: SURGERY

## 2024-10-28 PROCEDURE — 250N000024 HC ISOFLURANE, PER MIN: Performed by: STUDENT IN AN ORGANIZED HEALTH CARE EDUCATION/TRAINING PROGRAM

## 2024-10-28 PROCEDURE — 85384 FIBRINOGEN ACTIVITY: CPT | Performed by: STUDENT IN AN ORGANIZED HEALTH CARE EDUCATION/TRAINING PROGRAM

## 2024-10-28 PROCEDURE — 250N000013 HC RX MED GY IP 250 OP 250 PS 637: Performed by: SURGERY

## 2024-10-28 PROCEDURE — 82803 BLOOD GASES ANY COMBINATION: CPT

## 2024-10-28 PROCEDURE — 360N000079 HC SURGERY LEVEL 6, PER MIN: Performed by: STUDENT IN AN ORGANIZED HEALTH CARE EDUCATION/TRAINING PROGRAM

## 2024-10-28 PROCEDURE — 36415 COLL VENOUS BLD VENIPUNCTURE: CPT | Performed by: ANESTHESIOLOGY

## 2024-10-28 PROCEDURE — P9059 PLASMA, FRZ BETWEEN 8-24HOUR: HCPCS | Performed by: INTERNAL MEDICINE

## 2024-10-28 PROCEDURE — 33518 CABG ARTERY-VEIN TWO: CPT | Mod: GC | Performed by: STUDENT IN AN ORGANIZED HEALTH CARE EDUCATION/TRAINING PROGRAM

## 2024-10-28 PROCEDURE — 0W3C0ZZ CONTROL BLEEDING IN MEDIASTINUM, OPEN APPROACH: ICD-10-PCS | Performed by: STUDENT IN AN ORGANIZED HEALTH CARE EDUCATION/TRAINING PROGRAM

## 2024-10-28 PROCEDURE — 250N000011 HC RX IP 250 OP 636

## 2024-10-28 PROCEDURE — 30283B1 TRANSFUSION OF NONAUTOLOGOUS 4-FACTOR PROTHROMBIN COMPLEX CONCENTRATE INTO VEIN, PERCUTANEOUS APPROACH: ICD-10-PCS | Performed by: INTERNAL MEDICINE

## 2024-10-28 PROCEDURE — P9059 PLASMA, FRZ BETWEEN 8-24HOUR: HCPCS

## 2024-10-28 PROCEDURE — 999N000141 HC STATISTIC PRE-PROCEDURE NURSING ASSESSMENT: Performed by: STUDENT IN AN ORGANIZED HEALTH CARE EDUCATION/TRAINING PROGRAM

## 2024-10-28 PROCEDURE — 33533 CABG ARTERIAL SINGLE: CPT | Performed by: ANESTHESIOLOGY

## 2024-10-28 PROCEDURE — 76984 DX INTRAOP THORACIC AORTA US: CPT | Mod: 26 | Performed by: STUDENT IN AN ORGANIZED HEALTH CARE EDUCATION/TRAINING PROGRAM

## 2024-10-28 PROCEDURE — 82330 ASSAY OF CALCIUM: CPT

## 2024-10-28 PROCEDURE — P9045 ALBUMIN (HUMAN), 5%, 250 ML: HCPCS

## 2024-10-28 PROCEDURE — 250N000012 HC RX MED GY IP 250 OP 636 PS 637: Performed by: NURSE ANESTHETIST, CERTIFIED REGISTERED

## 2024-10-28 PROCEDURE — 5A1221Z PERFORMANCE OF CARDIAC OUTPUT, CONTINUOUS: ICD-10-PCS | Performed by: STUDENT IN AN ORGANIZED HEALTH CARE EDUCATION/TRAINING PROGRAM

## 2024-10-28 PROCEDURE — 84155 ASSAY OF PROTEIN SERUM: CPT | Performed by: SURGERY

## 2024-10-28 PROCEDURE — 02580ZZ DESTRUCTION OF CONDUCTION MECHANISM, OPEN APPROACH: ICD-10-PCS | Performed by: STUDENT IN AN ORGANIZED HEALTH CARE EDUCATION/TRAINING PROGRAM

## 2024-10-28 PROCEDURE — 258N000003 HC RX IP 258 OP 636: Performed by: SURGERY

## 2024-10-28 PROCEDURE — 85018 HEMOGLOBIN: CPT | Performed by: PHYSICIAN ASSISTANT

## 2024-10-28 PROCEDURE — 84132 ASSAY OF SERUM POTASSIUM: CPT

## 2024-10-28 PROCEDURE — 410N000005 HC PER-PERFUSION, SH ONLY, 1ST 30 MIN: Performed by: STUDENT IN AN ORGANIZED HEALTH CARE EDUCATION/TRAINING PROGRAM

## 2024-10-28 PROCEDURE — 82805 BLOOD GASES W/O2 SATURATION: CPT | Performed by: PHYSICIAN ASSISTANT

## 2024-10-28 PROCEDURE — 83735 ASSAY OF MAGNESIUM: CPT | Performed by: SURGERY

## 2024-10-28 PROCEDURE — 85384 FIBRINOGEN ACTIVITY: CPT | Performed by: PHYSICIAN ASSISTANT

## 2024-10-28 PROCEDURE — 258N000003 HC RX IP 258 OP 636: Performed by: STUDENT IN AN ORGANIZED HEALTH CARE EDUCATION/TRAINING PROGRAM

## 2024-10-28 PROCEDURE — 82247 BILIRUBIN TOTAL: CPT | Performed by: SURGERY

## 2024-10-28 PROCEDURE — 86850 RBC ANTIBODY SCREEN: CPT | Performed by: SURGERY

## 2024-10-28 PROCEDURE — 82805 BLOOD GASES W/O2 SATURATION: CPT | Performed by: STUDENT IN AN ORGANIZED HEALTH CARE EDUCATION/TRAINING PROGRAM

## 2024-10-28 PROCEDURE — 86923 COMPATIBILITY TEST ELECTRIC: CPT | Performed by: SURGERY

## 2024-10-28 PROCEDURE — 250N000011 HC RX IP 250 OP 636: Mod: JZ | Performed by: SURGERY

## 2024-10-28 PROCEDURE — 33257 ABLATE ATRIA LMTD ADD-ON: CPT | Mod: GC | Performed by: STUDENT IN AN ORGANIZED HEALTH CARE EDUCATION/TRAINING PROGRAM

## 2024-10-28 PROCEDURE — 250N000012 HC RX MED GY IP 250 OP 636 PS 637: Performed by: STUDENT IN AN ORGANIZED HEALTH CARE EDUCATION/TRAINING PROGRAM

## 2024-10-28 PROCEDURE — 85730 THROMBOPLASTIN TIME PARTIAL: CPT | Performed by: STUDENT IN AN ORGANIZED HEALTH CARE EDUCATION/TRAINING PROGRAM

## 2024-10-28 PROCEDURE — 250N000009 HC RX 250

## 2024-10-28 PROCEDURE — 06BP4ZZ EXCISION OF RIGHT SAPHENOUS VEIN, PERCUTANEOUS ENDOSCOPIC APPROACH: ICD-10-PCS | Performed by: STUDENT IN AN ORGANIZED HEALTH CARE EDUCATION/TRAINING PROGRAM

## 2024-10-28 PROCEDURE — 85014 HEMATOCRIT: CPT | Performed by: SURGERY

## 2024-10-28 PROCEDURE — 33533 CABG ARTERIAL SINGLE: CPT | Mod: GC | Performed by: STUDENT IN AN ORGANIZED HEALTH CARE EDUCATION/TRAINING PROGRAM

## 2024-10-28 PROCEDURE — 250N000011 HC RX IP 250 OP 636: Performed by: STUDENT IN AN ORGANIZED HEALTH CARE EDUCATION/TRAINING PROGRAM

## 2024-10-28 PROCEDURE — 82330 ASSAY OF CALCIUM: CPT | Performed by: SURGERY

## 2024-10-28 PROCEDURE — 99100 ANES PT EXTEME AGE<1 YR&>70: CPT | Performed by: NURSE ANESTHETIST, CERTIFIED REGISTERED

## 2024-10-28 PROCEDURE — 250N000011 HC RX IP 250 OP 636: Performed by: ANESTHESIOLOGY

## 2024-10-28 PROCEDURE — 85041 AUTOMATED RBC COUNT: CPT | Performed by: SURGERY

## 2024-10-28 PROCEDURE — 250N000009 HC RX 250: Performed by: NURSE ANESTHETIST, CERTIFIED REGISTERED

## 2024-10-28 PROCEDURE — 84100 ASSAY OF PHOSPHORUS: CPT | Performed by: SURGERY

## 2024-10-28 PROCEDURE — 80069 RENAL FUNCTION PANEL: CPT | Performed by: ANESTHESIOLOGY

## 2024-10-28 PROCEDURE — 99100 ANES PT EXTEME AGE<1 YR&>70: CPT

## 2024-10-28 PROCEDURE — 94003 VENT MGMT INPAT SUBQ DAY: CPT

## 2024-10-28 PROCEDURE — 85014 HEMATOCRIT: CPT | Performed by: STUDENT IN AN ORGANIZED HEALTH CARE EDUCATION/TRAINING PROGRAM

## 2024-10-28 PROCEDURE — 85396 CLOTTING ASSAY WHOLE BLOOD: CPT

## 2024-10-28 PROCEDURE — 258N000003 HC RX IP 258 OP 636: Performed by: PHYSICIAN ASSISTANT

## 2024-10-28 PROCEDURE — 82947 ASSAY GLUCOSE BLOOD QUANT: CPT | Performed by: STUDENT IN AN ORGANIZED HEALTH CARE EDUCATION/TRAINING PROGRAM

## 2024-10-28 PROCEDURE — 250N000011 HC RX IP 250 OP 636: Performed by: PHYSICIAN ASSISTANT

## 2024-10-28 PROCEDURE — 85610 PROTHROMBIN TIME: CPT | Performed by: STUDENT IN AN ORGANIZED HEALTH CARE EDUCATION/TRAINING PROGRAM

## 2024-10-28 PROCEDURE — 02L70CK OCCLUSION OF LEFT ATRIAL APPENDAGE WITH EXTRALUMINAL DEVICE, OPEN APPROACH: ICD-10-PCS | Performed by: STUDENT IN AN ORGANIZED HEALTH CARE EDUCATION/TRAINING PROGRAM

## 2024-10-28 PROCEDURE — 250N000013 HC RX MED GY IP 250 OP 250 PS 637: Performed by: STUDENT IN AN ORGANIZED HEALTH CARE EDUCATION/TRAINING PROGRAM

## 2024-10-28 PROCEDURE — 86900 BLOOD TYPING SEROLOGIC ABO: CPT | Performed by: SURGERY

## 2024-10-28 PROCEDURE — 278N000051 HC OR IMPLANT GENERAL: Performed by: STUDENT IN AN ORGANIZED HEALTH CARE EDUCATION/TRAINING PROGRAM

## 2024-10-28 PROCEDURE — 84295 ASSAY OF SERUM SODIUM: CPT | Performed by: SURGERY

## 2024-10-28 PROCEDURE — 83605 ASSAY OF LACTIC ACID: CPT | Performed by: SURGERY

## 2024-10-28 PROCEDURE — 86923 COMPATIBILITY TEST ELECTRIC: CPT

## 2024-10-28 PROCEDURE — 85730 THROMBOPLASTIN TIME PARTIAL: CPT | Performed by: PHYSICIAN ASSISTANT

## 2024-10-28 PROCEDURE — 85730 THROMBOPLASTIN TIME PARTIAL: CPT | Performed by: SURGERY

## 2024-10-28 PROCEDURE — C1898 LEAD, PMKR, OTHER THAN TRANS: HCPCS | Performed by: STUDENT IN AN ORGANIZED HEALTH CARE EDUCATION/TRAINING PROGRAM

## 2024-10-28 PROCEDURE — P9016 RBC LEUKOCYTES REDUCED: HCPCS

## 2024-10-28 PROCEDURE — 0211093 BYPASS CORONARY ARTERY, TWO ARTERIES FROM CORONARY ARTERY WITH AUTOLOGOUS VENOUS TISSUE, OPEN APPROACH: ICD-10-PCS | Performed by: STUDENT IN AN ORGANIZED HEALTH CARE EDUCATION/TRAINING PROGRAM

## 2024-10-28 PROCEDURE — C1760 CLOSURE DEV, VASC: HCPCS | Performed by: STUDENT IN AN ORGANIZED HEALTH CARE EDUCATION/TRAINING PROGRAM

## 2024-10-28 PROCEDURE — 80048 BASIC METABOLIC PNL TOTAL CA: CPT | Performed by: ANESTHESIOLOGY

## 2024-10-28 PROCEDURE — 35820 EXPLORE CHEST VESSELS: CPT | Performed by: ANESTHESIOLOGY

## 2024-10-28 PROCEDURE — 272N000001 HC OR GENERAL SUPPLY STERILE: Performed by: STUDENT IN AN ORGANIZED HEALTH CARE EDUCATION/TRAINING PROGRAM

## 2024-10-28 PROCEDURE — 35820 EXPLORE CHEST VESSELS: CPT

## 2024-10-28 PROCEDURE — 85610 PROTHROMBIN TIME: CPT | Performed by: SURGERY

## 2024-10-28 PROCEDURE — 410N000006: Performed by: STUDENT IN AN ORGANIZED HEALTH CARE EDUCATION/TRAINING PROGRAM

## 2024-10-28 PROCEDURE — 84295 ASSAY OF SERUM SODIUM: CPT | Performed by: STUDENT IN AN ORGANIZED HEALTH CARE EDUCATION/TRAINING PROGRAM

## 2024-10-28 PROCEDURE — 02100Z9 BYPASS CORONARY ARTERY, ONE ARTERY FROM LEFT INTERNAL MAMMARY, OPEN APPROACH: ICD-10-PCS | Performed by: STUDENT IN AN ORGANIZED HEALTH CARE EDUCATION/TRAINING PROGRAM

## 2024-10-28 PROCEDURE — 999N000157 HC STATISTIC RCP TIME EA 10 MIN

## 2024-10-28 PROCEDURE — 999N000065 XR CHEST PORT 1 VIEW

## 2024-10-28 PROCEDURE — 85610 PROTHROMBIN TIME: CPT | Performed by: PHYSICIAN ASSISTANT

## 2024-10-28 PROCEDURE — 3E043XZ INTRODUCTION OF VASOPRESSOR INTO CENTRAL VEIN, PERCUTANEOUS APPROACH: ICD-10-PCS | Performed by: INTERNAL MEDICINE

## 2024-10-28 PROCEDURE — 999N000065 XR ABDOMEN PORT 1 VIEW

## 2024-10-28 PROCEDURE — 83605 ASSAY OF LACTIC ACID: CPT | Performed by: STUDENT IN AN ORGANIZED HEALTH CARE EDUCATION/TRAINING PROGRAM

## 2024-10-28 PROCEDURE — 80051 ELECTROLYTE PANEL: CPT | Performed by: ANESTHESIOLOGY

## 2024-10-28 PROCEDURE — 250N000011 HC RX IP 250 OP 636: Performed by: NURSE ANESTHETIST, CERTIFIED REGISTERED

## 2024-10-28 DEVICE — IMP ATRICLIP FLEX V DEVICE LAA EXLUSION 50MM ACHV50: Type: IMPLANTABLE DEVICE | Site: HEART | Status: FUNCTIONAL

## 2024-10-28 RX ORDER — LIDOCAINE 40 MG/G
CREAM TOPICAL
Status: DISCONTINUED | OUTPATIENT
Start: 2024-10-28 | End: 2024-10-28 | Stop reason: HOSPADM

## 2024-10-28 RX ORDER — ACETAMINOPHEN 325 MG/1
975 TABLET ORAL ONCE
Status: COMPLETED | OUTPATIENT
Start: 2024-10-28 | End: 2024-10-28

## 2024-10-28 RX ORDER — HYDROMORPHONE HCL IN WATER/PF 6 MG/30 ML
0.1 PATIENT CONTROLLED ANALGESIA SYRINGE INTRAVENOUS
Status: DISCONTINUED | OUTPATIENT
Start: 2024-10-28 | End: 2024-11-05 | Stop reason: ALTCHOICE

## 2024-10-28 RX ORDER — DEXMEDETOMIDINE HYDROCHLORIDE 4 UG/ML
.2-.7 INJECTION, SOLUTION INTRAVENOUS CONTINUOUS
Status: DISCONTINUED | OUTPATIENT
Start: 2024-10-28 | End: 2024-10-31

## 2024-10-28 RX ORDER — DEXTROSE MONOHYDRATE 25 G/50ML
25-50 INJECTION, SOLUTION INTRAVENOUS
Status: DISCONTINUED | OUTPATIENT
Start: 2024-10-28 | End: 2024-10-29

## 2024-10-28 RX ORDER — SODIUM CHLORIDE 9 MG/ML
INJECTION, SOLUTION INTRAVENOUS CONTINUOUS PRN
Status: DISCONTINUED | OUTPATIENT
Start: 2024-10-28 | End: 2024-10-28

## 2024-10-28 RX ORDER — ASPIRIN 81 MG/1
162 TABLET, CHEWABLE ORAL DAILY
Status: DISCONTINUED | OUTPATIENT
Start: 2024-10-28 | End: 2024-11-15 | Stop reason: HOSPADM

## 2024-10-28 RX ORDER — BISACODYL 10 MG
10 SUPPOSITORY, RECTAL RECTAL DAILY PRN
Status: DISCONTINUED | OUTPATIENT
Start: 2024-10-31 | End: 2024-11-15 | Stop reason: HOSPADM

## 2024-10-28 RX ORDER — CEFAZOLIN SODIUM 2 G/100ML
2 INJECTION, SOLUTION INTRAVENOUS EVERY 8 HOURS
Status: DISPENSED | OUTPATIENT
Start: 2024-10-28 | End: 2024-10-29

## 2024-10-28 RX ORDER — AMOXICILLIN 250 MG
1 CAPSULE ORAL 2 TIMES DAILY
Status: DISCONTINUED | OUTPATIENT
Start: 2024-10-28 | End: 2024-10-31

## 2024-10-28 RX ORDER — NALOXONE HYDROCHLORIDE 0.4 MG/ML
0.4 INJECTION, SOLUTION INTRAMUSCULAR; INTRAVENOUS; SUBCUTANEOUS
Status: DISCONTINUED | OUTPATIENT
Start: 2024-10-28 | End: 2024-11-15 | Stop reason: HOSPADM

## 2024-10-28 RX ORDER — CALCIUM GLUCONATE 20 MG/ML
1 INJECTION, SOLUTION INTRAVENOUS
Status: DISPENSED | OUTPATIENT
Start: 2024-10-28 | End: 2024-11-03

## 2024-10-28 RX ORDER — PANTOPRAZOLE SODIUM 40 MG/1
40 TABLET, DELAYED RELEASE ORAL DAILY
Status: DISCONTINUED | OUTPATIENT
Start: 2024-10-28 | End: 2024-10-31

## 2024-10-28 RX ORDER — SODIUM CHLORIDE, SODIUM LACTATE, POTASSIUM CHLORIDE, CALCIUM CHLORIDE 600; 310; 30; 20 MG/100ML; MG/100ML; MG/100ML; MG/100ML
INJECTION, SOLUTION INTRAVENOUS CONTINUOUS
Status: CANCELLED | OUTPATIENT
Start: 2024-10-28

## 2024-10-28 RX ORDER — PROTAMINE SULFATE 10 MG/ML
25 INJECTION, SOLUTION INTRAVENOUS ONCE
Status: COMPLETED | OUTPATIENT
Start: 2024-10-28 | End: 2024-10-28

## 2024-10-28 RX ORDER — ASPIRIN 81 MG/1
162 TABLET, CHEWABLE ORAL
Status: COMPLETED | OUTPATIENT
Start: 2024-10-28 | End: 2024-10-28

## 2024-10-28 RX ORDER — HEPARIN SODIUM 1000 [USP'U]/ML
INJECTION, SOLUTION INTRAVENOUS; SUBCUTANEOUS PRN
Status: DISCONTINUED | OUTPATIENT
Start: 2024-10-28 | End: 2024-10-28

## 2024-10-28 RX ORDER — PROPOFOL 10 MG/ML
5-75 INJECTION, EMULSION INTRAVENOUS CONTINUOUS
Status: DISCONTINUED | OUTPATIENT
Start: 2024-10-28 | End: 2024-11-01

## 2024-10-28 RX ORDER — FAMOTIDINE 20 MG/1
20 TABLET, FILM COATED ORAL
Status: COMPLETED | OUTPATIENT
Start: 2024-10-28 | End: 2024-10-28

## 2024-10-28 RX ORDER — HEPARIN SODIUM 5000 [USP'U]/.5ML
5000 INJECTION, SOLUTION INTRAVENOUS; SUBCUTANEOUS EVERY 8 HOURS
Status: DISCONTINUED | OUTPATIENT
Start: 2024-10-29 | End: 2024-10-29

## 2024-10-28 RX ORDER — NICOTINE POLACRILEX 4 MG
15-30 LOZENGE BUCCAL
Status: DISCONTINUED | OUTPATIENT
Start: 2024-10-28 | End: 2024-10-29

## 2024-10-28 RX ORDER — FENTANYL CITRATE 50 UG/ML
INJECTION, SOLUTION INTRAMUSCULAR; INTRAVENOUS PRN
Status: DISCONTINUED | OUTPATIENT
Start: 2024-10-28 | End: 2024-10-28

## 2024-10-28 RX ORDER — FENTANYL CITRATE 0.05 MG/ML
25-100 INJECTION, SOLUTION INTRAMUSCULAR; INTRAVENOUS
Status: DISCONTINUED | OUTPATIENT
Start: 2024-10-28 | End: 2024-10-28 | Stop reason: HOSPADM

## 2024-10-28 RX ORDER — PROTAMINE SULFATE 10 MG/ML
INJECTION, SOLUTION INTRAVENOUS PRN
Status: DISCONTINUED | OUTPATIENT
Start: 2024-10-28 | End: 2024-10-28

## 2024-10-28 RX ORDER — VECURONIUM BROMIDE 1 MG/ML
INJECTION, POWDER, LYOPHILIZED, FOR SOLUTION INTRAVENOUS PRN
Status: DISCONTINUED | OUTPATIENT
Start: 2024-10-28 | End: 2024-10-28

## 2024-10-28 RX ORDER — ASPIRIN 81 MG/1
81 TABLET, CHEWABLE ORAL
Status: COMPLETED | OUTPATIENT
Start: 2024-10-28 | End: 2024-10-28

## 2024-10-28 RX ORDER — VASOPRESSIN IN 0.9 % NACL 2 UNIT/2ML
SYRINGE (ML) INTRAVENOUS PRN
Status: DISCONTINUED | OUTPATIENT
Start: 2024-10-28 | End: 2024-10-28

## 2024-10-28 RX ORDER — LIDOCAINE HYDROCHLORIDE 20 MG/ML
INJECTION, SOLUTION INFILTRATION; PERINEURAL PRN
Status: DISCONTINUED | OUTPATIENT
Start: 2024-10-28 | End: 2024-10-28

## 2024-10-28 RX ORDER — DEXMEDETOMIDINE HYDROCHLORIDE 4 UG/ML
INJECTION, SOLUTION INTRAVENOUS CONTINUOUS PRN
Status: DISCONTINUED | OUTPATIENT
Start: 2024-10-28 | End: 2024-10-28

## 2024-10-28 RX ORDER — ONDANSETRON 2 MG/ML
4 INJECTION INTRAMUSCULAR; INTRAVENOUS EVERY 6 HOURS PRN
Status: DISCONTINUED | OUTPATIENT
Start: 2024-10-28 | End: 2024-11-15

## 2024-10-28 RX ORDER — VANCOMYCIN HYDROCHLORIDE 1 G/20ML
INJECTION, POWDER, LYOPHILIZED, FOR SOLUTION INTRAVENOUS PRN
Status: DISCONTINUED | OUTPATIENT
Start: 2024-10-28 | End: 2024-10-28 | Stop reason: HOSPADM

## 2024-10-28 RX ORDER — ACETAMINOPHEN 325 MG/1
650 TABLET ORAL EVERY 4 HOURS PRN
Status: DISCONTINUED | OUTPATIENT
Start: 2024-10-31 | End: 2024-11-15 | Stop reason: HOSPADM

## 2024-10-28 RX ORDER — NALOXONE HYDROCHLORIDE 0.4 MG/ML
0.2 INJECTION, SOLUTION INTRAMUSCULAR; INTRAVENOUS; SUBCUTANEOUS
Status: DISCONTINUED | OUTPATIENT
Start: 2024-10-28 | End: 2024-11-15 | Stop reason: HOSPADM

## 2024-10-28 RX ORDER — KETAMINE HYDROCHLORIDE 10 MG/ML
INJECTION INTRAMUSCULAR; INTRAVENOUS PRN
Status: DISCONTINUED | OUTPATIENT
Start: 2024-10-28 | End: 2024-10-28

## 2024-10-28 RX ORDER — HYDRALAZINE HYDROCHLORIDE 20 MG/ML
10 INJECTION INTRAMUSCULAR; INTRAVENOUS EVERY 30 MIN PRN
Status: DISCONTINUED | OUTPATIENT
Start: 2024-10-28 | End: 2024-11-15 | Stop reason: HOSPADM

## 2024-10-28 RX ORDER — POLYETHYLENE GLYCOL 3350 17 G/17G
17 POWDER, FOR SOLUTION ORAL DAILY
Status: DISCONTINUED | OUTPATIENT
Start: 2024-10-29 | End: 2024-10-31

## 2024-10-28 RX ORDER — CALCIUM GLUCONATE 20 MG/ML
2 INJECTION, SOLUTION INTRAVENOUS
Status: DISPENSED | OUTPATIENT
Start: 2024-10-28 | End: 2024-11-03

## 2024-10-28 RX ORDER — ACETAMINOPHEN 325 MG/1
975 TABLET ORAL EVERY 8 HOURS
Status: DISCONTINUED | OUTPATIENT
Start: 2024-10-28 | End: 2024-10-29

## 2024-10-28 RX ORDER — CHLORHEXIDINE GLUCONATE ORAL RINSE 1.2 MG/ML
10 SOLUTION DENTAL ONCE
Status: COMPLETED | OUTPATIENT
Start: 2024-10-28 | End: 2024-10-28

## 2024-10-28 RX ORDER — OXYCODONE HYDROCHLORIDE 5 MG/1
5 TABLET ORAL EVERY 4 HOURS PRN
Status: DISCONTINUED | OUTPATIENT
Start: 2024-10-28 | End: 2024-11-15 | Stop reason: HOSPADM

## 2024-10-28 RX ORDER — LIDOCAINE 40 MG/G
CREAM TOPICAL
Status: DISCONTINUED | OUTPATIENT
Start: 2024-10-28 | End: 2024-11-15 | Stop reason: HOSPADM

## 2024-10-28 RX ORDER — PROCHLORPERAZINE MALEATE 5 MG/1
5 TABLET ORAL EVERY 6 HOURS PRN
Status: DISCONTINUED | OUTPATIENT
Start: 2024-10-28 | End: 2024-11-15

## 2024-10-28 RX ORDER — ONDANSETRON 4 MG/1
4 TABLET, ORALLY DISINTEGRATING ORAL EVERY 6 HOURS PRN
Status: DISCONTINUED | OUTPATIENT
Start: 2024-10-28 | End: 2024-11-15

## 2024-10-28 RX ORDER — CALCIUM CHLORIDE 100 MG/ML
INJECTION INTRAVENOUS; INTRAVENTRICULAR PRN
Status: DISCONTINUED | OUTPATIENT
Start: 2024-10-28 | End: 2024-10-28

## 2024-10-28 RX ORDER — ONDANSETRON 2 MG/ML
INJECTION INTRAMUSCULAR; INTRAVENOUS PRN
Status: DISCONTINUED | OUTPATIENT
Start: 2024-10-28 | End: 2024-10-28

## 2024-10-28 RX ORDER — HYDROMORPHONE HCL IN WATER/PF 6 MG/30 ML
0.2 PATIENT CONTROLLED ANALGESIA SYRINGE INTRAVENOUS
Status: DISCONTINUED | OUTPATIENT
Start: 2024-10-28 | End: 2024-11-05 | Stop reason: ALTCHOICE

## 2024-10-28 RX ORDER — PROPOFOL 10 MG/ML
INJECTION, EMULSION INTRAVENOUS CONTINUOUS PRN
Status: DISCONTINUED | OUTPATIENT
Start: 2024-10-28 | End: 2024-10-28

## 2024-10-28 RX ORDER — CEFAZOLIN SODIUM/WATER 2 G/20 ML
2 SYRINGE (ML) INTRAVENOUS SEE ADMIN INSTRUCTIONS
Status: DISCONTINUED | OUTPATIENT
Start: 2024-10-28 | End: 2024-10-28 | Stop reason: HOSPADM

## 2024-10-28 RX ORDER — MAGNESIUM HYDROXIDE 1200 MG/15ML
LIQUID ORAL PRN
Status: DISCONTINUED | OUTPATIENT
Start: 2024-10-28 | End: 2024-10-28 | Stop reason: HOSPADM

## 2024-10-28 RX ORDER — SODIUM CHLORIDE, SODIUM LACTATE, POTASSIUM CHLORIDE, CALCIUM CHLORIDE 600; 310; 30; 20 MG/100ML; MG/100ML; MG/100ML; MG/100ML
INJECTION, SOLUTION INTRAVENOUS CONTINUOUS PRN
Status: DISCONTINUED | OUTPATIENT
Start: 2024-10-28 | End: 2024-10-28

## 2024-10-28 RX ORDER — CEFAZOLIN SODIUM/WATER 2 G/20 ML
SYRINGE (ML) INTRAVENOUS PRN
Status: DISCONTINUED | OUTPATIENT
Start: 2024-10-28 | End: 2024-10-28

## 2024-10-28 RX ORDER — CEFAZOLIN SODIUM/WATER 2 G/20 ML
2 SYRINGE (ML) INTRAVENOUS
Status: COMPLETED | OUTPATIENT
Start: 2024-10-28 | End: 2024-10-28

## 2024-10-28 RX ORDER — NOREPINEPHRINE BITARTRATE 0.02 MG/ML
.01-.15 INJECTION, SOLUTION INTRAVENOUS CONTINUOUS PRN
Status: DISCONTINUED | OUTPATIENT
Start: 2024-10-28 | End: 2024-10-31

## 2024-10-28 RX ORDER — SODIUM CHLORIDE, SODIUM LACTATE, POTASSIUM CHLORIDE, CALCIUM CHLORIDE 600; 310; 30; 20 MG/100ML; MG/100ML; MG/100ML; MG/100ML
INJECTION, SOLUTION INTRAVENOUS CONTINUOUS
Status: DISCONTINUED | OUTPATIENT
Start: 2024-10-28 | End: 2024-10-28 | Stop reason: HOSPADM

## 2024-10-28 RX ORDER — METHOCARBAMOL 500 MG/1
500 TABLET, FILM COATED ORAL EVERY 6 HOURS PRN
Status: DISCONTINUED | OUTPATIENT
Start: 2024-10-28 | End: 2024-11-15 | Stop reason: HOSPADM

## 2024-10-28 RX ORDER — DEXAMETHASONE SODIUM PHOSPHATE 4 MG/ML
INJECTION, SOLUTION INTRA-ARTICULAR; INTRALESIONAL; INTRAMUSCULAR; INTRAVENOUS; SOFT TISSUE PRN
Status: DISCONTINUED | OUTPATIENT
Start: 2024-10-28 | End: 2024-10-28

## 2024-10-28 RX ORDER — EPINEPHRINE IN 0.9 % SOD CHLOR 5 MG/250ML
.01-.3 PLASTIC BAG, INJECTION (ML) INTRAVENOUS CONTINUOUS PRN
Status: DISCONTINUED | OUTPATIENT
Start: 2024-10-28 | End: 2024-11-15 | Stop reason: HOSPADM

## 2024-10-28 RX ORDER — PROPOFOL 10 MG/ML
INJECTION, EMULSION INTRAVENOUS PRN
Status: DISCONTINUED | OUTPATIENT
Start: 2024-10-28 | End: 2024-10-28

## 2024-10-28 RX ADMIN — MIDAZOLAM 1 MG: 1 INJECTION INTRAMUSCULAR; INTRAVENOUS at 07:24

## 2024-10-28 RX ADMIN — CALCIUM CHLORIDE INJECTION 500 MG: 100 INJECTION, SOLUTION INTRAVENOUS at 20:08

## 2024-10-28 RX ADMIN — PROPOFOL 50 MG: 10 INJECTION, EMULSION INTRAVENOUS at 07:50

## 2024-10-28 RX ADMIN — LIDOCAINE HYDROCHLORIDE 100 MG: 20 INJECTION, SOLUTION INFILTRATION; PERINEURAL at 07:50

## 2024-10-28 RX ADMIN — SODIUM CHLORIDE: 9 INJECTION, SOLUTION INTRAVENOUS at 19:51

## 2024-10-28 RX ADMIN — FAMOTIDINE 20 MG: 20 TABLET, FILM COATED ORAL at 05:46

## 2024-10-28 RX ADMIN — SODIUM CHLORIDE, POTASSIUM CHLORIDE, SODIUM LACTATE AND CALCIUM CHLORIDE 500 ML: 600; 310; 30; 20 INJECTION, SOLUTION INTRAVENOUS at 16:45

## 2024-10-28 RX ADMIN — NOREPINEPHRINE BITARTRATE 6.4 MCG: 1 INJECTION, SOLUTION, CONCENTRATE INTRAVENOUS at 07:49

## 2024-10-28 RX ADMIN — HYDROMORPHONE HYDROCHLORIDE 0.5 MG: 1 INJECTION, SOLUTION INTRAMUSCULAR; INTRAVENOUS; SUBCUTANEOUS at 13:41

## 2024-10-28 RX ADMIN — SODIUM CHLORIDE: 9 INJECTION, SOLUTION INTRAVENOUS at 07:39

## 2024-10-28 RX ADMIN — VASOPRESSIN 4 UNITS/HR: 20 INJECTION, SOLUTION INTRAMUSCULAR; SUBCUTANEOUS at 17:32

## 2024-10-28 RX ADMIN — PHENYLEPHRINE HYDROCHLORIDE 50 MCG: 10 INJECTION INTRAVENOUS at 09:52

## 2024-10-28 RX ADMIN — Medication 2 G: at 12:03

## 2024-10-28 RX ADMIN — DEXAMETHASONE SODIUM PHOSPHATE 8 MG: 4 INJECTION, SOLUTION INTRA-ARTICULAR; INTRALESIONAL; INTRAMUSCULAR; INTRAVENOUS; SOFT TISSUE at 08:15

## 2024-10-28 RX ADMIN — NOREPINEPHRINE BITARTRATE 6.4 MCG: 1 INJECTION, SOLUTION, CONCENTRATE INTRAVENOUS at 12:54

## 2024-10-28 RX ADMIN — VECURONIUM BROMIDE 2 MG: 1 INJECTION, POWDER, LYOPHILIZED, FOR SOLUTION INTRAVENOUS at 12:59

## 2024-10-28 RX ADMIN — VECURONIUM BROMIDE 5 MG: 1 INJECTION, POWDER, LYOPHILIZED, FOR SOLUTION INTRAVENOUS at 20:00

## 2024-10-28 RX ADMIN — NOREPINEPHRINE BITARTRATE 6.4 MCG: 1 INJECTION, SOLUTION, CONCENTRATE INTRAVENOUS at 12:12

## 2024-10-28 RX ADMIN — EPINEPHRINE 0.02 MCG/KG/MIN: 1 INJECTION INTRAMUSCULAR; INTRAVENOUS; SUBCUTANEOUS at 08:04

## 2024-10-28 RX ADMIN — SODIUM CHLORIDE, POTASSIUM CHLORIDE, SODIUM LACTATE AND CALCIUM CHLORIDE: 600; 310; 30; 20 INJECTION, SOLUTION INTRAVENOUS at 07:39

## 2024-10-28 RX ADMIN — HYDROXOCOBALAMIN 200 ML: 5 INJECTION, POWDER, LYOPHILIZED, FOR SOLUTION INTRAVENOUS at 12:20

## 2024-10-28 RX ADMIN — Medication 2 UNITS: at 21:43

## 2024-10-28 RX ADMIN — Medication 4 UNITS: at 19:47

## 2024-10-28 RX ADMIN — FENTANYL CITRATE 25 MCG: 50 INJECTION, SOLUTION INTRAMUSCULAR; INTRAVENOUS at 07:23

## 2024-10-28 RX ADMIN — CALCIUM GLUCONATE 1 G: 20 INJECTION, SOLUTION INTRAVENOUS at 15:59

## 2024-10-28 RX ADMIN — ANGIOTENSIN II 20 NG/KG/MIN: 2.5 INJECTION INTRAVENOUS at 16:28

## 2024-10-28 RX ADMIN — VECURONIUM BROMIDE 5 MG: 1 INJECTION, POWDER, LYOPHILIZED, FOR SOLUTION INTRAVENOUS at 19:54

## 2024-10-28 RX ADMIN — FENTANYL CITRATE 100 MCG: 50 INJECTION INTRAMUSCULAR; INTRAVENOUS at 20:35

## 2024-10-28 RX ADMIN — VECURONIUM BROMIDE 4 MG: 1 INJECTION, POWDER, LYOPHILIZED, FOR SOLUTION INTRAVENOUS at 09:45

## 2024-10-28 RX ADMIN — PHENYLEPHRINE HYDROCHLORIDE 100 MCG: 10 INJECTION INTRAVENOUS at 20:10

## 2024-10-28 RX ADMIN — MIDAZOLAM HYDROCHLORIDE 1 MG: 1 INJECTION, SOLUTION INTRAMUSCULAR; INTRAVENOUS at 11:20

## 2024-10-28 RX ADMIN — ROCURONIUM BROMIDE 50 MG: 50 INJECTION, SOLUTION INTRAVENOUS at 20:33

## 2024-10-28 RX ADMIN — ALBUMIN (HUMAN): 12.5 SOLUTION INTRAVENOUS at 22:17

## 2024-10-28 RX ADMIN — FENTANYL CITRATE 100 MCG: 50 INJECTION INTRAMUSCULAR; INTRAVENOUS at 07:49

## 2024-10-28 RX ADMIN — CHLORHEXIDINE GLUCONATE 0.12% ORAL RINSE 15 ML: 1.2 LIQUID ORAL at 05:51

## 2024-10-28 RX ADMIN — AMINOCAPROIC ACID 52.19 MG/KG/HR: 250 INJECTION, SOLUTION INTRAVENOUS at 07:46

## 2024-10-28 RX ADMIN — ACETAMINOPHEN 1000 MG: 500 TABLET ORAL at 05:46

## 2024-10-28 RX ADMIN — OXYCODONE HYDROCHLORIDE 2.5 MG: 5 TABLET ORAL at 16:34

## 2024-10-28 RX ADMIN — ALBUMIN (HUMAN): 12.5 SOLUTION INTRAVENOUS at 20:43

## 2024-10-28 RX ADMIN — HEPARIN SODIUM 30000 UNITS: 1000 INJECTION INTRAVENOUS; SUBCUTANEOUS at 09:23

## 2024-10-28 RX ADMIN — NOREPINEPHRINE BITARTRATE 6.4 MCG: 1 INJECTION, SOLUTION, CONCENTRATE INTRAVENOUS at 09:38

## 2024-10-28 RX ADMIN — FENTANYL CITRATE 100 MCG: 50 INJECTION INTRAMUSCULAR; INTRAVENOUS at 20:15

## 2024-10-28 RX ADMIN — PROPOFOL 50 MCG/KG/MIN: 10 INJECTION, EMULSION INTRAVENOUS at 16:11

## 2024-10-28 RX ADMIN — Medication 2 UNITS: at 21:14

## 2024-10-28 RX ADMIN — VECURONIUM BROMIDE 2 MG: 1 INJECTION, POWDER, LYOPHILIZED, FOR SOLUTION INTRAVENOUS at 08:47

## 2024-10-28 RX ADMIN — Medication 2 UNITS: at 19:42

## 2024-10-28 RX ADMIN — PROTAMINE SULFATE 25 MG: 10 INJECTION, SOLUTION INTRAVENOUS at 16:39

## 2024-10-28 RX ADMIN — PHENYLEPHRINE HYDROCHLORIDE 50 MCG: 10 INJECTION INTRAVENOUS at 09:51

## 2024-10-28 RX ADMIN — SODIUM CHLORIDE, POTASSIUM CHLORIDE, SODIUM LACTATE AND CALCIUM CHLORIDE: 600; 310; 30; 20 INJECTION, SOLUTION INTRAVENOUS at 19:47

## 2024-10-28 RX ADMIN — VECURONIUM BROMIDE 1 MG: 1 INJECTION, POWDER, LYOPHILIZED, FOR SOLUTION INTRAVENOUS at 11:51

## 2024-10-28 RX ADMIN — CALCIUM CHLORIDE INJECTION 500 MG: 100 INJECTION, SOLUTION INTRAVENOUS at 20:09

## 2024-10-28 RX ADMIN — PHENYLEPHRINE HYDROCHLORIDE 200 MCG: 10 INJECTION INTRAVENOUS at 21:17

## 2024-10-28 RX ADMIN — NOREPINEPHRINE BITARTRATE 6.4 MCG: 1 INJECTION, SOLUTION, CONCENTRATE INTRAVENOUS at 12:10

## 2024-10-28 RX ADMIN — PHENYLEPHRINE HYDROCHLORIDE 100 MCG: 10 INJECTION INTRAVENOUS at 09:57

## 2024-10-28 RX ADMIN — NOREPINEPHRINE BITARTRATE 6.4 MCG: 1 INJECTION, SOLUTION, CONCENTRATE INTRAVENOUS at 09:29

## 2024-10-28 RX ADMIN — Medication 200 MG: at 14:17

## 2024-10-28 RX ADMIN — MIDAZOLAM HYDROCHLORIDE 2 MG: 1 INJECTION, SOLUTION INTRAMUSCULAR; INTRAVENOUS at 09:44

## 2024-10-28 RX ADMIN — PHENYLEPHRINE HYDROCHLORIDE 100 MCG: 10 INJECTION INTRAVENOUS at 09:53

## 2024-10-28 RX ADMIN — PHENYLEPHRINE HYDROCHLORIDE 200 MCG: 10 INJECTION INTRAVENOUS at 19:43

## 2024-10-28 RX ADMIN — Medication 1 UNITS: at 21:39

## 2024-10-28 RX ADMIN — PHENYLEPHRINE HYDROCHLORIDE 50 MCG: 10 INJECTION INTRAVENOUS at 09:41

## 2024-10-28 RX ADMIN — ACETAMINOPHEN 975 MG: 325 TABLET, FILM COATED ORAL at 16:34

## 2024-10-28 RX ADMIN — MIDAZOLAM HYDROCHLORIDE 2 MG: 1 INJECTION, SOLUTION INTRAMUSCULAR; INTRAVENOUS at 07:49

## 2024-10-28 RX ADMIN — FENTANYL CITRATE 50 MCG: 50 INJECTION INTRAMUSCULAR; INTRAVENOUS at 22:04

## 2024-10-28 RX ADMIN — NOREPINEPHRINE BITARTRATE 6.4 MCG: 1 INJECTION, SOLUTION, CONCENTRATE INTRAVENOUS at 08:01

## 2024-10-28 RX ADMIN — METHOCARBAMOL 500 MG: 500 TABLET ORAL at 16:34

## 2024-10-28 RX ADMIN — ONDANSETRON 4 MG: 2 INJECTION INTRAMUSCULAR; INTRAVENOUS at 13:03

## 2024-10-28 RX ADMIN — PHENYLEPHRINE HYDROCHLORIDE 200 MCG: 10 INJECTION INTRAVENOUS at 22:44

## 2024-10-28 RX ADMIN — INSULIN HUMAN 2 UNITS: 100 INJECTION, SOLUTION PARENTERAL at 11:08

## 2024-10-28 RX ADMIN — Medication 2 UNITS: at 21:16

## 2024-10-28 RX ADMIN — METOPROLOL TARTRATE 12.5 MG: 25 TABLET, FILM COATED ORAL at 05:46

## 2024-10-28 RX ADMIN — Medication 2 G: at 08:15

## 2024-10-28 RX ADMIN — NOREPINEPHRINE BITARTRATE 6.4 MCG: 1 INJECTION, SOLUTION, CONCENTRATE INTRAVENOUS at 09:56

## 2024-10-28 RX ADMIN — VECURONIUM BROMIDE 4 MG: 1 INJECTION, POWDER, LYOPHILIZED, FOR SOLUTION INTRAVENOUS at 08:19

## 2024-10-28 RX ADMIN — SODIUM CHLORIDE, POTASSIUM CHLORIDE, SODIUM LACTATE AND CALCIUM CHLORIDE 500 ML: 600; 310; 30; 20 INJECTION, SOLUTION INTRAVENOUS at 15:30

## 2024-10-28 RX ADMIN — VASOPRESSIN 2 UNITS/HR: 20 INJECTION, SOLUTION INTRAMUSCULAR; SUBCUTANEOUS at 11:48

## 2024-10-28 RX ADMIN — DEXMEDETOMIDINE HYDROCHLORIDE 0.2 MCG/KG/HR: 200 INJECTION INTRAVENOUS at 08:01

## 2024-10-28 RX ADMIN — SODIUM CHLORIDE, POTASSIUM CHLORIDE, SODIUM LACTATE AND CALCIUM CHLORIDE: 600; 310; 30; 20 INJECTION, SOLUTION INTRAVENOUS at 07:04

## 2024-10-28 RX ADMIN — Medication 2 UNITS: at 20:39

## 2024-10-28 RX ADMIN — Medication 40 MG: at 16:35

## 2024-10-28 RX ADMIN — NOREPINEPHRINE BITARTRATE 0.15 MCG/KG/MIN: 0.02 INJECTION, SOLUTION INTRAVENOUS at 16:13

## 2024-10-28 RX ADMIN — AMINOCAPROIC ACID 10.44 MG/KG/HR: 250 INJECTION, SOLUTION INTRAVENOUS at 08:37

## 2024-10-28 RX ADMIN — SODIUM CHLORIDE: 9 INJECTION, SOLUTION INTRAVENOUS at 11:31

## 2024-10-28 RX ADMIN — PROTHROMBIN, COAGULATION FACTOR VII HUMAN, COAGULATION FACTOR IX HUMAN, COAGULATION FACTOR X HUMAN, PROTEIN C, PROTEIN S HUMAN, AND WATER 2106 UNITS: KIT at 19:04

## 2024-10-28 RX ADMIN — VECURONIUM BROMIDE 4 MG: 1 INJECTION, POWDER, LYOPHILIZED, FOR SOLUTION INTRAVENOUS at 10:30

## 2024-10-28 RX ADMIN — NOREPINEPHRINE BITARTRATE 6.4 MCG: 1 INJECTION, SOLUTION, CONCENTRATE INTRAVENOUS at 09:54

## 2024-10-28 RX ADMIN — NOREPINEPHRINE BITARTRATE 0.03 MCG/KG/MIN: 1 INJECTION, SOLUTION, CONCENTRATE INTRAVENOUS at 07:46

## 2024-10-28 RX ADMIN — NOREPINEPHRINE BITARTRATE 6.4 MCG: 1 INJECTION, SOLUTION, CONCENTRATE INTRAVENOUS at 12:07

## 2024-10-28 RX ADMIN — NOREPINEPHRINE BITARTRATE 6.4 MCG: 1 INJECTION, SOLUTION, CONCENTRATE INTRAVENOUS at 12:08

## 2024-10-28 RX ADMIN — Medication 30 MG: at 08:15

## 2024-10-28 RX ADMIN — PROTAMINE SULFATE 200 MG: 10 INJECTION, SOLUTION INTRAVENOUS at 12:15

## 2024-10-28 RX ADMIN — NOREPINEPHRINE BITARTRATE 6.4 MCG: 1 INJECTION, SOLUTION, CONCENTRATE INTRAVENOUS at 07:50

## 2024-10-28 RX ADMIN — SODIUM CHLORIDE, POTASSIUM CHLORIDE, SODIUM LACTATE AND CALCIUM CHLORIDE 500 ML: 600; 310; 30; 20 INJECTION, SOLUTION INTRAVENOUS at 16:00

## 2024-10-28 RX ADMIN — SODIUM BICARBONATE 50 MEQ: 84 INJECTION, SOLUTION INTRAVENOUS at 20:09

## 2024-10-28 RX ADMIN — PHENYLEPHRINE HYDROCHLORIDE 200 MCG: 10 INJECTION INTRAVENOUS at 21:09

## 2024-10-28 RX ADMIN — EPINEPHRINE 10 MCG: 1 INJECTION INTRAMUSCULAR; INTRAVENOUS; SUBCUTANEOUS at 12:10

## 2024-10-28 RX ADMIN — AMINOCAPROIC ACID 10.44 MG/KG/HR: 250 INJECTION, SOLUTION INTRAVENOUS at 13:47

## 2024-10-28 RX ADMIN — PHENYLEPHRINE HYDROCHLORIDE 200 MCG: 10 INJECTION INTRAVENOUS at 20:55

## 2024-10-28 RX ADMIN — PROPOFOL 20 MCG/KG/MIN: 10 INJECTION, EMULSION INTRAVENOUS at 12:36

## 2024-10-28 RX ADMIN — ROCURONIUM BROMIDE 50 MG: 50 INJECTION, SOLUTION INTRAVENOUS at 07:51

## 2024-10-28 RX ADMIN — FENTANYL CITRATE 100 MCG: 50 INJECTION INTRAMUSCULAR; INTRAVENOUS at 08:38

## 2024-10-28 RX ADMIN — SODIUM BICARBONATE 50 MEQ: 84 INJECTION, SOLUTION INTRAVENOUS at 20:58

## 2024-10-28 RX ADMIN — Medication 2 UNITS: at 22:00

## 2024-10-28 RX ADMIN — INSULIN HUMAN 2 UNITS/HR: 1 INJECTION, SOLUTION INTRAVENOUS at 11:08

## 2024-10-28 RX ADMIN — Medication 2 G: at 19:55

## 2024-10-28 RX ADMIN — ASPIRIN 81 MG CHEWABLE TABLET 162 MG: 81 TABLET CHEWABLE at 05:45

## 2024-10-28 ASSESSMENT — ACTIVITIES OF DAILY LIVING (ADL)
ADLS_ACUITY_SCORE: 0

## 2024-10-28 ASSESSMENT — LIFESTYLE VARIABLES
TOBACCO_USE: 1
TOBACCO_USE: 1

## 2024-10-28 ASSESSMENT — COPD QUESTIONNAIRES
COPD: 0
COPD: 0

## 2024-10-28 NOTE — ANESTHESIA CARE TRANSFER NOTE
Patient: Alessio Teixeira    Procedure: Procedure(s):  CORONARY ARTERY BYPASS GRAFTING X 3 WITH ENDOSOCOPIC VEIN HARVEST FROM RIGHT GREATER SAPHENOUS VEIN LEFT INTERNAL MAMMARY ARTERY-LEFT ANTERIOR DESCENDING ARTERY RIGHT GREATER SAPHENOUS VEIN- OBTUSE MARGINAL ARTERY/ DISTAL RIGHT CORONARY ARTERY  MODIFIED MAZE PROCEDURE  LIGATION, LEFT ATRIAL APPENDAGE WITH ATRICURE 50 MM ATRICLIP  ON PUMP/TRANSESOPHAGEAL ECHOCARDIOGRAM PER ANESTHESIA       Diagnosis: Coronary artery disease [I25.10]  Atrial fibrillation (H) [I48.91]  Diagnosis Additional Information: No value filed.    Anesthesia Type:   General     Note:    Oropharynx: endotracheal tube in place and ventilatory support  Level of Consciousness: iatrogenic sedation    Level of Supplemental Oxygen (L/min / FiO2): 15  Independent Airway: airway patency not satisfactory and stable  Dentition: dentition unchanged  Vital Signs Stable: post-procedure vital signs reviewed and stable  Report to RN Given: handoff report given  Patient transferred to: ICU  Comments: Manually ventilated via ambu bag to ICU. Full monitors.  ICU Handoff: Call for PAUSE to initiate/utilize ICU HANDOFF, Identified Patient, Identified Responsible Provider, Reviewed the Pertinent Medical History, Discussed Surgical Course, Reviewed Intra-OP Anesthesia Management and Issues during Anesthesia, Set Expectations for Post Procedure Period and Allowed Opportunity for Questions and Acknowledgement of Understanding      Vitals:  Vitals Value Taken Time   BP     Temp 37.6  C (99.68  F) 10/28/24 1421   Pulse 80 10/28/24 1421   Resp     SpO2 96 % 10/28/24 1421   Vitals shown include unfiled device data.    Electronically Signed By: ANNA Malin CRNA  October 28, 2024  2:23 PM

## 2024-10-28 NOTE — ANESTHESIA PROCEDURE NOTES
Central Line/PA Catheter Placement    Pre-Procedure   Staff -        Anesthesiologist:  Reza Ervin MD       Performed By: anesthesiologist       Location: pre-op       Pre-Anesthestic Checklist: patient identified, IV checked, site marked, risks and benefits discussed, informed consent, monitors and equipment checked, pre-op evaluation and at physician/surgeon's request  Timeout:       Correct Patient: Yes        Correct Procedure: Yes        Correct Site: Yes        Correct Position: Yes        Correct Laterality: Yes   Line Placement:   This line was placed Pre Induction starting at 10/28/2024 7:01 AM and ending at 10/28/2024 7:22 AM    Procedure   Procedure: central line       Laterality: right       Insertion Site: right, internal jugular.       Patient Position: Trendelenburg  Sterile Prep        All elements of maximal sterile barrier technique followed       Patient Prep/Sterile Barriers: draped, hand hygiene, gloves , hat , mask , draped, gown, sterile gel and probe cover       Skin prep: Chloraprep  Insertion/Injection        Local skin infiltrated with 3 mL of 1% lidocaine.        Technique: ultrasound guided and Seldinger Technique        1. Ultrasound was used to evaluate the access site.       2. Vein evaluated via ultrasound for patency/adequacy.       3. Using real-time ultrasound the needle/catheter was observed entering the artery/vein.       4. Permanent image was captured and entered into the patient's record.       5. The visualized structures were anatomically normal.       6. There were no apparent abnormal pathologic findings.       Introducer Type: 9 Fr, 10 cm        Type: PA/CVC with Introducer       PA Catheter Type: Thermodilution         Appropriate RV, RA and PA waveforms noted:  Yes            Withdrawn and Locked at cm: 55  Narrative         Secured by: suture       Tegaderm and Biopatch dressing used.       Complications: None apparent,        blood aspirated from all  lumens,        All lumens flushed: Yes       Verification method: Ultrasound   Comments:  Ultrasound Interpretation central venous access    1. Ultrasound guidance was used to evaluate potential access sites.  2. Ultrasound was also used to verify the patency of the vessel specified above.   3. Ultrasound was used to visualize the needle entering the vessel.   4. The visualized structures were anatomically normal.  5. There were no apparent abnormal pathological findings.  6. A permanent ultrasound image was saved in the patient's record.

## 2024-10-28 NOTE — OP NOTE
Date of Surgery: October 28, 2024    Preop Diagnosis: NSTEMI in the setting of severe mvCAD  Postop Diagnosis: same    Surgeon: Michael S. Mulvihill, M.D.  Assistant(s): Dr. Bone and Opal Nguyen PA-C.    Procedures:  1. CABG x3 (LIMA-LAD, V-dRCA, V-OM)  2. Endoscopic Vein [place]  3. Epiaortic Echo  4. Occlusion of left atrial appendage (50 mm Atriclip)  5. Modified left atrial MAZE (encompass clamp)    Anesthesia: GETA  Approach: Median sternotomy  Drains: 1 left pleural 28 fr, 2x mediastinal 32 fr  Pacing Wires: atrial and ventricular    Findings of Procedure:  severe CAD    Estimated Blood Loss: 300 ml  Blood Products Administered: 2 FFP    Disposition: ICU  Specimens: none  Indication: mvCAD    GRAFT MATRIX:  Distal: Graft Source: Target Quality: Conduit Quality: Distal Suture: Proximal Suture:  LAD   RUBIO  Good  Good  7-0  N/A  dRCA  RSVG  Good  Good  7-0  6-0  OM   RSVG  Good  Good  7-0  6-0    Operative Indications / Brief History of Present Illness:     Alessio Teixeira is a 88 year old male with a history of stomach ulcer who presented with 2 to 3 weeks of worsening fatigue and some dyspnea on exertion. He had a dark stool, he has had some lower abdomen tenderness on the left side. History of diverticulitis in the past. He was on Nexium but stopped it in the past week per recommendation of his doctor. He is also on Coumadin for A-fib. While awaiting an EGD  he was noted to have T wave changes on EKG and elevated troponin. EGD was cancelled and pt was taken for a coronary angiogram which showed severe 3 vessel coronary artery disease. Angiogram showed an occluded RCA, ost LAD to mid LAD of 80% and mid circ to distal circ with 99%. Echocardiogram showed an EF of 35-40%. The RV systolic function was normal. There was also mild mitral regurg. I met with him in the hospital and later following his recovery in the outpatient setting. We discussed the risks and benefits of surgical revascularization for his  severe mvCAD and his NSTEMI and ultimately he would like to proceed.    Procedure in Detail:  The risks, benefits, complications, treatment options, and expected outcomes were discussed with the patient. The possibilities of reaction to medication, pulmonary aspiration, perforation of viscus, bleeding, recurrent infection, the need for additional procedures, failure to diagnose a condition, and creating a complication requiring transfusion or operation were discussed with the patient. The patient concurred with the proposed plan, giving informed consent. The patient was taken to the operating room, identified as Alessio BASSETT Puneet and the procedure verified as Coronary Artery Bypass Grafting. A Time Out was held and the above information confirmed.    Standard monitoring lines and Amos catheter were placed. General anesthesia was induced. The patient was prepped and draped in a sterile fashion.     An endoscopic vein harvesting was carried out by Opal Nguyen PA-C.     A standard median sternotomy was performed. The left internal mammary artery was taken down using cautery and clips. Intravenous heparin was given at the appropriate dose to obtain anticoagulation sufficient for CPB. The LIMA was then transected distally. The flow was excellent.    The pericardium was then opened and the pericardial well was created.     Epiaortic echo was then completed. Images of the site for cannulation, cross clamp application and proximal anastomoses were recorded and personally interpreted by me. There was no significant plaque in any of these sites.    Concentric purse strings were placed and the aorta was cannulated with a 18fr EOPA cannula. After adequate de-airing this cannula was connected to the CPB circuit. A purse string was placed around the right atrial appendage and it was cannulated with a dual stage venous cannula.     Prior to initiating CPB the LIMA was fashioned to the appropriate length.    Cardiopulmonary bypass  was then initiated.     The distal targets (LAD, dRCA, OM) were then evaluated and marked.    We turned our attention to operative ablation for his atrial fibrillation. We began by developing the transverse and oblique sinuses for placement of the EnCompass radiofrequency ablation clamp. The ends of the clamp were guided into each sinus using a red rubber catheter as a guide and both ends were visualized to fully encircle the left sided pulmonary veins on a test clamp. Three pairs of ablations were next performed to create a transmural box lesion set around the left atrium (bilateral pulmonary veins, roof and floor of the left atrium).    An antegrade vent/cardioplegia cannula was then placed. The aortic cross clamp was then applied and the heart was arrested with 1200 cc antegrade [place] cardioplegia. Cardioplegia was then administered intermittently to ensure adequate myocardial protection.    The distal anastomosis to the dRCA was completed first. After positioning the heart, the artery was opened and RSV was ananstomosed to the artery in an end-to-side fashion using 7-0 prolene in a running fashion. Prior to completing the anastomosis it was probed proximally and distally to ensure patency.    Next, the distal anastomosis to to the OM was completed. After positioning the heart, the artery was opened and RSV was ananstomosed to the artery in an end-to-side fashion using 7-0 prolene in a running fashion. Prior to completing the anastomosis it was probed proximally and distally to ensure patency.    The left atrial appendage was then occluded with a 50 mm Atriclip.    Next the LIMA to LAD anastomosis was completed in a similar an end-to-side fashion with 7-0 prolene. Prior to completing the anastomosis it was probed to ensure patency.    The proximal anastomoses were then completed in the standard fashion using 6-0 prolene in a running fashion.     After adequate deairing maneuvers, the cross clamp was removed  and the heart was reperfused.    Examination of all surgical sites revealed good hemostasis. Atrial and ventricular epicardial pacing wires were then placed. The patient was fully ventillated and successfully weaned off of CPB. After a test dose of protamine, the patient was fully decannulated. The heparin was fully reversed with protamine. Again, examination of all surgical sites were evaluated for hemostasis which was good.    Post-bypass JARVIS revealed preserved RV/LV function and no new wall motion abnormalities.    Left pleural as well as mediastinal drains were placed.   The sternum was closed using stainless steel wires.  The dermal and subcutaneous tissues were closed in layers and the skin was sewn closed with a subcuticular stitch.    At the end of the operation, all sponge, instruments, and needle counts were correct.    The patient tolerated the procedure without complication and was transported to the CTICU in stable condition.    I was personally present and scrubbed for all key and critical portions of the procedure.     Michael Mulvihill, MD  10/28/2024 @ 2:27 PM

## 2024-10-28 NOTE — CONSULTS
CARDIAC SURGERY NUTRITION CONSULT    Received standing order to assess and educate patient.    Will follow and complete assessment once patient is extubated and/or is transferred to medical unit.    Patient will receive nutrition education during the Outpatient Cardiac Rehab Program (nutrition classes/dietitian counseling).    Emily Copeland RD, LD, [facility]   Clinical Dietitian - [facility]

## 2024-10-28 NOTE — ANESTHESIA PROCEDURE NOTES
Arterial Line Procedure Note    Pre-Procedure   Staff -        Anesthesiologist:  Reza Ervin MD       Performed By: Anesthesiologist       Location: pre-op       Pre-Anesthestic Checklist: patient identified, IV checked, risks and benefits discussed, informed consent, monitors and equipment checked and pre-op evaluation  Timeout:       Correct Patient: Yes        Correct Procedure: Yes        Correct Site: Yes        Correct Position: Yes   Line Placement:   This line was placed Pre Induction starting at 10/28/2024 6:55 AM and ending at 10/28/2024 7:00 AM  Procedure   Procedure: arterial line       Diagnosis: Hemodynamic instability       Laterality: right       Insertion Site: radial.  Sterile Prep        All elements of maximal sterile barrier technique followed       Patient Prep/Sterile Barriers: hand hygiene, sterile gloves, hat, mask, draped, sterile gown, draped       Skin prep: Chloraprep  Insertion/Injection        Technique: ultrasound guided        1. Ultrasound was used to evaluate the access site.       2. Artery evaluated via ultrasound for patency/adequacy.       3. Using real-time ultrasound the needle/catheter was observed entering the artery/vein.       4. Permanent image was captured and entered into the patient's record.       Catheter Type/Size: 20 gauge, 1.75 in/4.5 cm quick cath (integral wire)  Narrative         Secured by: other       Tegaderm dressing used.       Complications: None apparent,        Arterial waveform: Yes    Comments:  Ultrasound Interpretation arterial catheter    1. Ultrasound guidance was used to evaluate potential access sites.  2. Ultrasound was also used to verify the patency of the vessel specified above.   3. Ultrasound was used to visualize the needle entering the vessel.   4. The visualized structures were anatomically normal.  5. There were no apparent abnormal pathological findings.  6. A permanent ultrasound image was saved in the patient's  record.

## 2024-10-28 NOTE — ANESTHESIA POSTPROCEDURE EVALUATION
Patient: Alessio Teixeira    Procedure: Procedure(s):  CORONARY ARTERY BYPASS GRAFTING X 3 WITH ENDOSOCOPIC VEIN HARVEST FROM RIGHT GREATER SAPHENOUS VEIN LEFT INTERNAL MAMMARY ARTERY-LEFT ANTERIOR DESCENDING ARTERY RIGHT GREATER SAPHENOUS VEIN- OBTUSE MARGINAL ARTERY/ DISTAL RIGHT CORONARY ARTERY  MODIFIED MAZE PROCEDURE  LIGATION, LEFT ATRIAL APPENDAGE WITH ATRICURE 50 MM ATRICLIP  ON PUMP/TRANSESOPHAGEAL ECHOCARDIOGRAM PER ANESTHESIA       Anesthesia Type:  General    Note:  Disposition: ICU            ICU Sign Out: Anesthesiologist/ICU physician sign out WAS performed   Postop Pain Control: Uneventful            Sign Out: Well controlled pain   PONV: No   Neuro/Psych: Uneventful            Sign Out: PLANNED postop sedation   Airway/Respiratory:             Sign Out: AIRWAY IN SITU/Resp. Support               Airway in situ/Resp. Support: ETT                 Reason: Planned Pre-op   CV/Hemodynamics: Uneventful            Sign Out: Acceptable CV status   Other NRE: NONE   DID A NON-ROUTINE EVENT OCCUR? No    Event details/Postop Comments:  Report given to the critical care physician/fellow after patient delivered to ICU.               Last vitals:  Vitals:    10/28/24 0551   BP: 100/68   Pulse: 94   Resp: 16   Temp: 36.2  C (97.2  F)   SpO2: 94%       Electronically Signed By: Reza Ervin MD  October 28, 2024  2:30 PM

## 2024-10-28 NOTE — PROGRESS NOTES
[facility] ICU RESPIRATORY NOTE        Date of Admission: 10/28/2024     Date of Intubation (most recent): 10/28/84732    Reason for Mechanical Ventilation: Airway protection     Number of Days on Mechanical Ventilation: 1    Met Criteria for Spontaneous Breathing Trial: No    Reason for No Spontaneous Breathing Trial: Per MD    Bite Block: No    Significant Events Today: None    ABG Results:   Recent Labs   Lab 10/28/24  1451 10/28/24  1448 10/28/24  1311 10/28/24  1227 10/28/24  1130   PH  --  7.31* 7.35 7.29* 7.33*   PCO2  --  43 37 44 42   PO2  --  73* 271* 84 326*   HCO3  --  21 20* 21 22   O2PER 40 40 100.0 100.0 90.0         Current Vent Settings: FiO2 (%): 40 %, Resp: 23, Vent Mode: CMV/AC, Resp Rate (Set): 20 breaths/min, Tidal Volume (Set, mL): 500 mL, PEEP (cm H2O): 5 cmH2O, Resp Rate (Set): 20 breaths/min, Tidal Volume (Set, mL): 500 mL, PEEP (cm H2O): 5 cmH2O    Skin Assessment: Skin intact    Plan: Continue full vent support and wean as able     RT Jair on 10/28/2024 at 5:52 PM

## 2024-10-28 NOTE — ANESTHESIA PROCEDURE NOTES
Airway       Patient location during procedure: OR       Procedure Start/Stop Times: 10/28/2024 7:53 AM  Staff -        Anesthesiologist:  Reza Ervin MD       CRNA: Julissa Sims APRN CRNA       Performed By: CRNA  Consent for Airway        Urgency: elective  Indications and Patient Condition       Indications for airway management: cas-procedural       Induction type:intravenous       Mask difficulty assessment: 2 - vent by mask + OA or adjuvant +/- NMBA    Final Airway Details       Final airway type: endotracheal airway       Successful airway: ETT - single  Endotracheal Airway Details        ETT size (mm): 8.0       Cuffed: yes       Successful intubation technique: video laryngoscopy       VL Blade Size: Glidescope 4       Grade View of Cords: 1       Adjucts: stylet       Position: Right       Measured from: gums/teeth       Secured at (cm): 23       Bite Block used: JARVIS probe bite block.    Post intubation assessment        Placement verified by: capnometry, equal breath sounds and chest rise        Number of attempts at approach: 1       Number of other approaches attempted: 0       Secured with: tape       Ease of procedure: easy       Dentition: Intact and Unchanged    Medication(s) Administered   Medication Administration Time: 10/28/2024 7:53 AM

## 2024-10-28 NOTE — BRIEF OP NOTE
[facility]    Brief Operative Note    Pre-operative diagnosis: Coronary artery disease [I25.10]  Atrial fibrillation (H) [I48.91]  Post-operative diagnosis Same as pre-operative diagnosis    Procedure: CORONARY ARTERY BYPASS GRAFTING X 3 WITH ENDOSOCOPIC VEIN HARVEST FROM RIGHT GREATER SAPHENOUS VEIN LEFT INTERNAL MAMMARY ARTERY-LEFT ANTERIOR DESCENDING ARTERY RIGHT GREATER SAPHENOUS VEIN- OBTUSE MARGINAL ARTERY/ DISTAL RIGHT CORONARY ARTERY, N/A - Heart  MODIFIED MAZE PROCEDURE, N/A - Chest  LIGATION, LEFT ATRIAL APPENDAGE WITH ATRICURE 50 MM ATRICLIP  ON PUMP/TRANSESOPHAGEAL ECHOCARDIOGRAM PER ANESTHESIA, N/A - Heart    Surgeon: Surgeons and Role:     * Mulvihill, Michael, MD - Primary     * Opal Nguyen PA-C - Assisting     * Clarence Bone MD - Fellow - Assisting  Anesthesia: General   Estimated Blood Loss: 450 ml    Drains: One left pleural, two mediastinal  Specimens:   ID Type Source Tests Collected by Time Destination   A : POST CPB LABS. Blood Line, arterial CBC WITH PLATELETS, BASIC METABOLIC PANEL, FIBRINOGEN ACTIVITY, PARTIAL THROMBOPLASTIN TIME, INR Julissa Sims APRN CRNA 10/28/2024 12:25 PM      Findings:   CABGx3 (LIMA-LAD, SVG-distal RCA, SVG-OM2), modified left atrial maze, LAAE 50 mm clip .  Complications: None.  Implants:   Implant Name Type Inv. Item Serial No.  Lot No. LRB No. Used Action   IMP ATRICLIP FLEX V DEVICE PAYAM EXLUSION 50MM [id number] - [id number] Clip IMP ATRICLIP FLEX V DEVICE PAYAM EXLUSION 50MM [id number]  [employer] [id number] N/A 1 Implanted

## 2024-10-28 NOTE — ANESTHESIA PREPROCEDURE EVALUATION
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    Actinic keratosis     JAVIER (acute kidney injury) (H) 2019    Atrial fibrillation (H)     Chronic 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 2013    Edema of leg     Gastrointestinal hemorrhage associated with gastric ulcer 2019    Gout, unspecified     H/O diplopia     History of blood transfusion     History of pulmonary embolism- bilateral in 2016    Hypertension     Meningioma (H)     sinus    Myalgia and myositis, unspecified     Myasthenia gravis (H)     high dose steroids ,     Obesity     Other seborrheic keratosis     Presbyacusis     Primary localized osteoarthrosis, lower leg     Primary localized osteoarthrosis, pelvic region and thigh     Sleep apnea     doesn't use his CPAP)    Spinal stenosis     Squamous cell cancer of scalp and skin of neck     Dr Sanchez, multiple procedures, Mohs scalp and chest    Vitamin D deficiency       Past Surgical History:   Procedure Laterality Date    ABDOMEN SURGERY  2007    APPENDECTOMY      ARTHROPLASTY HIP Right 2016    ARTHROPLASTY HIP ANTERIOR Right 2016    Procedure: ARTHROPLASTY HIP ANTERIOR;  Surgeon: Miguel Johnson MD;  Location:  OR    ARTHROPLASTY KNEE Right 2019    Procedure: RIGHT TOTAL KNEE ARTROPLASTY;  Surgeon: Miguel Johnson MD;  Location:  OR    BIOPSY      skin cancer    BRAIN SURGERY      partial resection meningioma    CATARACT IOL, RT/LT      COLECTOMY      bowel perforation due to steroids    COLONOSCOPY      CV CORONARY ANGIOGRAM N/A 9/10/2024    Procedure: Coronary Angiogram;  Surgeon: Vickey Monteiro MD;  Location:  [facility]    CV PCI N/A 9/10/2024     Procedure: Percutaneous Coronary Intervention;  Surgeon: Vickey Monteiro MD;  Location:  [facility]    ESOPHAGOSCOPY, GASTROSCOPY, DUODENOSCOPY (EGD), COMBINED N/A 2018    Procedure: COMBINED ESOPHAGOSCOPY, GASTROSCOPY, DUODENOSCOPY (EGD), BIOPSY SINGLE OR MULTIPLE;  Surgeon: Elizabeth Jones MD;  Location:  GI    ESOPHAGOSCOPY, GASTROSCOPY, DUODENOSCOPY (EGD), COMBINED N/A 05/10/2019    Procedure: ESOPHAGOGASTRODUODENOSCOPY (EGD);  Surgeon: Ahsan Garcia MD;  Location:  GI    ESOPHAGOSCOPY, GASTROSCOPY, DUODENOSCOPY (EGD), COMBINED N/A 2024    Procedure: Esophagoscopy, gastroscopy, duodenoscopy (EGD), combined;  Surgeon: Shannon Stephenson MD;  Location:  GI    GENITOURINARY SURGERY      vasectomy    HEAD & NECK SURGERY      meningioma removed    KNEE SURGERY      left knee replacement    ORTHOPEDIC SURGERY      left TKR    PHACOEMULSIFICATION CLEAR CORNEA WITH TORIC INTRAOCULAR LENS IMPLANT  2012    Procedure: PHACOEMULSIFICATION CLEAR CORNEA WITH TORIC INTRAOCULAR LENS IMPLANT;  COMPLEX RIGHT PHACOEMULSIFICATION CLEAR CORNEA WITH TORIC INTRAOCULAR LENS IMPLANT WITH MALYUGIN RING;  Surgeon: Ronald Cortez MD;  Location:  EC    RECTAL SURGERY      ZZC RAD RESEC TONSIL/PILLARS        No Known Allergies   Social History     Tobacco Use    Smoking status: Former     Current packs/day: 0.00     Average packs/day: 1 pack/day for 3.9 years (3.9 ttl pk-yrs)     Types: Cigarettes     Start date: 1959     Quit date: 1963     Years since quittin.0    Smokeless tobacco: Never   Substance Use Topics    Alcohol use: Yes     Comment: 2-4 glasses of wine per week      Wt Readings from Last 1 Encounters:   10/25/24 97.1 kg (214 lb)        Anesthesia Evaluation        History of anesthetic complications       ROS/MED HX  ENT/Pulmonary:     (+) sleep apnea,               tobacco use, Past use,                    (-) asthma and COPD    Neurologic:  - neg neurologic ROS     Cardiovascular:     (+) Dyslipidemia hypertension- -  CAD - past MI - -   Taking blood thinners   CHF etiology: ICM                          Previous cardiac testing   Echo: Date: 9/24 Results:  Interpretation Summary     Technically very difficult study.     Left ventricular systolic function is moderately reduced.The visual ejection  fraction is 35-40%.There is moderate global hypokinesia of the left ventricle.  The right ventricular systolic function is normal.  There is mild (1+) mitral regurgitation.  IVC diameter <2.1 cm collapsing >50% with sniff suggests a normal RA pressure  of 3 mmHg.     Echo dated 12/22/2018 LVEF was noted 60-65%.    Stress Test:  Date: Results:    ECG Reviewed:  Date: Results:    Cath:  Date: 9/24 Results:    Conclusion          Ost LAD to Mid LAD lesion is 80% stenosed.     Prox Cx to Mid Cx lesion is 70% stenosed.     Mid Cx to Dist Cx lesion is 99% stenosed.     Prox RCA lesion is 100% stenosed.     1st Mrg lesion is 90% stenosed.     1st Diag lesion is 99% stenosed.     - Severe triple vessel disease CAD  Consider CABG, vs medical mangement, vs, PCI to LAD   (-) stent and CABG   METS/Exercise Tolerance:     Hematologic:     (+)      anemia,          Musculoskeletal: Comment: GOUT  (+)  arthritis,             GI/Hepatic:    (-) GERD and esophageal disease   Renal/Genitourinary:     (+) renal disease, type: ARF and CRI,            Endo:     (+)               Obesity,    (-) Type II DM   Psychiatric/Substance Use:  - neg psychiatric ROS     Infectious Disease:       Malignancy: Comment: CLL  (+) Malignancy, History of Lymphoma/Leukemia.    Other:            Physical Exam    Airway        Mallampati: III   TM distance: > 3 FB   Neck ROM: full   Mouth opening: > 3 cm    Respiratory Devices and Support         Dental       (+) Modest Abnormalities - crowns, retainers, 1 or 2 missing teeth      Cardiovascular   cardiovascular exam normal           Pulmonary   pulmonary exam normal                OUTSIDE LABS:  CBC:   Lab Results   Component Value Date    WBC 25.5 (H) 10/25/2024    WBC 22.9 (H) 10/01/2024    HGB 12.2 (L) 10/25/2024    HGB 11.3 (L) 10/01/2024    HCT 40.5 10/25/2024    HCT 37.7 (L) 10/01/2024     10/25/2024     10/01/2024     BMP:   Lab Results   Component Value Date     10/09/2024     09/23/2024    POTASSIUM 4.1 10/09/2024    POTASSIUM 5.1 09/23/2024    CHLORIDE 99 10/09/2024    CHLORIDE 103 09/23/2024    CO2 24 10/09/2024    CO2 22 09/23/2024    BUN 33.6 (H) 10/09/2024    BUN 32.5 (H) 09/23/2024    CR 1.79 (H) 10/09/2024    CR 1.77 (H) 09/23/2024    GLC 84 10/09/2024    GLC 94 09/23/2024     COAGS:   Lab Results   Component Value Date    PTT 27 01/12/2010    INR 1.71 (H) 09/18/2024    FIBR 0.0 03/01/2011     POC:   Lab Results   Component Value Date     (H) 04/27/2016     HEPATIC:   Lab Results   Component Value Date    ALBUMIN 3.7 10/09/2024    PROTTOTAL 6.9 10/09/2024    ALT 15 10/09/2024    AST 30 10/09/2024    ALKPHOS 109 10/09/2024    BILITOTAL 0.6 10/09/2024     OTHER:   Lab Results   Component Value Date    PH 7.40 08/27/2007    LACT 1.1 09/10/2024    A1C 4.5 10/09/2024    TATE 9.3 10/09/2024    PHOS 5.1 (H) 09/11/2024    MAG 2.1 09/16/2024    LIPASE 689 (H) 09/07/2007    AMYLASE 346 (H) 09/07/2007    TSH 4.66 (H) 09/18/2024    T4 0.82 (L) 09/18/2024    SED 10 04/12/2022       Anesthesia Plan    ASA Status:  4    NPO Status:  NPO Appropriate    Anesthesia Type: General.     - Airway: ETT   Induction: Intravenous, Propofol.   Maintenance: Balanced.   Techniques and Equipment:     - Lines/Monitors: Arterial Line, Central Line, PAC, JARVIS            JARVIS Absolute Contra-indication: NONE            JARVIS Relative Contra-indication: NONE     Consents    Anesthesia Plan(s) and associated risks, benefits, and realistic alternatives discussed. Questions answered and patient/representative(s) expressed  "understanding.     - Discussed:     - Discussed with:  Patient      - Extended Intubation/Ventilatory Support Discussed: Yes.      - Patient is DNR/DNI Status: No     Use of blood products discussed: Yes.     - Discussed with: Patient.     - Consented: consented to blood products     Postoperative Care    Pain management: IV analgesics, Multi-modal analgesia.   PONV prophylaxis: Ondansetron (or other 5HT-3), Dexamethasone or Solumedrol     Comments:    Other Comments: Induction with Fentanyl, Versed, Propofol and Rocuronium.  Plan to infuse Epinephrine  and Norepinephrine for separation from cardiopulmonary bypass.   JARVIS for monitoring purposes only.   Will plan to transfer to ICU on full monitors with ETT in situ.   Propofol gtt for post operative sedation.   Multi Model Analgesia:  -Tylenol 1000 mg po.  -Precedex gtt at 0.2-0.5 mcg/kg/hour prior to CPB only  -Ketamine 30 mg prior to incision.  -Decadron 8mg IV after induction.   -Dilaudid titrated prn.                  Reza Ervin MD    I have reviewed the pertinent notes and labs in the chart from the past 30 days and (re)examined the patient.  Any updates or changes from those notes are reflected in this note.            # Drug Induced Coagulation Defect: home medication list includes an anticoagulant medication    # Hypertension: Noted on problem list  # Chronic heart failure with reduced ejection fraction: last echo with EF <40%        # Obesity: Estimated body mass index is 30.02 kg/m  as calculated from the following:    Height as of 10/3/24: 1.798 m (5' 10.8\").    Weight as of 10/25/24: 97.1 kg (214 lb).             "

## 2024-10-28 NOTE — H&P
ICU H&P  Date of Service: 10/28/24    Assessment and Plan:  88 year old M with a history of severe 3 vessel CAD  Status post sternotomy, CABGx3, modified MAZE procedure, PAYAM ligation with Dr. Mulvihill 10/28/24     Arrives on 0.06 epinephrine, 0.06 levophed, 2.0 vasopressin.   Case was 'oozy'   Reportedly low blood pressure pre-op, low 100s, required levophed prior to induction.   A+V wires, backup paced 50. Currently native sinus rhythm.   JARVIS: Patient's baseline, EF 40-45%     300 cell saver   2 U FFP  2700 ml crystalloid    ml     Neuro:  # Sedation for mechanical ventilation  Propofol and precedex, wean as able     PRN pain control     CV:  #Hx of atrial fibrillation and distant PE on Eliquis   #Hx NSTEMI   #Severe multivessel CAD   Status post sternotomy, CABGx3, modified MAZE procedure, PAYAM ligation with Dr. Mulvihill 10/28/24.     Pre-operative workup:   Angiogram showed an occluded RCA, ost LAD to mid LAD of 80% and mid circ to distal circ with 99%. Echocardiogram showed an EF of 35-40%. The RV systolic function was normal. There was also mild mitral regurg.    Plan:   Post op EKG   SBP goal <140   MAP goal >65. Wean pressors as able.   Cardiac meds per CVTS  Aspirin 162 mg   PTA atorvastatin - resume tomorrow   PTA torsemide - hold     Pulm:  # Acute hypoxemic respiratory failure  - low tidal volume ventilation  - wean PEEP/FiO2, as able    Wean to extubate. Per CVTS pathway.   Admit CXR and gas       GI:  #Hx upper GIB, gastritis   Recent admission discharged 9/16/2024. Endoscopy revealing gastritis.   PTA pantoprazole     Goal to extubate and feed   Bowel regimen     Renal:  Post op labs pending   Pre operative cr 1.8     #Lactic acidosis   Resuscitate   Trend q 4 hours     #Hypocalcemia  Replacement     ID:  Perioperative ancef     Heme:  Heparin ppx   Post op CBC pending     MSK:   #Hx Myasthenia gravis     #Hx CLL   Follows with Dr. Raymundo hematology   Follow leukocytosis     Endo:  No  "history of DM   A1C 4.5   Insulin gtt post op     PPx:  1. DVT: heparin    2. VAP: HOB 30 degrees, chlorhexidine rinse  3. Stress Ulcer: PPI  4. Restraints: Nonviolent soft two point restraints required and necessary for patient safety and continued cares and good effect as patient continues to pull at necessary lines, tubes despite education and distraction. Will readdress daily.   5. Wound care - per unit routine   6. Feeding - hold for now   7. Family updated by operative team     Dispo: ICU    HPI:  Unable to obtain ROS due to intubation.  See above for case details.     /68   Pulse 94   Temp 97.2  F (36.2  C) (Temporal)   Resp 16   Ht 1.778 m (5' 10\")   Wt 95.8 kg (211 lb 3.2 oz)   SpO2 94%   BMI 30.30 kg/m      Resp: 16      Intake/Output Summary (Last 24 hours) at 10/28/2024 1430  Last data filed at 10/28/2024 1340  Gross per 24 hour   Intake 3588 ml   Output 825 ml   Net 2763 ml       Physical Exam:  In bed   Sedated, intubated   Pupils 2 mm equal and reactive to light   Chest rise equal bilaterally   CT with thin bloody output, no air leak, to suction   Amos with purple urine (cyanokit)   Abdomen soft, ND   Feet cool to the touch. Bilateral PT and DP with multiphasic doppler signals    RLE with ace wrap.     Labs: reviewed  Post op labs pending     Imaging: reviewed  Post op CXR, AXR for line/tube placement     Past Medical/Surgical history   Atrial fibrillation   Gout   GERD   Gastritis   Bilateral PE in 2007   Myasthenia gravis   HTN   Meningioma   MARTHA   Spinal stenosis     Family history   Not discussed today     Social history   Not discussed today     Carla Dubois MD   SICU Fellow     "

## 2024-10-29 ENCOUNTER — APPOINTMENT (OUTPATIENT)
Dept: GENERAL RADIOLOGY | Facility: CLINIC | Age: 89
DRG: 233 | End: 2024-10-29
Attending: STUDENT IN AN ORGANIZED HEALTH CARE EDUCATION/TRAINING PROGRAM
Payer: MEDICARE

## 2024-10-29 DIAGNOSIS — Z95.1 S/P CABG (CORONARY ARTERY BYPASS GRAFT): Primary | ICD-10-CM

## 2024-10-29 LAB
ALBUMIN SERPL BCG-MCNC: 3.3 G/DL (ref 3.5–5.2)
ALBUMIN SERPL BCG-MCNC: 3.5 G/DL (ref 3.5–5.2)
ALLEN'S TEST: ABNORMAL
ALP SERPL-CCNC: 69 U/L (ref 40–150)
ALP SERPL-CCNC: 70 U/L (ref 40–150)
ALT SERPL W P-5'-P-CCNC: 155 U/L (ref 0–70)
ALT SERPL W P-5'-P-CCNC: 308 U/L (ref 0–70)
ANION GAP SERPL CALCULATED.3IONS-SCNC: 18 MMOL/L (ref 7–15)
ANION GAP SERPL CALCULATED.3IONS-SCNC: 20 MMOL/L (ref 7–15)
ANION GAP SERPL CALCULATED.3IONS-SCNC: 23 MMOL/L (ref 7–15)
APTT PPP: 34 SECONDS (ref 22–38)
APTT PPP: 36 SECONDS (ref 22–38)
APTT PPP: 39 SECONDS (ref 22–38)
AST SERPL W P-5'-P-CCNC: 412 U/L (ref 0–45)
AST SERPL W P-5'-P-CCNC: ABNORMAL U/L
BASE EXCESS BLDA CALC-SCNC: -5.2 MMOL/L (ref -3–3)
BASE EXCESS BLDA CALC-SCNC: -5.8 MMOL/L (ref -3–3)
BASE EXCESS BLDA CALC-SCNC: -7.2 MMOL/L (ref -3–3)
BASE EXCESS BLDA CALC-SCNC: -9.3 MMOL/L (ref -3–3)
BASE EXCESS BLDV CALC-SCNC: -10.5 MMOL/L (ref -3–3)
BASE EXCESS BLDV CALC-SCNC: -4.9 MMOL/L (ref -3–3)
BASE EXCESS BLDV CALC-SCNC: -9.4 MMOL/L (ref -3–3)
BILIRUB SERPL-MCNC: 0.3 MG/DL
BILIRUB SERPL-MCNC: 0.6 MG/DL
BLD PROD TYP BPU: NORMAL
BLOOD COMPONENT TYPE: NORMAL
BUN SERPL-MCNC: 54.5 MG/DL (ref 8–23)
BUN SERPL-MCNC: 60.8 MG/DL (ref 8–23)
BUN SERPL-MCNC: 62 MG/DL (ref 8–23)
CA-I BLD-MCNC: 4.5 MG/DL (ref 4.4–5.2)
CALCIUM SERPL-MCNC: 7.7 MG/DL (ref 8.8–10.4)
CALCIUM SERPL-MCNC: 7.7 MG/DL (ref 8.8–10.4)
CALCIUM SERPL-MCNC: 8.3 MG/DL (ref 8.8–10.4)
CHLORIDE SERPL-SCNC: 105 MMOL/L (ref 98–107)
CHLORIDE SERPL-SCNC: 108 MMOL/L (ref 98–107)
CHLORIDE SERPL-SCNC: 109 MMOL/L (ref 98–107)
CODING SYSTEM: NORMAL
COHGB MFR BLD: 100 % (ref 95–96)
COHGB MFR BLD: 97.8 % (ref 95–96)
COHGB MFR BLD: 98.4 % (ref 95–96)
COHGB MFR BLD: 99 % (ref 95–96)
CREAT SERPL-MCNC: 2.5 MG/DL (ref 0.67–1.17)
CREAT SERPL-MCNC: 3.1 MG/DL (ref 0.67–1.17)
CREAT SERPL-MCNC: 3.34 MG/DL (ref 0.67–1.17)
CROSSMATCH: NORMAL
EGFRCR SERPLBLD CKD-EPI 2021: 17 ML/MIN/1.73M2
EGFRCR SERPLBLD CKD-EPI 2021: 19 ML/MIN/1.73M2
EGFRCR SERPLBLD CKD-EPI 2021: 24 ML/MIN/1.73M2
ELLIPTOCYTES BLD QL SMEAR: SLIGHT
ERYTHROCYTE [DISTWIDTH] IN BLOOD BY AUTOMATED COUNT: 17 % (ref 10–15)
ERYTHROCYTE [DISTWIDTH] IN BLOOD BY AUTOMATED COUNT: 17.2 % (ref 10–15)
ERYTHROCYTE [DISTWIDTH] IN BLOOD BY AUTOMATED COUNT: 19.3 % (ref 10–15)
FIBRINOGEN PPP-MCNC: 229 MG/DL (ref 170–510)
FIBRINOGEN PPP-MCNC: 247 MG/DL (ref 170–510)
FIBRINOGEN PPP-MCNC: 250 MG/DL (ref 170–510)
GLUCOSE BLDC GLUCOMTR-MCNC: 100 MG/DL (ref 70–99)
GLUCOSE BLDC GLUCOMTR-MCNC: 104 MG/DL (ref 70–99)
GLUCOSE BLDC GLUCOMTR-MCNC: 106 MG/DL (ref 70–99)
GLUCOSE BLDC GLUCOMTR-MCNC: 108 MG/DL (ref 70–99)
GLUCOSE BLDC GLUCOMTR-MCNC: 120 MG/DL (ref 70–99)
GLUCOSE BLDC GLUCOMTR-MCNC: 122 MG/DL (ref 70–99)
GLUCOSE BLDC GLUCOMTR-MCNC: 151 MG/DL (ref 70–99)
GLUCOSE BLDC GLUCOMTR-MCNC: 198 MG/DL (ref 70–99)
GLUCOSE BLDC GLUCOMTR-MCNC: 65 MG/DL (ref 70–99)
GLUCOSE BLDC GLUCOMTR-MCNC: 72 MG/DL (ref 70–99)
GLUCOSE BLDC GLUCOMTR-MCNC: 73 MG/DL (ref 70–99)
GLUCOSE BLDC GLUCOMTR-MCNC: 86 MG/DL (ref 70–99)
GLUCOSE BLDC GLUCOMTR-MCNC: 92 MG/DL (ref 70–99)
GLUCOSE BLDC GLUCOMTR-MCNC: 99 MG/DL (ref 70–99)
GLUCOSE SERPL-MCNC: 123 MG/DL (ref 70–99)
GLUCOSE SERPL-MCNC: 166 MG/DL (ref 70–99)
GLUCOSE SERPL-MCNC: 70 MG/DL (ref 70–99)
HCO3 BLD-SCNC: 17 MMOL/L (ref 21–28)
HCO3 BLD-SCNC: 19 MMOL/L (ref 21–28)
HCO3 BLD-SCNC: 20 MMOL/L (ref 21–28)
HCO3 BLD-SCNC: 21 MMOL/L (ref 21–28)
HCO3 BLDV-SCNC: 17 MMOL/L (ref 21–28)
HCO3 BLDV-SCNC: 18 MMOL/L (ref 21–28)
HCO3 BLDV-SCNC: 23 MMOL/L (ref 21–28)
HCO3 SERPL-SCNC: 17 MMOL/L (ref 22–29)
HCO3 SERPL-SCNC: 19 MMOL/L (ref 22–29)
HCO3 SERPL-SCNC: 19 MMOL/L (ref 22–29)
HCT VFR BLD AUTO: 24.1 % (ref 40–53)
HCT VFR BLD AUTO: 25.2 % (ref 40–53)
HCT VFR BLD AUTO: 25.5 % (ref 40–53)
HGB BLD-MCNC: 7.9 G/DL (ref 13.3–17.7)
HGB BLD-MCNC: 8 G/DL (ref 13.3–17.7)
HGB BLD-MCNC: 8.1 G/DL (ref 13.3–17.7)
HGB BLD-MCNC: 8.3 G/DL (ref 13.3–17.7)
HOLD SPECIMEN: NORMAL
INR PPP: 1.7 (ref 0.85–1.15)
INR PPP: 1.73 (ref 0.85–1.15)
INR PPP: 1.74 (ref 0.85–1.15)
ISSUE DATE AND TIME: NORMAL
LACTATE SERPL-SCNC: 4.6 MMOL/L (ref 0.7–2)
LACTATE SERPL-SCNC: 5 MMOL/L (ref 0.7–2)
LACTATE SERPL-SCNC: 5.2 MMOL/L (ref 0.7–2)
LACTATE SERPL-SCNC: 5.3 MMOL/L (ref 0.7–2)
LACTATE SERPL-SCNC: 5.7 MMOL/L (ref 0.7–2)
LACTATE SERPL-SCNC: 5.9 MMOL/L (ref 0.7–2)
LACTATE SERPL-SCNC: 6.3 MMOL/L (ref 0.7–2)
LACTATE SERPL-SCNC: 6.9 MMOL/L (ref 0.7–2)
LACTATE SERPL-SCNC: 7.1 MMOL/L (ref 0.7–2)
LACTATE SERPL-SCNC: 7.6 MMOL/L (ref 0.7–2)
LACTATE SERPL-SCNC: 7.6 MMOL/L (ref 0.7–2)
MAGNESIUM SERPL-MCNC: 2.5 MG/DL (ref 1.7–2.3)
MCH RBC QN AUTO: 29.1 PG (ref 26.5–33)
MCH RBC QN AUTO: 29.3 PG (ref 26.5–33)
MCH RBC QN AUTO: 29.7 PG (ref 26.5–33)
MCHC RBC AUTO-ENTMCNC: 31.7 G/DL (ref 31.5–36.5)
MCHC RBC AUTO-ENTMCNC: 32.5 G/DL (ref 31.5–36.5)
MCHC RBC AUTO-ENTMCNC: 32.8 G/DL (ref 31.5–36.5)
MCV RBC AUTO: 90 FL (ref 78–100)
MCV RBC AUTO: 91 FL (ref 78–100)
MCV RBC AUTO: 92 FL (ref 78–100)
O2/TOTAL GAS SETTING VFR VENT: 30 %
O2/TOTAL GAS SETTING VFR VENT: 30 %
O2/TOTAL GAS SETTING VFR VENT: 40 %
O2/TOTAL GAS SETTING VFR VENT: 50 %
O2/TOTAL GAS SETTING VFR VENT: 50 %
OXYHGB MFR BLDV: 37 % (ref 70–75)
OXYHGB MFR BLDV: 41 % (ref 70–75)
OXYHGB MFR BLDV: 56 % (ref 70–75)
PCO2 BLD: 39 MM HG (ref 35–45)
PCO2 BLD: 41 MM HG (ref 35–45)
PCO2 BLD: 41 MM HG (ref 35–45)
PCO2 BLD: 42 MM HG (ref 35–45)
PCO2 BLDV: 45 MM HG (ref 40–50)
PCO2 BLDV: 47 MM HG (ref 40–50)
PCO2 BLDV: 56 MM HG (ref 40–50)
PEEP: 5 CM H2O
PH BLD: 7.25 [PH] (ref 7.35–7.45)
PH BLD: 7.28 [PH] (ref 7.35–7.45)
PH BLD: 7.29 [PH] (ref 7.35–7.45)
PH BLD: 7.31 [PH] (ref 7.35–7.45)
PH BLDV: 7.19 [PH] (ref 7.32–7.43)
PH BLDV: 7.2 [PH] (ref 7.32–7.43)
PH BLDV: 7.22 [PH] (ref 7.32–7.43)
PHOSPHATE SERPL-MCNC: 7.8 MG/DL (ref 2.5–4.5)
PLAT MORPH BLD: ABNORMAL
PLATELET # BLD AUTO: 268 10E3/UL (ref 150–450)
PLATELET # BLD AUTO: 309 10E3/UL (ref 150–450)
PLATELET # BLD AUTO: 443 10E3/UL (ref 150–450)
PO2 BLD: 100 MM HG (ref 80–105)
PO2 BLD: 106 MM HG (ref 80–105)
PO2 BLD: 123 MM HG (ref 80–105)
PO2 BLD: 140 MM HG (ref 80–105)
PO2 BLDV: 25 MM HG (ref 25–47)
PO2 BLDV: 27 MM HG (ref 25–47)
PO2 BLDV: 36 MM HG (ref 25–47)
POLYCHROMASIA BLD QL SMEAR: SLIGHT
POTASSIUM SERPL-SCNC: 4.9 MMOL/L (ref 3.4–5.3)
POTASSIUM SERPL-SCNC: 4.9 MMOL/L (ref 3.4–5.3)
POTASSIUM SERPL-SCNC: 5.3 MMOL/L (ref 3.4–5.3)
POTASSIUM SERPL-SCNC: 6.2 MMOL/L (ref 3.4–5.3)
POTASSIUM SERPL-SCNC: 6.3 MMOL/L (ref 3.4–5.3)
PROT SERPL-MCNC: 5.2 G/DL (ref 6.4–8.3)
PROT SERPL-MCNC: 5.3 G/DL (ref 6.4–8.3)
RBC # BLD AUTO: 2.66 10E6/UL (ref 4.4–5.9)
RBC # BLD AUTO: 2.75 10E6/UL (ref 4.4–5.9)
RBC # BLD AUTO: 2.83 10E6/UL (ref 4.4–5.9)
RBC MORPH BLD: ABNORMAL
SAO2 % BLDA: 95 % (ref 92–100)
SAO2 % BLDA: 95 % (ref 92–100)
SAO2 % BLDA: 96 % (ref 92–100)
SAO2 % BLDA: 97 % (ref 92–100)
SAO2 % BLDV: 40.4 % (ref 70–75)
SAO2 % BLDV: 42.4 % (ref 70–75)
SAO2 % BLDV: 58.4 % (ref 70–75)
SODIUM SERPL-SCNC: 142 MMOL/L (ref 135–145)
SODIUM SERPL-SCNC: 148 MMOL/L (ref 135–145)
SODIUM SERPL-SCNC: 148 MMOL/L (ref 135–145)
UNIT ABO/RH: NORMAL
UNIT NUMBER: NORMAL
UNIT STATUS: NORMAL
UNIT TYPE ISBT: 6200
WBC # BLD AUTO: 61.8 10E3/UL (ref 4–11)
WBC # BLD AUTO: 62.7 10E3/UL (ref 4–11)
WBC # BLD AUTO: 62.7 10E3/UL (ref 4–11)
WBC # BLD AUTO: 95.6 10E3/UL (ref 4–11)

## 2024-10-29 PROCEDURE — 80069 RENAL FUNCTION PANEL: CPT | Performed by: SURGERY

## 2024-10-29 PROCEDURE — 93005 ELECTROCARDIOGRAM TRACING: CPT

## 2024-10-29 PROCEDURE — 250N000013 HC RX MED GY IP 250 OP 250 PS 637: Performed by: SURGERY

## 2024-10-29 PROCEDURE — 36415 COLL VENOUS BLD VENIPUNCTURE: CPT | Performed by: STUDENT IN AN ORGANIZED HEALTH CARE EDUCATION/TRAINING PROGRAM

## 2024-10-29 PROCEDURE — 80053 COMPREHEN METABOLIC PANEL: CPT | Performed by: INTERNAL MEDICINE

## 2024-10-29 PROCEDURE — 250N000011 HC RX IP 250 OP 636: Performed by: PHYSICIAN ASSISTANT

## 2024-10-29 PROCEDURE — 71045 X-RAY EXAM CHEST 1 VIEW: CPT

## 2024-10-29 PROCEDURE — 85610 PROTHROMBIN TIME: CPT | Performed by: PHYSICIAN ASSISTANT

## 2024-10-29 PROCEDURE — P9016 RBC LEUKOCYTES REDUCED: HCPCS | Performed by: PHYSICIAN ASSISTANT

## 2024-10-29 PROCEDURE — 83605 ASSAY OF LACTIC ACID: CPT | Performed by: PHYSICIAN ASSISTANT

## 2024-10-29 PROCEDURE — 120N000004 HC R&B MS OVERFLOW

## 2024-10-29 PROCEDURE — 250N000009 HC RX 250: Performed by: STUDENT IN AN ORGANIZED HEALTH CARE EDUCATION/TRAINING PROGRAM

## 2024-10-29 PROCEDURE — 84155 ASSAY OF PROTEIN SERUM: CPT | Performed by: PHYSICIAN ASSISTANT

## 2024-10-29 PROCEDURE — 85384 FIBRINOGEN ACTIVITY: CPT | Performed by: PHYSICIAN ASSISTANT

## 2024-10-29 PROCEDURE — 82947 ASSAY GLUCOSE BLOOD QUANT: CPT | Performed by: INTERNAL MEDICINE

## 2024-10-29 PROCEDURE — 94003 VENT MGMT INPAT SUBQ DAY: CPT

## 2024-10-29 PROCEDURE — 250N000012 HC RX MED GY IP 250 OP 636 PS 637: Performed by: PHYSICIAN ASSISTANT

## 2024-10-29 PROCEDURE — 250N000011 HC RX IP 250 OP 636: Performed by: STUDENT IN AN ORGANIZED HEALTH CARE EDUCATION/TRAINING PROGRAM

## 2024-10-29 PROCEDURE — 85007 BL SMEAR W/DIFF WBC COUNT: CPT | Performed by: PHYSICIAN ASSISTANT

## 2024-10-29 PROCEDURE — P9059 PLASMA, FRZ BETWEEN 8-24HOUR: HCPCS | Performed by: PHYSICIAN ASSISTANT

## 2024-10-29 PROCEDURE — 258N000003 HC RX IP 258 OP 636: Performed by: STUDENT IN AN ORGANIZED HEALTH CARE EDUCATION/TRAINING PROGRAM

## 2024-10-29 PROCEDURE — 87040 BLOOD CULTURE FOR BACTERIA: CPT | Performed by: STUDENT IN AN ORGANIZED HEALTH CARE EDUCATION/TRAINING PROGRAM

## 2024-10-29 PROCEDURE — 99291 CRITICAL CARE FIRST HOUR: CPT | Mod: 24 | Performed by: INTERNAL MEDICINE

## 2024-10-29 PROCEDURE — 85018 HEMOGLOBIN: CPT | Performed by: PHYSICIAN ASSISTANT

## 2024-10-29 PROCEDURE — 83605 ASSAY OF LACTIC ACID: CPT | Performed by: SURGERY

## 2024-10-29 PROCEDURE — 82805 BLOOD GASES W/O2 SATURATION: CPT | Performed by: SURGERY

## 2024-10-29 PROCEDURE — 250N000009 HC RX 250: Performed by: SURGERY

## 2024-10-29 PROCEDURE — 258N000001 HC RX 258: Performed by: PHYSICIAN ASSISTANT

## 2024-10-29 PROCEDURE — 84132 ASSAY OF SERUM POTASSIUM: CPT | Performed by: PHYSICIAN ASSISTANT

## 2024-10-29 PROCEDURE — P9045 ALBUMIN (HUMAN), 5%, 250 ML: HCPCS | Performed by: STUDENT IN AN ORGANIZED HEALTH CARE EDUCATION/TRAINING PROGRAM

## 2024-10-29 PROCEDURE — 82805 BLOOD GASES W/O2 SATURATION: CPT | Performed by: PHYSICIAN ASSISTANT

## 2024-10-29 PROCEDURE — 99222 1ST HOSP IP/OBS MODERATE 55: CPT | Performed by: INTERNAL MEDICINE

## 2024-10-29 PROCEDURE — 258N000003 HC RX IP 258 OP 636: Performed by: PHYSICIAN ASSISTANT

## 2024-10-29 PROCEDURE — 250N000009 HC RX 250: Performed by: PHYSICIAN ASSISTANT

## 2024-10-29 PROCEDURE — 85027 COMPLETE CBC AUTOMATED: CPT | Performed by: PHYSICIAN ASSISTANT

## 2024-10-29 PROCEDURE — 84075 ASSAY ALKALINE PHOSPHATASE: CPT | Performed by: PHYSICIAN ASSISTANT

## 2024-10-29 PROCEDURE — 83735 ASSAY OF MAGNESIUM: CPT | Performed by: SURGERY

## 2024-10-29 PROCEDURE — 85730 THROMBOPLASTIN TIME PARTIAL: CPT | Performed by: PHYSICIAN ASSISTANT

## 2024-10-29 PROCEDURE — 999N000157 HC STATISTIC RCP TIME EA 10 MIN

## 2024-10-29 PROCEDURE — 258N000003 HC RX IP 258 OP 636: Performed by: SURGERY

## 2024-10-29 PROCEDURE — 82565 ASSAY OF CREATININE: CPT | Performed by: INTERNAL MEDICINE

## 2024-10-29 PROCEDURE — P9045 ALBUMIN (HUMAN), 5%, 250 ML: HCPCS | Performed by: PHYSICIAN ASSISTANT

## 2024-10-29 PROCEDURE — 82330 ASSAY OF CALCIUM: CPT | Performed by: SURGERY

## 2024-10-29 PROCEDURE — 84132 ASSAY OF SERUM POTASSIUM: CPT | Performed by: INTERNAL MEDICINE

## 2024-10-29 PROCEDURE — 250N000011 HC RX IP 250 OP 636: Performed by: SURGERY

## 2024-10-29 PROCEDURE — 99223 1ST HOSP IP/OBS HIGH 75: CPT | Performed by: INTERNAL MEDICINE

## 2024-10-29 PROCEDURE — 82247 BILIRUBIN TOTAL: CPT | Performed by: PHYSICIAN ASSISTANT

## 2024-10-29 RX ORDER — NICOTINE POLACRILEX 4 MG
15-30 LOZENGE BUCCAL
Status: DISCONTINUED | OUTPATIENT
Start: 2024-10-29 | End: 2024-10-29

## 2024-10-29 RX ORDER — DEXTROSE MONOHYDRATE 25 G/50ML
25-50 INJECTION, SOLUTION INTRAVENOUS
Status: DISCONTINUED | OUTPATIENT
Start: 2024-10-29 | End: 2024-10-29

## 2024-10-29 RX ORDER — SODIUM CHLORIDE, SODIUM LACTATE, POTASSIUM CHLORIDE, CALCIUM CHLORIDE 600; 310; 30; 20 MG/100ML; MG/100ML; MG/100ML; MG/100ML
INJECTION, SOLUTION INTRAVENOUS CONTINUOUS
Status: DISCONTINUED | OUTPATIENT
Start: 2024-10-29 | End: 2024-11-15

## 2024-10-29 RX ORDER — CALCIUM GLUCONATE 20 MG/ML
2 INJECTION, SOLUTION INTRAVENOUS ONCE
Status: COMPLETED | OUTPATIENT
Start: 2024-10-29 | End: 2024-10-29

## 2024-10-29 RX ORDER — HEPARIN SODIUM 5000 [USP'U]/.5ML
5000 INJECTION, SOLUTION INTRAVENOUS; SUBCUTANEOUS EVERY 8 HOURS
Status: DISCONTINUED | OUTPATIENT
Start: 2024-10-29 | End: 2024-10-30

## 2024-10-29 RX ORDER — DEXTROSE MONOHYDRATE 25 G/50ML
25 INJECTION, SOLUTION INTRAVENOUS ONCE
Status: COMPLETED | OUTPATIENT
Start: 2024-10-29 | End: 2024-10-29

## 2024-10-29 RX ORDER — NICOTINE POLACRILEX 4 MG
15-30 LOZENGE BUCCAL
Status: DISCONTINUED | OUTPATIENT
Start: 2024-10-29 | End: 2024-10-30

## 2024-10-29 RX ORDER — DEXTROSE MONOHYDRATE 100 MG/ML
INJECTION, SOLUTION INTRAVENOUS CONTINUOUS
Status: DISCONTINUED | OUTPATIENT
Start: 2024-10-29 | End: 2024-11-09

## 2024-10-29 RX ORDER — DEXTROSE MONOHYDRATE 25 G/50ML
25-50 INJECTION, SOLUTION INTRAVENOUS
Status: DISCONTINUED | OUTPATIENT
Start: 2024-10-29 | End: 2024-10-30

## 2024-10-29 RX ADMIN — HEPARIN SODIUM 5000 UNITS: 5000 INJECTION, SOLUTION INTRAVENOUS; SUBCUTANEOUS at 17:05

## 2024-10-29 RX ADMIN — OXYCODONE HYDROCHLORIDE 5 MG: 5 TABLET ORAL at 21:49

## 2024-10-29 RX ADMIN — SODIUM BICARBONATE 50 MEQ: 84 INJECTION, SOLUTION INTRAVENOUS at 11:23

## 2024-10-29 RX ADMIN — DEXTROSE MONOHYDRATE 25 G: 25 INJECTION, SOLUTION INTRAVENOUS at 20:50

## 2024-10-29 RX ADMIN — DEXTROSE MONOHYDRATE 300 ML: 100 INJECTION, SOLUTION INTRAVENOUS at 20:41

## 2024-10-29 RX ADMIN — Medication 40 MG: at 08:11

## 2024-10-29 RX ADMIN — SODIUM CHLORIDE 10 UNITS: 9 INJECTION, SOLUTION INTRAVENOUS at 20:57

## 2024-10-29 RX ADMIN — SODIUM CHLORIDE, POTASSIUM CHLORIDE, SODIUM LACTATE AND CALCIUM CHLORIDE: 600; 310; 30; 20 INJECTION, SOLUTION INTRAVENOUS at 10:44

## 2024-10-29 RX ADMIN — CALCIUM GLUCONATE 2 G: 20 INJECTION, SOLUTION INTRAVENOUS at 20:17

## 2024-10-29 RX ADMIN — CEFAZOLIN SODIUM 2 G: 2 INJECTION, SOLUTION INTRAVENOUS at 04:52

## 2024-10-29 RX ADMIN — ANGIOTENSIN II 20 NG/KG/MIN: 2.5 INJECTION INTRAVENOUS at 22:22

## 2024-10-29 RX ADMIN — VASOPRESSIN 2.4 UNITS/HR: 20 INJECTION, SOLUTION INTRAMUSCULAR; SUBCUTANEOUS at 00:31

## 2024-10-29 RX ADMIN — AMIODARONE HYDROCHLORIDE 0.5 MG/MIN: 50 INJECTION, SOLUTION INTRAVENOUS at 16:40

## 2024-10-29 RX ADMIN — VASOPRESSIN 2.4 UNITS/HR: 20 INJECTION, SOLUTION INTRAMUSCULAR; SUBCUTANEOUS at 09:19

## 2024-10-29 RX ADMIN — EPINEPHRINE 0.07 MCG/KG/MIN: 1 INJECTION INTRAMUSCULAR; INTRAVENOUS; SUBCUTANEOUS at 08:08

## 2024-10-29 RX ADMIN — SODIUM CHLORIDE, POTASSIUM CHLORIDE, SODIUM LACTATE AND CALCIUM CHLORIDE 500 ML: 600; 310; 30; 20 INJECTION, SOLUTION INTRAVENOUS at 21:06

## 2024-10-29 RX ADMIN — SODIUM CHLORIDE 1 MG/MIN: 9 INJECTION, SOLUTION INTRAVENOUS at 10:52

## 2024-10-29 RX ADMIN — SENNOSIDES AND DOCUSATE SODIUM 1 TABLET: 50; 8.6 TABLET ORAL at 08:11

## 2024-10-29 RX ADMIN — ASPIRIN 81 MG CHEWABLE TABLET 162 MG: 81 TABLET CHEWABLE at 08:11

## 2024-10-29 RX ADMIN — ALBUMIN HUMAN 250 ML: 0.05 INJECTION, SOLUTION INTRAVENOUS at 13:06

## 2024-10-29 RX ADMIN — PROPOFOL 15 MCG/KG/MIN: 10 INJECTION, EMULSION INTRAVENOUS at 23:57

## 2024-10-29 RX ADMIN — SODIUM BICARBONATE 50 MEQ: 84 INJECTION, SOLUTION INTRAVENOUS at 11:40

## 2024-10-29 RX ADMIN — ALBUMIN HUMAN 250 ML: 0.05 INJECTION, SOLUTION INTRAVENOUS at 10:36

## 2024-10-29 RX ADMIN — OXYCODONE HYDROCHLORIDE 5 MG: 5 TABLET ORAL at 08:17

## 2024-10-29 RX ADMIN — POLYETHYLENE GLYCOL 3350 17 G: 17 POWDER, FOR SOLUTION ORAL at 08:11

## 2024-10-29 RX ADMIN — HYDROXOCOBALAMIN 5 G: 5 INJECTION, POWDER, LYOPHILIZED, FOR SOLUTION INTRAVENOUS at 16:00

## 2024-10-29 RX ADMIN — VASOPRESSIN 2 UNITS/HR: 20 INJECTION, SOLUTION INTRAMUSCULAR; SUBCUTANEOUS at 18:16

## 2024-10-29 RX ADMIN — CEFAZOLIN SODIUM 2 G: 2 INJECTION, SOLUTION INTRAVENOUS at 11:23

## 2024-10-29 RX ADMIN — NOREPINEPHRINE BITARTRATE 0.12 MCG/KG/MIN: 0.02 INJECTION, SOLUTION INTRAVENOUS at 13:10

## 2024-10-29 RX ADMIN — SODIUM CHLORIDE, POTASSIUM CHLORIDE, SODIUM LACTATE AND CALCIUM CHLORIDE 500 ML: 600; 310; 30; 20 INJECTION, SOLUTION INTRAVENOUS at 18:16

## 2024-10-29 RX ADMIN — OXYCODONE HYDROCHLORIDE 2.5 MG: 5 TABLET ORAL at 13:00

## 2024-10-29 RX ADMIN — PROPOFOL 30 MCG/KG/MIN: 10 INJECTION, EMULSION INTRAVENOUS at 02:50

## 2024-10-29 RX ADMIN — PROPOFOL 30 MCG/KG/MIN: 10 INJECTION, EMULSION INTRAVENOUS at 08:08

## 2024-10-29 RX ADMIN — SENNOSIDES AND DOCUSATE SODIUM 1 TABLET: 50; 8.6 TABLET ORAL at 21:49

## 2024-10-29 RX ADMIN — NOREPINEPHRINE BITARTRATE 0.07 MCG/KG/MIN: 0.02 INJECTION, SOLUTION INTRAVENOUS at 18:25

## 2024-10-29 RX ADMIN — AMIODARONE HYDROCHLORIDE 150 MG: 1.5 INJECTION, SOLUTION INTRAVENOUS at 10:26

## 2024-10-29 RX ADMIN — OXYCODONE HYDROCHLORIDE 2.5 MG: 5 TABLET ORAL at 17:05

## 2024-10-29 RX ADMIN — PROPOFOL 30 MCG/KG/MIN: 10 INJECTION, EMULSION INTRAVENOUS at 13:46

## 2024-10-29 RX ADMIN — SODIUM CHLORIDE, POTASSIUM CHLORIDE, SODIUM LACTATE AND CALCIUM CHLORIDE 500 ML: 600; 310; 30; 20 INJECTION, SOLUTION INTRAVENOUS at 09:49

## 2024-10-29 RX ADMIN — DEXTROSE MONOHYDRATE 25 G: 25 INJECTION, SOLUTION INTRAVENOUS at 20:46

## 2024-10-29 RX ADMIN — ACETAMINOPHEN 975 MG: 325 TABLET, FILM COATED ORAL at 05:18

## 2024-10-29 RX ADMIN — Medication 50 MEQ: at 11:40

## 2024-10-29 RX ADMIN — NOREPINEPHRINE BITARTRATE 0.12 MCG/KG/MIN: 0.02 INJECTION, SOLUTION INTRAVENOUS at 05:28

## 2024-10-29 NOTE — PLAN OF CARE
CV  Pressors (which pressors and any increase/decrease in pressor needs):    Epinephrine, Norepinephrine and Vasopressin. Frequent titrations made throughout the night. Angiotensin II remained off.    HR range: 105-125; Sinus Tach w/ pvcs    Chest tube output: Mediastinal 290mL and Pleural 390 mL     Neuro  Orientation: NIMA: sedated with RASS -4  Delirium present?(y/n): nima  Sleep: yes  Pain: CPOT 0; RASS -4    GI/  BM? (y/n): no    Urine output: Amos catheter in place. 320mL 2150-7251    Lines: Right PIV; Pulmonary art cath; CVC RIJ; RIJ Introducer and Right Radial A-line      Goal Outcome Evaluation:      Problem: Adult Inpatient Plan of Care  Goal: Plan of Care Review    Outcome: Progressing  Flowsheets (Taken 10/29/2024 0758)  Outcome Evaluation:   Returend from OR around 2245. Remained intubarted and sedated with propofol o/n. Vent settings: FiO2 50%, rate 18, tidal vol 500 and PEEP of 5. Bilat lung sounds clear and equal. Frequent titrations made to vasopressors (Epinephrine, Noreipinephrine and Vasopressin see mar) to maintain presribed MAP >65. Murmur noted on assessment and Intensivist updated. 2 mediastinal chest tubes and 2 pleural chest tubes   no air leak noted and no clots noted in chest tubes. Amos catheter in place with adequate UO. Hypoactive bowel sounds.  2 units of FFP and 1 unit pRBC transfused o/n. AM hemoglobin 8.3.  Plan of Care Reviewed With: patient  Overall Patient Progress: improving  Goal: Absence of Hospital-Acquired Illness or Injury  Outcome: Progressing  Intervention: Identify and Manage Fall Risk  Recent Flowsheet Documentation  Taken 10/29/2024 0400 by Charly Husain, RN  Safety Promotion/Fall Prevention: (RN 1:1)   clutter free environment maintained   increase visualization of patient   lighting adjusted   room door open   room near nurse's station   room organization consistent   safety round/check completed   supervised activity   treat reversible contributory factors    treat underlying cause  Taken 10/29/2024 0000 by Charly Husain RN  Safety Promotion/Fall Prevention: (RN 1:1)   clutter free environment maintained   increase visualization of patient   lighting adjusted   room door open   room near nurse's station   room organization consistent   safety round/check completed   supervised activity   treat reversible contributory factors   treat underlying cause  Intervention: Prevent Skin Injury  Recent Flowsheet Documentation  Taken 10/29/2024 0600 by Charly Husain RN  Body Position:   turned   heels elevated   upper extremity elevated  Taken 10/29/2024 0400 by Charly Husain RN  Body Position:   turned   heels elevated   upper extremity elevated  Taken 10/29/2024 0200 by Charly Husain RN  Body Position:   turned   heels elevated   upper extremity elevated  Taken 10/29/2024 0000 by Charly Husain RN  Body Position:   turned   heels elevated   upper extremity elevated  Intervention: Prevent and Manage VTE (Venous Thromboembolism) Risk  Recent Flowsheet Documentation  Taken 10/29/2024 0400 by Charly Husain RN  VTE Prevention/Management: (LLE only) SCDs on (sequential compression devices)  Taken 10/29/2024 0000 by Charly Husain RN  VTE Prevention/Management: (LLE only) SCDs on (sequential compression devices)  Goal: Optimal Comfort and Wellbeing  Outcome: Progressing  Intervention: Provide Person-Centered Care  Recent Flowsheet Documentation  Taken 10/29/2024 0400 by Charly Husain RN  Trust Relationship/Rapport:   care explained   reassurance provided  Taken 10/29/2024 0000 by Charly Husain RN  Trust Relationship/Rapport:   care explained   reassurance provided     Problem: Comorbidity Management  Goal: Blood Glucose Levels Within Targeted Range  Outcome: Progressing  Intervention: Monitor and Manage Glycemia  Recent Flowsheet Documentation  Taken 10/29/2024 0400 by Charly Husain RN  Medication Review/Management:   medications reviewed   high-risk medications  identified  Taken 10/29/2024 0000 by Charly Husain RN  Medication Review/Management:   medications reviewed   high-risk medications identified     Problem: Comorbidity Management  Goal: Blood Pressure in Desired Range  Outcome: Progressing  Intervention: Maintain Blood Pressure Management  Recent Flowsheet Documentation  Taken 10/29/2024 0400 by Charly Husain RN  Medication Review/Management:   medications reviewed   high-risk medications identified  Taken 10/29/2024 0000 by Charly Husain RN  Medication Review/Management:   medications reviewed   high-risk medications identified     Problem: Mechanical Ventilation Invasive  Goal: Mechanical Ventilation Liberation  Outcome: Not Progressing  Intervention: Promote Extubation and Mechanical Ventilation Liberation  Recent Flowsheet Documentation  Taken 10/29/2024 0400 by Charly Husain RN  Medication Review/Management:   medications reviewed   high-risk medications identified  Taken 10/29/2024 0000 by Charly Husain RN  Medication Review/Management:   medications reviewed   high-risk medications identified     Problem: Mechanical Ventilation Invasive  Goal: Optimal Nutrition Delivery  Outcome: Not Progressing     Problem: Cardiovascular Surgery  Goal: Absence of Bleeding  Outcome: Progressing  Goal: Optimal Cerebral Tissue Perfusion  Intervention: Protect and Optimize Cerebral Perfusion  Recent Flowsheet Documentation  Taken 10/29/2024 0600 by Charly Husain RN  Head of Bed (HOB) Positioning: HOB at 20-30 degrees  Taken 10/29/2024 0400 by Charly Husain RN  Sensory Stimulation Regulation:   care clustered   auditory stimulation minimized   lighting decreased   quiet environment promoted  Head of Bed (HOB) Positioning: HOB at 20-30 degrees  Taken 10/29/2024 0200 by Charly Husain RN  Head of Bed (HOB) Positioning: HOB at 20-30 degrees  Taken 10/29/2024 0000 by Charly Husain RN  Sensory Stimulation Regulation:   care clustered   auditory stimulation  minimized   lighting decreased   quiet environment promoted  Head of Bed (HOB) Positioning: HOB at 20-30 degrees  Goal: Blood Glucose Level Within Targeted Range  Outcome: Progressing  Goal: Anesthesia/Sedation Recovery  Intervention: Optimize Anesthesia Recovery  Recent Flowsheet Documentation  Taken 10/29/2024 0400 by Charly Husain RN  Safety Promotion/Fall Prevention: (RN 1:1)   clutter free environment maintained   increase visualization of patient   lighting adjusted   room door open   room near nurse's station   room organization consistent   safety round/check completed   supervised activity   treat reversible contributory factors   treat underlying cause  Taken 10/29/2024 0000 by Charly Husain RN  Safety Promotion/Fall Prevention: (RN 1:1)   clutter free environment maintained   increase visualization of patient   lighting adjusted   room door open   room near nurse's station   room organization consistent   safety round/check completed   supervised activity   treat reversible contributory factors   treat underlying cause  Goal: Effective Urinary Elimination  Outcome: Progressing  Goal: Effective Oxygenation and Ventilation  Outcome: Progressing  Intervention: Promote Airway Secretion Clearance  Recent Flowsheet Documentation  Taken 10/29/2024 0400 by Charly Husain RN  Cough And Deep Breathing: unable to perform  Taken 10/29/2024 0000 by Charly Husain RN  Cough And Deep Breathing: unable to perform     Problem: Cardiovascular Surgery  Goal: Blood Glucose Level Within Targeted Range  Outcome: Progressing     Problem: Cardiovascular Surgery  Goal: Effective Urinary Elimination  Outcome: Progressing     Problem: Cardiovascular Surgery  Goal: Effective Oxygenation and Ventilation  Outcome: Progressing  Intervention: Promote Airway Secretion Clearance  Recent Flowsheet Documentation  Taken 10/29/2024 0400 by Charly Husain RN  Cough And Deep Breathing: unable to perform  Taken 10/29/2024 0000 by Lisha  LOGAN Parks  Cough And Deep Breathing: unable to perform   CV  Pressors (which pressors and any increase/decrease in pressor needs):  Epinephrine, Norepinephrine and Vasopressin. Frequent titrations made throughout the night. Angiotensin II remained off.    HR range: 105-125    Chest tube output: Mediastinal 290mL and Pleural 390 mL     Neuro  Orientation: NIMA: sedated with RASS -4  Delirium present?(y/n): nima  Sleep: yes  Pain: CPOT 0; RASS -4    GI/  BM? (y/n): no  Urine output: Amos catheter in place. 320mL 7140-7323      Lines: Right PIV; Pulmonary art cath; CVC RIJ; RIJ Introducer and Right Radial A-line

## 2024-10-29 NOTE — PROGRESS NOTES
[facility]  Cardiovascular and Thoracic Surgery Daily Note      Assessment and Plan  POD # 1 s/p CABG x3 (LIMA-LAD, V-dRCA, V-OM), 2. Endoscopic Vein [place], 3. Epiaortic Echo  4. Occlusion of left atrial appendage (50 mm [place]), 5. Modified left atrial MAZE (encompass clamp) on 10/28/24 with Dr. Mulvihill    Taken back to OR pod#0 for Mediastinal re-exploration for post-operative hemorrhage, JARVIS. EBL 1200    - CVS: Pre-op TTE with mod global hypokinesia, EF 35-40%. Postop vasoplegic and hypovolemic, shock. On Epi 0.07, norepi 0.06 now at 0.1, vaso 2.4, ASA, BB on hold, statin on hold, sub q heparin this evening, Chest tubes: draining. TPW: capped, currently s. Tach 100s, +permanent a.fib, having beats of V tach, start amiodarone, albumin 250, 500 LR given by ICU this am.     - Resp: Postoperative mechanical ventilation. Intubated, wean to extubate per intesivist. IS, pulmonary toilet.    - Neuro: Neuro intact, pain controlled on current regimen, HX myasthenia gravis    - Renal: No history of significant renal disease. Cr pending this am. Trend BMP. Diuretic as above. CKD III BL Crea 1.5  Recent Labs   Lab 10/28/24  2305 10/28/24  2125 10/28/24  2003   CR 2.28* 2.17* 2.20*       - GI: -BM, -flatus, continue bowel regimen, on PPI has history of GI bleed, hx of perforated diverticulitis    - : +Amos, 20-40ml/hr UO    - Endo: Postop stress hyperglycemia. Insulin infusion    Hemoglobin A1C   Date Value Ref Range Status   10/09/2024 4.5 <5.7 % Final     Comment:     Normal <5.7%   Prediabetes 5.7-6.4%    Diabetes 6.5% or higher     Note: Adopted from ADA consensus guidelines.        - FEN: Replace electrolytes as needed. NPO while intubated.     - ID: Postop leukocytosis. Afebrile, Temp (24hrs), Av  F (37.8  C), Min:99.3  F (37.4  C), Max:100.6  F (38.1  C). WBC 62.7. Periop abx prophylaxis completing. Trend CBC and fever curve.   Recent Labs   Lab 10/29/24  0601 10/29/24  0204 10/28/24  5383    WBC 62.7* 61.8* 67.6*       - Heme: Acute blood loss anemia and thrombocytopenia due to surgery. Hgb and PLT wnl. Trend CBC, transfuse PRN. Has hx of retroperitoneal bleed, received multiple blood products cas op, workup started last hospitalization for CCL, appreciate heme/onc seeing patient.   Recent Labs   Lab 10/29/24  0601 10/29/24  0204 10/28/24  2305   HGB 8.3* 7.9* 7.6*    268 273       - Proph: SCD, subcutaneous heparin, PPI    - Other:  Clinically Significant Risk Factors         # Hypernatremia: Highest Na = 148 mmol/L in last 2 days, will monitor as appropriate  # Hyperchloremia: Highest Cl = 111 mmol/L in last 2 days, will monitor as appropriate      # Hypocalcemia: Lowest iCa = 4.1 mg/dL in last 2 days, will monitor and replace as appropriate    # Anion Gap Metabolic Acidosis: Highest Anion Gap = 19 mmol/L in last 2 days, will monitor and treat as appropriate  # Hypoalbuminemia: Lowest albumin = 3 g/dL at 10/28/2024 11:05 PM, will monitor as appropriate  # Coagulation Defect: INR = 1.73 (Ref range: 0.85 - 1.15) and/or PTT = 36 Seconds (Ref range: 22 - 38 Seconds), will monitor for bleeding   # Acute Kidney Injury, unspecified: based on a >150% or 0.3 mg/dL increase in last creatinine compared to past 90 day average, will monitor renal function  # Hypertension: Noted on problem list  # Chronic heart failure with reduced ejection fraction: last echo with EF <40%   # Acute Hypoxic Respiratory Failure: Documented O2 saturation < 90%. Continue supplemental oxygen as needed  # Acute Hypercapnic Respiratory Failure: based on arterial blood gas results.  Continue supplemental oxygen and ventilatory support as indicated.  # Acute Hypercapnic Respiratory Failure: based on venous blood gas results.  Continue supplemental oxygen and ventilatory support as indicated.         # Obesity: Estimated body mass index is 31.73 kg/m  as calculated from the following:    Height as of this encounter: 1.778 m (5'  "10\").    Weight as of this encounter: 100.3 kg (221 lb 1.9 oz)., PRESENT ON ADMISSION            - Dispo: ICU. Therapies recommending discharge to TCU when medically ready. Medically Ready for Discharge: Anticipated in 5+ Days        Interval History  Lying in bed, intubated, sedated      Medications  Current Facility-Administered Medications   Medication Dose Route Frequency Provider Last Rate Last Admin    acetaminophen (TYLENOL) tablet 975 mg  975 mg Oral Q8H Clarence Bone MD   975 mg at 10/29/24 0518    aspirin (ASA) chewable tablet 162 mg  162 mg Oral or NG Tube Daily lCarence Bone MD        ceFAZolin (ANCEF) 2 g in 100 mL D5W intermittent infusion  2 g Intravenous Q8H Clarence Bone  mL/hr at 10/29/24 0452 2 g at 10/29/24 0452    heparin ANTICOAGULANT injection 5,000 Units  5,000 Units Subcutaneous Q8H Clarence Bone MD        pantoprazole (PROTONIX) 2 mg/mL suspension 40 mg  40 mg Oral or NG Tube Daily Clarence Bone MD   40 mg at 10/28/24 1635    Or    pantoprazole (PROTONIX) EC tablet 40 mg  40 mg Oral Daily Clarence Bone MD        polyethylene glycol (MIRALAX) Packet 17 g  17 g Oral Daily Clarence Bone MD        senna-docusate (SENOKOT-S/PERICOLACE) 8.6-50 MG per tablet 1 tablet  1 tablet Oral BID Clarence Bone MD        sodium chloride (PF) 0.9% PF flush 3 mL  3 mL Intracatheter Q8H Clarence Bone MD   3 mL at 10/29/24 0607     Current Facility-Administered Medications   Medication Dose Route Frequency Provider Last Rate Last Admin    [START ON 10/31/2024] acetaminophen (TYLENOL) tablet 650 mg  650 mg Oral Q4H PRN Clarence Bone MD        [START ON 10/31/2024] bisacodyl (DULCOLAX) suppository 10 mg  10 mg Rectal Daily PRN Clarence Bone MD        calcium gluconate 1 g in 50 mL in sodium chloride intermittent infusion  1 g Intravenous Once PRN Clarence Bone MD   1 g at 10/28/24 1559    calcium gluconate 2 g in  mL " intermittent infusion  2 g Intravenous Once PRN Clarence Bone MD        calcium gluconate 3 g in sodium chloride 0.9 % 100 mL intermittent infusion  3 g Intravenous Once PRN Clarence Bone MD        glucose gel 15-30 g  15-30 g Oral Q15 Min PRN Clarence Bone MD        Or    dextrose 50 % injection 25-50 mL  25-50 mL Intravenous Q15 Min PRN Clarence Bone MD        Or    glucagon injection 1 mg  1 mg Subcutaneous Q15 Min PRN Clarence Bone MD        EPINEPHrine (ADRENALIN) 5 mg in  mL infusion  0.01-0.1 mcg/kg/min Intravenous Continuous PRN Clarence Bone MD 20.1 mL/hr at 10/29/24 0545 0.07 mcg/kg/min at 10/29/24 0545    hydrALAZINE (APRESOLINE) injection 10 mg  10 mg Intravenous Q30 Min PRN Claernce Bone MD        HYDROmorphone (DILAUDID) injection 0.1 mg  0.1 mg Intravenous Q2H PRN Clarence Bone MD        Or    HYDROmorphone (DILAUDID) injection 0.2 mg  0.2 mg Intravenous Q2H PRN Clarence Bone MD        lidocaine (LMX4) cream   Topical Q1H PRN Clarence Bone MD        lidocaine 1 % 0.1-1 mL  0.1-1 mL Other Q1H PRN Clarence Bone MD        [START ON 10/30/2024] magnesium hydroxide (MILK OF MAGNESIA) suspension 30 mL  30 mL Oral Daily PRN Clarence Bone MD        propofol (DIPRIVAN) bolus from bag or syringe pump  10 mg Intravenous Q15 Min PRN Tyra Dubois MD        And    Medication Instruction   Does not apply Continuous PRN Tyra Dubois MD        methocarbamol (ROBAXIN) tablet 500 mg  500 mg Oral Q6H PRN Clarence Bone MD   500 mg at 10/28/24 1634    naloxone (NARCAN) injection 0.2 mg  0.2 mg Intravenous Q2 Min PRN Mulvihill, Michael, MD        Or    naloxone (NARCAN) injection 0.4 mg  0.4 mg Intravenous Q2 Min PRN Mulvihill, Michael, MD        Or    naloxone (NARCAN) injection 0.2 mg  0.2 mg Intramuscular Q2 Min PRN Mulvihill, Michael, MD        Or    naloxone (NARCAN) injection 0.4 mg  0.4 mg  "Intramuscular Q2 Min PRN Mulvihill, Michael, MD        norepinephrine (LEVOPHED) 4 mg in  mL infusion PREMIX  0.01-0.15 mcg/kg/min Intravenous Continuous PRN Clarence Bone MD 21.6 mL/hr at 10/29/24 0608 0.06 mcg/kg/min at 10/29/24 0608    ondansetron (ZOFRAN ODT) ODT tab 4 mg  4 mg Oral Q6H PRN Clarence Bone MD        Or    ondansetron (ZOFRAN) injection 4 mg  4 mg Intravenous Q6H PRN Clarence Bone MD        oxyCODONE IR (ROXICODONE) half-tab 2.5 mg  2.5 mg Oral Q4H PRN Clarence Bone MD   2.5 mg at 10/28/24 1634    Or    oxyCODONE (ROXICODONE) tablet 5 mg  5 mg Oral Q4H PRN Clarence Bone MD        prochlorperazine (COMPAZINE) injection 5 mg  5 mg Intravenous Q6H PRN Clarence Bone MD        Or    prochlorperazine (COMPAZINE) tablet 5 mg  5 mg Oral Q6H PRN Clarence Bone MD        Reason beta blocker order not selected   Does not apply DOES NOT GO TO Clarence Olguin MD        sodium chloride (PF) 0.9% PF flush 3 mL  3 mL Intracatheter q1 min prn Clarence Bone MD        vasopressin 0.2 units/mL in NS (PITRESSIN) standard conc infusion  0.5-4 Units/hr Intravenous Continuous PRN Clarence Bone MD 12 mL/hr at 10/29/24 0450 2.4 Units/hr at 10/29/24 0450         Physical Exam  Vitals were reviewed  Blood pressure (!) 86/60, pulse (!) 122, temperature 100.4  F (38  C), resp. rate 19, height 1.778 m (5' 10\"), weight 100.3 kg (221 lb 1.9 oz), SpO2 92%.  Rhythm: s.tach    Lungs: diminished bases    Cardiovascular: rrr, no m/r/g    Abdomen: soft, NT, ND, +BS    Extremeties: warm, no LE edema    Incision: CDI    CT: 1.6L serosang output, no air leak    Weight:   Vitals:    10/28/24 0551 10/29/24 0401   Weight: 95.8 kg (211 lb 3.2 oz) 100.3 kg (221 lb 1.9 oz)         Data  Recent Labs   Lab 10/29/24  0601 10/29/24  0600 10/29/24  0447 10/29/24  0313 10/29/24  0204 10/28/24  2311 10/28/24  2305 10/28/24  2134 10/28/24  2125 10/28/24  2053 10/28/24  2003 " 10/28/24  1600 10/28/24  1446   WBC 62.7*  --   --   --  61.8*  --  67.6*  --  97.9*  --  109.2*   < > 80.4*   HGB 8.3*  --   --   --  7.9*  --  7.6* 8.5* 8.4*   < > 7.4*   < > 8.3*   MCV 90  --   --   --  91  --  92  --  93  --  94   < > 90     --   --   --  268  --  273  --  325  --  423   < > 538*   INR 1.73*  --   --   --  1.70*  --  1.92*  --  1.86*  --  1.64*   < > 1.67*   NA  --   --   --   --   --   --  148* 145 147*   < > 143  --  143   POTASSIUM  --   --   --   --   --   --  4.1 4.0 4.1   < > 5.2  --  3.6   CHLORIDE  --   --   --   --   --   --  111*  --  110*  --  108*  --  106   CO2  --   --   --   --   --   --  22  --  19*  --  16*  --  24   BUN  --   --   --   --   --   --  47.3*  --  48.0*  --  46.3*  --  47.9*   CR  --   --   --   --   --   --  2.28*  --  2.17*  --  2.20*  --  1.86*   ANIONGAP  --   --   --   --   --   --  15  --  18*  --  19*  --  13   TATE  --   --   --   --   --   --  8.4*  --  8.3*  --  7.8*  --  8.1*   GLC  --  120* 108* 122*  --    < > 80  --  112*   < > 151*   < > 162*   ALBUMIN  --   --   --   --   --   --  3.0*  --   --   --   --   --  3.3*   PROTTOTAL  --   --   --   --   --   --  4.5*  --   --   --   --   --  5.5*   BILITOTAL  --   --   --   --   --   --  0.4  --   --   --   --   --  0.4   ALKPHOS  --   --   --   --   --   --  71  --   --   --   --   --  107   ALT  --   --   --   --   --   --  136*  --   --   --   --   --  13   AST  --   --   --   --   --   --  298*  --   --   --   --   --   --     < > = values in this interval not displayed.       Imaging:  Recent Results (from the past 24 hours)   XR Chest Port 1 View    Narrative    CHEST ONE VIEW  10/28/2024 3:20 PM     HISTORY: Post Op CVTS Surgery    COMPARISON: CT chest 9/10/2024      Impression    IMPRESSION:     Lines and tubes: Endotracheal tube tip approximately 3.6 cm from the  dayanna. Gastric drainage tube courses below the diaphragm; tip not  visualized. Right [place] catheter tip in the region of  the main  pulmonary artery. Left atrial appendage clip. Median sternotomy. Chest  tubes.    Bibasilar atelectasis and probable trace pleural effusions. No  discernible pneumothorax. Prominent mediastinum consistent with recent  CABG.    COLLEEN JOHNSON MD         SYSTEM ID:  [id number]   XR Abdomen Port 1 View    Narrative    EXAM: XR ABDOMEN PORT 1 VIEW  LOCATION: [facility]  DATE: 10/28/2024    INDICATION: Assess OG tube location s p OR  COMPARISON: Chest x-ray 10/28/2024      Impression    IMPRESSION: Enteric drainage tube extends below the diaphragm, side port below the GE junction, tip just off the field of view, at the level of the stomach. Additional tubes/devices redemonstrated.   XR Chest Port 1 View    Narrative    EXAM: XR CHEST PORT 1 VIEW  LOCATION: [facility]  DATE: 10/28/2024    INDICATION: ETT location s p OR  COMPARISON: 10/28/2024      Impression    IMPRESSION: Endotracheal tube tip terminates roughly 2 cm above the dayanna, though dayanna is somewhat indistinct on this exam. Enteric tube terminates below the diaphragm. Right IJ pulmonary artery catheter tip overlies the pulmonary outflow tract. Right   lung base partially excluded. Bibasilar atelectasis or airspace disease. Left chest tube in place. No visible pneumothorax.         Patient seen and discussed with Dr. Mulvihill Erin Frendin, PA-C  Cardiothoracic Surgery  Available for paging [phone] (personal pager or CV Surgery Rounding Pager)  Personal Pager: [phone]  CV Surgery Rounding Pager: [phone]  After hours please page surgeon on-call

## 2024-10-29 NOTE — OP NOTE
Cardiothoracic Surgery - OPERATIVE NOTE    Date of Surgery: 10/28/2024    Preop Diagnosis: Postoperative hemorrhage status post CABG, L atrial MAZE, LAAO  Postop Diagnosis: same    Surgeon: Michael Mulvihill, MD  Assistant(s): Dr. Bone    Procedures:  1. Mediastinal re-exploration for post-operative hemorrhage (CPT 96831)  2. Transesophageal echocardiography    Findings of Procedure: moderate to large amount of clotted blood, diffuse raw surfaces. Hemodynamics improved significantly with clearance of the clotted blood, with downtitration of hemodynamic support.  Estimated Blood Loss: 500mL  Disposition: To ICU in stable but critical condition    Indication: Alessio Teixeira is a 88 year old male with a history of CAD who underwent CABG. After arriving to the ICU they began to have high chest tube output. Despite resuscitation, the bleeding did not stop so the decision was made to take them back to the OR. This was done as a Level 1 (emergency) case so no informed consent was obtained. I spoke directly with the patient's wife and daughter and informed them of the need for emergency re-exploration.    Procedure in Detail: The patient was placed supine on the operative table. The old dressings were removed and the chest was prepped and drapped in the standard sterile fashion. Surgical time-out was then completed.    The patient's median sternotomy incision was reopened and the sternal wires were removed. The sternal retractor was placed and the mediastinum was re-explored. There was a moderate amount of clotted blood. All surgical sites were carefully examined. These were all hemostatic. There was slow diffuse ooze near the site of the ablations on the dome of the left atrium. Hemostatic packing was placed and serially exchanged to verify hemostasis. A moderate amount of blood had collected in the R pleural space. The pleural space was carefully evacuated and a new pleural drain was placed in the R pleural  space.    The mediastinum was then irrigated with sterile saline. Again all surgical sites were examined and were found to be hemostatic. The mediastinal chest tubes were cleared and replaced in their original positions.     The sternum was reapproximated with steel sternal wires. The subcutaneous tissue was irrgated with sterile saline with bacitracin. The fascial and deep dermal layers were closed with running absorbable suture. The skin was closed with absorbable suture. The skin was cleaned and dried and a sterile dressing was applied over the incision.    The patient tolerated the procedure wothout complication. They remained hemodynamically stable throughout.    The patient was transported to the ICU in stable but critical condition.    At the end of the case, the sponge, needle and instrument counts were all correct. A debriefing was held at the end of the case.    I was present and scrubbed for the entire procedure.    Michael Mulvihill, MD  10/28/2024 @ 10:26 PM

## 2024-10-29 NOTE — CONSULTS
RENAL CONSULTATION NOTE    REFERRING MD:  Opal Nguyen PA-C     REASON FOR CONSULTATION:  S/p vasoplegic shock/cardiogenic shock, JAVIER    HPI:  88 y.o man with history of CLL, myasthenia gravis and severe 3-vessels CAD with HFrEF, who is s/p 3-vessels CABG.     Post-op is complicated by vasogenic shock.   He is on vaso, NE at 0.2 mcg and Epi at 0.1 mcg.   Getting 5% albumin infusion.     Baseline CKD IIIB.   Scr has been ~ 1.8 mg/dl since September and peviously ~ 1.2-1.5  Scr was 1.8 mg/dl on the day of surgery.   Scr has been rising since post-op.   Decent urine output.     I reviewed notes from IM (Dr. Dubois) and from CTS team.  I discussed the case with his wife and ICU RN at the bedside.     ROS:  A complete review of systems was performed and is negative except as noted above.    PMH:    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    Actinic keratosis     JAVIER (acute kidney injury) (H) 02/01/2019    Atrial fibrillation (H)     Chronic 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 06/12/2013    Edema of leg     Gastrointestinal hemorrhage associated with gastric ulcer 02/01/2019    Gout, unspecified     H/O diplopia     History of blood transfusion     History of pulmonary embolism- bilateral in 2007 05/02/2016    Hypertension     Meningioma (H)     sinus    Myalgia and myositis, unspecified     Myasthenia gravis (H)     high dose steroids 2007,     Obesity     Other seborrheic keratosis     Presbyacusis     Primary localized osteoarthrosis, lower leg     Primary localized osteoarthrosis, pelvic region and thigh     Sleep apnea     doesn't use his CPAP)    Spinal stenosis     Squamous cell cancer of scalp and skin of neck     Dr Sanchez, multiple procedures, Mohs scalp and chest    Vitamin D deficiency        PSH:    Past Surgical History:   Procedure Laterality Date    ABDOMEN SURGERY  2007    APPENDECTOMY      ARTHROPLASTY HIP Right 2016    ARTHROPLASTY HIP ANTERIOR Right  04/26/2016    Procedure: ARTHROPLASTY HIP ANTERIOR;  Surgeon: Miguel Johnson MD;  Location:  OR    ARTHROPLASTY KNEE Right 01/28/2019    Procedure: RIGHT TOTAL KNEE ARTROPLASTY;  Surgeon: Miguel Johnson MD;  Location:  OR    BIOPSY      skin cancer    BRAIN SURGERY  2007    partial resection meningioma    BYPASS GRAFT ARTERY CORONARY N/A 10/28/2024    Procedure: Exploration of Chest for Post-Surgical Hemorrhage;  Surgeon: Mulvihill, Michael, MD;  Location:  OR    BYPASS GRAFT ARTERY CORONARY N/A 10/28/2024    Procedure: CORONARY ARTERY BYPASS GRAFTING X 3 WITH ENDOSOCOPIC VEIN HARVEST FROM RIGHT GREATER SAPHENOUS VEIN LEFT INTERNAL MAMMARY ARTERY-LEFT ANTERIOR DESCENDING ARTERY RIGHT GREATER SAPHENOUS VEIN- OBTUSE MARGINAL ARTERY/ DISTAL RIGHT CORONARY ARTERY;  Surgeon: Mulvihill, Michael, MD;  Location:  OR    CATARACT IOL, RT/LT      COLECTOMY  2007    bowel perforation due to steroids    COLONOSCOPY      CV CORONARY ANGIOGRAM N/A 9/10/2024    Procedure: Coronary Angiogram;  Surgeon: Vickey Monteiro MD;  Location:  [facility]    CV PCI N/A 9/10/2024    Procedure: Percutaneous Coronary Intervention;  Surgeon: Vickey Monteiro MD;  Location:  [facility]    ESOPHAGOSCOPY, GASTROSCOPY, DUODENOSCOPY (EGD), COMBINED N/A 12/22/2018    Procedure: COMBINED ESOPHAGOSCOPY, GASTROSCOPY, DUODENOSCOPY (EGD), BIOPSY SINGLE OR MULTIPLE;  Surgeon: Elizabeth Jones MD;  Location:  GI    ESOPHAGOSCOPY, GASTROSCOPY, DUODENOSCOPY (EGD), COMBINED N/A 05/10/2019    Procedure: ESOPHAGOGASTRODUODENOSCOPY (EGD);  Surgeon: Ahsan Garcia MD;  Location:  GI    ESOPHAGOSCOPY, GASTROSCOPY, DUODENOSCOPY (EGD), COMBINED N/A 9/11/2024    Procedure: Esophagoscopy, gastroscopy, duodenoscopy (EGD), combined;  Surgeon: Shannon Stephenson MD;  Location:  GI    GENITOURINARY SURGERY      vasectomy    HEAD & NECK SURGERY  2007    meningioma removed    KNEE SURGERY   2010    left knee replacement    MAZE PROCEDURE N/A 10/28/2024    Procedure: MODIFIED MAZE PROCEDURE;  Surgeon: Mulvihill, Michael, MD;  Location:  OR    OR LIGATION, LEFT ATRIAL APPENDAGE N/A 10/28/2024    Procedure: LIGATION, LEFT ATRIAL APPENDAGE WITH ATRICURE 50 MM ATRICLIP  ON PUMP/TRANSESOPHAGEAL ECHOCARDIOGRAM PER ANESTHESIA;  Surgeon: Mulvihill, Michael, MD;  Location:  OR    ORTHOPEDIC SURGERY      left TKR    PHACOEMULSIFICATION CLEAR CORNEA WITH TORIC INTRAOCULAR LENS IMPLANT  07/30/2012    Procedure: PHACOEMULSIFICATION CLEAR CORNEA WITH TORIC INTRAOCULAR LENS IMPLANT;  COMPLEX RIGHT PHACOEMULSIFICATION CLEAR CORNEA WITH TORIC INTRAOCULAR LENS IMPLANT WITH MALYUGIN RING;  Surgeon: Ronald Cortez MD;  Location: [facility]    RECTAL SURGERY  [address] [place] [address] RAD RESEC TONSIL/PILLARS         MEDICATIONS:    Current Facility-Administered Medications   Medication Dose Route Frequency Provider Last Rate Last Admin    acetaminophen (TYLENOL) tablet 975 mg  975 mg Oral Q8H Clarence Bone MD   975 mg at 10/29/24 0518    aspirin (ASA) chewable tablet 162 mg  162 mg Oral or NG Tube Daily Clarence Bone MD   162 mg at 10/29/24 0811    ceFAZolin (ANCEF) 2 g in 100 mL D5W intermittent infusion  2 g Intravenous Q8H Clarence Bone  mL/hr at 10/29/24 0452 2 g at 10/29/24 0452    heparin ANTICOAGULANT injection 5,000 Units  5,000 Units Subcutaneous Q8H Clarence Bone MD        pantoprazole (PROTONIX) 2 mg/mL suspension 40 mg  40 mg Oral or NG Tube Daily Clarence Bone MD   40 mg at 10/29/24 0811    Or    pantoprazole (PROTONIX) EC tablet 40 mg  40 mg Oral Daily Clarence Bone MD        polyethylene glycol (MIRALAX) Packet 17 g  17 g Oral Daily Clarence Bone MD   17 g at 10/29/24 0811    senna-docusate (SENOKOT-S/PERICOLACE) 8.6-50 MG per tablet 1 tablet  1 tablet Oral BID Clarence Bone MD   1 tablet at 10/29/24 0811    sodium chloride (PF) 0.9% PF flush 3 mL  3 mL  Intracatheter Q8H Clarence Bone MD   3 mL at 10/29/24 0607       ALLERGIES:    Allergies as of 10/08/2024    (No Known Allergies)       FH:    Family History   Problem Relation Age of Onset    Glaucoma Father     Coronary Artery Disease Father     Hyperlipidemia Father     Unknown/Adopted Mother     Diabetes Brother     Leukemia Brother     Diabetes Brother     Hyperlipidemia Brother     Obesity Brother     Macular Degeneration No family hx of        SH:    Social History     Socioeconomic History    Marital status:      Spouse name: Not on file    Number of children: Not on file    Years of education: Not on file    Highest education level: Not on file   Occupational History    Occupation: retired   Tobacco Use    Smoking status: Former     Current packs/day: 0.00     Average packs/day: 1 pack/day for 3.9 years (3.9 ttl pk-yrs)     Types: Cigarettes     Start date: 1959     Quit date: 1963     Years since quittin.0    Smokeless tobacco: Never   Vaping Use    Vaping status: Never Used   Substance and Sexual Activity    Alcohol use: Yes     Comment: 2-4 glasses of wine per week    Drug use: No    Sexual activity: Not Currently     Partners: Female   Other Topics Concern    Parent/sibling w/ CABG, MI or angioplasty before 65F 55M? No   Social History Narrative    Not on file     Social Drivers of Health     Financial Resource Strain: Low Risk  (10/28/2024)    Financial Resource Strain     Within the past 12 months, have you or your family members you live with been unable to get utilities (heat, electricity) when it was really needed?: No   Food Insecurity: Low Risk  (10/28/2024)    Food Insecurity     Within the past 12 months, did you worry that your food would run out before you got money to buy more?: No     Within the past 12 months, did the food you bought just not last and you didn t have money to get more?: No   Recent Concern: Food Insecurity - High Risk (2024)    Food  "Insecurity     Within the past 12 months, did you worry that your food would run out before you got money to buy more?: Yes     Within the past 12 months, did the food you bought just not last and you didn t have money to get more?: No   Transportation Needs: Low Risk  (10/28/2024)    Transportation Needs     Within the past 12 months, has lack of transportation kept you from medical appointments, getting your medicines, non-medical meetings or appointments, work, or from getting things that you need?: No   Physical Activity: Inactive (8/28/2024)    Exercise Vital Sign     Days of Exercise per Week: 0 days     Minutes of Exercise per Session: 0 min   Stress: Stress Concern Present (8/28/2024)    [employer] [place] - Occupational Stress Questionnaire     Feeling of Stress : To some extent   Social Connections: Unknown (8/28/2024)    Social Connection and Isolation Panel [NHANES]     Frequency of Communication with Friends and Family: Not on file     Frequency of Social Gatherings with Friends and Family: Twice a week     Attends Yazdanism Services: Not on file     Active Member of Clubs or Organizations: Not on file     Attends Club or Organization Meetings: Not on file     Marital Status: Not on file   Interpersonal Safety: Low Risk  (10/28/2024)    Interpersonal Safety     Do you feel physically and emotionally safe where you currently live?: Yes     Within the past 12 months, have you been hit, slapped, kicked or otherwise physically hurt by someone?: No     Within the past 12 months, have you been humiliated or emotionally abused in other ways by your partner or ex-partner?: No   Housing Stability: Low Risk  (10/28/2024)    Housing Stability     Do you have housing? : Yes     Are you worried about losing your housing?: No       PHYSICAL EXAM:    BP 90/62 (BP Location: Left arm)   Pulse 113   Temp (!) 100.8  F (38.2  C)   Resp 19   Ht 1.778 m (5' 10\")   Wt 100.3 kg (221 lb 1.9 oz)   " SpO2 100%   BMI 31.73 kg/m    GENERAL: Critically ill.   HEENT:  Normocephalic. Intubated.   CV: RRR,   RESP: Clear bilaterally with good efforts anteriorly. No wheezes.  GI: Abdomen obese, soft.  MUSCULOSKELETAL: No edema.   SKIN: no suspicious lesions or rashes, dry to touch  NEURO:  sedated  PSYCH: unable to evaluate  LYMPH: No palpable ant/post cervical    +burt and chest tubes are in place.     LABS:      CBC RESULTS:     Recent Labs   Lab 10/29/24  0601 10/29/24  0204 10/28/24  2305 10/28/24  2134 10/28/24  2125 10/28/24  2053 10/28/24  2003 10/28/24  1900   WBC 62.7*  62.7* 61.8* 67.6*  --  97.9*  --  109.2* 95.6*   RBC 2.83* 2.66* 2.58*  --  2.83*  --  2.51* 2.75*   HGB 8.3* 7.9* 7.6* 8.5* 8.4* 9.2* 7.4* 8.0*   HCT 25.5* 24.1* 23.6* 25* 26.4* 27* 23.7* 25.2*    268 273  --  325  --  423 443       BMP RESULTS:  Recent Labs   Lab 10/29/24  0954 10/29/24  0806 10/29/24  0601 10/29/24  0600 10/29/24  0447 10/29/24  0313 10/28/24  2311 10/28/24  2305 10/28/24  2134 10/28/24  2125 10/28/24  2107 10/28/24  2053 10/28/24  2003 10/28/24  1600 10/28/24  1446 10/28/24  1227 10/28/24  1225   NA  --   --  148*  --   --   --   --  148* 145 147*  --  145 143  --  143   < > 144   POTASSIUM  --   --  4.9  4.9  --   --   --   --  4.1 4.0 4.1  --  4.2 5.2  --  3.6   < > 4.0   CHLORIDE  --   --  109*  --   --   --   --  111*  --  110*  --   --  108*  --  106  --  108*   CO2  --   --  19*  --   --   --   --  22  --  19*  --   --  16*  --  24  --  23   BUN  --   --  54.5*  --   --   --   --  47.3*  --  48.0*  --   --  46.3*  --  47.9*  --  46.8*   CR  --   --  2.50*  --   --   --   --  2.28*  --  2.17*  --   --  2.20*  --  1.86*  --  1.80*   * 92 123* 120* 108* 122*   < > 80  --  112*   < >  --  151*   < > 162*   < > 244*   TATE  --   --  8.3*  --   --   --   --  8.4*  --  8.3*  --   --  7.8*  --  8.1*  --  8.3*    < > = values in this interval not displayed.       INR  Recent Labs   Lab 10/29/24  0951  10/29/24  0601 10/29/24  0204 10/28/24  2305   INR 1.74* 1.73* 1.70* 1.92*        DIAGNOSTICS:  Reviewed    TTE 9//9/2024:    Left ventricular systolic function is moderately reduced.The visual ejection  fraction is 35-40%.There is moderate global hypokinesia of the left ventricle.  The right ventricular systolic function is normal.  There is mild (1+) mitral regurgitation.  IVC diameter <2.1 cm collapsing >50% with sniff suggests a normal RA pressure  of 3 mmHg.    A/P:  88 y.o man with s/p 3-vessels CABG and CKD IIIB, consulted for post-op JAVIER.     # Patient with baseline CKD IIIB, Scr ~ 1.8 mg/dl.     # Post-op JAVIER: Good urine output.     # Severe 3-vessels CAD s/p 3-vessels CABG.    -EF ~ 35%    # Vasogenic shock post-op:   -vaso   -NE at 0.2 mcg   -EPI at 0.1 mcg    # FEN: No significant peripheral edema. Mild iatrogenic hypernatremia   -up 9 liters and weight is up 5.5 kilos if accurate    # Lactic acidosis due to shock condition.     Plan:  # Keep MAP >65  # Refresh labs in the evening  # Dose all medications to eGFR ~ 10-20 ml/min  # No indication for RRT at this time. Will monitor patient closely with the teams.     Farhad Boland MD  [facility] - Nephrology  Office Phone: [phone]  Pager: [phone]

## 2024-10-29 NOTE — PLAN OF CARE
"CV  Pressors (which pressors and any increase/decrease in pressor needs): max levo, max vaso, epinephrine, angiotensin II  HR range: 80-90 before beginning to pace patient at 17:50 at 100 bmp  Chest tube output: 1060 mL  Tele: a-paced at 100 bpm    Neuro  Orientation: NIMA, sedated  Delirium present?(y/n): NIMA, sedated  Sleep: NIMA, sedated  Pain: PRN oxycodone and robaxin    GI/  BM? (y/n): N  Urine output: 10-50 ml/hr      Lines: right internal jugular PA catheter at 55 cm + introducer, right radial art line, PIV x2            Goal Outcome Evaluation:    Problem: Adult Inpatient Plan of Care  Goal: Plan of Care Review  Description: The Plan of Care Review/Shift note should be completed every shift.  The Outcome Evaluation is a brief statement about your assessment that the patient is improving, declining, or no change.  This information will be displayed automatically on your shift  note.  Outcome: Not Progressing  Goal: Patient-Specific Goal (Individualized)  Description: You can add care plan individualizations to a care plan. Examples of Individualization might be:  \"Parent requests to be called daily at 9am for status\", \"I have a hard time hearing out of my right ear\", or \"Do not touch me to wake me up as it startles  me\".  Outcome: Not Progressing  Goal: Absence of Hospital-Acquired Illness or Injury  Outcome: Not Progressing  Goal: Optimal Comfort and Wellbeing  Outcome: Not Progressing  Intervention: Monitor Pain and Promote Comfort  Recent Flowsheet Documentation  Taken 10/28/2024 1630 by Amanda Whitfield RN  Pain Management Interventions: medication (see MAR)  Goal: Readiness for Transition of Care  Outcome: Not Progressing                         "

## 2024-10-29 NOTE — ANESTHESIA CARE TRANSFER NOTE
Patient: Alessio Teixeira    Procedure: Procedure(s):  Exploration of Chest for Post-Surgical Hemorrhage       Diagnosis: Bleeding [R58]  Diagnosis Additional Information: No value filed.    Anesthesia Type:   General     Note:    Oropharynx: endotracheal tube in place, oral gastic tube in place and ventilatory support  Level of Consciousness: iatrogenic sedation      Independent Airway: airway patency not satisfactory and stable  Dentition: dentition unchanged  Vital Signs Stable: post-procedure vital signs reviewed and stable  Report to RN Given: handoff report given  Patient transferred to: ICU  Comments: Patient connected to SpO2, EKG, and arterial blood pressure transport monitors and accompanied by CRNA, anesthesiologist, circulating RN, surgeon (fellow) to ICU room. Patient ventilated by anesthesiologist with ambu via ETT with O2 at 10 liters per minute during transport.     On arrival to ICU, endotracheal tube position unchanged, equal, bilateral breath sounds auscultated in ICU room, patient placed on ICU ventilator by respiratory therapist, teeth and oral mucosa intact and unchanged at handoff of care. At anesthesia handoff of care, clinical monitors and alarms on and functioning, report on patient's clinical status given to ICU RN, report on patient's clinical status given to intensivist, ICU staff questions answered  ICU Handoff: Call for PAUSE to initiate/utilize ICU HANDOFF, Identified Patient, Identified Responsible Provider, Reviewed the Pertinent Medical History, Discussed Surgical Course, Reviewed Intra-OP Anesthesia Management and Issues during Anesthesia, Set Expectations for Post Procedure Period and Allowed Opportunity for Questions and Acknowledgement of Understanding  Vitals:  Vitals Value Taken Time   BP     Temp 37.6  C (99.68  F) 10/28/24 2253   Pulse 101 10/28/24 2253   Resp     SpO2 94 % 10/28/24 2253   Vitals shown include unfiled device data.    Electronically Signed By: Nunu CAMPA  ANNA Aguilar CRNA  October 28, 2024  10:54 PM

## 2024-10-29 NOTE — PROGRESS NOTES
Delayed Entry.     2 units pRBC, 2 units FFP, 25mg protamine given shortly after arrival to ICU. LR 1 liter bolus given. CT surgery team present. Chest tubes aggressively stripped. Decision made to RTOR.   See notes in Epic for details of RTOR.     Total OR 1FPP, 2  prbc's/albumin given. Patient off Angiotension at time of ICU return.   Labs pending at time of this note.     Sb Arthur PA-C

## 2024-10-29 NOTE — ANESTHESIA PREPROCEDURE EVALUATION
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    Actinic keratosis     JAVIER (acute kidney injury) (H) 2019    Atrial fibrillation (H)     Chronic 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 2013    Edema of leg     Gastrointestinal hemorrhage associated with gastric ulcer 2019    Gout, unspecified     H/O diplopia     History of blood transfusion     History of pulmonary embolism- bilateral in 2016    Hypertension     Meningioma (H)     sinus    Myalgia and myositis, unspecified     Myasthenia gravis (H)     high dose steroids ,     Obesity     Other seborrheic keratosis     Presbyacusis     Primary localized osteoarthrosis, lower leg     Primary localized osteoarthrosis, pelvic region and thigh     Sleep apnea     doesn't use his CPAP)    Spinal stenosis     Squamous cell cancer of scalp and skin of neck     Dr Sanchez, multiple procedures, Mohs scalp and chest    Vitamin D deficiency       Past Surgical History:   Procedure Laterality Date    ABDOMEN SURGERY  2007    APPENDECTOMY      ARTHROPLASTY HIP Right 2016    ARTHROPLASTY HIP ANTERIOR Right 2016    Procedure: ARTHROPLASTY HIP ANTERIOR;  Surgeon: Miguel Johnson MD;  Location:  OR    ARTHROPLASTY KNEE Right 2019    Procedure: RIGHT TOTAL KNEE ARTROPLASTY;  Surgeon: Miguel Johnson MD;  Location:  OR    BIOPSY      skin cancer    BRAIN SURGERY  2007    partial resection meningioma    CATARACT IOL, RT/LT      COLECTOMY      bowel perforation due to steroids    COLONOSCOPY      CV CORONARY ANGIOGRAM N/A 9/10/2024    Procedure: Coronary Angiogram;  Surgeon: Vickey Monteiro MD;  Location:  [facility]    CV PCI N/A 9/10/2024    Procedure: Percutaneous Coronary Intervention;   Surgeon: Vickey Monteiro MD;  Location:  [facility]    ESOPHAGOSCOPY, GASTROSCOPY, DUODENOSCOPY (EGD), COMBINED N/A 2018    Procedure: COMBINED ESOPHAGOSCOPY, GASTROSCOPY, DUODENOSCOPY (EGD), BIOPSY SINGLE OR MULTIPLE;  Surgeon: Elizabeth Jones MD;  Location:  GI    ESOPHAGOSCOPY, GASTROSCOPY, DUODENOSCOPY (EGD), COMBINED N/A 05/10/2019    Procedure: ESOPHAGOGASTRODUODENOSCOPY (EGD);  Surgeon: Ahsan Garcia MD;  Location:  GI    ESOPHAGOSCOPY, GASTROSCOPY, DUODENOSCOPY (EGD), COMBINED N/A 2024    Procedure: Esophagoscopy, gastroscopy, duodenoscopy (EGD), combined;  Surgeon: Shannon Stephenson MD;  Location:  GI    GENITOURINARY SURGERY      vasectomy    HEAD & NECK SURGERY      meningioma removed    KNEE SURGERY      left knee replacement    ORTHOPEDIC SURGERY      left TKR    PHACOEMULSIFICATION CLEAR CORNEA WITH TORIC INTRAOCULAR LENS IMPLANT  2012    Procedure: PHACOEMULSIFICATION CLEAR CORNEA WITH TORIC INTRAOCULAR LENS IMPLANT;  COMPLEX RIGHT PHACOEMULSIFICATION CLEAR CORNEA WITH TORIC INTRAOCULAR LENS IMPLANT WITH MALYUGIN RING;  Surgeon: Ronald Cortez MD;  Location:  [facility]    RECTAL SURGERY      ZZC RAD RESEC TONSIL/PILLARS        No Known Allergies   Social History     Tobacco Use    Smoking status: Former     Current packs/day: 0.00     Average packs/day: 1 pack/day for 3.9 years (3.9 ttl pk-yrs)     Types: Cigarettes     Start date: 1959     Quit date: 1963     Years since quittin.0    Smokeless tobacco: Never   Substance Use Topics    Alcohol use: Yes     Comment: 2-4 glasses of wine per week      Wt Readings from Last 1 Encounters:   10/28/24 95.8 kg (211 lb 3.2 oz)        Anesthesia Evaluation        History of anesthetic complications       ROS/MED HX  ENT/Pulmonary:     (+) sleep apnea,               tobacco use, Past use,                    (-) asthma and COPD   Neurologic:  - neg neurologic ROS      Cardiovascular:     (+) Dyslipidemia hypertension- -  CAD - past MI - -   Taking blood thinners   CHF etiology: ICM                          Previous cardiac testing   Echo: Date: 9/24 Results:  Interpretation Summary     Technically very difficult study.     Left ventricular systolic function is moderately reduced.The visual ejection  fraction is 35-40%.There is moderate global hypokinesia of the left ventricle.  The right ventricular systolic function is normal.  There is mild (1+) mitral regurgitation.  IVC diameter <2.1 cm collapsing >50% with sniff suggests a normal RA pressure  of 3 mmHg.     Echo dated 12/22/2018 LVEF was noted 60-65%.    Stress Test:  Date: Results:    ECG Reviewed:  Date: Results:    Cath:  Date: 9/24 Results:    Conclusion          Ost LAD to Mid LAD lesion is 80% stenosed.     Prox Cx to Mid Cx lesion is 70% stenosed.     Mid Cx to Dist Cx lesion is 99% stenosed.     Prox RCA lesion is 100% stenosed.     1st Mrg lesion is 90% stenosed.     1st Diag lesion is 99% stenosed.     - Severe triple vessel disease CAD  Consider CABG, vs medical mangement, vs, PCI to LAD   (-) stent and CABG   METS/Exercise Tolerance:     Hematologic:     (+)      anemia,          Musculoskeletal: Comment: GOUT  (+)  arthritis,             GI/Hepatic:    (-) GERD and esophageal disease   Renal/Genitourinary:     (+) renal disease, type: ARF and CRI,            Endo:     (+)               Obesity,    (-) Type II DM   Psychiatric/Substance Use:  - neg psychiatric ROS     Infectious Disease:       Malignancy: Comment: CLL  (+) Malignancy, History of Lymphoma/Leukemia.    Other: Comment: Post op day 0 s/p CABG x 3.  Return to OR for exploration for post surgical hemorrhage            Physical Exam    Airway        Mallampati: III   TM distance: > 3 FB   Neck ROM: full   Mouth opening: > 3 cm    Respiratory Devices and Support    ETT:      Dental       (+) Modest Abnormalities - crowns, retainers, 1 or 2 missing  teeth      Cardiovascular   cardiovascular exam normal          Pulmonary   pulmonary exam normal                OUTSIDE LABS:  CBC:   Lab Results   Component Value Date    WBC 90.7 (HH) 10/28/2024    WBC 80.4 (HH) 10/28/2024    HGB 6.9 (LL) 10/28/2024    HGB 8.3 (L) 10/28/2024    HCT 22.5 (L) 10/28/2024    HCT 27.2 (L) 10/28/2024     (H) 10/28/2024     (H) 10/28/2024     BMP:   Lab Results   Component Value Date     10/28/2024     (H) 10/28/2024    POTASSIUM 3.6 10/28/2024    POTASSIUM 3.6 10/28/2024    CHLORIDE 106 10/28/2024    CHLORIDE 108 (H) 10/28/2024    CO2 24 10/28/2024    CO2 23 10/28/2024    BUN 47.9 (H) 10/28/2024    BUN 46.8 (H) 10/28/2024    CR 1.86 (H) 10/28/2024    CR 1.80 (H) 10/28/2024     (H) 10/28/2024     (H) 10/28/2024     COAGS:   Lab Results   Component Value Date    PTT 36 10/28/2024    INR 1.75 (H) 10/28/2024    FIBR 274 10/28/2024     POC:   Lab Results   Component Value Date     (H) 04/27/2016     HEPATIC:   Lab Results   Component Value Date    ALBUMIN 3.3 (L) 10/28/2024    PROTTOTAL 5.5 (L) 10/28/2024    ALT 13 10/28/2024    AST  10/28/2024      Comment:      Unsatisfactory specimen - hemolyzed     ALKPHOS 107 10/28/2024    BILITOTAL 0.4 10/28/2024     OTHER:   Lab Results   Component Value Date    PH 7.31 (L) 10/28/2024    LACT 7.5 (HH) 10/28/2024    A1C 4.5 10/09/2024    TATE 8.1 (L) 10/28/2024    PHOS 4.0 10/28/2024    MAG 2.6 (H) 10/28/2024    LIPASE 689 (H) 09/07/2007    AMYLASE 346 (H) 09/07/2007    TSH 4.66 (H) 09/18/2024    T4 0.82 (L) 09/18/2024    SED 10 04/12/2022       Anesthesia Plan    ASA Status:  4, emergent    NPO Status:  NPO Appropriate    Anesthesia Type: General.     - Airway: ETT   Induction: Intravenous, Propofol.   Maintenance: Balanced.   Techniques and Equipment:     - Lines/Monitors: Arterial Line, Central Line, PAC, JARVIS            JARVIS Absolute Contra-indication: NONE            JARVIS Relative Contra-indication: NONE  "    Consents    Anesthesia Plan(s) and associated risks, benefits, and realistic alternatives discussed. Questions answered and patient/representative(s) expressed understanding.     - Discussed:     - Discussed with:  Patient      - Extended Intubation/Ventilatory Support Discussed: Yes.      - Patient is DNR/DNI Status: No     Use of blood products discussed: Yes.     - Discussed with: Patient.     - Consented: consented to blood products     Postoperative Care    Pain management: IV analgesics, Multi-modal analgesia.   PONV prophylaxis: Ondansetron (or other 5HT-3), Dexamethasone or Solumedrol     Comments:    Other Comments:                  Mesfin Sanz, DO    I have reviewed the pertinent notes and labs in the chart from the past 30 days and (re)examined the patient.  Any updates or changes from those notes are reflected in this note.     # Hypernatremia: Highest Na = 146 mmol/L in last 2 days, will monitor as appropriate  # Hyperchloremia: Highest Cl = 108 mmol/L in last 2 days, will monitor as appropriate      # Hypocalcemia: Lowest Ca = 8.1 mg/dL in last 2 days, will monitor and replace as appropriate     # Hypoalbuminemia: Lowest albumin = 3.3 g/dL at 10/28/2024  2:46 PM, will monitor as appropriate    # Drug Induced Coagulation Defect: home medication list includes an anticoagulant medication    # Hypertension: Noted on problem list  # Chronic heart failure with reduced ejection fraction: last echo with EF <40%  # Circulatory Shock: required vasopressors within past 24 hours     # Anemia: based on hgb <11       # Obesity: Estimated body mass index is 30.3 kg/m  as calculated from the following:    Height as of this encounter: 1.778 m (5' 10\").    Weight as of this encounter: 95.8 kg (211 lb 3.2 oz).             "

## 2024-10-29 NOTE — PROGRESS NOTES
[facility] ICU RESPIRATORY NOTE        Date of Admission: 10/28/2024    Date of Intubation (most recent): 10/28/24    Reason for Mechanical Ventilation: AW protection    Number of Days on Mechanical Ventilation: 1    Met Criteria for Spontaneous Breathing Trial: No    Reason for No Spontaneous Breathing Trial: per MD    Bite Block: No    Significant Events Today: None overnight    ABG Results:   Recent Labs   Lab 10/29/24  0205 10/29/24  0008 10/28/24  2305 10/28/24  2134 10/28/24  2053 10/28/24  1451 10/28/24  1448   PH 7.29*  --   --  7.20* 7.06*  --  7.31*   PCO2 42  --   --  48* 61*  --  43   PO2 140*  --   --  160* 86  --  73*   HCO3 20*  --   --  18* 18*  --  21   O2PER 50 50 40  --   --  40 40         Current Vent Settings: FiO2 (%): 50 %, Resp: 19, Vent Mode: CMV/AC, Resp Rate (Set): 18 breaths/min, Tidal Volume (Set, mL): 500 mL, PEEP (cm H2O): 5 cmH2O, Resp Rate (Set): 18 breaths/min, Tidal Volume (Set, mL): 500 mL, PEEP (cm H2O): 5 cmH2O    Skin Assessment: Intact    Plan: Assess for weaning readiness daily    RT Eduard on 10/29/2024 at 5:39 AM

## 2024-10-29 NOTE — CONSULTS
[facility] Cancer Care Consultation      Alessio Teixeira MRN# [id number]   YOB: 1935 Age: 88 year old   Date of Admission: 10/28/2024  Requesting physician: Tyra Dubois MD  Reason for consult: Leukocytosis           Assessment and Plan:   88 year old male with history of JAK2 mutation positive myeloproliferative neoplasm, myasthenia gravis, CAD, presents with recent NSTEMI now postop day 1 after CABG.     Primary oncologist: Dr. Aleksandra Raymundo.    1. Myeloproliferative neoplasm, JAK2-V617F mutation positive  2. Leukocytosis with neutrophilia related to #1 and acute illness  3. Anemia related to perioperative blood loss and MPN  4. CAD s/p 3V-CABG  5. History of bilateral PE in 2007  6. Atrial fibrillation  7. Myasthenia gravis  --Leukocytosis expected as reactive component from surgery and acute illness and component of MPN.  --Would not advise bone marrow biopsy at present time given postoperative hypovolemic shock.   --Cytoreduction for leukocytosis not indicated at present time.  --Follow CBC daily and transfuse for Hgb < 7 g/dL.  --Continue ICU care.  --Eventual follow-up after discharge with Dr. Aleksandra Raymundo.  --Above fully discussed with Devin's wife and daughter today.    Thank you for the consult.  Will signoff but please call if questions.    Nancy Jacobson MD  [facility] Hematology/Oncology    Total time spent today: 80 minutes in chart review, patient evaluation, counseling, documentation, test and/or medication/prescription orders, and coordination of care.              Chief Complaint:   No chief complaint on file.           History of Present Illness:   This patient is a 88 year old male with history of JAK2 mutation positive myeloproliferative neoplasm, myasthenia gravis, CAD, presents with recent NSTEMI now postop day 1 after 3-vessel CABG.   His course has been complicated by postoperative hemorrhage and estimated blood loss of at least 1200 mL. He is in vasoplegic  "and hypovolemic shock.    Patient was last seen by his primary oncologist Dr. Aleksandra Raymundo on 10/02/2024 for a JAK2 positive MPN.  2024 Peripheral blood smear had shown leukoerythroblastic features with anemia and nucleated RBCs.  WBC at that time was 32.3 with ANC 28.4; Hgb 8.5, platelets 333,000.  2024 NGS showed JAK2-V617F mutation. BCR-ABL1 PCR negative. 2024 Flow cytometry showed rare circulating myeloid blasts with no aberrant immunophenotype.     10/28/2024 WBC up to 109.2, Hgb 7.4, platelets 423,000.    10/29/2024 WBC 62.7 with absolute basophils 1.3, ANC 48, monocytes 2.2, metamyelocytes 2.2, myelocytes 3.8.  Most recent Hgb 8.1.  Platelets 309,000. INR 1.74, PTT 34, fibrinogen 247.         Physical Exam:   Vitals were reviewed  Blood pressure 102/59, pulse 89, temperature (!) 101.1  F (38.4  C), resp. rate 19, height 1.778 m (5' 10\"), weight 100.3 kg (221 lb 1.9 oz), SpO2 99%.  Temperatures:  Current - Temp: (!) 101.1  F (38.4  C); Max - Temp  Av.2  F (37.9  C)  Min: 99.3  F (37.4  C)  Max: 101.1  F (38.4  C)  Respiration range: Resp  Av  Min: 18  Max: 19  Pulse range: Pulse  Av.7  Min: 66  Max: 128  Blood pressure range: Systolic (24hrs), Av , Min:80 , Max:122   ; Diastolic (24hrs), Av, Min:54, Max:76    Pulse oximetry range: SpO2  Av.1 %  Min: 87 %  Max: 100 %    Intake/Output Summary (Last 24 hours) at 10/29/2024 1412  Last data filed at 10/29/2024 1400  Gross per 24 hour   Intake 30620.31 ml   Output 3440 ml   Net 7062.31 ml       GENERAL: Sedated.  SKIN: No rashes.  HEENT: Normocephalic, atraumatic. Intubated.  LYMPH: No palpable lymphadenopathy in the cervical, supraclavicular regions.  LUNGS: No audible cough or wheezing.  ABDOMEN: Dressings extending from chest to abdomen without overt bleeding.  EXTREMITIES: No clubbing, cyanosis; 2+ bilateral upper and lower extremity edema.  MENTAL: Sedated.              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    Actinic keratosis     JAVIER (acute kidney injury) (H) 02/01/2019    Atrial fibrillation (H)     Chronic 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 06/12/2013    Edema of leg     Gastrointestinal hemorrhage associated with gastric ulcer 02/01/2019    Gout, unspecified     H/O diplopia     History of blood transfusion     History of pulmonary embolism- bilateral in 2007 05/02/2016    Hypertension     Meningioma (H)     sinus    Myalgia and myositis, unspecified     Myasthenia gravis (H)     high dose steroids 2007,     Obesity     Other seborrheic keratosis     Presbyacusis     Primary localized osteoarthrosis, lower leg     Primary localized osteoarthrosis, pelvic region and thigh     Sleep apnea     doesn't use his CPAP)    Spinal stenosis     Squamous cell cancer of scalp and skin of neck     Dr Sanchez, multiple procedures, Mohs scalp and chest    Vitamin D deficiency              Past Surgical History:   I have reviewed this patient's past surgical history  Past Surgical History:   Procedure Laterality Date    ABDOMEN SURGERY  2007    APPENDECTOMY      ARTHROPLASTY HIP Right 2016    ARTHROPLASTY HIP ANTERIOR Right 04/26/2016    Procedure: ARTHROPLASTY HIP ANTERIOR;  Surgeon: Miguel Johnson MD;  Location: [facility] OR    ARTHROPLASTY KNEE Right 01/28/2019    Procedure: RIGHT TOTAL KNEE ARTROPLASTY;  Surgeon: Miguel Johnson MD;  Location: [facility] OR    BIOPSY      skin cancer    BRAIN SURGERY  2007    partial resection meningioma    BYPASS GRAFT ARTERY CORONARY N/A 10/28/2024    Procedure: Exploration of Chest for Post-Surgical Hemorrhage;  Surgeon: Mulvihill, Michael, MD;  Location: [facility] OR    BYPASS GRAFT ARTERY CORONARY N/A 10/28/2024    Procedure: CORONARY ARTERY BYPASS GRAFTING X 3 WITH ENDOSOCOPIC VEIN HARVEST FROM RIGHT GREATER SAPHENOUS VEIN LEFT INTERNAL MAMMARY ARTERY-LEFT ANTERIOR DESCENDING ARTERY RIGHT  GREATER SAPHENOUS VEIN- OBTUSE MARGINAL ARTERY/ DISTAL RIGHT CORONARY ARTERY;  Surgeon: Mulvihill, Michael, MD;  Location:  OR    CATARACT IOL, RT/LT      COLECTOMY  2007    bowel perforation due to steroids    COLONOSCOPY      CV CORONARY ANGIOGRAM N/A 9/10/2024    Procedure: Coronary Angiogram;  Surgeon: Vickey Monteiro MD;  Location:  HEART CARDIAC CATH LAB    CV PCI N/A 9/10/2024    Procedure: Percutaneous Coronary Intervention;  Surgeon: Vickey Monteiro MD;  Location:  HEART CARDIAC CATH LAB    ESOPHAGOSCOPY, GASTROSCOPY, DUODENOSCOPY (EGD), COMBINED N/A 12/22/2018    Procedure: COMBINED ESOPHAGOSCOPY, GASTROSCOPY, DUODENOSCOPY (EGD), BIOPSY SINGLE OR MULTIPLE;  Surgeon: Elizabeth Jones MD;  Location:  GI    ESOPHAGOSCOPY, GASTROSCOPY, DUODENOSCOPY (EGD), COMBINED N/A 05/10/2019    Procedure: ESOPHAGOGASTRODUODENOSCOPY (EGD);  Surgeon: Ahsan Garcia MD;  Location:  GI    ESOPHAGOSCOPY, GASTROSCOPY, DUODENOSCOPY (EGD), COMBINED N/A 9/11/2024    Procedure: Esophagoscopy, gastroscopy, duodenoscopy (EGD), combined;  Surgeon: Shannon Stephenson MD;  Location:  GI    GENITOURINARY SURGERY      vasectomy    HEAD & NECK SURGERY  2007    meningioma removed    KNEE SURGERY  2010    left knee replacement    MAZE PROCEDURE N/A 10/28/2024    Procedure: MODIFIED MAZE PROCEDURE;  Surgeon: Mulvihill, Michael, MD;  Location:  OR    OR LIGATION, LEFT ATRIAL APPENDAGE N/A 10/28/2024    Procedure: LIGATION, LEFT ATRIAL APPENDAGE WITH ATRICURE 50 MM ATRICLIP  ON PUMP/TRANSESOPHAGEAL ECHOCARDIOGRAM PER ANESTHESIA;  Surgeon: Mulvihill, Michael, MD;  Location:  OR    ORTHOPEDIC SURGERY      left TKR    PHACOEMULSIFICATION CLEAR CORNEA WITH TORIC INTRAOCULAR LENS IMPLANT  07/30/2012    Procedure: PHACOEMULSIFICATION CLEAR CORNEA WITH TORIC INTRAOCULAR LENS IMPLANT;  COMPLEX RIGHT PHACOEMULSIFICATION CLEAR CORNEA WITH TORIC INTRAOCULAR LENS IMPLANT WITH MALYUGIN RING;  Surgeon: Diego  MD Ronald;  Location: [facility]    RECTAL SURGERY      [facility] RAD RESEC TONSIL/PILLARS                 Social History:   I have reviewed this patient's social history  Social History     Tobacco Use    Smoking status: Former     Current packs/day: 0.00     Average packs/day: 1 pack/day for 3.9 years (3.9 ttl pk-yrs)     Types: Cigarettes     Start date: 1959     Quit date: 1963     Years since quittin.0    Smokeless tobacco: Never   Substance Use Topics    Alcohol use: Yes     Comment: 2-4 glasses of wine per week             Family History:   I have reviewed this patient's family history  Family History   Problem Relation Age of Onset    Glaucoma Father     Coronary Artery Disease Father     Hyperlipidemia Father     Unknown/Adopted Mother     Diabetes Brother     Leukemia Brother     Diabetes Brother     Hyperlipidemia Brother     Obesity Brother     Macular Degeneration No family hx of              Allergies:   No Known Allergies          Medications:   I have reviewed this patient's current medications  Medications Prior to Admission   Medication Sig Dispense Refill Last Dose/Taking    acetaminophen (TYLENOL) 500 MG tablet Take 500 mg by mouth at bedtime.   Bedtime    allopurinol (ZYLOPRIM) 300 MG tablet TAKE ONE TABLET BY MOUTH IN THE MORNING 90 tablet 1 Morning    apixaban ANTICOAGULANT (ELIQUIS) 2.5 MG tablet Take 1 tablet (2.5 mg) by mouth 2 times daily. 60 tablet 1 10/22/2024 Evening    atorvastatin (LIPITOR) 10 MG tablet Take 1 tablet (10 mg) by mouth every evening. 30 tablet 1 10/27/2024    clotrimazole-betamethasone (LOTRISONE) 1-0.05 % external cream Apply topically 2 times daily. 45 g 1 Taking    diclofenac (VOLTAREN) 1 % topical gel Apply 1 g topically in the morning and 1 g in the evening. 150 g 3 Taking    ferrous sulfate (FEROSUL) 325 (65 Fe) MG tablet Take 325 mg by mouth daily (with breakfast).   10/20/2024 Morning    ipratropium (ATROVENT) 0.03 % nasal spray Spray 2 sprays into both  nostrils every 12 hours.   Taking    magnesium 250 MG tablet Take 1 tablet by mouth daily.   10/20/2024 Morning    Melatonin 10 MG TABS tablet Take 10 mg by mouth nightly as needed for sleep.   10/24/2024 Bedtime    multivitamin w/minerals (MULTI-VITAMIN) tablet Take 1 tablet by mouth daily.   10/20/2024 Morning    nitroGLYcerin (NITROSTAT) 0.4 MG sublingual tablet For chest pain place 1 tablet under the tongue every 5 minutes for 3 doses. If symptoms persist 5 minutes after 1st dose call 911. 30 tablet 0 Taking    pantoprazole (PROTONIX) 40 MG EC tablet Take 1 tablet (40 mg) by mouth 2 times daily (before meals). 60 tablet 1 10/27/2024    torsemide (DEMADEX) 20 MG tablet Take 1 tablet (20 mg) by mouth daily. 30 tablet 1 10/27/2024     Current Facility-Administered Medications   Medication Dose Route Frequency Provider Last Rate Last Admin    [START ON 10/31/2024] acetaminophen (TYLENOL) tablet 650 mg  650 mg Oral Q4H PRN Clarence Bone MD        albumin human 5 % injection 250 mL  250 mL Intravenous Once May Repeat x1 Opal Nguyen PA-C   250 mL at 10/29/24 1306    amiodarone (CORDARONE) 1.8 mg/mL in sodium chloride 0.9% 500 mL ADULT STANDARD infusion  1 mg/min Intravenous Continuous Opal Nguyen PA-C 33.33 mL/hr at 10/29/24 1052 1 mg/min at 10/29/24 1052    amiodarone (CORDARONE) 1.8 mg/mL in sodium chloride 0.9% 500 mL ADULT STANDARD infusion  0.5 mg/min Intravenous Continuous Opal Nguyen PA-C        aspirin (ASA) chewable tablet 162 mg  162 mg Oral or NG Tube Daily Clarence Bone MD   162 mg at 10/29/24 0811    [START ON 10/31/2024] bisacodyl (DULCOLAX) suppository 10 mg  10 mg Rectal Daily PRN Clarence Bone MD        calcium gluconate 1 g in 50 mL in sodium chloride intermittent infusion  1 g Intravenous Once PRN Clarence Bone MD   1 g at 10/28/24 1559    calcium gluconate 2 g in  mL intermittent infusion  2 g Intravenous Once PRN Clarence Bone MD         calcium gluconate 3 g in sodium chloride 0.9 % 100 mL intermittent infusion  3 g Intravenous Once PRN Clarence Bone MD        ceFAZolin (ANCEF) 2 g in 100 mL D5W intermittent infusion  2 g Intravenous Q8H Clarence Bone  mL/hr at 10/29/24 1123 2 g at 10/29/24 1123    dexmedeTOMIDine (PRECEDEX) 4 mcg/mL in sodium chloride 0.9 % 100 mL infusion  0.2-0.7 mcg/kg/hr Intravenous Continuous Clarence Bone MD        glucose gel 15-30 g  15-30 g Oral Q15 Min PRN Clarence Bone MD        Or    dextrose 50 % injection 25-50 mL  25-50 mL Intravenous Q15 Min PRN Clarence Bone MD        Or    glucagon injection 1 mg  1 mg Subcutaneous Q15 Min PRN Clarence Bone MD        EPINEPHrine (ADRENALIN) 5 mg in  mL infusion  0.01-0.1 mcg/kg/min Intravenous Continuous PRN Clarence Bone MD 14.4 mL/hr at 10/29/24 1229 0.05 mcg/kg/min at 10/29/24 1229    heparin ANTICOAGULANT injection 5,000 Units  5,000 Units Subcutaneous Q8H Opal Nguyen PA-C        hydrALAZINE (APRESOLINE) injection 10 mg  10 mg Intravenous Q30 Min PRN Clarence Bone MD        HYDROmorphone (DILAUDID) injection 0.1 mg  0.1 mg Intravenous Q2H PRN Clarence Bone MD        Or    HYDROmorphone (DILAUDID) injection 0.2 mg  0.2 mg Intravenous Q2H PRN Clarence Bone MD        insulin regular (MYXREDLIN) 1 unit/mL infusion  0-24 Units/hr Intravenous Continuous Clarence Bone MD   Stopped at 10/29/24 1405    lactated ringers infusion   Intravenous Continuous Opal Nguyen PA-C 30 mL/hr at 10/29/24 1044 New Bag at 10/29/24 1044    lidocaine (LMX4) cream   Topical Q1H PRN Clarence Bone MD        lidocaine 1 % 0.1-1 mL  0.1-1 mL Other Q1H PRN Clarence Bone MD        [START ON 10/30/2024] magnesium hydroxide (MILK OF MAGNESIA) suspension 30 mL  30 mL Oral Daily PRN Clarence Bone MD        propofol (DIPRIVAN) infusion  5-75 mcg/kg/min Intravenous Continuous Dubois,  Tyra Cerna MD 17.2 mL/hr at 10/29/24 1346 30 mcg/kg/min at 10/29/24 1346    And    propofol (DIPRIVAN) bolus from bag or syringe pump  10 mg Intravenous Q15 Min PRN Tyra Dubois MD        And    Medication Instruction   Does not apply Continuous PRN Tyra Dubois MD        methocarbamol (ROBAXIN) tablet 500 mg  500 mg Oral Q6H PRN Clarence Bone MD   500 mg at 10/28/24 1634    naloxone (NARCAN) injection 0.2 mg  0.2 mg Intravenous Q2 Min PRN Mulvihill, Michael, MD        Or    naloxone (NARCAN) injection 0.4 mg  0.4 mg Intravenous Q2 Min PRN Mulvihill, Michael, MD        Or    naloxone (NARCAN) injection 0.2 mg  0.2 mg Intramuscular Q2 Min PRN Mulvihill, Michael, MD        Or    naloxone (NARCAN) injection 0.4 mg  0.4 mg Intramuscular Q2 Min PRN Mulvihill, Michael, MD        norepinephrine (LEVOPHED) 4 mg in  mL infusion PREMIX  0.01-0.15 mcg/kg/min Intravenous Continuous PRN Clarence Bone MD 43.1 mL/hr at 10/29/24 1310 0.12 mcg/kg/min at 10/29/24 1310    ondansetron (ZOFRAN ODT) ODT tab 4 mg  4 mg Oral Q6H PRN Clarence Bone MD        Or    ondansetron (ZOFRAN) injection 4 mg  4 mg Intravenous Q6H PRN Clarence Bone MD        oxyCODONE IR (ROXICODONE) half-tab 2.5 mg  2.5 mg Oral Q4H PRN Clarence Bone MD   2.5 mg at 10/29/24 1300    Or    oxyCODONE (ROXICODONE) tablet 5 mg  5 mg Oral Q4H PRN Clarence Bone MD   5 mg at 10/29/24 0817    pantoprazole (PROTONIX) 2 mg/mL suspension 40 mg  40 mg Oral or NG Tube Daily Clarence Bone MD   40 mg at 10/29/24 0811    Or    pantoprazole (PROTONIX) EC tablet 40 mg  40 mg Oral Daily Clarence Bone MD        polyethylene glycol (MIRALAX) Packet 17 g  17 g Oral Daily Clarence Bone MD   17 g at 10/29/24 0811    prochlorperazine (COMPAZINE) injection 5 mg  5 mg Intravenous Q6H PRN Clarence Bone MD        Or    prochlorperazine (COMPAZINE) tablet 5 mg  5 mg Oral Q6H PRN Lizandro  MD Clarence        Reason beta blocker order not selected   Does not apply DOES NOT GO TO Clarence Olguin MD        senna-docusate (SENOKOT-S/PERICOLACE) 8.6-50 MG per tablet 1 tablet  1 tablet Oral BID Clarence Bone MD   1 tablet at 10/29/24 0811    sodium chloride (PF) 0.9% PF flush 3 mL  3 mL Intracatheter Q8H Clarence Bone MD   3 mL at 10/29/24 0607    sodium chloride (PF) 0.9% PF flush 3 mL  3 mL Intracatheter q1 min prn Clarence Bone MD        vasopressin 0.2 units/mL in NS (PITRESSIN) standard conc infusion  0.5-4 Units/hr Intravenous Continuous PRN Clarence Bone MD 12 mL/hr at 10/29/24 0919 2.4 Units/hr at 10/29/24 0919             Review of Systems:     The 14 point Review of Systems is negative other than noted in the HPI.            Data:   All laboratory data reviewed  Results for orders placed or performed during the hospital encounter of 10/28/24 (from the past 24 hours)   Glucose by meter   Result Value Ref Range    GLUCOSE BY METER POCT 150 (H) 70 - 99 mg/dL   EKG 12-lead, tracing only   Result Value Ref Range    Systolic Blood Pressure  mmHg    Diastolic Blood Pressure  mmHg    Ventricular Rate 80 BPM    Atrial Rate 87 BPM    NV Interval  ms    QRS Duration 92 ms     ms    QTc 555 ms    P Axis  degrees    R AXIS 71 degrees    T Axis 254 degrees    Interpretation ECG       Sinus rhythm with Premature atrial complexes  Possible Inferior infarct (cited on or before 09-Sep-2024)  T wave abnormality, consider anterolateral ischemia  Prolonged QT  Abnormal ECG  When compared with ECG of 14-Sep-2024 02:01,  QT has lengthened  anterior t-wave inversion is worse  Atrial tachycardia not present  Confirmed by JAMESON FLOWERS ([id number]) on 10/28/2024 3:35:36 PM     INR   Result Value Ref Range    INR 1.67 (H) 0.85 - 1.15   Partial thromboplastin time   Result Value Ref Range    aPTT 52 (H) 22 - 38 Seconds   CBC with platelets   Result Value Ref Range    WBC Count 80.4  (HH) 4.0 - 11.0 10e3/uL    RBC Count 3.02 (L) 4.40 - 5.90 10e6/uL    Hemoglobin 8.3 (L) 13.3 - 17.7 g/dL    Hematocrit 27.2 (L) 40.0 - 53.0 %    MCV 90 78 - 100 fL    MCH 27.5 26.5 - 33.0 pg    MCHC 30.5 (L) 31.5 - 36.5 g/dL    RDW 21.2 (H) 10.0 - 15.0 %    Platelet Count 538 (H) 150 - 450 10e3/uL   Comprehensive metabolic panel   Result Value Ref Range    Sodium 143 135 - 145 mmol/L    Potassium 3.6 3.4 - 5.3 mmol/L    Carbon Dioxide (CO2) 24 22 - 29 mmol/L    Anion Gap 13 7 - 15 mmol/L    Urea Nitrogen 47.9 (H) 8.0 - 23.0 mg/dL    Creatinine 1.86 (H) 0.67 - 1.17 mg/dL    GFR Estimate 35 (L) >60 mL/min/1.73m2    Calcium 8.1 (L) 8.8 - 10.4 mg/dL    Chloride 106 98 - 107 mmol/L    Glucose 162 (H) 70 - 99 mg/dL    Alkaline Phosphatase 107 40 - 150 U/L    AST      ALT 13 0 - 70 U/L    Protein Total 5.5 (L) 6.4 - 8.3 g/dL    Albumin 3.3 (L) 3.5 - 5.2 g/dL    Bilirubin Total 0.4 <=1.2 mg/dL   Lactic acid whole blood   Result Value Ref Range    Lactic Acid 4.1 (HH) 0.7 - 2.0 mmol/L   Ionized Calcium   Result Value Ref Range    Calcium Ionized Whole Blood 4.3 (L) 4.4 - 5.2 mg/dL   Magnesium   Result Value Ref Range    Magnesium 2.6 (H) 1.7 - 2.3 mg/dL   Phosphorus   Result Value Ref Range    Phosphorus 4.0 2.5 - 4.5 mg/dL   RBC and Platelet Morphology   Result Value Ref Range    RBC Morphology Confirmed RBC Indices     Platelet Assessment  Automated Count Confirmed. Platelet morphology is normal.     Automated Count Confirmed. Platelet morphology is normal.    Elliptocytes Slight (A) None Seen    Polychromasia Slight (A) None Seen   Fibrinogen activity   Result Value Ref Range    Fibrinogen Activity 322 170 - 510 mg/dL   Blood gas arterial   Result Value Ref Range    pH Arterial 7.31 (L) 7.35 - 7.45    pCO2 Arterial 43 35 - 45 mm Hg    pO2 Arterial 73 (L) 80 - 105 mm Hg    FIO2 40     Bicarbonate Arterial 21 21 - 28 mmol/L    Base Excess/Deficit Arterial -4.6 (L) -3.0 - 3.0 mmol/L    Eliecer's Test Artline     Oxyhemoglobin  Arterial 89 (L) 92 - 100 %    O2 Sat, Arterial 93.5 (L) 95.0 - 96.0 %    Peep 5 cm H2O    Narrative    In healthy individuals, oxyhemoglobin (O2Hb) and oxygen saturation (SO2) are approximately equal. In the presence of dyshemoglobins, oxyhemoglobin can be considerably lower than oxygen saturation.   Blood gas venous   Result Value Ref Range    pH Venous 7.25 (L) 7.32 - 7.43    pCO2 Venous 48 40 - 50 mm Hg    pO2 Venous 31 25 - 47 mm Hg    Bicarbonate Venous 21 21 - 28 mmol/L    Base Excess/Deficit Venous -5.6 (L) -3.0 - 3.0 mmol/L    FIO2 40     Oxyhemoglobin Venous 44 (L) 70 - 75 %    O2 Sat, Venous 46.4 (L) 70.0 - 75.0 %    Narrative    In healthy individuals, oxyhemoglobin (O2Hb) and oxygen saturation (SO2) are approximately equal. In the presence of dyshemoglobins, oxyhemoglobin can be considerably lower than oxygen saturation.   XR Chest Port 1 View    Narrative    CHEST ONE VIEW  10/28/2024 3:20 PM     HISTORY: Post Op CVTS Surgery    COMPARISON: CT chest 9/10/2024      Impression    IMPRESSION:     Lines and tubes: Endotracheal tube tip approximately 3.6 cm from the  dayanna. Gastric drainage tube courses below the diaphragm; tip not  visualized. Right [place] Ju[place]al catheter tip in the region of the main  pulmonary artery. Left atrial appendage clip. Median sternotomy. Chest  tubes.    Bibasilar atelectasis and probable trace pleural effusions. No  discernible pneumothorax. Prominent mediastinum consistent with recent  CABG.    COLLEEN JOHNSON MD         SYSTEM ID:  [id number]   Glucose by meter   Result Value Ref Range    GLUCOSE BY METER POCT 161 (H) 70 - 99 mg/dL   Prepare plasma (unit)   Result Value Ref Range    Blood Component Type Plasma     Product Code K6698E37     Unit Status Transfused     Unit Number [id number]     CODING SYSTEM [id number]     ISSUE DATE AND TIME 13555313012638     UNIT ABO/RH A-     UNIT TYPE ISBT 0600    Prepare plasma (unit)   Result Value Ref Range    Blood Component Type  Plasma     Product Code E2305P24     Unit Status Transfused     Unit Number [id number]     CODING SYSTEM [id number]     ISSUE DATE AND TIME 86244430653456     UNIT ABO/RH A+     UNIT TYPE ISBT 6200    Lactic acid whole blood   Result Value Ref Range    Lactic Acid 6.4 (HH) 0.7 - 2.0 mmol/L   CBC with platelets   Result Value Ref Range    WBC Count 90.7 (HH) 4.0 - 11.0 10e3/uL    RBC Count 2.47 (L) 4.40 - 5.90 10e6/uL    Hemoglobin 6.9 (LL) 13.3 - 17.7 g/dL    Hematocrit 22.5 (L) 40.0 - 53.0 %    MCV 91 78 - 100 fL    MCH 27.9 26.5 - 33.0 pg    MCHC 30.7 (L) 31.5 - 36.5 g/dL    RDW 21.6 (H) 10.0 - 15.0 %    Platelet Count 519 (H) 150 - 450 10e3/uL   INR   Result Value Ref Range    INR 1.75 (H) 0.85 - 1.15   Partial thromboplastin time   Result Value Ref Range    aPTT 36 22 - 38 Seconds   Fibrinogen activity   Result Value Ref Range    Fibrinogen Activity 274 170 - 510 mg/dL   RBC and Platelet Morphology   Result Value Ref Range    RBC Morphology Confirmed RBC Indices     Platelet Assessment  Automated Count Confirmed. Platelet morphology is normal.     Automated Count Confirmed. Platelet morphology is normal.    Elliptocytes Slight (A) None Seen    Polychromasia Slight (A) None Seen   Glucose by meter   Result Value Ref Range    GLUCOSE BY METER POCT 134 (H) 70 - 99 mg/dL   Prepare red blood cells (unit)   Result Value Ref Range    Blood Component Type Red Blood Cells     Product Code L9190I52     Unit Status Transfused     Unit Number [id number]     CROSSMATCH Compatible     CODING SYSTEM [id number]     ISSUE DATE AND TIME [id number]     UNIT ABO/RH A+     UNIT TYPE ISBT 6200    Prepare red blood cells (unit)   Result Value Ref Range    Blood Component Type Red Blood Cells     Product Code E1913V33     Unit Status Transfused     Unit Number [id number]     CROSSMATCH Compatible     CODING SYSTEM [id number]     ISSUE DATE AND TIME [id number]     UNIT ABO/RH A+     UNIT TYPE ISBT 6200    Glucose by meter    Result Value Ref Range    GLUCOSE BY METER POCT 126 (H) 70 - 99 mg/dL   ABO/Rh type and screen    Narrative    The following orders were created for panel order ABO/Rh type and screen.  Procedure                               Abnormality         Status                     ---------                               -----------         ------                     Adult Type and Screen[[id number]]                            Final result                 Please view results for these tests on the individual orders.   Lactic acid whole blood   Result Value Ref Range    Lactic Acid 7.5 (HH) 0.7 - 2.0 mmol/L   CBC with platelets   Result Value Ref Range    WBC Count 95.6 (HH) 4.0 - 11.0 10e3/uL    RBC Count 2.75 (L) 4.40 - 5.90 10e6/uL    Hemoglobin 8.0 (L) 13.3 - 17.7 g/dL    Hematocrit 25.2 (L) 40.0 - 53.0 %    MCV 92 78 - 100 fL    MCH 29.1 26.5 - 33.0 pg    MCHC 31.7 31.5 - 36.5 g/dL    RDW 19.3 (H) 10.0 - 15.0 %    Platelet Count 443 150 - 450 10e3/uL   Adult Type and Screen   Result Value Ref Range    ABO/RH(D) A POS     Antibody Screen Negative Negative    SPECIMEN EXPIRATION DATE [id number]    Glucose by meter   Result Value Ref Range    GLUCOSE BY METER POCT 128 (H) 70 - 99 mg/dL   CBC with platelets   Result Value Ref Range    WBC Count 109.2 (HH) 4.0 - 11.0 10e3/uL    RBC Count 2.51 (L) 4.40 - 5.90 10e6/uL    Hemoglobin 7.4 (L) 13.3 - 17.7 g/dL    Hematocrit 23.7 (L) 40.0 - 53.0 %    MCV 94 78 - 100 fL    MCH 29.5 26.5 - 33.0 pg    MCHC 31.2 (L) 31.5 - 36.5 g/dL    RDW 19.0 (H) 10.0 - 15.0 %    Platelet Count 423 150 - 450 10e3/uL   Basic metabolic panel   Result Value Ref Range    Sodium 143 135 - 145 mmol/L    Potassium 5.2 3.4 - 5.3 mmol/L    Chloride 108 (H) 98 - 107 mmol/L    Carbon Dioxide (CO2) 16 (L) 22 - 29 mmol/L    Anion Gap 19 (H) 7 - 15 mmol/L    Urea Nitrogen 46.3 (H) 8.0 - 23.0 mg/dL    Creatinine 2.20 (H) 0.67 - 1.17 mg/dL    GFR Estimate 28 (L) >60 mL/min/1.73m2    Calcium 7.8 (L) 8.8 - 10.4  mg/dL    Glucose 151 (H) 70 - 99 mg/dL   Fibrinogen activity   Result Value Ref Range    Fibrinogen Activity 254 170 - 510 mg/dL   Partial thromboplastin time   Result Value Ref Range    aPTT 45 (H) 22 - 38 Seconds   INR   Result Value Ref Range    INR 1.64 (H) 0.85 - 1.15   Viscoelastic Clot Assessment POCT   Result Value Ref Range    Clot Time 173 (H) 113 - 166 sec    Clot Time w Heparinase 158 (H) 103 - 153 sec    Clot Stiffness 38.9 (H) 13.0 - 33.2 hPa    Fibr Contrib to Clot Stiffness 6.0 (H) 1.0 - 3.7 hPa    Plt Contrib to Clot Stiffness 32.9 (H) 11.9 - 29.8 hPa    Clot Time Ratio 1.1     Narrative    The Quantra QPlus System is indicated for the evaluation of blood coagulation in perioperative patients age 18 years and older to assess possible hypocoagulable and hypercoagulable conditions in cardiovascular or major orthopedic surgeries before, during, and following the procedure.   iStat Gases (Electrolytes) arterial POCT   Result Value Ref Range    CPB Applied No     Hematocrit POCT 27 (L) 40 - 53 %    Bicarbonate Arterial POCT 18 (L) 21 - 28 mmol/L    Hemoglobin POCT 9.2 (L) 13.3 - 17.7 g/dL    Potassium POCT 4.2 3.4 - 5.3 mmol/L    Sodium POCT 145 135 - 145 mmol/L    pCO2 Arterial POCT 61 (H) 35 - 45 mm Hg    pH Arterial POCT 7.06 (LL) 7.35 - 7.45    pO2 Arterial POCT 86 80 - 105 mm Hg    O2 Sat, Arterial POCT 91 (L) 92 - 100 %    Base Excess/Deficit (+/-) POCT -12.0 (L) -3.0 - 3.0 mmol/L   Prepare red blood cells (unit)   Result Value Ref Range    Blood Component Type Red Blood Cells     Product Code C7561Y46     Unit Status Returned     Unit Number [id number]     CROSSMATCH Compatible     CODING SYSTEM [id number]     ISSUE DATE AND TIME [id number]     UNIT ABO/RH A+     UNIT TYPE ISBT 6200    Prepare red blood cells (unit)   Result Value Ref Range    Blood Component Type Red Blood Cells     Product Code Z4956G54     Unit Status Returned     Unit Number [id number]     CROSSMATCH Compatible      CODING SYSTEM [id number]     ISSUE DATE AND TIME [id number]     UNIT ABO/RH A+     UNIT TYPE ISBT 6200    Glucose by meter   Result Value Ref Range    GLUCOSE BY METER POCT 116 (H) 70 - 99 mg/dL   CBC with platelets   Result Value Ref Range    WBC Count 97.9 (HH) 4.0 - 11.0 10e3/uL    RBC Count 2.83 (L) 4.40 - 5.90 10e6/uL    Hemoglobin 8.4 (L) 13.3 - 17.7 g/dL    Hematocrit 26.4 (L) 40.0 - 53.0 %    MCV 93 78 - 100 fL    MCH 29.7 26.5 - 33.0 pg    MCHC 31.8 31.5 - 36.5 g/dL    RDW 17.0 (H) 10.0 - 15.0 %    Platelet Count 325 150 - 450 10e3/uL   Basic metabolic panel   Result Value Ref Range    Sodium 147 (H) 135 - 145 mmol/L    Potassium 4.1 3.4 - 5.3 mmol/L    Chloride 110 (H) 98 - 107 mmol/L    Carbon Dioxide (CO2) 19 (L) 22 - 29 mmol/L    Anion Gap 18 (H) 7 - 15 mmol/L    Urea Nitrogen 48.0 (H) 8.0 - 23.0 mg/dL    Creatinine 2.17 (H) 0.67 - 1.17 mg/dL    GFR Estimate 29 (L) >60 mL/min/1.73m2    Calcium 8.3 (L) 8.8 - 10.4 mg/dL    Glucose 112 (H) 70 - 99 mg/dL   Fibrinogen activity   Result Value Ref Range    Fibrinogen Activity 214 170 - 510 mg/dL   Lactic acid whole blood   Result Value Ref Range    Lactic Acid 8.1 (HH) 0.7 - 2.0 mmol/L   Partial thromboplastin time   Result Value Ref Range    aPTT 50 (H) 22 - 38 Seconds   INR   Result Value Ref Range    INR 1.86 (H) 0.85 - 1.15   RBC and Platelet Morphology   Result Value Ref Range    RBC Morphology Confirmed RBC Indices     Platelet Assessment  Automated Count Confirmed. Platelet morphology is normal.     Automated Count Confirmed. Platelet morphology is normal.    Elliptocytes Slight (A) None Seen    Polychromasia Slight (A) None Seen   iStat Gases (Electrolytes) arterial POCT   Result Value Ref Range    CPB Applied No     Hematocrit POCT 25 (L) 40 - 53 %    Bicarbonate Arterial POCT 18 (L) 21 - 28 mmol/L    Hemoglobin POCT 8.5 (L) 13.3 - 17.7 g/dL    Potassium POCT 4.0 3.4 - 5.3 mmol/L    Sodium POCT 145 135 - 145 mmol/L    pCO2 Arterial POCT 48 (H) 35 -  45 mm Hg    pH Arterial POCT 7.20 (L) 7.35 - 7.45    pO2 Arterial POCT 160 (H) 80 - 105 mm Hg    O2 Sat, Arterial POCT 99 92 - 100 %    Base Excess/Deficit (+/-) POCT -9.0 (L) -3.0 - 3.0 mmol/L   Prepare plasma (unit)   Result Value Ref Range    Blood Component Type Plasma     Product Code M8852O60     Unit Status Returned     Unit Number [id number]     CODING SYSTEM [id number]     ISSUE DATE AND TIME 20241028222300     UNIT ABO/RH A+     UNIT TYPE ISBT 6200    Prepare plasma (unit)   Result Value Ref Range    Blood Component Type Plasma     Product Code P7832H56     Unit Status Transfused     Unit Number [id number]     CODING SYSTEM [id number]     ISSUE DATE AND TIME [id number]     UNIT ABO/RH A+     UNIT TYPE ISBT 6200    Lactic acid whole blood   Result Value Ref Range    Lactic Acid 5.6 (HH) 0.7 - 2.0 mmol/L   CBC with platelets   Result Value Ref Range    WBC Count 67.6 (HH) 4.0 - 11.0 10e3/uL    RBC Count 2.58 (L) 4.40 - 5.90 10e6/uL    Hemoglobin 7.6 (L) 13.3 - 17.7 g/dL    Hematocrit 23.6 (L) 40.0 - 53.0 %    MCV 92 78 - 100 fL    MCH 29.5 26.5 - 33.0 pg    MCHC 32.2 31.5 - 36.5 g/dL    RDW 16.9 (H) 10.0 - 15.0 %    Platelet Count 273 150 - 450 10e3/uL   INR   Result Value Ref Range    INR 1.92 (H) 0.85 - 1.15   Fibrinogen activity   Result Value Ref Range    Fibrinogen Activity 197 170 - 510 mg/dL   Comprehensive metabolic panel   Result Value Ref Range    Sodium 148 (H) 135 - 145 mmol/L    Potassium 4.1 3.4 - 5.3 mmol/L    Carbon Dioxide (CO2) 22 22 - 29 mmol/L    Anion Gap 15 7 - 15 mmol/L    Urea Nitrogen 47.3 (H) 8.0 - 23.0 mg/dL    Creatinine 2.28 (H) 0.67 - 1.17 mg/dL    GFR Estimate 27 (L) >60 mL/min/1.73m2    Calcium 8.4 (L) 8.8 - 10.4 mg/dL    Chloride 111 (H) 98 - 107 mmol/L    Glucose 80 70 - 99 mg/dL    Alkaline Phosphatase 71 40 - 150 U/L     (H) 0 - 45 U/L     (H) 0 - 70 U/L    Protein Total 4.5 (L) 6.4 - 8.3 g/dL    Albumin 3.0 (L) 3.5 - 5.2 g/dL    Bilirubin Total 0.4 <=1.2  mg/dL   Blood gas venous   Result Value Ref Range    pH Venous 7.24 (L) 7.32 - 7.43    pCO2 Venous 47 40 - 50 mm Hg    pO2 Venous 78 (H) 25 - 47 mm Hg    Bicarbonate Venous 20 (L) 21 - 28 mmol/L    Base Excess/Deficit Venous -7.1 (L) -3.0 - 3.0 mmol/L    FIO2 40     Oxyhemoglobin Venous 92 (H) 70 - 75 %    O2 Sat, Venous 96.0 (H) 70.0 - 75.0 %    Narrative    In healthy individuals, oxyhemoglobin (O2Hb) and oxygen saturation (SO2) are approximately equal. In the presence of dyshemoglobins, oxyhemoglobin can be considerably lower than oxygen saturation.   Glucose by meter   Result Value Ref Range    GLUCOSE BY METER POCT 76 70 - 99 mg/dL   Prepare red blood cells (unit)   Result Value Ref Range    Blood Component Type Red Blood Cells     Product Code I0589F30     Unit Status Transfused     Unit Number [id number]     CROSSMATCH Compatible     CODING SYSTEM [id number]     ISSUE DATE AND TIME [id number]     UNIT ABO/RH A+     UNIT TYPE ISBT 6200    XR Abdomen Port 1 View    Narrative    EXAM: XR ABDOMEN PORT 1 VIEW  LOCATION: [facility]  DATE: 10/28/2024    INDICATION: Assess OG tube location s p OR  COMPARISON: Chest x-ray 10/28/2024      Impression    IMPRESSION: Enteric drainage tube extends below the diaphragm, side port below the GE junction, tip just off the field of view, at the level of the stomach. Additional tubes/devices redemonstrated.   XR Chest Port 1 View    Narrative    EXAM: XR CHEST PORT 1 VIEW  LOCATION: [facility]  DATE: 10/28/2024    INDICATION: ETT location s p OR  COMPARISON: 10/28/2024      Impression    IMPRESSION: Endotracheal tube tip terminates roughly 2 cm above the dayanna, though dayanna is somewhat indistinct on this exam. Enteric tube terminates below the diaphragm. Right IJ pulmonary artery catheter tip overlies the pulmonary outflow tract. Right   lung base partially excluded. Bibasilar atelectasis or airspace disease. Left chest  tube in place. No visible pneumothorax.   Prepare plasma (unit)   Result Value Ref Range    Blood Component Type Plasma     Product Code Q2093A96     Unit Status Transfused     Unit Number [id number]     CODING SYSTEM [id number]     ISSUE DATE AND TIME 20241029001200     UNIT ABO/RH A+     UNIT TYPE ISBT 6200    Prepare plasma (unit)   Result Value Ref Range    Blood Component Type Plasma     Product Code T6750F79     Unit Status Transfused     Unit Number [id number]     CODING SYSTEM [id number]     ISSUE DATE AND TIME 20241029001200     UNIT ABO/RH A+     UNIT TYPE ISBT 6200    Blood gas venous   Result Value Ref Range    pH Venous 7.22 (L) 7.32 - 7.43    pCO2 Venous 56 (H) 40 - 50 mm Hg    pO2 Venous 25 25 - 47 mm Hg    Bicarbonate Venous 23 21 - 28 mmol/L    Base Excess/Deficit Venous -4.9 (L) -3.0 - 3.0 mmol/L    FIO2 50     Oxyhemoglobin Venous 41 (L) 70 - 75 %    O2 Sat, Venous 42.4 (L) 70.0 - 75.0 %    Narrative    In healthy individuals, oxyhemoglobin (O2Hb) and oxygen saturation (SO2) are approximately equal. In the presence of dyshemoglobins, oxyhemoglobin can be considerably lower than oxygen saturation.   EKG 12-lead, tracing only   Result Value Ref Range    Systolic Blood Pressure  mmHg    Diastolic Blood Pressure  mmHg    Ventricular Rate 128 BPM    Atrial Rate 128 BPM    OR Interval 158 ms    QRS Duration 84 ms     ms    QTc 511 ms    P Axis 75 degrees    R AXIS 70 degrees    T Axis 263 degrees    Interpretation ECG       Sinus tachycardia  Inferior infarct (cited on or before 09-Sep-2024)  ST & T wave abnormality, consider anterolateral ischemia  Abnormal ECG  When compared with ECG of 28-Oct-2024 14:36,  Previous ECG has undetermined rhythm, needs review     Glucose by meter   Result Value Ref Range    GLUCOSE BY METER POCT 99 70 - 99 mg/dL   Lactic acid whole blood   Result Value Ref Range    Lactic Acid 4.6 (HH) 0.7 - 2.0 mmol/L   CBC with platelets   Result Value Ref Range    WBC Count 61.8  (HH) 4.0 - 11.0 10e3/uL    RBC Count 2.66 (L) 4.40 - 5.90 10e6/uL    Hemoglobin 7.9 (L) 13.3 - 17.7 g/dL    Hematocrit 24.1 (L) 40.0 - 53.0 %    MCV 91 78 - 100 fL    MCH 29.7 26.5 - 33.0 pg    MCHC 32.8 31.5 - 36.5 g/dL    RDW 17.0 (H) 10.0 - 15.0 %    Platelet Count 268 150 - 450 10e3/uL   INR   Result Value Ref Range    INR 1.70 (H) 0.85 - 1.15   Fibrinogen activity   Result Value Ref Range    Fibrinogen Activity 229 170 - 510 mg/dL   Partial thromboplastin time   Result Value Ref Range    aPTT 39 (H) 22 - 38 Seconds   Blood gas arterial   Result Value Ref Range    pH Arterial 7.29 (L) 7.35 - 7.45    pCO2 Arterial 42 35 - 45 mm Hg    pO2 Arterial 140 (H) 80 - 105 mm Hg    FIO2 50     Bicarbonate Arterial 20 (L) 21 - 28 mmol/L    Base Excess/Deficit Arterial -5.8 (L) -3.0 - 3.0 mmol/L    Eliecer's Test Artline     Oxyhemoglobin Arterial 97 92 - 100 %    O2 Sat, Arterial 100.0 (H) 95.0 - 96.0 %    Peep 5 cm H2O    Narrative    In healthy individuals, oxyhemoglobin (O2Hb) and oxygen saturation (SO2) are approximately equal. In the presence of dyshemoglobins, oxyhemoglobin can be considerably lower than oxygen saturation.   Glucose by meter   Result Value Ref Range    GLUCOSE BY METER POCT 122 (H) 70 - 99 mg/dL   Lactic acid whole blood   Result Value Ref Range    Lactic Acid 5.0 (HH) 0.7 - 2.0 mmol/L   Glucose by meter   Result Value Ref Range    GLUCOSE BY METER POCT 108 (H) 70 - 99 mg/dL   Glucose by meter   Result Value Ref Range    GLUCOSE BY METER POCT 120 (H) 70 - 99 mg/dL   Magnesium   Result Value Ref Range    Magnesium 2.5 (H) 1.7 - 2.3 mg/dL   Ionized Calcium   Result Value Ref Range    Calcium Ionized Whole Blood 4.5 4.4 - 5.2 mg/dL   Phosphorus   Result Value Ref Range    Phosphorus 7.8 (H) 2.5 - 4.5 mg/dL   CBC with platelets   Result Value Ref Range    WBC Count 62.7 (HH) 4.0 - 11.0 10e3/uL    RBC Count 2.83 (L) 4.40 - 5.90 10e6/uL    Hemoglobin 8.3 (L) 13.3 - 17.7 g/dL    Hematocrit 25.5 (L) 40.0 - 53.0  %    MCV 90 78 - 100 fL    MCH 29.3 26.5 - 33.0 pg    MCHC 32.5 31.5 - 36.5 g/dL    RDW 17.2 (H) 10.0 - 15.0 %    Platelet Count 309 150 - 450 10e3/uL   INR   Result Value Ref Range    INR 1.73 (H) 0.85 - 1.15   Fibrinogen activity   Result Value Ref Range    Fibrinogen Activity 250 170 - 510 mg/dL   Partial thromboplastin time   Result Value Ref Range    aPTT 36 22 - 38 Seconds   Potassium   Result Value Ref Range    Potassium 4.9 3.4 - 5.3 mmol/L   Comprehensive metabolic panel   Result Value Ref Range    Sodium 148 (H) 135 - 145 mmol/L    Potassium 4.9 3.4 - 5.3 mmol/L    Carbon Dioxide (CO2) 19 (L) 22 - 29 mmol/L    Anion Gap 20 (H) 7 - 15 mmol/L    Urea Nitrogen 54.5 (H) 8.0 - 23.0 mg/dL    Creatinine 2.50 (H) 0.67 - 1.17 mg/dL    GFR Estimate 24 (L) >60 mL/min/1.73m2    Calcium 8.3 (L) 8.8 - 10.4 mg/dL    Chloride 109 (H) 98 - 107 mmol/L    Glucose 123 (H) 70 - 99 mg/dL    Alkaline Phosphatase 69 40 - 150 U/L     (H) 0 - 45 U/L     (H) 0 - 70 U/L    Protein Total 5.2 (L) 6.4 - 8.3 g/dL    Albumin 3.3 (L) 3.5 - 5.2 g/dL    Bilirubin Total 0.3 <=1.2 mg/dL   WBC and differential    Narrative    The following orders were created for panel order WBC and differential.  Procedure                               Abnormality         Status                     ---------                               -----------         ------                     WBC and Differential[[id number]]         Abnormal            Final result                 Please view results for these tests on the individual orders.   WBC and Differential   Result Value Ref Range    WBC Count 62.7 (HH) 4.0 - 11.0 10e3/uL   Manual Differential   Result Value Ref Range    % Neutrophils 77 %    % Lymphocytes 8 %    % Monocytes 4 %    % Eosinophils 1 %    % Basophils 2 %    % Metamyelocytes 4 %    % Myelocytes 6 %    Absolute Neutrophils 48.0 (H) 1.6 - 8.3 10e3/uL    Absolute Lymphocytes 5.0 0.8 - 5.3 10e3/uL    Absolute Monocytes 2.2 (H) 0.0 - 1.3  10e3/uL    Absolute Eosinophils 0.3 0.0 - 0.7 10e3/uL    Absolute Basophils 1.3 (H) 0.0 - 0.2 10e3/uL    Absolute Metamyelocytes 2.2 (H) <=0.0 10e3/uL    Absolute Myelocytes 3.8 (H) <=0.0 10e3/uL    RBC Morphology Confirmed RBC Indices     Platelet Assessment  Automated Count Confirmed. Platelet morphology is normal.     Automated Count Confirmed. Platelet morphology is normal.    Elliptocytes Slight (A) None Seen    Smudge Cells Present (A) None Seen    NRBCs per 100 WBC 7 %    Absolute NRBCs 4.4 10e3/uL    Narrative    Differential done on Albumin treated blood due to Smudge Cells.    Sent for Review by Pathologist. Review comments will be entered. Results will be updated after review as applicable.     Lactic acid whole blood   Result Value Ref Range    Lactic Acid 5.3 (HH) 0.7 - 2.0 mmol/L   EKG 12-lead, tracing only   Result Value Ref Range    Systolic Blood Pressure  mmHg    Diastolic Blood Pressure  mmHg    Ventricular Rate 121 BPM    Atrial Rate 121 BPM    OH Interval 186 ms    QRS Duration 78 ms     ms    QTc 497 ms    P Axis 90 degrees    R AXIS 62 degrees    T Axis 259 degrees    Interpretation ECG       Sinus tachycardia  Inferior infarct (cited on or before 09-Sep-2024)  T wave abnormality, consider anterolateral ischemia  Abnormal ECG  When compared with ECG of 29-Oct-2024 00:50, (unconfirmed)  Serial changes of Inferior infarct Present     Blood gas arterial   Result Value Ref Range    pH Arterial 7.28 (L) 7.35 - 7.45    pCO2 Arterial 41 35 - 45 mm Hg    pO2 Arterial 100 80 - 105 mm Hg    FIO2 40     Bicarbonate Arterial 19 (L) 21 - 28 mmol/L    Base Excess/Deficit Arterial -7.2 (L) -3.0 - 3.0 mmol/L    Eliecer's Test Artline     Oxyhemoglobin Arterial 95 92 - 100 %    O2 Sat, Arterial 97.8 (H) 95.0 - 96.0 %    Narrative    In healthy individuals, oxyhemoglobin (O2Hb) and oxygen saturation (SO2) are approximately equal. In the presence of dyshemoglobins, oxyhemoglobin can be considerably lower  than oxygen saturation.   Lactic acid whole blood   Result Value Ref Range    Lactic Acid 5.2 (HH) 0.7 - 2.0 mmol/L   Glucose by meter   Result Value Ref Range    GLUCOSE BY METER POCT 92 70 - 99 mg/dL   XR Chest Port 1 View    Narrative    XR CHEST PORT 1 VIEW  10/29/2024 9:50 AM       INDICATION: chest tubes, ett, post cardiac surgery with bleeding,  interval change  COMPARISON: Chest x-ray 10/28/2024       Impression    IMPRESSION: Endotracheal tube with its tip above the level of the  aortic arch, approximately 6 cm superior to the dayanna. Partially  visualized enteric tube. Right internal jugular Norwood-Garrett catheter  with the tip in the region of the main pulmonary artery. Stable left  atrial appendage surgical clip. Sternotomy. Large bore bilateral chest  tubes. Cardiac silhouette size is unchanged. Indistinct visualization  of the bilateral hemidiaphragms likely due to layering, small pleural  effusions. Subsegmental atelectasis versus linear fibrosis of the mid  aspect of the bilateral lungs.    ELLIOTT ANDREW MD         SYSTEM ID:  [id number]   Lactic acid whole blood   Result Value Ref Range    Lactic Acid 5.7 (HH) 0.7 - 2.0 mmol/L   INR   Result Value Ref Range    INR 1.74 (H) 0.85 - 1.15   Fibrinogen activity   Result Value Ref Range    Fibrinogen Activity 247 170 - 510 mg/dL   Partial thromboplastin time   Result Value Ref Range    aPTT 34 22 - 38 Seconds   Glucose by meter   Result Value Ref Range    GLUCOSE BY METER POCT 104 (H) 70 - 99 mg/dL   Blood gas arterial   Result Value Ref Range    pH Arterial 7.25 (L) 7.35 - 7.45    pCO2 Arterial 39 35 - 45 mm Hg    pO2 Arterial 106 (H) 80 - 105 mm Hg    FIO2 40     Bicarbonate Arterial 17 (L) 21 - 28 mmol/L    Base Excess/Deficit Arterial -9.3 (L) -3.0 - 3.0 mmol/L    Eliecer's Test Artline     Oxyhemoglobin Arterial 95 92 - 100 %    O2 Sat, Arterial 98.4 (H) 95.0 - 96.0 %    Narrative    In healthy individuals, oxyhemoglobin (O2Hb) and oxygen saturation (SO2)  are approximately equal. In the presence of dyshemoglobins, oxyhemoglobin can be considerably lower than oxygen saturation.   EKG 12-lead, tracing only   Result Value Ref Range    Systolic Blood Pressure  mmHg    Diastolic Blood Pressure  mmHg    Ventricular Rate 68 BPM    Atrial Rate 68 BPM    NH Interval  ms    QRS Duration 80 ms     ms    QTc 435 ms    P Axis  degrees    R AXIS 71 degrees    T Axis 37 degrees    Interpretation ECG       Undetermined rhythm  T wave abnormality, consider anterolateral ischemia  Abnormal ECG  When compared with ECG of 29-Oct-2024 06:56, (unconfirmed)  Current undetermined rhythm precludes rhythm comparison, needs review  Criteria for Inferior infarct are no longer Present  ST elevation now present in Inferior leads     Blood gas venous   Result Value Ref Range    pH Venous 7.19 (LL) 7.32 - 7.43    pCO2 Venous 45 40 - 50 mm Hg    pO2 Venous 36 25 - 47 mm Hg    Bicarbonate Venous 17 (L) 21 - 28 mmol/L    Base Excess/Deficit Venous -10.5 (L) -3.0 - 3.0 mmol/L    FIO2 40     Oxyhemoglobin Venous 56 (L) 70 - 75 %    O2 Sat, Venous 58.4 (L) 70.0 - 75.0 %    Narrative    In healthy individuals, oxyhemoglobin (O2Hb) and oxygen saturation (SO2) are approximately equal. In the presence of dyshemoglobins, oxyhemoglobin can be considerably lower than oxygen saturation.   Lactic acid whole blood   Result Value Ref Range    Lactic Acid 5.9 (HH) 0.7 - 2.0 mmol/L   Blood gas arterial   Result Value Ref Range    pH Arterial 7.31 (L) 7.35 - 7.45    pCO2 Arterial 41 35 - 45 mm Hg    pO2 Arterial 123 (H) 80 - 105 mm Hg    FIO2 30     Bicarbonate Arterial 21 21 - 28 mmol/L    Base Excess/Deficit Arterial -5.2 (L) -3.0 - 3.0 mmol/L    Eliecer's Test Artline     Oxyhemoglobin Arterial 96 92 - 100 %    O2 Sat, Arterial 99.0 (H) 95.0 - 96.0 %    Narrative    In healthy individuals, oxyhemoglobin (O2Hb) and oxygen saturation (SO2) are approximately equal. In the presence of dyshemoglobins, oxyhemoglobin  can be considerably lower than oxygen saturation.   Glucose by meter   Result Value Ref Range    GLUCOSE BY METER POCT 106 (H) 70 - 99 mg/dL   Hemoglobin   Result Value Ref Range    Hemoglobin 8.1 (L) 13.3 - 17.7 g/dL   Lactic acid whole blood   Result Value Ref Range    Lactic Acid 6.3 (HH) 0.7 - 2.0 mmol/L   Prepare red blood cells (unit)   Result Value Ref Range    Blood Component Type Red Blood Cells     Product Code U7577N79     Unit Status Issued     Unit Number [id number]     CROSSMATCH Compatible     CODING SYSTEM [id number]     ISSUE DATE AND TIME [id number]     UNIT ABO/RH A+     UNIT TYPE ISBT 6200    Glucose by meter   Result Value Ref Range    GLUCOSE BY METER POCT 86 70 - 99 mg/dL     *Note: Due to a large number of results and/or encounters for the requested time period, some results have not been displayed. A complete set of results can be found in Results Review.

## 2024-10-29 NOTE — PROGRESS NOTES
[facility] ICU RESPIRATORY NOTE        Date of Admission: 10/28/2024    Date of Intubation (most recent):  10/28/24    Reason for Mechanical Ventilation: Heart surgery    Number of Days on Mechanical Ventilation: 2    Met Criteria for Spontaneous Breathing Trial: No    Reason for No Spontaneous Breathing Trial: Per MD    Significant Events Today: None    ABG Results:   Recent Labs   Lab 10/29/24  1633 10/29/24  1221 10/29/24  1116 10/29/24  1047 10/29/24  0731 10/29/24  0205   PH  --  7.31*  --  7.25* 7.28* 7.29*   PCO2  --  41  --  39 41 42   PO2  --  123*  --  106* 100 140*   HCO3  --  21  --  17* 19* 20*   O2PER 30 30 40 40 40 50         Current Vent Settings: FiO2 (%): 30 %, Resp: 19, Vent Mode: CMV/AC, Resp Rate (Set): 18 breaths/min, Tidal Volume (Set, mL): 500 mL, PEEP (cm H2O): 5 cmH2O, Resp Rate (Set): 18 breaths/min, Tidal Volume (Set, mL): 500 mL, PEEP (cm H2O): 5 cmH2O    Skin Assessment: Intact    Plan: Pt to remain on full vent support overnight    Rui Alegre, RT

## 2024-10-29 NOTE — PROGRESS NOTES
ICU Progress Note   Date of Service: 10/29/24     Assessment and Plan:  88 year old M with a history of severe 3 vessel CAD. Status post sternotomy, CABGx3, modified MAZE procedure, PAYAM ligation with Dr. Mulvihill 10/28/24. Post operative hemorrhage with RTOR on POD0 for evacuation of mediastinal blood clot causing tamponade physiology.     Interval events:   Post op : 2 U FFP and 1 U PRBC   UOP around 40 ml q1 hour   Post op chest tube output - 300 ml left pleural and 200 ml mediastinal.   0.07 epi   0.06 levophed   2.4 vasopressin   Off angiotensin   Deeply sedated on propofol     Neuro:  # Sedation for mechanical ventilation  Propofol, wean as able   RASS goal 0 to -1    Pain control: Tylenol, oxycodone, dilaudid     CV:  #Hx of atrial fibrillation and distant PE on Eliquis   #Hx NSTEMI   #Severe multivessel CAD   Status post sternotomy, CABGx3, modified MAZE procedure, PAYAM ligation with Dr. Mulvihill 10/28/24. Post operative hemorrhage with RTOR on POD0 for evacuation of mediastinal blood clot causing tamponade physiology.     Pre-operative workup:   Angiogram showed an occluded RCA, ost LAD to mid LAD of 80% and mid circ to distal circ with 99%. Echocardiogram showed an EF of 35-40%. The RV systolic function was normal. There was also mild mitral regurg.    Plan:   SBP goal <140   MAP goal >65. Wean pressors as able.   Cardiac meds per CVTS  Aspirin 162 mg   PTA atorvastatin - resume per CVTS   PTA torsemide - hold     Pulm:  # Acute hypoxemic respiratory failure  - low tidal volume ventilation  - wean PEEP/FiO2, as able    AM CXR pending   AM gas with metabolic acidosis - will give LR bolus     GI:  #Hx upper GIB, gastritis   Recent admission discharged 9/16/2024. Endoscopy revealing gastritis.   PTA pantoprazole     Start tube feeds when appropriate   Bowel regimen     Renal:  Pre operative cr 1.8   Now 2.28     #Acute kidney injury, likely hypovolemic/pre-renal   #Lactic acidosis   Resuscitate - LR bolus now  "  Trend q 4 hours     #Hypermagnesemia   #Hyperphosphatemia   In setting of JAVIER, trend     #Hypocalcemia  Replacement     ID:  Perioperative ancef     Heme:  Heparin ppx   Hgb 8.3  Plt 309  INR 1.73    #Hx CLL   Follows with Dr. Raymundo hematology   Follow leukocytosis     MSK:   #Hx Myasthenia gravis     Endo:  No history of DM   A1C 4.5   Insulin gtt     PPx:  1. DVT: heparin    2. VAP: HOB 30 degrees, chlorhexidine rinse  3. Stress Ulcer: PPI  4. Restraints: Nonviolent soft two point restraints required and necessary for patient safety and continued cares and good effect as patient continues to pull at necessary lines, tubes despite education and distraction. Will readdress daily.   5. Wound care - per unit routine   6. Feeding - discuss with CVTS  7. Family updated by operative team     Dispo: ICU    BP 90/62 (BP Location: Left arm)   Pulse 108   Temp (!) 100.8  F (38.2  C)   Resp 19   Ht 1.778 m (5' 10\")   Wt 100.3 kg (221 lb 1.9 oz)   SpO2 99%   BMI 31.73 kg/m      FiO2 (%): 40 %, Resp: 19, Vent Mode: CMV/AC, Resp Rate (Set): 18 breaths/min, Tidal Volume (Set, mL): 500 mL, PEEP (cm H2O): 5 cmH2O, Resp Rate (Set): 18 breaths/min, Tidal Volume (Set, mL): 500 mL, PEEP (cm H2O): 5 cmH2O      I/Os  +8100      Physical Exam:  In bed   Sedated, intubated   Pupils 2 mm equal and reactive to light   Chest rise equal bilaterally   CT with thin bloody output, no air leak, to suction   Amos with purple urine (cyanokit)   Abdomen soft, ND   Feet cool to the touch. Bilateral PT and DP with multiphasic doppler signals.   Lower extremities no edema     Labs: reviewed  Hgb 8.3  Plt 309  INR 1.73  LA 5.2   Ionized calcium 4.5   Mag 2.5  Phos 7.8   K 4.9   Na 148   Cr 2.28   BUN 47     Imaging: reviewed  AM CXR pending     Past Medical/Surgical history   Atrial fibrillation   Gout   GERD   Gastritis   Bilateral PE in 2007   Myasthenia gravis   HTN   Meningioma   MARTHA   Spinal stenosis     Family history   Not discussed " today     Social history   Not discussed today     Carla Dubois MD   SICU Fellow

## 2024-10-29 NOTE — PLAN OF CARE
Goal Outcome Evaluation:      Plan of Care Reviewed With: patient, spouse, child           Patient left intubated due to increasing lactic acid levels. Propofol turned off this evening, attempting to open eyes, not following commands. Remains on epi, levo, and vaso to keep MAP>65 and CI>2. Amio started today, pacer turned off after conversion of rhythm causing hypotension. Cyanokit given today along with 1 L LR, 500 albumin. CT output stable. UO 20-30 qhr. Family at bedside throughout the day.

## 2024-10-29 NOTE — ANESTHESIA POSTPROCEDURE EVALUATION
Patient: Alessio Teixeira    Procedure: Procedure(s):  Exploration of Chest for Post-Surgical Hemorrhage       Anesthesia Type:  General    Note:  Disposition: Inpatient; ICU            ICU Sign Out: Anesthesiologist/ICU physician sign out WAS performed   Postop Pain Control: Uneventful            Sign Out: Well controlled pain   PONV: No   Neuro/Psych: Uneventful            Sign Out: PLANNED postop sedation   Airway/Respiratory: Uneventful            Sign Out: AIRWAY IN SITU/Resp. Support               Airway in situ/Resp. Support: ETT                 Reason: Planned Pre-op   CV/Hemodynamics: Uneventful            Sign Out: Acceptable CV status; No obvious hypovolemia; No obvious fluid overload   Other NRE: NONE   DID A NON-ROUTINE EVENT OCCUR? No           Last vitals:  Vitals:    10/29/24 0130 10/29/24 0145 10/29/24 0149   BP:      Pulse: (!) 126 118 (!) 125   Resp:      Temp: 38  C (100.4  F) 38  C (100.4  F) 37.9  C (100.2  F)   SpO2: 91% 100% 98%       Electronically Signed By: Mesfin Sanz DO  October 29, 2024  2:03 AM

## 2024-10-29 NOTE — BRIEF OP NOTE
[facility]    Brief Operative Note    Pre-operative diagnosis: Bleeding [R58]  Post-operative diagnosis Same as pre-operative diagnosis    Procedure: Exploration of Chest for Post-Surgical Hemorrhage, N/A - Heart    Surgeon: Surgeons and Role:     * Mulvihill, Michael, MD - Primary     * Clarence Bone MD - Fellow - Assisting  Anesthesia: General   Estimated Blood Loss: 500 ml    Drains: One left pleural, one right pleural, two mediastinal tubes  Specimens:   ID Type Source Tests Collected by Time Destination   A : stat post cbp, bringback labs Blood Line, arterial CBC WITH PLATELETS, BASIC METABOLIC PANEL, FIBRINOGEN ACTIVITY, PARTIAL THROMBOPLASTIN TIME, INR Mulvihill, Michael, MD 10/28/2024  8:03 PM    B : STAT- post CABG bring-back labs Blood Line, arterial CBC WITH PLATELETS, BASIC METABOLIC PANEL, FIBRINOGEN ACTIVITY, LACTIC ACID WHOLE BLOOD, PARTIAL THROMBOPLASTIN TIME, INR Nunu Aguilar APRN CRNA 10/28/2024  9:25 PM      Findings:   Large mediastinal blood clot causing cardiac tamponade. Generalized coagulopathic bleeding from raw surfaces. Mediastinal washout.  .  Complications: None.  Implants: * No implants in log *

## 2024-10-30 ENCOUNTER — APPOINTMENT (OUTPATIENT)
Dept: CARDIOLOGY | Facility: CLINIC | Age: 89
End: 2024-10-30
Attending: PHYSICIAN ASSISTANT
Payer: MEDICARE

## 2024-10-30 ENCOUNTER — APPOINTMENT (OUTPATIENT)
Dept: GENERAL RADIOLOGY | Facility: CLINIC | Age: 89
DRG: 233 | End: 2024-10-30
Attending: STUDENT IN AN ORGANIZED HEALTH CARE EDUCATION/TRAINING PROGRAM
Payer: MEDICARE

## 2024-10-30 ENCOUNTER — APPOINTMENT (OUTPATIENT)
Dept: ULTRASOUND IMAGING | Facility: CLINIC | Age: 89
DRG: 233 | End: 2024-10-30
Payer: MEDICARE

## 2024-10-30 LAB
ALBUMIN SERPL BCG-MCNC: 3 G/DL (ref 3.5–5.2)
ALBUMIN SERPL BCG-MCNC: 3.4 G/DL (ref 3.5–5.2)
ALBUMIN UR-MCNC: ABNORMAL G/DL
ALLEN'S TEST: ABNORMAL
ALP SERPL-CCNC: 88 U/L (ref 40–150)
ALP SERPL-CCNC: 90 U/L (ref 40–150)
ALP SERPL-CCNC: 96 U/L (ref 40–150)
ALT SERPL W P-5'-P-CCNC: 1484 U/L (ref 0–70)
ALT SERPL W P-5'-P-CCNC: 1850 U/L (ref 0–70)
ALT SERPL W P-5'-P-CCNC: 2488 U/L (ref 0–70)
ANION GAP SERPL CALCULATED.3IONS-SCNC: 23 MMOL/L (ref 7–15)
ANION GAP SERPL CALCULATED.3IONS-SCNC: 24 MMOL/L (ref 7–15)
ANION GAP SERPL CALCULATED.3IONS-SCNC: 25 MMOL/L (ref 7–15)
ANION GAP SERPL CALCULATED.3IONS-SCNC: 25 MMOL/L (ref 7–15)
ANION GAP SERPL CALCULATED.3IONS-SCNC: 26 MMOL/L (ref 7–15)
APPEARANCE UR: ABNORMAL
AST SERPL W P-5'-P-CCNC: 6484 U/L (ref 0–45)
AST SERPL W P-5'-P-CCNC: ABNORMAL U/L
AST SERPL W P-5'-P-CCNC: ABNORMAL U/L
ATRIAL RATE - MUSE: 106 BPM
ATRIAL RATE - MUSE: 108 BPM
ATRIAL RATE - MUSE: 121 BPM
ATRIAL RATE - MUSE: 128 BPM
ATRIAL RATE - MUSE: 45 BPM
ATRIAL RATE - MUSE: 68 BPM
BASE EXCESS BLDA CALC-SCNC: -10.6 MMOL/L (ref -3–3)
BASE EXCESS BLDA CALC-SCNC: -10.9 MMOL/L (ref -3–3)
BASE EXCESS BLDA CALC-SCNC: -11.9 MMOL/L (ref -3–3)
BASE EXCESS BLDA CALC-SCNC: -7.6 MMOL/L (ref -3–3)
BASE EXCESS BLDA CALC-SCNC: -8.3 MMOL/L (ref -3–3)
BASE EXCESS BLDA CALC-SCNC: -8.3 MMOL/L (ref -3–3)
BASE EXCESS BLDA CALC-SCNC: -8.5 MMOL/L (ref -3–3)
BASE EXCESS BLDV CALC-SCNC: -10.4 MMOL/L (ref -3–3)
BASE EXCESS BLDV CALC-SCNC: -11.1 MMOL/L (ref -3–3)
BASE EXCESS BLDV CALC-SCNC: -6.5 MMOL/L (ref -3–3)
BASE EXCESS BLDV CALC-SCNC: -7 MMOL/L (ref -3–3)
BASE EXCESS BLDV CALC-SCNC: -9.9 MMOL/L (ref -3–3)
BASOPHILS # BLD MANUAL: 1.3 10E3/UL (ref 0–0.2)
BASOPHILS NFR BLD MANUAL: 2 %
BI-PLANE LVEF ECHO: NORMAL
BILIRUB DIRECT SERPL-MCNC: 0.56 MG/DL (ref 0–0.3)
BILIRUB DIRECT SERPL-MCNC: ABNORMAL MG/DL
BILIRUB SERPL-MCNC: 0.8 MG/DL
BILIRUB SERPL-MCNC: 0.9 MG/DL
BILIRUB SERPL-MCNC: 0.9 MG/DL
BILIRUB UR QL STRIP: ABNORMAL
BUN SERPL-MCNC: 53.5 MG/DL (ref 8–23)
BUN SERPL-MCNC: 55.9 MG/DL (ref 8–23)
BUN SERPL-MCNC: 64.4 MG/DL (ref 8–23)
BUN SERPL-MCNC: 65.3 MG/DL (ref 8–23)
BUN SERPL-MCNC: 65.8 MG/DL (ref 8–23)
BURR CELLS BLD QL SMEAR: ABNORMAL
CA-I BLD-MCNC: 3.7 MG/DL (ref 4.4–5.2)
CA-I BLD-MCNC: 4 MG/DL (ref 4.4–5.2)
CA-I BLD-MCNC: 4.1 MG/DL (ref 4.4–5.2)
CALCIUM SERPL-MCNC: 7.1 MG/DL (ref 8.8–10.4)
CALCIUM SERPL-MCNC: 7.3 MG/DL (ref 8.8–10.4)
CALCIUM SERPL-MCNC: 7.6 MG/DL (ref 8.8–10.4)
CALCIUM SERPL-MCNC: 7.7 MG/DL (ref 8.8–10.4)
CALCIUM SERPL-MCNC: 7.9 MG/DL (ref 8.8–10.4)
CHLORIDE SERPL-SCNC: 101 MMOL/L (ref 98–107)
CHLORIDE SERPL-SCNC: 103 MMOL/L (ref 98–107)
CHLORIDE SERPL-SCNC: 105 MMOL/L (ref 98–107)
CHLORIDE SERPL-SCNC: 106 MMOL/L (ref 98–107)
CHLORIDE SERPL-SCNC: 107 MMOL/L (ref 98–107)
CK SERPL-CCNC: 386 U/L (ref 39–308)
COHGB MFR BLD: 100 % (ref 95–96)
COHGB MFR BLD: 96.9 % (ref 95–96)
COHGB MFR BLD: 97.5 % (ref 95–96)
COHGB MFR BLD: 98.3 % (ref 95–96)
COHGB MFR BLD: 99 % (ref 95–96)
COHGB MFR BLD: 99.7 % (ref 95–96)
COHGB MFR BLD: >100 % (ref 95–96)
COLOR UR AUTO: ABNORMAL
CREAT SERPL-MCNC: 3.16 MG/DL (ref 0.67–1.17)
CREAT SERPL-MCNC: 3.28 MG/DL (ref 0.67–1.17)
CREAT SERPL-MCNC: 3.76 MG/DL (ref 0.67–1.17)
CREAT SERPL-MCNC: 3.89 MG/DL (ref 0.67–1.17)
CREAT SERPL-MCNC: 4.07 MG/DL (ref 0.67–1.17)
DIASTOLIC BLOOD PRESSURE - MUSE: NORMAL MMHG
EGFRCR SERPLBLD CKD-EPI 2021: 14 ML/MIN/1.73M2
EGFRCR SERPLBLD CKD-EPI 2021: 14 ML/MIN/1.73M2
EGFRCR SERPLBLD CKD-EPI 2021: 15 ML/MIN/1.73M2
EGFRCR SERPLBLD CKD-EPI 2021: 18 ML/MIN/1.73M2
EGFRCR SERPLBLD CKD-EPI 2021: 18 ML/MIN/1.73M2
ELLIPTOCYTES BLD QL SMEAR: SLIGHT
EOSINOPHIL # BLD MANUAL: 0.3 10E3/UL (ref 0–0.7)
EOSINOPHIL NFR BLD MANUAL: 1 %
ERYTHROCYTE [DISTWIDTH] IN BLOOD BY AUTOMATED COUNT: 17.9 % (ref 10–15)
GLUCOSE BLDC GLUCOMTR-MCNC: 101 MG/DL (ref 70–99)
GLUCOSE BLDC GLUCOMTR-MCNC: 104 MG/DL (ref 70–99)
GLUCOSE BLDC GLUCOMTR-MCNC: 107 MG/DL (ref 70–99)
GLUCOSE BLDC GLUCOMTR-MCNC: 138 MG/DL (ref 70–99)
GLUCOSE BLDC GLUCOMTR-MCNC: 145 MG/DL (ref 70–99)
GLUCOSE BLDC GLUCOMTR-MCNC: 152 MG/DL (ref 70–99)
GLUCOSE BLDC GLUCOMTR-MCNC: 158 MG/DL (ref 70–99)
GLUCOSE BLDC GLUCOMTR-MCNC: 160 MG/DL (ref 70–99)
GLUCOSE BLDC GLUCOMTR-MCNC: 160 MG/DL (ref 70–99)
GLUCOSE BLDC GLUCOMTR-MCNC: 184 MG/DL (ref 70–99)
GLUCOSE SERPL-MCNC: 106 MG/DL (ref 70–99)
GLUCOSE SERPL-MCNC: 146 MG/DL (ref 70–99)
GLUCOSE SERPL-MCNC: 154 MG/DL (ref 70–99)
GLUCOSE SERPL-MCNC: 178 MG/DL (ref 70–99)
GLUCOSE SERPL-MCNC: 179 MG/DL (ref 70–99)
GLUCOSE UR STRIP-MCNC: ABNORMAL MG/DL
HCO3 BLD-SCNC: 15 MMOL/L (ref 21–28)
HCO3 BLD-SCNC: 15 MMOL/L (ref 21–28)
HCO3 BLD-SCNC: 16 MMOL/L (ref 21–28)
HCO3 BLD-SCNC: 17 MMOL/L (ref 21–28)
HCO3 BLD-SCNC: 18 MMOL/L (ref 21–28)
HCO3 BLDV-SCNC: 16 MMOL/L (ref 21–28)
HCO3 BLDV-SCNC: 17 MMOL/L (ref 21–28)
HCO3 BLDV-SCNC: 18 MMOL/L (ref 21–28)
HCO3 BLDV-SCNC: 20 MMOL/L (ref 21–28)
HCO3 BLDV-SCNC: 21 MMOL/L (ref 21–28)
HCO3 SERPL-SCNC: 14 MMOL/L (ref 22–29)
HCO3 SERPL-SCNC: 16 MMOL/L (ref 22–29)
HCO3 SERPL-SCNC: 19 MMOL/L (ref 22–29)
HCT VFR BLD AUTO: 30.4 % (ref 40–53)
HGB BLD-MCNC: 9.7 G/DL (ref 13.3–17.7)
HGB BLD-MCNC: 9.9 G/DL (ref 13.3–17.7)
HGB UR QL STRIP: ABNORMAL
INR PPP: 3.2 (ref 0.85–1.15)
INTERPRETATION ECG - MUSE: NORMAL
KETONES UR STRIP-MCNC: ABNORMAL MG/DL
LACTATE SERPL-SCNC: 5.8 MMOL/L (ref 0.7–2)
LACTATE SERPL-SCNC: 7 MMOL/L (ref 0.7–2)
LACTATE SERPL-SCNC: 7.1 MMOL/L (ref 0.7–2)
LACTATE SERPL-SCNC: 8.3 MMOL/L (ref 0.7–2)
LACTATE SERPL-SCNC: 8.8 MMOL/L (ref 0.7–2)
LACTATE SERPL-SCNC: 8.8 MMOL/L (ref 0.7–2)
LACTATE SERPL-SCNC: 8.9 MMOL/L (ref 0.7–2)
LEUKOCYTE ESTERASE UR QL STRIP: ABNORMAL
LYMPHOCYTES # BLD MANUAL: 5 10E3/UL (ref 0.8–5.3)
LYMPHOCYTES NFR BLD MANUAL: 8 %
MAGNESIUM SERPL-MCNC: 2.2 MG/DL (ref 1.7–2.3)
MAGNESIUM SERPL-MCNC: 2.4 MG/DL (ref 1.7–2.3)
MAGNESIUM SERPL-MCNC: 2.5 MG/DL (ref 1.7–2.3)
MCH RBC QN AUTO: 29.9 PG (ref 26.5–33)
MCHC RBC AUTO-ENTMCNC: 32.6 G/DL (ref 31.5–36.5)
MCV RBC AUTO: 92 FL (ref 78–100)
METAMYELOCYTES # BLD MANUAL: 2.2 10E3/UL
METAMYELOCYTES NFR BLD MANUAL: 4 %
MONOCYTES # BLD MANUAL: 2.2 10E3/UL (ref 0–1.3)
MONOCYTES NFR BLD MANUAL: 4 %
MUCOUS THREADS #/AREA URNS LPF: PRESENT /LPF
MYELOCYTES # BLD MANUAL: 3.8 10E3/UL
MYELOCYTES NFR BLD MANUAL: 6 %
NEUTROPHILS # BLD MANUAL: 48 10E3/UL (ref 1.6–8.3)
NEUTROPHILS NFR BLD MANUAL: 77 %
NITRATE UR QL: ABNORMAL
NRBC # BLD AUTO: 4.4 10E3/UL
NRBC BLD MANUAL-RTO: 7 %
O2/TOTAL GAS SETTING VFR VENT: 30 %
OXYHGB MFR BLDV: 41 % (ref 70–75)
OXYHGB MFR BLDV: 44 % (ref 70–75)
OXYHGB MFR BLDV: 45 % (ref 70–75)
OXYHGB MFR BLDV: 54 % (ref 70–75)
OXYHGB MFR BLDV: 58 % (ref 70–75)
P AXIS - MUSE: 75 DEGREES
P AXIS - MUSE: 90 DEGREES
P AXIS - MUSE: NORMAL DEGREES
PCO2 BLD: 29 MM HG (ref 35–45)
PCO2 BLD: 30 MM HG (ref 35–45)
PCO2 BLD: 30 MM HG (ref 35–45)
PCO2 BLD: 32 MM HG (ref 35–45)
PCO2 BLD: 37 MM HG (ref 35–45)
PCO2 BLD: 38 MM HG (ref 35–45)
PCO2 BLD: 38 MM HG (ref 35–45)
PCO2 BLDV: 39 MM HG (ref 40–50)
PCO2 BLDV: 42 MM HG (ref 40–50)
PCO2 BLDV: 46 MM HG (ref 40–50)
PCO2 BLDV: 47 MM HG (ref 40–50)
PCO2 BLDV: 50 MM HG (ref 40–50)
PEEP: 5 CM H2O
PEEP: 5 CM H2O
PH BLD: 7.22 [PH] (ref 7.35–7.45)
PH BLD: 7.23 [PH] (ref 7.35–7.45)
PH BLD: 7.27 [PH] (ref 7.35–7.45)
PH BLD: 7.29 [PH] (ref 7.35–7.45)
PH BLD: 7.34 [PH] (ref 7.35–7.45)
PH BLD: 7.34 [PH] (ref 7.35–7.45)
PH BLD: 7.37 [PH] (ref 7.35–7.45)
PH BLDV: 7.17 [PH] (ref 7.32–7.43)
PH BLDV: 7.19 [PH] (ref 7.32–7.43)
PH BLDV: 7.22 [PH] (ref 7.32–7.43)
PH BLDV: 7.23 [PH] (ref 7.32–7.43)
PH BLDV: 7.28 [PH] (ref 7.32–7.43)
PH UR STRIP: ABNORMAL [PH]
PHOSPHATE SERPL-MCNC: 6.9 MG/DL (ref 2.5–4.5)
PHOSPHATE SERPL-MCNC: 9.5 MG/DL (ref 2.5–4.5)
PHOSPHATE SERPL-MCNC: 9.8 MG/DL (ref 2.5–4.5)
PLAT MORPH BLD: ABNORMAL
PLAT MORPH BLD: ABNORMAL
PLATELET # BLD AUTO: 361 10E3/UL (ref 150–450)
PO2 BLD: 107 MM HG (ref 80–105)
PO2 BLD: 109 MM HG (ref 80–105)
PO2 BLD: 117 MM HG (ref 80–105)
PO2 BLD: 144 MM HG (ref 80–105)
PO2 BLD: 161 MM HG (ref 80–105)
PO2 BLD: 166 MM HG (ref 80–105)
PO2 BLD: 96 MM HG (ref 80–105)
PO2 BLDV: 29 MM HG (ref 25–47)
PO2 BLDV: 30 MM HG (ref 25–47)
PO2 BLDV: 31 MM HG (ref 25–47)
PO2 BLDV: 38 MM HG (ref 25–47)
PO2 BLDV: 38 MM HG (ref 25–47)
POTASSIUM SERPL-SCNC: 4.4 MMOL/L (ref 3.4–5.3)
POTASSIUM SERPL-SCNC: 4.4 MMOL/L (ref 3.4–5.3)
POTASSIUM SERPL-SCNC: 5.2 MMOL/L (ref 3.4–5.3)
POTASSIUM SERPL-SCNC: 5.3 MMOL/L (ref 3.4–5.3)
POTASSIUM SERPL-SCNC: 5.4 MMOL/L (ref 3.4–5.3)
POTASSIUM SERPL-SCNC: 6.4 MMOL/L (ref 3.4–5.3)
POTASSIUM SERPL-SCNC: 6.5 MMOL/L (ref 3.4–5.3)
PR INTERVAL - MUSE: 158 MS
PR INTERVAL - MUSE: 176 MS
PR INTERVAL - MUSE: 186 MS
PR INTERVAL - MUSE: NORMAL MS
PROT SERPL-MCNC: 4.6 G/DL (ref 6.4–8.3)
PROT SERPL-MCNC: 4.7 G/DL (ref 6.4–8.3)
PROT SERPL-MCNC: 5.2 G/DL (ref 6.4–8.3)
QRS DURATION - MUSE: 124 MS
QRS DURATION - MUSE: 124 MS
QRS DURATION - MUSE: 78 MS
QRS DURATION - MUSE: 80 MS
QRS DURATION - MUSE: 84 MS
QRS DURATION - MUSE: 84 MS
QT - MUSE: 350 MS
QT - MUSE: 350 MS
QT - MUSE: 384 MS
QT - MUSE: 410 MS
QT - MUSE: 416 MS
QT - MUSE: 468 MS
QTC - MUSE: 435 MS
QTC - MUSE: 494 MS
QTC - MUSE: 497 MS
QTC - MUSE: 510 MS
QTC - MUSE: 511 MS
QTC - MUSE: 552 MS
R AXIS - MUSE: -81 DEGREES
R AXIS - MUSE: 262 DEGREES
R AXIS - MUSE: 62 DEGREES
R AXIS - MUSE: 69 DEGREES
R AXIS - MUSE: 70 DEGREES
R AXIS - MUSE: 71 DEGREES
RBC # BLD AUTO: 3.31 10E6/UL (ref 4.4–5.9)
RBC MORPH BLD: ABNORMAL
RBC MORPH BLD: ABNORMAL
RBC URINE: 27 /HPF
SAO2 % BLDA: 92 % (ref 92–100)
SAO2 % BLDA: 93 % (ref 92–100)
SAO2 % BLDA: 93 % (ref 92–100)
SAO2 % BLDA: 95 % (ref 92–100)
SAO2 % BLDA: 96 % (ref 92–100)
SAO2 % BLDV: 43.1 % (ref 70–75)
SAO2 % BLDV: 46.7 % (ref 70–75)
SAO2 % BLDV: 47.4 % (ref 70–75)
SAO2 % BLDV: 57 % (ref 70–75)
SAO2 % BLDV: 60.6 % (ref 70–75)
SMUDGE CELLS BLD QL SMEAR: PRESENT
SODIUM SERPL-SCNC: 142 MMOL/L (ref 135–145)
SODIUM SERPL-SCNC: 145 MMOL/L (ref 135–145)
SODIUM SERPL-SCNC: 145 MMOL/L (ref 135–145)
SODIUM SERPL-SCNC: 147 MMOL/L (ref 135–145)
SODIUM SERPL-SCNC: 147 MMOL/L (ref 135–145)
SP GR UR STRIP: ABNORMAL
SYSTOLIC BLOOD PRESSURE - MUSE: NORMAL MMHG
T AXIS - MUSE: -16 DEGREES
T AXIS - MUSE: 259 DEGREES
T AXIS - MUSE: 263 DEGREES
T AXIS - MUSE: 37 DEGREES
T AXIS - MUSE: 55 DEGREES
T AXIS - MUSE: 56 DEGREES
TRIGL SERPL-MCNC: 100 MG/DL
UROBILINOGEN UR STRIP-MCNC: ABNORMAL MG/DL
VENTRICULAR RATE- MUSE: 106 BPM
VENTRICULAR RATE- MUSE: 106 BPM
VENTRICULAR RATE- MUSE: 121 BPM
VENTRICULAR RATE- MUSE: 128 BPM
VENTRICULAR RATE- MUSE: 67 BPM
VENTRICULAR RATE- MUSE: 68 BPM
WBC # BLD AUTO: 54.7 10E3/UL (ref 4–11)
WBC URINE: 55 /HPF

## 2024-10-30 PROCEDURE — 250N000011 HC RX IP 250 OP 636: Mod: JW | Performed by: STUDENT IN AN ORGANIZED HEALTH CARE EDUCATION/TRAINING PROGRAM

## 2024-10-30 PROCEDURE — 85014 HEMATOCRIT: CPT | Performed by: PHYSICIAN ASSISTANT

## 2024-10-30 PROCEDURE — 80069 RENAL FUNCTION PANEL: CPT | Performed by: INTERNAL MEDICINE

## 2024-10-30 PROCEDURE — 255N000002 HC RX 255 OP 636: Performed by: PHYSICIAN ASSISTANT

## 2024-10-30 PROCEDURE — 250N000012 HC RX MED GY IP 250 OP 636 PS 637: Performed by: INTERNAL MEDICINE

## 2024-10-30 PROCEDURE — 82805 BLOOD GASES W/O2 SATURATION: CPT | Performed by: STUDENT IN AN ORGANIZED HEALTH CARE EDUCATION/TRAINING PROGRAM

## 2024-10-30 PROCEDURE — 36620 INSERTION CATHETER ARTERY: CPT | Mod: GC | Performed by: INTERNAL MEDICINE

## 2024-10-30 PROCEDURE — 80069 RENAL FUNCTION PANEL: CPT | Performed by: PHYSICIAN ASSISTANT

## 2024-10-30 PROCEDURE — 999N000065 XR CHEST PORT 1 VIEW

## 2024-10-30 PROCEDURE — 250N000011 HC RX IP 250 OP 636: Performed by: SURGERY

## 2024-10-30 PROCEDURE — 93321 DOPPLER ECHO F-UP/LMTD STD: CPT | Mod: 26 | Performed by: INTERNAL MEDICINE

## 2024-10-30 PROCEDURE — 93005 ELECTROCARDIOGRAM TRACING: CPT

## 2024-10-30 PROCEDURE — 80069 RENAL FUNCTION PANEL: CPT | Performed by: STUDENT IN AN ORGANIZED HEALTH CARE EDUCATION/TRAINING PROGRAM

## 2024-10-30 PROCEDURE — 120N000004 HC R&B MS OVERFLOW

## 2024-10-30 PROCEDURE — 82805 BLOOD GASES W/O2 SATURATION: CPT | Performed by: PHYSICIAN ASSISTANT

## 2024-10-30 PROCEDURE — 84132 ASSAY OF SERUM POTASSIUM: CPT | Performed by: STUDENT IN AN ORGANIZED HEALTH CARE EDUCATION/TRAINING PROGRAM

## 2024-10-30 PROCEDURE — 82805 BLOOD GASES W/O2 SATURATION: CPT | Performed by: SURGERY

## 2024-10-30 PROCEDURE — 83605 ASSAY OF LACTIC ACID: CPT | Performed by: PHYSICIAN ASSISTANT

## 2024-10-30 PROCEDURE — 04HY32Z INSERTION OF MONITORING DEVICE INTO LOWER ARTERY, PERCUTANEOUS APPROACH: ICD-10-PCS | Performed by: INTERNAL MEDICINE

## 2024-10-30 PROCEDURE — 258N000003 HC RX IP 258 OP 636: Performed by: SURGERY

## 2024-10-30 PROCEDURE — 82330 ASSAY OF CALCIUM: CPT | Performed by: INTERNAL MEDICINE

## 2024-10-30 PROCEDURE — 999N000208 ECHOCARDIOGRAM LIMITED

## 2024-10-30 PROCEDURE — 06HN33Z INSERTION OF INFUSION DEVICE INTO LEFT FEMORAL VEIN, PERCUTANEOUS APPROACH: ICD-10-PCS | Performed by: INTERNAL MEDICINE

## 2024-10-30 PROCEDURE — 93325 DOPPLER ECHO COLOR FLOW MAPG: CPT | Mod: 26 | Performed by: INTERNAL MEDICINE

## 2024-10-30 PROCEDURE — 94003 VENT MGMT INPAT SUBQ DAY: CPT

## 2024-10-30 PROCEDURE — 82247 BILIRUBIN TOTAL: CPT | Performed by: STUDENT IN AN ORGANIZED HEALTH CARE EDUCATION/TRAINING PROGRAM

## 2024-10-30 PROCEDURE — 87086 URINE CULTURE/COLONY COUNT: CPT | Performed by: STUDENT IN AN ORGANIZED HEALTH CARE EDUCATION/TRAINING PROGRAM

## 2024-10-30 PROCEDURE — 258N000001 HC RX 258: Performed by: INTERNAL MEDICINE

## 2024-10-30 PROCEDURE — 84155 ASSAY OF PROTEIN SERUM: CPT | Performed by: PHYSICIAN ASSISTANT

## 2024-10-30 PROCEDURE — 250N000011 HC RX IP 250 OP 636: Performed by: PHYSICIAN ASSISTANT

## 2024-10-30 PROCEDURE — 250N000009 HC RX 250: Performed by: INTERNAL MEDICINE

## 2024-10-30 PROCEDURE — 82310 ASSAY OF CALCIUM: CPT | Performed by: STUDENT IN AN ORGANIZED HEALTH CARE EDUCATION/TRAINING PROGRAM

## 2024-10-30 PROCEDURE — 90947 DIALYSIS REPEATED EVAL: CPT

## 2024-10-30 PROCEDURE — 250N000009 HC RX 250: Performed by: SURGERY

## 2024-10-30 PROCEDURE — 05HN33Z INSERTION OF INFUSION DEVICE INTO LEFT INTERNAL JUGULAR VEIN, PERCUTANEOUS APPROACH: ICD-10-PCS | Performed by: INTERNAL MEDICINE

## 2024-10-30 PROCEDURE — 250N000009 HC RX 250: Performed by: PHYSICIAN ASSISTANT

## 2024-10-30 PROCEDURE — 76705 ECHO EXAM OF ABDOMEN: CPT

## 2024-10-30 PROCEDURE — 99233 SBSQ HOSP IP/OBS HIGH 50: CPT | Performed by: INTERNAL MEDICINE

## 2024-10-30 PROCEDURE — C8924 2D TTE W OR W/O FOL W/CON,FU: HCPCS

## 2024-10-30 PROCEDURE — 250N000012 HC RX MED GY IP 250 OP 636 PS 637: Performed by: PHYSICIAN ASSISTANT

## 2024-10-30 PROCEDURE — 85610 PROTHROMBIN TIME: CPT | Performed by: STUDENT IN AN ORGANIZED HEALTH CARE EDUCATION/TRAINING PROGRAM

## 2024-10-30 PROCEDURE — 82330 ASSAY OF CALCIUM: CPT | Performed by: SURGERY

## 2024-10-30 PROCEDURE — 84478 ASSAY OF TRIGLYCERIDES: CPT | Performed by: STUDENT IN AN ORGANIZED HEALTH CARE EDUCATION/TRAINING PROGRAM

## 2024-10-30 PROCEDURE — 36556 INSERT NON-TUNNEL CV CATH: CPT | Mod: GC | Performed by: INTERNAL MEDICINE

## 2024-10-30 PROCEDURE — 84155 ASSAY OF PROTEIN SERUM: CPT | Performed by: STUDENT IN AN ORGANIZED HEALTH CARE EDUCATION/TRAINING PROGRAM

## 2024-10-30 PROCEDURE — 81003 URINALYSIS AUTO W/O SCOPE: CPT | Performed by: STUDENT IN AN ORGANIZED HEALTH CARE EDUCATION/TRAINING PROGRAM

## 2024-10-30 PROCEDURE — 250N000011 HC RX IP 250 OP 636: Performed by: INTERNAL MEDICINE

## 2024-10-30 PROCEDURE — 82040 ASSAY OF SERUM ALBUMIN: CPT | Performed by: STUDENT IN AN ORGANIZED HEALTH CARE EDUCATION/TRAINING PROGRAM

## 2024-10-30 PROCEDURE — 71045 X-RAY EXAM CHEST 1 VIEW: CPT

## 2024-10-30 PROCEDURE — 258N000001 HC RX 258: Performed by: PHYSICIAN ASSISTANT

## 2024-10-30 PROCEDURE — 82040 ASSAY OF SERUM ALBUMIN: CPT | Performed by: PHYSICIAN ASSISTANT

## 2024-10-30 PROCEDURE — 85041 AUTOMATED RBC COUNT: CPT | Performed by: PHYSICIAN ASSISTANT

## 2024-10-30 PROCEDURE — 258N000003 HC RX IP 258 OP 636: Performed by: INTERNAL MEDICINE

## 2024-10-30 PROCEDURE — 93308 TTE F-UP OR LMTD: CPT | Mod: 26 | Performed by: INTERNAL MEDICINE

## 2024-10-30 PROCEDURE — 84132 ASSAY OF SERUM POTASSIUM: CPT | Performed by: PHYSICIAN ASSISTANT

## 2024-10-30 PROCEDURE — 258N000003 HC RX IP 258 OP 636: Performed by: PHYSICIAN ASSISTANT

## 2024-10-30 PROCEDURE — 999N000157 HC STATISTIC RCP TIME EA 10 MIN

## 2024-10-30 PROCEDURE — 82550 ASSAY OF CK (CPK): CPT | Performed by: STUDENT IN AN ORGANIZED HEALTH CARE EDUCATION/TRAINING PROGRAM

## 2024-10-30 PROCEDURE — 83735 ASSAY OF MAGNESIUM: CPT | Performed by: INTERNAL MEDICINE

## 2024-10-30 PROCEDURE — 250N000013 HC RX MED GY IP 250 OP 250 PS 637: Performed by: SURGERY

## 2024-10-30 PROCEDURE — 99291 CRITICAL CARE FIRST HOUR: CPT | Mod: 24 | Performed by: INTERNAL MEDICINE

## 2024-10-30 PROCEDURE — 82310 ASSAY OF CALCIUM: CPT | Performed by: INTERNAL MEDICINE

## 2024-10-30 PROCEDURE — 4A133B1 MONITORING OF ARTERIAL PRESSURE, PERIPHERAL, PERCUTANEOUS APPROACH: ICD-10-PCS | Performed by: INTERNAL MEDICINE

## 2024-10-30 PROCEDURE — 85018 HEMOGLOBIN: CPT | Performed by: SURGERY

## 2024-10-30 PROCEDURE — 84075 ASSAY ALKALINE PHOSPHATASE: CPT | Performed by: STUDENT IN AN ORGANIZED HEALTH CARE EDUCATION/TRAINING PROGRAM

## 2024-10-30 PROCEDURE — 5A1D90Z PERFORMANCE OF URINARY FILTRATION, CONTINUOUS, GREATER THAN 18 HOURS PER DAY: ICD-10-PCS | Performed by: INTERNAL MEDICINE

## 2024-10-30 RX ORDER — DEXTROSE MONOHYDRATE 25 G/50ML
25-50 INJECTION, SOLUTION INTRAVENOUS
Status: DISCONTINUED | OUTPATIENT
Start: 2024-10-30 | End: 2024-11-15 | Stop reason: HOSPADM

## 2024-10-30 RX ORDER — MAGNESIUM SULFATE HEPTAHYDRATE 40 MG/ML
2 INJECTION, SOLUTION INTRAVENOUS EVERY 8 HOURS PRN
Status: DISCONTINUED | OUTPATIENT
Start: 2024-10-30 | End: 2024-11-14

## 2024-10-30 RX ORDER — DEXTROSE MONOHYDRATE 25 G/50ML
25 INJECTION, SOLUTION INTRAVENOUS ONCE
Status: COMPLETED | OUTPATIENT
Start: 2024-10-30 | End: 2024-10-30

## 2024-10-30 RX ORDER — POTASSIUM CHLORIDE 29.8 MG/ML
20 INJECTION INTRAVENOUS EVERY 8 HOURS PRN
Status: DISCONTINUED | OUTPATIENT
Start: 2024-10-30 | End: 2024-11-14

## 2024-10-30 RX ORDER — DOBUTAMINE HYDROCHLORIDE 200 MG/100ML
5 INJECTION INTRAVENOUS CONTINUOUS
Status: DISCONTINUED | OUTPATIENT
Start: 2024-10-30 | End: 2024-10-31

## 2024-10-30 RX ORDER — CALCIUM GLUCONATE 20 MG/ML
2 INJECTION, SOLUTION INTRAVENOUS EVERY 8 HOURS PRN
Status: DISCONTINUED | OUTPATIENT
Start: 2024-10-30 | End: 2024-11-14

## 2024-10-30 RX ORDER — CALCIUM CHLORIDE, MAGNESIUM CHLORIDE, DEXTROSE MONOHYDRATE, LACTIC ACID, SODIUM CHLORIDE, SODIUM BICARBONATE AND POTASSIUM CHLORIDE 5.15; 2.03; 22; 5.4; 6.46; 3.09; .157 G/L; G/L; G/L; G/L; G/L; G/L; G/L
INJECTION INTRAVENOUS CONTINUOUS
Status: DISCONTINUED | OUTPATIENT
Start: 2024-10-30 | End: 2024-10-31

## 2024-10-30 RX ORDER — HEPARIN SODIUM,PORCINE 10 UNIT/ML
3 VIAL (ML) INTRAVENOUS
Status: DISCONTINUED | OUTPATIENT
Start: 2024-10-30 | End: 2024-11-15 | Stop reason: HOSPADM

## 2024-10-30 RX ORDER — NICOTINE POLACRILEX 4 MG
15-30 LOZENGE BUCCAL
Status: DISCONTINUED | OUTPATIENT
Start: 2024-10-30 | End: 2024-11-15 | Stop reason: HOSPADM

## 2024-10-30 RX ORDER — HYDROMORPHONE HYDROCHLORIDE 1 MG/ML
0.3 INJECTION, SOLUTION INTRAMUSCULAR; INTRAVENOUS; SUBCUTANEOUS ONCE
Status: COMPLETED | OUTPATIENT
Start: 2024-10-30 | End: 2024-10-30

## 2024-10-30 RX ORDER — CALCIUM CHLORIDE, MAGNESIUM CHLORIDE, DEXTROSE MONOHYDRATE, LACTIC ACID, SODIUM CHLORIDE, SODIUM BICARBONATE AND POTASSIUM CHLORIDE 5.15; 2.03; 22; 5.4; 6.46; 3.09; .157 G/L; G/L; G/L; G/L; G/L; G/L; G/L
INJECTION INTRAVENOUS CONTINUOUS
Status: DISCONTINUED | OUTPATIENT
Start: 2024-10-30 | End: 2024-11-01

## 2024-10-30 RX ADMIN — CALCIUM CHLORIDE, MAGNESIUM CHLORIDE, DEXTROSE MONOHYDRATE, LACTIC ACID, SODIUM CHLORIDE, SODIUM BICARBONATE AND POTASSIUM CHLORIDE 5000 ML: 5.15; 2.03; 22; 5.4; 6.46; 3.09; .157 INJECTION INTRAVENOUS at 12:25

## 2024-10-30 RX ADMIN — SODIUM BICARBONATE: 84 INJECTION, SOLUTION INTRAVENOUS at 22:58

## 2024-10-30 RX ADMIN — SODIUM CHLORIDE, POTASSIUM CHLORIDE, SODIUM LACTATE AND CALCIUM CHLORIDE: 600; 310; 30; 20 INJECTION, SOLUTION INTRAVENOUS at 18:37

## 2024-10-30 RX ADMIN — NOREPINEPHRINE BITARTRATE 0.08 MCG/KG/MIN: 0.02 INJECTION, SOLUTION INTRAVENOUS at 21:35

## 2024-10-30 RX ADMIN — CALCIUM GLUCONATE 2 G: 20 INJECTION, SOLUTION INTRAVENOUS at 13:31

## 2024-10-30 RX ADMIN — DEXTROSE MONOHYDRATE 300 ML: 100 INJECTION, SOLUTION INTRAVENOUS at 05:30

## 2024-10-30 RX ADMIN — SENNOSIDES AND DOCUSATE SODIUM 1 TABLET: 50; 8.6 TABLET ORAL at 08:49

## 2024-10-30 RX ADMIN — HYDROMORPHONE HYDROCHLORIDE 0.3 MG: 1 INJECTION, SOLUTION INTRAMUSCULAR; INTRAVENOUS; SUBCUTANEOUS at 16:54

## 2024-10-30 RX ADMIN — POLYETHYLENE GLYCOL 3350 17 G: 17 POWDER, FOR SOLUTION ORAL at 08:49

## 2024-10-30 RX ADMIN — HYDROMORPHONE HYDROCHLORIDE 0.2 MG: 0.2 INJECTION, SOLUTION INTRAMUSCULAR; INTRAVENOUS; SUBCUTANEOUS at 16:30

## 2024-10-30 RX ADMIN — AMIODARONE HYDROCHLORIDE 1 MG/MIN: 50 INJECTION, SOLUTION INTRAVENOUS at 12:29

## 2024-10-30 RX ADMIN — HEPARIN SODIUM 5000 UNITS: 5000 INJECTION, SOLUTION INTRAVENOUS; SUBCUTANEOUS at 02:29

## 2024-10-30 RX ADMIN — Medication 10 MG: at 16:38

## 2024-10-30 RX ADMIN — Medication 40 MG: at 08:49

## 2024-10-30 RX ADMIN — ANGIOTENSIN II 25 NG/KG/MIN: 2.5 INJECTION INTRAVENOUS at 11:09

## 2024-10-30 RX ADMIN — DOBUTAMINE HYDROCHLORIDE 2.5 MCG/KG/MIN: 200 INJECTION INTRAVENOUS at 14:17

## 2024-10-30 RX ADMIN — SODIUM CHLORIDE, POTASSIUM CHLORIDE, SODIUM LACTATE AND CALCIUM CHLORIDE 500 ML: 600; 310; 30; 20 INJECTION, SOLUTION INTRAVENOUS at 04:50

## 2024-10-30 RX ADMIN — SODIUM BICARBONATE 100 MEQ: 84 INJECTION, SOLUTION INTRAVENOUS at 10:11

## 2024-10-30 RX ADMIN — EPINEPHRINE 0.1 MCG/KG/MIN: 1 INJECTION INTRAMUSCULAR; INTRAVENOUS; SUBCUTANEOUS at 09:18

## 2024-10-30 RX ADMIN — INSULIN ASPART 1 UNITS: 100 INJECTION, SOLUTION INTRAVENOUS; SUBCUTANEOUS at 21:52

## 2024-10-30 RX ADMIN — SODIUM BICARBONATE 50 MEQ: 84 INJECTION, SOLUTION INTRAVENOUS at 05:34

## 2024-10-30 RX ADMIN — ANGIOTENSIN II 25 NG/KG/MIN: 2.5 INJECTION INTRAVENOUS at 00:17

## 2024-10-30 RX ADMIN — PROPOFOL 10 MCG/KG/MIN: 10 INJECTION, EMULSION INTRAVENOUS at 23:12

## 2024-10-30 RX ADMIN — CALCIUM CHLORIDE, MAGNESIUM CHLORIDE, DEXTROSE MONOHYDRATE, LACTIC ACID, SODIUM CHLORIDE, SODIUM BICARBONATE AND POTASSIUM CHLORIDE: 5.15; 2.03; 22; 5.4; 6.46; 3.09; .157 INJECTION INTRAVENOUS at 12:25

## 2024-10-30 RX ADMIN — SENNOSIDES AND DOCUSATE SODIUM 1 TABLET: 50; 8.6 TABLET ORAL at 21:08

## 2024-10-30 RX ADMIN — PROPOFOL 15 MCG/KG/MIN: 10 INJECTION, EMULSION INTRAVENOUS at 08:55

## 2024-10-30 RX ADMIN — HEPARIN SODIUM 2600 UNITS: 1000 INJECTION INTRAVENOUS; SUBCUTANEOUS at 10:59

## 2024-10-30 RX ADMIN — ASPIRIN 81 MG CHEWABLE TABLET 162 MG: 81 TABLET CHEWABLE at 08:49

## 2024-10-30 RX ADMIN — HEPARIN SODIUM 2600 UNITS: 1000 INJECTION INTRAVENOUS; SUBCUTANEOUS at 10:56

## 2024-10-30 RX ADMIN — SODIUM CHLORIDE 1000 ML: 9 INJECTION, SOLUTION INTRAVENOUS at 06:45

## 2024-10-30 RX ADMIN — HUMAN ALBUMIN MICROSPHERES AND PERFLUTREN 9 ML: 10; .22 INJECTION, SOLUTION INTRAVENOUS at 09:39

## 2024-10-30 RX ADMIN — NOREPINEPHRINE BITARTRATE 0.06 MCG/KG/MIN: 0.02 INJECTION, SOLUTION INTRAVENOUS at 05:49

## 2024-10-30 RX ADMIN — SODIUM BICARBONATE 100 MEQ: 84 INJECTION, SOLUTION INTRAVENOUS at 16:57

## 2024-10-30 RX ADMIN — DEXTROSE MONOHYDRATE: 100 INJECTION, SOLUTION INTRAVENOUS at 00:51

## 2024-10-30 RX ADMIN — CALCIUM GLUCONATE 1 G: 20 INJECTION, SOLUTION INTRAVENOUS at 06:36

## 2024-10-30 RX ADMIN — VASOPRESSIN 2.1 UNITS/HR: 20 INJECTION, SOLUTION INTRAMUSCULAR; SUBCUTANEOUS at 02:45

## 2024-10-30 RX ADMIN — DEXTROSE MONOHYDRATE 25 G: 25 INJECTION, SOLUTION INTRAVENOUS at 05:36

## 2024-10-30 RX ADMIN — SODIUM BICARBONATE: 84 INJECTION, SOLUTION INTRAVENOUS at 14:40

## 2024-10-30 RX ADMIN — SODIUM CHLORIDE 10 UNITS: 9 INJECTION, SOLUTION INTRAVENOUS at 05:42

## 2024-10-30 NOTE — PROGRESS NOTES
Potassium of 6.2 and 6.3 are slightly hemolyzed.  Recheck ordered.   Please page MD on-call result is back  Thanks

## 2024-10-30 NOTE — PROGRESS NOTES
[facility] ICU RESPIRATORY NOTE        Date of Admission: 10/28/2024    Date of Intubation (most recent): 10/28/24    Reason for Mechanical Ventilation: Heart surgery    Number of Days on Mechanical Ventilation: 3    Met Criteria for Spontaneous Breathing Trial: No    Reason for No Spontaneous Breathing Trial: per MD    Bite Block: No    Significant Events Today: None  overnight    ABG Results:   Recent Labs   Lab 10/30/24  0048 10/29/24  1633 10/29/24  1221 10/29/24  1116 10/29/24  1047 10/29/24  0731   PH 7.27*  --  7.31*  --  7.25* 7.28*   PCO2 38  --  41  --  39 41   PO2 109*  --  123*  --  106* 100   HCO3 18*  --  21  --  17* 19*   O2PER 30  30 30 30 40 40 40         Current Vent Settings: FiO2 (%): 30 %, Resp: 19, Vent Mode: CMV/AC, Resp Rate (Set): 18 breaths/min, Tidal Volume (Set, mL): 500 mL, PEEP (cm H2O): 5 cmH2O, Resp Rate (Set): 18 breaths/min, Tidal Volume (Set, mL): 500 mL, PEEP (cm H2O): 5 cmH2O    Skin Assessment: Intact    Plan: Assess for weaning readiness daily    RT Eduard on 10/30/2024 at 3:47 AM

## 2024-10-30 NOTE — PROGRESS NOTES
Renal Medicine Progress Note            Assessment/Plan:     88 y.o man with s/p 3-vessels CABG and CKD IIIB, consulted for post-op JAVIER.      # Patient with baseline CKD IIIB, Scr ~ 1.8 mg/dl.      # Post-op JAVIER-severe: Anuric.      # Severe 3-vessels CAD s/p 3-vessels CABG.                -EF ~ 35%     # Vasogenic shock post-op: Remains on pressors but improved.      # FEN: Hyperkalemia-hemolyzed. Significant fluid up.      # Lactic acidosis due to shock condition.      Plan:  # Appreciate ICU team putting in temp dialysis CVC to start CRRT. Order placed.  # Will try to remove fluid with CRRT. He is significantly fluid up.     I discussed the case with CTS team and Dr. Ndiaye           Interval History:     Febrile.   On pressors.   Anuric.   Weight is up ~ 20 lbs and + 12 liters since admission.   FIO2 at 30%.             Medications and Allergies:     Current Facility-Administered Medications   Medication Dose Route Frequency Provider Last Rate Last Admin    aspirin (ASA) chewable tablet 162 mg  162 mg Oral or NG Tube Daily Clarence Bone MD   162 mg at 10/30/24 0849    heparin ANTICOAGULANT injection 5,000 Units  5,000 Units Subcutaneous Q8H Opal Nguyen PA-C   5,000 Units at 10/30/24 0229    insulin aspart (NovoLOG) injection (RAPID ACTING)  1-7 Units Subcutaneous Q4H Sb Arthur PA-C        pantoprazole (PROTONIX) 2 mg/mL suspension 40 mg  40 mg Oral or NG Tube Daily Clarence Bone MD   40 mg at 10/30/24 0849    Or    pantoprazole (PROTONIX) EC tablet 40 mg  40 mg Oral Daily Clarence Bone MD        polyethylene glycol (MIRALAX) Packet 17 g  17 g Oral Daily Clarence Bone MD   17 g at 10/30/24 0849    senna-docusate (SENOKOT-S/PERICOLACE) 8.6-50 MG per tablet 1 tablet  1 tablet Oral BID Clarence Bone MD   1 tablet at 10/30/24 0849    sodium bicarbonate 8.4 % injection 100 mEq  100 mEq Intravenous Once Opal Nguyen PA-C        sodium chloride (PF) 0.9% PF flush 3  "mL  3 mL Intracatheter Q8H Clarence Bone MD   3 mL at 10/30/24 0616      No Known Allergies         Physical Exam:   Vitals were reviewed   , Blood pressure 115/74, pulse 89, temperature 99.5  F (37.5  C), resp. rate 19, height 1.778 m (5' 10\"), weight 105.1 kg (231 lb 11.3 oz), SpO2 94%.    Wt Readings from Last 3 Encounters:   10/30/24 105.1 kg (231 lb 11.3 oz)   10/25/24 97.1 kg (214 lb)   10/03/24 101.2 kg (223 lb)       Intake/Output Summary (Last 24 hours) at 10/30/2024 0959  Last data filed at 10/30/2024 0900  Gross per 24 hour   Intake 5776.72 ml   Output 1772 ml   Net 4004.72 ml       GENERAL APPEARANCE: Critically ill.   HEENT:  Intubated.  RESP: Vent sound.   CV: RRR.  EXTREMITIES/SKIN: Edematous.   NEURO: Sedated.   Amos: scant urine.          Data:     CBC RESULTS:     Recent Labs   Lab 10/30/24  0421 10/29/24  1324 10/29/24  0601 10/29/24  0204 10/28/24  2305 10/28/24  2134 10/28/24  2125 10/28/24  2053 10/28/24  2003   WBC 54.7*  --  62.7*  62.7* 61.8* 67.6*  --  97.9*  --  109.2*   RBC 3.31*  --  2.83* 2.66* 2.58*  --  2.83*  --  2.51*   HGB 9.9*  9.7* 8.1* 8.3* 7.9* 7.6* 8.5* 8.4*   < > 7.4*   HCT 30.4*  --  25.5* 24.1* 23.6* 25* 26.4*   < > 23.7*     --  309 268 273  --  325  --  423    < > = values in this interval not displayed.       Basic Metabolic Panel:  Recent Labs   Lab 10/30/24  0844 10/30/24  0817 10/30/24  0600 10/30/24  0526 10/30/24  0421 10/30/24  0420 10/30/24  0224 10/30/24  0044 10/29/24  2221 10/29/24  2117 10/29/24  2011 10/29/24  1819 10/29/24  1755 10/29/24  0806 10/29/24  0601 10/28/24  2311 10/28/24  2305 10/28/24  2134 10/28/24  2125   NA  --   --   --   --  147*  --   --   --  142  --   --   --  148*  --  148*  --  148* 145 147*   POTASSIUM 5.3  --   --   --  6.5*  6.4*  --   --   --  5.3  --  6.3*  --  6.2*  --  4.9  4.9  --  4.1 4.0 4.1   CHLORIDE  --   --   --   --  106  --   --   --  105  --   --   --  108*  --  109*  --  111*  --  110*   CO2  --   -- "   --   --  16*  --   --   --  19*  --   --   --  17*  --  19*  --  22  --  19*   BUN  --   --   --   --  65.8*  --   --   --  62.0*  --   --   --  60.8*  --  54.5*  --  47.3*  --  48.0*   CR  --   --   --   --  3.76*  --   --   --  3.34*  --   --   --  3.10*  --  2.50*  --  2.28*  --  2.17*   GLC  --  152* 184* 104* 106* 101* 107*   < > 166*   < >  --    < > 70   < > 123*   < > 80  --  112*   TATE  --   --   --   --  7.6*  --   --   --  7.7*  --   --   --  7.7*  --  8.3*  --  8.4*  --  8.3*    < > = values in this interval not displayed.       INR  Recent Labs   Lab 10/30/24  0802 10/29/24  0951 10/29/24  0601 10/29/24  0204   INR 3.20* 1.74* 1.73* 1.70*      Attestation:   I have reviewed today's relevant vital signs, notes, medications, labs and imaging.    Farhad Boland MD  [facility]phrology  Office phone :[phone]  Pager: [phone]

## 2024-10-30 NOTE — PLAN OF CARE
CV  Pressors (which pressors and any increase/decrease in pressor needs): Angiotensin II, Epinephrine; Vasopressin; Norepinephrine. Frequent titrations made throughout the night.    HR range:     Chest tube output: Mediastinal 250mL & Pleural 420mL    Neuro  Orientation: NIMA; Sedated  Delirium present?(y/n): NIMA  Sleep:   Pain: NIMA    GI/  BM? (y/n): No    Urine output: 220mL; declining throughout shift.      Lines: CVC RIJ; Pulmonary Artery Catheter; RIJ Introducer; Right Radial A-line; 2 PIV        Goal Outcome Evaluation:      Plan of Care Reviewed With: patient      Problem: Adult Inpatient Plan of Care  Goal: Plan of Care Review  Description: The Plan of Care Review/Shift note should be completed every shift.  The Outcome Evaluation is a brief statement about your assessment that the patient is improving, declining, or no change.  This information will be displayed automatically on your shift  note.  Outcome: Not Progressing  Flowsheets (Taken 10/30/2024 0909)  Outcome Evaluation:   Remains intubated and sedated with propofol. No changes made to vent o/n: FiO2 30%, rate 16, vol 500 and PEEP of 5. Remains on vasopressors (Epinephrine, Vaso, Norepi and restarted on Angiotensin II) requiring frequent titrations to maintain MAP and hemodynamic status. Verbal order obtained for Angiotensin II from CV Fellow Clarence. Received call from Dr Mulvihill with updated directions pertaining to MAP and weaning of pressors. Per Dr Mulvihill, start Angiotensin II with goal MAP low 70s and begin titrating down on norepi and vaso   Epinephrine should be last pressor titrated off. See MAR for titrations. Atrial paced rhythm with small runs of unsustained vtach. Around 0044, longer runs of unsustained vtach   Wide QRS complex with RBB on EKG   -109. Intensivist at bedside. Around 0130, pacemaker placed on standby and patient converted into accelerated junctional / sinus with HR 58-65. Received instructions from  Intensivist to hold Amio gtt at this time d/t bradycardia and to notify him if HR sustaining in 50s with increased pressor needs at which point resume pacing. HR into 70s throughout remainder of morning. Flipped back into Afib [id number]   resumed Amio gtt. Multiple fluid boluses administered for critical lactic acid (see MAR, Results and Notifications). Hyperkalemic with K+6.3 at start of shift and end of shift   potassium shifted with d50, Insulin and dextrose bolus and infusion. Nephrology aware. Tentative plan for dialysis as creatinine trending up, lactic trending up, repeated hyperkalemic episodes and dwindling UO. Intensivist and Dr Mulvihill updated throughout shift with decreasing urine output and chest tube output. Tmax 102.2F   trending down with ice packs. Update provided to patient's wife (Kelli) around 2345.  Plan of Care Reviewed With: patient  Overall Patient Progress: declining      Problem: Adult Inpatient Plan of Care  Goal: Optimal Comfort and Wellbeing  Outcome: Not Progressing  Intervention: Provide Person-Centered Care  Recent Flowsheet Documentation  Taken 10/30/2024 0400 by Charly Husain RN  Trust Relationship/Rapport: care explained  Taken 10/30/2024 0000 by Charly Husain RN  Trust Relationship/Rapport: care explained  Taken 10/29/2024 2000 by Charly Husain RN  Trust Relationship/Rapport:   care explained   emotional support provided   empathic listening provided   questions answered   questions encouraged   reassurance provided   thoughts/feelings acknowledged     Problem: Comorbidity Management  Goal: Maintenance of Heart Failure Symptom Control  Outcome: Not Progressing  Intervention: Maintain Heart Failure Management  Recent Flowsheet Documentation  Taken 10/30/2024 0400 by Charly Husain RN  Medication Review/Management: medications reviewed  Taken 10/30/2024 0000 by Charly Husain RN  Medication Review/Management: medications reviewed  Taken 10/29/2024 2000 by Charly Husain  RN  Medication Review/Management: medications reviewed     Problem: Comorbidity Management  Goal: Blood Pressure in Desired Range  Outcome: Not Progressing  Intervention: Maintain Blood Pressure Management  Recent Flowsheet Documentation  Taken 10/30/2024 0400 by Charly Husain RN  Medication Review/Management: medications reviewed  Taken 10/30/2024 0000 by Charly Husain RN  Medication Review/Management: medications reviewed  Taken 10/29/2024 2000 by Charly Husain RN  Medication Review/Management: medications reviewed     Problem: Mechanical Ventilation Invasive  Goal: Effective Communication  Outcome: Not Progressing  Intervention: Ensure Effective Communication  Recent Flowsheet Documentation  Taken 10/30/2024 0400 by Charly Husain RN  Trust Relationship/Rapport: care explained  Taken 10/30/2024 0000 by Charly Husain RN  Trust Relationship/Rapport: care explained  Taken 10/29/2024 2000 by Charly Husain RN  Family/Support System Care:   caregiver stress acknowledged   presence promoted   self-care encouraged   support provided  Trust Relationship/Rapport:   care explained   emotional support provided   empathic listening provided   questions answered   questions encouraged   reassurance provided   thoughts/feelings acknowledged  Goal: Optimal Device Function  Intervention: Optimize Device Care and Function  Recent Flowsheet Documentation  Taken 10/30/2024 0430 by Charly Husain RN  Oral Care: swabbed with sterile water  Taken 10/30/2024 0400 by Charly Husain RN  Airway Safety Measures:   all equipment/monitors on and audible   suction at bedside   oxygen flowmeter   manual resuscitator/mask/valve in room  Oral Care: swabbed with sterile water  Taken 10/30/2024 0000 by Charly Husain RN  Airway Safety Measures:   all equipment/monitors on and audible   suction at bedside   oxygen flowmeter   manual resuscitator/mask/valve in room  Oral Care: swabbed with sterile water  Taken 10/29/2024 2000 by Lisha  Charly RN  Airway Safety Measures:   all equipment/monitors on and audible   suction at bedside   oxygen flowmeter   manual resuscitator/mask/valve in room  Airway/Ventilation Management: airway patency maintained  Oral Care: swabbed with sterile water  Goal: Mechanical Ventilation Liberation  Outcome: Not Progressing  Intervention: Promote Extubation and Mechanical Ventilation Liberation  Recent Flowsheet Documentation  Taken 10/30/2024 0400 by Charly Husain, RN  Sleep/Rest Enhancement:   awakenings minimized   comfort measures   family presence promoted   noise level reduced   regular sleep/rest pattern promoted   relaxation techniques promoted   room darkened  Medication Review/Management: medications reviewed  Environmental Support: calm environment promoted  Taken 10/30/2024 0000 by Charly Husain, RN  Sleep/Rest Enhancement:   awakenings minimized   comfort measures   family presence promoted   noise level reduced   regular sleep/rest pattern promoted   relaxation techniques promoted   room darkened  Medication Review/Management: medications reviewed  Environmental Support: calm environment promoted  Taken 10/29/2024 2000 by Charly Husain, RN  Sleep/Rest Enhancement:   awakenings minimized   comfort measures   family presence promoted   noise level reduced   regular sleep/rest pattern promoted   relaxation techniques promoted   room darkened  Medication Review/Management: medications reviewed  Environmental Support: calm environment promoted  Goal: Optimal Nutrition Delivery  Outcome: Not Progressing  Intervention: Optimize Nutrition Delivery  Recent Flowsheet Documentation  Taken 10/30/2024 0400 by Charly Husain, RN  Nutrition Support Management: weight trending reviewed  Taken 10/30/2024 0000 by Charly Husain, RN  Nutrition Support Management: weight trending reviewed  Taken 10/29/2024 2000 by Charly Husain, RN  Nutrition Support Management: weight trending reviewed  Goal: Absence of Device-Related  Skin and Tissue Injury  Intervention: Maintain Skin and Tissue Health  Recent Flowsheet Documentation  Taken 10/30/2024 0400 by Charly Husain RN  Device Skin Pressure Protection:   absorbent pad utilized/changed   adhesive use limited   pressure points protected   tubing/devices free from skin contact  Taken 10/30/2024 0000 by Charly Husain RN  Device Skin Pressure Protection:   absorbent pad utilized/changed   adhesive use limited   pressure points protected   tubing/devices free from skin contact  Taken 10/29/2024 2000 by Charly Husain RN  Device Skin Pressure Protection:   absorbent pad utilized/changed   adhesive use limited   pressure points protected   tubing/devices free from skin contact  Goal: Absence of Ventilator-Induced Lung Injury  Intervention: Prevent Ventilator-Associated Pneumonia  Recent Flowsheet Documentation  Taken 10/30/2024 0600 by Charly Husain RN  Head of Bed (HOB) Positioning: HOB at 20-30 degrees  Taken 10/30/2024 0430 by Charly Husain RN  Oral Care: swabbed with sterile water  Head of Bed (HOB) Positioning: HOB at 20-30 degrees  Taken 10/30/2024 0400 by Charly Husain RN  Oral Care: swabbed with sterile water  Head of Bed (HOB) Positioning: HOB at 20-30 degrees  Taken 10/30/2024 0200 by Charly Husain RN  Head of Bed (HOB) Positioning: HOB at 20-30 degrees  Taken 10/30/2024 0000 by Charly Husain RN  Oral Care: swabbed with sterile water  Head of Bed (HOB) Positioning: HOB at 20-30 degrees  Taken 10/29/2024 2200 by Charly Husain RN  Head of Bed (HOB) Positioning: HOB at 20-30 degrees  Taken 10/29/2024 2000 by Charly Husain RN  Oral Care: swabbed with sterile water  Head of Bed (HOB) Positioning: HOB at 20-30 degrees     Problem: Mechanical Ventilation Invasive  Goal: Mechanical Ventilation Liberation  Outcome: Not Progressing  Intervention: Promote Extubation and Mechanical Ventilation Liberation  Recent Flowsheet Documentation  Taken 10/30/2024 0400 by Charly Husain  RN  Sleep/Rest Enhancement:   awakenings minimized   comfort measures   family presence promoted   noise level reduced   regular sleep/rest pattern promoted   relaxation techniques promoted   room darkened  Medication Review/Management: medications reviewed  Environmental Support: calm environment promoted  Taken 10/30/2024 0000 by Charly Husain RN  Sleep/Rest Enhancement:   awakenings minimized   comfort measures   family presence promoted   noise level reduced   regular sleep/rest pattern promoted   relaxation techniques promoted   room darkened  Medication Review/Management: medications reviewed  Environmental Support: calm environment promoted  Taken 10/29/2024 2000 by Charly Husain RN  Sleep/Rest Enhancement:   awakenings minimized   comfort measures   family presence promoted   noise level reduced   regular sleep/rest pattern promoted   relaxation techniques promoted   room darkened  Medication Review/Management: medications reviewed  Environmental Support: calm environment promoted     Problem: Mechanical Ventilation Invasive  Goal: Optimal Nutrition Delivery  Outcome: Not Progressing  Intervention: Optimize Nutrition Delivery  Recent Flowsheet Documentation  Taken 10/30/2024 0400 by Charly Husain RN  Nutrition Support Management: weight trending reviewed  Taken 10/30/2024 0000 by Charly Husain RN  Nutrition Support Management: weight trending reviewed  Taken 10/29/2024 2000 by Charly Husain RN  Nutrition Support Management: weight trending reviewed     Problem: Cardiovascular Surgery  Goal: Improved Activity Tolerance  Outcome: Not Progressing  Intervention: Optimize Tolerance for Activity  Recent Flowsheet Documentation  Taken 10/30/2024 0400 by Charly Husain RN  Environmental Support: calm environment promoted  Taken 10/30/2024 0000 by Charly Husain RN  Environmental Support: calm environment promoted  Taken 10/29/2024 2000 by Charly Husain, RN  Environmental Support: calm environment  promoted  Goal: Optimal Coping with Heart Surgery  Intervention: Support Psychosocial Response to Surgery  Recent Flowsheet Documentation  Taken 10/30/2024 0400 by Charly Husain RN  Supportive Measures:   relaxation techniques promoted   positive reinforcement provided  Taken 10/30/2024 0000 by Charly Husain RN  Supportive Measures:   relaxation techniques promoted   positive reinforcement provided  Taken 10/29/2024 2000 by Charly Husain RN  Supportive Measures:   relaxation techniques promoted   positive reinforcement provided  Family/Support System Care:   caregiver stress acknowledged   presence promoted   self-care encouraged   support provided  Goal: Absence of Bleeding  Outcome: Progressing  Intervention: Monitor and Manage Bleeding  Recent Flowsheet Documentation  Taken 10/30/2024 0400 by Charly Husain RN  Bleeding Management: dressing monitored  Taken 10/30/2024 0000 by hCarly Husain RN  Bleeding Management: dressing monitored  Taken 10/29/2024 2000 by Charly Husain RN  Bleeding Management: dressing monitored  Goal: Effective Cardiac Function  Outcome: Not Progressing  Intervention: Optimize Cardiac Output and Blood Flow  Recent Flowsheet Documentation  Taken 10/30/2024 0400 by Charly Husain RN  Dysrhythmia Management: pacing wires maintained  Taken 10/30/2024 0000 by Charly Husain RN  Dysrhythmia Management: pacing wires maintained  Taken 10/29/2024 2000 by Charly Husain RN  Dysrhythmia Management: pacing wires maintained  Goal: Optimal Cerebral Tissue Perfusion  Intervention: Protect and Optimize Cerebral Perfusion  Recent Flowsheet Documentation  Taken 10/30/2024 0600 by Charly Husain RN  Head of Bed (HOB) Positioning: HOB at 20-30 degrees  Taken 10/30/2024 0430 by Charly Husain RN  Head of Bed (HOB) Positioning: HOB at 20-30 degrees  Taken 10/30/2024 0400 by Charly Husain RN  Sensory Stimulation Regulation:   care clustered   quiet environment promoted  Glycemic Management: blood  glucose monitored  Head of Bed (HOB) Positioning: HOB at 20-30 degrees  Taken 10/30/2024 0200 by Charly Husain RN  Head of Bed (HOB) Positioning: HOB at 20-30 degrees  Taken 10/30/2024 0000 by Charly Husain RN  Sensory Stimulation Regulation:   care clustered   quiet environment promoted  Glycemic Management: blood glucose monitored  Head of Bed (HOB) Positioning: HOB at 20-30 degrees  Taken 10/29/2024 2200 by Charly Husain RN  Head of Bed (HOB) Positioning: HOB at 20-30 degrees  Taken 10/29/2024 2000 by Charly Husain RN  Glycemic Management: blood glucose monitored  Head of Bed (HOB) Positioning: HOB at 20-30 degrees  Goal: Fluid and Electrolyte Balance  Outcome: Not Progressing  Intervention: Monitor and Manage Fluid and Electrolyte Balance  Recent Flowsheet Documentation  Taken 10/30/2024 0400 by Charly Husain RN  Fluid/Electrolyte Management:   electrolyte-binding therapy initiated   fluids adjusted   intravenous fluids adjusted  Taken 10/30/2024 0000 by Charly Husain RN  Fluid/Electrolyte Management:   electrolyte-binding therapy initiated   fluids adjusted   intravenous fluids adjusted  Taken 10/29/2024 2000 by Charly Husain RN  Fluid/Electrolyte Management:   electrolyte-binding therapy initiated   fluids adjusted   intravenous fluids adjusted  Goal: Blood Glucose Level Within Targeted Range  Outcome: Progressing  Intervention: Optimize Glycemic Control  Recent Flowsheet Documentation  Taken 10/30/2024 0400 by Charly Husain RN  Glycemic Management: blood glucose monitored  Taken 10/30/2024 0000 by Charly Husain RN  Glycemic Management: blood glucose monitored  Taken 10/29/2024 2000 by Charly Husain RN  Glycemic Management: blood glucose monitored  Goal: Absence of Infection Signs and Symptoms  Intervention: Prevent or Manage Infection  Recent Flowsheet Documentation  Taken 10/30/2024 0400 by Charly Husain RN  Infection Prevention:   rest/sleep promoted   single patient room provided    hand hygiene promoted  Taken 10/30/2024 0000 by Charly Husain RN  Infection Prevention:   rest/sleep promoted   single patient room provided   hand hygiene promoted  Taken 10/29/2024 2000 by Charly Husain RN  Infection Prevention:   rest/sleep promoted   single patient room provided   hand hygiene promoted  Goal: Anesthesia/Sedation Recovery  Intervention: Optimize Anesthesia Recovery  Recent Flowsheet Documentation  Taken 10/30/2024 0400 by Charly Husain RN  Safety Promotion/Fall Prevention:   clutter free environment maintained   room organization consistent   increase visualization of patient   room door open   room near nurse's station   safety round/check completed   supervised activity   treat reversible contributory factors   treat underlying cause  Reorientation Measures: reorientation provided  Taken 10/30/2024 0000 by Charly Husain RN  Safety Promotion/Fall Prevention:   clutter free environment maintained   room organization consistent   increase visualization of patient   room door open   room near nurse's station   safety round/check completed   supervised activity   treat reversible contributory factors   treat underlying cause  Reorientation Measures: reorientation provided  Taken 10/29/2024 2000 by Charly Husain RN  Safety Promotion/Fall Prevention:   clutter free environment maintained   room organization consistent   increase visualization of patient   room door open   room near nurse's station   safety round/check completed   supervised activity   treat reversible contributory factors   treat underlying cause  Goal: Effective Urinary Elimination  Outcome: Not Progressing  Intervention: Monitor and Manage Urinary Retention  Recent Flowsheet Documentation  Taken 10/30/2024 0400 by Charly Husain RN  Urinary Elimination Promotion: catheter patency maintained  Taken 10/30/2024 0000 by Charly Husain RN  Urinary Elimination Promotion: catheter patency maintained  Taken 10/29/2024 2000 by  Lisha, Charly, RN  Urinary Elimination Promotion: catheter patency maintained  Goal: Effective Oxygenation and Ventilation  Intervention: Promote Airway Secretion Clearance  Recent Flowsheet Documentation  Taken 10/30/2024 0400 by Charly Husain RN  Cough And Deep Breathing: unable to perform  Taken 10/30/2024 0000 by Charly Husain RN  Cough And Deep Breathing: unable to perform  Taken 10/29/2024 2000 by Charly Husain RN  Cough And Deep Breathing: unable to perform  Airway/Ventilation Management: airway patency maintained  Intervention: Optimize Oxygenation and Ventilation  Recent Flowsheet Documentation  Taken 10/30/2024 0400 by Charly Husain RN  Chest Tube Safety:   all connections secured   suction checked  Taken 10/30/2024 0000 by Charly Husain RN  Chest Tube Safety:   all connections secured   suction checked  Taken 10/29/2024 2000 by Charly Husain RN  Chest Tube Safety:   all connections secured   suction checked     Problem: Cardiovascular Surgery  Goal: Absence of Bleeding  Outcome: Progressing  Intervention: Monitor and Manage Bleeding  Recent Flowsheet Documentation  Taken 10/30/2024 0400 by Charly Husain RN  Bleeding Management: dressing monitored  Taken 10/30/2024 0000 by Charly Husain RN  Bleeding Management: dressing monitored  Taken 10/29/2024 2000 by Charly Husain RN  Bleeding Management: dressing monitored     Problem: Cardiovascular Surgery  Goal: Effective Cardiac Function  Outcome: Not Progressing  Intervention: Optimize Cardiac Output and Blood Flow  Recent Flowsheet Documentation  Taken 10/30/2024 0400 by Charly Husain RN  Dysrhythmia Management: pacing wires maintained  Taken 10/30/2024 0000 by Charly Husain RN  Dysrhythmia Management: pacing wires maintained  Taken 10/29/2024 2000 by Charly Husain RN  Dysrhythmia Management: pacing wires maintained     Problem: Cardiovascular Surgery  Goal: Fluid and Electrolyte Balance  Outcome: Not Progressing  Intervention: Monitor  and Manage Fluid and Electrolyte Balance  Recent Flowsheet Documentation  Taken 10/30/2024 0400 by Charly Husain RN  Fluid/Electrolyte Management:   electrolyte-binding therapy initiated   fluids adjusted   intravenous fluids adjusted  Taken 10/30/2024 0000 by Charly Husain RN  Fluid/Electrolyte Management:   electrolyte-binding therapy initiated   fluids adjusted   intravenous fluids adjusted  Taken 10/29/2024 2000 by Charly Husain RN  Fluid/Electrolyte Management:   electrolyte-binding therapy initiated   fluids adjusted   intravenous fluids adjusted     Problem: Cardiovascular Surgery  Goal: Blood Glucose Level Within Targeted Range  Outcome: Progressing  Intervention: Optimize Glycemic Control  Recent Flowsheet Documentation  Taken 10/30/2024 0400 by Charly Husain RN  Glycemic Management: blood glucose monitored  Taken 10/30/2024 0000 by Charly Husain RN  Glycemic Management: blood glucose monitored  Taken 10/29/2024 2000 by Charly Husain RN  Glycemic Management: blood glucose monitored     Problem: Cardiovascular Surgery  Goal: Effective Urinary Elimination  Outcome: Not Progressing  Intervention: Monitor and Manage Urinary Retention  Recent Flowsheet Documentation  Taken 10/30/2024 0400 by Charly Husain RN  Urinary Elimination Promotion: catheter patency maintained  Taken 10/30/2024 0000 by Charly Husain RN  Urinary Elimination Promotion: catheter patency maintained  Taken 10/29/2024 2000 by Charly Husain RN  Urinary Elimination Promotion: catheter patency maintained     Problem: Restraint, Nonviolent  Goal: Absence of Harm or Injury  Outcome: Progressing  Intervention: Protect Dignity, Rights and Personal Wellbeing  Recent Flowsheet Documentation  Taken 10/30/2024 0400 by Charly Husain RN  Trust Relationship/Rapport: care explained  Taken 10/30/2024 0000 by Charly Husain RN  Trust Relationship/Rapport: care explained  Taken 10/29/2024 2000 by Charly Husain RN  Trust  Relationship/Rapport:   care explained   emotional support provided   empathic listening provided   questions answered   questions encouraged   reassurance provided   thoughts/feelings acknowledged  Intervention: Protect Skin and Joint Integrity  Recent Flowsheet Documentation  Taken 10/30/2024 0600 by Charly Husain RN  Body Position:   turned   heels elevated   legs elevated   upper extremity elevated  Taken 10/30/2024 0430 by Charly Husain RN  Body Position:   turned   heels elevated   legs elevated   upper extremity elevated  Taken 10/30/2024 0400 by Charly Husain RN  Body Position:   turned   heels elevated   legs elevated   upper extremity elevated  Skin Protection:   adhesive use limited   incontinence pads utilized  Taken 10/30/2024 0200 by Charly Husain RN  Body Position:   turned   heels elevated   legs elevated   upper extremity elevated  Taken 10/30/2024 0000 by Charly Husain RN  Body Position:   turned   heels elevated   legs elevated   upper extremity elevated  Skin Protection:   adhesive use limited   incontinence pads utilized  Taken 10/29/2024 2200 by Charly Husain RN  Body Position:   turned   heels elevated   legs elevated   upper extremity elevated  Taken 10/29/2024 2000 by Charly Husain RN  Body Position:   turned   heels elevated   legs elevated   upper extremity elevated  Skin Protection:   adhesive use limited   incontinence pads utilized

## 2024-10-30 NOTE — PROCEDURES
Procedure: Left femoral arterial line and central line placement   Patient: Alessio Teixeira   MRN: [id number]   Date: October 30, 2024     Indication: Hypotension, shock, vascular access, blood pressure monitoring     EBL: 2 ml     Local anesthesia: 1% lidocaine, 10 ml     The patient's wife consented to the procedure.     The left groin was prepped and draped in the usual sterile fashion. The femoral vein was accessed with a needle and a wire was advanced into the vein. The needle was removed. A skin knick was made. The skin was dilated. The catheter was advanced over the needle into the vein and sutured in place. All ports flushed and aspirated easily.     Next, the left femoral artery was visualized and accessed with a needle. A wire was advanced into the artery and the needle was removed. The catheter was advanced over the wire and the wire was removed. There was good pulsatile blood flow. The catheter was hooked up to the transducer and there was an appropriate waveform. This was sutured in place. Both lines were dressed with a Biopatch and Tegaderm.     The patient tolerated the procedure     Carla Dubois MD   SICU Fellow

## 2024-10-30 NOTE — PROGRESS NOTES
[facility] ICU RESPIRATORY NOTE        Date of Admission: 10/28/2024    Date of Intubation (most recent): 10/28/24     Reason for Mechanical Ventilation: Heart surgery     Number of Days on Mechanical Ventilation: 2    Met Criteria for Spontaneous Breathing Trial: No    Reason for No Spontaneous Breathing Trial: Per MD    Significant Events Today: None    ABG Results:   Recent Labs   Lab 10/30/24  1219 10/30/24  0804 10/30/24  0802 10/30/24  0048 10/29/24  1633 10/29/24  1221   PH 7.22* 7.23*  --  7.27*  --  7.31*   PCO2 37 38  --  38  --  41   PO2 96 107*  --  109*  --  123*   HCO3 15* 16*  --  18*  --  21   O2PER 30  30 30 30 30  30   < > 30    < > = values in this interval not displayed.         Current Vent Settings: FiO2 (%): 30 %, Resp: 19, Vent Mode: CMV/AC, Resp Rate (Set): 18 breaths/min, Tidal Volume (Set, mL): 500 mL, PEEP (cm H2O): 5 cmH2O, Resp Rate (Set): 18 breaths/min, Tidal Volume (Set, mL): 500 mL, PEEP (cm H2O): 5 cmH2O    Skin Assessment: Intact    Plan: Continue with current plan of care. Will wean and assess as able.     Alfreda Wright RT on 10/30/2024 at 2:15 PM

## 2024-10-30 NOTE — PROGRESS NOTES
ICU Progress Note   Date of Service: 10/30/24     Assessment and Plan:  88 year old M with a history of severe 3 vessel CAD. Status post sternotomy, CABGx3, modified MAZE procedure, PAYAM ligation with Dr. Mulvihill 10/28/24. Post operative hemorrhage with RTOR on POD0 for evacuation of mediastinal blood clot causing tamponade physiology.     Interval events:   Worsening lactic acidosis   Angiotensin 40   Epinephrine 0.02   Norepinephrine 0.04  Vasopressin 2.3   Decreased UOP 10 ml/hr   Tmax 102.2     Today:   Echo   HD catheter placement, start dialysis     Neuro:  # Sedation for mechanical ventilation  Propofol, wean as able   RASS goal 0 to -1    Pain control: Tylenol, oxycodone, dilaudid     CV:  #Hx of atrial fibrillation and distant PE on Eliquis   #Hx NSTEMI   #Severe multivessel CAD   Status post sternotomy, CABGx3, modified MAZE procedure, PAYAM ligation with Dr. Mulvihill 10/28/24. Post operative hemorrhage with RTOR on POD0 for evacuation of mediastinal blood clot causing tamponade physiology.     Pre-operative workup:   Angiogram showed an occluded RCA, ost LAD to mid LAD of 80% and mid circ to distal circ with 99%. Echocardiogram showed an EF of 35-40%. The RV systolic function was normal. There was also mild mitral regurg.    #Shock, cardiogenic, vasoplegic, ?hypovolemic   Aylin   Flotrack with second arterial line     - Echo today 10/30/24: Left ventricle is normal in size. Biplane LVEF 35%. There is moderate global hypokinesia of the left ventricle. Limited view of the RV.   - Cardiology consult per CVTS    Plan:   SBP goal <140   MAP goal >65. Wean pressors as able.   Cardiac meds per CVTS  Aspirin 162 mg   PTA atorvastatin - resume per CVTS   PTA torsemide - hold     Pulm:  # Acute hypoxemic respiratory failure  - low tidal volume ventilation  - wean PEEP/FiO2, as able    AM CXR - left internal jugular HD catheter in appropriate position.   Will advance ETT    GI:  #Hx upper GIB, gastritis   Recent  "admission discharged 9/16/2024. Endoscopy revealing gastritis.   PTA pantoprazole     Hold feeds with pressor requirements   Bowel regimen     Renal:  Pre operative cr 1.8   Now 3.76   Now anuric, nephrology following   CRRT today     #Acute kidney injury, likely hypovolemic/pre-renal   #Lactic acidosis   Multifactorial, discussed with CVTS at length   Secondary to epinephrine vs tissue hypoperfusion   Continue to monitor. Per CVTS, if continues to increase tonight will consider CT abdomen/pelvis.     #Hypermagnesemia   #Hyperphosphatemia   #Hyperkalemia  #Hypocalcemia   Nephrology consult   CRRT now     ID:  WBC not reliable with CLL   Afebrile (but on CRRT)     Cultures:   Urine 10/30/24 in process   Sputum 10/30/24 needs to be collected   Blood 10/29/24 NGTD     Now POD2, low threshold for antibiotics if worsens.     Heme:  Heparin ppx   Hgb 9.9   Plt 361  INR 3.20 (discussed with CVTS, hold FFP)     #Hx CLL   Follows with Dr. Raymundo hematology   Follow leukocytosis     MSK:   #Hx Myasthenia gravis     Endo:  No history of DM   A1C 4.5   Insulin gtt off. Medium sliding scale.     PPx:  1. DVT: heparin    2. VAP: HOB 30 degrees, chlorhexidine rinse  3. Stress Ulcer: PPI  4. Restraints: Nonviolent soft two point restraints required and necessary for patient safety and continued cares and good effect as patient continues to pull at necessary lines, tubes despite education and distraction. Will readdress daily.   5. Wound care - per unit routine   6. Feeding - hold with pressor requirements.   7. Family updated by operative team     Dispo: ICU    /74   Pulse 89   Temp 99.1  F (37.3  C)   Resp 19   Ht 1.778 m (5' 10\")   Wt 105.1 kg (231 lb 11.3 oz)   SpO2 94%   BMI 33.25 kg/m      FiO2 (%): 30 %, Resp: 19, Vent Mode: CMV/AC, Resp Rate (Set): 18 breaths/min, Tidal Volume (Set, mL): 500 mL, PEEP (cm H2O): 5 cmH2O, Resp Rate (Set): 18 breaths/min, Tidal Volume (Set, mL): 500 mL, PEEP (cm H2O): 5 " cmH2O      I/Os  +13,000      Physical Exam:  In bed   Sedated, intubated   Pupils 2 mm equal and reactive to light   Chest rise equal bilaterally   CT with thin bloody output, no air leak, to suction   Amos with purple urine (cyanokit)   Abdomen soft, ND   Feet cool to the touch. Bilateral PT and DP with multiphasic doppler signals.   Lower extremities minimal edema     Labs: reviewed  All labs reviewed   Increased LFTS  Lactic acidos  Metabolic acidosis     Imaging: reviewed  AM CXR reviewed     Past Medical/Surgical history   Atrial fibrillation   Gout   GERD   Gastritis   Bilateral PE in 2007   Myasthenia gravis   HTN   Meningioma   MARTHA   Spinal stenosis     Family history   Not discussed today     Social history   Not discussed today     Carla Dubois MD   SICU Fellow

## 2024-10-30 NOTE — PROGRESS NOTES
SUBJECTIVE:   CC: Waqar Ariza is an 65 year old male who presents for preventive health visit.     Healthy Habits:    Do you get at least three servings of calcium containing foods daily (dairy, green leafy vegetables, etc.)? yes    Amount of exercise or daily activities, outside of work: 7 day(s) per week    Problems taking medications regularly No    Medication side effects: yes-previous statin side effects    Have you had an eye exam in the past two years? yes    Do you see a dentist twice per year? yes    Do you have sleep apnea, excessive snoring or daytime drowsiness?no        HEARING FREQUENCY    Right Ear:      1000 Hz RESPONSE- on Level:pas   1000 Hz: RESPONSE- on Level: pass   2000 Hz: RESPONSE- on Level: fail   4000 Hz: RESPONSE- on Level: pass    Left Ear:      4000 Hz: RESPONSE- on Level: pass   2000 Hz: RESPONSE- on Level: pass   1000 Hz: RESPONSE- on Level: pass    500 Hz: RESPONSE- on Level: pass    Right Ear:    500 Hz: RESPONSE- on Level: pass    Hearing Acuity: Pass    Hearing Assessment: normal      PROBLEMS TO ADD ON...  -------------------------------------    Today's PHQ-2 Score:   PHQ-2 ( 1999 [employer]) 6/1/2020 5/31/2019   Q1: Little interest or pleasure in doing things 0 0   Q2: Feeling down, depressed or hopeless 0 0   PHQ-2 Score 0 0       Abuse: Current or Past(Physical, Sexual or Emotional)- No  Do you feel safe in your environment? Yes    Have you ever done Advance Care Planning? (For example, a Health Directive, POLST, or a discussion with a medical provider or your loved ones about your wishes): Yes, patient states has an Advance Care Planning document and will bring a copy to the clinic.       Social History     Tobacco Use     Smoking status: Never Smoker     Smokeless tobacco: Never Used   Substance Use Topics     Alcohol use: Yes     Comment: special occasions only     If you drink alcohol do you typically have >3 drinks per day or >7 drinks per week? No                     [facility]  Cardiovascular and Thoracic Surgery Daily Note    Today's Plans:  Start CRRT  STAT echo  Cards consult  Monitor LFTs/Lact/MACI      Assessment and Plan  POD # 1 s/p CABG x3 (LIMA-LAD, V-dRCA, V-OM), 2. Endoscopic Vein [place], 3. Epiaortic Echo  4. Occlusion of left atrial appendage (50 mm Atriclip), 5. Modified left atrial MAZE (encompass clamp) on 10/28/24 with Dr. Mulvihill    Taken back to OR pod#0 for Mediastinal re-exploration for post-operative hemorrhage, JARVIS. EBL 1200    - CVS: Pre-op TTE with mod global hypokinesia, EF 35-40%. Postop vasoplegic and hypovolemic now appearing cardiogenic shock-switches to gtts previously 40 angiotensin, 0.5 epi 0.06 norepi, now 0.1 epi, 20 angiotensin, off vaso and norepi. ASA, BB on hold, statin on hold given elevated LFTs, sub q heparin this evening, Chest tubes: draining. TPW: capped, currently A paced, +permanent a.fib, has had intermittent runs of V tach, amiodarone inc to 1 mg/hr with inc in epi, received 4 L fluid yesterday.   - Stat echo this am for function, fluid status  - Start CRRT for elevated K  - Appreciate Cardiology consult/further recs    - Resp: Postoperative mechanical ventilation. Intubated, IS, pulmonary toilet.    - Neuro: Neuro intact, pain controlled on current regimen, HX myasthenia gravis    - Renal: No history of significant renal disease. Cr 3.76<3.34, BL 1.5-1.8. Trend BMP. Diuretic as above.  Neph following along, CRRT this am  Recent Labs   Lab 10/30/24  0421 10/29/24  2221 10/29/24  1755   CR 3.76* 3.34* 3.10*       - GI: -BM, -flatus, continue bowel regimen, on PPI has history of GI bleed, hx of perforated diverticulitis    - : +Amos, 5-10 ml/hr UO    - Endo: Postop stress hyperglycemia. Insulin infusion    Hemoglobin A1C   Date Value Ref Range Status   10/09/2024 4.5 <5.7 % Final     Comment:     Normal <5.7%   Prediabetes 5.7-6.4%    Diabetes 6.5% or higher     Note: Adopted from ADA consensus guidelines.         - FEN: Replace electrolytes as needed. NPO while intubated.     - ID: Postop leukocytosis. Afebrile, Temp (24hrs), Av.5  F (38.6  C), Min:100.4  F (38  C), Max:102.2  F (39  C). WBC 54.7<62.7. Periop abx prophylaxis completed. Trend CBC and fever curve.   Recent Labs   Lab 10/30/24  0421 10/29/24  0601 10/29/24  0204   WBC 54.7* 62.7*  62.7* 61.8*       - Heme: Acute blood loss anemia and thrombocytopenia due to surgery. Hgb -transfused cas op, 1 U PRBC 10/29. Trend CBC, transfuse PRN. Has hx of retroperitoneal bleed, received multiple blood products cas op, workup started last hospitalization for CCL, heme/onc saw pt-myeloproliferative neoplasm JAK2 positive, recommend f/up with Dr. Raymundo, bone marrow bx as outpt.   Recent Labs   Lab 10/30/24  0421 10/29/24  1324 10/29/24  0601 10/29/24  0204   HGB 9.9*  9.7* 8.1* 8.3* 7.9*     --  309 [id number]       - Proph: SCD, subcutaneous heparin, PPI    - Other:  Clinically Significant Risk Factors        # Hyperkalemia: Highest K = 6.5 mmol/L in last 2 days, will monitor as appropriate  # Hypernatremia: Highest Na = 148 mmol/L in last 2 days, will monitor as appropriate  # Hyperchloremia: Highest Cl = 111 mmol/L in last 2 days, will monitor as appropriate      # Hypocalcemia: Lowest iCa = 4 mg/dL in last 2 days, will monitor and replace as appropriate    # Anion Gap Metabolic Acidosis: Highest Anion Gap = 25 mmol/L in last 2 days, will monitor and treat as appropriate  # Hypoalbuminemia: Lowest albumin = 3 g/dL at 10/28/2024 11:05 PM, will monitor as appropriate    # Coagulation Defect: INR = 3.20 (Ref range: 0.85 - 1.15) and/or PTT = 34 Seconds (Ref range: 22 - 38 Seconds), will monitor for bleeding   # Acute Kidney Injury, unspecified: based on a >150% or 0.3 mg/dL increase in last creatinine compared to past 90 day average, will monitor renal function  # Hypertension: Noted on problem list  # 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 Reviewed health maintenance and updated orders accordingly - Yes  Patient Active Problem List   Diagnosis     Adenomatous polyp of colon     Advanced directives, counseling/discussion     Mitral valve prolapse     Precordial pain     Abnormal cardiovascular stress test     Nonrheumatic mitral valve regurgitation     CAD (coronary artery disease)     Past Surgical History:   Procedure Laterality Date     CATARACT IOL, RT/LT  2004, 2009    Cataract IOL RT/LT     retinal detachment  2005       Social History     Tobacco Use     Smoking status: Never Smoker     Smokeless tobacco: Never Used   Substance Use Topics     Alcohol use: Yes     Comment: special occasions only     Family History   Problem Relation Age of Onset     Hypertension Mother         older in 70s           Reviewed and updated as needed this visit by clinical staff  Tobacco  Allergies  Meds         Reviewed and updated as needed this visit by Provider        Past Medical History:   Diagnosis Date     Abnormal stress test 10/28/2019     Adenomatous polyp of colon 2007    q 5 year colonoscopy     Chest pain      Mitral valve disorder 11/24/2014     Problem list name updated by automated process. Provider to review     Right fibular fracture 3/2013      Past Surgical History:   Procedure Laterality Date     CATARACT IOL, RT/LT  2004, 2009    Cataract IOL RT/LT     retinal detachment  2005       ROS:  CONSTITUTIONAL: NEGATIVE for fever, chills, change in weight  INTEGUMENTARY/SKIN: NEGATIVE for worrisome rashes, moles or lesions  EYES: NEGATIVE for vision changes or irritation  ENT: NEGATIVE for ear, mouth and throat problems  RESP: NEGATIVE for significant cough or SOB  CV: NEGATIVE for chest pain, palpitations or peripheral edema  GI: NEGATIVE for nausea, abdominal pain, heartburn, or change in bowel habits   male: negative for dysuria, hematuria, decreased urinary stream,  "erectile dysfunction, urethral discharge  MUSCULOSKELETAL: NEGATIVE for significant arthralgias or myalgia  NEURO: NEGATIVE for weakness, dizziness or paresthesias  PSYCHIATRIC: NEGATIVE for changes in mood or affect    OBJECTIVE:   /80   Pulse 76   Temp 98.1  F (36.7  C)   Resp 16   Ht 1.842 m (6' 0.5\")   Wt 87 kg (191 lb 12.8 oz)   SpO2 99%   BMI 25.66 kg/m    EXAM:  GENERAL: healthy, alert and no distress  EYES: Eyes grossly normal to inspection, PERRL and conjunctivae and sclerae normal  HENT: ear canals and TM's normal, nose and mouth without ulcers or lesions  NECK: no adenopathy, no asymmetry, masses, or scars and thyroid normal to palpation  RESP: lungs clear to auscultation - no rales, rhonchi or wheezes  CV: regular rate and rhythm, normal S1 S2, no S3 or S4, no murmur, click or rub, no peripheral edema and peripheral pulses strong  ABDOMEN: soft, nontender, no hepatosplenomegaly, no masses and bowel sounds normal  MS: no gross musculoskeletal defects noted, no edema  SKIN: no suspicious lesions or rashes  NEURO: Normal strength and tone, mentation intact and speech normal  PSYCH: mentation appears normal, affect normal/bright    Diagnostic Test Results:  Labs reviewed in Epic    ASSESSMENT/PLAN:   Waqar was seen today for physical.    Diagnoses and all orders for this visit:    Routine general medical examination at a health care facility    Coronary artery disease involving native coronary artery of native heart without angina pectoris  Very mild, some calcium on the CT,    Hypercholesteremia  MIld elevation and unable to tolerate statins.    Colon polyp due next in 22.    Did get shingles vaccine.      COUNSELING:  Reviewed preventive health counseling, as reflected in patient instructions       Healthy diet/nutrition    Estimated body mass index is 25.66 kg/m  as calculated from the following:    Height as of this encounter: 1.842 m (6' 0.5\").    Weight as of this encounter: 87 kg (191 lb " "with EF <40%   # Acute Hypercapnic Respiratory Failure: based on arterial blood gas results.  Continue supplemental oxygen and ventilatory support as indicated.  # Acute Hypercapnic Respiratory Failure: based on venous blood gas results.  Continue supplemental oxygen and ventilatory support as indicated.         # Obesity: Estimated body mass index is 33.25 kg/m  as calculated from the following:    Height as of this encounter: 1.778 m (5' 10\").    Weight as of this encounter: 105.1 kg (231 lb 11.3 oz)., PRESENT ON ADMISSION      # History of CABG: noted on surgical history       - Dispo: ICU. Therapies recommending discharge to TCU when medically ready. Medically Ready for Discharge: Anticipated in 5+ Days        Interval History  Lying in bed, intubated/sedated, did rouse with stopping of propofol and tried to go for ETT but did not follow commands.       Medications  Current Facility-Administered Medications   Medication Dose Route Frequency Provider Last Rate Last Admin    aspirin (ASA) chewable tablet 162 mg  162 mg Oral or NG Tube Daily Clarence Bone MD   162 mg at 10/29/24 0811    dextrose 10% BOLUS 300 mL  300 mL Intravenous Once Nasir Vallecillo MD 75 mL/hr at 10/30/24 0530 300 mL at 10/30/24 0530    heparin ANTICOAGULANT injection 5,000 Units  5,000 Units Subcutaneous Q8H Opal Nguyen PA-C   5,000 Units at 10/30/24 0229    insulin aspart (NovoLOG) injection (RAPID ACTING)  1-7 Units Subcutaneous Q4H Sb Arthur PA-C        pantoprazole (PROTONIX) 2 mg/mL suspension 40 mg  40 mg Oral or NG Tube Daily Clarence Bone MD   40 mg at 10/29/24 0811    Or    pantoprazole (PROTONIX) EC tablet 40 mg  40 mg Oral Daily Clarence Bone MD        polyethylene glycol (MIRALAX) Packet 17 g  17 g Oral Daily Clarence Bone MD   17 g at 10/29/24 0811    senna-docusate (SENOKOT-S/PERICOLACE) 8.6-50 MG per tablet 1 tablet  1 tablet Oral BID Clarence Bone MD   1 tablet at 10/29/24 " 12.8 oz).         reports that he has never smoked. He has never used smokeless tobacco.      Counseling Resources:  ATP IV Guidelines  Pooled Cohorts Equation Calculator  FRAX Risk Assessment  ICSI Preventive Guidelines  Dietary Guidelines for Americans, 2010  USDA's MyPlate  ASA Prophylaxis  Lung CA Screening    Surinder Catherine MD  [facility]   2149    sodium chloride (PF) 0.9% PF flush 3 mL  3 mL Intracatheter Q8H Clarence Bone MD   3 mL at 10/30/24 0616     Current Facility-Administered Medications   Medication Dose Route Frequency Provider Last Rate Last Admin    [START ON 10/31/2024] acetaminophen (TYLENOL) tablet 650 mg  650 mg Oral Q4H PRN Clarence Bone MD        albumin human 5 % injection 250 mL  250 mL Intravenous Once May Repeat x1 Opal Nguyen PA-C   250 mL at 10/29/24 1306    [START ON 10/31/2024] bisacodyl (DULCOLAX) suppository 10 mg  10 mg Rectal Daily PRN Clarence Bone MD        calcium gluconate 1 g in 50 mL in sodium chloride intermittent infusion  1 g Intravenous Once PRN Clarence Bone MD   1 g at 10/30/24 0636    calcium gluconate 2 g in  mL intermittent infusion  2 g Intravenous Once PRN Clarence Bone MD        calcium gluconate 3 g in sodium chloride 0.9 % 100 mL intermittent infusion  3 g Intravenous Once PRN Clarence Bone MD        glucose gel 15-30 g  15-30 g Oral Q15 Min PRN Nasir Vallecillo MD        Or    dextrose 50 % injection 25-50 mL  25-50 mL Intravenous Q15 Min PRN Nasir Vallecillo MD        Or    glucagon injection 1 mg  1 mg Subcutaneous Q15 Min PRN Nasir Vallecillo MD        glucose gel 15-30 g  15-30 g Oral Q15 Min PRN Sb Arthur PA-C        Or    dextrose 50 % injection 25-50 mL  25-50 mL Intravenous Q15 Min PRN Sb Arthur PA-C        Or    glucagon injection 1 mg  1 mg Subcutaneous Q15 Min PRN Sb Arthur PA-C        EPINEPHrine (ADRENALIN) 5 mg in  mL infusion  0.01-0.1 mcg/kg/min Intravenous Continuous PRN Clarence Bone MD 28.7 mL/hr at 10/30/24 0800 0.1 mcg/kg/min at 10/30/24 0800    hydrALAZINE (APRESOLINE) injection 10 mg  10 mg Intravenous Q30 Min PRN Clarence Bone MD        HYDROmorphone (DILAUDID) injection 0.1 mg  0.1 mg Intravenous Q2H PRN Clarence Bone MD        Or    HYDROmorphone (DILAUDID) injection 0.2  mg  0.2 mg Intravenous Q2H PRN Clarence Bone MD        lidocaine (LMX4) cream   Topical Q1H PRN Clarence Bone MD        lidocaine 1 % 0.1-1 mL  0.1-1 mL Other Q1H PRN Clarence Bone MD        magnesium hydroxide (MILK OF MAGNESIA) suspension 30 mL  30 mL Oral Daily PRN Clarence Bone MD        propofol (DIPRIVAN) bolus from bag or syringe pump  10 mg Intravenous Q15 Min PRN Tyra Dubois MD        And    Medication Instruction   Does not apply Continuous PRN Tyra Dubois MD        methocarbamol (ROBAXIN) tablet 500 mg  500 mg Oral Q6H PRN Clarence Bone MD   500 mg at 10/28/24 1634    naloxone (NARCAN) injection 0.2 mg  0.2 mg Intravenous Q2 Min PRN Mulvihill, Michael, MD        Or    naloxone (NARCAN) injection 0.4 mg  0.4 mg Intravenous Q2 Min PRN Mulvihill, Michael, MD        Or    naloxone (NARCAN) injection 0.2 mg  0.2 mg Intramuscular Q2 Min PRN Mulvihill, Michael, MD        Or    naloxone (NARCAN) injection 0.4 mg  0.4 mg Intramuscular Q2 Min PRN Mulvihill, Michael, MD        norepinephrine (LEVOPHED) 4 mg in  mL infusion PREMIX  0.01-0.15 mcg/kg/min Intravenous Continuous PRN Clarence Bone MD   Stopped at 10/30/24 0824    ondansetron (ZOFRAN ODT) ODT tab 4 mg  4 mg Oral Q6H PRN Clarence Bone MD        Or    ondansetron (ZOFRAN) injection 4 mg  4 mg Intravenous Q6H PRN Clarence Bone MD        oxyCODONE IR (ROXICODONE) half-tab 2.5 mg  2.5 mg Oral Q4H PRN Clarence Bone MD   2.5 mg at 10/29/24 1705    Or    oxyCODONE (ROXICODONE) tablet 5 mg  5 mg Oral Q4H PRN Clarence Bone MD   5 mg at 10/29/24 2149    prochlorperazine (COMPAZINE) injection 5 mg  5 mg Intravenous Q6H PRN Clarence Bone MD        Or    prochlorperazine (COMPAZINE) tablet 5 mg  5 mg Oral Q6H PRN Clarence Bone MD        Reason beta blocker order not selected   Does not apply DOES NOT GO TO lCarence Olguin MD        sodium  "chloride (PF) 0.9% PF flush 3 mL  3 mL Intracatheter q1 min prn Clarence Bone MD        vasopressin 0.2 units/mL in NS (PITRESSIN) standard conc infusion  0.5-4 Units/hr Intravenous Continuous PRN Clarence Bone MD   Stopped at 10/30/24 0812         Physical Exam  Vitals were reviewed  Blood pressure 115/74, pulse 70, temperature 100.4  F (38  C), resp. rate 19, height 1.778 m (5' 10\"), weight 105.1 kg (231 lb 11.3 oz), SpO2 94%.  Rhythm: paced    Lungs: course breath sounds    Cardiovascular: A paced at 90, no m/r/g    Abdomen: soft, NT, ND, +BS    Extremeties: warm, no LE edema, toes/fingers dusky    Incision: CDI    CT: 1.3L serosang output, no air leak    Weight:   Vitals:    10/28/24 0551 10/29/24 0401 10/30/24 0430   Weight: 95.8 kg (211 lb 3.2 oz) 100.3 kg (221 lb 1.9 oz) 105.1 kg (231 lb 11.3 oz)         Data  Recent Labs   Lab 10/30/24  0817 10/30/24  0802 10/30/24  0600 10/30/24  0526 10/30/24  0421 10/30/24  0044 10/29/24  2221 10/29/24  2117 10/29/24  2011 10/29/24  1819 10/29/24  1755 10/29/24  1353 10/29/24  1324 10/29/24  0954 10/29/24  0951 10/29/24  0806 10/29/24  0601 10/29/24  0313 10/29/24  0204 10/28/24  2311 10/28/24  2305   WBC  --   --   --   --  54.7*  --   --   --   --   --   --   --   --   --   --   --  62.7*  62.7*  --  61.8*  --  67.6*   HGB  --   --   --   --  9.9*  9.7*  --   --   --   --   --   --   --  8.1*  --   --   --  8.3*  --  7.9*  --  7.6*   MCV  --   --   --   --  92  --   --   --   --   --   --   --   --   --   --   --  90  --  91  --  92   PLT  --   --   --   --  361  --   --   --   --   --   --   --   --   --   --   --  309  --  268  --  273   INR  --  3.20*  --   --   --   --   --   --   --   --   --   --   --   --  1.74*  --  1.73*  --  1.70*  --  1.92*   NA  --   --   --   --  147*  --  142  --   --   --  148*  --   --   --   --   --  148*  --   --   --  148*   POTASSIUM  --   --   --   --  6.5*  6.4*  --  5.3  --  6.3*  --  6.2*  --   --   --   --   --  " 4.9  4.9  --   --   --  4.1   CHLORIDE  --   --   --   --  106  --  105  --   --   --  108*  --   --   --   --   --  109*  --   --   --  111*   CO2  --   --   --   --  16*  --  19*  --   --   --  17*  --   --   --   --   --  19*  --   --   --  22   BUN  --   --   --   --  65.8*  --  62.0*  --   --   --  60.8*  --   --   --   --   --  54.5*  --   --   --  47.3*   CR  --   --   --   --  3.76*  --  3.34*  --   --   --  3.10*  --   --   --   --   --  2.50*  --   --   --  2.28*   ANIONGAP  --   --   --   --  25*  --  18*  --   --   --  23*  --   --   --   --   --  20*  --   --   --  15   TATE  --   --   --   --  7.6*  --  7.7*  --   --   --  7.7*  --   --   --   --   --  8.3*  --   --   --  8.4*   *  --  184* 104* 106*   < > 166*   < >  --    < > 70   < >  --    < >  --    < > 123*   < >  --    < > 80   ALBUMIN  --   --   --   --  3.4*  --   --   --   --   --  3.5  --   --   --   --   --  3.3*  --   --   --  3.0*   PROTTOTAL  --   --   --   --  5.2*  --   --   --   --   --  5.3*  --   --   --   --   --  5.2*  --   --   --  4.5*   BILITOTAL  --   --   --   --  0.9  --   --   --   --   --  0.6  --   --   --   --   --  0.3  --   --   --  0.4   ALKPHOS  --   --   --   --  88  --   --   --   --   --  70  --   --   --   --   --  69  --   --   --  71   ALT  --   --   --   --  1,484*  --   --   --   --   --  308*  --   --   --   --   --  155*  --   --   --  136*   AST  --   --   --   --   --   --   --   --   --   --   --   --   --   --   --   --  412*  --   --   --  298*    < > = values in this interval not displayed.       Imaging:  Recent Results (from the past 24 hours)   XR Chest Port 1 View    Narrative    XR CHEST PORT 1 VIEW  10/29/2024 9:50 AM       INDICATION: chest tubes, ett, post cardiac surgery with bleeding,  interval change  COMPARISON: Chest x-ray 10/28/2024       Impression    IMPRESSION: Endotracheal tube with its tip above the level of the  aortic arch, approximately 6 cm superior to the dayanna.  Partially  visualized enteric tube. Right internal jugular Romayor-Garrett catheter  with the tip in the region of the main pulmonary artery. Stable left  atrial appendage surgical clip. Sternotomy. Large bore bilateral chest  tubes. Cardiac silhouette size is unchanged. Indistinct visualization  of the bilateral hemidiaphragms likely due to layering, small pleural  effusions. Subsegmental atelectasis versus linear fibrosis of the mid  aspect of the bilateral lungs.    ELLIOTT ANDREW MD         SYSTEM ID:  [id number]   XR Chest Port 1 View    Narrative    CHEST ONE VIEW PORTABLE  10/30/2024 7:55 AM       INDICATION: Fevers, chest tubes, interval change.    COMPARISON: 10/29/2024       Impression    IMPRESSION: Endotracheal tube above the dayanna. NG tube tip is not  seen. Romayor-Garrett catheter tip in the region of the pulmonary outflow  tract. Mediastinal and bilateral chest tubes present. No significant  pleural effusion. No pneumothorax. Minimal atelectasis. Lungs  otherwise clear.         Patient seen and discussed with Dr. Tracey, discussed with Dr. Melanie Nguyen PA-C  Cardiothoracic Surgery  Available for paging [phone] (personal pager or CV Surgery Rounding Pager)  Personal Pager: [phone]  CV Surgery Rounding Pager: [phone]  After hours please page surgeon on-call

## 2024-10-30 NOTE — PROCEDURES
Procedure: Left internal jugular hemodialysis catheter placement   MRN: [id number]   Name: Alessio Teixeira     Indication: Shock, hemodynamic instability, kidney failure, need for CRRT     EBL: 5 ml     Local anesthesia: 5 ml 1% lidocaine     The patient's wife consented to the procedure.     The left neck was prepped and draped in the usual sterile fashion. The left internal jugular vein was visualized with the ultrasound and accessed with a needle. A wire was advanced through the needle. A skin knick was made. The needle was removed and the dilator was advanced over the wire and removed. The double lumen HD catheter was advanced over the wire. All ports flushed and aspirated easily. The catheter was sutured into place. A sterile dressing was placed. CXR was ordered to confirm placement.     Carla Dubois MD   SICU Fellow

## 2024-10-30 NOTE — PROGRESS NOTES
Miscellaneous Progress Note    Spoke with CTVS team to make them aware about lactic acid and worsening LFTs. Likely shock liver. Hopes are that with now stabilized blood pressure and pressor regimen along with CRRT the patient's lactic acid will start improving. Patient's pressor needs have improved significantly after being switched over to v-paced at 100. Will order a limited ultrasound of the RUQ to evaluate liver and portal vein.    Hakan López PA-C on 10/30/2024 at 5:59 PM

## 2024-10-31 ENCOUNTER — APPOINTMENT (OUTPATIENT)
Dept: GENERAL RADIOLOGY | Facility: CLINIC | Age: 89
DRG: 233 | End: 2024-10-31
Attending: STUDENT IN AN ORGANIZED HEALTH CARE EDUCATION/TRAINING PROGRAM
Payer: MEDICARE

## 2024-10-31 ENCOUNTER — APPOINTMENT (OUTPATIENT)
Dept: GENERAL RADIOLOGY | Facility: CLINIC | Age: 89
End: 2024-10-31
Attending: PHYSICIAN ASSISTANT
Payer: MEDICARE

## 2024-10-31 LAB
ALBUMIN SERPL BCG-MCNC: 2.8 G/DL (ref 3.5–5.2)
ALBUMIN SERPL BCG-MCNC: 2.8 G/DL (ref 3.5–5.2)
ALBUMIN SERPL BCG-MCNC: 2.9 G/DL (ref 3.5–5.2)
ALBUMIN SERPL BCG-MCNC: 2.9 G/DL (ref 3.5–5.2)
ALBUMIN SERPL BCG-MCNC: 3 G/DL (ref 3.5–5.2)
ALLEN'S TEST: ABNORMAL
ALP SERPL-CCNC: 113 U/L (ref 40–150)
ALP SERPL-CCNC: 118 U/L (ref 40–150)
ALP SERPL-CCNC: 130 U/L (ref 40–150)
ALT SERPL W P-5'-P-CCNC: 1752 U/L (ref 0–70)
ALT SERPL W P-5'-P-CCNC: 1995 U/L (ref 0–70)
ALT SERPL W P-5'-P-CCNC: 2247 U/L (ref 0–70)
ANION GAP SERPL CALCULATED.3IONS-SCNC: 11 MMOL/L (ref 7–15)
ANION GAP SERPL CALCULATED.3IONS-SCNC: 12 MMOL/L (ref 7–15)
ANION GAP SERPL CALCULATED.3IONS-SCNC: 20 MMOL/L (ref 7–15)
ANION GAP SERPL CALCULATED.3IONS-SCNC: 21 MMOL/L (ref 7–15)
ANION GAP SERPL CALCULATED.3IONS-SCNC: 23 MMOL/L (ref 7–15)
APTT PPP: 42 SECONDS (ref 22–38)
AST SERPL W P-5'-P-CCNC: 5088 U/L (ref 0–45)
AST SERPL W P-5'-P-CCNC: 6055 U/L (ref 0–45)
AST SERPL W P-5'-P-CCNC: >7000 U/L (ref 0–45)
BACTERIA UR CULT: NO GROWTH
BASE EXCESS BLDA CALC-SCNC: -1.1 MMOL/L (ref -3–3)
BASE EXCESS BLDA CALC-SCNC: -1.7 MMOL/L (ref -3–3)
BASE EXCESS BLDA CALC-SCNC: -5.5 MMOL/L (ref -3–3)
BASE EXCESS BLDA CALC-SCNC: 0.4 MMOL/L (ref -3–3)
BASE EXCESS BLDA CALC-SCNC: 0.5 MMOL/L (ref -3–3)
BASE EXCESS BLDA CALC-SCNC: 0.6 MMOL/L (ref -3–3)
BASE EXCESS BLDA CALC-SCNC: 0.7 MMOL/L (ref -3–3)
BASE EXCESS BLDV CALC-SCNC: -0.7 MMOL/L (ref -3–3)
BASE EXCESS BLDV CALC-SCNC: -1.3 MMOL/L (ref -3–3)
BASE EXCESS BLDV CALC-SCNC: -3.6 MMOL/L (ref -3–3)
BASE EXCESS BLDV CALC-SCNC: 0.8 MMOL/L (ref -3–3)
BASE EXCESS BLDV CALC-SCNC: 1 MMOL/L (ref -3–3)
BASE EXCESS BLDV CALC-SCNC: 1.8 MMOL/L (ref -3–3)
BILIRUB DIRECT SERPL-MCNC: 0.81 MG/DL (ref 0–0.3)
BILIRUB SERPL-MCNC: 1.2 MG/DL
BILIRUB SERPL-MCNC: 1.3 MG/DL
BILIRUB SERPL-MCNC: 1.7 MG/DL
BUN SERPL-MCNC: 35.4 MG/DL (ref 8–23)
BUN SERPL-MCNC: 36 MG/DL (ref 8–23)
BUN SERPL-MCNC: 38.7 MG/DL (ref 8–23)
BUN SERPL-MCNC: 38.7 MG/DL (ref 8–23)
BUN SERPL-MCNC: 42.3 MG/DL (ref 8–23)
BUN SERPL-MCNC: 43.9 MG/DL (ref 8–23)
BUN SERPL-MCNC: 47.6 MG/DL (ref 8–23)
BURR CELLS BLD QL SMEAR: ABNORMAL
BURR CELLS BLD QL SMEAR: SLIGHT
CA-I BLD-MCNC: 3.9 MG/DL (ref 4.4–5.2)
CA-I BLD-MCNC: 4.1 MG/DL (ref 4.4–5.2)
CA-I BLD-MCNC: 4.1 MG/DL (ref 4.4–5.2)
CA-I BLD-MCNC: 4.3 MG/DL (ref 4.4–5.2)
CA-I BLD-MCNC: 4.4 MG/DL (ref 4.4–5.2)
CALCIUM SERPL-MCNC: 7.8 MG/DL (ref 8.8–10.4)
CALCIUM SERPL-MCNC: 7.9 MG/DL (ref 8.8–10.4)
CALCIUM SERPL-MCNC: 7.9 MG/DL (ref 8.8–10.4)
CALCIUM SERPL-MCNC: 8 MG/DL (ref 8.8–10.4)
CALCIUM SERPL-MCNC: 8.1 MG/DL (ref 8.8–10.4)
CALCIUM SERPL-MCNC: 8.4 MG/DL (ref 8.8–10.4)
CALCIUM SERPL-MCNC: 8.5 MG/DL (ref 8.8–10.4)
CHLORIDE SERPL-SCNC: 100 MMOL/L (ref 98–107)
CHLORIDE SERPL-SCNC: 101 MMOL/L (ref 98–107)
CHLORIDE SERPL-SCNC: 102 MMOL/L (ref 98–107)
COHGB MFR BLD: 97.9 % (ref 95–96)
COHGB MFR BLD: 98.2 % (ref 95–96)
COHGB MFR BLD: 98.8 % (ref 95–96)
COHGB MFR BLD: 98.8 % (ref 95–96)
COHGB MFR BLD: 99 % (ref 95–96)
COHGB MFR BLD: 99.2 % (ref 95–96)
COHGB MFR BLD: >100 % (ref 95–96)
CREAT SERPL-MCNC: 2.33 MG/DL (ref 0.67–1.17)
CREAT SERPL-MCNC: 2.34 MG/DL (ref 0.67–1.17)
CREAT SERPL-MCNC: 2.52 MG/DL (ref 0.67–1.17)
CREAT SERPL-MCNC: 2.52 MG/DL (ref 0.67–1.17)
CREAT SERPL-MCNC: 2.65 MG/DL (ref 0.67–1.17)
CREAT SERPL-MCNC: 2.73 MG/DL (ref 0.67–1.17)
CREAT SERPL-MCNC: 2.91 MG/DL (ref 0.67–1.17)
DACRYOCYTES BLD QL SMEAR: SLIGHT
EGFRCR SERPLBLD CKD-EPI 2021: 20 ML/MIN/1.73M2
EGFRCR SERPLBLD CKD-EPI 2021: 22 ML/MIN/1.73M2
EGFRCR SERPLBLD CKD-EPI 2021: 23 ML/MIN/1.73M2
EGFRCR SERPLBLD CKD-EPI 2021: 24 ML/MIN/1.73M2
EGFRCR SERPLBLD CKD-EPI 2021: 24 ML/MIN/1.73M2
EGFRCR SERPLBLD CKD-EPI 2021: 26 ML/MIN/1.73M2
EGFRCR SERPLBLD CKD-EPI 2021: 26 ML/MIN/1.73M2
ELLIPTOCYTES BLD QL SMEAR: SLIGHT
ELLIPTOCYTES BLD QL SMEAR: SLIGHT
ERYTHROCYTE [DISTWIDTH] IN BLOOD BY AUTOMATED COUNT: 17.8 % (ref 10–15)
ERYTHROCYTE [DISTWIDTH] IN BLOOD BY AUTOMATED COUNT: 17.8 % (ref 10–15)
ERYTHROCYTE [DISTWIDTH] IN BLOOD BY AUTOMATED COUNT: 18 % (ref 10–15)
ERYTHROCYTE [DISTWIDTH] IN BLOOD BY AUTOMATED COUNT: 18.1 % (ref 10–15)
FIBRINOGEN PPP-MCNC: 212 MG/DL (ref 170–510)
FIBRINOGEN PPP-MCNC: 222 MG/DL (ref 170–510)
FRAGMENTS BLD QL SMEAR: SLIGHT
GIANT PLATELETS BLD QL SMEAR: SLIGHT
GIANT PLATELETS BLD QL SMEAR: SLIGHT
GLUCOSE BLDC GLUCOMTR-MCNC: 104 MG/DL (ref 70–99)
GLUCOSE BLDC GLUCOMTR-MCNC: 109 MG/DL (ref 70–99)
GLUCOSE BLDC GLUCOMTR-MCNC: 129 MG/DL (ref 70–99)
GLUCOSE BLDC GLUCOMTR-MCNC: 143 MG/DL (ref 70–99)
GLUCOSE BLDC GLUCOMTR-MCNC: 78 MG/DL (ref 70–99)
GLUCOSE BLDC GLUCOMTR-MCNC: 91 MG/DL (ref 70–99)
GLUCOSE SERPL-MCNC: 111 MG/DL (ref 70–99)
GLUCOSE SERPL-MCNC: 111 MG/DL (ref 70–99)
GLUCOSE SERPL-MCNC: 137 MG/DL (ref 70–99)
GLUCOSE SERPL-MCNC: 147 MG/DL (ref 70–99)
GLUCOSE SERPL-MCNC: 168 MG/DL (ref 70–99)
GLUCOSE SERPL-MCNC: 77 MG/DL (ref 70–99)
GLUCOSE SERPL-MCNC: 77 MG/DL (ref 70–99)
HCO3 BLD-SCNC: 18 MMOL/L (ref 21–28)
HCO3 BLD-SCNC: 22 MMOL/L (ref 21–28)
HCO3 BLD-SCNC: 23 MMOL/L (ref 21–28)
HCO3 BLD-SCNC: 24 MMOL/L (ref 21–28)
HCO3 BLD-SCNC: 25 MMOL/L (ref 21–28)
HCO3 BLDV-SCNC: 22 MMOL/L (ref 21–28)
HCO3 BLDV-SCNC: 23 MMOL/L (ref 21–28)
HCO3 BLDV-SCNC: 24 MMOL/L (ref 21–28)
HCO3 BLDV-SCNC: 26 MMOL/L (ref 21–28)
HCO3 BLDV-SCNC: 26 MMOL/L (ref 21–28)
HCO3 BLDV-SCNC: 27 MMOL/L (ref 21–28)
HCO3 SERPL-SCNC: 18 MMOL/L (ref 22–29)
HCO3 SERPL-SCNC: 20 MMOL/L (ref 22–29)
HCO3 SERPL-SCNC: 20 MMOL/L (ref 22–29)
HCO3 SERPL-SCNC: 26 MMOL/L (ref 22–29)
HCO3 SERPL-SCNC: 27 MMOL/L (ref 22–29)
HCT VFR BLD AUTO: 24.4 % (ref 40–53)
HCT VFR BLD AUTO: 25.3 % (ref 40–53)
HCT VFR BLD AUTO: 25.4 % (ref 40–53)
HCT VFR BLD AUTO: 25.6 % (ref 40–53)
HGB BLD-MCNC: 8.2 G/DL (ref 13.3–17.7)
HGB BLD-MCNC: 8.7 G/DL (ref 13.3–17.7)
HGB BLD-MCNC: 8.7 G/DL (ref 13.3–17.7)
HGB BLD-MCNC: 8.9 G/DL (ref 13.3–17.7)
INR PPP: 3.5 (ref 0.85–1.15)
LACTATE SERPL-SCNC: 3.7 MMOL/L (ref 0.7–2)
LACTATE SERPL-SCNC: 4.1 MMOL/L (ref 0.7–2)
LACTATE SERPL-SCNC: 4.5 MMOL/L (ref 0.7–2)
LACTATE SERPL-SCNC: 5 MMOL/L (ref 0.7–2)
LACTATE SERPL-SCNC: 8 MMOL/L (ref 0.7–2)
LACTATE SERPL-SCNC: 9.3 MMOL/L (ref 0.7–2)
MAGNESIUM SERPL-MCNC: 1.8 MG/DL (ref 1.7–2.3)
MAGNESIUM SERPL-MCNC: 1.8 MG/DL (ref 1.7–2.3)
MAGNESIUM SERPL-MCNC: 1.9 MG/DL (ref 1.7–2.3)
MCH RBC QN AUTO: 29.5 PG (ref 26.5–33)
MCH RBC QN AUTO: 30 PG (ref 26.5–33)
MCH RBC QN AUTO: 30.1 PG (ref 26.5–33)
MCH RBC QN AUTO: 30.2 PG (ref 26.5–33)
MCHC RBC AUTO-ENTMCNC: 33.6 G/DL (ref 31.5–36.5)
MCHC RBC AUTO-ENTMCNC: 34 G/DL (ref 31.5–36.5)
MCHC RBC AUTO-ENTMCNC: 34.4 G/DL (ref 31.5–36.5)
MCHC RBC AUTO-ENTMCNC: 35 G/DL (ref 31.5–36.5)
MCV RBC AUTO: 86 FL (ref 78–100)
MCV RBC AUTO: 88 FL (ref 78–100)
MRSA DNA SPEC QL NAA+PROBE: NEGATIVE
O2/TOTAL GAS SETTING VFR VENT: 30 %
O2/TOTAL GAS SETTING VFR VENT: 40 %
O2/TOTAL GAS SETTING VFR VENT: 45 %
O2/TOTAL GAS SETTING VFR VENT: 45 %
O2/TOTAL GAS SETTING VFR VENT: 50 %
O2/TOTAL GAS SETTING VFR VENT: 50 %
OXYHGB MFR BLDV: 38 % (ref 70–75)
OXYHGB MFR BLDV: 39 % (ref 70–75)
OXYHGB MFR BLDV: 46 % (ref 70–75)
OXYHGB MFR BLDV: 47 % (ref 70–75)
OXYHGB MFR BLDV: 49 % (ref 70–75)
OXYHGB MFR BLDV: 51 % (ref 70–75)
PCO2 BLD: 30 MM HG (ref 35–45)
PCO2 BLD: 30 MM HG (ref 35–45)
PCO2 BLD: 34 MM HG (ref 35–45)
PCO2 BLD: 35 MM HG (ref 35–45)
PCO2 BLD: 35 MM HG (ref 35–45)
PCO2 BLD: 38 MM HG (ref 35–45)
PCO2 BLD: 39 MM HG (ref 35–45)
PCO2 BLDV: 37 MM HG (ref 40–50)
PCO2 BLDV: 41 MM HG (ref 40–50)
PCO2 BLDV: 43 MM HG (ref 40–50)
PCO2 BLDV: 45 MM HG (ref 40–50)
PEEP: 5 CM H2O
PH BLD: 7.4 [PH] (ref 7.35–7.45)
PH BLD: 7.42 [PH] (ref 7.35–7.45)
PH BLD: 7.43 [PH] (ref 7.35–7.45)
PH BLD: 7.43 [PH] (ref 7.35–7.45)
PH BLD: 7.45 [PH] (ref 7.35–7.45)
PH BLD: 7.46 [PH] (ref 7.35–7.45)
PH BLD: 7.46 [PH] (ref 7.35–7.45)
PH BLDV: 7.34 [PH] (ref 7.32–7.43)
PH BLDV: 7.38 [PH] (ref 7.32–7.43)
PH BLDV: 7.39 [PH] (ref 7.32–7.43)
PH BLDV: 7.41 [PH] (ref 7.32–7.43)
PHOSPHATE SERPL-MCNC: 4.9 MG/DL (ref 2.5–4.5)
PHOSPHATE SERPL-MCNC: 4.9 MG/DL (ref 2.5–4.5)
PHOSPHATE SERPL-MCNC: 5.4 MG/DL (ref 2.5–4.5)
PLAT MORPH BLD: ABNORMAL
PLAT MORPH BLD: ABNORMAL
PLATELET # BLD AUTO: 206 10E3/UL (ref 150–450)
PLATELET # BLD AUTO: 277 10E3/UL (ref 150–450)
PLATELET # BLD AUTO: 313 10E3/UL (ref 150–450)
PLATELET # BLD AUTO: 324 10E3/UL (ref 150–450)
PO2 BLD: 102 MM HG (ref 80–105)
PO2 BLD: 104 MM HG (ref 80–105)
PO2 BLD: 115 MM HG (ref 80–105)
PO2 BLD: 116 MM HG (ref 80–105)
PO2 BLD: 124 MM HG (ref 80–105)
PO2 BLD: 159 MM HG (ref 80–105)
PO2 BLD: 97 MM HG (ref 80–105)
PO2 BLDV: 26 MM HG (ref 25–47)
PO2 BLDV: 26 MM HG (ref 25–47)
PO2 BLDV: 29 MM HG (ref 25–47)
PO2 BLDV: 29 MM HG (ref 25–47)
PO2 BLDV: 30 MM HG (ref 25–47)
PO2 BLDV: 31 MM HG (ref 25–47)
POLYCHROMASIA BLD QL SMEAR: SLIGHT
POTASSIUM SERPL-SCNC: 3.9 MMOL/L (ref 3.4–5.3)
POTASSIUM SERPL-SCNC: 3.9 MMOL/L (ref 3.4–5.3)
POTASSIUM SERPL-SCNC: 4 MMOL/L (ref 3.4–5.3)
POTASSIUM SERPL-SCNC: 4.3 MMOL/L (ref 3.4–5.3)
POTASSIUM SERPL-SCNC: 4.5 MMOL/L (ref 3.4–5.3)
PROT SERPL-MCNC: 4.4 G/DL (ref 6.4–8.3)
PROT SERPL-MCNC: 4.6 G/DL (ref 6.4–8.3)
PROT SERPL-MCNC: 4.6 G/DL (ref 6.4–8.3)
RBC # BLD AUTO: 2.78 10E6/UL (ref 4.4–5.9)
RBC # BLD AUTO: 2.89 10E6/UL (ref 4.4–5.9)
RBC # BLD AUTO: 2.9 10E6/UL (ref 4.4–5.9)
RBC # BLD AUTO: 2.95 10E6/UL (ref 4.4–5.9)
RBC MORPH BLD: ABNORMAL
RBC MORPH BLD: ABNORMAL
SA TARGET DNA: NEGATIVE
SAO2 % BLDA: 94 % (ref 92–100)
SAO2 % BLDA: 94 % (ref 92–100)
SAO2 % BLDA: 95 % (ref 92–100)
SAO2 % BLDA: 97 % (ref 92–100)
SAO2 % BLDV: 39.6 % (ref 70–75)
SAO2 % BLDV: 40.4 % (ref 70–75)
SAO2 % BLDV: 47.5 % (ref 70–75)
SAO2 % BLDV: 49.3 % (ref 70–75)
SAO2 % BLDV: 51 % (ref 70–75)
SAO2 % BLDV: 53 % (ref 70–75)
SODIUM SERPL-SCNC: 138 MMOL/L (ref 135–145)
SODIUM SERPL-SCNC: 140 MMOL/L (ref 135–145)
SODIUM SERPL-SCNC: 141 MMOL/L (ref 135–145)
SODIUM SERPL-SCNC: 141 MMOL/L (ref 135–145)
SODIUM SERPL-SCNC: 142 MMOL/L (ref 135–145)
SODIUM SERPL-SCNC: 142 MMOL/L (ref 135–145)
SODIUM SERPL-SCNC: 143 MMOL/L (ref 135–145)
WBC # BLD AUTO: 28 10E3/UL (ref 4–11)
WBC # BLD AUTO: 40 10E3/UL (ref 4–11)
WBC # BLD AUTO: 55.2 10E3/UL (ref 4–11)
WBC # BLD AUTO: 59.6 10E3/UL (ref 4–11)

## 2024-10-31 PROCEDURE — 82247 BILIRUBIN TOTAL: CPT | Performed by: INTERNAL MEDICINE

## 2024-10-31 PROCEDURE — 999N000157 HC STATISTIC RCP TIME EA 10 MIN

## 2024-10-31 PROCEDURE — 87640 STAPH A DNA AMP PROBE: CPT | Performed by: STUDENT IN AN ORGANIZED HEALTH CARE EDUCATION/TRAINING PROGRAM

## 2024-10-31 PROCEDURE — 85730 THROMBOPLASTIN TIME PARTIAL: CPT | Performed by: PHYSICIAN ASSISTANT

## 2024-10-31 PROCEDURE — 999N000009 HC STATISTIC AIRWAY CARE

## 2024-10-31 PROCEDURE — 82805 BLOOD GASES W/O2 SATURATION: CPT | Performed by: PHYSICIAN ASSISTANT

## 2024-10-31 PROCEDURE — 85027 COMPLETE CBC AUTOMATED: CPT | Performed by: PHYSICIAN ASSISTANT

## 2024-10-31 PROCEDURE — 83605 ASSAY OF LACTIC ACID: CPT | Performed by: INTERNAL MEDICINE

## 2024-10-31 PROCEDURE — 250N000013 HC RX MED GY IP 250 OP 250 PS 637: Performed by: STUDENT IN AN ORGANIZED HEALTH CARE EDUCATION/TRAINING PROGRAM

## 2024-10-31 PROCEDURE — 120N000004 HC R&B MS OVERFLOW

## 2024-10-31 PROCEDURE — 250N000009 HC RX 250: Performed by: INTERNAL MEDICINE

## 2024-10-31 PROCEDURE — 258N000003 HC RX IP 258 OP 636: Performed by: SURGERY

## 2024-10-31 PROCEDURE — 83605 ASSAY OF LACTIC ACID: CPT | Performed by: SURGERY

## 2024-10-31 PROCEDURE — 03HY32Z INSERTION OF MONITORING DEVICE INTO UPPER ARTERY, PERCUTANEOUS APPROACH: ICD-10-PCS | Performed by: INTERNAL MEDICINE

## 2024-10-31 PROCEDURE — 99232 SBSQ HOSP IP/OBS MODERATE 35: CPT | Performed by: INTERNAL MEDICINE

## 2024-10-31 PROCEDURE — 82330 ASSAY OF CALCIUM: CPT | Performed by: SURGERY

## 2024-10-31 PROCEDURE — 82805 BLOOD GASES W/O2 SATURATION: CPT | Performed by: INTERNAL MEDICINE

## 2024-10-31 PROCEDURE — 36556 INSERT NON-TUNNEL CV CATH: CPT | Mod: GC | Performed by: INTERNAL MEDICINE

## 2024-10-31 PROCEDURE — 250N000011 HC RX IP 250 OP 636: Performed by: PHYSICIAN ASSISTANT

## 2024-10-31 PROCEDURE — 82330 ASSAY OF CALCIUM: CPT | Performed by: INTERNAL MEDICINE

## 2024-10-31 PROCEDURE — 250N000009 HC RX 250: Performed by: PHYSICIAN ASSISTANT

## 2024-10-31 PROCEDURE — 87641 MR-STAPH DNA AMP PROBE: CPT | Performed by: STUDENT IN AN ORGANIZED HEALTH CARE EDUCATION/TRAINING PROGRAM

## 2024-10-31 PROCEDURE — 80053 COMPREHEN METABOLIC PANEL: CPT | Performed by: STUDENT IN AN ORGANIZED HEALTH CARE EDUCATION/TRAINING PROGRAM

## 2024-10-31 PROCEDURE — 90947 DIALYSIS REPEATED EVAL: CPT

## 2024-10-31 PROCEDURE — 258N000001 HC RX 258: Performed by: PHYSICIAN ASSISTANT

## 2024-10-31 PROCEDURE — 82805 BLOOD GASES W/O2 SATURATION: CPT | Performed by: STUDENT IN AN ORGANIZED HEALTH CARE EDUCATION/TRAINING PROGRAM

## 2024-10-31 PROCEDURE — 250N000009 HC RX 250: Performed by: SURGERY

## 2024-10-31 PROCEDURE — 85048 AUTOMATED LEUKOCYTE COUNT: CPT | Performed by: INTERNAL MEDICINE

## 2024-10-31 PROCEDURE — 82248 BILIRUBIN DIRECT: CPT | Performed by: INTERNAL MEDICINE

## 2024-10-31 PROCEDURE — 83735 ASSAY OF MAGNESIUM: CPT | Performed by: INTERNAL MEDICINE

## 2024-10-31 PROCEDURE — 87040 BLOOD CULTURE FOR BACTERIA: CPT | Performed by: PHYSICIAN ASSISTANT

## 2024-10-31 PROCEDURE — 71045 X-RAY EXAM CHEST 1 VIEW: CPT

## 2024-10-31 PROCEDURE — 85018 HEMOGLOBIN: CPT | Performed by: INTERNAL MEDICINE

## 2024-10-31 PROCEDURE — 36415 COLL VENOUS BLD VENIPUNCTURE: CPT | Performed by: PHYSICIAN ASSISTANT

## 2024-10-31 PROCEDURE — 85384 FIBRINOGEN ACTIVITY: CPT | Performed by: PHYSICIAN ASSISTANT

## 2024-10-31 PROCEDURE — 84075 ASSAY ALKALINE PHOSPHATASE: CPT | Performed by: INTERNAL MEDICINE

## 2024-10-31 PROCEDURE — 250N000013 HC RX MED GY IP 250 OP 250 PS 637: Performed by: PHYSICIAN ASSISTANT

## 2024-10-31 PROCEDURE — 80048 BASIC METABOLIC PNL TOTAL CA: CPT | Performed by: PHYSICIAN ASSISTANT

## 2024-10-31 PROCEDURE — 84155 ASSAY OF PROTEIN SERUM: CPT | Performed by: INTERNAL MEDICINE

## 2024-10-31 PROCEDURE — 85384 FIBRINOGEN ACTIVITY: CPT | Performed by: STUDENT IN AN ORGANIZED HEALTH CARE EDUCATION/TRAINING PROGRAM

## 2024-10-31 PROCEDURE — 94003 VENT MGMT INPAT SUBQ DAY: CPT

## 2024-10-31 PROCEDURE — 83605 ASSAY OF LACTIC ACID: CPT | Performed by: PHYSICIAN ASSISTANT

## 2024-10-31 PROCEDURE — 85610 PROTHROMBIN TIME: CPT | Performed by: PHYSICIAN ASSISTANT

## 2024-10-31 PROCEDURE — 250N000011 HC RX IP 250 OP 636: Performed by: SURGERY

## 2024-10-31 PROCEDURE — 250N000011 HC RX IP 250 OP 636: Performed by: STUDENT IN AN ORGANIZED HEALTH CARE EDUCATION/TRAINING PROGRAM

## 2024-10-31 PROCEDURE — 258N000003 HC RX IP 258 OP 636: Performed by: PHYSICIAN ASSISTANT

## 2024-10-31 PROCEDURE — 80069 RENAL FUNCTION PANEL: CPT | Performed by: INTERNAL MEDICINE

## 2024-10-31 PROCEDURE — 250N000013 HC RX MED GY IP 250 OP 250 PS 637: Performed by: SURGERY

## 2024-10-31 PROCEDURE — 258N000003 HC RX IP 258 OP 636: Performed by: STUDENT IN AN ORGANIZED HEALTH CARE EDUCATION/TRAINING PROGRAM

## 2024-10-31 PROCEDURE — 84450 TRANSFERASE (AST) (SGOT): CPT | Performed by: INTERNAL MEDICINE

## 2024-10-31 PROCEDURE — 999N000065 XR ABDOMEN PORT 1 VIEW

## 2024-10-31 PROCEDURE — 06HY33Z INSERTION OF INFUSION DEVICE INTO LOWER VEIN, PERCUTANEOUS APPROACH: ICD-10-PCS | Performed by: INTERNAL MEDICINE

## 2024-10-31 PROCEDURE — 250N000009 HC RX 250: Performed by: STUDENT IN AN ORGANIZED HEALTH CARE EDUCATION/TRAINING PROGRAM

## 2024-10-31 PROCEDURE — 999N000065 XR CHEST PORT 1 VIEW

## 2024-10-31 PROCEDURE — 99291 CRITICAL CARE FIRST HOUR: CPT | Mod: 24 | Performed by: INTERNAL MEDICINE

## 2024-10-31 PROCEDURE — 250N000009 HC RX 250

## 2024-10-31 RX ORDER — DEXTROSE MONOHYDRATE 100 MG/ML
INJECTION, SOLUTION INTRAVENOUS CONTINUOUS PRN
Status: DISCONTINUED | OUTPATIENT
Start: 2024-10-31 | End: 2024-11-15 | Stop reason: HOSPADM

## 2024-10-31 RX ORDER — AMOXICILLIN 250 MG
1 CAPSULE ORAL 2 TIMES DAILY
Status: DISCONTINUED | OUTPATIENT
Start: 2024-10-31 | End: 2024-11-11

## 2024-10-31 RX ORDER — DOBUTAMINE HYDROCHLORIDE 200 MG/100ML
2.5 INJECTION INTRAVENOUS CONTINUOUS
Status: DISCONTINUED | OUTPATIENT
Start: 2024-10-31 | End: 2024-11-05

## 2024-10-31 RX ORDER — HYDROCORTISONE SODIUM SUCCINATE 100 MG/2ML
100 INJECTION INTRAMUSCULAR; INTRAVENOUS EVERY 8 HOURS
Status: DISCONTINUED | OUTPATIENT
Start: 2024-10-31 | End: 2024-11-02

## 2024-10-31 RX ORDER — METOCLOPRAMIDE HYDROCHLORIDE 5 MG/ML
5 INJECTION INTRAMUSCULAR; INTRAVENOUS
Status: ACTIVE | OUTPATIENT
Start: 2024-10-31 | End: 2024-11-01

## 2024-10-31 RX ORDER — NOREPINEPHRINE BITARTRATE 0.06 MG/ML
.01-.15 INJECTION, SOLUTION INTRAVENOUS CONTINUOUS
Status: DISCONTINUED | OUTPATIENT
Start: 2024-10-31 | End: 2024-11-01

## 2024-10-31 RX ORDER — VANCOMYCIN HYDROCHLORIDE 1 G/200ML
1000 INJECTION, SOLUTION INTRAVENOUS EVERY 24 HOURS
Status: DISCONTINUED | OUTPATIENT
Start: 2024-11-01 | End: 2024-10-31

## 2024-10-31 RX ORDER — AMINO ACIDS/PROTEIN HYDROLYS 11G-40/45
1 LIQUID IN PACKET (ML) ORAL 2 TIMES DAILY
Status: DISCONTINUED | OUTPATIENT
Start: 2024-10-31 | End: 2024-11-04

## 2024-10-31 RX ORDER — CALCIUM CHLORIDE, MAGNESIUM CHLORIDE, DEXTROSE MONOHYDRATE, LACTIC ACID, SODIUM CHLORIDE, SODIUM BICARBONATE AND POTASSIUM CHLORIDE 5.15; 2.03; 22; 5.4; 6.46; 3.09; .157 G/L; G/L; G/L; G/L; G/L; G/L; G/L
INJECTION INTRAVENOUS CONTINUOUS
Status: DISCONTINUED | OUTPATIENT
Start: 2024-10-31 | End: 2024-11-01

## 2024-10-31 RX ORDER — LIDOCAINE HYDROCHLORIDE 10 MG/ML
1 INJECTION, SOLUTION EPIDURAL; INFILTRATION; INTRACAUDAL; PERINEURAL ONCE
Status: COMPLETED | OUTPATIENT
Start: 2024-10-31 | End: 2024-10-31

## 2024-10-31 RX ORDER — LIDOCAINE HYDROCHLORIDE 20 MG/ML
5 SOLUTION OROPHARYNGEAL ONCE
Status: COMPLETED | OUTPATIENT
Start: 2024-10-31 | End: 2024-10-31

## 2024-10-31 RX ORDER — PIPERACILLIN SODIUM, TAZOBACTAM SODIUM 3; .375 G/15ML; G/15ML
3.38 INJECTION, POWDER, LYOPHILIZED, FOR SOLUTION INTRAVENOUS EVERY 6 HOURS
Status: DISCONTINUED | OUTPATIENT
Start: 2024-10-31 | End: 2024-11-01

## 2024-10-31 RX ORDER — PANTOPRAZOLE SODIUM 40 MG/1
40 TABLET, DELAYED RELEASE ORAL
Status: DISCONTINUED | OUTPATIENT
Start: 2024-10-31 | End: 2024-11-15 | Stop reason: HOSPADM

## 2024-10-31 RX ORDER — POLYETHYLENE GLYCOL 3350 17 G/17G
17 POWDER, FOR SOLUTION ORAL DAILY
Status: DISCONTINUED | OUTPATIENT
Start: 2024-11-01 | End: 2024-11-11

## 2024-10-31 RX ADMIN — ASPIRIN 81 MG CHEWABLE TABLET 162 MG: 81 TABLET CHEWABLE at 10:30

## 2024-10-31 RX ADMIN — HYDROCORTISONE SODIUM SUCCINATE 100 MG: 100 INJECTION, POWDER, FOR SOLUTION INTRAMUSCULAR; INTRAVENOUS at 11:46

## 2024-10-31 RX ADMIN — PIPERACILLIN AND TAZOBACTAM 3.38 G: 3; .375 INJECTION, POWDER, FOR SOLUTION INTRAVENOUS at 15:39

## 2024-10-31 RX ADMIN — CALCIUM CHLORIDE, MAGNESIUM CHLORIDE, DEXTROSE MONOHYDRATE, LACTIC ACID, SODIUM CHLORIDE, SODIUM BICARBONATE AND POTASSIUM CHLORIDE 5000 ML: 5.15; 2.03; 22; 5.4; 6.46; 3.09; .157 INJECTION INTRAVENOUS at 16:07

## 2024-10-31 RX ADMIN — Medication 40 MG: at 10:30

## 2024-10-31 RX ADMIN — SENNOSIDES AND DOCUSATE SODIUM 1 TABLET: 50; 8.6 TABLET ORAL at 20:15

## 2024-10-31 RX ADMIN — CALCIUM CHLORIDE, MAGNESIUM CHLORIDE, DEXTROSE MONOHYDRATE, LACTIC ACID, SODIUM CHLORIDE, SODIUM BICARBONATE AND POTASSIUM CHLORIDE 5000 ML: 5.15; 2.03; 22; 5.4; 6.46; 3.09; .157 INJECTION INTRAVENOUS at 16:51

## 2024-10-31 RX ADMIN — NOREPINEPHRINE BITARTRATE 0.12 MCG/KG/MIN: 0.02 INJECTION, SOLUTION INTRAVENOUS at 05:15

## 2024-10-31 RX ADMIN — CALCIUM GLUCONATE 1 G: 20 INJECTION, SOLUTION INTRAVENOUS at 06:40

## 2024-10-31 RX ADMIN — CALCIUM CHLORIDE, MAGNESIUM CHLORIDE, DEXTROSE MONOHYDRATE, LACTIC ACID, SODIUM CHLORIDE, SODIUM BICARBONATE AND POTASSIUM CHLORIDE 5000 ML: 5.15; 2.03; 22; 5.4; 6.46; 3.09; .157 INJECTION INTRAVENOUS at 21:39

## 2024-10-31 RX ADMIN — NOREPINEPHRINE BITARTRATE 0.12 MCG/KG/MIN: 0.02 INJECTION, SOLUTION INTRAVENOUS at 10:54

## 2024-10-31 RX ADMIN — CALCIUM GLUCONATE 1 G: 20 INJECTION, SOLUTION INTRAVENOUS at 00:48

## 2024-10-31 RX ADMIN — Medication 40 MG: at 15:44

## 2024-10-31 RX ADMIN — DEXTROSE MONOHYDRATE: 100 INJECTION, SOLUTION INTRAVENOUS at 14:07

## 2024-10-31 RX ADMIN — PIPERACILLIN AND TAZOBACTAM 3.38 G: 3; .375 INJECTION, POWDER, FOR SOLUTION INTRAVENOUS at 21:01

## 2024-10-31 RX ADMIN — SODIUM BICARBONATE: 84 INJECTION, SOLUTION INTRAVENOUS at 05:43

## 2024-10-31 RX ADMIN — LIDOCAINE HYDROCHLORIDE 1 ML: 10 INJECTION, SOLUTION EPIDURAL; INFILTRATION; INTRACAUDAL; PERINEURAL at 11:03

## 2024-10-31 RX ADMIN — INSULIN ASPART 1 UNITS: 100 INJECTION, SOLUTION INTRAVENOUS; SUBCUTANEOUS at 03:00

## 2024-10-31 RX ADMIN — PROPOFOL 10 MCG/KG/MIN: 10 INJECTION, EMULSION INTRAVENOUS at 13:48

## 2024-10-31 RX ADMIN — CALCIUM CHLORIDE, MAGNESIUM CHLORIDE, DEXTROSE MONOHYDRATE, LACTIC ACID, SODIUM CHLORIDE, SODIUM BICARBONATE AND POTASSIUM CHLORIDE 5000 ML: 5.15; 2.03; 22; 5.4; 6.46; 3.09; .157 INJECTION INTRAVENOUS at 12:02

## 2024-10-31 RX ADMIN — CALCIUM CHLORIDE, MAGNESIUM CHLORIDE, DEXTROSE MONOHYDRATE, LACTIC ACID, SODIUM CHLORIDE, SODIUM BICARBONATE AND POTASSIUM CHLORIDE 5000 ML: 5.15; 2.03; 22; 5.4; 6.46; 3.09; .157 INJECTION INTRAVENOUS at 21:52

## 2024-10-31 RX ADMIN — CALCIUM GLUCONATE 1 G: 20 INJECTION, SOLUTION INTRAVENOUS at 21:48

## 2024-10-31 RX ADMIN — CALCIUM CHLORIDE, MAGNESIUM CHLORIDE, DEXTROSE MONOHYDRATE, LACTIC ACID, SODIUM CHLORIDE, SODIUM BICARBONATE AND POTASSIUM CHLORIDE: 5.15; 2.03; 22; 5.4; 6.46; 3.09; .157 INJECTION INTRAVENOUS at 21:39

## 2024-10-31 RX ADMIN — CALCIUM GLUCONATE 1 G: 20 INJECTION, SOLUTION INTRAVENOUS at 14:15

## 2024-10-31 RX ADMIN — HYDROMORPHONE HYDROCHLORIDE 0.2 MG: 0.2 INJECTION, SOLUTION INTRAMUSCULAR; INTRAVENOUS; SUBCUTANEOUS at 15:48

## 2024-10-31 RX ADMIN — VANCOMYCIN HYDROCHLORIDE 2500 MG: 10 INJECTION, POWDER, LYOPHILIZED, FOR SOLUTION INTRAVENOUS at 12:01

## 2024-10-31 RX ADMIN — CALCIUM GLUCONATE 1 G: 20 INJECTION, SOLUTION INTRAVENOUS at 16:32

## 2024-10-31 RX ADMIN — HYDROCORTISONE SODIUM SUCCINATE 100 MG: 100 INJECTION, POWDER, FOR SOLUTION INTRAMUSCULAR; INTRAVENOUS at 17:56

## 2024-10-31 RX ADMIN — PIPERACILLIN AND TAZOBACTAM 3.38 G: 3; .375 INJECTION, POWDER, FOR SOLUTION INTRAVENOUS at 11:54

## 2024-10-31 RX ADMIN — SODIUM CHLORIDE, POTASSIUM CHLORIDE, SODIUM LACTATE AND CALCIUM CHLORIDE: 600; 310; 30; 20 INJECTION, SOLUTION INTRAVENOUS at 21:04

## 2024-10-31 RX ADMIN — EPINEPHRINE 0.07 MCG/KG/MIN: 1 INJECTION INTRAMUSCULAR; INTRAVENOUS; SUBCUTANEOUS at 17:35

## 2024-10-31 RX ADMIN — DOBUTAMINE HYDROCHLORIDE 2.5 MCG/KG/MIN: 200 INJECTION INTRAVENOUS at 10:30

## 2024-10-31 RX ADMIN — DEXTROSE MONOHYDRATE: 100 INJECTION, SOLUTION INTRAVENOUS at 19:54

## 2024-10-31 RX ADMIN — NOREPINEPHRINE BITARTRATE 0.11 MCG/KG/MIN: 0.06 INJECTION, SOLUTION INTRAVENOUS at 16:57

## 2024-10-31 NOTE — CONSULTS
CLINICAL NUTRITION SERVICES  -  ASSESSMENT NOTE    Recommendations Ordered by Registered Dietitian (RD):   Goal TF =   Vital High Protein @ 60ml/hr  (1440ml/day) provides 1440 kcals, 125 g PRO, 1203 ml free H20, 159 g CHO, and 0 g fiber daily.   + ProSource TF 20 1 pkt BID (160 kcal, 40 g protein)     TOTAL (TF + Prosource) = 1600 kcal (17 kcal/kg), 165 g protein (2.2 g/kg)     - Start TF @ 15 mL/hr. Advance by 15 ml q 12 hours.   - Flush 30 mL q 4 hours, or per provider.   Malnutrition:   % Weight Loss:  Weight loss does not meet criteria for malnutrition   % Intake:  Decreased intake does not meet criteria for malnutrition   Subcutaneous Fat Loss:  Deferred - pt busy with cares   Muscle Loss:  Deferred - pt busy with cares. Pt's wife reports muscle losses.   Fluid Retention:  edematous extremities     Malnutrition Diagnosis: Unable to determine due to lack of NFPE.      REASON FOR ASSESSMENT  Alessio Teixeira is a 88 year old male seen by Registered Dietitian for Provider Order - Registered Dietitian to Assess and Order TF per Medical Nutrition Therapy Protocol    NUTRITION HISTORY  - Information obtained from chart, patient's spouse outside room.   - Admitted for planned open heart surgery.   - Seen by RD in 9/2024 - at the time he reported having a great appetite and reported no unintended wt loss.   - Deferred visit with patient as he was undergoing cares. Did see patient's wife outside room who provided report.     - Pt was eating a little less PTA, not significant. She notes that he has lost quite a bit of muscle tone the past year given increased weakness. She says right now it's hard to tell given volume status, normally legs are quite thin. She otherwise attributes any recent wt loss to fluid shifts.     CURRENT NUTRITION ORDERS  Diet Order:     NPO    Current Intake/Tolerance:  N/A    NUTRITION FOCUSED PHYSICAL ASSESSMENT FOR DIAGNOSING MALNUTRITION)  No:  Deferred - discussed with wife outside room  "    Obtained from Chart/Interdisciplinary Team:  OGT in place    ANTHROPOMETRICS  Height: 5' 10\"  Weight: 95.8 kg (211 lb 3.2 oz) - 10/28  Body mass index is 30.3 kg/m .  Weight Status:  Obesity Grade I BMI 30-34.9  IBW: 75.5 kg   % IBW: 127%  Weight History: suspect dry wt closer to the 220-225#, consistent with weights measured during September admission. Currently volume up.  Wt Readings from Last 10 Encounters:   10/31/24 107.4 kg (236 lb 12.4 oz)   10/25/24 97.1 kg (214 lb)   10/03/24 101.2 kg (223 lb)   10/02/24 104.8 kg (231 lb)   09/23/24 105.1 kg (231 lb 12.8 oz)   09/18/24 107 kg (236 lb)   09/16/24 105.1 kg (231 lb 11.2 oz)   09/12/24     101.4 kg (223 lb 8.7 oz)   08/29/24 99.8 kg (220 lb)   08/07/23 98.6 kg (217 lb 6 oz)   05/17/23 99.8 kg (220 lb)       LABS  Labs reviewed  BUN 42.3 (H), Cr 2.65 (H)  GFR 23 (L)  Phos 5.4 (H)   BGM <180    MEDICATIONS  Medications reviewed  Medium sliding scale insulin   Scheduled bowel meds - miralax, senokot     Pressor requirement decreasing, currently on Epi and Norepi     ASSESSED NUTRITION NEEDS PER APPROVED PRACTICE GUIDELINES:  Dosing Weight   95.8 kg actual weight (Energy)  75.5 kg ideal weight (Protein)   Estimated Energy Needs: 9530-3764 kcals (14-17 Kcal/Kg)  Justification: obese and vented  Estimated Protein Needs: 151+ grams protein (2+ g pro/Kg)  Justification: obesity guidelines  and CRRT  Estimated Fluid Needs: Per provider   Justification: per provider pending fluid status    MALNUTRITION:  % Weight Loss:  Weight loss does not meet criteria for malnutrition   % Intake:  Decreased intake does not meet criteria for malnutrition   Subcutaneous Fat Loss:  Deferred - pt busy with cares   Muscle Loss:  Deferred - pt busy with cares. Pt's wife reports muscle losses.   Fluid Retention:  edematous extremities     Malnutrition Diagnosis: Unable to determine due to lack of NFPE.     NUTRITION DIAGNOSIS:  Inadequate protein-energy intake related to intubated x3 " days as evidenced by plan for TF to meet nutrient needs.     NUTRITION INTERVENTIONS  Recommendations / Nutrition Prescription    Goal TF =   Vital High Protein @ 60ml/hr  (1440ml/day) provides 1440 kcals, 125 g PRO, 1203 ml free H20, 159 g CHO, and 0 g fiber daily.   + ProSource TF 20 1 pkt BID (160 kcal, 40 g protein)     TOTAL (TF + Prosource) = 1600 kcal (17 kcal/kg), 165 g protein (2.2 g/kg)     - Start TF @ 15 mL/hr. Advance by 15 ml q 12 hours.   - Flush 30 mL q 4 hours, or per provider.    Implementation  Nutrition education: Provided education on TF start with patient's spouse.   EN Composition, EN Schedule, and Feeding Tube Flush: orders entered    Nutrition Goals  TF @ goal to provide % estimated needs.     MONITORING AND EVALUATION:  Progress towards goals will be monitored and evaluated per protocol and Practice Guidelines    Aida Keating RD, LD  Pager: [phone]  Available on [employer]

## 2024-10-31 NOTE — PROGRESS NOTES
Rectal temp of 34.0c  matching SG temp of 34.1 jovan arzate. ICU MD at bedside.[facility]

## 2024-10-31 NOTE — PROGRESS NOTES
Attention brought to low SVO2 of 40 and CI of 1.0. Calculated measurements appear to be higher.    Dr. Navarro was called overnight to discuss the discrepancy, ?unreliable swan readings. Pressor requirements not escalating.     We will follow lactate levels.    Grant Cardenas MD  Pulmonary, Critical Care and Sleep Medicine  [facility]  Pager: [phone]

## 2024-10-31 NOTE — PLAN OF CARE
Problem: Comorbidity Management  Goal: Blood Pressure in Desired Range  Outcome: Not Progressing  Intervention: Maintain Blood Pressure Management  Recent Flowsheet Documentation  Taken 10/31/2024 1200 by Angela Marin RN  Medication Review/Management: medications reviewed  Taken 10/31/2024 0800 by Angela Marin RN  Medication Review/Management: medications reviewed     Problem: Mechanical Ventilation Invasive  Goal: Mechanical Ventilation Liberation  Outcome: Not Progressing  Intervention: Promote Extubation and Mechanical Ventilation Liberation  Recent Flowsheet Documentation  Taken 10/31/2024 1200 by Angela Marin RN  Medication Review/Management: medications reviewed  Environmental Support: calm environment promoted  Taken 10/31/2024 0800 by Angela Marin RN  Medication Review/Management: medications reviewed  Environmental Support: calm environment promoted  Goal: Optimal Nutrition Delivery  Outcome: Not Progressing     Problem: Cardiovascular Surgery  Goal: Improved Activity Tolerance  Outcome: Not Progressing  Intervention: Optimize Tolerance for Activity  Recent Flowsheet Documentation  Taken 10/31/2024 1200 by Angela Marin RN  Environmental Support: calm environment promoted  Taken 10/31/2024 0800 by Angela Marin RN  Environmental Support: calm environment promoted  Goal: Effective Bowel Elimination  Outcome: Not Progressing  Goal: Effective Cardiac Function  Outcome: Not Progressing  Intervention: Optimize Cardiac Output and Blood Flow  Recent Flowsheet Documentation  Taken 10/31/2024 1200 by Angela Marin RN  Dysrhythmia Management: pacing wires maintained  Taken 10/31/2024 0800 by Angela Marin RN  Dysrhythmia Management: pacing wires maintained     Problem: Adult Inpatient Plan of Care  Goal: Plan of Care Review  Description: The Plan of Care Review/Shift note should be completed every shift.  The Outcome  "Evaluation is a brief statement about your assessment that the patient is improving, declining, or no change.  This information will be displayed automatically on your shift  note.  Outcome: Progressing  Flowsheets (Taken 10/31/2024 1539)  Outcome Evaluation: pt remains intubated, no weaning at this time. CV: Atrial paced at 100. underlying Afib with CVR and occ junctional. Epi at 0.06, levo at 0.13, dobutamine added at 2.5. Improved co 3.5, CI 1.7 and SVR 1250. Svo2 remains low at 46. Warming pt via icy cath controled rate. now 35.4 rectal and 35.6 SG. Resolving LA. PT opens eyes to command and is able to raise eyebrows to command, does not follow with limbs, dose move left foot spont. prop at 10. dilaudid PRN. [facility]  Plan of Care Reviewed With:   patient   spouse   child  Overall Patient Progress: improving  Goal: Patient-Specific Goal (Individualized)  Description: You can add care plan individualizations to a care plan. Examples of Individualization might be:  \"Parent requests to be called daily at 9am for status\", \"I have a hard time hearing out of my right ear\", or \"Do not touch me to wake me up as it startles  me\".  Outcome: Progressing  Goal: Absence of Hospital-Acquired Illness or Injury  Outcome: Progressing  Intervention: Identify and Manage Fall Risk  Recent Flowsheet Documentation  Taken 10/31/2024 1200 by Angela Marin RN  Safety Promotion/Fall Prevention: room door open  Taken 10/31/2024 0800 by Angela Marin RN  Safety Promotion/Fall Prevention: room door open  Intervention: Prevent Skin Injury  Recent Flowsheet Documentation  Taken 10/31/2024 1400 by Angela Marin RN  Body Position:   turned   right   side-lying 30 degrees  Taken 10/31/2024 1200 by Angela Marin RN  Body Position:   turned   left  Taken 10/31/2024 1000 by Angela Marin RN  Body Position:   turned   right  Taken 10/31/2024 0800 by Angela Marin, " RN  Body Position:   turned   left  Intervention: Prevent and Manage VTE (Venous Thromboembolism) Risk  Recent Flowsheet Documentation  Taken 10/31/2024 1200 by Angela Marin RN  VTE Prevention/Management: SCDs on (sequential compression devices)  Taken 10/31/2024 0800 by Angela Marin RN  VTE Prevention/Management: SCDs on (sequential compression devices)  Intervention: Prevent Infection  Recent Flowsheet Documentation  Taken 10/31/2024 1200 by Angela Marin RN  Infection Prevention:   environmental surveillance performed   equipment surfaces disinfected  Taken 10/31/2024 0800 by Anegla Marin RN  Infection Prevention:   environmental surveillance performed   equipment surfaces disinfected  Goal: Optimal Comfort and Wellbeing  Outcome: Progressing  Intervention: Provide Person-Centered Care  Recent Flowsheet Documentation  Taken 10/31/2024 1200 by Angela Marin RN  Trust Relationship/Rapport:   care explained   choices provided   emotional support provided   empathic listening provided   questions answered   questions encouraged   reassurance provided   thoughts/feelings acknowledged  Taken 10/31/2024 0800 by Angela Marin RN  Trust Relationship/Rapport:   care explained   choices provided   emotional support provided   empathic listening provided   questions answered   questions encouraged   reassurance provided   thoughts/feelings acknowledged  Goal: Readiness for Transition of Care  Outcome: Progressing     Problem: Comorbidity Management  Goal: Maintenance of Asthma Control  Outcome: Progressing  Intervention: Maintain Asthma Symptom Control  Recent Flowsheet Documentation  Taken 10/31/2024 1200 by Angela Marin RN  Medication Review/Management: medications reviewed  Taken 10/31/2024 0800 by Angela Marin RN  Medication Review/Management: medications reviewed  Goal: Maintenance of Behavioral Health Symptom Control  Outcome:  Progressing  Intervention: Maintain Behavioral Health Symptom Control  Recent Flowsheet Documentation  Taken 10/31/2024 1200 by Angela Marin RN  Medication Review/Management: medications reviewed  Taken 10/31/2024 0800 by Angela Marin RN  Medication Review/Management: medications reviewed  Goal: Maintenance of COPD Symptom Control  Outcome: Progressing  Intervention: Maintain COPD Symptom Control  Recent Flowsheet Documentation  Taken 10/31/2024 1200 by Angela Marin RN  Medication Review/Management: medications reviewed  Taken 10/31/2024 0800 by Angela Marin RN  Medication Review/Management: medications reviewed  Goal: Blood Glucose Levels Within Targeted Range  Outcome: Progressing  Intervention: Monitor and Manage Glycemia  Recent Flowsheet Documentation  Taken 10/31/2024 1200 by Angela Marin RN  Medication Review/Management: medications reviewed  Taken 10/31/2024 0800 by Angela Marin RN  Medication Review/Management: medications reviewed  Goal: Maintenance of Heart Failure Symptom Control  Outcome: Progressing  Intervention: Maintain Heart Failure Management  Recent Flowsheet Documentation  Taken 10/31/2024 1200 by Angela Marin RN  Medication Review/Management: medications reviewed  Taken 10/31/2024 0800 by Angela Marin RN  Medication Review/Management: medications reviewed  Goal: Maintenance of Osteoarthritis Symptom Control  Outcome: Progressing  Intervention: Maintain Osteoarthritis Symptom Control  Recent Flowsheet Documentation  Taken 10/31/2024 1200 by Angela Marin RN  Medication Review/Management: medications reviewed  Taken 10/31/2024 0800 by Angela Marin RN  Medication Review/Management: medications reviewed  Goal: Bariatric Home Regimen Maintained  Outcome: Progressing  Intervention: Maintain and Manage Postbariatric Surgery Care  Recent Flowsheet Documentation  Taken 10/31/2024 1200  by Angela Marin RN  Medication Review/Management: medications reviewed  Taken 10/31/2024 0800 by Angela Marin RN  Medication Review/Management: medications reviewed  Goal: Maintenance of Seizure Control  Outcome: Progressing  Intervention: Maintain Seizure Symptom Control  Recent Flowsheet Documentation  Taken 10/31/2024 1200 by Angela Marin RN  Sensory Stimulation Regulation:   care clustered   quiet environment promoted  Medication Review/Management: medications reviewed  Taken 10/31/2024 0800 by Angela Marin RN  Sensory Stimulation Regulation:   care clustered   quiet environment promoted  Medication Review/Management: medications reviewed     Problem: Fall Injury Risk  Goal: Absence of Fall and Fall-Related Injury  Outcome: Progressing  Intervention: Identify and Manage Contributors  Recent Flowsheet Documentation  Taken 10/31/2024 1200 by Angela Marin RN  Medication Review/Management: medications reviewed  Taken 10/31/2024 0800 by Angela Marin RN  Medication Review/Management: medications reviewed  Intervention: Promote Injury-Free Environment  Recent Flowsheet Documentation  Taken 10/31/2024 1200 by Angela Marin RN  Safety Promotion/Fall Prevention: room door open  Taken 10/31/2024 0800 by Angela Marin RN  Safety Promotion/Fall Prevention: room door open     Problem: Mechanical Ventilation Invasive  Goal: Effective Communication  Outcome: Progressing  Intervention: Ensure Effective Communication  Recent Flowsheet Documentation  Taken 10/31/2024 1200 by Angela Marin RN  Family/Support System Care:   involvement promoted   presence promoted   self-care encouraged   support provided   caregiver stress acknowledged  Trust Relationship/Rapport:   care explained   choices provided   emotional support provided   empathic listening provided   questions answered   questions encouraged   reassurance provided    thoughts/feelings acknowledged  Taken 10/31/2024 0800 by Angela Marin RN  Family/Support System Care:   involvement promoted   presence promoted   self-care encouraged   support provided   caregiver stress acknowledged  Trust Relationship/Rapport:   care explained   choices provided   emotional support provided   empathic listening provided   questions answered   questions encouraged   reassurance provided   thoughts/feelings acknowledged  Goal: Optimal Device Function  Outcome: Progressing  Intervention: Optimize Device Care and Function  Recent Flowsheet Documentation  Taken 10/31/2024 1200 by Angela Marin RN  Airway Safety Measures:   all equipment/monitors on and audible   emergency medication sheet  Oral Care: oral rinse provided  Taken 10/31/2024 0800 by Angela Marin RN  Airway Safety Measures:   all equipment/monitors on and audible   emergency medication sheet  Oral Care: oral rinse provided  Goal: Absence of Device-Related Skin and Tissue Injury  Outcome: Progressing  Goal: Absence of Ventilator-Induced Lung Injury  Outcome: Progressing  Intervention: Prevent Ventilator-Associated Pneumonia  Recent Flowsheet Documentation  Taken 10/31/2024 1200 by Angela Marin RN  Oral Care: oral rinse provided  Head of Bed (HOB) Positioning: HOB at 20 degrees  Taken 10/31/2024 0800 by Angela Marin RN  Oral Care: oral rinse provided  Head of Bed (HOB) Positioning: HOB at 20 degrees     Problem: Cardiovascular Surgery  Goal: Optimal Coping with Heart Surgery  Outcome: Progressing  Intervention: Support Psychosocial Response to Surgery  Recent Flowsheet Documentation  Taken 10/31/2024 1200 by Angela Marin RN  Supportive Measures:   relaxation techniques promoted   positive reinforcement provided  Family/Support System Care:   involvement promoted   presence promoted   self-care encouraged   support provided   caregiver stress acknowledged  Taken  10/31/2024 0800 by Angela Marin RN  Supportive Measures:   relaxation techniques promoted   positive reinforcement provided  Family/Support System Care:   involvement promoted   presence promoted   self-care encouraged   support provided   caregiver stress acknowledged  Goal: Absence of Bleeding  Outcome: Progressing  Intervention: Monitor and Manage Bleeding  Recent Flowsheet Documentation  Taken 10/31/2024 1200 by Angela Marin RN  Bleeding Management: dressing monitored  Taken 10/31/2024 0800 by Angela Marin RN  Bleeding Management: dressing monitored  Goal: Optimal Cerebral Tissue Perfusion  Outcome: Progressing  Intervention: Protect and Optimize Cerebral Perfusion  Recent Flowsheet Documentation  Taken 10/31/2024 1200 by Angela Marin RN  Sensory Stimulation Regulation:   care clustered   quiet environment promoted  Glycemic Management: blood glucose monitored  Head of Bed (HOB) Positioning: HOB at 20 degrees  Taken 10/31/2024 0800 by Angela Marin RN  Sensory Stimulation Regulation:   care clustered   quiet environment promoted  Glycemic Management: blood glucose monitored  Head of Bed (HOB) Positioning: HOB at 20 degrees  Goal: Fluid and Electrolyte Balance  Outcome: Progressing  Intervention: Monitor and Manage Fluid and Electrolyte Balance  Recent Flowsheet Documentation  Taken 10/31/2024 1200 by Angela Marin RN  Fluid/Electrolyte Management: fluids adjusted  Taken 10/31/2024 0800 by Angela Marin RN  Fluid/Electrolyte Management: fluids adjusted  Goal: Blood Glucose Level Within Targeted Range  Outcome: Progressing  Intervention: Optimize Glycemic Control  Recent Flowsheet Documentation  Taken 10/31/2024 1200 by Angela Marin RN  Glycemic Management: blood glucose monitored  Taken 10/31/2024 0800 by Angela Marin RN  Glycemic Management: blood glucose monitored  Goal: Absence of Infection Signs and  Symptoms  Outcome: Progressing  Intervention: Prevent or Manage Infection  Recent Flowsheet Documentation  Taken 10/31/2024 1200 by Angela Marin RN  Infection Prevention:   environmental surveillance performed   equipment surfaces disinfected  Taken 10/31/2024 0800 by Angela Marin RN  Infection Prevention:   environmental surveillance performed   equipment surfaces disinfected  Goal: Anesthesia/Sedation Recovery  Outcome: Progressing  Intervention: Optimize Anesthesia Recovery  Recent Flowsheet Documentation  Taken 10/31/2024 1200 by Angela Marin RN  Safety Promotion/Fall Prevention: room door open  Reorientation Measures:   clock in view   familiar social contact encouraged   reorientation provided  Taken 10/31/2024 0800 by Angela Marin RN  Safety Promotion/Fall Prevention: room door open  Reorientation Measures:   clock in view   familiar social contact encouraged   reorientation provided  Goal: Acceptable Pain Control  Outcome: Progressing  Goal: Nausea and Vomiting Relief  Outcome: Progressing  Goal: Effective Urinary Elimination  Outcome: Progressing  Goal: Effective Oxygenation and Ventilation  Outcome: Progressing  Intervention: Promote Airway Secretion Clearance  Recent Flowsheet Documentation  Taken 10/31/2024 1200 by Angela Marin RN  Cough And Deep Breathing: unable to perform  Taken 10/31/2024 0800 by Angela Marin RN  Cough And Deep Breathing: unable to perform  Intervention: Optimize Oxygenation and Ventilation  Recent Flowsheet Documentation  Taken 10/31/2024 1200 by Angela Marin RN  Chest Tube Safety: all connections secured  Taken 10/31/2024 0800 by Angela Marin RN  Chest Tube Safety: all connections secured     Problem: Skin Injury Risk Increased  Goal: Skin Health and Integrity  Outcome: Progressing  Intervention: Plan: Nurse Driven Intervention: Moisture Management  Recent Flowsheet Documentation  Taken  10/31/2024 1200 by Angela Marin RN  Moisture Interventions: Incontinence pad  Bathing/Skin Care: moisturizer applied  Taken 10/31/2024 0800 by Angela Marin RN  Moisture Interventions: Incontinence pad  Bathing/Skin Care: moisturizer applied  Intervention: Plan: Nurse Driven Intervention: Friction and Shear  Recent Flowsheet Documentation  Taken 10/31/2024 1200 by Angela Marin RN  Friction/Shear Interventions:   HOB 30 degrees or less   Silicone foam sacral dressing  Taken 10/31/2024 0800 by Angela Marin RN  Friction/Shear Interventions:   HOB 30 degrees or less   Silicone foam sacral dressing  Intervention: Optimize Skin Protection  Recent Flowsheet Documentation  Taken 10/31/2024 1200 by Agnela Marin RN  Head of Bed (HOB) Positioning: HOB at 20 degrees  Taken 10/31/2024 0800 by Angela Marin RN  Head of Bed (HOB) Positioning: HOB at 20 degrees     Problem: Risk for Delirium  Goal: Optimal Coping  Outcome: Progressing  Intervention: Optimize Psychosocial Adjustment to Delirium  Recent Flowsheet Documentation  Taken 10/31/2024 1200 by Angela Marin RN  Supportive Measures:   relaxation techniques promoted   positive reinforcement provided  Family/Support System Care:   involvement promoted   presence promoted   self-care encouraged   support provided   caregiver stress acknowledged  Taken 10/31/2024 0800 by Angela Marin RN  Supportive Measures:   relaxation techniques promoted   positive reinforcement provided  Family/Support System Care:   involvement promoted   presence promoted   self-care encouraged   support provided   caregiver stress acknowledged   Goal Outcome Evaluation:      Plan of Care Reviewed With: patient, spouse, child    Overall Patient Progress: improvingOverall Patient Progress: improving    Outcome Evaluation: pt remains intubated, no weaning at this time. CV: Atrial paced at 100. underlying Afib with CVR  and occ junctional. Epi at 0.06, levo at 0.13, dobutamine added at 2.5. Improved co 3.5, CI 1.7 and SVR 1250. Svo2 remains low at 46. Warming pt via icy cath controled rate. now 35.4 rectal and 35.6 SG. Resolving LA. PT opens eyes to command and is able to raise eyebrows to command, does not follow with limbs, dose move left foot spont. prop at 10. dilaudid PRN. [facility]

## 2024-10-31 NOTE — PROCEDURES
Procedure: Right femoral central venous catheter placement for temperature management device   Patient: Alessio Teixeira   MRN: [id number]   Date: 10/31/24      Indication: Vascular access, warming device, shock     Consent was obtained from the patient's wife     The right groin was prepped and draped in the usual sterile fashion. The right femoral vein was visualized using ultrasound and accessed with a needle. A wire was advanced into the vein and the needle was removed. A skin knick was made. The subcutaneous tissues were dilated. The catheter was advanced over the wire and the wire was removed. The ports flushed and aspirated easily. The catheter was sutured in place and dressed with a Biopatch and Tegaderm.      The patient tolerated the procedure      Carla Dubois MD   SICU Fellow

## 2024-10-31 NOTE — PROVIDER NOTIFICATION
[facility] called with update re: temp, Linda, clotting and options to warm.   ICU MD at bedside: Catron replaced. Thermogard placed with controled warming setting at 0.67 degrees per hour. CRRT filter changed 2/2 clotting. Blood not returned 2/2 clotting. Dobutamine gtt stared per order and will triturate epi and levo gtts as directed. TMP atrial pacing with better profusion. V wire capped. Pts wife at bedside and updated to all current findings and goals of care. [facility]

## 2024-10-31 NOTE — PROCEDURES
Procedure: Left radial arterial line placement   Patient: Alessio Teixeira   MRN: [id number]   Date: 10/31/24      Indication: Hypotension, shock, vascular access, blood pressure monitoring      EBL: 2 ml      Local anesthesia: 1% lidocaine, 3 ml      The patient's wife consented to the procedure.      The left groin was prepped and draped in the usual sterile fashion. The left radial artery was visualized and accessed with a needle. A wire was advanced into the artery and the needle was removed. The catheter was advanced over the wire and the wire was removed. There was good pulsatile blood flow. The catheter was hooked up to the transducer and there was an appropriate waveform. This was sutured in place and dressed with a Biopatch and Tegaderm.      The patient tolerated the procedure      Carla Dubois MD   SICU Fellow

## 2024-10-31 NOTE — PROGRESS NOTES
ICU Progress Note   Date of Service: 10/31/24     Assessment and Plan:  88 year old M with a history of severe 3 vessel CAD. Status post sternotomy, CABGx3, modified MAZE procedure, PAYAM ligation with Dr. Mulvihill 10/28/24. Post operative hemorrhage with RTOR on POD0 for evacuation of mediastinal blood clot causing tamponade physiology.     Interval events:   Persistent lactic acidosis   Issues with CRRT alarming/clotting   Epi 0.03   NE 0.12  Off vaso and angiotensin   Low CI (around 1.3) per Aylin, higher values with Shirin (1.9)   Hypothermic   Arterial line not working     Today:   Replace arterial line   Intravascular heat exchange device   Stress dose steroids   Stop bicarbonate drip   Antibiotics   Titrate pressors/inotropes in discussion with CVTS     Neuro:  # Sedation for mechanical ventilation  Propofol   RASS goal 0 to -1    Pain control: Tylenol, oxycodone, dilaudid     CV:  #Hx of atrial fibrillation and distant PE on Eliquis   #Hx NSTEMI   #Severe multivessel CAD   Status post sternotomy, CABGx3, modified MAZE procedure, PAYAM ligation with Dr. Mulvihill 10/28/24. Post operative hemorrhage with RTOR on POD0 for evacuation of mediastinal blood clot causing tamponade physiology. Postoperative course has been tenuous with mixed cardiogenic, vasoplegic shock.     Continue Aylin  Trend lactics, ABG   Per CVTS adding dobutamine for inotropy     Pre-operative workup:   Angiogram showed an occluded RCA, ost LAD to mid LAD of 80% and mid circ to distal circ with 99%. Echocardiogram showed an EF of 35-40%. The RV systolic function was normal. There was also mild mitral regurg.    - Echo 10/30/24: Left ventricle is normal in size. Biplane LVEF 35%. There is moderate global hypokinesia of the left ventricle. Limited view of the RV.     Plan:   SBP goal <140   MAP goal >65. Wean pressors and inotropes as able.   Cardiac meds per CVTS  Aspirin 162 mg   PTA atorvastatin - resume per CVTS   PTA torsemide - hold  "    Pulm:  # Acute hypoxemic respiratory failure  - low tidal volume ventilation  - wean PEEP/FiO2, as able    GI:  #Hx upper GIB, gastritis   Recent admission discharged 9/16/2024. Endoscopy revealing gastritis.   PTA pantoprazole     #Shock liver   Treating shock as above   Trend labs     Hold feeds with pressor requirements   Will place NJ feeding tube and start feeds when appropriate   Bowel regimen     Renal:  Pre operative cr 1.8   Now 2.65  Anuric kidney failure, nephrology following   CRRT started 10/30/24      #Acute kidney injury   #Lactic acidosis   Multifactorial, discussed with CVTS at length   Secondary to epinephrine vs tissue hypoperfusion   Continue to monitor. Per CVTS, if continues to increase would consider CT abdomen/pelvis.     #Hypermagnesemia   #Hyperphosphatemia   #Hyperkalemia  #Hypocalcemia   Nephrology consult   CRRT   Stop bicarbonate drip     ID:  WBC not reliable with CLL   Afebrile (but on CRRT)   Zosyn, Vancomycin     Cultures:   Urine 10/30/24 no growth   Sputum 10/31/24 ordered   Blood 10/29/24 NGTD     Heme:  Heparin ppx   Hgb 8.7   Plt 324  INR 3.50    #Hx CLL   Follows with Dr. Raymundo hematology   Follow leukocytosis     MSK:   #Hx Myasthenia gravis     Endo:  No history of DM   A1C 4.5   Medium sliding scale.     PPx:  1. DVT: heparin    2. VAP: HOB 30 degrees, chlorhexidine rinse  3. Stress Ulcer: PPI  4. Restraints: Nonviolent soft two point restraints required and necessary for patient safety and continued cares and good effect as patient continues to pull at necessary lines, tubes despite education and distraction. Will readdress daily.   5. Wound care - per unit routine   6. Feeding - hold with pressor requirements.   7. Family updated at the bedside. They are aware of guarded prognosis.     Dispo: ICU    /79   Pulse 80   Temp (!) 93.4  F (34.1  C) (Pulmonary Artery)   Resp 22   Ht 1.778 m (5' 10\")   Wt 107.4 kg (236 lb 12.4 oz)   SpO2 93%   BMI 33.97 kg/m  "     FiO2 (%): 50 % (temporarily increased), Resp: 22, Vent Mode: CMV/AC, Resp Rate (Set): 22 breaths/min, Tidal Volume (Set, mL): 500 mL, PEEP (cm H2O): 5 cmH2O, Resp Rate (Set): 22 breaths/min, Tidal Volume (Set, mL): 500 mL, PEEP (cm H2O): 5 cmH2O      I/Os  +13,000    Physical Exam:  In bed   Sedated, intubated   Pupils 2 mm equal and reactive to light   Chest rise equal bilaterally   CT with thin bloody output, no air leak, to suction   Amos with very little dark urine in bad   Abdomen soft, ND   Feet cool to the touch. Bilateral PT and DP with multiphasic doppler signals.   Lower extremities 2+  edema   Right hand noted to be swollen. Visualized the right ulnar artery with pulsatile doppler flow on ultrasound. +Cap refill.     Labs: reviewed  All labs reviewed   Increased LFTS  Lactic acidos  Metabolic acidosis     Imaging: reviewed  AM CXR reviewed   AXR for femoral warmer placement - verified with radiology     Past Medical/Surgical history   Atrial fibrillation   Gout   GERD   Gastritis   Bilateral PE in 2007   Myasthenia gravis   HTN   Meningioma   MARTHA   Spinal stenosis     Family history   Not discussed today     Social history   Not discussed today     Carla Dubois MD   SICU Fellow

## 2024-10-31 NOTE — PLAN OF CARE
CV  Pressors (which pressors and any increase/decrease in pressor needs):  Epi 0.03, Levo 0.12 Up on Levo, down on Epi.  HR range: Paced at 100  Chest tube output: 0-40/hr    Neuro  Orientation: NIMA-- Sedated   Delirium present?(y/n): NIMA--Pt minimally responsive.  Sleep: Sedated  Pain: No NVBL indicators of pain present.    GI/  BM? (y/n): N  Urine output: Anuric, CRRT.       Lines: R internal jugular introducer, swan. L Femoral CVC, L femoral Artline. ETT, OG.           Goal Outcome Evaluation:      Plan of Care Reviewed With: patient    Overall Patient Progress: no changeOverall Patient Progress: no change      Problem: Adult Inpatient Plan of Care  Goal: Plan of Care Review  Description: The Plan of Care Review/Shift note should be completed every shift.  The Outcome Evaluation is a brief statement about your assessment that the patient is improving, declining, or no change.  This information will be displayed automatically on your shift  note.  Outcome: Not Progressing  Flowsheets (Taken 10/31/2024 0637)  Outcome Evaluation: Remains Sedated, intubated. CVTS aware of erroneous values on Hemosphere. Using Shirin calculations. Unable to pull fluid, net neutral. ABG shifted alkalotic, RR decreased to 22. Ical replaced x2. Lactic acid down to 8. HGB stable. CVTS updated by intensivist.  Plan of Care Reviewed With: patient  Overall Patient Progress: no change  Goal: Absence of Hospital-Acquired Illness or Injury  Intervention: Prevent and Manage VTE (Venous Thromboembolism) Risk  Recent Flowsheet Documentation  Taken 10/31/2024 0000 by Italo Alan RN  VTE Prevention/Management: SCDs on (sequential compression devices)  Goal: Optimal Comfort and Wellbeing  Intervention: Provide Person-Centered Care  Recent Flowsheet Documentation  Taken 10/31/2024 0000 by Italo Alan RN  Trust Relationship/Rapport: care explained     Problem: Comorbidity Management  Goal: Maintenance of Seizure Control  Intervention:  Maintain Seizure Symptom Control  Recent Flowsheet Documentation  Taken 10/31/2024 0000 by Italo Alan RN  Sensory Stimulation Regulation:   care clustered   quiet environment promoted     Problem: Mechanical Ventilation Invasive  Goal: Effective Communication  Intervention: Ensure Effective Communication  Recent Flowsheet Documentation  Taken 10/31/2024 0000 by Italo Alan RN  Trust Relationship/Rapport: care explained  Goal: Optimal Device Function  Intervention: Optimize Device Care and Function  Recent Flowsheet Documentation  Taken 10/31/2024 0000 by Italo Alan RN  Airway/Ventilation Management: airway patency maintained  Goal: Mechanical Ventilation Liberation  Intervention: Promote Extubation and Mechanical Ventilation Liberation  Recent Flowsheet Documentation  Taken 10/31/2024 0000 by Italo Alan RN  Environmental Support: calm environment promoted     Problem: Cardiovascular Surgery  Goal: Improved Activity Tolerance  Intervention: Optimize Tolerance for Activity  Recent Flowsheet Documentation  Taken 10/31/2024 0000 by Italo Alan RN  Environmental Support: calm environment promoted  Goal: Optimal Coping with Heart Surgery  Intervention: Support Psychosocial Response to Surgery  Recent Flowsheet Documentation  Taken 10/31/2024 0000 by Italo Alan RN  Supportive Measures:   relaxation techniques promoted   positive reinforcement provided  Goal: Effective Cardiac Function  Intervention: Optimize Cardiac Output and Blood Flow  Recent Flowsheet Documentation  Taken 10/31/2024 0000 by Italo Alan RN  Dysrhythmia Management: pacing wires maintained  Goal: Optimal Cerebral Tissue Perfusion  Intervention: Protect and Optimize Cerebral Perfusion  Recent Flowsheet Documentation  Taken 10/31/2024 0000 by Italo Alan RN  Sensory Stimulation Regulation:   care clustered   quiet environment promoted  Goal: Anesthesia/Sedation Recovery  Intervention: Optimize Anesthesia  Recovery  Recent Flowsheet Documentation  Taken 10/31/2024 0000 by Italo Alan RN  Reorientation Measures: reorientation provided  Goal: Effective Oxygenation and Ventilation  Intervention: Promote Airway Secretion Clearance  Recent Flowsheet Documentation  Taken 10/31/2024 0000 by Italo Alan RN  Cough And Deep Breathing: unable to perform  Airway/Ventilation Management: airway patency maintained     Problem: Risk for Delirium  Goal: Optimal Coping  Intervention: Optimize Psychosocial Adjustment to Delirium  Recent Flowsheet Documentation  Taken 10/31/2024 0000 by Italo Alan RN  Supportive Measures:   relaxation techniques promoted   positive reinforcement provided  Goal: Improved Behavioral Control  Intervention: Prevent and Manage Agitation  Recent Flowsheet Documentation  Taken 10/31/2024 0000 by Italo Alan RN  Environment Familiarity/Consistency: daily routine followed  Intervention: Minimize Safety Risk  Recent Flowsheet Documentation  Taken 10/31/2024 0000 by Italo Alan RN  Trust Relationship/Rapport: care explained  Goal: Improved Attention and Thought Clarity  Intervention: Maximize Cognitive Function  Recent Flowsheet Documentation  Taken 10/31/2024 0000 by Italo Alan RN  Sensory Stimulation Regulation:   care clustered   quiet environment promoted  Reorientation Measures: reorientation provided     Problem: Restraint, Nonviolent  Goal: Absence of Harm or Injury  Intervention: Protect Dignity, Rights and Personal Wellbeing  Recent Flowsheet Documentation  Taken 10/31/2024 0000 by Italo Alan RN  Trust Relationship/Rapport: care explained

## 2024-10-31 NOTE — PROGRESS NOTES
[facility]  Cardiovascular and Thoracic Surgery Daily Note      Assessment and Plan  POD # 2 s/p CABG x 3 (LIMA to LAD, SVG to OM, SVG to RCA), Modified left atrial MAZE ([facility]), and occlusion of left atrial appendage (50 mm Atriclip) on 10/28 with Dr. Michael Mulvihill.    POD #2 s/p return to OR for mediastinal re-exploration, washout    - CVS: Pre-op TTE with EF 35-40%. Postop TTE 10/30 with EF ~35% on high-dose inotropic support.   Postop mixed cardiogenic/vasoplegic/hypovolemic shock, mild improvement today. CI goal >2.0 (calculated elaine), add DBU 2.5 mcg/kg/min today, continues on moderate dose epi. NE to MAP goal 65, wean as able.   Hypervolemic, filling pressures increasing, volume management via CRRT, pull as able (currently limited by pressor requirement). CVP goal ~10-12.  Hypothermic since initiation on CRRT, Thermagaurd catheter placed 10/31, target normothermia.   Start stress dose steroids today.   History of paroxysmal atrial fibrillation, continues with intermittent afib and accelerated junctional at times, continue A-pacing at 100 BPM (intermittent capture with afib/junctional, but BP tolerating intermittent a-pacing better than 100% V pacing). Amio discontinued with shock liver.   Absent right radial pulse (previous arterial line in this location). ICU MD did bedside US, ulnar artery patent. Right hand warm, good capillary refill. Defer dedicated US at present (would not change current management).   Aspirin 162 mg daily, defer statin with ALI.    Chest tubes: output 980 mL, serosang, no air leak. TPW: a-paced at 100 BPM     - Resp: Acute hypoxemic respiratory failure, stable. Not hemodynamically appropriate for weaning, continue full vent support.     - Neuro: Sedated with propofol while intubated. Analgesic PRN. History of myasthenia gravis.    - Renal: CKD stage 3, baseline Cr ~1.8. Postop oliguric/anuric JAVIER. Nephrology consulted, CRRT started 10/30. Avoid nephrotoxins.    Recent Labs   Lab 10/31/24  0537 10/31/24  0355 10/30/24  2349   CR 2.65* 2.73* 2.91*       - GI: -BM, +flatus, continue bowel regimen. Shock liver, trend LFTs, avoid hepatotoxins. Recent admission for GIB 09/2024, increase pantoprazole to BID.    - : Amos in place, continue for UOP monitoring/hemodynamic status    - Endo: Postop stress hyperglycemia, resolved. Insulin infusion transitioned to sliding scale insulin. Stress dose steroids started 10/31 as above.   Hemoglobin A1C   Date Value Ref Range Status   10/09/2024 4.5 <5.7 % Final     Comment:     Normal <5.7%   Prediabetes 5.7-6.4%    Diabetes 6.5% or higher     Note: Adopted from ADA consensus guidelines.        - FEN: Replace electrolytes as needed. Place NJ, consider trophic feeds if consistently able to maintain perfusion/CI.    - ID: WBC chronically elevated and hypothermic on CRRT as above so difficult to assess for possible infection; start empiric Vanc/Zosyn 10/31, pan-culture. WBC 55.2.  Trend CBC and fever curve.   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 Acute blood loss anemia due to surgery. Postop coagulopathy, improved. Hgb and PLT 8.7/324. Trend CBC, transfuse PRN.   Recent Labs   Lab 10/31/24  0355 10/31/24  0138 10/30/24  0421   HGB 8.7* 8.7* 9.9*  9.7*    [phone]       - Proph: SCD, subcutaneous heparin, PPI    - Other:  Clinically Significant Risk Factors        # Hyperkalemia: Highest K = 6.5 mmol/L in last 2 days, will monitor as appropriate  # Hypernatremia: Highest Na = 148 mmol/L in last 2 days, will monitor as appropriate  # Hyperchloremia: Highest Cl = 108 mmol/L in last 2 days, will monitor as appropriate      # Hypocalcemia: Lowest iCa = 3.7 mg/dL in last 2 days, will monitor and replace as appropriate    # Anion Gap  "Metabolic Acidosis: Highest Anion Gap = 26 mmol/L in last 2 days, will monitor and treat as appropriate  # Hypoalbuminemia: Lowest albumin = 3 g/dL at 10/31/2024  3:55 AM, will monitor as appropriate  # Coagulation Defect: INR = 3.50 (Ref range: 0.85 - 1.15) and/or PTT = 34 Seconds (Ref range: 22 - 38 Seconds), will monitor for bleeding    # Hypertension: Noted on problem list  # Chronic heart failure with reduced ejection fraction: last echo with EF <40%   # Acute Hypoxic Respiratory Failure: Documented O2 saturation < 90%. Continue supplemental oxygen as needed  # Acute Hypercapnic Respiratory Failure: based on arterial blood gas results.  Continue supplemental oxygen and ventilatory support as indicated.  # Acute Hypercapnic Respiratory Failure: based on venous blood gas results.  Continue supplemental oxygen and ventilatory support as indicated.         # Obesity: Estimated body mass index is 33.97 kg/m  as calculated from the following:    Height as of this encounter: 1.778 m (5' 10\").    Weight as of this encounter: 107.4 kg (236 lb 12.4 oz)., PRESENT ON ADMISSION      # History of CABG: noted on surgical history       - Dispo: ICU. Guarded prognosis but with very small hemodynamic improvements today, family updated multiple times at bedside. Medically Ready for Discharge: Anticipated in 5+ Days      Interval History  CI depressed overnight with decreasing SVO2, mildly improved with addition of DBU. Lactate slowly trending down. Shock liver appears plateaued, INR remains elevated.       Medications  Current Facility-Administered Medications   Medication Dose Route Frequency Provider Last Rate Last Admin    aspirin (ASA) chewable tablet 162 mg  162 mg Oral or NG Tube Daily Clarence Bone MD   162 mg at 10/30/24 0849    hydrocortisone sodium succinate PF (solu-CORTEF) injection 100 mg  100 mg Intravenous Q8H Arlin Hodge PA-C        insulin aspart (NovoLOG) injection (RAPID ACTING)  1-7 Units " Subcutaneous Q4H Sb Arthur PA-C   1 Units at 10/31/24 0300    lidocaine (PF) (XYLOCAINE) 1 % injection 1 mL  1 mL Subcutaneous Once Tyra Dubois MD        lidocaine (viscous) (XYLOCAINE) 2 % solution 5 mL  5 mL Topical Once Arlin Hodge PA-C        pantoprazole (PROTONIX) 2 mg/mL suspension 40 mg  40 mg Oral or NG Tube Daily Clarence Bone MD   40 mg at 10/30/24 0849    Or    pantoprazole (PROTONIX) EC tablet 40 mg  40 mg Oral Daily Clarence Bone MD        piperacillin-tazobactam (ZOSYN) 3.375 g vial to attach to  mL bag  3.375 g Intravenous Q6H Arlin Hodge PA-C        polyethylene glycol (MIRALAX) Packet 17 g  17 g Oral Daily Clarence Bone MD   17 g at 10/30/24 0849    senna-docusate (SENOKOT-S/PERICOLACE) 8.6-50 MG per tablet 1 tablet  1 tablet Oral BID Clarence Bone MD   1 tablet at 10/30/24 2108    sodium chloride (PF) 0.9% PF flush 3 mL  3 mL Intracatheter Q8H Clarence Bone MD   3 mL at 10/31/24 0550    vancomycin (VANCOCIN) 2,500 mg in 0.9% NaCl 525 mL intermittent infusion  2,500 mg Intravenous Once Mulvihill, Michael, MD         Current Facility-Administered Medications   Medication Dose Route Frequency Provider Last Rate Last Admin    acetaminophen (TYLENOL) tablet 650 mg  650 mg Oral Q4H PRN Clarence Bone MD        bisacodyl (DULCOLAX) suppository 10 mg  10 mg Rectal Daily PRN Clarence Bone MD        calcium gluconate 1 g in 50 mL in sodium chloride intermittent infusion  1 g Intravenous Once PRN Clarence Bone MD   1 g at 10/31/24 0640    calcium gluconate 2 g in  mL intermittent infusion  2 g Intravenous Q8H PRN Farhad Boland MD        calcium gluconate 2 g in  mL intermittent infusion  2 g Intravenous Once PRN Clarence Bone MD   2 g at 10/30/24 1331    calcium gluconate 3 g in sodium chloride 0.9 % 100 mL intermittent infusion  3 g Intravenous Once PRN Clarence Bone MD        calcium  gluconate 4 g in sodium chloride 0.9 % 100 mL intermittent infusion  4 g Intravenous Q8H PRN Farhad Boland MD        glucose gel 15-30 g  15-30 g Oral Q15 Min PRN Nasir Vallecillo MD        Or    dextrose 50 % injection 25-50 mL  25-50 mL Intravenous Q15 Min PRN Nasir Vallecillo MD        Or    glucagon injection 1 mg  1 mg Subcutaneous Q15 Min PRN Nasir Vallecillo MD        EPINEPHrine (ADRENALIN) 5 mg in  mL infusion  0.01-0.1 mcg/kg/min Intravenous Continuous PRN Clarence Bone MD 10.1 mL/hr at 10/31/24 0929 0.035 mcg/kg/min at 10/31/24 0929    heparin lock flush 10 unit/mL injection 3 mL  3 mL Intracatheter Q1H PRN Tyra Dubois MD        hydrALAZINE (APRESOLINE) injection 10 mg  10 mg Intravenous Q30 Min PRN Clarence Bone MD        HYDROmorphone (DILAUDID) injection 0.1 mg  0.1 mg Intravenous Q2H PRN Clarence Bone MD        Or    HYDROmorphone (DILAUDID) injection 0.2 mg  0.2 mg Intravenous Q2H PRN Clarence Bone MD   0.2 mg at 10/30/24 1630    lidocaine (LMX4) cream   Topical Q1H PRN Clarence Bone MD        lidocaine 1 % 0.1-1 mL  0.1-1 mL Other Q1H PRN Clarence Bone MD        magnesium hydroxide (MILK OF MAGNESIA) suspension 30 mL  30 mL Oral Daily PRN Clarence Bone MD        magnesium sulfate 2 g in 50 mL sterile water intermittent infusion  2 g Intravenous Q8H PRN Farhad Boland MD        propofol (DIPRIVAN) bolus from bag or syringe pump  10 mg Intravenous Q15 Min PRN Tyra Dubois MD   10 mg at 10/30/24 1638    And    Medication Instruction   Does not apply Continuous PRN Tyra Dubois MD        methocarbamol (ROBAXIN) tablet 500 mg  500 mg Oral Q6H PRN Clarence Bone MD   500 mg at 10/28/24 1634    metoclopramide (REGLAN) injection 5 mg  5 mg Intravenous Once PRN Arlin Hodge PA-C        naloxone (NARCAN) injection 0.2 mg  0.2 mg Intravenous Q2 Min PRN Mulvihill, Michael, MD        Or     naloxone (NARCAN) injection 0.4 mg  0.4 mg Intravenous Q2 Min PRN Mulvihill, Michael, MD        Or    naloxone (NARCAN) injection 0.2 mg  0.2 mg Intramuscular Q2 Min PRN Mulvihill, Michael, MD        Or    naloxone (NARCAN) injection 0.4 mg  0.4 mg Intramuscular Q2 Min PRN Mulvihill, Michael, MD        No heparin required   Does not apply Continuous PRN Farhad Boland MD        norepinephrine (LEVOPHED) 4 mg in  mL infusion PREMIX  0.01-0.15 mcg/kg/min Intravenous Continuous PRN Clarence Bone MD 46.7 mL/hr at 10/31/24 0929 0.13 mcg/kg/min at 10/31/24 0929    ondansetron (ZOFRAN ODT) ODT tab 4 mg  4 mg Oral Q6H PRN Clarence Bone MD        Or    ondansetron (ZOFRAN) injection 4 mg  4 mg Intravenous Q6H PRN Clarence Bone MD        oxyCODONE IR (ROXICODONE) half-tab 2.5 mg  2.5 mg Oral Q4H PRN Clarence Bone MD   2.5 mg at 10/29/24 1705    Or    oxyCODONE (ROXICODONE) tablet 5 mg  5 mg Oral Q4H PRN Clarence Bone MD   5 mg at 10/29/24 2149    potassium chloride 20 mEq in 50 mL intermittent infusion  20 mEq Intravenous Q8H PRN Farhad Boland MD        prochlorperazine (COMPAZINE) injection 5 mg  5 mg Intravenous Q6H PRN Clarence Bone MD        Or    prochlorperazine (COMPAZINE) tablet 5 mg  5 mg Oral Q6H PRN Clarence Bone MD        Reason beta blocker order not selected   Does not apply DOES NOT GO TO Clarence Olguin MD        sodium chloride (PF) 0.9% PF flush 3 mL  3 mL Intracatheter q1 min prn Clarence Bone MD        sodium phosphate 15 mmol in NS 250mL intermittent infusion  15 mmol Intravenous Q8H PRN Farhad Boland MD        vasopressin 0.2 units/mL in NS (PITRESSIN) standard conc infusion  0.5-4 Units/hr Intravenous Continuous PRN Clarence Bone MD   Stopped at 10/30/24 1413         Physical Exam  Vitals were reviewed  Blood pressure 117/79, pulse 80, temperature (!) 93.4  F (34.1  C), temperature source Pulmonary Artery, resp.  "rate 22, height 1.778 m (5' 10\"), weight 107.4 kg (236 lb 12.4 oz), SpO2 99%.  Rhythm: intermitted a-pacing, a fib, accelerated junctional    Lungs: diminished bases, +crackles    Cardiovascular: irregular rate/rhythm, no m/r/g    Abdomen: soft, NT, ND, +BS    Extremeties: 2+ BLE/BUE edema    Incision: CDI    CT: serosang output 980 mL, no air leak    Weight:   Vitals:    10/28/24 0551 10/29/24 0401 10/30/24 0430 10/31/24 0400   Weight: 95.8 kg (211 lb 3.2 oz) 100.3 kg (221 lb 1.9 oz) 105.1 kg (231 lb 11.3 oz) 107.4 kg (236 lb 12.4 oz)         Data  Recent Labs   Lab 10/31/24  0703 10/31/24  0549 10/31/24  0537 10/31/24  0357 10/31/24  0355 10/31/24  0258 10/31/24  0138 10/30/24  2349 10/30/24  1941 10/30/24  1940 10/30/24  1810 10/30/24  1304 10/30/24  0817 10/30/24  0802 10/30/24  0526 10/30/24  0421 10/29/24  0954 10/29/24  0951   WBC  --   --   --   --  55.2*  --  59.6*  --   --   --   --   --   --   --   --  54.7*  --   --    HGB  --   --   --   --  8.7*  --  8.7*  --   --   --   --   --   --   --   --  9.9*  9.7*   < >  --    MCV  --   --   --   --  88  --  88  --   --   --   --   --   --   --   --  92  --   --    PLT  --   --   --   --  324  --  313  --   --   --   --   --   --   --   --  361  --   --    INR 3.50*  --   --   --   --   --   --   --   --   --   --   --   --  3.20*  --   --   --  1.74*   NA  --   --  142  --  142  --   --  143  --  142   < > 145   < >  --   --  147*   < >  --    POTASSIUM  --   --  4.0  --  4.3  --   --  4.5  --  4.4   < > 5.4*   < >  --   --  6.5*  6.4*   < >  --    CHLORIDE  --   --  101  --  102  --   --  102  --  101   < > 105   < >  --   --  106   < >  --    CO2  --   --  20*  --  20*  --   --  18*  --  16*   < > 14*   < >  --   --  16*   < >  --    BUN  --   --  42.3*  --  43.9*  --   --  47.6*  --  53.5*   < > 64.4*   < >  --   --  65.8*   < >  --    CR  --   --  2.65*  --  2.73*  --   --  2.91*  --  3.16*   < > 3.89*   < >  --   --  3.76*   < >  --    ANIONGAP  --   -- "  21*  --  20*  --   --  23*  --  25*   < > 26*   < >  --   --  25*   < >  --    TATE  --   --  8.1*  --  8.0*  --   --  7.8*  --  7.9*   < > 7.1*   < >  --   --  7.6*   < >  --    GLC  --  129* 137*  --  147*   < >  --  168*   < > 178*   < > 146*   < >  --    < > 106*   < >  --    ALBUMIN  --   --   --   --  3.0*  --   --   --   --  3.0*  --  3.0*   < >  --   --  3.4*   < >  --    PROTTOTAL  --   --   --   --  4.6*  --   --   --   --   --   --  4.6*   < >  --   --  5.2*   < >  --    BILITOTAL  --   --   --   --  1.2  --   --   --   --   --   --  0.9   < >  --   --  0.9   < >  --    ALKPHOS  --   --   --  113  --   --   --   --   --   --   --  96   < >  --   --  88   < >  --    ALT  --   --   --  2,247*  --   --   --   --   --   --   --  2,488*   < >  --   --  1,484*   < >  --    AST  --   --   --  >7,000*  --   --   --   --   --   --   --  6,484*  --   --   --   --   --   --     < > = values in this interval not displayed.       Imaging:  Recent Results (from the past 24 hours)   XR Chest Port 1 View    Narrative    CHEST ONE VIEW PORTABLE  10/30/2024 10:48 AM     HISTORY: HD line verification.    COMPARISON: Chest x-ray 10/30/2024.      Impression    IMPRESSION: ET tube is 6.5 cm proximal to the dayanna. Nasogastric tube  in place. Stable bilateral chest tubes. Right neck central line tip at  the inferior aspect of the right atrium. Left neck venous catheter is  suggested with its tip at the expected central left innominate vein.  No pneumothorax identified. Bibasilar consolidation versus atelectasis  appears stable. Stable enlarged cardiac silhouette. A degree of  pulmonary edema is suggested appearing stable.    ANN OVIEDO MD         SYSTEM ID:  [id number]   XR Chest Port 1 View    Narrative    EXAM: XR CHEST PORT 1 VIEW  LOCATION: [facility]  DATE: 10/30/2024    INDICATION: ett advancement  COMPARISON: 10/30/2024 at 1046 hours      Impression    IMPRESSION: ET tube in good position 6 cm  above dayanna. NG tube extends into the stomach. Right IJ [place] catheter with its tip in main pulmonary artery. Left IJ central port with its tip at upper most SVC. Bilateral pleural drains. Sternal wires and   left atrial appendage clip. Heart size within normal limits. Mild atelectasis or pleural fluid at each lung base. No pneumothorax.   US Abdomen Limited    Narrative    EXAM: US ABDOMEN LIMITED  LOCATION: [facility]  DATE: 10/30/2024    INDICATION: Portal vein thrombus? Severe transaminitis  COMPARISON: 4/18/2013  TECHNIQUE: Limited abdominal ultrasound.    FINDINGS:    GALLBLADDER: Sludge in the gallbladder which is moderately distended. No pericholecystic fluid or gallbladder wall thickening. Negative sonographic Hernandes's sign.    BILE DUCTS: No biliary dilatation. The common duct measures 5 mm.    LIVER: Increased echogenicity from diffuse fatty infiltration. No focal mass.    RIGHT KIDNEY: No hydronephrosis. 2 simple cysts, largest 3.5 cm. No follow-up necessary.    PANCREAS: The visualized portions are normal.    PORTAL VEINS: Main, left, and right portal vein are patent with appropriate direction of flow. No evidence of thrombus..    Small amount of upper abdominal ascites.      Impression    IMPRESSION:  1.  Patent portal veins. No evidence of portal thrombus.  2.  Hepatic steatosis.  3.  Small volume ascites.             Patient seen and discussed with Dr. Tracey and Dr. Mulvihill Arielle Webb, PA-C  Cardiothoracic Surgery  Available for paging [phone] (personal pager or CV Surgery Rounding Pager)  Personal Pager: [phone]  CV Surgery Rounding Pager: [phone]  After hours please page surgeon on-call

## 2024-10-31 NOTE — PROGRESS NOTES
[facility] ICU RESPIRATORY NOTE        Date of Admission: 10/28/2024    Date of Intubation (most recent): 10/28    Reason for Mechanical Ventilation: CABG    Number of Days on Mechanical Ventilation: 4    Met Criteria for Spontaneous Breathing Trial: No    Reason for No Spontaneous Breathing Trial: Per MD    Bite Block: No    Significant Events Today: None    ABG Results:   Recent Labs   Lab 10/31/24  1738 10/31/24  1351 10/31/24  1139 10/31/24  1136 10/31/24  0702   PH 7.45 7.42  --  7.46* 7.43   PCO2 35 39  --  34* 35   PO2 97 124*  --  159* 102   HCO3 25 25  --  24 23   O2PER 40  40 45  45 50 50 30         Current Vent Settings: FiO2 (%): 40 %, Resp: 20, Vent Mode: CMV/AC, Resp Rate (Set): 20 breaths/min, Tidal Volume (Set, mL): 500 mL, PEEP (cm H2O): 5 cmH2O, Resp Rate (Set): 20 breaths/min, Tidal Volume (Set, mL): 500 mL, PEEP (cm H2O): 5 cmH2O    Skin Assessment: Intact    Plan: Continue current therapy.    Manda Messina, RT on 10/31/2024 at 6:13 PM

## 2024-10-31 NOTE — PLAN OF CARE
Problem: Adult Inpatient Plan of Care  Goal: Plan of Care Review  Description: The Plan of Care Review/Shift note should be completed every shift.  The Outcome Evaluation is a brief statement about your assessment that the patient is improving, declining, or no change.  This information will be displayed automatically on your shift  note.  Outcome: Not Progressing  Flowsheets (Taken 10/30/2024 5464)  Outcome Evaluation: Remains intubated and sedated, on pressors to maintain MAP >65, remans on CRRT, tolerating net zero, ART line very positional, cuff pressures correlate, remains Vpaced at 100, tolerating ventilation, MACI numbers charted at 2200.  Plan of Care Reviewed With: patient  Overall Patient Progress: no change   Goal Outcome Evaluation:      Plan of Care Reviewed With: patient    Overall Patient Progress: no changeOverall Patient Progress: no change    Outcome Evaluation: Remains intubated and sedated, on pressors to maintain MAP >65, remans on CRRT, tolerating net zero, ART line very positional, cuff pressures correlate, remains Vpaced at 100, tolerating ventilation, MACI numbers charted at 2200.

## 2024-10-31 NOTE — PROVIDER NOTIFICATION
ICU MD and CVS updated as to lack of radial pulse. ICU MD completed bedside U/S to confirm ulnar flow. csccrn

## 2024-10-31 NOTE — PROVIDER NOTIFICATION
Notified MD Theresa Pt's SvO2 has been maintaining ~45. The hemodynamic monitor's CO and CI have been low despite the calculations run on the bedside monitor data being around double. MD Theresa called CTS and relayed this information.     Addendum: MD Theresa notified of SvO2 continued downward trend. While Epi has been weaned slightly, Norepi has been increased by a total of 50%. Per Shirin Calculations, CO and CI are stable. MD Theresa updating CTS.  No changes at this time.

## 2024-10-31 NOTE — PHARMACY-VANCOMYCIN DOSING SERVICE
"Pharmacy Vancomycin Initial Note  Date of Service 2024  Patient's  1935  88 year old, male    Indication: Sepsis    Current estimated CrCl = Estimated Creatinine Clearance: 25.4 mL/min (A) (based on SCr of 2.52 mg/dL (H)).    Creatinine for last 3 days  10/28/2024:  2:46 PM Creatinine 1.86 mg/dL;  8:03 PM Creatinine 2.20 mg/dL;  9:25 PM Creatinine 2.17 mg/dL; 11:05 PM Creatinine 2.28 mg/dL  10/29/2024:  6:01 AM Creatinine 2.50 mg/dL;  5:55 PM Creatinine 3.10 mg/dL; 10:21 PM Creatinine 3.34 mg/dL  10/30/2024:  4:21 AM Creatinine 3.76 mg/dL; 11:31 AM Creatinine 4.07 mg/dL;  1:04 PM Creatinine 3.89 mg/dL;  6:10 PM Creatinine 3.28 mg/dL;  7:40 PM Creatinine 3.16 mg/dL; 11:49 PM Creatinine 2.91 mg/dL  10/31/2024:  3:55 AM Creatinine 2.73 mg/dL;  5:37 AM Creatinine 2.65 mg/dL; 12:50 PM Creatinine 2.52 mg/dL; 12:50 PM Creatinine 2.52 mg/dL    Recent Vancomycin Level(s) for last 3 days  No results found for requested labs within last 3 days.      Vancomycin IV Administrations (past 72 hours)                     vancomycin (VANCOCIN) 2,500 mg in 0.9% NaCl 525 mL intermittent infusion (mg) 2,500 mg New Bag 10/31/24 1201                    Nephrotoxins and other renal medications (From now, onward)      Start     Dose/Rate Route Frequency Ordered Stop    10/31/24 1349  vancomycin place rico - receiving intermittent dosing         1 each Intravenous SEE ADMIN INSTRUCTIONS 10/31/24 1349      10/31/24 1000  piperacillin-tazobactam (ZOSYN) 3.375 g vial to attach to  mL bag        Note to Pharmacy: For [facility], [facility] and [facility]: For Zosyn-naive patients, use the \"Zosyn initial dose + extended infusion\" order panel.    3.375 g  over 30 Minutes Intravenous EVERY 6 HOURS 10/31/24 0940      10/31/24 1000  vancomycin (VANCOCIN) 2,500 mg in 0.9% NaCl 525 mL intermittent infusion         2,500 mg  over 120 Minutes Intravenous ONCE 10/31/24 0946      10/28/24 1425  norepinephrine (LEVOPHED) 4 mg in  mL infusion " PREMIX         0.01-0.15 mcg/kg/min × 95.8 kg  3.6-53.9 mL/hr  Intravenous CONTINUOUS PRN 10/28/24 1425      10/28/24 1425  vasopressin 0.2 units/mL in NS (PITRESSIN) standard conc infusion         0.5-4 Units/hr  2.5-20 mL/hr  Intravenous CONTINUOUS PRN 10/28/24 1425              Contrast Orders - past 72 hours (72h ago, onward)      Start     Dose/Rate Route Frequency Stop    10/30/24 1000  perflutren diluted 1mL to 2mL with saline (OPTISON) diluted injection 9 mL        Note to Pharmacy: NDC# [id number]    9 mL Intravenous ONCE 10/30/24 0939                  Plan:  Start intermittent vancomycin.  Vancomycin monitoring method: Trough (Method 2 = manual dose calculation)  Vancomycin therapeutic monitoring goal: 15-20 mg/L  Pharmacy will check vancomycin levels as appropriate in 1-3 Days.    Serum creatinine levels will be ordered daily for the first week of therapy and at least twice weekly for subsequent weeks.      Tonie Anderson RPH

## 2024-10-31 NOTE — PLAN OF CARE
CV  Pressors (which pressors and any increase/decrease in pressor needs):   HR range: 100 - 100% V paced  Chest tube output: 0-40/hr    Neuro  Orientation: NIMA Sedated  Delirium present?(y/n): Y  Sleep: assumed  Pain: dilaudid PRN    GI/  BM? (y/n): N, meds given  Urine output: mostly anuric; minimal output has been maroon from cyanokit    Lines: RIJ introducer + swan, L fem CVC, L fem arterial line, L PIV    Long conversation between CVS and Intensivist today at the bedside - PLAN is to continue epi gtt at 0.07, and norepi gtt weaning it as able. Dobutamine, angiotensin II & amio gtts stopped per MDs. The patient's temporary pacer lost atrial capture, so now 100% V paced at 100 bpm. Continue bicarb gtt for acidosis. D10 gtt continued for hypoglycemia. CVP ~ 12. CI ~ 1.8, CO ~ 4. Pul htn continued, MD aware. Bear hugger on because unable to get reliable temperature, but feels cold to touch.     Problem: Adult Inpatient Plan of Care  Goal: Absence of Hospital-Acquired Illness or Injury  Intervention: Prevent Skin Injury  Recent Flowsheet Documentation  Taken 10/30/2024 1800 by Nano Mcbride RN  Body Position:   turned   side-lying   heels elevated  Taken 10/30/2024 1600 by Nano Mcbride RN  Body Position:   turned   side-lying   heels elevated  Skin Protection:   adhesive use limited   incontinence pads utilized  Taken 10/30/2024 1400 by Nano Mcbride RN  Body Position:   turned   side-lying   heels elevated  Taken 10/30/2024 1200 by Nano Mcbride RN  Body Position:   turned   side-lying   heels elevated  Skin Protection:   adhesive use limited   incontinence pads utilized  Taken 10/30/2024 1000 by Nano Mcbride RN  Body Position:   turned   side-lying   heels elevated  Taken 10/30/2024 0800 by Nano Mcbride RN  Body Position:   turned   side-lying   heels elevated  Skin Protection:   adhesive use limited   incontinence pads utilized     Problem: Comorbidity Management  Goal: Blood Pressure in  Desired Range  Intervention: Maintain Blood Pressure Management  Recent Flowsheet Documentation  Taken 10/30/2024 1600 by Nano Mcbride, RN  Medication Review/Management: medications reviewed  Taken 10/30/2024 1200 by Nano Mcbride, RN  Medication Review/Management: medications reviewed  Taken 10/30/2024 0800 by Nano Mcbride, RN  Medication Review/Management: medications reviewed

## 2024-11-01 ENCOUNTER — APPOINTMENT (OUTPATIENT)
Dept: GENERAL RADIOLOGY | Facility: CLINIC | Age: 89
End: 2024-11-01
Attending: STUDENT IN AN ORGANIZED HEALTH CARE EDUCATION/TRAINING PROGRAM
Payer: MEDICARE

## 2024-11-01 LAB
ABO/RH(D): NORMAL
ACANTHOCYTES BLD QL SMEAR: ABNORMAL
ACANTHOCYTES BLD QL SMEAR: SLIGHT
ALBUMIN SERPL BCG-MCNC: 2.7 G/DL (ref 3.5–5.2)
ALBUMIN SERPL BCG-MCNC: 2.8 G/DL (ref 3.5–5.2)
ALBUMIN SERPL BCG-MCNC: 2.9 G/DL (ref 3.5–5.2)
ALBUMIN SERPL BCG-MCNC: 2.9 G/DL (ref 3.5–5.2)
ALLEN'S TEST: ABNORMAL
ALP SERPL-CCNC: 132 U/L (ref 40–150)
ALP SERPL-CCNC: 152 U/L (ref 40–150)
ALP SERPL-CCNC: 157 U/L (ref 40–150)
ALT SERPL W P-5'-P-CCNC: 1099 U/L (ref 0–70)
ALT SERPL W P-5'-P-CCNC: 1451 U/L (ref 0–70)
ALT SERPL W P-5'-P-CCNC: 925 U/L (ref 0–70)
ANION GAP SERPL CALCULATED.3IONS-SCNC: 12 MMOL/L (ref 7–15)
ANION GAP SERPL CALCULATED.3IONS-SCNC: 12 MMOL/L (ref 7–15)
ANION GAP SERPL CALCULATED.3IONS-SCNC: 16 MMOL/L (ref 7–15)
ANION GAP SERPL CALCULATED.3IONS-SCNC: 16 MMOL/L (ref 7–15)
ANTIBODY SCREEN: NEGATIVE
AST SERPL W P-5'-P-CCNC: 1557 U/L (ref 0–45)
AST SERPL W P-5'-P-CCNC: 2266 U/L (ref 0–45)
AST SERPL W P-5'-P-CCNC: 3855 U/L (ref 0–45)
BASE EXCESS BLDA CALC-SCNC: 0.2 MMOL/L (ref -3–3)
BASE EXCESS BLDA CALC-SCNC: 0.6 MMOL/L (ref -3–3)
BASE EXCESS BLDA CALC-SCNC: 0.6 MMOL/L (ref -3–3)
BASE EXCESS BLDA CALC-SCNC: 0.9 MMOL/L (ref -3–3)
BASE EXCESS BLDV CALC-SCNC: -0.7 MMOL/L (ref -3–3)
BASE EXCESS BLDV CALC-SCNC: -2.4 MMOL/L (ref -3–3)
BASE EXCESS BLDV CALC-SCNC: -3.2 MMOL/L (ref -3–3)
BASE EXCESS BLDV CALC-SCNC: 1.2 MMOL/L (ref -3–3)
BASE EXCESS BLDV CALC-SCNC: 1.8 MMOL/L (ref -3–3)
BASE EXCESS BLDV CALC-SCNC: 2.3 MMOL/L (ref -3–3)
BASO STIPL BLD QL SMEAR: PRESENT
BILIRUB SERPL-MCNC: 1.7 MG/DL
BILIRUB SERPL-MCNC: 1.9 MG/DL
BILIRUB SERPL-MCNC: 2 MG/DL
BLD PROD TYP BPU: NORMAL
BLOOD COMPONENT TYPE: NORMAL
BUN SERPL-MCNC: 30.7 MG/DL (ref 8–23)
BUN SERPL-MCNC: 30.9 MG/DL (ref 8–23)
BUN SERPL-MCNC: 33.1 MG/DL (ref 8–23)
BUN SERPL-MCNC: 34.2 MG/DL (ref 8–23)
CA-I BLD-MCNC: 4 MG/DL (ref 4.4–5.2)
CA-I BLD-MCNC: 4.4 MG/DL (ref 4.4–5.2)
CA-I BLD-MCNC: 4.5 MG/DL (ref 4.4–5.2)
CA-I BLD-MCNC: 4.6 MG/DL (ref 4.4–5.2)
CA-I BLD-MCNC: 4.6 MG/DL (ref 4.4–5.2)
CALCIUM SERPL-MCNC: 8.4 MG/DL (ref 8.8–10.4)
CALCIUM SERPL-MCNC: 8.5 MG/DL (ref 8.8–10.4)
CALCIUM SERPL-MCNC: 8.7 MG/DL (ref 8.8–10.4)
CALCIUM SERPL-MCNC: 8.7 MG/DL (ref 8.8–10.4)
CHLORIDE SERPL-SCNC: 100 MMOL/L (ref 98–107)
CHLORIDE SERPL-SCNC: 102 MMOL/L (ref 98–107)
CHLORIDE SERPL-SCNC: 102 MMOL/L (ref 98–107)
CHLORIDE SERPL-SCNC: 103 MMOL/L (ref 98–107)
CODING SYSTEM: NORMAL
COHGB MFR BLD: 98.8 % (ref 95–96)
COHGB MFR BLD: 99.1 % (ref 95–96)
COHGB MFR BLD: 99.6 % (ref 95–96)
COHGB MFR BLD: 99.6 % (ref 95–96)
CREAT SERPL-MCNC: 2.06 MG/DL (ref 0.67–1.17)
CREAT SERPL-MCNC: 2.06 MG/DL (ref 0.67–1.17)
CREAT SERPL-MCNC: 2.27 MG/DL (ref 0.67–1.17)
CREAT SERPL-MCNC: 2.34 MG/DL (ref 0.67–1.17)
CROSSMATCH: NORMAL
EGFRCR SERPLBLD CKD-EPI 2021: 26 ML/MIN/1.73M2
EGFRCR SERPLBLD CKD-EPI 2021: 27 ML/MIN/1.73M2
EGFRCR SERPLBLD CKD-EPI 2021: 31 ML/MIN/1.73M2
EGFRCR SERPLBLD CKD-EPI 2021: 31 ML/MIN/1.73M2
ELLIPTOCYTES BLD QL SMEAR: SLIGHT
ELLIPTOCYTES BLD QL SMEAR: SLIGHT
ERYTHROCYTE [DISTWIDTH] IN BLOOD BY AUTOMATED COUNT: 17.8 % (ref 10–15)
ERYTHROCYTE [DISTWIDTH] IN BLOOD BY AUTOMATED COUNT: 18 % (ref 10–15)
ERYTHROCYTE [DISTWIDTH] IN BLOOD BY AUTOMATED COUNT: 18.1 % (ref 10–15)
GLUCOSE BLDC GLUCOMTR-MCNC: 113 MG/DL (ref 70–99)
GLUCOSE BLDC GLUCOMTR-MCNC: 117 MG/DL (ref 70–99)
GLUCOSE BLDC GLUCOMTR-MCNC: 126 MG/DL (ref 70–99)
GLUCOSE BLDC GLUCOMTR-MCNC: 129 MG/DL (ref 70–99)
GLUCOSE BLDC GLUCOMTR-MCNC: 133 MG/DL (ref 70–99)
GLUCOSE BLDC GLUCOMTR-MCNC: 137 MG/DL (ref 70–99)
GLUCOSE SERPL-MCNC: 120 MG/DL (ref 70–99)
GLUCOSE SERPL-MCNC: 120 MG/DL (ref 70–99)
GLUCOSE SERPL-MCNC: 128 MG/DL (ref 70–99)
GLUCOSE SERPL-MCNC: 138 MG/DL (ref 70–99)
HCO3 BLD-SCNC: 24 MMOL/L (ref 21–28)
HCO3 BLD-SCNC: 25 MMOL/L (ref 21–28)
HCO3 BLDV-SCNC: 22 MMOL/L (ref 21–28)
HCO3 BLDV-SCNC: 22 MMOL/L (ref 21–28)
HCO3 BLDV-SCNC: 24 MMOL/L (ref 21–28)
HCO3 BLDV-SCNC: 26 MMOL/L (ref 21–28)
HCO3 BLDV-SCNC: 27 MMOL/L (ref 21–28)
HCO3 BLDV-SCNC: 27 MMOL/L (ref 21–28)
HCO3 SERPL-SCNC: 19 MMOL/L (ref 22–29)
HCO3 SERPL-SCNC: 19 MMOL/L (ref 22–29)
HCO3 SERPL-SCNC: 24 MMOL/L (ref 22–29)
HCO3 SERPL-SCNC: 24 MMOL/L (ref 22–29)
HCT VFR BLD AUTO: 25.6 % (ref 40–53)
HCT VFR BLD AUTO: 25.8 % (ref 40–53)
HCT VFR BLD AUTO: 27.3 % (ref 40–53)
HGB BLD-MCNC: 8.5 G/DL (ref 13.3–17.7)
HGB BLD-MCNC: 8.9 G/DL (ref 13.3–17.7)
HGB BLD-MCNC: 9.2 G/DL (ref 13.3–17.7)
INR PPP: 2.88 (ref 0.85–1.15)
ISSUE DATE AND TIME: NORMAL
LACTATE SERPL-SCNC: 2 MMOL/L (ref 0.7–2)
LACTATE SERPL-SCNC: 2.3 MMOL/L (ref 0.7–2)
LACTATE SERPL-SCNC: 2.5 MMOL/L (ref 0.7–2)
LACTATE SERPL-SCNC: 2.6 MMOL/L (ref 0.7–2)
LACTATE SERPL-SCNC: 2.8 MMOL/L (ref 0.7–2)
LACTATE SERPL-SCNC: 3.1 MMOL/L (ref 0.7–2)
MAGNESIUM SERPL-MCNC: 1.8 MG/DL (ref 1.7–2.3)
MAGNESIUM SERPL-MCNC: 1.9 MG/DL (ref 1.7–2.3)
MAGNESIUM SERPL-MCNC: 2.2 MG/DL (ref 1.7–2.3)
MCH RBC QN AUTO: 29.5 PG (ref 26.5–33)
MCH RBC QN AUTO: 29.8 PG (ref 26.5–33)
MCH RBC QN AUTO: 30 PG (ref 26.5–33)
MCHC RBC AUTO-ENTMCNC: 33.2 G/DL (ref 31.5–36.5)
MCHC RBC AUTO-ENTMCNC: 33.7 G/DL (ref 31.5–36.5)
MCHC RBC AUTO-ENTMCNC: 34.5 G/DL (ref 31.5–36.5)
MCV RBC AUTO: 87 FL (ref 78–100)
MCV RBC AUTO: 88 FL (ref 78–100)
MCV RBC AUTO: 89 FL (ref 78–100)
O2/TOTAL GAS SETTING VFR VENT: 35 %
O2/TOTAL GAS SETTING VFR VENT: 40 %
OXYHGB MFR BLDV: 47 % (ref 70–75)
OXYHGB MFR BLDV: 50 % (ref 70–75)
OXYHGB MFR BLDV: 55 % (ref 70–75)
OXYHGB MFR BLDV: 64 % (ref 70–75)
PCO2 BLD: 36 MM HG (ref 35–45)
PCO2 BLD: 37 MM HG (ref 35–45)
PCO2 BLDV: 36 MM HG (ref 40–50)
PCO2 BLDV: 36 MM HG (ref 40–50)
PCO2 BLDV: 39 MM HG (ref 40–50)
PCO2 BLDV: 42 MM HG (ref 40–50)
PCO2 BLDV: 42 MM HG (ref 40–50)
PCO2 BLDV: 44 MM HG (ref 40–50)
PEEP: 5 CM H2O
PH BLD: 7.44 [PH] (ref 7.35–7.45)
PH BLD: 7.44 [PH] (ref 7.35–7.45)
PH BLD: 7.45 [PH] (ref 7.35–7.45)
PH BLD: 7.45 [PH] (ref 7.35–7.45)
PH BLDV: 7.38 [PH] (ref 7.32–7.43)
PH BLDV: 7.4 [PH] (ref 7.32–7.43)
PH BLDV: 7.41 [PH] (ref 7.32–7.43)
PH BLDV: 7.41 [PH] (ref 7.32–7.43)
PHOSPHATE SERPL-MCNC: 4.4 MG/DL (ref 2.5–4.5)
PLAT MORPH BLD: ABNORMAL
PLAT MORPH BLD: ABNORMAL
PLATELET # BLD AUTO: 273 10E3/UL (ref 150–450)
PLATELET # BLD AUTO: 275 10E3/UL (ref 150–450)
PLATELET # BLD AUTO: 307 10E3/UL (ref 150–450)
PO2 BLD: 109 MM HG (ref 80–105)
PO2 BLD: 121 MM HG (ref 80–105)
PO2 BLD: 122 MM HG (ref 80–105)
PO2 BLD: 124 MM HG (ref 80–105)
PO2 BLDV: 29 MM HG (ref 25–47)
PO2 BLDV: 30 MM HG (ref 25–47)
PO2 BLDV: 33 MM HG (ref 25–47)
PO2 BLDV: 36 MM HG (ref 25–47)
POLYCHROMASIA BLD QL SMEAR: SLIGHT
POLYCHROMASIA BLD QL SMEAR: SLIGHT
POTASSIUM SERPL-SCNC: 3.8 MMOL/L (ref 3.4–5.3)
POTASSIUM SERPL-SCNC: 4.4 MMOL/L (ref 3.4–5.3)
POTASSIUM SERPL-SCNC: 4.4 MMOL/L (ref 3.4–5.3)
POTASSIUM SERPL-SCNC: 4.6 MMOL/L (ref 3.4–5.3)
PROT SERPL-MCNC: 4.3 G/DL (ref 6.4–8.3)
PROT SERPL-MCNC: 4.4 G/DL (ref 6.4–8.3)
PROT SERPL-MCNC: 4.6 G/DL (ref 6.4–8.3)
RBC # BLD AUTO: 2.88 10E6/UL (ref 4.4–5.9)
RBC # BLD AUTO: 2.97 10E6/UL (ref 4.4–5.9)
RBC # BLD AUTO: 3.09 10E6/UL (ref 4.4–5.9)
RBC MORPH BLD: ABNORMAL
RBC MORPH BLD: ABNORMAL
SAO2 % BLDA: 96 % (ref 92–100)
SAO2 % BLDV: 48.9 % (ref 70–75)
SAO2 % BLDV: 51.3 % (ref 70–75)
SAO2 % BLDV: 51.4 % (ref 70–75)
SAO2 % BLDV: 51.5 % (ref 70–75)
SAO2 % BLDV: 57.3 % (ref 70–75)
SAO2 % BLDV: 66.2 % (ref 70–75)
SODIUM SERPL-SCNC: 135 MMOL/L (ref 135–145)
SODIUM SERPL-SCNC: 137 MMOL/L (ref 135–145)
SODIUM SERPL-SCNC: 138 MMOL/L (ref 135–145)
SODIUM SERPL-SCNC: 139 MMOL/L (ref 135–145)
SPECIMEN EXPIRATION DATE: NORMAL
TARGETS BLD QL SMEAR: SLIGHT
UNIT ABO/RH: NORMAL
UNIT NUMBER: NORMAL
UNIT STATUS: NORMAL
UNIT TYPE ISBT: 6200
VANCOMYCIN SERPL-MCNC: 13.9 UG/ML
WBC # BLD AUTO: 45.5 10E3/UL (ref 4–11)
WBC # BLD AUTO: 61.4 10E3/UL (ref 4–11)
WBC # BLD AUTO: 62 10E3/UL (ref 4–11)

## 2024-11-01 PROCEDURE — 85014 HEMATOCRIT: CPT | Performed by: PHYSICIAN ASSISTANT

## 2024-11-01 PROCEDURE — 250N000013 HC RX MED GY IP 250 OP 250 PS 637: Performed by: SURGERY

## 2024-11-01 PROCEDURE — 99232 SBSQ HOSP IP/OBS MODERATE 35: CPT | Performed by: INTERNAL MEDICINE

## 2024-11-01 PROCEDURE — 82330 ASSAY OF CALCIUM: CPT | Performed by: PHYSICIAN ASSISTANT

## 2024-11-01 PROCEDURE — 82947 ASSAY GLUCOSE BLOOD QUANT: CPT | Performed by: INTERNAL MEDICINE

## 2024-11-01 PROCEDURE — 71045 X-RAY EXAM CHEST 1 VIEW: CPT

## 2024-11-01 PROCEDURE — 86923 COMPATIBILITY TEST ELECTRIC: CPT | Performed by: PHYSICIAN ASSISTANT

## 2024-11-01 PROCEDURE — 99291 CRITICAL CARE FIRST HOUR: CPT | Mod: 24 | Performed by: INTERNAL MEDICINE

## 2024-11-01 PROCEDURE — 82805 BLOOD GASES W/O2 SATURATION: CPT | Performed by: STUDENT IN AN ORGANIZED HEALTH CARE EDUCATION/TRAINING PROGRAM

## 2024-11-01 PROCEDURE — 94003 VENT MGMT INPAT SUBQ DAY: CPT

## 2024-11-01 PROCEDURE — 83735 ASSAY OF MAGNESIUM: CPT | Performed by: INTERNAL MEDICINE

## 2024-11-01 PROCEDURE — 83605 ASSAY OF LACTIC ACID: CPT | Performed by: PHYSICIAN ASSISTANT

## 2024-11-01 PROCEDURE — 258N000003 HC RX IP 258 OP 636: Performed by: SURGERY

## 2024-11-01 PROCEDURE — 250N000011 HC RX IP 250 OP 636: Performed by: PHYSICIAN ASSISTANT

## 2024-11-01 PROCEDURE — 82330 ASSAY OF CALCIUM: CPT | Performed by: INTERNAL MEDICINE

## 2024-11-01 PROCEDURE — 82805 BLOOD GASES W/O2 SATURATION: CPT | Performed by: PHYSICIAN ASSISTANT

## 2024-11-01 PROCEDURE — 250N000013 HC RX MED GY IP 250 OP 250 PS 637: Performed by: STUDENT IN AN ORGANIZED HEALTH CARE EDUCATION/TRAINING PROGRAM

## 2024-11-01 PROCEDURE — 250N000011 HC RX IP 250 OP 636: Performed by: STUDENT IN AN ORGANIZED HEALTH CARE EDUCATION/TRAINING PROGRAM

## 2024-11-01 PROCEDURE — 87070 CULTURE OTHR SPECIMN AEROBIC: CPT | Performed by: PHYSICIAN ASSISTANT

## 2024-11-01 PROCEDURE — 250N000011 HC RX IP 250 OP 636: Performed by: SURGERY

## 2024-11-01 PROCEDURE — 258N000003 HC RX IP 258 OP 636: Performed by: STUDENT IN AN ORGANIZED HEALTH CARE EDUCATION/TRAINING PROGRAM

## 2024-11-01 PROCEDURE — P9016 RBC LEUKOCYTES REDUCED: HCPCS | Performed by: PHYSICIAN ASSISTANT

## 2024-11-01 PROCEDURE — 82247 BILIRUBIN TOTAL: CPT | Performed by: PHYSICIAN ASSISTANT

## 2024-11-01 PROCEDURE — 82310 ASSAY OF CALCIUM: CPT | Performed by: PHYSICIAN ASSISTANT

## 2024-11-01 PROCEDURE — 80202 ASSAY OF VANCOMYCIN: CPT | Performed by: STUDENT IN AN ORGANIZED HEALTH CARE EDUCATION/TRAINING PROGRAM

## 2024-11-01 PROCEDURE — 250N000011 HC RX IP 250 OP 636: Performed by: INTERNAL MEDICINE

## 2024-11-01 PROCEDURE — 85610 PROTHROMBIN TIME: CPT | Performed by: PHYSICIAN ASSISTANT

## 2024-11-01 PROCEDURE — 86900 BLOOD TYPING SEROLOGIC ABO: CPT | Performed by: PHYSICIAN ASSISTANT

## 2024-11-01 PROCEDURE — 82330 ASSAY OF CALCIUM: CPT | Performed by: SURGERY

## 2024-11-01 PROCEDURE — 999N000157 HC STATISTIC RCP TIME EA 10 MIN

## 2024-11-01 PROCEDURE — 120N000004 HC R&B MS OVERFLOW

## 2024-11-01 PROCEDURE — 87205 SMEAR GRAM STAIN: CPT | Performed by: PHYSICIAN ASSISTANT

## 2024-11-01 PROCEDURE — 83735 ASSAY OF MAGNESIUM: CPT | Performed by: PHYSICIAN ASSISTANT

## 2024-11-01 PROCEDURE — 84155 ASSAY OF PROTEIN SERUM: CPT | Performed by: PHYSICIAN ASSISTANT

## 2024-11-01 PROCEDURE — 250N000013 HC RX MED GY IP 250 OP 250 PS 637: Performed by: PHYSICIAN ASSISTANT

## 2024-11-01 PROCEDURE — 80069 RENAL FUNCTION PANEL: CPT | Performed by: INTERNAL MEDICINE

## 2024-11-01 PROCEDURE — 250N000009 HC RX 250

## 2024-11-01 PROCEDURE — 250N000009 HC RX 250: Performed by: INTERNAL MEDICINE

## 2024-11-01 PROCEDURE — 85014 HEMATOCRIT: CPT | Performed by: INTERNAL MEDICINE

## 2024-11-01 PROCEDURE — 86850 RBC ANTIBODY SCREEN: CPT | Performed by: PHYSICIAN ASSISTANT

## 2024-11-01 PROCEDURE — 82310 ASSAY OF CALCIUM: CPT | Performed by: INTERNAL MEDICINE

## 2024-11-01 PROCEDURE — 90947 DIALYSIS REPEATED EVAL: CPT

## 2024-11-01 RX ORDER — PIPERACILLIN SODIUM, TAZOBACTAM SODIUM 4; .5 G/20ML; G/20ML
4.5 INJECTION, POWDER, LYOPHILIZED, FOR SOLUTION INTRAVENOUS EVERY 6 HOURS
Status: DISCONTINUED | OUTPATIENT
Start: 2024-11-01 | End: 2024-11-04

## 2024-11-01 RX ORDER — DEXMEDETOMIDINE HYDROCHLORIDE 4 UG/ML
.1-1.2 INJECTION, SOLUTION INTRAVENOUS CONTINUOUS
Status: DISCONTINUED | OUTPATIENT
Start: 2024-11-01 | End: 2024-11-02

## 2024-11-01 RX ORDER — PROPOFOL 10 MG/ML
5-75 INJECTION, EMULSION INTRAVENOUS CONTINUOUS
Status: DISCONTINUED | OUTPATIENT
Start: 2024-11-01 | End: 2024-11-07

## 2024-11-01 RX ORDER — CHLORHEXIDINE GLUCONATE ORAL RINSE 1.2 MG/ML
15 SOLUTION DENTAL 2 TIMES DAILY
Status: DISCONTINUED | OUTPATIENT
Start: 2024-11-01 | End: 2024-11-09

## 2024-11-01 RX ORDER — CALCIUM CHLORIDE, MAGNESIUM CHLORIDE, DEXTROSE MONOHYDRATE, LACTIC ACID, SODIUM CHLORIDE, SODIUM BICARBONATE AND POTASSIUM CHLORIDE 5.15; 2.03; 22; 5.4; 6.46; 3.09; .157 G/L; G/L; G/L; G/L; G/L; G/L; G/L
INJECTION INTRAVENOUS CONTINUOUS
Status: DISCONTINUED | OUTPATIENT
Start: 2024-11-01 | End: 2024-11-11

## 2024-11-01 RX ORDER — NOREPINEPHRINE BITARTRATE 0.06 MG/ML
.01-.2 INJECTION, SOLUTION INTRAVENOUS CONTINUOUS
Status: DISCONTINUED | OUTPATIENT
Start: 2024-11-01 | End: 2024-11-15 | Stop reason: HOSPADM

## 2024-11-01 RX ORDER — MAGNESIUM SULFATE HEPTAHYDRATE 40 MG/ML
2 INJECTION, SOLUTION INTRAVENOUS ONCE
Status: DISCONTINUED | OUTPATIENT
Start: 2024-11-01 | End: 2024-11-01

## 2024-11-01 RX ORDER — CALCIUM CHLORIDE, MAGNESIUM CHLORIDE, DEXTROSE MONOHYDRATE, LACTIC ACID, SODIUM CHLORIDE, SODIUM BICARBONATE AND POTASSIUM CHLORIDE 5.15; 2.03; 22; 5.4; 6.46; 3.09; .157 G/L; G/L; G/L; G/L; G/L; G/L; G/L
INJECTION INTRAVENOUS CONTINUOUS
Status: DISCONTINUED | OUTPATIENT
Start: 2024-11-01 | End: 2024-11-12

## 2024-11-01 RX ADMIN — SENNOSIDES AND DOCUSATE SODIUM 1 TABLET: 50; 8.6 TABLET ORAL at 21:19

## 2024-11-01 RX ADMIN — POLYETHYLENE GLYCOL 3350 17 G: 17 POWDER, FOR SOLUTION ORAL at 09:33

## 2024-11-01 RX ADMIN — CALCIUM CHLORIDE, MAGNESIUM CHLORIDE, DEXTROSE MONOHYDRATE, LACTIC ACID, SODIUM CHLORIDE, SODIUM BICARBONATE AND POTASSIUM CHLORIDE 5000 ML: 5.15; 2.03; 22; 5.4; 6.46; 3.09; .157 INJECTION INTRAVENOUS at 08:30

## 2024-11-01 RX ADMIN — Medication 40 MG: at 15:53

## 2024-11-01 RX ADMIN — CALCIUM CHLORIDE, MAGNESIUM CHLORIDE, DEXTROSE MONOHYDRATE, LACTIC ACID, SODIUM CHLORIDE, SODIUM BICARBONATE AND POTASSIUM CHLORIDE: 5.15; 2.03; 22; 5.4; 6.46; 3.09; .157 INJECTION INTRAVENOUS at 20:12

## 2024-11-01 RX ADMIN — MAGNESIUM SULFATE HEPTAHYDRATE 2 G: 40 INJECTION, SOLUTION INTRAVENOUS at 05:19

## 2024-11-01 RX ADMIN — NOREPINEPHRINE BITARTRATE 0.17 MCG/KG/MIN: 0.06 INJECTION, SOLUTION INTRAVENOUS at 13:32

## 2024-11-01 RX ADMIN — CALCIUM CHLORIDE, MAGNESIUM CHLORIDE, DEXTROSE MONOHYDRATE, LACTIC ACID, SODIUM CHLORIDE, SODIUM BICARBONATE AND POTASSIUM CHLORIDE 5000 ML: 5.15; 2.03; 22; 5.4; 6.46; 3.09; .157 INJECTION INTRAVENOUS at 23:38

## 2024-11-01 RX ADMIN — PIPERACILLIN AND TAZOBACTAM 4.5 G: 4; .5 INJECTION, POWDER, FOR SOLUTION INTRAVENOUS at 21:20

## 2024-11-01 RX ADMIN — Medication 40 MG: at 08:59

## 2024-11-01 RX ADMIN — CALCIUM CHLORIDE, MAGNESIUM CHLORIDE, DEXTROSE MONOHYDRATE, LACTIC ACID, SODIUM CHLORIDE, SODIUM BICARBONATE AND POTASSIUM CHLORIDE 5000 ML: 5.15; 2.03; 22; 5.4; 6.46; 3.09; .157 INJECTION INTRAVENOUS at 16:35

## 2024-11-01 RX ADMIN — VANCOMYCIN HYDROCHLORIDE 1750 MG: 10 INJECTION, POWDER, LYOPHILIZED, FOR SOLUTION INTRAVENOUS at 12:49

## 2024-11-01 RX ADMIN — CHLORHEXIDINE GLUCONATE 0.12% ORAL RINSE 15 ML: 1.2 LIQUID ORAL at 23:44

## 2024-11-01 RX ADMIN — HYDROCORTISONE SODIUM SUCCINATE 100 MG: 100 INJECTION, POWDER, FOR SOLUTION INTRAMUSCULAR; INTRAVENOUS at 02:25

## 2024-11-01 RX ADMIN — PROPOFOL 10 MCG/KG/MIN: 10 INJECTION, EMULSION INTRAVENOUS at 21:14

## 2024-11-01 RX ADMIN — HYDROCORTISONE SODIUM SUCCINATE 100 MG: 100 INJECTION, POWDER, FOR SOLUTION INTRAMUSCULAR; INTRAVENOUS at 17:15

## 2024-11-01 RX ADMIN — PIPERACILLIN AND TAZOBACTAM 4.5 G: 4; .5 INJECTION, POWDER, FOR SOLUTION INTRAVENOUS at 15:56

## 2024-11-01 RX ADMIN — EPINEPHRINE 0.08 MCG/KG/MIN: 1 INJECTION INTRAMUSCULAR; INTRAVENOUS; SUBCUTANEOUS at 05:03

## 2024-11-01 RX ADMIN — ASPIRIN 81 MG CHEWABLE TABLET 162 MG: 81 TABLET CHEWABLE at 08:59

## 2024-11-01 RX ADMIN — HYDROCORTISONE SODIUM SUCCINATE 100 MG: 100 INJECTION, POWDER, FOR SOLUTION INTRAMUSCULAR; INTRAVENOUS at 10:31

## 2024-11-01 RX ADMIN — CALCIUM CHLORIDE, MAGNESIUM CHLORIDE, DEXTROSE MONOHYDRATE, LACTIC ACID, SODIUM CHLORIDE, SODIUM BICARBONATE AND POTASSIUM CHLORIDE 5000 ML: 5.15; 2.03; 22; 5.4; 6.46; 3.09; .157 INJECTION INTRAVENOUS at 02:31

## 2024-11-01 RX ADMIN — PIPERACILLIN AND TAZOBACTAM 3.38 G: 3; .375 INJECTION, POWDER, FOR SOLUTION INTRAVENOUS at 04:51

## 2024-11-01 RX ADMIN — CALCIUM CHLORIDE, MAGNESIUM CHLORIDE, DEXTROSE MONOHYDRATE, LACTIC ACID, SODIUM CHLORIDE, SODIUM BICARBONATE AND POTASSIUM CHLORIDE 5000 ML: 5.15; 2.03; 22; 5.4; 6.46; 3.09; .157 INJECTION INTRAVENOUS at 14:10

## 2024-11-01 RX ADMIN — CALCIUM GLUCONATE 2 G: 20 INJECTION, SOLUTION INTRAVENOUS at 11:14

## 2024-11-01 RX ADMIN — CALCIUM CHLORIDE, MAGNESIUM CHLORIDE, DEXTROSE MONOHYDRATE, LACTIC ACID, SODIUM CHLORIDE, SODIUM BICARBONATE AND POTASSIUM CHLORIDE 5000 ML: 5.15; 2.03; 22; 5.4; 6.46; 3.09; .157 INJECTION INTRAVENOUS at 13:06

## 2024-11-01 RX ADMIN — MAGNESIUM SULFATE HEPTAHYDRATE 2 G: 40 INJECTION, SOLUTION INTRAVENOUS at 15:00

## 2024-11-01 RX ADMIN — SENNOSIDES AND DOCUSATE SODIUM 1 TABLET: 50; 8.6 TABLET ORAL at 09:33

## 2024-11-01 RX ADMIN — CALCIUM CHLORIDE, MAGNESIUM CHLORIDE, DEXTROSE MONOHYDRATE, LACTIC ACID, SODIUM CHLORIDE, SODIUM BICARBONATE AND POTASSIUM CHLORIDE: 5.15; 2.03; 22; 5.4; 6.46; 3.09; .157 INJECTION INTRAVENOUS at 06:32

## 2024-11-01 RX ADMIN — PIPERACILLIN AND TAZOBACTAM 3.38 G: 3; .375 INJECTION, POWDER, FOR SOLUTION INTRAVENOUS at 09:21

## 2024-11-01 RX ADMIN — CALCIUM CHLORIDE, MAGNESIUM CHLORIDE, DEXTROSE MONOHYDRATE, LACTIC ACID, SODIUM CHLORIDE, SODIUM BICARBONATE AND POTASSIUM CHLORIDE 5000 ML: 5.15; 2.03; 22; 5.4; 6.46; 3.09; .157 INJECTION INTRAVENOUS at 08:55

## 2024-11-01 RX ADMIN — CALCIUM CHLORIDE, MAGNESIUM CHLORIDE, DEXTROSE MONOHYDRATE, LACTIC ACID, SODIUM CHLORIDE, SODIUM BICARBONATE AND POTASSIUM CHLORIDE 5000 ML: 5.15; 2.03; 22; 5.4; 6.46; 3.09; .157 INJECTION INTRAVENOUS at 03:03

## 2024-11-01 RX ADMIN — CALCIUM CHLORIDE, MAGNESIUM CHLORIDE, DEXTROSE MONOHYDRATE, LACTIC ACID, SODIUM CHLORIDE, SODIUM BICARBONATE AND POTASSIUM CHLORIDE 5000 ML: 5.15; 2.03; 22; 5.4; 6.46; 3.09; .157 INJECTION INTRAVENOUS at 20:11

## 2024-11-01 RX ADMIN — Medication 60 ML: at 09:44

## 2024-11-01 RX ADMIN — Medication 60 ML: at 12:51

## 2024-11-01 RX ADMIN — CALCIUM GLUCONATE 1 G: 20 INJECTION, SOLUTION INTRAVENOUS at 23:44

## 2024-11-01 RX ADMIN — CALCIUM CHLORIDE, MAGNESIUM CHLORIDE, DEXTROSE MONOHYDRATE, LACTIC ACID, SODIUM CHLORIDE, SODIUM BICARBONATE AND POTASSIUM CHLORIDE 5000 ML: 5.15; 2.03; 22; 5.4; 6.46; 3.09; .157 INJECTION INTRAVENOUS at 20:13

## 2024-11-01 NOTE — PROGRESS NOTES
CLINICAL NUTRITION SERVICES - BRIEF NOTE    - Discussed patient in interdisciplinary rounds  - Per fellow, trophic feeds today with increased pressor needs.  - Will update TF orders to 15 mL/hr and hold.    Brittni Nelson RD, LD  Clinical Dietitian - [facility]

## 2024-11-01 NOTE — PHARMACY-VANCOMYCIN DOSING SERVICE
"Pharmacy Vancomycin Note  Date of Service 2024  Patient's  1935   88 year old, male    Indication: Sepsis  Day of Therapy: 2  Current vancomycin regimen: intermittent based on levels  Current vancomycin monitoring method: Trough (Method 2 = manual dose calculation)  Current vancomycin therapeutic monitoring goal: 15-20 mg/L    Current estimated CrCl = Estimated Creatinine Clearance: 27.3 mL/min (A) (based on SCr of 2.34 mg/dL (H)).    Creatinine for last 3 days  10/29/2024:  5:55 PM Creatinine 3.10 mg/dL; 10:21 PM Creatinine 3.34 mg/dL  10/30/2024:  4:21 AM Creatinine 3.76 mg/dL; 11:31 AM Creatinine 4.07 mg/dL;  1:04 PM Creatinine 3.89 mg/dL;  6:10 PM Creatinine 3.28 mg/dL;  7:40 PM Creatinine 3.16 mg/dL; 11:49 PM Creatinine 2.91 mg/dL  10/31/2024:  3:55 AM Creatinine 2.73 mg/dL;  5:37 AM Creatinine 2.65 mg/dL; 12:50 PM Creatinine 2.52 mg/dL; 12:50 PM Creatinine 2.52 mg/dL;  7:57 PM Creatinine 2.34 mg/dL;  7:57 PM Creatinine 2.33 mg/dL  2024:  2:07 AM Creatinine 2.34 mg/dL;  2:07 AM Creatinine 2.27 mg/dL    Recent Vancomycin Levels (past 3 days)  2024:  2:41 AM Vancomycin 13.9 ug/mL    Vancomycin IV Administrations (past 72 hours)                     vancomycin (VANCOCIN) 2,500 mg in 0.9% NaCl 525 mL intermittent infusion (mg) 2,500 mg New Bag 10/31/24 1201                    Nephrotoxins and other renal medications (From now, onward)      Start     Dose/Rate Route Frequency Ordered Stop    10/31/24 1530  norepinephrine (LEVOPHED) 16 mg in  mL infusion MAX CONC CENTRAL LINE         0.01-0.15 mcg/kg/min × 95.8 kg  0.9-13.5 mL/hr  Intravenous CONTINUOUS 10/31/24 1525      10/31/24 1349  vancomycin place rico - receiving intermittent dosing         1 each Intravenous SEE ADMIN INSTRUCTIONS 10/31/24 1349      10/31/24 1000  piperacillin-tazobactam (ZOSYN) 3.375 g vial to attach to  mL bag        Note to Pharmacy: For KEKE, J[facility]Y and [facility]: For Zosyn-naive patients, use the \"Zosyn " "initial dose + extended infusion\" order panel.    3.375 g  over 30 Minutes Intravenous EVERY 6 HOURS 10/31/24 0940      10/28/24 1425  vasopressin 0.2 units/mL in NS (PITRESSIN) standard conc infusion         0.5-4 Units/hr  2.5-20 mL/hr  Intravenous CONTINUOUS PRN 10/28/24 1425                 Contrast Orders - past 72 hours (72h ago, onward)      Start     Dose/Rate Route Frequency Stop    10/30/24 1000  perflutren diluted 1mL to 2mL with saline (OPTISON) diluted injection 9 mL        Note to Pharmacy: NDC# [id number]    9 mL Intravenous ONCE 10/30/24 0939          Interpretation of levels and current regimen:  Vancomycin level is reflective of subtherapeutic level (after a single dose)    Has serum creatinine changed greater than 50% in last 72 hours: N/A, on CRRT  Urine output: diminished urine output  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  Serum creatinine levels will be ordered daily for the first week of therapy and at least twice weekly for subsequent weeks.    Dinah Pathak RPH    "

## 2024-11-01 NOTE — PROGRESS NOTES
[facility] ICU RESPIRATORY NOTE        Date of Admission: 10/28/2024    Date of Intubation (most recent): 10/28/2024    Reason for Mechanical Ventilation: CABG     Number of Days on Mechanical Ventilation: 5    Met Criteria for Spontaneous Breathing Trial: No    Reason for No Spontaneous Breathing Trial: Per MD    Bite Block: No    Significant Events Today: none     ABG Results:   Recent Labs   Lab 11/01/24  1408 11/01/24  1013 11/01/24  0522 11/01/24  0207 10/31/24  2126 10/31/24  1738   PH  --   --  7.44 7.45 7.43 7.45   PCO2  --   --  37 36 38 35   PO2  --   --  124* 122* 116* 97   HCO3  --   --  25 25 25 25   O2PER 35 35 40  40 40  40 40  40 40  40         Current Vent Settings: FiO2 (%): 35 %, Resp: 20, Vent Mode: CMV/AC, Resp Rate (Set): 20 breaths/min, Tidal Volume (Set, mL): 500 mL, PEEP (cm H2O): 5 cmH2O, Resp Rate (Set): 20 breaths/min, Tidal Volume (Set, mL): 500 mL, PEEP (cm H2O): 5 cmH2O    Skin Assessment: clean and dry.     Plan: Continue to monitor and assess respiratory status while in ICU.

## 2024-11-01 NOTE — PLAN OF CARE
"  Problem: Adult Inpatient Plan of Care  Goal: Plan of Care Review  Description: The Plan of Care Review/Shift note should be completed every shift.  The Outcome Evaluation is a brief statement about your assessment that the patient is improving, declining, or no change.  This information will be displayed automatically on your shift  note.  Outcome: Not Progressing  Flowsheets (Taken 11/1/2024 1843)  Outcome Evaluation: Goal Outcome Evaluation:        Pt continues to require two pressors, increased dosing throughout the day max levo 0.17 mcg/kg, epi max dose 1.0 mcg/kg.  Tatum dampened, vigorous flushing and positioning done to maintain line.  Pt on CRRT, stopped pulling fluid for several hours today for improved bp, able to pull 50cc per hour at shift end, not net zero at this time.  CV team is closely watching fluid, plan to admin one unit RBCs. Note small 0.5cm pinpoint non-blanchable redness on right ischial spine, deligent turns completed every two hours.  Requiring warming with warming blanket and thermoguard to maintain normothermia. Family updated throughout the day by nursing, CV team and Intensivist team.  Plan of Care Reviewed With:   patient   spouse   child  Overall Patient Progress: no change  Goal: Patient-Specific Goal (Individualized)  Description: You can add care plan individualizations to a care plan. Examples of Individualization might be:  \"Parent requests to be called daily at 9am for status\", \"I have a hard time hearing out of my right ear\", or \"Do not touch me to wake me up as it startles  me\".  Outcome: Not Progressing  Goal: Absence of Hospital-Acquired Illness or Injury  Outcome: Not Progressing  Intervention: Identify and Manage Fall Risk  Recent Flowsheet Documentation  Taken 11/1/2024 1600 by Pascale Chirinos RN  Safety Promotion/Fall Prevention:   clutter free environment maintained   lighting adjusted   room door open   room near nurse's station   room organization " consistent  Taken 11/1/2024 1200 by Pascale Chirinos RN  Safety Promotion/Fall Prevention:   clutter free environment maintained   lighting adjusted   room door open   room near nurse's station   room organization consistent  Taken 11/1/2024 0800 by Pascale Chirinos RN  Safety Promotion/Fall Prevention:   clutter free environment maintained   lighting adjusted   room door open   room near nurse's station   room organization consistent  Intervention: Prevent Skin Injury  Recent Flowsheet Documentation  Taken 11/1/2024 1800 by Pascale Chirinos RN  Body Position:   turned   right   heels elevated  Taken 11/1/2024 1700 by Pascale Chirinos RN  Body Position:   turned   left  Taken 11/1/2024 1440 by Pascale Chirinos RN  Body Position:   turned   right  Taken 11/1/2024 1230 by Pascale Chirinos RN  Body Position:   turned   left   heels elevated  Taken 11/1/2024 1030 by Pascale Chirinos RN  Body Position:   turned   right   heels elevated  Taken 11/1/2024 0830 by Pascale Chirinos RN  Body Position:   turned   left   head repositioned, left  Skin Protection:   adhesive use limited   incontinence pads utilized  Intervention: Prevent and Manage VTE (Venous Thromboembolism) Risk  Recent Flowsheet Documentation  Taken 11/1/2024 1600 by Pascale Chirinos RN  VTE Prevention/Management: SCDs on (sequential compression devices)  Taken 11/1/2024 1200 by Pascale Chirinos RN  VTE Prevention/Management: SCDs on (sequential compression devices)  Taken 11/1/2024 0800 by Pascale Chirinos RN  VTE Prevention/Management: SCDs on (sequential compression devices)  Intervention: Prevent Infection  Recent Flowsheet Documentation  Taken 11/1/2024 1600 by Pascale Chirinos RN  Infection Prevention:   cohorting utilized   hand hygiene promoted   rest/sleep promoted   single patient room provided  Taken 11/1/2024 1200 by Pascale Chirinos RN  Infection Prevention:   cohorting utilized   hand hygiene promoted   rest/sleep  promoted   single patient room provided  Taken 11/1/2024 0800 by Pascale Chirinos RN  Infection Prevention:   cohorting utilized   hand hygiene promoted   rest/sleep promoted   single patient room provided  Goal: Optimal Comfort and Wellbeing  Outcome: Not Progressing  Intervention: Provide Person-Centered Care  Recent Flowsheet Documentation  Taken 11/1/2024 1600 by Pascale Chirinos RN  Trust Relationship/Rapport:   care explained   reassurance provided  Taken 11/1/2024 1200 by Pascale Chirinos RN  Trust Relationship/Rapport:   care explained   reassurance provided  Taken 11/1/2024 0800 by Pascale Chirinos RN  Trust Relationship/Rapport:   care explained   reassurance provided  Goal: Readiness for Transition of Care  Outcome: Not Progressing     Problem: Comorbidity Management  Goal: Maintenance of Asthma Control  Outcome: Not Progressing  Intervention: Maintain Asthma Symptom Control  Recent Flowsheet Documentation  Taken 11/1/2024 1600 by Pascale Chirinos RN  Medication Review/Management: medications reviewed  Taken 11/1/2024 1200 by Pascale Chirinos RN  Medication Review/Management: medications reviewed  Taken 11/1/2024 0800 by Pascale Chirinos RN  Medication Review/Management: medications reviewed  Goal: Maintenance of Behavioral Health Symptom Control  Outcome: Not Progressing  Intervention: Maintain Behavioral Health Symptom Control  Recent Flowsheet Documentation  Taken 11/1/2024 1600 by Pascale Chirinos RN  Medication Review/Management: medications reviewed  Taken 11/1/2024 1200 by Pascale Chirinos RN  Medication Review/Management: medications reviewed  Taken 11/1/2024 0800 by Pascale Chirinos RN  Medication Review/Management: medications reviewed  Goal: Maintenance of COPD Symptom Control  Outcome: Not Progressing  Intervention: Maintain COPD Symptom Control  Recent Flowsheet Documentation  Taken 11/1/2024 1600 by Pascale Chirinos RN  Medication Review/Management: medications  reviewed  Taken 11/1/2024 1200 by Pascale Chirinos RN  Medication Review/Management: medications reviewed  Taken 11/1/2024 0800 by Pascale Chirinos RN  Medication Review/Management: medications reviewed  Goal: Blood Glucose Levels Within Targeted Range  Outcome: Not Progressing  Intervention: Monitor and Manage Glycemia  Recent Flowsheet Documentation  Taken 11/1/2024 1600 by Pascale Chirinos RN  Medication Review/Management: medications reviewed  Taken 11/1/2024 1200 by Pascale Chirinos RN  Medication Review/Management: medications reviewed  Taken 11/1/2024 0800 by Pascale Chirinos RN  Medication Review/Management: medications reviewed  Goal: Maintenance of Heart Failure Symptom Control  Outcome: Not Progressing  Intervention: Maintain Heart Failure Management  Recent Flowsheet Documentation  Taken 11/1/2024 1600 by Pascale Chirinos RN  Medication Review/Management: medications reviewed  Taken 11/1/2024 1200 by Pascale Chirinos RN  Medication Review/Management: medications reviewed  Taken 11/1/2024 0800 by Pascale Chirinos RN  Medication Review/Management: medications reviewed  Goal: Blood Pressure in Desired Range  Outcome: Not Progressing  Intervention: Maintain Blood Pressure Management  Recent Flowsheet Documentation  Taken 11/1/2024 1600 by Pascale Chirinos RN  Medication Review/Management: medications reviewed  Taken 11/1/2024 1200 by Pascale Chirinos RN  Medication Review/Management: medications reviewed  Taken 11/1/2024 0800 by Pascale Chirinos RN  Medication Review/Management: medications reviewed  Goal: Maintenance of Osteoarthritis Symptom Control  Outcome: Not Progressing  Intervention: Maintain Osteoarthritis Symptom Control  Recent Flowsheet Documentation  Taken 11/1/2024 1700 by Pascale Chirinos RN  Assistive Device Utilized: lift device  Activity Management: bedrest  Taken 11/1/2024 1600 by Pascale Chirinos RN  Assistive Device Utilized: lift device  Activity Management:  bedrest  Medication Review/Management: medications reviewed  Taken 11/1/2024 1200 by Pascale Chirinos RN  Assistive Device Utilized: lift device  Activity Management: bedrest  Medication Review/Management: medications reviewed  Taken 11/1/2024 0800 by Pascale Chirinos RN  Assistive Device Utilized: lift device  Activity Management: bedrest  Medication Review/Management: medications reviewed  Goal: Bariatric Home Regimen Maintained  Outcome: Not Progressing  Intervention: Maintain and Manage Postbariatric Surgery Care  Recent Flowsheet Documentation  Taken 11/1/2024 1600 by Pascale Chirinos RN  Medication Review/Management: medications reviewed  Taken 11/1/2024 1200 by Pascale Chirinos RN  Medication Review/Management: medications reviewed  Taken 11/1/2024 0800 by Pascale Chirinos RN  Medication Review/Management: medications reviewed  Goal: Maintenance of Seizure Control  Outcome: Not Progressing  Intervention: Maintain Seizure Symptom Control  Recent Flowsheet Documentation  Taken 11/1/2024 1600 by Pascale Chirinos RN  Sensory Stimulation Regulation:   care clustered   quiet environment promoted   auditory stimulation minimized   lighting decreased  Medication Review/Management: medications reviewed  Taken 11/1/2024 1200 by Pascale Chirinos RN  Sensory Stimulation Regulation:   care clustered   quiet environment promoted   auditory stimulation minimized   lighting decreased  Medication Review/Management: medications reviewed  Taken 11/1/2024 0800 by Pascale Chirinos RN  Sensory Stimulation Regulation:   care clustered   quiet environment promoted   auditory stimulation minimized   lighting decreased  Medication Review/Management: medications reviewed     Problem: Fall Injury Risk  Goal: Absence of Fall and Fall-Related Injury  Outcome: Not Progressing  Intervention: Identify and Manage Contributors  Recent Flowsheet Documentation  Taken 11/1/2024 1600 by Pascale Chirinos RN  Medication  Review/Management: medications reviewed  Taken 11/1/2024 1200 by Pascale Chirinos RN  Medication Review/Management: medications reviewed  Taken 11/1/2024 0800 by Pascale Chirinos RN  Medication Review/Management: medications reviewed  Intervention: Promote Injury-Free Environment  Recent Flowsheet Documentation  Taken 11/1/2024 1600 by Pascale Chirinos RN  Safety Promotion/Fall Prevention:   clutter free environment maintained   lighting adjusted   room door open   room near nurse's station   room organization consistent  Taken 11/1/2024 1200 by Pascale Chirinos RN  Safety Promotion/Fall Prevention:   clutter free environment maintained   lighting adjusted   room door open   room near nurse's station   room organization consistent  Taken 11/1/2024 0800 by Pascale Chirinos RN  Safety Promotion/Fall Prevention:   clutter free environment maintained   lighting adjusted   room door open   room near nurse's station   room organization consistent     Problem: Mechanical Ventilation Invasive  Goal: Effective Communication  Outcome: Not Progressing  Intervention: Ensure Effective Communication  Recent Flowsheet Documentation  Taken 11/1/2024 1600 by Pascale Chirinos RN  Family/Support System Care:   involvement promoted   presence promoted   self-care encouraged   support provided   caregiver stress acknowledged  Trust Relationship/Rapport:   care explained   reassurance provided  Taken 11/1/2024 1200 by Pascale Chirinos RN  Family/Support System Care:   involvement promoted   presence promoted   self-care encouraged   support provided   caregiver stress acknowledged  Trust Relationship/Rapport:   care explained   reassurance provided  Taken 11/1/2024 0800 by Pascale Chirinos RN  Family/Support System Care:   involvement promoted   presence promoted   self-care encouraged   support provided   caregiver stress acknowledged  Trust Relationship/Rapport:   care explained   reassurance provided  Goal: Optimal  Device Function  Outcome: Not Progressing  Intervention: Optimize Device Care and Function  Recent Flowsheet Documentation  Taken 11/1/2024 1700 by Pascale Chirinos RN  Oral Care:   oral rinse provided   suction provided   swabbed with antiseptic solution   teeth brushed   tongue brushed  Taken 11/1/2024 1600 by Pascale Chirinos RN  Airway Safety Measures:   all equipment/monitors on and audible   manual resuscitator/mask/valve at bedside   mask at bedside   suction equipment   suction at bedside  Oral Care:   oral rinse provided   suction provided   swabbed with antiseptic solution   teeth brushed   tongue brushed  Taken 11/1/2024 1200 by Pascale Chirinos RN  Airway Safety Measures:   all equipment/monitors on and audible   manual resuscitator/mask/valve at bedside   mask at bedside   suction equipment   suction at bedside  Oral Care:   oral rinse provided   suction provided   swabbed with antiseptic solution   teeth brushed   tongue brushed  Taken 11/1/2024 0800 by Pascale Chirinos RN  Airway Safety Measures:   all equipment/monitors on and audible   manual resuscitator/mask/valve at bedside   mask at bedside   suction equipment   suction at bedside  Oral Care:   oral rinse provided   suction provided   swabbed with antiseptic solution   teeth brushed   tongue brushed  Goal: Mechanical Ventilation Liberation  Outcome: Not Progressing  Intervention: Promote Extubation and Mechanical Ventilation Liberation  Recent Flowsheet Documentation  Taken 11/1/2024 1600 by Pascale Chirinos RN  Medication Review/Management: medications reviewed  Environmental Support: calm environment promoted  Taken 11/1/2024 1200 by Pascale Chirinos RN  Medication Review/Management: medications reviewed  Environmental Support: calm environment promoted  Taken 11/1/2024 0800 by Pascale Chirinos RN  Medication Review/Management: medications reviewed  Environmental Support: calm environment promoted  Goal: Optimal Nutrition  Delivery  Outcome: Not Progressing  Goal: Absence of Device-Related Skin and Tissue Injury  Outcome: Not Progressing  Intervention: Maintain Skin and Tissue Health  Recent Flowsheet Documentation  Taken 11/1/2024 0830 by Pascale Chirinos RN  Device Skin Pressure Protection:   absorbent pad utilized/changed   adhesive use limited   pressure points protected   tubing/devices free from skin contact  Goal: Absence of Ventilator-Induced Lung Injury  Outcome: Not Progressing  Intervention: Prevent Ventilator-Associated Pneumonia  Recent Flowsheet Documentation  Taken 11/1/2024 1800 by Pascale Chirinos RN  Head of Bed ([facility]) Positioning: HOB at 15 degrees  Taken 11/1/2024 1700 by Pascale Chirinos RN  Oral Care:   oral rinse provided   suction provided   swabbed with antiseptic solution   teeth brushed   tongue brushed  Head of Bed ([facility]) Positioning: HOB at 20-30 degrees  Taken 11/1/2024 1600 by Pascale Chirinos RN  Oral Care:   oral rinse provided   suction provided   swabbed with antiseptic solution   teeth brushed   tongue brushed  VAP Prevention Contraindications: condition unstable  VAP Prevention Measures: not completed (see contraindications)  Taken 11/1/2024 1440 by Pascale Chirinos RN  Head of Bed ([facility]) Positioning: HOB at 20-30 degrees  Taken 11/1/2024 1230 by Pascale Chirinos RN  Head of Bed ([facility]) Positioning: HOB at 20-30 degrees  Taken 11/1/2024 1200 by Pascale Chirinos RN  Oral Care:   oral rinse provided   suction provided   swabbed with antiseptic solution   teeth brushed   tongue brushed  VAP Prevention Contraindications: condition unstable  VAP Prevention Measures: not completed (see contraindications)  Taken 11/1/2024 1030 by Pascale Chirinos RN  Head of Bed ([facility]) Positioning: HOB at 20-30 degrees  Taken 11/1/2024 0830 by Pascale Chirinos RN  Head of Bed ([facility]) Positioning: HOB at 30 degrees  Taken 11/1/2024 0800 by Pascale Chirinos RN  Oral Care:   oral rinse provided   suction  provided   swabbed with antiseptic solution   teeth brushed   tongue brushed  VAP Prevention Contraindications: condition unstable  VAP Prevention Measures: not completed (see contraindications)     Problem: Cardiovascular Surgery  Goal: Improved Activity Tolerance  Outcome: Not Progressing  Intervention: Optimize Tolerance for Activity  Recent Flowsheet Documentation  Taken 11/1/2024 1600 by Pascale Chirinos RN  Environmental Support: calm environment promoted  Taken 11/1/2024 1200 by Pascale Chirinos RN  Environmental Support: calm environment promoted  Taken 11/1/2024 0800 by Pascale Chirinos RN  Environmental Support: calm environment promoted  Goal: Optimal Coping with Heart Surgery  Outcome: Not Progressing  Intervention: Support Psychosocial Response to Surgery  Recent Flowsheet Documentation  Taken 11/1/2024 1600 by Pascale Chirinos RN  Supportive Measures: relaxation techniques promoted  Family/Support System Care:   involvement promoted   presence promoted   self-care encouraged   support provided   caregiver stress acknowledged  Taken 11/1/2024 1200 by Pascale Chirinos RN  Supportive Measures: relaxation techniques promoted  Family/Support System Care:   involvement promoted   presence promoted   self-care encouraged   support provided   caregiver stress acknowledged  Taken 11/1/2024 0800 by Pascale Chirinos RN  Supportive Measures: relaxation techniques promoted  Family/Support System Care:   involvement promoted   presence promoted   self-care encouraged   support provided   caregiver stress acknowledged  Goal: Absence of Bleeding  Outcome: Not Progressing  Intervention: Monitor and Manage Bleeding  Recent Flowsheet Documentation  Taken 11/1/2024 1600 by Pascale Chirinos RN  Bleeding Management: dressing monitored  Taken 11/1/2024 1200 by Pascale Chirinos RN  Bleeding Management: dressing monitored  Taken 11/1/2024 0800 by Pascale Chirinos RN  Bleeding Management: dressing  monitored  Goal: Effective Bowel Elimination  Outcome: Not Progressing  Intervention: Enhance Bowel Motility and Elimination  Recent Flowsheet Documentation  Taken 11/1/2024 1600 by Pascale Chirinos RN  Bowel Elimination Management: (meds given, tube feeds starrted) other (see comments)  Taken 11/1/2024 1200 by Pascale Chirions RN  Bowel Elimination Management: (meds given, tube feeds starrted) other (see comments)  Taken 11/1/2024 0800 by Pascale Chirinos RN  Bowel Elimination Management: (meds given, tube feeds starrted) other (see comments)  Goal: Effective Cardiac Function  Outcome: Not Progressing  Intervention: Optimize Cardiac Output and Blood Flow  Recent Flowsheet Documentation  Taken 11/1/2024 1600 by Pascale Chirinos RN  Dysrhythmia Management: pacing wires maintained  Taken 11/1/2024 1200 by Pascale Chirinos RN  Dysrhythmia Management: pacing wires maintained  Taken 11/1/2024 0800 by Pascale Chirinos RN  Dysrhythmia Management: pacing wires maintained  Goal: Optimal Cerebral Tissue Perfusion  Outcome: Not Progressing  Intervention: Protect and Optimize Cerebral Perfusion  Recent Flowsheet Documentation  Taken 11/1/2024 1800 by Pascale Chirinos RN  Head of Bed ([facility]) Positioning: HOB at 15 degrees  Taken 11/1/2024 1700 by Pascale Chirinos RN  Head of Bed ([facility] [place]) Positioning: HOB at 20-30 degrees  Taken 11/1/2024 1600 by Pascale Chirinos RN  Sensory Stimulation Regulation:   care clustered   quiet environment promoted   auditory stimulation minimized   lighting decreased  Glycemic Management: blood glucose monitored  Taken 11/1/2024 1440 by Pascale Chirinos RN  Head of Bed ([facility] [place]) Positioning: HOB at 20-30 degrees  Taken 11/1/2024 1230 by Pascale Chirinos RN  Head of Bed ([facility] [place]) Positioning: HOB at 20-30 degrees  Taken 11/1/2024 1200 by Pascale Chirinos RN  Sensory Stimulation Regulation:   care clustered   quiet environment promoted   auditory stimulation minimized   lighting  decreased  Glycemic Management: blood glucose monitored  Taken 11/1/2024 1030 by Pascale Chirinos RN  Head of Bed ([facility]) Positioning: HOB at 20-30 degrees  Taken 11/1/2024 0830 by Pascale Chirinos RN  Head of Bed ([facility]) Positioning: HOB at 30 degrees  Taken 11/1/2024 0800 by Pascale Chirinos RN  Sensory Stimulation Regulation:   care clustered   quiet environment promoted   auditory stimulation minimized   lighting decreased  Glycemic Management: blood glucose monitored  Goal: Fluid and Electrolyte Balance  Outcome: Not Progressing  Goal: Blood Glucose Level Within Targeted Range  Outcome: Not Progressing  Intervention: Optimize Glycemic Control  Recent Flowsheet Documentation  Taken 11/1/2024 1600 by Pascale Chirinos RN  Glycemic Management: blood glucose monitored  Taken 11/1/2024 1200 by Pascale Chirinos RN  Glycemic Management: blood glucose monitored  Taken 11/1/2024 0800 by Pascale Chirinos RN  Glycemic Management: blood glucose monitored  Goal: Absence of Infection Signs and Symptoms  Outcome: Not Progressing  Intervention: Prevent or Manage Infection  Recent Flowsheet Documentation  Taken 11/1/2024 1600 by Pascale Chirinos RN  Infection Prevention:   cohorting utilized   hand hygiene promoted   rest/sleep promoted   single patient room provided  Taken 11/1/2024 1200 by Pascale Chirinos RN  Infection Prevention:   cohorting utilized   hand hygiene promoted   rest/sleep promoted   single patient room provided  Taken 11/1/2024 0800 by Pascale Chirinos RN  Infection Prevention:   cohorting utilized   hand hygiene promoted   rest/sleep promoted   single patient room provided  Goal: Anesthesia/Sedation Recovery  Outcome: Not Progressing  Intervention: Optimize Anesthesia Recovery  Recent Flowsheet Documentation  Taken 11/1/2024 1600 by Pascale Chirinos RN  Safety Promotion/Fall Prevention:   clutter free environment maintained   lighting adjusted   room door open   room near nurse's station    room organization consistent  Reorientation Measures:   clock in view   familiar social contact encouraged   reorientation provided  Taken 11/1/2024 1200 by Pascale Chirinos RN  Safety Promotion/Fall Prevention:   clutter free environment maintained   lighting adjusted   room door open   room near nurse's station   room organization consistent  Reorientation Measures:   clock in view   familiar social contact encouraged   reorientation provided  Taken 11/1/2024 0800 by Pascale Chirinos RN  Safety Promotion/Fall Prevention:   clutter free environment maintained   lighting adjusted   room door open   room near nurse's station   room organization consistent  Reorientation Measures:   clock in view   familiar social contact encouraged   reorientation provided  Goal: Acceptable Pain Control  Outcome: Not Progressing  Goal: Nausea and Vomiting Relief  Outcome: Not Progressing  Goal: Effective Urinary Elimination  Outcome: Not Progressing  Goal: Effective Oxygenation and Ventilation  Outcome: Not Progressing  Intervention: Promote Airway Secretion Clearance  Recent Flowsheet Documentation  Taken 11/1/2024 1800 by Pascale Chirinos RN  Cough And Deep Breathing: unable to perform  Taken 11/1/2024 1600 by Pascale Chirinos RN  Cough And Deep Breathing: unable to perform  Taken 11/1/2024 1200 by Pascale Chirinos RN  Cough And Deep Breathing: unable to perform  Taken 11/1/2024 0800 by Pascale Chirinos RN  Cough And Deep Breathing: unable to perform  Intervention: Optimize Oxygenation and Ventilation  Recent Flowsheet Documentation  Taken 11/1/2024 1600 by Pascale Chirinos RN  Chest Tube Safety: all connections secured  Taken 11/1/2024 1200 by Pascale Chirinos RN  Chest Tube Safety: all connections secured  Taken 11/1/2024 0800 by Pascale Chirinos RN  Chest Tube Safety: all connections secured     Problem: Skin Injury Risk Increased  Goal: Skin Health and Integrity  Outcome: Not Progressing  Intervention: Plan:  Nurse Driven Intervention: Positioning  Recent Flowsheet Documentation  Taken 11/1/2024 0830 by Pascale Chirinos RN  Plan: Positioning Interventions:   REPOSITION Left/Right (No supine) q2h   HOB 30 degrees or less   OFF-LOAD HEELS with pillows  Intervention: Plan: Nurse Driven Intervention: Moisture Management  Recent Flowsheet Documentation  Taken 11/1/2024 1700 by Pascale Chirinos RN  Bathing/Skin Care:   back care   linen changed  Taken 11/1/2024 1600 by Pascale Chirinos RN  Moisture Interventions:   No brief in bed   Urinary collection device  Bathing/Skin Care: wipes, CHG  Taken 11/1/2024 1200 by Pascale Chirinos RN  Moisture Interventions:   No brief in bed   Urinary collection device  Bathing/Skin Care: wipes, CHG  Taken 11/1/2024 0800 by Pascale Chirinos RN  Moisture Interventions:   No brief in bed   Urinary collection device  Bathing/Skin Care:   bath, chlorhexidine wipes   bath, complete   wipes, CHG   dressed/undressed   electrode patches/site rotation   linen changed  Intervention: Plan: Nurse Driven Intervention: Friction and Shear  Recent Flowsheet Documentation  Taken 11/1/2024 1600 by Pascale Chirinos RN  Friction/Shear Interventions:   HOB 30 degrees or less   Silicone foam sacral dressing   Repositioning device (TAP system, etc.)  Taken 11/1/2024 1200 by Pascale Chirinos RN  Friction/Shear Interventions:   HOB 30 degrees or less   Silicone foam sacral dressing   Repositioning device (TAP system, etc.)  Taken 11/1/2024 0800 by Pascale Chirinos RN  Friction/Shear Interventions:   HOB 30 degrees or less   Silicone foam sacral dressing   Repositioning device (TAP system, etc.)  Intervention: Optimize Skin Protection  Recent Flowsheet Documentation  Taken 11/1/2024 1800 by Pascale Chirinos RN  Head of Bed (HOB) Positioning: HOB at 15 degrees  Taken 11/1/2024 1700 by Pascale Chirinos RN  Activity Management: bedrest  Head of Bed (HOB) Positioning: HOB at 20-30 degrees  Taken  11/1/2024 1600 by Pascale Chirinos RN  Activity Management: bedrest  Taken 11/1/2024 1440 by Pascale Chirinos RN  Head of Bed (HOB) Positioning: HOB at 20-30 degrees  Taken 11/1/2024 1230 by Pascale Chirinos RN  Head of Bed (HOB) Positioning: HOB at 20-30 degrees  Taken 11/1/2024 1200 by Pascale Chirinos RN  Activity Management: bedrest  Taken 11/1/2024 1030 by Pascale Chirinos RN  Head of Bed (HOB) Positioning: HOB at 20-30 degrees  Taken 11/1/2024 0830 by Pascale Chirinos RN  Pressure Reduction Techniques:   heels elevated off bed   pressure points protected  Pressure Reduction Devices: positioning supports utilized  Skin Protection:   adhesive use limited   incontinence pads utilized  Head of Bed (HOB) Positioning: HOB at 30 degrees  Taken 11/1/2024 0800 by Pascale Chirnios RN  Activity Management: bedrest     Problem: Risk for Delirium  Goal: Optimal Coping  Outcome: Not Progressing  Intervention: Optimize Psychosocial Adjustment to Delirium  Recent Flowsheet Documentation  Taken 11/1/2024 1600 by Pascale Chirinos RN  Supportive Measures: relaxation techniques promoted  Family/Support System Care:   involvement promoted   presence promoted   self-care encouraged   support provided   caregiver stress acknowledged  Taken 11/1/2024 1200 by Pascale Chirinos RN  Supportive Measures: relaxation techniques promoted  Family/Support System Care:   involvement promoted   presence promoted   self-care encouraged   support provided   caregiver stress acknowledged  Taken 11/1/2024 0800 by Pascale Chirinos RN  Supportive Measures: relaxation techniques promoted  Family/Support System Care:   involvement promoted   presence promoted   self-care encouraged   support provided   caregiver stress acknowledged  Goal: Improved Behavioral Control  Outcome: Not Progressing  Intervention: Prevent and Manage Agitation  Recent Flowsheet Documentation  Taken 11/1/2024 1600 by Pascale Chirinos RN  Environment  Familiarity/Consistency: daily routine followed  Taken 11/1/2024 1200 by Pascale Chirinos RN  Environment Familiarity/Consistency: daily routine followed  Taken 11/1/2024 0800 by Pascale Chirinos RN  Environment Familiarity/Consistency: daily routine followed  Intervention: Minimize Safety Risk  Recent Flowsheet Documentation  Taken 11/1/2024 1600 by Pascale Chirinos RN  Enhanced Safety Measures: pain management  Trust Relationship/Rapport:   care explained   reassurance provided  Taken 11/1/2024 1200 by Pascale Chirinos RN  Enhanced Safety Measures: pain management  Trust Relationship/Rapport:   care explained   reassurance provided  Taken 11/1/2024 0800 by Pascale Chirinos RN  Enhanced Safety Measures: pain management  Trust Relationship/Rapport:   care explained   reassurance provided  Goal: Improved Attention and Thought Clarity  Outcome: Not Progressing  Intervention: Maximize Cognitive Function  Recent Flowsheet Documentation  Taken 11/1/2024 1600 by Pascale Chirinos RN  Sensory Stimulation Regulation:   care clustered   quiet environment promoted   auditory stimulation minimized   lighting decreased  Reorientation Measures:   clock in view   familiar social contact encouraged   reorientation provided  Taken 11/1/2024 1200 by Pascale Chirinos RN  Sensory Stimulation Regulation:   care clustered   quiet environment promoted   auditory stimulation minimized   lighting decreased  Reorientation Measures:   clock in view   familiar social contact encouraged   reorientation provided  Taken 11/1/2024 0800 by Pascale Chirinos RN  Sensory Stimulation Regulation:   care clustered   quiet environment promoted   auditory stimulation minimized   lighting decreased  Reorientation Measures:   clock in view   familiar social contact encouraged   reorientation provided  Goal: Improved Sleep  Outcome: Not Progressing     Problem: Restraint, Nonviolent  Goal: Absence of Harm or Injury  Outcome: Not  Progressing  Intervention: Protect Dignity, Rights and Personal Wellbeing  Recent Flowsheet Documentation  Taken 11/1/2024 1600 by Pascale Chirinos RN  Trust Relationship/Rapport:   care explained   reassurance provided  Taken 11/1/2024 1200 by Pascale Chirinos RN  Trust Relationship/Rapport:   care explained   reassurance provided  Taken 11/1/2024 0800 by Pascale Chirinos RN  Trust Relationship/Rapport:   care explained   reassurance provided  Intervention: Protect Skin and Joint Integrity  Recent Flowsheet Documentation  Taken 11/1/2024 1800 by Pascale Chirinos RN  Body Position:   turned   right   heels elevated  Taken 11/1/2024 1700 by Pascale Chirinos RN  Body Position:   turned   left  Taken 11/1/2024 1600 by Pascale Chirinos RN  Range of Motion: ROM (range of motion) performed  Taken 11/1/2024 1440 by Pascale Chirinos RN  Body Position:   turned   right  Taken 11/1/2024 1230 by Pascale Chirinos RN  Body Position:   turned   left   heels elevated  Taken 11/1/2024 1200 by Pascale Chirinos RN  Range of Motion: ROM (range of motion) performed  Taken 11/1/2024 1030 by Pascale Chirinos RN  Body Position:   turned   right   heels elevated  Taken 11/1/2024 0830 by Pascale Chirinos RN  Body Position:   turned   left   head repositioned, left  Skin Protection:   adhesive use limited   incontinence pads utilized  Taken 11/1/2024 0800 by Pascale Chirinos RN  Range of Motion: ROM (range of motion) performed     Problem: CRRT (Continuous Renal Replacement Therapy)  Goal: Safe, Effective Therapy Delivery  Outcome: Not Progressing  Intervention: Optimize Device Care and Function  Recent Flowsheet Documentation  Taken 11/1/2024 1600 by Pascale Chirinos RN  Bleeding Precautions:   blood pressure closely monitored   coagulation study results reviewed  Bleeding Management: dressing monitored  Taken 11/1/2024 1200 by Pascale Chirinos RN  Bleeding Precautions:   blood pressure closely monitored    coagulation study results reviewed  Bleeding Management: dressing monitored  Taken 11/1/2024 0800 by Pascale Chirinos RN  Bleeding Precautions:   blood pressure closely monitored   coagulation study results reviewed  Bleeding Management: dressing monitored  Goal: Hemodynamic Stability  Outcome: Not Progressing  Intervention: Optimize Blood Flow  Recent Flowsheet Documentation  Taken 11/1/2024 1600 by Pascale Chirinos RN  Bleeding Precautions:   blood pressure closely monitored   coagulation study results reviewed  Bleeding Management: dressing monitored  Taken 11/1/2024 1200 by Pascale Chirinos RN  Bleeding Precautions:   blood pressure closely monitored   coagulation study results reviewed  Bleeding Management: dressing monitored  Taken 11/1/2024 0800 by Pascale Chirinos RN  Bleeding Precautions:   blood pressure closely monitored   coagulation study results reviewed  Bleeding Management: dressing monitored  Goal: Body Temperature Maintained in Desired Range  Outcome: Not Progressing  Intervention: Prevent or Minimize Hypothermia  Recent Flowsheet Documentation  Taken 11/1/2024 1600 by Pascale Chirinos RN  Thermoregulation Maintenance: forced warm air  Taken 11/1/2024 1200 by Pascale Chirinos RN  Thermoregulation Maintenance: forced warm air  Taken 11/1/2024 0800 by Pascale Chirinos RN  Thermoregulation Maintenance: forced warm air  Goal: Absence of Infection Signs and Symptoms  Outcome: Not Progressing  Intervention: Prevent or Manage Infection  Recent Flowsheet Documentation  Taken 11/1/2024 1600 by Pascale Chirinos RN  Infection Prevention:   cohorting utilized   hand hygiene promoted   rest/sleep promoted   single patient room provided  Infection Management: aseptic technique maintained  Taken 11/1/2024 1200 by Pascale Chirinos RN  Infection Prevention:   cohorting utilized   hand hygiene promoted   rest/sleep promoted   single patient room provided  Infection Management: aseptic technique  maintained  Taken 11/1/2024 0800 by Pascale Chirinos RN  Infection Prevention:   cohorting utilized   hand hygiene promoted   rest/sleep promoted   single patient room provided  Infection Management: aseptic technique maintained   Goal Outcome Evaluation:      Plan of Care Reviewed With: patient, spouse, child    Overall Patient Progress: no changeOverall Patient Progress: no change    Outcome Evaluation: Goal Outcome Evaluation:        Pt continues to require two pressors, increased dosing throughout the day max levo 0.17 mcg/kg, epi max dose 1.0 mcg/kg.  Linda dampened, vigorous flushing and positioning done to maintain line.  Pt on CRRT, stopped pulling fluid for several hours today for improved bp, able to pull 50cc per hour at shift end, not net zero at this time.  CV team is closely watching fluid, plan to admin one unit RBCs. Note small 0.5cm pinpoint non-blanchable redness on right ischial spine, deligent turns completed every two hours.  Requiring warming with warming blanket and thermoguard to maintain normothermia. Family updated throughout the day by nursing, CV team and Intensivist team.

## 2024-11-01 NOTE — PROGRESS NOTES
Renal Medicine Progress Note            Assessment/Plan:   88 y.o man with s/p 3-vessels CABG and CKD IIIB, consulted for post-op JAVIER.      # Patient with baseline CKD IIIB, Scr ~ 1.8 mg/dl.      # Post-op JAVIER-severe: Anuric.                -started CRRT 10/30     # Severe 3-vessels CAD s/p 3-vessels CABG.                -EF ~ 35%     # Refractory shock with MODS:                -three pressors               -stress dose steroid               -Zosyn/vanco     # FEN: Volume up and edematous. Electrolytes and acid/base status are acceptable with CRRT.     # Shock liver imrpoving.      Plan:  # Decrease pre-filter replacement fluid to 800, dialysate to 1500 and post filter to 200. Filtration fraction decreased from 18 to 13%-hoping to prolong life of dialysis filter.   # Would at least keep I=O. I favor going net negative fluid balance. I suspect off loading his congestion will improve his hemodynamics.     I discussed the case with ICU RN at the bedside. I reviewed notes from CTS and ICU teams.           Interval History:     Patient remains critically ill but stable/improving  Dobutamine 2.5 mcg  NE 0.17 mcg   NE 0.1 mcg  Net negative 300 ml last 24 hrs wo counting insensible water loss.   Pt remains edematous and volume up.   Filter changed early this morning.             Medications and Allergies:     Current Facility-Administered Medications   Medication Dose Route Frequency Provider Last Rate Last Admin    aspirin (ASA) chewable tablet 162 mg  162 mg Oral or NG Tube Daily Clarence Bone MD   162 mg at 11/01/24 0859    hydrocortisone sodium succinate PF (solu-CORTEF) injection 100 mg  100 mg Intravenous Q8H Arlin Hodge PA-C   100 mg at 11/01/24 1031    insulin aspart (NovoLOG) injection (RAPID ACTING)  1-7 Units Subcutaneous Q4H Her-Sb Calle PA-C   1 Units at 10/31/24 0300    pantoprazole (PROTONIX) 2 mg/mL suspension 40 mg  40 mg Oral or NG Tube BID AC Alrin Hodge PA-C   40 mg at 11/01/24 0859  "   Or    pantoprazole (PROTONIX) EC tablet 40 mg  40 mg Oral BID AC Arlin Hodge PA-C        piperacillin-tazobactam (ZOSYN) 4.5 g vial to attach to  mL bag  4.5 g Intravenous Q6H Mulvihill, Michael, MD        polyethylene glycol (MIRALAX) Packet 17 g  17 g Oral or Feeding Tube Daily Mulvihill, Michael, MD   17 g at 11/01/24 0933    Prosource TF20 ENfit Compatibl EN LIQD (PROSOURCE TF20) packet 60 mL  1 packet Per Feeding Tube BID 09 12 Arlin Hodge PA-C   60 mL at 11/01/24 0944    senna-docusate (SENOKOT-S/PERICOLACE) 8.6-50 MG per tablet 1 tablet  1 tablet Oral or Feeding Tube BID Mulvihill, Michael, MD   1 tablet at 11/01/24 0933    sodium chloride (PF) 0.9% PF flush 3 mL  3 mL Intracatheter Q8H Clarence Bone MD   3 mL at 10/31/24 2136    vancomycin (VANCOCIN) 1,750 mg in 0.9% NaCl 517.5 mL intermittent infusion  1,750 mg Intravenous Once Mulvihill, Michael, MD        vancomycin place rico - receiving intermittent dosing  1 each Intravenous See Admin Instructions Mulvihill, Michael, MD          No Known Allergies         Physical Exam:   Vitals were reviewed   , Blood pressure (!) 89/54, pulse 101, temperature 97.2  F (36.2  C), resp. rate 20, height 1.778 m (5' 10\"), weight 107.2 kg (236 lb 5.3 oz), SpO2 100%.    Wt Readings from Last 3 Encounters:   11/01/24 107.2 kg (236 lb 5.3 oz)   10/25/24 97.1 kg (214 lb)   10/03/24 101.2 kg (223 lb)       Intake/Output Summary (Last 24 hours) at 11/1/2024 1203  Last data filed at 11/1/2024 1200  Gross per 24 hour   Intake 3630.83 ml   Output 3577 ml   Net 53.83 ml     GENERAL APPEARANCE: Critical  HEENT:  intubated  RESP: Vent sound  CV: RRR  ABDOMEN: obese, soft.  EXTREMITIES/SKIN: edematous  NEURO: Sedated         Data:     CBC RESULTS:     Recent Labs   Lab 11/01/24  0207 10/31/24  1957 10/31/24  1250 10/31/24  0355 10/31/24  0138 10/30/24  0421   WBC 45.5* 40.0* 28.0* 55.2* 59.6* 54.7*   RBC 2.97* 2.95* 2.78* 2.89* 2.90* 3.31*   HGB 8.9* 8.9* " 8.2* 8.7* 8.7* 9.9*  9.7*   HCT 25.8* 25.4* 24.4* 25.3* 25.6* 30.4*    277 206 324 313 361       Basic Metabolic Panel:  Recent Labs   Lab 11/01/24  0958 11/01/24  0533 11/01/24  0222 11/01/24  0207 10/31/24  2001 10/31/24  1957 10/31/24  1359 10/31/24  1250 10/31/24  0549 10/31/24  0537 10/31/24  0355 10/31/24  0258 10/30/24  2349   NA  --   --   --  138  139  --  138  140  --  141  141  --  142 142  --  143   POTASSIUM  --   --   --  4.6  4.4  --  4.5  4.5  --  3.9  3.9  --  4.0 4.3  --  4.5   CHLORIDE  --   --   --  102  103  --  100  102  --  102  102  --  101 102  --  102   CO2  --   --   --  24  24  --  26  27  --  27  27  --  20* 20*  --  18*   BUN  --   --   --  33.1*  34.2*  --  36.0*  35.4*  --  38.7*  38.7*  --  42.3* 43.9*  --  47.6*   CR  --   --   --  2.27*  2.34*  --  2.33*  2.34*  --  2.52*  2.52*  --  2.65* 2.73*  --  2.91*   * 113* 117* 120*  120* 109* 111*  111*   < > 77  77   < > 137* 147*   < > 168*   TATE  --   --   --  8.5*  8.7*  --  8.4*  8.5*  --  7.9*  7.9*  --  8.1* 8.0*  --  7.8*    < > = values in this interval not displayed.       INR  Recent Labs   Lab 11/01/24  0207 10/31/24  0703 10/30/24  0802 10/29/24  0951   INR 2.88* 3.50* 3.20* 1.74*      Attestation:   I have reviewed today's relevant vital signs, notes, medications, labs and imaging.    Farhad Boland MD  [facility]phrology  Office phone :[phone]  Pager: [phone]

## 2024-11-01 NOTE — PROGRESS NOTES
ICU Progress Note   Date of Service: 11/01/24     Assessment and Plan:  88 year old M with a history of severe 3 vessel CAD. Status post sternotomy, CABGx3, modified MAZE procedure, PAYAM ligation with Dr. Mulvihill 10/28/24. Post operative hemorrhage with RTOR on POD0 for evacuation of mediastinal blood clot causing tamponade physiology.     Interval events:   No acute events   Lactic acid down trending   LFTs down trending   Pulling 50 ml/hr on CRRT overnight - continue goal today.   Anuric     Today:   Attempt to pull CRRT   Trophic feeds   Wean FiO2   Titrate pressors/inotropes in discussion with CVTS     Neuro:  # Sedation for mechanical ventilation  Propofol   RASS goal 0 to -1    Pain control: Tylenol, oxycodone, dilaudid     CV:  #Hx of atrial fibrillation and distant PE on Eliquis   #Hx NSTEMI   #Severe multivessel CAD   Status post sternotomy, CABGx3, modified MAZE procedure, PAYAM ligation with Dr. Mulvihill 10/28/24. Post operative hemorrhage with RTOR on POD0 for evacuation of mediastinal blood clot causing tamponade physiology. Postoperative course has been tenuous with mixed cardiogenic, vasoplegic shock.     Continue Aylin  Trend lactics, ABG   Per CVTS dobutamine for inotropy     Pre-operative workup:   Angiogram showed an occluded RCA, ost LAD to mid LAD of 80% and mid circ to distal circ with 99%. Echocardiogram showed an EF of 35-40%. The RV systolic function was normal. There was also mild mitral regurg.    - Echo 10/30/24: Left ventricle is normal in size. Biplane LVEF 35%. There is moderate global hypokinesia of the left ventricle. Limited view of the RV.     Plan:   SBP goal <140   MAP goal >65. Wean pressors and inotropes as able.   Cardiac meds per CVTS  Aspirin 162 mg   PTA atorvastatin - resume per CVTS   PTA torsemide - hold     Pulm:  # Acute hypoxemic respiratory failure  - low tidal volume ventilation  - wean PEEP/FiO2, as able    CXR pending     GI:  #Hx upper GIB, gastritis   Recent  "admission discharged 9/16/2024. Endoscopy revealing gastritis.   PTA pantoprazole     #Shock liver   Treating shock as above   Trend labs     Will place NJ feeding tube. Start trophic feeds.   Bowel regimen     Renal:  Pre operative cr 1.8   Now 2.27  Anuric kidney failure, nephrology following   CRRT started 10/30/24      #Acute kidney injury   #Lactic acidosis, improving   Secondary to epinephrine vs tissue hypoperfusion   Continue to monitor     #Hypermagnesemia, resolved.   #Hyperphosphatemia, resolved.   #Hyperkalemia, resolved.   #Hypocalcemia, resolved.   Nephrology consult   CRRT     ID:  WBC not reliable with CLL, 45.5   Afebrile (but on CRRT)   Refractory shock - Continue Zosyn, Vancomycin     Cultures:   Urine 10/30/24 no growth   Sputum 10/31/24 ordered. Will obtain today.   Blood 10/29/24 NGTD   Blood 10/31/24 In process     Heme:  Heparin ppx   Hgb 8.9  Plt 273  INR 2.88     #Hx CLL   Follows with Dr. Raymundo hematology   Follow leukocytosis     MSK:   #Hx Myasthenia gravis     Endo:  No history of DM   A1C 4.5   Medium sliding scale.     PPx:  1. DVT: heparin    2. VAP: HOB 30 degrees, chlorhexidine rinse  3. Stress Ulcer: PPI  4. Restraints: Nonviolent soft two point restraints required and necessary for patient safety and continued cares and good effect as patient continues to pull at necessary lines, tubes despite education and distraction. Will readdress daily.   5. Wound care - per unit routine   6. Feeding - will discuss with CVTS   7. Family updated at the bedside. They are aware of guarded prognosis.     Dispo: ICU    BP (!) 77/52   Pulse 96   Temp 97  F (36.1  C) (Pulmonary Artery)   Resp 20   Ht 1.778 m (5' 10\")   Wt 107.2 kg (236 lb 5.3 oz)   SpO2 100%   BMI 33.91 kg/m      FiO2 (%): 40 %, Resp: 20, Vent Mode: CMV/AC, Resp Rate (Set): 20 breaths/min, Tidal Volume (Set, mL): 500 mL, PEEP (cm H2O): 5 cmH2O, Resp Rate (Set): 20 breaths/min, Tidal Volume (Set, mL): 500 mL, PEEP (cm H2O): 5 " cmH2O      I/Os  +12,500     Physical Exam:  In bed   Sedated, intubated   Pupils 2 mm equal and reactive to light   Chest rise equal bilaterally   CT with thin bloody output, no air leak, to suction   Amos with very little dark urine in bag  Abdomen soft, ND   Feet cool to the touch. Bilateral PT and DP with multiphasic doppler signals.   Lower extremities 2+  edema   BUE hands cool to the touch - signs of distal poor perfusion with dusky fingers in both hands. 2+ pitting edema bilaterally.     Labs: reviewed  All labs reviewed   Improved lactic acidosis, LFTs  PH 7.44   WBC 45.5   Hgb 8.9   Plt 273    Imaging: reviewed  AM CXR reviewed   Advance ETT 2.0 cm     Past Medical/Surgical history   Atrial fibrillation   Gout   GERD   Gastritis   Bilateral PE in 2007   Myasthenia gravis   HTN   Meningioma   MARTHA   Spinal stenosis     Family history   Not discussed today     Social history   Not discussed today     Carla Dubois MD   SICU Fellow

## 2024-11-01 NOTE — PROGRESS NOTES
[facility]  Cardiovascular and Thoracic Surgery Daily Note      Assessment and Plan  POD # 3 s/p CABG x 3 (LIMA to LAD, SVG to OM, SVG to RCA), Modified left atrial MAZE ([facility]), and occlusion of left atrial appendage (50 mm Atriclip) on 10/28 with Dr. Michael Mulvihill.    POD # 3 s/p return to OR for mediastinal re-exploration, washout    - CVS: Pre-op TTE with EF 35-40%. Postop TTE 10/30 with EF ~35% on high-dose inotropic support.   Postop mixed cardiogenic/vasoplegic/hypovolemic shock. Lactic acidosis and SVO2 slowly improving. CI goal >2.0 (calculated elaine), increase DBU to 5 mcg/kg/min today, attempt to wean epi as able. NE to MAP goal 65, wean as able. ADDENDUM: did not tolerate increase in DBU with increasing NE requirements/vasoplegia, will decrease back to 2.5 mcg/kg/min.   Hypervolemic, volume management via CRRT, pull as able (currently limited by pressor requirement). CVP goal ~10-12.  Hypothermic since initiation on CRRT, Thermagaurd catheter placed 10/31, target 37 C.   Stress dose steroids started 10/31.   History of paroxysmal atrial fibrillation, continues with intermittent afib and accelerated junctional at times, continue A-pacing at 100 BPM (intermittent capture with afib/junctional, but BP tolerating intermittent a-pacing better than 100% V pacing). Amio discontinued with shock liver.   Absent right radial pulse (previous arterial line in this location). ICU MD did bedside US, ulnar artery patent. Right hand warm, good capillary refill. Defer dedicated US at present (would not change current management).   Aspirin 162 mg daily, defer statin with ALI.    Chest tubes: output 710 mL, serosang, no air leak. TPW: a-paced at 100 BPM     - Resp: Acute hypoxemic and hypercapnic respiratory failure, stable. Not hemodynamically appropriate for weaning, continue full vent support.     - Neuro: Sedated with propofol while intubated, will trial sedation lightening today and attempt  neuro assessment. Analgesic PRN. History of myasthenia gravis.    - Renal: CKD stage 3, baseline Cr ~1.8. Postop oliguric/anuric JAVIER. Nephrology consulted, CRRT started 10/30. Avoid nephrotoxins.   Recent Labs   Lab 11/01/24  0207 10/31/24  1957 10/31/24  1250   CR 2.27*  2.34* 2.33*  2.34* 2.52*  2.52*       - GI: -BM, +flatus, continue bowel regimen. Shock liver, trend LFTs, avoid hepatotoxins. Recent admission for GIB 09/2024, increased pantoprazole to BID.    - : Amos in place, continue for UOP monitoring/hemodynamic status    - Endo: Postop stress hyperglycemia, resolved. Insulin infusion transitioned to sliding scale insulin. Stress dose steroids started 10/31 as above.   Hemoglobin A1C   Date Value Ref Range Status   10/09/2024 4.5 <5.7 % Final     Comment:     Normal <5.7%   Prediabetes 5.7-6.4%    Diabetes 6.5% or higher     Note: Adopted from ADA consensus guidelines.        - FEN: Replace electrolytes as needed. Start trophic feeds through OG today, will wait on NJ until INR further decreasing.    - ID: WBC chronically elevated and hypothermic on CRRT as above so difficult to assess for possible infection; start empiric Vanc/Zosyn 10/31. Blood culture 10/29 and 10/31 no growth. WBC 45.5.  Trend CBC and fever curve.   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 Acute blood loss anemia due to surgery. Postop coagulopathy, improved. Hgb and PLT 8.7/324. Trend CBC, transfuse PRN.   Recent Labs   Lab 11/01/24  0207 10/31/24  1957 10/31/24  1250   HGB 8.9* 8.9* 8.2*    277 206       - Proph: SCD, subcutaneous heparin, PPI    - Other:  Clinically Significant Risk Factors        # Hyperkalemia: Highest K = 5.4 mmol/L in last 2 days, will monitor as appropriate  # Hypernatremia: Highest Na = 147 mmol/L  "in last 2 days, will monitor as appropriate   # Hypocalcemia: Lowest iCa = 3.7 mg/dL in last 2 days, will monitor and replace as appropriate    # Anion Gap Metabolic Acidosis: Highest Anion Gap = 26 mmol/L in last 2 days, will monitor and treat as appropriate  # Hypoalbuminemia: Lowest albumin = 2.8 g/dL at 10/31/2024 12:50 PM, will monitor as appropriate    # Coagulation Defect: INR = 2.88 (Ref range: 0.85 - 1.15) and/or PTT = 42 Seconds (Ref range: 22 - 38 Seconds), will monitor for bleeding    # Hypertension: Noted on problem list  # Chronic heart failure with reduced ejection fraction: last echo with EF <40%   # Acute Hypercapnic Respiratory Failure: based on arterial blood gas results.  Continue supplemental oxygen and ventilatory support as indicated.  # Acute Hypercapnic Respiratory Failure: based on venous blood gas results.  Continue supplemental oxygen and ventilatory support as indicated.         # Obesity: Estimated body mass index is 33.97 kg/m  as calculated from the following:    Height as of this encounter: 1.778 m (5' 10\").    Weight as of this encounter: 107.4 kg (236 lb 12.4 oz).       # History of CABG: noted on surgical history       - Dispo: ICU. Guarded prognosis but continues with small improvements in clearing lactate and improving liver function. Family updated multiple times at bedside. Medically Ready for Discharge: Anticipated in 5+ Days      Interval History  CI stabilized on DBU and epi, pressor requirements relatively unchanged. Tolerating matching on CRRT, negative ~180 on the day. Noted to be moving extremities when sedation lightened.       Medications  Current Facility-Administered Medications   Medication Dose Route Frequency Provider Last Rate Last Admin    aspirin (ASA) chewable tablet 162 mg  162 mg Oral or NG Tube Daily Clarence Bone MD   162 mg at 10/31/24 1030    hydrocortisone sodium succinate PF (solu-CORTEF) injection 100 mg  100 mg Intravenous Q8H Arlin Hodge " TERRELL ARMENDARIZ   100 mg at 11/01/24 0225    insulin aspart (NovoLOG) injection (RAPID ACTING)  1-7 Units Subcutaneous Q4H Sb Arthur PA-C   1 Units at 10/31/24 0300    pantoprazole (PROTONIX) 2 mg/mL suspension 40 mg  40 mg Oral or NG Tube BID AC Arlin Hodge PA-C   40 mg at 10/31/24 1544    Or    pantoprazole (PROTONIX) EC tablet 40 mg  40 mg Oral BID AC Arlin Hodge PA-C        piperacillin-tazobactam (ZOSYN) 3.375 g vial to attach to  mL bag  3.375 g Intravenous Q6H Arlin Hodge PA-C   3.375 g at 11/01/24 0451    polyethylene glycol (MIRALAX) Packet 17 g  17 g Oral or Feeding Tube Daily Mulvihill, Michael, MD        Prosource TF20 ENfit Compatibl EN LIQD (PROSOURCE TF20) packet 60 mL  1 packet Per Feeding Tube BID 09 12 Arlin Hodge PA-C        senna-docusate (SENOKOT-S/PERICOLACE) 8.6-50 MG per tablet 1 tablet  1 tablet Oral or Feeding Tube BID Mulvihill, Michael, MD   1 tablet at 10/31/24 2015    sodium chloride (PF) 0.9% PF flush 3 mL  3 mL Intracatheter Q8H Clarence Bone MD   3 mL at 10/31/24 2136    vancomycin place rico - receiving intermittent dosing  1 each Intravenous See Admin Instructions Mulvihill, Michael, MD         Current Facility-Administered Medications   Medication Dose Route Frequency Provider Last Rate Last Admin    acetaminophen (TYLENOL) tablet 650 mg  650 mg Oral Q4H PRN Clarence Bone MD        bisacodyl (DULCOLAX) suppository 10 mg  10 mg Rectal Daily PRN Clarence Bone MD        calcium gluconate 1 g in 50 mL in sodium chloride intermittent infusion  1 g Intravenous Once PRN Clarence Bone MD   1 g at 10/31/24 2148    calcium gluconate 2 g in  mL intermittent infusion  2 g Intravenous Q8H PRN Farhad Boland MD        calcium gluconate 2 g in  mL intermittent infusion  2 g Intravenous Once PRN Clarence Bone MD   2 g at 10/30/24 1331    calcium gluconate 3 g in sodium chloride 0.9 % 100 mL intermittent infusion  3 g  Intravenous Once PRN Clarence Bone MD        calcium gluconate 4 g in sodium chloride 0.9 % 100 mL intermittent infusion  4 g Intravenous Q8H PRN Farhad Boland MD        dextrose 10% infusion   Intravenous Continuous PRN Arlin Hodge PA-C        glucose gel 15-30 g  15-30 g Oral Q15 Min PRN Nasir Vallecillo MD        Or    dextrose 50 % injection 25-50 mL  25-50 mL Intravenous Q15 Min PRN Nasir Vallecillo MD        Or    glucagon injection 1 mg  1 mg Subcutaneous Q15 Min PRN Nasir Vallecillo MD        EPINEPHrine (ADRENALIN) 5 mg in  mL infusion  0.01-0.1 mcg/kg/min Intravenous Continuous PRN Clarence Bone MD 23 mL/hr at 11/01/24 0700 0.08 mcg/kg/min at 11/01/24 0700    heparin lock flush 10 unit/mL injection 3 mL  3 mL Intracatheter Q1H PRN Tyra Dubois MD        hydrALAZINE (APRESOLINE) injection 10 mg  10 mg Intravenous Q30 Min PRN Clarence Bone MD        HYDROmorphone (DILAUDID) injection 0.1 mg  0.1 mg Intravenous Q2H PRN Clarence Bnoe MD        Or    HYDROmorphone (DILAUDID) injection 0.2 mg  0.2 mg Intravenous Q2H PRN Clarence Bone MD   0.2 mg at 10/31/24 1548    lidocaine (LMX4) cream   Topical Q1H PRN Clarence Bone MD        lidocaine 1 % 0.1-1 mL  0.1-1 mL Other Q1H PRN Clarence Bone MD        magnesium hydroxide (MILK OF MAGNESIA) suspension 30 mL  30 mL Oral Daily PRN Clarence Bone MD        magnesium sulfate 2 g in 50 mL sterile water intermittent infusion  2 g Intravenous Q8H PRN Farhad Boland MD   2 g at 11/01/24 0519    propofol (DIPRIVAN) bolus from bag or syringe pump  10 mg Intravenous Q15 Min PRN Tyra Dubois MD   10 mg at 10/30/24 1638    And    Medication Instruction   Does not apply Continuous PRN Tyra Dubois MD        methocarbamol (ROBAXIN) tablet 500 mg  500 mg Oral Q6H PRN Clarence Bone MD   500 mg at 10/28/24 1634    metoclopramide (REGLAN) injection 5 mg  5 mg  "Intravenous Once PRN Arlin Hodge PA-C        naloxone (NARCAN) injection 0.2 mg  0.2 mg Intravenous Q2 Min PRN Mulvihill, Michael, MD        Or    naloxone (NARCAN) injection 0.4 mg  0.4 mg Intravenous Q2 Min PRN Mulvihill, Michael, MD        Or    naloxone (NARCAN) injection 0.2 mg  0.2 mg Intramuscular Q2 Min PRN Mulvihill, Michael, MD        Or    naloxone (NARCAN) injection 0.4 mg  0.4 mg Intramuscular Q2 Min PRN Mulvihill, Michael, MD        No heparin required   Does not apply Continuous PRN Farhad Boland MD        ondansetron (ZOFRAN ODT) ODT tab 4 mg  4 mg Oral Q6H PRN Clarence Bone MD        Or    ondansetron (ZOFRAN) injection 4 mg  4 mg Intravenous Q6H PRN Clarence Bone MD        oxyCODONE IR (ROXICODONE) half-tab 2.5 mg  2.5 mg Oral Q4H PRN Clarence Bone MD   2.5 mg at 10/29/24 1705    Or    oxyCODONE (ROXICODONE) tablet 5 mg  5 mg Oral Q4H PRN Clarence Bone MD   5 mg at 10/29/24 2149    potassium chloride 20 mEq in 50 mL intermittent infusion  20 mEq Intravenous Q8H PRN Farhad Boland MD        prochlorperazine (COMPAZINE) injection 5 mg  5 mg Intravenous Q6H PRN Clarence Bone MD        Or    prochlorperazine (COMPAZINE) tablet 5 mg  5 mg Oral Q6H PRN Clarence Bone MD        Reason beta blocker order not selected   Does not apply DOES NOT GO TO Clarence Olguin MD        sodium chloride (PF) 0.9% PF flush 3 mL  3 mL Intracatheter q1 min prn Clarence Bone MD        sodium phosphate 15 mmol in NS 250mL intermittent infusion  15 mmol Intravenous Q8H PRN Farhad Boland MD        vasopressin 0.2 units/mL in NS (PITRESSIN) standard conc infusion  0.5-4 Units/hr Intravenous Continuous PRN Clarence Bone MD   Stopped at 10/30/24 1413         Physical Exam  Vitals were reviewed  Blood pressure (!) 77/52, pulse 97, temperature 97.2  F (36.2  C), resp. rate 20, height 1.778 m (5' 10\"), weight 107.4 kg (236 lb 12.4 oz), SpO2 " 100%.  Rhythm: intermitted a-pacing, a fib, accelerated junctional    Lungs: diminished bases, +crackles    Cardiovascular: irregular rate/rhythm, no m/r/g    Abdomen: soft, NT, ND, +BS    Extremeties: 2+ BLE/BUE edema    Incision: CDI    CT: serosang output 710 mL, no air leak    Weight:   Vitals:    10/28/24 0551 10/29/24 0401 10/30/24 0430 10/31/24 0400   Weight: 95.8 kg (211 lb 3.2 oz) 100.3 kg (221 lb 1.9 oz) 105.1 kg (231 lb 11.3 oz) 107.4 kg (236 lb 12.4 oz)         Data  Recent Labs   Lab 11/01/24  0533 11/01/24 0222 11/01/24  0207 10/31/24  2001 10/31/24  1957 10/31/24  1359 10/31/24  1250 10/31/24  1028 10/31/24  0703 10/30/24  0817 10/30/24  0802   WBC  --   --  45.5*  --  40.0*  --  28.0*  --   --    < >  --    HGB  --   --  8.9*  --  8.9*  --  8.2*  --   --    < >  --    MCV  --   --  87  --  86  --  88  --   --    < >  --    PLT  --   --  273  --  277  --  206  --   --    < >  --    INR  --   --  2.88*  --   --   --   --   --  3.50*  --  3.20*   NA  --   --  138  139  --  138  140  --  141  141  --   --    < >  --    POTASSIUM  --   --  4.6  4.4  --  4.5  4.5  --  3.9  3.9  --   --    < >  --    CHLORIDE  --   --  102  103  --  100  102  --  102  102  --   --    < >  --    CO2  --   --  24  24  --  26  27  --  27  27  --   --    < >  --    BUN  --   --  33.1*  34.2*  --  36.0*  35.4*  --  38.7*  38.7*  --   --    < >  --    CR  --   --  2.27*  2.34*  --  2.33*  2.34*  --  2.52*  2.52*  --   --    < >  --    ANIONGAP  --   --  12 12  --  12 11  --  12 12  --   --    < >  --    TATE  --   --  8.5*  8.7*  --  8.4*  8.5*  --  7.9*  7.9*  --   --    < >  --    * 117* 120*  120*   < > 111*  111*   < > 77  77   < >  --    < >  --    ALBUMIN  --   --  2.9*  2.9*  --  2.9*  2.9*  --  2.8*  2.8*  --   --    < >  --    PROTTOTAL  --   --  4.6*  --  4.6*  --  4.4*  --   --    < >  --    BILITOTAL  --   --  1.7*  --  1.7*  --  1.3*  --   --    < >  --    ALKPHOS  --   --  132   --  130  --  118  --   --    < >  --    ALT  --   --  1,451*  --  1,752*  --  1,995*  --   --    < >  --    AST  --   --  3,855*  --  5,088*  --  6,055*  --   --    < >  --     < > = values in this interval not displayed.       Imaging:  Recent Results (from the past 24 hours)   XR Chest Port 1 View    Narrative    CHEST ONE VIEW  10/31/2024 9:11 AM     HISTORY: line placement    COMPARISON: Chest radiograph 10/30/2024      Impression    IMPRESSION:     Lines and tubes: Endotracheal tube tip 5.4 cm above the dayanna. Right  [place] catheter tip in the region of the main pulmonary artery.  Similar left central venous catheter tip just crossing midline near  the brachiocephalic confluence. Similar chest tubes, median sternotomy  and left atrial appendage clip.    No convincing pneumothorax. Similar bilateral trace pleural effusions  and adjacent atelectasis. Similar heart size.     COLLEEN JOHNSON MD         SYSTEM ID:  [id number]   XR Chest Port 1 View    Narrative    XR CHEST PORT 1 VIEW 10/31/2024 11:19 AM    HISTORY: line placement    COMPARISON: Earlier today at 8:30 AM      Impression    IMPRESSION: No significant change in lines and tubes in the visualized  chest since the chest x-ray earlier today. The endotracheal tube tip  is about 4.5 cm above the dayanna, esophageal temperature probe  projecting over the mid esophagus, right IJ [place] catheter tip in  the main pulmonary artery, left IJ central venous catheter tip at the  left innominate vein, mediastinal drains, chest tubes, enteric tube  and median sternotomy with left atrial appendage is visualized. Stable  small bilateral pleural effusions and bibasilar atelectasis. Stable  cardiothymic silhouette.    MP JASSO MD         SYSTEM ID:  [id number]   XR Abdomen Port 1 View    Narrative    XR ABDOMEN PORT 1 VIEW   10/31/2024 11:20 AM     HISTORY: line placement    COMPARISON: CT of the abdomen and pelvis on 12/22/2018      Impression     IMPRESSION: A right inferior approach venous catheter has been placed  with tip projecting over the mid spine, likely in the IVC at the level  of the renal veins. Right basilar chest tube, mediastinal drains and  epicardial pacing wires. Enteric tube tip and side-port in the  stomach.    MP JASSO MD         SYSTEM ID:  [id number]         Patient seen and discussed with Dr. Mulvihill Arielle Webb, PAKarolinaC  Cardiothoracic Surgery  Available for paging [phone] (personal pager or CV Surgery Rounding Pager)  Personal Pager: [phone]  CV Surgery Rounding Pager: [phone]  After hours please page surgeon on-call

## 2024-11-01 NOTE — PLAN OF CARE
Goal Outcome Evaluation:       Pt continues to require two pressors, increased dosing throughout the day max levo 0.17 mcg/kg, epi max dose 1.0 mcg/kg.  Telferner dampened, vigorous flushing and positioning done to maintain line.  Pt on CRRT, stopped pulling fluid for several hours today for improved bp, able to pull 50cc per hour at shift end, not net zero at this time.  CV team is closely watching fluid, plan to admin one unit RBCs. Note small 0.5cm pinpoint non-blanchable redness on right ischial spine, deligent turns completed every two hours.  Requiring warming with warming blanket and thermoguard to maintain normothermia. Family updated throughout the day by nursing, CV team and Intensivist team.

## 2024-11-01 NOTE — PLAN OF CARE
Goal Outcome Evaluation:           Overall Patient Progress: improvingOverall Patient Progress: improving    Outcome Evaluation: Patient's lactic acid continues to go down, current 4.1.  CI >2 at end of this RN shift, epi increased as ordered to 0.08,  at 2.5 and levo remains at 0.13.  Unable to wean down levophed.  When patient more awake and calm BP improves, unable to pull significant amount of fluid with CRRT due to BP.  Dilaudid 0.2 mg given X1 without BP issues.  Family at bedside.      Problem: Mechanical Ventilation Invasive  Goal: Effective Communication  Outcome: Progressing  Intervention: Ensure Effective Communication  Recent Flowsheet Documentation  Taken 10/31/2024 1800 by Amanda Murillo, RN  Family/Support System Care:   involvement promoted   presence promoted   self-care encouraged   support provided   caregiver stress acknowledged  Trust Relationship/Rapport:   care explained   choices provided   emotional support provided   empathic listening provided   questions answered   questions encouraged   reassurance provided   thoughts/feelings acknowledged  Taken 10/31/2024 1600 by Amanda Murillo, RN  Family/Support System Care:   involvement promoted   presence promoted   self-care encouraged   support provided   caregiver stress acknowledged  Trust Relationship/Rapport:   care explained   choices provided   emotional support provided   empathic listening provided   questions answered   questions encouraged   reassurance provided   thoughts/feelings acknowledged  Taken 10/31/2024 1400 by Amanda Murillo, RN  Family/Support System Care:   involvement promoted   presence promoted   self-care encouraged   support provided   caregiver stress acknowledged  Trust Relationship/Rapport:   care explained   choices provided   emotional support provided   empathic listening provided   questions answered   questions encouraged   reassurance provided   thoughts/feelings acknowledged

## 2024-11-01 NOTE — PROGRESS NOTES
[facility] ICU RESPIRATORY NOTE           Date of Admission: 10/28/2024     Date of Intubation (most recent): 10/28     Reason for Mechanical Ventilation: CABG     Number of Days on Mechanical Ventilation: 5     Met Criteria for Spontaneous Breathing Trial: No     Reason for No Spontaneous Breathing Trial: Per MD       Significant Events Today: None     ABG Results:   Recent Labs   Lab 11/01/24  0207 10/31/24  2126 10/31/24  1738 10/31/24  1351   PH 7.45 7.43 7.45 7.42   PCO2 36 38 35 39   PO2 122* 116* 97 124*   HCO3 25 25 25 25   O2PER 40  40 40  40 40  40 45  45     FiO2 (%): 40 %, Resp: 20, Vent Mode: CMV/AC, Resp Rate (Set): 20 breaths/min, Tidal Volume (Set, mL): 500 mL, PEEP (cm H2O): 5 cmH2O, Resp Rate (Set): 20 breaths/min, Tidal Volume (Set, mL): 500 mL, PEEP (cm H2O): 5 cmH2O    NORAH Alaniz, RRT

## 2024-11-02 ENCOUNTER — APPOINTMENT (OUTPATIENT)
Dept: GENERAL RADIOLOGY | Facility: CLINIC | Age: 89
DRG: 233 | End: 2024-11-02
Attending: PHYSICIAN ASSISTANT
Payer: MEDICARE

## 2024-11-02 ENCOUNTER — APPOINTMENT (OUTPATIENT)
Dept: GENERAL RADIOLOGY | Facility: CLINIC | Age: 89
End: 2024-11-02
Attending: PHYSICIAN ASSISTANT
Payer: MEDICARE

## 2024-11-02 LAB
ALBUMIN SERPL BCG-MCNC: 2.7 G/DL (ref 3.5–5.2)
ALBUMIN SERPL BCG-MCNC: 2.8 G/DL (ref 3.5–5.2)
ALBUMIN SERPL BCG-MCNC: 3.1 G/DL (ref 3.5–5.2)
ALLEN'S TEST: ABNORMAL
ALP SERPL-CCNC: 184 U/L (ref 40–150)
ALP SERPL-CCNC: 188 U/L (ref 40–150)
ALP SERPL-CCNC: 194 U/L (ref 40–150)
ALT SERPL W P-5'-P-CCNC: 638 U/L (ref 0–70)
ALT SERPL W P-5'-P-CCNC: 697 U/L (ref 0–70)
ALT SERPL W P-5'-P-CCNC: 831 U/L (ref 0–70)
ANION GAP SERPL CALCULATED.3IONS-SCNC: 14 MMOL/L (ref 7–15)
ANION GAP SERPL CALCULATED.3IONS-SCNC: 16 MMOL/L (ref 7–15)
ANION GAP SERPL CALCULATED.3IONS-SCNC: 16 MMOL/L (ref 7–15)
AST SERPL W P-5'-P-CCNC: 1181 U/L (ref 0–45)
AST SERPL W P-5'-P-CCNC: 658 U/L (ref 0–45)
AST SERPL W P-5'-P-CCNC: 853 U/L (ref 0–45)
BASE EXCESS BLDA CALC-SCNC: -0.2 MMOL/L (ref -3–3)
BASE EXCESS BLDA CALC-SCNC: 0.1 MMOL/L (ref -3–3)
BASE EXCESS BLDA CALC-SCNC: 0.2 MMOL/L (ref -3–3)
BASE EXCESS BLDV CALC-SCNC: -0.1 MMOL/L (ref -3–3)
BASE EXCESS BLDV CALC-SCNC: -0.8 MMOL/L (ref -3–3)
BASE EXCESS BLDV CALC-SCNC: -3.2 MMOL/L (ref -3–3)
BASE EXCESS BLDV CALC-SCNC: 0.4 MMOL/L (ref -3–3)
BASE EXCESS BLDV CALC-SCNC: 0.8 MMOL/L (ref -3–3)
BASE EXCESS BLDV CALC-SCNC: 1.1 MMOL/L (ref -3–3)
BASE EXCESS BLDV CALC-SCNC: 1.5 MMOL/L (ref -3–3)
BILIRUB SERPL-MCNC: 2.1 MG/DL
BUN SERPL-MCNC: 28.7 MG/DL (ref 8–23)
BUN SERPL-MCNC: 30.5 MG/DL (ref 8–23)
BUN SERPL-MCNC: 31 MG/DL (ref 8–23)
BURR CELLS BLD QL SMEAR: SLIGHT
CA-I BLD-MCNC: 4.3 MG/DL (ref 4.4–5.2)
CA-I BLD-MCNC: 4.7 MG/DL (ref 4.4–5.2)
CALCIUM SERPL-MCNC: 8.3 MG/DL (ref 8.8–10.4)
CALCIUM SERPL-MCNC: 8.5 MG/DL (ref 8.8–10.4)
CALCIUM SERPL-MCNC: 8.7 MG/DL (ref 8.8–10.4)
CHLORIDE SERPL-SCNC: 101 MMOL/L (ref 98–107)
CHLORIDE SERPL-SCNC: 102 MMOL/L (ref 98–107)
CHLORIDE SERPL-SCNC: 102 MMOL/L (ref 98–107)
COHGB MFR BLD: 98.4 % (ref 95–96)
COHGB MFR BLD: 99.6 % (ref 95–96)
COHGB MFR BLD: 99.6 % (ref 95–96)
CREAT SERPL-MCNC: 1.9 MG/DL (ref 0.67–1.17)
CREAT SERPL-MCNC: 1.94 MG/DL (ref 0.67–1.17)
CREAT SERPL-MCNC: 1.94 MG/DL (ref 0.67–1.17)
EGFRCR SERPLBLD CKD-EPI 2021: 33 ML/MIN/1.73M2
EGFRCR SERPLBLD CKD-EPI 2021: 33 ML/MIN/1.73M2
EGFRCR SERPLBLD CKD-EPI 2021: 34 ML/MIN/1.73M2
ELLIPTOCYTES BLD QL SMEAR: SLIGHT
ELLIPTOCYTES BLD QL SMEAR: SLIGHT
ERYTHROCYTE [DISTWIDTH] IN BLOOD BY AUTOMATED COUNT: 18.2 % (ref 10–15)
ERYTHROCYTE [DISTWIDTH] IN BLOOD BY AUTOMATED COUNT: 18.6 % (ref 10–15)
ERYTHROCYTE [DISTWIDTH] IN BLOOD BY AUTOMATED COUNT: 19 % (ref 10–15)
GIANT PLATELETS BLD QL SMEAR: SLIGHT
GIANT PLATELETS BLD QL SMEAR: SLIGHT
GLUCOSE BLDC GLUCOMTR-MCNC: 122 MG/DL (ref 70–99)
GLUCOSE BLDC GLUCOMTR-MCNC: 125 MG/DL (ref 70–99)
GLUCOSE BLDC GLUCOMTR-MCNC: 131 MG/DL (ref 70–99)
GLUCOSE BLDC GLUCOMTR-MCNC: 131 MG/DL (ref 70–99)
GLUCOSE BLDC GLUCOMTR-MCNC: 142 MG/DL (ref 70–99)
GLUCOSE BLDC GLUCOMTR-MCNC: 144 MG/DL (ref 70–99)
GLUCOSE SERPL-MCNC: 134 MG/DL (ref 70–99)
GLUCOSE SERPL-MCNC: 137 MG/DL (ref 70–99)
GLUCOSE SERPL-MCNC: 145 MG/DL (ref 70–99)
HCO3 BLD-SCNC: 24 MMOL/L (ref 21–28)
HCO3 BLD-SCNC: 25 MMOL/L (ref 21–28)
HCO3 BLD-SCNC: 25 MMOL/L (ref 21–28)
HCO3 BLDV-SCNC: 23 MMOL/L (ref 21–28)
HCO3 BLDV-SCNC: 25 MMOL/L (ref 21–28)
HCO3 BLDV-SCNC: 26 MMOL/L (ref 21–28)
HCO3 BLDV-SCNC: 27 MMOL/L (ref 21–28)
HCO3 SERPL-SCNC: 19 MMOL/L (ref 22–29)
HCO3 SERPL-SCNC: 19 MMOL/L (ref 22–29)
HCO3 SERPL-SCNC: 20 MMOL/L (ref 22–29)
HCT VFR BLD AUTO: 24 % (ref 40–53)
HCT VFR BLD AUTO: 24.8 % (ref 40–53)
HCT VFR BLD AUTO: 27.3 % (ref 40–53)
HGB BLD-MCNC: 7.9 G/DL (ref 13.3–17.7)
HGB BLD-MCNC: 8.2 G/DL (ref 13.3–17.7)
HGB BLD-MCNC: 9 G/DL (ref 13.3–17.7)
INR PPP: 2.37 (ref 0.85–1.15)
LACTATE SERPL-SCNC: 1.7 MMOL/L (ref 0.7–2)
LACTATE SERPL-SCNC: 1.8 MMOL/L (ref 0.7–2)
LACTATE SERPL-SCNC: 1.9 MMOL/L (ref 0.7–2)
LACTATE SERPL-SCNC: 2.1 MMOL/L (ref 0.7–2)
LACTATE SERPL-SCNC: 2.2 MMOL/L (ref 0.7–2)
LACTATE SERPL-SCNC: 2.4 MMOL/L (ref 0.7–2)
MAGNESIUM SERPL-MCNC: 2 MG/DL (ref 1.7–2.3)
MAGNESIUM SERPL-MCNC: 2 MG/DL (ref 1.7–2.3)
MAGNESIUM SERPL-MCNC: 2.1 MG/DL (ref 1.7–2.3)
MCH RBC QN AUTO: 29.2 PG (ref 26.5–33)
MCH RBC QN AUTO: 29.6 PG (ref 26.5–33)
MCH RBC QN AUTO: 29.8 PG (ref 26.5–33)
MCHC RBC AUTO-ENTMCNC: 32.9 G/DL (ref 31.5–36.5)
MCHC RBC AUTO-ENTMCNC: 33 G/DL (ref 31.5–36.5)
MCHC RBC AUTO-ENTMCNC: 33.1 G/DL (ref 31.5–36.5)
MCV RBC AUTO: 89 FL (ref 78–100)
MCV RBC AUTO: 90 FL (ref 78–100)
MCV RBC AUTO: 90 FL (ref 78–100)
O2/TOTAL GAS SETTING VFR VENT: 30 %
O2/TOTAL GAS SETTING VFR VENT: 35 %
OXYHGB MFR BLDV: 49 % (ref 70–75)
OXYHGB MFR BLDV: 49 % (ref 70–75)
OXYHGB MFR BLDV: 51 % (ref 70–75)
OXYHGB MFR BLDV: 54 % (ref 70–75)
OXYHGB MFR BLDV: 60 % (ref 70–75)
OXYHGB MFR BLDV: 64 % (ref 70–75)
OXYHGB MFR BLDV: 69 % (ref 70–75)
PCO2 BLD: 37 MM HG (ref 35–45)
PCO2 BLD: 37 MM HG (ref 35–45)
PCO2 BLD: 45 MM HG (ref 35–45)
PCO2 BLDV: 40 MM HG (ref 40–50)
PCO2 BLDV: 43 MM HG (ref 40–50)
PCO2 BLDV: 44 MM HG (ref 40–50)
PCO2 BLDV: 45 MM HG (ref 40–50)
PCO2 BLDV: 46 MM HG (ref 40–50)
PCO2 BLDV: 47 MM HG (ref 40–50)
PCO2 BLDV: 53 MM HG (ref 40–50)
PEEP: 5 CM H2O
PEEP: 5 CM H2O
PH BLD: 7.36 [PH] (ref 7.35–7.45)
PH BLD: 7.43 [PH] (ref 7.35–7.45)
PH BLD: 7.43 [PH] (ref 7.35–7.45)
PH BLDV: 7.31 [PH] (ref 7.32–7.43)
PH BLDV: 7.32 [PH] (ref 7.32–7.43)
PH BLDV: 7.35 [PH] (ref 7.32–7.43)
PH BLDV: 7.35 [PH] (ref 7.32–7.43)
PH BLDV: 7.39 [PH] (ref 7.32–7.43)
PH BLDV: 7.39 [PH] (ref 7.32–7.43)
PH BLDV: 7.42 [PH] (ref 7.32–7.43)
PHOSPHATE SERPL-MCNC: 3.2 MG/DL (ref 2.5–4.5)
PLAT MORPH BLD: ABNORMAL
PLATELET # BLD AUTO: 223 10E3/UL (ref 150–450)
PLATELET # BLD AUTO: 275 10E3/UL (ref 150–450)
PLATELET # BLD AUTO: 291 10E3/UL (ref 150–450)
PO2 BLD: 123 MM HG (ref 80–105)
PO2 BLD: 153 MM HG (ref 80–105)
PO2 BLD: 93 MM HG (ref 80–105)
PO2 BLDV: 29 MM HG (ref 25–47)
PO2 BLDV: 31 MM HG (ref 25–47)
PO2 BLDV: 31 MM HG (ref 25–47)
PO2 BLDV: 32 MM HG (ref 25–47)
PO2 BLDV: 36 MM HG (ref 25–47)
PO2 BLDV: 37 MM HG (ref 25–47)
PO2 BLDV: 38 MM HG (ref 25–47)
POTASSIUM SERPL-SCNC: 4 MMOL/L (ref 3.4–5.3)
POTASSIUM SERPL-SCNC: 4 MMOL/L (ref 3.4–5.3)
POTASSIUM SERPL-SCNC: 4.3 MMOL/L (ref 3.4–5.3)
PROT SERPL-MCNC: 4.3 G/DL (ref 6.4–8.3)
PROT SERPL-MCNC: 4.3 G/DL (ref 6.4–8.3)
PROT SERPL-MCNC: 4.6 G/DL (ref 6.4–8.3)
RBC # BLD AUTO: 2.67 10E6/UL (ref 4.4–5.9)
RBC # BLD AUTO: 2.75 10E6/UL (ref 4.4–5.9)
RBC # BLD AUTO: 3.08 10E6/UL (ref 4.4–5.9)
RBC MORPH BLD: ABNORMAL
SAO2 % BLDA: 95 % (ref 92–100)
SAO2 % BLDA: 96 % (ref 92–100)
SAO2 % BLDA: 97 % (ref 92–100)
SAO2 % BLDV: 50.3 % (ref 70–75)
SAO2 % BLDV: 51 % (ref 70–75)
SAO2 % BLDV: 52.8 % (ref 70–75)
SAO2 % BLDV: 56.3 % (ref 70–75)
SAO2 % BLDV: 62 % (ref 70–75)
SAO2 % BLDV: 66.7 % (ref 70–75)
SAO2 % BLDV: 71.1 % (ref 70–75)
SMUDGE CELLS BLD QL SMEAR: PRESENT
SMUDGE CELLS BLD QL SMEAR: PRESENT
SODIUM SERPL-SCNC: 136 MMOL/L (ref 135–145)
SODIUM SERPL-SCNC: 136 MMOL/L (ref 135–145)
SODIUM SERPL-SCNC: 137 MMOL/L (ref 135–145)
VANCOMYCIN SERPL-MCNC: 9.2 UG/ML
WBC # BLD AUTO: 67.4 10E3/UL (ref 4–11)
WBC # BLD AUTO: 72.9 10E3/UL (ref 4–11)
WBC # BLD AUTO: 78.5 10E3/UL (ref 4–11)

## 2024-11-02 PROCEDURE — 82330 ASSAY OF CALCIUM: CPT | Performed by: PHYSICIAN ASSISTANT

## 2024-11-02 PROCEDURE — 999N000065 XR CHEST PORT 1 VIEW

## 2024-11-02 PROCEDURE — 250N000011 HC RX IP 250 OP 636: Performed by: INTERNAL MEDICINE

## 2024-11-02 PROCEDURE — 258N000003 HC RX IP 258 OP 636: Performed by: SURGERY

## 2024-11-02 PROCEDURE — 80202 ASSAY OF VANCOMYCIN: CPT | Performed by: STUDENT IN AN ORGANIZED HEALTH CARE EDUCATION/TRAINING PROGRAM

## 2024-11-02 PROCEDURE — 99232 SBSQ HOSP IP/OBS MODERATE 35: CPT | Performed by: INTERNAL MEDICINE

## 2024-11-02 PROCEDURE — 84155 ASSAY OF PROTEIN SERUM: CPT | Performed by: PHYSICIAN ASSISTANT

## 2024-11-02 PROCEDURE — 250N000009 HC RX 250: Performed by: PHYSICIAN ASSISTANT

## 2024-11-02 PROCEDURE — 99291 CRITICAL CARE FIRST HOUR: CPT | Mod: 24 | Performed by: INTERNAL MEDICINE

## 2024-11-02 PROCEDURE — 120N000004 HC R&B MS OVERFLOW

## 2024-11-02 PROCEDURE — 82805 BLOOD GASES W/O2 SATURATION: CPT | Performed by: PHYSICIAN ASSISTANT

## 2024-11-02 PROCEDURE — 250N000013 HC RX MED GY IP 250 OP 250 PS 637: Performed by: PHYSICIAN ASSISTANT

## 2024-11-02 PROCEDURE — 999N000157 HC STATISTIC RCP TIME EA 10 MIN

## 2024-11-02 PROCEDURE — 85014 HEMATOCRIT: CPT | Performed by: PHYSICIAN ASSISTANT

## 2024-11-02 PROCEDURE — 82040 ASSAY OF SERUM ALBUMIN: CPT | Performed by: PHYSICIAN ASSISTANT

## 2024-11-02 PROCEDURE — 71045 X-RAY EXAM CHEST 1 VIEW: CPT

## 2024-11-02 PROCEDURE — 250N000009 HC RX 250: Performed by: INTERNAL MEDICINE

## 2024-11-02 PROCEDURE — 250N000011 HC RX IP 250 OP 636: Performed by: STUDENT IN AN ORGANIZED HEALTH CARE EDUCATION/TRAINING PROGRAM

## 2024-11-02 PROCEDURE — 82310 ASSAY OF CALCIUM: CPT | Performed by: PHYSICIAN ASSISTANT

## 2024-11-02 PROCEDURE — 94003 VENT MGMT INPAT SUBQ DAY: CPT

## 2024-11-02 PROCEDURE — 999N000065 XR ABDOMEN PORT 1 VIEW

## 2024-11-02 PROCEDURE — 250N000013 HC RX MED GY IP 250 OP 250 PS 637: Performed by: STUDENT IN AN ORGANIZED HEALTH CARE EDUCATION/TRAINING PROGRAM

## 2024-11-02 PROCEDURE — 83605 ASSAY OF LACTIC ACID: CPT | Performed by: PHYSICIAN ASSISTANT

## 2024-11-02 PROCEDURE — 258N000001 HC RX 258: Performed by: PHYSICIAN ASSISTANT

## 2024-11-02 PROCEDURE — 999N000009 HC STATISTIC AIRWAY CARE

## 2024-11-02 PROCEDURE — 85610 PROTHROMBIN TIME: CPT | Performed by: PHYSICIAN ASSISTANT

## 2024-11-02 PROCEDURE — P9047 ALBUMIN (HUMAN), 25%, 50ML: HCPCS | Mod: JZ | Performed by: PHYSICIAN ASSISTANT

## 2024-11-02 PROCEDURE — 258N000003 HC RX IP 258 OP 636: Performed by: PHYSICIAN ASSISTANT

## 2024-11-02 PROCEDURE — 250N000011 HC RX IP 250 OP 636: Performed by: PHYSICIAN ASSISTANT

## 2024-11-02 PROCEDURE — 258N000003 HC RX IP 258 OP 636: Performed by: STUDENT IN AN ORGANIZED HEALTH CARE EDUCATION/TRAINING PROGRAM

## 2024-11-02 PROCEDURE — 84100 ASSAY OF PHOSPHORUS: CPT | Performed by: PHYSICIAN ASSISTANT

## 2024-11-02 PROCEDURE — 250N000011 HC RX IP 250 OP 636: Mod: JZ | Performed by: SURGERY

## 2024-11-02 PROCEDURE — 250N000013 HC RX MED GY IP 250 OP 250 PS 637: Performed by: SURGERY

## 2024-11-02 PROCEDURE — 90947 DIALYSIS REPEATED EVAL: CPT

## 2024-11-02 PROCEDURE — 82330 ASSAY OF CALCIUM: CPT | Performed by: SURGERY

## 2024-11-02 PROCEDURE — 83735 ASSAY OF MAGNESIUM: CPT | Performed by: PHYSICIAN ASSISTANT

## 2024-11-02 RX ORDER — POTASSIUM CHLORIDE 29.8 MG/ML
20 INJECTION INTRAVENOUS ONCE
Status: COMPLETED | OUTPATIENT
Start: 2024-11-02 | End: 2024-11-02

## 2024-11-02 RX ORDER — ALBUMIN (HUMAN) 12.5 G/50ML
25 SOLUTION INTRAVENOUS EVERY 12 HOURS
Status: COMPLETED | OUTPATIENT
Start: 2024-11-02 | End: 2024-11-04

## 2024-11-02 RX ORDER — HYDROCORTISONE SODIUM SUCCINATE 100 MG/2ML
50 INJECTION INTRAMUSCULAR; INTRAVENOUS EVERY 8 HOURS
Status: COMPLETED | OUTPATIENT
Start: 2024-11-02 | End: 2024-11-03

## 2024-11-02 RX ORDER — LIDOCAINE HYDROCHLORIDE 20 MG/ML
JELLY TOPICAL ONCE
Status: COMPLETED | OUTPATIENT
Start: 2024-11-02 | End: 2024-11-02

## 2024-11-02 RX ORDER — MIDAZOLAM HCL IN 0.9 % NACL/PF 1 MG/ML
1-8 PLASTIC BAG, INJECTION (ML) INTRAVENOUS CONTINUOUS
Status: DISCONTINUED | OUTPATIENT
Start: 2024-11-02 | End: 2024-11-03

## 2024-11-02 RX ORDER — METOCLOPRAMIDE HYDROCHLORIDE 5 MG/ML
10 INJECTION INTRAMUSCULAR; INTRAVENOUS ONCE
Status: COMPLETED | OUTPATIENT
Start: 2024-11-02 | End: 2024-11-02

## 2024-11-02 RX ADMIN — EPINEPHRINE 0.1 MCG/KG/MIN: 1 INJECTION INTRAMUSCULAR; INTRAVENOUS; SUBCUTANEOUS at 13:41

## 2024-11-02 RX ADMIN — Medication 40 MG: at 07:27

## 2024-11-02 RX ADMIN — POLYETHYLENE GLYCOL 3350 17 G: 17 POWDER, FOR SOLUTION ORAL at 08:51

## 2024-11-02 RX ADMIN — METOCLOPRAMIDE 10 MG: 5 INJECTION, SOLUTION INTRAMUSCULAR; INTRAVENOUS at 09:03

## 2024-11-02 RX ADMIN — PIPERACILLIN AND TAZOBACTAM 4.5 G: 4; .5 INJECTION, POWDER, FOR SOLUTION INTRAVENOUS at 04:37

## 2024-11-02 RX ADMIN — HYDROCORTISONE SODIUM SUCCINATE 50 MG: 100 INJECTION, POWDER, FOR SOLUTION INTRAMUSCULAR; INTRAVENOUS at 17:35

## 2024-11-02 RX ADMIN — NOREPINEPHRINE BITARTRATE 0.14 MCG/KG/MIN: 0.06 INJECTION, SOLUTION INTRAVENOUS at 07:21

## 2024-11-02 RX ADMIN — CHLORHEXIDINE GLUCONATE 0.12% ORAL RINSE 15 ML: 1.2 LIQUID ORAL at 11:04

## 2024-11-02 RX ADMIN — POTASSIUM CHLORIDE 20 MEQ: 29.8 INJECTION, SOLUTION INTRAVENOUS at 00:22

## 2024-11-02 RX ADMIN — INSULIN ASPART 1 UNITS: 100 INJECTION, SOLUTION INTRAVENOUS; SUBCUTANEOUS at 17:41

## 2024-11-02 RX ADMIN — PIPERACILLIN AND TAZOBACTAM 4.5 G: 4; .5 INJECTION, POWDER, FOR SOLUTION INTRAVENOUS at 15:57

## 2024-11-02 RX ADMIN — PROPOFOL 20 MCG/KG/MIN: 10 INJECTION, EMULSION INTRAVENOUS at 09:16

## 2024-11-02 RX ADMIN — INSULIN ASPART 1 UNITS: 100 INJECTION, SOLUTION INTRAVENOUS; SUBCUTANEOUS at 22:11

## 2024-11-02 RX ADMIN — MIDAZOLAM HYDROCHLORIDE 1 MG/HR: 1 INJECTION, SOLUTION INTRAVENOUS at 10:39

## 2024-11-02 RX ADMIN — SENNOSIDES AND DOCUSATE SODIUM 1 TABLET: 50; 8.6 TABLET ORAL at 08:51

## 2024-11-02 RX ADMIN — CALCIUM CHLORIDE, MAGNESIUM CHLORIDE, DEXTROSE MONOHYDRATE, LACTIC ACID, SODIUM CHLORIDE, SODIUM BICARBONATE AND POTASSIUM CHLORIDE 5000 ML: 5.15; 2.03; 22; 5.4; 6.46; 3.09; .157 INJECTION INTRAVENOUS at 19:39

## 2024-11-02 RX ADMIN — OXYCODONE HYDROCHLORIDE 2.5 MG: 5 TABLET ORAL at 02:14

## 2024-11-02 RX ADMIN — CALCIUM CHLORIDE, MAGNESIUM CHLORIDE, DEXTROSE MONOHYDRATE, LACTIC ACID, SODIUM CHLORIDE, SODIUM BICARBONATE AND POTASSIUM CHLORIDE 5000 ML: 5.15; 2.03; 22; 5.4; 6.46; 3.09; .157 INJECTION INTRAVENOUS at 15:44

## 2024-11-02 RX ADMIN — LIDOCAINE HYDROCHLORIDE: 20 JELLY TOPICAL at 09:03

## 2024-11-02 RX ADMIN — VASOPRESSIN 4 UNITS/HR: 20 INJECTION INTRAVENOUS at 14:59

## 2024-11-02 RX ADMIN — PIPERACILLIN AND TAZOBACTAM 4.5 G: 4; .5 INJECTION, POWDER, FOR SOLUTION INTRAVENOUS at 10:23

## 2024-11-02 RX ADMIN — PIPERACILLIN AND TAZOBACTAM 4.5 G: 4; .5 INJECTION, POWDER, FOR SOLUTION INTRAVENOUS at 21:30

## 2024-11-02 RX ADMIN — DEXTROSE MONOHYDRATE: 100 INJECTION, SOLUTION INTRAVENOUS at 04:09

## 2024-11-02 RX ADMIN — CALCIUM CHLORIDE, MAGNESIUM CHLORIDE, DEXTROSE MONOHYDRATE, LACTIC ACID, SODIUM CHLORIDE, SODIUM BICARBONATE AND POTASSIUM CHLORIDE 5000 ML: 5.15; 2.03; 22; 5.4; 6.46; 3.09; .157 INJECTION INTRAVENOUS at 06:19

## 2024-11-02 RX ADMIN — CALCIUM CHLORIDE, MAGNESIUM CHLORIDE, DEXTROSE MONOHYDRATE, LACTIC ACID, SODIUM CHLORIDE, SODIUM BICARBONATE AND POTASSIUM CHLORIDE 5000 ML: 5.15; 2.03; 22; 5.4; 6.46; 3.09; .157 INJECTION INTRAVENOUS at 22:49

## 2024-11-02 RX ADMIN — CALCIUM CHLORIDE, MAGNESIUM CHLORIDE, DEXTROSE MONOHYDRATE, LACTIC ACID, SODIUM CHLORIDE, SODIUM BICARBONATE AND POTASSIUM CHLORIDE 5000 ML: 5.15; 2.03; 22; 5.4; 6.46; 3.09; .157 INJECTION INTRAVENOUS at 02:18

## 2024-11-02 RX ADMIN — HYDROCORTISONE SODIUM SUCCINATE 100 MG: 100 INJECTION, POWDER, FOR SOLUTION INTRAMUSCULAR; INTRAVENOUS at 10:21

## 2024-11-02 RX ADMIN — EPINEPHRINE 0.09 MCG/KG/MIN: 1 INJECTION INTRAMUSCULAR; INTRAVENOUS; SUBCUTANEOUS at 02:52

## 2024-11-02 RX ADMIN — Medication 60 ML: at 12:07

## 2024-11-02 RX ADMIN — CHLORHEXIDINE GLUCONATE 0.12% ORAL RINSE 15 ML: 1.2 LIQUID ORAL at 21:30

## 2024-11-02 RX ADMIN — DOBUTAMINE HYDROCHLORIDE 2.5 MCG/KG/MIN: 200 INJECTION INTRAVENOUS at 16:20

## 2024-11-02 RX ADMIN — CALCIUM GLUCONATE 1 G: 20 INJECTION, SOLUTION INTRAVENOUS at 14:03

## 2024-11-02 RX ADMIN — CALCIUM CHLORIDE, MAGNESIUM CHLORIDE, DEXTROSE MONOHYDRATE, LACTIC ACID, SODIUM CHLORIDE, SODIUM BICARBONATE AND POTASSIUM CHLORIDE 5000 ML: 5.15; 2.03; 22; 5.4; 6.46; 3.09; .157 INJECTION INTRAVENOUS at 02:52

## 2024-11-02 RX ADMIN — CALCIUM CHLORIDE, MAGNESIUM CHLORIDE, DEXTROSE MONOHYDRATE, LACTIC ACID, SODIUM CHLORIDE, SODIUM BICARBONATE AND POTASSIUM CHLORIDE 5000 ML: 5.15; 2.03; 22; 5.4; 6.46; 3.09; .157 INJECTION INTRAVENOUS at 21:36

## 2024-11-02 RX ADMIN — SENNOSIDES AND DOCUSATE SODIUM 1 TABLET: 50; 8.6 TABLET ORAL at 21:30

## 2024-11-02 RX ADMIN — Medication 100 MCG/HR: at 08:37

## 2024-11-02 RX ADMIN — ASPIRIN 81 MG CHEWABLE TABLET 162 MG: 81 TABLET CHEWABLE at 08:51

## 2024-11-02 RX ADMIN — HYDROCORTISONE SODIUM SUCCINATE 100 MG: 100 INJECTION, POWDER, FOR SOLUTION INTRAMUSCULAR; INTRAVENOUS at 01:54

## 2024-11-02 RX ADMIN — Medication 40 MG: at 15:47

## 2024-11-02 RX ADMIN — Medication 100 MCG: at 10:17

## 2024-11-02 RX ADMIN — ALBUMIN HUMAN 25 G: 0.25 SOLUTION INTRAVENOUS at 12:57

## 2024-11-02 RX ADMIN — VANCOMYCIN HYDROCHLORIDE 1750 MG: 10 INJECTION, POWDER, LYOPHILIZED, FOR SOLUTION INTRAVENOUS at 13:19

## 2024-11-02 RX ADMIN — CALCIUM CHLORIDE, MAGNESIUM CHLORIDE, DEXTROSE MONOHYDRATE, LACTIC ACID, SODIUM CHLORIDE, SODIUM BICARBONATE AND POTASSIUM CHLORIDE 5000 ML: 5.15; 2.03; 22; 5.4; 6.46; 3.09; .157 INJECTION INTRAVENOUS at 12:25

## 2024-11-02 RX ADMIN — VASOPRESSIN 4 UNITS/HR: 20 INJECTION INTRAVENOUS at 18:40

## 2024-11-02 NOTE — PROGRESS NOTES
Counts include 234 beds at the [facility] ICU RESPIRATORY NOTE        Date of Admission: 10/28/2024    Date of Intubation (most recent): 10/28/24    Reason for Mechanical Ventilation: CABG    Number of Days on Mechanical Ventilation: 6    Met Criteria for Spontaneous Breathing Trial: No    Reason for No Spontaneous Breathing Trial: Per MD    Bite Block: No    Significant Events Today: None    ABG Results:   Recent Labs   Lab 11/02/24  1728 11/02/24  1337 11/02/24  1224 11/02/24  1009 11/02/24  0544 11/02/24  0136 11/01/24  2206 11/01/24  1750 11/01/24  1725   PH  --   --   --  7.43 7.43  --  7.45  --  7.44   PCO2  --   --   --  37 37  --  36  --  36   PO2  --   --   --  93 123*  --  109*  --  121*   HCO3  --   --   --  24 25  --  25  --  24   O2PER 35 35 35 35  35 30  30   < > 35  35   < > 35    < > = values in this interval not displayed.         Current Vent Settings: FiO2 (%): 35 %, Resp: 20, Vent Mode: CMV/AC, Resp Rate (Set): 20 breaths/min, Tidal Volume (Set, mL): 500 mL, PEEP (cm H2O): 5 cmH2O, Resp Rate (Set): 20 breaths/min, Tidal Volume (Set, mL): 500 mL, PEEP (cm H2O): 5 cmH2O    Skin Assessment: Intact    Plan: Will cont full vent support for now and will assess for weaning readiness daily.    Amber Moctezuma RT on 11/2/2024 at 5:44 PM

## 2024-11-02 NOTE — PHARMACY-VANCOMYCIN DOSING SERVICE
Pharmacy Vancomycin Note  Date of Service 2024  Patient's  1935   88 year old, male    Indication: Sepsis  Day of Therapy: 3  Current vancomycin regimen:  intermittent doses based on levels  Current vancomycin monitoring method: Trough (Method 2 = manual dose calculation)  Current vancomycin therapeutic monitoring goal: 15-20 mg/L        Current estimated CrCl = Estimated Creatinine Clearance: 33.2 mL/min (A) (based on SCr of 1.94 mg/dL (H)).    Creatinine for last 3 days  10/30/2024:  1:04 PM Creatinine 3.89 mg/dL;  6:10 PM Creatinine 3.28 mg/dL;  7:40 PM Creatinine 3.16 mg/dL; 11:49 PM Creatinine 2.91 mg/dL  10/31/2024:  3:55 AM Creatinine 2.73 mg/dL;  5:37 AM Creatinine 2.65 mg/dL; 12:50 PM Creatinine 2.52 mg/dL; 12:50 PM Creatinine 2.52 mg/dL;  7:57 PM Creatinine 2.34 mg/dL;  7:57 PM Creatinine 2.33 mg/dL  2024:  2:07 AM Creatinine 2.34 mg/dL;  2:07 AM Creatinine 2.27 mg/dL;  2:09 PM Creatinine 2.06 mg/dL; 10:06 PM Creatinine 2.06 mg/dL  2024:  5:44 AM Creatinine 1.94 mg/dL    Recent Vancomycin Levels (past 3 days)  2024:  2:41 AM Vancomycin 13.9 ug/mL  2024: 11:56 AM Vancomycin 9.2 ug/mL    Vancomycin IV Administrations (past 72 hours)                     vancomycin (VANCOCIN) 1,750 mg in 0.9% NaCl 517.5 mL intermittent infusion (mg) 1,750 mg New Bag 24 1249    vancomycin (VANCOCIN) 2,500 mg in 0.9% NaCl 525 mL intermittent infusion (mg) 2,500 mg New Bag 10/31/24 1201                    Nephrotoxins and other renal medications (From now, onward)      Start     Dose/Rate Route Frequency Ordered Stop    24 1300  vancomycin (VANCOCIN) 1,750 mg in 0.9% NaCl 517.5 mL intermittent infusion         1,750 mg  over 2 Hours Intravenous ONCE 24 1232      24 1700  norepinephrine (LEVOPHED) 16 mg in  mL infusion MAX CONC CENTRAL LINE         0.01-0.2 mcg/kg/min × 95.8 kg  0.9-18 mL/hr  Intravenous CONTINUOUS 24 1639      24 1539   "piperacillin-tazobactam (ZOSYN) 4.5 g vial to attach to  mL bag        Note to Pharmacy: For [facility], [facility] and [facility]: For Zosyn-naive patients, use the \"Zosyn initial dose + extended infusion\" order panel.    4.5 g  over 30 Minutes Intravenous EVERY 6 HOURS 11/01/24 1128      10/31/24 1349  vancomycin place rico - receiving intermittent dosing         1 each Intravenous SEE ADMIN INSTRUCTIONS 10/31/24 1349                 Contrast Orders - past 72 hours (72h ago, onward)      None            Interpretation of levels and current regimen:  Vancomycin level is reflective of subtherapeutic level    Has serum creatinine changed greater than 50% in last 72 hours: No    Urine output:  unable to determine    Renal Function: ARF on CRRT        Plan:  Redose with 1750 mg x 1 (~ 16 mg/kg)  Vancomycin monitoring method: Trough (Method 2 = manual dose calculation)  Vancomycin therapeutic monitoring goal: 15-20 mg/L  Pharmacy will check vancomycin levels as appropriate in 1-3 Days.  Serum creatinine levels will be ordered daily for the first week of therapy and at least twice weekly for subsequent weeks.    Chao Scott, [facility]    "

## 2024-11-02 NOTE — PROGRESS NOTES
On license of [facility] ICU RESPIRATORY NOTE        Date of Admission: 10/28/2024    Date of Intubation (most recent): 10/28    Reason for Mechanical Ventilation: CABG    Number of Days on Mechanical Ventilation: 6    Met Criteria for Spontaneous Breathing Trial: No    Reason for No Spontaneous Breathing Trial: Per MD    Bite Block: No    Significant Events Today: None    ABG Results:   Recent Labs   Lab 11/02/24  0136 11/01/24  2206 11/01/24  1750 11/01/24  1725 11/01/24  1013 11/01/24  0522 11/01/24  0207   PH  --  7.45  --  7.44  --  7.44 7.45   PCO2  --  36  --  36  --  37 36   PO2  --  109*  --  121*  --  124* 122*   HCO3  --  25  --  24  --  25 25   O2PER 30 35  35 35 35   < > 40  40 40  40    < > = values in this interval not displayed.         Current Vent Settings: FiO2 (%): 30 %, Vent Mode: CMV/AC, Resp Rate (Set): 20 breaths/min, Tidal Volume (Set, mL): 500 mL, PEEP (cm H2O): 5 cmH2O, Resp Rate (Set): 20 breaths/min, Tidal Volume (Set, mL): 500 mL, PEEP (cm H2O): 5 cmH2O    Skin Assessment: Intact    Plan: Conitnue to monitorand assess for weaning    RT Marva on 11/2/2024 at 5:32 AM

## 2024-11-02 NOTE — PROGRESS NOTES
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 Michael Mulvihill.    POD # 4 s/p return to OR for mediastinal re-exploration, washout    - CVS: Pre-op TTE with EF 35-40%. Postop TTE 10/30 with EF ~35% on high-dose inotropic support.   Postop mixed cardiogenic/vasoplegic/hypovolemic shock. Lactic acidosis and SVO2 slowly improving. CI goal >2.0 (calculated elaine), continue DBU to 2.5 mcg/kg/min (did not tolerate DBU increase with worsening vasoplegia), attempt to wean epi as able. NE to MAP goal 65, wean as able. SVR down-trending, resume vaso.   Hypervolemic, volume management via CRRT, pull as able (currently limited by pressor requirement). CVP goal ~10-12. Will plan more aggressive volume removal with addition of vasopressin. Albumin 25% push BID to pull volume intravascular.  Hypothermic since initiation on CRRT, Thermagaurd catheter placed 10/31, target 37 C.   Stress dose steroids started 10/31, will wean today, off tomorrow.  History of paroxysmal atrial fibrillation, continues with intermittent afib and accelerated junctional at times, continue A-pacing at 100 BPM (intermittent capture with afib/junctional, but BP tolerating intermittent a-pacing better than 100% V pacing). Amio discontinued with shock liver.   Absent right radial pulse (previous arterial line in this location). ICU MD did bedside US, ulnar artery patent. Right hand warm, good capillary refill. Defer dedicated US at present (would not change current management).   Aspirin 162 mg daily, defer statin with ALI.    Chest tubes: output 890 mL, serosang, no air leak. TPW: a-paced at 100 BPM     - Resp: Acute hypoxemic and hypercapnic respiratory failure, stable. Not hemodynamically appropriate for weaning, continue full vent  support. Non-compliant with ventilator, improved with deeper sedation, but required increased pressor support with deeper sedation.      - Neuro: Sedated with propofol while intubated. Add Fentanyl infusion and Versed PRN to improve vent compliance. History of myasthenia gravis. Purposeful upper extremity movements and spontaneous LE movement when sedation lightened.     - Renal: CKD stage 3, baseline Cr ~1.8. Postop oliguric/anuric JAVIER. Nephrology consulted, CRRT started 10/30. Avoid nephrotoxins.   Recent Labs   Lab 11/02/24  0544 11/01/24 2206 11/01/24  1409   CR 1.94* 2.06* 2.06*       - GI: -BM, +flatus, continue bowel regimen. Shock liver, trend LFTs, avoid hepatotoxins. Recent admission for GIB 09/2024, increased pantoprazole to BID.    - : Amos in place, continue for UOP monitoring/hemodynamic status    - Endo: Postop stress hyperglycemia, resolved. Insulin infusion transitioned to sliding scale insulin. Stress dose steroids started 10/31 as above.   Hemoglobin A1C   Date Value Ref Range Status   10/09/2024 4.5 <5.7 % Final     Comment:     Normal <5.7%   Prediabetes 5.7-6.4%    Diabetes 6.5% or higher     Note: Adopted from ADA consensus guidelines.        - FEN: Replace electrolytes as needed. Place NJ today, increase TF to goal.     - ID: WBC chronically elevated and hypothermic on CRRT as above so difficult to assess for possible infection; start empiric Vanc/Zosyn 10/31. Blood, urine, respiratory cultures NGTD. WBC 68.9.  Trend CBC and fever curve.   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 Acute blood loss anemia due to surgery. Postop coagulopathy, improved. Hgb and PLT 8.7/324. Trend CBC, transfuse PRN.   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  Continue supplemental oxygen and ventilatory support as indicated.  # Acute Hypercapnic Respiratory Failure: based on venous blood gas results.  Continue supplemental oxygen and ventilatory support as indicated.         # Obesity: Estimated body mass index is 34.45 kg/m  as calculated from the following:    Height as of this encounter: 1.778 m (5' 10\").    Weight as of this encounter: 108.9 kg (240 lb 1.3 oz).       # History of CABG: noted on surgical history       - Dispo: ICU. Guarded prognosis but continues with improvements in clearing lactate,  improving liver function, and more stable CI. Family updated multiple times at bedside. Medically Ready for Discharge: Anticipated in 5+ Days      Interval History  Ventilator non-compliance, improved with deepening sedation, but required increased pressors. SVR down-trending, vasopressin added. Will attempt more aggressive volume removal.       Medications  Current Facility-Administered Medications   Medication Dose Route Frequency Provider Last Rate Last Admin    aspirin (ASA) chewable tablet 162 mg  162 mg Oral or NG Tube Daily Clarence Bone MD   162 mg at 11/01/24 0859    chlorhexidine (PERIDEX) 0.12 % solution 15 mL  15 mL Swish & Spit BID Zac Kaur DO   15 mL at 11/01/24 5185    hydrocortisone " sodium succinate PF (solu-CORTEF) injection 100 mg  100 mg Intravenous Q8H Arlin Hodge PA-C   100 mg at 11/02/24 0154    insulin aspart (NovoLOG) injection (RAPID ACTING)  1-7 Units Subcutaneous Q4H Sb Arthur PA-C   1 Units at 10/31/24 0300    pantoprazole (PROTONIX) 2 mg/mL suspension 40 mg  40 mg Oral or NG Tube BID AC Arlin Hodge PA-C   40 mg at 11/01/24 1553    Or    pantoprazole (PROTONIX) EC tablet 40 mg  40 mg Oral BID AC Arlin Hodge PA-C        piperacillin-tazobactam (ZOSYN) 4.5 g vial to attach to  mL bag  4.5 g Intravenous Q6H Mulvihill, Michael, MD   4.5 g at 11/02/24 0437    polyethylene glycol (MIRALAX) Packet 17 g  17 g Oral or Feeding Tube Daily Mulvihill, Michael, MD   17 g at 11/01/24 0933    Prosource TF20 ENfit Compatibl EN LIQD (PROSOURCE TF20) packet 60 mL  1 packet Per Feeding Tube BID 09 12 Arlin Hodge PA-C   60 mL at 11/01/24 1251    senna-docusate (SENOKOT-S/PERICOLACE) 8.6-50 MG per tablet 1 tablet  1 tablet Oral or Feeding Tube BID Mulvihill, Michael, MD   1 tablet at 11/01/24 2119    sodium chloride (PF) 0.9% PF flush 3 mL  3 mL Intracatheter Q8H Clarence Bone MD   3 mL at 11/02/24 0536    vancomycin place rico - receiving intermittent dosing  1 each Intravenous See Admin Instructions Mulvihill, Michael, MD         Current Facility-Administered Medications   Medication Dose Route Frequency Provider Last Rate Last Admin    acetaminophen (TYLENOL) tablet 650 mg  650 mg Oral Q4H PRN Clarence Bone MD        bisacodyl (DULCOLAX) suppository 10 mg  10 mg Rectal Daily PRN Clarence Bone MD        calcium gluconate 1 g in 50 mL in sodium chloride intermittent infusion  1 g Intravenous Once PRN Clarence Bone MD   1 g at 11/01/24 2344    calcium gluconate 2 g in  mL intermittent infusion  2 g Intravenous Q8H PRN Farhad Boland MD        calcium gluconate 2 g in  mL intermittent infusion  2 g Intravenous Once PRN Lizandro,  MD Clarence   2 g at 11/01/24 1114    calcium gluconate 3 g in sodium chloride 0.9 % 100 mL intermittent infusion  3 g Intravenous Once PRN Clarence Bone MD        calcium gluconate 4 g in sodium chloride 0.9 % 100 mL intermittent infusion  4 g Intravenous Q8H PRN Farhad Boland MD        dextrose 10% infusion   Intravenous Continuous PRN Arlin Hodge PA-C 30 mL/hr at 11/01/24 2000 Rate Verify at 11/01/24 2000    glucose gel 15-30 g  15-30 g Oral Q15 Min PRN Nasir Vallecillo MD        Or    dextrose 50 % injection 25-50 mL  25-50 mL Intravenous Q15 Min PRN Nasir Vallecillo MD        Or    glucagon injection 1 mg  1 mg Subcutaneous Q15 Min PRN Nasir Vallecillo MD        EPINEPHrine (ADRENALIN) 5 mg in  mL infusion  0.01-0.1 mcg/kg/min Intravenous Continuous PRN Clarence Bone MD 23 mL/hr at 11/02/24 0422 0.08 mcg/kg/min at 11/02/24 0422    heparin lock flush 10 unit/mL injection 3 mL  3 mL Intracatheter Q1H PRN Tyra Dubois MD        hydrALAZINE (APRESOLINE) injection 10 mg  10 mg Intravenous Q30 Min PRN Clarence Bone MD        HYDROmorphone (DILAUDID) injection 0.1 mg  0.1 mg Intravenous Q2H PRN Clarence Bone MD        Or    HYDROmorphone (DILAUDID) injection 0.2 mg  0.2 mg Intravenous Q2H PRN Clarence Bone MD   0.2 mg at 10/31/24 1548    lidocaine (LMX4) cream   Topical Q1H PRN Clarence Bone MD        lidocaine 1 % 0.1-1 mL  0.1-1 mL Other Q1H PRN Clarence Bone MD        magnesium hydroxide (MILK OF MAGNESIA) suspension 30 mL  30 mL Oral Daily PRN Clarence Bone MD        magnesium sulfate 2 g in 50 mL sterile water intermittent infusion  2 g Intravenous Q8H PRN Farhad Boland MD   2 g at 11/01/24 1500    propofol (DIPRIVAN) bolus from bag or syringe pump  10 mg Intravenous Q15 Min PRN Tyra Dubois MD        And    Medication Instruction   Does not apply Continuous PRN Tyra Dubois MD         "methocarbamol (ROBAXIN) tablet 500 mg  500 mg Oral Q6H PRN Clarence Bone MD   500 mg at 10/28/24 1634    naloxone (NARCAN) injection 0.2 mg  0.2 mg Intravenous Q2 Min PRN Mulvihill, Michael, MD        Or    naloxone (NARCAN) injection 0.4 mg  0.4 mg Intravenous Q2 Min PRN Mulvihill, Michael, MD        Or    naloxone (NARCAN) injection 0.2 mg  0.2 mg Intramuscular Q2 Min PRN Mulvihill, Michael, MD        Or    naloxone (NARCAN) injection 0.4 mg  0.4 mg Intramuscular Q2 Min PRN Mulvihill, Michael, MD        No heparin required   Does not apply Continuous PRN Farhad Boland MD        ondansetron (ZOFRAN ODT) ODT tab 4 mg  4 mg Oral Q6H PRN Clarence Bone MD        Or    ondansetron (ZOFRAN) injection 4 mg  4 mg Intravenous Q6H PRN Clarence Bone MD        oxyCODONE IR (ROXICODONE) half-tab 2.5 mg  2.5 mg Oral Q4H PRN Clarence Bone MD   2.5 mg at 11/02/24 0214    Or    oxyCODONE (ROXICODONE) tablet 5 mg  5 mg Oral Q4H PRN Clarence Bone MD   5 mg at 10/29/24 2149    potassium chloride 20 mEq in 50 mL intermittent infusion  20 mEq Intravenous Q8H PRN Farhad Boland MD        prochlorperazine (COMPAZINE) injection 5 mg  5 mg Intravenous Q6H PRN Clarence Bone MD        Or    prochlorperazine (COMPAZINE) tablet 5 mg  5 mg Oral Q6H PRN Clarence Bone MD        Reason beta blocker order not selected   Does not apply DOES NOT GO TO Clarence Olguin MD        sodium chloride (PF) 0.9% PF flush 3 mL  3 mL Intracatheter q1 min prn Clarence Bone MD        sodium phosphate 15 mmol in NS 250mL intermittent infusion  15 mmol Intravenous Q8H PRN Farhad Boland MD             Physical Exam  Vitals were reviewed  Blood pressure (!) 89/54, pulse 100, temperature 98.1  F (36.7  C), resp. rate 20, height 1.778 m (5' 10\"), weight 108.9 kg (240 lb 1.3 oz), SpO2 99%.  Rhythm: intermitted a-pacing, a fib    Lungs: diminished bases, +crackles    Cardiovascular: irregular " rate/rhythm, no m/r/g    Abdomen: soft, NT, ND, +BS    Extremeties: 2+ BLE/BUE edema    Incision: CDI    CT: serosang output 890 mL, no air leak    Weight:   Vitals:    10/29/24 0401 10/30/24 0430 10/31/24 0400 11/01/24 0400   Weight: 100.3 kg (221 lb 1.9 oz) 105.1 kg (231 lb 11.3 oz) 107.4 kg (236 lb 12.4 oz) 107.2 kg (236 lb 5.3 oz)    11/02/24 0400   Weight: 108.9 kg (240 lb 1.3 oz)         Data  Recent Labs   Lab 11/02/24  0544 11/02/24  0541 11/02/24  0144 11/01/24  2214 11/01/24  2206 11/01/24  1555 11/01/24  1409 11/01/24  0222 11/01/24  0207 10/31/24  1028 10/31/24  0703   WBC 68.9*  --   --   --  62.0*  --  61.4*  --  45.5*   < >  --    HGB 9.0*  --   --   --  9.2*  --  8.5*  --  8.9*   < >  --    MCV 88  --   --   --  88  --  89  --  87   < >  --      --   --   --  275  --  307  --  273   < >  --    INR 2.37*  --   --   --   --   --   --   --  2.88*  --  3.50*     --   --   --  135  --  137  --  138  139   < >  --    POTASSIUM 4.3  --   --   --  3.8  --  4.4  --  4.6  4.4   < >  --    CHLORIDE 102  --   --   --  100  --  102  --  102  103   < >  --    CO2 19*  --   --   --  19*  --  19*  --  24  24   < >  --    BUN 31.0*  --   --   --  30.9*  --  30.7*  --  33.1*  34.2*   < >  --    CR 1.94*  --   --   --  2.06*  --  2.06*  --  2.27*  2.34*   < >  --    ANIONGAP 16*  --   --   --  16*  --  16*  --  12  12   < >  --    TATE 8.7*  --   --   --  8.4*  --  8.7*  --  8.5*  8.7*   < >  --    * 131* 125*   < > 138*   < > 128*   < > 120*  120*   < >  --    ALBUMIN 2.7*  --   --   --  2.7*  --  2.8*  --  2.9*  2.9*   < >  --    PROTTOTAL 4.3*  --   --   --  4.3*  --  4.4*  --  4.6*   < >  --    BILITOTAL 2.1*  --   --   --  1.9*  --  2.0*  --  1.7*   < >  --    ALKPHOS 188*  --   --   --  157*  --  152*  --  132   < >  --    *  --   --   --  925*  --  1,099*  --  1,451*   < >  --    AST 1,181*  --   --   --  1,557*  --  2,266*  --  3,855*   < >  --     < > = values in this  interval not displayed.       Imaging:  Recent Results (from the past 24 hours)   XR Chest Port 1 View    Narrative    CHEST ONE VIEW  11/1/2024 8:15 AM     HISTORY: critically ill, ett, chest tubes, interval change    COMPARISON: Chest radiograph 10/31/2024.      Impression    IMPRESSION:     Lines and tubes: Glencoe-Garrett catheter tip projects in the region of the  main pulmonary trunk. Endotracheal tube tip in the mid thoracic  trachea. Gastric drainage tube courses below the diaphragm; tip not  visualized. Left central venous catheter at the left innominate vein.  Similar chest tubes, median sternotomy and left atrial appendage clip.    Small bilateral pleural effusions and bibasilar atelectasis. No  pneumothorax. Similar heart size.    COLLEEN JOHNSON MD         SYSTEM ID:  [id number]         Patient seen and discussed with Dr. Mulvihill Arielle Webb, PA-C  Cardiothoracic Surgery  Available for paging [phone] (personal pager or CV Surgery Rounding Pager)  Personal Pager: [phone]  CV Surgery Rounding Pager: [phone]  After hours please page surgeon on-call

## 2024-11-02 NOTE — PLAN OF CARE
Goal Outcome Evaluation:  CV  Pressors (which pressors and any increase/decrease in pressor needs): epi/norepi-unable to wean down  HR range: A paced 100-occ pvc  Chest tube output: minimal- approx 120-150 with large turn x 1    Neuro  Orientation: purposeful/opens eyes to speech/withdraws  Delirium present?(y/n): NIMA  Sleep: sedated  Pain: oxy 2.5mg x 1 before bath    GI/  BM? (y/n): N  Urine output: 10cc/12 hrs      Lines:  remain    Temp near normothermic. Very hypoactive bowel sounds. Did move BUE purposefully, moves feet spontaneously. Weight up ~1.5kg. Oxyhwmoglobin improved fm 47 to 69 post unit prbc.

## 2024-11-02 NOTE — PLAN OF CARE
Problem: Adult Inpatient Plan of Care  Goal: Plan of Care Review    Outcome: Progressing  Flowsheets (Taken 11/2/2024 1822)  Outcome Evaluation: Pt requiring increased doses of pressors to maintain map, added vasopressin at 1500, able to titrate down vasoactive gtts and begin to pull fluid for CRRT.  Map consistently 65 or greater even with afib breakthrough from a paced rhythm of 100 beats/minute.  Sedated with fentanyl, versed and propofol initially, per order to assist ventilator compliance and assist with BP, noted no gains in blood pressure so versed stopped, fentanyl decreased.  Pt remains compliant with vent, no double stacking of breaths, plan to eval in AM for potential lightening of further sedation.  CRRT line clotted x 1, air in line x1, two filter changes this shift.  If continues to clot, neph team considering adding in citrate, discussed with CV team, who prefers no anticoag for now. Next nurse to notify neph team if clots overnight.  Family at bedside, updated by MDs and nursing.  Plan of Care Reviewed With:   patient   spouse   child    Outcome: Progressing  Goal: Absence of Hospital-Acquired Illness or Injury  Outcome: Progressing  Intervention: Identify and Manage Fall Risk  Recent Flowsheet Documentation  Taken 11/2/2024 1600 by Pascale Chirinos RN  Safety Promotion/Fall Prevention:   lighting adjusted   clutter free environment maintained   room organization consistent   safety round/check completed   treat reversible contributory factors   treat underlying cause  Taken 11/2/2024 1200 by Pascale Chirinos RN  Safety Promotion/Fall Prevention:   lighting adjusted   clutter free environment maintained   room organization consistent   safety round/check completed   treat reversible contributory factors   treat underlying cause  Taken 11/2/2024 0800 by Pascale Chirinos RN  Safety Promotion/Fall Prevention:   lighting adjusted   clutter free environment maintained   room organization  consistent   safety round/check completed   treat reversible contributory factors   treat underlying cause  Intervention: Prevent Skin Injury  Recent Flowsheet Documentation  Taken 11/2/2024 1800 by Pascale Chirinos RN  Body Position:   turned   right   heels elevated  Taken 11/2/2024 1600 by Pascale Chirinos RN  Body Position:   turned   left   heels elevated  Skin Protection: silicone foam dressing in place  Taken 11/2/2024 1400 by Pascale Chirinos RN  Body Position:   turned   heels elevated  Taken 11/2/2024 1200 by Pascale Chirinos RN  Body Position:   turned   heels elevated  Skin Protection: silicone foam dressing in place  Taken 11/2/2024 0800 by Pascale Chirinos RN  Body Position:   turned   heels elevated  Skin Protection: silicone foam dressing in place  Intervention: Prevent and Manage VTE (Venous Thromboembolism) Risk  Recent Flowsheet Documentation  Taken 11/2/2024 1600 by Pascale Chirinos RN  VTE Prevention/Management: SCDs on (sequential compression devices)  Taken 11/2/2024 1200 by Pascale Chirinos RN  VTE Prevention/Management: SCDs on (sequential compression devices)  Taken 11/2/2024 0800 by Pascale Chirinos RN  VTE Prevention/Management: SCDs on (sequential compression devices)  Intervention: Prevent Infection  Recent Flowsheet Documentation  Taken 11/2/2024 1600 by Pascale Chirinos RN  Infection Prevention:   environmental surveillance performed   equipment surfaces disinfected   hand hygiene promoted   rest/sleep promoted   single patient room provided  Taken 11/2/2024 1200 by Pascale Chirinos RN  Infection Prevention:   environmental surveillance performed   equipment surfaces disinfected   hand hygiene promoted   rest/sleep promoted   single patient room provided  Taken 11/2/2024 0800 by Pascale Chirinos RN  Infection Prevention:   environmental surveillance performed   equipment surfaces disinfected   hand hygiene promoted   rest/sleep promoted   single patient room  provided  Goal: Optimal Comfort and Wellbeing  Outcome: Progressing  Intervention: Provide Person-Centered Care  Recent Flowsheet Documentation  Taken 11/2/2024 1600 by Pascale Chirinos RN  Trust Relationship/Rapport: care explained  Taken 11/2/2024 1200 by Pascale Chirinos RN  Trust Relationship/Rapport: care explained  Taken 11/2/2024 0800 by Pascale Chirinos RN  Trust Relationship/Rapport: care explained  Goal: Readiness for Transition of Care  Outcome: Progressing     Problem: Comorbidity Management  Goal: Maintenance of Asthma Control  Outcome: Progressing  Intervention: Maintain Asthma Symptom Control  Recent Flowsheet Documentation  Taken 11/2/2024 1600 by Pascale Chirinos RN  Medication Review/Management: medications reviewed  Taken 11/2/2024 1200 by Pascale Chirinos RN  Medication Review/Management: medications reviewed  Taken 11/2/2024 0800 by Pascale Chirinos RN  Medication Review/Management: medications reviewed  Goal: Maintenance of Behavioral Health Symptom Control  Outcome: Progressing  Intervention: Maintain Behavioral Health Symptom Control  Recent Flowsheet Documentation  Taken 11/2/2024 1600 by Pascale Chirinos RN  Medication Review/Management: medications reviewed  Taken 11/2/2024 1200 by Pascale Chirinos RN  Medication Review/Management: medications reviewed  Taken 11/2/2024 0800 by Pascale Chirinos RN  Medication Review/Management: medications reviewed  Goal: Maintenance of COPD Symptom Control  Outcome: Progressing  Intervention: Maintain COPD Symptom Control  Recent Flowsheet Documentation  Taken 11/2/2024 1600 by Pascale Chirinos RN  Medication Review/Management: medications reviewed  Taken 11/2/2024 1200 by Pascale Chirinos RN  Medication Review/Management: medications reviewed  Taken 11/2/2024 0800 by Pascale Chirinos RN  Medication Review/Management: medications reviewed  Goal: Blood Glucose Levels Within Targeted Range  Outcome: Progressing  Intervention: Monitor  and Manage Glycemia  Recent Flowsheet Documentation  Taken 11/2/2024 1600 by Pascale Chirinos RN  Medication Review/Management: medications reviewed  Taken 11/2/2024 1200 by Pascale Chirinos RN  Medication Review/Management: medications reviewed  Taken 11/2/2024 0800 by Pascale Chirinos RN  Medication Review/Management: medications reviewed  Goal: Maintenance of Heart Failure Symptom Control  Outcome: Progressing  Intervention: Maintain Heart Failure Management  Recent Flowsheet Documentation  Taken 11/2/2024 1600 by Pascale Chirinos RN  Medication Review/Management: medications reviewed  Taken 11/2/2024 1200 by Pascale Chirinos RN  Medication Review/Management: medications reviewed  Taken 11/2/2024 0800 by Pascale Chirinos RN  Medication Review/Management: medications reviewed  Goal: Blood Pressure in Desired Range  Outcome: Progressing  Intervention: Maintain Blood Pressure Management  Recent Flowsheet Documentation  Taken 11/2/2024 1600 by Pascale Chirinos RN  Medication Review/Management: medications reviewed  Taken 11/2/2024 1200 by Pascale Chirinos RN  Medication Review/Management: medications reviewed  Taken 11/2/2024 0800 by Pascale Chirinos RN  Medication Review/Management: medications reviewed  Goal: Maintenance of Osteoarthritis Symptom Control  Outcome: Progressing  Intervention: Maintain Osteoarthritis Symptom Control  Recent Flowsheet Documentation  Taken 11/2/2024 1600 by Pascale Chirinos RN  Assistive Device Utilized: lift device  Activity Management: bedrest  Medication Review/Management: medications reviewed  Taken 11/2/2024 1200 by Pascale Chirinos RN  Assistive Device Utilized: lift device  Activity Management: bedrest  Medication Review/Management: medications reviewed  Taken 11/2/2024 0800 by Pascale Chirinos RN  Assistive Device Utilized: lift device  Activity Management: bedrest  Medication Review/Management: medications reviewed  Goal: Bariatric Home Regimen  Maintained  Outcome: Progressing  Intervention: Maintain and Manage Postbariatric Surgery Care  Recent Flowsheet Documentation  Taken 11/2/2024 1600 by Pascale Chirinos RN  Medication Review/Management: medications reviewed  Taken 11/2/2024 1200 by Pascale Chirinos RN  Medication Review/Management: medications reviewed  Taken 11/2/2024 0800 by Pascale Chirinos RN  Medication Review/Management: medications reviewed  Goal: Maintenance of Seizure Control  Outcome: Progressing  Intervention: Maintain Seizure Symptom Control  Recent Flowsheet Documentation  Taken 11/2/2024 1600 by Pascale Chirinos RN  Sensory Stimulation Regulation:   care clustered   lighting decreased   quiet environment promoted  Seizure Precautions: clutter-free environment maintained  Medication Review/Management: medications reviewed  Taken 11/2/2024 1200 by Pascale Chirinos RN  Sensory Stimulation Regulation:   care clustered   lighting decreased   quiet environment promoted  Seizure Precautions: clutter-free environment maintained  Medication Review/Management: medications reviewed  Taken 11/2/2024 0800 by Pascale Chirinos RN  Sensory Stimulation Regulation:   care clustered   lighting decreased   quiet environment promoted  Seizure Precautions: clutter-free environment maintained  Medication Review/Management: medications reviewed     Problem: Fall Injury Risk  Goal: Absence of Fall and Fall-Related Injury  Outcome: Progressing  Intervention: Identify and Manage Contributors  Recent Flowsheet Documentation  Taken 11/2/2024 1600 by Pascale Chirinos RN  Medication Review/Management: medications reviewed  Taken 11/2/2024 1200 by Pascale Chirinos RN  Medication Review/Management: medications reviewed  Taken 11/2/2024 0800 by Pascale Chirinos RN  Medication Review/Management: medications reviewed  Intervention: Promote Injury-Free Environment  Recent Flowsheet Documentation  Taken 11/2/2024 1600 by Pascale Chirinos RN  Safety  Promotion/Fall Prevention:   lighting adjusted   clutter free environment maintained   room organization consistent   safety round/check completed   treat reversible contributory factors   treat underlying cause  Taken 11/2/2024 1200 by Pascale Chirinos RN  Safety Promotion/Fall Prevention:   lighting adjusted   clutter free environment maintained   room organization consistent   safety round/check completed   treat reversible contributory factors   treat underlying cause  Taken 11/2/2024 0800 by Pascale Chirinos RN  Safety Promotion/Fall Prevention:   lighting adjusted   clutter free environment maintained   room organization consistent   safety round/check completed   treat reversible contributory factors   treat underlying cause     Problem: Mechanical Ventilation Invasive  Goal: Effective Communication  Outcome: Progressing  Intervention: Ensure Effective Communication  Recent Flowsheet Documentation  Taken 11/2/2024 1600 by Pascale Chirinos RN  Family/Support System Care:   caregiver stress acknowledged   involvement promoted   presence promoted   support provided  Trust Relationship/Rapport: care explained  Taken 11/2/2024 1200 by Pascale Chirinos RN  Family/Support System Care:   caregiver stress acknowledged   involvement promoted   presence promoted   support provided  Trust Relationship/Rapport: care explained  Taken 11/2/2024 0800 by Pascale Chirinos RN  Family/Support System Care:   caregiver stress acknowledged   involvement promoted   presence promoted   support provided  Trust Relationship/Rapport: care explained  Goal: Optimal Device Function  Outcome: Progressing  Intervention: Optimize Device Care and Function  Recent Flowsheet Documentation  Taken 11/2/2024 1800 by Pascale Chirinos RN  Oral Care: swabbed with antiseptic solution  Taken 11/2/2024 1600 by Pascale Chirinos RN  Airway Safety Measures:   all equipment/monitors on and audible   mask at bedside   manual  resuscitator/mask/valve at bedside   oxygen flowmeter   suction at bedside   suction equipment   suction regulator  Oral Care: swabbed with antiseptic solution  Taken 11/2/2024 1200 by Pascale Chirinos RN  Airway Safety Measures:   all equipment/monitors on and audible   mask at bedside   manual resuscitator/mask/valve at bedside   oxygen flowmeter   suction at bedside   suction equipment   suction regulator  Oral Care: swabbed with antiseptic solution  Taken 11/2/2024 0800 by Pascale Chirinos RN  Airway Safety Measures:   all equipment/monitors on and audible   mask at bedside   manual resuscitator/mask/valve at bedside   oxygen flowmeter   suction at bedside   suction equipment   suction regulator  Oral Care: swabbed with antiseptic solution  Goal: Mechanical Ventilation Liberation  Outcome: Progressing  Intervention: Promote Extubation and Mechanical Ventilation Liberation  Recent Flowsheet Documentation  Taken 11/2/2024 1600 by Pascale Chirinos RN  Medication Review/Management: medications reviewed  Environmental Support: calm environment promoted  Taken 11/2/2024 1200 by Pascale Chirinos RN  Medication Review/Management: medications reviewed  Environmental Support: calm environment promoted  Taken 11/2/2024 0800 by Pascale Chirinos RN  Medication Review/Management: medications reviewed  Environmental Support: calm environment promoted  Goal: Optimal Nutrition Delivery  Outcome: Progressing  Intervention: Optimize Nutrition Delivery  Recent Flowsheet Documentation  Taken 11/2/2024 1600 by Pascale Chirinos RN  Nutrition Support Management: weight trending reviewed  Taken 11/2/2024 1200 by Pascale Chirinos RN  Nutrition Support Management: weight trending reviewed  Taken 11/2/2024 0800 by Pascale Chirinos RN  Nutrition Support Management: weight trending reviewed  Goal: Absence of Device-Related Skin and Tissue Injury  Outcome: Progressing  Intervention: Maintain Skin and Tissue Health  Recent  Flowsheet Documentation  Taken 11/2/2024 1600 by Pascale Chirinos RN  Device Skin Pressure Protection:   pressure points protected   positioning supports utilized   tubing/devices free from skin contact  Taken 11/2/2024 1200 by Pascale Chirinos RN  Device Skin Pressure Protection:   pressure points protected   positioning supports utilized   tubing/devices free from skin contact  Taken 11/2/2024 0800 by Pascale Chirinos RN  Device Skin Pressure Protection:   pressure points protected   positioning supports utilized   tubing/devices free from skin contact  Goal: Absence of Ventilator-Induced Lung Injury  Outcome: Progressing  Intervention: Prevent Ventilator-Associated Pneumonia  Recent Flowsheet Documentation  Taken 11/2/2024 1800 by Pascale Chirinos RN  Oral Care: swabbed with antiseptic solution  Head of Bed (HOB) Positioning: HOB at 20-30 degrees  Taken 11/2/2024 1600 by Pascale Chirinos RN  Oral Care: swabbed with antiseptic solution  Head of Bed (HOB) Positioning: HOB at 30 degrees  VAP Prevention Contraindications: condition unstable  VAP Prevention Measures: not completed (see contraindications)  Taken 11/2/2024 1400 by Pascale Chirinos RN  Head of Bed (HOB) Positioning: HOB at 20-30 degrees  Taken 11/2/2024 1200 by Pascale Chirinos RN  Oral Care: swabbed with antiseptic solution  Head of Bed (HOB) Positioning: HOB at 20-30 degrees  VAP Prevention Contraindications: condition unstable  VAP Prevention Measures: not completed (see contraindications)  Taken 11/2/2024 0800 by Pascale Chirinos RN  Oral Care: swabbed with antiseptic solution  Head of Bed (HOB) Positioning: HOB at 20-30 degrees  VAP Prevention Contraindications: condition unstable  VAP Prevention Measures: not completed (see contraindications)     Problem: Cardiovascular Surgery  Goal: Improved Activity Tolerance  Outcome: Progressing  Intervention: Optimize Tolerance for Activity  Recent Flowsheet Documentation  Taken 11/2/2024 1600  by Pascale Chirinos RN  Environmental Support: calm environment promoted  Taken 11/2/2024 1200 by Pascale Chirinos RN  Environmental Support: calm environment promoted  Taken 11/2/2024 0800 by Pascale Chirinos RN  Environmental Support: calm environment promoted  Goal: Optimal Coping with Heart Surgery  Outcome: Progressing  Intervention: Support Psychosocial Response to Surgery  Recent Flowsheet Documentation  Taken 11/2/2024 1600 by Pascale Chirinos RN  Supportive Measures: positive reinforcement provided  Family/Support System Care:   caregiver stress acknowledged   involvement promoted   presence promoted   support provided  Taken 11/2/2024 1200 by Pascale Chirinos RN  Supportive Measures: positive reinforcement provided  Family/Support System Care:   caregiver stress acknowledged   involvement promoted   presence promoted   support provided  Taken 11/2/2024 0800 by Pascale Chirinos RN  Supportive Measures: positive reinforcement provided  Family/Support System Care:   caregiver stress acknowledged   involvement promoted   presence promoted   support provided  Goal: Absence of Bleeding  Outcome: Progressing  Intervention: Monitor and Manage Bleeding  Recent Flowsheet Documentation  Taken 11/2/2024 1600 by Pascale Chirinos RN  Bleeding Management: dressing monitored  Taken 11/2/2024 1200 by Pascale Chirinos RN  Bleeding Management: dressing monitored  Taken 11/2/2024 0800 by Pascale Chirinos RN  Bleeding Management: dressing monitored  Goal: Effective Bowel Elimination  Outcome: Progressing  Goal: Effective Cardiac Function  Outcome: Progressing  Intervention: Optimize Cardiac Output and Blood Flow  Recent Flowsheet Documentation  Taken 11/2/2024 1600 by Pascale Chirinos RN  Dysrhythmia Management: pacing wires maintained  Taken 11/2/2024 1200 by Pascale Chirinos RN  Dysrhythmia Management: pacing wires maintained  Taken 11/2/2024 0800 by Pascale Chirinos RN  Dysrhythmia Management: pacing  wires maintained  Goal: Optimal Cerebral Tissue Perfusion  Outcome: Progressing  Intervention: Protect and Optimize Cerebral Perfusion  Recent Flowsheet Documentation  Taken 11/2/2024 1800 by Pascale Chirinos RN  Head of Bed (HOB) Positioning: HOB at 20-30 degrees  Taken 11/2/2024 1600 by Pascale Chirinos RN  Sensory Stimulation Regulation:   care clustered   lighting decreased   quiet environment promoted  Cerebral Perfusion Promotion:   blood pressure monitored   normothermia promoted  Glycemic Management: blood glucose monitored  Head of Bed (HOB) Positioning: HOB at 30 degrees  Taken 11/2/2024 1400 by Pascale Chirinos RN  Head of Bed (HOB) Positioning: HOB at 20-30 degrees  Taken 11/2/2024 1200 by Pascale Chirinos RN  Sensory Stimulation Regulation:   care clustered   lighting decreased   quiet environment promoted  Cerebral Perfusion Promotion:   blood pressure monitored   normothermia promoted  Glycemic Management: blood glucose monitored  Head of Bed (HOB) Positioning: HOB at 20-30 degrees  Taken 11/2/2024 0800 by Pascael Chirinos RN  Sensory Stimulation Regulation:   care clustered   lighting decreased   quiet environment promoted  Cerebral Perfusion Promotion:   blood pressure monitored   normothermia promoted  Glycemic Management: blood glucose monitored  Head of Bed (HOB) Positioning: HOB at 20-30 degrees  Goal: Fluid and Electrolyte Balance  Outcome: Progressing  Intervention: Monitor and Manage Fluid and Electrolyte Balance  Recent Flowsheet Documentation  Taken 11/2/2024 1600 by Pascale Chirinos RN  Fluid/Electrolyte Management: electrolyte supplement adjusted  Taken 11/2/2024 1200 by Pascale Chirinos RN  Fluid/Electrolyte Management: electrolyte supplement adjusted  Taken 11/2/2024 0800 by Pascale Chirinos RN  Fluid/Electrolyte Management: electrolyte supplement adjusted  Goal: Blood Glucose Level Within Targeted Range  Outcome: Progressing  Intervention: Optimize Glycemic  Control  Recent Flowsheet Documentation  Taken 11/2/2024 1600 by Pascale Chirinos RN  Glycemic Management: blood glucose monitored  Taken 11/2/2024 1200 by Pascale Chirinos RN  Glycemic Management: blood glucose monitored  Taken 11/2/2024 0800 by Pascale Chirinos RN  Glycemic Management: blood glucose monitored  Goal: Absence of Infection Signs and Symptoms  Outcome: Progressing  Intervention: Prevent or Manage Infection  Recent Flowsheet Documentation  Taken 11/2/2024 1600 by Pascale Chirinos RN  Infection Prevention:   environmental surveillance performed   equipment surfaces disinfected   hand hygiene promoted   rest/sleep promoted   single patient room provided  Taken 11/2/2024 1200 by Pascale Chirinos RN  Infection Prevention:   environmental surveillance performed   equipment surfaces disinfected   hand hygiene promoted   rest/sleep promoted   single patient room provided  Taken 11/2/2024 0800 by Pascale Chirinos RN  Infection Prevention:   environmental surveillance performed   equipment surfaces disinfected   hand hygiene promoted   rest/sleep promoted   single patient room provided  Goal: Anesthesia/Sedation Recovery  Outcome: Progressing  Intervention: Optimize Anesthesia Recovery  Recent Flowsheet Documentation  Taken 11/2/2024 1600 by Pascale Chirinos RN  Safety Promotion/Fall Prevention:   lighting adjusted   clutter free environment maintained   room organization consistent   safety round/check completed   treat reversible contributory factors   treat underlying cause  Reorientation Measures: clock in view  Taken 11/2/2024 1200 by Pascale Chirinos RN  Safety Promotion/Fall Prevention:   lighting adjusted   clutter free environment maintained   room organization consistent   safety round/check completed   treat reversible contributory factors   treat underlying cause  Reorientation Measures: clock in view  Taken 11/2/2024 0800 by Pascale Chirinos RN  Safety Promotion/Fall Prevention:    lighting adjusted   clutter free environment maintained   room organization consistent   safety round/check completed   treat reversible contributory factors   treat underlying cause  Reorientation Measures: clock in view  Goal: Acceptable Pain Control  Outcome: Progressing  Goal: Nausea and Vomiting Relief  Outcome: Progressing  Goal: Effective Urinary Elimination  Outcome: Progressing  Goal: Effective Oxygenation and Ventilation  Outcome: Progressing  Intervention: Promote Airway Secretion Clearance  Recent Flowsheet Documentation  Taken 11/2/2024 1600 by Pascale Chirinos RN  Cough And Deep Breathing: unable to perform  Taken 11/2/2024 1200 by Pascale Chirinos RN  Cough And Deep Breathing: unable to perform  Taken 11/2/2024 0800 by Pascale Chirinos RN  Cough And Deep Breathing: unable to perform  Intervention: Optimize Oxygenation and Ventilation  Recent Flowsheet Documentation  Taken 11/2/2024 1600 by Pascale Chirinos RN  Chest Tube Safety:   all connections secured   suction checked   rubber-tipped hemostat(s) with patient  Taken 11/2/2024 1200 by Pascale Chirinos RN  Chest Tube Safety:   all connections secured   suction checked   rubber-tipped hemostat(s) with patient  Taken 11/2/2024 0800 by Pascale Chirinos RN  Chest Tube Safety:   all connections secured   suction checked   rubber-tipped hemostat(s) with patient     Problem: Skin Injury Risk Increased  Goal: Skin Health and Integrity  Outcome: Progressing  Intervention: Plan: Nurse Driven Intervention: Positioning  Recent Flowsheet Documentation  Taken 11/2/2024 1600 by Pascale Chirinos RN  Plan: Positioning Interventions:   REPOSITION Left/Right (No supine) q2h   HOB 30 degrees or less   OFF-LOAD HEELS with boots   OFF-LOAD HEELS with pillows   Use wedge positioners  Taken 11/2/2024 1200 by Pascale Chirinos RN  Plan: Positioning Interventions:   REPOSITION Left/Right (No supine) q2h   HOB 30 degrees or less   OFF-LOAD HEELS with boots    OFF-LOAD HEELS with pillows   Use wedge positioners  Taken 11/2/2024 0800 by Pascale Chirinos RN  Plan: Positioning Interventions:   REPOSITION Left/Right (No supine) q2h   HOB 30 degrees or less   OFF-LOAD HEELS with boots   OFF-LOAD HEELS with pillows   Use wedge positioners  Intervention: Plan: Nurse Driven Intervention: Moisture Management  Recent Flowsheet Documentation  Taken 11/2/2024 1600 by Pascale Chirinos RN  Moisture Interventions:   Incontinence pad   Urinary collection device   Perineal cleanser  Bathing/Skin Care: back care  Taken 11/2/2024 1200 by Pascale Chirinos RN  Moisture Interventions:   Incontinence pad   Urinary collection device   Perineal cleanser  Bathing/Skin Care: back care  Taken 11/2/2024 0800 by Pascale Chirinos RN  Moisture Interventions:   Incontinence pad   Urinary collection device   Perineal cleanser  Bathing/Skin Care: back care  Intervention: Plan: Nurse Driven Intervention: Friction and Shear  Recent Flowsheet Documentation  Taken 11/2/2024 1600 by Pascale Chirinos RN  Friction/Shear Interventions:   HOB 30 degrees or less   Silicone foam sacral dressing   Lateral transfer device (hovermat, etc.)   Assistive lifting device (portable/ceiling lift, etc.)   Repositioning device (TAP system, etc.)  Taken 11/2/2024 1200 by Pascale Chirinos RN  Friction/Shear Interventions:   HOB 30 degrees or less   Silicone foam sacral dressing   Lateral transfer device (hovermat, etc.)   Assistive lifting device (portable/ceiling lift, etc.)   Repositioning device (TAP system, etc.)  Taken 11/2/2024 0800 by Pascale Chirinos RN  Friction/Shear Interventions:   HOB 30 degrees or less   Silicone foam sacral dressing   Lateral transfer device (hovermat, etc.)   Assistive lifting device (portable/ceiling lift, etc.)   Repositioning device (TAP system, etc.)  Intervention: Optimize Skin Protection  Recent Flowsheet Documentation  Taken 11/2/2024 1800 by Pascale Chirinos RN  Head of Bed  (HOB) Positioning: HOB at 20-30 degrees  Taken 11/2/2024 1600 by Pascale Chirinos RN  Pressure Reduction Techniques:   heels elevated off bed   pressure points protected  Pressure Reduction Devices:   positioning supports utilized   heel offloading device utilized  Skin Protection: silicone foam dressing in place  Activity Management: bedrest  Head of Bed (HOB) Positioning: HOB at 30 degrees  Taken 11/2/2024 1400 by Pascale Chirinos RN  Head of Bed (HOB) Positioning: HOB at 20-30 degrees  Taken 11/2/2024 1200 by Pascale Chirinos RN  Pressure Reduction Techniques:   heels elevated off bed   pressure points protected  Pressure Reduction Devices:   positioning supports utilized   heel offloading device utilized  Skin Protection: silicone foam dressing in place  Activity Management: bedrest  Head of Bed (HOB) Positioning: HOB at 20-30 degrees  Taken 11/2/2024 0800 by Pascale Chirinos RN  Pressure Reduction Techniques:   heels elevated off bed   pressure points protected  Pressure Reduction Devices:   positioning supports utilized   heel offloading device utilized  Skin Protection: silicone foam dressing in place  Activity Management: bedrest  Head of Bed (HOB) Positioning: HOB at 20-30 degrees     Problem: Risk for Delirium  Goal: Optimal Coping  Outcome: Progressing  Intervention: Optimize Psychosocial Adjustment to Delirium  Recent Flowsheet Documentation  Taken 11/2/2024 1600 by Pascale Chirinos RN  Supportive Measures: positive reinforcement provided  Family/Support System Care:   caregiver stress acknowledged   involvement promoted   presence promoted   support provided  Taken 11/2/2024 1200 by Pascale Chirinos RN  Supportive Measures: positive reinforcement provided  Family/Support System Care:   caregiver stress acknowledged   involvement promoted   presence promoted   support provided  Taken 11/2/2024 0800 by Pascale Chirinos RN  Supportive Measures: positive reinforcement provided  Family/Support  System Care:   caregiver stress acknowledged   involvement promoted   presence promoted   support provided  Goal: Improved Behavioral Control  Outcome: Progressing  Intervention: Prevent and Manage Agitation  Recent Flowsheet Documentation  Taken 11/2/2024 1600 by Pascale Chirinos RN  Environment Familiarity/Consistency: daily routine followed  Taken 11/2/2024 1200 by Pascale Chirinos RN  Environment Familiarity/Consistency: daily routine followed  Taken 11/2/2024 0800 by Pascale Chirinos RN  Environment Familiarity/Consistency: daily routine followed  Intervention: Minimize Safety Risk  Recent Flowsheet Documentation  Taken 11/2/2024 1600 by Pascale Chirinos RN  Enhanced Safety Measures: pain management  Trust Relationship/Rapport: care explained  Taken 11/2/2024 1200 by Pascale Chirinos RN  Enhanced Safety Measures: pain management  Trust Relationship/Rapport: care explained  Taken 11/2/2024 0800 by Pascale Chirinos RN  Enhanced Safety Measures: pain management  Trust Relationship/Rapport: care explained  Goal: Improved Attention and Thought Clarity  Outcome: Progressing  Intervention: Maximize Cognitive Function  Recent Flowsheet Documentation  Taken 11/2/2024 1600 by Pascale Chirinos RN  Sensory Stimulation Regulation:   care clustered   lighting decreased   quiet environment promoted  Reorientation Measures: clock in view  Taken 11/2/2024 1200 by Pascale Chirinos RN  Sensory Stimulation Regulation:   care clustered   lighting decreased   quiet environment promoted  Reorientation Measures: clock in view  Taken 11/2/2024 0800 by Pascale Chirinos RN  Sensory Stimulation Regulation:   care clustered   lighting decreased   quiet environment promoted  Reorientation Measures: clock in view  Goal: Improved Sleep  Outcome: Progressing     Problem: CRRT (Continuous Renal Replacement Therapy)  Goal: Safe, Effective Therapy Delivery  Outcome: Progressing  Intervention: Optimize Device Care and  Function  Recent Flowsheet Documentation  Taken 11/2/2024 1600 by Pascale Chirinos RN  Bleeding Precautions:   blood pressure closely monitored   coagulation study results reviewed   gentle oral care promoted   monitored for signs of bleeding  Bleeding Management: dressing monitored  Fluid/Electrolyte Management: electrolyte supplement adjusted  Circuit Management:   air detection alarms on   circuit line warming device in use  Taken 11/2/2024 1200 by Pascale Chirinos RN  Bleeding Precautions:   blood pressure closely monitored   coagulation study results reviewed   gentle oral care promoted   monitored for signs of bleeding  Bleeding Management: dressing monitored  Fluid/Electrolyte Management: electrolyte supplement adjusted  Circuit Management:   air detection alarms on   circuit line warming device in use  Taken 11/2/2024 0800 by Pascale Chirinos RN  Bleeding Precautions:   blood pressure closely monitored   coagulation study results reviewed   gentle oral care promoted   monitored for signs of bleeding  Bleeding Management: dressing monitored  Fluid/Electrolyte Management: electrolyte supplement adjusted  Circuit Management:   air detection alarms on   circuit line warming device in use  Goal: Hemodynamic Stability  Outcome: Progressing  Intervention: Optimize Blood Flow  Recent Flowsheet Documentation  Taken 11/2/2024 1600 by Pascale Chirinos RN  Bleeding Precautions:   blood pressure closely monitored   coagulation study results reviewed   gentle oral care promoted   monitored for signs of bleeding  Bleeding Management: dressing monitored  Fluid/Electrolyte Management: electrolyte supplement adjusted  Taken 11/2/2024 1200 by Pascale Chirinos RN  Bleeding Precautions:   blood pressure closely monitored   coagulation study results reviewed   gentle oral care promoted   monitored for signs of bleeding  Bleeding Management: dressing monitored  Fluid/Electrolyte Management: electrolyte supplement  adjusted  Taken 11/2/2024 0800 by Pascale Chirinos RN  Bleeding Precautions:   blood pressure closely monitored   coagulation study results reviewed   gentle oral care promoted   monitored for signs of bleeding  Bleeding Management: dressing monitored  Fluid/Electrolyte Management: electrolyte supplement adjusted  Goal: Body Temperature Maintained in Desired Range  Outcome: Progressing  Intervention: Prevent or Minimize Hypothermia  Recent Flowsheet Documentation  Taken 11/2/2024 1600 by Pascale Chirinos RN  Thermoregulation Maintenance:   forced warm air   room temperature increased  Taken 11/2/2024 1200 by Pascale Chirinos RN  Thermoregulation Maintenance:   forced warm air   room temperature increased  Taken 11/2/2024 0800 by Pascale Chirinos RN  Thermoregulation Maintenance:   forced warm air   room temperature increased  Goal: Absence of Infection Signs and Symptoms  Outcome: Progressing  Intervention: Prevent or Manage Infection  Recent Flowsheet Documentation  Taken 11/2/2024 1600 by Pascale Chirinos RN  Infection Prevention:   environmental surveillance performed   equipment surfaces disinfected   hand hygiene promoted   rest/sleep promoted   single patient room provided  Infection Management: aseptic technique maintained  Taken 11/2/2024 1200 by Pascale Chirinos RN  Infection Prevention:   environmental surveillance performed   equipment surfaces disinfected   hand hygiene promoted   rest/sleep promoted   single patient room provided  Infection Management: aseptic technique maintained  Taken 11/2/2024 0800 by Pascale Chirinos RN  Infection Prevention:   environmental surveillance performed   equipment surfaces disinfected   hand hygiene promoted   rest/sleep promoted   single patient room provided  Infection Management: aseptic technique maintained   Goal Outcome Evaluation:      Plan of Care Reviewed With: patient, spouse, child    Overall Patient Progress: no changeOverall Patient Progress:  no change    Outcome Evaluation: Pt requiring increased doses of pressors to maintain map, added vasopressin at 1500, able to titrate down vasoactive gtts and begin to pull fluid for CRRT.  Map consistently 65 or greater even with afib breakthrough from a paced rhythm of 100 beats/minute.  Sedated with fentanyl, versed and propofol initially, per order to assist ventilator compliance and assist with BP, noted no gains in blood pressure so versed stopped, fentanyl decreased.  Pt remains compliant with vent, no double stacking of breaths, plan to eval in AM for potential lightening of further sedation.  CRRT line clotted x 1, air in line x1, two filter changes this shift.  If continues to clot, neph team considering adding in citrate, discussed with CV team, who prefers no anticoag for now. Next nurse to notify neph team if clots overnight.  Family at bedside, updated by MDs and nursing.

## 2024-11-02 NOTE — PROGRESS NOTES
Renal Medicine Progress Note            Assessment/Plan:     88 y.o man with s/p 3-vessels CABG and CKD IIIB, consulted for post-op JAVIER.      # Patient with baseline CKD IIIB, Scr ~ 1.8 mg/dl.      # Post-op JAVIER-severe: Anuric.                -started CRRT 10/30     # Severe 3-vessels CAD s/p 3-vessels CABG.                -EF ~ 35%     # Refractory shock with MODS:                -three pressors               -stress dose steroid               -Zosyn/vanco     # FEN: Volume up and edematous. Electrolytes and acid/base status are acceptable with CRRT.     # Shock liver imrpoving.      Plan:  # Would try to keep patient net negative next 24 hrs. Rec to UF net negative  ml/hr. I suspect removing volume will improve his hemodynamics. Okay to UF if SBP ~ 90 and or MAP >65 if okay with CTS surgery team.     I discussed the plan with ICU team and his daughter at the bedside. Will reach out to CTS team regarding fluid removal.         Interval History:     Pt remains on three pressors.   He is + 2.3 liters last 24 hrs and + 14 liters since admission.   He is edematous.   CRRT clotted at ~ 9 PM yesterday and again this morning.   Vent settings are stable.   Electrolytes are acceptable.              Medications and Allergies:     Current Facility-Administered Medications   Medication Dose Route Frequency Provider Last Rate Last Admin    aspirin (ASA) chewable tablet 162 mg  162 mg Oral or NG Tube Daily Clarence Bone MD   162 mg at 11/02/24 0851    chlorhexidine (PERIDEX) 0.12 % solution 15 mL  15 mL Swish & Spit BID Zac Kaur DO   15 mL at 11/01/24 2344    hydrocortisone sodium succinate PF (solu-CORTEF) injection 100 mg  100 mg Intravenous Q8H Arlin Hodge PA-C   100 mg at 11/02/24 0154    insulin aspart (NovoLOG) injection (RAPID ACTING)  1-7 Units Subcutaneous Q4H Sb Arthur PA-C   1 Units at 10/31/24 0300    pantoprazole (PROTONIX) 2 mg/mL suspension 40 mg  40 mg Oral or NG Tube  "BID AC Arlin Hodge PA-C   40 mg at 11/02/24 0727    Or    pantoprazole (PROTONIX) EC tablet 40 mg  40 mg Oral BID AC Arlin Hodge PA-C        piperacillin-tazobactam (ZOSYN) 4.5 g vial to attach to  mL bag  4.5 g Intravenous Q6H Mulvihill, Michael, MD   4.5 g at 11/02/24 0437    polyethylene glycol (MIRALAX) Packet 17 g  17 g Oral or Feeding Tube Daily Mulvihill, Michael, MD   17 g at 11/02/24 0851    Prosource TF20 ENfit Compatibl EN LIQD (PROSOURCE TF20) packet 60 mL  1 packet Per Feeding Tube BID 09 12 Arlin Hodge PA-C   60 mL at 11/01/24 1251    senna-docusate (SENOKOT-S/PERICOLACE) 8.6-50 MG per tablet 1 tablet  1 tablet Oral or Feeding Tube BID Mulvihill, Michael, MD   1 tablet at 11/02/24 0851    sodium chloride (PF) 0.9% PF flush 3 mL  3 mL Intracatheter Q8H Clarence Bone MD   3 mL at 11/02/24 0536    vancomycin place rico - receiving intermittent dosing  1 each Intravenous See Admin Instructions Mulvihill, Michael, MD          No Known Allergies         Physical Exam:   Vitals were reviewed   , Blood pressure (!) 89/54, pulse 107, temperature 99  F (37.2  C), resp. rate 20, height 1.778 m (5' 10\"), weight 108.9 kg (240 lb 1.3 oz), SpO2 99%.    Wt Readings from Last 3 Encounters:   11/02/24 108.9 kg (240 lb 1.3 oz)   10/25/24 97.1 kg (214 lb)   10/03/24 101.2 kg (223 lb)       Intake/Output Summary (Last 24 hours) at 11/2/2024 1000  Last data filed at 11/2/2024 0900  Gross per 24 hour   Intake 4073.12 ml   Output 1917 ml   Net 2156.12 ml     GENERAL APPEARANCE: Critical  HEENT:  intubated  RESP: Vent sound  CV: RRR  ABDOMEN: obese, soft.  EXTREMITIES/SKIN: edematous  NEURO: Sedated         Data:     CBC RESULTS:     Recent Labs   Lab 11/02/24  0544 11/01/24  2206 11/01/24  1409 11/01/24  0207 10/31/24  1957 10/31/24  1250   WBC 67.4* 62.0* 61.4* 45.5* 40.0* 28.0*   RBC 3.08* 3.09* 2.88* 2.97* 2.95* 2.78*   HGB 9.0* 9.2* 8.5* 8.9* 8.9* 8.2*   HCT 27.3* 27.3* 25.6* 25.8* 25.4* " 24.4*    275 307 273 277 206       Basic Metabolic Panel:  Recent Labs   Lab 11/02/24  0544 11/02/24  0541 11/02/24  0144 11/01/24  2214 11/01/24  2206 11/01/24  1555 11/01/24  1409 11/01/24  0222 11/01/24  0207 10/31/24  2001 10/31/24  1957 10/31/24  1359 10/31/24  1250     --   --   --  135  --  137  --  138  139  --  138  140  --  141  141   POTASSIUM 4.3  --   --   --  3.8  --  4.4  --  4.6  4.4  --  4.5  4.5  --  3.9  3.9   CHLORIDE 102  --   --   --  100  --  102  --  102  103  --  100  102  --  102  102   CO2 19*  --   --   --  19*  --  19*  --  24  24  --  26  27  --  27  27   BUN 31.0*  --   --   --  30.9*  --  30.7*  --  33.1*  34.2*  --  36.0*  35.4*  --  38.7*  38.7*   CR 1.94*  --   --   --  2.06*  --  2.06*  --  2.27*  2.34*  --  2.33*  2.34*  --  2.52*  2.52*   * 131* 125* 126* 138* 129* 128*   < > 120*  120*   < > 111*  111*   < > 77  77   TTAE 8.7*  --   --   --  8.4*  --  8.7*  --  8.5*  8.7*  --  8.4*  8.5*  --  7.9*  7.9*    < > = values in this interval not displayed.       INR  Recent Labs   Lab 11/02/24 0544 11/01/24  0207 10/31/24  0703 10/30/24  0802   INR 2.37* 2.88* 3.50* 3.20*      Attestation:   I have reviewed today's relevant vital signs, notes, medications, labs and imaging.    Farhad Boland MD  [facility] - Nephrology  Office phone :[phone]  Pager: [phone]

## 2024-11-03 ENCOUNTER — APPOINTMENT (OUTPATIENT)
Dept: GENERAL RADIOLOGY | Facility: CLINIC | Age: 89
DRG: 233 | End: 2024-11-03
Attending: PHYSICIAN ASSISTANT
Payer: MEDICARE

## 2024-11-03 ENCOUNTER — APPOINTMENT (OUTPATIENT)
Dept: ULTRASOUND IMAGING | Facility: CLINIC | Age: 89
End: 2024-11-03
Attending: INTERNAL MEDICINE
Payer: MEDICARE

## 2024-11-03 LAB
ALBUMIN SERPL BCG-MCNC: 3 G/DL (ref 3.5–5.2)
ALBUMIN SERPL BCG-MCNC: 3.3 G/DL (ref 3.5–5.2)
ALBUMIN SERPL BCG-MCNC: 3.4 G/DL (ref 3.5–5.2)
ALLEN'S TEST: ABNORMAL
ALLEN'S TEST: NO
ALP SERPL-CCNC: 167 U/L (ref 40–150)
ALP SERPL-CCNC: 175 U/L (ref 40–150)
ALP SERPL-CCNC: 184 U/L (ref 40–150)
ALT SERPL W P-5'-P-CCNC: 519 U/L (ref 0–70)
ALT SERPL W P-5'-P-CCNC: 541 U/L (ref 0–70)
ALT SERPL W P-5'-P-CCNC: 562 U/L (ref 0–70)
ANION GAP SERPL CALCULATED.3IONS-SCNC: 11 MMOL/L (ref 7–15)
ANION GAP SERPL CALCULATED.3IONS-SCNC: 14 MMOL/L (ref 7–15)
ANION GAP SERPL CALCULATED.3IONS-SCNC: 9 MMOL/L (ref 7–15)
AST SERPL W P-5'-P-CCNC: 461 U/L (ref 0–45)
AST SERPL W P-5'-P-CCNC: 478 U/L (ref 0–45)
AST SERPL W P-5'-P-CCNC: 530 U/L (ref 0–45)
BACTERIA BLD CULT: NO GROWTH
BACTERIA SPT CULT: NORMAL
BASE EXCESS BLDA CALC-SCNC: -0.1 MMOL/L (ref -3–3)
BASE EXCESS BLDA CALC-SCNC: -1 MMOL/L (ref -3–3)
BASE EXCESS BLDA CALC-SCNC: -2.2 MMOL/L (ref -3–3)
BASE EXCESS BLDA CALC-SCNC: 0.1 MMOL/L (ref -3–3)
BASE EXCESS BLDV CALC-SCNC: -2.3 MMOL/L (ref -3–3)
BASE EXCESS BLDV CALC-SCNC: 0.6 MMOL/L (ref -3–3)
BASE EXCESS BLDV CALC-SCNC: 0.6 MMOL/L (ref -3–3)
BASO STIPL BLD QL SMEAR: PRESENT
BASO STIPL BLD QL SMEAR: PRESENT
BILIRUB SERPL-MCNC: 2.2 MG/DL
BILIRUB SERPL-MCNC: 2.2 MG/DL
BILIRUB SERPL-MCNC: 2.3 MG/DL
BLD PROD TYP BPU: NORMAL
BLOOD COMPONENT TYPE: NORMAL
BUN SERPL-MCNC: 27.2 MG/DL (ref 8–23)
BUN SERPL-MCNC: 27.4 MG/DL (ref 8–23)
BUN SERPL-MCNC: 28.4 MG/DL (ref 8–23)
CA-I BLD-MCNC: 4.6 MG/DL (ref 4.4–5.2)
CA-I BLD-MCNC: 4.9 MG/DL (ref 4.4–5.2)
CA-I BLD-MCNC: 4.9 MG/DL (ref 4.4–5.2)
CALCIUM SERPL-MCNC: 8.3 MG/DL (ref 8.8–10.4)
CALCIUM SERPL-MCNC: 8.5 MG/DL (ref 8.8–10.4)
CALCIUM SERPL-MCNC: 8.7 MG/DL (ref 8.8–10.4)
CHLORIDE SERPL-SCNC: 101 MMOL/L (ref 98–107)
CHLORIDE SERPL-SCNC: 102 MMOL/L (ref 98–107)
CHLORIDE SERPL-SCNC: 102 MMOL/L (ref 98–107)
CODING SYSTEM: NORMAL
COHGB MFR BLD: 100 % (ref 95–96)
COHGB MFR BLD: 94.2 % (ref 95–96)
COHGB MFR BLD: 98.5 % (ref 95–96)
COHGB MFR BLD: 98.7 % (ref 95–96)
CREAT SERPL-MCNC: 1.8 MG/DL (ref 0.67–1.17)
CREAT SERPL-MCNC: 1.82 MG/DL (ref 0.67–1.17)
CREAT SERPL-MCNC: 1.85 MG/DL (ref 0.67–1.17)
CROSSMATCH: NORMAL
DACRYOCYTES BLD QL SMEAR: SLIGHT
EGFRCR SERPLBLD CKD-EPI 2021: 35 ML/MIN/1.73M2
EGFRCR SERPLBLD CKD-EPI 2021: 36 ML/MIN/1.73M2
EGFRCR SERPLBLD CKD-EPI 2021: 36 ML/MIN/1.73M2
ELLIPTOCYTES BLD QL SMEAR: SLIGHT
ELLIPTOCYTES BLD QL SMEAR: SLIGHT
ERYTHROCYTE [DISTWIDTH] IN BLOOD BY AUTOMATED COUNT: 18.8 % (ref 10–15)
ERYTHROCYTE [DISTWIDTH] IN BLOOD BY AUTOMATED COUNT: 19.9 % (ref 10–15)
ERYTHROCYTE [DISTWIDTH] IN BLOOD BY AUTOMATED COUNT: 20.4 % (ref 10–15)
FRAGMENTS BLD QL SMEAR: SLIGHT
GIANT PLATELETS BLD QL SMEAR: SLIGHT
GLUCOSE BLDC GLUCOMTR-MCNC: 113 MG/DL (ref 70–99)
GLUCOSE BLDC GLUCOMTR-MCNC: 122 MG/DL (ref 70–99)
GLUCOSE BLDC GLUCOMTR-MCNC: 129 MG/DL (ref 70–99)
GLUCOSE BLDC GLUCOMTR-MCNC: 130 MG/DL (ref 70–99)
GLUCOSE BLDC GLUCOMTR-MCNC: 138 MG/DL (ref 70–99)
GLUCOSE SERPL-MCNC: 123 MG/DL (ref 70–99)
GLUCOSE SERPL-MCNC: 130 MG/DL (ref 70–99)
GLUCOSE SERPL-MCNC: 146 MG/DL (ref 70–99)
GRAM STAIN RESULT: NORMAL
GRAM STAIN RESULT: NORMAL
HBV SURFACE AB SERPL IA-ACNC: <3.5 M[IU]/ML
HBV SURFACE AB SERPL IA-ACNC: NONREACTIVE M[IU]/ML
HBV SURFACE AG SERPL QL IA: NONREACTIVE
HCO3 BLD-SCNC: 25 MMOL/L (ref 21–28)
HCO3 BLD-SCNC: 26 MMOL/L (ref 21–28)
HCO3 BLDV-SCNC: 24 MMOL/L (ref 21–28)
HCO3 BLDV-SCNC: 27 MMOL/L (ref 21–28)
HCO3 BLDV-SCNC: 27 MMOL/L (ref 21–28)
HCO3 SERPL-SCNC: 21 MMOL/L (ref 22–29)
HCO3 SERPL-SCNC: 23 MMOL/L (ref 22–29)
HCO3 SERPL-SCNC: 25 MMOL/L (ref 22–29)
HCT VFR BLD AUTO: 23 % (ref 40–53)
HCT VFR BLD AUTO: 25.1 % (ref 40–53)
HCT VFR BLD AUTO: 25.4 % (ref 40–53)
HGB BLD-MCNC: 7.4 G/DL (ref 13.3–17.7)
HGB BLD-MCNC: 8.2 G/DL (ref 13.3–17.7)
HGB BLD-MCNC: 8.2 G/DL (ref 13.3–17.7)
INR PPP: 2.31 (ref 0.85–1.15)
ISSUE DATE AND TIME: NORMAL
LACTATE SERPL-SCNC: 1.6 MMOL/L (ref 0.7–2)
LACTATE SERPL-SCNC: 1.7 MMOL/L (ref 0.7–2)
LACTATE SERPL-SCNC: 1.9 MMOL/L (ref 0.7–2)
MAGNESIUM SERPL-MCNC: 2 MG/DL (ref 1.7–2.3)
MAGNESIUM SERPL-MCNC: 2 MG/DL (ref 1.7–2.3)
MAGNESIUM SERPL-MCNC: 2.3 MG/DL (ref 1.7–2.3)
MCH RBC QN AUTO: 29.1 PG (ref 26.5–33)
MCH RBC QN AUTO: 29.5 PG (ref 26.5–33)
MCH RBC QN AUTO: 29.6 PG (ref 26.5–33)
MCHC RBC AUTO-ENTMCNC: 32.2 G/DL (ref 31.5–36.5)
MCHC RBC AUTO-ENTMCNC: 32.3 G/DL (ref 31.5–36.5)
MCHC RBC AUTO-ENTMCNC: 32.7 G/DL (ref 31.5–36.5)
MCV RBC AUTO: 90 FL (ref 78–100)
MCV RBC AUTO: 90 FL (ref 78–100)
MCV RBC AUTO: 92 FL (ref 78–100)
O2/TOTAL GAS SETTING VFR VENT: 30 %
O2/TOTAL GAS SETTING VFR VENT: 30 %
O2/TOTAL GAS SETTING VFR VENT: 35 %
OXYHGB MFR BLDV: 56 % (ref 70–75)
OXYHGB MFR BLDV: 62 % (ref 70–75)
OXYHGB MFR BLDV: 64 % (ref 70–75)
PCO2 BLD: 43 MM HG (ref 35–45)
PCO2 BLD: 46 MM HG (ref 35–45)
PCO2 BLD: 48 MM HG (ref 35–45)
PCO2 BLD: 53 MM HG (ref 35–45)
PCO2 BLDV: 49 MM HG (ref 40–50)
PCO2 BLDV: 50 MM HG (ref 40–50)
PCO2 BLDV: 54 MM HG (ref 40–50)
PEEP: 5 CM H2O
PEEP: 5 CM H2O
PH BLD: 7.28 [PH] (ref 7.35–7.45)
PH BLD: 7.33 [PH] (ref 7.35–7.45)
PH BLD: 7.36 [PH] (ref 7.35–7.45)
PH BLD: 7.38 [PH] (ref 7.35–7.45)
PH BLDV: 7.3 [PH] (ref 7.32–7.43)
PH BLDV: 7.31 [PH] (ref 7.32–7.43)
PH BLDV: 7.33 [PH] (ref 7.32–7.43)
PHOSPHATE SERPL-MCNC: 2.9 MG/DL (ref 2.5–4.5)
PLAT MORPH BLD: ABNORMAL
PLATELET # BLD AUTO: 207 10E3/UL (ref 150–450)
PLATELET # BLD AUTO: 211 10E3/UL (ref 150–450)
PLATELET # BLD AUTO: 216 10E3/UL (ref 150–450)
PO2 BLD: 103 MM HG (ref 80–105)
PO2 BLD: 104 MM HG (ref 80–105)
PO2 BLD: 132 MM HG (ref 80–105)
PO2 BLD: 76 MM HG (ref 80–105)
PO2 BLDV: 32 MM HG (ref 25–47)
PO2 BLDV: 34 MM HG (ref 25–47)
PO2 BLDV: 37 MM HG (ref 25–47)
POLYCHROMASIA BLD QL SMEAR: SLIGHT
POTASSIUM SERPL-SCNC: 3.5 MMOL/L (ref 3.4–5.3)
POTASSIUM SERPL-SCNC: 3.7 MMOL/L (ref 3.4–5.3)
POTASSIUM SERPL-SCNC: 4 MMOL/L (ref 3.4–5.3)
PROT SERPL-MCNC: 4.3 G/DL (ref 6.4–8.3)
PROT SERPL-MCNC: 4.7 G/DL (ref 6.4–8.3)
PROT SERPL-MCNC: 4.7 G/DL (ref 6.4–8.3)
RBC # BLD AUTO: 2.5 10E6/UL (ref 4.4–5.9)
RBC # BLD AUTO: 2.78 10E6/UL (ref 4.4–5.9)
RBC # BLD AUTO: 2.82 10E6/UL (ref 4.4–5.9)
RBC MORPH BLD: ABNORMAL
SAO2 % BLDA: 91 % (ref 92–100)
SAO2 % BLDA: 95 % (ref 92–100)
SAO2 % BLDA: 95 % (ref 92–100)
SAO2 % BLDA: 97 % (ref 92–100)
SAO2 % BLDV: 57.5 % (ref 70–75)
SAO2 % BLDV: 64.5 % (ref 70–75)
SAO2 % BLDV: 66.6 % (ref 70–75)
SMUDGE CELLS BLD QL SMEAR: PRESENT
SODIUM SERPL-SCNC: 136 MMOL/L (ref 135–145)
UNIT ABO/RH: NORMAL
UNIT NUMBER: NORMAL
UNIT STATUS: NORMAL
UNIT TYPE ISBT: 6200
VANCOMYCIN SERPL-MCNC: 17.7 UG/ML
WBC # BLD AUTO: 69.2 10E3/UL (ref 4–11)
WBC # BLD AUTO: 75.5 10E3/UL (ref 4–11)
WBC # BLD AUTO: 76.4 10E3/UL (ref 4–11)

## 2024-11-03 PROCEDURE — 90947 DIALYSIS REPEATED EVAL: CPT

## 2024-11-03 PROCEDURE — 250N000013 HC RX MED GY IP 250 OP 250 PS 637: Performed by: SURGERY

## 2024-11-03 PROCEDURE — 258N000001 HC RX 258: Performed by: PHYSICIAN ASSISTANT

## 2024-11-03 PROCEDURE — 83735 ASSAY OF MAGNESIUM: CPT | Performed by: PHYSICIAN ASSISTANT

## 2024-11-03 PROCEDURE — 80202 ASSAY OF VANCOMYCIN: CPT | Performed by: STUDENT IN AN ORGANIZED HEALTH CARE EDUCATION/TRAINING PROGRAM

## 2024-11-03 PROCEDURE — 250N000013 HC RX MED GY IP 250 OP 250 PS 637: Performed by: PHYSICIAN ASSISTANT

## 2024-11-03 PROCEDURE — 87340 HEPATITIS B SURFACE AG IA: CPT | Performed by: INTERNAL MEDICINE

## 2024-11-03 PROCEDURE — 99291 CRITICAL CARE FIRST HOUR: CPT | Mod: 24 | Performed by: INTERNAL MEDICINE

## 2024-11-03 PROCEDURE — 80069 RENAL FUNCTION PANEL: CPT | Performed by: STUDENT IN AN ORGANIZED HEALTH CARE EDUCATION/TRAINING PROGRAM

## 2024-11-03 PROCEDURE — 250N000011 HC RX IP 250 OP 636: Performed by: STUDENT IN AN ORGANIZED HEALTH CARE EDUCATION/TRAINING PROGRAM

## 2024-11-03 PROCEDURE — 250N000009 HC RX 250: Performed by: INTERNAL MEDICINE

## 2024-11-03 PROCEDURE — 82805 BLOOD GASES W/O2 SATURATION: CPT | Performed by: PHYSICIAN ASSISTANT

## 2024-11-03 PROCEDURE — 99232 SBSQ HOSP IP/OBS MODERATE 35: CPT | Performed by: INTERNAL MEDICINE

## 2024-11-03 PROCEDURE — 120N000004 HC R&B MS OVERFLOW

## 2024-11-03 PROCEDURE — 82330 ASSAY OF CALCIUM: CPT | Performed by: PHYSICIAN ASSISTANT

## 2024-11-03 PROCEDURE — 250N000011 HC RX IP 250 OP 636: Performed by: SURGERY

## 2024-11-03 PROCEDURE — 85610 PROTHROMBIN TIME: CPT | Performed by: PHYSICIAN ASSISTANT

## 2024-11-03 PROCEDURE — 250N000009 HC RX 250: Performed by: PHYSICIAN ASSISTANT

## 2024-11-03 PROCEDURE — P9047 ALBUMIN (HUMAN), 25%, 50ML: HCPCS | Mod: JZ | Performed by: PHYSICIAN ASSISTANT

## 2024-11-03 PROCEDURE — 83605 ASSAY OF LACTIC ACID: CPT | Performed by: PHYSICIAN ASSISTANT

## 2024-11-03 PROCEDURE — 999N000065 XR CHEST PORT 1 VIEW

## 2024-11-03 PROCEDURE — 94003 VENT MGMT INPAT SUBQ DAY: CPT

## 2024-11-03 PROCEDURE — 258N000003 HC RX IP 258 OP 636: Performed by: STUDENT IN AN ORGANIZED HEALTH CARE EDUCATION/TRAINING PROGRAM

## 2024-11-03 PROCEDURE — 71045 X-RAY EXAM CHEST 1 VIEW: CPT

## 2024-11-03 PROCEDURE — 250N000011 HC RX IP 250 OP 636: Performed by: INTERNAL MEDICINE

## 2024-11-03 PROCEDURE — 85014 HEMATOCRIT: CPT | Performed by: PHYSICIAN ASSISTANT

## 2024-11-03 PROCEDURE — 999N000157 HC STATISTIC RCP TIME EA 10 MIN

## 2024-11-03 PROCEDURE — 86706 HEP B SURFACE ANTIBODY: CPT | Performed by: INTERNAL MEDICINE

## 2024-11-03 PROCEDURE — 84460 ALANINE AMINO (ALT) (SGPT): CPT | Performed by: PHYSICIAN ASSISTANT

## 2024-11-03 PROCEDURE — 250N000011 HC RX IP 250 OP 636: Performed by: PHYSICIAN ASSISTANT

## 2024-11-03 PROCEDURE — 93931 UPPER EXTREMITY STUDY: CPT | Mod: RT

## 2024-11-03 PROCEDURE — 84155 ASSAY OF PROTEIN SERUM: CPT | Performed by: PHYSICIAN ASSISTANT

## 2024-11-03 PROCEDURE — 82805 BLOOD GASES W/O2 SATURATION: CPT | Performed by: INTERNAL MEDICINE

## 2024-11-03 PROCEDURE — 258N000003 HC RX IP 258 OP 636: Performed by: PHYSICIAN ASSISTANT

## 2024-11-03 PROCEDURE — 250N000013 HC RX MED GY IP 250 OP 250 PS 637: Performed by: STUDENT IN AN ORGANIZED HEALTH CARE EDUCATION/TRAINING PROGRAM

## 2024-11-03 PROCEDURE — 258N000003 HC RX IP 258 OP 636: Performed by: SURGERY

## 2024-11-03 PROCEDURE — P9016 RBC LEUKOCYTES REDUCED: HCPCS | Performed by: PHYSICIAN ASSISTANT

## 2024-11-03 RX ORDER — POTASSIUM CHLORIDE 29.8 MG/ML
20 INJECTION INTRAVENOUS ONCE
Status: COMPLETED | OUTPATIENT
Start: 2024-11-03 | End: 2024-11-03

## 2024-11-03 RX ORDER — MAGNESIUM SULFATE HEPTAHYDRATE 40 MG/ML
2 INJECTION, SOLUTION INTRAVENOUS ONCE
Status: COMPLETED | OUTPATIENT
Start: 2024-11-03 | End: 2024-11-03

## 2024-11-03 RX ADMIN — Medication 60 ML: at 08:23

## 2024-11-03 RX ADMIN — SODIUM CHLORIDE, POTASSIUM CHLORIDE, SODIUM LACTATE AND CALCIUM CHLORIDE: 600; 310; 30; 20 INJECTION, SOLUTION INTRAVENOUS at 22:22

## 2024-11-03 RX ADMIN — VASOPRESSIN 4 UNITS/HR: 20 INJECTION INTRAVENOUS at 20:17

## 2024-11-03 RX ADMIN — ALBUMIN HUMAN 25 G: 0.25 SOLUTION INTRAVENOUS at 12:29

## 2024-11-03 RX ADMIN — MAGNESIUM SULFATE HEPTAHYDRATE 2 G: 40 INJECTION, SOLUTION INTRAVENOUS at 15:04

## 2024-11-03 RX ADMIN — CHLORHEXIDINE GLUCONATE 0.12% ORAL RINSE 15 ML: 1.2 LIQUID ORAL at 08:12

## 2024-11-03 RX ADMIN — Medication 50 MCG: at 01:35

## 2024-11-03 RX ADMIN — PIPERACILLIN AND TAZOBACTAM 4.5 G: 4; .5 INJECTION, POWDER, FOR SOLUTION INTRAVENOUS at 03:56

## 2024-11-03 RX ADMIN — PROPOFOL 10 MCG/KG/MIN: 10 INJECTION, EMULSION INTRAVENOUS at 00:33

## 2024-11-03 RX ADMIN — VASOPRESSIN 4 UNITS/HR: 20 INJECTION INTRAVENOUS at 04:28

## 2024-11-03 RX ADMIN — Medication 40 MG: at 16:39

## 2024-11-03 RX ADMIN — POLYETHYLENE GLYCOL 3350 17 G: 17 POWDER, FOR SOLUTION ORAL at 08:12

## 2024-11-03 RX ADMIN — DOBUTAMINE HYDROCHLORIDE 2.5 MCG/KG/MIN: 200 INJECTION INTRAVENOUS at 22:22

## 2024-11-03 RX ADMIN — PROPOFOL 10 MCG/KG/MIN: 10 INJECTION, EMULSION INTRAVENOUS at 22:12

## 2024-11-03 RX ADMIN — MAGNESIUM SULFATE HEPTAHYDRATE 2 G: 40 INJECTION, SOLUTION INTRAVENOUS at 07:48

## 2024-11-03 RX ADMIN — CALCIUM CHLORIDE, MAGNESIUM CHLORIDE, DEXTROSE MONOHYDRATE, LACTIC ACID, SODIUM CHLORIDE, SODIUM BICARBONATE AND POTASSIUM CHLORIDE 5000 ML: 5.15; 2.03; 22; 5.4; 6.46; 3.09; .157 INJECTION INTRAVENOUS at 20:53

## 2024-11-03 RX ADMIN — VANCOMYCIN HYDROCHLORIDE 1750 MG: 10 INJECTION, POWDER, LYOPHILIZED, FOR SOLUTION INTRAVENOUS at 08:20

## 2024-11-03 RX ADMIN — Medication 40 MG: at 08:12

## 2024-11-03 RX ADMIN — EPINEPHRINE 0.07 MCG/KG/MIN: 1 INJECTION INTRAMUSCULAR; INTRAVENOUS; SUBCUTANEOUS at 15:09

## 2024-11-03 RX ADMIN — PIPERACILLIN AND TAZOBACTAM 4.5 G: 4; .5 INJECTION, POWDER, FOR SOLUTION INTRAVENOUS at 16:41

## 2024-11-03 RX ADMIN — PIPERACILLIN AND TAZOBACTAM 4.5 G: 4; .5 INJECTION, POWDER, FOR SOLUTION INTRAVENOUS at 22:12

## 2024-11-03 RX ADMIN — HYDROCORTISONE SODIUM SUCCINATE 50 MG: 100 INJECTION, POWDER, FOR SOLUTION INTRAMUSCULAR; INTRAVENOUS at 01:01

## 2024-11-03 RX ADMIN — Medication 125 MCG/HR: at 01:41

## 2024-11-03 RX ADMIN — NOREPINEPHRINE BITARTRATE 0.11 MCG/KG/MIN: 0.06 INJECTION, SOLUTION INTRAVENOUS at 00:33

## 2024-11-03 RX ADMIN — VASOPRESSIN 4 UNITS/HR: 20 INJECTION INTRAVENOUS at 00:02

## 2024-11-03 RX ADMIN — POTASSIUM CHLORIDE 20 MEQ: 29.8 INJECTION, SOLUTION INTRAVENOUS at 15:03

## 2024-11-03 RX ADMIN — EPINEPHRINE 0.07 MCG/KG/MIN: 1 INJECTION INTRAMUSCULAR; INTRAVENOUS; SUBCUTANEOUS at 01:18

## 2024-11-03 RX ADMIN — VASOPRESSIN 4 UNITS/HR: 20 INJECTION INTRAVENOUS at 09:29

## 2024-11-03 RX ADMIN — ASPIRIN 81 MG CHEWABLE TABLET 162 MG: 81 TABLET CHEWABLE at 08:11

## 2024-11-03 RX ADMIN — DEXTROSE MONOHYDRATE: 100 INJECTION, SOLUTION INTRAVENOUS at 07:59

## 2024-11-03 RX ADMIN — NOREPINEPHRINE BITARTRATE 0.12 MCG/KG/MIN: 0.06 INJECTION, SOLUTION INTRAVENOUS at 22:21

## 2024-11-03 RX ADMIN — POTASSIUM CHLORIDE 20 MEQ: 29.8 INJECTION, SOLUTION INTRAVENOUS at 22:44

## 2024-11-03 RX ADMIN — INSULIN ASPART 1 UNITS: 100 INJECTION, SOLUTION INTRAVENOUS; SUBCUTANEOUS at 01:00

## 2024-11-03 RX ADMIN — VASOPRESSIN 4 UNITS/HR: 20 INJECTION INTRAVENOUS at 14:45

## 2024-11-03 RX ADMIN — SENNOSIDES AND DOCUSATE SODIUM 1 TABLET: 50; 8.6 TABLET ORAL at 22:12

## 2024-11-03 RX ADMIN — ALBUMIN HUMAN 25 G: 0.25 SOLUTION INTRAVENOUS at 00:33

## 2024-11-03 RX ADMIN — CALCIUM CHLORIDE, MAGNESIUM CHLORIDE, DEXTROSE MONOHYDRATE, LACTIC ACID, SODIUM CHLORIDE, SODIUM BICARBONATE AND POTASSIUM CHLORIDE 5000 ML: 5.15; 2.03; 22; 5.4; 6.46; 3.09; .157 INJECTION INTRAVENOUS at 22:36

## 2024-11-03 RX ADMIN — PIPERACILLIN AND TAZOBACTAM 4.5 G: 4; .5 INJECTION, POWDER, FOR SOLUTION INTRAVENOUS at 10:15

## 2024-11-03 RX ADMIN — Medication 60 ML: at 12:00

## 2024-11-03 RX ADMIN — DEXTROSE MONOHYDRATE: 100 INJECTION, SOLUTION INTRAVENOUS at 15:44

## 2024-11-03 RX ADMIN — CALCIUM CHLORIDE, MAGNESIUM CHLORIDE, DEXTROSE MONOHYDRATE, LACTIC ACID, SODIUM CHLORIDE, SODIUM BICARBONATE AND POTASSIUM CHLORIDE 5000 ML: 5.15; 2.03; 22; 5.4; 6.46; 3.09; .157 INJECTION INTRAVENOUS at 10:21

## 2024-11-03 RX ADMIN — SENNOSIDES AND DOCUSATE SODIUM 1 TABLET: 50; 8.6 TABLET ORAL at 08:11

## 2024-11-03 RX ADMIN — CALCIUM CHLORIDE, MAGNESIUM CHLORIDE, DEXTROSE MONOHYDRATE, LACTIC ACID, SODIUM CHLORIDE, SODIUM BICARBONATE AND POTASSIUM CHLORIDE 5000 ML: 5.15; 2.03; 22; 5.4; 6.46; 3.09; .157 INJECTION INTRAVENOUS at 07:21

## 2024-11-03 RX ADMIN — CALCIUM CHLORIDE, MAGNESIUM CHLORIDE, DEXTROSE MONOHYDRATE, LACTIC ACID, SODIUM CHLORIDE, SODIUM BICARBONATE AND POTASSIUM CHLORIDE 5000 ML: 5.15; 2.03; 22; 5.4; 6.46; 3.09; .157 INJECTION INTRAVENOUS at 01:24

## 2024-11-03 RX ADMIN — CHLORHEXIDINE GLUCONATE 0.12% ORAL RINSE 15 ML: 1.2 LIQUID ORAL at 22:12

## 2024-11-03 RX ADMIN — CALCIUM CHLORIDE, MAGNESIUM CHLORIDE, DEXTROSE MONOHYDRATE, LACTIC ACID, SODIUM CHLORIDE, SODIUM BICARBONATE AND POTASSIUM CHLORIDE 5000 ML: 5.15; 2.03; 22; 5.4; 6.46; 3.09; .157 INJECTION INTRAVENOUS at 09:39

## 2024-11-03 RX ADMIN — PROPOFOL 10 MCG/KG/MIN: 10 INJECTION, EMULSION INTRAVENOUS at 10:37

## 2024-11-03 RX ADMIN — CALCIUM CHLORIDE, MAGNESIUM CHLORIDE, DEXTROSE MONOHYDRATE, LACTIC ACID, SODIUM CHLORIDE, SODIUM BICARBONATE AND POTASSIUM CHLORIDE 5000 ML: 5.15; 2.03; 22; 5.4; 6.46; 3.09; .157 INJECTION INTRAVENOUS at 13:43

## 2024-11-03 NOTE — PROGRESS NOTES
[facility]  Cardiovascular and Thoracic Surgery Daily Note      Assessment and Plan  POD # 5 s/p CABG x 3 (LIMA to LAD, SVG to OM, SVG to RCA), Modified left atrial MAZE ([facility]), and occlusion of left atrial appendage (50 mm Atriclip) on 10/28 with Dr. Michael Mulvihill.    POD # 5 s/p return to OR for mediastinal re-exploration, washout    - CVS: Pre-op TTE with EF 35-40%. Postop TTE 10/30 with EF ~35% on high-dose inotropic support.   Postop mixed cardiogenic/vasoplegic/hypovolemic shock. Lactic acidosis cleared and SVO2 stabilized. CI goal >2.0 (calculated elaine), continue DBU to 2.5 mcg/kg/min (did not tolerate DBU increase with worsening vasoplegia), attempt to wean epi as able. NE to MAP goal 65, wean as able. Vasopressin resumed 11/02.   Hypervolemic, volume management via CRRT, pull as able. CVP goal ~10-12. Filling pressures increasing, will attempt more aggressive volume removal with addition of vasopressin. Albumin 25% push BID to pull volume intravascular.  Hypothermic since initiation on CRRT, Thermagaurd catheter placed 10/31, target 37 C.   Stress dose steroids started 10/31, 11/02 PM.  History of paroxysmal atrial fibrillation, continues with intermittent afib and accelerated junctional at times. Previously being atrial paced at 100, now appears to have improved BP without pacing (intrinsic rate ~80 BP, sinus rhythm with intermittent atrial fibrillation). Amio discontinued with shock liver. Defer systemic anticoagulation at present (ok for citrate for CRRT machine if needed).   Absent right radial pulse (previous arterial line in this location). ICU MD did bedside US, ulnar artery patent. Right hand warm, good capillary refill. Increased duskiness of right hand noted after starting vasopressin, which demonstrated patent arterial flow with slow biphasic flow at the distal radial artery (site of previous arterial line).   Aspirin 162 mg daily, defer statin with ALI.    Chest tubes:  output 1280 mL, serosang, no air leak. TPW: AAI back up rate of 50    - Resp: Acute hypoxemic and hypercapnic respiratory failure, stable. Non-compliant with ventilator 11/02, improved with deeper sedation, but required increased pressor support with deeper sedation. Trial of PST 11/03, patient quickly became hypercarbic and acidotic.      - Neuro: Sedated with propofol while intubated. Add fentanyl infusion and Versed PRN to improve vent compliance. History of myasthenia gravis. Purposeful upper extremity movements and spontaneous LE movement when sedation lightened.     - Renal: CKD stage 3, baseline Cr ~1.8. Postop oliguric/anuric JAVIER. Nephrology consulted, CRRT started 10/30. Avoid nephrotoxins.   Recent Labs   Lab 11/02/24  2153 11/02/24  1337 11/02/24  0544   CR 1.90* 1.94* 1.94*       - GI: +BM, +flatus, continue bowel regimen. Shock liver, improving. Trend LFTs, avoid hepatotoxins. Recent admission for GIB 09/2024, increased pantoprazole to BID.    - : Amos in place, continue for UOP monitoring/hemodynamic status    - Endo: Postop stress hyperglycemia, resolved. Insulin infusion transitioned to sliding scale insulin. Stress dose steroids started 10/31 as above.   Hemoglobin A1C   Date Value Ref Range Status   10/09/2024 4.5 <5.7 % Final     Comment:     Normal <5.7%   Prediabetes 5.7-6.4%    Diabetes 6.5% or higher     Note: Adopted from ADA consensus guidelines.        - FEN: Replace electrolytes as needed. Initiated on trophic feeds via OG 11/1, NJ placed 11/2 after INR came down and TF increased to goal.      - ID: WBC chronically elevated and hypothermic on CRRT as above so difficult to assess for possible infection; start empiric Vanc/Zosyn 10/31. Blood, urine, respiratory cultures NGTD. WBC 76.4.  Trend CBC and fever curve.   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 Acute blood loss anemia due to surgery. Postop coagulopathy, improved (required multiple blood transfusions and blood productions immediately postop). Hgb down-trending, 1 unit RBCs 11/3. Trend CBC, transfuse PRN.   Recent Labs   Lab 11/03/24  0548 11/02/24  2153 11/02/24  1337   HGB 7.4* 7.9* 8.2*    223 291       - Proph: SCD, subcutaneous heparin, PPI    - Other:  Clinically Significant Risk Factors           # Hypocalcemia: Lowest iCa = 4 mg/dL in last 2 days, will monitor and replace as appropriate     # Hypoalbuminemia: Lowest albumin = 2.7 g/dL at 11/2/2024  5:44 AM, will monitor as appropriate    # Coagulation Defect: INR = 2.31 (Ref range: 0.85 - 1.15) and/or PTT = 42 Seconds (Ref range: 22 - 38 Seconds), will monitor for bleeding    # Hypertension: Noted on problem list  # Chronic heart failure with reduced ejection fraction: last echo with EF <40%   # Acute Hypercapnic Respiratory Failure: based on arterial blood gas results.  Continue supplemental oxygen and ventilatory support as indicated.  # Acute Hypercapnic Respiratory Failure: based on venous blood gas results.  Continue supplemental oxygen and ventilatory support as indicated.         # Obesity: Estimated body mass index is 35.33 kg/m  as calculated from the following:    Height as of this encounter: 1.778 m (5' 10\").    Weight as of this encounter: 111.7 kg (246 lb 4.1 oz).       # History of CABG: noted on surgical history       - Dispo: ICU. Guarded prognosis but continues with improvements in clearing lactate, improving liver function, and more stable CI. Family updated multiple times at bedside. Medically Ready for Discharge: Anticipated in 5+ Days      Interval History  Remains compliant with ventilator, decreased sedation and trialed vent wean but did not tolerate with increased hypercarbia and acidosis. Filling pressures increasing, but now tolerating more " aggressive removal.       Medications  Current Facility-Administered Medications   Medication Dose Route Frequency Provider Last Rate Last Admin    albumin human 25 % injection 25 g  25 g Intravenous Q12H Arlin Hodge PA-C   25 g at 11/03/24 0033    aspirin (ASA) chewable tablet 162 mg  162 mg Oral or NG Tube Daily Clarence Bone MD   162 mg at 11/02/24 0851    chlorhexidine (PERIDEX) 0.12 % solution 15 mL  15 mL Swish & Spit BID Zac Kaur DO   15 mL at 11/02/24 2130    insulin aspart (NovoLOG) injection (RAPID ACTING)  1-7 Units Subcutaneous Q4H Sb Arthur PA-C   1 Units at 11/03/24 0100    pantoprazole (PROTONIX) 2 mg/mL suspension 40 mg  40 mg Oral or NG Tube BID AC Arlin Hodge PA-C   40 mg at 11/02/24 1547    Or    pantoprazole (PROTONIX) EC tablet 40 mg  40 mg Oral BID AC Arlin Hodge PA-C        piperacillin-tazobactam (ZOSYN) 4.5 g vial to attach to  mL bag  4.5 g Intravenous Q6H Mulvihill, Michael, MD   4.5 g at 11/03/24 0356    polyethylene glycol (MIRALAX) Packet 17 g  17 g Oral or Feeding Tube Daily Mulvihill, Michael, MD   17 g at 11/02/24 0851    Prosource TF20 ENfit Compatibl EN LIQD (PROSOURCE TF20) packet 60 mL  1 packet Per Feeding Tube BID 09 12 Arlin Hodge PA-C   60 mL at 11/02/24 1207    senna-docusate (SENOKOT-S/PERICOLACE) 8.6-50 MG per tablet 1 tablet  1 tablet Oral or Feeding Tube BID Mulvihill, Michael, MD   1 tablet at 11/02/24 2130    sodium chloride (PF) 0.9% PF flush 3 mL  3 mL Intracatheter Q8H Clarence Bone MD   3 mL at 11/03/24 0600    vancomycin place rico - receiving intermittent dosing  1 each Intravenous See Admin Instructions Mulvihill, Michael, MD         Current Facility-Administered Medications   Medication Dose Route Frequency Provider Last Rate Last Admin    acetaminophen (TYLENOL) tablet 650 mg  650 mg Oral Q4H PRN Clarence Bone MD        bisacodyl (DULCOLAX) suppository 10 mg  10 mg Rectal Daily PRN  Clarence Bone MD        calcium gluconate 1 g in 50 mL in sodium chloride intermittent infusion  1 g Intravenous Once PRN Calrence Bone MD   1 g at 11/02/24 1403    calcium gluconate 2 g in  mL intermittent infusion  2 g Intravenous Q8H PRN Farhad Boland MD        calcium gluconate 2 g in  mL intermittent infusion  2 g Intravenous Once PRN Clarence Bone MD   2 g at 11/01/24 1114    calcium gluconate 3 g in sodium chloride 0.9 % 100 mL intermittent infusion  3 g Intravenous Once PRN Clarence Bone MD        calcium gluconate 4 g in sodium chloride 0.9 % 100 mL intermittent infusion  4 g Intravenous Q8H PRN Farhad Boland MD        dextrose 10% infusion   Intravenous Continuous PRN Arlin Hodge PA-C 30 mL/hr at 11/01/24 2000 Rate Verify at 11/01/24 2000    glucose gel 15-30 g  15-30 g Oral Q15 Min PRN Nasir Vallecillo MD        Or    dextrose 50 % injection 25-50 mL  25-50 mL Intravenous Q15 Min PRN Nasir Vallecillo MD        Or    glucagon injection 1 mg  1 mg Subcutaneous Q15 Min PRN Nasir Vallecillo MD        EPINEPHrine (ADRENALIN) 5 mg in  mL infusion  0.01-0.1 mcg/kg/min Intravenous Continuous PRN Clarence Bone MD 20.1 mL/hr at 11/03/24 0118 0.07 mcg/kg/min at 11/03/24 0118    fentaNYL (SUBLIMAZE) 50 mcg/mL bolus from pump  100 mcg Intravenous Q1H PRN Key Dobson MD   50 mcg at 11/03/24 0135    heparin lock flush 10 unit/mL injection 3 mL  3 mL Intracatheter Q1H PRN Tyra Dubois MD        hydrALAZINE (APRESOLINE) injection 10 mg  10 mg Intravenous Q30 Min PRN Clarence Bone MD        HYDROmorphone (DILAUDID) injection 0.1 mg  0.1 mg Intravenous Q2H PRN Clarence Bone MD        Or    HYDROmorphone (DILAUDID) injection 0.2 mg  0.2 mg Intravenous Q2H PRN Clarence Bone MD   0.2 mg at 10/31/24 1548    lidocaine (LMX4) cream   Topical Q1H PRN Clarence Bone MD        lidocaine 1 % 0.1-1 mL  0.1-1 mL  Other Q1H PRN Clarence Bone MD        magnesium hydroxide (MILK OF MAGNESIA) suspension 30 mL  30 mL Oral Daily PRN Clarence Bone MD        magnesium sulfate 2 g in 50 mL sterile water intermittent infusion  2 g Intravenous Q8H PRN Farhad Boland MD   2 g at 11/01/24 1500    propofol (DIPRIVAN) bolus from bag or syringe pump  10 mg Intravenous Q15 Min PRN Tyra Dubois MD        And    Medication Instruction   Does not apply Continuous PRN Tyra Dubois MD        methocarbamol (ROBAXIN) tablet 500 mg  500 mg Oral Q6H PRN Clarence Bone MD   500 mg at 10/28/24 1634    naloxone (NARCAN) injection 0.2 mg  0.2 mg Intravenous Q2 Min PRN Mulvihill, Michael, MD        Or    naloxone (NARCAN) injection 0.4 mg  0.4 mg Intravenous Q2 Min PRN Mulvihill, Michael, MD        Or    naloxone (NARCAN) injection 0.2 mg  0.2 mg Intramuscular Q2 Min PRN Mulvihill, Michael, MD        Or    naloxone (NARCAN) injection 0.4 mg  0.4 mg Intramuscular Q2 Min PRN Mulvihill, Michael, MD        No heparin required   Does not apply Continuous PRN Farhad Boland MD        ondansetron (ZOFRAN ODT) ODT tab 4 mg  4 mg Oral Q6H PRN Clarence Bone MD        Or    ondansetron (ZOFRAN) injection 4 mg  4 mg Intravenous Q6H PRN Clarence Bone MD        oxyCODONE IR (ROXICODONE) half-tab 2.5 mg  2.5 mg Oral Q4H PRN Clarence Bone MD   2.5 mg at 11/02/24 0214    Or    oxyCODONE (ROXICODONE) tablet 5 mg  5 mg Oral Q4H PRN Clarence Bone MD   5 mg at 10/29/24 2149    potassium chloride 20 mEq in 50 mL intermittent infusion  20 mEq Intravenous Q8H PRN Farhad Boland MD        prochlorperazine (COMPAZINE) injection 5 mg  5 mg Intravenous Q6H PRN Clarence Bone MD        Or    prochlorperazine (COMPAZINE) tablet 5 mg  5 mg Oral Q6H PRN Clarence Bone MD        Reason beta blocker order not selected   Does not apply DOES NOT GO TO Clarence Olguin MD        sodium  "chloride (PF) 0.9% PF flush 3 mL  3 mL Intracatheter q1 min prn Clarence Bone MD        sodium phosphate 15 mmol in NS 250mL intermittent infusion  15 mmol Intravenous Q8H PRN Farhad Boland MD             Physical Exam  Vitals were reviewed  Blood pressure 91/57, pulse 86, temperature 97.5  F (36.4  C), resp. rate 20, height 1.778 m (5' 10\"), weight 111.7 kg (246 lb 4.1 oz), SpO2 100%.  Rhythm: intermittent NSR and atrial fibrillation    Lungs: diminished bases, +crackles    Cardiovascular: irregular rate/rhythm, no m/r/g    Abdomen: soft, NT, ND, +BS    Extremeties: 2+ BLE/BUE edema    Incision: CDI    CT: serosang output 1280 mL, no air leak    Weight:   Vitals:    10/30/24 0430 10/31/24 0400 11/01/24 0400 11/02/24 0400   Weight: 105.1 kg (231 lb 11.3 oz) 107.4 kg (236 lb 12.4 oz) 107.2 kg (236 lb 5.3 oz) 108.9 kg (240 lb 1.3 oz)    11/03/24 0600   Weight: 111.7 kg (246 lb 4.1 oz)         Data  Recent Labs   Lab 11/03/24  0552 11/03/24  0548 11/02/24  2158 11/02/24  2153 11/02/24  1728 11/02/24  1337 11/02/24  1011 11/02/24  0544 11/01/24  0222 11/01/24  0207   WBC  --  76.4*  --  72.9*  --  78.5*  --  67.4*   < > 45.5*   HGB  --  7.4*  --  7.9*  --  8.2*  --  9.0*   < > 8.9*   MCV  --  92  --  90  --  90  --  89   < > 87   PLT  --  216  --  223  --  291  --  275   < > 273   INR  --  2.31*  --   --   --   --   --  2.37*  --  2.88*   NA  --   --   --  136  --  136  --  137   < > 138  139   POTASSIUM  --   --   --  4.0  --  4.0  --  4.3   < > 4.6  4.4   CHLORIDE  --   --   --  102  --  101  --  102   < > 102  103   CO2  --   --   --  20*  --  19*  --  19*   < > 24  24   BUN  --   --   --  28.7*  --  30.5*  --  31.0*   < > 33.1*  34.2*   CR  --   --   --  1.90*  --  1.94*  --  1.94*   < > 2.27*  2.34*   ANIONGAP  --   --   --  14  --  16*  --  16*   < > 12  12   TATE  --   --   --  8.5*  --  8.3*  --  8.7*   < > 8.5*  8.7*   *  --  144* 145*   < > 137*   < > 134*   < > 120*  120*   ALBUMIN  " --   --   --  2.8*  --  3.1*  --  2.7*   < > 2.9*  2.9*   PROTTOTAL  --   --   --  4.3*  --  4.6*  --  4.3*   < > 4.6*   BILITOTAL  --   --   --  2.1*  --  2.1*  --  2.1*   < > 1.7*   ALKPHOS  --   --   --  184*  --  194*  --  188*   < > 132   ALT  --   --   --  638*  --  697*  --  831*   < > 1,451*   AST  --   --   --  658*  --  853*  --  1,181*   < > 3,855*    < > = values in this interval not displayed.       Imaging:  Recent Results (from the past 24 hours)   XR Chest Port 1 View    Narrative    EXAM: XR CHEST PORT 1 VIEW  LOCATION: [facility]  DATE: 11/2/2024    INDICATION: Postoperative follow-up  COMPARISON: 11/2/2024, CT chest from 9/10/2024      Impression    IMPRESSION: ETT is 2 cm from the dayanan. Right IJ Pearce-Garrett catheter terminates over the expected main pulmonary artery. Left IJ line projects over the aortic arch. This may be in the brachiocephalic vein. However, correlation for arterial placement is   recommended. Enteric tube enters the stomach and out of field-of-view. Mediastinal/chest tubes are in place. Patchy bibasilar atelectasis, right greater than left. Probable trace right effusion. Heart size is stable. Mild pulmonary vascular congestion   without overt pulmonary edema.   XR Abdomen Port 1 View    Narrative    EXAM: XR ABDOMEN PORT 1 VIEW  LOCATION: [facility]  DATE: 11/2/2024    INDICATION: Verify small bowel feeding tube bedside placement  COMPARISON: Abdominal radiograph 10/31/2024.      Impression    IMPRESSION: Feeding catheter with inner stylette courses below the diaphragm and is likely in postpyloric position, tip most likely near the ligament of Treitz given patient rotation. Right femoral approach central venous catheter with tip overlying the   superior vena cava. No evidence of bowel obstruction. Postsurgical changes in the chest are partially visualized. Multiple chest tubes and drains are again noted. No acute bony  abnormality.   XR Chest Port 1 View    Narrative    EXAM: XR CHEST PORT 1 VIEW  LOCATION: [facility]  DATE: 11/2/2024    INDICATION: line placement  COMPARISON: 11/2/2024      Impression    IMPRESSION: ET tube, feeding tube, right IJ Manzanola-Garrett catheter, left IJ central port as well as mediastinal and pleural drains all remain in good position. Minimal atelectasis or pleural fluid at lung bases, lungs otherwise clear. No pneumothorax. Heart   size normal. Sternal wires and mediastinal clips.   XR Chest Port 1 View    Narrative    EXAM: XR CHEST PORT 1 VIEW  LOCATION: [facility]  DATE: 11/2/2024    INDICATION: swan placement  COMPARISON: Same day chest radiograph.      Impression    IMPRESSION: Stable size of cardiomediastinal silhouette status post median sternotomy and CABG. Right internal jugular approach Manzanola-Garrett catheter with tip overlying the pulmonary artery. Additional support lines and tubes are in stable position.   Persistent bilateral pleural effusions with associated compressive atelectasis. No definite pneumothorax. Bones are unchanged.   US Upper Extremity Arterial Duplex Right    Narrative    EXAM: US UPPER EXTREMITY ARTERIAL DUPLEX RIGHT  LOCATION: [facility]  DATE: 11/3/2024    INDICATION: Mottled hand.  COMPARISON: None available.  TECHNIQUE: Arterial Duplex ultrasound of the right arm. Color flow and spectral Doppler with waveform analysis performed.    FINDINGS (RIGHT upper extremity):    ARTERIAL PEAK SYSTOLIC VELOCITIES (cm/s):    Subclavian artery (proximal): 51  Subclavian artery (distal): 71  Axillary artery: 70  Brachial (proximal): 74  Brachial (distal): 76  Radial (proximal): 41  Radial (distal): 14  Ulnar (proximal): 85  Ulnar (distal): 136    WAVEFORMS/COLOR DOPPLER: Triphasic waveforms are noted throughout except in the distal radial artery where biphasic waveforms are noted.      Impression    IMPRESSION:    1.  Patent right upper extremity arteries although slow flow is noted within the distal radial artery along with biphasic waveforms, findings are of indeterminate etiology, could be due to area of focal stenosis.         Patient seen and discussed with Dr. Mulvihill Arielle Webb, PA-C  Cardiothoracic Surgery  Available for paging [phone] (personal pager or CV Surgery Rounding Pager)  Personal Pager: [phone]  CV Surgery Rounding Pager: [phone]  After hours please page surgeon on-call

## 2024-11-03 NOTE — PROGRESS NOTES
[facility] ICU RESPIRATORY NOTE        Date of Admission: 10/28/2024     Date of Intubation (most recent): 10/28     Reason for Mechanical Ventilation: CABG     Number of Days on Mechanical Ventilation: 7     Met Criteria for Spontaneous Breathing Trial: No     Reason for No Spontaneous Breathing Trial: Per MD     Bite Block: No     Significant Events Today: None    ABG Results:   Recent Labs   Lab 11/02/24  2153 11/02/24  1728 11/02/24  1337 11/02/24  1224 11/02/24  1009 11/02/24  0544 11/02/24  0136 11/01/24  2206   PH 7.36  --   --   --  7.43 7.43  --  7.45   PCO2 45  --   --   --  37 37  --  36   PO2 153*  --   --   --  93 123*  --  109*   HCO3 25  --   --   --  24 25  --  25   O2PER 35  35 35 35 35 35  35 30  30   < > 35  35    < > = values in this interval not displayed.         Current Vent Settings: FiO2 (%): 35 %, Resp: 20, Vent Mode: CMV/AC, Resp Rate (Set): 20 breaths/min, Tidal Volume (Set, mL): 500 mL, PEEP (cm H2O): 5 cmH2O, Resp Rate (Set): 20 breaths/min, Tidal Volume (Set, mL): 500 mL, PEEP (cm H2O): 5 cmH2O    Skin Assessment: clean/intact.    Plan: Continue full ventilatory support and assess for daily weaning trial.     Kalpana Celestin, RT on 11/3/2024 at 5:46 AM

## 2024-11-03 NOTE — PROGRESS NOTES
Renal Medicine Progress Note            Assessment/Plan:     88 y.o man with s/p 3-vessels CABG and CKD IIIB, consulted for post-op JAVIER.      # Patient with baseline CKD IIIB, Scr ~ 1.8 mg/dl.      # Post-op JAVIER-severe: Anuric.                -started CRRT 10/30     # Severe 3-vessels CAD s/p 3-vessels CABG.                -EF ~ 35%     # Refractory shock with MODS:                -4 pressors               -stress dose steroid               -Zosyn/vanco     # FEN: Volume up and edematous. Electrolytes and acid/base status are acceptable with CRRT.      # Shock liver improved.      Plan:  # Continue CRRT. Goal is net neg  ml/hr UF. Goal net negative 1-2 liters next 24 hrs. I discussed the case with Dr. Ndiaye. I discussed the case with CTS team, Naveen Oliveros PA-C.  # Will use citrate for regional anticoagulation if there is issue with filter clotting Shock liver improved.     Prognosis: Guarded.          Interval History:     Vaso added yesterday-no on 4 pressors.   On Zosyn/vancomycin.   FIO2 at 30%.   Net + 1.3 liters wo counting insensible water loss.   Electrolytes are acceptable.   ALT/AST improved.   CRRT filtered changed early this morning.   Getting blood transfusion         Medications and Allergies:     Current Facility-Administered Medications   Medication Dose Route Frequency Provider Last Rate Last Admin    albumin human 25 % injection 25 g  25 g Intravenous Q12H Arlin Hodge PA-C   25 g at 11/03/24 0033    aspirin (ASA) chewable tablet 162 mg  162 mg Oral or NG Tube Daily Clarence Bone MD   162 mg at 11/03/24 0811    chlorhexidine (PERIDEX) 0.12 % solution 15 mL  15 mL Swish & Spit BID Zac Kaur DO   15 mL at 11/03/24 0812    insulin aspart (NovoLOG) injection (RAPID ACTING)  1-7 Units Subcutaneous Q4H Sb Arthur PA-C   1 Units at 11/03/24 0100    pantoprazole (PROTONIX) 2 mg/mL suspension 40 mg  40 mg Oral or NG Tube BID AC Arlin Hodge PA-C   40 mg at  "11/03/24 0812    Or    pantoprazole (PROTONIX) EC tablet 40 mg  40 mg Oral BID AC Arlin Hodge PA-C        piperacillin-tazobactam (ZOSYN) 4.5 g vial to attach to  mL bag  4.5 g Intravenous Q6H Mulvihill, Michael, MD   4.5 g at 11/03/24 0356    polyethylene glycol (MIRALAX) Packet 17 g  17 g Oral or Feeding Tube Daily Mulvihill, Michael, MD   17 g at 11/03/24 0812    Prosource TF20 ENfit Compatibl EN LIQD (PROSOURCE TF20) packet 60 mL  1 packet Per Feeding Tube BID 09 12 Arlin Hodge PA-C   60 mL at 11/03/24 0823    senna-docusate (SENOKOT-S/PERICOLACE) 8.6-50 MG per tablet 1 tablet  1 tablet Oral or Feeding Tube BID Mulvihill, Michael, MD   1 tablet at 11/03/24 0811    sodium chloride (PF) 0.9% PF flush 3 mL  3 mL Intracatheter Q8H Clarence Bone MD   3 mL at 11/03/24 0600    vancomycin (VANCOCIN) 1,750 mg in 0.9% NaCl 517.5 mL intermittent infusion  1,750 mg Intravenous Once Mulvihill, Michael, MD   1,750 mg at 11/03/24 0820    vancomycin place rico - receiving intermittent dosing  1 each Intravenous See Admin Instructions Mulvihill, Michael, MD          No Known Allergies         Physical Exam:   Vitals were reviewed   , Blood pressure 91/57, pulse 90, temperature 98.8  F (37.1  C), resp. rate 20, height 1.778 m (5' 10\"), weight 111.7 kg (246 lb 4.1 oz), SpO2 98%.    Wt Readings from Last 3 Encounters:   11/03/24 111.7 kg (246 lb 4.1 oz)   10/25/24 97.1 kg (214 lb)   10/03/24 101.2 kg (223 lb)       Intake/Output Summary (Last 24 hours) at 11/3/2024 1006  Last data filed at 11/3/2024 1000  Gross per 24 hour   Intake 4263.72 ml   Output 2387 ml   Net 1876.72 ml       GENERAL APPEARANCE: Critical  HEENT:  intubated/sedated  RESP: Vent sound  CV: RRR  ABDOMEN: obese, soft.  EXTREMITIES/SKIN: edematous  NEURO: Sedated         Data:     CBC RESULTS:     Recent Labs   Lab 11/03/24  0548 11/02/24  2153 11/02/24  1337 11/02/24  0544 11/01/24  2206 11/01/24  1409   WBC 76.4* 72.9* 78.5* 67.4* 62.0* " 61.4*   RBC 2.50* 2.67* 2.75* 3.08* 3.09* 2.88*   HGB 7.4* 7.9* 8.2* 9.0* 9.2* 8.5*   HCT 23.0* 24.0* 24.8* 27.3* 27.3* 25.6*    223 291 275 275 307       Basic Metabolic Panel:  Recent Labs   Lab 11/03/24  0552 11/03/24  0548 11/02/24  2158 11/02/24  2153 11/02/24  1728 11/02/24  1337 11/02/24  1011 11/02/24  0544 11/01/24  2214 11/01/24  2206 11/01/24  1555 11/01/24  1409   NA  --  136  --  136  --  136  --  137  --  135  --  137   POTASSIUM  --  4.0  --  4.0  --  4.0  --  4.3  --  3.8  --  4.4   CHLORIDE  --  101  --  102  --  101  --  102  --  100  --  102   CO2  --  21*  --  20*  --  19*  --  19*  --  19*  --  19*   BUN  --  27.4*  --  28.7*  --  30.5*  --  31.0*  --  30.9*  --  30.7*   CR  --  1.85*  --  1.90*  --  1.94*  --  1.94*  --  2.06*  --  2.06*   * 146* 144* 145* 142* 137*   < > 134*   < > 138*   < > 128*   TATE  --  8.5*  --  8.5*  --  8.3*  --  8.7*  --  8.4*  --  8.7*    < > = values in this interval not displayed.       INR  Recent Labs   Lab 11/03/24  0548 11/02/24  0544 11/01/24  0207 10/31/24  0703   INR 2.31* 2.37* 2.88* 3.50*      Attestation:   I have reviewed today's relevant vital signs, notes, medications, labs and imaging.    Farhad Boland MD  [facility] - Nephrology  Office phone :[phone]  Pager: [phone]

## 2024-11-03 NOTE — PROGRESS NOTES
Critical Care  Note      11/04/2024    Name: Alessio Teixeira MRN#: [id number]   Age: 88 year old YOB: 1935     Hsptl Day# 7  ICU DAY #7    MV DAY #7             Problem List:   Active Problems:    Coronary artery disease  Mixed cardiogenic/vasoplegic shock  Acute hypoxemic respiratory failure         Summary/Hospital Course:   88 year old M with a history of severe 3 vessel CAD. Status post sternotomy, CABGx3, modified MAZE procedure, PAYAM ligation with Dr. Mulvihill 10/28/24. Post operative hemorrhage with RTOR on POD0 for evacuation of mediastinal blood clot causing tamponade physiology.   Course subsequently complicated by persistent shock (appears mixed cardigenic/vasoplegic), and ongoing respiratory failure.    Interval/overnight events:  NAEON      Assessment and plan :     Alessio Teixeira IS a 88 year old male admitted on 10/28/2024 for shock and respiratory failure.   I have personally reviewed the daily labs, imaging studies, cultures and discussed the case with referring physician and consulting physicians.     My assessment and plan by system for this patient is as follows:    Neurology/Psychiatry:   1. Pain/analgesia: fentanyl drip  2. Sedation: propofol    Cardiovascular:   Shock:  appears c/w mixed cardiogenic and vasoplegic picture; persistent.  Continues on dobutamine, epi, norepi, vaso.  Trying to get dobutamine off today; CI remains in low 2s.  S/p CAB3:  continues on ASA.  Add statin when LFTs stabilized.    Pulmonary/Ventilator Management:   1. Acute hypoxemic respiratory failure, vent dependent:  continue lung protective tidal volumes.  Spont breathing trials as able.    GI and Nutrition :   1. TF  2. PPI for pud prophy    Renal/Fluids/Electrolytes:   1. Acute kidney failure:  continues on CRRT.  Appreciate nephrology support.    Infectious Disease:   1. Empirically on vanco/zosyn with wbc 63k (known CLL).  Cx remain ngtd.  Stop v/z today.    Endocrine:   1. Stress induced  "hyperglycemia:  SSI    Hematology/Oncology:   1. Hyperleukocytosis to 63, known h/o CLL.  Appreciate heme input.  2. Hgb 7.8.  Acute blood loss Anemia d/t post-operative bleeding, no signs, symptoms of ongoing active blood loss  3. Pltlts 195 ok.    ICU Prophylaxis:   1. DVT: mechanical only for now  2. VAP: HOB 30 degrees, chlorhexidine rinse  3. Stress Ulcer: PPI  4. Restraints: Nonviolent soft two point restraints required and necessary for patient safety and continued cares and good effect as patient continues to pull at necessary lines, tubes despite education and distraction. Will readdress daily.   5. Wound care  -   6. Feeding - tf  7. Family Update: per CV surgery  8. Disposition - ICU    Clinically Significant Risk Factors           # Hypocalcemia: Lowest iCa = 4.3 mg/dL in last 2 days, will monitor and replace as appropriate     # Hypoalbuminemia: Lowest albumin = 2.7 g/dL at 11/2/2024  5:44 AM, will monitor as appropriate  # Coagulation Defect: INR = 2.31 (Ref range: 0.85 - 1.15) and/or PTT = 42 Seconds (Ref range: 22 - 38 Seconds), will monitor for bleeding    # Hypertension: Noted on problem list  # Chronic heart failure with reduced ejection fraction: last echo with EF <40%   # Acute Hypercapnic Respiratory Failure: based on arterial blood gas results.  Continue supplemental oxygen and ventilatory support as indicated.  # Acute Hypercapnic Respiratory Failure: based on venous blood gas results.  Continue supplemental oxygen and ventilatory support as indicated.         # Obesity: Estimated body mass index is 35.33 kg/m  as calculated from the following:    Height as of this encounter: 1.778 m (5' 10\").    Weight as of this encounter: 111.7 kg (246 lb 4.1 oz).       # History of CABG: noted on surgical history                           Key Medications:     Current Facility-Administered Medications   Medication Dose Route Frequency Provider Last Rate Last Admin    albumin human 25 % injection 25 g  25 g " Intravenous Q12H Arlin Hodge PA-C   25 g at 11/03/24 1229    aspirin (ASA) chewable tablet 162 mg  162 mg Oral or NG Tube Daily Clarence Bone MD   162 mg at 11/03/24 0811    chlorhexidine (PERIDEX) 0.12 % solution 15 mL  15 mL Swish & Spit BID Zac Kaur DO   15 mL at 11/03/24 0812    insulin aspart (NovoLOG) injection (RAPID ACTING)  1-7 Units Subcutaneous Q4H Sb Arthur PA-C   1 Units at 11/03/24 0100    pantoprazole (PROTONIX) 2 mg/mL suspension 40 mg  40 mg Oral or NG Tube BID AC Arlin Hodge PA-C   40 mg at 11/03/24 0812    Or    pantoprazole (PROTONIX) EC tablet 40 mg  40 mg Oral BID AC Arlin Hodge PA-C        piperacillin-tazobactam (ZOSYN) 4.5 g vial to attach to  mL bag  4.5 g Intravenous Q6H Mulvihill, Michael, MD   4.5 g at 11/03/24 1015    polyethylene glycol (MIRALAX) Packet 17 g  17 g Oral or Feeding Tube Daily Mulvihill, Michael, MD   17 g at 11/03/24 0812    Prosource TF20 ENfit Compatibl EN LIQD (PROSOURCE TF20) packet 60 mL  1 packet Per Feeding Tube BID 09 12 Arlin Hodge PA-C   60 mL at 11/03/24 1200    senna-docusate (SENOKOT-S/PERICOLACE) 8.6-50 MG per tablet 1 tablet  1 tablet Oral or Feeding Tube BID Mulvihill, Michael, MD   1 tablet at 11/03/24 0811    sodium chloride (PF) 0.9% PF flush 3 mL  3 mL Intracatheter Q8H Clarence Bone MD   3 mL at 11/03/24 1428    vancomycin place rico - receiving intermittent dosing  1 each Intravenous See Admin Instructions Mulvihill, Michael, MD         Current Facility-Administered Medications   Medication Dose Route Frequency Provider Last Rate Last Admin    dextrose 10% infusion   Intravenous Continuous PRN Arlin Hodge PA-C 30 mL/hr at 11/03/24 1000 Rate Verify at 11/03/24 1000    dextrose 10% infusion   Intravenous Continuous Her-Sb Calle PA-C 30 mL/hr at 11/03/24 0759 New Bag at 11/03/24 0759    dialysate solution (PrismaSol BGK 2/3.5)   CRRT Continuous Farhad Boland MD 1,500 mL/hr  at 11/03/24 0721 5,000 mL at 11/03/24 1343    DOBUTamine (DOBUTREX) 500 mg in D5W 250 mL infusion (adult std conc)  2.5 mcg/kg/min Intravenous Continuous Arlin Hodge PA-C 8.1 mL/hr at 11/03/24 0757 2.5 mcg/kg/min at 11/03/24 0757    EPINEPHrine (ADRENALIN) 5 mg in  mL infusion  0.01-0.1 mcg/kg/min Intravenous Continuous PRN Clarence Bone MD 20.1 mL/hr at 11/03/24 0800 0.07 mcg/kg/min at 11/03/24 0800    fentaNYL (SUBLIMAZE) infusion   mcg/hr Intravenous Continuous Key Dobson MD 1 mL/hr at 11/03/24 1403 50 mcg/hr at 11/03/24 1403    lactated ringers infusion   Intravenous Continuous Opal Nguyen PA-C 20 mL/hr at 11/03/24 0758 Rate Verify at 11/03/24 0758    propofol (DIPRIVAN) infusion  5-75 mcg/kg/min Intravenous Continuous Tyra Dubois MD 6.4 mL/hr at 11/03/24 1037 10 mcg/kg/min at 11/03/24 1037    And    Medication Instruction   Does not apply Continuous PRN Tyra Dubois MD        No heparin required   Does not apply Continuous PRN Farhad Boland MD        norepinephrine (LEVOPHED) 16 mg in  mL infusion MAX CONC CENTRAL LINE  0.01-0.2 mcg/kg/min Intravenous Continuous Arlin Hodge PA-C 10.8 mL/hr at 11/03/24 0845 0.12 mcg/kg/min at 11/03/24 0845    Post-Filter replacement solution (PRISMASOL BGK 2/3.5)   CRRT Continuous Farhad Boland  mL/hr at 11/01/24 2012 New Bag at 11/01/24 2012    Pre-Filter replacement solution (PRISMASOL BGK 2/3.5)   CRRT Continuous Farhad Boland  mL/hr at 11/02/24 0218 5,000 mL at 11/02/24 2136    Reason beta blocker order not selected   Does not apply DOES NOT GO TO Clarence Olguin MD        vasopressin 0.2 units/mL in NS (PITRESSIN) standard conc infusion  0.5-4 Units/hr Intravenous Continuous Arlin Hodge PA-C 20 mL/hr at 11/03/24 0929 4 Units/hr at 11/03/24 0929              Physical Examination:   Temp:  [97.5  F (36.4  C)-99  F (37.2  C)] 99  F (37.2  C)  Pulse:  []  90  MAP:  [52 mmHg-117 mmHg] 68 mmHg  Arterial Line BP: ()/(40-95) 112/53  FiO2 (%):  [30 %-35 %] 30 %  SpO2:  [96 %-100 %] 99 %      Intake/Output Summary (Last 24 hours) at 11/4/2024 0942  Last data filed at 11/4/2024 0900  Gross per 24 hour   Intake 5032.49 ml   Output 8528 ml   Net -3495.51 ml         Wt Readings from Last 4 Encounters:   11/03/24 111.7 kg (246 lb 4.1 oz)   10/25/24 97.1 kg (214 lb)   10/03/24 101.2 kg (223 lb)   10/02/24 104.8 kg (231 lb)     Arterial Line BP: ()/(40-95) 112/53  MAP:  [52 mmHg-117 mmHg] 68 mmHg  CVP:  [9 mmHg-24 mmHg] 15 mmHg  SVO2:  [46 %-64 %] 64 %  FiO2 (%): 30 %, Resp: 20, Vent Mode: CMV/AC, Resp Rate (Set): 20 breaths/min, Tidal Volume (Set, mL): 500 mL, PEEP (cm H2O): 5 cmH2O, Resp Rate (Set): 20 breaths/min, Tidal Volume (Set, mL): 500 mL, PEEP (cm H2O): 5 cmH2O  Recent Labs   Lab 11/03/24  1357 11/03/24  0854 11/03/24  0548 11/02/24  2153   PH 7.33* 7.28* 7.36 7.36   PCO2 48* 53* 46* 45   PO2 103 76* 132* 153*   HCO3 25 25 26 25   O2PER 35  35 35 35  35 35  35       GEN: no acute distress   HEENT: head ncat, sclera anicteric, OP patent, trachea midline   PULM: unlabored synchronous with vent, clear anteriorly    CV/COR: RRR S1S2 no gallop,  No rub, no murmur  ABD: soft nontender, hypoactive bowel sounds, no mass  EXT:  warm and well perfused x4  NEURO: PERRL, no obvious deficits  SKIN: no obvious rash  LINES: clean, dry intact         Data:   All data and imaging reviewed     ROUTINE ICU LABS (Last four results)  [facility]  Recent Labs   Lab 11/03/24  1359 11/03/24  1005 11/03/24  0552 11/03/24  0548 11/02/24  2158 11/02/24  2153 11/02/24  1728 11/02/24  1337 11/02/24  1011 11/02/24  0544 11/01/24  0222 11/01/24  0207 10/31/24  2001 10/31/24  1957   NA  --   --   --  136  --  136  --  136  --  137   < > 138  139  --  138  140   POTASSIUM  --   --   --  4.0  --  4.0  --  4.0  --  4.3   < > 4.6  4.4  --  4.5  4.5   CHLORIDE  --   --   --  101  --  102  --   101  --  102   < > 102  103  --  100  102   CO2  --   --   --  21*  --  20*  --  19*  --  19*   < > 24  24  --  26  27   ANIONGAP  --   --   --  14  --  14  --  16*  --  16*   < > 12  12  --  12  11   * 129* 138* 146*   < > 145*   < > 137*   < > 134*   < > 120*  120*   < > 111*  111*   BUN  --   --   --  27.4*  --  28.7*  --  30.5*  --  31.0*   < > 33.1*  34.2*  --  36.0*  35.4*   CR  --   --   --  1.85*  --  1.90*  --  1.94*  --  1.94*   < > 2.27*  2.34*  --  2.33*  2.34*   GFRESTIMATED  --   --   --  35*  --  34*  --  33*  --  33*   < > 27*  26*  --  26*  26*   TATE  --   --   --  8.5*  --  8.5*  --  8.3*  --  8.7*   < > 8.5*  8.7*  --  8.4*  8.5*   MAG  --   --   --  2.0  --  2.0  --  2.0  --  2.1   < > 1.8  --  1.8   PHOS  --   --   --  2.9  --   --   --   --   --  3.2  --  4.4  --  4.9*   PROTTOTAL  --   --   --  4.3*  --  4.3*  --  4.6*  --  4.3*   < > 4.6*  --  4.6*   ALBUMIN  --   --   --  3.0*  --  2.8*  --  3.1*  --  2.7*   < > 2.9*  2.9*  --  2.9*  2.9*   BILITOTAL  --   --   --  2.2*  --  2.1*  --  2.1*  --  2.1*   < > 1.7*  --  1.7*   ALKPHOS  --   --   --  167*  --  184*  --  194*  --  188*   < > 132  --  130   AST  --   --   --  530*  --  658*  --  853*  --  1,181*   < > 3,855*  --  5,088*   ALT  --   --   --  562*  --  638*  --  697*  --  831*   < > 1,451*  --  1,752*    < > = values in this interval not displayed.     CBC  Recent Labs   Lab 11/03/24  0548 11/02/24  2153 11/02/24  1337 11/02/24  0544   WBC 76.4* 72.9* 78.5* 67.4*   RBC 2.50* 2.67* 2.75* 3.08*   HGB 7.4* 7.9* 8.2* 9.0*   HCT 23.0* 24.0* 24.8* 27.3*   MCV 92 90 90 89   MCH 29.6 29.6 29.8 29.2   MCHC 32.2 32.9 33.1 33.0   RDW 18.8* 19.0* 18.6* 18.2*    223 291 275     INR  Recent Labs   Lab 11/03/24  0548 11/02/24  0544 11/01/24  0207 10/31/24  0703   INR 2.31* 2.37* 2.88* 3.50*     Arterial Blood Gas  Recent Labs   Lab 11/03/24  1357 11/03/24  0854 11/03/24  0548 11/02/24  2153   PH 7.33* 7.28* 7.36 7.36  "  PCO2 48* 53* 46* 45   PO2 103 76* 132* 153*   HCO3 25 25 26 25   O2PER 35  35 35 35  35 35  35       All cultures:  No results for input(s): \"CULT\" in the last 168 hours.  Recent Results (from the past 24 hours)   US Upper Extremity Arterial Duplex Right    Narrative    EXAM: US UPPER EXTREMITY ARTERIAL DUPLEX RIGHT  LOCATION: [facility]  DATE: 11/3/2024    INDICATION: Mottled hand.  COMPARISON: None available.  TECHNIQUE: Arterial Duplex ultrasound of the right arm. Color flow and spectral Doppler with waveform analysis performed.    FINDINGS (RIGHT upper extremity):    ARTERIAL PEAK SYSTOLIC VELOCITIES (cm/s):    Subclavian artery (proximal): 51  Subclavian artery (distal): 71  Axillary artery: 70  Brachial (proximal): 74  Brachial (distal): 76  Radial (proximal): 41  Radial (distal): 14  Ulnar (proximal): 85  Ulnar (distal): 136    WAVEFORMS/COLOR DOPPLER: Triphasic waveforms are noted throughout except in the distal radial artery where biphasic waveforms are noted.      Impression    IMPRESSION:   1.  Patent right upper extremity arteries although slow flow is noted within the distal radial artery along with biphasic waveforms, findings are of indeterminate etiology, could be due to area of focal stenosis.   XR Chest Port 1 View    Narrative    EXAM: XR CHEST PORT 1 VIEW  LOCATION: [facility]  DATE: 11/3/2024    INDICATION: eval infiltrate edema  COMPARISON: Chest radiograph 11/2/2024.      Impression    IMPRESSION:     Lines and tubes: Endotracheal tube tip is not well visualized, likely in the upper trachea. Feeding tube courses below the diaphragm. [place] catheter near the main pulmonary trunk. Similar left central venous catheter, left atrial appendage clip and   median sternotomy. Bilateral chest tubes.    Right basilar opacity favoring combination of pleural fluid, atelectasis and/or infection. Possible trace left pleural effusion. No discernible " pneumothorax. Similar cardiomediastinal silhouette.     D/w SADE Webb, Dr. Mulvihill of CV surgery.    Billing: This patient is critically ill: Yes. Total critical care time today 50 min, excluding procedures.

## 2024-11-03 NOTE — PLAN OF CARE
Goal Outcome Evaluation:      Plan of Care Reviewed With: patient, spouse          Outcome Evaluation: Remains sedated/intubated/CRRT/thermoguard to warm pt. Initally able to pull 140cc w/ CRRT. Mid shift pt began having increased afib 80's-90's et severely competing w/temp pacer. attempted to override instrinsic rhythm with rate increase to 110-failed as bp decreasing et had runs of rvr. Attempted DDD which caused con't tacy/rvr. Attempted to resume atrial pacing w/decreased MA. Ultimately, bp more stable with instrinsic rhythm of controlled afib w/ backup pacer rate of 60 et MA 15. With stable bp now initiating volume pull w/crrt slowly. Right finger tips became mottled. Dr Beatty notified et arterial duplex obtained showing likely narrowing w/weak pulse, but no evidence of clot. Pt had small, loose bm. single episode of white, thick secretions w/plug obtained w/lavage. Arterial line remains extremely positional with necessary traction to draw gases. BS trend up slighlty to 140's w/nominal ssi need of 1 unit. Monitor. Temp cont to require warming

## 2024-11-03 NOTE — PLAN OF CARE
Problem: Adult Inpatient Plan of Care  Goal: Plan of Care Review    Outcome: Progressing  Flowsheets (Taken 11/3/2024 1736)  Outcome Evaluation: Pt remains on three pressors, able to pull fluid with CRRT, attempt to meet goal of 1-2 liters off today.  Tolerating a pull of 300 an hour without change in BP with pressor support. Sedation reduced to 50 of fentanyl and 10 mcg/kg of propofol, appears sedated and compliant with ventilator. BM x 3, loose, brown and  no evidence of bleeding.Arterial continues to be positional, swan repositioned per CV team, given one unit of RBCs for 7.4, hgb up to 8.2.  Temp pacer continues with back up rate of 60 AAI, pt's own intrinsic rate of 80's afib with need for pacing.  Family at bedside, updated by nursing and medical team.  Plan of Care Reviewed With:   patient   spouse   family  Goal: Patient-Specific Goal (Individualized)    Outcome: Progressing  Goal: Absence of Hospital-Acquired Illness or Injury  Outcome: Progressing  Intervention: Identify and Manage Fall Risk  Recent Flowsheet Documentation  Taken 11/3/2024 1600 by Pascale Chirinos RN  Safety Promotion/Fall Prevention:   lighting adjusted   clutter free environment maintained   room organization consistent   safety round/check completed   treat reversible contributory factors   treat underlying cause  Taken 11/3/2024 1200 by Pascale Chirinos RN  Safety Promotion/Fall Prevention:   lighting adjusted   clutter free environment maintained   room organization consistent   safety round/check completed   treat reversible contributory factors   treat underlying cause  Taken 11/3/2024 0800 by Pascale Chirinos RN  Safety Promotion/Fall Prevention:   lighting adjusted   clutter free environment maintained   room organization consistent   safety round/check completed   treat reversible contributory factors   treat underlying cause  Intervention: Prevent Skin Injury  Recent Flowsheet Documentation  Taken 11/3/2024 1600 by  Pascale Chirinos RN  Body Position:   turned   left   heels elevated  Skin Protection:   silicone foam dressing in place   skin to device areas padded   skin to skin areas padded   incontinence pads utilized  Taken 11/3/2024 1200 by Pascale Chirinos RN  Body Position:   turned   left   heels elevated  Skin Protection:   silicone foam dressing in place   skin to device areas padded   skin to skin areas padded   incontinence pads utilized  Taken 11/3/2024 0800 by Pascale Chirinos RN  Body Position:   turned   heels elevated   side-lying 30 degrees   upper extremity elevated  Skin Protection:   silicone foam dressing in place   skin to device areas padded   skin to skin areas padded   incontinence pads utilized  Intervention: Prevent and Manage VTE (Venous Thromboembolism) Risk  Recent Flowsheet Documentation  Taken 11/3/2024 1600 by Pascale Chirinos RN  VTE Prevention/Management: SCDs on (sequential compression devices)  Taken 11/3/2024 1200 by Pascale Chirinos RN  VTE Prevention/Management: SCDs on (sequential compression devices)  Taken 11/3/2024 0800 by Pascale Chirinos RN  VTE Prevention/Management: SCDs on (sequential compression devices)  Intervention: Prevent Infection  Recent Flowsheet Documentation  Taken 11/3/2024 1600 by Pascale Chirinos RN  Infection Prevention:   environmental surveillance performed   equipment surfaces disinfected   hand hygiene promoted   rest/sleep promoted   single patient room provided  Taken 11/3/2024 1200 by Pascale Chirinos RN  Infection Prevention:   environmental surveillance performed   equipment surfaces disinfected   hand hygiene promoted   rest/sleep promoted   single patient room provided  Taken 11/3/2024 0800 by Pascale Chirinos RN  Infection Prevention:   environmental surveillance performed   equipment surfaces disinfected   hand hygiene promoted   rest/sleep promoted   single patient room provided  Goal: Optimal Comfort and Wellbeing  Outcome:  Progressing  Intervention: Provide Person-Centered Care  Recent Flowsheet Documentation  Taken 11/3/2024 1600 by Pascale Chirinos RN  Trust Relationship/Rapport:   care explained   reassurance provided  Taken 11/3/2024 1200 by Pascale Chirinos RN  Trust Relationship/Rapport:   care explained   reassurance provided  Taken 11/3/2024 0800 by Pascale Chirinos RN  Trust Relationship/Rapport:   care explained   reassurance provided  Goal: Readiness for Transition of Care  Outcome: Progressing     Problem: Comorbidity Management  Goal: Maintenance of Asthma Control  Outcome: Progressing  Intervention: Maintain Asthma Symptom Control  Recent Flowsheet Documentation  Taken 11/3/2024 1600 by Pascale Chirinos RN  Medication Review/Management: medications reviewed  Taken 11/3/2024 1200 by Pascale Chirinos RN  Medication Review/Management: medications reviewed  Taken 11/3/2024 0800 by Pascale Chirinos RN  Medication Review/Management: medications reviewed  Goal: Maintenance of Behavioral Health Symptom Control  Outcome: Progressing  Intervention: Maintain Behavioral Health Symptom Control  Recent Flowsheet Documentation  Taken 11/3/2024 1600 by Pascale Chirinos RN  Medication Review/Management: medications reviewed  Taken 11/3/2024 1200 by Pascale Chirinos RN  Medication Review/Management: medications reviewed  Taken 11/3/2024 0800 by Pascale Chirinos RN  Medication Review/Management: medications reviewed  Goal: Maintenance of COPD Symptom Control  Outcome: Progressing  Intervention: Maintain COPD Symptom Control  Recent Flowsheet Documentation  Taken 11/3/2024 1600 by Pascale Chirinos RN  Medication Review/Management: medications reviewed  Taken 11/3/2024 1200 by Pascale Chirinos RN  Medication Review/Management: medications reviewed  Taken 11/3/2024 0800 by Pascale Chirinos RN  Medication Review/Management: medications reviewed  Goal: Blood Glucose Levels Within Targeted Range  Outcome:  Progressing  Intervention: Monitor and Manage Glycemia  Recent Flowsheet Documentation  Taken 11/3/2024 1600 by Pascale Chirinos RN  Medication Review/Management: medications reviewed  Taken 11/3/2024 1200 by Pascale Chirinos RN  Medication Review/Management: medications reviewed  Taken 11/3/2024 0800 by Pascale Chirinos RN  Medication Review/Management: medications reviewed  Goal: Maintenance of Heart Failure Symptom Control  Outcome: Progressing  Intervention: Maintain Heart Failure Management  Recent Flowsheet Documentation  Taken 11/3/2024 1600 by Pascale Chirinos RN  Medication Review/Management: medications reviewed  Taken 11/3/2024 1200 by Pascale Chirinos RN  Medication Review/Management: medications reviewed  Taken 11/3/2024 0800 by Pascale Chirinos RN  Medication Review/Management: medications reviewed  Goal: Blood Pressure in Desired Range  Outcome: Progressing  Intervention: Maintain Blood Pressure Management  Recent Flowsheet Documentation  Taken 11/3/2024 1600 by Pascale Chirinos RN  Medication Review/Management: medications reviewed  Taken 11/3/2024 1200 by Pascale Chirinos RN  Medication Review/Management: medications reviewed  Taken 11/3/2024 0800 by Pascale Chirinos RN  Medication Review/Management: medications reviewed  Goal: Maintenance of Osteoarthritis Symptom Control  Outcome: Progressing  Intervention: Maintain Osteoarthritis Symptom Control  Recent Flowsheet Documentation  Taken 11/3/2024 1600 by Pascale Chirinos RN  Assistive Device Utilized: lift device  Activity Management: bedrest  Medication Review/Management: medications reviewed  Taken 11/3/2024 1200 by Pascale Chirinos RN  Assistive Device Utilized: lift device  Activity Management: bedrest  Medication Review/Management: medications reviewed  Taken 11/3/2024 0800 by Pascale Chirinos RN  Assistive Device Utilized: lift device  Activity Management: bedrest  Medication Review/Management: medications  reviewed  Goal: Bariatric Home Regimen Maintained  Outcome: Progressing  Intervention: Maintain and Manage Postbariatric Surgery Care  Recent Flowsheet Documentation  Taken 11/3/2024 1600 by Pascale Chirinos RN  Medication Review/Management: medications reviewed  Taken 11/3/2024 1200 by Pascale Chirinos RN  Medication Review/Management: medications reviewed  Taken 11/3/2024 0800 by Pascale Chirinos RN  Medication Review/Management: medications reviewed  Goal: Maintenance of Seizure Control  Outcome: Progressing  Intervention: Maintain Seizure Symptom Control  Recent Flowsheet Documentation  Taken 11/3/2024 1600 by Pascale Chirinos RN  Sensory Stimulation Regulation:   care clustered   lighting decreased  Seizure Precautions: clutter-free environment maintained  Medication Review/Management: medications reviewed  Taken 11/3/2024 1200 by Pascale Chirinos RN  Sensory Stimulation Regulation:   care clustered   lighting decreased  Seizure Precautions: clutter-free environment maintained  Medication Review/Management: medications reviewed  Taken 11/3/2024 0800 by Pascale Chirinos RN  Sensory Stimulation Regulation:   care clustered   lighting decreased  Seizure Precautions: clutter-free environment maintained  Medication Review/Management: medications reviewed     Problem: Fall Injury Risk  Goal: Absence of Fall and Fall-Related Injury  Outcome: Progressing  Intervention: Identify and Manage Contributors  Recent Flowsheet Documentation  Taken 11/3/2024 1600 by Pascale Chirinos RN  Medication Review/Management: medications reviewed  Taken 11/3/2024 1200 by Pascale Chirinos RN  Medication Review/Management: medications reviewed  Taken 11/3/2024 0800 by Pascale Chirinos RN  Medication Review/Management: medications reviewed  Intervention: Promote Injury-Free Environment  Recent Flowsheet Documentation  Taken 11/3/2024 1600 by Pascale Chirinos RN  Safety Promotion/Fall Prevention:   lighting adjusted    clutter free environment maintained   room organization consistent   safety round/check completed   treat reversible contributory factors   treat underlying cause  Taken 11/3/2024 1200 by Pascale Chirinos RN  Safety Promotion/Fall Prevention:   lighting adjusted   clutter free environment maintained   room organization consistent   safety round/check completed   treat reversible contributory factors   treat underlying cause  Taken 11/3/2024 0800 by Pascale Chirinos RN  Safety Promotion/Fall Prevention:   lighting adjusted   clutter free environment maintained   room organization consistent   safety round/check completed   treat reversible contributory factors   treat underlying cause     Problem: Mechanical Ventilation Invasive  Goal: Effective Communication  Outcome: Progressing  Intervention: Ensure Effective Communication  Recent Flowsheet Documentation  Taken 11/3/2024 1600 by Pascale Chirinos RN  Trust Relationship/Rapport:   care explained   reassurance provided  Taken 11/3/2024 1200 by Pascale Chirinos RN  Trust Relationship/Rapport:   care explained   reassurance provided  Taken 11/3/2024 0800 by Pascale Chirinos RN  Trust Relationship/Rapport:   care explained   reassurance provided  Goal: Optimal Device Function  Outcome: Progressing  Intervention: Optimize Device Care and Function  Recent Flowsheet Documentation  Taken 11/3/2024 1600 by Pascale Chirinos RN  Airway Safety Measures:   all equipment/monitors on and audible   mask at bedside   manual resuscitator/mask/valve at bedside   oxygen flowmeter   suction at bedside   suction equipment   suction regulator  Oral Care: oral rinse provided  Taken 11/3/2024 1200 by Pascale Chirinos RN  Airway Safety Measures:   all equipment/monitors on and audible   mask at bedside   manual resuscitator/mask/valve at bedside   oxygen flowmeter   suction at bedside   suction equipment   suction regulator  Oral Care: oral rinse provided  Taken 11/3/2024 0800  by Pascale Chirinos RN  Airway Safety Measures:   all equipment/monitors on and audible   mask at bedside   manual resuscitator/mask/valve at bedside   oxygen flowmeter   suction at bedside   suction equipment   suction regulator  Oral Care: oral rinse provided  Goal: Mechanical Ventilation Liberation  Outcome: Progressing  Intervention: Promote Extubation and Mechanical Ventilation Liberation  Recent Flowsheet Documentation  Taken 11/3/2024 1600 by Pascale Chirinos RN  Medication Review/Management: medications reviewed  Environmental Support:   calm environment promoted   environmental consistency promoted   rest periods encouraged  Taken 11/3/2024 1200 by Pascale Chirinos RN  Medication Review/Management: medications reviewed  Environmental Support:   calm environment promoted   environmental consistency promoted   rest periods encouraged  Taken 11/3/2024 0800 by Pascale Chirinos RN  Medication Review/Management: medications reviewed  Environmental Support:   calm environment promoted   environmental consistency promoted   rest periods encouraged  Goal: Optimal Nutrition Delivery  Outcome: Progressing  Intervention: Optimize Nutrition Delivery  Recent Flowsheet Documentation  Taken 11/3/2024 1600 by Pascale Chirinos RN  Nutrition Support Management: weight trending reviewed  Taken 11/3/2024 1200 by Pascale Chirinos RN  Nutrition Support Management: weight trending reviewed  Taken 11/3/2024 0800 by Pascale Chirinos RN  Nutrition Support Management: weight trending reviewed  Goal: Absence of Device-Related Skin and Tissue Injury  Outcome: Progressing  Intervention: Maintain Skin and Tissue Health  Recent Flowsheet Documentation  Taken 11/3/2024 1600 by Pascale Chirinos RN  Device Skin Pressure Protection:   pressure points protected   positioning supports utilized   tubing/devices free from skin contact  Taken 11/3/2024 1200 by Pascale Chirinos RN  Device Skin Pressure Protection:   pressure points  protected   positioning supports utilized   tubing/devices free from skin contact  Taken 11/3/2024 0800 by Pascale Chirinos RN  Device Skin Pressure Protection:   pressure points protected   positioning supports utilized   tubing/devices free from skin contact  Goal: Absence of Ventilator-Induced Lung Injury  Outcome: Progressing  Intervention: Prevent Ventilator-Associated Pneumonia  Recent Flowsheet Documentation  Taken 11/3/2024 1600 by Pascale Chriinos RN  Oral Care: oral rinse provided  Head of Bed (HOB) Positioning: HOB at 20-30 degrees  VAP Prevention Contraindications: condition unstable  VAP Prevention Measures: not completed (see contraindications)  Taken 11/3/2024 1200 by Pascale Chirinos RN  Oral Care: oral rinse provided  Head of Bed (HOB) Positioning: HOB at 20-30 degrees  VAP Prevention Contraindications: condition unstable  VAP Prevention Measures: not completed (see contraindications)  Taken 11/3/2024 0800 by Pascale Chirinos RN  Oral Care: oral rinse provided  Head of Bed (HOB) Positioning: HOB at 20-30 degrees  VAP Prevention Contraindications: condition unstable  VAP Prevention Measures: not completed (see contraindications)     Problem: Cardiovascular Surgery  Goal: Improved Activity Tolerance  Outcome: Progressing  Intervention: Optimize Tolerance for Activity  Recent Flowsheet Documentation  Taken 11/3/2024 1600 by Pascale Chirinos RN  Environmental Support:   calm environment promoted   environmental consistency promoted   rest periods encouraged  Taken 11/3/2024 1200 by Pascale Chirinos RN  Environmental Support:   calm environment promoted   environmental consistency promoted   rest periods encouraged  Taken 11/3/2024 0800 by Pascale Chirinos RN  Environmental Support:   calm environment promoted   environmental consistency promoted   rest periods encouraged  Goal: Optimal Coping with Heart Surgery  Outcome: Progressing  Intervention: Support Psychosocial Response to  Surgery  Recent Flowsheet Documentation  Taken 11/3/2024 1600 by Pascale Chirinos RN  Supportive Measures: positive reinforcement provided  Taken 11/3/2024 1200 by Pascale Chirinos RN  Supportive Measures: positive reinforcement provided  Taken 11/3/2024 0800 by Pascale Chirinos RN  Supportive Measures: positive reinforcement provided  Goal: Absence of Bleeding  Outcome: Progressing  Intervention: Monitor and Manage Bleeding  Recent Flowsheet Documentation  Taken 11/3/2024 1600 by Pascale Chirinos RN  Bleeding Management: dressing monitored  Taken 11/3/2024 1200 by Pascale Chirinos RN  Bleeding Management: dressing monitored  Taken 11/3/2024 0800 by Pascale Chirinos RN  Bleeding Management: dressing monitored  Goal: Effective Bowel Elimination  Outcome: Progressing  Goal: Effective Cardiac Function  Outcome: Progressing  Intervention: Optimize Cardiac Output and Blood Flow  Recent Flowsheet Documentation  Taken 11/3/2024 1600 by Pascale Chirinos RN  Dysrhythmia Management: pacing wires maintained  Taken 11/3/2024 1200 by Pascale Chirinos RN  Dysrhythmia Management: pacing wires maintained  Taken 11/3/2024 0800 by Pascale Chirinos RN  Dysrhythmia Management: pacing wires maintained  Goal: Optimal Cerebral Tissue Perfusion  Outcome: Progressing  Intervention: Protect and Optimize Cerebral Perfusion  Recent Flowsheet Documentation  Taken 11/3/2024 1600 by Pascale Chirinos RN  Sensory Stimulation Regulation:   care clustered   lighting decreased  Cerebral Perfusion Promotion: blood pressure monitored  Glycemic Management: blood glucose monitored  Head of Bed (HOB) Positioning: HOB at 20-30 degrees  Taken 11/3/2024 1200 by Pascale Chirinos RN  Sensory Stimulation Regulation:   care clustered   lighting decreased  Cerebral Perfusion Promotion: blood pressure monitored  Glycemic Management: blood glucose monitored  Head of Bed (HOB) Positioning: HOB at 20-30 degrees  Taken 11/3/2024 0800 by Candis  Pascale CLOE RN  Sensory Stimulation Regulation:   care clustered   lighting decreased  Cerebral Perfusion Promotion: blood pressure monitored  Glycemic Management: blood glucose monitored  Head of Bed (HOB) Positioning: HOB at 20-30 degrees  Goal: Fluid and Electrolyte Balance  Outcome: Progressing  Intervention: Monitor and Manage Fluid and Electrolyte Balance  Recent Flowsheet Documentation  Taken 11/3/2024 1600 by Pascale Chirinos RN  Fluid/Electrolyte Management: electrolyte supplement adjusted  Taken 11/3/2024 1200 by Pascale Chirinos RN  Fluid/Electrolyte Management: electrolyte supplement adjusted  Taken 11/3/2024 0800 by Pascale Chirinos RN  Fluid/Electrolyte Management: electrolyte supplement adjusted  Goal: Blood Glucose Level Within Targeted Range  Outcome: Progressing  Intervention: Optimize Glycemic Control  Recent Flowsheet Documentation  Taken 11/3/2024 1600 by Pascale Chirinos RN  Glycemic Management: blood glucose monitored  Taken 11/3/2024 1200 by Pascale Chirinos RN  Glycemic Management: blood glucose monitored  Taken 11/3/2024 0800 by Pascale Chirinos RN  Glycemic Management: blood glucose monitored  Goal: Absence of Infection Signs and Symptoms  Outcome: Progressing  Intervention: Prevent or Manage Infection  Recent Flowsheet Documentation  Taken 11/3/2024 1600 by Pascale Chirinos RN  Infection Prevention:   environmental surveillance performed   equipment surfaces disinfected   hand hygiene promoted   rest/sleep promoted   single patient room provided  Taken 11/3/2024 1200 by Pascale Chirinos RN  Infection Prevention:   environmental surveillance performed   equipment surfaces disinfected   hand hygiene promoted   rest/sleep promoted   single patient room provided  Taken 11/3/2024 0800 by Pascale Chirinos RN  Infection Prevention:   environmental surveillance performed   equipment surfaces disinfected   hand hygiene promoted   rest/sleep promoted   single patient room  provided  Goal: Anesthesia/Sedation Recovery  Outcome: Progressing  Intervention: Optimize Anesthesia Recovery  Recent Flowsheet Documentation  Taken 11/3/2024 1600 by Pascale Chirinos RN  Safety Promotion/Fall Prevention:   lighting adjusted   clutter free environment maintained   room organization consistent   safety round/check completed   treat reversible contributory factors   treat underlying cause  Reorientation Measures: clock in view  Taken 11/3/2024 1200 by Pascale Chirinos RN  Safety Promotion/Fall Prevention:   lighting adjusted   clutter free environment maintained   room organization consistent   safety round/check completed   treat reversible contributory factors   treat underlying cause  Reorientation Measures: clock in view  Taken 11/3/2024 0800 by Pascale Chirinos RN  Safety Promotion/Fall Prevention:   lighting adjusted   clutter free environment maintained   room organization consistent   safety round/check completed   treat reversible contributory factors   treat underlying cause  Reorientation Measures: clock in view  Goal: Acceptable Pain Control  Outcome: Progressing  Goal: Nausea and Vomiting Relief  Outcome: Progressing  Goal: Effective Urinary Elimination  Outcome: Progressing  Goal: Effective Oxygenation and Ventilation  Outcome: Progressing  Intervention: Promote Airway Secretion Clearance  Recent Flowsheet Documentation  Taken 11/3/2024 1600 by Pascale Chirinos RN  Cough And Deep Breathing: unable to perform  Taken 11/3/2024 1200 by Pascale Chirinos RN  Cough And Deep Breathing: unable to perform  Taken 11/3/2024 0800 by Pascale Chirinos RN  Cough And Deep Breathing: unable to perform  Intervention: Optimize Oxygenation and Ventilation  Recent Flowsheet Documentation  Taken 11/3/2024 1600 by Pascale Chirinos RN  Chest Tube Safety:   all connections secured   suction checked   rubber-tipped hemostat(s) with patient  Taken 11/3/2024 1200 by Pascale Chirinos RN  Chest Tube  Safety:   all connections secured   suction checked   rubber-tipped hemostat(s) with patient  Taken 11/3/2024 0800 by Pascale Chirinos RN  Chest Tube Safety:   all connections secured   suction checked   rubber-tipped hemostat(s) with patient     Problem: Skin Injury Risk Increased  Goal: Skin Health and Integrity  Outcome: Progressing  Intervention: Plan: Nurse Driven Intervention: Positioning  Recent Flowsheet Documentation  Taken 11/3/2024 1600 by Pascale Chirinos RN  Plan: Positioning Interventions:   REPOSITION Left/Right (No supine) q2h   Use wedge positioners   OFF-LOAD HEELS with pillows   HOB 30 degrees or less   OFF-LOAD HEELS with boots  Taken 11/3/2024 1200 by Pascale Chirinos RN  Plan: Positioning Interventions:   REPOSITION Left/Right (No supine) q2h   Use wedge positioners   OFF-LOAD HEELS with pillows   HOB 30 degrees or less   OFF-LOAD HEELS with boots  Taken 11/3/2024 0800 by Pascale Chirinos RN  Plan: Positioning Interventions:   REPOSITION Left/Right (No supine) q2h   Use wedge positioners   OFF-LOAD HEELS with pillows   HOB 30 degrees or less   OFF-LOAD HEELS with boots  Intervention: Plan: Nurse Driven Intervention: Moisture Management  Recent Flowsheet Documentation  Taken 11/3/2024 1600 by Pascale Chirinos RN  Moisture Interventions: Urinary collection device  Bathing/Skin Care:   back care   incontinence care   linen changed  Taken 11/3/2024 1200 by Pascale Chirinos RN  Moisture Interventions: Urinary collection device  Bathing/Skin Care:   back care   incontinence care   linen changed  Taken 11/3/2024 0800 by Pascale Chirinos RN  Moisture Interventions: Urinary collection device  Intervention: Plan: Nurse Driven Intervention: Friction and Shear  Recent Flowsheet Documentation  Taken 11/3/2024 1600 by Pascale Chirinos RN  Friction/Shear Interventions:   HOB 30 degrees or less   Silicone foam sacral dressing   Repositioning device (TAP system, etc.)  Taken 11/3/2024 1200 by  Pascale Chirinos RN  Friction/Shear Interventions:   HOB 30 degrees or less   Silicone foam sacral dressing   Repositioning device (TAP system, etc.)  Taken 11/3/2024 0800 by Pascale Chirinos RN  Friction/Shear Interventions:   HOB 30 degrees or less   Silicone foam sacral dressing   Repositioning device (TAP system, etc.)  Intervention: Optimize Skin Protection  Recent Flowsheet Documentation  Taken 11/3/2024 1600 by Pascale Chirinos RN  Pressure Reduction Techniques:   heels elevated off bed   pressure points protected  Pressure Reduction Devices:   positioning supports utilized   specialty bed utilized  Skin Protection:   silicone foam dressing in place   skin to device areas padded   skin to skin areas padded   incontinence pads utilized  Activity Management: bedrest  Head of Bed (HOB) Positioning: HOB at 20-30 degrees  Taken 11/3/2024 1200 by Pascale Chirinos RN  Pressure Reduction Techniques:   heels elevated off bed   pressure points protected  Pressure Reduction Devices:   positioning supports utilized   specialty bed utilized  Skin Protection:   silicone foam dressing in place   skin to device areas padded   skin to skin areas padded   incontinence pads utilized  Activity Management: bedrest  Head of Bed (HOB) Positioning: HOB at 20-30 degrees  Taken 11/3/2024 0800 by Pascale Chirinos RN  Pressure Reduction Techniques:   heels elevated off bed   pressure points protected  Pressure Reduction Devices:   positioning supports utilized   specialty bed utilized  Skin Protection:   silicone foam dressing in place   skin to device areas padded   skin to skin areas padded   incontinence pads utilized  Activity Management: bedrest  Head of Bed (HOB) Positioning: HOB at 20-30 degrees     Problem: Risk for Delirium  Goal: Optimal Coping  Outcome: Progressing  Intervention: Optimize Psychosocial Adjustment to Delirium  Recent Flowsheet Documentation  Taken 11/3/2024 1600 by Pascale Chirinos RN  Supportive  Measures: positive reinforcement provided  Taken 11/3/2024 1200 by Pascale Chirinos RN  Supportive Measures: positive reinforcement provided  Taken 11/3/2024 0800 by Pascale Chirinos RN  Supportive Measures: positive reinforcement provided  Goal: Improved Behavioral Control  Outcome: Progressing  Intervention: Prevent and Manage Agitation  Recent Flowsheet Documentation  Taken 11/3/2024 1600 by Pascale Chirinos RN  Environment Familiarity/Consistency: daily routine followed  Taken 11/3/2024 1200 by Pascale Chirinos RN  Environment Familiarity/Consistency: daily routine followed  Taken 11/3/2024 0800 by Pascale Chirinos RN  Environment Familiarity/Consistency: daily routine followed  Intervention: Minimize Safety Risk  Recent Flowsheet Documentation  Taken 11/3/2024 1600 by Pascale Chirinos RN  Enhanced Safety Measures: pain management  Trust Relationship/Rapport:   care explained   reassurance provided  Taken 11/3/2024 1200 by Pascale Chirinos RN  Enhanced Safety Measures: pain management  Trust Relationship/Rapport:   care explained   reassurance provided  Taken 11/3/2024 0800 by Pascale Chirinos RN  Enhanced Safety Measures: pain management  Trust Relationship/Rapport:   care explained   reassurance provided  Goal: Improved Attention and Thought Clarity  Outcome: Progressing  Intervention: Maximize Cognitive Function  Recent Flowsheet Documentation  Taken 11/3/2024 1600 by Pascale Chirinos RN  Sensory Stimulation Regulation:   care clustered   lighting decreased  Reorientation Measures: clock in view  Taken 11/3/2024 1200 by Pascale Chirinos RN  Sensory Stimulation Regulation:   care clustered   lighting decreased  Reorientation Measures: clock in view  Taken 11/3/2024 0800 by Pascale Chirinos RN  Sensory Stimulation Regulation:   care clustered   lighting decreased  Reorientation Measures: clock in view  Goal: Improved Sleep  Outcome: Progressing     Problem: CRRT (Continuous Renal  Replacement Therapy)  Goal: Safe, Effective Therapy Delivery  Outcome: Progressing  Intervention: Optimize Device Care and Function  Recent Flowsheet Documentation  Taken 11/3/2024 1600 by Pascale Chirinos RN  Bleeding Precautions:   blood pressure closely monitored   coagulation study results reviewed   gentle oral care promoted   monitored for signs of bleeding  Bleeding Management: dressing monitored  Fluid/Electrolyte Management: electrolyte supplement adjusted  Circuit Management:   air detection alarms on   circuit line warming device in use  Taken 11/3/2024 1200 by Pascale Chirinos RN  Bleeding Precautions:   blood pressure closely monitored   coagulation study results reviewed   gentle oral care promoted   monitored for signs of bleeding  Bleeding Management: dressing monitored  Fluid/Electrolyte Management: electrolyte supplement adjusted  Circuit Management:   air detection alarms on   circuit line warming device in use  Taken 11/3/2024 0800 by Pascale Chirinos RN  Bleeding Precautions:   blood pressure closely monitored   coagulation study results reviewed   gentle oral care promoted   monitored for signs of bleeding  Bleeding Management: dressing monitored  Fluid/Electrolyte Management: electrolyte supplement adjusted  Circuit Management:   air detection alarms on   circuit line warming device in use  Goal: Hemodynamic Stability  Outcome: Progressing  Intervention: Optimize Blood Flow  Recent Flowsheet Documentation  Taken 11/3/2024 1600 by Pascale Chirinos RN  Bleeding Precautions:   blood pressure closely monitored   coagulation study results reviewed   gentle oral care promoted   monitored for signs of bleeding  Bleeding Management: dressing monitored  Fluid/Electrolyte Management: electrolyte supplement adjusted  Taken 11/3/2024 1200 by Pascale Chirinos RN  Bleeding Precautions:   blood pressure closely monitored   coagulation study results reviewed   gentle oral care promoted   monitored  for signs of bleeding  Bleeding Management: dressing monitored  Fluid/Electrolyte Management: electrolyte supplement adjusted  Taken 11/3/2024 0800 by Pascale Chirinos RN  Bleeding Precautions:   blood pressure closely monitored   coagulation study results reviewed   gentle oral care promoted   monitored for signs of bleeding  Bleeding Management: dressing monitored  Fluid/Electrolyte Management: electrolyte supplement adjusted  Goal: Body Temperature Maintained in Desired Range  Outcome: Progressing  Intervention: Prevent or Minimize Hypothermia  Recent Flowsheet Documentation  Taken 11/3/2024 1600 by Pascale Chirinos RN  Thermoregulation Maintenance: forced warm air  Taken 11/3/2024 1200 by Pascale Chirinos RN  Thermoregulation Maintenance: forced warm air  Taken 11/3/2024 0800 by Pascale Chirinos RN  Thermoregulation Maintenance: forced warm air  Goal: Absence of Infection Signs and Symptoms  Outcome: Progressing  Intervention: Prevent or Manage Infection  Recent Flowsheet Documentation  Taken 11/3/2024 1600 by Pascale Chirinos RN  Infection Prevention:   environmental surveillance performed   equipment surfaces disinfected   hand hygiene promoted   rest/sleep promoted   single patient room provided  Infection Management: aseptic technique maintained  Taken 11/3/2024 1200 by Pascale Chirinos RN  Infection Prevention:   environmental surveillance performed   equipment surfaces disinfected   hand hygiene promoted   rest/sleep promoted   single patient room provided  Infection Management: aseptic technique maintained  Taken 11/3/2024 0800 by Pascale Chirinos RN  Infection Prevention:   environmental surveillance performed   equipment surfaces disinfected   hand hygiene promoted   rest/sleep promoted   single patient room provided  Infection Management: aseptic technique maintained   Goal Outcome Evaluation:      Plan of Care Reviewed With: patient, spouse, family          Outcome Evaluation: Pt remains  on three pressors, able to pull fluid with CRRT, attempt to meet goal of 1-2 liters off today.  Tolerating a pull of 300 an hour without change in BP with pressor support. Sedation reduced to 50 of fentanyl and 10 mcg/kg of propofol, appears sedated and compliant with ventilator. BM x 3, loose, brown and  no evidence of bleeding.Arterial continues to be positional, swan repositioned per CV team, given one unit of RBCs for 7.4, hgb up to 8.2.  Temp pacer continues with back up rate of 60 AAI, pt's own intrinsic rate of 80's afib with need for pacing.  Family at bedside, updated by nursing and medical team.

## 2024-11-03 NOTE — PROGRESS NOTES
[facility] ICU RESPIRATORY NOTE        Date of Admission: 10/28/2024    Date of Intubation (most recent): 10/28/2024    Reason for Mechanical Ventilation: CABG    Number of Days on Mechanical Ventilation: Day 7    Met Criteria for Spontaneous Breathing Trial: No    Reason for No Spontaneous Breathing Trial: Per MD    Bite Block: No    Significant Events Today: N/A    ABG Results:   Recent Labs   Lab 11/03/24  1357 11/03/24  0854 11/03/24  0548 11/02/24  2153   PH 7.33* 7.28* 7.36 7.36   PCO2 48* 53* 46* 45   PO2 103 76* 132* 153*   HCO3 25 25 26 25   O2PER 35  35 35 35  35 35  35         Current Vent Settings: FiO2 (%): 30 %, Resp: 20, Vent Mode: CMV/AC, Resp Rate (Set): 20 breaths/min, Tidal Volume (Set, mL): 500 mL, PEEP (cm H2O): 5 cmH2O, Resp Rate (Set): 20 breaths/min, Tidal Volume (Set, mL): 500 mL, PEEP (cm H2O): 5 cmH2O    Plan: Continue to monitor the patient on full ventilatory support overnight and assess for weaning daily    Mac Morales, RT on 11/3/2024 at 5:21 PM

## 2024-11-03 NOTE — PROVIDER NOTIFICATION
Md's priorly aware that unable to doppler Right radial pulse. With 2200 assessment noted that R nailbeds/finger tips now mottled. Left hand nailbeds pale w/cont'd doppler pulse. Dr Beatty notified. Given order for Arterial duplex of KATHERYN.

## 2024-11-03 NOTE — PHARMACY-VANCOMYCIN DOSING SERVICE
Pharmacy Vancomycin Note  Date of Service November 3, 2024  Patient's  1935   88 year old, male    Indication: Sepsis  Day of Therapy: 4  Current vancomycin regimen:  intermittent levels based on levels  Current vancomycin monitoring method: Renal Replacement Therapy  Current vancomycin therapeutic monitoring goal: 15-20 mg/L        Current estimated CrCl = Estimated Creatinine Clearance: 35.2 mL/min (A) (based on SCr of 1.85 mg/dL (H)).    Creatinine for last 3 days  10/31/2024: 12:50 PM Creatinine 2.52 mg/dL; 12:50 PM Creatinine 2.52 mg/dL;  7:57 PM Creatinine 2.34 mg/dL;  7:57 PM Creatinine 2.33 mg/dL  2024:  2:07 AM Creatinine 2.34 mg/dL;  2:07 AM Creatinine 2.27 mg/dL;  2:09 PM Creatinine 2.06 mg/dL; 10:06 PM Creatinine 2.06 mg/dL  2024:  5:44 AM Creatinine 1.94 mg/dL;  1:37 PM Creatinine 1.94 mg/dL;  9:53 PM Creatinine 1.90 mg/dL  11/3/2024:  5:48 AM Creatinine 1.85 mg/dL    Recent Vancomycin Levels (past 3 days)  2024:  2:41 AM Vancomycin 13.9 ug/mL  2024: 11:56 AM Vancomycin 9.2 ug/mL  11/3/2024:  5:48 AM Vancomycin 17.7 ug/mL    Vancomycin IV Administrations (past 72 hours)                     vancomycin (VANCOCIN) 1,750 mg in 0.9% NaCl 517.5 mL intermittent infusion (mg) 1,750 mg New Bag 24 1319    vancomycin (VANCOCIN) 1,750 mg in 0.9% NaCl 517.5 mL intermittent infusion (mg) 1,750 mg New Bag 24 1249    vancomycin (VANCOCIN) 2,500 mg in 0.9% NaCl 525 mL intermittent infusion (mg) 2,500 mg New Bag 10/31/24 1201                    Nephrotoxins and other renal medications (From now, onward)      Start     Dose/Rate Route Frequency Ordered Stop    24 0830  vancomycin (VANCOCIN) 1,750 mg in 0.9% NaCl 517.5 mL intermittent infusion         1,750 mg  over 2 Hours Intravenous ONCE 24 0724      24 1500  vasopressin 0.2 units/mL in NS (PITRESSIN) standard conc infusion         0.5-4 Units/hr  2.5-20 mL/hr  Intravenous CONTINUOUS 24 1438      24  "1700  norepinephrine (LEVOPHED) 16 mg in  mL infusion MAX CONC CENTRAL LINE         0.01-0.2 mcg/kg/min × 95.8 kg  0.9-18 mL/hr  Intravenous CONTINUOUS 11/01/24 1639      11/01/24 1530  piperacillin-tazobactam (ZOSYN) 4.5 g vial to attach to  mL bag        Note to Pharmacy: For [facility], [facility] and [facility]: For Zosyn-naive patients, use the \"Zosyn initial dose + extended infusion\" order panel.    4.5 g  over 30 Minutes Intravenous EVERY 6 HOURS 11/01/24 1128      10/31/24 1349  vancomycin place rico - receiving intermittent dosing         1 each Intravenous SEE ADMIN INSTRUCTIONS 10/31/24 1349                 Contrast Orders - past 72 hours (72h ago, onward)      None            Interpretation of levels and current regimen:  Vancomycin level is reflective of therapeutic level    Has serum creatinine changed greater than 50% in last 72 hours: No    Urine output:  unable to determine    Renal Function: ARF on Dialysis          Plan:  Redose with 1750 mg x 1  Vancomycin monitoring method: Renal Replacement Therapy  Vancomycin therapeutic monitoring goal: 15-20 mg/L  Pharmacy will check vancomycin levels as appropriate in 1-3 Days.  Serum creatinine levels will be ordered daily for the first week of therapy and at least twice weekly for subsequent weeks.    Chao Scott [facility]    "

## 2024-11-03 NOTE — PROGRESS NOTES
ICU Progress Note   Date of Service: 11/03/24     Assessment and Plan:  88 year old M with a history of severe 3 vessel CAD. Status post sternotomy, CABGx3, modified MAZE procedure, PAYAM ligation with Dr. Mulvihill 10/28/24. Post operative hemorrhage with RTOR on POD0 for evacuation of mediastinal blood clot causing tamponade physiology.     Interval events:   No acute events   SvO2 better on current vasopressors  LFTs down trending   Pacer rate decreased to 60  Anuric     Today:   Attempt to pull CRRT   Feeds per nutrition  Wean FiO2   Titrate pressors/inotropes in discussion with CVTS     Neuro:  # Sedation for mechanical ventilation  Propofol   RASS goal 0 to -1    Pain control: Tylenol, oxycodone, dilaudid     CV:  #Hx of atrial fibrillation and distant PE on Eliquis   #Hx NSTEMI   #Severe multivessel CAD   Status post sternotomy, CABGx3, modified MAZE procedure, PAYAM ligation with Dr. Mulvihill 10/28/24. Post operative hemorrhage with RTOR on POD0 for evacuation of mediastinal blood clot causing tamponade physiology. Postoperative course has been tenuous with mixed cardiogenic, vasoplegic shock.     Continue [place]  Trend lactate, ABG and SvO2  Per CVTS dobutamine for inotropy     Pre-operative workup:   Angiogram showed an occluded RCA, ost LAD to mid LAD of 80% and mid circ to distal circ with 99%. Echocardiogram showed an EF of 35-40%. The RV systolic function was normal. There was also mild mitral regurg.    - Echo 10/30/24: Left ventricle is normal in size. Biplane LVEF 35%. There is moderate global hypokinesia of the left ventricle. Limited view of the RV.     Plan:   SBP goal <140   MAP goal >65. Wean pressors and inotropes as able.   Cardiac meds per CVTS  Aspirin 162 mg   PTA atorvastatin - resumed per CVTS   PTA torsemide - hold     Pulm:  # Acute hypoxemic respiratory failure  - low tidal volume ventilation  - wean PEEP/FiO2, as able    CXR pending     GI:  #Hx upper GIB, gastritis   Recent admission  "discharged 9/16/2024. Endoscopy revealing gastritis.   PTA pantoprazole     #Shock liver   Treating shock as above   Trend labs     Will place [place] feeding tube. Start trophic feeds.   Bowel regimen     Renal:  Pre operative cr 1.8   Anuric kidney failure, nephrology following   CRRT started 10/30/24      #Acute kidney injury   #Lactic acidosis, improving   Secondary to epinephrine vs tissue hypoperfusion   Continue to monitor     #Hypermagnesemia, resolved.   #Hyperphosphatemia, resolved.   #Hyperkalemia, resolved.   #Hypocalcemia, resolved.   Nephrology consult   CRRT     ID:  WBC not reliable with CLL  Afebrile (but on CRRT)   Refractory shock - Continue Zosyn, Vancomycin     Cultures:   Urine 10/30/24 no growth   Sputum 10/31/24 ordered. Will obtain today.   Blood 10/29/24 NGTD   Blood 10/31/24 NGTD    Heme:  Lab Results   Component Value Date    WBC 76.4 11/03/2024    WBC 13.7 03/04/2021     Lab Results   Component Value Date    RBC 2.50 11/03/2024    RBC 6.20 03/04/2021     Lab Results   Component Value Date    HGB 7.4 11/03/2024    HGB 16.0 03/04/2021     Lab Results   Component Value Date    HCT 23.0 11/03/2024    HCT 52.2 03/04/2021     No components found for: \"MCT\"  Lab Results   Component Value Date    MCV 92 11/03/2024    MCV 84 03/04/2021     Lab Results   Component Value Date    MCH 29.6 11/03/2024    MCH 25.8 03/04/2021     Lab Results   Component Value Date    MCHC 32.2 11/03/2024    MCHC 30.7 03/04/2021     Lab Results   Component Value Date    RDW 18.8 11/03/2024    RDW 23.2 03/04/2021     Lab Results   Component Value Date     11/03/2024     03/04/2021         #Hx CLL   Follows with Dr. Raymundo hematology   Follow leukocytosis     MSK:   #Hx Myasthenia gravis     Endo:  No history of DM   A1C 4.5   Medium sliding scale.     PPx:  1. DVT: heparin    2. VAP: HOB 30 degrees, chlorhexidine rinse  3. Stress Ulcer: PPI  4. Restraints: Nonviolent soft two point restraints required and " "necessary for patient safety and continued cares and good effect as patient continues to pull at necessary lines, tubes despite education and distraction. Will readdress daily.   5. Wound care - per unit routine   6. Feeding - per dietician  7. Family updated at the bedside. They are aware of guarded prognosis.     Dispo: ICU    BP 91/57   Pulse 79   Temp 98.1  F (36.7  C)   Resp 20   Ht 1.778 m (5' 10\")   Wt 111.7 kg (246 lb 4.1 oz)   SpO2 99%   BMI 35.33 kg/m      FiO2 (%): 35 %, Resp: 20, Vent Mode: CMV/AC, Resp Rate (Set): 20 breaths/min, Tidal Volume (Set, mL): 500 mL, PEEP (cm H2O): 5 cmH2O, Resp Rate (Set): 20 breaths/min, Tidal Volume (Set, mL): 500 mL, PEEP (cm H2O): 5 cmH2O      I/Os  +12,500     Physical Exam:  In bed   Sedated, intubated   Pupils 2 mm equal and reactive to light   Chest rise equal bilaterally   CT with thin bloody output, no air leak, to suction   Amos with very little dark urine in bag  Abdomen soft, ND   Feet cool to the touch. Bilateral PT and DP with multiphasic doppler signals.   Lower extremities 2+  edema   BUE hands cool to the touch - signs of distal poor perfusion with dusky fingers in both hands. 2+ pitting edema bilaterally.     Labs: reviewed  All labs reviewed   Improved lactic acidosis, LFTs      Imaging: reviewed  AM CXR reviewed     Past Medical/Surgical history   Atrial fibrillation   Gout   GERD   Gastritis   Bilateral PE in 2007   Myasthenia gravis   HTN   Meningioma   MARTHA   Spinal stenosis     Family history   Not discussed today     Social history   Not discussed today     Ludwin Ndiaye MD  [facility] Intensivist Service  CC time 60 min    "

## 2024-11-04 LAB
ACANTHOCYTES BLD QL SMEAR: SLIGHT
ACANTHOCYTES BLD QL SMEAR: SLIGHT
ALBUMIN SERPL BCG-MCNC: 3.4 G/DL (ref 3.5–5.2)
ALBUMIN SERPL BCG-MCNC: 3.7 G/DL (ref 3.5–5.2)
ALBUMIN SERPL BCG-MCNC: 3.7 G/DL (ref 3.5–5.2)
ALLEN'S TEST: ABNORMAL
ALLEN'S TEST: ABNORMAL
ALP SERPL-CCNC: 173 U/L (ref 40–150)
ALP SERPL-CCNC: 180 U/L (ref 40–150)
ALP SERPL-CCNC: 182 U/L (ref 40–150)
ALT SERPL W P-5'-P-CCNC: 391 U/L (ref 0–70)
ALT SERPL W P-5'-P-CCNC: 438 U/L (ref 0–70)
ALT SERPL W P-5'-P-CCNC: 480 U/L (ref 0–70)
ANION GAP SERPL CALCULATED.3IONS-SCNC: 11 MMOL/L (ref 7–15)
ANION GAP SERPL CALCULATED.3IONS-SCNC: 11 MMOL/L (ref 7–15)
ANION GAP SERPL CALCULATED.3IONS-SCNC: 12 MMOL/L (ref 7–15)
AST SERPL W P-5'-P-CCNC: 281 U/L (ref 0–45)
AST SERPL W P-5'-P-CCNC: 345 U/L (ref 0–45)
AST SERPL W P-5'-P-CCNC: 404 U/L (ref 0–45)
BASE EXCESS BLDA CALC-SCNC: -0.1 MMOL/L (ref -3–3)
BASE EXCESS BLDA CALC-SCNC: 0.6 MMOL/L (ref -3–3)
BASE EXCESS BLDV CALC-SCNC: -2.6 MMOL/L (ref -3–3)
BASE EXCESS BLDV CALC-SCNC: -5.6 MMOL/L (ref -3–3)
BASE EXCESS BLDV CALC-SCNC: 0.3 MMOL/L (ref -3–3)
BASE EXCESS BLDV CALC-SCNC: 0.8 MMOL/L (ref -3–3)
BASO STIPL BLD QL SMEAR: PRESENT
BASO STIPL BLD QL SMEAR: PRESENT
BILIRUB SERPL-MCNC: 2.4 MG/DL
BILIRUB SERPL-MCNC: 2.6 MG/DL
BILIRUB SERPL-MCNC: 2.6 MG/DL
BUN SERPL-MCNC: 25.7 MG/DL (ref 8–23)
BUN SERPL-MCNC: 25.9 MG/DL (ref 8–23)
BUN SERPL-MCNC: 27.1 MG/DL (ref 8–23)
CA-I BLD-MCNC: 4.9 MG/DL (ref 4.4–5.2)
CA-I BLD-MCNC: 4.9 MG/DL (ref 4.4–5.2)
CA-I BLD-MCNC: 5 MG/DL (ref 4.4–5.2)
CALCIUM SERPL-MCNC: 7.9 MG/DL (ref 8.8–10.4)
CALCIUM SERPL-MCNC: 8.7 MG/DL (ref 8.8–10.4)
CALCIUM SERPL-MCNC: 9 MG/DL (ref 8.8–10.4)
CHLORIDE SERPL-SCNC: 100 MMOL/L (ref 98–107)
CHLORIDE SERPL-SCNC: 101 MMOL/L (ref 98–107)
CHLORIDE SERPL-SCNC: 101 MMOL/L (ref 98–107)
COHGB MFR BLD: 98.2 % (ref 95–96)
COHGB MFR BLD: 99.2 % (ref 95–96)
CREAT SERPL-MCNC: 1.73 MG/DL (ref 0.67–1.17)
CREAT SERPL-MCNC: 1.74 MG/DL (ref 0.67–1.17)
CREAT SERPL-MCNC: 1.78 MG/DL (ref 0.67–1.17)
EGFRCR SERPLBLD CKD-EPI 2021: 36 ML/MIN/1.73M2
EGFRCR SERPLBLD CKD-EPI 2021: 37 ML/MIN/1.73M2
EGFRCR SERPLBLD CKD-EPI 2021: 38 ML/MIN/1.73M2
ELLIPTOCYTES BLD QL SMEAR: SLIGHT
ELLIPTOCYTES BLD QL SMEAR: SLIGHT
ERYTHROCYTE [DISTWIDTH] IN BLOOD BY AUTOMATED COUNT: 20.4 % (ref 10–15)
ERYTHROCYTE [DISTWIDTH] IN BLOOD BY AUTOMATED COUNT: 20.5 % (ref 10–15)
ERYTHROCYTE [DISTWIDTH] IN BLOOD BY AUTOMATED COUNT: 20.5 % (ref 10–15)
FRAGMENTS BLD QL SMEAR: ABNORMAL
FRAGMENTS BLD QL SMEAR: SLIGHT
GIANT PLATELETS BLD QL SMEAR: SLIGHT
GIANT PLATELETS BLD QL SMEAR: SLIGHT
GLUCOSE BLDC GLUCOMTR-MCNC: 117 MG/DL (ref 70–99)
GLUCOSE BLDC GLUCOMTR-MCNC: 118 MG/DL (ref 70–99)
GLUCOSE BLDC GLUCOMTR-MCNC: 130 MG/DL (ref 70–99)
GLUCOSE BLDC GLUCOMTR-MCNC: 132 MG/DL (ref 70–99)
GLUCOSE BLDC GLUCOMTR-MCNC: 136 MG/DL (ref 70–99)
GLUCOSE BLDC GLUCOMTR-MCNC: 143 MG/DL (ref 70–99)
GLUCOSE SERPL-MCNC: 120 MG/DL (ref 70–99)
GLUCOSE SERPL-MCNC: 129 MG/DL (ref 70–99)
GLUCOSE SERPL-MCNC: 130 MG/DL (ref 70–99)
HCO3 BLD-SCNC: 26 MMOL/L (ref 21–28)
HCO3 BLD-SCNC: 26 MMOL/L (ref 21–28)
HCO3 BLDV-SCNC: 20 MMOL/L (ref 21–28)
HCO3 BLDV-SCNC: 24 MMOL/L (ref 21–28)
HCO3 BLDV-SCNC: 27 MMOL/L (ref 21–28)
HCO3 BLDV-SCNC: 27 MMOL/L (ref 21–28)
HCO3 SERPL-SCNC: 23 MMOL/L (ref 22–29)
HCO3 SERPL-SCNC: 24 MMOL/L (ref 22–29)
HCO3 SERPL-SCNC: 25 MMOL/L (ref 22–29)
HCT VFR BLD AUTO: 23.5 % (ref 40–53)
HCT VFR BLD AUTO: 24.1 % (ref 40–53)
HCT VFR BLD AUTO: 24.3 % (ref 40–53)
HGB BLD-MCNC: 7.6 G/DL (ref 13.3–17.7)
HGB BLD-MCNC: 7.8 G/DL (ref 13.3–17.7)
HGB BLD-MCNC: 7.8 G/DL (ref 13.3–17.7)
INR PPP: 2.11 (ref 0.85–1.15)
LACTATE SERPL-SCNC: 1.4 MMOL/L (ref 0.7–2)
LACTATE SERPL-SCNC: 1.6 MMOL/L (ref 0.7–2)
LACTATE SERPL-SCNC: 1.8 MMOL/L (ref 0.7–2)
MAGNESIUM SERPL-MCNC: 1.7 MG/DL (ref 1.7–2.3)
MAGNESIUM SERPL-MCNC: 2.1 MG/DL (ref 1.7–2.3)
MAGNESIUM SERPL-MCNC: 2.4 MG/DL (ref 1.7–2.3)
MCH RBC QN AUTO: 28.6 PG (ref 26.5–33)
MCH RBC QN AUTO: 28.9 PG (ref 26.5–33)
MCH RBC QN AUTO: 29 PG (ref 26.5–33)
MCHC RBC AUTO-ENTMCNC: 32.1 G/DL (ref 31.5–36.5)
MCHC RBC AUTO-ENTMCNC: 32.3 G/DL (ref 31.5–36.5)
MCHC RBC AUTO-ENTMCNC: 32.4 G/DL (ref 31.5–36.5)
MCV RBC AUTO: 88 FL (ref 78–100)
MCV RBC AUTO: 89 FL (ref 78–100)
MCV RBC AUTO: 90 FL (ref 78–100)
O2/TOTAL GAS SETTING VFR VENT: 30 %
OXYHGB MFR BLDV: 48 % (ref 70–75)
OXYHGB MFR BLDV: 56 % (ref 70–75)
OXYHGB MFR BLDV: 63 % (ref 70–75)
OXYHGB MFR BLDV: 65 % (ref 70–75)
PCO2 BLD: 42 MM HG (ref 35–45)
PCO2 BLD: 46 MM HG (ref 35–45)
PCO2 BLDV: 42 MM HG (ref 40–50)
PCO2 BLDV: 46 MM HG (ref 40–50)
PCO2 BLDV: 49 MM HG (ref 40–50)
PCO2 BLDV: 53 MM HG (ref 40–50)
PEEP: 5 CM H2O
PEEP: 5 CM H2O
PH BLD: 7.35 [PH] (ref 7.35–7.45)
PH BLD: 7.4 [PH] (ref 7.35–7.45)
PH BLDV: 7.3 [PH] (ref 7.32–7.43)
PH BLDV: 7.31 [PH] (ref 7.32–7.43)
PH BLDV: 7.32 [PH] (ref 7.32–7.43)
PH BLDV: 7.35 [PH] (ref 7.32–7.43)
PHOSPHATE SERPL-MCNC: 1.9 MG/DL (ref 2.5–4.5)
PHOSPHATE SERPL-MCNC: 2.2 MG/DL (ref 2.5–4.5)
PHOSPHATE SERPL-MCNC: 2.2 MG/DL (ref 2.5–4.5)
PLAT MORPH BLD: ABNORMAL
PLAT MORPH BLD: ABNORMAL
PLATELET # BLD AUTO: 194 10E3/UL (ref 150–450)
PLATELET # BLD AUTO: 195 10E3/UL (ref 150–450)
PLATELET # BLD AUTO: 212 10E3/UL (ref 150–450)
PO2 BLD: 87 MM HG (ref 80–105)
PO2 BLD: 96 MM HG (ref 80–105)
PO2 BLDV: 26 MM HG (ref 25–47)
PO2 BLDV: 30 MM HG (ref 25–47)
PO2 BLDV: 35 MM HG (ref 25–47)
PO2 BLDV: 35 MM HG (ref 25–47)
POLYCHROMASIA BLD QL SMEAR: SLIGHT
POLYCHROMASIA BLD QL SMEAR: SLIGHT
POTASSIUM SERPL-SCNC: 3.3 MMOL/L (ref 3.4–5.3)
POTASSIUM SERPL-SCNC: 3.4 MMOL/L (ref 3.4–5.3)
POTASSIUM SERPL-SCNC: 3.7 MMOL/L (ref 3.4–5.3)
POTASSIUM SERPL-SCNC: 4.7 MMOL/L (ref 3.4–5.3)
PROT SERPL-MCNC: 4.8 G/DL (ref 6.4–8.3)
PROT SERPL-MCNC: 5 G/DL (ref 6.4–8.3)
PROT SERPL-MCNC: 5.1 G/DL (ref 6.4–8.3)
RBC # BLD AUTO: 2.66 10E6/UL (ref 4.4–5.9)
RBC # BLD AUTO: 2.69 10E6/UL (ref 4.4–5.9)
RBC # BLD AUTO: 2.7 10E6/UL (ref 4.4–5.9)
RBC AGGLUT BLD QL: ABNORMAL
RBC MORPH BLD: ABNORMAL
RBC MORPH BLD: ABNORMAL
SAO2 % BLDA: 95 % (ref 92–100)
SAO2 % BLDA: 96 % (ref 92–100)
SAO2 % BLDV: 49.9 % (ref 70–75)
SAO2 % BLDV: 57.9 % (ref 70–75)
SAO2 % BLDV: 64.9 % (ref 70–75)
SAO2 % BLDV: 67.2 % (ref 70–75)
SODIUM SERPL-SCNC: 135 MMOL/L (ref 135–145)
SODIUM SERPL-SCNC: 136 MMOL/L (ref 135–145)
SODIUM SERPL-SCNC: 137 MMOL/L (ref 135–145)
VANCOMYCIN SERPL-MCNC: 18.7 UG/ML
WBC # BLD AUTO: 60.9 10E3/UL (ref 4–11)
WBC # BLD AUTO: 63.2 10E3/UL (ref 4–11)
WBC # BLD AUTO: 66.4 10E3/UL (ref 4–11)

## 2024-11-04 PROCEDURE — 250N000011 HC RX IP 250 OP 636: Performed by: SURGERY

## 2024-11-04 PROCEDURE — 94003 VENT MGMT INPAT SUBQ DAY: CPT

## 2024-11-04 PROCEDURE — 82805 BLOOD GASES W/O2 SATURATION: CPT | Performed by: PHYSICIAN ASSISTANT

## 2024-11-04 PROCEDURE — 250N000013 HC RX MED GY IP 250 OP 250 PS 637: Performed by: STUDENT IN AN ORGANIZED HEALTH CARE EDUCATION/TRAINING PROGRAM

## 2024-11-04 PROCEDURE — 85610 PROTHROMBIN TIME: CPT | Performed by: PHYSICIAN ASSISTANT

## 2024-11-04 PROCEDURE — 250N000013 HC RX MED GY IP 250 OP 250 PS 637: Performed by: SURGERY

## 2024-11-04 PROCEDURE — 250N000009 HC RX 250: Performed by: PHYSICIAN ASSISTANT

## 2024-11-04 PROCEDURE — 250N000009 HC RX 250: Performed by: INTERNAL MEDICINE

## 2024-11-04 PROCEDURE — 250N000011 HC RX IP 250 OP 636: Mod: JZ | Performed by: PHYSICIAN ASSISTANT

## 2024-11-04 PROCEDURE — 85041 AUTOMATED RBC COUNT: CPT | Performed by: PHYSICIAN ASSISTANT

## 2024-11-04 PROCEDURE — 85014 HEMATOCRIT: CPT | Performed by: PHYSICIAN ASSISTANT

## 2024-11-04 PROCEDURE — 82330 ASSAY OF CALCIUM: CPT | Performed by: PHYSICIAN ASSISTANT

## 2024-11-04 PROCEDURE — 999N000157 HC STATISTIC RCP TIME EA 10 MIN

## 2024-11-04 PROCEDURE — 120N000004 HC R&B MS OVERFLOW

## 2024-11-04 PROCEDURE — 82040 ASSAY OF SERUM ALBUMIN: CPT | Performed by: PHYSICIAN ASSISTANT

## 2024-11-04 PROCEDURE — 84132 ASSAY OF SERUM POTASSIUM: CPT | Performed by: STUDENT IN AN ORGANIZED HEALTH CARE EDUCATION/TRAINING PROGRAM

## 2024-11-04 PROCEDURE — 250N000011 HC RX IP 250 OP 636: Performed by: STUDENT IN AN ORGANIZED HEALTH CARE EDUCATION/TRAINING PROGRAM

## 2024-11-04 PROCEDURE — 84100 ASSAY OF PHOSPHORUS: CPT | Performed by: STUDENT IN AN ORGANIZED HEALTH CARE EDUCATION/TRAINING PROGRAM

## 2024-11-04 PROCEDURE — 80202 ASSAY OF VANCOMYCIN: CPT | Performed by: STUDENT IN AN ORGANIZED HEALTH CARE EDUCATION/TRAINING PROGRAM

## 2024-11-04 PROCEDURE — 250N000011 HC RX IP 250 OP 636: Performed by: INTERNAL MEDICINE

## 2024-11-04 PROCEDURE — 90945 DIALYSIS ONE EVALUATION: CPT | Performed by: INTERNAL MEDICINE

## 2024-11-04 PROCEDURE — 90947 DIALYSIS REPEATED EVAL: CPT

## 2024-11-04 PROCEDURE — G0463 HOSPITAL OUTPT CLINIC VISIT: HCPCS

## 2024-11-04 PROCEDURE — 250N000009 HC RX 250: Performed by: STUDENT IN AN ORGANIZED HEALTH CARE EDUCATION/TRAINING PROGRAM

## 2024-11-04 PROCEDURE — P9047 ALBUMIN (HUMAN), 25%, 50ML: HCPCS | Mod: JZ | Performed by: PHYSICIAN ASSISTANT

## 2024-11-04 PROCEDURE — 83735 ASSAY OF MAGNESIUM: CPT | Performed by: PHYSICIAN ASSISTANT

## 2024-11-04 PROCEDURE — 258N000003 HC RX IP 258 OP 636: Performed by: STUDENT IN AN ORGANIZED HEALTH CARE EDUCATION/TRAINING PROGRAM

## 2024-11-04 PROCEDURE — 84155 ASSAY OF PROTEIN SERUM: CPT | Performed by: PHYSICIAN ASSISTANT

## 2024-11-04 PROCEDURE — 999N000287 HC ICU ADULT ROUNDING, EACH 10 MINS

## 2024-11-04 PROCEDURE — 258N000003 HC RX IP 258 OP 636: Performed by: SURGERY

## 2024-11-04 PROCEDURE — 258N000003 HC RX IP 258 OP 636: Performed by: PHYSICIAN ASSISTANT

## 2024-11-04 PROCEDURE — 83605 ASSAY OF LACTIC ACID: CPT | Performed by: PHYSICIAN ASSISTANT

## 2024-11-04 PROCEDURE — 99291 CRITICAL CARE FIRST HOUR: CPT | Mod: 24 | Performed by: INTERNAL MEDICINE

## 2024-11-04 PROCEDURE — 250N000013 HC RX MED GY IP 250 OP 250 PS 637: Performed by: PHYSICIAN ASSISTANT

## 2024-11-04 PROCEDURE — 258N000001 HC RX 258: Performed by: PHYSICIAN ASSISTANT

## 2024-11-04 RX ORDER — POTASSIUM CHLORIDE 1.5 G/1.58G
40 POWDER, FOR SOLUTION ORAL ONCE
Status: COMPLETED | OUTPATIENT
Start: 2024-11-04 | End: 2024-11-04

## 2024-11-04 RX ORDER — POTASSIUM CHLORIDE 1.5 G/1.58G
40 POWDER, FOR SOLUTION ORAL ONCE
Status: COMPLETED | OUTPATIENT
Start: 2024-11-05 | End: 2024-11-05

## 2024-11-04 RX ORDER — ALBUMIN (HUMAN) 12.5 G/50ML
25 SOLUTION INTRAVENOUS EVERY 12 HOURS
Status: DISCONTINUED | OUTPATIENT
Start: 2024-11-04 | End: 2024-11-14

## 2024-11-04 RX ORDER — AMINO ACIDS/PROTEIN HYDROLYS 11G-40/45
1 LIQUID IN PACKET (ML) ORAL 3 TIMES DAILY
Status: DISCONTINUED | OUTPATIENT
Start: 2024-11-04 | End: 2024-11-08

## 2024-11-04 RX ADMIN — SENNOSIDES AND DOCUSATE SODIUM 1 TABLET: 50; 8.6 TABLET ORAL at 20:32

## 2024-11-04 RX ADMIN — CALCIUM CHLORIDE, MAGNESIUM CHLORIDE, DEXTROSE MONOHYDRATE, LACTIC ACID, SODIUM CHLORIDE, SODIUM BICARBONATE AND POTASSIUM CHLORIDE 5000 ML: 5.15; 2.03; 22; 5.4; 6.46; 3.09; .157 INJECTION INTRAVENOUS at 22:23

## 2024-11-04 RX ADMIN — CALCIUM CHLORIDE, MAGNESIUM CHLORIDE, DEXTROSE MONOHYDRATE, LACTIC ACID, SODIUM CHLORIDE, SODIUM BICARBONATE AND POTASSIUM CHLORIDE 5000 ML: 5.15; 2.03; 22; 5.4; 6.46; 3.09; .157 INJECTION INTRAVENOUS at 11:03

## 2024-11-04 RX ADMIN — Medication 25 MCG/HR: at 18:07

## 2024-11-04 RX ADMIN — PIPERACILLIN AND TAZOBACTAM 4.5 G: 4; .5 INJECTION, POWDER, FOR SOLUTION INTRAVENOUS at 09:48

## 2024-11-04 RX ADMIN — Medication 1 PACKET: at 14:52

## 2024-11-04 RX ADMIN — VASOPRESSIN 4 UNITS/HR: 20 INJECTION INTRAVENOUS at 06:43

## 2024-11-04 RX ADMIN — VASOPRESSIN 4 UNITS/HR: 20 INJECTION INTRAVENOUS at 11:56

## 2024-11-04 RX ADMIN — POTASSIUM CHLORIDE 40 MEQ: 1.5 POWDER, FOR SOLUTION ORAL at 06:47

## 2024-11-04 RX ADMIN — VASOPRESSIN 4 UNITS/HR: 20 INJECTION INTRAVENOUS at 17:21

## 2024-11-04 RX ADMIN — Medication 40 MG: at 08:18

## 2024-11-04 RX ADMIN — CHLORHEXIDINE GLUCONATE 0.12% ORAL RINSE 15 ML: 1.2 LIQUID ORAL at 08:28

## 2024-11-04 RX ADMIN — VASOPRESSIN 4 UNITS/HR: 20 INJECTION INTRAVENOUS at 22:48

## 2024-11-04 RX ADMIN — CALCIUM CHLORIDE, MAGNESIUM CHLORIDE, DEXTROSE MONOHYDRATE, LACTIC ACID, SODIUM CHLORIDE, SODIUM BICARBONATE AND POTASSIUM CHLORIDE 5000 ML: 5.15; 2.03; 22; 5.4; 6.46; 3.09; .157 INJECTION INTRAVENOUS at 03:37

## 2024-11-04 RX ADMIN — EPINEPHRINE 0.07 MCG/KG/MIN: 1 INJECTION INTRAMUSCULAR; INTRAVENOUS; SUBCUTANEOUS at 14:55

## 2024-11-04 RX ADMIN — ALBUMIN HUMAN 25 G: 0.25 SOLUTION INTRAVENOUS at 08:11

## 2024-11-04 RX ADMIN — NOREPINEPHRINE BITARTRATE 0.18 MCG/KG/MIN: 0.06 INJECTION, SOLUTION INTRAVENOUS at 12:37

## 2024-11-04 RX ADMIN — PROPOFOL 15 MCG/KG/MIN: 10 INJECTION, EMULSION INTRAVENOUS at 06:47

## 2024-11-04 RX ADMIN — POTASSIUM CHLORIDE 20 MEQ: 29.8 INJECTION, SOLUTION INTRAVENOUS at 12:00

## 2024-11-04 RX ADMIN — ALBUMIN HUMAN 25 G: 0.25 SOLUTION INTRAVENOUS at 00:26

## 2024-11-04 RX ADMIN — CALCIUM CHLORIDE, MAGNESIUM CHLORIDE, DEXTROSE MONOHYDRATE, LACTIC ACID, SODIUM CHLORIDE, SODIUM BICARBONATE AND POTASSIUM CHLORIDE 5000 ML: 5.15; 2.03; 22; 5.4; 6.46; 3.09; .157 INJECTION INTRAVENOUS at 00:15

## 2024-11-04 RX ADMIN — Medication 60 ML: at 08:25

## 2024-11-04 RX ADMIN — SODIUM PHOSPHATE, MONOBASIC, MONOHYDRATE AND SODIUM PHOSPHATE, DIBASIC, ANHYDROUS 15 MMOL: 142; 276 INJECTION, SOLUTION INTRAVENOUS at 08:14

## 2024-11-04 RX ADMIN — CALCIUM CHLORIDE, MAGNESIUM CHLORIDE, DEXTROSE MONOHYDRATE, LACTIC ACID, SODIUM CHLORIDE, SODIUM BICARBONATE AND POTASSIUM CHLORIDE 5000 ML: 5.15; 2.03; 22; 5.4; 6.46; 3.09; .157 INJECTION INTRAVENOUS at 03:36

## 2024-11-04 RX ADMIN — Medication 1 PACKET: at 23:35

## 2024-11-04 RX ADMIN — CALCIUM CHLORIDE, MAGNESIUM CHLORIDE, DEXTROSE MONOHYDRATE, LACTIC ACID, SODIUM CHLORIDE, SODIUM BICARBONATE AND POTASSIUM CHLORIDE 5000 ML: 5.15; 2.03; 22; 5.4; 6.46; 3.09; .157 INJECTION INTRAVENOUS at 21:07

## 2024-11-04 RX ADMIN — Medication 40 MG: at 15:37

## 2024-11-04 RX ADMIN — CALCIUM CHLORIDE, MAGNESIUM CHLORIDE, DEXTROSE MONOHYDRATE, LACTIC ACID, SODIUM CHLORIDE, SODIUM BICARBONATE AND POTASSIUM CHLORIDE 5000 ML: 5.15; 2.03; 22; 5.4; 6.46; 3.09; .157 INJECTION INTRAVENOUS at 07:00

## 2024-11-04 RX ADMIN — EPINEPHRINE 0.07 MCG/KG/MIN: 1 INJECTION INTRAMUSCULAR; INTRAVENOUS; SUBCUTANEOUS at 05:09

## 2024-11-04 RX ADMIN — CALCIUM CHLORIDE, MAGNESIUM CHLORIDE, DEXTROSE MONOHYDRATE, LACTIC ACID, SODIUM CHLORIDE, SODIUM BICARBONATE AND POTASSIUM CHLORIDE 5000 ML: 5.15; 2.03; 22; 5.4; 6.46; 3.09; .157 INJECTION INTRAVENOUS at 17:39

## 2024-11-04 RX ADMIN — VASOPRESSIN 4 UNITS/HR: 20 INJECTION INTRAVENOUS at 01:11

## 2024-11-04 RX ADMIN — PIPERACILLIN AND TAZOBACTAM 4.5 G: 4; .5 INJECTION, POWDER, FOR SOLUTION INTRAVENOUS at 04:00

## 2024-11-04 RX ADMIN — DEXTROSE MONOHYDRATE: 100 INJECTION, SOLUTION INTRAVENOUS at 18:38

## 2024-11-04 RX ADMIN — ASPIRIN 81 MG CHEWABLE TABLET 162 MG: 81 TABLET CHEWABLE at 08:18

## 2024-11-04 RX ADMIN — CHLORHEXIDINE GLUCONATE 0.12% ORAL RINSE 15 ML: 1.2 LIQUID ORAL at 20:32

## 2024-11-04 RX ADMIN — CALCIUM CHLORIDE, MAGNESIUM CHLORIDE, DEXTROSE MONOHYDRATE, LACTIC ACID, SODIUM CHLORIDE, SODIUM BICARBONATE AND POTASSIUM CHLORIDE: 5.15; 2.03; 22; 5.4; 6.46; 3.09; .157 INJECTION INTRAVENOUS at 03:36

## 2024-11-04 RX ADMIN — Medication 60 ML: at 22:00

## 2024-11-04 RX ADMIN — CALCIUM CHLORIDE, MAGNESIUM CHLORIDE, DEXTROSE MONOHYDRATE, LACTIC ACID, SODIUM CHLORIDE, SODIUM BICARBONATE AND POTASSIUM CHLORIDE 5000 ML: 5.15; 2.03; 22; 5.4; 6.46; 3.09; .157 INJECTION INTRAVENOUS at 16:27

## 2024-11-04 RX ADMIN — Medication 1 PACKET: at 16:38

## 2024-11-04 RX ADMIN — PROPOFOL 15 MCG/KG/MIN: 10 INJECTION, EMULSION INTRAVENOUS at 14:31

## 2024-11-04 RX ADMIN — ALBUMIN HUMAN 25 G: 0.25 SOLUTION INTRAVENOUS at 19:46

## 2024-11-04 RX ADMIN — Medication 60 ML: at 15:36

## 2024-11-04 RX ADMIN — CALCIUM CHLORIDE, MAGNESIUM CHLORIDE, DEXTROSE MONOHYDRATE, LACTIC ACID, SODIUM CHLORIDE, SODIUM BICARBONATE AND POTASSIUM CHLORIDE 5000 ML: 5.15; 2.03; 22; 5.4; 6.46; 3.09; .157 INJECTION INTRAVENOUS at 10:25

## 2024-11-04 RX ADMIN — MAGNESIUM SULFATE HEPTAHYDRATE 2 G: 40 INJECTION, SOLUTION INTRAVENOUS at 14:50

## 2024-11-04 RX ADMIN — DEXTROSE MONOHYDRATE: 100 INJECTION, SOLUTION INTRAVENOUS at 07:34

## 2024-11-04 ASSESSMENT — ACTIVITIES OF DAILY LIVING (ADL)
ADLS_ACUITY_SCORE: 0
DEPENDENT_IADLS:: CLEANING;COOKING;LAUNDRY;SHOPPING;MEAL PREPARATION;TRANSPORTATION
ADLS_ACUITY_SCORE: 0

## 2024-11-04 NOTE — CONSULTS
[facility] Nurse Inpatient Assessment     Consulted for:  Left groin    Summary:    Left groin: large shallow breakdown s/p prior lines and adhesive dressings, now weepy and sloughy in areas, no s/s infection.  Pt remains very ill in ICU and has fluid issues.  For now will focus on managing drainage.    Right sacrum/ upper buttock: small area of persistent erythema <1cm, intact, unclear if related to pressure vs minor trauma.  Incidental finding 11/4, stable.   Perianal: pt has internal fecal management system in place.  Purplish area noted above perianal, unclear if related to pressure from tubing vs varicosities. Incidental finding 11/4, continue to monitor.     Patient History (according to provider note(s):      88 year old M with a history of severe 3 vessel CAD. Status post sternotomy, CABGx3, modified MAZE procedure, PAYAM ligation with Dr. Mulvihill 10/28/24. Post operative hemorrhage with RTOR on POD0 for evacuation of mediastinal blood clot causing tamponade physiology.   Course subsequently complicated by persistent shock (appears mixed cardigenic/vasoplegic), and ongoing respiratory failure.    Areas Assessed:      Areas visualized during today's visit: Perineal area, Sacrum/coccyx, and left groin    Wound location: Left groin    Last photo: 11-4-24      Wound due to: Moisture Associated Skin Damage (MASD) and Medical Adhesive Related Skin Injury (MARSI)  Wound history/plan of care: per report, pt had prior adhesives to area.  11/4 pt in ICU on CRRT, has anasarca, and has body warmer over him.    Wound base: groin area has yellow and pink moist filmy tissue; upper thigh has moist pink-yellow weepy puncture wound from prior lines       Palpation of the wound bed: normal      Drainage: moderate     Description of drainage: serous and scant creamy     Measurements (length x width x depth, in cm): roughly 5 x 17 x 0.2cm,irregular; thigh puncture wound approx 0.4 x 0.4 x 0.3+cm       Tunneling: N/A     Undermining: N/A  Periwound skin: scattered scabbing, no erythema      Color: normal and consistent with surrounding tissue      Temperature: cool  Odor: none  Pain: unable to assess due to  sedation    Pain interventions prior to dressing change: slow and gentle cares   Treatment goal: Drainage control and Infection control/prevention  STATUS: initial assessment  Supplies ordered: supplies stored on unit      Wound location: Right sacrum/ upper buttock    Last photo: 11-4-24      Wound due to: pressure vs minor trauma  Wound history/plan of care: unclear  Wound base: pale red intact tissue, not easily blanchable, not on bony prominence     Palpation of the wound bed: normal      Drainage: none     Measurements (length x width x depth, in cm): approx 0.9 x 0.9 x 0cm   Periwound skin: Intact      Color: normal and consistent with surrounding tissue      Temperature: normal   Pain: unable to assess due to  sedation    Pain interventions prior to dressing change: slow and gentle cares   Treatment goal: Heal  and Protection  STATUS: healing/improving per Nursing  Supplies ordered: supplies stored on unit      Wound location: Perianal    Last photo: 11-4-24      Wound due to: pressure from tubing vs other  Wound history/plan of care: pt has internal fecal management system in place, per nursing this was placed last night 11/3. Pt is in ICU and is being repositioned frequently.   Wound base: pink-purple non-blanching intact tissue, located at approx 12 o'clock above perianal, not contiguous with tubing insertion site      Palpation of the wound bed: normal      Drainage: none     Measurements (length x width x depth, in cm): approx 1.8 x 0.8 x 0cm   Periwound skin: Intact      Color: normal and consistent with surrounding tissue      Temperature: normal   Pain: unable to assess due to  sedation    Pain interventions prior to dressing change: slow and gentle cares   Treatment goal: Heal  and  Protection  STATUS: initial assessment  Supplies ordered: supplies stored on unit       Treatment Plan:     Left groin wound(s): Daily to every other day if dressing remains clean/dry/intact:   Cleanse with wound cleanser, dry well.  Swab periwound areas with no-sting skin barrier, let dry.  Apply Aquacel Ag to open areas, cut to fit approximate size.    Cover with foam dressing(s).      Pressure Injury Prevention (PIP) Plan:  -Mattress: low air loss  -HOB: Maintain at or below 30 degrees, unless contraindicated  -Repositioning in bed: Every 1-2 hours , Left/right positioning; avoid supine, Raise foot of bed prior to raising head of bed, to reduce patient sliding down (shear), and Frequent microturns using TAPS wedges, as patient tolerates  -Heels: Keep elevated off mattress and Heel lift boots  -Protective Dressing: Sacral Mepilex for prevention (#[id number]),  especially for the agitated patient   -Positioning Equipment: TAPS wedges (#[id number]) to help maintain 30 degree side lying position   -Moisture Management: Perineal cleansing /protection: Follow Incontinence Protocol, Avoid brief in bed, and Clean and dry skin folds with bathing   -Under Devices: Inspect skin under all medical devices during skin inspection , Ensure tubes are stabilized without tension, and Ensure patient is not lying on medical devices or equipment when repositioned        Orders: Written    RECOMMEND PRIMARY TEAM ORDER: None, at this time  Education provided: plan of care  Discussed plan of care with: Nurse  WOC nurse follow-up plan:  weekly and prn  Notify WOC if wound(s) deteriorate.  Nursing to notify the Provider(s) and re-consult the WOC Nurse if new skin concern.    DATA:     Current support surface: Standard  Low air loss (SHEN pump, Isolibrium, Pulsate) in ICU   Containment of urine/stool: Indwelling catheter and Internal fecal management  BMI: Body mass index is 34.61 kg/m .   Active diet order: None     Output: I/O last 3 completed  shifts:  In: 5045.24 [I.V.:3895.24; NG/GT:240]  Out: 7795 [Urine:34; Other:6006; Stool:175; Chest Tube:1580]     Labs:   Recent Labs   Lab 11/04/24  0545   ALBUMIN 3.4*   HGB 7.8*   INR 2.11*   WBC 63.2*     Pressure injury risk assessment:   Sensory Perception: 1-->completely limited  Moisture: 4-->rarely moist  Activity: 1-->bedfast  Mobility: 1-->completely immobile  Nutrition: 2-->probably inadequate  Friction and Shear: 1-->problem  Sawyer Score: 10    Rosa Maria Brandt RN CWOCN  -Securely message with [employer] ([facility] [employer])  Preferred  -[facility] Office Phone: [phone] (messages checked periodically Mon-Fri 8a-4p)

## 2024-11-04 NOTE — PLAN OF CARE
Goal Outcome Evaluation:      Plan of Care Reviewed With: spouse    Overall Patient Progress: no changeOverall Patient Progress: no change    Outcome Evaluation: Remains intubated, sedated, CRRT, et on same 3 pressors. Able to increase fluid pull to 400cc/hr with minimal increase in norepi need. Weight down approx 3kg this am. lungs now cta posteriorly.  3 episodes of a few cc of uop, otherwise anuric. Cont'd need for thermoguard use to maintain temp. Temporary pacer remains on backup rate of 60 with pts intrinsic rhythm cont in controlled afib. Woc consult for healing tape burn L groin. Wife updated via phone this am

## 2024-11-04 NOTE — PROGRESS NOTES
[facility]  Cardiovascular and Thoracic Surgery Daily Note      Assessment and Plan  POD # 6 s/p CABG x 3 (LIMA to LAD, SVG to OM, SVG to RCA), Modified left atrial MAZE ([facility]), and occlusion of left atrial appendage (50 mm Atriclip) on 10/28 with Dr. Michael Mulvihill.    POD # 6 s/p return to OR for mediastinal re-exploration, washout    - CVS: Pre-op TTE with EF 35-40%. Postop TTE 10/30 with EF ~35% on high-dose inotropic support.   Postop mixed cardiogenic/vasoplegic/hypovolemic shock. Lactic acidosis cleared and SVO2 stabilized. CI goal >2.0 (calculated elaine), stop DBU 11/4 and trend SVO2. NE and vasopressin to MAP goal 65, wean as able.   Hypervolemic, volume management via CRRT, pull as able. CVP goal ~10-12. Now tolerating more aggressive removal. Albumin 25% push BID to pull volume intravascular.  Hypothermic since initiation on CRRT, Thermagaurd catheter placed 10/31, target 37 C.   Stress dose steroids started 10/31, off 11/02 PM.  History of paroxysmal atrial fibrillation, continues with intermittent afib and accelerated junctional at times. Previously being atrial paced at 100, now appears to have improved BP without pacing (intrinsic rate ~80 BP, sinus rhythm with intermittent atrial fibrillation). Amio discontinued with shock liver. Defer systemic anticoagulation at present (ok for citrate for CRRT machine if needed).   Absent right radial pulse (previous arterial line in this location). ICU MD did bedside US, ulnar artery patent. Right hand warm, good capillary refill. Increased duskiness of right hand noted after starting vasopressin, which demonstrated patent arterial flow with slow biphasic flow at the distal radial artery (site of previous arterial line).   Aspirin 162 mg daily, defer statin with ALI.    Chest tubes: output 1580 mL, serosang, no air leak. TPW: AAI back up rate of 50    - Resp: Acute hypoxemic and hypercapnic respiratory failure, stable. Non-compliant with  ventilator 11/02, improved with deeper sedation, but required increased pressor support with deeper sedation. Trial of PST 11/03, patient quickly became hypercarbic and acidotic. Attempt to optimize fluid status today, possible PST tomorrow morning.      - Neuro: Sedated with propofol while intubated. Add fentanyl infusion and Versed PRN to improve vent compliance. History of myasthenia gravis. Purposeful upper extremity movements and spontaneous LE movement when sedation lightened.     - Renal: CKD stage 3, baseline Cr ~1.8. Postop oliguric/anuric JAVIER. Nephrology consulted, CRRT started 10/30. Avoid nephrotoxins.   Recent Labs   Lab 11/04/24  0545 11/03/24  2156 11/03/24  1357   CR 1.73* 1.80* 1.82*       - GI: +BM, +flatus, continue bowel regimen. Shock liver, improving. Trend LFTs, avoid hepatotoxins. Recent admission for GIB 09/2024, increased pantoprazole to BID.    - : Amos in place, continue for UOP monitoring/hemodynamic status    - Endo: Postop stress hyperglycemia, resolved. Insulin infusion transitioned to sliding scale insulin. Stress dose steroids started 10/31 as above.   Hemoglobin A1C   Date Value Ref Range Status   10/09/2024 4.5 <5.7 % Final     Comment:     Normal <5.7%   Prediabetes 5.7-6.4%    Diabetes 6.5% or higher     Note: Adopted from ADA consensus guidelines.        - FEN: Replace electrolytes as needed. Initiated on trophic feeds via OG 11/1, NJ placed 11/2 after INR came down, TF increase to goal.      - ID: WBC chronically elevated and hypothermic on CRRT as above so difficult to assess for possible infection; start empiric Vanc/Zosyn 10/31, will discontinue 11/04. Blood, urine, respiratory cultures NGTD. WBC 63.2.  Trend CBC and fever curve.   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 Acute blood loss anemia due to surgery. Postop coagulopathy, improved (required multiple blood transfusions and blood productions immediately postop). Hgb down-trending, 1 unit RBCs 11/3. Trend CBC, transfuse PRN.   Recent Labs   Lab 11/04/24  0545 11/03/24  2156 11/03/24  1357   HGB 7.8* 8.2* 8.2*    207 211       - Proph: SCD, subcutaneous heparin, PPI    - Other:  Clinically Significant Risk Factors        # Hypokalemia: Lowest K = 3.3 mmol/L in last 2 days, will replace as needed    # Hypocalcemia: Lowest iCa = 4.3 mg/dL in last 2 days, will monitor and replace as appropriate     # Hypoalbuminemia: Lowest albumin = 2.7 g/dL at 11/2/2024  5:44 AM, will monitor as appropriate    # Coagulation Defect: INR = 2.11 (Ref range: 0.85 - 1.15) and/or PTT = 42 Seconds (Ref range: 22 - 38 Seconds), will monitor for bleeding    # Hypertension: Noted on problem list  # Chronic heart failure with reduced ejection fraction: last echo with EF <40%   # Acute Hypercapnic Respiratory Failure: based on arterial blood gas results.  Continue supplemental oxygen and ventilatory support as indicated.  # Acute Hypercapnic Respiratory Failure: based on venous blood gas results.  Continue supplemental oxygen and ventilatory support as indicated.      #Acute blood loss anemia: Lowest Hgb this hospitalization: 6.9 g/dL. Will continue to monitor and treat/transfuse as appropriate.     # Obesity: Estimated body mass index is 34.61 kg/m  as calculated from the following:    Height as of this encounter: 1.778 m (5' 10\").    Weight as of this encounter: 109.4 kg (241 lb 2.9 oz).       # History of CABG: noted on surgical history       - Dispo: ICU. Guarded prognosis but continues with improvements in clearing lactate, improving liver function, and more stable CI now off DBU. Family updated multiple times at bedside. Medically Ready for Discharge: Anticipated in 5+ Days      Interval History  Tolerating more aggressive volume " removal without significant increase in pressor requirements. No PST today. Able to stop DBU, SVO2 stable.       Medications  Current Facility-Administered Medications   Medication Dose Route Frequency Provider Last Rate Last Admin    albumin human 25 % injection 25 g  25 g Intravenous Q12H Arlin Hodge PA-C   25 g at 11/04/24 0811    aspirin (ASA) chewable tablet 162 mg  162 mg Oral or NG Tube Daily Clarence Bone MD   162 mg at 11/04/24 0818    chlorhexidine (PERIDEX) 0.12 % solution 15 mL  15 mL Swish & Spit BID Zac Kaur DO   15 mL at 11/04/24 0828    insulin aspart (NovoLOG) injection (RAPID ACTING)  1-7 Units Subcutaneous Q4H Sb Arthur PA-C   1 Units at 11/03/24 0100    pantoprazole (PROTONIX) 2 mg/mL suspension 40 mg  40 mg Oral or NG Tube BID AC Arlin Hodge PA-C   40 mg at 11/04/24 0818    Or    pantoprazole (PROTONIX) EC tablet 40 mg  40 mg Oral BID AC Arlin Hodge PA-C        piperacillin-tazobactam (ZOSYN) 4.5 g vial to attach to  mL bag  4.5 g Intravenous Q6H Mulvihill, Michael, MD   4.5 g at 11/04/24 0948    polyethylene glycol (MIRALAX) Packet 17 g  17 g Oral or Feeding Tube Daily Mulvihill, Michael, MD   17 g at 11/03/24 0812    Prosource TF20 ENfit Compatibl EN LIQD (PROSOURCE TF20) packet 60 mL  1 packet Per Feeding Tube BID 09 12 Arlin Hodge PA-C   60 mL at 11/04/24 0825    senna-docusate (SENOKOT-S/PERICOLACE) 8.6-50 MG per tablet 1 tablet  1 tablet Oral or Feeding Tube BID Mulvihill, Michael, MD   1 tablet at 11/03/24 2212    sodium chloride (PF) 0.9% PF flush 3 mL  3 mL Intracatheter Q8H Clarence Bone MD   3 mL at 11/04/24 0628    sodium phosphate 15 mmol in NS 250mL intermittent infusion  15 mmol Intravenous Q2H Mulvihill, Michael, MD   Rate Verify at 11/04/24 0956    vancomycin place rico - receiving intermittent dosing  1 each Intravenous See Admin Instructions Mulvihill, Michael, MD         Current Facility-Administered  Medications   Medication Dose Route Frequency Provider Last Rate Last Admin    acetaminophen (TYLENOL) tablet 650 mg  650 mg Oral Q4H PRN Clarence Bone MD        bisacodyl (DULCOLAX) suppository 10 mg  10 mg Rectal Daily PRN Clarence Bone MD        calcium gluconate 2 g in  mL intermittent infusion  2 g Intravenous Q8H PRN Farhad Boland MD        calcium gluconate 4 g in sodium chloride 0.9 % 100 mL intermittent infusion  4 g Intravenous Q8H PRN Farhad Boland MD        dextrose 10% infusion   Intravenous Continuous PRN Arlin Hodge PA-C 30 mL/hr at 11/03/24 1000 Rate Verify at 11/03/24 1000    glucose gel 15-30 g  15-30 g Oral Q15 Min PRN Nasir Vallecillo MD        Or    dextrose 50 % injection 25-50 mL  25-50 mL Intravenous Q15 Min PRN Nasir Vallecillo MD        Or    glucagon injection 1 mg  1 mg Subcutaneous Q15 Min PRN Nasir Vallecillo MD        EPINEPHrine (ADRENALIN) 5 mg in  mL infusion  0.01-0.1 mcg/kg/min Intravenous Continuous PRN Clarence Bone MD 20.1 mL/hr at 11/04/24 0733 0.07 mcg/kg/min at 11/04/24 0733    fentaNYL (SUBLIMAZE) 50 mcg/mL bolus from pump  100 mcg Intravenous Q1H PRN Key Dobson MD   50 mcg at 11/03/24 0135    heparin lock flush 10 unit/mL injection 3 mL  3 mL Intracatheter Q1H PRN Tyra Dubois MD        hydrALAZINE (APRESOLINE) injection 10 mg  10 mg Intravenous Q30 Min PRN Clarence Bone MD        HYDROmorphone (DILAUDID) injection 0.1 mg  0.1 mg Intravenous Q2H PRN Clarence Bone MD        Or    HYDROmorphone (DILAUDID) injection 0.2 mg  0.2 mg Intravenous Q2H PRN Clarence Bone MD   0.2 mg at 10/31/24 1548    lidocaine (LMX4) cream   Topical Q1H PRN Clarence Bone MD        lidocaine 1 % 0.1-1 mL  0.1-1 mL Other Q1H PRN Clarence Bone MD        magnesium hydroxide (MILK OF MAGNESIA) suspension 30 mL  30 mL Oral Daily PRN Clarence Bone MD        magnesium sulfate 2 g in 50 mL  sterile water intermittent infusion  2 g Intravenous Q8H PRN Farhad Boland MD   2 g at 11/03/24 1504    propofol (DIPRIVAN) bolus from bag or syringe pump  10 mg Intravenous Q15 Min PRN Tyra Dubois MD        And    Medication Instruction   Does not apply Continuous PRN Tyra Dubois MD        methocarbamol (ROBAXIN) tablet 500 mg  500 mg Oral Q6H PRN Clarence Bone MD   500 mg at 10/28/24 1634    naloxone (NARCAN) injection 0.2 mg  0.2 mg Intravenous Q2 Min PRN Mulvihill, Michael, MD        Or    naloxone (NARCAN) injection 0.4 mg  0.4 mg Intravenous Q2 Min PRN Mulvihill, Michael, MD        Or    naloxone (NARCAN) injection 0.2 mg  0.2 mg Intramuscular Q2 Min PRN Mulvihill, Michael, MD        Or    naloxone (NARCAN) injection 0.4 mg  0.4 mg Intramuscular Q2 Min PRN Mulvihill, Michael, MD        No heparin required   Does not apply Continuous PRN Farhad Boland MD        ondansetron (ZOFRAN ODT) ODT tab 4 mg  4 mg Oral Q6H PRN Clarence Bone MD        Or    ondansetron (ZOFRAN) injection 4 mg  4 mg Intravenous Q6H PRN Clarence Bone MD        oxyCODONE IR (ROXICODONE) half-tab 2.5 mg  2.5 mg Oral Q4H PRN Clarence Bone MD   2.5 mg at 11/02/24 0214    Or    oxyCODONE (ROXICODONE) tablet 5 mg  5 mg Oral Q4H PRN Clarence Bone MD   5 mg at 10/29/24 2149    potassium chloride 20 mEq in 50 mL intermittent infusion  20 mEq Intravenous Q8H PRN Farhad Boland MD 50 mL/hr at 11/03/24 1503 20 mEq at 11/03/24 1503    prochlorperazine (COMPAZINE) injection 5 mg  5 mg Intravenous Q6H PRN Clarence Bone MD        Or    prochlorperazine (COMPAZINE) tablet 5 mg  5 mg Oral Q6H PRN Clarence Bone MD        Reason beta blocker order not selected   Does not apply DOES NOT GO TO Clarence Olguin MD        sodium chloride (PF) 0.9% PF flush 3 mL  3 mL Intracatheter q1 min prn Clarence Bone MD        sodium phosphate 15 mmol in NS 250mL  "intermittent infusion  15 mmol Intravenous Q8H PRN Farhad Boland MD             Physical Exam  Vitals were reviewed  Blood pressure 91/57, pulse 86, temperature 99  F (37.2  C), resp. rate 20, height 1.778 m (5' 10\"), weight 109.4 kg (241 lb 2.9 oz), SpO2 100%.  Rhythm: intermittent NSR and atrial fibrillation    Lungs: diminished bases, +crackles    Cardiovascular: irregular rate/rhythm, no m/r/g    Abdomen: soft, NT, ND, +BS    Extremeties: 2+ BLE/BUE edema    Incision: CDI    CT: serosang output 1580 mL, no air leak    Weight:   Vitals:    10/31/24 0400 11/01/24 0400 11/02/24 0400 11/03/24 0600   Weight: 107.4 kg (236 lb 12.4 oz) 107.2 kg (236 lb 5.3 oz) 108.9 kg (240 lb 1.3 oz) 111.7 kg (246 lb 4.1 oz)    11/04/24 0400   Weight: 109.4 kg (241 lb 2.9 oz)         Data  Recent Labs   Lab 11/04/24  0550 11/04/24  0545 11/04/24  0157 11/03/24  2159 11/03/24  2156 11/03/24  1359 11/03/24  1357 11/03/24  0552 11/03/24  0548 11/02/24  1011 11/02/24  0544   WBC  --  63.2*  --   --  69.2*  --  75.5*  --  76.4*   < > 67.4*   HGB  --  7.8*  --   --  8.2*  --  8.2*  --  7.4*   < > 9.0*   MCV  --  89  --   --  90  --  90  --  92   < > 89   PLT  --  195  --   --  207  --  211  --  216   < > 275   INR  --  2.11*  --   --   --   --   --   --  2.31*  --  2.37*   NA  --  137  --   --  136  --  136  --  136   < > 137   POTASSIUM  --  3.3*  --   --  3.5  --  3.7  --  4.0   < > 4.3   CHLORIDE  --  101  --   --  102  --  102  --  101   < > 102   CO2  --  25  --   --  25  --  23  --  21*   < > 19*   BUN  --  25.9*  --   --  27.2*  --  28.4*  --  27.4*   < > 31.0*   CR  --  1.73*  --   --  1.80*  --  1.82*  --  1.85*   < > 1.94*   ANIONGAP  --  11  --   --  9  --  11  --  14   < > 16*   TATE  --  8.7*  --   --  8.7*  --  8.3*  --  8.5*   < > 8.7*   * 120* 117*   < > 123*   < > 130*   < > 146*   < > 134*   ALBUMIN  --  3.4*  --   --  3.3*  --  3.4*  --  3.0*   < > 2.7*   PROTTOTAL  --  4.8*  --   --  4.7*  --  4.7*  --  4.3*   " < > 4.3*   BILITOTAL  --  2.6*  --   --  2.3*  --  2.2*  --  2.2*   < > 2.1*   ALKPHOS  --  173*  --   --  184*  --  175*  --  167*   < > 188*   ALT  --  480*  --   --  519*  --  541*  --  562*   < > 831*   AST  --  404*  --   --  461*  --  478*  --  530*   < > 1,181*    < > = values in this interval not displayed.       Imaging:  Recent Results (from the past 24 hours)   XR Chest Port 1 View    Narrative    EXAM: XR CHEST PORT 1 VIEW  LOCATION: [facility]  DATE: 11/3/2024    INDICATION: PA cath position  COMPARISON: 11/3/2024 at 0802 hours      Impression    IMPRESSION: Endotracheal tube terminates 5.8 cm above the dayanna. Feeding tube courses into the upper abdomen, its tip not included. Left-sided central venous catheter terminates over the left superior mediastinum, unchanged, potentially within the   innominate, unchanged. Right heart catheter terminates over the main pulmonary outflow tract. Chest tubes/mediastinal drains are unchanged. Stable heart size status post median sternotomy. Lung volumes have diminished with increasing mild interstitial   edema pattern. Probable small right effusion. No significant pneumothorax.   XR Chest Port 1 View    Narrative    EXAM: XR CHEST PORT 1 VIEW  LOCATION: [facility]  DATE: 11/3/2024 4:22 PM    INDICATION: swan placement  COMPARISON: 11/3/2024 4:18 PM      Impression    IMPRESSION:     No significant interval change.    Right IJ pulmonary artery catheter with tip in the main pulmonary artery, not appreciably changed in location.         Patient seen and discussed with Dr. Mulvihill Arielle Webb, PAKarolinaC  Cardiothoracic Surgery  Available for paging [phone] (personal pager or CV Surgery Rounding Pager)  Personal Pager: [phone]  CV Surgery Rounding Pager: [phone]  After hours please page surgeon on-call

## 2024-11-04 NOTE — CONSULTS
SPIRITUAL HEALTH SERVICES Consult Note  [facility]  ICU    Referral Source/Reason for Visit: Epic consult per request by staff.    Summary and Recommendations -  Pt and family are members of the [employer] and are being supported by their own [employer] community.  Family invites future visits from  for prayer.    Plan: Advised pt/family of the availability of  services by request.  to follow while pt is in hospital.     Isra Shell  Associate   [facility] Phone Line [phone]  Spiritual Health Pager [phone]    [facility] available 24/7 for emergent requests/referrals, either by paging the on-call  or by entering an ASAP/STAT consult in [facility], which will also page the on-call .    Assessment    Saw pt Alessio Teixeira per [facility] consult request. Spouse and son (who is in  and visiting from [facility] in [place])    Patient/Family Understanding of Illness and Goals of Care - Per spouse and son pt is receiving excellent care and improving after surgery.    Distress and Loss - No specific areas of [employer], spiritual, or existential distress or loss reported by pt/family or observed by .     Strengths, Coping, and Resources - Devin is supported by family and [employer] community.    Meaning, Beliefs, and Spirituality - Devin and family are member of the [employer] and receiving support from their [employer] community.

## 2024-11-04 NOTE — PROGRESS NOTES
CLINICAL NUTRITION SERVICES - REASSESSMENT NOTE      Recommendations Ordered by Registered Dietitian (RD): Change TF to Promote, increase to 20 mL/hr and advance every 12 hours by 15 mL to goal of 50 mL/hr to provide: 1200 kcal, 74 g protein, 156 g CHO, 0 g fiber, 1007 mL H2O  Increase ProSource TF 20 to 1 pkt TID = 240 kcal, 60 g protein  Total (TF + ProSource TF20 + Propofol)= 1693 kcal (18 kcal/kg and 104% needs), 134 g protein (1.8 g/kg)   Malnutrition: % Weight Loss:  None noted (weight up from admit)  % Intake:  </= 50% for >/= 5 days (severe malnutrition)  Subcutaneous Fat Loss:  None observed however may be masked by obesity   Muscle Loss:  Moderate (LE) per wife, difficult to assess with fluid up   Fluid Retention:  None noted    Malnutrition Diagnosis: Moderate malnutrition  In Context of:  Acute illness or injury       EVALUATION OF PROGRESS TOWARD GOALS   Diet:  NPO on vent     Nutrition Support:  Patient started on trophic TF 11/1 and continues as follows ~    Nutrition Support Enteral:  Type of Feeding Tube: Tip at LOT   Enteral Frequency:  Continuous  Enteral Regimen: Vital HP at 15 mL/hr  Total Enteral Provisions: 360 kcal, 31 g protein, 40 g CHO, 0 g fiber, 301 mL H2O  Free Water Flush: Flushes canceled per provider on 10/31  Propofol at 9.6 mL/hr= 253 kcal   ProSource TF 20 1 pkt BID (160 kcal, 40 g protein)   D10 at 30 mL/hr= 72 g CHO, 245 kcal   Total = 1018 kcal (11 kcal/kg and 75% needs), 71 g protein (0.9 g/kg and 60% needs)      Intake/Tolerance:    K 3.3 (L) - replacing  Mg NL  Phos 1.9 (LL) - replacing   -130 with Medium sliding scale insulin   I/O 1475/7717, weight = 109.4 kg (up significantly from admit)  LFTs elevated   BM x 5 yest -- rectal tube now in place (was previously constipated and received Senna 10/29-11/3 and Miralax 10/29-10/30 + 11/1-11/3)       ASSESSED NUTRITION NEEDS:  Dosing Weight::    95.8 kg actual weight (Energy)  75.5 kg ideal weight (Protein)   Estimated  Energy Needs: 9571-5992 kcals (14-17 Kcal/Kg)  Justification: obese and vented  Estimated Protein Needs: 115-150 grams protein (1.5-2 g pro/Kg)  Justification: obesity guidelines  and CRRT  Estimated Fluid Needs: Per provider   Justification: per provider pending fluid status      NEW FINDINGS:   CRRT continues  Pressor x 3   CT with 1580 mL out yesterday     Discussed during rounds -- OK to increase TF per Dr. Ness     Previous Goals (10/31):   TF @ goal to provide % estimated needs   Evaluation: Not met    Previous Nutrition Diagnosis (10/31):   Inadequate protein-energy intake related to intubated x3 days as evidenced by plan for TF to meet nutrient needs   Evaluation: Improving      MALNUTRITION  % Weight Loss:  None noted (weight up from admit)  % Intake:  </= 50% for >/= 5 days (severe malnutrition)  Subcutaneous Fat Loss:  None observed however may be masked by obesity   Muscle Loss:  Moderate (LE) per wife, difficult to assess with fluid up   Fluid Retention:  None noted    Malnutrition Diagnosis: Moderate malnutrition  In Context of:  Acute illness or injury    CURRENT NUTRITION DIAGNOSIS  Inadequate protein-energy intake related to TF at trophic rate as evidenced by meeting 60% protein and 75% energy needs from current regimen     INTERVENTIONS  Recommendations / Nutrition Prescription  Change TF to Promote, increase to 20 mL/hr and advance every 12 hours by 15 mL to goal of 50 mL/hr to provide: 1200 kcal, 74 g protein, 156 g CHO, 0 g fiber, 1007 mL H2O  Increase ProSource TF 20 to 1 pkt TID = 240 kcal, 60 g protein  Total (TF + ProSource TF20 + Propofol)= 1693 kcal (18 kcal/kg and 104% needs), 134 g protein (1.8 g/kg)    Implementation  EN Composition, EN Schedule, Medical Food Supplement: Above advancement orders written   Collaboration and Referral of Nutrition care: Patient discussed today during interdisciplinary bedside rounds    Goals  Goal TF regimen will meet % needs     MONITORING  AND EVALUATION:  Progress towards goals will be monitored and evaluated per protocol and Practice Guidelines    Emily Copeland RD, LD, CNSC   Clinical Dietitian - [facility]

## 2024-11-04 NOTE — PROGRESS NOTES
ICU Progress Note   Date of Service: 11/02/24     Assessment and Plan:  88 year old M with a history of severe 3 vessel CAD. Status post sternotomy, CABGx3, modified MAZE procedure, PAYAM ligation with Dr. Mulvihill 10/28/24. Post operative hemorrhage with RTOR on POD0 for evacuation of mediastinal blood clot causing tamponade physiology.     Interval events:   No acute events   Lactic acid down trending   LFTs down trending   Pulling 50 ml/hr on CRRT overnight - continue goal today.   Anuric   Difficult to determine effects of sedation and ventilation    Today:   Attempt to pull CRRT   Trophic feeds   Wean FiO2   Titrate pressors/inotropes in discussion with CVTS     Neuro:  # Sedation for mechanical ventilation  Propofol   RASS goal 0 to -1    Pain control: Tylenol, oxycodone, dilaudid     CV:  #Hx of atrial fibrillation and distant PE on Eliquis   #Hx NSTEMI   #Severe multivessel CAD   Status post sternotomy, CABGx3, modified MAZE procedure, PAYAM ligation with Dr. Mulvihill 10/28/24. Post operative hemorrhage with RTOR on POD0 for evacuation of mediastinal blood clot causing tamponade physiology. Postoperative course has been tenuous with mixed cardiogenic, vasoplegic shock.     Continue Aylin  Trend lactics, ABG   Per CVTS dobutamine for inotropy     Pre-operative workup:   Angiogram showed an occluded RCA, ost LAD to mid LAD of 80% and mid circ to distal circ with 99%. Echocardiogram showed an EF of 35-40%. The RV systolic function was normal. There was also mild mitral regurg.    - Echo 10/30/24: Left ventricle is normal in size. Biplane LVEF 35%. There is moderate global hypokinesia of the left ventricle. Limited view of the RV.     Plan:   SBP goal <140   MAP goal >65. Wean pressors and inotropes as able.   Cardiac meds per CVTS  Aspirin 162 mg   PTA atorvastatin - resume per CVTS   PTA torsemide - hold     Pulm:  # Acute hypoxemic respiratory failure  - low tidal volume ventilation  - wean PEEP/FiO2, as  "able    CXR pending     GI:  #Hx upper GIB, gastritis   Recent admission discharged 9/16/2024. Endoscopy revealing gastritis.   PTA pantoprazole     #Shock liver   Treating shock as above   Trend labs     Will place NJ feeding tube. Start trophic feeds.   Bowel regimen     Renal:  Pre operative cr 1.8   Now 2.27  Anuric kidney failure, nephrology following   CRRT started 10/30/24      #Acute kidney injury   #Lactic acidosis, improving   Secondary to epinephrine vs tissue hypoperfusion   Continue to monitor     #Hypermagnesemia, resolved.   #Hyperphosphatemia, resolved.   #Hyperkalemia, resolved.   #Hypocalcemia, resolved.   Nephrology consult   CRRT     ID:  WBC not reliable with CLL, 45.5   Afebrile (but on CRRT)   Refractory shock - Continue Zosyn, Vancomycin     Cultures:   Urine 10/30/24 no growth   Sputum 10/31/24 ordered. Will obtain today.   Blood 10/29/24 NGTD   Blood 10/31/24 In process     Heme:  Heparin ppx   Hgb 8.9  Plt 273  INR 2.88     #Hx CLL   Follows with Dr. Raymundo hematology   Follow leukocytosis     MSK:   #Hx Myasthenia gravis     Endo:  No history of DM   A1C 4.5   Medium sliding scale.     PPx:  1. DVT: heparin    2. VAP: HOB 30 degrees, chlorhexidine rinse  3. Stress Ulcer: PPI  4. Restraints: Nonviolent soft two point restraints required and necessary for patient safety and continued cares and good effect as patient continues to pull at necessary lines, tubes despite education and distraction. Will readdress daily.   5. Wound care - per unit routine   6. Feeding - will discuss with CVTS   7. Family updated at the bedside. They are aware of guarded prognosis.     Dispo: ICU    BP 91/57   Pulse 90   Temp 98.8  F (37.1  C)   Resp 20   Ht 1.778 m (5' 10\")   Wt 111.7 kg (246 lb 4.1 oz)   SpO2 99%   BMI 35.33 kg/m      FiO2 (%): 30 %, Resp: 20, Vent Mode: CMV/AC, Resp Rate (Set): 20 breaths/min, Tidal Volume (Set, mL): 500 mL, PEEP (cm H2O): 5 cmH2O, Resp Rate (Set): 20 breaths/min, Tidal " Volume (Set, mL): 500 mL, PEEP (cm H2O): 5 cmH2O      I/Os  +12,500     Physical Exam:  In bed   Sedated, intubated   Pupils 2 mm equal and reactive to light   Chest rise equal bilaterally   CT with thin bloody output, no air leak, to suction   Amos with very little dark urine in bag  Abdomen soft, ND   Feet cool to the touch. Bilateral PT and DP with multiphasic doppler signals.   Lower extremities 2+  edema   BUE hands cool to the touch - signs of distal poor perfusion with dusky fingers in both hands. 2+ pitting edema bilaterally.     Labs: reviewed  All labs reviewed   Improved lactic acidosis, LFTs  PH 7.44   WBC 45.5   Hgb 8.9   Plt 273    Imaging: reviewed  AM CXR reviewed   Advance ETT 2.0 cm     Past Medical/Surgical history   Atrial fibrillation   Gout   GERD   Gastritis   Bilateral PE in 2007   Myasthenia gravis   HTN   Meningioma   MARTHA   Spinal stenosis     Family history   Not discussed today     Social history   Not discussed today   Ludwin Ndiaye MD  [facility] Intensivist Service  CC time 45 min

## 2024-11-04 NOTE — PROGRESS NOTES
Renal Medicine Progress Note    SHORTHAND KEY FOR MY NOTES:  c = with, s = without, p = after, a = before, x = except, asx = asymptomatic, tx = transplant or treatment, sx = symptoms or symptomatic, cx = canceled or culture, rxn = reaction, yday = yesterday, nl = normal, abx = antibiotics, fxn = function, dx = diagnosis, dz = disease, m/h = melena/hematochezia, c/d/l/ha = cramping/dizziness/lightheadedness/headache, d/c = discharge or diarrhea/constipation, f/c/n/v = fevers/chills/nausea/vomiting, cp/sob = chest pain/shortness of breath, tbv = total body volume, rxn = reaction, tdc = tunneled dialysis catheter, pta = prior to admission, hd = hemodialysis, pd = peritoneal dialysis, hhd = home hemodialysis, edw = estimated dry wt         Assessment/Plan:     1.  Oliguric JAVIER/CKD IIIb.  Pt is not making much urine but is able to pull fluid c CRRT.  His electrolytes are ok and being replaced prn.  Cr is steady at ~1.8, which was his outpt baseline.  A.  Continue same CRRT orders.  B.  UF goal 150-200 ml/h.    2.  Severe shock.  Pt is on multiple pressors and broad abx.    A.  Wean pressors as able.  B.  Dose abx for CRRT.    3.  Resp failure.  Pt is on full vent support.  A.  Vent mgmt per ICU team.    4.  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  EF 35%.  A.  Per CV Surg.    5.  FEN.  K and phos are low today.  Na, Mg are ok.  A.  Continue replacement protocols.    6.  Code Status.  Pt is full code.  His prognosis is poor.    Case d/w Dr. Ness, Arlin Hodge PA-C, Pascale RN.        Interval History:     Unable.  Pt is intubated/sedated.          Medications and Allergies:     Current Facility-Administered Medications   Medication Dose Route Frequency Provider Last Rate Last Admin    albumin human 25 % injection 25 g  25 g Intravenous Q12H Arlin Hodge PA-C   25 g at 11/04/24 0811    aspirin (ASA) chewable tablet 162 mg  162 mg Oral or NG Tube Daily Clarence Bone MD   162 mg at 11/04/24 0818    chlorhexidine (PERIDEX) 0.12 %  "solution 15 mL  15 mL Swish & Spit BID Zac Kaur,    15 mL at 11/04/24 0828    insulin aspart (NovoLOG) injection (RAPID ACTING)  1-7 Units Subcutaneous Q4H Sb Arthur PA-C   1 Units at 11/03/24 0100    pantoprazole (PROTONIX) 2 mg/mL suspension 40 mg  40 mg Oral or NG Tube BID AC Arlin Hodge PA-C   40 mg at 11/04/24 0818    Or    pantoprazole (PROTONIX) EC tablet 40 mg  40 mg Oral BID AC Arlin Hodge PA-C        piperacillin-tazobactam (ZOSYN) 4.5 g vial to attach to  mL bag  4.5 g Intravenous Q6H Mulvihill, Michael, MD   4.5 g at 11/04/24 0948    polyethylene glycol (MIRALAX) Packet 17 g  17 g Oral or Feeding Tube Daily Mulvihill, Michael, MD   17 g at 11/03/24 0812    Prosource TF20 ENfit Compatibl EN LIQD (PROSOURCE TF20) packet 60 mL  1 packet Per Feeding Tube BID 09 12 HodgeArlin nixon PA-C   60 mL at 11/04/24 0825    senna-docusate (SENOKOT-S/PERICOLACE) 8.6-50 MG per tablet 1 tablet  1 tablet Oral or Feeding Tube BID Mulvihill, Michael, MD   1 tablet at 11/03/24 2212    sodium chloride (PF) 0.9% PF flush 3 mL  3 mL Intracatheter Q8H Clarence Bone MD   3 mL at 11/04/24 0628    sodium phosphate 15 mmol in NS 250mL intermittent infusion  15 mmol Intravenous Q2H Mulvihill, Michael, MD   Rate Verify at 11/04/24 0956    vancomycin place rico - receiving intermittent dosing  1 each Intravenous See Admin Instructions Mulvihill, Michael, MD          No Known Allergies       Physical Exam:     Vitals were reviewed   , Blood pressure 91/57, pulse 92, temperature 99  F (37.2  C), resp. rate 20, height 1.778 m (5' 10\"), weight 109.4 kg (241 lb 2.9 oz), SpO2 99%.  Wt Readings from Last 3 Encounters:   11/04/24 109.4 kg (241 lb 2.9 oz)   10/25/24 97.1 kg (214 lb)   10/03/24 101.2 kg (223 lb)     Intake/Output Summary (Last 24 hours) at 11/4/2024 1109  Last data filed at 11/4/2024 1000  Gross per 24 hour   Intake 4389.77 ml   Output 8217 ml   Net -3827.23 ml     GENERAL " APPEARANCE: intubated, sedated  HEENT:  eyes/ears/nose/neck grossly nl  RESP: + vent sounds  CV: irreg, nl S1/S2   ABDOMEN: s/nt/nd  EXTREMITIES/SKIN: + edema; skin warm  NEURO:  sedated  LINES:  + burt. + multiple lines - art, central, S-G    Pt seen on CRRT.  Stable run.  Tolerating 150-200 ml UF/h.           Data:     CBC RESULTS:     Recent Labs   Lab 11/04/24  0545 11/03/24 2156 11/03/24  1357 11/03/24  0548 11/02/24  2153 11/02/24  1337   WBC 63.2* 69.2* 75.5* 76.4* 72.9* 78.5*   RBC 2.70* 2.82* 2.78* 2.50* 2.67* 2.75*   HGB 7.8* 8.2* 8.2* 7.4* 7.9* 8.2*   HCT 24.1* 25.4* 25.1* 23.0* 24.0* 24.8*    207 211 216 223 291     Basic Metabolic Panel:  Recent Labs   Lab 11/04/24  1031 11/04/24  0550 11/04/24  0545 11/04/24  0157 11/03/24 2159 11/03/24 2156 11/03/24  1359 11/03/24  1357 11/03/24  0552 11/03/24  0548 11/02/24  2158 11/02/24 2153 11/02/24  1728 11/02/24  1337   NA  --   --  137  --   --  136  --  136  --  136  --  136  --  136   POTASSIUM  --   --  3.3*  --   --  3.5  --  3.7  --  4.0  --  4.0  --  4.0   CHLORIDE  --   --  101  --   --  102  --  102  --  101  --  102  --  101   CO2  --   --  25  --   --  25  --  23  --  21*  --  20*  --  19*   BUN  --   --  25.9*  --   --  27.2*  --  28.4*  --  27.4*  --  28.7*  --  30.5*   CR  --   --  1.73*  --   --  1.80*  --  1.82*  --  1.85*  --  1.90*  --  1.94*   * 130* 120* 117* 122* 123*   < > 130*   < > 146*   < > 145*   < > 137*   TATE  --   --  8.7*  --   --  8.7*  --  8.3*  --  8.5*  --  8.5*  --  8.3*    < > = values in this interval not displayed.     INR  Recent Labs   Lab 11/04/24  0545 11/03/24  0548 11/02/24  0544 11/01/24  0207   INR 2.11* 2.31* 2.37* 2.88*      Attestation:   I have reviewed today's relevant vital signs, notes, medications, labs and imaging.    Kaz Ann MD  [facility] - Nephrology  [phone]

## 2024-11-04 NOTE — CONSULTS
Care Management Initial Consult    General Information  Assessment completed with: Spouse or significant other, Claudia  Type of CM/SW Visit: Initial Assessment    Primary Care Provider verified and updated as needed: Yes   Readmission within the last 30 days: no previous admission in last 30 days      Reason for Consult: discharge planning  Advance Care Planning: Advance Care Planning Reviewed: present on chart          Communication Assessment  Patient's communication style: spoken language (English or Bilingual)    Hearing Difficulty or Deaf: yes   Wear Glasses or Blind: no    Cognitive  Cognitive/Neuro/Behavioral: .WDL except  Level of Consciousness: sedated  Arousal Level: arouses to repeated stimulation (sedated)  Orientation: other (see comments) (dina)  Mood/Behavior: behavior appropriate to situation  Best Language: 0 - No aphasia (DINA-ETT/sedated)  Speech: endotracheal tube    Living Environment:   People in home: spouse     Current living Arrangements: [facility] home      Able to return to prior arrangements: other (see comments)  Living Arrangement Comments:  (TBD)    Family/Social Support:  Care provided by: self, spouse/significant other, child(franko)  Provides care for: no one  Marital Status:   Support system: Wife, Children   (Claudia)       Description of Support System: Supportive, Involved    Support Assessment: Adequate family and caregiver support    Current Resources:   Patient receiving home care services: Yes  Skilled Home Care Services: Skilled Nursing, Physical Therapy     Community Resources: None  Equipment currently used at home: shower chair, walker, rolling  Supplies currently used at home:      Employment/Financial:  Employment Status: retired        Financial Concerns:     Referral to Financial Worker: No       Does the patient's insurance plan have a 3 day qualifying hospital stay waiver?  Yes     Which insurance plan 3 day waiver is available? [employer]    Will the waiver be used for  post-acute placement? Undetermined at this time    Lifestyle & Psychosocial Needs:  Social Drivers of Health     Food Insecurity: Low Risk  (10/28/2024)    Food Insecurity     Within the past 12 months, did you worry that your food would run out before you got money to buy more?: No     Within the past 12 months, did the food you bought just not last and you didn t have money to get more?: No   Recent Concern: Food Insecurity - High Risk (8/28/2024)    Food Insecurity     Within the past 12 months, did you worry that your food would run out before you got money to buy more?: Yes     Within the past 12 months, did the food you bought just not last and you didn t have money to get more?: No   Depression: Not at risk (8/29/2024)    PHQ-2     PHQ-2 Score: 1   Housing Stability: Low Risk  (10/28/2024)    Housing Stability     Do you have housing? : Yes     Are you worried about losing your housing?: No   Tobacco Use: Medium Risk (10/25/2024)    Patient History     Smoking Tobacco Use: Former     Smokeless Tobacco Use: Never     Passive Exposure: Not on file   Financial Resource Strain: Low Risk  (10/28/2024)    Financial Resource Strain     Within the past 12 months, have you or your family members you live with been unable to get utilities (heat, electricity) when it was really needed?: No   Alcohol Use: Not on file   Transportation Needs: Low Risk  (10/28/2024)    Transportation Needs     Within the past 12 months, has lack of transportation kept you from medical appointments, getting your medicines, non-medical meetings or appointments, work, or from getting things that you need?: No   Physical Activity: Inactive (8/28/2024)    Exercise Vital Sign     Days of Exercise per Week: 0 days     Minutes of Exercise per Session: 0 min   Interpersonal Safety: Low Risk  (10/28/2024)    Interpersonal Safety     Do you feel physically and emotionally safe where you currently live?: Yes     Within the past 12 months, have you been  hit, slapped, kicked or otherwise physically hurt by someone?: No     Within the past 12 months, have you been humiliated or emotionally abused in other ways by your partner or ex-partner?: No   Stress: Stress Concern Present (8/28/2024)    [employer] - Occupational Stress Questionnaire     Feeling of Stress : To some extent   Social Connections: Unknown (8/28/2024)    Social Connection and Isolation Panel [NHANES]     Frequency of Communication with Friends and Family: Not on file     Frequency of Social Gatherings with Friends and Family: Twice a week     Attends Roman Catholic Services: Not on file     Active Member of Clubs or Organizations: Not on file     Attends Club or Organization Meetings: Not on file     Marital Status: Not on file   Health Literacy: Not on file       Functional Status:  Prior to admission patient needed assistance:   Dependent ADLs:: Ambulation-walker  Dependent IADLs:: Cleaning, Cooking, Laundry, Shopping, Meal Preparation, Transportation       Mental Health Status:  Mental Health Status: No Current Concerns       Chemical Dependency Status:  Chemical Dependency Status: No Current Concerns             Values/Beliefs:  Spiritual, Cultural Beliefs, Roman Catholic Practices, Values that affect care: yes  Description of Beliefs that Will Affect Care: Buddhism    Cultural/Roman Catholic Practices Patient Routinely Participates In: prayer       Discussed  Partnership in Safe Discharge Planning  document with patient/family: Yes:     Additional Information:  Consult for discharge planning.   88 year old M with a history of severe 3 vessel CAD. Status post sternotomy, CABGx3, modified MAZE procedure, PAYAM ligation with Dr. Mulvihill 10/28/24. Post operative hemorrhage with RTOR on POD0 for evacuation of mediastinal blood clot causing tamponade physiology. Course subsequently complicated by persistent shock (appears mixed cardigenic/vasoplegic), and ongoing respiratory  failure.  Writer reviewed chart and recommendations at discharge. Writer met with patient and spouse Claudia at bedside and introduced self and role. Writer confirmed patient's primary doctor and home address as accurate. Patient currently receiving ICU level of cares and has not yet participated in therapies to determine next steps. Patient anticipating a stay in TCU and they would like [facility] to start. Patient lives on a main level and has help from his wife. They were open to home care PT and nursing through [facility] prior to admission. Patient was using a walker.     Next Steps: SW/CC continue to follow for discharge planning.     BALJIT Dill

## 2024-11-05 ENCOUNTER — APPOINTMENT (OUTPATIENT)
Dept: GENERAL RADIOLOGY | Facility: CLINIC | Age: 89
DRG: 233 | End: 2024-11-05
Attending: INTERNAL MEDICINE
Payer: MEDICARE

## 2024-11-05 ENCOUNTER — ANCILLARY PROCEDURE (OUTPATIENT)
Dept: ULTRASOUND IMAGING | Facility: CLINIC | Age: 89
End: 2024-11-05
Attending: INTERNAL MEDICINE
Payer: MEDICARE

## 2024-11-05 LAB
ABO/RH(D): NORMAL
ALBUMIN SERPL BCG-MCNC: 3.7 G/DL (ref 3.5–5.2)
ALBUMIN SERPL BCG-MCNC: 3.7 G/DL (ref 3.5–5.2)
ALBUMIN SERPL BCG-MCNC: 3.8 G/DL (ref 3.5–5.2)
ALLEN'S TEST: ABNORMAL
ALLEN'S TEST: ABNORMAL
ALLEN'S TEST: NO
ALP SERPL-CCNC: 164 U/L (ref 40–150)
ALP SERPL-CCNC: 168 U/L (ref 40–150)
ALP SERPL-CCNC: 178 U/L (ref 40–150)
ALT SERPL W P-5'-P-CCNC: 257 U/L (ref 0–70)
ALT SERPL W P-5'-P-CCNC: 294 U/L (ref 0–70)
ALT SERPL W P-5'-P-CCNC: 353 U/L (ref 0–70)
ANION GAP SERPL CALCULATED.3IONS-SCNC: 12 MMOL/L (ref 7–15)
ANION GAP SERPL CALCULATED.3IONS-SCNC: 12 MMOL/L (ref 7–15)
ANION GAP SERPL CALCULATED.3IONS-SCNC: 14 MMOL/L (ref 7–15)
ANTIBODY SCREEN: NEGATIVE
AST SERPL W P-5'-P-CCNC: 135 U/L (ref 0–45)
AST SERPL W P-5'-P-CCNC: 171 U/L (ref 0–45)
AST SERPL W P-5'-P-CCNC: 235 U/L (ref 0–45)
BACTERIA BLD CULT: NO GROWTH
BACTERIA BLD CULT: NO GROWTH
BASE EXCESS BLDA CALC-SCNC: -0.5 MMOL/L (ref -3–3)
BASE EXCESS BLDA CALC-SCNC: 0.4 MMOL/L (ref -3–3)
BASE EXCESS BLDA CALC-SCNC: 0.8 MMOL/L (ref -3–3)
BASE EXCESS BLDV CALC-SCNC: 0.4 MMOL/L (ref -3–3)
BASE EXCESS BLDV CALC-SCNC: 1.2 MMOL/L (ref -3–3)
BASE EXCESS BLDV CALC-SCNC: 1.9 MMOL/L (ref -3–3)
BASE EXCESS BLDV CALC-SCNC: 2.1 MMOL/L (ref -3–3)
BILIRUB SERPL-MCNC: 2 MG/DL
BILIRUB SERPL-MCNC: 2.3 MG/DL
BILIRUB SERPL-MCNC: 2.5 MG/DL
BLD PROD TYP BPU: NORMAL
BLOOD COMPONENT TYPE: NORMAL
BUN SERPL-MCNC: 27.3 MG/DL (ref 8–23)
BUN SERPL-MCNC: 30.3 MG/DL (ref 8–23)
BUN SERPL-MCNC: 32.1 MG/DL (ref 8–23)
CA-I BLD-MCNC: 4.8 MG/DL (ref 4.4–5.2)
CA-I BLD-MCNC: 5 MG/DL (ref 4.4–5.2)
CA-I BLD-MCNC: 5 MG/DL (ref 4.4–5.2)
CALCIUM SERPL-MCNC: 9.1 MG/DL (ref 8.8–10.4)
CALCIUM SERPL-MCNC: 9.2 MG/DL (ref 8.8–10.4)
CALCIUM SERPL-MCNC: 9.2 MG/DL (ref 8.8–10.4)
CHLORIDE SERPL-SCNC: 100 MMOL/L (ref 98–107)
CHLORIDE SERPL-SCNC: 101 MMOL/L (ref 98–107)
CHLORIDE SERPL-SCNC: 99 MMOL/L (ref 98–107)
CODING SYSTEM: NORMAL
COHGB MFR BLD: 97.1 % (ref 95–96)
COHGB MFR BLD: 97.5 % (ref 95–96)
COHGB MFR BLD: 98.8 % (ref 95–96)
CREAT SERPL-MCNC: 1.69 MG/DL (ref 0.67–1.17)
CREAT SERPL-MCNC: 1.69 MG/DL (ref 0.67–1.17)
CREAT SERPL-MCNC: 1.76 MG/DL (ref 0.67–1.17)
CROSSMATCH: NORMAL
EGFRCR SERPLBLD CKD-EPI 2021: 37 ML/MIN/1.73M2
EGFRCR SERPLBLD CKD-EPI 2021: 39 ML/MIN/1.73M2
EGFRCR SERPLBLD CKD-EPI 2021: 39 ML/MIN/1.73M2
ERYTHROCYTE [DISTWIDTH] IN BLOOD BY AUTOMATED COUNT: 19.9 % (ref 10–15)
ERYTHROCYTE [DISTWIDTH] IN BLOOD BY AUTOMATED COUNT: 20.4 % (ref 10–15)
ERYTHROCYTE [DISTWIDTH] IN BLOOD BY AUTOMATED COUNT: 20.7 % (ref 10–15)
GLUCOSE BLDC GLUCOMTR-MCNC: 121 MG/DL (ref 70–99)
GLUCOSE BLDC GLUCOMTR-MCNC: 133 MG/DL (ref 70–99)
GLUCOSE BLDC GLUCOMTR-MCNC: 140 MG/DL (ref 70–99)
GLUCOSE BLDC GLUCOMTR-MCNC: 140 MG/DL (ref 70–99)
GLUCOSE BLDC GLUCOMTR-MCNC: 143 MG/DL (ref 70–99)
GLUCOSE BLDC GLUCOMTR-MCNC: 90 MG/DL (ref 70–99)
GLUCOSE SERPL-MCNC: 135 MG/DL (ref 70–99)
GLUCOSE SERPL-MCNC: 141 MG/DL (ref 70–99)
GLUCOSE SERPL-MCNC: 141 MG/DL (ref 70–99)
HCO3 BLD-SCNC: 25 MMOL/L (ref 21–28)
HCO3 BLDV-SCNC: 26 MMOL/L (ref 21–28)
HCO3 BLDV-SCNC: 27 MMOL/L (ref 21–28)
HCO3 SERPL-SCNC: 23 MMOL/L (ref 22–29)
HCO3 SERPL-SCNC: 23 MMOL/L (ref 22–29)
HCO3 SERPL-SCNC: 24 MMOL/L (ref 22–29)
HCT VFR BLD AUTO: 23.1 % (ref 40–53)
HCT VFR BLD AUTO: 23.8 % (ref 40–53)
HCT VFR BLD AUTO: 24.8 % (ref 40–53)
HGB BLD-MCNC: 7.6 G/DL (ref 13.3–17.7)
HGB BLD-MCNC: 7.7 G/DL (ref 13.3–17.7)
HGB BLD-MCNC: 7.8 G/DL (ref 13.3–17.7)
INR PPP: 1.85 (ref 0.85–1.15)
ISSUE DATE AND TIME: NORMAL
LACTATE SERPL-SCNC: 1.5 MMOL/L (ref 0.7–2)
LACTATE SERPL-SCNC: 2 MMOL/L (ref 0.7–2)
LACTATE SERPL-SCNC: 2 MMOL/L (ref 0.7–2)
LACTATE SERPL-SCNC: 2.1 MMOL/L (ref 0.7–2)
MAGNESIUM SERPL-MCNC: 2 MG/DL (ref 1.7–2.3)
MAGNESIUM SERPL-MCNC: 2.1 MG/DL (ref 1.7–2.3)
MAGNESIUM SERPL-MCNC: 2.2 MG/DL (ref 1.7–2.3)
MCH RBC QN AUTO: 27.8 PG (ref 26.5–33)
MCH RBC QN AUTO: 28.6 PG (ref 26.5–33)
MCH RBC QN AUTO: 28.8 PG (ref 26.5–33)
MCHC RBC AUTO-ENTMCNC: 31.5 G/DL (ref 31.5–36.5)
MCHC RBC AUTO-ENTMCNC: 31.9 G/DL (ref 31.5–36.5)
MCHC RBC AUTO-ENTMCNC: 33.3 G/DL (ref 31.5–36.5)
MCV RBC AUTO: 87 FL (ref 78–100)
MCV RBC AUTO: 88 FL (ref 78–100)
MCV RBC AUTO: 90 FL (ref 78–100)
O2/TOTAL GAS SETTING VFR VENT: 30 %
OXYHGB MFR BLDV: 42 % (ref 70–75)
OXYHGB MFR BLDV: 49 % (ref 70–75)
PCO2 BLD: 37 MM HG (ref 35–45)
PCO2 BLD: 37 MM HG (ref 35–45)
PCO2 BLD: 42 MM HG (ref 35–45)
PCO2 BLDV: 41 MM HG (ref 40–50)
PCO2 BLDV: 43 MM HG (ref 40–50)
PCO2 BLDV: 44 MM HG (ref 40–50)
PCO2 BLDV: 48 MM HG (ref 40–50)
PEEP: 5 CM H2O
PEEP: 5 CM H2O
PH BLD: 7.38 [PH] (ref 7.35–7.45)
PH BLD: 7.43 [PH] (ref 7.35–7.45)
PH BLD: 7.44 [PH] (ref 7.35–7.45)
PH BLDV: 7.35 [PH] (ref 7.32–7.43)
PH BLDV: 7.38 [PH] (ref 7.32–7.43)
PH BLDV: 7.41 [PH] (ref 7.32–7.43)
PH BLDV: 7.42 [PH] (ref 7.32–7.43)
PHOSPHATE SERPL-MCNC: 2.3 MG/DL (ref 2.5–4.5)
PLATELET # BLD AUTO: 189 10E3/UL (ref 150–450)
PLATELET # BLD AUTO: 190 10E3/UL (ref 150–450)
PLATELET # BLD AUTO: 198 10E3/UL (ref 150–450)
PO2 BLD: 103 MM HG (ref 80–105)
PO2 BLD: 76 MM HG (ref 80–105)
PO2 BLD: 79 MM HG (ref 80–105)
PO2 BLDV: 23 MM HG (ref 25–47)
PO2 BLDV: 25 MM HG (ref 25–47)
PO2 BLDV: 26 MM HG (ref 25–47)
PO2 BLDV: 27 MM HG (ref 25–47)
POTASSIUM SERPL-SCNC: 3.8 MMOL/L (ref 3.4–5.3)
POTASSIUM SERPL-SCNC: 4 MMOL/L (ref 3.4–5.3)
POTASSIUM SERPL-SCNC: 4 MMOL/L (ref 3.4–5.3)
PROT SERPL-MCNC: 5 G/DL (ref 6.4–8.3)
PROT SERPL-MCNC: 5.1 G/DL (ref 6.4–8.3)
PROT SERPL-MCNC: 5.1 G/DL (ref 6.4–8.3)
RBC # BLD AUTO: 2.66 10E6/UL (ref 4.4–5.9)
RBC # BLD AUTO: 2.67 10E6/UL (ref 4.4–5.9)
RBC # BLD AUTO: 2.81 10E6/UL (ref 4.4–5.9)
SAO2 % BLDA: 94 % (ref 92–100)
SAO2 % BLDA: 95 % (ref 92–100)
SAO2 % BLDA: 96 % (ref 92–100)
SAO2 % BLDV: 43.3 % (ref 70–75)
SAO2 % BLDV: 50.3 % (ref 70–75)
SAO2 % BLDV: 50.3 % (ref 70–75)
SAO2 % BLDV: 51.1 % (ref 70–75)
SODIUM SERPL-SCNC: 135 MMOL/L (ref 135–145)
SODIUM SERPL-SCNC: 136 MMOL/L (ref 135–145)
SODIUM SERPL-SCNC: 137 MMOL/L (ref 135–145)
SPECIMEN EXPIRATION DATE: NORMAL
UNIT ABO/RH: NORMAL
UNIT NUMBER: NORMAL
UNIT STATUS: NORMAL
UNIT TYPE ISBT: 6200
WBC # BLD AUTO: 44.2 10E3/UL (ref 4–11)
WBC # BLD AUTO: 53.5 10E3/UL (ref 4–11)
WBC # BLD AUTO: 59.1 10E3/UL (ref 4–11)

## 2024-11-05 PROCEDURE — 258N000003 HC RX IP 258 OP 636: Performed by: SURGERY

## 2024-11-05 PROCEDURE — 36556 INSERT NON-TUNNEL CV CATH: CPT | Performed by: INTERNAL MEDICINE

## 2024-11-05 PROCEDURE — 86923 COMPATIBILITY TEST ELECTRIC: CPT | Performed by: PHYSICIAN ASSISTANT

## 2024-11-05 PROCEDURE — 250N000013 HC RX MED GY IP 250 OP 250 PS 637: Performed by: STUDENT IN AN ORGANIZED HEALTH CARE EDUCATION/TRAINING PROGRAM

## 2024-11-05 PROCEDURE — 84155 ASSAY OF PROTEIN SERUM: CPT | Performed by: PHYSICIAN ASSISTANT

## 2024-11-05 PROCEDURE — 86850 RBC ANTIBODY SCREEN: CPT | Performed by: PHYSICIAN ASSISTANT

## 2024-11-05 PROCEDURE — 999N000157 HC STATISTIC RCP TIME EA 10 MIN

## 2024-11-05 PROCEDURE — 258N000003 HC RX IP 258 OP 636: Performed by: PHYSICIAN ASSISTANT

## 2024-11-05 PROCEDURE — 83605 ASSAY OF LACTIC ACID: CPT | Performed by: PHYSICIAN ASSISTANT

## 2024-11-05 PROCEDURE — 250N000013 HC RX MED GY IP 250 OP 250 PS 637: Performed by: PHYSICIAN ASSISTANT

## 2024-11-05 PROCEDURE — 82330 ASSAY OF CALCIUM: CPT | Performed by: PHYSICIAN ASSISTANT

## 2024-11-05 PROCEDURE — 84100 ASSAY OF PHOSPHORUS: CPT | Performed by: STUDENT IN AN ORGANIZED HEALTH CARE EDUCATION/TRAINING PROGRAM

## 2024-11-05 PROCEDURE — 82805 BLOOD GASES W/O2 SATURATION: CPT | Performed by: PHYSICIAN ASSISTANT

## 2024-11-05 PROCEDURE — 250N000013 HC RX MED GY IP 250 OP 250 PS 637: Performed by: SURGERY

## 2024-11-05 PROCEDURE — 250N000009 HC RX 250: Performed by: PHYSICIAN ASSISTANT

## 2024-11-05 PROCEDURE — 85027 COMPLETE CBC AUTOMATED: CPT | Performed by: PHYSICIAN ASSISTANT

## 2024-11-05 PROCEDURE — P9016 RBC LEUKOCYTES REDUCED: HCPCS | Performed by: PHYSICIAN ASSISTANT

## 2024-11-05 PROCEDURE — 120N000004 HC R&B MS OVERFLOW

## 2024-11-05 PROCEDURE — 94003 VENT MGMT INPAT SUBQ DAY: CPT

## 2024-11-05 PROCEDURE — 999N000065 XR CHEST PORT 1 VIEW

## 2024-11-05 PROCEDURE — 02HV33Z INSERTION OF INFUSION DEVICE INTO SUPERIOR VENA CAVA, PERCUTANEOUS APPROACH: ICD-10-PCS | Performed by: INTERNAL MEDICINE

## 2024-11-05 PROCEDURE — 250N000011 HC RX IP 250 OP 636: Performed by: SURGERY

## 2024-11-05 PROCEDURE — 250N000009 HC RX 250: Performed by: INTERNAL MEDICINE

## 2024-11-05 PROCEDURE — 250N000011 HC RX IP 250 OP 636: Performed by: INTERNAL MEDICINE

## 2024-11-05 PROCEDURE — 90945 DIALYSIS ONE EVALUATION: CPT | Performed by: INTERNAL MEDICINE

## 2024-11-05 PROCEDURE — 99291 CRITICAL CARE FIRST HOUR: CPT | Mod: 24 | Performed by: INTERNAL MEDICINE

## 2024-11-05 PROCEDURE — 86923 COMPATIBILITY TEST ELECTRIC: CPT | Performed by: STUDENT IN AN ORGANIZED HEALTH CARE EDUCATION/TRAINING PROGRAM

## 2024-11-05 PROCEDURE — 250N000013 HC RX MED GY IP 250 OP 250 PS 637: Performed by: INTERNAL MEDICINE

## 2024-11-05 PROCEDURE — 82310 ASSAY OF CALCIUM: CPT | Performed by: PHYSICIAN ASSISTANT

## 2024-11-05 PROCEDURE — 86900 BLOOD TYPING SEROLOGIC ABO: CPT | Performed by: PHYSICIAN ASSISTANT

## 2024-11-05 PROCEDURE — 999N000253 HC STATISTIC WEANING TRIALS

## 2024-11-05 PROCEDURE — 76937 US GUIDE VASCULAR ACCESS: CPT | Performed by: INTERNAL MEDICINE

## 2024-11-05 PROCEDURE — 90947 DIALYSIS REPEATED EVAL: CPT

## 2024-11-05 PROCEDURE — 85041 AUTOMATED RBC COUNT: CPT | Performed by: PHYSICIAN ASSISTANT

## 2024-11-05 PROCEDURE — 85610 PROTHROMBIN TIME: CPT | Performed by: PHYSICIAN ASSISTANT

## 2024-11-05 PROCEDURE — P9047 ALBUMIN (HUMAN), 25%, 50ML: HCPCS | Mod: JZ | Performed by: PHYSICIAN ASSISTANT

## 2024-11-05 PROCEDURE — 83735 ASSAY OF MAGNESIUM: CPT | Performed by: PHYSICIAN ASSISTANT

## 2024-11-05 PROCEDURE — 250N000011 HC RX IP 250 OP 636: Performed by: STUDENT IN AN ORGANIZED HEALTH CARE EDUCATION/TRAINING PROGRAM

## 2024-11-05 PROCEDURE — 250N000011 HC RX IP 250 OP 636: Mod: JZ | Performed by: PHYSICIAN ASSISTANT

## 2024-11-05 RX ORDER — POTASSIUM CHLORIDE 1.5 G/1.58G
20 POWDER, FOR SOLUTION ORAL ONCE
Status: COMPLETED | OUTPATIENT
Start: 2024-11-05 | End: 2024-11-05

## 2024-11-05 RX ORDER — FENTANYL CITRATE 50 UG/ML
25 INJECTION, SOLUTION INTRAMUSCULAR; INTRAVENOUS
Status: DISCONTINUED | OUTPATIENT
Start: 2024-11-05 | End: 2024-11-11

## 2024-11-05 RX ORDER — DOBUTAMINE HYDROCHLORIDE 200 MG/100ML
2.5 INJECTION INTRAVENOUS CONTINUOUS
Status: DISCONTINUED | OUTPATIENT
Start: 2024-11-05 | End: 2024-11-10

## 2024-11-05 RX ADMIN — POTASSIUM CHLORIDE 20 MEQ: 1.5 POWDER, FOR SOLUTION ORAL at 08:19

## 2024-11-05 RX ADMIN — SENNOSIDES AND DOCUSATE SODIUM 1 TABLET: 50; 8.6 TABLET ORAL at 21:23

## 2024-11-05 RX ADMIN — VASOPRESSIN 4 UNITS/HR: 20 INJECTION INTRAVENOUS at 14:16

## 2024-11-05 RX ADMIN — ASPIRIN 81 MG CHEWABLE TABLET 162 MG: 81 TABLET CHEWABLE at 08:20

## 2024-11-05 RX ADMIN — VASOPRESSIN 4 UNITS/HR: 20 INJECTION INTRAVENOUS at 09:05

## 2024-11-05 RX ADMIN — CALCIUM CHLORIDE, MAGNESIUM CHLORIDE, DEXTROSE MONOHYDRATE, LACTIC ACID, SODIUM CHLORIDE, SODIUM BICARBONATE AND POTASSIUM CHLORIDE 5000 ML: 5.15; 2.03; 22; 5.4; 6.46; 3.09; .157 INJECTION INTRAVENOUS at 04:10

## 2024-11-05 RX ADMIN — CALCIUM CHLORIDE, MAGNESIUM CHLORIDE, DEXTROSE MONOHYDRATE, LACTIC ACID, SODIUM CHLORIDE, SODIUM BICARBONATE AND POTASSIUM CHLORIDE 5000 ML: 5.15; 2.03; 22; 5.4; 6.46; 3.09; .157 INJECTION INTRAVENOUS at 15:40

## 2024-11-05 RX ADMIN — INSULIN ASPART 1 UNITS: 100 INJECTION, SOLUTION INTRAVENOUS; SUBCUTANEOUS at 19:08

## 2024-11-05 RX ADMIN — VASOPRESSIN 4 UNITS/HR: 20 INJECTION INTRAVENOUS at 04:00

## 2024-11-05 RX ADMIN — Medication 40 MG: at 16:00

## 2024-11-05 RX ADMIN — OXYCODONE HYDROCHLORIDE 5 MG: 5 TABLET ORAL at 17:09

## 2024-11-05 RX ADMIN — METHOCARBAMOL 500 MG: 500 TABLET ORAL at 17:09

## 2024-11-05 RX ADMIN — FENTANYL CITRATE 25 MCG: 50 INJECTION, SOLUTION INTRAMUSCULAR; INTRAVENOUS at 16:00

## 2024-11-05 RX ADMIN — CALCIUM CHLORIDE, MAGNESIUM CHLORIDE, DEXTROSE MONOHYDRATE, LACTIC ACID, SODIUM CHLORIDE, SODIUM BICARBONATE AND POTASSIUM CHLORIDE 5000 ML: 5.15; 2.03; 22; 5.4; 6.46; 3.09; .157 INJECTION INTRAVENOUS at 12:14

## 2024-11-05 RX ADMIN — EPINEPHRINE 0.07 MCG/KG/MIN: 1 INJECTION INTRAMUSCULAR; INTRAVENOUS; SUBCUTANEOUS at 16:37

## 2024-11-05 RX ADMIN — Medication 1 PACKET: at 16:00

## 2024-11-05 RX ADMIN — Medication: at 08:19

## 2024-11-05 RX ADMIN — EPINEPHRINE 0.07 MCG/KG/MIN: 1 INJECTION INTRAMUSCULAR; INTRAVENOUS; SUBCUTANEOUS at 03:27

## 2024-11-05 RX ADMIN — CALCIUM CHLORIDE, MAGNESIUM CHLORIDE, DEXTROSE MONOHYDRATE, LACTIC ACID, SODIUM CHLORIDE, SODIUM BICARBONATE AND POTASSIUM CHLORIDE 5000 ML: 5.15; 2.03; 22; 5.4; 6.46; 3.09; .157 INJECTION INTRAVENOUS at 04:52

## 2024-11-05 RX ADMIN — CALCIUM CHLORIDE, MAGNESIUM CHLORIDE, DEXTROSE MONOHYDRATE, LACTIC ACID, SODIUM CHLORIDE, SODIUM BICARBONATE AND POTASSIUM CHLORIDE: 5.15; 2.03; 22; 5.4; 6.46; 3.09; .157 INJECTION INTRAVENOUS at 06:08

## 2024-11-05 RX ADMIN — CALCIUM CHLORIDE, MAGNESIUM CHLORIDE, DEXTROSE MONOHYDRATE, LACTIC ACID, SODIUM CHLORIDE, SODIUM BICARBONATE AND POTASSIUM CHLORIDE 5000 ML: 5.15; 2.03; 22; 5.4; 6.46; 3.09; .157 INJECTION INTRAVENOUS at 19:19

## 2024-11-05 RX ADMIN — CHLORHEXIDINE GLUCONATE 0.12% ORAL RINSE 15 ML: 1.2 LIQUID ORAL at 08:21

## 2024-11-05 RX ADMIN — CALCIUM CHLORIDE, MAGNESIUM CHLORIDE, DEXTROSE MONOHYDRATE, LACTIC ACID, SODIUM CHLORIDE, SODIUM BICARBONATE AND POTASSIUM CHLORIDE 5000 ML: 5.15; 2.03; 22; 5.4; 6.46; 3.09; .157 INJECTION INTRAVENOUS at 08:45

## 2024-11-05 RX ADMIN — CHLORHEXIDINE GLUCONATE 0.12% ORAL RINSE 15 ML: 1.2 LIQUID ORAL at 21:22

## 2024-11-05 RX ADMIN — INSULIN ASPART 1 UNITS: 100 INJECTION, SOLUTION INTRAVENOUS; SUBCUTANEOUS at 14:14

## 2024-11-05 RX ADMIN — ALBUMIN HUMAN 25 G: 0.25 SOLUTION INTRAVENOUS at 21:23

## 2024-11-05 RX ADMIN — VASOPRESSIN 4 UNITS/HR: 20 INJECTION INTRAVENOUS at 21:00

## 2024-11-05 RX ADMIN — Medication 60 ML: at 22:00

## 2024-11-05 RX ADMIN — PROPOFOL 7.5 MCG/KG/MIN: 10 INJECTION, EMULSION INTRAVENOUS at 14:51

## 2024-11-05 RX ADMIN — NOREPINEPHRINE BITARTRATE 0.11 MCG/KG/MIN: 0.06 INJECTION, SOLUTION INTRAVENOUS at 03:26

## 2024-11-05 RX ADMIN — Medication 60 ML: at 08:54

## 2024-11-05 RX ADMIN — Medication: at 11:52

## 2024-11-05 RX ADMIN — ALBUMIN HUMAN 25 G: 0.25 SOLUTION INTRAVENOUS at 08:20

## 2024-11-05 RX ADMIN — DOBUTAMINE HYDROCHLORIDE 2.5 MCG/KG/MIN: 200 INJECTION INTRAVENOUS at 15:42

## 2024-11-05 RX ADMIN — CALCIUM CHLORIDE, MAGNESIUM CHLORIDE, DEXTROSE MONOHYDRATE, LACTIC ACID, SODIUM CHLORIDE, SODIUM BICARBONATE AND POTASSIUM CHLORIDE 5000 ML: 5.15; 2.03; 22; 5.4; 6.46; 3.09; .157 INJECTION INTRAVENOUS at 00:39

## 2024-11-05 RX ADMIN — PROPOFOL 15 MCG/KG/MIN: 10 INJECTION, EMULSION INTRAVENOUS at 00:42

## 2024-11-05 RX ADMIN — Medication 60 ML: at 16:01

## 2024-11-05 RX ADMIN — CALCIUM CHLORIDE, MAGNESIUM CHLORIDE, DEXTROSE MONOHYDRATE, LACTIC ACID, SODIUM CHLORIDE, SODIUM BICARBONATE AND POTASSIUM CHLORIDE 5000 ML: 5.15; 2.03; 22; 5.4; 6.46; 3.09; .157 INJECTION INTRAVENOUS at 22:47

## 2024-11-05 RX ADMIN — POTASSIUM CHLORIDE 40 MEQ: 1.5 POWDER, FOR SOLUTION ORAL at 00:43

## 2024-11-05 RX ADMIN — Medication 40 MG: at 08:21

## 2024-11-05 RX ADMIN — FENTANYL CITRATE 25 MCG: 50 INJECTION, SOLUTION INTRAMUSCULAR; INTRAVENOUS at 18:48

## 2024-11-05 RX ADMIN — CALCIUM CHLORIDE, MAGNESIUM CHLORIDE, DEXTROSE MONOHYDRATE, LACTIC ACID, SODIUM CHLORIDE, SODIUM BICARBONATE AND POTASSIUM CHLORIDE 5000 ML: 5.15; 2.03; 22; 5.4; 6.46; 3.09; .157 INJECTION INTRAVENOUS at 12:09

## 2024-11-05 NOTE — PROGRESS NOTES
[facility] ICU RESPIRATORY NOTE        Date of Admission: 10/28/2024    Date of Intubation (most recent): 10/28/24    Reason for Mechanical Ventilation: CABG    Number of Days on Mechanical Ventilation: 9    Met Criteria for Spontaneous Breathing Trial: No    Reason for No Spontaneous Breathing Trial: Per MD    Bite Block: No    Significant Events Today: None    ABG Results:   Recent Labs   Lab 11/04/24  2148 11/04/24  1340 11/04/24  0903 11/04/24  0545 11/03/24  2156 11/03/24  1357   PH 7.40  --   --  7.35 7.38 7.33*   PCO2 42  --   --  46* 43 48*   PO2 96  --   --  87 104 103   HCO3 26  --   --  26 25 25   O2PER 30  30 30 30 30  30 30  30 35  35         Current Vent Settings: FiO2 (%): 30 %, Vent Mode: CMV/AC, Resp Rate (Set): 20 breaths/min, Tidal Volume (Set, mL): 500 mL, PEEP (cm H2O): 5 cmH2O, Resp Rate (Set): 20 breaths/min, Tidal Volume (Set, mL): 500 mL, PEEP (cm H2O): 5 cmH2O    Skin Assessment: Intact    Plan: We will continue to monitor     RT Marva on 11/5/2024 at 3:56 AM

## 2024-11-05 NOTE — PROGRESS NOTES
Critical Care  Note      11/05/2024    Name: Alessio Teixeira MRN#: [id number]   Age: 88 year old YOB: 1935     Hsptl Day# 8  ICU DAY #8    MV DAY #8             Problem List:   Active Problems:    Coronary artery disease  Mixed cardiogenic/vasoplegic shock  Acute hypoxemic respiratory failure         Summary/Hospital Course:   88 year old M with a history of severe 3 vessel CAD. Status post sternotomy, CABGx3, modified MAZE procedure, PAYAM ligation with Dr. Mulvihill 10/28/24. Post operative hemorrhage with RTOR on POD0 for evacuation of mediastinal blood clot causing tamponade physiology.   Course subsequently complicated by persistent shock (appears mixed cardigenic/vasoplegic), and ongoing respiratory failure.    Interval/overnight events:  NAEON, dobutamine weaned off yesterday.      Assessment and plan :     Alessio Teixeira IS a 88 year old male admitted on 10/28/2024 for shock and respiratory failure.   I have personally reviewed the daily labs, imaging studies, cultures and discussed the case with referring physician and consulting physicians.     My assessment and plan by system for this patient is as follows:    Neurology/Psychiatry:   1. Pain/analgesia: fentanyl drip  2. Sedation: propofol    Cardiovascular:   Shock:  appears c/w mixed cardiogenic and vasoplegic picture; persistent.  Continues on epi, norepi, vaso.    S/p CAB3:  continues on ASA.  Add statin when LFTs stabilized.    Pulmonary/Ventilator Management:   1. Acute hypoxemic respiratory failure, vent dependent:  continue lung protective tidal volumes.  Spont breathing trials as able--apneic on trials yesterday.    GI and Nutrition :   1. TF  2. PPI for pud prophy    Renal/Fluids/Electrolytes:   1. Acute kidney failure:  continues on CRRT.  Appreciate nephrology support.    Infectious Disease:   1. Ngtd on cultures.  Abx stopped.    Endocrine:   1. Stress induced hyperglycemia:  SSI    Hematology/Oncology:   1. Hyperleukocytosis to  "59, known h/o CLL.  Appreciate heme input.  2. Hgb 7.6.  Acute blood loss Anemia d/t post-operative bleeding, no signs, symptoms of ongoing active blood loss  3. Pltlts 198 ok.    ICU Prophylaxis:   1. DVT: mechanical only for now  2. VAP: HOB 30 degrees, chlorhexidine rinse  3. Stress Ulcer: PPI  4. Restraints: Nonviolent soft two point restraints required and necessary for patient safety and continued cares and good effect as patient continues to pull at necessary lines, tubes despite education and distraction. Will readdress daily.   5. Wound care  -   6. Feeding - tf  7. Family Update: per CV surgery  8. Disposition - ICU    Clinically Significant Risk Factors        # Hypokalemia: Lowest K = 3.3 mmol/L in last 2 days, will replace as needed    # Hypocalcemia: Lowest Ca = 7.9 mg/dL in last 2 days, will monitor and replace as appropriate     # Hypoalbuminemia: Lowest albumin = 2.7 g/dL at 11/2/2024  5:44 AM, will monitor as appropriate    # Coagulation Defect: INR = 1.85 (Ref range: 0.85 - 1.15) and/or PTT = 42 Seconds (Ref range: 22 - 38 Seconds), will monitor for bleeding    # Hypertension: Noted on problem list  # Chronic heart failure with reduced ejection fraction: last echo with EF <40%   # Acute Hypercapnic Respiratory Failure: based on arterial blood gas results.  Continue supplemental oxygen and ventilatory support as indicated.  # Acute Hypercapnic Respiratory Failure: based on venous blood gas results.  Continue supplemental oxygen and ventilatory support as indicated.      #Acute blood loss anemia: Lowest Hgb this hospitalization: 6.9 g/dL. Will continue to monitor and treat/transfuse as appropriate.     # Obesity: Estimated body mass index is 33.06 kg/m  as calculated from the following:    Height as of this encounter: 1.778 m (5' 10\").    Weight as of this encounter: 104.5 kg (230 lb 6.1 oz).       # History of CABG: noted on surgical history                           Key Medications:     Current " Facility-Administered Medications   Medication Dose Route Frequency Provider Last Rate Last Admin    albumin human 25 % injection 25 g  25 g Intravenous Q12H Arlin Hodge PA-C 100 mL/hr at 11/04/24 1946 25 g at 11/04/24 1946    aspirin (ASA) chewable tablet 162 mg  162 mg Oral or NG Tube Daily Clarence Bone MD   162 mg at 11/04/24 0818    chlorhexidine (PERIDEX) 0.12 % solution 15 mL  15 mL Swish & Spit BID Zac Kaur DO   15 mL at 11/04/24 2032    insulin aspart (NovoLOG) injection (RAPID ACTING)  1-7 Units Subcutaneous Q4H Sb Arthur PA-C   1 Units at 11/03/24 0100    pantoprazole (PROTONIX) 2 mg/mL suspension 40 mg  40 mg Oral or NG Tube BID AC Arlin Hodge PA-C   40 mg at 11/04/24 1537    Or    pantoprazole (PROTONIX) EC tablet 40 mg  40 mg Oral BID AC Arlin Hodge PA-C        polyethylene glycol (MIRALAX) Packet 17 g  17 g Oral or Feeding Tube Daily Mulvihill, Michael, MD   17 g at 11/03/24 0812    potassium & sodium phosphates (NEUTRA-PHOS) Packet 1 packet  1 packet Oral or Feeding Tube Q4H Farhad Boland MD        potassium chloride (KLOR-CON) Packet 20 mEq  20 mEq Oral or Feeding Tube Once Farhad Boland MD        Prosource TF20 ENfit Compatibl EN LIQD (PROSOURCE TF20) packet 60 mL  1 packet Per Feeding Tube TID Arlin Hodge PA-C   60 mL at 11/04/24 2200    senna-docusate (SENOKOT-S/PERICOLACE) 8.6-50 MG per tablet 1 tablet  1 tablet Oral or Feeding Tube BID Mulvihill, Michael, MD   1 tablet at 11/04/24 2032    sodium chloride (PF) 0.9% PF flush 3 mL  3 mL Intracatheter Q8H Clarence Bone MD   3 mL at 11/04/24 2200     Current Facility-Administered Medications   Medication Dose Route Frequency Provider Last Rate Last Admin    dextrose 10% infusion   Intravenous Continuous PRN Arlin Hodge PA-C 30 mL/hr at 11/03/24 1000 Rate Verify at 11/03/24 1000    dextrose 10% infusion   Intravenous Continuous Her-Sb Calle PA-C   Stopped at 11/05/24 0200     dialysate solution (PrismaSol BGK 2/3.5)   CRRT Continuous Farhad Boland MD 1,500 mL/hr at 11/04/24 0700 5,000 mL at 11/05/24 0410    DOBUTamine (DOBUTREX) 500 mg in D5W 250 mL infusion (adult std conc)  2.5 mcg/kg/min Intravenous Continuous Arlin Hodge PA-C   Stopped at 11/04/24 0745    EPINEPHrine (ADRENALIN) 5 mg in  mL infusion  0.01-0.1 mcg/kg/min Intravenous Continuous PRN Clarence Bone MD 20.1 mL/hr at 11/05/24 0600 0.07 mcg/kg/min at 11/05/24 0600    fentaNYL (SUBLIMAZE) infusion   mcg/hr Intravenous Continuous Key Dobson MD 0.5 mL/hr at 11/05/24 0600 25 mcg/hr at 11/05/24 0600    lactated ringers infusion   Intravenous Continuous Opal Nguyen PA-C 20 mL/hr at 11/04/24 0734 Rate Verify at 11/04/24 0734    propofol (DIPRIVAN) infusion  5-75 mcg/kg/min Intravenous Continuous Tyra Dubois MD 9.6 mL/hr at 11/05/24 0600 15 mcg/kg/min at 11/05/24 0600    And    Medication Instruction   Does not apply Continuous PRN Tyra Dubois MD        No heparin required   Does not apply Continuous PRN Farhad Boland MD        norepinephrine (LEVOPHED) 16 mg in  mL infusion MAX CONC CENTRAL LINE  0.01-0.2 mcg/kg/min Intravenous Continuous Arlin Hodge PA-C 9 mL/hr at 11/05/24 0600 0.1 mcg/kg/min at 11/05/24 0600    Post-Filter replacement solution (PRISMASOL BGK 2/3.5)   CRRT Continuous Farhad Boland  mL/hr at 11/05/24 0608 New Bag at 11/05/24 0608    Pre-Filter replacement solution (PRISMASOL BGK 2/3.5)   CRRT Continuous Farhad Boland  mL/hr at 11/04/24 0337 5,000 mL at 11/05/24 0452    Reason beta blocker order not selected   Does not apply DOES NOT GO TO Clarence Olguin MD        vasopressin 0.2 units/mL in NS (PITRESSIN) standard conc infusion  0.5-4 Units/hr Intravenous Continuous Arlin Hodge PA-C 20 mL/hr at 11/05/24 0600 4 Units/hr at 11/05/24 0600              Physical Examination:   Temp:  [98.1  F (36.7   C)-99.1  F (37.3  C)] 98.2  F (36.8  C)  Pulse:  [] 81  MAP:  [62 mmHg-257 mmHg] 72 mmHg  Arterial Line BP: ()/() 126/53  FiO2 (%):  [30 %] 30 %  SpO2:  [89 %-100 %] 98 %        Intake/Output Summary (Last 24 hours) at 11/5/2024 1203  Last data filed at 11/5/2024 1200  Gross per 24 hour   Intake 4001.7 ml   Output 7963 ml   Net -3961.3 ml           Wt Readings from Last 4 Encounters:   11/05/24 104.5 kg (230 lb 6.1 oz)   10/25/24 97.1 kg (214 lb)   10/03/24 101.2 kg (223 lb)   10/02/24 104.8 kg (231 lb)     Arterial Line BP: ()/() 126/53  MAP:  [62 mmHg-257 mmHg] 72 mmHg  CVP:  [6 mmHg-21 mmHg] 13 mmHg  SVO2:  [42 %-66 %] 60 %  FiO2 (%): 30 %, Vent Mode: CMV/AC, Resp Rate (Set): 20 breaths/min, Tidal Volume (Set, mL): 500 mL, PEEP (cm H2O): 5 cmH2O, Resp Rate (Set): 20 breaths/min, Tidal Volume (Set, mL): 500 mL, PEEP (cm H2O): 5 cmH2O  Recent Labs   Lab 11/05/24  0456 11/04/24  2148 11/04/24  1340 11/04/24  0903 11/04/24  0545 11/03/24  2156   PH 7.38 7.40  --   --  7.35 7.38   PCO2 42 42  --   --  46* 43   PO2 103 96  --   --  87 104   HCO3 25 26  --   --  26 25   O2PER 30  30 30  30 30 30 30  30 30  30       GEN: no acute distress   HEENT: head ncat, sclera anicteric, OP patent, trachea midline   PULM: unlabored synchronous with vent, clear anteriorly    CV/COR: RRR S1S2 no gallop,  No rub, no murmur  ABD: soft nontender, hypoactive bowel sounds, no mass  EXT:  warm and well perfused x4  NEURO: PERRL, no obvious deficits  SKIN: no obvious rash  LINES: clean, dry intact         Data:   All data and imaging reviewed     ROUTINE ICU LABS (Last four results)  CMP  Recent Labs   Lab 11/05/24  0509 11/05/24  0456 11/05/24  0146 11/04/24  2200 11/04/24  2148 11/04/24  1726 11/04/24  1340 11/04/24  1031 11/04/24  1030 11/04/24  0550 11/04/24  0545 11/03/24  0552 11/03/24  0548   NA  --  135  --   --  135  --  136  --   --   --  137   < > 136   POTASSIUM  --  3.8  --   --  3.4  --  4.7   --  3.7  --  3.3*   < > 4.0   CHLORIDE  --  100  --   --  100  --  101  --   --   --  101   < > 101   CO2  --  23  --   --  23  --  24  --   --   --  25   < > 21*   ANIONGAP  --  12  --   --  12  --  11  --   --   --  11   < > 14   GLC 90 135* 143* 136* 130*   < > 129*   < >  --    < > 120*   < > 146*   BUN  --  27.3*  --   --  25.7*  --  27.1*  --   --   --  25.9*   < > 27.4*   CR  --  1.76*  --   --  1.74*  --  1.78*  --   --   --  1.73*   < > 1.85*   GFRESTIMATED  --  37*  --   --  37*  --  36*  --   --   --  38*   < > 35*   TATE  --  9.2  --   --  9.0  --  7.9*  --   --   --  8.7*   < > 8.5*   MAG  --  2.2  --   --  2.4*  --  1.7  --   --   --  2.1   < > 2.0   PHOS  --  2.3*  --   --   --   --  2.2*  2.2*  --   --   --  1.9*  --  2.9   PROTTOTAL  --  5.1*  --   --  5.0*  --  5.1*  --   --   --  4.8*   < > 4.3*   ALBUMIN  --  3.7  --   --  3.7  --  3.7  --   --   --  3.4*   < > 3.0*   BILITOTAL  --  2.5*  --   --  2.4*  --  2.6*  --   --   --  2.6*   < > 2.2*   ALKPHOS  --  178*  --   --  180*  --  182*  --   --   --  173*   < > 167*   AST  --  235*  --   --  281*  --  345*  --   --   --  404*   < > 530*   ALT  --  353*  --   --  391*  --  438*  --   --   --  480*   < > 562*    < > = values in this interval not displayed.     CBC  Recent Labs   Lab 11/05/24 0456 11/04/24 2148 11/04/24  1340 11/04/24  0545   WBC 59.1* 60.9* 66.4* 63.2*   RBC 2.66* 2.66* 2.69* 2.70*   HGB 7.6* 7.6* 7.8* 7.8*   HCT 23.8* 23.5* 24.3* 24.1*   MCV 90 88 90 89   MCH 28.6 28.6 29.0 28.9   MCHC 31.9 32.3 32.1 32.4   RDW 20.7* 20.5* 20.5* 20.4*    194 212 195     INR  Recent Labs   Lab 11/05/24 0456 11/04/24  0545 11/03/24  0548 11/02/24  0544   INR 1.85* 2.11* 2.31* 2.37*     Arterial Blood Gas  Recent Labs   Lab 11/05/24 0456 11/04/24 2148 11/04/24  1340 11/04/24  0903 11/04/24  0545 11/03/24  2156   PH 7.38 7.40  --   --  7.35 7.38   PCO2 42 42  --   --  46* 43   PO2 103 96  --   --  87 104   HCO3 25 26  --   --  26 25  "  O2PER 30  30 30  30 30 30 30  30 30  30       All cultures:  No results for input(s): \"CULT\" in the last 168 hours.  Recent Results (from the past 24 hours)   US Upper Extremity Arterial Duplex Right    Narrative    EXAM: US UPPER EXTREMITY ARTERIAL DUPLEX RIGHT  LOCATION: [facility]  DATE: 11/3/2024    INDICATION: Mottled hand.  COMPARISON: None available.  TECHNIQUE: Arterial Duplex ultrasound of the right arm. Color flow and spectral Doppler with waveform analysis performed.    FINDINGS (RIGHT upper extremity):    ARTERIAL PEAK SYSTOLIC VELOCITIES (cm/s):    Subclavian artery (proximal): 51  Subclavian artery (distal): 71  Axillary artery: 70  Brachial (proximal): 74  Brachial (distal): 76  Radial (proximal): 41  Radial (distal): 14  Ulnar (proximal): 85  Ulnar (distal): 136    WAVEFORMS/COLOR DOPPLER: Triphasic waveforms are noted throughout except in the distal radial artery where biphasic waveforms are noted.      Impression    IMPRESSION:   1.  Patent right upper extremity arteries although slow flow is noted within the distal radial artery along with biphasic waveforms, findings are of indeterminate etiology, could be due to area of focal stenosis.   XR Chest Port 1 View    Narrative    EXAM: XR CHEST PORT 1 VIEW  LOCATION: [facility]  DATE: 11/3/2024    INDICATION: eval infiltrate edema  COMPARISON: Chest radiograph 11/2/2024.      Impression    IMPRESSION:     Lines and tubes: Endotracheal tube tip is not well visualized, likely in the upper trachea. Feeding tube courses below the diaphragm. [place] catheter near the main pulmonary trunk. Similar left central venous catheter, left atrial appendage clip and   median sternotomy. Bilateral chest tubes.    Right basilar opacity favoring combination of pleural fluid, atelectasis and/or infection. Possible trace left pleural effusion. No discernible pneumothorax. Similar cardiomediastinal silhouette. "     D/w SADE Webb, Dr. Mulvihill of CV surgery.    Billing: This patient is critically ill: Yes. Total critical care time today 50 min, excluding procedures.

## 2024-11-05 NOTE — PLAN OF CARE
Goal Outcome Evaluation:      Plan of Care Reviewed With: patient          Outcome Evaluation: AFib, Vented, sedated with Propofol 15 mcq/kg/min, pain controlled with 25mcq/hr  of Fentanyl, pt can open his eyes to voice, can open his mouth for cares, but can not follow commands with bilateral low and upper extremeties. CRRT fluid removal at negative 100-200/hr. Levo gtt titrated down to 0.1 mcq/kg/min, Epi and Vaso gtt are at the same rate.

## 2024-11-05 NOTE — PROGRESS NOTES
[facility] ICU RESPIRATORY NOTE        Date of Admission: 10/28/2024    Date of Intubation (most recent): 10/28/24    Reason for Mechanical Ventilation: CABG    Number of Days on Mechanical Ventilation: 8    Met Criteria for Spontaneous Breathing Trial: No    Reason for No Spontaneous Breathing Trial: Per MD    Bite Block: No    Significant Events Today: None    ABG Results:   Recent Labs   Lab 11/04/24  1340 11/04/24  0903 11/04/24  0545 11/03/24  2156 11/03/24  1357 11/03/24  0854   PH  --   --  7.35 7.38 7.33* 7.28*   PCO2  --   --  46* 43 48* 53*   PO2  --   --  87 104 103 76*   HCO3  --   --  26 25 25 25   O2PER 30 30 30  30 30  30 35  35 35         Current Vent Settings: FiO2 (%): 30 %, Vent Mode: CMV/AC, Resp Rate (Set): 20 breaths/min, Tidal Volume (Set, mL): 500 mL, PEEP (cm H2O): 5 cmH2O, Resp Rate (Set): 20 breaths/min, Tidal Volume (Set, mL): 500 mL, PEEP (cm H2O): 5 cmH2O    Skin Assessment: Intact    Plan: Continue to monitor    RT Ebonie on 11/4/2024 at 7:39 PM

## 2024-11-05 NOTE — PROGRESS NOTES
Started Dobutamine per CV surgery. Increased Nor[place] infusion to compensate for this as well as increasing sedation for bedside line placement.  Will check another venous gas 2 hours after the start of Dobutamine. Pulled less fluid for CRRT during all these changes.

## 2024-11-05 NOTE — PROGRESS NOTES
Renal Medicine Progress Note    SHORTHAND KEY FOR MY NOTES:  c = with, s = without, p = after, a = before, x = except, asx = asymptomatic, tx = transplant or treatment, sx = symptoms or symptomatic, cx = canceled or culture, rxn = reaction, yday = yesterday, nl = normal, abx = antibiotics, fxn = function, dx = diagnosis, dz = disease, m/h = melena/hematochezia, c/d/l/ha = cramping/dizziness/lightheadedness/headache, d/c = discharge or diarrhea/constipation, f/c/n/v = fevers/chills/nausea/vomiting, cp/sob = chest pain/shortness of breath, tbv = total body volume, rxn = reaction, tdc = tunneled dialysis catheter, pta = prior to admission, hd = hemodialysis, pd = peritoneal dialysis, hhd = home hemodialysis, edw = estimated dry wt         Assessment/Plan:     1.  Oliguric JAVIER/CKD IIIb.  Pt remains on CRRT.  He is not making any significant amt of urine.  Electrolytes are being replaced prn.  A.  Continue same CRRT orders.  B.  UF goal 150-200 ml/h.  When he gets blood, do not pull that fluid amt.    2.  Severe shock.  Pt remains on multiple pressors and broad abx.  CVP 14, PAD 28.  A.  Wean pressors as able.  B.  Dose abx for CRRT.    3.  Resp failure.  Pt is on full vent support.  A.  Vent mgmt per ICU team.    4.  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  EF 35%.  A.  Per CV Surg.    5.  FEN.  Phos remains low.  K, Na, Mg are ok.  A.  Continue replacement protocols.    6.  Code Status.  Pt is full code.  His prognosis remains guarded and poor.    Case d/w Dr. Ness, Bhanu RN, pt's wife.        Interval History:     Unable.  Pt is intubated/sedated.          Medications and Allergies:     Current Facility-Administered Medications   Medication Dose Route Frequency Provider Last Rate Last Admin    albumin human 25 % injection 25 g  25 g Intravenous Q12H Arlin Hodge PA-C 100 mL/hr at 11/04/24 1946 25 g at 11/05/24 0820    aspirin (ASA) chewable tablet 162 mg  162 mg Oral or NG Tube Daily Clarence Bone MD   162 mg at 11/05/24 0816  "   chlorhexidine (PERIDEX) 0.12 % solution 15 mL  15 mL Swish & Spit BID Zac Kaur DO   15 mL at 11/05/24 0821    insulin aspart (NovoLOG) injection (RAPID ACTING)  1-7 Units Subcutaneous Q4H Sb Arthur PA-C   1 Units at 11/03/24 0100    pantoprazole (PROTONIX) 2 mg/mL suspension 40 mg  40 mg Oral or NG Tube BID AC Arlin Hodge PA-C   40 mg at 11/05/24 0821    Or    pantoprazole (PROTONIX) EC tablet 40 mg  40 mg Oral BID AC Arlin Hodge PA-C        polyethylene glycol (MIRALAX) Packet 17 g  17 g Oral or Feeding Tube Daily Mulvihill, Michael, MD   17 g at 11/03/24 0812    potassium & sodium phosphates (NEUTRA-PHOS) Packet 1 packet  1 packet Oral or Feeding Tube Q4H Farhad Boland MD   Given at 11/05/24 1152    Prosource TF20 ENfit Compatibl EN LIQD (PROSOURCE TF20) packet 60 mL  1 packet Per Feeding Tube TID Arlin Hodge PA-C   60 mL at 11/05/24 0854    senna-docusate (SENOKOT-S/PERICOLACE) 8.6-50 MG per tablet 1 tablet  1 tablet Oral or Feeding Tube BID Mulvihill, Michael, MD   1 tablet at 11/04/24 2032    sodium chloride (PF) 0.9% PF flush 3 mL  3 mL Intracatheter Q8H Clarence Bone MD   3 mL at 11/04/24 2200      No Known Allergies       Physical Exam:     Vitals were reviewed   , Blood pressure 91/57, pulse 90, temperature 98.2  F (36.8  C), resp. rate 13, height 1.778 m (5' 10\"), weight 104.5 kg (230 lb 6.1 oz), SpO2 100%.  Wt Readings from Last 3 Encounters:   11/05/24 104.5 kg (230 lb 6.1 oz)   10/25/24 97.1 kg (214 lb)   10/03/24 101.2 kg (223 lb)     Intake/Output Summary (Last 24 hours) at 11/5/2024 1220  Last data filed at 11/5/2024 1200  Gross per 24 hour   Intake 4201.7 ml   Output 7973 ml   Net -3771.3 ml     GENERAL APPEARANCE: intubated, sedated  HEENT:  eyes/ears/nose/neck grossly nl  RESP: + vent sounds  CV: irreg, nl S1/S2   ABDOMEN: s/nt/nd  EXTREMITIES/SKIN: + scrotal edema; 2+ ble edema; skin cool  NEURO:  sedated  LINES:  + burt. + multiple lines: " art, central, S-G, temp HD    Pt seen on CRRT.  Stable run.  Tolerating 150-200 ml UF/h.           Data:     CBC RESULTS:     Recent Labs   Lab 11/05/24  0456 11/04/24  2148 11/04/24  1340 11/04/24  0545 11/03/24  2156 11/03/24  1357   WBC 59.1* 60.9* 66.4* 63.2* 69.2* 75.5*   RBC 2.66* 2.66* 2.69* 2.70* 2.82* 2.78*   HGB 7.6* 7.6* 7.8* 7.8* 8.2* 8.2*   HCT 23.8* 23.5* 24.3* 24.1* 25.4* 25.1*    194 212 195 207 211     Basic Metabolic Panel:  Recent Labs   Lab 11/05/24  0850 11/05/24  0509 11/05/24  0456 11/05/24  0146 11/04/24  2200 11/04/24  2148 11/04/24  1726 11/04/24  1340 11/04/24  1031 11/04/24  1030 11/04/24  0550 11/04/24  0545 11/03/24  2159 11/03/24  2156 11/03/24  1359 11/03/24  1357   NA  --   --  135  --   --  135  --  136  --   --   --  137  --  136  --  136   POTASSIUM  --   --  3.8  --   --  3.4  --  4.7  --  3.7  --  3.3*  --  3.5  --  3.7   CHLORIDE  --   --  100  --   --  100  --  101  --   --   --  101  --  102  --  102   CO2  --   --  23  --   --  23  --  24  --   --   --  25  --  25  --  23   BUN  --   --  27.3*  --   --  25.7*  --  27.1*  --   --   --  25.9*  --  27.2*  --  28.4*   CR  --   --  1.76*  --   --  1.74*  --  1.78*  --   --   --  1.73*  --  1.80*  --  1.82*   * 90 135* 143* 136* 130*   < > 129*   < >  --    < > 120*   < > 123*   < > 130*   TATE  --   --  9.2  --   --  9.0  --  7.9*  --   --   --  8.7*  --  8.7*  --  8.3*    < > = values in this interval not displayed.     INR  Recent Labs   Lab 11/05/24  0456 11/04/24  0545 11/03/24  0548 11/02/24  0544   INR 1.85* 2.11* 2.31* 2.37*      Attestation:   I have reviewed today's relevant vital signs, notes, medications, labs and imaging.    Kaz Ann MD  [facility] - Nephrology  [phone]

## 2024-11-05 NOTE — PLAN OF CARE
Problem: Adult Inpatient Plan of Care    Outcome: Progressing  Flowsheets (Taken 11/4/2024 1828)  Outcome Evaluation: Pt continues to tolerate CRRT fluid removal at negative 100-200 each hour.  Pressors remain the same to allow for fluid removal. Dobutamine off at 0745 and remained off with reasonable CO/CI and SVo2.  Pacer connected to patient but off per provider, intrinsic rhythm is afib with junctional beats, bp pressure tolerating, no drift in heart rate lower than 80's. 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  Fentanyl reduced to 25mcg/hr with 15mcg/kg/hr of propofol for sedation.  Note occasional break through breaths, no stacking of breaths noted as in previous days.  Discussion had about attempting pressure support tomorrow.  Family involved, at bedside, concerned and hopeful.  Supportive conversations held with CV team and nursing.  Plan of Care Reviewed With:   spouse   family  Overall Patient Progress: improving  Goal: Patient-Specific Goal (Individualized)    Outcome: Progressing  Goal: Absence of Hospital-Acquired Illness or Injury  Outcome: Progressing  Intervention: Identify and Manage Fall Risk  Recent Flowsheet Documentation  Taken 11/4/2024 1600 by Pascale Chirinos RN  Safety Promotion/Fall Prevention:   lighting adjusted   clutter free environment maintained   room organization consistent   safety round/check completed   treat reversible contributory factors   treat underlying cause  Taken 11/4/2024 1200 by Pascale Chirinos, RN  Safety Promotion/Fall Prevention:   lighting adjusted   clutter free environment maintained   room organization consistent   safety round/check completed   treat reversible contributory factors   treat underlying cause  Taken 11/4/2024 0800 by Pascale Chirinos, RN  Safety Promotion/Fall Prevention:   lighting adjusted   clutter free environment maintained   room organization consistent   safety  round/check completed   treat reversible contributory factors   treat underlying cause  Intervention: Prevent Skin Injury  Recent Flowsheet Documentation  Taken 11/4/2024 1600 by Pascale Chirinos RN  Body Position:   side-lying 30 degrees   upper extremity elevated  Skin Protection:   silicone foam dressing in place   skin to device areas padded   skin to skin areas padded   incontinence pads utilized  Taken 11/4/2024 1200 by Pascale Chirinos RN  Body Position:   side-lying 30 degrees   upper extremity elevated  Skin Protection:   silicone foam dressing in place   skin to device areas padded   skin to skin areas padded   incontinence pads utilized  Taken 11/4/2024 1130 by Pascale Chirinos RN  Body Position:   turned   right   heels elevated  Taken 11/4/2024 1000 by Pascale Chirinos RN  Body Position:   turned   right  Taken 11/4/2024 0900 by Pascale Chirinos RN  Body Position:   turned   left   legs elevated   heels elevated  Taken 11/4/2024 0800 by Pascale Chirinos RN  Body Position:   side-lying 30 degrees   upper extremity elevated  Skin Protection:   silicone foam dressing in place   skin to device areas padded   skin to skin areas padded   incontinence pads utilized  Intervention: Prevent and Manage VTE (Venous Thromboembolism) Risk  Recent Flowsheet Documentation  Taken 11/4/2024 1600 by Pascale Chirinos RN  VTE Prevention/Management: SCDs on (sequential compression devices)  Taken 11/4/2024 1200 by Pascale Chirinos RN  VTE Prevention/Management: SCDs on (sequential compression devices)  Taken 11/4/2024 0800 by Pascale Chirinos RN  VTE Prevention/Management: SCDs on (sequential compression devices)  Intervention: Prevent Infection  Recent Flowsheet Documentation  Taken 11/4/2024 1600 by Pascale hCirinos RN  Infection Prevention:   environmental surveillance performed   equipment surfaces disinfected   hand hygiene promoted   rest/sleep promoted   single patient room provided  Taken  11/4/2024 1200 by Pascale Chirinos RN  Infection Prevention:   environmental surveillance performed   equipment surfaces disinfected   hand hygiene promoted   rest/sleep promoted   single patient room provided  Taken 11/4/2024 0800 by Pascale Chirinos RN  Infection Prevention:   environmental surveillance performed   equipment surfaces disinfected   hand hygiene promoted   rest/sleep promoted   single patient room provided  Goal: Optimal Comfort and Wellbeing  Outcome: Progressing  Intervention: Provide Person-Centered Care  Recent Flowsheet Documentation  Taken 11/4/2024 1600 by Pascale Chirinos RN  Trust Relationship/Rapport:   care explained   reassurance provided  Taken 11/4/2024 1200 by Pascale Chirinos RN  Trust Relationship/Rapport:   care explained   reassurance provided  Taken 11/4/2024 0800 by Pascale Chirinos RN  Trust Relationship/Rapport:   care explained   reassurance provided  Goal: Readiness for Transition of Care  Outcome: Progressing     Problem: Comorbidity Management  Goal: Maintenance of Asthma Control  Outcome: Progressing  Intervention: Maintain Asthma Symptom Control  Recent Flowsheet Documentation  Taken 11/4/2024 1600 by Pascale Chirinos RN  Medication Review/Management: medications reviewed  Taken 11/4/2024 1200 by Pascale Chirinos RN  Medication Review/Management: medications reviewed  Taken 11/4/2024 0800 by Pascale Chirinos RN  Medication Review/Management: medications reviewed  Goal: Maintenance of Behavioral Health Symptom Control  Outcome: Progressing  Intervention: Maintain Behavioral Health Symptom Control  Recent Flowsheet Documentation  Taken 11/4/2024 1600 by Pascale Chirinos RN  Medication Review/Management: medications reviewed  Taken 11/4/2024 1200 by Pascale Chirinos RN  Medication Review/Management: medications reviewed  Taken 11/4/2024 0800 by Pascale Chirinos RN  Medication Review/Management: medications reviewed  Goal: Maintenance of COPD Symptom  Control  Outcome: Progressing  Intervention: Maintain COPD Symptom Control  Recent Flowsheet Documentation  Taken 11/4/2024 1600 by Pascale Chirinos RN  Medication Review/Management: medications reviewed  Taken 11/4/2024 1200 by Pascale Chirinos RN  Medication Review/Management: medications reviewed  Taken 11/4/2024 0800 by Pascale Chirinos RN  Medication Review/Management: medications reviewed  Goal: Blood Glucose Levels Within Targeted Range  Outcome: Progressing  Intervention: Monitor and Manage Glycemia  Recent Flowsheet Documentation  Taken 11/4/2024 1600 by Pascale Chirinos RN  Medication Review/Management: medications reviewed  Taken 11/4/2024 1200 by Pascale Chirinos RN  Medication Review/Management: medications reviewed  Taken 11/4/2024 0800 by Pascale Chirinos RN  Medication Review/Management: medications reviewed  Goal: Maintenance of Heart Failure Symptom Control  Outcome: Progressing  Intervention: Maintain Heart Failure Management  Recent Flowsheet Documentation  Taken 11/4/2024 1600 by Pascale Chirinos RN  Medication Review/Management: medications reviewed  Taken 11/4/2024 1200 by Pascale Chirinos RN  Medication Review/Management: medications reviewed  Taken 11/4/2024 0800 by Pasacle Chirinos RN  Medication Review/Management: medications reviewed  Goal: Blood Pressure in Desired Range  Outcome: Progressing  Intervention: Maintain Blood Pressure Management  Recent Flowsheet Documentation  Taken 11/4/2024 1600 by Pascale Chirinos RN  Medication Review/Management: medications reviewed  Taken 11/4/2024 1200 by Pascale Chirinos RN  Medication Review/Management: medications reviewed  Taken 11/4/2024 0800 by Pascale Chirinos RN  Medication Review/Management: medications reviewed  Goal: Maintenance of Osteoarthritis Symptom Control  Outcome: Progressing  Intervention: Maintain Osteoarthritis Symptom Control  Recent Flowsheet Documentation  Taken 11/4/2024 1600 by Pascale Chirinos  RN  Assistive Device Utilized: lift device  Activity Management: bedrest  Medication Review/Management: medications reviewed  Taken 11/4/2024 1200 by Pascale Chirinos RN  Assistive Device Utilized: lift device  Activity Management: bedrest  Medication Review/Management: medications reviewed  Taken 11/4/2024 0800 by Pascale Chirinos RN  Assistive Device Utilized: lift device  Activity Management: bedrest  Medication Review/Management: medications reviewed  Goal: Bariatric Home Regimen Maintained  Outcome: Progressing  Intervention: Maintain and Manage Postbariatric Surgery Care  Recent Flowsheet Documentation  Taken 11/4/2024 1600 by Pascale Chirinos RN  Medication Review/Management: medications reviewed  Taken 11/4/2024 1200 by Pascale Chirinos RN  Medication Review/Management: medications reviewed  Taken 11/4/2024 0800 by Pascale Chirinos RN  Medication Review/Management: medications reviewed  Goal: Maintenance of Seizure Control  Outcome: Progressing  Intervention: Maintain Seizure Symptom Control  Recent Flowsheet Documentation  Taken 11/4/2024 1600 by Pascale Chirinos RN  Sensory Stimulation Regulation:   care clustered   lighting decreased   quiet environment promoted  Seizure Precautions: clutter-free environment maintained  Medication Review/Management: medications reviewed  Taken 11/4/2024 1200 by Pascale Chirinos RN  Sensory Stimulation Regulation:   care clustered   lighting decreased   quiet environment promoted  Seizure Precautions: clutter-free environment maintained  Medication Review/Management: medications reviewed  Taken 11/4/2024 0800 by Pascale Chirinos RN  Sensory Stimulation Regulation:   care clustered   lighting decreased   quiet environment promoted  Seizure Precautions: clutter-free environment maintained  Medication Review/Management: medications reviewed     Problem: Fall Injury Risk  Goal: Absence of Fall and Fall-Related Injury  Outcome: Progressing  Intervention:  Identify and Manage Contributors  Recent Flowsheet Documentation  Taken 11/4/2024 1600 by Pascale Chirinos RN  Medication Review/Management: medications reviewed  Taken 11/4/2024 1200 by Pascale Chirinos RN  Medication Review/Management: medications reviewed  Taken 11/4/2024 0800 by Pascale Chirinos RN  Medication Review/Management: medications reviewed  Intervention: Promote Injury-Free Environment  Recent Flowsheet Documentation  Taken 11/4/2024 1600 by Pascale Chirinos RN  Safety Promotion/Fall Prevention:   lighting adjusted   clutter free environment maintained   room organization consistent   safety round/check completed   treat reversible contributory factors   treat underlying cause  Taken 11/4/2024 1200 by Pascale Chirinos RN  Safety Promotion/Fall Prevention:   lighting adjusted   clutter free environment maintained   room organization consistent   safety round/check completed   treat reversible contributory factors   treat underlying cause  Taken 11/4/2024 0800 by Pascale Chirinos RN  Safety Promotion/Fall Prevention:   lighting adjusted   clutter free environment maintained   room organization consistent   safety round/check completed   treat reversible contributory factors   treat underlying cause     Problem: Mechanical Ventilation Invasive  Goal: Effective Communication  Outcome: Progressing  Intervention: Ensure Effective Communication  Recent Flowsheet Documentation  Taken 11/4/2024 1600 by Pascale Cihrinos RN  Family/Support System Care:   caregiver stress acknowledged   self-care encouraged  Trust Relationship/Rapport:   care explained   reassurance provided  Taken 11/4/2024 1200 by Pascale Chirinos RN  Family/Support System Care:   caregiver stress acknowledged   self-care encouraged  Trust Relationship/Rapport:   care explained   reassurance provided  Taken 11/4/2024 0800 by Pascale Chirinos RN  Family/Support System Care:   caregiver stress acknowledged   self-care  encouraged  Trust Relationship/Rapport:   care explained   reassurance provided  Goal: Optimal Device Function  Outcome: Progressing  Intervention: Optimize Device Care and Function  Recent Flowsheet Documentation  Taken 11/4/2024 1600 by Pascale Chirinos RN  Airway Safety Measures:   all equipment/monitors on and audible   mask at bedside   manual resuscitator/mask/valve at bedside   oxygen flowmeter   suction at bedside   suction equipment   suction regulator  Oral Care: oral rinse provided  Taken 11/4/2024 1200 by Pascale Chirinos RN  Airway Safety Measures:   all equipment/monitors on and audible   mask at bedside   manual resuscitator/mask/valve at bedside   oxygen flowmeter   suction at bedside   suction equipment   suction regulator  Oral Care: oral rinse provided  Taken 11/4/2024 0800 by Pascale Chirinos RN  Airway Safety Measures:   all equipment/monitors on and audible   mask at bedside   manual resuscitator/mask/valve at bedside   oxygen flowmeter   suction at bedside   suction equipment   suction regulator  Oral Care: oral rinse provided  Goal: Mechanical Ventilation Liberation  Outcome: Progressing  Intervention: Promote Extubation and Mechanical Ventilation Liberation  Recent Flowsheet Documentation  Taken 11/4/2024 1600 by Pascale Chirinos RN  Medication Review/Management: medications reviewed  Environmental Support:   calm environment promoted   environmental consistency promoted   rest periods encouraged  Taken 11/4/2024 1200 by Pascale Chirinos RN  Medication Review/Management: medications reviewed  Environmental Support:   calm environment promoted   environmental consistency promoted   rest periods encouraged  Taken 11/4/2024 0800 by Pascale Chirinos RN  Medication Review/Management: medications reviewed  Environmental Support:   calm environment promoted   environmental consistency promoted   rest periods encouraged  Goal: Optimal Nutrition Delivery  Outcome:  Progressing  Intervention: Optimize Nutrition Delivery  Recent Flowsheet Documentation  Taken 11/4/2024 1600 by Pascale Chirinos RN  Nutrition Support Management: weight trending reviewed  Taken 11/4/2024 1200 by Pascale Chirinos RN  Nutrition Support Management: weight trending reviewed  Taken 11/4/2024 0800 by Pascale Chirinos RN  Nutrition Support Management: weight trending reviewed  Goal: Absence of Device-Related Skin and Tissue Injury  Outcome: Progressing  Intervention: Maintain Skin and Tissue Health  Recent Flowsheet Documentation  Taken 11/4/2024 1600 by Pascale Chirinos RN  Device Skin Pressure Protection: pressure points protected  Taken 11/4/2024 1200 by Pascale Chirinos RN  Device Skin Pressure Protection: pressure points protected  Taken 11/4/2024 0800 by Pascale Chirinos RN  Device Skin Pressure Protection: pressure points protected  Goal: Absence of Ventilator-Induced Lung Injury  Outcome: Progressing  Intervention: Prevent Ventilator-Associated Pneumonia  Recent Flowsheet Documentation  Taken 11/4/2024 1600 by Pascale Chiirnos RN  Oral Care: oral rinse provided  Head of Bed (HOB) Positioning: HOB at 30 degrees  VAP Prevention Contraindications: condition unstable  VAP Prevention Measures: not completed (see contraindications)  Taken 11/4/2024 1200 by Pascale Chirinos RN  Oral Care: oral rinse provided  Head of Bed (HOB) Positioning: HOB at 30 degrees  VAP Prevention Contraindications: condition unstable  VAP Prevention Measures: not completed (see contraindications)  Taken 11/4/2024 1130 by Pascale Chirinos RN  Head of Bed (HOB) Positioning: HOB at 20-30 degrees  Taken 11/4/2024 1000 by Pascale Chirinos RN  Head of Bed (HOB) Positioning: HOB at 20-30 degrees  Taken 11/4/2024 0900 by Pascale Chirinos RN  Head of Bed (HOB) Positioning: HOB at 20-30 degrees  Taken 11/4/2024 0800 by Pascale Chirinos RN  Oral Care: oral rinse provided  Head of Bed (HOB) Positioning: HOB at 30  degrees  VAP Prevention Contraindications: condition unstable  VAP Prevention Measures: not completed (see contraindications)     Problem: Cardiovascular Surgery  Goal: Improved Activity Tolerance  Outcome: Progressing  Intervention: Optimize Tolerance for Activity  Recent Flowsheet Documentation  Taken 11/4/2024 1600 by Pascale Chirinos RN  Environmental Support:   calm environment promoted   environmental consistency promoted   rest periods encouraged  Taken 11/4/2024 1200 by Pascale Chirinos RN  Environmental Support:   calm environment promoted   environmental consistency promoted   rest periods encouraged  Taken 11/4/2024 0800 by Pascale Chirinos RN  Environmental Support:   calm environment promoted   environmental consistency promoted   rest periods encouraged  Goal: Optimal Coping with Heart Surgery  Outcome: Progressing  Intervention: Support Psychosocial Response to Surgery  Recent Flowsheet Documentation  Taken 11/4/2024 1600 by Pascale Chirinos RN  Supportive Measures: positive reinforcement provided  Family/Support System Care:   caregiver stress acknowledged   self-care encouraged  Taken 11/4/2024 1200 by Pascale Chirinos RN  Supportive Measures: positive reinforcement provided  Family/Support System Care:   caregiver stress acknowledged   self-care encouraged  Taken 11/4/2024 0800 by Pascale Chirinos RN  Supportive Measures: positive reinforcement provided  Family/Support System Care:   caregiver stress acknowledged   self-care encouraged  Goal: Absence of Bleeding  Outcome: Progressing  Intervention: Monitor and Manage Bleeding  Recent Flowsheet Documentation  Taken 11/4/2024 1600 by Pascale Chirinos RN  Bleeding Management: dressing monitored  Taken 11/4/2024 1200 by Pascale Chirinos RN  Bleeding Management: dressing monitored  Taken 11/4/2024 0800 by Pascale Chirinos RN  Bleeding Management: dressing monitored  Goal: Effective Bowel Elimination  Outcome: Progressing  Goal:  Effective Cardiac Function  Outcome: Progressing  Intervention: Optimize Cardiac Output and Blood Flow  Recent Flowsheet Documentation  Taken 11/4/2024 1600 by Pascale Chirinos RN  Dysrhythmia Management: pacing wires maintained  Taken 11/4/2024 1200 by Pascale Chirinos RN  Dysrhythmia Management: pacing wires maintained  Taken 11/4/2024 0800 by Pascale Chirinos RN  Dysrhythmia Management: pacing wires maintained  Goal: Optimal Cerebral Tissue Perfusion  Outcome: Progressing  Intervention: Protect and Optimize Cerebral Perfusion  Recent Flowsheet Documentation  Taken 11/4/2024 1600 by Pascale Chirinos RN  Sensory Stimulation Regulation:   care clustered   lighting decreased   quiet environment promoted  Glycemic Management: blood glucose monitored  Head of Bed (HOB) Positioning: HOB at 30 degrees  Taken 11/4/2024 1200 by Pascale Chirinos RN  Sensory Stimulation Regulation:   care clustered   lighting decreased   quiet environment promoted  Glycemic Management: blood glucose monitored  Head of Bed (HOB) Positioning: HOB at 30 degrees  Taken 11/4/2024 1130 by Pascale Chirinos RN  Head of Bed (HOB) Positioning: HOB at 20-30 degrees  Taken 11/4/2024 1000 by Pascale Chirinos RN  Head of Bed (HOB) Positioning: HOB at 20-30 degrees  Taken 11/4/2024 0900 by Pascale Chirinos RN  Head of Bed (HOB) Positioning: HOB at 20-30 degrees  Taken 11/4/2024 0800 by Pascale Chirinos RN  Sensory Stimulation Regulation:   care clustered   lighting decreased   quiet environment promoted  Glycemic Management: blood glucose monitored  Head of Bed (HOB) Positioning: HOB at 30 degrees  Goal: Fluid and Electrolyte Balance  Outcome: Progressing  Intervention: Monitor and Manage Fluid and Electrolyte Balance  Recent Flowsheet Documentation  Taken 11/4/2024 1600 by Pascale Chirinos RN  Fluid/Electrolyte Management: electrolyte supplement adjusted  Taken 11/4/2024 1200 by Pascale Chirinos RN  Fluid/Electrolyte Management:  electrolyte supplement adjusted  Taken 11/4/2024 0800 by Pascale Chirinos RN  Fluid/Electrolyte Management: electrolyte supplement adjusted  Goal: Blood Glucose Level Within Targeted Range  Outcome: Progressing  Intervention: Optimize Glycemic Control  Recent Flowsheet Documentation  Taken 11/4/2024 1600 by Pascale Chirinos RN  Glycemic Management: blood glucose monitored  Taken 11/4/2024 1200 by Pascale Chirinos RN  Glycemic Management: blood glucose monitored  Taken 11/4/2024 0800 by Pascale Chirinos RN  Glycemic Management: blood glucose monitored  Goal: Absence of Infection Signs and Symptoms  Outcome: Progressing  Intervention: Prevent or Manage Infection  Recent Flowsheet Documentation  Taken 11/4/2024 1600 by Pascale Chirinos RN  Infection Prevention:   environmental surveillance performed   equipment surfaces disinfected   hand hygiene promoted   rest/sleep promoted   single patient room provided  Taken 11/4/2024 1200 by Pascale Chirinos RN  Infection Prevention:   environmental surveillance performed   equipment surfaces disinfected   hand hygiene promoted   rest/sleep promoted   single patient room provided  Taken 11/4/2024 0800 by Pascale Chirinos RN  Infection Prevention:   environmental surveillance performed   equipment surfaces disinfected   hand hygiene promoted   rest/sleep promoted   single patient room provided  Goal: Anesthesia/Sedation Recovery  Outcome: Progressing  Intervention: Optimize Anesthesia Recovery  Recent Flowsheet Documentation  Taken 11/4/2024 1600 by Pascale Chirinos RN  Safety Promotion/Fall Prevention:   lighting adjusted   clutter free environment maintained   room organization consistent   safety round/check completed   treat reversible contributory factors   treat underlying cause  Reorientation Measures:   clock in view   reorientation provided  Taken 11/4/2024 1200 by Pascale Cihrinos RN  Safety Promotion/Fall Prevention:   lighting adjusted   clutter free  environment maintained   room organization consistent   safety round/check completed   treat reversible contributory factors   treat underlying cause  Reorientation Measures:   clock in view   reorientation provided  Taken 11/4/2024 0800 by Pascale Chirinos RN  Safety Promotion/Fall Prevention:   lighting adjusted   clutter free environment maintained   room organization consistent   safety round/check completed   treat reversible contributory factors   treat underlying cause  Reorientation Measures:   clock in view   reorientation provided  Goal: Acceptable Pain Control  Outcome: Progressing  Goal: Nausea and Vomiting Relief  Outcome: Progressing  Goal: Effective Urinary Elimination  Outcome: Progressing  Goal: Effective Oxygenation and Ventilation  Outcome: Progressing  Intervention: Promote Airway Secretion Clearance  Recent Flowsheet Documentation  Taken 11/4/2024 1600 by Pascale Chirinos RN  Cough And Deep Breathing: unable to perform  Taken 11/4/2024 1200 by Pascale Chirinos RN  Cough And Deep Breathing: unable to perform  Taken 11/4/2024 0800 by Pascale Chirinos RN  Cough And Deep Breathing: unable to perform  Intervention: Optimize Oxygenation and Ventilation  Recent Flowsheet Documentation  Taken 11/4/2024 1600 by Pascale Chirinos RN  Chest Tube Safety:   all connections secured   suction checked   rubber-tipped hemostat(s) with patient  Taken 11/4/2024 1200 by Pascale Chirinos RN  Chest Tube Safety:   all connections secured   suction checked   rubber-tipped hemostat(s) with patient  Taken 11/4/2024 0800 by Pascale Chirinos RN  Chest Tube Safety:   all connections secured   suction checked   rubber-tipped hemostat(s) with patient     Problem: Skin Injury Risk Increased  Goal: Skin Health and Integrity  Outcome: Progressing  Intervention: Plan: Nurse Driven Intervention: Positioning  Recent Flowsheet Documentation  Taken 11/4/2024 1600 by Pascale Chirinos RN  Plan: Positioning  Interventions:   REPOSITION Left/Right (No supine) q2h   HOB 30 degrees or less   OFF-LOAD HEELS with boots   OFF-LOAD HEELS with pillows  Taken 11/4/2024 1200 by Pascale Chirinos RN  Plan: Positioning Interventions:   REPOSITION Left/Right (No supine) q2h   HOB 30 degrees or less   OFF-LOAD HEELS with boots   OFF-LOAD HEELS with pillows  Taken 11/4/2024 0800 by Pascale Chirinos RN  Plan: Positioning Interventions:   REPOSITION Left/Right (No supine) q2h   HOB 30 degrees or less   OFF-LOAD HEELS with boots   OFF-LOAD HEELS with pillows  Intervention: Plan: Nurse Driven Intervention: Moisture Management  Recent Flowsheet Documentation  Taken 11/4/2024 1600 by Pascale Chirinos RN  Moisture Interventions:   Incontinence pad   Fecal collection device   Urinary collection device   Perineal cleanser  Taken 11/4/2024 1200 by Pascale Chirinos RN  Moisture Interventions:   Incontinence pad   Fecal collection device   Urinary collection device   Perineal cleanser  Taken 11/4/2024 0800 by Pascale Chirinos RN  Moisture Interventions:   Incontinence pad   Fecal collection device   Urinary collection device   Perineal cleanser  Intervention: Plan: Nurse Driven Intervention: Friction and Shear  Recent Flowsheet Documentation  Taken 11/4/2024 1600 by Pascale Chirinos RN  Friction/Shear Interventions:   HOB 30 degrees or less   Silicone foam sacral dressing   Assistive lifting device (portable/ceiling lift, etc.)   Lateral transfer device (hovermat, etc.)   Repositioning device (TAP system, etc.)  Taken 11/4/2024 1200 by Pascale Chirinos RN  Friction/Shear Interventions:   HOB 30 degrees or less   Silicone foam sacral dressing   Assistive lifting device (portable/ceiling lift, etc.)   Lateral transfer device (hovermat, etc.)   Repositioning device (TAP system, etc.)  Taken 11/4/2024 0800 by Pascale Chirinos RN  Friction/Shear Interventions:   HOB 30 degrees or less   Silicone foam sacral dressing   Assistive lifting  device (portable/ceiling lift, etc.)   Lateral transfer device (hovermat, etc.)   Repositioning device (TAP system, etc.)  Intervention: Optimize Skin Protection  Recent Flowsheet Documentation  Taken 11/4/2024 1600 by Pascale Chirinos RN  Pressure Reduction Techniques:   heels elevated off bed   pressure points protected  Pressure Reduction Devices:   positioning supports utilized   specialty bed utilized  Skin Protection:   silicone foam dressing in place   skin to device areas padded   skin to skin areas padded   incontinence pads utilized  Activity Management: bedrest  Head of Bed (HOB) Positioning: HOB at 30 degrees  Taken 11/4/2024 1200 by Pascale Chirinos RN  Pressure Reduction Techniques:   heels elevated off bed   pressure points protected  Pressure Reduction Devices:   positioning supports utilized   specialty bed utilized  Skin Protection:   silicone foam dressing in place   skin to device areas padded   skin to skin areas padded   incontinence pads utilized  Activity Management: bedrest  Head of Bed (HOB) Positioning: HOB at 30 degrees  Taken 11/4/2024 1130 by Pascale Chirinos RN  Head of Bed (HOB) Positioning: HOB at 20-30 degrees  Taken 11/4/2024 1000 by Pascale Chirinos RN  Head of Bed (HOB) Positioning: HOB at 20-30 degrees  Taken 11/4/2024 0900 by Pascale Chirinos RN  Head of Bed (HOB) Positioning: HOB at 20-30 degrees  Taken 11/4/2024 0800 by Pascale Chirinos RN  Pressure Reduction Techniques:   heels elevated off bed   pressure points protected  Pressure Reduction Devices:   positioning supports utilized   specialty bed utilized  Skin Protection:   silicone foam dressing in place   skin to device areas padded   skin to skin areas padded   incontinence pads utilized  Activity Management: bedrest  Head of Bed (HOB) Positioning: HOB at 30 degrees     Problem: Risk for Delirium  Goal: Optimal Coping  Outcome: Progressing  Intervention: Optimize Psychosocial Adjustment to Delirium  Recent  Flowsheet Documentation  Taken 11/4/2024 1600 by Pascale Chirinos RN  Supportive Measures: positive reinforcement provided  Family/Support System Care:   caregiver stress acknowledged   self-care encouraged  Taken 11/4/2024 1200 by Pascale Chirinos RN  Supportive Measures: positive reinforcement provided  Family/Support System Care:   caregiver stress acknowledged   self-care encouraged  Taken 11/4/2024 0800 by Pascale Chirinos RN  Supportive Measures: positive reinforcement provided  Family/Support System Care:   caregiver stress acknowledged   self-care encouraged  Goal: Improved Behavioral Control  Outcome: Progressing  Intervention: Prevent and Manage Agitation  Recent Flowsheet Documentation  Taken 11/4/2024 1600 by Pascale Chirinos RN  Environment Familiarity/Consistency: daily routine followed  Taken 11/4/2024 1200 by Pascale Chirinos RN  Environment Familiarity/Consistency: daily routine followed  Taken 11/4/2024 0800 by Pascale Chirinos RN  Environment Familiarity/Consistency: daily routine followed  Intervention: Minimize Safety Risk  Recent Flowsheet Documentation  Taken 11/4/2024 1600 by Pascale Chirinos RN  Enhanced Safety Measures: pain management  Trust Relationship/Rapport:   care explained   reassurance provided  Taken 11/4/2024 1200 by Pascale Chirinos RN  Enhanced Safety Measures: pain management  Trust Relationship/Rapport:   care explained   reassurance provided  Taken 11/4/2024 0800 by Pascale Chirinos RN  Enhanced Safety Measures: pain management  Trust Relationship/Rapport:   care explained   reassurance provided  Goal: Improved Attention and Thought Clarity  Outcome: Progressing  Intervention: Maximize Cognitive Function  Recent Flowsheet Documentation  Taken 11/4/2024 1600 by Pascale Chirinos RN  Sensory Stimulation Regulation:   care clustered   lighting decreased   quiet environment promoted  Reorientation Measures:   clock in view   reorientation provided  Taken  11/4/2024 1200 by Pascale Chirinos RN  Sensory Stimulation Regulation:   care clustered   lighting decreased   quiet environment promoted  Reorientation Measures:   clock in view   reorientation provided  Taken 11/4/2024 0800 by Pascale Chirinos RN  Sensory Stimulation Regulation:   care clustered   lighting decreased   quiet environment promoted  Reorientation Measures:   clock in view   reorientation provided  Goal: Improved Sleep  Outcome: Progressing     Problem: CRRT (Continuous Renal Replacement Therapy)  Goal: Safe, Effective Therapy Delivery  Outcome: Progressing  Intervention: Optimize Device Care and Function  Recent Flowsheet Documentation  Taken 11/4/2024 1600 by Pascale Chirinos RN  Bleeding Precautions:   blood pressure closely monitored   coagulation study results reviewed   gentle oral care promoted   monitored for signs of bleeding  Bleeding Management: dressing monitored  Fluid/Electrolyte Management: electrolyte supplement adjusted  Circuit Management:   air detection alarms on   circuit line warming device in use  Taken 11/4/2024 1200 by Pascale Chirinos RN  Bleeding Precautions:   blood pressure closely monitored   coagulation study results reviewed   gentle oral care promoted   monitored for signs of bleeding  Bleeding Management: dressing monitored  Fluid/Electrolyte Management: electrolyte supplement adjusted  Circuit Management:   air detection alarms on   circuit line warming device in use  Taken 11/4/2024 0800 by Pascale Chirinos RN  Bleeding Precautions:   blood pressure closely monitored   coagulation study results reviewed   gentle oral care promoted   monitored for signs of bleeding  Bleeding Management: dressing monitored  Fluid/Electrolyte Management: electrolyte supplement adjusted  Circuit Management:   air detection alarms on   circuit line warming device in use  Goal: Hemodynamic Stability  Outcome: Progressing  Intervention: Optimize Blood Flow  Recent Flowsheet  Documentation  Taken 11/4/2024 1600 by Pascale Chirinos RN  Bleeding Precautions:   blood pressure closely monitored   coagulation study results reviewed   gentle oral care promoted   monitored for signs of bleeding  Bleeding Management: dressing monitored  Fluid/Electrolyte Management: electrolyte supplement adjusted  Taken 11/4/2024 1200 by Pascale Chirinos RN  Bleeding Precautions:   blood pressure closely monitored   coagulation study results reviewed   gentle oral care promoted   monitored for signs of bleeding  Bleeding Management: dressing monitored  Fluid/Electrolyte Management: electrolyte supplement adjusted  Taken 11/4/2024 0800 by Pascale Chirinos RN  Bleeding Precautions:   blood pressure closely monitored   coagulation study results reviewed   gentle oral care promoted   monitored for signs of bleeding  Bleeding Management: dressing monitored  Fluid/Electrolyte Management: electrolyte supplement adjusted  Goal: Body Temperature Maintained in Desired Range  Outcome: Progressing  Intervention: Prevent or Minimize Hypothermia  Recent Flowsheet Documentation  Taken 11/4/2024 1600 by Pascale Chirinos RN  Thermoregulation Maintenance:   forced warm air   room temperature increased  Taken 11/4/2024 1200 by Pascale Chirinos RN  Thermoregulation Maintenance:   forced warm air   room temperature increased  Taken 11/4/2024 0800 by Pascale Chirinos RN  Thermoregulation Maintenance:   forced warm air   room temperature increased  Goal: Absence of Infection Signs and Symptoms  Outcome: Progressing  Intervention: Prevent or Manage Infection  Recent Flowsheet Documentation  Taken 11/4/2024 1600 by Pascale Chirinos RN  Infection Prevention:   environmental surveillance performed   equipment surfaces disinfected   hand hygiene promoted   rest/sleep promoted   single patient room provided  Infection Management: aseptic technique maintained  Taken 11/4/2024 1200 by Pascale Chirinos RN  Infection  Prevention:   environmental surveillance performed   equipment surfaces disinfected   hand hygiene promoted   rest/sleep promoted   single patient room provided  Infection Management: aseptic technique maintained  Taken 11/4/2024 0800 by Pascale Chirinos RN  Infection Prevention:   environmental surveillance performed   equipment surfaces disinfected   hand hygiene promoted   rest/sleep promoted   single patient room provided  Infection Management: aseptic technique maintained   Goal Outcome Evaluation:      Plan of Care Reviewed With: spouse, family    Overall Patient Progress: improvingOverall Patient Progress: improving    Outcome Evaluation: Pt continues to tolerate CRRT fluid removal at negative 100-200 each hour.  Pressors remain the same to allow for fluid removal. Dobutamine off at 0745 and remained off with reasonable CO/CI and SVo2.  Pacer connected to patient but off per provider, intrinsic rhythm is afib with junctional beats, bp pressure tolerating, no drift in heart rate lower than 80's. 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  Fentanyl reduced to 25mcg/hr with 15mcg/kg/hr of propofol for sedation.  Note occasional break through breaths, no stacking of breaths noted as in previous days.  Discussion had about attempting pressure support tomorrow.  Family involved, at bedside, concerned and hopeful.  Supportive conversations held with CV team and nursing.

## 2024-11-05 NOTE — PROGRESS NOTES
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 Michael Mulvihill.    POD # 7 s/p return to OR for mediastinal re-exploration, washout    - CVS: Pre-op TTE with EF 35-40%. Postop TTE 10/30 with EF ~35% on high-dose inotropic support.   Postop mixed cardiogenic/vasoplegic/hypovolemic shock. Lactic acidosis cleared and SVO2 stabilized. CI goal >2.0 (calculated elaine), stop DBU 11/4 and trend SVO2. NE and vasopressin to MAP goal 65, wean as able. ADDENDUM: decrease in SVO2 and increase in lactate with PST, will resume DBU 2.5 mcg/kg/min 11/5.  Hypervolemic, volume management via CRRT, pull as able. CVP goal ~10-12. Now tolerating more aggressive removal. Albumin 25% push BID to pull volume intravascular.  Hypothermic since initiation on CRRT, Thermagaurd catheter placed 10/31, target 37 C.   Stress dose steroids started 10/31, off 11/02 PM.  History of paroxysmal atrial fibrillation, continues with intermittent afib and accelerated junctional at times. Previously being atrial paced at 100, now appears to have improved BP without pacing (intrinsic rate ~80 BP, sinus rhythm with intermittent atrial fibrillation). Amio discontinued with shock liver. Defer systemic anticoagulation at present (ok for citrate for CRRT machine if needed).   Absent right radial pulse (previous arterial line in this location). ICU MD did bedside US, ulnar artery patent. Right hand warm, good capillary refill. Increased duskiness of right hand noted after starting vasopressin, which demonstrated patent arterial flow with slow biphasic flow at the distal radial artery (site of previous arterial line).   Aspirin 162 mg daily, defer statin with ALI.    Chest tubes: output 970 mL, serosang, no air leak. TPW: AAI back up rate of  50    - Resp: Acute hypoxemic and hypercapnic respiratory failure, stable. Non-compliant with ventilator 11/02, improved with deeper sedation, but required increased pressor support with deeper sedation. Trial of PST 11/03, patient quickly became hypercarbic and acidotic. Tolerated PST 11/5, but appears not quite awake enough for extubation, reassess readiness daily.     - Neuro: Sedated with propofol while intubated. Add fentanyl infusion and Versed PRN to improve vent compliance. History of myasthenia gravis. Purposeful upper extremity movements and spontaneous LE movement when sedation lightened.     - Renal: CKD stage 3, baseline Cr ~1.8. Postop oliguric/anuric JAVIER. Nephrology consulted, CRRT started 10/30. Avoid nephrotoxins.   Recent Labs   Lab 11/05/24  0456 11/04/24 2148 11/04/24  1340   CR 1.76* 1.74* 1.78*       - GI: +BM, +flatus, continue bowel regimen. Shock liver, improving. Trend LFTs, avoid hepatotoxins. Recent admission for GIB 09/2024, increased pantoprazole to BID. Diarrhea with initiation of TF, rectal tube placed 11/05.    - : Amos in place, continue for UOP monitoring/hemodynamic status    - Endo: Postop stress hyperglycemia, resolved. Insulin infusion transitioned to sliding scale insulin. Stress dose steroids 10/31-11/02 as above.   Hemoglobin A1C   Date Value Ref Range Status   10/09/2024 4.5 <5.7 % Final     Comment:     Normal <5.7%   Prediabetes 5.7-6.4%    Diabetes 6.5% or higher     Note: Adopted from ADA consensus guidelines.        - FEN: Replace electrolytes as needed. Initiated on trophic feeds via OG 11/1, NJ placed 11/2 after INR came down, TF increase to goal.      - ID: WBC chronically elevated and hypothermic on CRRT as above so difficult to assess for possible infection;  empiric Vanc/Zosyn 10/31-11/04. Blood, urine, respiratory cultures NGTD. WBC elevated.  Trend CBC and fever curve.   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 Acute blood loss anemia due to surgery. Postop coagulopathy, improved (required multiple blood transfusions and blood productions immediately postop). 1 unit RBCs 11/3. 1 unit RBC 11/5 Trend CBC, transfuse PRN.   Recent Labs   Lab 11/05/24  0456 11/04/24  2148 11/04/24  1340   HGB 7.6* 7.6* 7.8*    194 212       - Proph: SCD, subcutaneous heparin, PPI    - Other:  Clinically Significant Risk Factors        # Hypokalemia: Lowest K = 3.3 mmol/L in last 2 days, will replace as needed    # Hypocalcemia: Lowest Ca = 7.9 mg/dL in last 2 days, will monitor and replace as appropriate     # Hypoalbuminemia: Lowest albumin = 2.7 g/dL at 11/2/2024  5:44 AM, will monitor as appropriate    # Coagulation Defect: INR = 1.85 (Ref range: 0.85 - 1.15) and/or PTT = 42 Seconds (Ref range: 22 - 38 Seconds), will monitor for bleeding    # Hypertension: Noted on problem list  # Chronic heart failure with reduced ejection fraction: last echo with EF <40%   # Acute Hypercapnic Respiratory Failure: based on arterial blood gas results.  Continue supplemental oxygen and ventilatory support as indicated.  # Acute Hypercapnic Respiratory Failure: based on venous blood gas results.  Continue supplemental oxygen and ventilatory support as indicated.      #Acute blood loss anemia: Lowest Hgb this hospitalization: 6.9 g/dL. Will continue to monitor and treat/transfuse as appropriate.     # Obesity: Estimated body mass index is 33.06 kg/m  as calculated from the following:    Height as of this encounter: 1.778 m (5' 10\").    Weight as of this encounter: 104.5 kg (230 lb 6.1 oz).       # History of CABG: noted on surgical history       - Dispo: ICU. Guarded prognosis but continues with improving liver function, and more stable CI now off DBU, able to wean down on pressors and still " tolerating volume removal. Family updated multiple times at bedside. Medically Ready for Discharge: Anticipated in 5+ Days      Interval History  Continues to tolerate volume removal on CRRT. Sedation lightened and able to PST today.       Medications  Current Facility-Administered Medications   Medication Dose Route Frequency Provider Last Rate Last Admin    albumin human 25 % injection 25 g  25 g Intravenous Q12H Arlin Hodge PA-C 100 mL/hr at 11/04/24 1946 25 g at 11/05/24 0820    aspirin (ASA) chewable tablet 162 mg  162 mg Oral or NG Tube Daily Clarence Bone MD   162 mg at 11/05/24 0820    chlorhexidine (PERIDEX) 0.12 % solution 15 mL  15 mL Swish & Spit BID Zac Kaur DO   15 mL at 11/05/24 0821    insulin aspart (NovoLOG) injection (RAPID ACTING)  1-7 Units Subcutaneous Q4H Sb Arthur PA-C   1 Units at 11/03/24 0100    pantoprazole (PROTONIX) 2 mg/mL suspension 40 mg  40 mg Oral or NG Tube BID AC Arlin Hodge PA-C   40 mg at 11/05/24 0821    Or    pantoprazole (PROTONIX) EC tablet 40 mg  40 mg Oral BID AC Arlin Hodge PA-C        polyethylene glycol (MIRALAX) Packet 17 g  17 g Oral or Feeding Tube Daily Mulvihill, Michael, MD   17 g at 11/03/24 0812    potassium & sodium phosphates (NEUTRA-PHOS) Packet 1 packet  1 packet Oral or Feeding Tube Q4H Farhad Boland MD   Given at 11/05/24 0819    Prosource TF20 ENfit Compatibl EN LIQD (PROSOURCE TF20) packet 60 mL  1 packet Per Feeding Tube TID Arlin Hodge PA-C   60 mL at 11/05/24 0854    senna-docusate (SENOKOT-S/PERICOLACE) 8.6-50 MG per tablet 1 tablet  1 tablet Oral or Feeding Tube BID Mulvihill, Michael, MD   1 tablet at 11/04/24 2032    sodium chloride (PF) 0.9% PF flush 3 mL  3 mL Intracatheter Q8H Clarence Bone MD   3 mL at 11/04/24 2200     Current Facility-Administered Medications   Medication Dose Route Frequency Provider Last Rate Last Admin    acetaminophen (TYLENOL) tablet 650 mg  650 mg Oral  Q4H PRN Clarence Bone MD        bisacodyl (DULCOLAX) suppository 10 mg  10 mg Rectal Daily PRN Clarence Bone MD        calcium gluconate 2 g in  mL intermittent infusion  2 g Intravenous Q8H PRN Farhad Boland MD        calcium gluconate 4 g in sodium chloride 0.9 % 100 mL intermittent infusion  4 g Intravenous Q8H PRN Farhad Boland MD        dextrose 10% infusion   Intravenous Continuous PRN Arlin Hodge PA-C   Stopped at 11/05/24 0600    glucose gel 15-30 g  15-30 g Oral Q15 Min PRN Nasir Vallecillo MD        Or    dextrose 50 % injection 25-50 mL  25-50 mL Intravenous Q15 Min PRN Nasir Vallecillo MD        Or    glucagon injection 1 mg  1 mg Subcutaneous Q15 Min PRN Nasir Vallecillo MD        EPINEPHrine (ADRENALIN) 5 mg in  mL infusion  0.01-0.1 mcg/kg/min Intravenous Continuous PRN Clarence Bone MD 20.1 mL/hr at 11/05/24 0600 0.07 mcg/kg/min at 11/05/24 0600    fentaNYL (SUBLIMAZE) 50 mcg/mL bolus from pump  100 mcg Intravenous Q1H PRN Key Dobson MD   50 mcg at 11/03/24 0135    heparin lock flush 10 unit/mL injection 3 mL  3 mL Intracatheter Q1H PRN Tyra Dubois MD        hydrALAZINE (APRESOLINE) injection 10 mg  10 mg Intravenous Q30 Min PRN Clarence Bone MD        HYDROmorphone (DILAUDID) injection 0.1 mg  0.1 mg Intravenous Q2H PRN Clarence Bone MD        Or    HYDROmorphone (DILAUDID) injection 0.2 mg  0.2 mg Intravenous Q2H PRN Clarence Bone MD   0.2 mg at 10/31/24 1548    lidocaine (LMX4) cream   Topical Q1H PRN Clarence Bone MD        lidocaine 1 % 0.1-1 mL  0.1-1 mL Other Q1H PRN Clarence Bone MD        magnesium hydroxide (MILK OF MAGNESIA) suspension 30 mL  30 mL Oral Daily PRN Clarence Bone MD        magnesium sulfate 2 g in 50 mL sterile water intermittent infusion  2 g Intravenous Q8H PRN Farhad Boland MD   2 g at 11/04/24 1450    propofol (DIPRIVAN) bolus from bag or syringe  pump  10 mg Intravenous Q15 Min PRN Tyra Dubois MD        And    Medication Instruction   Does not apply Continuous PRN Tyra Dubois MD        methocarbamol (ROBAXIN) tablet 500 mg  500 mg Oral Q6H PRN Clarence Bone MD   500 mg at 10/28/24 1634    naloxone (NARCAN) injection 0.2 mg  0.2 mg Intravenous Q2 Min PRN Mulvihill, Michael, MD        Or    naloxone (NARCAN) injection 0.4 mg  0.4 mg Intravenous Q2 Min PRN Mulvihill, Michael, MD        Or    naloxone (NARCAN) injection 0.2 mg  0.2 mg Intramuscular Q2 Min PRN Mulvihill, Michael, MD        Or    naloxone (NARCAN) injection 0.4 mg  0.4 mg Intramuscular Q2 Min PRN Mulvihill, Michael, MD        No heparin required   Does not apply Continuous PRN Farhad Boland MD        ondansetron (ZOFRAN ODT) ODT tab 4 mg  4 mg Oral Q6H PRN Clarence Bone MD        Or    ondansetron (ZOFRAN) injection 4 mg  4 mg Intravenous Q6H PRN Clarence Bone MD        oxyCODONE IR (ROXICODONE) half-tab 2.5 mg  2.5 mg Oral Q4H PRN Clarence Bone MD   2.5 mg at 11/02/24 0214    Or    oxyCODONE (ROXICODONE) tablet 5 mg  5 mg Oral Q4H PRN Clarence Bone MD   5 mg at 10/29/24 2149    potassium chloride 20 mEq in 50 mL intermittent infusion  20 mEq Intravenous Q8H PRN Farhad Boland MD 50 mL/hr at 11/04/24 1200 20 mEq at 11/04/24 1200    prochlorperazine (COMPAZINE) injection 5 mg  5 mg Intravenous Q6H PRN Clarence Bone MD        Or    prochlorperazine (COMPAZINE) tablet 5 mg  5 mg Oral Q6H PRN Clarence Bone MD        Reason beta blocker order not selected   Does not apply DOES NOT GO TO Clarence Olguin MD        sodium chloride (PF) 0.9% PF flush 3 mL  3 mL Intracatheter q1 min prn Clarence Bone MD        sodium phosphate 15 mmol in NS 250mL intermittent infusion  15 mmol Intravenous Q8H PRN Farhad Boland MD             Physical Exam  Vitals were reviewed  Blood pressure 91/57, pulse 82, temperature  "99  F (37.2  C), resp. rate 23, height 1.778 m (5' 10\"), weight 104.5 kg (230 lb 6.1 oz), SpO2 100%.  Rhythm: intermittent NSR and atrial fibrillation    Lungs: diminished bases, +crackles    Cardiovascular: irregular rate/rhythm, no m/r/g    Abdomen: soft, NT, ND, +BS    Extremeties: 2+ BLE/BUE edema    Incision: CDI    CT: serosang output 1580 mL, no air leak    Weight:   Vitals:    11/01/24 0400 11/02/24 0400 11/03/24 0600 11/04/24 0400   Weight: 107.2 kg (236 lb 5.3 oz) 108.9 kg (240 lb 1.3 oz) 111.7 kg (246 lb 4.1 oz) 109.4 kg (241 lb 2.9 oz)    11/05/24 0300   Weight: 104.5 kg (230 lb 6.1 oz)         Data  Recent Labs   Lab 11/05/24  0850 11/05/24  0509 11/05/24  0456 11/04/24  2200 11/04/24  2148 11/04/24  1726 11/04/24  1340 11/04/24  0550 11/04/24  0545 11/03/24  0552 11/03/24  0548   WBC  --   --  59.1*  --  60.9*  --  66.4*  --  63.2*   < > 76.4*   HGB  --   --  7.6*  --  7.6*  --  7.8*  --  7.8*   < > 7.4*   MCV  --   --  90  --  88  --  90  --  89   < > 92   PLT  --   --  198  --  194  --  212  --  195   < > 216   INR  --   --  1.85*  --   --   --   --   --  2.11*  --  2.31*   NA  --   --  135  --  135  --  136  --  137   < > 136   POTASSIUM  --   --  3.8  --  3.4  --  4.7   < > 3.3*   < > 4.0   CHLORIDE  --   --  100  --  100  --  101  --  101   < > 101   CO2  --   --  23  --  23  --  24  --  25   < > 21*   BUN  --   --  27.3*  --  25.7*  --  27.1*  --  25.9*   < > 27.4*   CR  --   --  1.76*  --  1.74*  --  1.78*  --  1.73*   < > 1.85*   ANIONGAP  --   --  12  --  12  --  11  --  11   < > 14   TATE  --   --  9.2  --  9.0  --  7.9*  --  8.7*   < > 8.5*   * 90 135*   < > 130*   < > 129*   < > 120*   < > 146*   ALBUMIN  --   --  3.7  --  3.7  --  3.7  --  3.4*   < > 3.0*   PROTTOTAL  --   --  5.1*  --  5.0*  --  5.1*  --  4.8*   < > 4.3*   BILITOTAL  --   --  2.5*  --  2.4*  --  2.6*  --  2.6*   < > 2.2*   ALKPHOS  --   --  178*  --  180*  --  182*  --  173*   < > 167*   ALT  --   --  353*  --  391* "  --  438*  --  480*   < > 562*   AST  --   --  235*  --  281*  --  345*  --  404*   < > 530*    < > = values in this interval not displayed.       Imaging:  No results found for this or any previous visit (from the past 24 hours).        Patient seen and discussed with Dr. Mulvihill Arielle Webb, PAKarolinaC  Cardiothoracic Surgery  Available for paging [phone] (personal pager or CV Surgery Rounding Pager)  Personal Pager: [phone]  CV Surgery Rounding Pager: [phone]  After hours please page surgeon on-call

## 2024-11-05 NOTE — PLAN OF CARE
Problem: Adult Inpatient Plan of Care  Goal: Readiness for Transition of Care  Outcome: Not Progressing  Flowsheets (Taken 11/5/2024 1604)  Anticipated Changes Related to Illness: inability to care for self  Intervention: Mutually Develop Transition Plan  Recent Flowsheet Documentation  Taken 11/5/2024 1604 by Bhanu Vicente RN  Anticipated Changes Related to Illness: inability to care for self     Problem: Comorbidity Management  Goal: Maintenance of Heart Failure Symptom Control  Outcome: Not Progressing  Intervention: Maintain Heart Failure Management  Recent Flowsheet Documentation  Taken 11/5/2024 1200 by Bhanu Vicente RN  Medication Review/Management:   medications reviewed   high-risk medications identified  Taken 11/5/2024 0800 by Bhanu Vicente RN  Medication Review/Management:   medications reviewed   high-risk medications identified  Goal: Blood Pressure in Desired Range  Outcome: Not Progressing  Intervention: Maintain Blood Pressure Management  Recent Flowsheet Documentation  Taken 11/5/2024 1200 by Bhanu Vicente RN  Medication Review/Management:   medications reviewed   high-risk medications identified  Taken 11/5/2024 0800 by Bhanu Vicente RN  Medication Review/Management:   medications reviewed   high-risk medications identified  Goal: Bariatric Home Regimen Maintained  Outcome: Not Progressing  Intervention: Maintain and Manage Postbariatric Surgery Care  Recent Flowsheet Documentation  Taken 11/5/2024 1200 by Bhanu Vicente RN  Medication Review/Management:   medications reviewed   high-risk medications identified  Taken 11/5/2024 0800 by Bhanu Vicente RN  Medication Review/Management:   medications reviewed   high-risk medications identified     Problem: Mechanical Ventilation Invasive  Goal: Absence of Device-Related Skin and Tissue Injury  Outcome: Not Progressing     Problem: Cardiovascular Surgery  Goal: Improved Activity Tolerance  Outcome: Not Progressing  Intervention: Optimize  Tolerance for Activity  Recent Flowsheet Documentation  Taken 11/5/2024 0800 by Bhanu Vicente RN  Environmental Support:   calm environment promoted   distractions minimized   rest periods encouraged  Goal: Optimal Coping with Heart Surgery  Outcome: Not Progressing  Intervention: Support Psychosocial Response to Surgery  Recent Flowsheet Documentation  Taken 11/5/2024 0900 by Bhanu Vicente RN  Family/Support System Care:   caregiver stress acknowledged   involvement promoted   presence promoted  Taken 11/5/2024 0800 by Bhanu Vicente RN  Supportive Measures: active listening utilized  Goal: Effective Cardiac Function  Outcome: Not Progressing  Intervention: Optimize Cardiac Output and Blood Flow  Recent Flowsheet Documentation  Taken 11/5/2024 1200 by Bhanu Vicente RN  Dysrhythmia Management: pacing wires maintained  Goal: Effective Urinary Elimination  Outcome: Not Progressing     Problem: Skin Injury Risk Increased  Goal: Skin Health and Integrity  Outcome: Not Progressing  Intervention: Plan: Nurse Driven Intervention: Moisture Management  Recent Flowsheet Documentation  Taken 11/5/2024 1200 by Bhanu Vicente RN  Moisture Interventions:   No brief in bed   Incontinence pad   Fecal collection device   Perineal cleanser  Taken 11/5/2024 0800 by Bhanu Vicente RN  Moisture Interventions:   No brief in bed   Incontinence pad   Body-worn absorptive product when OOB (brief, etc.)   Urinary collection device   Fecal collection device   Perineal cleanser  Bathing/Skin Care:   incontinence care   linen changed   moisturizer applied  Intervention: Plan: Nurse Driven Intervention: Friction and Shear  Recent Flowsheet Documentation  Taken 11/5/2024 1200 by Bhanu Vicente RN  Friction/Shear Interventions:   HOB 30 degrees or less   Silicone foam sacral dressing   Assistive lifting device (portable/ceiling lift, etc.)   Repositioning device (TAP system, etc.)  Taken 11/5/2024 0800 by Bhanu Vicente RN  Friction/Shear  Interventions:   HOB 30 degrees or less   Silicone foam sacral dressing   Pad bony prominence (elbow pads, heel pads, ear protectors)   Assistive lifting device (portable/ceiling lift, etc.)   Repositioning device (TAP system, etc.)   Preventative dressing heels  Intervention: Optimize Skin Protection  Recent Flowsheet Documentation  Taken 11/5/2024 1400 by Bhanu Vicente RN  Head of Bed (HOB) Positioning: HOB at 20 degrees  Taken 11/5/2024 1200 by Bhanu Vicente RN  Activity Management: bedrest  Taken 11/5/2024 1138 by Bhanu Vicente RN  Head of Bed (HOB) Positioning: HOB at 20-30 degrees  Taken 11/5/2024 1000 by Bhanu Vicente RN  Head of Bed (HOB) Positioning: HOB at 20-30 degrees  Taken 11/5/2024 0800 by Bhanu Vicente RN  Activity Management: bedrest  Head of Bed (HOB) Positioning: HOB at 15 degrees     Problem: Risk for Delirium  Goal: Improved Sleep  Outcome: Not Progressing   Goal Outcome Evaluation:                 Outcome Evaluation: Alert, follows commands weakly x 4. SBT 30% 5/5 beginning at 1015 am. Remains in atrial fib with rates . Epi, norepi and vaso continue. Norepi weaned as able. Transfused 1unit PRBC for hgb 7.6. LA slightly elevated, mixed venous sat decreasing. Plan to resume dobutamine. Tube feeding at goal rate 50, lytes replaced per CRRT protocols. Flexiseal remains in place with decreasing output. Oliguric, burt removed. Bilateral groin skin tears/MASD. Appears to have new L lateral ankle HAPI, WOC following. Wife and daughter at bedside throughout day. Updtated by RN, CVS and ICU MD.

## 2024-11-06 LAB
ALBUMIN SERPL BCG-MCNC: 3.6 G/DL (ref 3.5–5.2)
ALBUMIN SERPL BCG-MCNC: 3.6 G/DL (ref 3.5–5.2)
ALBUMIN SERPL BCG-MCNC: 3.8 G/DL (ref 3.5–5.2)
ALLEN'S TEST: ABNORMAL
ALP SERPL-CCNC: 133 U/L (ref 40–150)
ALP SERPL-CCNC: 139 U/L (ref 40–150)
ALP SERPL-CCNC: 153 U/L (ref 40–150)
ALT SERPL W P-5'-P-CCNC: 162 U/L (ref 0–70)
ALT SERPL W P-5'-P-CCNC: 187 U/L (ref 0–70)
ALT SERPL W P-5'-P-CCNC: 223 U/L (ref 0–70)
ANION GAP SERPL CALCULATED.3IONS-SCNC: 12 MMOL/L (ref 7–15)
ANION GAP SERPL CALCULATED.3IONS-SCNC: 16 MMOL/L (ref 7–15)
ANION GAP SERPL CALCULATED.3IONS-SCNC: 17 MMOL/L (ref 7–15)
AST SERPL W P-5'-P-CCNC: 101 U/L (ref 0–45)
AST SERPL W P-5'-P-CCNC: 59 U/L (ref 0–45)
AST SERPL W P-5'-P-CCNC: 73 U/L (ref 0–45)
BASE EXCESS BLDA CALC-SCNC: 1 MMOL/L (ref -3–3)
BASE EXCESS BLDA CALC-SCNC: 1.3 MMOL/L (ref -3–3)
BASE EXCESS BLDA CALC-SCNC: 1.6 MMOL/L (ref -3–3)
BASE EXCESS BLDV CALC-SCNC: 1.2 MMOL/L (ref -3–3)
BASE EXCESS BLDV CALC-SCNC: 2 MMOL/L (ref -3–3)
BASE EXCESS BLDV CALC-SCNC: 2.1 MMOL/L (ref -3–3)
BILIRUB SERPL-MCNC: 1.9 MG/DL
BILIRUB SERPL-MCNC: 1.9 MG/DL
BILIRUB SERPL-MCNC: 2.1 MG/DL
BUN SERPL-MCNC: 30 MG/DL (ref 8–23)
BUN SERPL-MCNC: 31.4 MG/DL (ref 8–23)
BUN SERPL-MCNC: 31.9 MG/DL (ref 8–23)
CA-I BLD-MCNC: 4.8 MG/DL (ref 4.4–5.2)
CA-I BLD-MCNC: 5 MG/DL (ref 4.4–5.2)
CA-I BLD-MCNC: 5 MG/DL (ref 4.4–5.2)
CALCIUM SERPL-MCNC: 9.4 MG/DL (ref 8.8–10.4)
CHLORIDE SERPL-SCNC: 100 MMOL/L (ref 98–107)
CHLORIDE SERPL-SCNC: 101 MMOL/L (ref 98–107)
CHLORIDE SERPL-SCNC: 101 MMOL/L (ref 98–107)
COHGB MFR BLD: 99.6 % (ref 95–96)
COHGB MFR BLD: 99.7 % (ref 95–96)
COHGB MFR BLD: >100 % (ref 95–96)
CREAT SERPL-MCNC: 1.66 MG/DL (ref 0.67–1.17)
CREAT SERPL-MCNC: 1.71 MG/DL (ref 0.67–1.17)
CREAT SERPL-MCNC: 1.72 MG/DL (ref 0.67–1.17)
EGFRCR SERPLBLD CKD-EPI 2021: 38 ML/MIN/1.73M2
EGFRCR SERPLBLD CKD-EPI 2021: 38 ML/MIN/1.73M2
EGFRCR SERPLBLD CKD-EPI 2021: 40 ML/MIN/1.73M2
ERYTHROCYTE [DISTWIDTH] IN BLOOD BY AUTOMATED COUNT: 20.2 % (ref 10–15)
GLUCOSE BLDC GLUCOMTR-MCNC: 130 MG/DL (ref 70–99)
GLUCOSE BLDC GLUCOMTR-MCNC: 131 MG/DL (ref 70–99)
GLUCOSE BLDC GLUCOMTR-MCNC: 134 MG/DL (ref 70–99)
GLUCOSE BLDC GLUCOMTR-MCNC: 134 MG/DL (ref 70–99)
GLUCOSE BLDC GLUCOMTR-MCNC: 139 MG/DL (ref 70–99)
GLUCOSE SERPL-MCNC: 135 MG/DL (ref 70–99)
GLUCOSE SERPL-MCNC: 135 MG/DL (ref 70–99)
GLUCOSE SERPL-MCNC: 149 MG/DL (ref 70–99)
HCO3 BLD-SCNC: 25 MMOL/L (ref 21–28)
HCO3 BLD-SCNC: 25 MMOL/L (ref 21–28)
HCO3 BLD-SCNC: 26 MMOL/L (ref 21–28)
HCO3 BLDV-SCNC: 26 MMOL/L (ref 21–28)
HCO3 BLDV-SCNC: 27 MMOL/L (ref 21–28)
HCO3 BLDV-SCNC: 27 MMOL/L (ref 21–28)
HCO3 SERPL-SCNC: 18 MMOL/L (ref 22–29)
HCO3 SERPL-SCNC: 19 MMOL/L (ref 22–29)
HCO3 SERPL-SCNC: 23 MMOL/L (ref 22–29)
HCT VFR BLD AUTO: 21.7 % (ref 40–53)
HCT VFR BLD AUTO: 22.5 % (ref 40–53)
HCT VFR BLD AUTO: 22.9 % (ref 40–53)
HGB BLD-MCNC: 7 G/DL (ref 13.3–17.7)
HGB BLD-MCNC: 7.4 G/DL (ref 13.3–17.7)
HGB BLD-MCNC: 7.5 G/DL (ref 13.3–17.7)
INR PPP: 1.71 (ref 0.85–1.15)
LACTATE SERPL-SCNC: 1.7 MMOL/L (ref 0.7–2)
LACTATE SERPL-SCNC: 1.8 MMOL/L (ref 0.7–2)
LACTATE SERPL-SCNC: 1.9 MMOL/L (ref 0.7–2)
MAGNESIUM SERPL-MCNC: 2 MG/DL (ref 1.7–2.3)
MCH RBC QN AUTO: 28 PG (ref 26.5–33)
MCH RBC QN AUTO: 28.4 PG (ref 26.5–33)
MCH RBC QN AUTO: 28.6 PG (ref 26.5–33)
MCHC RBC AUTO-ENTMCNC: 32.3 G/DL (ref 31.5–36.5)
MCHC RBC AUTO-ENTMCNC: 32.8 G/DL (ref 31.5–36.5)
MCHC RBC AUTO-ENTMCNC: 32.9 G/DL (ref 31.5–36.5)
MCV RBC AUTO: 87 FL (ref 78–100)
O2/TOTAL GAS SETTING VFR VENT: 25 %
O2/TOTAL GAS SETTING VFR VENT: 30 %
O2/TOTAL GAS SETTING VFR VENT: 30 %
OXYHGB MFR BLDV: 49 % (ref 70–75)
OXYHGB MFR BLDV: 51 % (ref 70–75)
OXYHGB MFR BLDV: 52 % (ref 70–75)
PCO2 BLD: 36 MM HG (ref 35–45)
PCO2 BLD: 37 MM HG (ref 35–45)
PCO2 BLD: 38 MM HG (ref 35–45)
PCO2 BLDV: 39 MM HG (ref 40–50)
PCO2 BLDV: 43 MM HG (ref 40–50)
PCO2 BLDV: 45 MM HG (ref 40–50)
PEEP: 5 CM H2O
PEEP: 5 CM H2O
PH BLD: 7.44 [PH] (ref 7.35–7.45)
PH BLD: 7.44 [PH] (ref 7.35–7.45)
PH BLD: 7.46 [PH] (ref 7.35–7.45)
PH BLDV: 7.39 [PH] (ref 7.32–7.43)
PH BLDV: 7.41 [PH] (ref 7.32–7.43)
PH BLDV: 7.43 [PH] (ref 7.32–7.43)
PHOSPHATE SERPL-MCNC: 2.3 MG/DL (ref 2.5–4.5)
PLATELET # BLD AUTO: 179 10E3/UL (ref 150–450)
PLATELET # BLD AUTO: 187 10E3/UL (ref 150–450)
PLATELET # BLD AUTO: 194 10E3/UL (ref 150–450)
PO2 BLD: 102 MM HG (ref 80–105)
PO2 BLD: 106 MM HG (ref 80–105)
PO2 BLD: 113 MM HG (ref 80–105)
PO2 BLDV: 26 MM HG (ref 25–47)
PO2 BLDV: 27 MM HG (ref 25–47)
PO2 BLDV: 28 MM HG (ref 25–47)
POTASSIUM SERPL-SCNC: 3.7 MMOL/L (ref 3.4–5.3)
POTASSIUM SERPL-SCNC: 3.8 MMOL/L (ref 3.4–5.3)
POTASSIUM SERPL-SCNC: 3.8 MMOL/L (ref 3.4–5.3)
PROT SERPL-MCNC: 5 G/DL (ref 6.4–8.3)
PROT SERPL-MCNC: 5 G/DL (ref 6.4–8.3)
PROT SERPL-MCNC: 5.2 G/DL (ref 6.4–8.3)
RBC # BLD AUTO: 2.5 10E6/UL (ref 4.4–5.9)
RBC # BLD AUTO: 2.59 10E6/UL (ref 4.4–5.9)
RBC # BLD AUTO: 2.64 10E6/UL (ref 4.4–5.9)
SAO2 % BLDA: 97 % (ref 92–100)
SAO2 % BLDV: 50.5 % (ref 70–75)
SAO2 % BLDV: 53 % (ref 70–75)
SAO2 % BLDV: 53.7 % (ref 70–75)
SODIUM SERPL-SCNC: 135 MMOL/L (ref 135–145)
SODIUM SERPL-SCNC: 136 MMOL/L (ref 135–145)
SODIUM SERPL-SCNC: 136 MMOL/L (ref 135–145)
WBC # BLD AUTO: 36.9 10E3/UL (ref 4–11)
WBC # BLD AUTO: 37.7 10E3/UL (ref 4–11)
WBC # BLD AUTO: 41 10E3/UL (ref 4–11)

## 2024-11-06 PROCEDURE — 250N000009 HC RX 250: Performed by: PHYSICIAN ASSISTANT

## 2024-11-06 PROCEDURE — 250N000011 HC RX IP 250 OP 636: Performed by: PHYSICIAN ASSISTANT

## 2024-11-06 PROCEDURE — 90947 DIALYSIS REPEATED EVAL: CPT

## 2024-11-06 PROCEDURE — 258N000003 HC RX IP 258 OP 636: Performed by: SURGERY

## 2024-11-06 PROCEDURE — 82247 BILIRUBIN TOTAL: CPT | Performed by: PHYSICIAN ASSISTANT

## 2024-11-06 PROCEDURE — 82330 ASSAY OF CALCIUM: CPT | Performed by: PHYSICIAN ASSISTANT

## 2024-11-06 PROCEDURE — 250N000013 HC RX MED GY IP 250 OP 250 PS 637: Performed by: STUDENT IN AN ORGANIZED HEALTH CARE EDUCATION/TRAINING PROGRAM

## 2024-11-06 PROCEDURE — 250N000013 HC RX MED GY IP 250 OP 250 PS 637: Performed by: PHYSICIAN ASSISTANT

## 2024-11-06 PROCEDURE — 250N000009 HC RX 250: Performed by: INTERNAL MEDICINE

## 2024-11-06 PROCEDURE — 83735 ASSAY OF MAGNESIUM: CPT | Performed by: PHYSICIAN ASSISTANT

## 2024-11-06 PROCEDURE — 250N000011 HC RX IP 250 OP 636: Performed by: STUDENT IN AN ORGANIZED HEALTH CARE EDUCATION/TRAINING PROGRAM

## 2024-11-06 PROCEDURE — 258N000003 HC RX IP 258 OP 636: Performed by: PHYSICIAN ASSISTANT

## 2024-11-06 PROCEDURE — 90945 DIALYSIS ONE EVALUATION: CPT | Performed by: INTERNAL MEDICINE

## 2024-11-06 PROCEDURE — 82805 BLOOD GASES W/O2 SATURATION: CPT | Performed by: PHYSICIAN ASSISTANT

## 2024-11-06 PROCEDURE — 120N000004 HC R&B MS OVERFLOW

## 2024-11-06 PROCEDURE — 83605 ASSAY OF LACTIC ACID: CPT | Performed by: PHYSICIAN ASSISTANT

## 2024-11-06 PROCEDURE — 85049 AUTOMATED PLATELET COUNT: CPT | Performed by: PHYSICIAN ASSISTANT

## 2024-11-06 PROCEDURE — 85610 PROTHROMBIN TIME: CPT | Performed by: PHYSICIAN ASSISTANT

## 2024-11-06 PROCEDURE — 999N000157 HC STATISTIC RCP TIME EA 10 MIN

## 2024-11-06 PROCEDURE — 250N000011 HC RX IP 250 OP 636: Performed by: INTERNAL MEDICINE

## 2024-11-06 PROCEDURE — 94003 VENT MGMT INPAT SUBQ DAY: CPT

## 2024-11-06 PROCEDURE — 999N000253 HC STATISTIC WEANING TRIALS

## 2024-11-06 PROCEDURE — 84155 ASSAY OF PROTEIN SERUM: CPT | Performed by: PHYSICIAN ASSISTANT

## 2024-11-06 PROCEDURE — P9047 ALBUMIN (HUMAN), 25%, 50ML: HCPCS | Mod: JZ | Performed by: PHYSICIAN ASSISTANT

## 2024-11-06 PROCEDURE — 84100 ASSAY OF PHOSPHORUS: CPT | Performed by: INTERNAL MEDICINE

## 2024-11-06 PROCEDURE — 250N000013 HC RX MED GY IP 250 OP 250 PS 637: Performed by: SURGERY

## 2024-11-06 PROCEDURE — 250N000011 HC RX IP 250 OP 636: Performed by: SURGERY

## 2024-11-06 PROCEDURE — 85027 COMPLETE CBC AUTOMATED: CPT | Performed by: PHYSICIAN ASSISTANT

## 2024-11-06 PROCEDURE — 99291 CRITICAL CARE FIRST HOUR: CPT | Mod: 24 | Performed by: INTERNAL MEDICINE

## 2024-11-06 RX ORDER — POTASSIUM CHLORIDE 1.5 G/1.58G
20 POWDER, FOR SOLUTION ORAL ONCE
Status: DISCONTINUED | OUTPATIENT
Start: 2024-11-06 | End: 2024-11-06

## 2024-11-06 RX ADMIN — Medication 40 MG: at 08:24

## 2024-11-06 RX ADMIN — OXYCODONE HYDROCHLORIDE 5 MG: 5 TABLET ORAL at 13:28

## 2024-11-06 RX ADMIN — PROPOFOL 15 MCG/KG/MIN: 10 INJECTION, EMULSION INTRAVENOUS at 21:46

## 2024-11-06 RX ADMIN — CALCIUM CHLORIDE, MAGNESIUM CHLORIDE, DEXTROSE MONOHYDRATE, LACTIC ACID, SODIUM CHLORIDE, SODIUM BICARBONATE AND POTASSIUM CHLORIDE 5000 ML: 5.15; 2.03; 22; 5.4; 6.46; 3.09; .157 INJECTION INTRAVENOUS at 21:46

## 2024-11-06 RX ADMIN — VASOPRESSIN 4 UNITS/HR: 20 INJECTION INTRAVENOUS at 11:55

## 2024-11-06 RX ADMIN — VASOPRESSIN 4 UNITS/HR: 20 INJECTION INTRAVENOUS at 21:58

## 2024-11-06 RX ADMIN — PROPOFOL 15 MCG/KG/MIN: 10 INJECTION, EMULSION INTRAVENOUS at 00:10

## 2024-11-06 RX ADMIN — Medication 60 ML: at 08:24

## 2024-11-06 RX ADMIN — Medication 40 MG: at 16:12

## 2024-11-06 RX ADMIN — CALCIUM CHLORIDE, MAGNESIUM CHLORIDE, DEXTROSE MONOHYDRATE, LACTIC ACID, SODIUM CHLORIDE, SODIUM BICARBONATE AND POTASSIUM CHLORIDE 5000 ML: 5.15; 2.03; 22; 5.4; 6.46; 3.09; .157 INJECTION INTRAVENOUS at 03:03

## 2024-11-06 RX ADMIN — OXYCODONE HYDROCHLORIDE 5 MG: 5 TABLET ORAL at 03:05

## 2024-11-06 RX ADMIN — CALCIUM CHLORIDE, MAGNESIUM CHLORIDE, DEXTROSE MONOHYDRATE, LACTIC ACID, SODIUM CHLORIDE, SODIUM BICARBONATE AND POTASSIUM CHLORIDE 5000 ML: 5.15; 2.03; 22; 5.4; 6.46; 3.09; .157 INJECTION INTRAVENOUS at 10:10

## 2024-11-06 RX ADMIN — SENNOSIDES AND DOCUSATE SODIUM 1 TABLET: 50; 8.6 TABLET ORAL at 19:29

## 2024-11-06 RX ADMIN — CALCIUM CHLORIDE, MAGNESIUM CHLORIDE, DEXTROSE MONOHYDRATE, LACTIC ACID, SODIUM CHLORIDE, SODIUM BICARBONATE AND POTASSIUM CHLORIDE 5000 ML: 5.15; 2.03; 22; 5.4; 6.46; 3.09; .157 INJECTION INTRAVENOUS at 09:09

## 2024-11-06 RX ADMIN — ALBUMIN HUMAN 25 G: 0.25 SOLUTION INTRAVENOUS at 08:24

## 2024-11-06 RX ADMIN — CALCIUM CHLORIDE, MAGNESIUM CHLORIDE, DEXTROSE MONOHYDRATE, LACTIC ACID, SODIUM CHLORIDE, SODIUM BICARBONATE AND POTASSIUM CHLORIDE 5000 ML: 5.15; 2.03; 22; 5.4; 6.46; 3.09; .157 INJECTION INTRAVENOUS at 08:30

## 2024-11-06 RX ADMIN — CALCIUM CHLORIDE, MAGNESIUM CHLORIDE, DEXTROSE MONOHYDRATE, LACTIC ACID, SODIUM CHLORIDE, SODIUM BICARBONATE AND POTASSIUM CHLORIDE: 5.15; 2.03; 22; 5.4; 6.46; 3.09; .157 INJECTION INTRAVENOUS at 10:10

## 2024-11-06 RX ADMIN — VASOPRESSIN 4 UNITS/HR: 20 INJECTION INTRAVENOUS at 01:17

## 2024-11-06 RX ADMIN — CHLORHEXIDINE GLUCONATE 0.12% ORAL RINSE 15 ML: 1.2 LIQUID ORAL at 08:23

## 2024-11-06 RX ADMIN — VASOPRESSIN 4 UNITS/HR: 20 INJECTION INTRAVENOUS at 17:04

## 2024-11-06 RX ADMIN — OXYCODONE HYDROCHLORIDE 5 MG: 5 TABLET ORAL at 09:45

## 2024-11-06 RX ADMIN — ASPIRIN 81 MG CHEWABLE TABLET 162 MG: 81 TABLET CHEWABLE at 08:24

## 2024-11-06 RX ADMIN — PROPOFOL 15 MCG/KG/MIN: 10 INJECTION, EMULSION INTRAVENOUS at 10:07

## 2024-11-06 RX ADMIN — ALBUMIN HUMAN 25 G: 0.25 SOLUTION INTRAVENOUS at 19:23

## 2024-11-06 RX ADMIN — DOBUTAMINE HYDROCHLORIDE 2.5 MCG/KG/MIN: 200 INJECTION INTRAVENOUS at 10:46

## 2024-11-06 RX ADMIN — CALCIUM CHLORIDE, MAGNESIUM CHLORIDE, DEXTROSE MONOHYDRATE, LACTIC ACID, SODIUM CHLORIDE, SODIUM BICARBONATE AND POTASSIUM CHLORIDE 5000 ML: 5.15; 2.03; 22; 5.4; 6.46; 3.09; .157 INJECTION INTRAVENOUS at 17:08

## 2024-11-06 RX ADMIN — CHLORHEXIDINE GLUCONATE 0.12% ORAL RINSE 15 ML: 1.2 LIQUID ORAL at 21:47

## 2024-11-06 RX ADMIN — METHOCARBAMOL 500 MG: 500 TABLET ORAL at 03:03

## 2024-11-06 RX ADMIN — CALCIUM CHLORIDE, MAGNESIUM CHLORIDE, DEXTROSE MONOHYDRATE, LACTIC ACID, SODIUM CHLORIDE, SODIUM BICARBONATE AND POTASSIUM CHLORIDE 5000 ML: 5.15; 2.03; 22; 5.4; 6.46; 3.09; .157 INJECTION INTRAVENOUS at 05:45

## 2024-11-06 RX ADMIN — VASOPRESSIN 4 UNITS/HR: 20 INJECTION INTRAVENOUS at 06:05

## 2024-11-06 RX ADMIN — EPINEPHRINE 0.07 MCG/KG/MIN: 1 INJECTION INTRAMUSCULAR; INTRAVENOUS; SUBCUTANEOUS at 05:09

## 2024-11-06 RX ADMIN — OXYCODONE HYDROCHLORIDE 5 MG: 5 TABLET ORAL at 19:45

## 2024-11-06 RX ADMIN — FENTANYL CITRATE 25 MCG: 50 INJECTION, SOLUTION INTRAMUSCULAR; INTRAVENOUS at 00:09

## 2024-11-06 RX ADMIN — NOREPINEPHRINE BITARTRATE 0.06 MCG/KG/MIN: 0.06 INJECTION, SOLUTION INTRAVENOUS at 10:49

## 2024-11-06 RX ADMIN — METHOCARBAMOL 500 MG: 500 TABLET ORAL at 19:45

## 2024-11-06 RX ADMIN — EPINEPHRINE 0.06 MCG/KG/MIN: 1 INJECTION INTRAMUSCULAR; INTRAVENOUS; SUBCUTANEOUS at 17:54

## 2024-11-06 RX ADMIN — Medication 60 ML: at 22:00

## 2024-11-06 RX ADMIN — Medication 60 ML: at 15:49

## 2024-11-06 RX ADMIN — METHOCARBAMOL 500 MG: 500 TABLET ORAL at 12:21

## 2024-11-06 NOTE — PROGRESS NOTES
Critical Care  Note      11/06/2024    Name: Alessio Teixeira MRN#: [id number]   Age: 88 year old YOB: 1935     Hsptl Day# 9  ICU DAY # 9    MV DAY # 9             Problem List:   Active Problems:    Coronary artery disease  Mixed cardiogenic/vasoplegic shock  Acute hypoxemic respiratory failure         Summary/Hospital Course:   88 year old M with a history of severe 3 vessel CAD. Status post sternotomy, CABGx3, modified MAZE procedure, PAYAM ligation with Dr. Mulvihill 10/28/24. Post operative hemorrhage with RTOR on POD0 for evacuation of mediastinal blood clot causing tamponade physiology.   Course subsequently complicated by persistent shock (appears mixed cardigenic/vasoplegic), and ongoing respiratory failure.    Interval/overnight events:  Back on dobutamine yesterday afternoon for lower scvo2.      Assessment and plan :     Alessio Teixeira IS a 88 year old male admitted on 10/28/2024 for shock and respiratory failure.   I have personally reviewed the daily labs, imaging studies, cultures and discussed the case with referring physician and consulting physicians.     My assessment and plan by system for this patient is as follows:    Neurology/Psychiatry:   1. Pain/analgesia: fentanyl prn  2. Sedation: propofol    Cardiovascular:   Shock:  appears c/w mixed cardiogenic and vasoplegic picture; persistent.  Continues on epi, norepi, vaso.  Now back on dobutamine 2.5 to maintain CI of ~2.0  S/p CAB3:  continues on ASA.  Add statin when LFTs stabilized.    Pulmonary/Ventilator Management:   1. Acute hypoxemic respiratory failure, vent dependent:  continue lung protective tidal volumes.  Spont breathing trials as able. Doing well on trials now--just trying to awaken enough to extubate.    GI and Nutrition :   1. TF  2. PPI for pud prophy    Renal/Fluids/Electrolytes:   1. Acute kidney failure:  continues on CRRT.  Appreciate nephrology support.    Infectious Disease:   1. Ngtd on cultures.  Abx  "stopped.    Endocrine:   1. Stress induced hyperglycemia:  SSI    Hematology/Oncology:   1. Hyperleukocytosis to 41, known h/o CLL/MDS.  Appreciate heme input.  2. Hgb 7.5.  Acute blood loss Anemia d/t post-operative bleeding, no signs, symptoms of ongoing active blood loss  3. Pltlts 187 ok.    ICU Prophylaxis:   1. DVT: mechanical only for now  2. VAP: HOB 30 degrees, chlorhexidine rinse  3. Stress Ulcer: PPI  4. Restraints: Nonviolent soft two point restraints required and necessary for patient safety and continued cares and good effect as patient continues to pull at necessary lines, tubes despite education and distraction. Will readdress daily.   5. Wound care  -   6. Feeding - tf  7. Family Update: bedside  8. Disposition - ICU    Clinically Significant Risk Factors           # Hypocalcemia: Lowest Ca = 7.9 mg/dL in last 2 days, will monitor and replace as appropriate     # Hypoalbuminemia: Lowest albumin = 2.7 g/dL at 11/2/2024  5:44 AM, will monitor as appropriate    # Coagulation Defect: INR = 1.71 (Ref range: 0.85 - 1.15) and/or PTT = 42 Seconds (Ref range: 22 - 38 Seconds), will monitor for bleeding    # Hypertension: Noted on problem list  # Chronic heart failure with reduced ejection fraction: last echo with EF <40%   # Acute Hypoxic Respiratory Failure: Documented O2 saturation < 90%. Continue supplemental oxygen as needed  # Acute Hypercapnic Respiratory Failure: based on arterial blood gas results.  Continue supplemental oxygen and ventilatory support as indicated.  # Acute Hypercapnic Respiratory Failure: based on venous blood gas results.  Continue supplemental oxygen and ventilatory support as indicated.      #Acute blood loss anemia: Lowest Hgb this hospitalization: 6.9 g/dL. Will continue to monitor and treat/transfuse as appropriate.     # Obesity: Estimated body mass index is 32.49 kg/m  as calculated from the following:    Height as of this encounter: 1.778 m (5' 10\").    Weight as of this " encounter: 102.7 kg (226 lb 6.6 oz).       # History of CABG: noted on surgical history                           Key Medications:     Current Facility-Administered Medications   Medication Dose Route Frequency Provider Last Rate Last Admin    albumin human 25 % injection 25 g  25 g Intravenous Q12H Arlin Hodge PA-C 100 mL/hr at 11/04/24 1946 25 g at 11/06/24 0824    aspirin (ASA) chewable tablet 162 mg  162 mg Oral or NG Tube Daily Clarence Bone MD   162 mg at 11/06/24 0824    chlorhexidine (PERIDEX) 0.12 % solution 15 mL  15 mL Swish & Spit BID Zac Kaur DO   15 mL at 11/06/24 0823    insulin aspart (NovoLOG) injection (RAPID ACTING)  1-7 Units Subcutaneous Q4H Sb Arthur PA-C   1 Units at 11/05/24 1908    pantoprazole (PROTONIX) 2 mg/mL suspension 40 mg  40 mg Oral or NG Tube BID AC Arlin Hodge PA-C   40 mg at 11/06/24 0824    Or    pantoprazole (PROTONIX) EC tablet 40 mg  40 mg Oral BID AC Arlin Hodge PA-C        polyethylene glycol (MIRALAX) Packet 17 g  17 g Oral or Feeding Tube Daily Mulvihill, Michael, MD   17 g at 11/03/24 0812    Prosource TF20 ENfit Compatibl EN LIQD (PROSOURCE TF20) packet 60 mL  1 packet Per Feeding Tube TID Arlin Hodge PA-C   60 mL at 11/06/24 0824    senna-docusate (SENOKOT-S/PERICOLACE) 8.6-50 MG per tablet 1 tablet  1 tablet Oral or Feeding Tube BID Mulvihill, Michael, MD   1 tablet at 11/05/24 2123    sodium chloride (PF) 0.9% PF flush 3 mL  3 mL Intracatheter Q8H Clarence Bone MD   3 mL at 11/06/24 0818     Current Facility-Administered Medications   Medication Dose Route Frequency Provider Last Rate Last Admin    dextrose 10% infusion   Intravenous Continuous PRN Arlin Hodge PA-C   Stopped at 11/05/24 0600    dextrose 10% infusion   Intravenous Continuous Sb Arthur PA-C   Stopped at 11/05/24 0200    dialysate solution (PrismaSol BGK 2/3.5)   CRRT Continuous Farhad Boland MD 1,500 mL/hr at 11/04/24 0700 5,000  mL at 11/06/24 0909    DOBUTamine (DOBUTREX) 500 mg in D5W 250 mL infusion (adult std conc)  2.5 mcg/kg/min Intravenous Continuous Arlin Hodge PA-C 7.8 mL/hr at 11/06/24 0400 2.5 mcg/kg/min at 11/06/24 0400    EPINEPHrine (ADRENALIN) 5 mg in  mL infusion  0.01-0.1 mcg/kg/min Intravenous Continuous PRN Clarence Bone MD 20.1 mL/hr at 11/06/24 0509 0.07 mcg/kg/min at 11/06/24 0509    lactated ringers infusion   Intravenous Continuous Opal Nguyen PA-C 20 mL/hr at 11/05/24 1600 Rate Verify at 11/05/24 1600    propofol (DIPRIVAN) infusion  5-75 mcg/kg/min Intravenous Continuous Tyra Dubois MD 9.6 mL/hr at 11/06/24 0400 15 mcg/kg/min at 11/06/24 0400    And    Medication Instruction   Does not apply Continuous PRN Tyra Dubois MD        No heparin required   Does not apply Continuous PRN Farhad Boland MD        norepinephrine (LEVOPHED) 16 mg in  mL infusion MAX CONC CENTRAL LINE  0.01-0.2 mcg/kg/min Intravenous Continuous Arlin Hodge PA-C 6.3 mL/hr at 11/06/24 0812 0.07 mcg/kg/min at 11/06/24 0812    Post-Filter replacement solution (PRISMASOL BGK 2/3.5)   CRRT Continuous Farhad Boland  mL/hr at 11/05/24 0608 New Bag at 11/05/24 0608    Pre-Filter replacement solution (PRISMASOL BGK 2/3.5)   CRRT Continuous Farhad Boland  mL/hr at 11/04/24 0337 5,000 mL at 11/06/24 0303    Reason beta blocker order not selected   Does not apply DOES NOT GO TO Clarence Olguin MD        vasopressin 0.2 units/mL in NS (PITRESSIN) standard conc infusion  0.5-4 Units/hr Intravenous Continuous Hodge, Arlin C, PA-C 20 mL/hr at 11/06/24 0605 4 Units/hr at 11/06/24 0605              Physical Examination:   Temp:  [97.7  F (36.5  C)-98.8  F (37.1  C)] 97.7  F (36.5  C)  Pulse:  [] 89  Resp:  [9-24] 11  BP: (109-123)/(68-79) 123/68  MAP:  [59 mmHg-149 mmHg] 65 mmHg  Arterial Line BP: ()/() 121/47  FiO2 (%):  [25 %-30 %] 25 %  SpO2:   [66 %-100 %] 97 %      Intake/Output Summary (Last 24 hours) at 11/6/2024 0927  Last data filed at 11/6/2024 0900  Gross per 24 hour   Intake 4221.26 ml   Output 6254.8 ml   Net -2033.54 ml           Wt Readings from Last 4 Encounters:   11/06/24 102.7 kg (226 lb 6.6 oz)   10/25/24 97.1 kg (214 lb)   10/03/24 101.2 kg (223 lb)   10/02/24 104.8 kg (231 lb)     Arterial Line BP: ()/() 121/47  MAP:  [59 mmHg-149 mmHg] 65 mmHg  BP - Mean:  [89-90] 90  CVP:  [8 mmHg-18 mmHg] 13 mmHg  SVO2:  [45 %-51 %] 48 %  FiO2 (%): 25 %, Resp: 11, Vent Mode: (S) CPAP/PS, Resp Rate (Set): 20 breaths/min, Tidal Volume (Set, mL): 500 mL, PEEP (cm H2O): 5 cmH2O, Pressure Support (cm H2O): 5 cmH2O, Resp Rate (Set): 20 breaths/min, Tidal Volume (Set, mL): 500 mL, PEEP (cm H2O): 5 cmH2O  Recent Labs   Lab 11/06/24  0514 11/05/24  2209 11/05/24  1730 11/05/24  1349 11/05/24  0456   PH 7.44 7.44  --  7.43 7.38   PCO2 37 37  --  37 42   PO2 106* 76*  --  79* 103   HCO3 25 25  --  25 25   O2PER 30  30 30  30 30 30  30 30  30       GEN: no acute distress   HEENT: head ncat, sclera anicteric, OP patent, trachea midline   PULM: unlabored synchronous with vent, clear anteriorly    CV/COR: RRR S1S2 no gallop,  No rub, no murmur  ABD: soft nontender, hypoactive bowel sounds, no mass  EXT:  warm and well perfused x4  NEURO: PERRL, no obvious deficits  SKIN: no obvious rash  LINES: clean, dry intact         Data:   All data and imaging reviewed     ROUTINE ICU LABS (Last four results)  CMP  Recent Labs   Lab 11/06/24  0540 11/06/24  0514 11/05/24  2222 11/05/24  2209 11/05/24  1733 11/05/24  1349 11/05/24  0509 11/05/24  0456 11/04/24  1726 11/04/24  1340 11/04/24  0550 11/04/24  0545   NA  --  135  --  136  --  137  --  135   < > 136  --  137   POTASSIUM  --  3.8  --  4.0  --  4.0  --  3.8   < > 4.7   < > 3.3*   CHLORIDE  --  100  --  99  --  101  --  100   < > 101  --  101   CO2  --  23  --  23  --  24  --  23   < > 24  --  25  "  ANIONGAP  --  12  --  14  --  12  --  12   < > 11  --  11   * 149* 133* 141*   < > 141*   < > 135*   < > 129*   < > 120*   BUN  --  30.0*  --  32.1*  --  30.3*  --  27.3*   < > 27.1*  --  25.9*   CR  --  1.71*  --  1.69*  --  1.69*  --  1.76*   < > 1.78*  --  1.73*   GFRESTIMATED  --  38*  --  39*  --  39*  --  37*   < > 36*  --  38*   TATE  --  9.4  --  9.2  --  9.1  --  9.2   < > 7.9*  --  8.7*   MAG  --  2.0  --  2.0  --  2.1  --  2.2   < > 1.7  --  2.1   PHOS  --  2.3*  --   --   --   --   --  2.3*  --  2.2*  2.2*  --  1.9*   PROTTOTAL  --  5.0*  --  5.1*  --  5.0*  --  5.1*   < > 5.1*  --  4.8*   ALBUMIN  --  3.6  --  3.7  --  3.8  --  3.7   < > 3.7  --  3.4*   BILITOTAL  --  2.1*  --  2.0*  --  2.3*  --  2.5*   < > 2.6*  --  2.6*   ALKPHOS  --  153*  --  164*  --  168*  --  178*   < > 182*  --  173*   AST  --  101*  --  135*  --  171*  --  235*   < > 345*  --  404*   ALT  --  223*  --  257*  --  294*  --  353*   < > 438*  --  480*    < > = values in this interval not displayed.     CBC  Recent Labs   Lab 11/06/24  0514 11/05/24  2209 11/05/24  1349 11/05/24  0456   WBC 41.0* 44.2* 53.5* 59.1*   RBC 2.64* 2.67* 2.81* 2.66*   HGB 7.5* 7.7* 7.8* 7.6*   HCT 22.9* 23.1* 24.8* 23.8*   MCV 87 87 88 90   MCH 28.4 28.8 27.8 28.6   MCHC 32.8 33.3 31.5 31.9   RDW 20.2* 20.4* 19.9* 20.7*    189 190 198     INR  Recent Labs   Lab 11/06/24  0514 11/05/24  0456 11/04/24  0545 11/03/24  0548   INR 1.71* 1.85* 2.11* 2.31*     Arterial Blood Gas  Recent Labs   Lab 11/06/24  0514 11/05/24  2209 11/05/24  1730 11/05/24  1349 11/05/24  0456   PH 7.44 7.44  --  7.43 7.38   PCO2 37 37  --  37 42   PO2 106* 76*  --  79* 103   HCO3 25 25  --  25 25   O2PER 30  30 30  30 30 30  30 30  30       All cultures:  No results for input(s): \"CULT\" in the last 168 hours.  Recent Results (from the past 24 hours)   US Upper Extremity Arterial Duplex Right    Narrative    EXAM: US UPPER EXTREMITY ARTERIAL DUPLEX " RIGHT  LOCATION: [facility]  DATE: 11/3/2024    INDICATION: Mottled hand.  COMPARISON: None available.  TECHNIQUE: Arterial Duplex ultrasound of the right arm. Color flow and spectral Doppler with waveform analysis performed.    FINDINGS (RIGHT upper extremity):    ARTERIAL PEAK SYSTOLIC VELOCITIES (cm/s):    Subclavian artery (proximal): 51  Subclavian artery (distal): 71  Axillary artery: 70  Brachial (proximal): 74  Brachial (distal): 76  Radial (proximal): 41  Radial (distal): 14  Ulnar (proximal): 85  Ulnar (distal): 136    WAVEFORMS/COLOR DOPPLER: Triphasic waveforms are noted throughout except in the distal radial artery where biphasic waveforms are noted.      Impression    IMPRESSION:   1.  Patent right upper extremity arteries although slow flow is noted within the distal radial artery along with biphasic waveforms, findings are of indeterminate etiology, could be due to area of focal stenosis.   XR Chest Port 1 View    Narrative    EXAM: XR CHEST PORT 1 VIEW  LOCATION: [facility]  DATE: 11/3/2024    INDICATION: eval infiltrate edema  COMPARISON: Chest radiograph 11/2/2024.      Impression    IMPRESSION:     Lines and tubes: Endotracheal tube tip is not well visualized, likely in the upper trachea. Feeding tube courses below the diaphragm. [place]-Garrett catheter near the main pulmonary trunk. Similar left central venous catheter, left atrial appendage clip and   median sternotomy. Bilateral chest tubes.    Right basilar opacity favoring combination of pleural fluid, atelectasis and/or infection. Possible trace left pleural effusion. No discernible pneumothorax. Similar cardiomediastinal silhouette.     D/w SADE Dr. Lisa Menard of CV surgery.    Billing: This patient is critically ill: Yes. Total critical care time today 50 min, excluding procedures.

## 2024-11-06 NOTE — PROGRESS NOTES
Intensivist addendum:    1400:  noted lactate up to 2.1 and scvo2 down to 42.  May suggest worsening cardiac output/cardigenic shock. Discussed with Dr. Mulvihill and SADE Hodge of CV surgery. Will resume dobutamine for now.    I spent an additional 15 minutes of critical care time managing the diagnosis of cardiogenic shock.  Total time now 65 minutes, excluding procedures.

## 2024-11-06 NOTE — PROGRESS NOTES
CV  Pressors (which pressors and any increase/decrease in pressor needs): EPI, Vaso gtts same rate throughout the night, Levo gtt titrated down to 0.05 mcq/kg min  HR range: , Afib  Chest tube output:50cc/2hr    Neuro  Orientation: vented, sedated, open eyes to voice, cooperates with oral cares, occasional spontaneous movements with hands and feet.  Delirium present?(y/n):n/a  Sleep: sedated  Pain: Robaxin and Oxycodone PO and Fentanyl IV PRN    GI/  BM? (y/n): yes, rectal tube out  Urine output: anuric, CRRT      Lines: multiples, see epic

## 2024-11-06 NOTE — PROGRESS NOTES
[facility] ICU RESPIRATORY NOTE        Date of Admission: 10/28/2024    Date of Intubation (most recent): 10/28/24    Reason for Mechanical Ventilation: airway protection/post-op complication.     Number of Days on Mechanical Ventilation: 10    Met Criteria for Spontaneous Breathing Trial: Yes -  %    Bite Block: No    Significant Events Today: Patient placed on SBT 5/5 25% at 0730 and tolerating well.     ABG Results:   Recent Labs   Lab 11/06/24  1355 11/06/24  0514 11/05/24  2209 11/05/24  1730 11/05/24  1349   PH 7.46* 7.44 7.44  --  7.43   PCO2 36 37 37  --  37   PO2 113* 106* 76*  --  79*   HCO3 25 25 25  --  25   O2PER 25 30  30 30  30 30 30  30         Current Vent Settings: FiO2 (%): 25 %, Resp: 11, Vent Mode: CPAP/PS, Resp Rate (Set): 20 breaths/min, Tidal Volume (Set, mL): 500 mL, PEEP (cm H2O): 5 cmH2O, Pressure Support (cm H2O): 5 cmH2O, Resp Rate (Set): 20 breaths/min, Tidal Volume (Set, mL): 500 mL, PEEP (cm H2O): 5 cmH2O    Skin Assessment: Skin intact around ETT.    Plan: Return to VC later today. Possibly extubate to NIV tomorrow.     Manuel Cortes, RT on 11/6/2024 at 2:01 PM

## 2024-11-06 NOTE — PROGRESS NOTES
[facility] ICU RESPIRATORY NOTE        Date of Admission: 10/28/2024    Date of Intubation (most recent): 10/28/2024    Reason for Mechanical Ventilation: CABG    Number of Days on Mechanical Ventilation: 10    Met Criteria for Spontaneous Breathing Trial: Yes     Bite Block: No    Significant Events Today: Acute drop in saturations, lavage done per RN    ABG Results:   Recent Labs   Lab 11/06/24  0514 11/05/24  2209 11/05/24  1730 11/05/24  1349 11/05/24  0456   PH 7.44 7.44  --  7.43 7.38   PCO2 37 37  --  37 42   PO2 106* 76*  --  79* 103   HCO3 25 25  --  25 25   O2PER 30  30 30  30 30 30  30 30  30         Current Vent Settings: FiO2 (%): 30 %, Resp: 19, Vent Mode: CMV/AC, Resp Rate (Set): 20 breaths/min, Tidal Volume (Set, mL): 500 mL, PEEP (cm H2O): 5 cmH2O, Pressure Support (cm H2O): 5 cmH2O, Resp Rate (Set): 20 breaths/min, Tidal Volume (Set, mL): 500 mL, PEEP (cm H2O): 5 cmH2O    Skin Assessment: Intact      Jhoana Woody, RT on 11/6/2024 at 6:21 AM

## 2024-11-06 NOTE — PROGRESS NOTES
[facility] ICU RESPIRATORY NOTE        Date of Admission: 10/28/2024    Date of Intubation (most recent): 10/28/24    Reason for Mechanical Ventilation: CABG    Number of Days on Mechanical Ventilation: 9    Met Criteria for Spontaneous Breathing Trial: Yes    Bite Block: No    Significant Events Today: PS 5/+5    ABG Results:   Recent Labs   Lab 11/05/24  1730 11/05/24  1349 11/05/24  0456 11/04/24  2148 11/04/24  0903 11/04/24  0545   PH  --  7.43 7.38 7.40  --  7.35   PCO2  --  37 42 42  --  46*   PO2  --  79* 103 96  --  87   HCO3  --  25 25 26  --  26   O2PER 30 30  30 30  30 30  30   < > 30  30    < > = values in this interval not displayed.         Current Vent Settings: FiO2 (%): 30 %, Resp: 19, Vent Mode: CMV/AC, Resp Rate (Set): 20 breaths/min, Tidal Volume (Set, mL): 500 mL, PEEP (cm H2O): 5 cmH2O, Pressure Support (cm H2O): 5 cmH2O, Resp Rate (Set): 20 breaths/min, Tidal Volume (Set, mL): 500 mL, PEEP (cm H2O): 5 cmH2O    Skin Assessment: Intact    Plan: Continue to monitor    RT Ebonie on 11/5/2024 at 8:33 PM

## 2024-11-06 NOTE — PROGRESS NOTES
Renal Medicine Progress Note    SHORTHAND KEY FOR MY NOTES:  c = with, s = without, p = after, a = before, x = except, asx = asymptomatic, tx = transplant or treatment, sx = symptoms or symptomatic, cx = canceled or culture, rxn = reaction, yday = yesterday, nl = normal, abx = antibiotics, fxn = function, dx = diagnosis, dz = disease, m/h = melena/hematochezia, c/d/l/ha = cramping/dizziness/lightheadedness/headache, d/c = discharge or diarrhea/constipation, f/c/n/v = fevers/chills/nausea/vomiting, cp/sob = chest pain/shortness of breath, tbv = total body volume, rxn = reaction, tdc = tunneled dialysis catheter, pta = prior to admission, hd = hemodialysis, pd = peritoneal dialysis, hhd = home hemodialysis, edw = estimated dry wt         Assessment/Plan:     1.  Oliguric JAVIER/CKD IIIb.  Pt remains on CRRT and we are pulling less fluid overnight.  CVP is only 10 and PAD 25 today so he may not need as much fluid pulled.  He is still on pressors but in an effort to reduce the need, we will decrease UF goal.  A.  Reduce UF goal to try and reduce pressor needs.  B.  Follow clinically.    2.  Severe shock.  Pt remains on multiple pressors and broad abx.  A.  Wean pressors as able.  B.  Dose abx for CRRT.    3.  Resp failure.  Pt is on full vent support but doing well on PS trial.  A.  Vent mgmt per ICU team.    4.  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  EF 35%.  A.  Per CV Surg.    5.  FEN.  Phos remains low.  K, Na, Mg are ok.  A.  Continue replacement protocols.    6.  Code Status.  Pt is full code.  His prognosis remains guarded and poor.    Case d/w Rubi Grey RN, pt's wife.        Interval History:     Unable.  Pt is intubated/sedated.          Medications and Allergies:     Current Facility-Administered Medications   Medication Dose Route Frequency Provider Last Rate Last Admin    albumin human 25 % injection 25 g  25 g Intravenous Q12H Arlin Hodge PA-C 100 mL/hr at 11/04/24 1946 25 g at 11/06/24 0824    aspirin (ASA) chewable  "tablet 162 mg  162 mg Oral or NG Tube Daily Clarence Bone MD   162 mg at 11/06/24 0824    chlorhexidine (PERIDEX) 0.12 % solution 15 mL  15 mL Swish & Spit BID Zac Kaur,    15 mL at 11/06/24 0823    insulin aspart (NovoLOG) injection (RAPID ACTING)  1-7 Units Subcutaneous Q4H Sb rAthur PA-C   1 Units at 11/05/24 1908    pantoprazole (PROTONIX) 2 mg/mL suspension 40 mg  40 mg Oral or NG Tube BID AC Arlin Hodge PA-C   40 mg at 11/06/24 0824    Or    pantoprazole (PROTONIX) EC tablet 40 mg  40 mg Oral BID AC Arlin Hodge PA-C        polyethylene glycol (MIRALAX) Packet 17 g  17 g Oral or Feeding Tube Daily Mulvihill, Michael, MD   17 g at 11/03/24 0812    Prosource TF20 ENfit Compatibl EN LIQD (PROSOURCE TF20) packet 60 mL  1 packet Per Feeding Tube TID Arlin Hodge PA-C   60 mL at 11/06/24 0824    senna-docusate (SENOKOT-S/PERICOLACE) 8.6-50 MG per tablet 1 tablet  1 tablet Oral or Feeding Tube BID Mulvihill, Michael, MD   1 tablet at 11/05/24 2123    sodium chloride (PF) 0.9% PF flush 3 mL  3 mL Intracatheter Q8H Clarence Bone MD   3 mL at 11/06/24 1336      No Known Allergies       Physical Exam:     Vitals were reviewed   , Blood pressure 123/68, pulse 98, temperature 98.6  F (37  C), temperature source Esophageal, resp. rate 11, height 1.778 m (5' 10\"), weight 102.7 kg (226 lb 6.6 oz), SpO2 100%.  Wt Readings from Last 3 Encounters:   11/06/24 102.7 kg (226 lb 6.6 oz)   10/25/24 97.1 kg (214 lb)   10/03/24 101.2 kg (223 lb)     Intake/Output Summary (Last 24 hours) at 11/6/2024 1407  Last data filed at 11/6/2024 1400  Gross per 24 hour   Intake 3784.42 ml   Output 5451.8 ml   Net -1667.38 ml     GENERAL APPEARANCE: intubated, sedated  HEENT:  eyes/ears/nose/neck grossly nl  RESP: + vent sounds  CV: irreg, nl S1/S2   ABDOMEN: s/nt/nd  EXTREMITIES/SKIN: + scrotal edema; 2+ ble edema; skin cool  NEURO:  sedated  LINES:  + burt. + multiple lines: art, central, " S-G, temp HD    Pt seen on CRRT.  Stable run.  UF is ~100 ml/h.           Data:     CBC RESULTS:     Recent Labs   Lab 11/06/24  0514 11/05/24  2209 11/05/24  1349 11/05/24  0456 11/04/24  2148 11/04/24  1340   WBC 41.0* 44.2* 53.5* 59.1* 60.9* 66.4*   RBC 2.64* 2.67* 2.81* 2.66* 2.66* 2.69*   HGB 7.5* 7.7* 7.8* 7.6* 7.6* 7.8*   HCT 22.9* 23.1* 24.8* 23.8* 23.5* 24.3*    189 190 198 194 212     Basic Metabolic Panel:  Recent Labs   Lab 11/06/24  1351 11/06/24  1017 11/06/24  0540 11/06/24  0514 11/05/24  2222 11/05/24 2209 11/05/24  1733 11/05/24  1349 11/05/24  0509 11/05/24  0456 11/04/24  2200 11/04/24 2148 11/04/24  1726 11/04/24  1340   NA  --   --   --  135  --  136  --  137  --  135  --  135  --  136   POTASSIUM  --   --   --  3.8  --  4.0  --  4.0  --  3.8  --  3.4  --  4.7   CHLORIDE  --   --   --  100  --  99  --  101  --  100  --  100  --  101   CO2  --   --   --  23  --  23  --  24  --  23  --  23  --  24   BUN  --   --   --  30.0*  --  32.1*  --  30.3*  --  27.3*  --  25.7*  --  27.1*   CR  --   --   --  1.71*  --  1.69*  --  1.69*  --  1.76*  --  1.74*  --  1.78*   * 131* 134* 149* 133* 141*   < > 141*   < > 135*   < > 130*   < > 129*   TATE  --   --   --  9.4  --  9.2  --  9.1  --  9.2  --  9.0  --  7.9*    < > = values in this interval not displayed.     INR  Recent Labs   Lab 11/06/24  0514 11/05/24  0456 11/04/24  0545 11/03/24  0548   INR 1.71* 1.85* 2.11* 2.31*      Attestation:   I have reviewed today's relevant vital signs, notes, medications, labs and imaging.    Kaz Ann MD  [facility] Nephrology  [phone]

## 2024-11-06 NOTE — PROCEDURES
[facility]    Central line    Date/Time: 11/5/2024 6:31 PM    Performed by: Bhanu Ness MD  Authorized by: Bhanu Ness MD  Indications: vasoplegic and cardiogenic shock.      UNIVERSAL PROTOCOL   Site Marked: NA  Prior Images Obtained and Reviewed:  NA  Required items: Required blood products, implants, devices and special equipment available    Patient identity confirmed:  Arm band  NA - No sedation, light sedation, or local anesthesia  Confirmation Checklist:  Patient's identity using two indicators, relevant allergies, procedure was appropriate and matched the consent or emergent situation and correct equipment/implants were available  Time out: Immediately prior to the procedure a time out was called    Universal Protocol: the Joint Commission Universal Protocol was followed    Preparation: Patient was prepped and draped in usual sterile fashion       ANESTHESIA    Local Anesthetic:  Lidocaine 1% without epinephrine      Preparation: skin prepped with 2% chlorhexidine  Skin prep agent dried: skin prep agent completely dried prior to procedure  Sterile barriers: all five maximum sterile barriers used - cap, mask, sterile gown, sterile gloves, and large sterile sheet  Hand hygiene: hand hygiene performed prior to central venous catheter insertion  Patient position: Trendelenburg  Catheter type: triple lumen  Pre-procedure: landmarks identified  Ultrasound guidance: yes  Sterile ultrasound techniques: sterile gel and sterile probe covers were used  Number of attempts: 1  Successful placement: yes  Post-procedure: line sutured and dressing applied  Assessment: blood return through all ports, free fluid flow and placement verified by x-ray      PROCEDURE    Patient Tolerance:  Patient tolerated the procedure well with no immediate complications  Length of time physician/provider present for 1:1 monitoring during sedation: 0   Ultrasound guidance was used for this procedure and  images are permanently stored.  Additional central access was needed pending removal of pts introducer and swan.

## 2024-11-07 LAB
ALBUMIN SERPL BCG-MCNC: 3.4 G/DL (ref 3.5–5.2)
ALBUMIN SERPL BCG-MCNC: 3.6 G/DL (ref 3.5–5.2)
ALBUMIN SERPL BCG-MCNC: 3.8 G/DL (ref 3.5–5.2)
ALLEN'S TEST: ABNORMAL
ALP SERPL-CCNC: 115 U/L (ref 40–150)
ALP SERPL-CCNC: 122 U/L (ref 40–150)
ALP SERPL-CCNC: 123 U/L (ref 40–150)
ALT SERPL W P-5'-P-CCNC: 115 U/L (ref 0–70)
ALT SERPL W P-5'-P-CCNC: 130 U/L (ref 0–70)
ALT SERPL W P-5'-P-CCNC: 144 U/L (ref 0–70)
ANION GAP SERPL CALCULATED.3IONS-SCNC: 13 MMOL/L (ref 7–15)
ANION GAP SERPL CALCULATED.3IONS-SCNC: 14 MMOL/L (ref 7–15)
ANION GAP SERPL CALCULATED.3IONS-SCNC: 16 MMOL/L (ref 7–15)
AST SERPL W P-5'-P-CCNC: 44 U/L (ref 0–45)
AST SERPL W P-5'-P-CCNC: 48 U/L (ref 0–45)
AST SERPL W P-5'-P-CCNC: 56 U/L (ref 0–45)
BASE EXCESS BLDA CALC-SCNC: 1.3 MMOL/L (ref -3–3)
BASE EXCESS BLDA CALC-SCNC: 1.3 MMOL/L (ref -3–3)
BASE EXCESS BLDA CALC-SCNC: 1.4 MMOL/L (ref -3–3)
BASE EXCESS BLDA CALC-SCNC: 1.7 MMOL/L (ref -3–3)
BASE EXCESS BLDV CALC-SCNC: 1.7 MMOL/L (ref -3–3)
BASE EXCESS BLDV CALC-SCNC: 2 MMOL/L (ref -3–3)
BASE EXCESS BLDV CALC-SCNC: 3.1 MMOL/L (ref -3–3)
BILIRUB SERPL-MCNC: 1.5 MG/DL
BILIRUB SERPL-MCNC: 1.6 MG/DL
BILIRUB SERPL-MCNC: 1.6 MG/DL
BLD PROD TYP BPU: NORMAL
BLOOD COMPONENT TYPE: NORMAL
BUN SERPL-MCNC: 31 MG/DL (ref 8–23)
BUN SERPL-MCNC: 31 MG/DL (ref 8–23)
BUN SERPL-MCNC: 31.2 MG/DL (ref 8–23)
CA-I BLD-MCNC: 4.9 MG/DL (ref 4.4–5.2)
CA-I BLD-MCNC: 5.1 MG/DL (ref 4.4–5.2)
CA-I BLD-MCNC: 5.2 MG/DL (ref 4.4–5.2)
CALCIUM SERPL-MCNC: 9.2 MG/DL (ref 8.8–10.4)
CALCIUM SERPL-MCNC: 9.4 MG/DL (ref 8.8–10.4)
CALCIUM SERPL-MCNC: 9.6 MG/DL (ref 8.8–10.4)
CHLORIDE SERPL-SCNC: 100 MMOL/L (ref 98–107)
CHLORIDE SERPL-SCNC: 102 MMOL/L (ref 98–107)
CHLORIDE SERPL-SCNC: 103 MMOL/L (ref 98–107)
CODING SYSTEM: NORMAL
COHGB MFR BLD: 97.5 % (ref 95–96)
COHGB MFR BLD: 99.2 % (ref 95–96)
COHGB MFR BLD: 99.5 % (ref 95–96)
COHGB MFR BLD: >100 % (ref 95–96)
CREAT SERPL-MCNC: 1.57 MG/DL (ref 0.67–1.17)
CREAT SERPL-MCNC: 1.59 MG/DL (ref 0.67–1.17)
CREAT SERPL-MCNC: 1.66 MG/DL (ref 0.67–1.17)
CROSSMATCH: NORMAL
EGFRCR SERPLBLD CKD-EPI 2021: 40 ML/MIN/1.73M2
EGFRCR SERPLBLD CKD-EPI 2021: 42 ML/MIN/1.73M2
EGFRCR SERPLBLD CKD-EPI 2021: 42 ML/MIN/1.73M2
ERYTHROCYTE [DISTWIDTH] IN BLOOD BY AUTOMATED COUNT: 19.2 % (ref 10–15)
ERYTHROCYTE [DISTWIDTH] IN BLOOD BY AUTOMATED COUNT: 19.5 % (ref 10–15)
ERYTHROCYTE [DISTWIDTH] IN BLOOD BY AUTOMATED COUNT: 20.3 % (ref 10–15)
GLUCOSE BLDC GLUCOMTR-MCNC: 114 MG/DL (ref 70–99)
GLUCOSE BLDC GLUCOMTR-MCNC: 116 MG/DL (ref 70–99)
GLUCOSE BLDC GLUCOMTR-MCNC: 118 MG/DL (ref 70–99)
GLUCOSE BLDC GLUCOMTR-MCNC: 118 MG/DL (ref 70–99)
GLUCOSE BLDC GLUCOMTR-MCNC: 125 MG/DL (ref 70–99)
GLUCOSE BLDC GLUCOMTR-MCNC: 127 MG/DL (ref 70–99)
GLUCOSE SERPL-MCNC: 118 MG/DL (ref 70–99)
GLUCOSE SERPL-MCNC: 122 MG/DL (ref 70–99)
GLUCOSE SERPL-MCNC: 129 MG/DL (ref 70–99)
HCO3 BLD-SCNC: 25 MMOL/L (ref 21–28)
HCO3 BLD-SCNC: 26 MMOL/L (ref 21–28)
HCO3 BLDV-SCNC: 27 MMOL/L (ref 21–28)
HCO3 BLDV-SCNC: 27 MMOL/L (ref 21–28)
HCO3 BLDV-SCNC: 28 MMOL/L (ref 21–28)
HCO3 SERPL-SCNC: 19 MMOL/L (ref 22–29)
HCO3 SERPL-SCNC: 19 MMOL/L (ref 22–29)
HCO3 SERPL-SCNC: 21 MMOL/L (ref 22–29)
HCT VFR BLD AUTO: 20.7 % (ref 40–53)
HCT VFR BLD AUTO: 22.7 % (ref 40–53)
HCT VFR BLD AUTO: 23.1 % (ref 40–53)
HGB BLD-MCNC: 6.6 G/DL (ref 13.3–17.7)
HGB BLD-MCNC: 7.4 G/DL (ref 13.3–17.7)
HGB BLD-MCNC: 7.5 G/DL (ref 13.3–17.7)
INR PPP: 1.58 (ref 0.85–1.15)
ISSUE DATE AND TIME: NORMAL
LACTATE SERPL-SCNC: 1.8 MMOL/L (ref 0.7–2)
LACTATE SERPL-SCNC: 2.1 MMOL/L (ref 0.7–2)
LACTATE SERPL-SCNC: 2.3 MMOL/L (ref 0.7–2)
MAGNESIUM SERPL-MCNC: 1.9 MG/DL (ref 1.7–2.3)
MCH RBC QN AUTO: 28.1 PG (ref 26.5–33)
MCH RBC QN AUTO: 28.2 PG (ref 26.5–33)
MCH RBC QN AUTO: 28.7 PG (ref 26.5–33)
MCHC RBC AUTO-ENTMCNC: 31.9 G/DL (ref 31.5–36.5)
MCHC RBC AUTO-ENTMCNC: 32 G/DL (ref 31.5–36.5)
MCHC RBC AUTO-ENTMCNC: 33 G/DL (ref 31.5–36.5)
MCV RBC AUTO: 87 FL (ref 78–100)
MCV RBC AUTO: 88 FL (ref 78–100)
MCV RBC AUTO: 88 FL (ref 78–100)
O2/TOTAL GAS SETTING VFR VENT: 25 %
O2/TOTAL GAS SETTING VFR VENT: 36 %
O2/TOTAL GAS SETTING VFR VENT: 36 %
OXYHGB MFR BLDV: 46 % (ref 70–75)
OXYHGB MFR BLDV: 52 % (ref 70–75)
OXYHGB MFR BLDV: 60 % (ref 70–75)
PCO2 BLD: 35 MM HG (ref 35–45)
PCO2 BLD: 36 MM HG (ref 35–45)
PCO2 BLD: 37 MM HG (ref 35–45)
PCO2 BLD: 38 MM HG (ref 35–45)
PCO2 BLDV: 43 MM HG (ref 40–50)
PCO2 BLDV: 43 MM HG (ref 40–50)
PCO2 BLDV: 45 MM HG (ref 40–50)
PEEP: 5 CM H2O
PH BLD: 7.44 [PH] (ref 7.35–7.45)
PH BLD: 7.45 [PH] (ref 7.35–7.45)
PH BLD: 7.46 [PH] (ref 7.35–7.45)
PH BLD: 7.46 [PH] (ref 7.35–7.45)
PH BLDV: 7.4 [PH] (ref 7.32–7.43)
PH BLDV: 7.4 [PH] (ref 7.32–7.43)
PH BLDV: 7.41 [PH] (ref 7.32–7.43)
PHOSPHATE SERPL-MCNC: 1.9 MG/DL (ref 2.5–4.5)
PLATELET # BLD AUTO: 174 10E3/UL (ref 150–450)
PLATELET # BLD AUTO: 183 10E3/UL (ref 150–450)
PLATELET # BLD AUTO: 187 10E3/UL (ref 150–450)
PO2 BLD: 100 MM HG (ref 80–105)
PO2 BLD: 117 MM HG (ref 80–105)
PO2 BLD: 200 MM HG (ref 80–105)
PO2 BLD: 76 MM HG (ref 80–105)
PO2 BLDV: 25 MM HG (ref 25–47)
PO2 BLDV: 28 MM HG (ref 25–47)
PO2 BLDV: 31 MM HG (ref 25–47)
POTASSIUM SERPL-SCNC: 3.7 MMOL/L (ref 3.4–5.3)
POTASSIUM SERPL-SCNC: 3.9 MMOL/L (ref 3.4–5.3)
POTASSIUM SERPL-SCNC: 3.9 MMOL/L (ref 3.4–5.3)
PROT SERPL-MCNC: 4.8 G/DL (ref 6.4–8.3)
PROT SERPL-MCNC: 5.1 G/DL (ref 6.4–8.3)
PROT SERPL-MCNC: 5.3 G/DL (ref 6.4–8.3)
RBC # BLD AUTO: 2.35 10E6/UL (ref 4.4–5.9)
RBC # BLD AUTO: 2.61 10E6/UL (ref 4.4–5.9)
RBC # BLD AUTO: 2.62 10E6/UL (ref 4.4–5.9)
SAO2 % BLDA: 94 % (ref 92–100)
SAO2 % BLDA: 96 % (ref 92–100)
SAO2 % BLDA: 97 % (ref 92–100)
SAO2 % BLDA: 98 % (ref 92–100)
SAO2 % BLDV: 47.7 % (ref 70–75)
SAO2 % BLDV: 53.8 % (ref 70–75)
SAO2 % BLDV: 61.4 % (ref 70–75)
SODIUM SERPL-SCNC: 135 MMOL/L (ref 135–145)
SODIUM SERPL-SCNC: 136 MMOL/L (ref 135–145)
SODIUM SERPL-SCNC: 136 MMOL/L (ref 135–145)
UNIT ABO/RH: NORMAL
UNIT NUMBER: NORMAL
UNIT STATUS: NORMAL
UNIT TYPE ISBT: 6200
WBC # BLD AUTO: 30.1 10E3/UL (ref 4–11)
WBC # BLD AUTO: 32.2 10E3/UL (ref 4–11)
WBC # BLD AUTO: 34.1 10E3/UL (ref 4–11)

## 2024-11-07 PROCEDURE — 84460 ALANINE AMINO (ALT) (SGPT): CPT | Performed by: PHYSICIAN ASSISTANT

## 2024-11-07 PROCEDURE — 83735 ASSAY OF MAGNESIUM: CPT | Performed by: PHYSICIAN ASSISTANT

## 2024-11-07 PROCEDURE — 250N000009 HC RX 250: Performed by: INTERNAL MEDICINE

## 2024-11-07 PROCEDURE — P9016 RBC LEUKOCYTES REDUCED: HCPCS | Performed by: STUDENT IN AN ORGANIZED HEALTH CARE EDUCATION/TRAINING PROGRAM

## 2024-11-07 PROCEDURE — 250N000013 HC RX MED GY IP 250 OP 250 PS 637: Performed by: STUDENT IN AN ORGANIZED HEALTH CARE EDUCATION/TRAINING PROGRAM

## 2024-11-07 PROCEDURE — 94003 VENT MGMT INPAT SUBQ DAY: CPT

## 2024-11-07 PROCEDURE — 250N000011 HC RX IP 250 OP 636: Performed by: PHYSICIAN ASSISTANT

## 2024-11-07 PROCEDURE — 258N000003 HC RX IP 258 OP 636: Performed by: PHYSICIAN ASSISTANT

## 2024-11-07 PROCEDURE — 258N000003 HC RX IP 258 OP 636: Performed by: SURGERY

## 2024-11-07 PROCEDURE — 83605 ASSAY OF LACTIC ACID: CPT | Performed by: PHYSICIAN ASSISTANT

## 2024-11-07 PROCEDURE — 82805 BLOOD GASES W/O2 SATURATION: CPT | Performed by: PHYSICIAN ASSISTANT

## 2024-11-07 PROCEDURE — 999N000259 HC STATISTIC EXTUBATION

## 2024-11-07 PROCEDURE — 250N000011 HC RX IP 250 OP 636: Performed by: SURGERY

## 2024-11-07 PROCEDURE — 250N000013 HC RX MED GY IP 250 OP 250 PS 637: Performed by: SURGERY

## 2024-11-07 PROCEDURE — 250N000013 HC RX MED GY IP 250 OP 250 PS 637: Performed by: PHYSICIAN ASSISTANT

## 2024-11-07 PROCEDURE — 84100 ASSAY OF PHOSPHORUS: CPT | Performed by: INTERNAL MEDICINE

## 2024-11-07 PROCEDURE — 90947 DIALYSIS REPEATED EVAL: CPT

## 2024-11-07 PROCEDURE — 82330 ASSAY OF CALCIUM: CPT | Performed by: PHYSICIAN ASSISTANT

## 2024-11-07 PROCEDURE — 120N000004 HC R&B MS OVERFLOW

## 2024-11-07 PROCEDURE — 85018 HEMOGLOBIN: CPT | Performed by: PHYSICIAN ASSISTANT

## 2024-11-07 PROCEDURE — 99291 CRITICAL CARE FIRST HOUR: CPT | Mod: 24 | Performed by: INTERNAL MEDICINE

## 2024-11-07 PROCEDURE — 250N000011 HC RX IP 250 OP 636: Performed by: INTERNAL MEDICINE

## 2024-11-07 PROCEDURE — 82247 BILIRUBIN TOTAL: CPT | Performed by: PHYSICIAN ASSISTANT

## 2024-11-07 PROCEDURE — 85014 HEMATOCRIT: CPT | Performed by: PHYSICIAN ASSISTANT

## 2024-11-07 PROCEDURE — 999N000253 HC STATISTIC WEANING TRIALS

## 2024-11-07 PROCEDURE — 90945 DIALYSIS ONE EVALUATION: CPT | Performed by: INTERNAL MEDICINE

## 2024-11-07 PROCEDURE — 999N000157 HC STATISTIC RCP TIME EA 10 MIN

## 2024-11-07 PROCEDURE — P9047 ALBUMIN (HUMAN), 25%, 50ML: HCPCS | Mod: JZ | Performed by: PHYSICIAN ASSISTANT

## 2024-11-07 PROCEDURE — 85610 PROTHROMBIN TIME: CPT | Performed by: PHYSICIAN ASSISTANT

## 2024-11-07 PROCEDURE — 84155 ASSAY OF PROTEIN SERUM: CPT | Performed by: PHYSICIAN ASSISTANT

## 2024-11-07 PROCEDURE — 250N000011 HC RX IP 250 OP 636: Performed by: STUDENT IN AN ORGANIZED HEALTH CARE EDUCATION/TRAINING PROGRAM

## 2024-11-07 PROCEDURE — 82805 BLOOD GASES W/O2 SATURATION: CPT | Performed by: INTERNAL MEDICINE

## 2024-11-07 RX ADMIN — VASOPRESSIN 4 UNITS/HR: 20 INJECTION INTRAVENOUS at 08:48

## 2024-11-07 RX ADMIN — ASPIRIN 81 MG CHEWABLE TABLET 162 MG: 81 TABLET CHEWABLE at 08:12

## 2024-11-07 RX ADMIN — CHLORHEXIDINE GLUCONATE 0.12% ORAL RINSE 15 ML: 1.2 LIQUID ORAL at 08:13

## 2024-11-07 RX ADMIN — OXYCODONE HYDROCHLORIDE 5 MG: 5 TABLET ORAL at 18:46

## 2024-11-07 RX ADMIN — METHOCARBAMOL 500 MG: 500 TABLET ORAL at 13:04

## 2024-11-07 RX ADMIN — CALCIUM CHLORIDE, MAGNESIUM CHLORIDE, DEXTROSE MONOHYDRATE, LACTIC ACID, SODIUM CHLORIDE, SODIUM BICARBONATE AND POTASSIUM CHLORIDE 5000 ML: 5.15; 2.03; 22; 5.4; 6.46; 3.09; .157 INJECTION INTRAVENOUS at 11:04

## 2024-11-07 RX ADMIN — VASOPRESSIN 4 UNITS/HR: 20 INJECTION INTRAVENOUS at 03:00

## 2024-11-07 RX ADMIN — VASOPRESSIN 4 UNITS/HR: 20 INJECTION INTRAVENOUS at 19:38

## 2024-11-07 RX ADMIN — SENNOSIDES AND DOCUSATE SODIUM 1 TABLET: 50; 8.6 TABLET ORAL at 08:13

## 2024-11-07 RX ADMIN — EPINEPHRINE 0.05 MCG/KG/MIN: 1 INJECTION INTRAMUSCULAR; INTRAVENOUS; SUBCUTANEOUS at 12:09

## 2024-11-07 RX ADMIN — VASOPRESSIN 4 UNITS/HR: 20 INJECTION INTRAVENOUS at 23:10

## 2024-11-07 RX ADMIN — Medication 60 ML: at 15:56

## 2024-11-07 RX ADMIN — ALBUMIN HUMAN 25 G: 0.25 SOLUTION INTRAVENOUS at 20:12

## 2024-11-07 RX ADMIN — CALCIUM CHLORIDE, MAGNESIUM CHLORIDE, DEXTROSE MONOHYDRATE, LACTIC ACID, SODIUM CHLORIDE, SODIUM BICARBONATE AND POTASSIUM CHLORIDE 5000 ML: 5.15; 2.03; 22; 5.4; 6.46; 3.09; .157 INJECTION INTRAVENOUS at 19:04

## 2024-11-07 RX ADMIN — CALCIUM CHLORIDE, MAGNESIUM CHLORIDE, DEXTROSE MONOHYDRATE, LACTIC ACID, SODIUM CHLORIDE, SODIUM BICARBONATE AND POTASSIUM CHLORIDE 5000 ML: 5.15; 2.03; 22; 5.4; 6.46; 3.09; .157 INJECTION INTRAVENOUS at 05:35

## 2024-11-07 RX ADMIN — CALCIUM CHLORIDE, MAGNESIUM CHLORIDE, DEXTROSE MONOHYDRATE, LACTIC ACID, SODIUM CHLORIDE, SODIUM BICARBONATE AND POTASSIUM CHLORIDE 5000 ML: 5.15; 2.03; 22; 5.4; 6.46; 3.09; .157 INJECTION INTRAVENOUS at 15:42

## 2024-11-07 RX ADMIN — Medication 60 ML: at 08:12

## 2024-11-07 RX ADMIN — PROPOFOL 15 MCG/KG/MIN: 10 INJECTION, EMULSION INTRAVENOUS at 07:50

## 2024-11-07 RX ADMIN — SENNOSIDES AND DOCUSATE SODIUM 1 TABLET: 50; 8.6 TABLET ORAL at 20:12

## 2024-11-07 RX ADMIN — CALCIUM CHLORIDE, MAGNESIUM CHLORIDE, DEXTROSE MONOHYDRATE, LACTIC ACID, SODIUM CHLORIDE, SODIUM BICARBONATE AND POTASSIUM CHLORIDE 5000 ML: 5.15; 2.03; 22; 5.4; 6.46; 3.09; .157 INJECTION INTRAVENOUS at 17:41

## 2024-11-07 RX ADMIN — OXYCODONE HYDROCHLORIDE 5 MG: 5 TABLET ORAL at 13:04

## 2024-11-07 RX ADMIN — CALCIUM CHLORIDE, MAGNESIUM CHLORIDE, DEXTROSE MONOHYDRATE, LACTIC ACID, SODIUM CHLORIDE, SODIUM BICARBONATE AND POTASSIUM CHLORIDE 5000 ML: 5.15; 2.03; 22; 5.4; 6.46; 3.09; .157 INJECTION INTRAVENOUS at 05:10

## 2024-11-07 RX ADMIN — Medication 40 MG: at 15:55

## 2024-11-07 RX ADMIN — OXYCODONE HYDROCHLORIDE 5 MG: 5 TABLET ORAL at 22:41

## 2024-11-07 RX ADMIN — Medication 40 MG: at 08:12

## 2024-11-07 RX ADMIN — VASOPRESSIN 4 UNITS/HR: 20 INJECTION INTRAVENOUS at 13:59

## 2024-11-07 RX ADMIN — DOBUTAMINE HYDROCHLORIDE 2.5 MCG/KG/MIN: 200 INJECTION INTRAVENOUS at 19:45

## 2024-11-07 RX ADMIN — CALCIUM CHLORIDE, MAGNESIUM CHLORIDE, DEXTROSE MONOHYDRATE, LACTIC ACID, SODIUM CHLORIDE, SODIUM BICARBONATE AND POTASSIUM CHLORIDE 5000 ML: 5.15; 2.03; 22; 5.4; 6.46; 3.09; .157 INJECTION INTRAVENOUS at 22:28

## 2024-11-07 RX ADMIN — Medication 60 ML: at 22:23

## 2024-11-07 RX ADMIN — ALBUMIN HUMAN 25 G: 0.25 SOLUTION INTRAVENOUS at 08:13

## 2024-11-07 RX ADMIN — CALCIUM CHLORIDE, MAGNESIUM CHLORIDE, DEXTROSE MONOHYDRATE, LACTIC ACID, SODIUM CHLORIDE, SODIUM BICARBONATE AND POTASSIUM CHLORIDE 5000 ML: 5.15; 2.03; 22; 5.4; 6.46; 3.09; .157 INJECTION INTRAVENOUS at 07:45

## 2024-11-07 RX ADMIN — MAGNESIUM SULFATE HEPTAHYDRATE 2 G: 40 INJECTION, SOLUTION INTRAVENOUS at 23:39

## 2024-11-07 RX ADMIN — CALCIUM CHLORIDE, MAGNESIUM CHLORIDE, DEXTROSE MONOHYDRATE, LACTIC ACID, SODIUM CHLORIDE, SODIUM BICARBONATE AND POTASSIUM CHLORIDE 5000 ML: 5.15; 2.03; 22; 5.4; 6.46; 3.09; .157 INJECTION INTRAVENOUS at 00:41

## 2024-11-07 RX ADMIN — SODIUM CHLORIDE, POTASSIUM CHLORIDE, SODIUM LACTATE AND CALCIUM CHLORIDE: 600; 310; 30; 20 INJECTION, SOLUTION INTRAVENOUS at 08:41

## 2024-11-07 NOTE — PROGRESS NOTES
Renal Medicine Progress Note    SHORTHAND KEY FOR MY NOTES:  c = with, s = without, p = after, a = before, x = except, asx = asymptomatic, tx = transplant or treatment, sx = symptoms or symptomatic, cx = canceled or culture, rxn = reaction, yday = yesterday, nl = normal, abx = antibiotics, fxn = function, dx = diagnosis, dz = disease, m/h = melena/hematochezia, c/d/l/ha = cramping/dizziness/lightheadedness/headache, d/c = discharge or diarrhea/constipation, f/c/n/v = fevers/chills/nausea/vomiting, cp/sob = chest pain/shortness of breath, tbv = total body volume, rxn = reaction, tdc = tunneled dialysis catheter, pta = prior to admission, hd = hemodialysis, pd = peritoneal dialysis, hhd = home hemodialysis, edw = estimated dry wt         Assessment/Plan:     1.  Oliguric JAVIER/CKD IIIb.  Pt remains on CRRT.  We pulled less fluid yday and his pressor requirements are reduced.  CVP is still on the lower side so we will not remove the fluid associated c the blood today.    A.  Continue 0-50 ml/h UF goal.  B.  Follow labs daily.  C.  He is off abx so eventually we will be able to place a TDC.    2.  Severe shock.  Pt remains on multiple pressors but needs are decreasing.  He is no longer on abx.  A.  Wean pressors as able.    3.  Resp failure.  Pt is doing well on PS trial and will likely get extubated today.  A.  Extubation plans per ICU team.    4.  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  EF 35%.  A.  Per CV Surg.    5.  FEN.  Phos remains low.  K, Na, Mg are ok.  A.  Continue replacement protocols.    6.  Code Status.  Pt is full code.  His prognosis remains guarded.    Case d/w Jessie Grey RN, pt's wife.        Interval History:     Unable.  Pt is intubated/sedated.          Medications and Allergies:     Current Facility-Administered Medications   Medication Dose Route Frequency Provider Last Rate Last Admin    albumin human 25 % injection 25 g  25 g Intravenous Q12H Arlin Hodge PA-C 100 mL/hr at 11/04/24 1946 25 g at 11/07/24  "0813    aspirin (ASA) chewable tablet 162 mg  162 mg Oral or NG Tube Daily Clarence Bone MD   162 mg at 11/07/24 0812    chlorhexidine (PERIDEX) 0.12 % solution 15 mL  15 mL Swish & Spit BID Zac Kaur DO   15 mL at 11/07/24 0813    insulin aspart (NovoLOG) injection (RAPID ACTING)  1-7 Units Subcutaneous Q4H Sb Arthur PA-C   1 Units at 11/05/24 1908    pantoprazole (PROTONIX) 2 mg/mL suspension 40 mg  40 mg Oral or NG Tube BID AC Arlin Hodge PA-C   40 mg at 11/07/24 0812    Or    pantoprazole (PROTONIX) EC tablet 40 mg  40 mg Oral BID AC Arlin Hodge PA-C        polyethylene glycol (MIRALAX) Packet 17 g  17 g Oral or Feeding Tube Daily Mulvihill, Michael, MD   17 g at 11/03/24 0812    Prosource TF20 ENfit Compatibl EN LIQD (PROSOURCE TF20) packet 60 mL  1 packet Per Feeding Tube TID Arlin Hodge PA-C   60 mL at 11/07/24 0812    senna-docusate (SENOKOT-S/PERICOLACE) 8.6-50 MG per tablet 1 tablet  1 tablet Oral or Feeding Tube BID Mulvihill, Michael, MD   1 tablet at 11/07/24 0813    sodium chloride (PF) 0.9% PF flush 3 mL  3 mL Intracatheter Q8H Clarence Bone MD   3 mL at 11/07/24 0814      No Known Allergies       Physical Exam:     Vitals were reviewed   , Blood pressure (!) 115/39, pulse 86, temperature 97.5  F (36.4  C), resp. rate 20, height 1.778 m (5' 10\"), weight 101 kg (222 lb 10.6 oz), SpO2 98%.  Wt Readings from Last 3 Encounters:   11/07/24 101 kg (222 lb 10.6 oz)   10/25/24 97.1 kg (214 lb)   10/03/24 101.2 kg (223 lb)     Intake/Output Summary (Last 24 hours) at 11/7/2024 0912  Last data filed at 11/7/2024 0800  Gross per 24 hour   Intake 3380.37 ml   Output 3436 ml   Net -55.63 ml     GENERAL APPEARANCE: intubated, lightly sedated  HEENT:  eyes/ears/nose/neck grossly nl  RESP: + vent sounds  CV: irreg, nl S1/S2   ABDOMEN: s/nt/nd  EXTREMITIES/SKIN: + scrotal edema; 2+ ble edema  NEURO:  follows conversation  LINES:  + burt. + multiple lines: art, " central, temp HD    Pt seen on CRRT.  Stable run.  UF is 0-50 ml/h.           Data:     CBC RESULTS:     Recent Labs   Lab 11/07/24  0537 11/06/24 2218 11/06/24  1355 11/06/24  0514 11/05/24 2209 11/05/24  1349   WBC 30.1* 36.9* 37.7* 41.0* 44.2* 53.5*   RBC 2.35* 2.50* 2.59* 2.64* 2.67* 2.81*   HGB 6.6* 7.0* 7.4* 7.5* 7.7* 7.8*   HCT 20.7* 21.7* 22.5* 22.9* 23.1* 24.8*    179 194 187 189 190     Basic Metabolic Panel:  Recent Labs   Lab 11/07/24  0830 11/07/24  0541 11/07/24  0537 11/07/24  0030 11/06/24 2218 11/06/24  2009 11/06/24  1558 11/06/24  1355 11/06/24  0540 11/06/24  0514 11/05/24 2222 11/05/24 2209 11/05/24  1733 11/05/24  1349   NA  --   --  135  --  136  --   --  136  --  135  --  136  --  137   POTASSIUM  --   --  3.9  --  3.8  --   --  3.7  --  3.8  --  4.0  --  4.0   CHLORIDE  --   --  100  --  101  --   --  101  --  100  --  99  --  101   CO2  --   --  19*  --  19*  --   --  18*  --  23  --  23  --  24   BUN  --   --  31.0*  --  31.4*  --   --  31.9*  --  30.0*  --  32.1*  --  30.3*   CR  --   --  1.66*  --  1.66*  --   --  1.72*  --  1.71*  --  1.69*  --  1.69*   * 118* 129* 125* 135* 130*   < > 135*   < > 149*   < > 141*   < > 141*   TATE  --   --  9.2  --  9.4  --   --  9.4  --  9.4  --  9.2  --  9.1    < > = values in this interval not displayed.     INR  Recent Labs   Lab 11/07/24  0537 11/06/24  0514 11/05/24  0456 11/04/24  0545   INR 1.58* 1.71* 1.85* 2.11*      Attestation:   I have reviewed today's relevant vital signs, notes, medications, labs and imaging.    Kaz Ann MD  [facility] - Nephrology  [phone]

## 2024-11-07 NOTE — PLAN OF CARE
Problem: Adult Inpatient Plan of Care  Goal: Plan of Care Review  Description: The Plan of Care Review/Shift note should be completed every shift.  The Outcome Evaluation is a brief statement about your assessment that the patient is improving, declining, or no change.  This information will be displayed automatically on your shift  note.  Outcome: Met  Flowsheets (Taken 11/6/2024 1808)  Outcome Evaluation: sedation vacation done today. pt follows commands and nods appropriately. generalized weakness. cpap 5/5 25% FiO2- tolerated from 0117-3834. small amount thick secretions with suctioning.  Afib- rate controlled. epi and levo both continue at 0.06mcg/kg/min- attempted to wean pressors but was unable to do so and maintain MAP >65. dobutamin and vaso continue. chest tube output minimal. tolerating CRRT- order changed to NET 0-50ml/hr in an attempt to help wean pressors. oxycodone for pain along with robaxin. tolerating tube feedings. small BM x 1. anuric- BUS 0. diligent turning and repositioning q2hrs.  wife and daughter at bedside and updated with plan of care by nursing and MD.     Problem: Skin Injury Risk Increased  Goal: Skin Health and Integrity  Outcome: Progressing  Intervention: Plan: Nurse Driven Intervention: Positioning  Recent Flowsheet Documentation  Taken 11/6/2024 1600 by Rubi Gamble RN  Plan: Positioning Interventions:   REPOSITION Left/Right (No supine) q2h   Use wedge positioners   OFF-LOAD HEELS with pillows   Use fluidized gel positioners (z-juan m)  Taken 11/6/2024 1200 by Rubi Gamble, RN  Plan: Positioning Interventions:   REPOSITION Left/Right (No supine) q2h   Use wedge positioners   OFF-LOAD HEELS with pillows   Use fluidized gel positioners (z-juan m)  Taken 11/6/2024 0800 by Rubi Gamble, RN  Plan: Positioning Interventions:   REPOSITION Left/Right (No supine) q2h   Use wedge positioners   OFF-LOAD HEELS with pillows   Use fluidized gel positioners (z-juan m)  Intervention:  Plan: Nurse Driven Intervention: Moisture Management  Recent Flowsheet Documentation  Taken 11/6/2024 0800 by Rubi Gamble RN  Moisture Interventions:   No brief in bed   Incontinence pad   Perineal cleanser  Bathing/Skin Care:   wipes, CHG   incontinence care   linen changed  Intervention: Plan: Nurse Driven Intervention: Friction and Shear  Recent Flowsheet Documentation  Taken 11/6/2024 0800 by Rubi Gamble RN  Friction/Shear Interventions:   HOB 30 degrees or less   Silicone foam sacral dressing   Repositioning device (TAP system, etc.)   Pad bony prominence (elbow pads, heel pads, ear protectors)   Preventative dressing heels   Assistive lifting device (portable/ceiling lift, etc.)  Intervention: Optimize Skin Protection  Recent Flowsheet Documentation  Taken 11/6/2024 1800 by Rubi Gamble RN  Activity Management: bedrest  Head of Bed (HOB) Positioning: HOB at 20-30 degrees  Taken 11/6/2024 1600 by Rubi Gamble RN  Pressure Reduction Techniques:   pressure points protected   positioned off wounds   heels elevated off bed  Pressure Reduction Devices:   pressure-redistributing mattress utilized   positioning supports utilized   heel offloading device utilized  Skin Protection:   adhesive use limited   pulse oximeter probe site changed   silicone foam dressing in place   skin to device areas padded  Activity Management: bedrest  Head of Bed (HOB) Positioning: HOB at 20-30 degrees  Taken 11/6/2024 1400 by Rubi Gamble RN  Activity Management: bedrest  Head of Bed (HOB) Positioning: HOB at 20-30 degrees  Taken 11/6/2024 1200 by Rubi Gamble RN  Pressure Reduction Techniques:   pressure points protected   positioned off wounds   heels elevated off bed  Pressure Reduction Devices:   pressure-redistributing mattress utilized   positioning supports utilized   heel offloading device utilized  Skin Protection:   adhesive use limited   pulse oximeter probe site changed   silicone foam  dressing in place   skin to device areas padded  Activity Management: bedrest  Head of Bed (HOB) Positioning: HOB at 20-30 degrees  Taken 11/6/2024 1000 by Rubi Gamble RN  Activity Management: bedrest  Head of Bed (HOB) Positioning: HOB at 20 degrees  Taken 11/6/2024 0800 by Rubi Gamble RN  Pressure Reduction Techniques:   pressure points protected   positioned off wounds   heels elevated off bed  Pressure Reduction Devices:   pressure-redistributing mattress utilized   positioning supports utilized   heel offloading device utilized  Skin Protection:   adhesive use limited   pulse oximeter probe site changed   silicone foam dressing in place   skin to device areas padded  Activity Management: bedrest  Head of Bed (HOB) Positioning: HOB at 20 degrees     Problem: Cardiovascular Surgery  Goal: Effective Cardiac Function  Outcome: Progressing  Intervention: Optimize Cardiac Output and Blood Flow  Recent Flowsheet Documentation  Taken 11/6/2024 1600 by Rubi Gamble RN  Dysrhythmia Management: pacing wires maintained  Taken 11/6/2024 1200 by Rubi Gamble RN  Dysrhythmia Management: pacing wires maintained  Taken 11/6/2024 0800 by Rubi Gamble RN  Dysrhythmia Management: pacing wires maintained     Problem: Adult Inpatient Plan of Care  Goal: Optimal Comfort and Wellbeing  Outcome: Progressing  Intervention: Monitor Pain and Promote Comfort  Recent Flowsheet Documentation  Taken 11/6/2024 1800 by Rubi Gamble RN  Pain Management Interventions:   rest   repositioned  Taken 11/6/2024 1600 by Rubi Gamble RN  Pain Management Interventions:   rest   repositioned  Taken 11/6/2024 1400 by Rubi Gamble RN  Pain Management Interventions:   rest   repositioned  Taken 11/6/2024 1328 by Rubi Gamble RN  Pain Management Interventions: medication (see MAR)  Taken 11/6/2024 1200 by Rubi Gamble RN  Pain Management Interventions:   rest   repositioned  Taken 11/6/2024 1000 by  Rubi Gamble RN  Pain Management Interventions:   medication (see MAR)   rest   repositioned  Taken 11/6/2024 0800 by Rubi Gamble RN  Pain Management Interventions:   rest   repositioned  Intervention: Provide Person-Centered Care  Recent Flowsheet Documentation  Taken 11/6/2024 1600 by Rubi Gamble RN  Trust Relationship/Rapport:   care explained   choices provided   emotional support provided   empathic listening provided   questions answered   questions encouraged   reassurance provided  Taken 11/6/2024 1200 by Rubi Gamble RN  Trust Relationship/Rapport:   care explained   choices provided   emotional support provided   empathic listening provided   questions answered   questions encouraged   reassurance provided  Taken 11/6/2024 0800 by Rubi Gamble RN  Trust Relationship/Rapport:   care explained   choices provided   emotional support provided   empathic listening provided   questions answered   questions encouraged   reassurance provided     Problem: CRRT (Continuous Renal Replacement Therapy)  Goal: Hemodynamic Stability  Outcome: Met  Intervention: Optimize Blood Flow  Recent Flowsheet Documentation  Taken 11/6/2024 1600 by Rubi Gamble RN  Bleeding Precautions:   blood pressure closely monitored   other (see comments)   coagulation study results reviewed  Bleeding Management: dressing monitored  Taken 11/6/2024 1200 by Rubi Gamble RN  Bleeding Precautions:   blood pressure closely monitored   other (see comments)   coagulation study results reviewed  Bleeding Management: dressing monitored  Taken 11/6/2024 0800 by Rubi Gamble RN  Bleeding Precautions:   blood pressure closely monitored   other (see comments)   coagulation study results reviewed  Bleeding Management: dressing monitored   Goal Outcome Evaluation:                 Outcome Evaluation: sedation vacation done today. pt follows commands and nods appropriately. generalized weakness. cpap 5/5 25%  FiO2- tolerated from 0541-8115. small amount thick secretions with suctioning.  Afib- rate controlled. epi and levo both continue at 0.06mcg/kg/min- attempted to wean pressors but was unable to do so and maintain MAP >65. dobutamin and vaso continue. chest tube output minimal. tolerating CRRT- order changed to NET 0-50ml/hr in an attempt to help wean pressors. oxycodone for pain along with robaxin. tolerating tube feedings. small BM x 1. anuric- BUS 0. diligent turning and repositioning q2hrs.  wife and daughter at bedside and updated with plan of care by nursing and MD.

## 2024-11-07 NOTE — PROGRESS NOTES
CV  Pressors (which pressors and any increase/decrease in pressor needs): Vaso 4 units, Epi titrated down to 0.05mcq/kg/min, Levo down to 0.01mcq/kg/min    HR range:Afib 85    Chest tube output: minimal    Neuro  Orientation: sedated  Delirium present?(y/n):   Sleep: sedated  Pain: Robaxin and Oxycodone 5 mg PT X1    GI/  BM? (y/n): NO, pt passing flatus  Urine output: anuric, CRRT      Lines:  Multiple   Patient sees Debbie León PTA for worker comp. Form has been faxed to their office at [phone].

## 2024-11-07 NOTE — CONSULTS
Brief Nephrology Note    Consult was done by Dr. Boland 10/29, please refer to that note and updated progress notes thereafter.     Order complete.     Татьяна Pierre MD   [facility], Nephrology

## 2024-11-07 NOTE — PROGRESS NOTES
Critical Care  Note      11/07/2024    Name: Alessio Teixeira MRN#: [id number]   Age: 88 year old YOB: 1935     Hsptl Day# 10  ICU DAY # 10    MV DAY # 10             Problem List:   Active Problems:    Coronary artery disease  Mixed cardiogenic/vasoplegic shock  Acute hypoxemic respiratory failure         Summary/Hospital Course:   88 year old M with a history of severe 3 vessel CAD. Status post sternotomy, CABGx3, modified MAZE procedure, PAYAM ligation with Dr. Mulvihill 10/28/24. Post operative hemorrhage with RTOR on POD0 for evacuation of mediastinal blood clot causing tamponade physiology.   Course subsequently complicated by persistent shock (appears mixed cardigenic/vasoplegic), and ongoing respiratory failure.    Interval/overnight events:  NAEON. Reduced UF with CRRT yesterday. 1prbc overnight      Assessment and plan :     Alessio Teixeira IS a 88 year old male admitted on 10/28/2024 for shock and respiratory failure.   I have personally reviewed the daily labs, imaging studies, cultures and discussed the case with referring physician and consulting physicians.     My assessment and plan by system for this patient is as follows:    Neurology/Psychiatry:   1. Pain/analgesia: fentanyl prn  2. Sedation: propofol    Cardiovascular:   Shock:  appears c/w mixed cardiogenic and vasoplegic picture; persistent.  Continues on epi, norepi, vaso.  Now back on dobutamine 2.5 to maintain CI of ~2.0. Mainly trying to wean levo now.  S/p CAB3:  continues on ASA.  Add statin when LFTs stabilized.    Pulmonary/Ventilator Management:   1. Acute hypoxemic respiratory failure, vent dependent:  continue lung protective tidal volumes.  Spont breathing trials as able. Doing well on trials now--just trying to awaken enough to extubate.    GI and Nutrition :   1. TF  2. PPI for pud prophy    Renal/Fluids/Electrolytes:   1. Acute kidney failure:  continues on CRRT.  Appreciate nephrology support.    Infectious Disease:  "  1. Ngtd on cultures.  Abx stopped.    Endocrine:   1. Stress induced hyperglycemia:  SSI    Hematology/Oncology:   1. Hyperleukocytosis to 30, known h/o CLL/MDS.  Appreciate heme input.  2. Hgb 6.6.  Acute blood loss Anemia d/t post-operative bleeding, no signs, symptoms of ongoing active blood loss  3. Pltlts 183 ok.    ICU Prophylaxis:   1. DVT: mechanical only for now  2. VAP: HOB 30 degrees, chlorhexidine rinse  3. Stress Ulcer: PPI  4. Restraints: Nonviolent soft two point restraints required and necessary for patient safety and continued cares and good effect as patient continues to pull at necessary lines, tubes despite education and distraction. Will readdress daily.   5. Wound care  -   6. Feeding - tf  7. Family Update: bedside  8. Disposition - ICU    Clinically Significant Risk Factors               # Hypoalbuminemia: Lowest albumin = 2.7 g/dL at 11/2/2024  5:44 AM, will monitor as appropriate    # Coagulation Defect: INR = 1.58 (Ref range: 0.85 - 1.15) and/or PTT = 42 Seconds (Ref range: 22 - 38 Seconds), will monitor for bleeding    # Hypertension: Noted on problem list  # Chronic heart failure with reduced ejection fraction: last echo with EF <40%   # Acute Hypercapnic Respiratory Failure: based on arterial blood gas results.  Continue supplemental oxygen and ventilatory support as indicated.  # Acute Hypercapnic Respiratory Failure: based on venous blood gas results.  Continue supplemental oxygen and ventilatory support as indicated.      #Acute blood loss anemia: Lowest Hgb this hospitalization: 6.6 g/dL. Will continue to monitor and treat/transfuse as appropriate.     # Obesity: Estimated body mass index is 31.95 kg/m  as calculated from the following:    Height as of this encounter: 1.778 m (5' 10\").    Weight as of this encounter: 101 kg (222 lb 10.6 oz).       # History of CABG: noted on surgical history                           Key Medications:     Current Facility-Administered Medications "   Medication Dose Route Frequency Provider Last Rate Last Admin    albumin human 25 % injection 25 g  25 g Intravenous Q12H Arlin Hodge PA-C 100 mL/hr at 11/04/24 1946 25 g at 11/06/24 1923    aspirin (ASA) chewable tablet 162 mg  162 mg Oral or NG Tube Daily Clarence Bone MD   162 mg at 11/06/24 0824    chlorhexidine (PERIDEX) 0.12 % solution 15 mL  15 mL Swish & Spit BID Zac Kaur DO   15 mL at 11/06/24 2147    insulin aspart (NovoLOG) injection (RAPID ACTING)  1-7 Units Subcutaneous Q4H Sb Arthur PA-C   1 Units at 11/05/24 1908    pantoprazole (PROTONIX) 2 mg/mL suspension 40 mg  40 mg Oral or NG Tube BID AC Arlin Hodge PA-C   40 mg at 11/06/24 1612    Or    pantoprazole (PROTONIX) EC tablet 40 mg  40 mg Oral BID AC Arlin Hodge PA-C        polyethylene glycol (MIRALAX) Packet 17 g  17 g Oral or Feeding Tube Daily Mulvihill, Michael, MD   17 g at 11/03/24 0812    Prosource TF20 ENfit Compatibl EN LIQD (PROSOURCE TF20) packet 60 mL  1 packet Per Feeding Tube TID Arlin Hodge PA-C   60 mL at 11/06/24 2200    senna-docusate (SENOKOT-S/PERICOLACE) 8.6-50 MG per tablet 1 tablet  1 tablet Oral or Feeding Tube BID Mulvihill, Michael, MD   1 tablet at 11/06/24 1929    sodium chloride (PF) 0.9% PF flush 3 mL  3 mL Intracatheter Q8H Clarence Bone MD   3 mL at 11/06/24 2200     Current Facility-Administered Medications   Medication Dose Route Frequency Provider Last Rate Last Admin    dextrose 10% infusion   Intravenous Continuous PRN Arlin Hodge PA-C   Stopped at 11/05/24 0600    dextrose 10% infusion   Intravenous Continuous Sb Arthur PA-C   Stopped at 11/05/24 0200    dialysate solution (PrismaSol BGK 2/3.5)   CRRT Continuous BolandFarhad MD 1,500 mL/hr at 11/04/24 0700 5,000 mL at 11/07/24 0510    DOBUTamine (DOBUTREX) 500 mg in D5W 250 mL infusion (adult std conc)  2.5 mcg/kg/min Intravenous Continuous Arlin Hodge PA-C 7.8 mL/hr at 11/07/24  0400 2.5 mcg/kg/min at 11/07/24 0400    EPINEPHrine (ADRENALIN) 5 mg in  mL infusion  0.01-0.1 mcg/kg/min Intravenous Continuous PRN Clarence Bone MD 14.4 mL/hr at 11/07/24 0400 0.05 mcg/kg/min at 11/07/24 0400    lactated ringers infusion   Intravenous Continuous Opal Nguyen PA-C 20 mL/hr at 11/05/24 1600 Rate Verify at 11/05/24 1600    propofol (DIPRIVAN) infusion  5-75 mcg/kg/min Intravenous Continuous Tyra Dubois MD 9.6 mL/hr at 11/07/24 0400 15 mcg/kg/min at 11/07/24 0400    And    Medication Instruction   Does not apply Continuous PRN Tyra Dubois MD        No heparin required   Does not apply Continuous PRN Farhad Boland MD        norepinephrine (LEVOPHED) 16 mg in  mL infusion MAX CONC CENTRAL LINE  0.01-0.2 mcg/kg/min Intravenous Continuous Arlin Hodge PA-C 0.9 mL/hr at 11/07/24 0545 0.01 mcg/kg/min at 11/07/24 0545    Post-Filter replacement solution (PRISMASOL BGK 2/3.5)   CRRT Continuous Farhad Boland  mL/hr at 11/06/24 1010 New Bag at 11/06/24 1010    Pre-Filter replacement solution (PRISMASOL BGK 2/3.5)   CRRT Continuous Farhad Boland  mL/hr at 11/04/24 0337 5,000 mL at 11/07/24 0535    Reason beta blocker order not selected   Does not apply DOES NOT GO TO Clarence Olguin MD        vasopressin 0.2 units/mL in NS (PITRESSIN) standard conc infusion  0.5-4 Units/hr Intravenous Continuous Arlin Hodge PA-C 20 mL/hr at 11/07/24 0300 4 Units/hr at 11/07/24 0300              Physical Examination:   Temp:  [97.3  F (36.3  C)-98.6  F (37  C)] 97.9  F (36.6  C)  Pulse:  [] 88  Resp:  [11-25] 24  MAP:  [57 mmHg-105 mmHg] 64 mmHg  Arterial Line BP: ()/(39-88) 120/46  FiO2 (%):  [25 %] 25 %  SpO2:  [58 %-100 %] 100 %      Intake/Output Summary (Last 24 hours) at 11/7/2024 1139  Last data filed at 11/7/2024 1000  Gross per 24 hour   Intake 3438.47 ml   Output 3472 ml   Net -33.53 ml         Wt Readings from  Last 4 Encounters:   11/07/24 101 kg (222 lb 10.6 oz)   10/25/24 97.1 kg (214 lb)   10/03/24 101.2 kg (223 lb)   10/02/24 104.8 kg (231 lb)     Arterial Line BP: ()/(39-88) 120/46  MAP:  [57 mmHg-105 mmHg] 64 mmHg  CVP:  [8 mmHg-23 mmHg] 12 mmHg  SVO2:  [46 %-51 %] 49 %  FiO2 (%): 25 %, Resp: 24, Vent Mode: CMV/AC, Resp Rate (Set): 20 breaths/min, Tidal Volume (Set, mL): 500 mL, PEEP (cm H2O): 5 cmH2O, Pressure Support (cm H2O): 5 cmH2O, Resp Rate (Set): 20 breaths/min, Tidal Volume (Set, mL): 500 mL, PEEP (cm H2O): 5 cmH2O  Recent Labs   Lab 11/07/24  0537 11/06/24  2218 11/06/24  1355 11/06/24  0514   PH 7.45 7.44 7.46* 7.44   PCO2 37 38 36 37   PO2 100 102 113* 106*   HCO3 26 26 25 25   O2PER 25  25 25  25 25  25 30  30       GEN: no acute distress   HEENT: head ncat, sclera anicteric, OP patent, trachea midline   PULM: unlabored synchronous with vent, clear anteriorly    CV/COR: RRR S1S2 no gallop,  No rub, no murmur  ABD: soft nontender, hypoactive bowel sounds, no mass  EXT:  warm and well perfused x4  NEURO: PERRL, no obvious deficits  SKIN: no obvious rash  LINES: clean, dry intact         Data:   All data and imaging reviewed     ROUTINE ICU LABS (Last four results)  CMP  Recent Labs   Lab 11/07/24  0541 11/07/24  0537 11/07/24  0030 11/06/24  2218 11/06/24  1558 11/06/24  1355 11/06/24  0540 11/06/24  0514 11/05/24  0509 11/05/24  0456 11/04/24  1726 11/04/24  1340 11/04/24  0550 11/04/24  0545   NA  --  135  --  136  --  136  --  135   < > 135   < > 136  --  137   POTASSIUM  --  3.9  --  3.8  --  3.7  --  3.8   < > 3.8   < > 4.7   < > 3.3*   CHLORIDE  --  100  --  101  --  101  --  100   < > 100   < > 101  --  101   CO2  --  19*  --  19*  --  18*  --  23   < > 23   < > 24  --  25   ANIONGAP  --  16*  --  16*  --  17*  --  12   < > 12   < > 11  --  11   * 129* 125* 135*   < > 135*   < > 149*   < > 135*   < > 129*   < > 120*   BUN  --  31.0*  --  31.4*  --  31.9*  --  30.0*   < > 27.3*   <  "> 27.1*  --  25.9*   CR  --  1.66*  --  1.66*  --  1.72*  --  1.71*   < > 1.76*   < > 1.78*  --  1.73*   GFRESTIMATED  --  40*  --  40*  --  38*  --  38*   < > 37*   < > 36*  --  38*   TATE  --  9.2  --  9.4  --  9.4  --  9.4   < > 9.2   < > 7.9*  --  8.7*   MAG  --  1.9  --  2.0  --  2.0  --  2.0   < > 2.2   < > 1.7  --  2.1   PHOS  --   --   --   --   --   --   --  2.3*  --  2.3*  --  2.2*  2.2*  --  1.9*   PROTTOTAL  --  4.8*  --  5.0*  --  5.2*  --  5.0*   < > 5.1*   < > 5.1*  --  4.8*   ALBUMIN  --  3.4*  --  3.6  --  3.8  --  3.6   < > 3.7   < > 3.7  --  3.4*   BILITOTAL  --  1.5*  --  1.9*  --  1.9*  --  2.1*   < > 2.5*   < > 2.6*  --  2.6*   ALKPHOS  --  123  --  133  --  139  --  153*   < > 178*   < > 182*  --  173*   AST  --  56*  --  59*  --  73*  --  101*   < > 235*   < > 345*  --  404*   ALT  --  144*  --  162*  --  187*  --  223*   < > 353*   < > 438*  --  480*    < > = values in this interval not displayed.     CBC  Recent Labs   Lab 11/07/24  0537 11/06/24  2218 11/06/24  1355 11/06/24  0514   WBC 30.1* 36.9* 37.7* 41.0*   RBC 2.35* 2.50* 2.59* 2.64*   HGB 6.6* 7.0* 7.4* 7.5*   HCT 20.7* 21.7* 22.5* 22.9*   MCV 88 87 87 87   MCH 28.1 28.0 28.6 28.4   MCHC 31.9 32.3 32.9 32.8   RDW 20.3* 20.2* 20.2* 20.2*    179 194 187     INR  Recent Labs   Lab 11/07/24  0537 11/06/24  0514 11/05/24  0456 11/04/24  0545   INR 1.58* 1.71* 1.85* 2.11*     Arterial Blood Gas  Recent Labs   Lab 11/07/24  0537 11/06/24  2218 11/06/24  1355 11/06/24  0514   PH 7.45 7.44 7.46* 7.44   PCO2 37 38 36 37   PO2 100 102 113* 106*   HCO3 26 26 25 25   O2PER 25  25 25  25 25  25 30  30       All cultures:  No results for input(s): \"CULT\" in the last 168 hours.  Recent Results (from the past 24 hours)   US Upper Extremity Arterial Duplex Right    Narrative    EXAM: US UPPER EXTREMITY ARTERIAL DUPLEX RIGHT  LOCATION: [facility]  DATE: 11/3/2024    INDICATION: Mottled hand.  COMPARISON: None " available.  TECHNIQUE: Arterial Duplex ultrasound of the right arm. Color flow and spectral Doppler with waveform analysis performed.    FINDINGS (RIGHT upper extremity):    ARTERIAL PEAK SYSTOLIC VELOCITIES (cm/s):    Subclavian artery (proximal): 51  Subclavian artery (distal): 71  Axillary artery: 70  Brachial (proximal): 74  Brachial (distal): 76  Radial (proximal): 41  Radial (distal): 14  Ulnar (proximal): 85  Ulnar (distal): 136    WAVEFORMS/COLOR DOPPLER: Triphasic waveforms are noted throughout except in the distal radial artery where biphasic waveforms are noted.      Impression    IMPRESSION:   1.  Patent right upper extremity arteries although slow flow is noted within the distal radial artery along with biphasic waveforms, findings are of indeterminate etiology, could be due to area of focal stenosis.   XR Chest Port 1 View    Narrative    EXAM: XR CHEST PORT 1 VIEW  LOCATION: [facility]  DATE: 11/3/2024    INDICATION: eval infiltrate edema  COMPARISON: Chest radiograph 11/2/2024.      Impression    IMPRESSION:     Lines and tubes: Endotracheal tube tip is not well visualized, likely in the upper trachea. Feeding tube courses below the diaphragm. [place] catheter near the main pulmonary trunk. Similar left central venous catheter, left atrial appendage clip and   median sternotomy. Bilateral chest tubes.    Right basilar opacity favoring combination of pleural fluid, atelectasis and/or infection. Possible trace left pleural effusion. No discernible pneumothorax. Similar cardiomediastinal silhouette.     D/w SADE Lawson of CV surgery.    Billing: This patient is critically ill: Yes. Total critical care time today 45 min, excluding procedures.

## 2024-11-07 NOTE — PROGRESS NOTES
Extubation Note    Successful completion of SBT (Yes or No): Yes  Extubation time: 1132    Patient assessment:  Lung sounds:Coarse crackles  Stridor Present (Yes or No): No  Patient tolerance: Fairly well     Oxygen device: BiPAP ST    BiPAP Settings  IPAP: 10  EPAP: 6  Set rate: 14  FiO2: 50%  Timed Inspiration (sec): 0.8    SpO2:92-97%    RT will continue to follow.     Manuel Cortes, RT  11/7/2024

## 2024-11-07 NOTE — PROGRESS NOTES
[facility] ICU RESPIRATORY NOTE        Date of Admission: 10/28/2024    Date of Intubation (most recent): 10/28/2024    Reason for Mechanical Ventilation: CABG    Number of Days on Mechanical Ventilation: 11    Met Criteria for Spontaneous Breathing Trial: Yes     Bite Block: No    Significant Events Today: none overnight     ABG Results:   Recent Labs   Lab 11/06/24  2218 11/06/24  1355 11/06/24  0514 11/05/24  2209   PH 7.44 7.46* 7.44 7.44   PCO2 38 36 37 37   PO2 102 113* 106* 76*   HCO3 26 25 25 25   O2PER 25  25 25  25 30  30 30  30         Current Vent Settings: FiO2 (%): 25 %, Resp: 25, Vent Mode: CMV/AC, Resp Rate (Set): 20 breaths/min, Tidal Volume (Set, mL): 500 mL, PEEP (cm H2O): 5 cmH2O, Pressure Support (cm H2O): 5 cmH2O, Resp Rate (Set): 20 breaths/min, Tidal Volume (Set, mL): 500 mL, PEEP (cm H2O): 5 cmH2O    Skin Assessment: Intact    Plan: Pt continue on the ventilator and will wean for possible toward extubation     RT Nilo on 11/7/2024 at 5:22 AM

## 2024-11-07 NOTE — PROGRESS NOTES
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 Michael Mulvihill.    POD # 9 s/p return to OR for mediastinal re-exploration, washout    - CVS: Pre-op TTE with EF 35-40%. Postop TTE 10/30 with EF ~35% on high-dose inotropic support.   Postop mixed cardiogenic/vasoplegic/hypovolemic shock. Lactic acidosis cleared and SVO2 stabilized. CI goal >2.0 (calculated elaine), stopped DBU 11/4 no back on after dec in SVO2 with inc in lact back on at 2.5. NE off and vasopressin to MAP goal 65, wean as able.   Hypervolemic, volume management via CRRT, pull as able. CVP goal ~10-12. Now tolerating more aggressive removal on hold as becoming euvolemic. Albumin 25% push BID to pull volume intravascular.  Hypothermic since initiation on CRRT, Thermagaurd catheter placed 10/31, target 37 C.   Stress dose steroids started 10/31, off 11/02 PM.  History of paroxysmal atrial fibrillation, continues with intermittent afib and accelerated junctional at times. Previously being atrial paced at 100, now appears to have improved BP without pacing (intrinsic rate ~80 BP, sinus rhythm with intermittent atrial fibrillation). Amio discontinued with shock liver. Defer systemic anticoagulation at present (ok for citrate for CRRT machine if needed).   Absent right radial pulse (previous arterial line in this location). ICU MD did bedside US, ulnar artery patent. Right hand warm, good capillary refill. Increased duskiness of right hand noted after starting vasopressin, which demonstrated patent arterial flow with slow biphasic flow at the distal radial artery (site of previous arterial line).   Aspirin 162 mg daily, defer statin with ALI.    Chest tubes: output  750<975<970 mL, serosang, no air leak. TPW: AAI back up rate of 50    - Resp: Acute hypoxemic and hypercapnic respiratory failure, stable. Extubated pod#9    - Neuro: Sedated with propofol while intubated. Add fentanyl infusion and Versed PRN to improve vent compliance. History of myasthenia gravis. Purposeful upper extremity movements and spontaneous LE movement when sedation lightened.     - Renal: CKD stage 3, baseline Cr ~1.8. Postop oliguric/anuric JAVIER. Nephrology consulted, CRRT started 10/30. Avoid nephrotoxins.   Recent Labs   Lab 11/07/24  0537 11/06/24 2218 11/06/24  1355   CR 1.66* 1.66* 1.72*       - GI: +BM, +flatus, continue bowel regimen. Shock liver, improving. Trend LFTs, avoid hepatotoxins. Recent admission for GIB 09/2024, increased pantoprazole to BID. Diarrhea with initiation of TF, rectal tube placed 11/05.    - : bladder scan q shift. Anuric on CRRT    - Endo: Postop stress hyperglycemia, resolved. Insulin infusion transitioned to sliding scale insulin. Stress dose steroids 10/31-11/02 as above.   Hemoglobin A1C   Date Value Ref Range Status   10/09/2024 4.5 <5.7 % Final     Comment:     Normal <5.7%   Prediabetes 5.7-6.4%    Diabetes 6.5% or higher     Note: Adopted from ADA consensus guidelines.        - FEN: Replace electrolytes as needed. Initiated on trophic feeds via OG 11/1, NJ placed 11/2 after INR came down, TF increase to goal.      - ID: WBC chronically elevated and hypothermic on CRRT as above so difficult to assess for possible infection;  empiric Vanc/Zosyn 10/31-11/04. Blood, urine, respiratory cultures NGTD. WBC elevated.  Trend CBC and fever curve.   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 Acute blood loss anemia due to surgery. Postop coagulopathy, improved (required  "multiple blood transfusions and blood productions immediately postop). 1 unit RBCs 11/3. 1 unit RBC 11/5 Trend CBC, transfuse PRN. 1U PRBC 11/7/24  Recent Labs   Lab 11/07/24  0537 11/06/24  2218 11/06/24  1355   HGB 6.6* 7.0* 7.4*    179 194       - Proph: SCD, subcutaneous heparin, PPI    - Other:  Clinically Significant Risk Factors               # Hypoalbuminemia: Lowest albumin = 2.7 g/dL at 11/2/2024  5:44 AM, will monitor as appropriate    # Coagulation Defect: INR = 1.58 (Ref range: 0.85 - 1.15) and/or PTT = 42 Seconds (Ref range: 22 - 38 Seconds), will monitor for bleeding    # Hypertension: Noted on problem list  # Chronic heart failure with reduced ejection fraction: last echo with EF <40%   # Acute Hypercapnic Respiratory Failure: based on arterial blood gas results.  Continue supplemental oxygen and ventilatory support as indicated.  # Acute Hypercapnic Respiratory Failure: based on venous blood gas results.  Continue supplemental oxygen and ventilatory support as indicated.      #Acute blood loss anemia: Lowest Hgb this hospitalization: 6.6 g/dL. Will continue to monitor and treat/transfuse as appropriate.     # Obesity: Estimated body mass index is 31.95 kg/m  as calculated from the following:    Height as of this encounter: 1.778 m (5' 10\").    Weight as of this encounter: 101 kg (222 lb 10.6 oz).       # History of CABG: noted on surgical history       - Dispo: ICU. Guarded prognosis but continues with improving liver function. Family updated multiple times at bedside. Medically Ready for Discharge: Anticipated in 5+ Days      Interval History  Lying in bed, breathing on NC, denies pain, tolerating tube feeds, on CRRT, consult to OT for lymphedema wraps    Medications  Current Facility-Administered Medications   Medication Dose Route Frequency Provider Last Rate Last Admin    albumin human 25 % injection 25 g  25 g Intravenous Q12H Arlin Hodge PA-C 100 mL/hr at 11/04/24 1946 25 g at " 11/06/24 1923    aspirin (ASA) chewable tablet 162 mg  162 mg Oral or NG Tube Daily Clarence Bone MD   162 mg at 11/06/24 0824    chlorhexidine (PERIDEX) 0.12 % solution 15 mL  15 mL Swish & Spit BID Zac Kaur DO   15 mL at 11/06/24 2147    insulin aspart (NovoLOG) injection (RAPID ACTING)  1-7 Units Subcutaneous Q4H Sb Arthur PA-C   1 Units at 11/05/24 1908    pantoprazole (PROTONIX) 2 mg/mL suspension 40 mg  40 mg Oral or NG Tube BID AC Arlin Hodge PA-C   40 mg at 11/06/24 1612    Or    pantoprazole (PROTONIX) EC tablet 40 mg  40 mg Oral BID AC Arlin Hodge PA-C        polyethylene glycol (MIRALAX) Packet 17 g  17 g Oral or Feeding Tube Daily Mulvihill, Michael, MD   17 g at 11/03/24 0812    Prosource TF20 ENfit Compatibl EN LIQD (PROSOURCE TF20) packet 60 mL  1 packet Per Feeding Tube TID Arlin Hodge PA-C   60 mL at 11/06/24 2200    senna-docusate (SENOKOT-S/PERICOLACE) 8.6-50 MG per tablet 1 tablet  1 tablet Oral or Feeding Tube BID Mulvihill, Michael, MD   1 tablet at 11/06/24 1929    sodium chloride (PF) 0.9% PF flush 3 mL  3 mL Intracatheter Q8H Clarence Bone MD   3 mL at 11/06/24 2200     Current Facility-Administered Medications   Medication Dose Route Frequency Provider Last Rate Last Admin    acetaminophen (TYLENOL) tablet 650 mg  650 mg Oral Q4H PRN Clarence Bone MD        bisacodyl (DULCOLAX) suppository 10 mg  10 mg Rectal Daily PRN Clarence Bone MD        calcium gluconate 2 g in  mL intermittent infusion  2 g Intravenous Q8H PRN Farhad Boland MD        calcium gluconate 4 g in sodium chloride 0.9 % 100 mL intermittent infusion  4 g Intravenous Q8H PRN Farhad Boland MD        dextrose 10% infusion   Intravenous Continuous PRN Arlin Hodge PA-C   Stopped at 11/05/24 0600    glucose gel 15-30 g  15-30 g Oral Q15 Min PRN Nasir Vallecillo MD        Or    dextrose 50 % injection 25-50 mL  25-50 mL Intravenous Q15 Min  PRN Nasir Vallecillo MD        Or    glucagon injection 1 mg  1 mg Subcutaneous Q15 Min PRN Nasir Vallecillo MD        EPINEPHrine (ADRENALIN) 5 mg in  mL infusion  0.01-0.1 mcg/kg/min Intravenous Continuous PRN Clarence Bone MD 14.4 mL/hr at 11/07/24 0400 0.05 mcg/kg/min at 11/07/24 0400    fentaNYL (PF) (SUBLIMAZE) injection 25 mcg  25 mcg Intravenous Q1H PRN Bhanu Ness MD   25 mcg at 11/06/24 0009    heparin lock flush 10 unit/mL injection 3 mL  3 mL Intracatheter Q1H PRN Tyra Dubois MD        hydrALAZINE (APRESOLINE) injection 10 mg  10 mg Intravenous Q30 Min PRN Clarence Bone MD        lidocaine (LMX4) cream   Topical Q1H PRN Clarence Bone MD        lidocaine 1 % 0.1-1 mL  0.1-1 mL Other Q1H PRN Clarence Bone MD        magnesium hydroxide (MILK OF MAGNESIA) suspension 30 mL  30 mL Oral Daily PRN Clarence Bone MD        magnesium sulfate 2 g in 50 mL sterile water intermittent infusion  2 g Intravenous Q8H PRN Farhad Boland MD   2 g at 11/04/24 1450    propofol (DIPRIVAN) bolus from bag or syringe pump  10 mg Intravenous Q15 Min PRN Tyra Dubois MD        And    Medication Instruction   Does not apply Continuous PRN Tyra Dubois MD        methocarbamol (ROBAXIN) tablet 500 mg  500 mg Oral Q6H PRN Clarence Bone MD   500 mg at 11/06/24 1945    naloxone (NARCAN) injection 0.2 mg  0.2 mg Intravenous Q2 Min PRN Mulvihill, Michael, MD        Or    naloxone (NARCAN) injection 0.4 mg  0.4 mg Intravenous Q2 Min PRN Mulvihill, Michael, MD        Or    naloxone (NARCAN) injection 0.2 mg  0.2 mg Intramuscular Q2 Min PRN Mulvihill, Michael, MD        Or    naloxone (NARCAN) injection 0.4 mg  0.4 mg Intramuscular Q2 Min PRN Mulvihill, Michael, MD        No heparin required   Does not apply Continuous PRN Farhad Boland MD        ondansetron (ZOFRAN ODT) ODT tab 4 mg  4 mg Oral Q6H PRN Clarence Bone MD        Or     "ondansetron (ZOFRAN) injection 4 mg  4 mg Intravenous Q6H PRN Clarence Bone MD        oxyCODONE IR (ROXICODONE) half-tab 2.5 mg  2.5 mg Oral Q4H PRN Clarence Bone MD   2.5 mg at 11/02/24 0214    Or    oxyCODONE (ROXICODONE) tablet 5 mg  5 mg Oral Q4H PRN Clarence Bone MD   5 mg at 11/06/24 1945    potassium chloride 20 mEq in 50 mL intermittent infusion  20 mEq Intravenous Q8H PRN Farhad Boland MD 50 mL/hr at 11/04/24 1200 20 mEq at 11/04/24 1200    prochlorperazine (COMPAZINE) injection 5 mg  5 mg Intravenous Q6H PRN Clarence Bone MD        Or    prochlorperazine (COMPAZINE) tablet 5 mg  5 mg Oral Q6H PRN Clarence Bone MD        Reason beta blocker order not selected   Does not apply DOES NOT GO TO Clarence Olguin MD        sodium chloride (PF) 0.9% PF flush 3 mL  3 mL Intracatheter q1 min prn Clarence Bone MD        sodium chloride 0.9% BOLUS 1-250 mL  1-250 mL Intravenous Q1H PRN Miguel Floyd MD        sodium phosphate 15 mmol in NS 250mL intermittent infusion  15 mmol Intravenous Q8H PRN Farhad Boland MD             Physical Exam  Vitals were reviewed  Blood pressure 123/68, pulse 88, temperature 97.9  F (36.6  C), temperature source Esophageal, resp. rate 24, height 1.778 m (5' 10\"), weight 101 kg (222 lb 10.6 oz), SpO2 100%.  Rhythm: intermittent NSR and atrial fibrillation    Lungs: diminished bases     Cardiovascular: irregular rate/rhythm, no m/r/g    Abdomen: soft, NT, ND, +BS    Extremeties: 3+ BLE/BUE edema    Incision: CDI    CT: serosang output 750<975<1580 mL, no air leak    Weight:   Vitals:    11/03/24 0600 11/04/24 0400 11/05/24 0300 11/06/24 0200   Weight: 111.7 kg (246 lb 4.1 oz) 109.4 kg (241 lb 2.9 oz) 104.5 kg (230 lb 6.1 oz) 102.7 kg (226 lb 6.6 oz)    11/07/24 0200   Weight: 101 kg (222 lb 10.6 oz)         Data  Recent Labs   Lab 11/07/24  0541 11/07/24  0537 11/07/24  0030 11/06/24  2218 11/06/24  1558 11/06/24  1355 " 11/06/24  0540 11/06/24  0514 11/05/24  0509 11/05/24  0456   WBC  --  30.1*  --  36.9*  --  37.7*  --  41.0*   < > 59.1*   HGB  --  6.6*  --  7.0*  --  7.4*  --  7.5*   < > 7.6*   MCV  --  88  --  87  --  87  --  87   < > 90   PLT  --  183  --  179  --  194  --  187   < > 198   INR  --  1.58*  --   --   --   --   --  1.71*  --  1.85*   NA  --  135  --  136  --  136  --  135   < > 135   POTASSIUM  --  3.9  --  3.8  --  3.7  --  3.8   < > 3.8   CHLORIDE  --  100  --  101  --  101  --  100   < > 100   CO2  --  19*  --  19*  --  18*  --  23   < > 23   BUN  --  31.0*  --  31.4*  --  31.9*  --  30.0*   < > 27.3*   CR  --  1.66*  --  1.66*  --  1.72*  --  1.71*   < > 1.76*   ANIONGAP  --  16*  --  16*  --  17*  --  12   < > 12   TATE  --  9.2  --  9.4  --  9.4  --  9.4   < > 9.2   * 129* 125* 135*   < > 135*   < > 149*   < > 135*   ALBUMIN  --  3.4*  --  3.6  --  3.8  --  3.6   < > 3.7   PROTTOTAL  --  4.8*  --  5.0*  --  5.2*  --  5.0*   < > 5.1*   BILITOTAL  --  1.5*  --  1.9*  --  1.9*  --  2.1*   < > 2.5*   ALKPHOS  --  123  --  133  --  139  --  153*   < > 178*   ALT  --  144*  --  162*  --  187*  --  223*   < > 353*   AST  --  56*  --  59*  --  73*  --  101*   < > 235*    < > = values in this interval not displayed.       Imaging:  No results found for this or any previous visit (from the past 24 hours).          Patient seen and discussed with Dr. Mulvihill Erin Frendin, PA-C  Cardiothoracic Surgery  Available for paging [phone] (personal pager or CV Surgery Rounding Pager)  Personal Pager: [phone]  CV Surgery Rounding Pager: [phone]  After hours please page surgeon on-call

## 2024-11-08 ENCOUNTER — APPOINTMENT (OUTPATIENT)
Dept: OCCUPATIONAL THERAPY | Facility: CLINIC | Age: 89
DRG: 233 | End: 2024-11-08
Attending: PHYSICIAN ASSISTANT
Payer: MEDICARE

## 2024-11-08 LAB
ALBUMIN SERPL BCG-MCNC: 3.7 G/DL (ref 3.5–5.2)
ALBUMIN SERPL BCG-MCNC: 3.8 G/DL (ref 3.5–5.2)
ALBUMIN SERPL BCG-MCNC: 3.8 G/DL (ref 3.5–5.2)
ALLEN'S TEST: ABNORMAL
ALLEN'S TEST: ABNORMAL
ALP SERPL-CCNC: 110 U/L (ref 40–150)
ALP SERPL-CCNC: 112 U/L (ref 40–150)
ALP SERPL-CCNC: 121 U/L (ref 40–150)
ALT SERPL W P-5'-P-CCNC: 112 U/L (ref 0–70)
ALT SERPL W P-5'-P-CCNC: 85 U/L (ref 0–70)
ALT SERPL W P-5'-P-CCNC: 93 U/L (ref 0–70)
ANION GAP SERPL CALCULATED.3IONS-SCNC: 12 MMOL/L (ref 7–15)
ANION GAP SERPL CALCULATED.3IONS-SCNC: 14 MMOL/L (ref 7–15)
ANION GAP SERPL CALCULATED.3IONS-SCNC: 15 MMOL/L (ref 7–15)
AST SERPL W P-5'-P-CCNC: 40 U/L (ref 0–45)
AST SERPL W P-5'-P-CCNC: 43 U/L (ref 0–45)
AST SERPL W P-5'-P-CCNC: 48 U/L (ref 0–45)
BASE EXCESS BLDA CALC-SCNC: 0.4 MMOL/L (ref -3–3)
BASE EXCESS BLDA CALC-SCNC: 2.2 MMOL/L (ref -3–3)
BASE EXCESS BLDV CALC-SCNC: -5.3 MMOL/L (ref -3–3)
BASE EXCESS BLDV CALC-SCNC: 2.2 MMOL/L (ref -3–3)
BILIRUB SERPL-MCNC: 1.4 MG/DL
BILIRUB SERPL-MCNC: 1.6 MG/DL
BILIRUB SERPL-MCNC: 2.3 MG/DL
BUN SERPL-MCNC: 31.6 MG/DL (ref 8–23)
BUN SERPL-MCNC: 33.5 MG/DL (ref 8–23)
BUN SERPL-MCNC: 33.7 MG/DL (ref 8–23)
CA-I BLD-MCNC: 4.9 MG/DL (ref 4.4–5.2)
CA-I BLD-MCNC: 5.1 MG/DL (ref 4.4–5.2)
CA-I BLD-MCNC: 5.2 MG/DL (ref 4.4–5.2)
CALCIUM SERPL-MCNC: 9.4 MG/DL (ref 8.8–10.4)
CALCIUM SERPL-MCNC: 9.6 MG/DL (ref 8.8–10.4)
CALCIUM SERPL-MCNC: 9.8 MG/DL (ref 8.8–10.4)
CHLORIDE SERPL-SCNC: 100 MMOL/L (ref 98–107)
CHLORIDE SERPL-SCNC: 102 MMOL/L (ref 98–107)
CHLORIDE SERPL-SCNC: 102 MMOL/L (ref 98–107)
COHGB MFR BLD: 95.1 % (ref 95–96)
COHGB MFR BLD: 98.9 % (ref 95–96)
CREAT SERPL-MCNC: 1.48 MG/DL (ref 0.67–1.17)
CREAT SERPL-MCNC: 1.53 MG/DL (ref 0.67–1.17)
CREAT SERPL-MCNC: 1.56 MG/DL (ref 0.67–1.17)
EGFRCR SERPLBLD CKD-EPI 2021: 43 ML/MIN/1.73M2
EGFRCR SERPLBLD CKD-EPI 2021: 44 ML/MIN/1.73M2
EGFRCR SERPLBLD CKD-EPI 2021: 46 ML/MIN/1.73M2
ERYTHROCYTE [DISTWIDTH] IN BLOOD BY AUTOMATED COUNT: 19.7 % (ref 10–15)
ERYTHROCYTE [DISTWIDTH] IN BLOOD BY AUTOMATED COUNT: 19.8 % (ref 10–15)
GLUCOSE BLDC GLUCOMTR-MCNC: 115 MG/DL (ref 70–99)
GLUCOSE BLDC GLUCOMTR-MCNC: 116 MG/DL (ref 70–99)
GLUCOSE BLDC GLUCOMTR-MCNC: 117 MG/DL (ref 70–99)
GLUCOSE BLDC GLUCOMTR-MCNC: 120 MG/DL (ref 70–99)
GLUCOSE BLDC GLUCOMTR-MCNC: 134 MG/DL (ref 70–99)
GLUCOSE BLDC GLUCOMTR-MCNC: 139 MG/DL (ref 70–99)
GLUCOSE SERPL-MCNC: 116 MG/DL (ref 70–99)
GLUCOSE SERPL-MCNC: 122 MG/DL (ref 70–99)
GLUCOSE SERPL-MCNC: 142 MG/DL (ref 70–99)
HCO3 BLD-SCNC: 24 MMOL/L (ref 21–28)
HCO3 BLD-SCNC: 26 MMOL/L (ref 21–28)
HCO3 BLDV-SCNC: 19 MMOL/L (ref 21–28)
HCO3 BLDV-SCNC: 27 MMOL/L (ref 21–28)
HCO3 SERPL-SCNC: 19 MMOL/L (ref 22–29)
HCO3 SERPL-SCNC: 21 MMOL/L (ref 22–29)
HCO3 SERPL-SCNC: 23 MMOL/L (ref 22–29)
HCT VFR BLD AUTO: 22.6 % (ref 40–53)
HCT VFR BLD AUTO: 23.5 % (ref 40–53)
HGB BLD-MCNC: 7.2 G/DL (ref 13.3–17.7)
HGB BLD-MCNC: 7.4 G/DL (ref 13.3–17.7)
INR PPP: 1.5 (ref 0.85–1.15)
LACTATE SERPL-SCNC: 2.2 MMOL/L (ref 0.7–2)
LACTATE SERPL-SCNC: 2.3 MMOL/L (ref 0.7–2)
MAGNESIUM SERPL-MCNC: 2 MG/DL (ref 1.7–2.3)
MAGNESIUM SERPL-MCNC: 2 MG/DL (ref 1.7–2.3)
MAGNESIUM SERPL-MCNC: 2.2 MG/DL (ref 1.7–2.3)
MCH RBC QN AUTO: 27.9 PG (ref 26.5–33)
MCH RBC QN AUTO: 28.1 PG (ref 26.5–33)
MCHC RBC AUTO-ENTMCNC: 31.5 G/DL (ref 31.5–36.5)
MCHC RBC AUTO-ENTMCNC: 31.9 G/DL (ref 31.5–36.5)
MCV RBC AUTO: 88 FL (ref 78–100)
MCV RBC AUTO: 89 FL (ref 78–100)
O2/TOTAL GAS SETTING VFR VENT: 36 %
O2/TOTAL GAS SETTING VFR VENT: 36 %
O2/TOTAL GAS SETTING VFR VENT: 4 %
O2/TOTAL GAS SETTING VFR VENT: 4 %
OXYHGB MFR BLDV: 42 % (ref 70–75)
OXYHGB MFR BLDV: 74 % (ref 70–75)
PCO2 BLD: 35 MM HG (ref 35–45)
PCO2 BLD: 36 MM HG (ref 35–45)
PCO2 BLDV: 34 MM HG (ref 40–50)
PCO2 BLDV: 43 MM HG (ref 40–50)
PH BLD: 7.45 [PH] (ref 7.35–7.45)
PH BLD: 7.46 [PH] (ref 7.35–7.45)
PH BLDV: 7.36 [PH] (ref 7.32–7.43)
PH BLDV: 7.41 [PH] (ref 7.32–7.43)
PHOSPHATE SERPL-MCNC: 2.5 MG/DL (ref 2.5–4.5)
PLATELET # BLD AUTO: 207 10E3/UL (ref 150–450)
PLATELET # BLD AUTO: 214 10E3/UL (ref 150–450)
PO2 BLD: 63 MM HG (ref 80–105)
PO2 BLD: 86 MM HG (ref 80–105)
PO2 BLDV: 24 MM HG (ref 25–47)
PO2 BLDV: 44 MM HG (ref 25–47)
POTASSIUM SERPL-SCNC: 3.6 MMOL/L (ref 3.4–5.3)
POTASSIUM SERPL-SCNC: 3.7 MMOL/L (ref 3.4–5.3)
POTASSIUM SERPL-SCNC: 3.8 MMOL/L (ref 3.4–5.3)
PROT SERPL-MCNC: 5.1 G/DL (ref 6.4–8.3)
PROT SERPL-MCNC: 5.3 G/DL (ref 6.4–8.3)
PROT SERPL-MCNC: 5.4 G/DL (ref 6.4–8.3)
RBC # BLD AUTO: 2.56 10E6/UL (ref 4.4–5.9)
RBC # BLD AUTO: 2.65 10E6/UL (ref 4.4–5.9)
SAO2 % BLDA: 92 % (ref 92–100)
SAO2 % BLDA: 96 % (ref 92–100)
SAO2 % BLDV: 43.6 % (ref 70–75)
SAO2 % BLDV: 75.5 % (ref 70–75)
SODIUM SERPL-SCNC: 135 MMOL/L (ref 135–145)
SODIUM SERPL-SCNC: 136 MMOL/L (ref 135–145)
SODIUM SERPL-SCNC: 137 MMOL/L (ref 135–145)
WBC # BLD AUTO: 35.1 10E3/UL (ref 4–11)
WBC # BLD AUTO: 35.7 10E3/UL (ref 4–11)

## 2024-11-08 PROCEDURE — 250N000011 HC RX IP 250 OP 636: Performed by: SURGERY

## 2024-11-08 PROCEDURE — 83605 ASSAY OF LACTIC ACID: CPT | Performed by: PHYSICIAN ASSISTANT

## 2024-11-08 PROCEDURE — 258N000003 HC RX IP 258 OP 636: Performed by: INTERNAL MEDICINE

## 2024-11-08 PROCEDURE — 250N000013 HC RX MED GY IP 250 OP 250 PS 637: Performed by: SURGERY

## 2024-11-08 PROCEDURE — 82805 BLOOD GASES W/O2 SATURATION: CPT | Performed by: PHYSICIAN ASSISTANT

## 2024-11-08 PROCEDURE — 120N000004 HC R&B MS OVERFLOW

## 2024-11-08 PROCEDURE — 258N000003 HC RX IP 258 OP 636: Performed by: SURGERY

## 2024-11-08 PROCEDURE — 258N000003 HC RX IP 258 OP 636: Performed by: PHYSICIAN ASSISTANT

## 2024-11-08 PROCEDURE — P9047 ALBUMIN (HUMAN), 25%, 50ML: HCPCS | Mod: JZ | Performed by: PHYSICIAN ASSISTANT

## 2024-11-08 PROCEDURE — 84100 ASSAY OF PHOSPHORUS: CPT | Performed by: INTERNAL MEDICINE

## 2024-11-08 PROCEDURE — 85027 COMPLETE CBC AUTOMATED: CPT | Performed by: PHYSICIAN ASSISTANT

## 2024-11-08 PROCEDURE — 90945 DIALYSIS ONE EVALUATION: CPT | Performed by: INTERNAL MEDICINE

## 2024-11-08 PROCEDURE — 250N000013 HC RX MED GY IP 250 OP 250 PS 637: Performed by: STUDENT IN AN ORGANIZED HEALTH CARE EDUCATION/TRAINING PROGRAM

## 2024-11-08 PROCEDURE — 99291 CRITICAL CARE FIRST HOUR: CPT | Mod: 24 | Performed by: INTERNAL MEDICINE

## 2024-11-08 PROCEDURE — 250N000011 HC RX IP 250 OP 636: Mod: JZ | Performed by: PHYSICIAN ASSISTANT

## 2024-11-08 PROCEDURE — 83735 ASSAY OF MAGNESIUM: CPT | Performed by: PHYSICIAN ASSISTANT

## 2024-11-08 PROCEDURE — 250N000009 HC RX 250: Performed by: INTERNAL MEDICINE

## 2024-11-08 PROCEDURE — 97166 OT EVAL MOD COMPLEX 45 MIN: CPT | Mod: GO

## 2024-11-08 PROCEDURE — 97140 MANUAL THERAPY 1/> REGIONS: CPT | Mod: GO

## 2024-11-08 PROCEDURE — 84155 ASSAY OF PROTEIN SERUM: CPT | Performed by: PHYSICIAN ASSISTANT

## 2024-11-08 PROCEDURE — 82330 ASSAY OF CALCIUM: CPT | Performed by: PHYSICIAN ASSISTANT

## 2024-11-08 PROCEDURE — 84075 ASSAY ALKALINE PHOSPHATASE: CPT | Performed by: PHYSICIAN ASSISTANT

## 2024-11-08 PROCEDURE — 250N000013 HC RX MED GY IP 250 OP 250 PS 637: Performed by: PHYSICIAN ASSISTANT

## 2024-11-08 PROCEDURE — 85610 PROTHROMBIN TIME: CPT | Performed by: PHYSICIAN ASSISTANT

## 2024-11-08 RX ORDER — AMINO ACIDS/PROTEIN HYDROLYS 11G-40/45
1 LIQUID IN PACKET (ML) ORAL 2 TIMES DAILY
Status: DISCONTINUED | OUTPATIENT
Start: 2024-11-08 | End: 2024-11-15 | Stop reason: HOSPADM

## 2024-11-08 RX ADMIN — ALBUMIN HUMAN 25 G: 0.25 SOLUTION INTRAVENOUS at 19:58

## 2024-11-08 RX ADMIN — METHOCARBAMOL 500 MG: 500 TABLET ORAL at 18:11

## 2024-11-08 RX ADMIN — ALBUMIN HUMAN 25 G: 0.25 SOLUTION INTRAVENOUS at 08:02

## 2024-11-08 RX ADMIN — Medication 60 ML: at 08:29

## 2024-11-08 RX ADMIN — VASOPRESSIN 4 UNITS/HR: 20 INJECTION INTRAVENOUS at 03:30

## 2024-11-08 RX ADMIN — CALCIUM CHLORIDE, MAGNESIUM CHLORIDE, DEXTROSE MONOHYDRATE, LACTIC ACID, SODIUM CHLORIDE, SODIUM BICARBONATE AND POTASSIUM CHLORIDE 5000 ML: 5.15; 2.03; 22; 5.4; 6.46; 3.09; .157 INJECTION INTRAVENOUS at 12:36

## 2024-11-08 RX ADMIN — CALCIUM CHLORIDE, MAGNESIUM CHLORIDE, DEXTROSE MONOHYDRATE, LACTIC ACID, SODIUM CHLORIDE, SODIUM BICARBONATE AND POTASSIUM CHLORIDE 5000 ML: 5.15; 2.03; 22; 5.4; 6.46; 3.09; .157 INJECTION INTRAVENOUS at 12:37

## 2024-11-08 RX ADMIN — Medication 40 MG: at 08:08

## 2024-11-08 RX ADMIN — OXYCODONE HYDROCHLORIDE 5 MG: 5 TABLET ORAL at 15:56

## 2024-11-08 RX ADMIN — METHOCARBAMOL 500 MG: 500 TABLET ORAL at 11:03

## 2024-11-08 RX ADMIN — OXYCODONE HYDROCHLORIDE 5 MG: 5 TABLET ORAL at 20:09

## 2024-11-08 RX ADMIN — VASOPRESSIN 4 UNITS/HR: 20 INJECTION INTRAVENOUS at 13:55

## 2024-11-08 RX ADMIN — EPINEPHRINE 0.05 MCG/KG/MIN: 1 INJECTION INTRAMUSCULAR; INTRAVENOUS; SUBCUTANEOUS at 04:39

## 2024-11-08 RX ADMIN — Medication 60 ML: at 21:00

## 2024-11-08 RX ADMIN — METHOCARBAMOL 500 MG: 500 TABLET ORAL at 00:49

## 2024-11-08 RX ADMIN — ASPIRIN 81 MG CHEWABLE TABLET 162 MG: 81 TABLET CHEWABLE at 08:08

## 2024-11-08 RX ADMIN — CALCIUM CHLORIDE, MAGNESIUM CHLORIDE, DEXTROSE MONOHYDRATE, LACTIC ACID, SODIUM CHLORIDE, SODIUM BICARBONATE AND POTASSIUM CHLORIDE 5000 ML: 5.15; 2.03; 22; 5.4; 6.46; 3.09; .157 INJECTION INTRAVENOUS at 00:13

## 2024-11-08 RX ADMIN — EPINEPHRINE 0.05 MCG/KG/MIN: 1 INJECTION INTRAMUSCULAR; INTRAVENOUS; SUBCUTANEOUS at 07:37

## 2024-11-08 RX ADMIN — SODIUM CHLORIDE, POTASSIUM CHLORIDE, SODIUM LACTATE AND CALCIUM CHLORIDE: 600; 310; 30; 20 INJECTION, SOLUTION INTRAVENOUS at 13:19

## 2024-11-08 RX ADMIN — SENNOSIDES AND DOCUSATE SODIUM 1 TABLET: 50; 8.6 TABLET ORAL at 08:08

## 2024-11-08 RX ADMIN — CALCIUM CHLORIDE, MAGNESIUM CHLORIDE, DEXTROSE MONOHYDRATE, LACTIC ACID, SODIUM CHLORIDE, SODIUM BICARBONATE AND POTASSIUM CHLORIDE: 5.15; 2.03; 22; 5.4; 6.46; 3.09; .157 INJECTION INTRAVENOUS at 19:06

## 2024-11-08 RX ADMIN — CALCIUM CHLORIDE, MAGNESIUM CHLORIDE, DEXTROSE MONOHYDRATE, LACTIC ACID, SODIUM CHLORIDE, SODIUM BICARBONATE AND POTASSIUM CHLORIDE 5000 ML: 5.15; 2.03; 22; 5.4; 6.46; 3.09; .157 INJECTION INTRAVENOUS at 05:27

## 2024-11-08 RX ADMIN — CHLORHEXIDINE GLUCONATE 0.12% ORAL RINSE 15 ML: 1.2 LIQUID ORAL at 20:14

## 2024-11-08 RX ADMIN — SODIUM PHOSPHATE, MONOBASIC, MONOHYDRATE AND SODIUM PHOSPHATE, DIBASIC, ANHYDROUS 15 MMOL: 142; 276 INJECTION, SOLUTION INTRAVENOUS at 00:49

## 2024-11-08 RX ADMIN — OXYCODONE HYDROCHLORIDE 5 MG: 5 TABLET ORAL at 03:30

## 2024-11-08 RX ADMIN — CALCIUM CHLORIDE, MAGNESIUM CHLORIDE, DEXTROSE MONOHYDRATE, LACTIC ACID, SODIUM CHLORIDE, SODIUM BICARBONATE AND POTASSIUM CHLORIDE 5000 ML: 5.15; 2.03; 22; 5.4; 6.46; 3.09; .157 INJECTION INTRAVENOUS at 19:37

## 2024-11-08 RX ADMIN — OXYCODONE HYDROCHLORIDE 5 MG: 5 TABLET ORAL at 09:02

## 2024-11-08 RX ADMIN — POLYETHYLENE GLYCOL 3350 17 G: 17 POWDER, FOR SOLUTION ORAL at 08:09

## 2024-11-08 RX ADMIN — SENNOSIDES AND DOCUSATE SODIUM 1 TABLET: 50; 8.6 TABLET ORAL at 20:09

## 2024-11-08 RX ADMIN — CALCIUM CHLORIDE, MAGNESIUM CHLORIDE, DEXTROSE MONOHYDRATE, LACTIC ACID, SODIUM CHLORIDE, SODIUM BICARBONATE AND POTASSIUM CHLORIDE 5000 ML: 5.15; 2.03; 22; 5.4; 6.46; 3.09; .157 INJECTION INTRAVENOUS at 16:17

## 2024-11-08 RX ADMIN — VASOPRESSIN 4 UNITS/HR: 20 INJECTION INTRAVENOUS at 23:09

## 2024-11-08 RX ADMIN — EPINEPHRINE 0.05 MCG/KG/MIN: 1 INJECTION INTRAMUSCULAR; INTRAVENOUS; SUBCUTANEOUS at 23:10

## 2024-11-08 RX ADMIN — VASOPRESSIN 4 UNITS/HR: 20 INJECTION INTRAVENOUS at 08:08

## 2024-11-08 RX ADMIN — CALCIUM CHLORIDE, MAGNESIUM CHLORIDE, DEXTROSE MONOHYDRATE, LACTIC ACID, SODIUM CHLORIDE, SODIUM BICARBONATE AND POTASSIUM CHLORIDE 5000 ML: 5.15; 2.03; 22; 5.4; 6.46; 3.09; .157 INJECTION INTRAVENOUS at 08:56

## 2024-11-08 RX ADMIN — VASOPRESSIN 4 UNITS/HR: 20 INJECTION INTRAVENOUS at 17:43

## 2024-11-08 RX ADMIN — CALCIUM CHLORIDE, MAGNESIUM CHLORIDE, DEXTROSE MONOHYDRATE, LACTIC ACID, SODIUM CHLORIDE, SODIUM BICARBONATE AND POTASSIUM CHLORIDE 5000 ML: 5.15; 2.03; 22; 5.4; 6.46; 3.09; .157 INJECTION INTRAVENOUS at 23:03

## 2024-11-08 RX ADMIN — Medication 40 MG: at 15:57

## 2024-11-08 RX ADMIN — CALCIUM CHLORIDE, MAGNESIUM CHLORIDE, DEXTROSE MONOHYDRATE, LACTIC ACID, SODIUM CHLORIDE, SODIUM BICARBONATE AND POTASSIUM CHLORIDE: 5.15; 2.03; 22; 5.4; 6.46; 3.09; .157 INJECTION INTRAVENOUS at 12:36

## 2024-11-08 NOTE — PLAN OF CARE
Goal Outcome Evaluation:   AxO, confused on time, remains on 4 pressors, unable to pull any fluid from CRRT but able to remain net zero. CT output minimal. Family here and updated  Problem: Cardiovascular Surgery  Goal: Improved Activity Tolerance  Outcome: Not Progressing  Intervention: Optimize Tolerance for Activity  Recent Flowsheet Documentation  Taken 11/8/2024 1600 by Miguel Wright, RN  Environmental Support: calm environment promoted  Taken 11/8/2024 1200 by Miguel Wright, RN  Environmental Support: calm environment promoted  Taken 11/8/2024 0800 by Miguel Wright RN  Environmental Support: calm environment promoted  Goal: Optimal Coping with Heart Surgery  Outcome: Not Progressing  Intervention: Support Psychosocial Response to Surgery  Recent Flowsheet Documentation  Taken 11/8/2024 1600 by Miguel Wright RN  Supportive Measures: positive reinforcement provided  Family/Support System Care: caregiver stress acknowledged  Taken 11/8/2024 1200 by Miguel Wright RN  Supportive Measures: positive reinforcement provided  Family/Support System Care: caregiver stress acknowledged  Taken 11/8/2024 0800 by Miguel Wright RN  Supportive Measures: positive reinforcement provided  Family/Support System Care: caregiver stress acknowledged  Goal: Absence of Bleeding  Intervention: Monitor and Manage Bleeding  Recent Flowsheet Documentation  Taken 11/8/2024 1600 by Miguel Wright, RN  Bleeding Management: dressing monitored  Taken 11/8/2024 1200 by Miguel Wright RN  Bleeding Management: dressing monitored  Taken 11/8/2024 0800 by Miguel Wright, RN  Bleeding Management: dressing monitored  Goal: Effective Cardiac Function  Outcome: Not Progressing  Intervention: Optimize Cardiac Output and Blood Flow  Recent Flowsheet Documentation  Taken 11/8/2024 1600 by Miguel Wright RN  Dysrhythmia Management: pacing wires maintained  Taken 11/8/2024 1200 by Miguel Wright,  RN  Dysrhythmia Management: pacing wires maintained  Taken 11/8/2024 0800 by Miguel Wright RN  Dysrhythmia Management: pacing wires maintained  Goal: Optimal Cerebral Tissue Perfusion  Intervention: Protect and Optimize Cerebral Perfusion  Recent Flowsheet Documentation  Taken 11/8/2024 1600 by Miguel Wright, RN  Sensory Stimulation Regulation:   auditory stimulation minimized   care clustered   lighting decreased   quiet environment promoted  Cerebral Perfusion Promotion: blood pressure monitored  Glycemic Management: blood glucose monitored  Head of Bed (HOB) Positioning: (reverse trendelenburg) HOB flat  Taken 11/8/2024 1400 by Miguel Wright RN  Head of Bed (HOB) Positioning: (reverse trend) HOB flat  Taken 11/8/2024 1200 by Miguel Wright, RN  Sensory Stimulation Regulation:   auditory stimulation minimized   care clustered   lighting decreased   quiet environment promoted  Cerebral Perfusion Promotion: blood pressure monitored  Glycemic Management: blood glucose monitored  Head of Bed (HOB) Positioning: (reverse trendelenburg) HOB flat  Taken 11/8/2024 1000 by Miguel Wright, RN  Head of Bed (HOB) Positioning: (reverse trendelenburg 20 degrees) HOB flat  Taken 11/8/2024 0800 by Miguel Wright, RN  Sensory Stimulation Regulation:   auditory stimulation minimized   care clustered   lighting decreased   quiet environment promoted  Cerebral Perfusion Promotion: blood pressure monitored  Glycemic Management: blood glucose monitored  Head of Bed (HOB) Positioning: (reverse trendelenburg) HOB flat  Goal: Blood Glucose Level Within Targeted Range  Intervention: Optimize Glycemic Control  Recent Flowsheet Documentation  Taken 11/8/2024 1600 by Miguel Wright, RN  Glycemic Management: blood glucose monitored  Taken 11/8/2024 1200 by Miguel Wright, RN  Glycemic Management: blood glucose monitored  Taken 11/8/2024 0800 by Miguel Wright, RN  Glycemic Management: blood glucose  monitored  Goal: Absence of Infection Signs and Symptoms  Outcome: Not Progressing  Intervention: Prevent or Manage Infection  Recent Flowsheet Documentation  Taken 11/8/2024 1600 by Miguel Wright, RN  Infection Prevention: hand hygiene promoted  Taken 11/8/2024 1200 by Miguel Wright, RN  Infection Prevention: hand hygiene promoted  Taken 11/8/2024 0800 by Miguel Wright, RN  Infection Prevention: hand hygiene promoted  Goal: Anesthesia/Sedation Recovery  Intervention: Optimize Anesthesia Recovery  Recent Flowsheet Documentation  Taken 11/8/2024 1600 by Miguel Wright, RN  Safety Promotion/Fall Prevention:   activity supervised   clutter free environment maintained   increased rounding and observation   increase visualization of patient   lighting adjusted   nonskid shoes/slippers when out of bed   room organization consistent   safety round/check completed   treat reversible contributory factors   treat underlying cause  Reorientation Measures:   calendar in view   clock in view   reorientation provided  Taken 11/8/2024 1200 by Miguel Wright, RN  Safety Promotion/Fall Prevention:   activity supervised   clutter free environment maintained   increased rounding and observation   increase visualization of patient   lighting adjusted   nonskid shoes/slippers when out of bed   room organization consistent   safety round/check completed   treat reversible contributory factors   treat underlying cause  Reorientation Measures:   calendar in view   clock in view   reorientation provided  Taken 11/8/2024 0800 by Miguel Wright, RN  Safety Promotion/Fall Prevention:   activity supervised   clutter free environment maintained   increased rounding and observation   increase visualization of patient   lighting adjusted   nonskid shoes/slippers when out of bed   room organization consistent   safety round/check completed   treat reversible contributory factors   treat underlying cause  Reorientation  Measures:   calendar in view   clock in view   reorientation provided  Goal: Effective Urinary Elimination  Outcome: Not Progressing  Goal: Effective Oxygenation and Ventilation  Intervention: Promote Airway Secretion Clearance  Recent Flowsheet Documentation  Taken 11/8/2024 1600 by Miguel Wright, RN  Cough And Deep Breathing: done with encouragement  Airway/Ventilation Management: airway patency maintained  Taken 11/8/2024 1200 by Miguel Wright, RN  Cough And Deep Breathing: done with encouragement  Airway/Ventilation Management: airway patency maintained  Taken 11/8/2024 0800 by Miguel Wright, RN  Cough And Deep Breathing: done with encouragement  Airway/Ventilation Management: airway patency maintained  Intervention: Optimize Oxygenation and Ventilation  Recent Flowsheet Documentation  Taken 11/8/2024 1600 by Miguel Wright, RN  Chest Tube Safety: all connections secured  Taken 11/8/2024 1200 by Miguel Wright, RN  Chest Tube Safety: all connections secured  Taken 11/8/2024 0800 by Miguel Wright, RN  Chest Tube Safety: all connections secured

## 2024-11-08 NOTE — PLAN OF CARE
[id number]    Neuro: Alert, follows but slow to respond.   CV: Map > 65 maintained, Tele AFib CVR. Continues on 3 pressors  Resp: Extubated today @ 1132. Now on NC @ 3LPM  GI: Passing gas, active BS, no BM this shift.   Diet: TF at goal  : anuric, on CRRT  Access/Sites: [place], subclavian CVC, R groin icy cath all CDI. CMS intact  Drips: Vaso, Epi, Levo, Dobutamine  Social/Family: Family updated at bedside throughout shift  Plan: OT for lymph wraps tomorrow. Wean pressors as able. CRRT goal 0-50.         Problem: Mechanical Ventilation Invasive  Goal: Effective Communication  Outcome: Met     Problem: Mechanical Ventilation Invasive  Goal: Mechanical Ventilation Liberation  Outcome: Met     Problem: Cardiovascular Surgery  Goal: Optimal Coping with Heart Surgery  Outcome: Progressing     Problem: Cardiovascular Surgery  Goal: Absence of Infection Signs and Symptoms  Outcome: Progressing     Problem: Skin Injury Risk Increased  Goal: Skin Health and Integrity  Outcome: Progressing

## 2024-11-08 NOTE — PROGRESS NOTES
CV  Pressors (which pressors and any increase/decrease in pressor needs): Remains on 4 pressors, Epi 0.05, Levo up slightly to 0.04, Vasopressin 4, and dobutamine 2.5  HR range: , A-fib  Chest tube output: 30-50/hour avg from one CT atrium, zero from the other    Neuro  Orientation: AxO, disoriented to time intermittently  Delirium present?(y/n): N  Sleep: minimal  Pain: seems controled with oxy, robaxin seems to make him more comfortable    GI/  BM? (y/n): yesterday  Urine output: anuric      Lines:  [place], CVC, thermogaurd, [place], CT, Dialysis cath

## 2024-11-08 NOTE — PROGRESS NOTES
Critical Care  Note      11/08/2024    Name: Alessio Teixeira MRN#: [id number]   Age: 88 year old YOB: 1935     Hsptl Day# 11  ICU DAY # 11    MV DAY # 11             Problem List:   Active Problems:    Coronary artery disease  Mixed cardiogenic/vasoplegic shock  Acute hypoxemic respiratory failure         Summary/Hospital Course:   88 year old M with a history of severe 3 vessel CAD. Status post sternotomy, CABGx3, modified MAZE procedure, PAYAM ligation with Dr. Mulvihill 10/28/24. Post operative hemorrhage with RTOR on POD0 for evacuation of mediastinal blood clot causing tamponade physiology.   Course subsequently complicated by persistent shock (appears mixed cardigenic/vasoplegic), and ongoing respiratory failure.    Interval/overnight events:  Successfully extubated yesterday but still no improvement in pressor/inotrope needs with this.      Assessment and plan :     Alessio Teixeira IS a 88 year old male admitted on 10/28/2024 for shock and respiratory failure.   I have personally reviewed the daily labs, imaging studies, cultures and discussed the case with referring physician and consulting physicians.     My assessment and plan by system for this patient is as follows:    Neurology/Psychiatry:   1. Pain/analgesia: fentanyl prn  2. Sedation: propofol    Cardiovascular:   Shock:  appears c/w mixed cardiogenic and vasoplegic picture; persistent.  Continues on epi, norepi, vaso, and dobutamine at 2.5 to maintain CI of ~2.0. No signficant improvement in hemodynamics with extubation.  S/p CAB3:  continues on ASA.  Add statin when LFTs stabilized.    Pulmonary/Ventilator Management:   Tolerating nasal cannula    GI and Nutrition :   1. TF  2. PPI for pud prophy    Renal/Fluids/Electrolytes:   1. Acute kidney failure:  continues on CRRT.  Appreciate nephrology support.    Infectious Disease:   1. Ngtd on cultures.  Abx stopped.    Endocrine:   1. Stress induced hyperglycemia:  SSI    Hematology/Oncology:  "  1. Hyperleukocytosis to 35, known h/o CLL/MDS.  Appreciate heme input.  2. Hgb 7.4.  Acute blood loss Anemia d/t post-operative bleeding, no signs, symptoms of ongoing active blood loss  3. Pltlts 207 ok.    ICU Prophylaxis:   1. DVT: mechanical only for now  2. VAP: HOB 30 degrees, chlorhexidine rinse  3. Stress Ulcer: PPI  4. Restraints: Nonviolent soft two point restraints required and necessary for patient safety and continued cares and good effect as patient continues to pull at necessary lines, tubes despite education and distraction. Will readdress daily.   5. Wound care  -   6. Feeding - tf  7. Family Update: bedside  8. Disposition - ICU    Clinically Significant Risk Factors               # Hypoalbuminemia: Lowest albumin = 2.7 g/dL at 11/2/2024  5:44 AM, will monitor as appropriate    # Coagulation Defect: INR = 1.50 (Ref range: 0.85 - 1.15) and/or PTT = 42 Seconds (Ref range: 22 - 38 Seconds), will monitor for bleeding    # Hypertension: Noted on problem list  # Chronic heart failure with reduced ejection fraction: last echo with EF <40%   # Acute Hypercapnic Respiratory Failure: based on arterial blood gas results.  Continue supplemental oxygen and ventilatory support as indicated.  # Acute Hypercapnic Respiratory Failure: based on venous blood gas results.  Continue supplemental oxygen and ventilatory support as indicated.      #Acute blood loss anemia: Lowest Hgb this hospitalization: 6.6 g/dL. Will continue to monitor and treat/transfuse as appropriate.     # Obesity: Estimated body mass index is 32.46 kg/m  as calculated from the following:    Height as of this encounter: 1.778 m (5' 10\").    Weight as of this encounter: 102.6 kg (226 lb 3.1 oz).   # Moderate Malnutrition: based on nutrition assessment     # History of CABG: noted on surgical history                           Key Medications:     Current Facility-Administered Medications   Medication Dose Route Frequency Provider Last Rate Last " Admin    albumin human 25 % injection 25 g  25 g Intravenous Q12H Arlin Hodge PA-C 100 mL/hr at 11/07/24 2012 25 g at 11/08/24 0802    aspirin (ASA) chewable tablet 162 mg  162 mg Oral or NG Tube Daily Clarence Bone MD   162 mg at 11/08/24 0808    chlorhexidine (PERIDEX) 0.12 % solution 15 mL  15 mL Swish & Spit BID Zac Kaur DO   15 mL at 11/07/24 0813    insulin aspart (NovoLOG) injection (RAPID ACTING)  1-7 Units Subcutaneous Q4H Sb Arthur PA-C   1 Units at 11/05/24 1908    pantoprazole (PROTONIX) 2 mg/mL suspension 40 mg  40 mg Oral or NG Tube BID AC Arlin Hodge PA-C   40 mg at 11/08/24 0808    Or    pantoprazole (PROTONIX) EC tablet 40 mg  40 mg Oral BID AC Arlin Hodge PA-C        polyethylene glycol (MIRALAX) Packet 17 g  17 g Oral or Feeding Tube Daily Mulvihill, Michael, MD   17 g at 11/08/24 0809    Prosource TF20 ENfit Compatibl EN LIQD (PROSOURCE TF20) packet 60 mL  1 packet Per Feeding Tube BID Arlin Hodge PA-C        senna-docusate (SENOKOT-S/PERICOLACE) 8.6-50 MG per tablet 1 tablet  1 tablet Oral or Feeding Tube BID Mulvihill, Michael, MD   1 tablet at 11/08/24 0808    sodium chloride (PF) 0.9% PF flush 3 mL  3 mL Intracatheter Q8H Clarence Bone MD   3 mL at 11/08/24 1335     Current Facility-Administered Medications   Medication Dose Route Frequency Provider Last Rate Last Admin    dextrose 10% infusion   Intravenous Continuous PRN Arlin Hodge PA-C   Stopped at 11/05/24 0600    dextrose 10% infusion   Intravenous Continuous Sb Arthur PA-C   Stopped at 11/05/24 0200    dialysate solution (PrismaSol BGK 2/3.5)   CRRT Continuous BolandFarhad moser MD 1,500 mL/hr at 11/08/24 1236 5,000 mL at 11/08/24 1236    DOBUTamine (DOBUTREX) 500 mg in D5W 250 mL infusion (adult std conc)  2.5 mcg/kg/min Intravenous Continuous Arlin Hodge PA-C 7.8 mL/hr at 11/08/24 0737 2.5 mcg/kg/min at 11/08/24 0737    EPINEPHrine (ADRENALIN) 5 mg in   mL infusion  0.01-0.1 mcg/kg/min Intravenous Continuous PRN Clarence Bone MD 14.4 mL/hr at 11/08/24 0737 0.05 mcg/kg/min at 11/08/24 0737    lactated ringers infusion   Intravenous Continuous Opal Nguyen PA-C 20 mL/hr at 11/08/24 1319 New Bag at 11/08/24 1319    No heparin required   Does not apply Continuous PRN Farhad Boland MD        norepinephrine (LEVOPHED) 16 mg in  mL infusion MAX CONC CENTRAL LINE  0.01-0.2 mcg/kg/min Intravenous Continuous Arlin Hodge PA-C 3.6 mL/hr at 11/08/24 1311 0.04 mcg/kg/min at 11/08/24 1311    Patient may continue current oral medications   Does not apply Continuous PRN Bhanu Ness MD        Post-Filter replacement solution (PRISMASOL BGK 2/3.5)   CRRT Continuous Farhad Boland  mL/hr at 11/08/24 1236 New Bag at 11/08/24 1236    Pre-Filter replacement solution (PRISMASOL BGK 2/3.5)   CRRT Continuous Farhad Boland  mL/hr at 11/08/24 1237 5,000 mL at 11/08/24 1237    Reason beta blocker order not selected   Does not apply DOES NOT GO TO Clarence Olguin MD        vasopressin 0.2 units/mL in NS (PITRESSIN) standard conc infusion  0.5-4 Units/hr Intravenous Continuous Arlin Hodge PA-C 20 mL/hr at 11/08/24 1355 4 Units/hr at 11/08/24 1355              Physical Examination:   Temp:  [97.3  F (36.3  C)-97.8  F (36.6  C)] 97.3  F (36.3  C)  Pulse:  [] 90  MAP:  [54 mmHg-154 mmHg] 59 mmHg  Arterial Line BP: ()/() 102/41  SpO2:  [87 %-100 %] 98 %      Intake/Output Summary (Last 24 hours) at 11/7/2024 1139  Last data filed at 11/7/2024 1000  Gross per 24 hour   Intake 3438.47 ml   Output 3472 ml   Net -33.53 ml         Wt Readings from Last 4 Encounters:   11/08/24 102.6 kg (226 lb 3.1 oz)   10/25/24 97.1 kg (214 lb)   10/03/24 101.2 kg (223 lb)   10/02/24 104.8 kg (231 lb)     Arterial Line BP: ()/() 102/41  MAP:  [54 mmHg-154 mmHg] 59 mmHg  CVP:  [7 mmHg-17 mmHg] 12 mmHg  SVO2:  [42 %-47 %] 42  %  FiO2 (%): 25 %, Resp: 20, Vent Mode: (S) CPAP/PS, Resp Rate (Set): 20 breaths/min, Tidal Volume (Set, mL): 500 mL, PEEP (cm H2O): 5 cmH2O, Pressure Support (cm H2O): 5 cmH2O, Resp Rate (Set): 20 breaths/min, Tidal Volume (Set, mL): 500 mL, PEEP (cm H2O): 5 cmH2O  Recent Labs   Lab 11/08/24  0518 11/07/24  2040 11/07/24  1411 11/07/24  0954   PH 7.45 7.46* 7.44 7.46*   PCO2 35 36 38 35   PO2 86 76* 200* 117*   HCO3 24 26 26 25   O2PER 36  36 36  36 25  25 25       GEN: no acute distress   HEENT: head ncat, sclera anicteric, OP patent, trachea midline   PULM: unlabored synchronous with vent, clear anteriorly    CV/COR: RRR S1S2 no gallop,  No rub, no murmur  ABD: soft nontender, hypoactive bowel sounds, no mass  EXT:  warm and well perfused x4  NEURO: PERRL, no obvious deficits  SKIN: no obvious rash  LINES: clean, dry intact         Data:   All data and imaging reviewed     ROUTINE ICU LABS (Last four results)  CMP  Recent Labs   Lab 11/08/24  1331 11/08/24  1149 11/08/24  0813 11/08/24  0529 11/08/24  0518 11/08/24  0017 11/07/24  2217 11/07/24  1725 11/07/24  1411 11/06/24  0540 11/06/24  0514 11/05/24  0509 11/05/24  0456     --   --   --  137  --  136  --  136   < > 135   < > 135   POTASSIUM 3.7  --   --   --  3.6  --  3.7  --  3.9   < > 3.8   < > 3.8   CHLORIDE 102  --   --   --  102  --  102  --  103   < > 100   < > 100   CO2 19*  --   --   --  21*  --  21*  --  19*   < > 23   < > 23   ANIONGAP 15  --   --   --  14  --  13  --  14   < > 12   < > 12   * 116* 117* 115* 116*   < > 118*   < > 122*   < > 149*   < > 135*   BUN 33.7*  --   --   --  31.6*  --  31.0*  --  31.2*   < > 30.0*   < > 27.3*   CR 1.56*  --   --   --  1.53*  --  1.57*  --  1.59*   < > 1.71*   < > 1.76*   GFRESTIMATED 43*  --   --   --  44*  --  42*  --  42*   < > 38*   < > 37*   TATE 9.4  --   --   --  9.8  --  9.6  --  9.4   < > 9.4   < > 9.2   MAG 2.0  --   --   --  2.2  --  1.9  --  1.9   < > 2.0   < > 2.2   PHOS  --   --    "--   --  2.5  --  1.9*  --   --   --  2.3*  --  2.3*   PROTTOTAL 5.1*  --   --   --  5.3*  --  5.3*  --  5.1*   < > 5.0*   < > 5.1*   ALBUMIN 3.7  --   --   --  3.8  --  3.8  --  3.6   < > 3.6   < > 3.7   BILITOTAL 2.3*  --   --   --  1.6*  --  1.6*  --  1.6*   < > 2.1*   < > 2.5*   ALKPHOS 110  --   --   --  121  --  115  --  122   < > 153*   < > 178*   AST 40  --   --   --  43  --  44  --  48*   < > 101*   < > 235*   ALT 93*  --   --   --  112*  --  115*  --  130*   < > 223*   < > 353*    < > = values in this interval not displayed.     CBC  Recent Labs   Lab 11/08/24 0518 11/07/24 2044 11/07/24 1411 11/07/24  0537   WBC 35.1* 34.1* 32.2* 30.1*   RBC 2.65* 2.62* 2.61* 2.35*   HGB 7.4* 7.4* 7.5* 6.6*   HCT 23.5* 23.1* 22.7* 20.7*   MCV 89 88 87 88   MCH 27.9 28.2 28.7 28.1   MCHC 31.5 32.0 33.0 31.9   RDW 19.8* 19.2* 19.5* 20.3*    187 174 183     INR  Recent Labs   Lab 11/08/24 0518 11/07/24 0537 11/06/24  0514 11/05/24  0456   INR 1.50* 1.58* 1.71* 1.85*     Arterial Blood Gas  Recent Labs   Lab 11/08/24 0518 11/07/24 2040 11/07/24  1411 11/07/24  0954   PH 7.45 7.46* 7.44 7.46*   PCO2 35 36 38 35   PO2 86 76* 200* 117*   HCO3 24 26 26 25   O2PER 36  36 36  36 25  25 25       All cultures:  No results for input(s): \"CULT\" in the last 168 hours.  Recent Results (from the past 24 hours)   US Upper Extremity Arterial Duplex Right    Narrative    EXAM: US UPPER EXTREMITY ARTERIAL DUPLEX RIGHT  LOCATION: [facility]  DATE: 11/3/2024    INDICATION: Mottled hand.  COMPARISON: None available.  TECHNIQUE: Arterial Duplex ultrasound of the right arm. Color flow and spectral Doppler with waveform analysis performed.    FINDINGS (RIGHT upper extremity):    ARTERIAL PEAK SYSTOLIC VELOCITIES (cm/s):    Subclavian artery (proximal): 51  Subclavian artery (distal): 71  Axillary artery: 70  Brachial (proximal): 74  Brachial (distal): 76  Radial (proximal): 41  Radial (distal): 14  Ulnar " (proximal): 85  Ulnar (distal): 136    WAVEFORMS/COLOR DOPPLER: Triphasic waveforms are noted throughout except in the distal radial artery where biphasic waveforms are noted.      Impression    IMPRESSION:   1.  Patent right upper extremity arteries although slow flow is noted within the distal radial artery along with biphasic waveforms, findings are of indeterminate etiology, could be due to area of focal stenosis.   XR Chest Port 1 View    Narrative    EXAM: XR CHEST PORT 1 VIEW  LOCATION: [facility]  DATE: 11/3/2024    INDICATION: eval infiltrate edema  COMPARISON: Chest radiograph 11/2/2024.      Impression    IMPRESSION:     Lines and tubes: Endotracheal tube tip is not well visualized, likely in the upper trachea. Feeding tube courses below the diaphragm. [place]-Gar[place] catheter near the main pulmonary trunk. Similar left central venous catheter, left atrial appendage clip and   median sternotomy. Bilateral chest tubes.    Right basilar opacity favoring combination of pleural fluid, atelectasis and/or infection. Possible trace left pleural effusion. No discernible pneumothorax. Similar cardiomediastinal silhouette.     D/w SADE Lawson of CV surgery.    Billing: This patient is critically ill: Yes. Total critical care time today 40 min, excluding procedures.

## 2024-11-08 NOTE — PROGRESS NOTES
11/08/24 1100   Appointment Info   Signing Clinician's Name / Credentials (OT) Dinah Santoro, OTR/L   Rehab Comments (OT) OT order for lymph, PT following for CR   Quick Adds   Quick Adds Edema   Living Environment   People in Home spouse   Current Living Arrangements house   Home Accessibility stairs to enter home   Number of Stairs, Main Entrance 2   Stair Railings, Main Entrance none   Transportation Anticipated family or friend will provide   Living Environment Comments Pt lives at home w/ spouse, 2 ENA then can stay on main floor. Pt has tub shower, bench and bars.   Self-Care   Usual Activity Tolerance good   Current Activity Tolerance poor   Equipment Currently Used at Home shower chair;walker, rolling   Fall history within last six months no   Activity/Exercise/Self-Care Comment Per spouse, pt was independent w/ ADL tasks  and had recently been using FWW.   Instrumental Activities of Daily Living (IADL)   IADL Comments Per spouse, pt has not been driving recently   General Information   Onset of Illness/Injury or Date of Surgery 10/28/24   Referring Physician Opal Nguyen PA-C   Additional Occupational Profile Info/Pertinent History of Current Problem 88 year old M with a history of severe 3 vessel CAD. Status post sternotomy, CABGx3, modified MAZE procedure, PAYAM ligation with Dr. Mulvihill 10/28/24. Post operative hemorrhage with RTOR on POD0 for evacuation of mediastinal blood clot causing tamponade physiology.   Course subsequently complicated by persistent shock (appears mixed cardigenic/vasoplegic), and ongoing respiratory failure.   Existing Precautions/Restrictions fall;cardiac  (CRRT, R groin line)   General Observations and Info Pt on CRRT, okay for lymph wraps per cardiac surgery team   Cognitive Status Examination   Orientation Status person;place   Cognitive Status Comments Pt alert and following commands fairly consistently but w/ some delay - quite [place] per spouse. Oriented x3   Pain  "Assessment   Patient Currently in Pain Yes, see Vital Sign flowsheet  (soreness throughout body)   Edema General Information   Affected Body Part(s) Left LE;Right LE   General Comments/Previous Edema Treatment/Edema Equipment Per spouse, pt has had some edema in BLE previously but never wore compression garments. Report BLE look \"much better\" than earlier this week   Edema Examination/Assessment   Skin Condition Pitting;Dryness   Stemmer Sign Positive   Pitting Assessment 3+ bilaterally below knee   Edema Assessment Comments BLE w/ 3+ pitting edema below knee and into feet. A few small wounds covered w/ bandages per nursing staff.   Range of Motion Comprehensive   Comment, General Range of Motion BUE weak and painful, PROM WFL   Bed Mobility   Comment (Bed Mobility) did not assess   Transfers   Transfer Comments did not assess, anticipate pt would need lift   Activities of Daily Living   BADL Assessment/Intervention bathing;upper body dressing;lower body dressing;grooming;toileting   Bathing Assessment/Intervention   [place] Level (Bathing) dependent (less than 25% patient effort)   Comment, (Bathing) per clinical judgement   Upper Body Dressing Assessment/Training   Comment, (Upper Body Dressing) per clinical judgement   [place] Level (Upper Body Dressing) dependent (less than 25% patient effort)   Lower Body Dressing Assessment/Training   [place] Level (Lower Body Dressing) dependent (less than 25% patient effort)   Grooming Assessment/Training   [place] Level (Grooming) maximum assist (25% patient effort)   Toileting   [place] Level (Toileting) dependent (less than 25% patient effort)   Clinical Impression   Criteria for Skilled Therapeutic Interventions Met (OT) Yes, treatment indicated   OT Diagnosis decreased I/ADL independence   Edema: Patient Presentation Edema   OT Problem List-Impairments impacting ADL problems related to;activity tolerance " impaired;strength;pain;balance;cognition;communication;coordination;mobility;range of motion (ROM);post-surgical precautions;postural control   Assessment of Occupational Performance 5 or more Performance Deficits   Identified Performance Deficits impaired independence w/ all I/ADL tasks   Planned Therapy Interventions (OT) progressive activity/exercise;transfer training;strengthening;ROM;bed mobility training;cognition   Edema: Planned Interventions Gradient compression bandaging;Fit for compression garment;Edema exercises;Precautions to prevent infection/exacerbation;Education;ADL training   Clinical Decision Making Complexity (OT) detailed assessment/moderate complexity   Risk & Benefits of therapy have been explained evaluation/treatment results reviewed;care plan/treatment goals reviewed;participants included;patient;spouse/significant other   OT Total Evaluation Time   OT Eval, Moderate Complexity Minutes (08601) 10   OT Goals   Therapy Frequency (OT) Daily   OT Predicted Duration/Target Date for Goal Attainment 11/22/24   OT Goals Edema   OT: Edema education to increase ability to manage edema after discharge from the hospital Patient;Caregiver;Verbalize;Demonstrate;Supervision/Stand-by assist;signs/symptoms of intolerance;wear schedule;limb positioning;garrnet/bandage care   OT: Management of edema bandages Caregiver;Demonstrate;Supervision/Stand-by assist;quick wrap;garment(s)   OT: Functional edema exercise program to reduce limb volume, increase activity tolerance and improve independence with ADL Patient;Demonstrates;Supervision/Stand-by assist;HEP   Interventions   Interventions Quick Adds Manual Therapy   Manual Therapy   Manual Therapy: Mobilization, MFR, MLD, friction massage minutes (73089) 15   Symptoms noted during/after treatment increased pain   Treatment Detail/Skilled Intervention Pt in bed upon arrival, alert and oriented x3. Pt following commands fairly consistently though needs inc time to  process/follow commands. He is quite [place] so also needs some repetition. OT orders for lymphedema wraps per CV surgery. Discussed w/ RN and PA. Lymphedema evaluation complete and treatment initiated.  Pt demonstrates edema in BLE below knee. Some soreness in BLE. Pt is appropriate for LE edema wear. Pt and spouse educated in rationale for compression with wound healing. LEs washed, lotion applied, and applied size M edema wear from feet up to just below knees. Coordinated with nursing regarding wearing schedule, If pt does not tolerate wraps well, please remove wraps but keep LEs elevated. Also edu on HEP for lymph mgmt. Pt completed 10-15reps bilateral ankle pumps, AAROM knee flex. Deferred hip ROM d/t groin lines. Further mobility deferred at this time. Left in bed w/ all needs met, RN present at exit.   OT Discharge Planning   OT Plan monitor edemawear and assess need for wraps as tolerated (pt has some pain in BLE today so started w/ edemawear), BLE HEP (avoid R hip ROM d/t groin line)   OT Discharge Recommendation (DC Rec) Transitional Care Facility   OT Rationale for DC Rec Pt well below baseline, limited by decreased activity tolerance, weakness, edema. Not yet able to mobilize d/t medical complexity, groin lines, CRRT. Anticipate pt will need ongoing rehab at d/c. OT will continue to follow and update recs as appropriate.   OT Brief overview of current status Goals of therapy will be to address safe mobility and make recs for d/c to next level of care. Pt and RN will continue to follow all falls risk precautions as documented by RN staff while hospitalized. donned size M edema wear to BLE   Total Session Time   Timed Code Treatment Minutes 15   Total Session Time (sum of timed and untimed services) 25

## 2024-11-08 NOTE — PROGRESS NOTES
CLINICAL NUTRITION SERVICES - REASSESSMENT NOTE      Recommendations Ordered by Registered Dietitian (RD): Change TF goal to Osmolite 1.5 at 65 mL/hr= 2340 kcal, 98 g protein, 318 g CHO, 0 g fiber, 1189 mL H2O  Decrease ProSource TF20 to 1 pkt BID = 160 kcal and 40 g protein  Total = 2500 kcal (31 kcal/kg and 103% needs), 138 g protein (1.7 g/kg)   Malnutrition: 11/4  % Weight Loss:  None noted (weight up from admit)  % Intake:  </= 50% for >/= 5 days (severe malnutrition)  Subcutaneous Fat Loss:  None observed however may be masked by obesity   Muscle Loss:  Moderate (LE) per wife, difficult to assess with fluid up   Fluid Retention:  None noted     Malnutrition Diagnosis: Moderate malnutrition  In Context of:  Acute illness or injury       EVALUATION OF PROGRESS TOWARD GOALS   Diet:  NPO    Nutrition Support:  Patient's TF regimen switched on 11/4, advanced to goal on 11/5, and continues as follows ~    Nutrition Support Enteral:  Type of Feeding Tube: Tip at LOT   Enteral Frequency:  Continuous  Enteral Regimen: Promote at 50 mL/hr  Total Enteral Provisions: 1200 kcal, 74 g protein, 156 g CHO, 0 g fiber, 1007 mL H2O   Free Water Flush: None (cancelled by provider 10/31)  ProSource TF 20 to 1 pkt TID = 240 kcal, 60 g protein   Total = 1440 kcal (18 kcal/kg), 134 g protein (1.7 g/kg)    Intake/Tolerance:    K/Mg/Phos NL  -127 with Med sliding scale  I/O 3878/3890, wt 102.6 kg (up from admit) - CRRT continues, also on IV Albumin   Last BM 11/6      ASSESSED NUTRITION NEEDS:  Dosing Weight::    81 kg (adjusted)  Estimated Energy Needs: 6601-1404 kcals (25-30 Kcal/Kg)  Justification: obese   Estimated Protein Needs: 120-160 grams protein (1.5-2 g pro/Kg)  Justification: CRRT  Estimated Fluid Needs: Per provider   Justification: per provider pending fluid status      NEW FINDINGS:   11/7: Extubated, Propofol off     11/4: WOCN=   Wound location: Left groin   Wound due to: Moisture Associated Skin Damage (MASD)  and Medical Adhesive Related Skin Injury (MARSI)   STATUS: initial assessment      Wound location: Right sacrum/ upper buttock   Wound due to: pressure vs minor trauma   STATUS: healing/improving per Nursing      Wound location: Perianal   Wound due to: pressure from tubing vs other   STATUS: initial assessment     CT= 860 mL  Continues on CRRT  Pressor x 4     Previous Goals (11/4):   Goal TF regimen will meet % needs   Evaluation: Not met    Previous Nutrition Diagnosis (11/4):   Inadequate protein-energy intake related to TF at trophic rate as evidenced by meeting 60% protein and 75% energy needs from current regimen   Evaluation: Improving      CURRENT NUTRITION DIAGNOSIS  Inadequate energy intake related to TF at goal, Propofol off with extubation as evidenced by meeting ~70% goal energy needs with current TF regimen    INTERVENTIONS  Recommendations / Nutrition Prescription  Change TF goal to Osmolite 1.5 at 65 mL/hr= 2340 kcal, 98 g protein, 318 g CHO, 0 g fiber, 1189 mL H2O  Decrease ProSource TF20 to 1 pkt BID = 160 kcal and 40 g protein  Total = 2500 kcal (31 kcal/kg and 103% needs), 138 g protein (1.7 g/kg)    Implementation  EN Composition, EN Schedule: Above TF orders written   Collaboration and Referral of Nutrition care: Patient discussed today during interdisciplinary bedside rounds    Goals  Goal TF regimen will meet % needs     MONITORING AND EVALUATION:  Progress towards goals will be monitored and evaluated per protocol and Practice Guidelines    Emily Copeland RD, LD, CNSC   Clinical Dietitian - [facility]

## 2024-11-08 NOTE — PROGRESS NOTES
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 Michael Mulvihill.    POD # 10 s/p return to OR for mediastinal re-exploration, washout    - CVS: Pre-op TTE with EF 35-40%. Postop TTE 10/30 with EF ~35% on high-dose inotropic support.  Gtt's: Epi 0.05 Norepi: 0.04 Vaso: 4 DBU 2.5  Postop mixed cardiogenic/vasoplegic/hypovolemic shock. Lactic acidosis cleared and SVO2 stabilized. CI goal >2.0 (calculated elaine), stopped DBU 11/4 no back on after dec in SVO2 with inc in lact back on at 2.5. NE off and vasopressin to MAP goal 65, wean as able.   Hypervolemic, volume management via CRRT, pull as able. CVP goal ~10-12. Now tolerating more aggressive removal on hold as becoming euvolemic. Albumin 25% push BID to pull volume intravascular.  Hypothermic since initiation on CRRT, Thermagaurd catheter placed 10/31, target 37 C.   Stress dose steroids started 10/31, off 11/02 PM.  History of paroxysmal atrial fibrillation, continues with intermittent afib and accelerated junctional at times. Previously being atrial paced at 100, now appears to have improved BP without pacing (intrinsic rate ~80 BP, sinus rhythm with intermittent atrial fibrillation). Amio discontinued with shock liver. Defer systemic anticoagulation at present (ok for citrate for CRRT machine if needed).   Absent right radial pulse (previous arterial line in this location). ICU MD did bedside US, ulnar artery patent. Right hand warm, good capillary refill. Increased duskiness of right hand noted after starting vasopressin, which demonstrated patent arterial flow with slow biphasic flow at the distal radial artery (site of previous  arterial line).   Aspirin 162 mg daily, defer statin with ALI.    Chest tubes: output 860<750<975<970 mL, serosang, no air leak. TPW: AAI back up rate of 50    - Resp: Acute hypoxemic and hypercapnic respiratory failure, stable. Extubated pod#9    - Neuro: Sedated with propofol while intubated. Add fentanyl infusion and Versed PRN to improve vent compliance. History of myasthenia gravis. Purposeful upper extremity movements and spontaneous LE movement when sedation lightened.     - Renal: CKD stage 3, baseline Cr ~1.8. Postop oliguric/anuric JAVIER. Nephrology consulted, CRRT started 10/30. Avoid nephrotoxins.   Recent Labs   Lab 11/08/24  1331 11/08/24  0518 11/07/24  2217   CR 1.56* 1.53* 1.57*       - GI: +BM, +flatus, continue bowel regimen. Shock liver, improving. Trend LFTs, avoid hepatotoxins. Recent admission for GIB 09/2024, increased pantoprazole to BID. Diarrhea with initiation of TF, rectal tube placed 11/05 removed-stable now.    - : bladder scan q shift. Anuric on CRRT    - Endo: Postop stress hyperglycemia, resolved. Insulin infusion transitioned to sliding scale insulin. Stress dose steroids 10/31-11/02 as above.   Hemoglobin A1C   Date Value Ref Range Status   10/09/2024 4.5 <5.7 % Final     Comment:     Normal <5.7%   Prediabetes 5.7-6.4%    Diabetes 6.5% or higher     Note: Adopted from ADA consensus guidelines.        - FEN: Replace electrolytes as needed. Initiated on trophic feeds via OG 11/1, NJ placed 11/2 after INR came down, TF increase to goal.      - ID: WBC chronically elevated and hypothermic on CRRT as above so difficult to assess for possible infection;  empiric Vanc/Zosyn 10/31-11/04. Blood, urine, respiratory cultures NGTD. WBC elevated.  Trend CBC and fever curve.   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 Acute blood loss anemia due to surgery. Postop coagulopathy, improved (required multiple blood transfusions and blood productions immediately postop). 1 unit RBCs 11/3. 1 unit RBC 11/5 Trend CBC, transfuse PRN. 1U PRBC 11/7/24  Recent Labs   Lab 11/08/24  0518 11/07/24  2044 11/07/24  1411   HGB 7.4* 7.4* 7.5*    187 174       - Proph: SCD, subcutaneous heparin, PPI    - Other:  Clinically Significant Risk Factors               # Hypoalbuminemia: Lowest albumin = 2.7 g/dL at 11/2/2024  5:44 AM, will monitor as appropriate    # Coagulation Defect: INR = 1.50 (Ref range: 0.85 - 1.15) and/or PTT = 42 Seconds (Ref range: 22 - 38 Seconds), will monitor for bleeding    # Hypertension: Noted on problem list  # Chronic heart failure with reduced ejection fraction: last echo with EF <40%   # Acute Hypercapnic Respiratory Failure: based on arterial blood gas results.  Continue supplemental oxygen and ventilatory support as indicated.  # Acute Hypercapnic Respiratory Failure: based on venous blood gas results.  Continue supplemental oxygen and ventilatory support as indicated.      #Acute blood loss anemia: Lowest Hgb this hospitalization: 6.6 g/dL. Will continue to monitor and treat/transfuse as appropriate.     # Obesity: Estimated body mass index is 32.46 kg/m  as calculated from the following:    Height as of this encounter: 1.778 m (5' 10\").    Weight as of this encounter: 102.6 kg (226 lb 3.1 oz).   # Moderate Malnutrition: based on nutrition assessment     # History of CABG: noted on surgical history       - Dispo: ICU. Guarded prognosis but continues to slowly improve. Family updated multiple times at bedside. Medically Ready for Discharge: Anticipated in 5+ Days      Interval History  Lying in bed, breathing stable-denies SOB, tolerating tube feeds, denies pain, states he's \"confused\"     Medications  Current Facility-Administered Medications   Medication Dose Route Frequency " Provider Last Rate Last Admin    albumin human 25 % injection 25 g  25 g Intravenous Q12H Arlin Hodge PA-C 100 mL/hr at 11/07/24 2012 25 g at 11/08/24 0802    aspirin (ASA) chewable tablet 162 mg  162 mg Oral or NG Tube Daily Clarence Bone MD   162 mg at 11/08/24 0808    chlorhexidine (PERIDEX) 0.12 % solution 15 mL  15 mL Swish & Spit BID Zac Kaur DO   15 mL at 11/07/24 0813    insulin aspart (NovoLOG) injection (RAPID ACTING)  1-7 Units Subcutaneous Q4H Sb Arthur PA-C   1 Units at 11/05/24 1908    pantoprazole (PROTONIX) 2 mg/mL suspension 40 mg  40 mg Oral or NG Tube BID AC Arlin Hodge PA-C   40 mg at 11/08/24 0808    Or    pantoprazole (PROTONIX) EC tablet 40 mg  40 mg Oral BID AC Arlin Hodge PA-C        polyethylene glycol (MIRALAX) Packet 17 g  17 g Oral or Feeding Tube Daily Mulvihill, Michael, MD   17 g at 11/08/24 0809    Prosource TF20 ENfit Compatibl EN LIQD (PROSOURCE TF20) packet 60 mL  1 packet Per Feeding Tube BID Arlin Hodge PA-C        senna-docusate (SENOKOT-S/PERICOLACE) 8.6-50 MG per tablet 1 tablet  1 tablet Oral or Feeding Tube BID Mulvihill, Michael, MD   1 tablet at 11/08/24 0808    sodium chloride (PF) 0.9% PF flush 3 mL  3 mL Intracatheter Q8H Clarence Bone MD   3 mL at 11/08/24 1335     Current Facility-Administered Medications   Medication Dose Route Frequency Provider Last Rate Last Admin    acetaminophen (TYLENOL) tablet 650 mg  650 mg Oral Q4H PRN Clarence Bone MD        bisacodyl (DULCOLAX) suppository 10 mg  10 mg Rectal Daily PRN Clarence Bone MD        calcium gluconate 2 g in  mL intermittent infusion  2 g Intravenous Q8H PRN Farhad Boland MD        calcium gluconate 4 g in sodium chloride 0.9 % 100 mL intermittent infusion  4 g Intravenous Q8H PRN Farhad Boland MD        dextrose 10% infusion   Intravenous Continuous PRN Arlin Hodge PA-C   Stopped at 11/05/24 0600    glucose gel 15-30 g   15-30 g Oral Q15 Min PRN Nasir Vallecillo MD        Or    dextrose 50 % injection 25-50 mL  25-50 mL Intravenous Q15 Min PRN Nasir Vallecillo MD        Or    glucagon injection 1 mg  1 mg Subcutaneous Q15 Min PRN Nasir Vallecillo MD        EPINEPHrine (ADRENALIN) 5 mg in  mL infusion  0.01-0.1 mcg/kg/min Intravenous Continuous PRN Clarence Bone MD 14.4 mL/hr at 11/08/24 0737 0.05 mcg/kg/min at 11/08/24 0737    fentaNYL (PF) (SUBLIMAZE) injection 25 mcg  25 mcg Intravenous Q1H PRN Bhanu Ness MD   25 mcg at 11/06/24 0009    heparin lock flush 10 unit/mL injection 3 mL  3 mL Intracatheter Q1H PRN Tyra Dubois MD        hydrALAZINE (APRESOLINE) injection 10 mg  10 mg Intravenous Q30 Min PRN Clarence Bone MD        lidocaine (LMX4) cream   Topical Q1H PRN Clarence Bone MD        lidocaine 1 % 0.1-1 mL  0.1-1 mL Other Q1H PRN Clarence Bone MD        magnesium hydroxide (MILK OF MAGNESIA) suspension 30 mL  30 mL Oral Daily PRN Clarence Bone MD        magnesium sulfate 2 g in 50 mL sterile water intermittent infusion  2 g Intravenous Q8H PRN Farhad Boland MD   2 g at 11/07/24 2339    methocarbamol (ROBAXIN) tablet 500 mg  500 mg Oral Q6H PRN Clarence Bone MD   500 mg at 11/08/24 1103    naloxone (NARCAN) injection 0.2 mg  0.2 mg Intravenous Q2 Min PRN Mulvihill, Michael, MD        Or    naloxone (NARCAN) injection 0.4 mg  0.4 mg Intravenous Q2 Min PRN Mulvihill, Michael, MD        Or    naloxone (NARCAN) injection 0.2 mg  0.2 mg Intramuscular Q2 Min PRN Mulvihill, Michael, MD        Or    naloxone (NARCAN) injection 0.4 mg  0.4 mg Intramuscular Q2 Min PRN Mulvihill, Michael, MD        No heparin required   Does not apply Continuous PRN Farhad Boland MD        ondansetron (ZOFRAN ODT) ODT tab 4 mg  4 mg Oral Q6H PRN Clarence Bone MD        Or    ondansetron (ZOFRAN) injection 4 mg  4 mg Intravenous Q6H PRN Clarence Bone MD      "   oxyCODONE IR (ROXICODONE) half-tab 2.5 mg  2.5 mg Oral Q4H PRN Clarence Bone MD   2.5 mg at 11/02/24 0214    Or    oxyCODONE (ROXICODONE) tablet 5 mg  5 mg Oral Q4H PRN Clarence Bone MD   5 mg at 11/08/24 0902    Patient may continue current oral medications   Does not apply Continuous PRN Bhanu Ness MD        potassium chloride 20 mEq in 50 mL intermittent infusion  20 mEq Intravenous Q8H PRN Farhad Boland MD 50 mL/hr at 11/04/24 1200 20 mEq at 11/04/24 1200    prochlorperazine (COMPAZINE) injection 5 mg  5 mg Intravenous Q6H PRN Clarence Bone MD        Or    prochlorperazine (COMPAZINE) tablet 5 mg  5 mg Oral Q6H PRN Clarence Bone MD        Reason beta blocker order not selected   Does not apply DOES NOT GO TO Clarence Olguin MD        sodium chloride (PF) 0.9% PF flush 3 mL  3 mL Intracatheter q1 min prn Clarence Bone MD        sodium chloride 0.9% BOLUS 1-250 mL  1-250 mL Intravenous Q1H PRN Miguel Floyd MD        sodium phosphate 15 mmol in NS 250mL intermittent infusion  15 mmol Intravenous Q8H PRN Farhad Boland MD   15 mmol at 11/08/24 0049         Physical Exam  Vitals were reviewed  Blood pressure (!) 115/39, pulse 89, temperature 97.3  F (36.3  C), temperature source Oral, resp. rate 20, height 1.778 m (5' 10\"), weight 102.6 kg (226 lb 3.1 oz), SpO2 97%.  Rhythm: intermittent NSR and atrial fibrillation    Lungs: diminished bases     Cardiovascular: irregular rate/rhythm, no m/r/g    Abdomen: soft, NT, ND, +BS    Extremeties: 3+ BLE/BUE edema    Incision: CDI    CT: serosang output 860<750<975<1580 mL, no air leak    Weight:   Vitals:    11/04/24 0400 11/05/24 0300 11/06/24 0200 11/07/24 0200   Weight: 109.4 kg (241 lb 2.9 oz) 104.5 kg (230 lb 6.1 oz) 102.7 kg (226 lb 6.6 oz) 101 kg (222 lb 10.6 oz)    11/08/24 0430   Weight: 102.6 kg (226 lb 3.1 oz)         Data  Recent Labs   Lab 11/08/24  1331 11/08/24  1149 11/08/24  0813 " 11/08/24  0529 11/08/24  0518 11/08/24  0017 11/07/24  2217 11/07/24  2050 11/07/24  2044 11/07/24  1725 11/07/24  1411 11/07/24  0541 11/07/24  0537 11/06/24  0540 11/06/24  0514   WBC  --   --   --   --  35.1*  --   --   --  34.1*  --  32.2*  --  30.1*   < > 41.0*   HGB  --   --   --   --  7.4*  --   --   --  7.4*  --  7.5*  --  6.6*   < > 7.5*   MCV  --   --   --   --  89  --   --   --  88  --  87  --  88   < > 87   PLT  --   --   --   --  207  --   --   --  187  --  174  --  183   < > 187   INR  --   --   --   --  1.50*  --   --   --   --   --   --   --  1.58*  --  1.71*     --   --   --  137  --  136  --   --   --  136  --  135   < > 135   POTASSIUM 3.7  --   --   --  3.6  --  3.7  --   --   --  3.9  --  3.9   < > 3.8   CHLORIDE 102  --   --   --  102  --  102  --   --   --  103  --  100   < > 100   CO2 19*  --   --   --  21*  --  21*  --   --   --  19*  --  19*   < > 23   BUN 33.7*  --   --   --  31.6*  --  31.0*  --   --   --  31.2*  --  31.0*   < > 30.0*   CR 1.56*  --   --   --  1.53*  --  1.57*  --   --   --  1.59*  --  1.66*   < > 1.71*   ANIONGAP 15  --   --   --  14  --  13  --   --   --  14  --  16*   < > 12   TATE 9.4  --   --   --  9.8  --  9.6  --   --   --  9.4  --  9.2   < > 9.4   * 116* 117*   < > 116*   < > 118*   < >  --    < > 122*   < > 129*   < > 149*   ALBUMIN 3.7  --   --   --  3.8  --  3.8  --   --   --  3.6  --  3.4*   < > 3.6   PROTTOTAL 5.1*  --   --   --  5.3*  --  5.3*  --   --   --  5.1*  --  4.8*   < > 5.0*   BILITOTAL 2.3*  --   --   --  1.6*  --  1.6*  --   --   --  1.6*  --  1.5*   < > 2.1*   ALKPHOS 110  --   --   --  121  --  115  --   --   --  122  --  123   < > 153*   ALT 93*  --   --   --  112*  --  115*  --   --   --  130*  --  144*   < > 223*   AST 40  --   --   --  43  --  44  --   --   --  48*  --  56*   < > 101*    < > = values in this interval not displayed.       Imaging:  No results found for this or any previous visit (from the past 24  hours).          Patient seen and discussed with Dr. Lisa Nguyen PA-C  Cardiothoracic Surgery  Available for paging [phone] (personal pager or CV Surgery Rounding Pager)  Personal Pager: [phone]  CV Surgery Rounding Pager: [phone]  After hours please page surgeon on-call

## 2024-11-08 NOTE — PROGRESS NOTES
Care Management Follow Up    Length of Stay (days): 11    Expected Discharge Date: 11/11/2024     Concerns to be Addressed:       Patient plan of care discussed at interdisciplinary rounds: Yes    Anticipated Discharge Disposition: Skilled Nursing Facility      Anticipated Discharge Services:    Anticipated Discharge DME:      Patient/family educated on Medicare website which has current facility and service quality ratings:    Education Provided on the Discharge Plan:    Patient/Family in Agreement with the Plan: yes    Referrals Placed by CM/SW: Post Acute Facilities  Private pay costs discussed: Not applicable    Discussed  Partnership in Safe Discharge Planning  document with patient/family: No     Handoff Completed: No, handoff not indicated or clinically appropriate    Additional Information:  Patient is requiring ICU level of cares     Next Steps: SW/CC following to assist with any discharge planning     BALJIT Dill

## 2024-11-08 NOTE — PLAN OF CARE
Problem: Adult Inpatient Plan of Care  Goal: Plan of Care Review  Description: The Plan of Care Review/Shift note should be completed every shift.  The Outcome Evaluation is a brief statement about your assessment that the patient is improving, declining, or no change.  This information will be displayed automatically on your shift  note.  Recent Flowsheet Documentation  Taken 11/8/2024 0550 by Shreya Sloan RN  Outcome Evaluation: Needing small increase in dose levo (now at .03) otherwise gtts remain unchanged. Still having ~50 mL/hr out of chest tube. Remains in afib. Tolerating CRRT (goal for net 0). On 4L NC, tolerating well. LS clear/diminished. TF remain at goal. Anuric. Pain controlled with Oxycodone and Robaxin.  Plan of Care Reviewed With:   patient   family  Overall Patient Progress: no change  Goal: Absence of Hospital-Acquired Illness or Injury  Intervention: Identify and Manage Fall Risk  Recent Flowsheet Documentation  Taken 11/8/2024 0400 by Shreya Sloan RN  Safety Promotion/Fall Prevention:   activity supervised   clutter free environment maintained   increased rounding and observation   increase visualization of patient   lighting adjusted   nonskid shoes/slippers when out of bed   room organization consistent   safety round/check completed   treat reversible contributory factors   treat underlying cause  Taken 11/8/2024 0000 by Shreya Sloan RN  Safety Promotion/Fall Prevention:   activity supervised   clutter free environment maintained   increased rounding and observation   increase visualization of patient   lighting adjusted   nonskid shoes/slippers when out of bed   room organization consistent   safety round/check completed   treat reversible contributory factors   treat underlying cause  Taken 11/7/2024 2000 by Shreya Sloan RN  Safety Promotion/Fall Prevention:   activity supervised   clutter free environment maintained   increased rounding and observation   increase  visualization of patient   lighting adjusted   nonskid shoes/slippers when out of bed   room organization consistent   safety round/check completed   treat reversible contributory factors   treat underlying cause  Intervention: Prevent Skin Injury  Recent Flowsheet Documentation  Taken 11/8/2024 0600 by Shreya Sloan RN  Body Position: turned  Taken 11/8/2024 0400 by Shreya Sloan RN  Body Position: turned  Skin Protection: adhesive use limited  Taken 11/8/2024 0200 by Shreya Sloan RN  Body Position: turned  Taken 11/8/2024 0000 by Shreya Sloan RN  Body Position: turned  Skin Protection: adhesive use limited  Taken 11/7/2024 2200 by Shreya Sloan RN  Body Position: turned  Taken 11/7/2024 2000 by Shreya Sloan RN  Body Position: turned  Skin Protection: adhesive use limited  Intervention: Prevent and Manage VTE (Venous Thromboembolism) Risk  Recent Flowsheet Documentation  Taken 11/8/2024 0400 by Shreya Sloan RN  VTE Prevention/Management: SCDs on (sequential compression devices)  Taken 11/8/2024 0000 by Shreya Sloan RN  VTE Prevention/Management: SCDs on (sequential compression devices)  Taken 11/7/2024 2000 by Shreya Sloan RN  VTE Prevention/Management: SCDs on (sequential compression devices)  Intervention: Prevent Infection  Recent Flowsheet Documentation  Taken 11/8/2024 0400 by Shreya Sloan RN  Infection Prevention: hand hygiene promoted  Taken 11/8/2024 0000 by Shreya Sloan RN  Infection Prevention: hand hygiene promoted  Taken 11/7/2024 2000 by Shreya Sloan RN  Infection Prevention: hand hygiene promoted  Goal: Optimal Comfort and Wellbeing  11/8/2024 0550 by Shreya Sloan RN  Outcome: Not Progressing  11/8/2024 0548 by Shreya Sloan RN  Outcome: Not Progressing  Intervention: Monitor Pain and Promote Comfort  Recent Flowsheet Documentation  Taken 11/8/2024 0330 by Shreya Sloan RN  Pain Management Interventions: medication  (see MAR)  Taken 11/8/2024 0000 by Shreya Sloan RN  Pain Management Interventions:   rest   repositioned  Taken 11/7/2024 2000 by Shreya Sloan RN  Pain Management Interventions:   rest   repositioned  Intervention: Provide Person-Centered Care  Recent Flowsheet Documentation  Taken 11/8/2024 0400 by Shreya Sloan RN  Trust Relationship/Rapport:   care explained   choices provided  Taken 11/8/2024 0000 by Shreya Sloan RN  Trust Relationship/Rapport:   care explained   choices provided  Taken 11/7/2024 2000 by Shreya Sloan RN  Trust Relationship/Rapport:   care explained   choices provided  Goal: Readiness for Transition of Care  11/8/2024 0550 by Shreya Sloan RN  Outcome: Not Progressing  11/8/2024 0548 by Shreya Sloan RN  Outcome: Not Progressing  Goal: Plan of Care Review  Description: The Plan of Care Review/Shift note should be completed every shift.  The Outcome Evaluation is a brief statement about your assessment that the patient is improving, declining, or no change.  This information will be displayed automatically on your shift  note.  Outcome: Not Progressing  Flowsheets (Taken 11/8/2024 0550)  Outcome Evaluation: Needing small increase in dose levo (now at .03) otherwise gtts remain unchanged. Still having ~50 mL/hr out of chest tube. Remains in afib. Tolerating CRRT (goal for net 0). On 4L NC, tolerating well. LS clear/diminished. TF remain at goal. Anuric. Pain controlled with Oxycodone and Robaxin.  Plan of Care Reviewed With:   patient   family  Overall Patient Progress: no change       Problem: Skin Injury Risk Increased  Goal: Skin Health and Integrity  11/8/2024 0550 by Shreya Sloan RN  Outcome: Not Progressing  11/8/2024 0548 by Shreya Sloan RN  Outcome: Not Progressing  Intervention: Plan: Nurse Driven Intervention: Positioning  Recent Flowsheet Documentation  Taken 11/8/2024 0400 by Shreya Sloan RN  Plan: Positioning Interventions:    REPOSITION Left/Right (No supine) q2h   Use wedge positioners   HOB 30 degrees or less  Taken 11/8/2024 0000 by Shreya Sloan RN  Plan: Positioning Interventions:   REPOSITION Left/Right (No supine) q2h   Use wedge positioners   HOB 30 degrees or less  Taken 11/7/2024 2000 by Shreya Sloan RN  Plan: Positioning Interventions:   REPOSITION Left/Right (No supine) q2h   Use wedge positioners   HOB 30 degrees or less  Intervention: Plan: Nurse Driven Intervention: Moisture Management  Recent Flowsheet Documentation  Taken 11/8/2024 0430 by Shreya Sloan RN  Bathing/Skin Care:   back care   wipes, CHG   dressed/undressed   electrode patches/site rotation   linen changed  Taken 11/8/2024 0400 by Shreya Sloan RN  Moisture Interventions:   No brief in bed   Incontinence pad   Perineal cleanser  Taken 11/8/2024 0000 by Shreya Sloan RN  Moisture Interventions:   No brief in bed   Incontinence pad   Perineal cleanser  Taken 11/7/2024 2000 by Shreya Sloan RN  Moisture Interventions:   No brief in bed   Incontinence pad   Perineal cleanser  Intervention: Plan: Nurse Driven Intervention: Friction and Shear  Recent Flowsheet Documentation  Taken 11/8/2024 0400 by Shreya Sloan RN  Friction/Shear Interventions:   HOB 30 degrees or less   Silicone foam sacral dressing   Assistive lifting device (portable/ceiling lift, etc.)   Lateral transfer device (hovermat, etc.)   Repositioning device (TAP system, etc.)  Taken 11/8/2024 0000 by Shreya Sloan RN  Friction/Shear Interventions:   HOB 30 degrees or less   Silicone foam sacral dressing   Assistive lifting device (portable/ceiling lift, etc.)   Lateral transfer device (hovermat, etc.)   Repositioning device (TAP system, etc.)  Taken 11/7/2024 2000 by Shreya Sloan RN  Friction/Shear Interventions:   HOB 30 degrees or less   Silicone foam sacral dressing   Assistive lifting device (portable/ceiling lift, etc.)   Lateral transfer device  (hovermat, etc.)   Repositioning device (TAP system, etc.)  Intervention: Optimize Skin Protection  Recent Flowsheet Documentation  Taken 11/8/2024 0600 by Shreya Sloan RN  Head of Bed (HOB) Positioning: HOB at 20-30 degrees  Taken 11/8/2024 0400 by Shreya Sloan RN  Pressure Reduction Techniques: pressure points protected  Pressure Reduction Devices: pressure-redistributing mattress utilized  Skin Protection: adhesive use limited  Activity Management: bedrest  Head of Bed (HOB) Positioning: HOB at 20-30 degrees  Taken 11/8/2024 0200 by Shreya Sloan RN  Head of Bed (HOB) Positioning: HOB at 20-30 degrees  Taken 11/8/2024 0000 by Shreya Sloan RN  Pressure Reduction Techniques: pressure points protected  Pressure Reduction Devices: pressure-redistributing mattress utilized  Skin Protection: adhesive use limited  Activity Management: bedrest  Head of Bed (HOB) Positioning: HOB at 20-30 degrees  Taken 11/7/2024 2200 by Shreya Sloan RN  Head of Bed (HOB) Positioning: HOB at 20-30 degrees  Taken 11/7/2024 2000 by Shreya Sloan RN  Pressure Reduction Techniques: pressure points protected  Pressure Reduction Devices: pressure-redistributing mattress utilized  Skin Protection: adhesive use limited  Activity Management: bedrest  Head of Bed (HOB) Positioning: HOB at 20-30 degrees

## 2024-11-08 NOTE — PROGRESS NOTES
"Nephrology Progress Note  11/08/2024         Assessment & Recommendations:   1POD # 10 s/p CABG x 3 (LIMA to LAD, SVG to OM, SVG to RCA)   2 Severe JAVIER - on CRRT  3 Peristent shock - Cardiogenic -> on multiple pressors   4 Severe post op anemia - PRBC prn per primary team   5 FEN - bicarb 21, lytes okay on CRRT . Phos 2.5     Continue CRRT. General hypervolemia ( ~7 kg up by weight , 5 L Up by I/O) but Did not tolerate UF of 50 ml/hr. Will try to keep atleast even and re-attempt UF if hemodynamics improve        Wesley Carty MD  [facility] - Nephrology   [phone]      Interval History :   Seen / examined.   Remains on multiple pressors - Dobut/ Epi/LEvo/ Vaso  CVP ~ 12, PA mean 32   7 kg up by weight , 5 L Up by I/O  CRRT running okay . Good clearance,   Lactate 2.2, SvO2 lower         Physical Exam:   I/O last 3 completed shifts:  In: 3878.35 [I.V.:1893.35; NG/GT:585]  Out: 3896 [Other:3036; Chest Tube:860]  BP (!) 115/39   Pulse 98   Temp 97.8  F (36.6  C) (Oral)   Resp 20   Ht 1.778 m (5' 10\")   Wt 102.6 kg (226 lb 3.1 oz)   SpO2 96%   BMI 32.46 kg/m      GENERAL APPEARANCE: 7 kg up by weight , 5 L Up by I/O  EYES:  no scleral icterus, pupils equal  PULM- diminished at bases   CV: s1+s2      -edema +, pitting    GI: soft, BS+  NEURO: mentation intact    Labs:   All labs reviewed by me  Electrolytes/Renal -   Recent Labs   Lab Test 11/08/24  0813 11/08/24  0529 11/08/24  0518 11/08/24  0017 11/07/24  2217 11/07/24  1725 11/07/24  1411 11/06/24  0540 11/06/24  0514   NA  --   --  137  --  136  --  136   < > 135   POTASSIUM  --   --  3.6  --  3.7  --  3.9   < > 3.8   CHLORIDE  --   --  102  --  102  --  103   < > 100   CO2  --   --  21*  --  21*  --  19*   < > 23   BUN  --   --  31.6*  --  31.0*  --  31.2*   < > 30.0*   CR  --   --  1.53*  --  1.57*  --  1.59*   < > 1.71*   * 115* 116*   < > 118*   < > 122*   < > 149*   TATE  --   --  9.8  --  9.6  --  9.4   < > 9.4   MAG  --   --  2.2  " --  1.9  --  1.9   < > 2.0   PHOS  --   --  2.5  --  1.9*  --   --   --  2.3*    < > = values in this interval not displayed.       CBC -   Recent Labs   Lab Test 11/08/24 0518 11/07/24  2044 11/07/24  1411   WBC 35.1* 34.1* 32.2*   HGB 7.4* 7.4* 7.5*    187 174       LFTs -   Recent Labs   Lab Test 11/08/24 0518 11/07/24  2217 11/07/24  1411   ALKPHOS 121 115 122   BILITOTAL 1.6* 1.6* 1.6*   * 115* 130*   AST 43 44 48*   PROTTOTAL 5.3* 5.3* 5.1*   ALBUMIN 3.8 3.8 3.6       Iron Panel -   Recent Labs   Lab Test 09/18/24  0948 09/11/24  0614 05/17/23  1034   IRON 47*  --  43*   IRONSAT 24  --  20   GARY 316   < > 93    < > = values in this interval not displayed.         Current Medications:  Current Facility-Administered Medications   Medication Dose Route Frequency Provider Last Rate Last Admin    albumin human 25 % injection 25 g  25 g Intravenous Q12H Arlin Hodge PA-C 100 mL/hr at 11/07/24 2012 25 g at 11/08/24 0802    aspirin (ASA) chewable tablet 162 mg  162 mg Oral or NG Tube Daily Clarence Bone MD   162 mg at 11/08/24 0808    chlorhexidine (PERIDEX) 0.12 % solution 15 mL  15 mL Swish & Spit BID Zca Kaur DO   15 mL at 11/07/24 0813    insulin aspart (NovoLOG) injection (RAPID ACTING)  1-7 Units Subcutaneous Q4H Her-Sb Calle PA-C   1 Units at 11/05/24 1908    pantoprazole (PROTONIX) 2 mg/mL suspension 40 mg  40 mg Oral or NG Tube BID AC Arlin Hodge PA-C   40 mg at 11/08/24 0808    Or    pantoprazole (PROTONIX) EC tablet 40 mg  40 mg Oral BID AC Arlin Hodge PA-C        polyethylene glycol (MIRALAX) Packet 17 g  17 g Oral or Feeding Tube Daily Mulvihill, Michael, MD   17 g at 11/08/24 0809    Prosource TF20 ENfit Compatibl EN LIQD (PROSOURCE TF20) packet 60 mL  1 packet Per Feeding Tube TID Arlin Hodge PA-C   60 mL at 11/08/24 0829    senna-docusate (SENOKOT-S/PERICOLACE) 8.6-50 MG per tablet 1 tablet  1 tablet Oral or Feeding Tube BID Mulvihill,  MD Miguel   1 tablet at 11/08/24 0808    sodium chloride (PF) 0.9% PF flush 3 mL  3 mL Intracatheter Q8H Clarence Bone MD   3 mL at 11/08/24 0530     Current Facility-Administered Medications   Medication Dose Route Frequency Provider Last Rate Last Admin    dextrose 10% infusion   Intravenous Continuous PRN Arlin Hodge PA-C   Stopped at 11/05/24 0600    dextrose 10% infusion   Intravenous Continuous Sb Arthur PA-C   Stopped at 11/05/24 0200    dialysate solution (PrismaSol BGK 2/3.5)   CRRT Continuous Farhad Boland MD 1,500 mL/hr at 11/04/24 0700 5,000 mL at 11/08/24 0856    DOBUTamine (DOBUTREX) 500 mg in D5W 250 mL infusion (adult std conc)  2.5 mcg/kg/min Intravenous Continuous Arlin Hodge PA-C 7.8 mL/hr at 11/08/24 0737 2.5 mcg/kg/min at 11/08/24 0737    EPINEPHrine (ADRENALIN) 5 mg in  mL infusion  0.01-0.1 mcg/kg/min Intravenous Continuous PRN Clarence Bone MD 14.4 mL/hr at 11/08/24 0737 0.05 mcg/kg/min at 11/08/24 0737    lactated ringers infusion   Intravenous Continuous Opal Nguyen PA-C 20 mL/hr at 11/07/24 1948 Rate Verify at 11/07/24 1948    No heparin required   Does not apply Continuous PRN Farhad Boland MD        norepinephrine (LEVOPHED) 16 mg in  mL infusion MAX CONC CENTRAL LINE  0.01-0.2 mcg/kg/min Intravenous Continuous Arlin Hodge PA-C 2.7 mL/hr at 11/08/24 0737 0.03 mcg/kg/min at 11/08/24 0737    Patient may continue current oral medications   Does not apply Continuous PRN Bhanu Ness MD        Post-Filter replacement solution (PRISMASOL BGK 2/3.5)   CRRT Continuous Farhad Boland  mL/hr at 11/06/24 1010 New Bag at 11/06/24 1010    Pre-Filter replacement solution (PRISMASOL BGK 2/3.5)   CRRT Continuous BolandFarhad  mL/hr at 11/04/24 0337 5,000 mL at 11/07/24 1741    Reason beta blocker order not selected   Does not apply DOES NOT GO TO Clarence Olguin MD        vasopressin 0.2 units/mL in NS  (PITRESSIN) standard conc infusion  0.5-4 Units/hr Intravenous Continuous Arlin Hodge PA-C 20 mL/hr at 11/08/24 0808 4 Units/hr at 11/08/24 0808     Wesley Carty MD

## 2024-11-09 ENCOUNTER — APPOINTMENT (OUTPATIENT)
Dept: CT IMAGING | Facility: CLINIC | Age: 89
DRG: 233 | End: 2024-11-09
Attending: PHYSICIAN ASSISTANT
Payer: MEDICARE

## 2024-11-09 ENCOUNTER — APPOINTMENT (OUTPATIENT)
Dept: GENERAL RADIOLOGY | Facility: CLINIC | Age: 89
DRG: 233 | End: 2024-11-09
Attending: INTERNAL MEDICINE
Payer: MEDICARE

## 2024-11-09 ENCOUNTER — APPOINTMENT (OUTPATIENT)
Dept: OCCUPATIONAL THERAPY | Facility: CLINIC | Age: 89
DRG: 233 | End: 2024-11-09
Attending: STUDENT IN AN ORGANIZED HEALTH CARE EDUCATION/TRAINING PROGRAM
Payer: MEDICARE

## 2024-11-09 LAB
ABO/RH(D): NORMAL
ALBUMIN SERPL BCG-MCNC: 3.8 G/DL (ref 3.5–5.2)
ALBUMIN SERPL BCG-MCNC: 3.9 G/DL (ref 3.5–5.2)
ALBUMIN SERPL BCG-MCNC: 3.9 G/DL (ref 3.5–5.2)
ALLEN'S TEST: ABNORMAL
ALP SERPL-CCNC: 115 U/L (ref 40–150)
ALP SERPL-CCNC: 117 U/L (ref 40–150)
ALP SERPL-CCNC: 125 U/L (ref 40–150)
ALT SERPL W P-5'-P-CCNC: 67 U/L (ref 0–70)
ALT SERPL W P-5'-P-CCNC: 75 U/L (ref 0–70)
ALT SERPL W P-5'-P-CCNC: 82 U/L (ref 0–70)
ANION GAP SERPL CALCULATED.3IONS-SCNC: 12 MMOL/L (ref 7–15)
ANION GAP SERPL CALCULATED.3IONS-SCNC: 12 MMOL/L (ref 7–15)
ANION GAP SERPL CALCULATED.3IONS-SCNC: 14 MMOL/L (ref 7–15)
ANTIBODY SCREEN: NEGATIVE
AST SERPL W P-5'-P-CCNC: 41 U/L (ref 0–45)
AST SERPL W P-5'-P-CCNC: 42 U/L (ref 0–45)
AST SERPL W P-5'-P-CCNC: 51 U/L (ref 0–45)
BASE EXCESS BLDA CALC-SCNC: 0.2 MMOL/L (ref -3–3)
BASE EXCESS BLDA CALC-SCNC: 0.8 MMOL/L (ref -3–3)
BASE EXCESS BLDA CALC-SCNC: 1.2 MMOL/L (ref -3–3)
BASE EXCESS BLDA CALC-SCNC: 2.2 MMOL/L (ref -3–3)
BASE EXCESS BLDV CALC-SCNC: 2.7 MMOL/L (ref -3–3)
BASE EXCESS BLDV CALC-SCNC: 2.7 MMOL/L (ref -3–3)
BILIRUB SERPL-MCNC: 1.4 MG/DL
BILIRUB SERPL-MCNC: 1.4 MG/DL
BILIRUB SERPL-MCNC: 1.6 MG/DL
BLD PROD TYP BPU: NORMAL
BLD PROD TYP BPU: NORMAL
BLOOD COMPONENT TYPE: NORMAL
BLOOD COMPONENT TYPE: NORMAL
BUN SERPL-MCNC: 31.2 MG/DL (ref 8–23)
BUN SERPL-MCNC: 33.1 MG/DL (ref 8–23)
BUN SERPL-MCNC: 33.4 MG/DL (ref 8–23)
CA-I BLD-MCNC: 4.9 MG/DL (ref 4.4–5.2)
CA-I BLD-MCNC: 5 MG/DL (ref 4.4–5.2)
CA-I BLD-MCNC: 5.1 MG/DL (ref 4.4–5.2)
CALCIUM SERPL-MCNC: 9.5 MG/DL (ref 8.8–10.4)
CALCIUM SERPL-MCNC: 9.6 MG/DL (ref 8.8–10.4)
CALCIUM SERPL-MCNC: 9.7 MG/DL (ref 8.8–10.4)
CHLORIDE SERPL-SCNC: 101 MMOL/L (ref 98–107)
CHLORIDE SERPL-SCNC: 101 MMOL/L (ref 98–107)
CHLORIDE SERPL-SCNC: 102 MMOL/L (ref 98–107)
CODING SYSTEM: NORMAL
CODING SYSTEM: NORMAL
COHGB MFR BLD: 95.5 % (ref 95–96)
COHGB MFR BLD: 96.4 % (ref 95–96)
COHGB MFR BLD: 97.1 % (ref 95–96)
COHGB MFR BLD: 97.7 % (ref 95–96)
CREAT SERPL-MCNC: 1.45 MG/DL (ref 0.67–1.17)
CREAT SERPL-MCNC: 1.46 MG/DL (ref 0.67–1.17)
CREAT SERPL-MCNC: 1.47 MG/DL (ref 0.67–1.17)
CROSSMATCH: NORMAL
CROSSMATCH: NORMAL
EGFRCR SERPLBLD CKD-EPI 2021: 46 ML/MIN/1.73M2
EGFRCR SERPLBLD CKD-EPI 2021: 46 ML/MIN/1.73M2
EGFRCR SERPLBLD CKD-EPI 2021: 47 ML/MIN/1.73M2
ERYTHROCYTE [DISTWIDTH] IN BLOOD BY AUTOMATED COUNT: 18.6 % (ref 10–15)
ERYTHROCYTE [DISTWIDTH] IN BLOOD BY AUTOMATED COUNT: 19.6 % (ref 10–15)
GLUCOSE BLDC GLUCOMTR-MCNC: 131 MG/DL (ref 70–99)
GLUCOSE BLDC GLUCOMTR-MCNC: 140 MG/DL (ref 70–99)
GLUCOSE BLDC GLUCOMTR-MCNC: 142 MG/DL (ref 70–99)
GLUCOSE BLDC GLUCOMTR-MCNC: 157 MG/DL (ref 70–99)
GLUCOSE BLDC GLUCOMTR-MCNC: 88 MG/DL (ref 70–99)
GLUCOSE SERPL-MCNC: 137 MG/DL (ref 70–99)
GLUCOSE SERPL-MCNC: 147 MG/DL (ref 70–99)
GLUCOSE SERPL-MCNC: 147 MG/DL (ref 70–99)
HCO3 BLD-SCNC: 24 MMOL/L (ref 21–28)
HCO3 BLD-SCNC: 25 MMOL/L (ref 21–28)
HCO3 BLD-SCNC: 25 MMOL/L (ref 21–28)
HCO3 BLD-SCNC: 26 MMOL/L (ref 21–28)
HCO3 BLDV-SCNC: 27 MMOL/L (ref 21–28)
HCO3 BLDV-SCNC: 28 MMOL/L (ref 21–28)
HCO3 SERPL-SCNC: 20 MMOL/L (ref 22–29)
HCO3 SERPL-SCNC: 23 MMOL/L (ref 22–29)
HCO3 SERPL-SCNC: 23 MMOL/L (ref 22–29)
HCT VFR BLD AUTO: 22 % (ref 40–53)
HCT VFR BLD AUTO: 27.6 % (ref 40–53)
HGB BLD-MCNC: 7 G/DL (ref 13.3–17.7)
HGB BLD-MCNC: 8.9 G/DL (ref 13.3–17.7)
HGB BLD-MCNC: 8.9 G/DL (ref 13.3–17.7)
INR PPP: 1.58 (ref 0.85–1.15)
ISSUE DATE AND TIME: NORMAL
ISSUE DATE AND TIME: NORMAL
LACTATE SERPL-SCNC: 1.8 MMOL/L (ref 0.7–2)
LACTATE SERPL-SCNC: 2.1 MMOL/L (ref 0.7–2)
MAGNESIUM SERPL-MCNC: 1.9 MG/DL (ref 1.7–2.3)
MAGNESIUM SERPL-MCNC: 2 MG/DL (ref 1.7–2.3)
MAGNESIUM SERPL-MCNC: 2.2 MG/DL (ref 1.7–2.3)
MCH RBC QN AUTO: 27.7 PG (ref 26.5–33)
MCH RBC QN AUTO: 27.8 PG (ref 26.5–33)
MCHC RBC AUTO-ENTMCNC: 31.8 G/DL (ref 31.5–36.5)
MCHC RBC AUTO-ENTMCNC: 32.2 G/DL (ref 31.5–36.5)
MCV RBC AUTO: 86 FL (ref 78–100)
MCV RBC AUTO: 87 FL (ref 78–100)
O2/TOTAL GAS SETTING VFR VENT: 36 %
O2/TOTAL GAS SETTING VFR VENT: 6 %
OXYHGB MFR BLDV: 43 % (ref 70–75)
OXYHGB MFR BLDV: 48 % (ref 70–75)
PCO2 BLD: 35 MM HG (ref 35–45)
PCO2 BLD: 38 MM HG (ref 35–45)
PCO2 BLDV: 42 MM HG (ref 40–50)
PCO2 BLDV: 43 MM HG (ref 40–50)
PH BLD: 7.44 [PH] (ref 7.35–7.45)
PH BLD: 7.45 [PH] (ref 7.35–7.45)
PH BLD: 7.45 [PH] (ref 7.35–7.45)
PH BLD: 7.48 [PH] (ref 7.35–7.45)
PH BLDV: 7.41 [PH] (ref 7.32–7.43)
PH BLDV: 7.42 [PH] (ref 7.32–7.43)
PHOSPHATE SERPL-MCNC: 1.7 MG/DL (ref 2.5–4.5)
PLATELET # BLD AUTO: 236 10E3/UL (ref 150–450)
PLATELET # BLD AUTO: 270 10E3/UL (ref 150–450)
PO2 BLD: 68 MM HG (ref 80–105)
PO2 BLD: 70 MM HG (ref 80–105)
PO2 BLD: 74 MM HG (ref 80–105)
PO2 BLD: 83 MM HG (ref 80–105)
PO2 BLDV: 24 MM HG (ref 25–47)
PO2 BLDV: 26 MM HG (ref 25–47)
POTASSIUM SERPL-SCNC: 3.6 MMOL/L (ref 3.4–5.3)
POTASSIUM SERPL-SCNC: 3.6 MMOL/L (ref 3.4–5.3)
POTASSIUM SERPL-SCNC: 4 MMOL/L (ref 3.4–5.3)
PROT SERPL-MCNC: 5.3 G/DL (ref 6.4–8.3)
PROT SERPL-MCNC: 5.5 G/DL (ref 6.4–8.3)
PROT SERPL-MCNC: 5.6 G/DL (ref 6.4–8.3)
RBC # BLD AUTO: 2.53 10E6/UL (ref 4.4–5.9)
RBC # BLD AUTO: 3.2 10E6/UL (ref 4.4–5.9)
SAO2 % BLDA: 93 % (ref 92–100)
SAO2 % BLDA: 94 % (ref 92–100)
SAO2 % BLDA: 94 % (ref 92–100)
SAO2 % BLDA: 95 % (ref 92–100)
SAO2 % BLDV: 44.1 % (ref 70–75)
SAO2 % BLDV: 49.3 % (ref 70–75)
SODIUM SERPL-SCNC: 135 MMOL/L (ref 135–145)
SODIUM SERPL-SCNC: 136 MMOL/L (ref 135–145)
SODIUM SERPL-SCNC: 137 MMOL/L (ref 135–145)
SPECIMEN EXPIRATION DATE: NORMAL
UNIT ABO/RH: NORMAL
UNIT ABO/RH: NORMAL
UNIT NUMBER: NORMAL
UNIT NUMBER: NORMAL
UNIT STATUS: NORMAL
UNIT STATUS: NORMAL
UNIT TYPE ISBT: 6200
UNIT TYPE ISBT: 6200
WBC # BLD AUTO: 37.8 10E3/UL (ref 4–11)
WBC # BLD AUTO: 47.8 10E3/UL (ref 4–11)

## 2024-11-09 PROCEDURE — 85014 HEMATOCRIT: CPT | Performed by: PHYSICIAN ASSISTANT

## 2024-11-09 PROCEDURE — 250N000013 HC RX MED GY IP 250 OP 250 PS 637: Performed by: STUDENT IN AN ORGANIZED HEALTH CARE EDUCATION/TRAINING PROGRAM

## 2024-11-09 PROCEDURE — 90945 DIALYSIS ONE EVALUATION: CPT | Performed by: INTERNAL MEDICINE

## 2024-11-09 PROCEDURE — 258N000003 HC RX IP 258 OP 636: Performed by: SURGERY

## 2024-11-09 PROCEDURE — 258N000003 HC RX IP 258 OP 636: Performed by: INTERNAL MEDICINE

## 2024-11-09 PROCEDURE — 258N000003 HC RX IP 258 OP 636: Performed by: PHYSICIAN ASSISTANT

## 2024-11-09 PROCEDURE — 83605 ASSAY OF LACTIC ACID: CPT | Performed by: PHYSICIAN ASSISTANT

## 2024-11-09 PROCEDURE — 120N000004 HC R&B MS OVERFLOW

## 2024-11-09 PROCEDURE — 250N000009 HC RX 250: Performed by: PHYSICIAN ASSISTANT

## 2024-11-09 PROCEDURE — 36415 COLL VENOUS BLD VENIPUNCTURE: CPT | Performed by: PHYSICIAN ASSISTANT

## 2024-11-09 PROCEDURE — 86850 RBC ANTIBODY SCREEN: CPT | Performed by: SURGERY

## 2024-11-09 PROCEDURE — G1010 CDSM STANSON: HCPCS

## 2024-11-09 PROCEDURE — P9016 RBC LEUKOCYTES REDUCED: HCPCS | Performed by: SURGERY

## 2024-11-09 PROCEDURE — 83735 ASSAY OF MAGNESIUM: CPT | Performed by: PHYSICIAN ASSISTANT

## 2024-11-09 PROCEDURE — 82805 BLOOD GASES W/O2 SATURATION: CPT | Performed by: PHYSICIAN ASSISTANT

## 2024-11-09 PROCEDURE — P9016 RBC LEUKOCYTES REDUCED: HCPCS | Performed by: INTERNAL MEDICINE

## 2024-11-09 PROCEDURE — 84155 ASSAY OF PROTEIN SERUM: CPT | Performed by: PHYSICIAN ASSISTANT

## 2024-11-09 PROCEDURE — 250N000009 HC RX 250: Performed by: INTERNAL MEDICINE

## 2024-11-09 PROCEDURE — 99291 CRITICAL CARE FIRST HOUR: CPT | Mod: 24 | Performed by: INTERNAL MEDICINE

## 2024-11-09 PROCEDURE — 86900 BLOOD TYPING SEROLOGIC ABO: CPT | Performed by: SURGERY

## 2024-11-09 PROCEDURE — 74177 CT ABD & PELVIS W/CONTRAST: CPT | Mod: MG

## 2024-11-09 PROCEDURE — 250N000011 HC RX IP 250 OP 636: Performed by: INTERNAL MEDICINE

## 2024-11-09 PROCEDURE — 87040 BLOOD CULTURE FOR BACTERIA: CPT | Performed by: PHYSICIAN ASSISTANT

## 2024-11-09 PROCEDURE — 86923 COMPATIBILITY TEST ELECTRIC: CPT | Performed by: SURGERY

## 2024-11-09 PROCEDURE — 85049 AUTOMATED PLATELET COUNT: CPT | Performed by: PHYSICIAN ASSISTANT

## 2024-11-09 PROCEDURE — 84132 ASSAY OF SERUM POTASSIUM: CPT | Performed by: PHYSICIAN ASSISTANT

## 2024-11-09 PROCEDURE — 85610 PROTHROMBIN TIME: CPT | Performed by: PHYSICIAN ASSISTANT

## 2024-11-09 PROCEDURE — 250N000011 HC RX IP 250 OP 636: Performed by: PHYSICIAN ASSISTANT

## 2024-11-09 PROCEDURE — 82330 ASSAY OF CALCIUM: CPT | Performed by: PHYSICIAN ASSISTANT

## 2024-11-09 PROCEDURE — 250N000013 HC RX MED GY IP 250 OP 250 PS 637: Performed by: SURGERY

## 2024-11-09 PROCEDURE — 250N000011 HC RX IP 250 OP 636: Performed by: SURGERY

## 2024-11-09 PROCEDURE — 71260 CT THORAX DX C+: CPT | Mod: MG

## 2024-11-09 PROCEDURE — 85018 HEMOGLOBIN: CPT | Performed by: PHYSICIAN ASSISTANT

## 2024-11-09 PROCEDURE — 250N000013 HC RX MED GY IP 250 OP 250 PS 637: Performed by: PHYSICIAN ASSISTANT

## 2024-11-09 PROCEDURE — P9047 ALBUMIN (HUMAN), 25%, 50ML: HCPCS | Mod: JZ | Performed by: PHYSICIAN ASSISTANT

## 2024-11-09 PROCEDURE — 86923 COMPATIBILITY TEST ELECTRIC: CPT | Performed by: INTERNAL MEDICINE

## 2024-11-09 PROCEDURE — 84100 ASSAY OF PHOSPHORUS: CPT | Performed by: PHYSICIAN ASSISTANT

## 2024-11-09 PROCEDURE — 999N000065 XR CHEST PORT 1 VIEW

## 2024-11-09 PROCEDURE — 97140 MANUAL THERAPY 1/> REGIONS: CPT | Mod: GO

## 2024-11-09 RX ORDER — IOPAMIDOL 755 MG/ML
111 INJECTION, SOLUTION INTRAVASCULAR ONCE
Status: COMPLETED | OUTPATIENT
Start: 2024-11-09 | End: 2024-11-09

## 2024-11-09 RX ADMIN — Medication 40 MG: at 15:40

## 2024-11-09 RX ADMIN — CALCIUM CHLORIDE, MAGNESIUM CHLORIDE, DEXTROSE MONOHYDRATE, LACTIC ACID, SODIUM CHLORIDE, SODIUM BICARBONATE AND POTASSIUM CHLORIDE 5000 ML: 5.15; 2.03; 22; 5.4; 6.46; 3.09; .157 INJECTION INTRAVENOUS at 14:08

## 2024-11-09 RX ADMIN — CALCIUM CHLORIDE, MAGNESIUM CHLORIDE, DEXTROSE MONOHYDRATE, LACTIC ACID, SODIUM CHLORIDE, SODIUM BICARBONATE AND POTASSIUM CHLORIDE 5000 ML: 5.15; 2.03; 22; 5.4; 6.46; 3.09; .157 INJECTION INTRAVENOUS at 21:22

## 2024-11-09 RX ADMIN — SODIUM CHLORIDE 72 ML: 9 INJECTION, SOLUTION INTRAVENOUS at 16:08

## 2024-11-09 RX ADMIN — EPINEPHRINE 0.06 MCG/KG/MIN: 1 INJECTION INTRAMUSCULAR; INTRAVENOUS; SUBCUTANEOUS at 15:33

## 2024-11-09 RX ADMIN — METHOCARBAMOL 500 MG: 500 TABLET ORAL at 20:35

## 2024-11-09 RX ADMIN — Medication 40 MG: at 08:25

## 2024-11-09 RX ADMIN — ASPIRIN 81 MG CHEWABLE TABLET 162 MG: 81 TABLET CHEWABLE at 08:25

## 2024-11-09 RX ADMIN — POLYETHYLENE GLYCOL 3350 17 G: 17 POWDER, FOR SOLUTION ORAL at 08:25

## 2024-11-09 RX ADMIN — INSULIN ASPART 1 UNITS: 100 INJECTION, SOLUTION INTRAVENOUS; SUBCUTANEOUS at 15:39

## 2024-11-09 RX ADMIN — CALCIUM CHLORIDE, MAGNESIUM CHLORIDE, DEXTROSE MONOHYDRATE, LACTIC ACID, SODIUM CHLORIDE, SODIUM BICARBONATE AND POTASSIUM CHLORIDE 5000 ML: 5.15; 2.03; 22; 5.4; 6.46; 3.09; .157 INJECTION INTRAVENOUS at 01:26

## 2024-11-09 RX ADMIN — VASOPRESSIN 4 UNITS/HR: 20 INJECTION INTRAVENOUS at 14:48

## 2024-11-09 RX ADMIN — CALCIUM CHLORIDE, MAGNESIUM CHLORIDE, DEXTROSE MONOHYDRATE, LACTIC ACID, SODIUM CHLORIDE, SODIUM BICARBONATE AND POTASSIUM CHLORIDE: 5.15; 2.03; 22; 5.4; 6.46; 3.09; .157 INJECTION INTRAVENOUS at 12:55

## 2024-11-09 RX ADMIN — INSULIN ASPART 1 UNITS: 100 INJECTION, SOLUTION INTRAVENOUS; SUBCUTANEOUS at 04:31

## 2024-11-09 RX ADMIN — INSULIN ASPART 1 UNITS: 100 INJECTION, SOLUTION INTRAVENOUS; SUBCUTANEOUS at 00:31

## 2024-11-09 RX ADMIN — SENNOSIDES AND DOCUSATE SODIUM 1 TABLET: 50; 8.6 TABLET ORAL at 08:25

## 2024-11-09 RX ADMIN — IOPAMIDOL 111 ML: 755 INJECTION, SOLUTION INTRAVENOUS at 16:08

## 2024-11-09 RX ADMIN — Medication 60 ML: at 08:27

## 2024-11-09 RX ADMIN — Medication 60 ML: at 20:35

## 2024-11-09 RX ADMIN — CALCIUM CHLORIDE, MAGNESIUM CHLORIDE, DEXTROSE MONOHYDRATE, LACTIC ACID, SODIUM CHLORIDE, SODIUM BICARBONATE AND POTASSIUM CHLORIDE 5000 ML: 5.15; 2.03; 22; 5.4; 6.46; 3.09; .157 INJECTION INTRAVENOUS at 23:46

## 2024-11-09 RX ADMIN — SODIUM PHOSPHATE, MONOBASIC, MONOHYDRATE AND SODIUM PHOSPHATE, DIBASIC, ANHYDROUS 15 MMOL: 142; 276 INJECTION, SOLUTION INTRAVENOUS at 06:41

## 2024-11-09 RX ADMIN — CALCIUM CHLORIDE, MAGNESIUM CHLORIDE, DEXTROSE MONOHYDRATE, LACTIC ACID, SODIUM CHLORIDE, SODIUM BICARBONATE AND POTASSIUM CHLORIDE 5000 ML: 5.15; 2.03; 22; 5.4; 6.46; 3.09; .157 INJECTION INTRAVENOUS at 07:40

## 2024-11-09 RX ADMIN — VASOPRESSIN 4 UNITS/HR: 20 INJECTION INTRAVENOUS at 04:30

## 2024-11-09 RX ADMIN — OXYCODONE HYDROCHLORIDE 5 MG: 5 TABLET ORAL at 23:02

## 2024-11-09 RX ADMIN — METHOCARBAMOL 500 MG: 500 TABLET ORAL at 00:14

## 2024-11-09 RX ADMIN — CALCIUM CHLORIDE, MAGNESIUM CHLORIDE, DEXTROSE MONOHYDRATE, LACTIC ACID, SODIUM CHLORIDE, SODIUM BICARBONATE AND POTASSIUM CHLORIDE 5000 ML: 5.15; 2.03; 22; 5.4; 6.46; 3.09; .157 INJECTION INTRAVENOUS at 12:55

## 2024-11-09 RX ADMIN — OXYCODONE HYDROCHLORIDE 5 MG: 5 TABLET ORAL at 11:17

## 2024-11-09 RX ADMIN — VASOPRESSIN 4 UNITS/HR: 20 INJECTION INTRAVENOUS at 10:03

## 2024-11-09 RX ADMIN — DOBUTAMINE HYDROCHLORIDE 2.5 MCG/KG/MIN: 200 INJECTION INTRAVENOUS at 04:06

## 2024-11-09 RX ADMIN — ALBUMIN HUMAN 25 G: 0.25 SOLUTION INTRAVENOUS at 19:47

## 2024-11-09 RX ADMIN — MAGNESIUM SULFATE HEPTAHYDRATE 2 G: 40 INJECTION, SOLUTION INTRAVENOUS at 13:44

## 2024-11-09 RX ADMIN — CALCIUM CHLORIDE, MAGNESIUM CHLORIDE, DEXTROSE MONOHYDRATE, LACTIC ACID, SODIUM CHLORIDE, SODIUM BICARBONATE AND POTASSIUM CHLORIDE 5000 ML: 5.15; 2.03; 22; 5.4; 6.46; 3.09; .157 INJECTION INTRAVENOUS at 16:57

## 2024-11-09 RX ADMIN — CALCIUM CHLORIDE, MAGNESIUM CHLORIDE, DEXTROSE MONOHYDRATE, LACTIC ACID, SODIUM CHLORIDE, SODIUM BICARBONATE AND POTASSIUM CHLORIDE 5000 ML: 5.15; 2.03; 22; 5.4; 6.46; 3.09; .157 INJECTION INTRAVENOUS at 02:27

## 2024-11-09 RX ADMIN — CALCIUM CHLORIDE, MAGNESIUM CHLORIDE, DEXTROSE MONOHYDRATE, LACTIC ACID, SODIUM CHLORIDE, SODIUM BICARBONATE AND POTASSIUM CHLORIDE 5000 ML: 5.15; 2.03; 22; 5.4; 6.46; 3.09; .157 INJECTION INTRAVENOUS at 09:16

## 2024-11-09 RX ADMIN — INSULIN ASPART 1 UNITS: 100 INJECTION, SOLUTION INTRAVENOUS; SUBCUTANEOUS at 08:27

## 2024-11-09 RX ADMIN — VASOPRESSIN 2.5 UNITS/HR: 20 INJECTION INTRAVENOUS at 20:48

## 2024-11-09 RX ADMIN — METHOCARBAMOL 500 MG: 500 TABLET ORAL at 08:25

## 2024-11-09 RX ADMIN — ALBUMIN HUMAN 25 G: 0.25 SOLUTION INTRAVENOUS at 08:25

## 2024-11-09 RX ADMIN — CALCIUM CHLORIDE, MAGNESIUM CHLORIDE, DEXTROSE MONOHYDRATE, LACTIC ACID, SODIUM CHLORIDE, SODIUM BICARBONATE AND POTASSIUM CHLORIDE 5000 ML: 5.15; 2.03; 22; 5.4; 6.46; 3.09; .157 INJECTION INTRAVENOUS at 05:49

## 2024-11-09 RX ADMIN — OXYCODONE HYDROCHLORIDE 5 MG: 5 TABLET ORAL at 02:13

## 2024-11-09 RX ADMIN — CALCIUM CHLORIDE, MAGNESIUM CHLORIDE, DEXTROSE MONOHYDRATE, LACTIC ACID, SODIUM CHLORIDE, SODIUM BICARBONATE AND POTASSIUM CHLORIDE 5000 ML: 5.15; 2.03; 22; 5.4; 6.46; 3.09; .157 INJECTION INTRAVENOUS at 20:21

## 2024-11-09 RX ADMIN — INSULIN ASPART 1 UNITS: 100 INJECTION, SOLUTION INTRAVENOUS; SUBCUTANEOUS at 11:22

## 2024-11-09 RX ADMIN — SENNOSIDES AND DOCUSATE SODIUM 1 TABLET: 50; 8.6 TABLET ORAL at 20:35

## 2024-11-09 NOTE — PROGRESS NOTES
CV  Pressors (which pressors and any increase/decrease in pressor needs):  Levo, Vaso, Epi, Dobutamine.   HR range: 80s-110s, Afib.  Chest tube output: 10-120mL/hr, 1100mL Total Shift Output. Post-turn/bath 380mL Output @ 0700, MD's aware.    Neuro  Orientation: Alert, disoriented to time/situation, forgetful.  Delirium present?(y/n): Yes  Sleep: None  Pain: Controlled, 3-6 /10, Oxycodone/Robaxin given.    GI/  BM? (y/n): No  Urine output: CRRT      Lines: R) Jerome, R) Introducer, R) Subclavian TLC, L)IJ HD port, L) Radial Art line, R) Femoral Icy cath.

## 2024-11-09 NOTE — PLAN OF CARE
Goal Outcome Evaluation:      Plan of Care Reviewed With: patient    Overall Patient Progress: no changeOverall Patient Progress: no change    Outcome Evaluation: Alert, confusion/forgetful, very weak w/ extremity movement, CRRT ongoing 0-50mL/hr, Levo, Epi, Vaso gtts infusing w/o titration d/t BP. 4L NC, Diminished Bases. TF @ 65mL/hr (Goal), No BM during shift. Oxycodone, Robaxin given for pain. Hgb 7.0, 1u pRBCs infusing, plan to give 2u pRBCs total d/t increased Chest Tube Drainage ~380mL around AM shift change, CV Surg & Intensivist aware.      Problem: Comorbidity Management  Goal: Blood Pressure in Desired Range  Outcome: Not Progressing  Intervention: Maintain Blood Pressure Management  Recent Flowsheet Documentation  Taken 11/9/2024 0400 by Toy Sanz RN  Medication Review/Management:   medications reviewed   high-risk medications identified  Taken 11/9/2024 0000 by Toy Sanz RN  Medication Review/Management:   medications reviewed   high-risk medications identified  Taken 11/8/2024 2000 by Toy Sanz RN  Medication Review/Management:   medications reviewed   high-risk medications identified     Problem: Cardiovascular Surgery  Goal: Improved Activity Tolerance  Outcome: Not Progressing  Intervention: Optimize Tolerance for Activity  Recent Flowsheet Documentation  Taken 11/9/2024 0400 by Toy Sanz RN  Environmental Support:   calm environment promoted   caregiver consistency promoted   environmental consistency promoted   rest periods encouraged  Taken 11/9/2024 0000 by Toy Sanz RN  Environmental Support:   calm environment promoted   caregiver consistency promoted   environmental consistency promoted   rest periods encouraged  Taken 11/8/2024 2000 by Toy Sanz RN  Environmental Support:   calm environment promoted   caregiver consistency promoted   environmental consistency promoted   rest periods encouraged     Problem: Cardiovascular Surgery  Goal:  Optimal Coping with Heart Surgery  Outcome: Not Progressing  Intervention: Support Psychosocial Response to Surgery  Recent Flowsheet Documentation  Taken 11/9/2024 0400 by Toy Sanz RN  Supportive Measures:   active listening utilized   positive reinforcement provided   problem-solving facilitated   relaxation techniques promoted  Taken 11/9/2024 0000 by Toy Sanz RN  Supportive Measures:   active listening utilized   positive reinforcement provided   problem-solving facilitated   relaxation techniques promoted  Taken 11/8/2024 2000 by Toy Sanz RN  Supportive Measures:   active listening utilized   positive reinforcement provided   problem-solving facilitated   relaxation techniques promoted  Family/Support System Care: caregiver stress acknowledged     Problem: Cardiovascular Surgery  Goal: Effective Cardiac Function  Outcome: Not Progressing  Intervention: Optimize Cardiac Output and Blood Flow  Recent Flowsheet Documentation  Taken 11/9/2024 0400 by Toy Sanz RN  Dysrhythmia Management: pacing wires maintained  Taken 11/9/2024 0000 by Toy Sanz RN  Dysrhythmia Management: pacing wires maintained  Taken 11/8/2024 2000 by Toy Sanz RN  Dysrhythmia Management: pacing wires maintained

## 2024-11-09 NOTE — PROVIDER NOTIFICATION
MD Notification    Notified Person: PA    Notified Person Name: Opal Nguyen    Notification Date/Time: 1210    Notification Interaction: Phone    Purpose of Notification: Dusky Toes/Hands    Orders Received: Tritrate Noreli to 0.06, stat abg, HGB    Comments: Turned off dobutamine around 9am, Pt since has become slightly more confused and restless. Toes/Fingers more dusky.

## 2024-11-09 NOTE — PROGRESS NOTES
Critical Care  Note      11/09/2024    Name: Alessio Teixeira MRN#: [id number]   Age: 88 year old YOB: 1935     Hsptl Day# 12  ICU DAY # 11    MV DAY # 11             Problem List:   Active Problems:    Coronary artery disease  Mixed cardiogenic/vasoplegic shock  Acute hypoxemic respiratory failure         Summary/Hospital Course:   88 year old M with a history of severe 3 vessel CAD. Status post sternotomy, CABGx3, modified MAZE procedure, PAYAM ligation with Dr. Mulvihill 10/28/24. Post operative hemorrhage with RTOR on POD0 for evacuation of mediastinal blood clot causing tamponade physiology.   Course subsequently complicated by persistent shock (appears mixed cardigenic/vasoplegic), and ongoing respiratory failure.    Interval/overnight events:  Blood chest output  -May do CT imaging.       Assessment and plan :     Alessio Teixeira IS a 88 year old male admitted on 10/28/2024 for shock and respiratory failure.   I have personally reviewed the daily labs, imaging studies, cultures and discussed the case with referring physician and consulting physicians.     My assessment and plan by system for this patient is as follows:    Neurology/Psychiatry:       Cardiovascular:   Shock:  appears c/w mixed cardiogenic and vasoplegic picture; persistent.  Continues on epi, norepi, vaso, and dobutamine at 2.5 to maintain CI of ~2.0. No signficant improvement in hemodynamics with extubation.  S/p CAB3:  continues on ASA.  Add statin when LFTs stabilized.    Pulmonary/Ventilator Management:   Tolerating nasal cannula    GI and Nutrition :   1. TF  2. PPI for pud prophy    Renal/Fluids/Electrolytes:   1. Acute kidney failure:  continues on CRRT.  Appreciate nephrology support.    Infectious Disease:   1. Ngtd on cultures.  Abx stopped.    Endocrine:   1. Stress induced hyperglycemia:  SSI    Hematology/Oncology:   1. Hyperleukocytosis to 35, known h/o CLL/MDS.  Appreciate heme input.  2. Acute blood loss Anemia d/t  "post-operative bleeding, no signs, symptoms of   -@ units of blood given.     ICU Prophylaxis:   1. DVT: mechanical only for now  2. VAP: HOB 30 degrees, chlorhexidine rinse  3. Stress Ulcer: PPI  4. Restraints: Nonviolent soft two point restraints required and necessary for patient safety and continued cares and good effect as patient continues to pull at necessary lines, tubes despite education and distraction. Will readdress daily.   5. Wound care  -   6. Feeding - tf  7. Family Update: bedside  8. Disposition - ICU    Clinically Significant Risk Factors               # Hypoalbuminemia: Lowest albumin = 2.7 g/dL at 11/2/2024  5:44 AM, will monitor as appropriate    # Coagulation Defect: INR = 1.58 (Ref range: 0.85 - 1.15) and/or PTT = 42 Seconds (Ref range: 22 - 38 Seconds), will monitor for bleeding    # Hypertension: Noted on problem list  # Chronic heart failure with reduced ejection fraction: last echo with EF <40%   # Acute Hypercapnic Respiratory Failure: based on arterial blood gas results.  Continue supplemental oxygen and ventilatory support as indicated.  # Acute Hypercapnic Respiratory Failure: based on venous blood gas results.  Continue supplemental oxygen and ventilatory support as indicated.      #Acute blood loss anemia: Lowest Hgb this hospitalization: 6.6 g/dL. Will continue to monitor and treat/transfuse as appropriate.     # Obesity: Estimated body mass index is 31.63 kg/m  as calculated from the following:    Height as of this encounter: 1.778 m (5' 10\").    Weight as of this encounter: 100 kg (220 lb 7.4 oz).   # Moderate Malnutrition: based on nutrition assessment     # History of CABG: noted on surgical history                           Key Medications:     Current Facility-Administered Medications   Medication Dose Route Frequency Provider Last Rate Last Admin    albumin human 25 % injection 25 g  25 g Intravenous Q12H Arlin Hodge PA-C 100 mL/hr at 11/07/24 2012 25 g at 11/08/24 1958 "    aspirin (ASA) chewable tablet 162 mg  162 mg Oral or NG Tube Daily Clarence Bone MD   162 mg at 11/08/24 0808    insulin aspart (NovoLOG) injection (RAPID ACTING)  1-7 Units Subcutaneous Q4H Sb Arthur PA-C   1 Units at 11/09/24 0431    pantoprazole (PROTONIX) 2 mg/mL suspension 40 mg  40 mg Oral or NG Tube BID AC Arlin Hodge PA-C   40 mg at 11/08/24 1557    Or    pantoprazole (PROTONIX) EC tablet 40 mg  40 mg Oral BID AC Arlin Hodge PA-C        polyethylene glycol (MIRALAX) Packet 17 g  17 g Oral or Feeding Tube Daily Mulvihill, Michael, MD   17 g at 11/08/24 0809    Prosource TF20 ENfit Compatibl EN LIQD (PROSOURCE TF20) packet 60 mL  1 packet Per Feeding Tube BID Arlin Hodge PA-C   60 mL at 11/08/24 2100    senna-docusate (SENOKOT-S/PERICOLACE) 8.6-50 MG per tablet 1 tablet  1 tablet Oral or Feeding Tube BID Mulvihill, Michael, MD   1 tablet at 11/08/24 2009    sodium chloride (PF) 0.9% PF flush 3 mL  3 mL Intracatheter Q8H Clarence Bone MD   3 mL at 11/09/24 0517     Current Facility-Administered Medications   Medication Dose Route Frequency Provider Last Rate Last Admin    dextrose 10% infusion   Intravenous Continuous PRN Arlin Hodge PA-C   Stopped at 11/05/24 0600    dextrose 10% infusion   Intravenous Continuous Sb Arthur PA-C   Stopped at 11/05/24 0200    dialysate solution (PrismaSol BGK 2/3.5)   CRRT Continuous BolandFarhad MD 1,500 mL/hr at 11/08/24 1617 5,000 mL at 11/09/24 0549    DOBUTamine (DOBUTREX) 500 mg in D5W 250 mL infusion (adult std conc)  2.5 mcg/kg/min Intravenous Continuous Arlin Hodge PA-C 7.8 mL/hr at 11/09/24 0715 2.5 mcg/kg/min at 11/09/24 0715    EPINEPHrine (ADRENALIN) 5 mg in  mL infusion  0.01-0.1 mcg/kg/min Intravenous Continuous PRN Clarence Bone MD 14.4 mL/hr at 11/09/24 0715 0.05 mcg/kg/min at 11/09/24 0715    lactated ringers infusion   Intravenous Continuous Opal Nguyen PA-C 20 mL/hr at 11/08/24 2000  Rate Verify at 11/08/24 2000    No heparin required   Does not apply Continuous PRN Farhad Boland MD        norepinephrine (LEVOPHED) 16 mg in  mL infusion MAX CONC CENTRAL LINE  0.01-0.2 mcg/kg/min Intravenous Continuous Arlin Hodge PA-C 3.6 mL/hr at 11/09/24 0715 0.04 mcg/kg/min at 11/09/24 0715    Patient may continue current oral medications   Does not apply Continuous PRN Bhanu Ness MD        Post-Filter replacement solution (PRISMASOL BGK 2/3.5)   CRRT Continuous Farhad Boland  mL/hr at 11/08/24 1236 New Bag at 11/08/24 1236    Pre-Filter replacement solution (PRISMASOL BGK 2/3.5)   CRRT Continuous Farhad Boland  mL/hr at 11/09/24 0740 5,000 mL at 11/09/24 0740    Reason beta blocker order not selected   Does not apply DOES NOT GO TO Clarence Olguin MD        vasopressin 0.2 units/mL in NS (PITRESSIN) standard conc infusion  0.5-4 Units/hr Intravenous Continuous Arlin Hodge PA-C 20 mL/hr at 11/09/24 0715 4 Units/hr at 11/09/24 0715              Physical Examination:   Temp:  [97.3  F (36.3  C)-98.2  F (36.8  C)] 97.7  F (36.5  C)  Pulse:  [] 105  MAP:  [55 mmHg-154 mmHg] 69 mmHg  Arterial Line BP: ()/() 112/51  SpO2:  [87 %-98 %] 93 %      Intake/Output Summary (Last 24 hours) at 11/7/2024 1139  Last data filed at 11/7/2024 1000  Gross per 24 hour   Intake 3438.47 ml   Output 3472 ml   Net -33.53 ml         Wt Readings from Last 4 Encounters:   11/09/24 100 kg (220 lb 7.4 oz)   10/25/24 97.1 kg (214 lb)   10/03/24 101.2 kg (223 lb)   10/02/24 104.8 kg (231 lb)     Arterial Line BP: ()/() 112/51  MAP:  [55 mmHg-154 mmHg] 69 mmHg  CVP:  [10 mmHg-26 mmHg] 15 mmHg  SVO2:  [40 %-42 %] 42 %     Recent Labs   Lab 11/09/24  0427 11/08/24  1716 11/08/24  0518 11/07/24 2040   PH 7.48* 7.46* 7.45 7.46*   PCO2 35 36 35 36   PO2 70* 63* 86 76*   HCO3 26 26 24 26   O2PER 36  36 4  4 36  36 36  36       GEN: no acute distress   HEENT:  head ncat, sclera anicteric, OP patent, trachea midline   PULM: unlabored synchronous with vent, clear anteriorly    CV/COR: RRR S1S2 no gallop,  No rub, no murmur  ABD: soft nontender, hypoactive bowel sounds, no mass  EXT:  warm and well perfused x4  NEURO: PERRL, no obvious deficits  SKIN: no obvious rash  LINES: clean, dry intact         Data:   All data and imaging reviewed     ROUTINE ICU LABS (Last four results)  CMP  Recent Labs   Lab 11/09/24  0750 11/09/24  0427 11/09/24  0420 11/09/24  0029 11/08/24  2218 11/08/24  1605 11/08/24  1331 11/08/24  0529 11/08/24  0518 11/08/24  0017 11/07/24  2217 11/06/24  0540 11/06/24  0514   NA  --  135  --   --  135  --  136  --  137  --  136   < > 135   POTASSIUM  --  3.6  --   --  3.8  --  3.7  --  3.6  --  3.7   < > 3.8   CHLORIDE  --  101  --   --  100  --  102  --  102  --  102   < > 100   CO2  --  20*  --   --  23  --  19*  --  21*  --  21*   < > 23   ANIONGAP  --  14  --   --  12  --  15  --  14  --  13   < > 12   * 147* 140* 142* 142*   < > 122*   < > 116*   < > 118*   < > 149*   BUN  --  31.2*  --   --  33.5*  --  33.7*  --  31.6*  --  31.0*   < > 30.0*   CR  --  1.47*  --   --  1.48*  --  1.56*  --  1.53*  --  1.57*   < > 1.71*   GFRESTIMATED  --  46*  --   --  46*  --  43*  --  44*  --  42*   < > 38*   TATE  --  9.7  --   --  9.6  --  9.4  --  9.8  --  9.6   < > 9.4   MAG  --  2.0  --   --  2.0  --  2.0  --  2.2  --  1.9   < > 2.0   PHOS  --  1.7*  --   --   --   --   --   --  2.5  --  1.9*  --  2.3*   PROTTOTAL  --  5.3*  --   --  5.4*  --  5.1*  --  5.3*  --  5.3*   < > 5.0*   ALBUMIN  --  3.8  --   --  3.8  --  3.7  --  3.8  --  3.8   < > 3.6   BILITOTAL  --  1.4*  --   --  1.4*  --  2.3*  --  1.6*  --  1.6*   < > 2.1*   ALKPHOS  --  115  --   --  112  --  110  --  121  --  115   < > 153*   AST  --  41  --   --  48*  --  40  --  43  --  44   < > 101*   ALT  --  82*  --   --  85*  --  93*  --  112*  --  115*   < > 223*    < > = values in this interval  "not displayed.     CBC  Recent Labs   Lab 11/09/24 0427 11/08/24 1716 11/08/24  0518 11/07/24  2044   WBC 37.8* 35.7* 35.1* 34.1*   RBC 2.53* 2.56* 2.65* 2.62*   HGB 7.0* 7.2* 7.4* 7.4*   HCT 22.0* 22.6* 23.5* 23.1*   MCV 87 88 89 88   MCH 27.7 28.1 27.9 28.2   MCHC 31.8 31.9 31.5 32.0   RDW 19.6* 19.7* 19.8* 19.2*    214 207 187     INR  Recent Labs   Lab 11/09/24 0427 11/08/24  0518 11/07/24  0537 11/06/24  0514   INR 1.58* 1.50* 1.58* 1.71*     Arterial Blood Gas  Recent Labs   Lab 11/09/24 0427 11/08/24 1716 11/08/24 0518 11/07/24  2040   PH 7.48* 7.46* 7.45 7.46*   PCO2 35 36 35 36   PO2 70* 63* 86 76*   HCO3 26 26 24 26   O2PER 36  36 4  4 36  36 36  36       All cultures:  No results for input(s): \"CULT\" in the last 168 hours.  Recent Results (from the past 24 hours)   US Upper Extremity Arterial Duplex Right    Narrative    EXAM: US UPPER EXTREMITY ARTERIAL DUPLEX RIGHT  LOCATION: [facility]  DATE: 11/3/2024    INDICATION: Mottled hand.  COMPARISON: None available.  TECHNIQUE: Arterial Duplex ultrasound of the right arm. Color flow and spectral Doppler with waveform analysis performed.    FINDINGS (RIGHT upper extremity):    ARTERIAL PEAK SYSTOLIC VELOCITIES (cm/s):    Subclavian artery (proximal): 51  Subclavian artery (distal): 71  Axillary artery: 70  Brachial (proximal): 74  Brachial (distal): 76  Radial (proximal): 41  Radial (distal): 14  Ulnar (proximal): 85  Ulnar (distal): 136    WAVEFORMS/COLOR DOPPLER: Triphasic waveforms are noted throughout except in the distal radial artery where biphasic waveforms are noted.      Impression    IMPRESSION:   1.  Patent right upper extremity arteries although slow flow is noted within the distal radial artery along with biphasic waveforms, findings are of indeterminate etiology, could be due to area of focal stenosis.   XR Chest Port 1 View    Narrative    EXAM: XR CHEST PORT 1 VIEW  LOCATION: [facility]  DATE: 11/3/2024    INDICATION: eval infiltrate edema  COMPARISON: Chest radiograph 11/2/2024.      Impression    IMPRESSION:     Lines and tubes: Endotracheal tube tip is not well visualized, likely in the upper trachea. Feeding tube courses below the diaphragm. [place] catheter near the main pulmonary trunk. Similar left central venous catheter, left atrial appendage clip and   median sternotomy. Bilateral chest tubes.    Right basilar opacity favoring combination of pleural fluid, atelectasis and/or infection. Possible trace left pleural effusion. No discernible pneumothorax. Similar cardiomediastinal silhouette.     D/w SADE Flushing of CV surgery.    Billing: This patient is critically ill: Yes. Total critical care time today 40 min, excluding procedures.

## 2024-11-09 NOTE — PROGRESS NOTES
[facility]  Cardiovascular and Thoracic Surgery Daily Note    Today's Plans: Lymph wraps, remove [place], stop DBU uptitrate epi if needed, CT scan C/A/P to eval bleeding/gut ischemia, 2U PRBC, aim for net even on CRRT, pan culture as hypothermic holding on abx now, remove mediastinal tubes as minimal output.        Assessment and Plan  POD # 11 s/p CABG x 3 (LIMA to LAD, SVG to OM, SVG to RCA), Modified left atrial MAZE (Encompass), and occlusion of left atrial appendage (50 mm Atriclip) on 10/28 with Dr. Michael Mulvihill.    POD # 11 s/p return to OR for mediastinal re-exploration, washout    - CVS: Pre-op TTE with EF 35-40%. Postop TTE 10/30 with EF ~35% on high-dose inotropic support.  Gtt's: Epi 0.05 Norepi: 0.04 Vaso: 4 -Stop DBU 2.5  Postop mixed cardiogenic/vasoplegic/hypovolemic shock. Lactic acidosis cleared and SVO2 stabilized. CI goal >2.0 (calculated elaine), stopped DBU 11/4 no back on after dec in SVO2 with inc in lact back on at 2.5. NE off and vasopressin to MAP goal 65, wean as able.   Hypervolemic, volume management via CRRT, pull as able. CVP goal ~10-12. Now tolerating more aggressive removal on hold as becoming euvolemic. Albumin 25% push BID to pull volume intravascular.  Hypothermic since initiation on CRRT, Thermagaurd catheter placed 10/31, target 37 C.   Stress dose steroids started 10/31, off 11/02 PM.  History of paroxysmal atrial fibrillation, continues with intermittent afib and accelerated junctional at times. Previously being atrial paced at 100, now appears to have improved BP without pacing (intrinsic rate ~80 BP, sinus rhythm with intermittent atrial fibrillation). Amio discontinued with shock liver. Defer systemic anticoagulation at present (ok for citrate for CRRT machine if needed).   Absent right radial pulse (previous arterial line in this location). ICU MD did bedside US, ulnar artery patent. Right hand warm, good capillary refill. Increased duskiness of right  hand noted after starting vasopressin, which demonstrated patent arterial flow with slow biphasic flow at the distal radial artery (site of previous arterial line).   Aspirin 162 mg daily, defer statin with ALI.    Chest tubes: output 860<750<975<970 mL, serosang, no air leak. TPW: AAI back up rate of 50, Some concern overnight about output-red tinged serous fluid-extravascular as fluid overloaded.     - Resp: Acute hypoxemic and hypercapnic respiratory failure, stable. Extubated pod#9    - Neuro: Sedated with propofol while intubated. Add fentanyl infusion and Versed PRN to improve vent compliance. History of myasthenia gravis. Purposeful upper extremity movements and spontaneous LE movement when sedation lightened.     - Renal: CKD stage 3, baseline Cr ~1.8. Postop oliguric/anuric JAVIER. Nephrology consulted, CRRT started 10/30. Avoid nephrotoxins.   Recent Labs   Lab 11/09/24  0427 11/08/24  2218 11/08/24  1331   CR 1.47* 1.48* 1.56*       - GI: +BM, +flatus, continue bowel regimen. Shock liver, improving. Trend LFTs, avoid hepatotoxins. Recent admission for GIB 09/2024, increased pantoprazole to BID. Diarrhea with initiation of TF, rectal tube placed 11/05 removed-stable now.    - : bladder scan q shift. Anuric on CRRT    - Endo: Postop stress hyperglycemia, resolved. Insulin infusion transitioned to sliding scale insulin. Stress dose steroids 10/31-11/02 as above.   Hemoglobin A1C   Date Value Ref Range Status   10/09/2024 4.5 <5.7 % Final     Comment:     Normal <5.7%   Prediabetes 5.7-6.4%    Diabetes 6.5% or higher     Note: Adopted from ADA consensus guidelines.        - FEN: Replace electrolytes as needed. Initiated on trophic feeds via OG 11/1, NJ placed 11/2 after INR came down, TF increase to goal.      - ID: WBC chronically elevated and hypothermic on CRRT as above so difficult to assess for possible infection;  empiric Vanc/Zosyn 10/31-11/04. Blood, urine, respiratory cultures NGTD. WBC elevated.   "Trend CBC and fever curve.   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 Acute blood loss anemia due to surgery. Postop coagulopathy, improved (required multiple blood transfusions and blood productions immediately postop). 1 unit RBCs 11/3. 1 unit RBC 11/5 Trend CBC, transfuse PRN. 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  Continue supplemental oxygen and ventilatory support as indicated.  # Acute Hypercapnic Respiratory Failure: based on venous blood gas results.  Continue supplemental oxygen and ventilatory support as indicated.      #Acute blood loss anemia: Lowest Hgb this hospitalization: 6.6 g/dL. Will continue to monitor and treat/transfuse as appropriate.     # Obesity: Estimated body mass index is 31.63 kg/m  as calculated from the following:    Height as of this encounter: 1.778 m (5' 10\").    Weight as of this encounter: 100 kg (220 lb 7.4 oz).   # Moderate Malnutrition: based on nutrition assessment     # History of CABG: noted on surgical history       - Dispo: ICU. Guarded prognosis but continues to slowly improve. Family updated multiple times at bedside. Medically Ready for Discharge: " Anticipated in 5+ Days      Interval History  Lying in bed, breathing stable, tolerating tube feeds, needs BM, stop DBU, transfuse blood, remove [place], CT C/A/P once circuit needs to be changed, pan cultures    Medications  Current Facility-Administered Medications   Medication Dose Route Frequency Provider Last Rate Last Admin    albumin human 25 % injection 25 g  25 g Intravenous Q12H Arlin Hodge PA-C 100 mL/hr at 11/07/24 2012 25 g at 11/08/24 1958    aspirin (ASA) chewable tablet 162 mg  162 mg Oral or NG Tube Daily Clarence Bone MD   162 mg at 11/08/24 0808    chlorhexidine (PERIDEX) 0.12 % solution 15 mL  15 mL Swish & Spit BID Zac Kaur DO   15 mL at 11/08/24 2014    insulin aspart (NovoLOG) injection (RAPID ACTING)  1-7 Units Subcutaneous Q4H Sb Arthur PA-C   1 Units at 11/09/24 0431    pantoprazole (PROTONIX) 2 mg/mL suspension 40 mg  40 mg Oral or NG Tube BID AC Arlin Hodge PA-C   40 mg at 11/08/24 1557    Or    pantoprazole (PROTONIX) EC tablet 40 mg  40 mg Oral BID AC Arlin Hodge PA-C        polyethylene glycol (MIRALAX) Packet 17 g  17 g Oral or Feeding Tube Daily Mulvihill, Michael, MD   17 g at 11/08/24 0809    Prosource TF20 ENfit Compatibl EN LIQD (PROSOURCE TF20) packet 60 mL  1 packet Per Feeding Tube BID Arlin Hodge PA-C   60 mL at 11/08/24 2100    senna-docusate (SENOKOT-S/PERICOLACE) 8.6-50 MG per tablet 1 tablet  1 tablet Oral or Feeding Tube BID Mulvihill, Michael, MD   1 tablet at 11/08/24 2009    sodium chloride (PF) 0.9% PF flush 3 mL  3 mL Intracatheter Q8H Clarence Bone MD   3 mL at 11/09/24 0517     Current Facility-Administered Medications   Medication Dose Route Frequency Provider Last Rate Last Admin    acetaminophen (TYLENOL) tablet 650 mg  650 mg Oral Q4H PRN Clarence Bone MD        bisacodyl (DULCOLAX) suppository 10 mg  10 mg Rectal Daily PRN Clarence Bone MD        calcium gluconate 2 g in  mL  intermittent infusion  2 g Intravenous Q8H PRN Farhad Boland MD        calcium gluconate 4 g in sodium chloride 0.9 % 100 mL intermittent infusion  4 g Intravenous Q8H PRN Farhad Boland MD        dextrose 10% infusion   Intravenous Continuous PRN Arlin Hodge PA-C   Stopped at 11/05/24 0600    glucose gel 15-30 g  15-30 g Oral Q15 Min PRN Nasir Vallecillo MD        Or    dextrose 50 % injection 25-50 mL  25-50 mL Intravenous Q15 Min PRN Nasir Vallecillo MD        Or    glucagon injection 1 mg  1 mg Subcutaneous Q15 Min PRN Nasir Vallecillo MD        EPINEPHrine (ADRENALIN) 5 mg in  mL infusion  0.01-0.1 mcg/kg/min Intravenous Continuous PRN Clarence Bone MD 14.4 mL/hr at 11/08/24 2310 0.05 mcg/kg/min at 11/08/24 2310    fentaNYL (PF) (SUBLIMAZE) injection 25 mcg  25 mcg Intravenous Q1H PRN Bhanu Ness MD   25 mcg at 11/06/24 0009    heparin lock flush 10 unit/mL injection 3 mL  3 mL Intracatheter Q1H PRN Tyra Dubois MD        hydrALAZINE (APRESOLINE) injection 10 mg  10 mg Intravenous Q30 Min PRN Clarence Bone MD        lidocaine (LMX4) cream   Topical Q1H PRN Clarence Bone MD        lidocaine 1 % 0.1-1 mL  0.1-1 mL Other Q1H PRN Clarence Bone MD        magnesium hydroxide (MILK OF MAGNESIA) suspension 30 mL  30 mL Oral Daily PRN Clarence Bone MD        magnesium sulfate 2 g in 50 mL sterile water intermittent infusion  2 g Intravenous Q8H PRN Farhad Boland MD   2 g at 11/07/24 2339    methocarbamol (ROBAXIN) tablet 500 mg  500 mg Oral Q6H PRN Clarence Bone MD   500 mg at 11/09/24 0014    naloxone (NARCAN) injection 0.2 mg  0.2 mg Intravenous Q2 Min PRN Mulvihill, Michael, MD        Or    naloxone (NARCAN) injection 0.4 mg  0.4 mg Intravenous Q2 Min PRN Mulvihill, Michael, MD        Or    naloxone (NARCAN) injection 0.2 mg  0.2 mg Intramuscular Q2 Min PRN Mulvihill, Michael, MD        Or    naloxone (NARCAN) injection 0.4 mg   "0.4 mg Intramuscular Q2 Min PRN Mulvihill, Michael, MD        No heparin required   Does not apply Continuous PRN Farhad Boland MD        ondansetron (ZOFRAN ODT) ODT tab 4 mg  4 mg Oral Q6H PRN Clarence Bone MD        Or    ondansetron (ZOFRAN) injection 4 mg  4 mg Intravenous Q6H PRN Clarence Bone MD        oxyCODONE IR (ROXICODONE) half-tab 2.5 mg  2.5 mg Oral Q4H PRN Clarence Bone MD   2.5 mg at 11/02/24 0214    Or    oxyCODONE (ROXICODONE) tablet 5 mg  5 mg Oral Q4H PRN Clarence Bone MD   5 mg at 11/09/24 0213    Patient may continue current oral medications   Does not apply Continuous PRN Bhanu Ness MD        potassium chloride 20 mEq in 50 mL intermittent infusion  20 mEq Intravenous Q8H PRN Farhad Boland MD 50 mL/hr at 11/04/24 1200 20 mEq at 11/04/24 1200    prochlorperazine (COMPAZINE) injection 5 mg  5 mg Intravenous Q6H PRN Clarence Bone MD        Or    prochlorperazine (COMPAZINE) tablet 5 mg  5 mg Oral Q6H PRN Clarence Bone MD        Reason beta blocker order not selected   Does not apply DOES NOT GO TO Clarence Olguin MD        sodium chloride (PF) 0.9% PF flush 3 mL  3 mL Intracatheter q1 min prn Clarence Bone MD        sodium chloride 0.9% BOLUS 1-250 mL  1-250 mL Intravenous Q1H PRN Carol Enrique MD        sodium chloride 0.9% BOLUS 1-250 mL  1-250 mL Intravenous Q1H PRN Miguel Floyd MD        sodium phosphate 15 mmol in NS 250mL intermittent infusion  15 mmol Intravenous Q8H PRN Farhad Boland MD   15 mmol at 11/09/24 0641         Physical Exam  Vitals were reviewed  Blood pressure (!) 115/39, pulse 102, temperature 98.2  F (36.8  C), temperature source Oral, resp. rate 20, height 1.778 m (5' 10\"), weight 100 kg (220 lb 7.4 oz), SpO2 90%.  Rhythm: intermittent NSR and atrial fibrillation    Lungs: diminished bases     Cardiovascular: irregular rate/rhythm, no m/r/g    Abdomen: soft, NT, ND, " +BS    Extremeties: 3+ BLE/BUE edema    Incision: CDI    CT: serosang output 1.1<860<750<975<1580 mL, no air leak    Weight:   Vitals:    11/05/24 0300 11/06/24 0200 11/07/24 0200 11/08/24 0430   Weight: 104.5 kg (230 lb 6.1 oz) 102.7 kg (226 lb 6.6 oz) 101 kg (222 lb 10.6 oz) 102.6 kg (226 lb 3.1 oz)    11/09/24 0630   Weight: 100 kg (220 lb 7.4 oz)         Data  Recent Labs   Lab 11/09/24  0427 11/09/24  0420 11/09/24  0029 11/08/24  2218 11/08/24  2039 11/08/24  1716 11/08/24  1605 11/08/24  1331 11/08/24  0529 11/08/24  0518 11/07/24  0541 11/07/24  0537   WBC 37.8*  --   --   --   --  35.7*  --   --   --  35.1*   < > 30.1*   HGB 7.0*  --   --   --   --  7.2*  --   --   --  7.4*   < > 6.6*   MCV 87  --   --   --   --  88  --   --   --  89   < > 88     --   --   --   --  214  --   --   --  207   < > 183   INR 1.58*  --   --   --   --   --   --   --   --  1.50*  --  1.58*     --   --  135  --   --   --  136  --  137   < > 135   POTASSIUM 3.6  --   --  3.8  --   --   --  3.7  --  3.6   < > 3.9   CHLORIDE 101  --   --  100  --   --   --  102  --  102   < > 100   CO2 20*  --   --  23  --   --   --  19*  --  21*   < > 19*   BUN 31.2*  --   --  33.5*  --   --   --  33.7*  --  31.6*   < > 31.0*   CR 1.47*  --   --  1.48*  --   --   --  1.56*  --  1.53*   < > 1.66*   ANIONGAP 14  --   --  12  --   --   --  15  --  14   < > 16*   TATE 9.7  --   --  9.6  --   --   --  9.4  --  9.8   < > 9.2   * 140* 142* 142*   < >  --    < > 122*   < > 116*   < > 129*   ALBUMIN 3.8  --   --  3.8  --   --   --  3.7  --  3.8   < > 3.4*   PROTTOTAL 5.3*  --   --  5.4*  --   --   --  5.1*  --  5.3*   < > 4.8*   BILITOTAL 1.4*  --   --  1.4*  --   --   --  2.3*  --  1.6*   < > 1.5*   ALKPHOS 115  --   --  112  --   --   --  110  --  121   < > 123   ALT 82*  --   --  85*  --   --   --  93*  --  112*   < > 144*   AST 41  --   --  48*  --   --   --  40  --  43   < > 56*    < > = values in this interval not displayed.        Imaging:  No results found for this or any previous visit (from the past 24 hours).          Patient seen and discussed with Dr. Lida Nguyen PA-C  Cardiothoracic Surgery  Available for paging [phone] (personal pager or CV Surgery Rounding Pager)  Personal Pager: [phone]  CV Surgery Rounding Pager: [phone]  After hours please page surgeon on-call

## 2024-11-09 NOTE — PROGRESS NOTES
Renal Medicine Progress Note    SHORTHAND KEY FOR MY NOTES:  c = with, s = without, p = after, a = before, x = except, asx = asymptomatic, tx = transplant or treatment, sx = symptoms or symptomatic, cx = canceled or culture, rxn = reaction, yday = yesterday, nl = normal, abx = antibiotics, fxn = function, dx = diagnosis, dz = disease, m/h = melena/hematochezia, c/d/l/ha = cramping/dizziness/lightheadedness/headache, d/c = discharge or diarrhea/constipation, f/c/n/v = fevers/chills/nausea/vomiting, cp/sob = chest pain/shortness of breath, tbv = total body volume, rxn = reaction, tdc = tunneled dialysis catheter, pta = prior to admission, hd = hemodialysis, pd = peritoneal dialysis, hhd = home hemodialysis, edw = estimated dry wt         Assessment/Plan:     1.  Oliguric JAVIER/CKD IIIb.  Pt remains on CRRT.  His volume status is ok c CVP ~14 and PAD 23.  We are not pulling as much fluid as earlier in the wk.    A.  Continue CRRT c same orders.  B.  Goal UF 0-50 ml/h but don't pull the blood volume p tranfusion.    2.  Severe shock.  Pt remains on multiple pressors.  No significant improvement this wk.  A.  Wean pressors as able.    3.  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  CV Surg is managing the post-op care.  A.  Per CV Surg.    4.  FEN.  Phos remains low and is being replaced.  K, Na, Mg are ok.  A.  Continue replacement protocols.    5.  Code Status.  Pt is full code.  His prognosis remains guarded.    Case d/w Dr. Vee, Opal Nguyen, TERRELL, Regan RN.        Interval History:     Pt states he is doing ok x he wants to put socks on.  No cp/abd pain.  He isn't making much urine (not recorded).             Medications and Allergies:     Current Facility-Administered Medications   Medication Dose Route Frequency Provider Last Rate Last Admin    albumin human 25 % injection 25 g  25 g Intravenous Q12H Arlin Hodge PA-C 100 mL/hr at 11/07/24 2012 25 g at 11/09/24 0825    aspirin (ASA) chewable tablet 162 mg  162 mg Oral or NG Tube Daily  "Clarence Bone MD   162 mg at 11/09/24 0825    insulin aspart (NovoLOG) injection (RAPID ACTING)  1-7 Units Subcutaneous Q4H Sb Arthur PA-C   1 Units at 11/09/24 0827    pantoprazole (PROTONIX) 2 mg/mL suspension 40 mg  40 mg Oral or NG Tube BID AC Arlin Hodge PA-C   40 mg at 11/09/24 0825    Or    pantoprazole (PROTONIX) EC tablet 40 mg  40 mg Oral BID AC Arlin Hodge PA-C        polyethylene glycol (MIRALAX) Packet 17 g  17 g Oral or Feeding Tube Daily Mulvihill, Michael, MD   17 g at 11/09/24 0825    Prosource TF20 ENfit Compatibl EN LIQD (PROSOURCE TF20) packet 60 mL  1 packet Per Feeding Tube BID Arlin Hodge PA-C   60 mL at 11/09/24 0827    senna-docusate (SENOKOT-S/PERICOLACE) 8.6-50 MG per tablet 1 tablet  1 tablet Oral or Feeding Tube BID Mulvihill, Michael, MD   1 tablet at 11/09/24 0825    sodium chloride (PF) 0.9% PF flush 3 mL  3 mL Intracatheter Q8H Clarence Bone MD   3 mL at 11/09/24 0517      No Known Allergies       Physical Exam:     Vitals were reviewed   , Blood pressure (!) 115/39, pulse 90, temperature 98.2  F (36.8  C), temperature source Oral, resp. rate 20, height 1.778 m (5' 10\"), weight 100 kg (220 lb 7.4 oz), SpO2 (!) 85%.  Wt Readings from Last 3 Encounters:   11/09/24 100 kg (220 lb 7.4 oz)   10/25/24 97.1 kg (214 lb)   10/03/24 101.2 kg (223 lb)     Intake/Output Summary (Last 24 hours) at 11/9/2024 1056  Last data filed at 11/9/2024 1036  Gross per 24 hour   Intake 4399.86 ml   Output 4081 ml   Net 318.86 ml     GENERAL APPEARANCE: lying in bed, speaks slowly, awake  RESP: some coarse sounds, decreased laterally  CV: irreg, nl S1/S2   ABDOMEN: s/nt/nd  EXTREMITIES/SKIN: + scrotal edema; 2+ ble edema  LINES:  + burt. + multiple lines: art, central, temp HD    Pt seen on CRRT.  Stable run.  UF goal 0-50 ml/h.           Data:     CBC RESULTS:     Recent Labs   Lab 11/09/24  0427 11/08/24  1716 11/08/24  0518 11/07/24  2044 11/07/24  1411 11/07/24  0537 "   WBC 37.8* 35.7* 35.1* 34.1* 32.2* 30.1*   RBC 2.53* 2.56* 2.65* 2.62* 2.61* 2.35*   HGB 7.0* 7.2* 7.4* 7.4* 7.5* 6.6*   HCT 22.0* 22.6* 23.5* 23.1* 22.7* 20.7*    214 207 187 174 183     Basic Metabolic Panel:  Recent Labs   Lab 11/09/24  0750 11/09/24  0427 11/09/24  0420 11/09/24  0029 11/08/24 2218 11/08/24  2039 11/08/24  1605 11/08/24  1331 11/08/24  0529 11/08/24  0518 11/08/24  0017 11/07/24 2217 11/07/24  1725 11/07/24  1411   NA  --  135  --   --  135  --   --  136  --  137  --  136  --  136   POTASSIUM  --  3.6  --   --  3.8  --   --  3.7  --  3.6  --  3.7  --  3.9   CHLORIDE  --  101  --   --  100  --   --  102  --  102  --  102  --  103   CO2  --  20*  --   --  23  --   --  19*  --  21*  --  21*  --  19*   BUN  --  31.2*  --   --  33.5*  --   --  33.7*  --  31.6*  --  31.0*  --  31.2*   CR  --  1.47*  --   --  1.48*  --   --  1.56*  --  1.53*  --  1.57*  --  1.59*   * 147* 140* 142* 142* 139*   < > 122*   < > 116*   < > 118*   < > 122*   TATE  --  9.7  --   --  9.6  --   --  9.4  --  9.8  --  9.6  --  9.4    < > = values in this interval not displayed.     INR  Recent Labs   Lab 11/09/24 0427 11/08/24  0518 11/07/24  0537 11/06/24  0514   INR 1.58* 1.50* 1.58* 1.71*      Attestation:   I have reviewed today's relevant vital signs, notes, medications, labs and imaging.    Kaz Ann MD  [facility] - Nephrology  [phone]

## 2024-11-10 ENCOUNTER — APPOINTMENT (OUTPATIENT)
Dept: OCCUPATIONAL THERAPY | Facility: CLINIC | Age: 89
DRG: 233 | End: 2024-11-10
Attending: STUDENT IN AN ORGANIZED HEALTH CARE EDUCATION/TRAINING PROGRAM
Payer: MEDICARE

## 2024-11-10 LAB
ACANTHOCYTES BLD QL SMEAR: SLIGHT
ALBUMIN SERPL BCG-MCNC: 3.8 G/DL (ref 3.5–5.2)
ALBUMIN SERPL BCG-MCNC: 4 G/DL (ref 3.5–5.2)
ALBUMIN SERPL BCG-MCNC: 4.1 G/DL (ref 3.5–5.2)
ALLEN'S TEST: ABNORMAL
ALLEN'S TEST: ABNORMAL
ALP SERPL-CCNC: 127 U/L (ref 40–150)
ALP SERPL-CCNC: 129 U/L (ref 40–150)
ALP SERPL-CCNC: 129 U/L (ref 40–150)
ALT SERPL W P-5'-P-CCNC: 60 U/L (ref 0–70)
ALT SERPL W P-5'-P-CCNC: 62 U/L (ref 0–70)
ALT SERPL W P-5'-P-CCNC: 66 U/L (ref 0–70)
ANION GAP SERPL CALCULATED.3IONS-SCNC: 12 MMOL/L (ref 7–15)
AST SERPL W P-5'-P-CCNC: 46 U/L (ref 0–45)
AST SERPL W P-5'-P-CCNC: 52 U/L (ref 0–45)
AST SERPL W P-5'-P-CCNC: 53 U/L (ref 0–45)
BASE EXCESS BLDA CALC-SCNC: 0.9 MMOL/L (ref -3–3)
BASE EXCESS BLDA CALC-SCNC: 1.7 MMOL/L (ref -3–3)
BASE EXCESS BLDV CALC-SCNC: 1.9 MMOL/L (ref -3–3)
BASO STIPL BLD QL SMEAR: PRESENT
BILIRUB SERPL-MCNC: 1.6 MG/DL
BILIRUB SERPL-MCNC: 1.6 MG/DL
BILIRUB SERPL-MCNC: 1.7 MG/DL
BUN SERPL-MCNC: 32.9 MG/DL (ref 8–23)
BUN SERPL-MCNC: 33.5 MG/DL (ref 8–23)
BUN SERPL-MCNC: 34.3 MG/DL (ref 8–23)
BURR CELLS BLD QL SMEAR: SLIGHT
CA-I BLD-MCNC: 4.8 MG/DL (ref 4.4–5.2)
CA-I BLD-MCNC: 5 MG/DL (ref 4.4–5.2)
CA-I BLD-MCNC: 5 MG/DL (ref 4.4–5.2)
CALCIUM SERPL-MCNC: 9.7 MG/DL (ref 8.8–10.4)
CALCIUM SERPL-MCNC: 9.7 MG/DL (ref 8.8–10.4)
CALCIUM SERPL-MCNC: 9.8 MG/DL (ref 8.8–10.4)
CHLORIDE SERPL-SCNC: 102 MMOL/L (ref 98–107)
CHLORIDE SERPL-SCNC: 102 MMOL/L (ref 98–107)
CHLORIDE SERPL-SCNC: 104 MMOL/L (ref 98–107)
COHGB MFR BLD: 93.9 % (ref 95–96)
COHGB MFR BLD: 96.2 % (ref 95–96)
CREAT SERPL-MCNC: 1.4 MG/DL (ref 0.67–1.17)
CREAT SERPL-MCNC: 1.43 MG/DL (ref 0.67–1.17)
CREAT SERPL-MCNC: 1.46 MG/DL (ref 0.67–1.17)
EGFRCR SERPLBLD CKD-EPI 2021: 46 ML/MIN/1.73M2
EGFRCR SERPLBLD CKD-EPI 2021: 47 ML/MIN/1.73M2
EGFRCR SERPLBLD CKD-EPI 2021: 49 ML/MIN/1.73M2
ELLIPTOCYTES BLD QL SMEAR: SLIGHT
ERYTHROCYTE [DISTWIDTH] IN BLOOD BY AUTOMATED COUNT: 18.9 % (ref 10–15)
ERYTHROCYTE [DISTWIDTH] IN BLOOD BY AUTOMATED COUNT: 18.9 % (ref 10–15)
GIANT PLATELETS BLD QL SMEAR: SLIGHT
GLUCOSE BLDC GLUCOMTR-MCNC: 128 MG/DL (ref 70–99)
GLUCOSE BLDC GLUCOMTR-MCNC: 131 MG/DL (ref 70–99)
GLUCOSE BLDC GLUCOMTR-MCNC: 133 MG/DL (ref 70–99)
GLUCOSE BLDC GLUCOMTR-MCNC: 141 MG/DL (ref 70–99)
GLUCOSE BLDC GLUCOMTR-MCNC: 143 MG/DL (ref 70–99)
GLUCOSE SERPL-MCNC: 134 MG/DL (ref 70–99)
GLUCOSE SERPL-MCNC: 135 MG/DL (ref 70–99)
GLUCOSE SERPL-MCNC: 145 MG/DL (ref 70–99)
HCO3 BLD-SCNC: 24 MMOL/L (ref 21–28)
HCO3 BLD-SCNC: 25 MMOL/L (ref 21–28)
HCO3 BLDV-SCNC: 27 MMOL/L (ref 21–28)
HCO3 SERPL-SCNC: 23 MMOL/L (ref 22–29)
HCT VFR BLD AUTO: 26.9 % (ref 40–53)
HCT VFR BLD AUTO: 27.7 % (ref 40–53)
HGB BLD-MCNC: 8.8 G/DL (ref 13.3–17.7)
HGB BLD-MCNC: 9.2 G/DL (ref 13.3–17.7)
INR PPP: 1.58 (ref 0.85–1.15)
LACTATE SERPL-SCNC: 2.2 MMOL/L (ref 0.7–2)
LACTATE SERPL-SCNC: 2.2 MMOL/L (ref 0.7–2)
MAGNESIUM SERPL-MCNC: 2.1 MG/DL (ref 1.7–2.3)
MAGNESIUM SERPL-MCNC: 2.1 MG/DL (ref 1.7–2.3)
MAGNESIUM SERPL-MCNC: 2.2 MG/DL (ref 1.7–2.3)
MCH RBC QN AUTO: 27.8 PG (ref 26.5–33)
MCH RBC QN AUTO: 28.1 PG (ref 26.5–33)
MCHC RBC AUTO-ENTMCNC: 32.7 G/DL (ref 31.5–36.5)
MCHC RBC AUTO-ENTMCNC: 33.2 G/DL (ref 31.5–36.5)
MCV RBC AUTO: 85 FL (ref 78–100)
MCV RBC AUTO: 85 FL (ref 78–100)
O2/TOTAL GAS SETTING VFR VENT: 40 %
O2/TOTAL GAS SETTING VFR VENT: 40 %
O2/TOTAL GAS SETTING VFR VENT: 5 %
OXYHGB MFR BLDV: 48 % (ref 70–75)
PCO2 BLD: 33 MM HG (ref 35–45)
PCO2 BLD: 34 MM HG (ref 35–45)
PCO2 BLDV: 42 MM HG (ref 40–50)
PH BLD: 7.47 [PH] (ref 7.35–7.45)
PH BLD: 7.49 [PH] (ref 7.35–7.45)
PH BLDV: 7.42 [PH] (ref 7.32–7.43)
PHOSPHATE SERPL-MCNC: 2.2 MG/DL (ref 2.5–4.5)
PLAT MORPH BLD: ABNORMAL
PLATELET # BLD AUTO: 258 10E3/UL (ref 150–450)
PLATELET # BLD AUTO: 288 10E3/UL (ref 150–450)
PO2 BLD: 61 MM HG (ref 80–105)
PO2 BLD: 70 MM HG (ref 80–105)
PO2 BLDV: 26 MM HG (ref 25–47)
POLYCHROMASIA BLD QL SMEAR: SLIGHT
POTASSIUM SERPL-SCNC: 3.6 MMOL/L (ref 3.4–5.3)
POTASSIUM SERPL-SCNC: 3.8 MMOL/L (ref 3.4–5.3)
POTASSIUM SERPL-SCNC: 3.9 MMOL/L (ref 3.4–5.3)
PROT SERPL-MCNC: 5.7 G/DL (ref 6.4–8.3)
PROT SERPL-MCNC: 5.8 G/DL (ref 6.4–8.3)
PROT SERPL-MCNC: 5.9 G/DL (ref 6.4–8.3)
RBC # BLD AUTO: 3.17 10E6/UL (ref 4.4–5.9)
RBC # BLD AUTO: 3.27 10E6/UL (ref 4.4–5.9)
RBC MORPH BLD: ABNORMAL
SAO2 % BLDA: 91 % (ref 92–100)
SAO2 % BLDA: 93 % (ref 92–100)
SAO2 % BLDV: 49.7 % (ref 70–75)
SODIUM SERPL-SCNC: 137 MMOL/L (ref 135–145)
SODIUM SERPL-SCNC: 137 MMOL/L (ref 135–145)
SODIUM SERPL-SCNC: 139 MMOL/L (ref 135–145)
WBC # BLD AUTO: 46 10E3/UL (ref 4–11)
WBC # BLD AUTO: 50.4 10E3/UL (ref 4–11)

## 2024-11-10 PROCEDURE — P9045 ALBUMIN (HUMAN), 5%, 250 ML: HCPCS | Performed by: INTERNAL MEDICINE

## 2024-11-10 PROCEDURE — 85610 PROTHROMBIN TIME: CPT | Performed by: PHYSICIAN ASSISTANT

## 2024-11-10 PROCEDURE — 87075 CULTR BACTERIA EXCEPT BLOOD: CPT | Performed by: PHYSICIAN ASSISTANT

## 2024-11-10 PROCEDURE — 83605 ASSAY OF LACTIC ACID: CPT | Performed by: PHYSICIAN ASSISTANT

## 2024-11-10 PROCEDURE — 82247 BILIRUBIN TOTAL: CPT | Performed by: PHYSICIAN ASSISTANT

## 2024-11-10 PROCEDURE — 250N000009 HC RX 250: Performed by: PHYSICIAN ASSISTANT

## 2024-11-10 PROCEDURE — 250N000013 HC RX MED GY IP 250 OP 250 PS 637: Performed by: STUDENT IN AN ORGANIZED HEALTH CARE EDUCATION/TRAINING PROGRAM

## 2024-11-10 PROCEDURE — 250N000013 HC RX MED GY IP 250 OP 250 PS 637: Performed by: PHYSICIAN ASSISTANT

## 2024-11-10 PROCEDURE — 83735 ASSAY OF MAGNESIUM: CPT | Performed by: PHYSICIAN ASSISTANT

## 2024-11-10 PROCEDURE — 250N000013 HC RX MED GY IP 250 OP 250 PS 637: Performed by: SURGERY

## 2024-11-10 PROCEDURE — 87070 CULTURE OTHR SPECIMN AEROBIC: CPT | Performed by: PHYSICIAN ASSISTANT

## 2024-11-10 PROCEDURE — 82805 BLOOD GASES W/O2 SATURATION: CPT | Performed by: PHYSICIAN ASSISTANT

## 2024-11-10 PROCEDURE — 84155 ASSAY OF PROTEIN SERUM: CPT | Performed by: PHYSICIAN ASSISTANT

## 2024-11-10 PROCEDURE — 250N000011 HC RX IP 250 OP 636: Performed by: INTERNAL MEDICINE

## 2024-11-10 PROCEDURE — 84460 ALANINE AMINO (ALT) (SGPT): CPT | Performed by: PHYSICIAN ASSISTANT

## 2024-11-10 PROCEDURE — 85027 COMPLETE CBC AUTOMATED: CPT | Performed by: PHYSICIAN ASSISTANT

## 2024-11-10 PROCEDURE — 120N000004 HC R&B MS OVERFLOW

## 2024-11-10 PROCEDURE — 258N000003 HC RX IP 258 OP 636: Performed by: INTERNAL MEDICINE

## 2024-11-10 PROCEDURE — 258N000003 HC RX IP 258 OP 636: Performed by: PHYSICIAN ASSISTANT

## 2024-11-10 PROCEDURE — 250N000011 HC RX IP 250 OP 636: Performed by: SURGERY

## 2024-11-10 PROCEDURE — 82330 ASSAY OF CALCIUM: CPT | Performed by: PHYSICIAN ASSISTANT

## 2024-11-10 PROCEDURE — 250N000011 HC RX IP 250 OP 636: Mod: JZ | Performed by: PHYSICIAN ASSISTANT

## 2024-11-10 PROCEDURE — 84100 ASSAY OF PHOSPHORUS: CPT | Performed by: INTERNAL MEDICINE

## 2024-11-10 PROCEDURE — 258N000003 HC RX IP 258 OP 636: Performed by: SURGERY

## 2024-11-10 PROCEDURE — 250N000009 HC RX 250: Performed by: INTERNAL MEDICINE

## 2024-11-10 PROCEDURE — 90945 DIALYSIS ONE EVALUATION: CPT | Performed by: INTERNAL MEDICINE

## 2024-11-10 PROCEDURE — 97140 MANUAL THERAPY 1/> REGIONS: CPT | Mod: GO

## 2024-11-10 PROCEDURE — 99291 CRITICAL CARE FIRST HOUR: CPT | Mod: 24 | Performed by: INTERNAL MEDICINE

## 2024-11-10 PROCEDURE — P9047 ALBUMIN (HUMAN), 25%, 50ML: HCPCS | Mod: JZ | Performed by: PHYSICIAN ASSISTANT

## 2024-11-10 RX ADMIN — CALCIUM CHLORIDE, MAGNESIUM CHLORIDE, DEXTROSE MONOHYDRATE, LACTIC ACID, SODIUM CHLORIDE, SODIUM BICARBONATE AND POTASSIUM CHLORIDE 5000 ML: 5.15; 2.03; 22; 5.4; 6.46; 3.09; .157 INJECTION INTRAVENOUS at 10:00

## 2024-11-10 RX ADMIN — SODIUM PHOSPHATE, MONOBASIC, MONOHYDRATE AND SODIUM PHOSPHATE, DIBASIC, ANHYDROUS 15 MMOL: 142; 276 INJECTION, SOLUTION INTRAVENOUS at 13:19

## 2024-11-10 RX ADMIN — Medication 40 MG: at 08:06

## 2024-11-10 RX ADMIN — ALBUMIN HUMAN 25 G: 0.25 SOLUTION INTRAVENOUS at 19:44

## 2024-11-10 RX ADMIN — VASOPRESSIN 2 UNITS/HR: 20 INJECTION INTRAVENOUS at 05:16

## 2024-11-10 RX ADMIN — POLYETHYLENE GLYCOL 3350 17 G: 17 POWDER, FOR SOLUTION ORAL at 08:06

## 2024-11-10 RX ADMIN — SENNOSIDES AND DOCUSATE SODIUM 1 TABLET: 50; 8.6 TABLET ORAL at 21:10

## 2024-11-10 RX ADMIN — CALCIUM CHLORIDE, MAGNESIUM CHLORIDE, DEXTROSE MONOHYDRATE, LACTIC ACID, SODIUM CHLORIDE, SODIUM BICARBONATE AND POTASSIUM CHLORIDE 5000 ML: 5.15; 2.03; 22; 5.4; 6.46; 3.09; .157 INJECTION INTRAVENOUS at 03:14

## 2024-11-10 RX ADMIN — SENNOSIDES AND DOCUSATE SODIUM 1 TABLET: 50; 8.6 TABLET ORAL at 08:06

## 2024-11-10 RX ADMIN — DIPHENHYDRAMINE HYDROCHLORIDE: 25 CAPSULE ORAL at 21:09

## 2024-11-10 RX ADMIN — Medication 60 ML: at 21:10

## 2024-11-10 RX ADMIN — CALCIUM CHLORIDE, MAGNESIUM CHLORIDE, DEXTROSE MONOHYDRATE, LACTIC ACID, SODIUM CHLORIDE, SODIUM BICARBONATE AND POTASSIUM CHLORIDE 5000 ML: 5.15; 2.03; 22; 5.4; 6.46; 3.09; .157 INJECTION INTRAVENOUS at 06:31

## 2024-11-10 RX ADMIN — MAGNESIUM HYDROXIDE 30 ML: 400 SUSPENSION ORAL at 16:41

## 2024-11-10 RX ADMIN — NOREPINEPHRINE BITARTRATE 0.03 MCG/KG/MIN: 0.06 INJECTION, SOLUTION INTRAVENOUS at 03:25

## 2024-11-10 RX ADMIN — CALCIUM CHLORIDE, MAGNESIUM CHLORIDE, DEXTROSE MONOHYDRATE, LACTIC ACID, SODIUM CHLORIDE, SODIUM BICARBONATE AND POTASSIUM CHLORIDE: 5.15; 2.03; 22; 5.4; 6.46; 3.09; .157 INJECTION INTRAVENOUS at 14:34

## 2024-11-10 RX ADMIN — CALCIUM CHLORIDE, MAGNESIUM CHLORIDE, DEXTROSE MONOHYDRATE, LACTIC ACID, SODIUM CHLORIDE, SODIUM BICARBONATE AND POTASSIUM CHLORIDE 5000 ML: 5.15; 2.03; 22; 5.4; 6.46; 3.09; .157 INJECTION INTRAVENOUS at 03:41

## 2024-11-10 RX ADMIN — ALBUMIN HUMAN 12.5 G: 0.05 INJECTION, SOLUTION INTRAVENOUS at 12:14

## 2024-11-10 RX ADMIN — EPINEPHRINE 0.06 MCG/KG/MIN: 1 INJECTION INTRAMUSCULAR; INTRAVENOUS; SUBCUTANEOUS at 07:47

## 2024-11-10 RX ADMIN — INSULIN ASPART 1 UNITS: 100 INJECTION, SOLUTION INTRAVENOUS; SUBCUTANEOUS at 16:02

## 2024-11-10 RX ADMIN — CALCIUM CHLORIDE, MAGNESIUM CHLORIDE, DEXTROSE MONOHYDRATE, LACTIC ACID, SODIUM CHLORIDE, SODIUM BICARBONATE AND POTASSIUM CHLORIDE 5000 ML: 5.15; 2.03; 22; 5.4; 6.46; 3.09; .157 INJECTION INTRAVENOUS at 16:22

## 2024-11-10 RX ADMIN — CALCIUM CHLORIDE, MAGNESIUM CHLORIDE, DEXTROSE MONOHYDRATE, LACTIC ACID, SODIUM CHLORIDE, SODIUM BICARBONATE AND POTASSIUM CHLORIDE 5000 ML: 5.15; 2.03; 22; 5.4; 6.46; 3.09; .157 INJECTION INTRAVENOUS at 16:23

## 2024-11-10 RX ADMIN — INSULIN ASPART 1 UNITS: 100 INJECTION, SOLUTION INTRAVENOUS; SUBCUTANEOUS at 12:00

## 2024-11-10 RX ADMIN — VASOPRESSIN 2 UNITS/HR: 20 INJECTION INTRAVENOUS at 16:12

## 2024-11-10 RX ADMIN — CALCIUM CHLORIDE, MAGNESIUM CHLORIDE, DEXTROSE MONOHYDRATE, LACTIC ACID, SODIUM CHLORIDE, SODIUM BICARBONATE AND POTASSIUM CHLORIDE 5000 ML: 5.15; 2.03; 22; 5.4; 6.46; 3.09; .157 INJECTION INTRAVENOUS at 22:38

## 2024-11-10 RX ADMIN — ALBUMIN HUMAN 25 G: 0.25 SOLUTION INTRAVENOUS at 08:23

## 2024-11-10 RX ADMIN — CALCIUM CHLORIDE, MAGNESIUM CHLORIDE, DEXTROSE MONOHYDRATE, LACTIC ACID, SODIUM CHLORIDE, SODIUM BICARBONATE AND POTASSIUM CHLORIDE 5000 ML: 5.15; 2.03; 22; 5.4; 6.46; 3.09; .157 INJECTION INTRAVENOUS at 13:18

## 2024-11-10 RX ADMIN — CALCIUM CHLORIDE, MAGNESIUM CHLORIDE, DEXTROSE MONOHYDRATE, LACTIC ACID, SODIUM CHLORIDE, SODIUM BICARBONATE AND POTASSIUM CHLORIDE 5000 ML: 5.15; 2.03; 22; 5.4; 6.46; 3.09; .157 INJECTION INTRAVENOUS at 19:39

## 2024-11-10 RX ADMIN — Medication 40 MG: at 16:02

## 2024-11-10 RX ADMIN — Medication 60 ML: at 08:06

## 2024-11-10 RX ADMIN — CALCIUM CHLORIDE, MAGNESIUM CHLORIDE, DEXTROSE MONOHYDRATE, LACTIC ACID, SODIUM CHLORIDE, SODIUM BICARBONATE AND POTASSIUM CHLORIDE 5000 ML: 5.15; 2.03; 22; 5.4; 6.46; 3.09; .157 INJECTION INTRAVENOUS at 23:11

## 2024-11-10 RX ADMIN — ASPIRIN 81 MG CHEWABLE TABLET 162 MG: 81 TABLET CHEWABLE at 08:06

## 2024-11-10 NOTE — PLAN OF CARE
Problem: Cardiovascular Surgery  Goal: Improved Activity Tolerance  Outcome: Not Progressing  Intervention: Optimize Tolerance for Activity  Recent Flowsheet Documentation  Taken 11/9/2024 1600 by Miguel Wright, RN  Environmental Support: calm environment promoted  Taken 11/9/2024 1200 by Miguel Wright RN  Environmental Support: calm environment promoted  Taken 11/9/2024 0800 by Miguel Wright RN  Environmental Support: calm environment promoted  Goal: Optimal Coping with Heart Surgery  Outcome: Not Progressing  Intervention: Support Psychosocial Response to Surgery  Recent Flowsheet Documentation  Taken 11/9/2024 1600 by Miguel Wright RN  Supportive Measures: positive reinforcement provided  Family/Support System Care: caregiver stress acknowledged  Taken 11/9/2024 1200 by Miguel Wright RN  Supportive Measures: positive reinforcement provided  Family/Support System Care: caregiver stress acknowledged  Taken 11/9/2024 0800 by Miguel Wright RN  Supportive Measures: positive reinforcement provided  Family/Support System Care: caregiver stress acknowledged  Goal: Absence of Bleeding  Intervention: Monitor and Manage Bleeding  Recent Flowsheet Documentation  Taken 11/9/2024 1600 by Miguel Wright RN  Bleeding Management: dressing monitored  Taken 11/9/2024 1200 by Miguel Wright RN  Bleeding Management: dressing monitored  Taken 11/9/2024 0800 by Miguel Wright RN  Bleeding Management: dressing monitored  Goal: Effective Cardiac Function  Outcome: Not Progressing  Intervention: Optimize Cardiac Output and Blood Flow  Recent Flowsheet Documentation  Taken 11/9/2024 1600 by Miguel Wright RN  Dysrhythmia Management: pacing wires maintained  Taken 11/9/2024 1200 by Miguel Wright RN  Dysrhythmia Management: pacing wires maintained  Taken 11/9/2024 0800 by Miguel Wright RN  Dysrhythmia Management: pacing wires maintained  Goal: Optimal Cerebral Tissue  Perfusion  Intervention: Protect and Optimize Cerebral Perfusion  Recent Flowsheet Documentation  Taken 11/9/2024 1800 by Miguel Wright, RN  Head of Bed (HOB) Positioning: HOB flat  Taken 11/9/2024 1600 by Miguel Wright, RN  Sensory Stimulation Regulation:   auditory stimulation minimized   care clustered   lighting decreased   quiet environment promoted  Cerebral Perfusion Promotion: blood pressure monitored  Glycemic Management: blood glucose monitored  Head of Bed (HOB) Positioning: (Reverse Trendelenberg) HOB flat  Taken 11/9/2024 1400 by Miguel Wright, RN  Head of Bed (HOB) Positioning: HOB flat  Taken 11/9/2024 1200 by Miguel Wright, RN  Sensory Stimulation Regulation:   auditory stimulation minimized   care clustered   lighting decreased   quiet environment promoted  Cerebral Perfusion Promotion: blood pressure monitored  Glycemic Management: blood glucose monitored  Head of Bed (HOB) Positioning: (Reverse Trendelenberg) HOB flat  Taken 11/9/2024 1000 by Miguel Wright RN  Head of Bed (HOB) Positioning: HOB flat  Taken 11/9/2024 0800 by Miguel Wright, RN  Sensory Stimulation Regulation:   auditory stimulation minimized   care clustered   lighting decreased   quiet environment promoted  Cerebral Perfusion Promotion: blood pressure monitored  Glycemic Management: blood glucose monitored  Head of Bed (HOB) Positioning: (Reverse Trendelenberg) HOB flat  Goal: Blood Glucose Level Within Targeted Range  Intervention: Optimize Glycemic Control  Recent Flowsheet Documentation  Taken 11/9/2024 1600 by Miguel Wright, RN  Glycemic Management: blood glucose monitored  Taken 11/9/2024 1200 by Miguel Wright, RN  Glycemic Management: blood glucose monitored  Taken 11/9/2024 0800 by Miguel Wright, RN  Glycemic Management: blood glucose monitored  Goal: Absence of Infection Signs and Symptoms  Outcome: Not Progressing  Intervention: Prevent or Manage Infection  Recent Flowsheet  Documentation  Taken 11/9/2024 1600 by Miguel Wright, RN  Infection Prevention: hand hygiene promoted  Taken 11/9/2024 1200 by Miguel Wright, RN  Infection Prevention: hand hygiene promoted  Taken 11/9/2024 0800 by Miguel Wright, RN  Infection Prevention: hand hygiene promoted  Goal: Anesthesia/Sedation Recovery  Intervention: Optimize Anesthesia Recovery  Recent Flowsheet Documentation  Taken 11/9/2024 1600 by Miguel Wright, RN  Safety Promotion/Fall Prevention:   activity supervised   clutter free environment maintained   increased rounding and observation   increase visualization of patient   lighting adjusted   nonskid shoes/slippers when out of bed   room organization consistent   safety round/check completed   treat reversible contributory factors   treat underlying cause  Reorientation Measures:   calendar in view   clock in view   reorientation provided  Taken 11/9/2024 1200 by Miguel Wright, RN  Safety Promotion/Fall Prevention:   activity supervised   clutter free environment maintained   increased rounding and observation   increase visualization of patient   lighting adjusted   nonskid shoes/slippers when out of bed   room organization consistent   safety round/check completed   treat reversible contributory factors   treat underlying cause  Reorientation Measures:   calendar in view   clock in view   reorientation provided  Taken 11/9/2024 0800 by Miguel Wright, RN  Safety Promotion/Fall Prevention:   activity supervised   clutter free environment maintained   increased rounding and observation   increase visualization of patient   lighting adjusted   nonskid shoes/slippers when out of bed   room organization consistent   safety round/check completed   treat reversible contributory factors   treat underlying cause  Reorientation Measures:   calendar in view   clock in view   reorientation provided  Goal: Acceptable Pain Control  Intervention: Prevent or Manage Pain  Recent  Flowsheet Documentation  Taken 11/9/2024 1600 by Miguel Wright, RN  Pain Management Interventions:   pain management plan reviewed with patient/caregiver   repositioned   rest   quiet environment facilitated   relaxation techniques promoted   pillow support provided  Taken 11/9/2024 1200 by Miguel Wright, RN  Pain Management Interventions:   pain management plan reviewed with patient/caregiver   repositioned   rest   quiet environment facilitated   relaxation techniques promoted   pillow support provided  Taken 11/9/2024 0800 by Miguel Wright, RN  Pain Management Interventions:   pain management plan reviewed with patient/caregiver   repositioned   rest   quiet environment facilitated   relaxation techniques promoted   pillow support provided  Goal: Effective Urinary Elimination  Outcome: Not Progressing  Goal: Effective Oxygenation and Ventilation  Intervention: Promote Airway Secretion Clearance  Recent Flowsheet Documentation  Taken 11/9/2024 1600 by Miguel Wright RN  Cough And Deep Breathing: done with encouragement  Airway/Ventilation Management: airway patency maintained  Taken 11/9/2024 1200 by Miguel Wright, RN  Cough And Deep Breathing: done with encouragement  Airway/Ventilation Management: airway patency maintained  Taken 11/9/2024 0800 by Miguel Wright RN  Cough And Deep Breathing: done with encouragement  Airway/Ventilation Management: airway patency maintained  Intervention: Optimize Oxygenation and Ventilation  Recent Flowsheet Documentation  Taken 11/9/2024 1600 by Miguel Wright, RN  Chest Tube Safety: all connections secured  Taken 11/9/2024 1200 by Miguel Wright, RN  Chest Tube Safety: all connections secured  Taken 11/9/2024 0800 by Miguel Wright, RN  Chest Tube Safety: all connections secured   Goal Outcome Evaluation:       AxO, forgetful at times, minimal CT output. Net zero crrt and BP seems to be slightly better today. A little less vasopressor  support towards end of shift. CT of C/A/P done, not read yet. Lawrence out.

## 2024-11-10 NOTE — PROGRESS NOTES
CV  Pressors (which pressors and any increase/decrease in pressor needs): Vaso 2, levo 0.03, Epi 0.06  HR range: afib  Chest tube output: less than 10 per hour, mostly zero    Neuro  Orientation: AxO, forgetful at times  Delirium present?(y/n): n  Sleep: taking short naps  Pain: well controlled, not much    GI/  BM? (y/n): N  Urine output: anuric bladder scan for zero      Lines:  CVC, icy cath, a line, PIV   details…

## 2024-11-10 NOTE — PROGRESS NOTES
Critical Care  Note      11/10/2024    Name: Alessio Teixeira MRN#: [id number]   Age: 88 year old YOB: 1935     Hsptl Day# 13  ICU DAY # 11    MV DAY # 11             Problem List:   Active Problems:    Coronary artery disease  Mixed cardiogenic/vasoplegic shock  Acute hypoxemic respiratory failure         Summary/Hospital Course:   88 year old M with a history of severe 3 vessel CAD. Status post sternotomy, CABGx3, modified MAZE procedure, PAYAM ligation with Dr. Mulvihill 10/28/24. Post operative hemorrhage with RTOR on POD0 for evacuation of mediastinal blood clot causing tamponade physiology.   Course subsequently complicated by persistent shock (appears mixed cardigenic/vasoplegic), and ongoing respiratory failure.    11/10:  -Stable.  -Stable pressers need we think he is dry giving some volume       Assessment and plan :     Alessio Teixeira IS a 88 year old male admitted on 10/28/2024 for shock and respiratory failure.   I have personally reviewed the daily labs, imaging studies, cultures and discussed the case with referring physician and consulting physicians.     My assessment and plan by system for this patient is as follows:    Neurology/Psychiatry:       Cardiovascular:   Shock:  appears c/w mixed cardiogenic and vasoplegic picture; persistent.  Continues on epi, norepi, vaso,  CI of ~2.0. No signficant improvement in hemodynamics with extubation. Probabaly dry   S/p CAB3:  continues on ASA.  Add statin when LFTs stabilized.    Pulmonary/Ventilator Management:   Tolerating nasal cannula  Pleural efusion with chst tube in.     GI and Nutrition :   1. TF  2. PPI for pud prophy    Renal/Fluids/Electrolytes:   1. Acute kidney failure:  continues on CRRT.  Appreciate nephrology support.    Infectious Disease:   1. Ngtd on cultures.  Abx stopped.    Endocrine:   1. Stress induced hyperglycemia:  SSI    Hematology/Oncology:   1. Hyperleukocytosis to 35, known h/o CLL/MDS.  Appreciate heme input.  2.  "Acute blood loss Anemia d/t post-operative bleeding, no signs, symptoms of   -@ units of blood given.     ICU Prophylaxis:   1. DVT: mechanical only for now  2. VAP: HOB 30 degrees, chlorhexidine rinse  3. Stress Ulcer: PPI  4. Restraints: Nonviolent soft two point restraints required and necessary for patient safety and continued cares and good effect as patient continues to pull at necessary lines, tubes despite education and distraction. Will readdress daily.   5. Wound care  -   6. Feeding - tf  7. Family Update: bedside  8. Disposition - ICU    Clinically Significant Risk Factors               # Hypoalbuminemia: Lowest albumin = 2.7 g/dL at 11/2/2024  5:44 AM, will monitor as appropriate    # Coagulation Defect: INR = 1.58 (Ref range: 0.85 - 1.15) and/or PTT = 42 Seconds (Ref range: 22 - 38 Seconds), will monitor for bleeding    # Hypertension: Noted on problem list  # Chronic heart failure with reduced ejection fraction: last echo with EF <40%   # Acute Hypercapnic Respiratory Failure: based on arterial blood gas results.  Continue supplemental oxygen and ventilatory support as indicated.  # Acute Hypercapnic Respiratory Failure: based on venous blood gas results.  Continue supplemental oxygen and ventilatory support as indicated.      #Acute blood loss anemia: Lowest Hgb this hospitalization: 6.6 g/dL. Will continue to monitor and treat/transfuse as appropriate.     # Obesity: Estimated body mass index is 31.7 kg/m  as calculated from the following:    Height as of this encounter: 1.778 m (5' 10\").    Weight as of this encounter: 100.2 kg (220 lb 14.4 oz).   # Moderate Malnutrition: based on nutrition assessment     # History of CABG: noted on surgical history                           Key Medications:     Current Facility-Administered Medications   Medication Dose Route Frequency Provider Last Rate Last Admin    albumin human 25 % injection 25 g  25 g Intravenous Q12H Arlin Hodge PA-C 100 mL/hr at " 11/07/24 2012 25 g at 11/10/24 0823    aspirin (ASA) chewable tablet 162 mg  162 mg Oral or NG Tube Daily Clarence Bone MD   162 mg at 11/10/24 0806    insulin aspart (NovoLOG) injection (RAPID ACTING)  1-7 Units Subcutaneous Q4H Sb Arthur PA-C   1 Units at 11/10/24 1602    pantoprazole (PROTONIX) 2 mg/mL suspension 40 mg  40 mg Oral or NG Tube BID AC Arlin Hodge PA-C   40 mg at 11/10/24 1602    Or    pantoprazole (PROTONIX) EC tablet 40 mg  40 mg Oral BID AC Arlin Hodge PA-C        polyethylene glycol (MIRALAX) Packet 17 g  17 g Oral or Feeding Tube Daily Mulvihill, Michael, MD   17 g at 11/10/24 0806    Prosource TF20 ENfit Compatibl EN LIQD (PROSOURCE TF20) packet 60 mL  1 packet Per Feeding Tube BID Arlin Hodge PA-C   60 mL at 11/10/24 0806    senna-docusate (SENOKOT-S/PERICOLACE) 8.6-50 MG per tablet 1 tablet  1 tablet Oral or Feeding Tube BID Mulvihill, Michael, MD   1 tablet at 11/10/24 0806    sodium chloride (PF) 0.9% PF flush 3 mL  3 mL Intracatheter Q8H Clarence Bone MD   3 mL at 11/10/24 1324     Current Facility-Administered Medications   Medication Dose Route Frequency Provider Last Rate Last Admin    dextrose 10% infusion   Intravenous Continuous PRN Arlin Hodge PA-C   Stopped at 11/05/24 0600    dialysate solution (PrismaSol BGK 2/3.5)   CRRT Continuous BolandFarhad MD 1,500 mL/hr at 11/09/24 1657 5,000 mL at 11/10/24 1623    EPINEPHrine (ADRENALIN) 5 mg in  mL infusion  0.01-0.1 mcg/kg/min Intravenous Continuous PRN Clarence Bone MD 17.2 mL/hr at 11/10/24 0747 0.06 mcg/kg/min at 11/10/24 0747    lactated ringers infusion   Intravenous Continuous Opal Nguyen PA-C 20 mL/hr at 11/09/24 2000 Rate Verify at 11/09/24 2000    No heparin required   Does not apply Continuous PRN Farhad Boland MD        norepinephrine (LEVOPHED) 16 mg in  mL infusion MAX CONC CENTRAL LINE  0.01-0.2 mcg/kg/min Intravenous Continuous Arlin Hodge,  PA-C 2.7 mL/hr at 11/10/24 1221 0.03 mcg/kg/min at 11/10/24 1221    Patient may continue current oral medications   Does not apply Continuous PRN Bhanu Ness MD        Post-Filter replacement solution (PRISMASOL BGK 2/3.5)   CRRT Continuous Farhad Boland  mL/hr at 11/10/24 1434 New Bag at 11/10/24 1434    Pre-Filter replacement solution (PRISMASOL BGK 2/3.5)   CRRT Continuous Farhad Boland  mL/hr at 11/09/24 1408 5,000 mL at 11/10/24 1622    Reason beta blocker order not selected   Does not apply DOES NOT GO TO Clarence Olguin MD        vasopressin 0.2 units/mL in NS (PITRESSIN) standard conc infusion  0.5-4 Units/hr Intravenous Continuous HodgeArlin nixon PA-C 10 mL/hr at 11/10/24 1612 2 Units/hr at 11/10/24 1612              Physical Examination:   Temp:  [97.3  F (36.3  C)-98.2  F (36.8  C)] 97.4  F (36.3  C)  Pulse:  [] 84  Resp:  [16-18] 18  BP: (100)/(64) 100/64  MAP:  [60 mmHg-95 mmHg] 75 mmHg  Arterial Line BP: ()/(44-89) 96/59  SpO2:  [85 %-99 %] 92 %      Intake/Output Summary (Last 24 hours) at 11/7/2024 1139  Last data filed at 11/7/2024 1000  Gross per 24 hour   Intake 3438.47 ml   Output 3472 ml   Net -33.53 ml         Wt Readings from Last 4 Encounters:   11/10/24 100.2 kg (220 lb 14.4 oz)   10/25/24 97.1 kg (214 lb)   10/03/24 101.2 kg (223 lb)   10/02/24 104.8 kg (231 lb)     Arterial Line BP: ()/(44-89) 96/59  MAP:  [60 mmHg-95 mmHg] 75 mmHg  BP - Mean:  [76] 76  Resp: 18  Recent Labs   Lab 11/10/24  1158 11/10/24  0459 11/09/24  2237 11/09/24  1729   PH 7.49* 7.47* 7.45 7.44   PCO2 33* 34* 35 38   PO2 70* 61* 68* 74*   HCO3 25 24 24 25   O2PER 5 40  40 36 6  6       GEN: no acute distress   HEENT: head ncat, sclera anicteric, OP patent, trachea midline   PULM: unlabored synchronous with vent, clear anteriorly    CV/COR: RRR S1S2 no gallop,  No rub, no murmur  ABD: soft nontender, hypoactive bowel sounds, no mass  EXT:  warm and well perfused  x4  NEURO: PERRL, no obvious deficits  SKIN: no obvious rash  LINES: clean, dry intact         Data:   All data and imaging reviewed     ROUTINE ICU LABS (Last four results)  CMP  Recent Labs   Lab 11/10/24  1602 11/10/24  1406 11/10/24  1157 11/10/24  0730 11/10/24  0459 11/09/24  2352 11/09/24  2237 11/09/24  1536 11/09/24  1239 11/09/24  0750 11/09/24  0427 11/08/24  0529 11/08/24  0518 11/08/24  0017 11/07/24  2217   NA  --  139  --   --  137  --  137  --  136  --  135   < > 137  --  136   POTASSIUM  --  3.8  --   --  3.9  --  4.0  --  3.6  --  3.6   < > 3.6  --  3.7   CHLORIDE  --  104  --   --  102  --  102  --  101  --  101   < > 102  --  102   CO2  --  23  --   --  23  --  23  --  23  --  20*   < > 21*  --  21*   ANIONGAP  --  12  --   --  12  --  12  --  12  --  14   < > 14  --  13   * 145* 141* 133* 134*   < > 137*   < > 147*   < > 147*   < > 116*   < > 118*   BUN  --  33.5*  --   --  32.9*  --  33.4*  --  33.1*  --  31.2*   < > 31.6*  --  31.0*   CR  --  1.40*  --   --  1.46*  --  1.46*  --  1.45*  --  1.47*   < > 1.53*  --  1.57*   GFRESTIMATED  --  49*  --   --  46*  --  46*  --  47*  --  46*   < > 44*  --  42*   TATE  --  9.7  --   --  9.7  --  9.6  --  9.5  --  9.7   < > 9.8  --  9.6   MAG  --  2.1  --   --  2.2  --  2.2  --  1.9  --  2.0   < > 2.2  --  1.9   PHOS  --   --   --   --  2.2*  --   --   --   --   --  1.7*  --  2.5  --  1.9*   PROTTOTAL  --  5.8*  --   --  5.7*  --  5.6*  --  5.5*  --  5.3*   < > 5.3*  --  5.3*   ALBUMIN  --  4.0  --   --  3.8  --  3.9  --  3.9  --  3.8   < > 3.8  --  3.8   BILITOTAL  --  1.6*  --   --  1.6*  --  1.6*  --  1.4*  --  1.4*   < > 1.6*  --  1.6*   ALKPHOS  --  127  --   --  129  --  125  --  117  --  115   < > 121  --  115   AST  --  52*  --   --  46*  --  51*  --  42  --  41   < > 43  --  44   ALT  --  62  --   --  66  --  67  --  75*  --  82*   < > 112*  --  115*    < > = values in this interval not displayed.     CBC  Recent Labs   Lab 11/10/24  0391  "11/09/24  1732 11/09/24  1239 11/09/24  0427 11/08/24  1716   WBC 50.4* 47.8*  --  37.8* 35.7*   RBC 3.27* 3.20*  --  2.53* 2.56*   HGB 9.2* 8.9* 8.9* 7.0* 7.2*   HCT 27.7* 27.6*  --  22.0* 22.6*   MCV 85 86  --  87 88   MCH 28.1 27.8  --  27.7 28.1   MCHC 33.2 32.2  --  31.8 31.9   RDW 18.9* 18.6*  --  19.6* 19.7*    270  --  236 214     INR  Recent Labs   Lab 11/10/24  0459 11/09/24  0427 11/08/24  0518 11/07/24  0537   INR 1.58* 1.58* 1.50* 1.58*     Arterial Blood Gas  Recent Labs   Lab 11/10/24  1158 11/10/24  0459 11/09/24  2237 11/09/24  1729   PH 7.49* 7.47* 7.45 7.44   PCO2 33* 34* 35 38   PO2 70* 61* 68* 74*   HCO3 25 24 24 25   O2PER 5 40  40 36 6  6       All cultures:  No results for input(s): \"CULT\" in the last 168 hours.  Recent Results (from the past 24 hours)   US Upper Extremity Arterial Duplex Right    Narrative    EXAM: US UPPER EXTREMITY ARTERIAL DUPLEX RIGHT  LOCATION: [facility]  DATE: 11/3/2024    INDICATION: Mottled hand.  COMPARISON: None available.  TECHNIQUE: Arterial Duplex ultrasound of the right arm. Color flow and spectral Doppler with waveform analysis performed.    FINDINGS (RIGHT upper extremity):    ARTERIAL PEAK SYSTOLIC VELOCITIES (cm/s):    Subclavian artery (proximal): 51  Subclavian artery (distal): 71  Axillary artery: 70  Brachial (proximal): 74  Brachial (distal): 76  Radial (proximal): 41  Radial (distal): 14  Ulnar (proximal): 85  Ulnar (distal): 136    WAVEFORMS/COLOR DOPPLER: Triphasic waveforms are noted throughout except in the distal radial artery where biphasic waveforms are noted.      Impression    IMPRESSION:   1.  Patent right upper extremity arteries although slow flow is noted within the distal radial artery along with biphasic waveforms, findings are of indeterminate etiology, could be due to area of focal stenosis.   XR Chest Port 1 View    Narrative    EXAM: XR CHEST PORT 1 VIEW  LOCATION: [facility]  DATE: 11/3/2024    INDICATION: eval infiltrate edema  COMPARISON: Chest radiograph 11/2/2024.      Impression    IMPRESSION:     Lines and tubes: Endotracheal tube tip is not well visualized, likely in the upper trachea. Feeding tube courses below the diaphragm. Weatherford-Garrett catheter near the main pulmonary trunk. Similar left central venous catheter, left atrial appendage clip and   median sternotomy. Bilateral chest tubes.    Right basilar opacity favoring combination of pleural fluid, atelectasis and/or infection. Possible trace left pleural effusion. No discernible pneumothorax. Similar cardiomediastinal silhouette.     D/w SADE [place] of CV surgery.    Billing: This patient is critically ill: Yes. Total critical care time today 35  min, excluding procedures.

## 2024-11-10 NOTE — PLAN OF CARE
Goal Outcome Evaluation:      Plan of Care Reviewed With: patient    Overall Patient Progress: no changeOverall Patient Progress: no change    Outcome Evaluation:   - Pt alert, disoriented to situation/time. Follows commands, significant weakness to all extremities, speech- clear, [place].   - Afib, HR 80s-100s, SBP 80s-110s, MAP >65 on Epi, Levo, Vaso gtts. Vaso gtt slightly weaned. Chest Tubes w/ Minimal Output through shift.  - CRRT pt removal ordered 0-50mL/hr.   - 5L NC, SpO2 >94%. Diminished Lung sounds, Denies SOB.   - TF @ Goal 65mL/hr, FWF 60 q8h. Abdomen- distention, semi-firm, No BM.   - Bladder scan 70mL, Incontinent of Urine x1 prior to attempting straight cath for UA collection.   - Pain Oxyocdone & Robaxin given, denies pain otherwise.   - Skin- see Wound charting.  - Thermaguard icy cath ongoing to Target Temp 37.0c.   - WBC 50.4, MD notified.   - Hgb 9.2.        Problem: Cardiovascular Surgery  Goal: Improved Activity Tolerance  Outcome: Not Progressing  Intervention: Optimize Tolerance for Activity  Recent Flowsheet Documentation  Taken 11/10/2024 0000 by Toy Sanz RN  Environmental Support:   calm environment promoted   caregiver consistency promoted   environmental consistency promoted   rest periods encouraged  Taken 11/9/2024 2000 by Toy Sanz RN  Environmental Support:   calm environment promoted   caregiver consistency promoted   environmental consistency promoted   rest periods encouraged     Problem: Cardiovascular Surgery  Goal: Optimal Coping with Heart Surgery  Outcome: Not Progressing  Intervention: Support Psychosocial Response to Surgery  Recent Flowsheet Documentation  Taken 11/10/2024 0000 by Toy Sanz RN  Supportive Measures:   active listening utilized   positive reinforcement provided   problem-solving facilitated   relaxation techniques promoted  Taken 11/9/2024 2000 by Toy Sanz RN  Supportive Measures:   active listening utilized   positive  reinforcement provided   problem-solving facilitated   relaxation techniques promoted  Family/Support System Care: caregiver stress acknowledged     Problem: Skin Injury Risk Increased  Goal: Skin Health and Integrity  Outcome: Not Progressing  Intervention: Plan: Nurse Driven Intervention: Moisture Management  Recent Flowsheet Documentation  Taken 11/10/2024 0200 by Toy Sanz RN  Bathing/Skin Care:   bath, complete   wipes, CHG   dressed/undressed   electrode patches/site rotation   linen changed  Taken 11/10/2024 0000 by Toy Sanz RN  Moisture Interventions:   No brief in bed   Incontinence pad  Taken 11/9/2024 2000 by Toy Sanz RN  Moisture Interventions:   No brief in bed   Incontinence pad  Intervention: Plan: Nurse Driven Intervention: Friction and Shear  Recent Flowsheet Documentation  Taken 11/10/2024 0000 by Toy Sanz RN  Friction/Shear Interventions:   HOB 30 degrees or less   Silicone foam sacral dressing   Preventative dressing heels   Pad bony prominence (elbow pads, heel pads, ear protectors)   Assistive lifting device (portable/ceiling lift, etc.)   Lateral transfer device (hovermat, etc.)   Repositioning device (TAP system, etc.)  Taken 11/9/2024 2000 by Toy Sanz RN  Friction/Shear Interventions:   HOB 30 degrees or less   Silicone foam sacral dressing   Preventative dressing heels   Pad bony prominence (elbow pads, heel pads, ear protectors)   Assistive lifting device (portable/ceiling lift, etc.)   Lateral transfer device (hovermat, etc.)   Repositioning device (TAP system, etc.)  Intervention: Optimize Skin Protection  Recent Flowsheet Documentation  Taken 11/10/2024 0400 by Toy Sanz RN  Activity Management: bedrest  Head of Bed (HOB) Positioning: HOB flat  Taken 11/10/2024 0200 by oTy Sanz RN  Activity Management: bedrest  Head of Bed (HOB) Positioning: HOB flat  Taken 11/10/2024 0000 by Toy Sanz RN  Pressure Reduction  Techniques:   heels elevated off bed   pressure points protected  Pressure Reduction Devices:   foam padding utilized   heel offloading device utilized   positioning supports utilized   pressure-redistributing mattress utilized  Skin Protection:   adhesive use limited   drying agents applied   incontinence pads utilized   protective footwear used   pulse oximeter probe site changed   silicone foam dressing in place   transparent dressing maintained   skin to device areas padded  Activity Management: bedrest  Head of Bed (HOB) Positioning: HOB flat  Taken 11/9/2024 2200 by Toy Sanz RN  Activity Management: bedrest  Head of Bed (HOB) Positioning: HOB flat  Taken 11/9/2024 2000 by Toy Sanz RN  Pressure Reduction Techniques:   heels elevated off bed   pressure points protected  Pressure Reduction Devices:   foam padding utilized   heel offloading device utilized   positioning supports utilized   pressure-redistributing mattress utilized  Skin Protection:   adhesive use limited   drying agents applied   incontinence pads utilized   protective footwear used   pulse oximeter probe site changed   silicone foam dressing in place   transparent dressing maintained   skin to device areas padded  Activity Management: bedrest  Head of Bed (HOB) Positioning: HOB flat     Problem: Risk for Delirium  Goal: Improved Attention and Thought Clarity  Outcome: Not Progressing  Intervention: Maximize Cognitive Function  Recent Flowsheet Documentation  Taken 11/10/2024 0000 by Toy Sanz RN  Sensory Stimulation Regulation:   auditory stimulation minimized   care clustered   lighting decreased   quiet environment promoted  Reorientation Measures:   calendar in view   clock in view   reorientation provided  Taken 11/9/2024 2000 by Toy Sanz RN  Sensory Stimulation Regulation:   auditory stimulation minimized   care clustered   lighting decreased   quiet environment promoted  Reorientation Measures:    calendar in view   clock in view   reorientation provided     Problem: Risk for Delirium  Goal: Improved Sleep  Outcome: Not Progressing

## 2024-11-10 NOTE — PLAN OF CARE
Problem: Cardiovascular Surgery  Goal: Improved Activity Tolerance  Intervention: Optimize Tolerance for Activity  Recent Flowsheet Documentation  Taken 11/10/2024 1600 by Miguel Wright, RN  Environmental Support:   calm environment promoted   caregiver consistency promoted   environmental consistency promoted   rest periods encouraged  Taken 11/10/2024 1200 by Miguel Wright, RN  Environmental Support:   calm environment promoted   caregiver consistency promoted   environmental consistency promoted   rest periods encouraged  Taken 11/10/2024 0800 by Miguel Wright, RN  Environmental Support:   calm environment promoted   caregiver consistency promoted   environmental consistency promoted   rest periods encouraged  Goal: Optimal Coping with Heart Surgery  Intervention: Support Psychosocial Response to Surgery  Recent Flowsheet Documentation  Taken 11/10/2024 1600 by Miguel Wright, RN  Supportive Measures:   active listening utilized   positive reinforcement provided   problem-solving facilitated   relaxation techniques promoted  Family/Support System Care: caregiver stress acknowledged  Taken 11/10/2024 1200 by Miguel Wright, RN  Supportive Measures:   active listening utilized   positive reinforcement provided   problem-solving facilitated   relaxation techniques promoted  Family/Support System Care: caregiver stress acknowledged  Taken 11/10/2024 0800 by Miguel Wright, RN  Supportive Measures:   active listening utilized   positive reinforcement provided   problem-solving facilitated   relaxation techniques promoted  Goal: Absence of Bleeding  Intervention: Monitor and Manage Bleeding  Recent Flowsheet Documentation  Taken 11/10/2024 1600 by Miguel Wright, RN  Bleeding Management: dressing monitored  Taken 11/10/2024 1200 by Miguel Wright, RN  Bleeding Management: dressing monitored  Taken 11/10/2024 0800 by Miguel Wright, RN  Bleeding Management: dressing  monitored  Goal: Effective Cardiac Function  Intervention: Optimize Cardiac Output and Blood Flow  Recent Flowsheet Documentation  Taken 11/10/2024 1600 by Miguel Wright, RN  Dysrhythmia Management: pacing wires maintained  Taken 11/10/2024 1200 by Miguel Wright RN  Dysrhythmia Management: pacing wires maintained  Taken 11/10/2024 0800 by Miguel Wright, RN  Dysrhythmia Management: pacing wires maintained  Goal: Optimal Cerebral Tissue Perfusion  Intervention: Protect and Optimize Cerebral Perfusion  Recent Flowsheet Documentation  Taken 11/10/2024 1600 by Miguel Wright, RN  Sensory Stimulation Regulation:   auditory stimulation minimized   care clustered   lighting decreased   quiet environment promoted  Cerebral Perfusion Promotion:   blood pressure monitored   normothermia promoted  Glycemic Management: blood glucose monitored  Head of Bed (HOB) Positioning: HOB flat  Taken 11/10/2024 1400 by Miguel Wright, RN  Head of Bed (HOB) Positioning: HOB flat  Taken 11/10/2024 1200 by Miguel Wright, RN  Sensory Stimulation Regulation:   auditory stimulation minimized   care clustered   lighting decreased   quiet environment promoted  Cerebral Perfusion Promotion:   blood pressure monitored   normothermia promoted  Glycemic Management: blood glucose monitored  Head of Bed (HOB) Positioning: HOB flat  Taken 11/10/2024 1000 by Miguel Wright, RN  Head of Bed (HOB) Positioning: HOB flat  Taken 11/10/2024 0800 by Miguel Wright, RN  Sensory Stimulation Regulation:   auditory stimulation minimized   care clustered   lighting decreased   quiet environment promoted  Cerebral Perfusion Promotion:   blood pressure monitored   normothermia promoted  Glycemic Management: blood glucose monitored  Head of Bed (HOB) Positioning: HOB flat  Goal: Blood Glucose Level Within Targeted Range  Intervention: Optimize Glycemic Control  Recent Flowsheet Documentation  Taken 11/10/2024 1600 by Miguel Wrigth  LOGAN SINGH  Glycemic Management: blood glucose monitored  Taken 11/10/2024 1200 by Miguel Wright, RN  Glycemic Management: blood glucose monitored  Taken 11/10/2024 0800 by Miguel Wright, RN  Glycemic Management: blood glucose monitored  Goal: Absence of Infection Signs and Symptoms  Intervention: Prevent or Manage Infection  Recent Flowsheet Documentation  Taken 11/10/2024 1600 by Miguel Wright, RN  Infection Prevention:   environmental surveillance performed   equipment surfaces disinfected   hand hygiene promoted   rest/sleep promoted   single patient room provided  Taken 11/10/2024 1200 by Miguel Wright, RN  Infection Prevention:   environmental surveillance performed   equipment surfaces disinfected   hand hygiene promoted   rest/sleep promoted   single patient room provided  Taken 11/10/2024 0800 by Miguel Wright, RN  Infection Prevention:   environmental surveillance performed   equipment surfaces disinfected   hand hygiene promoted   rest/sleep promoted   single patient room provided  Goal: Anesthesia/Sedation Recovery  Intervention: Optimize Anesthesia Recovery  Recent Flowsheet Documentation  Taken 11/10/2024 1600 by Miguel Wright, RN  Safety Promotion/Fall Prevention:   activity supervised   assistive device/personal items within reach   clutter free environment maintained   increased rounding and observation   increase visualization of patient   lighting adjusted   nonskid shoes/slippers when out of bed   patient and family education   room door open   room near nurse's station   room organization consistent   safety round/check completed   treat reversible contributory factors  Reorientation Measures:   calendar in view   clock in view   reorientation provided  Taken 11/10/2024 1200 by Miguel Wright, RN  Safety Promotion/Fall Prevention:   activity supervised   assistive device/personal items within reach   clutter free environment maintained   increased rounding and  observation   increase visualization of patient   lighting adjusted   nonskid shoes/slippers when out of bed   patient and family education   room door open   room near nurse's station   room organization consistent   safety round/check completed   treat reversible contributory factors  Reorientation Measures:   calendar in view   clock in view   reorientation provided  Taken 11/10/2024 0800 by Miguel Wright, RN  Safety Promotion/Fall Prevention:   activity supervised   assistive device/personal items within reach   clutter free environment maintained   increased rounding and observation   increase visualization of patient   lighting adjusted   nonskid shoes/slippers when out of bed   patient and family education   room door open   room near nurse's station   room organization consistent   safety round/check completed   treat reversible contributory factors  Reorientation Measures:   calendar in view   clock in view   reorientation provided  Goal: Acceptable Pain Control  Intervention: Prevent or Manage Pain  Recent Flowsheet Documentation  Taken 11/10/2024 1600 by Miguel Wright, RN  Pain Management Interventions:   pain management plan reviewed with patient/caregiver   repositioned   rest   quiet environment facilitated   relaxation techniques promoted   pillow support provided  Taken 11/10/2024 1200 by Miguel Wright, RN  Pain Management Interventions:   pain management plan reviewed with patient/caregiver   repositioned   rest   quiet environment facilitated   relaxation techniques promoted   pillow support provided  Taken 11/10/2024 0800 by Miguel Wright, RN  Pain Management Interventions:   pain management plan reviewed with patient/caregiver   repositioned   rest   quiet environment facilitated   relaxation techniques promoted   pillow support provided  Goal: Effective Oxygenation and Ventilation  Intervention: Promote Airway Secretion Clearance  Recent Flowsheet Documentation  Taken  11/10/2024 1600 by Miguel Wright, RN  Cough And Deep Breathing: done with encouragement  Airway/Ventilation Management:   airway patency maintained   calming measures promoted   pulmonary hygiene promoted  Taken 11/10/2024 1200 by Miguel Wright, RN  Cough And Deep Breathing: done with encouragement  Airway/Ventilation Management:   airway patency maintained   calming measures promoted   pulmonary hygiene promoted  Taken 11/10/2024 0800 by Miguel Wright, RN  Cough And Deep Breathing: done with encouragement  Airway/Ventilation Management:   airway patency maintained   calming measures promoted   pulmonary hygiene promoted  Intervention: Optimize Oxygenation and Ventilation  Recent Flowsheet Documentation  Taken 11/10/2024 1600 by Miguel Wright, RN  Chest Tube Safety:   all connections secured   rubber-tipped hemostat(s) with patient   suction checked  Taken 11/10/2024 1200 by Miguel Wright, RN  Chest Tube Safety:   all connections secured   rubber-tipped hemostat(s) with patient   suction checked  Taken 11/10/2024 0800 by Miguel Wright, RN  Chest Tube Safety:   all connections secured   rubber-tipped hemostat(s) with patient   suction checked   Goal Outcome Evaluation: No change       AxO, forgetful. Unable to wean pressors today. Extra albumin given. Switched CRRT to +10 per hour to help get off pressors. Milk of Mag given. Family here and updated

## 2024-11-10 NOTE — PROGRESS NOTES
[facility]  Cardiovascular and Thoracic Surgery Daily Note    Today's Plans: warm feet, monitor pulses, wean vaso as able, culture internal jugular tip-remove as has R suclavian line      Assessment and Plan  POD # 12 s/p CABG x 3 (LIMA to LAD, SVG to OM, SVG to RCA), Modified left atrial MAZE (Encompass), and occlusion of left atrial appendage (50 mm Atriclip) on 10/28 with Dr. Michael Mulvihill.    POD # 12 s/p return to OR for mediastinal re-exploration, washout    - CVS: Pre-op TTE with EF 35-40%. Postop TTE 10/30 with EF ~35% on high-dose inotropic support.  Gtt's: Epi 0.06 Norepi: 0.02 Vaso: 2 -DBU stopped 11/9, swan removed 11/9  Postop mixed cardiogenic/vasoplegic/hypovolemic shock. Lactic acidosis cleared and SVO2 stabilized. CI goal >2.0 (calculated elaine), stopped DBU 11/4 no back on after dec in SVO2 with inc in lact back on at 2.5. NE off and vasopressin to MAP goal 65, wean as able.   Hypervolemic, volume management via CRRT, pull as able. CVP goal ~10-12. Now tolerating more aggressive removal on hold as becoming euvolemic. Albumin 25% push BID to pull volume intravascular.  Hypothermic since initiation on CRRT, Thermagaurd catheter placed 10/31, target 37 C.   Stress dose steroids started 10/31, off 11/02 PM.  History of paroxysmal atrial fibrillation, continues with intermittent afib and accelerated junctional at times. Previously being atrial paced at 100, now appears to have improved BP without pacing (intrinsic rate ~80 BP, sinus rhythm with intermittent atrial fibrillation). Amio discontinued with shock liver. Defer systemic anticoagulation at present (ok for citrate for CRRT machine if needed).   Absent right radial pulse (previous arterial line in this location). ICU MD did bedside US, ulnar artery patent. Right hand warm, good capillary refill. Increased duskiness of right hand noted after starting vasopressin, which demonstrated patent arterial flow with slow biphasic flow at  the distal radial artery (site of previous arterial line).   Aspirin 162 mg daily, defer statin with ALI.    Chest tubes: output 840<860<750<975<970 mL, serosang, no air leak. TPW removed with mediastinal tubes 11/9  CT C/A/P w/contrast showing pleural effusions, benign a/p    - Resp: Acute hypoxemic and hypercapnic respiratory failure, stable. Extubated pod#9    - Neuro: Sedated with propofol while intubated. Add fentanyl infusion and Versed PRN to improve vent compliance. History of myasthenia gravis. Purposeful upper extremity movements and spontaneous LE movement when sedation lightened.     - Renal: CKD stage 3, baseline Cr ~1.8. Postop oliguric/anuric JAVIER. Nephrology consulted, CRRT started 10/30. Avoid nephrotoxins.   Recent Labs   Lab 11/10/24  0459 11/09/24  2237 11/09/24  1239   CR 1.46* 1.46* 1.45*       - GI: +BM, +flatus, continue bowel regimen. Shock liver, improving. Trend LFTs, avoid hepatotoxins. Recent admission for GIB 09/2024, increased pantoprazole to BID. Diarrhea with initiation of TF, rectal tube placed 11/05     - : bladder scan q shift. Anuric on CRRT    - Endo: Postop stress hyperglycemia, resolved. Insulin infusion transitioned to sliding scale insulin. Stress dose steroids 10/31-11/02 as above.   Hemoglobin A1C   Date Value Ref Range Status   10/09/2024 4.5 <5.7 % Final     Comment:     Normal <5.7%   Prediabetes 5.7-6.4%    Diabetes 6.5% or higher     Note: Adopted from ADA consensus guidelines.        - FEN: Replace electrolytes as needed. Initiated on trophic feeds via OG 11/1, NJ placed 11/2 after INR came down, TF increase to goal.      - ID: WBC chronically elevated and hypothermic on CRRT as above so difficult to assess for possible infection;  empiric Vanc/Zosyn 10/31-11/04. Blood, urine, respiratory cultures NGTD. WBC elevated.  Trend CBC and fever curve. 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 Acute blood loss anemia due to surgery. Postop coagulopathy, improved (required multiple blood transfusions and blood productions immediately postop). 1 unit RBCs 11/3. 1 unit RBC 11/5 Trend CBC, transfuse PRN. 1U PRBC 11/7/24, 2U 11/9  Recent Labs   Lab 11/10/24  0459 11/09/24  1732 11/09/24  1239 11/09/24  0427   HGB 9.2* 8.9* 8.9* 7.0*    270  --  236       - Proph: SCD, subcutaneous heparin, PPI    - Other:  Clinically Significant Risk Factors               # Hypoalbuminemia: Lowest albumin = 2.7 g/dL at 11/2/2024  5:44 AM, will monitor as appropriate    # Coagulation Defect: INR = 1.58 (Ref range: 0.85 - 1.15) and/or PTT = 42 Seconds (Ref range: 22 - 38 Seconds), will monitor for bleeding    # Hypertension: Noted on problem list  # Chronic heart failure with reduced ejection fraction: last echo with EF <40%   # Acute Hypoxic Respiratory Failure: Documented O2 saturation < 90%. Continue supplemental oxygen as needed  # Acute Hypercapnic Respiratory Failure: based on arterial blood gas results.  Continue supplemental oxygen and ventilatory support as indicated.  # Acute Hypercapnic Respiratory Failure: based on venous blood gas results.  Continue supplemental oxygen and ventilatory support as indicated.      #Acute blood loss anemia: Lowest Hgb this hospitalization: 6.6 g/dL. Will continue to monitor and treat/transfuse as appropriate.     # Obesity: Estimated body mass index is 31.7 kg/m  as calculated from the following:    Height as of this encounter: 1.778 m (5' 10\").    Weight as of this encounter: 100.2 kg (220 lb 14.4 oz).   # Moderate Malnutrition: based on nutrition assessment     # History of CABG: noted on surgical history       - Dispo: ICU. Guarded prognosis but continues to slowly improve. Family updated multiple times at " bedside. Medically Ready for Discharge: Anticipated in 5+ Days      Interval History  Lying in bed, breathing stable, tolerating tube feeds, uncomfortable but denies pain, CRRT    Medications  Current Facility-Administered Medications   Medication Dose Route Frequency Provider Last Rate Last Admin    albumin human 25 % injection 25 g  25 g Intravenous Q12H Arlin Hodge PA-C 100 mL/hr at 11/07/24 2012 25 g at 11/09/24 1947    aspirin (ASA) chewable tablet 162 mg  162 mg Oral or NG Tube Daily Clarence Bone MD   162 mg at 11/09/24 0825    insulin aspart (NovoLOG) injection (RAPID ACTING)  1-7 Units Subcutaneous Q4H Sb Arthur PA-C   1 Units at 11/09/24 1539    pantoprazole (PROTONIX) 2 mg/mL suspension 40 mg  40 mg Oral or NG Tube BID AC Arlin Hodge PA-C   40 mg at 11/09/24 1540    Or    pantoprazole (PROTONIX) EC tablet 40 mg  40 mg Oral BID AC Arlin Hodge PA-C        polyethylene glycol (MIRALAX) Packet 17 g  17 g Oral or Feeding Tube Daily Mulvihill, Michael, MD   17 g at 11/09/24 0825    Prosource TF20 ENfit Compatibl EN LIQD (PROSOURCE TF20) packet 60 mL  1 packet Per Feeding Tube BID Arlin Hodge PA-C   60 mL at 11/09/24 2035    senna-docusate (SENOKOT-S/PERICOLACE) 8.6-50 MG per tablet 1 tablet  1 tablet Oral or Feeding Tube BID Mulvihill, Michael, MD   1 tablet at 11/09/24 2035    sodium chloride (PF) 0.9% PF flush 3 mL  3 mL Intracatheter Q8H Clarence Bone MD   3 mL at 11/10/24 0516     Current Facility-Administered Medications   Medication Dose Route Frequency Provider Last Rate Last Admin    acetaminophen (TYLENOL) tablet 650 mg  650 mg Oral Q4H PRN Clarence Bone MD        bisacodyl (DULCOLAX) suppository 10 mg  10 mg Rectal Daily PRN Clarence Bone MD        calcium gluconate 2 g in  mL intermittent infusion  2 g Intravenous Q8H PRN Farhad Boland MD        calcium gluconate 4 g in sodium chloride 0.9 % 100 mL intermittent infusion  4 g Intravenous  Q8H PRN Farhad Boland MD        dextrose 10% infusion   Intravenous Continuous PRN Arlin Hodge PA-C   Stopped at 11/05/24 0600    glucose gel 15-30 g  15-30 g Oral Q15 Min PRN Nasir Vallecillo MD        Or    dextrose 50 % injection 25-50 mL  25-50 mL Intravenous Q15 Min PRN Nasir Vallecillo MD        Or    glucagon injection 1 mg  1 mg Subcutaneous Q15 Min PRN Nasir Vallecillo MD        EPINEPHrine (ADRENALIN) 5 mg in  mL infusion  0.01-0.1 mcg/kg/min Intravenous Continuous PRN Clarence Bone MD 17.2 mL/hr at 11/09/24 2000 0.06 mcg/kg/min at 11/09/24 2000    fentaNYL (PF) (SUBLIMAZE) injection 25 mcg  25 mcg Intravenous Q1H PRN Bhanu Ness MD   25 mcg at 11/06/24 0009    heparin lock flush 10 unit/mL injection 3 mL  3 mL Intracatheter Q1H PRN Tyra Dubois MD        hydrALAZINE (APRESOLINE) injection 10 mg  10 mg Intravenous Q30 Min PRN Clarence Bone MD        lidocaine (LMX4) cream   Topical Q1H PRN Clarence Bone MD        lidocaine 1 % 0.1-1 mL  0.1-1 mL Other Q1H PRN Clarence Bone MD        magnesium hydroxide (MILK OF MAGNESIA) suspension 30 mL  30 mL Oral Daily PRN Clarence Bone MD        magnesium sulfate 2 g in 50 mL sterile water intermittent infusion  2 g Intravenous Q8H PRN Farhad Boland MD   2 g at 11/09/24 1344    methocarbamol (ROBAXIN) tablet 500 mg  500 mg Oral Q6H PRN Clarence Bone MD   500 mg at 11/09/24 2035    naloxone (NARCAN) injection 0.2 mg  0.2 mg Intravenous Q2 Min PRN Mulvihill, Michael, MD        Or    naloxone (NARCAN) injection 0.4 mg  0.4 mg Intravenous Q2 Min PRN Mulvihill, Michael, MD        Or    naloxone (NARCAN) injection 0.2 mg  0.2 mg Intramuscular Q2 Min PRN Mulvihill, Michael, MD        Or    naloxone (NARCAN) injection 0.4 mg  0.4 mg Intramuscular Q2 Min PRN Mulvihill, Michael, MD        No heparin required   Does not apply Continuous PRN Farhad Boland MD        ondansetron (ZOFRAN ODT)  "ODT tab 4 mg  4 mg Oral Q6H PRN Clarence Bone MD        Or    ondansetron (ZOFRAN) injection 4 mg  4 mg Intravenous Q6H PRN Clarence Bone MD        oxyCODONE IR (ROXICODONE) half-tab 2.5 mg  2.5 mg Oral Q4H PRN Clarence Bone MD   2.5 mg at 11/02/24 0214    Or    oxyCODONE (ROXICODONE) tablet 5 mg  5 mg Oral Q4H PRN Clarence Bone MD   5 mg at 11/09/24 2302    Patient may continue current oral medications   Does not apply Continuous PRN Bhanu Ness MD        potassium chloride 20 mEq in 50 mL intermittent infusion  20 mEq Intravenous Q8H PRN Farhad Boland MD 50 mL/hr at 11/04/24 1200 20 mEq at 11/04/24 1200    prochlorperazine (COMPAZINE) injection 5 mg  5 mg Intravenous Q6H PRN Clarence Bone MD        Or    prochlorperazine (COMPAZINE) tablet 5 mg  5 mg Oral Q6H PRN Clarence Bone MD        Reason beta blocker order not selected   Does not apply DOES NOT GO TO Clarence Olguin MD        sodium chloride (PF) 0.9% PF flush 3 mL  3 mL Intracatheter q1 min prn Clarence Bone MD        sodium chloride 0.9% BOLUS 1-250 mL  1-250 mL Intravenous Q1H PRN Carol Enrique MD        sodium chloride 0.9% BOLUS 1-250 mL  1-250 mL Intravenous Q1H PRN Miguel Floyd MD        sodium phosphate 15 mmol in NS 250mL intermittent infusion  15 mmol Intravenous Q8H PRN Farhad Boland MD   15 mmol at 11/09/24 0641         Physical Exam  Vitals were reviewed  Blood pressure (!) 115/39, pulse 101, temperature 97.5  F (36.4  C), temperature source Oral, resp. rate 20, height 1.778 m (5' 10\"), weight 100.2 kg (220 lb 14.4 oz), SpO2 96%.  Rhythm: intermittent NSR and atrial fibrillation    Lungs: diminished bases     Cardiovascular: irregular rate/rhythm, no m/r/g    Abdomen: soft, NT, ND, +BS    Extremeties: 3+ BLE/BUE edema    Incision: CDI    CT: pleural tubes serosang output 840<1.1<860<750<975<1580 mL, no air leak    Weight:   Vitals:    11/06/24 0200 11/07/24 " 0200 11/08/24 0430 11/09/24 0630   Weight: 102.7 kg (226 lb 6.6 oz) 101 kg (222 lb 10.6 oz) 102.6 kg (226 lb 3.1 oz) 100 kg (220 lb 7.4 oz)    11/10/24 0231   Weight: 100.2 kg (220 lb 14.4 oz)         Data  Recent Labs   Lab 11/10/24  0459 11/10/24  0455 11/09/24  2352 11/09/24  2237 11/09/24  2030 11/09/24  1732 11/09/24  1536 11/09/24  1239 11/09/24  0750 11/09/24  0427 11/08/24  0529 11/08/24  0518   WBC 50.4*  --   --   --   --  47.8*  --   --   --  37.8*   < > 35.1*   HGB 9.2*  --   --   --   --  8.9*  --  8.9*  --  7.0*   < > 7.4*   MCV 85  --   --   --   --  86  --   --   --  87   < > 89     --   --   --   --  270  --   --   --  236   < > 207   INR 1.58*  --   --   --   --   --   --   --   --  1.58*  --  1.50*     --   --  137  --   --   --  136  --  135   < > 137   POTASSIUM 3.9  --   --  4.0  --   --   --  3.6  --  3.6   < > 3.6   CHLORIDE 102  --   --  102  --   --   --  101  --  101   < > 102   CO2 23  --   --  23  --   --   --  23  --  20*   < > 21*   BUN 32.9*  --   --  33.4*  --   --   --  33.1*  --  31.2*   < > 31.6*   CR 1.46*  --   --  1.46*  --   --   --  1.45*  --  1.47*   < > 1.53*   ANIONGAP 12  --   --  12  --   --   --  12  --  14   < > 14   TATE 9.7  --   --  9.6  --   --   --  9.5  --  9.7   < > 9.8   * 128* 131* 137*   < >  --    < > 147*   < > 147*   < > 116*   ALBUMIN 3.8  --   --  3.9  --   --   --  3.9  --  3.8   < > 3.8   PROTTOTAL 5.7*  --   --  5.6*  --   --   --  5.5*  --  5.3*   < > 5.3*   BILITOTAL 1.6*  --   --  1.6*  --   --   --  1.4*  --  1.4*   < > 1.6*   ALKPHOS 129  --   --  125  --   --   --  117  --  115   < > 121   ALT 66  --   --  67  --   --   --  75*  --  82*   < > 112*   AST 46*  --   --  51*  --   --   --  42  --  41   < > 43    < > = values in this interval not displayed.       Imaging:  Recent Results (from the past 24 hours)   XR Chest Port 1 View    Narrative    EXAM: XR CHEST PORT 1 VIEW  LOCATION: [facility]  DATE:  11/9/2024    INDICATION: Chest tube positioning  COMPARISON: Portable chest x-ray 11/5/2024      Impression    IMPRESSION: Stable large bore bilateral chest tubes. Right internal jugular [place]-Garrett catheter with its tip in the region of the main pulmonary artery. Right subclavian vein central venous catheter with the tip at the mid SVC region. Left internal   jugular central venous catheter with its tip obscured by overlying monitor cables, but appears to be within the region of the left innominate vein. Partially visualized enteric tube coursing into the stomach.    Sternotomy with mediastinal clips and the surgical left atrial appendage clear. Stable cardiomegaly. Stable subsegmental atelectasis within the bilateral mid lungs. Indistinct hemidiaphragms which may be secondary to small, layering bilateral pleural   effusions. Calcified atherosclerotic changes of the aortic arch. Degenerative changes, bilateral glenohumeral joints.   CT Chest/Abdomen/Pelvis w Contrast    Narrative    EXAM: CT CHEST/ABDOMEN/PELVIS W CONTRAST  LOCATION: [facility]  DATE: 11/9/2024    INDICATION: s p cabg, with mixed cardiogenic shock. Monitoring for gut ischemia bleeding, scan to be timed with CRRT filter exchange pause.  COMPARISON: Chest CT 9/10/2024  TECHNIQUE: CT scan of the chest, abdomen, and pelvis was performed following injection of IV contrast. Multiplanar reformats were obtained. Dose reduction techniques were used.   CONTRAST: 111mL Isovue 370    FINDINGS:   LUNGS AND PLEURA: Small left and moderate right pleural effusion as well as associated atelectasis of posterior lung zones. The nondependent lung zones remain fairly well aerated. Bilateral pleural drains are present. No pneumothorax.    MEDIASTINUM/AXILLAE: Expected postoperative changes after CABG, no fluid collection. Left atrial appendage clip present. Mild cardiomegaly.    CORONARY ARTERY CALCIFICATION: CABG    HEPATOBILIARY: Liver seen  early in portal venous enhancement, no focal lesions evident.    PANCREAS: Normal.    SPLEEN: Prominent splenomegaly persists but does appear improved compared to most recent chest CT. There are several splenic lesions with largest measuring 7.5 x 6.5 cm appearing to have a potential internal cystic change for fluid/fluid level within it,   this portion of the spleen was not included on prior exam.    ADRENAL GLANDS: Normal.    KIDNEYS/BLADDER: No significant mass, stone, or hydronephrosis.    BOWEL: Feeding tube in good position. Rectal tube present. Nothing obstructive or inflammatory involving bowel. Mild ascites.    LYMPH NODES: No lymphadenopathy.    VASCULATURE: Heavy atherosclerotic calcifications. Mesenteric arteries and veins remain patent and unremarkable.    PELVIC ORGANS: Mild ascites.    MUSCULOSKELETAL: Modest degenerative changes throughout spine. DJD both shoulders and left hip. Prior right hip arthroplasty.      Impression    IMPRESSION:  1.  Bilateral pleural effusions and associated dependent atelectasis, right worse than left. Chest tubes remain in place.  2.  Prominent splenomegaly actually appears decreased in size compared to exam from September. There are internal splenic lesions which are indeterminant, the largest inferiorly located measuring up to 7.5 cm with possible internal necrosis or cystic   change. Splenic abscesses unlikely but can't be excluded. Ultrasound evaluation may be beneficial.  3.  No acute GI process identified. Mild ascites.             Patient seen and discussed with Dr. Lida Nguyen PA-C  Cardiothoracic Surgery  Available for paging [phone] (personal pager or CV Surgery Rounding Pager)  Personal Pager: [phone]  CV Surgery Rounding Pager: [phone]  After hours please page surgeon on-call

## 2024-11-10 NOTE — PROGRESS NOTES
CV  Pressors (which pressors and any increase/decrease in pressor needs): Vaso 3.5, Norepi 0.03, Epi 0.06, Dubutamine off early am per provider  HR range:, Afib  Chest tube output: 10-40/hr    Neuro  Orientation: AxO, confused to time intermittently  Delirium present?(y/n): N  Sleep: Sleeping better today, better naps  Pain: seems well controlled other than general soreness/stiffness    GI/  BM? (y/n): N  Urine output: Anuric, bladder scan for 18mL, straight cath for 0      Lines:  Introducer, CVC, A line, Icy cath, PIV

## 2024-11-10 NOTE — PROGRESS NOTES
Renal Medicine Progress Note    SHORTHAND KEY FOR MY NOTES:  c = with, s = without, p = after, a = before, x = except, asx = asymptomatic, tx = transplant or treatment, sx = symptoms or symptomatic, cx = canceled or culture, rxn = reaction, yday = yesterday, nl = normal, abx = antibiotics, fxn = function, dx = diagnosis, dz = disease, m/h = melena/hematochezia, c/d/l/ha = cramping/dizziness/lightheadedness/headache, d/c = discharge or diarrhea/constipation, f/c/n/v = fevers/chills/nausea/vomiting, cp/sob = chest pain/shortness of breath, tbv = total body volume, rxn = reaction, tdc = tunneled dialysis catheter, pta = prior to admission, hd = hemodialysis, pd = peritoneal dialysis, hhd = home hemodialysis, edw = estimated dry wt         Assessment/Plan:     1.  Oliguric JAVIER/CKD IIIb.  Pt remains on CRRT.  He drops his BP when turned on his L side, suggesting that he is intravascularly dry.  His volume status seems similar to yday and he may benefit from additional volume.  A.  We will change fluid balance to + 10 ml/h, which will give him an additional ~250 ml/d.  This can be supplemented by fluid boluses prn if the BP drops.  Will need to monitor his breathing closely.  B.  Continue CRRT o/w the same.    ADDENDUM:  will give 250 ml of 5% alb.  D/w Dr. Vee + Opal Nguyen PA-C.    2.  Severe shock.  Pt remains on multiple pressors.  Overall, his pressors needs have decreased/increased at times this week.  He has completed his course of abx and is not on any right now.  A.  Wean pressors as able.    3.  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  CV Surg is managing the post-op care.  A.  Per CV Surg.    4.  FEN.  Phos remains low and is being replaced.  It was not checked today.  K, Na, Mg are ok.  A.  Continue replacement protocols.  B.  Check phos now and daily.    5.  Code Status.  Pt is full code.  His prognosis remains guarded.    Case d/w Dr. Vee, Opal Nguyen PA-C, Regan FORD, pt/wife.        Interval History:     Pt feels ok today.  " He is breathing ok.  No uo recorded but he did urinate once.          Medications and Allergies:     Current Facility-Administered Medications   Medication Dose Route Frequency Provider Last Rate Last Admin    albumin human 25 % injection 25 g  25 g Intravenous Q12H Arlin Hodge PA-C 100 mL/hr at 11/07/24 2012 25 g at 11/10/24 0823    aspirin (ASA) chewable tablet 162 mg  162 mg Oral or NG Tube Daily Clarence Bone MD   162 mg at 11/10/24 0806    insulin aspart (NovoLOG) injection (RAPID ACTING)  1-7 Units Subcutaneous Q4H Sb Arthur PA-C   1 Units at 11/09/24 1539    pantoprazole (PROTONIX) 2 mg/mL suspension 40 mg  40 mg Oral or NG Tube BID AC Arlin Hodge PA-C   40 mg at 11/10/24 0806    Or    pantoprazole (PROTONIX) EC tablet 40 mg  40 mg Oral BID AC Arlin Hodge PA-C        polyethylene glycol (MIRALAX) Packet 17 g  17 g Oral or Feeding Tube Daily Mulvihill, Michael, MD   17 g at 11/10/24 0806    Prosource TF20 ENfit Compatibl EN LIQD (PROSOURCE TF20) packet 60 mL  1 packet Per Feeding Tube BID Arlin Hodge PA-C   60 mL at 11/10/24 0806    senna-docusate (SENOKOT-S/PERICOLACE) 8.6-50 MG per tablet 1 tablet  1 tablet Oral or Feeding Tube BID Mulvihill, Michael, MD   1 tablet at 11/10/24 0806    sodium chloride (PF) 0.9% PF flush 3 mL  3 mL Intracatheter Q8H Clarence Bone MD   3 mL at 11/10/24 0516      No Known Allergies       Physical Exam:     Vitals were reviewed   , Blood pressure (!) 115/39, pulse 96, temperature 97.3  F (36.3  C), temperature source Oral, resp. rate 20, height 1.778 m (5' 10\"), weight 100.2 kg (220 lb 14.4 oz), SpO2 95%.  Wt Readings from Last 3 Encounters:   11/10/24 100.2 kg (220 lb 14.4 oz)   10/25/24 97.1 kg (214 lb)   10/03/24 101.2 kg (223 lb)     Intake/Output Summary (Last 24 hours) at 11/10/2024 1100  Last data filed at 11/10/2024 1000  Gross per 24 hour   Intake 2927.9 ml   Output 2683 ml   Net 244.9 ml     GENERAL APPEARANCE: lying in bed, " speaks slowly, awake  RESP: clear ant, decreased   CV:  irreg, nl S1/S2   ABDOMEN: s/nt/nd  EXTREMITIES/SKIN: + scrotal edema; 2+ ble edema  LINES:  + burt. + multiple lines: art, central, temp HD    Pt seen on CRRT.  Stable run.  Current UF goal  0-50 ml/h.           Data:     CBC RESULTS:     Recent Labs   Lab 11/10/24  0459 11/09/24  1732 11/09/24  1239 11/09/24  0427 11/08/24  1716 11/08/24  0518 11/07/24  2044   WBC 50.4* 47.8*  --  37.8* 35.7* 35.1* 34.1*   RBC 3.27* 3.20*  --  2.53* 2.56* 2.65* 2.62*   HGB 9.2* 8.9* 8.9* 7.0* 7.2* 7.4* 7.4*   HCT 27.7* 27.6*  --  22.0* 22.6* 23.5* 23.1*    270  --  236 214 207 187     Basic Metabolic Panel:  Recent Labs   Lab 11/10/24  0730 11/10/24  0459 11/10/24  0455 11/09/24  2352 11/09/24  2237 11/09/24  2030 11/09/24  1536 11/09/24  1239 11/09/24  0750 11/09/24  0427 11/09/24  0029 11/08/24  2218 11/08/24  1605 11/08/24  1331   NA  --  137  --   --  137  --   --  136  --  135  --  135  --  136   POTASSIUM  --  3.9  --   --  4.0  --   --  3.6  --  3.6  --  3.8  --  3.7   CHLORIDE  --  102  --   --  102  --   --  101  --  101  --  100  --  102   CO2  --  23  --   --  23  --   --  23  --  20*  --  23  --  19*   BUN  --  32.9*  --   --  33.4*  --   --  33.1*  --  31.2*  --  33.5*  --  33.7*   CR  --  1.46*  --   --  1.46*  --   --  1.45*  --  1.47*  --  1.48*  --  1.56*   * 134* 128* 131* 137* 88   < > 147*   < > 147*   < > 142*   < > 122*   TATE  --  9.7  --   --  9.6  --   --  9.5  --  9.7  --  9.6  --  9.4    < > = values in this interval not displayed.     INR  Recent Labs   Lab 11/10/24  0459 11/09/24  0427 11/08/24  0518 11/07/24  0537   INR 1.58* 1.58* 1.50* 1.58*      Attestation:   I have reviewed today's relevant vital signs, notes, medications, labs and imaging.    Kaz Ann MD  [facility] - Nephrology  [phone]

## 2024-11-10 NOTE — PROGRESS NOTES
CV  Pressors (which pressors and any increase/decrease in pressor needs):   Epi 0.06 mcg/kg/min. Levo 0.02 mcg/kg/min. Vaso 2 u/hr.  HR range: 80s-100s, Afib  Chest tube output: Total Shift Output ~10-15mL.     Neuro  Orientation: Alert, confusion ongoing w/ disorientation to time, situation at times, forgetful.  Delirium present?(y/n): Yes  Sleep: None, may need sleeping aid.*  Pain: Pain tolerable per pt, Oxycodone & Robaxin given x1.    GI/  BM? (y/n): No  Urine output: Bladder scan 70mL, Incontinent of urine x1 just prior to straight cath attempt for UA collection.      Lines: R) Introducer, R) subclavian TLC, R) Groin Icy cath w/ TLC, L) subcalvian HD cath, L) Radial Art line.

## 2024-11-11 ENCOUNTER — APPOINTMENT (OUTPATIENT)
Dept: OCCUPATIONAL THERAPY | Facility: CLINIC | Age: 89
DRG: 233 | End: 2024-11-11
Attending: STUDENT IN AN ORGANIZED HEALTH CARE EDUCATION/TRAINING PROGRAM
Payer: MEDICARE

## 2024-11-11 ENCOUNTER — APPOINTMENT (OUTPATIENT)
Dept: PHYSICAL THERAPY | Facility: CLINIC | Age: 89
DRG: 233 | End: 2024-11-11
Attending: SURGERY
Payer: MEDICARE

## 2024-11-11 ENCOUNTER — APPOINTMENT (OUTPATIENT)
Dept: GENERAL RADIOLOGY | Facility: CLINIC | Age: 89
DRG: 233 | End: 2024-11-11
Attending: STUDENT IN AN ORGANIZED HEALTH CARE EDUCATION/TRAINING PROGRAM
Payer: MEDICARE

## 2024-11-11 LAB
ALBUMIN SERPL BCG-MCNC: 3.7 G/DL (ref 3.5–5.2)
ALBUMIN SERPL BCG-MCNC: 3.8 G/DL (ref 3.5–5.2)
ALBUMIN SERPL BCG-MCNC: 4 G/DL (ref 3.5–5.2)
ALLEN'S TEST: ABNORMAL
ALP SERPL-CCNC: 133 U/L (ref 40–150)
ALP SERPL-CCNC: 142 U/L (ref 40–150)
ALP SERPL-CCNC: 145 U/L (ref 40–150)
ALT SERPL W P-5'-P-CCNC: 58 U/L (ref 0–70)
ALT SERPL W P-5'-P-CCNC: 60 U/L (ref 0–70)
ALT SERPL W P-5'-P-CCNC: 63 U/L (ref 0–70)
ANION GAP SERPL CALCULATED.3IONS-SCNC: 13 MMOL/L (ref 7–15)
AST SERPL W P-5'-P-CCNC: 56 U/L (ref 0–45)
AST SERPL W P-5'-P-CCNC: 67 U/L (ref 0–45)
AST SERPL W P-5'-P-CCNC: ABNORMAL U/L
BASE EXCESS BLDA CALC-SCNC: 1.1 MMOL/L (ref -3–3)
BILIRUB SERPL-MCNC: 1.5 MG/DL
BILIRUB SERPL-MCNC: 1.6 MG/DL
BILIRUB SERPL-MCNC: 1.8 MG/DL
BUN SERPL-MCNC: 33.9 MG/DL (ref 8–23)
BUN SERPL-MCNC: 34.9 MG/DL (ref 8–23)
BUN SERPL-MCNC: 37.5 MG/DL (ref 8–23)
CA-I BLD-MCNC: 4.9 MG/DL (ref 4.4–5.2)
CALCIUM SERPL-MCNC: 9.5 MG/DL (ref 8.8–10.4)
CALCIUM SERPL-MCNC: 9.6 MG/DL (ref 8.8–10.4)
CALCIUM SERPL-MCNC: 9.6 MG/DL (ref 8.8–10.4)
CHLORIDE SERPL-SCNC: 101 MMOL/L (ref 98–107)
CHLORIDE SERPL-SCNC: 103 MMOL/L (ref 98–107)
CHLORIDE SERPL-SCNC: 104 MMOL/L (ref 98–107)
COHGB MFR BLD: 96.7 % (ref 95–96)
CREAT SERPL-MCNC: 1.44 MG/DL (ref 0.67–1.17)
CREAT SERPL-MCNC: 1.47 MG/DL (ref 0.67–1.17)
CREAT SERPL-MCNC: 1.55 MG/DL (ref 0.67–1.17)
EGFRCR SERPLBLD CKD-EPI 2021: 43 ML/MIN/1.73M2
EGFRCR SERPLBLD CKD-EPI 2021: 46 ML/MIN/1.73M2
EGFRCR SERPLBLD CKD-EPI 2021: 47 ML/MIN/1.73M2
ERYTHROCYTE [DISTWIDTH] IN BLOOD BY AUTOMATED COUNT: 19 % (ref 10–15)
ERYTHROCYTE [DISTWIDTH] IN BLOOD BY AUTOMATED COUNT: 19.3 % (ref 10–15)
GLUCOSE BLDC GLUCOMTR-MCNC: 118 MG/DL (ref 70–99)
GLUCOSE BLDC GLUCOMTR-MCNC: 129 MG/DL (ref 70–99)
GLUCOSE BLDC GLUCOMTR-MCNC: 93 MG/DL (ref 70–99)
GLUCOSE SERPL-MCNC: 111 MG/DL (ref 70–99)
GLUCOSE SERPL-MCNC: 124 MG/DL (ref 70–99)
GLUCOSE SERPL-MCNC: 135 MG/DL (ref 70–99)
HCO3 BLD-SCNC: 25 MMOL/L (ref 21–28)
HCO3 SERPL-SCNC: 22 MMOL/L (ref 22–29)
HCO3 SERPL-SCNC: 23 MMOL/L (ref 22–29)
HCO3 SERPL-SCNC: 23 MMOL/L (ref 22–29)
HCT VFR BLD AUTO: 27.3 % (ref 40–53)
HCT VFR BLD AUTO: 27.5 % (ref 40–53)
HGB BLD-MCNC: 8.5 G/DL (ref 13.3–17.7)
HGB BLD-MCNC: 8.8 G/DL (ref 13.3–17.7)
INR PPP: 1.68 (ref 0.85–1.15)
LACTATE SERPL-SCNC: 2.2 MMOL/L (ref 0.7–2)
LACTATE SERPL-SCNC: 2.4 MMOL/L (ref 0.7–2)
MAGNESIUM SERPL-MCNC: 2 MG/DL (ref 1.7–2.3)
MAGNESIUM SERPL-MCNC: 2.1 MG/DL (ref 1.7–2.3)
MAGNESIUM SERPL-MCNC: 2.2 MG/DL (ref 1.7–2.3)
MCH RBC QN AUTO: 27.1 PG (ref 26.5–33)
MCH RBC QN AUTO: 27.7 PG (ref 26.5–33)
MCHC RBC AUTO-ENTMCNC: 31.1 G/DL (ref 31.5–36.5)
MCHC RBC AUTO-ENTMCNC: 32 G/DL (ref 31.5–36.5)
MCV RBC AUTO: 87 FL (ref 78–100)
MCV RBC AUTO: 87 FL (ref 78–100)
O2/TOTAL GAS SETTING VFR VENT: 40 %
PCO2 BLD: 34 MM HG (ref 35–45)
PH BLD: 7.47 [PH] (ref 7.35–7.45)
PHOSPHATE SERPL-MCNC: 2 MG/DL (ref 2.5–4.5)
PLATELET # BLD AUTO: 259 10E3/UL (ref 150–450)
PLATELET # BLD AUTO: 285 10E3/UL (ref 150–450)
PO2 BLD: 70 MM HG (ref 80–105)
POTASSIUM SERPL-SCNC: 3.9 MMOL/L (ref 3.4–5.3)
POTASSIUM SERPL-SCNC: 3.9 MMOL/L (ref 3.4–5.3)
POTASSIUM SERPL-SCNC: 4.1 MMOL/L (ref 3.4–5.3)
PROT SERPL-MCNC: 5.5 G/DL (ref 6.4–8.3)
PROT SERPL-MCNC: 5.7 G/DL (ref 6.4–8.3)
PROT SERPL-MCNC: 5.8 G/DL (ref 6.4–8.3)
RBC # BLD AUTO: 3.14 10E6/UL (ref 4.4–5.9)
RBC # BLD AUTO: 3.18 10E6/UL (ref 4.4–5.9)
SAO2 % BLDA: 94 % (ref 92–100)
SODIUM SERPL-SCNC: 137 MMOL/L (ref 135–145)
SODIUM SERPL-SCNC: 138 MMOL/L (ref 135–145)
SODIUM SERPL-SCNC: 140 MMOL/L (ref 135–145)
WBC # BLD AUTO: 38.7 10E3/UL (ref 4–11)
WBC # BLD AUTO: 42.3 10E3/UL (ref 4–11)

## 2024-11-11 PROCEDURE — 99232 SBSQ HOSP IP/OBS MODERATE 35: CPT | Performed by: INTERNAL MEDICINE

## 2024-11-11 PROCEDURE — 250N000013 HC RX MED GY IP 250 OP 250 PS 637: Performed by: SURGERY

## 2024-11-11 PROCEDURE — 85014 HEMATOCRIT: CPT | Performed by: PHYSICIAN ASSISTANT

## 2024-11-11 PROCEDURE — 99291 CRITICAL CARE FIRST HOUR: CPT | Mod: 24 | Performed by: INTERNAL MEDICINE

## 2024-11-11 PROCEDURE — P9047 ALBUMIN (HUMAN), 25%, 50ML: HCPCS | Mod: JZ | Performed by: PHYSICIAN ASSISTANT

## 2024-11-11 PROCEDURE — 250N000013 HC RX MED GY IP 250 OP 250 PS 637: Performed by: PHYSICIAN ASSISTANT

## 2024-11-11 PROCEDURE — 71045 X-RAY EXAM CHEST 1 VIEW: CPT

## 2024-11-11 PROCEDURE — 84155 ASSAY OF PROTEIN SERUM: CPT | Performed by: PHYSICIAN ASSISTANT

## 2024-11-11 PROCEDURE — 84100 ASSAY OF PHOSPHORUS: CPT | Performed by: INTERNAL MEDICINE

## 2024-11-11 PROCEDURE — 90947 DIALYSIS REPEATED EVAL: CPT

## 2024-11-11 PROCEDURE — 83735 ASSAY OF MAGNESIUM: CPT | Performed by: PHYSICIAN ASSISTANT

## 2024-11-11 PROCEDURE — 250N000011 HC RX IP 250 OP 636: Mod: JZ | Performed by: PHYSICIAN ASSISTANT

## 2024-11-11 PROCEDURE — 250N000011 HC RX IP 250 OP 636: Performed by: SURGERY

## 2024-11-11 PROCEDURE — 36620 INSERTION CATHETER ARTERY: CPT | Mod: GC | Performed by: STUDENT IN AN ORGANIZED HEALTH CARE EDUCATION/TRAINING PROGRAM

## 2024-11-11 PROCEDURE — 258N000003 HC RX IP 258 OP 636: Performed by: SURGERY

## 2024-11-11 PROCEDURE — 82435 ASSAY OF BLOOD CHLORIDE: CPT | Performed by: PHYSICIAN ASSISTANT

## 2024-11-11 PROCEDURE — 250N000009 HC RX 250: Performed by: INTERNAL MEDICINE

## 2024-11-11 PROCEDURE — 85610 PROTHROMBIN TIME: CPT | Performed by: PHYSICIAN ASSISTANT

## 2024-11-11 PROCEDURE — 97530 THERAPEUTIC ACTIVITIES: CPT | Mod: GP | Performed by: PHYSICAL THERAPIST

## 2024-11-11 PROCEDURE — 258N000003 HC RX IP 258 OP 636: Performed by: PHYSICIAN ASSISTANT

## 2024-11-11 PROCEDURE — 258N000003 HC RX IP 258 OP 636: Performed by: STUDENT IN AN ORGANIZED HEALTH CARE EDUCATION/TRAINING PROGRAM

## 2024-11-11 PROCEDURE — 250N000013 HC RX MED GY IP 250 OP 250 PS 637: Performed by: STUDENT IN AN ORGANIZED HEALTH CARE EDUCATION/TRAINING PROGRAM

## 2024-11-11 PROCEDURE — 258N000003 HC RX IP 258 OP 636: Performed by: INTERNAL MEDICINE

## 2024-11-11 PROCEDURE — 83605 ASSAY OF LACTIC ACID: CPT | Performed by: PHYSICIAN ASSISTANT

## 2024-11-11 PROCEDURE — 82040 ASSAY OF SERUM ALBUMIN: CPT | Performed by: PHYSICIAN ASSISTANT

## 2024-11-11 PROCEDURE — 97110 THERAPEUTIC EXERCISES: CPT | Mod: GP | Performed by: PHYSICAL THERAPIST

## 2024-11-11 PROCEDURE — 250N000011 HC RX IP 250 OP 636: Performed by: PHYSICIAN ASSISTANT

## 2024-11-11 PROCEDURE — 250N000011 HC RX IP 250 OP 636: Performed by: STUDENT IN AN ORGANIZED HEALTH CARE EDUCATION/TRAINING PROGRAM

## 2024-11-11 PROCEDURE — 97161 PT EVAL LOW COMPLEX 20 MIN: CPT | Mod: GP | Performed by: PHYSICAL THERAPIST

## 2024-11-11 PROCEDURE — 82805 BLOOD GASES W/O2 SATURATION: CPT | Performed by: INTERNAL MEDICINE

## 2024-11-11 PROCEDURE — 82330 ASSAY OF CALCIUM: CPT | Performed by: PHYSICIAN ASSISTANT

## 2024-11-11 PROCEDURE — 120N000004 HC R&B MS OVERFLOW

## 2024-11-11 PROCEDURE — 97140 MANUAL THERAPY 1/> REGIONS: CPT | Mod: GO

## 2024-11-11 RX ORDER — CALCIUM CHLORIDE, MAGNESIUM CHLORIDE, SODIUM CHLORIDE, SODIUM BICARBONATE, POTASSIUM CHLORIDE AND SODIUM PHOSPHATE DIBASIC DIHYDRATE 3.68; 3.05; 6.34; 3.09; .314; .187 G/L; G/L; G/L; G/L; G/L; G/L
INJECTION INTRAVENOUS CONTINUOUS
Status: DISCONTINUED | OUTPATIENT
Start: 2024-11-11 | End: 2024-11-14

## 2024-11-11 RX ORDER — POLYETHYLENE GLYCOL 3350 17 G/17G
17 POWDER, FOR SOLUTION ORAL 2 TIMES DAILY
Status: DISCONTINUED | OUTPATIENT
Start: 2024-11-12 | End: 2024-11-13

## 2024-11-11 RX ORDER — HEPARIN SODIUM 10000 [USP'U]/100ML
500 INJECTION, SOLUTION INTRAVENOUS CONTINUOUS
Status: DISCONTINUED | OUTPATIENT
Start: 2024-11-11 | End: 2024-11-15 | Stop reason: HOSPADM

## 2024-11-11 RX ORDER — CALCIUM CHLORIDE, MAGNESIUM CHLORIDE, SODIUM CHLORIDE, SODIUM BICARBONATE, POTASSIUM CHLORIDE AND SODIUM PHOSPHATE DIBASIC DIHYDRATE 3.68; 3.05; 6.34; 3.09; .314; .187 G/L; G/L; G/L; G/L; G/L; G/L
200 INJECTION INTRAVENOUS CONTINUOUS
Status: DISCONTINUED | OUTPATIENT
Start: 2024-11-11 | End: 2024-11-14

## 2024-11-11 RX ORDER — MIDODRINE HYDROCHLORIDE 5 MG/1
10 TABLET ORAL
Status: DISCONTINUED | OUTPATIENT
Start: 2024-11-11 | End: 2024-11-13

## 2024-11-11 RX ORDER — AMOXICILLIN 250 MG
2 CAPSULE ORAL 2 TIMES DAILY
Status: DISCONTINUED | OUTPATIENT
Start: 2024-11-12 | End: 2024-11-13

## 2024-11-11 RX ADMIN — SODIUM PHOSPHATE, MONOBASIC, MONOHYDRATE AND SODIUM PHOSPHATE, DIBASIC, ANHYDROUS 15 MMOL: 142; 276 INJECTION, SOLUTION INTRAVENOUS at 11:21

## 2024-11-11 RX ADMIN — Medication 40 MG: at 08:38

## 2024-11-11 RX ADMIN — ASPIRIN 81 MG CHEWABLE TABLET 162 MG: 81 TABLET CHEWABLE at 08:38

## 2024-11-11 RX ADMIN — ALBUMIN HUMAN 25 G: 0.25 SOLUTION INTRAVENOUS at 19:41

## 2024-11-11 RX ADMIN — ACETAMINOPHEN 650 MG: 325 TABLET, FILM COATED ORAL at 13:52

## 2024-11-11 RX ADMIN — EPINEPHRINE 0.06 MCG/KG/MIN: 1 INJECTION INTRAMUSCULAR; INTRAVENOUS; SUBCUTANEOUS at 17:24

## 2024-11-11 RX ADMIN — CALCIUM CHLORIDE, MAGNESIUM CHLORIDE, DEXTROSE MONOHYDRATE, LACTIC ACID, SODIUM CHLORIDE, SODIUM BICARBONATE AND POTASSIUM CHLORIDE 5000 ML: 5.15; 2.03; 22; 5.4; 6.46; 3.09; .157 INJECTION INTRAVENOUS at 22:46

## 2024-11-11 RX ADMIN — MIDODRINE HYDROCHLORIDE 10 MG: 5 TABLET ORAL at 13:52

## 2024-11-11 RX ADMIN — CALCIUM CHLORIDE, MAGNESIUM CHLORIDE, SODIUM CHLORIDE, SODIUM BICARBONATE, POTASSIUM CHLORIDE AND SODIUM PHOSPHATE DIBASIC DIHYDRATE 5000 ML: 3.68; 3.05; 6.34; 3.09; .314; .187 INJECTION INTRAVENOUS at 17:56

## 2024-11-11 RX ADMIN — HEPARIN SODIUM 500 UNITS/HR: 10000 INJECTION, SOLUTION INTRAVENOUS at 17:37

## 2024-11-11 RX ADMIN — METHOCARBAMOL 500 MG: 500 TABLET ORAL at 13:52

## 2024-11-11 RX ADMIN — CALCIUM CHLORIDE, MAGNESIUM CHLORIDE, DEXTROSE MONOHYDRATE, LACTIC ACID, SODIUM CHLORIDE, SODIUM BICARBONATE AND POTASSIUM CHLORIDE 5000 ML: 5.15; 2.03; 22; 5.4; 6.46; 3.09; .157 INJECTION INTRAVENOUS at 05:26

## 2024-11-11 RX ADMIN — EPINEPHRINE 0.06 MCG/KG/MIN: 1 INJECTION INTRAMUSCULAR; INTRAVENOUS; SUBCUTANEOUS at 01:22

## 2024-11-11 RX ADMIN — SENNOSIDES AND DOCUSATE SODIUM 1 TABLET: 50; 8.6 TABLET ORAL at 08:38

## 2024-11-11 RX ADMIN — Medication 40 MG: at 16:47

## 2024-11-11 RX ADMIN — CALCIUM CHLORIDE, MAGNESIUM CHLORIDE, DEXTROSE MONOHYDRATE, LACTIC ACID, SODIUM CHLORIDE, SODIUM BICARBONATE AND POTASSIUM CHLORIDE: 5.15; 2.03; 22; 5.4; 6.46; 3.09; .157 INJECTION INTRAVENOUS at 09:00

## 2024-11-11 RX ADMIN — CALCIUM CHLORIDE, MAGNESIUM CHLORIDE, DEXTROSE MONOHYDRATE, LACTIC ACID, SODIUM CHLORIDE, SODIUM BICARBONATE AND POTASSIUM CHLORIDE: 5.15; 2.03; 22; 5.4; 6.46; 3.09; .157 INJECTION INTRAVENOUS at 11:30

## 2024-11-11 RX ADMIN — CALCIUM CHLORIDE, MAGNESIUM CHLORIDE, DEXTROSE MONOHYDRATE, LACTIC ACID, SODIUM CHLORIDE, SODIUM BICARBONATE AND POTASSIUM CHLORIDE 5000 ML: 5.15; 2.03; 22; 5.4; 6.46; 3.09; .157 INJECTION INTRAVENOUS at 19:16

## 2024-11-11 RX ADMIN — CALCIUM CHLORIDE, MAGNESIUM CHLORIDE, DEXTROSE MONOHYDRATE, LACTIC ACID, SODIUM CHLORIDE, SODIUM BICARBONATE AND POTASSIUM CHLORIDE 5000 ML: 5.15; 2.03; 22; 5.4; 6.46; 3.09; .157 INJECTION INTRAVENOUS at 02:34

## 2024-11-11 RX ADMIN — Medication 60 ML: at 20:34

## 2024-11-11 RX ADMIN — CALCIUM CHLORIDE, MAGNESIUM CHLORIDE, DEXTROSE MONOHYDRATE, LACTIC ACID, SODIUM CHLORIDE, SODIUM BICARBONATE AND POTASSIUM CHLORIDE: 5.15; 2.03; 22; 5.4; 6.46; 3.09; .157 INJECTION INTRAVENOUS at 05:26

## 2024-11-11 RX ADMIN — ACETAMINOPHEN 650 MG: 325 TABLET, FILM COATED ORAL at 08:38

## 2024-11-11 RX ADMIN — MIDODRINE HYDROCHLORIDE 10 MG: 5 TABLET ORAL at 17:57

## 2024-11-11 RX ADMIN — OXYCODONE HYDROCHLORIDE 5 MG: 5 TABLET ORAL at 23:22

## 2024-11-11 RX ADMIN — Medication 60 ML: at 11:58

## 2024-11-11 RX ADMIN — CALCIUM CHLORIDE, MAGNESIUM CHLORIDE, DEXTROSE MONOHYDRATE, LACTIC ACID, SODIUM CHLORIDE, SODIUM BICARBONATE AND POTASSIUM CHLORIDE: 5.15; 2.03; 22; 5.4; 6.46; 3.09; .157 INJECTION INTRAVENOUS at 12:21

## 2024-11-11 RX ADMIN — CALCIUM CHLORIDE, MAGNESIUM CHLORIDE, SODIUM CHLORIDE, SODIUM BICARBONATE, POTASSIUM CHLORIDE AND SODIUM PHOSPHATE DIBASIC DIHYDRATE 200 ML/HR: 3.68; 3.05; 6.34; 3.09; .314; .187 INJECTION INTRAVENOUS at 21:54

## 2024-11-11 RX ADMIN — SENNOSIDES AND DOCUSATE SODIUM 1 TABLET: 50; 8.6 TABLET ORAL at 20:34

## 2024-11-11 RX ADMIN — VASOPRESSIN 2 UNITS/HR: 20 INJECTION INTRAVENOUS at 02:50

## 2024-11-11 RX ADMIN — BISACODYL 10 MG: 10 SUPPOSITORY RECTAL at 11:30

## 2024-11-11 RX ADMIN — POLYETHYLENE GLYCOL 3350 17 G: 17 POWDER, FOR SOLUTION ORAL at 08:38

## 2024-11-11 RX ADMIN — ALBUMIN HUMAN 25 G: 0.25 SOLUTION INTRAVENOUS at 08:36

## 2024-11-11 RX ADMIN — CALCIUM CHLORIDE, MAGNESIUM CHLORIDE, DEXTROSE MONOHYDRATE, LACTIC ACID, SODIUM CHLORIDE, SODIUM BICARBONATE AND POTASSIUM CHLORIDE 5000 ML: 5.15; 2.03; 22; 5.4; 6.46; 3.09; .157 INJECTION INTRAVENOUS at 15:46

## 2024-11-11 NOTE — PROGRESS NOTES
[facility]    Nephrology Progress Note     Assessment & Plan     1. Oliguric JAVIER/CKD IIIb.      Pt remains on CRRT.    He is net + 10 mL/hour.  Weight up 1.9  kg.       Non citrate  Dialysate K 2 1500 mL/hour  Post Filter Replacement K 2 200 mL/hour  Pre Filter Replacement K 2 800 mL/hour     2.  Severe shock.  Pt remains on two pressors.  Overall, his pressors needs have decreased/increased over time.  Now vasopressin, NE.   A.  Wean pressors as able.     3.  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  CV Surg is managing the post-op care.  A.  Per CV Surg.     4.  FEN.  Phos remains low and is being replaced.  It was not checked today.  K, Na, Mg are ok.  A.  Continue replacement protocols.  B.  Check phos now and daily.  C.  I will change replacement fluids to K4 as it will also contain phos.       5.  Code Status.  Pt is full code.  His prognosis remains guarded.     Bhanu Ritchie MD  [facility] - Nephrology  [phone]    Interval History     Weak.  Feels like he would like to sit up to move bowels.   Not SOB.      Physical Exam   Temp: 97.8  F (36.6  C) Temp src: Axillary BP: (!) 88/59 Pulse: 97   Resp: 16 SpO2: 95 % O2 Device: None (Room air) Oxygen Delivery: 5 LPM  Vitals:    11/09/24 0630 11/10/24 0231 11/11/24 0600   Weight: 100 kg (220 lb 7.4 oz) 100.2 kg (220 lb 14.4 oz) 102.1 kg (225 lb 1.4 oz)     Vital Signs with Ranges  Temp:  [97.4  F (36.3  C)-98.2  F (36.8  C)] 97.8  F (36.6  C)  Pulse:  [] 97  Resp:  [12-18] 16  BP: ()/(51-80) 88/59  MAP:  [49 mmHg-91 mmHg] 49 mmHg  Arterial Line BP: ()/(32-90) 109/32  SpO2:  [90 %-100 %] 95 %  I/O last 3 completed shifts:  In: 3831.5 [I.V.:1451.5; NG/GT:370]  Out: 3035 [Other:2635; Chest Tube:400]    GENERAL APPEARANCE: lying in bed  HEENT:  Eyes/ears/nose/neck grossly normal  RESP: lungs cta b c good efforts, no crackles, rhonchi or wheezes  CV: RRR, nl S1/S2, no m/r/g   ABDOMEN: o/s/nt/nd, bs present  EXTREMITIES/SKIN: no  rashes/lesions; tr-1+ BLE edema    Medications   Current Facility-Administered Medications   Medication Dose Route Frequency Provider Last Rate Last Admin    dextrose 10% infusion   Intravenous Continuous PRN Arlin Hodge PA-C   Stopped at 11/05/24 0600    dialysate solution (PrismaSol BGK 2/3.5)   CRRT Continuous Farhad Boland MD 1,500 mL/hr at 11/09/24 1657 5,000 mL at 11/11/24 0526    EPINEPHrine (ADRENALIN) 5 mg in  mL infusion  0.01-0.1 mcg/kg/min Intravenous Continuous PRN Tyra Dubois MD 17.2 mL/hr at 11/11/24 0122 0.06 mcg/kg/min at 11/11/24 0122    lactated ringers infusion   Intravenous Continuous Opal Nugyen PA-C 10 mL/hr at 11/11/24 0800 Rate Change at 11/11/24 0800    No heparin required   Does not apply Continuous PRN Farhad Boland MD        norepinephrine (LEVOPHED) 16 mg in  mL infusion MAX CONC CENTRAL LINE  0.01-0.2 mcg/kg/min Intravenous Continuous Tyra Dubois MD 3.6 mL/hr at 11/11/24 1000 0.04 mcg/kg/min at 11/11/24 1000    Patient may continue current oral medications   Does not apply Continuous PRN Bhanu Ness MD        Post-Filter replacement solution (PRISMASOL BGK 2/3.5)   CRRT Continuous Farhad Boland  mL/hr at 11/11/24 0526 New Bag at 11/11/24 0526    Pre-Filter replacement solution (PRISMASOL BGK 2/3.5)   CRRT Continuous Farhad Boland  mL/hr at 11/09/24 1408 5,000 mL at 11/11/24 0526    Reason beta blocker order not selected   Does not apply DOES NOT GO TO Clarence Olguin MD        vasopressin 0.2 units/mL in NS (PITRESSIN) standard conc infusion  0.5-4 Units/hr Intravenous Continuous Tyra Dubois MD 10 mL/hr at 11/11/24 1050 2 Units/hr at 11/11/24 1050     Current Facility-Administered Medications   Medication Dose Route Frequency Provider Last Rate Last Admin    albumin human 25 % injection 25 g  25 g Intravenous Q12H Arlin Hodge, TERRELL 100 mL/hr at 11/07/24 2012 25 g at 11/11/24  0836    aspirin (ASA) chewable tablet 162 mg  162 mg Oral or NG Tube Daily Clarence Bone MD   162 mg at 11/11/24 0838    insulin aspart (NovoLOG) injection (RAPID ACTING)  1-7 Units Subcutaneous Q4H Sb Arthur PA-C   1 Units at 11/10/24 1602    pantoprazole (PROTONIX) 2 mg/mL suspension 40 mg  40 mg Oral or NG Tube BID AC Arlin Hodge PA-C   40 mg at 11/11/24 0838    Or    pantoprazole (PROTONIX) EC tablet 40 mg  40 mg Oral BID AC Arlin Hodge PA-C        polyethylene glycol (MIRALAX) Packet 17 g  17 g Oral or Feeding Tube Daily Mulvihill, Michael, MD   17 g at 11/11/24 0838    Prosource TF20 ENfit Compatibl EN LIQD (PROSOURCE TF20) packet 60 mL  1 packet Per Feeding Tube BID Arlin Hodge PA-C   60 mL at 11/10/24 2110    senna-docusate (SENOKOT-S/PERICOLACE) 8.6-50 MG per tablet 1 tablet  1 tablet Oral or Feeding Tube BID Mulvihill, Michael, MD   1 tablet at 11/11/24 0838    sodium chloride (PF) 0.9% PF flush 3 mL  3 mL Intracatheter Q8H Clarence Bone MD   3 mL at 11/11/24 0532       Data   BMP  Recent Labs   Lab 11/11/24  0840 11/11/24  0637 11/11/24  0636 11/11/24  0046 11/10/24  2221 11/10/24  1602 11/10/24  1406 11/10/24  0730 11/10/24  0459   NA  --  137  --   --  137  --  139  --  137   POTASSIUM  --  3.9  --   --  3.6  --  3.8  --  3.9   CHLORIDE  --  101  --   --  102  --  104  --  102   TATE  --  9.5  --   --  9.8  --  9.7  --  9.7   CO2  --  23  --   --  23  --  23  --  23   BUN  --  37.5*  --   --  34.3*  --  33.5*  --  32.9*   CR  --  1.55*  --   --  1.43*  --  1.40*  --  1.46*   GLC 93 135* 118* 129* 135*   < > 145*   < > 134*    < > = values in this interval not displayed.     [email or web address](phos:4)  CBC)  Recent Labs   Lab 11/11/24  0637 11/10/24  1715 11/10/24  0459 11/09/24  1732   WBC 42.3* 46.0* 50.4* 47.8*   HGB 8.8* 8.8* 9.2* 8.9*   HCT 27.5* 26.9* 27.7* 27.6*   MCV 87 85 85 86    258 288 270     Recent Labs   Lab 11/11/24  0637   AST 56*   ALT 63   ALKPHOS  142   BILITOTAL 1.5*     Recent Labs   Lab 11/11/24  0637   INR 1.68*         Attestation:   I have reviewed today's relevant vital signs, notes, medications, labs and imaging.

## 2024-11-11 NOTE — PLAN OF CARE
Goal Outcome Evaluation:      Plan of Care Reviewed With: patient    Overall Patient Progress: no change  Overall Patient Progress: no change    Outcome Evaluation:   - Pt alert, disoriented to situation/time. Follows commands, significant weakness to all extremities, speech- clear, [place].     - Afib, HR 80s-100s, SBP 80s-110s, MAP >65 on Epi, Levo, Vaso gtts. Chest Tubes w/ Minimal Output until Turn after bath, 400mL Output, MD notified.    - CRRT pt removal ordered +10mL/hr, Filter changed 0500.     - 5L NC, SpO2 >94%. Diminished Lung sounds, Denies SOB.     - TF @ Goal 65mL/hr, FWF 60 q8h. Abdomen- distention, semi-firm, passing gas but No BM.     - Anuria, on CRRT.     - Denies pain.     - Skin- see Wound charting.    - Thermaguard icy cath ongoing to Target Temp 37.0c.     - Hgb 8.8.        Problem: Comorbidity Management  Goal: Blood Pressure in Desired Range  Outcome: Not Progressing  Intervention: Maintain Blood Pressure Management  Recent Flowsheet Documentation  Taken 11/11/2024 0400 by Toy Sanz, RN  Medication Review/Management:   medications reviewed   high-risk medications identified  Taken 11/11/2024 0000 by Toy Sanz RN  Medication Review/Management:   medications reviewed   high-risk medications identified  Taken 11/10/2024 2000 by Toy Sanz RN  Medication Review/Management:   medications reviewed   high-risk medications identified     Problem: Cardiovascular Surgery  Goal: Improved Activity Tolerance  Outcome: Not Progressing  Intervention: Optimize Tolerance for Activity  Recent Flowsheet Documentation  Taken 11/11/2024 0400 by Toy Sanz RN  Environmental Support:   calm environment promoted   caregiver consistency promoted   environmental consistency promoted   rest periods encouraged  Taken 11/11/2024 0000 by Toy Sanz RN  Environmental Support:   calm environment promoted   caregiver consistency promoted   environmental consistency promoted   rest  periods encouraged  Taken 11/10/2024 2000 by Toy Sanz RN  Environmental Support:   calm environment promoted   caregiver consistency promoted   environmental consistency promoted   rest periods encouraged     Problem: Cardiovascular Surgery  Goal: Optimal Coping with Heart Surgery  Outcome: Not Progressing  Intervention: Support Psychosocial Response to Surgery  Recent Flowsheet Documentation  Taken 11/11/2024 0400 by Toy Sanz RN  Supportive Measures:   active listening utilized   positive reinforcement provided   problem-solving facilitated   relaxation techniques promoted  Taken 11/11/2024 0000 by Toy Sanz RN  Supportive Measures:   active listening utilized   positive reinforcement provided   problem-solving facilitated   relaxation techniques promoted  Taken 11/10/2024 2000 by Toy Sanz RN  Supportive Measures:   active listening utilized   positive reinforcement provided   problem-solving facilitated   relaxation techniques promoted     Problem: Cardiovascular Surgery  Goal: Effective Cardiac Function  Outcome: Not Progressing  Intervention: Optimize Cardiac Output and Blood Flow  Recent Flowsheet Documentation  Taken 11/11/2024 0400 by Toy Sanz RN  Dysrhythmia Management: pacing wires maintained  Taken 11/11/2024 0000 by Toy Sanz RN  Dysrhythmia Management: pacing wires maintained  Taken 11/10/2024 2000 by Toy Sanz RN  Dysrhythmia Management: pacing wires maintained     Problem: Risk for Delirium  Goal: Improved Sleep  Outcome: Not Progressing

## 2024-11-11 NOTE — PROGRESS NOTES
[facility]  Cardiovascular and Thoracic Surgery Daily Note    Today's Plans: warm feet, monitor pulses, wean vaso as able, culture internal jugular tip-remove as has R suclavian line      Assessment and Plan  POD # 13 s/p CABG x 3 (LIMA to LAD, SVG to OM, SVG to RCA), Modified left atrial MAZE ([facility]), and occlusion of left atrial appendage (50 mm Atriclip) on 10/28 with Dr. Michael Mulvihill.    POD # 13 s/p return to OR for mediastinal re-exploration, washout    - CVS: Pre-op TTE with EF 35-40%. Postop TTE 10/30 with EF ~35% on high-dose inotropic support.  Gtt's: Epi 0.06 Norepi: 0.03 Vaso off today: 2 -DBU stopped 11/9, swan removed 11/9, midodrine started 11/1/24  Postop mixed cardiogenic/vasoplegic/hypovolemic shock. Lactic acidosis cleared and SVO2 stabilized. CI goal >2.0 (calculated elaine), stopped DBU 11/4 no back on after dec in SVO2 with inc in lact back on at 2.5. NE off and vasopressin to MAP goal 65, wean as able.   Hypervolemic, volume management via CRRT, pull as able. CVP goal ~10-12. Now tolerating more aggressive removal on hold as becoming euvolemic. Albumin 25% push BID to pull volume intravascular.  Hypothermic since initiation on CRRT, Thermagaurd catheter placed 10/31, target 37 C.   Stress dose steroids started 10/31, off 11/02 PM.  History of paroxysmal atrial fibrillation, continues with intermittent afib and accelerated junctional at times. Previously being atrial paced at 100, now appears to have improved BP without pacing (intrinsic rate ~80 BP, sinus rhythm with intermittent atrial fibrillation). Amio discontinued with shock liver. Defer systemic anticoagulation at present (ok for citrate for CRRT machine if needed).   Absent right radial pulse (previous arterial line in this location). ICU MD did bedside US, ulnar artery patent. Right hand warm, good capillary refill. Increased duskiness of right hand noted after starting vasopressin, which demonstrated patent  arterial flow with slow biphasic flow at the distal radial artery (site of previous arterial line).   Aspirin 162 mg daily, defer statin with ALI.    Chest tubes: output 1.7L<840<860<750<975<970 mL, serosang, not blood, fluid overloaded-pleural effusions on CT, no air leak. TPW removed with mediastinal tubes 11/9  CT C/A/P w/contrast showing pleural effusions, benign a/p    - Resp: Acute hypoxemic and hypercapnic respiratory failure, stable. Extubated pod#9    - Neuro: Sedated with propofol while intubated. Add fentanyl infusion and Versed PRN to improve vent compliance. History of myasthenia gravis. Purposeful upper extremity movements and spontaneous LE movement when sedation lightened.     - Renal: CKD stage 3, baseline Cr ~1.8. Postop oliguric/anuric JAVIER. Nephrology consulted, CRRT started 10/30. Avoid nephrotoxins.   Recent Labs   Lab 11/11/24  0637 11/10/24  2221 11/10/24  1406   CR 1.55* 1.43* 1.40*       - GI: +BM, +flatus, continue bowel regimen. Shock liver, improving. Trend LFTs, avoid hepatotoxins. Recent admission for GIB 09/2024, increased pantoprazole to BID. Diarrhea with initiation of TF, rectal tube placed 11/05     - : bladder scan q shift. Anuric on CRRT    - Endo: Postop stress hyperglycemia, resolved. Insulin infusion transitioned to sliding scale insulin. Stress dose steroids 10/31-11/02 as above.   Hemoglobin A1C   Date Value Ref Range Status   10/09/2024 4.5 <5.7 % Final     Comment:     Normal <5.7%   Prediabetes 5.7-6.4%    Diabetes 6.5% or higher     Note: Adopted from ADA consensus guidelines.        - FEN: Replace electrolytes as needed. Initiated on trophic feeds via OG 11/1, NJ placed 11/2 after INR came down, TF increase to goal.      - ID: WBC chronically elevated and hypothermic on CRRT as above so difficult to assess for possible infection;  empiric Vanc/Zosyn 10/31-11/04. Blood, urine, respiratory cultures NGTD. WBC elevated.  Trend CBC and fever curve. 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 Acute blood loss anemia due to surgery. Postop coagulopathy, improved (required multiple blood transfusions and blood productions immediately postop). 1 unit RBCs 11/3. 1 unit RBC 11/5 Trend CBC, transfuse PRN. 1U PRBC 11/7/24, 2U 11/9  Recent Labs   Lab 11/11/24  0637 11/10/24  1715 11/10/24  0459   HGB 8.8* 8.8* 9.2*    258 288       - Proph: SCD, subcutaneous heparin, PPI    - Other:  Clinically Significant Risk Factors               # Hypoalbuminemia: Lowest albumin = 2.7 g/dL at 11/2/2024  5:44 AM, will monitor as appropriate    # Coagulation Defect: INR = 1.68 (Ref range: 0.85 - 1.15) and/or PTT = 42 Seconds (Ref range: 22 - 38 Seconds), will monitor for bleeding    # Hypertension: Noted on problem list  # Chronic heart failure with reduced ejection fraction: last echo with EF <40%   # Acute Hypercapnic Respiratory Failure: based on arterial blood gas results.  Continue supplemental oxygen and ventilatory support as indicated.  # Acute Hypercapnic Respiratory Failure: based on venous blood gas results.  Continue supplemental oxygen and ventilatory support as indicated.      #Acute blood loss anemia: Lowest Hgb this hospitalization: 6.6 g/dL. Will continue to monitor and treat/transfuse as appropriate.     # Obesity: Estimated body mass index is 32.3 kg/m  as calculated from the following:    Height as of this encounter: 1.778 m (5' 10\").    Weight as of this encounter: 102.1 kg (225 lb 1.4 oz).   # Moderate Malnutrition: based on nutrition assessment     # History of CABG: noted on surgical history       - Dispo: ICU. Great improvement today, worked with rehab, up in chair. Family updated multiple times at bedside. Medically Ready for " Discharge: Anticipated in 5+ Days      Interval History  Sitting up in chair, breathing stable, tolerating tube feeds, working with rehab, denies pain,     Medications  Current Facility-Administered Medications   Medication Dose Route Frequency Provider Last Rate Last Admin    albumin human 25 % injection 25 g  25 g Intravenous Q12H Arlin Hodge PA-C 100 mL/hr at 11/07/24 2012 25 g at 11/11/24 0836    aspirin (ASA) chewable tablet 162 mg  162 mg Oral or NG Tube Daily Clarence Bone MD   162 mg at 11/11/24 0838    insulin aspart (NovoLOG) injection (RAPID ACTING)  1-7 Units Subcutaneous Q4H Sb Arthur PA-C   1 Units at 11/10/24 1602    pantoprazole (PROTONIX) 2 mg/mL suspension 40 mg  40 mg Oral or NG Tube BID AC Arlin Hodge PA-C   40 mg at 11/11/24 0838    Or    pantoprazole (PROTONIX) EC tablet 40 mg  40 mg Oral BID AC Arlin Hodge PA-C        polyethylene glycol (MIRALAX) Packet 17 g  17 g Oral or Feeding Tube Daily Mulvihill, Michael, MD   17 g at 11/11/24 0838    Prosource TF20 ENfit Compatibl EN LIQD (PROSOURCE TF20) packet 60 mL  1 packet Per Feeding Tube BID Arlin Hodge PA-C   60 mL at 11/11/24 1158    senna-docusate (SENOKOT-S/PERICOLACE) 8.6-50 MG per tablet 1 tablet  1 tablet Oral or Feeding Tube BID Mulvihill, Michael, MD   1 tablet at 11/11/24 0838    sodium chloride (PF) 0.9% PF flush 3 mL  3 mL Intracatheter Q8H Clarence Bone MD   3 mL at 11/11/24 0532     Current Facility-Administered Medications   Medication Dose Route Frequency Provider Last Rate Last Admin    acetaminophen (TYLENOL) tablet 650 mg  650 mg Oral Q4H PRN Clarence Bone MD   650 mg at 11/11/24 0838    bisacodyl (DULCOLAX) suppository 10 mg  10 mg Rectal Daily PRN Clarence Bone MD   10 mg at 11/11/24 1130    calcium gluconate 2 g in  mL intermittent infusion  2 g Intravenous Q8H PRN Farhad Boland MD        calcium gluconate 4 g in sodium chloride 0.9 % 100 mL intermittent infusion   4 g Intravenous Q8H PRN Farhad Boland MD        dextrose 10% infusion   Intravenous Continuous PRN Arlin Hodge PA-C   Stopped at 11/05/24 0600    glucose gel 15-30 g  15-30 g Oral Q15 Min PRN Nasir Vallecillo MD        Or    dextrose 50 % injection 25-50 mL  25-50 mL Intravenous Q15 Min PRN Nasir Vallecillo MD        Or    glucagon injection 1 mg  1 mg Subcutaneous Q15 Min PRN Nasir Vallecillo MD        diphenhydrAMINE (BENADRYL) 25 mg, acetaminophen (TYLENOL) 500 mg alternative for Tylenol PM   Oral At Bedtime PRN Opal Nguyen PA-C   Given at 11/10/24 2109    EPINEPHrine (ADRENALIN) 5 mg in  mL infusion  0.01-0.1 mcg/kg/min Intravenous Continuous PRN Tyra Dubois MD 17.2 mL/hr at 11/11/24 0122 0.06 mcg/kg/min at 11/11/24 0122    heparin lock flush 10 unit/mL injection 3 mL  3 mL Intracatheter Q1H PRN Tyra Dubois MD        hydrALAZINE (APRESOLINE) injection 10 mg  10 mg Intravenous Q30 Min PRN Clarence Bone MD        lidocaine (LMX4) cream   Topical Q1H PRN Clarence Bone MD        lidocaine 1 % 0.1-1 mL  0.1-1 mL Other Q1H PRN Clarence Bone MD        magnesium hydroxide (MILK OF MAGNESIA) suspension 30 mL  30 mL Oral Daily PRN Clarence Bone MD   30 mL at 11/10/24 1641    magnesium sulfate 2 g in 50 mL sterile water intermittent infusion  2 g Intravenous Q8H PRN Farhad Boland MD   2 g at 11/09/24 1344    melatonin liquid 5 mg  5 mg Oral At Bedtime PRN Opal Nguyen PA-C        methocarbamol (ROBAXIN) tablet 500 mg  500 mg Oral Q6H PRN Clarence Bone MD   500 mg at 11/09/24 2035    naloxone (NARCAN) injection 0.2 mg  0.2 mg Intravenous Q2 Min PRN Mulvihill, Michael, MD        Or    naloxone (NARCAN) injection 0.4 mg  0.4 mg Intravenous Q2 Min PRN Mulvihill, Michael, MD        Or    naloxone (NARCAN) injection 0.2 mg  0.2 mg Intramuscular Q2 Min PRN Mulvihill, Michael, MD        Or    naloxone (NARCAN) injection 0.4  "mg  0.4 mg Intramuscular Q2 Min PRN Mulvihill, Michael, MD        No heparin required   Does not apply Continuous PRN Bhanu Ritchie MD        No heparin required   Does not apply Continuous PRN Farhad Boland MD        ondansetron (ZOFRAN ODT) ODT tab 4 mg  4 mg Oral Q6H PRN Clarence Bone MD        Or    ondansetron (ZOFRAN) injection 4 mg  4 mg Intravenous Q6H PRN Clarence Bone MD        oxyCODONE IR (ROXICODONE) half-tab 2.5 mg  2.5 mg Oral Q4H PRN Clarence Bone MD   2.5 mg at 11/02/24 0214    Or    oxyCODONE (ROXICODONE) tablet 5 mg  5 mg Oral Q4H PRN Clarence Bone MD   5 mg at 11/09/24 2302    Patient may continue current oral medications   Does not apply Continuous PRN Bhanu Ness MD        potassium chloride 20 mEq in 50 mL intermittent infusion  20 mEq Intravenous Q8H PRN Farhad Boland MD 50 mL/hr at 11/04/24 1200 20 mEq at 11/04/24 1200    prochlorperazine (COMPAZINE) injection 5 mg  5 mg Intravenous Q6H PRN Clarence Bone MD        Or    prochlorperazine (COMPAZINE) tablet 5 mg  5 mg Oral Q6H PRN Clarence Bone MD        Reason beta blocker order not selected   Does not apply DOES NOT GO TO Clarence Olguin MD        sodium chloride (PF) 0.9% PF flush 3 mL  3 mL Intracatheter q1 min prn Clarence Bone MD        sodium chloride 0.9% BOLUS 1,000 mL  1,000 mL CRRT Q1H PRN Bhanu Ritchie MD        sodium chloride 0.9% BOLUS 1-250 mL  1-250 mL Intravenous Q1H PRN Carol Enrique MD        sodium chloride 0.9% BOLUS 1-250 mL  1-250 mL Intravenous Q1H PRN Miguel Floyd MD        sodium phosphate 15 mmol in NS 250mL intermittent infusion  15 mmol Intravenous Q8H PRN Farhad Boland MD   15 mmol at 11/11/24 1121         Physical Exam  Vitals were reviewed  Blood pressure (!) 88/59, pulse 101, temperature 97.2  F (36.2  C), temperature source Temporal, resp. rate 16, height 1.778 m (5' 10\"), weight 102.1 kg (225 lb 1.4 oz), SpO2 " 91%.  Rhythm: intermittent NSR and atrial fibrillation    Lungs: diminished bases     Cardiovascular: irregular rate/rhythm, no m/r/g    Abdomen: soft, NT, ND, +BS    Extremeties: 3+ BLE/BUE edema    Incision: CDI    CT: pleural tubes serosang output 1.7L<840<1.1<860<750<975<1580 mL, no air leak    Weight:   Vitals:    11/07/24 0200 11/08/24 0430 11/09/24 0630 11/10/24 0231   Weight: 101 kg (222 lb 10.6 oz) 102.6 kg (226 lb 3.1 oz) 100 kg (220 lb 7.4 oz) 100.2 kg (220 lb 14.4 oz)    11/11/24 0600   Weight: 102.1 kg (225 lb 1.4 oz)         Data  Recent Labs   Lab 11/11/24  0840 11/11/24  0637 11/11/24  0636 11/11/24  0046 11/10/24  2221 11/10/24  2115 11/10/24  1715 11/10/24  1602 11/10/24  1406 11/10/24  0730 11/10/24  0459 11/09/24  0750 11/09/24  0427   WBC  --  42.3*  --   --   --   --  46.0*  --   --   --  50.4*   < > 37.8*   HGB  --  8.8*  --   --   --   --  8.8*  --   --   --  9.2*   < > 7.0*   MCV  --  87  --   --   --   --  85  --   --   --  85   < > 87   PLT  --  285  --   --   --   --  258  --   --   --  288   < > 236   INR  --  1.68*  --   --   --   --   --   --   --   --  1.58*  --  1.58*   NA  --  137  --   --  137  --   --   --  139  --  137   < > 135   POTASSIUM  --  3.9  --   --  3.6  --   --   --  3.8  --  3.9   < > 3.6   CHLORIDE  --  101  --   --  102  --   --   --  104  --  102   < > 101   CO2  --  23  --   --  23  --   --   --  23  --  23   < > 20*   BUN  --  37.5*  --   --  34.3*  --   --   --  33.5*  --  32.9*   < > 31.2*   CR  --  1.55*  --   --  1.43*  --   --   --  1.40*  --  1.46*   < > 1.47*   ANIONGAP  --  13  --   --  12  --   --   --  12  --  12   < > 14   TATE  --  9.5  --   --  9.8  --   --   --  9.7  --  9.7   < > 9.7   GLC 93 135* 118*   < > 135*   < >  --    < > 145*   < > 134*   < > 147*   ALBUMIN  --  4.0  --   --  4.1  --   --   --  4.0  --  3.8   < > 3.8   PROTTOTAL  --  5.8*  --   --  5.9*  --   --   --  5.8*  --  5.7*   < > 5.3*   BILITOTAL  --  1.5*  --   --  1.7*  --   --    --  1.6*  --  1.6*   < > 1.4*   ALKPHOS  --  142  --   --  129  --   --   --  127  --  129   < > 115   ALT  --  63  --   --  60  --   --   --  62  --  66   < > 82*   AST  --  56*  --   --  53*  --   --   --  52*  --  46*   < > 41    < > = values in this interval not displayed.       Imaging:  No results found for this or any previous visit (from the past 24 hours).            Patient seen and discussed with Dr. Lisa Nguyen PA-C  Cardiothoracic Surgery  Available for paging [phone] (personal pager or CV Surgery Rounding Pager)  Personal Pager: [phone]  CV Surgery Rounding Pager: [phone]  After hours please page surgeon on-call

## 2024-11-11 NOTE — PROCEDURES
Procedure: Left radial arterial line placement   Patient: Alessio Teixeira   MRN: [id number]   Date: 11/11/24      Indication: Hypotension, shock, vascular access, blood pressure monitoring      EBL: 4 ml      Local anesthesia: 1% lidocaine, 10 ml      The patient's wife consented to the procedure.      The right wrist was prepped and draped in the usual sterile fashion. The radial artery was visualized with ultrasound. A needle was used to access the artery. The wire would not advance through the needle, though. This target was then aborted. The left side was then prepped and draped in the usual sterile fashion. The left radial artery had a previous arterial line in place. It was drawing, but not transducing. I attempted to re-wire this line but was not successful. The catheter was removed. The left radial artery was visualized with ultrasound and accessed with a needle. A wire was advanced into the artery and the needle was removed. The catheter was advanced over the wire and the wire was removed. There was good pulsatile blood flow. The catheter was hooked up to the transducer and there was an appropriate waveform. This was sutured in place and dressed with a Biopatch and Tegaderm.      The patient tolerated the procedure      Carla Dubois MD   SICU Fellow

## 2024-11-11 NOTE — PROGRESS NOTES
ICU Progress Note   Date of Service: 11/11/24     Assessment and Plan:  88 year old M with a history of severe 3 vessel CAD. Status post sternotomy, CABGx3, modified MAZE procedure, PAYAM ligation with Dr. Mulvihill 10/28/24. Post operative hemorrhage with RTOR on POD0 for evacuation of mediastinal blood clot causing tamponade physiology. Post operative course complicated by kidney failure requiring CRRT and persistent cardiogenic/vasoplegic shock.     Interval events:   POD14  No acute events   Did dump 400 ml serosanguinous fluid from pleural tube   Remains on vasopressin 2, levophed 0.04, epinephrine 0.06   Arterial line not functioning     Today:   Replace arterial line   Remove thermaguard   Systolic goal >100, wean pressors as able   Speech evaluation   PT/OT   Discuss midodrine with CVTS    Neuro:  Intermittently confused   But alert and oriented x3 this morning. Following commands in all 4 extremities.     Pain control: PRN tylenol, robaxin, oxycodone     CV:  #Hx of atrial fibrillation and distant PE on Eliquis   #Hx NSTEMI   #Severe multivessel CAD   Status post sternotomy, CABGx3, modified MAZE procedure, PAYAM ligation with Dr. Mulvihill 10/28/24. Post operative hemorrhage with RTOR on POD0 for evacuation of mediastinal blood clot causing tamponade physiology. Postoperative course has been tenuous with mixed cardiogenic, vasoplegic shock and renal failure on CRRT.     Dobutamine stopped 10/9  Will make systolic goal >100 today in an effort to wean pressors.     Pre-operative workup:   Angiogram showed an occluded RCA, ost LAD to mid LAD of 80% and mid circ to distal circ with 99%. Echocardiogram showed an EF of 35-40%. The RV systolic function was normal. There was also mild mitral regurg.    - Echo 10/30/24: Left ventricle is normal in size. Biplane LVEF 35%. There is moderate global hypokinesia of the left ventricle. Limited view of the RV.     Plan:   Systolic >100, wean pressors as able.   Cardiac meds  "per CVTS  Aspirin 162 mg   PTA atorvastatin - resume per CVTS   PTA torsemide - hold with hypotension     Pulm:  # Acute hypoxemic respiratory failure  Exubated pod9    CXR pending     GI:  #Hx upper GIB, gastritis   Recent admission discharged 9/16/2024. Endoscopy revealing gastritis.   PTA pantoprazole     #Shock liver   Improved     AST 56  ALT 63     NJ feeding tube - feeds at goal   Bowel regimen. Adding suppository today.     Renal:  Pre operative cr 1.8   Now 1.55   Anuric kidney failure, nephrology following. Remains anuric.   CRRT started 10/30/24. Continue.     #Acute kidney injury   #Hypophosphatemia   Replace   Nephrology consult   CRRT     ID:  WBC not reliable with CLL, 42.3   Afebrile (but on CRRT)   Not on antibiotics     Cultures:   Urine 10/30/24 no growth   Sputum 10/31/24 ordered. Will obtain today.   Blood 10/29/24 NGTD   Blood 10/31/24 NGTD   Respiratory cultures 11/1/24 - mixed paddy    Blood cultures 11/9/2024 - NGTD  Catheter tip [place]in 11/10/24 - NGTD    Heme:  Holding ppx - will discuss with CVTS   SCDs  Hgb 8.8   Plt 285    #Hx CLL   Follows with Dr. Raymundo hematology   Follow leukocytosis   WBC 42    MSK:   #Hx Myasthenia gravis     Endo:  No history of DM   A1C 4.5   Discontinue insulin  Daily glc checks     PPx:  1. DVT ppx: heparin    2. VAP: HOB 30 degrees, chlorhexidine rinse  3. Stress Ulcer: PPI  4. Restraints: Nonviolent soft two point restraints required and necessary for patient safety and continued cares and good effect as patient continues to pull at necessary lines, tubes despite education and distraction. Will readdress daily.   5. Wound care - per unit routine   6. Feeding - tube feeds, speech consult today.   7. Family updated at the bedside.     Dispo: ICU    BP (!) 88/59   Pulse 86   Temp 97.8  F (36.6  C) (Axillary)   Resp 16   Ht 1.778 m (5' 10\")   Wt 102.1 kg (225 lb 1.4 oz)   SpO2 96%   BMI 32.30 kg/m      Resp: 16      I/Os  +12,500     Physical " Exam:  In bed   Sedated, intubated   Pupils 2 mm equal and reactive to light   Chest rise equal bilaterally   CT with thin bloody output, no air leak, to suction   Amos with very little dark urine in bag  Abdomen soft, ND   Feet cool to the touch. Bilateral PT and DP with multiphasic doppler signals.   Lower extremities 2+  edema   BUE hands cool to the touch - signs of distal poor perfusion with dusky fingers in both hands. 2+ pitting edema bilaterally.     Labs: reviewed  All labs reviewed   Improved lactic acidosis, LFTs  PH 7.44   WBC 45.5   Hgb 8.9   Plt 273    Imaging: reviewed  AM CXR reviewed   Advance ETT 2.0 cm     Past Medical/Surgical history   Atrial fibrillation   Gout   GERD   Gastritis   Bilateral PE in 2007   Myasthenia gravis   HTN   Meningioma   MARTHA   Spinal stenosis     Family history   Not discussed today     Social history   Not discussed today     Carla Dubois MD   SICU Fellow

## 2024-11-11 NOTE — PROGRESS NOTES
"   11/11/24 1148   Appointment Info   Signing Clinician's Name / Credentials (PT) ROULA Sweeney   Student Supervision Direct Patient Contact Provided;Line of sight supervision provided;Direct supervision provided;On-site supervision provided   Living Environment   People in Home spouse   Current Living Arrangements house   Home Accessibility stairs to enter home;stairs within home   Number of Stairs, Main Entrance 3   Number of Stairs, Within Home, Primary ten   Transportation Anticipated family or friend will provide   Living Environment Comments Pt at home w/spouse, 3 ENA, full flight of stairs down to basement, does not need to access basement. Pt has not been driving lately per spouse.   Self-Care   Usual Activity Tolerance good   Current Activity Tolerance poor   Regular Exercise Other (see comments)   Equipment Currently Used at Home walker, standard   Fall history within last six months no   Activity/Exercise/Self-Care Comment Per spouse, pt began using FWW after previous hospitalization. Spouse encourages pt to go for walks, but does not get out too often. Pt hobbies include opera, model trains, and reading.   General Information   Onset of Illness/Injury or Date of Surgery 10/28/24   Referring Physician Clarence Bone MD   Patient/Family Therapy Goals Statement (PT) \"To return home\"   Pertinent History of Current Problem (include personal factors and/or comorbidities that impact the POC) Per chart: \"Pt is a 88 year old M with a history of severe 3 vessel CAD. Status post sternotomy, CABGx3, modified MAZE procedure, PAYAM ligation on 10/28/24. Post operative hemorrhage with RTOR on POD0 for evacuation of mediastinal blood clot causing tamponade physiology. Course subsequently complicated by persistent shock (appears mixed cardigenic/vasoplegic), and ongoing respiratory failure.\" Extubated and on POD POD #8. On room air at time of PT/CR evaluation.   Existing Precautions/Restrictions " fall;cardiac;sternal   General Observations Pt with CRRT running during session. Pt is very [place], has HA for the R ear in place at time of eval.   Cognition   Affect/Mental Status (Cognition) confused;low arousal/lethargic   Orientation Status (Cognition) oriented to;person;place   Follows Commands (Cognition) follows one-step commands;75-90% accuracy;increased processing time needed;physical/tactile prompts required;verbal cues/prompting required   Safety Deficit (Cognition) minimal deficit;ability to follow commands;insight into deficits/self-awareness;safety precautions awareness   Pain Assessment   Patient Currently in Pain Yes, see Vital Sign flowsheet   Integumentary/Edema   Integumentary/Edema Comments Blanching erythema to B toes, B calves in edema wear size M. Blanchable skin proximal to B knees.   Posture    Posture Forward head position;Kyphosis   Range of Motion (ROM)   ROM Comment Edema limits functional ROM, AAROM at B LEs WFL. AROM WFL at UEs with sternal precautions.   Strength (Manual Muscle Testing)   Strength (Manual Muscle Testing) Able to perform R SLR;Able to perform L SLR   Strength Comments BLE weakness, LE MMT: L ankle df 3+/5, L knee ext/flex 3+/5. R ankle df 3/5, R knee ext/flex 3+/5.   Bed Mobility   Comment, (Bed Mobility) Dependent via lift per RN. Pt up in recliner at time of PT/CR evaluation.   Balance   Balance Comments Pt able to shift weight fwd with Mod A, side to side in sitting with independence to reposition/center.   Sensory Examination   Sensory Perception patient reports no sensory changes   Sensory Perception Comments Light touch sensation intact to BLE, pt with some delayed/unclear responses   Coordination   Coordination Comments Pt able to tap toes alternating, alternating knee ext   Muscle Tone   Muscle Tone Comments Minimal muscle bulk, atrophy from illness, age, disuse   Clinical Impression   Criteria for Skilled Therapeutic Intervention Yes, treatment indicated   PT  Diagnosis (PT) Impaired functional activity tolerance   Influenced by the following impairments Post surgical fatigue, weakness especially LE weakness, edema, respiratory status, impaired posture and sternal precautions.   Functional limitations due to impairments Impaired functional mobility   Clinical Presentation (PT Evaluation Complexity) stable   Clinical Presentation Rationale See MR   Clinical Decision Making (Complexity) low complexity   Planned Therapy Interventions (PT) balance training;bed mobility training;gait training;home exercise program;neuromuscular re-education;postural re-education;patient/family education;ROM (range of motion);stair training;strengthening;stretching;transfer training;progressive activity/exercise;risk factor education;home program guidelines   Risk & Benefits of therapy have been explained evaluation/treatment results reviewed;care plan/treatment goals reviewed;risks/benefits reviewed;current/potential barriers reviewed;participants voiced agreement with care plan;participants included;patient;spouse/significant other   PT Total Evaluation Time   PT Eval, Low Complexity Minutes (02393) 15   Physical Therapy Goals   PT Frequency Daily   PT Predicted Duration/Target Date for Goal Attainment 11/20/24   PT Goals Bed Mobility;Transfers;Gait;Stairs;Cardiac Phase 1   PT: Bed Mobility Independent;Supine to/from sit;Rolling;Bridging  (sternal precautions)   PT: Transfers Modified independent;Sit to/from stand;Bed to/from chair;Assistive device;Within precautions  (sternal precautions)   PT: Understanding of cardiac education to maximize quality of life, condition management, and health outcomes Patient;Caregiver;Verbalize;Demonstrate   PT: Perform aerobic activity with stable cardiovascular response intermittent;10 minutes;ambulation   PT: Functional/aerobic ambulation tolerance with stable cardiovascular response in order to return to home and community environment Standard  walker;Within precautions;100 feet;Assistive device;Supervision/SBA  (sternal precautions, FWW)   PT: Navigation of stairs simulating home set up with stable cardiovascular response in order to return to home and community environment Supervision/SBA;Rail on both sides;10 stairs   Interventions   Interventions Quick Adds Therapeutic Activity;Therapeutic Procedure;Cardiac Rehab   Therapeutic Procedure/Exercise   Ther. Procedure: strength, endurance, ROM, flexibillity Minutes (96342) 9   Symptoms Noted During/After Treatment increased pain   Treatment Detail/Skilled Intervention In chair, seated marching 20x each leg, knee ext 10x each leg, ankle pumps 10x each leg. Strong verbal and tactile cues needed to initiate exercises. Seated calf raise 5x each leg, facilitation and verbal cueing needed to initiate movment. Edu on use of the LE AAROM for circulation and reduced stiffness.   Therapeutic Activity   Therapeutic Activities: dynamic activities to improve functional performance Minutes (23952) 10   Treatment Detail/Skilled Intervention Pt approached up in chair, spouse Claudia in room, RN in room. Increased time for line management, equipment gathering, skilled VS monitoring. See VSFS for details. Note pt running CRRT at time of session. Lines too short to trial a safe stand from the recliner. Pt cued to scoot hips back, pt initaites trunk movement and presses throughout BLE, unable to scoot on own. Pt repositioned in chair, scoot back, totalAx2 using bedsheet. Pillows placed for improvedl lumbar support and midline orientation. UEs elevated on pillows, edu on keeping from dependent position for improved circulation, reduced edema. End of session, pt up in chair, RN and wife in room, vitals stable.   PT Discharge Planning   PT Plan Sitting tolerance, STS from EOB SS?   PT Discharge Recommendation (DC Rec) Transitional Care Facility   PT Rationale for DC Rec Pt is currently well below baseline level of independence. Pt  is typically mod I with activity using FWW since previous hospitalization. Pt would benefit from skilled rehab services and TCU stay to increase functional mobility and activity tolerance prior to returning home.   PT Brief overview of current status Dependent with all mobility. Goals of therapy will be to address safe mobility and make recs for d/c to next level of care. Pt and RN will continue to follow all falls risk precautions as documented by RN staff while hospitalized.   Physical Therapy Time and Intention   Timed Code Treatment Minutes 19   Total Session Time (sum of timed and untimed services) 34

## 2024-11-11 NOTE — PLAN OF CARE
Goal Outcome Evaluation:      Plan of Care Reviewed With: spouse, patient    Overall Patient Progress: improvingOverall Patient Progress: improving    Outcome Evaluation: Drowsy, but arouses. CAM negative. Calm and cooperative. Follow commands. LS clear. A fib. On low dose epi and levo to keep SBP higher than 100. Anuric. On CRRT, tolerating good. On TF via NJ. No BM this shift. Passing flatus. Afebrile. Up to chair with A/2 and lift. Will continue to monitor and support.      Problem: Cardiovascular Surgery  Goal: Improved Activity Tolerance  Outcome: Progressing  Intervention: Optimize Tolerance for Activity  Recent Flowsheet Documentation  Taken 11/11/2024 0800 by Mia Calle RN  Environmental Support: calm environment promoted  Goal: Optimal Coping with Heart Surgery  Outcome: Progressing  Intervention: Support Psychosocial Response to Surgery  Recent Flowsheet Documentation  Taken 11/11/2024 0800 by Mia Calle RN  Supportive Measures:   positive reinforcement provided   relaxation techniques promoted  Family/Support System Care: support provided  Goal: Absence of Bleeding  Intervention: Monitor and Manage Bleeding  Recent Flowsheet Documentation  Taken 11/11/2024 0800 by Mia Calle RN  Bleeding Management: dressing monitored  Goal: Effective Bowel Elimination  Intervention: Enhance Bowel Motility and Elimination  Recent Flowsheet Documentation  Taken 11/11/2024 0800 by Mia Calle RN  Bowel Elimination Management:   relaxation techniques promoted   suppository given  Goal: Effective Cardiac Function  Outcome: Progressing  Goal: Optimal Cerebral Tissue Perfusion  Intervention: Protect and Optimize Cerebral Perfusion  Recent Flowsheet Documentation  Taken 11/11/2024 1400 by Mia Calle RN  Head of Bed (HOB) Positioning: HOB at 30 degrees  Taken 11/11/2024 1000 by Mia Calle RN  Head of Bed (HOB) Positioning: HOB at 30 degrees  Taken 11/11/2024 0800 by Mia Calle RN  Sensory Stimulation  Regulation: care clustered  Cerebral Perfusion Promotion: blood pressure monitored  Glycemic Management: blood glucose monitored  Goal: Fluid and Electrolyte Balance  Intervention: Monitor and Manage Fluid and Electrolyte Balance  Recent Flowsheet Documentation  Taken 11/11/2024 0800 by Mia Calle RN  Fluid/Electrolyte Management: fluids provided  Goal: Blood Glucose Level Within Targeted Range  Intervention: Optimize Glycemic Control  Recent Flowsheet Documentation  Taken 11/11/2024 0800 by Mia Calle RN  Glycemic Management: blood glucose monitored  Goal: Absence of Infection Signs and Symptoms  Outcome: Progressing  Intervention: Prevent or Manage Infection  Recent Flowsheet Documentation  Taken 11/11/2024 0800 by Mia Calle RN  Infection Prevention: rest/sleep promoted  Goal: Anesthesia/Sedation Recovery  Intervention: Optimize Anesthesia Recovery  Recent Flowsheet Documentation  Taken 11/11/2024 0800 by Mia Calle RN  Safety Promotion/Fall Prevention:   activity supervised   clutter free environment maintained   increased rounding and observation   increase visualization of patient   lighting adjusted   nonskid shoes/slippers when out of bed   patient and family education   safety round/check completed   room organization consistent   supervised activity  Administration (IS): unable to perform  Reorientation Measures:   reorientation provided   calendar in view   clock in view   hearing device use encouraged  Goal: Effective Urinary Elimination  Outcome: Progressing  Goal: Effective Oxygenation and Ventilation  Intervention: Promote Airway Secretion Clearance  Recent Flowsheet Documentation  Taken 11/11/2024 0800 by Mia Calle RN  Administration (IS): unable to perform  Cough And Deep Breathing: done with encouragement  Airway/Ventilation Management:   airway patency maintained   calming measures promoted   pulmonary hygiene promoted   position adjusted  Intervention: Optimize Oxygenation  and Ventilation  Recent Flowsheet Documentation  Taken 11/11/2024 0800 by Mia Calle RN  Chest Tube Safety: all connections secured  Goal: Absence of Bleeding  Outcome: Progressing  Intervention: Monitor and Manage Bleeding  Recent Flowsheet Documentation  Taken 11/11/2024 0800 by Mia Calle RN  Bleeding Management: dressing monitored  Goal: Effective Bowel Elimination  Outcome: Progressing  Intervention: Enhance Bowel Motility and Elimination  Recent Flowsheet Documentation  Taken 11/11/2024 0800 by Mia Calle RN  Bowel Elimination Management:   relaxation techniques promoted   suppository given  Goal: Optimal Cerebral Tissue Perfusion  Outcome: Progressing  Intervention: Protect and Optimize Cerebral Perfusion  Recent Flowsheet Documentation  Taken 11/11/2024 1400 by Mia Calle RN  Head of Bed (HOB) Positioning: HOB at 30 degrees  Taken 11/11/2024 1000 by Mia Calle RN  Head of Bed (HOB) Positioning: HOB at 30 degrees  Taken 11/11/2024 0800 by Mia Calle RN  Sensory Stimulation Regulation: care clustered  Cerebral Perfusion Promotion: blood pressure monitored  Glycemic Management: blood glucose monitored  Goal: Fluid and Electrolyte Balance  Outcome: Progressing  Intervention: Monitor and Manage Fluid and Electrolyte Balance  Recent Flowsheet Documentation  Taken 11/11/2024 0800 by Mia Calle RN  Fluid/Electrolyte Management: fluids provided  Goal: Blood Glucose Level Within Targeted Range  Outcome: Progressing  Intervention: Optimize Glycemic Control  Recent Flowsheet Documentation  Taken 11/11/2024 0800 by Mia Calle RN  Glycemic Management: blood glucose monitored  Goal: Anesthesia/Sedation Recovery  Outcome: Progressing  Intervention: Optimize Anesthesia Recovery  Recent Flowsheet Documentation  Taken 11/11/2024 0800 by Mia Calle RN  Safety Promotion/Fall Prevention:   activity supervised   clutter free environment maintained   increased rounding and observation   increase  visualization of patient   lighting adjusted   nonskid shoes/slippers when out of bed   patient and family education   safety round/check completed   room organization consistent   supervised activity  Administration (IS): unable to perform  Reorientation Measures:   reorientation provided   calendar in view   clock in view   hearing device use encouraged  Goal: Acceptable Pain Control  Outcome: Progressing  Goal: Nausea and Vomiting Relief  Outcome: Progressing  Goal: Effective Oxygenation and Ventilation  Outcome: Progressing  Intervention: Promote Airway Secretion Clearance  Recent Flowsheet Documentation  Taken 11/11/2024 0800 by Mia Calle, RN  Administration (IS): unable to perform  Cough And Deep Breathing: done with encouragement  Airway/Ventilation Management:   airway patency maintained   calming measures promoted   pulmonary hygiene promoted   position adjusted  Intervention: Optimize Oxygenation and Ventilation  Recent Flowsheet Documentation  Taken 11/11/2024 0800 by Mia Calle, RN  Chest Tube Safety: all connections secured

## 2024-11-11 NOTE — PROGRESS NOTES
CV  Pressors (which pressors and any increase/decrease in pressor needs):   Epi 0.06 mcg/kg/min. Levo 0.04 mcg/kg/min. Vaso 2 u/hr.  HR range: 80s-100s, Afib  Chest tube output: Minimal until turn after bath, 400mL Output- MD aware. Hgb 8.8     Neuro  Orientation: Alert, confusion ongoing w/ disorientation to time, situation at times, forgetful.  Delirium present?(y/n): Yes  Sleep: 1-2hrs  Pain: Pain tolerable per pt.     GI/  BM? (y/n): No but is now Passing gas  Urine output: CRRT      Lines: R) subclavian TLC, R) Groin Icy cath w/ TLC, L) subcalvian HD cath, L) Radial Art line.

## 2024-11-12 ENCOUNTER — APPOINTMENT (OUTPATIENT)
Dept: PHYSICAL THERAPY | Facility: CLINIC | Age: 89
DRG: 233 | End: 2024-11-12
Attending: STUDENT IN AN ORGANIZED HEALTH CARE EDUCATION/TRAINING PROGRAM
Payer: MEDICARE

## 2024-11-12 ENCOUNTER — APPOINTMENT (OUTPATIENT)
Dept: SPEECH THERAPY | Facility: CLINIC | Age: 89
DRG: 233 | End: 2024-11-12
Attending: STUDENT IN AN ORGANIZED HEALTH CARE EDUCATION/TRAINING PROGRAM
Payer: MEDICARE

## 2024-11-12 ENCOUNTER — APPOINTMENT (OUTPATIENT)
Dept: OCCUPATIONAL THERAPY | Facility: CLINIC | Age: 89
DRG: 233 | End: 2024-11-12
Attending: STUDENT IN AN ORGANIZED HEALTH CARE EDUCATION/TRAINING PROGRAM
Payer: MEDICARE

## 2024-11-12 ENCOUNTER — APPOINTMENT (OUTPATIENT)
Dept: GENERAL RADIOLOGY | Facility: CLINIC | Age: 89
DRG: 233 | End: 2024-11-12
Attending: INTERNAL MEDICINE
Payer: MEDICARE

## 2024-11-12 LAB
ALBUMIN SERPL BCG-MCNC: 3.7 G/DL (ref 3.5–5.2)
ALBUMIN SERPL BCG-MCNC: 3.8 G/DL (ref 3.5–5.2)
ALBUMIN SERPL BCG-MCNC: 3.8 G/DL (ref 3.5–5.2)
ALBUMIN UR-MCNC: ABNORMAL G/DL
ALLEN'S TEST: ABNORMAL
ALLEN'S TEST: ABNORMAL
ALP SERPL-CCNC: 139 U/L (ref 40–150)
ALP SERPL-CCNC: 143 U/L (ref 40–150)
ALP SERPL-CCNC: 144 U/L (ref 40–150)
ALT SERPL W P-5'-P-CCNC: 53 U/L (ref 0–70)
ALT SERPL W P-5'-P-CCNC: 57 U/L (ref 0–70)
ALT SERPL W P-5'-P-CCNC: 58 U/L (ref 0–70)
ANION GAP SERPL CALCULATED.3IONS-SCNC: 11 MMOL/L (ref 7–15)
ANION GAP SERPL CALCULATED.3IONS-SCNC: 12 MMOL/L (ref 7–15)
ANION GAP SERPL CALCULATED.3IONS-SCNC: 13 MMOL/L (ref 7–15)
APPEARANCE UR: ABNORMAL
AST SERPL W P-5'-P-CCNC: 54 U/L (ref 0–45)
AST SERPL W P-5'-P-CCNC: 65 U/L (ref 0–45)
AST SERPL W P-5'-P-CCNC: 82 U/L (ref 0–45)
BACTERIA #/AREA URNS HPF: ABNORMAL /[HPF]
BACTERIA SPEC CULT: NO GROWTH
BASE EXCESS BLDA CALC-SCNC: 0.7 MMOL/L (ref -3–3)
BASE EXCESS BLDA CALC-SCNC: 1.7 MMOL/L (ref -3–3)
BILIRUB SERPL-MCNC: 1.4 MG/DL
BILIRUB SERPL-MCNC: 1.4 MG/DL
BILIRUB SERPL-MCNC: 1.5 MG/DL
BILIRUB UR QL STRIP: ABNORMAL
BUN SERPL-MCNC: 33.5 MG/DL (ref 8–23)
BUN SERPL-MCNC: 35.1 MG/DL (ref 8–23)
BUN SERPL-MCNC: 35.5 MG/DL (ref 8–23)
CA-I BLD-MCNC: 4.4 MG/DL (ref 4.4–5.2)
CA-I BLD-MCNC: 4.7 MG/DL (ref 4.4–5.2)
CA-I BLD-MCNC: 4.9 MG/DL (ref 4.4–5.2)
CALCIUM SERPL-MCNC: 8.5 MG/DL (ref 8.8–10.4)
CALCIUM SERPL-MCNC: 8.8 MG/DL (ref 8.8–10.4)
CALCIUM SERPL-MCNC: 9.3 MG/DL (ref 8.8–10.4)
CHLORIDE SERPL-SCNC: 103 MMOL/L (ref 98–107)
CHLORIDE SERPL-SCNC: 104 MMOL/L (ref 98–107)
CHLORIDE SERPL-SCNC: 105 MMOL/L (ref 98–107)
COHGB MFR BLD: 90.2 % (ref 95–96)
COHGB MFR BLD: 98.2 % (ref 95–96)
COLOR UR AUTO: ABNORMAL
CREAT SERPL-MCNC: 1.31 MG/DL (ref 0.67–1.17)
CREAT SERPL-MCNC: 1.39 MG/DL (ref 0.67–1.17)
CREAT SERPL-MCNC: 1.41 MG/DL (ref 0.67–1.17)
EGFRCR SERPLBLD CKD-EPI 2021: 48 ML/MIN/1.73M2
EGFRCR SERPLBLD CKD-EPI 2021: 49 ML/MIN/1.73M2
EGFRCR SERPLBLD CKD-EPI 2021: 53 ML/MIN/1.73M2
ERYTHROCYTE [DISTWIDTH] IN BLOOD BY AUTOMATED COUNT: 19.4 % (ref 10–15)
ERYTHROCYTE [DISTWIDTH] IN BLOOD BY AUTOMATED COUNT: 19.5 % (ref 10–15)
GLUCOSE BLDC GLUCOMTR-MCNC: 117 MG/DL (ref 70–99)
GLUCOSE BLDC GLUCOMTR-MCNC: 124 MG/DL (ref 70–99)
GLUCOSE SERPL-MCNC: 125 MG/DL (ref 70–99)
GLUCOSE SERPL-MCNC: 127 MG/DL (ref 70–99)
GLUCOSE SERPL-MCNC: 134 MG/DL (ref 70–99)
GLUCOSE SERPL-MCNC: 134 MG/DL (ref 70–99)
GLUCOSE UR STRIP-MCNC: ABNORMAL MG/DL
GRAM STAIN RESULT: NORMAL
GRAM STAIN RESULT: NORMAL
HCO3 BLD-SCNC: 25 MMOL/L (ref 21–28)
HCO3 BLD-SCNC: 25 MMOL/L (ref 21–28)
HCO3 SERPL-SCNC: 22 MMOL/L (ref 22–29)
HCO3 SERPL-SCNC: 23 MMOL/L (ref 22–29)
HCO3 SERPL-SCNC: 23 MMOL/L (ref 22–29)
HCT VFR BLD AUTO: 28.5 % (ref 40–53)
HCT VFR BLD AUTO: 28.7 % (ref 40–53)
HGB BLD-MCNC: 9 G/DL (ref 13.3–17.7)
HGB BLD-MCNC: 9.2 G/DL (ref 13.3–17.7)
HGB UR QL STRIP: ABNORMAL
INR PPP: 1.68 (ref 0.85–1.15)
KETONES UR STRIP-MCNC: ABNORMAL MG/DL
LACTATE SERPL-SCNC: 1.8 MMOL/L (ref 0.7–2)
LACTATE SERPL-SCNC: 2.2 MMOL/L (ref 0.7–2)
LEUKOCYTE ESTERASE UR QL STRIP: ABNORMAL
MAGNESIUM SERPL-MCNC: 2.1 MG/DL (ref 1.7–2.3)
MAGNESIUM SERPL-MCNC: 2.1 MG/DL (ref 1.7–2.3)
MAGNESIUM SERPL-MCNC: 2.2 MG/DL (ref 1.7–2.3)
MCH RBC QN AUTO: 27.3 PG (ref 26.5–33)
MCH RBC QN AUTO: 27.5 PG (ref 26.5–33)
MCHC RBC AUTO-ENTMCNC: 31.6 G/DL (ref 31.5–36.5)
MCHC RBC AUTO-ENTMCNC: 32.1 G/DL (ref 31.5–36.5)
MCV RBC AUTO: 86 FL (ref 78–100)
MCV RBC AUTO: 86 FL (ref 78–100)
NITRATE UR QL: ABNORMAL
O2/TOTAL GAS SETTING VFR VENT: 21 %
O2/TOTAL GAS SETTING VFR VENT: 40 %
PCO2 BLD: 34 MM HG (ref 35–45)
PCO2 BLD: 36 MM HG (ref 35–45)
PH BLD: 7.45 [PH] (ref 7.35–7.45)
PH BLD: 7.48 [PH] (ref 7.35–7.45)
PH UR STRIP: ABNORMAL [PH]
PHOSPHATE SERPL-MCNC: 2.1 MG/DL (ref 2.5–4.5)
PLATELET # BLD AUTO: 270 10E3/UL (ref 150–450)
PLATELET # BLD AUTO: 321 10E3/UL (ref 150–450)
PO2 BLD: 52 MM HG (ref 80–105)
PO2 BLD: 87 MM HG (ref 80–105)
POTASSIUM SERPL-SCNC: 3.9 MMOL/L (ref 3.4–5.3)
POTASSIUM SERPL-SCNC: 4 MMOL/L (ref 3.4–5.3)
POTASSIUM SERPL-SCNC: 4.4 MMOL/L (ref 3.4–5.3)
PROT SERPL-MCNC: 5.4 G/DL (ref 6.4–8.3)
PROT SERPL-MCNC: 5.5 G/DL (ref 6.4–8.3)
PROT SERPL-MCNC: 5.6 G/DL (ref 6.4–8.3)
RBC # BLD AUTO: 3.3 10E6/UL (ref 4.4–5.9)
RBC # BLD AUTO: 3.34 10E6/UL (ref 4.4–5.9)
RBC URINE: ABNORMAL
SAO2 % BLDA: 87 % (ref 92–100)
SAO2 % BLDA: 96 % (ref 92–100)
SODIUM SERPL-SCNC: 138 MMOL/L (ref 135–145)
SODIUM SERPL-SCNC: 139 MMOL/L (ref 135–145)
SODIUM SERPL-SCNC: 139 MMOL/L (ref 135–145)
SP GR UR STRIP: 1.03 (ref 1–1.03)
UFH PPP CHRO-ACNC: <0.1 IU/ML
UROBILINOGEN UR STRIP-MCNC: ABNORMAL MG/DL
WBC # BLD AUTO: 42.6 10E3/UL (ref 4–11)
WBC # BLD AUTO: 45 10E3/UL (ref 4–11)
WBC URINE: ABNORMAL

## 2024-11-12 PROCEDURE — 250N000013 HC RX MED GY IP 250 OP 250 PS 637: Performed by: PHYSICIAN ASSISTANT

## 2024-11-12 PROCEDURE — 71045 X-RAY EXAM CHEST 1 VIEW: CPT

## 2024-11-12 PROCEDURE — 250N000009 HC RX 250: Performed by: INTERNAL MEDICINE

## 2024-11-12 PROCEDURE — 84460 ALANINE AMINO (ALT) (SGPT): CPT | Performed by: PHYSICIAN ASSISTANT

## 2024-11-12 PROCEDURE — P9047 ALBUMIN (HUMAN), 25%, 50ML: HCPCS | Mod: JZ | Performed by: PHYSICIAN ASSISTANT

## 2024-11-12 PROCEDURE — 120N000004 HC R&B MS OVERFLOW

## 2024-11-12 PROCEDURE — 97530 THERAPEUTIC ACTIVITIES: CPT | Mod: GO

## 2024-11-12 PROCEDURE — 85610 PROTHROMBIN TIME: CPT | Performed by: PHYSICIAN ASSISTANT

## 2024-11-12 PROCEDURE — 92610 EVALUATE SWALLOWING FUNCTION: CPT | Mod: GN | Performed by: SPEECH-LANGUAGE PATHOLOGIST

## 2024-11-12 PROCEDURE — 82947 ASSAY GLUCOSE BLOOD QUANT: CPT | Performed by: STUDENT IN AN ORGANIZED HEALTH CARE EDUCATION/TRAINING PROGRAM

## 2024-11-12 PROCEDURE — 82310 ASSAY OF CALCIUM: CPT | Performed by: PHYSICIAN ASSISTANT

## 2024-11-12 PROCEDURE — G0463 HOSPITAL OUTPT CLINIC VISIT: HCPCS

## 2024-11-12 PROCEDURE — 84100 ASSAY OF PHOSPHORUS: CPT | Performed by: INTERNAL MEDICINE

## 2024-11-12 PROCEDURE — 85027 COMPLETE CBC AUTOMATED: CPT | Performed by: PHYSICIAN ASSISTANT

## 2024-11-12 PROCEDURE — 84155 ASSAY OF PROTEIN SERUM: CPT | Performed by: PHYSICIAN ASSISTANT

## 2024-11-12 PROCEDURE — 250N000011 HC RX IP 250 OP 636: Performed by: STUDENT IN AN ORGANIZED HEALTH CARE EDUCATION/TRAINING PROGRAM

## 2024-11-12 PROCEDURE — 83735 ASSAY OF MAGNESIUM: CPT | Performed by: PHYSICIAN ASSISTANT

## 2024-11-12 PROCEDURE — 250N000013 HC RX MED GY IP 250 OP 250 PS 637: Performed by: SURGERY

## 2024-11-12 PROCEDURE — 250N000011 HC RX IP 250 OP 636: Mod: JZ | Performed by: PHYSICIAN ASSISTANT

## 2024-11-12 PROCEDURE — 82247 BILIRUBIN TOTAL: CPT | Performed by: PHYSICIAN ASSISTANT

## 2024-11-12 PROCEDURE — 258N000003 HC RX IP 258 OP 636: Performed by: INTERNAL MEDICINE

## 2024-11-12 PROCEDURE — 258N000003 HC RX IP 258 OP 636: Performed by: STUDENT IN AN ORGANIZED HEALTH CARE EDUCATION/TRAINING PROGRAM

## 2024-11-12 PROCEDURE — 90947 DIALYSIS REPEATED EVAL: CPT

## 2024-11-12 PROCEDURE — 97110 THERAPEUTIC EXERCISES: CPT | Mod: GP | Performed by: PHYSICAL THERAPIST

## 2024-11-12 PROCEDURE — 250N000009 HC RX 250: Performed by: STUDENT IN AN ORGANIZED HEALTH CARE EDUCATION/TRAINING PROGRAM

## 2024-11-12 PROCEDURE — 97530 THERAPEUTIC ACTIVITIES: CPT | Mod: GP | Performed by: PHYSICAL THERAPIST

## 2024-11-12 PROCEDURE — 82330 ASSAY OF CALCIUM: CPT | Performed by: PHYSICIAN ASSISTANT

## 2024-11-12 PROCEDURE — 82805 BLOOD GASES W/O2 SATURATION: CPT | Performed by: INTERNAL MEDICINE

## 2024-11-12 PROCEDURE — 82805 BLOOD GASES W/O2 SATURATION: CPT | Performed by: STUDENT IN AN ORGANIZED HEALTH CARE EDUCATION/TRAINING PROGRAM

## 2024-11-12 PROCEDURE — 85520 HEPARIN ASSAY: CPT | Performed by: PHYSICIAN ASSISTANT

## 2024-11-12 PROCEDURE — 92526 ORAL FUNCTION THERAPY: CPT | Mod: GN | Performed by: SPEECH-LANGUAGE PATHOLOGIST

## 2024-11-12 PROCEDURE — 81003 URINALYSIS AUTO W/O SCOPE: CPT | Performed by: PHYSICIAN ASSISTANT

## 2024-11-12 PROCEDURE — 99232 SBSQ HOSP IP/OBS MODERATE 35: CPT | Performed by: INTERNAL MEDICINE

## 2024-11-12 PROCEDURE — 99291 CRITICAL CARE FIRST HOUR: CPT | Mod: 24 | Performed by: INTERNAL MEDICINE

## 2024-11-12 PROCEDURE — 83605 ASSAY OF LACTIC ACID: CPT | Performed by: PHYSICIAN ASSISTANT

## 2024-11-12 PROCEDURE — 250N000013 HC RX MED GY IP 250 OP 250 PS 637

## 2024-11-12 PROCEDURE — 258N000003 HC RX IP 258 OP 636: Performed by: PHYSICIAN ASSISTANT

## 2024-11-12 RX ORDER — HYDROMORPHONE HCL IN WATER/PF 6 MG/30 ML
0.2 PATIENT CONTROLLED ANALGESIA SYRINGE INTRAVENOUS
Status: DISCONTINUED | OUTPATIENT
Start: 2024-11-12 | End: 2024-11-15 | Stop reason: HOSPADM

## 2024-11-12 RX ORDER — CALCIUM CHLORIDE, MAGNESIUM CHLORIDE, SODIUM CHLORIDE, SODIUM BICARBONATE, POTASSIUM CHLORIDE AND SODIUM PHOSPHATE DIBASIC DIHYDRATE 3.68; 3.05; 6.34; 3.09; .314; .187 G/L; G/L; G/L; G/L; G/L; G/L
INJECTION INTRAVENOUS CONTINUOUS
Status: DISCONTINUED | OUTPATIENT
Start: 2024-11-12 | End: 2024-11-14

## 2024-11-12 RX ADMIN — CALCIUM CHLORIDE, MAGNESIUM CHLORIDE, SODIUM CHLORIDE, SODIUM BICARBONATE, POTASSIUM CHLORIDE AND SODIUM PHOSPHATE DIBASIC DIHYDRATE 5000 ML: 3.68; 3.05; 6.34; 3.09; .314; .187 INJECTION INTRAVENOUS at 12:11

## 2024-11-12 RX ADMIN — ASPIRIN 81 MG CHEWABLE TABLET 162 MG: 81 TABLET CHEWABLE at 08:15

## 2024-11-12 RX ADMIN — HYDROMORPHONE HYDROCHLORIDE 0.2 MG: 0.2 INJECTION, SOLUTION INTRAMUSCULAR; INTRAVENOUS; SUBCUTANEOUS at 10:41

## 2024-11-12 RX ADMIN — ALBUMIN HUMAN 25 G: 0.25 SOLUTION INTRAVENOUS at 08:15

## 2024-11-12 RX ADMIN — CALCIUM CHLORIDE, MAGNESIUM CHLORIDE, SODIUM CHLORIDE, SODIUM BICARBONATE, POTASSIUM CHLORIDE AND SODIUM PHOSPHATE DIBASIC DIHYDRATE: 3.68; 3.05; 6.34; 3.09; .314; .187 INJECTION INTRAVENOUS at 14:52

## 2024-11-12 RX ADMIN — EPINEPHRINE 0.06 MCG/KG/MIN: 1 INJECTION INTRAMUSCULAR; INTRAVENOUS; SUBCUTANEOUS at 06:25

## 2024-11-12 RX ADMIN — HYDROMORPHONE HYDROCHLORIDE 0.2 MG: 0.2 INJECTION, SOLUTION INTRAMUSCULAR; INTRAVENOUS; SUBCUTANEOUS at 19:33

## 2024-11-12 RX ADMIN — ACETAMINOPHEN 650 MG: 325 TABLET, FILM COATED ORAL at 17:01

## 2024-11-12 RX ADMIN — SENNOSIDES AND DOCUSATE SODIUM 2 TABLET: 50; 8.6 TABLET ORAL at 20:15

## 2024-11-12 RX ADMIN — OXYCODONE HYDROCHLORIDE 5 MG: 5 TABLET ORAL at 08:15

## 2024-11-12 RX ADMIN — OXYCODONE HYDROCHLORIDE 5 MG: 5 TABLET ORAL at 03:26

## 2024-11-12 RX ADMIN — CALCIUM CHLORIDE, MAGNESIUM CHLORIDE, SODIUM CHLORIDE, SODIUM BICARBONATE, POTASSIUM CHLORIDE AND SODIUM PHOSPHATE DIBASIC DIHYDRATE 5000 ML: 3.68; 3.05; 6.34; 3.09; .314; .187 INJECTION INTRAVENOUS at 22:48

## 2024-11-12 RX ADMIN — Medication 60 ML: at 08:16

## 2024-11-12 RX ADMIN — SENNOSIDES AND DOCUSATE SODIUM 2 TABLET: 50; 8.6 TABLET ORAL at 08:15

## 2024-11-12 RX ADMIN — METHOCARBAMOL 500 MG: 500 TABLET ORAL at 08:15

## 2024-11-12 RX ADMIN — SODIUM CHLORIDE, POTASSIUM CHLORIDE, SODIUM LACTATE AND CALCIUM CHLORIDE: 600; 310; 30; 20 INJECTION, SOLUTION INTRAVENOUS at 20:22

## 2024-11-12 RX ADMIN — Medication 60 ML: at 20:18

## 2024-11-12 RX ADMIN — CALCIUM CHLORIDE, MAGNESIUM CHLORIDE, DEXTROSE MONOHYDRATE, LACTIC ACID, SODIUM CHLORIDE, SODIUM BICARBONATE AND POTASSIUM CHLORIDE 5000 ML: 5.15; 2.03; 22; 5.4; 6.46; 3.09; .157 INJECTION INTRAVENOUS at 05:40

## 2024-11-12 RX ADMIN — HYDROMORPHONE HYDROCHLORIDE 0.2 MG: 0.2 INJECTION, SOLUTION INTRAMUSCULAR; INTRAVENOUS; SUBCUTANEOUS at 14:02

## 2024-11-12 RX ADMIN — MIDODRINE HYDROCHLORIDE 10 MG: 5 TABLET ORAL at 12:19

## 2024-11-12 RX ADMIN — Medication 40 MG: at 08:15

## 2024-11-12 RX ADMIN — ALBUMIN HUMAN 25 G: 0.25 SOLUTION INTRAVENOUS at 20:26

## 2024-11-12 RX ADMIN — CALCIUM CHLORIDE, MAGNESIUM CHLORIDE, DEXTROSE MONOHYDRATE, LACTIC ACID, SODIUM CHLORIDE, SODIUM BICARBONATE AND POTASSIUM CHLORIDE 5000 ML: 5.15; 2.03; 22; 5.4; 6.46; 3.09; .157 INJECTION INTRAVENOUS at 02:11

## 2024-11-12 RX ADMIN — MIDODRINE HYDROCHLORIDE 10 MG: 5 TABLET ORAL at 08:15

## 2024-11-12 RX ADMIN — NOREPINEPHRINE BITARTRATE 0.08 MCG/KG/MIN: 0.06 INJECTION, SOLUTION INTRAVENOUS at 20:12

## 2024-11-12 RX ADMIN — CALCIUM CHLORIDE, MAGNESIUM CHLORIDE, SODIUM CHLORIDE, SODIUM BICARBONATE, POTASSIUM CHLORIDE AND SODIUM PHOSPHATE DIBASIC DIHYDRATE 5000 ML: 3.68; 3.05; 6.34; 3.09; .314; .187 INJECTION INTRAVENOUS at 06:37

## 2024-11-12 RX ADMIN — POLYETHYLENE GLYCOL 3350 17 G: 17 POWDER, FOR SOLUTION ORAL at 20:16

## 2024-11-12 RX ADMIN — ACETAMINOPHEN 650 MG: 325 TABLET, FILM COATED ORAL at 12:19

## 2024-11-12 RX ADMIN — OXYCODONE HYDROCHLORIDE 5 MG: 5 TABLET ORAL at 19:34

## 2024-11-12 RX ADMIN — SODIUM PHOSPHATE, MONOBASIC, MONOHYDRATE AND SODIUM PHOSPHATE, DIBASIC, ANHYDROUS 15 MMOL: 142; 276 INJECTION, SOLUTION INTRAVENOUS at 06:59

## 2024-11-12 RX ADMIN — CALCIUM CHLORIDE, MAGNESIUM CHLORIDE, SODIUM CHLORIDE, SODIUM BICARBONATE, POTASSIUM CHLORIDE AND SODIUM PHOSPHATE DIBASIC DIHYDRATE 5000 ML: 3.68; 3.05; 6.34; 3.09; .314; .187 INJECTION INTRAVENOUS at 21:10

## 2024-11-12 RX ADMIN — Medication 40 MG: at 17:00

## 2024-11-12 RX ADMIN — CALCIUM CHLORIDE, MAGNESIUM CHLORIDE, SODIUM CHLORIDE, SODIUM BICARBONATE, POTASSIUM CHLORIDE AND SODIUM PHOSPHATE DIBASIC DIHYDRATE 5000 ML: 3.68; 3.05; 6.34; 3.09; .314; .187 INJECTION INTRAVENOUS at 00:35

## 2024-11-12 RX ADMIN — METHOCARBAMOL 500 MG: 500 TABLET ORAL at 17:00

## 2024-11-12 RX ADMIN — MIDODRINE HYDROCHLORIDE 10 MG: 5 TABLET ORAL at 17:00

## 2024-11-12 NOTE — PLAN OF CARE
A-fib with PAC's noted.       Pt remains on Epi and Levophed.     BM X1 lg  UOP 70ML    Pt alert but confused. Asks to go to the bank, but understands that he is in the hospital.

## 2024-11-12 NOTE — PROGRESS NOTES
ICU Progress Note   Date of Service: 11/12/24     Assessment and Plan:  88 year old M with a history of severe 3 vessel CAD. Status post sternotomy, CABGx3, modified MAZE procedure, PAYAM ligation with Dr. Mulvihill 10/28/24. Post operative hemorrhage with RTOR on POD0 for evacuation of mediastinal blood clot causing tamponade physiology. Post operative course complicated by kidney failure requiring CRRT and persistent cardiogenic/vasoplegic shock.     Interval events:   POD15  No acute events   Was up to the chair with therapies yesterday   On 0.04 epinephrine and 0.06 levophed this morning   Worsening pulmonary edema after net positive on CRRT    Today:   Discuss fluid status with CVTS and renal - ?pull fluid   Systolic BP goal >100, wean pressors   Speech consult     Neuro:  Intermittently confused   But alert and oriented x3 this morning. Following commands in all 4 extremities.     Pain control: PRN tylenol, robaxin, oxycodone, dilaudid     CV:  #Hx of atrial fibrillation and distant PE on Eliquis   #Hx NSTEMI   #Severe multivessel CAD   Status post sternotomy, CABGx3, modified MAZE procedure, PAYAM ligation with Dr. Mulvihill 10/28/24. Post operative hemorrhage with RTOR on POD0 for evacuation of mediastinal blood clot causing tamponade physiology. Postoperative course has been tenuous with mixed cardiogenic, vasoplegic shock and renal failure on CRRT.     Dobutamine stopped 10/9  Systolic goal >100 in an effort to wean pressors.     Pre-operative workup:   Angiogram showed an occluded RCA, ost LAD to mid LAD of 80% and mid circ to distal circ with 99%. Echocardiogram showed an EF of 35-40%. The RV systolic function was normal. There was also mild mitral regurg.    - Echo 10/30/24: Left ventricle is normal in size. Biplane LVEF 35%. There is moderate global hypokinesia of the left ventricle. Limited view of the RV.     Plan:   Systolic >100, wean pressors as able.   Cardiac meds per CVTS  Aspirin 162 mg   PTA  "atorvastatin - resume per CVTS   PTA torsemide - hold with hypotension   Heparin rate 500/hr per CVTS     Pulm:  # Acute hypoxemic respiratory failure  Exubated pod9    CXR - increased pleural effusions   Will discuss pulling fluid   Pulmonary hygiene with IS and flutter valve      GI:  #Hx upper GIB, gastritis   Recent admission discharged 9/16/2024. Endoscopy revealing gastritis.   PTA pantoprazole     #Shock liver   Improved     AST 65  ALT 57     NJ feeding tube - feeds at goal   Bowel regimen. Adding suppository today.   Speech consult - failed. Continue tube feeds.     Renal:  Pre operative cr 1.8   Now 1.41   Anuric kidney failure, nephrology following. Remains anuric. Though one small episode of incontinence overnight.   CRRT started 10/30/24. Continue.   Intermittent bladder scan, no burt.     #Acute kidney injury   #Hypophosphatemia   Replace   Nephrology consult   CRRT     ID:  WBC not reliable with CLL, 45  Afebrile (but on CRRT)   Not on antibiotics     Cultures:   Urine 10/30/24 no growth   Sputum 10/31/24 ordered. Will obtain today.   Blood 10/29/24 NGTD   Blood 10/31/24 NGTD   Respiratory cultures 11/1/24 - mixed paddy    Blood cultures 11/9/2024 - NGTD  Catheter tip A[place]in 11/10/24 - <15 CFU Staph epidermidis     Heme:  Low dose heparin gtt for atrial fibrillation - per CVTS  SCDs  Hgb 9.2   Plt 321    #Hx CLL   Follows with Dr. Raymundo hematology   Follow leukocytosis     MSK:   #Hx Myasthenia gravis     Endo:  No history of DM   A1C 4.5   Daily glc checks   No insulin     PPx:  1. DVT ppx: heparin gtt  2. VAP: HOB 30 degrees, chlorhexidine rinse  3. Stress Ulcer: PPI  4. Restraints: n/a   5. Wound care - per unit routine   6. Feeding - tube feeds, speech consult today.   7. Family updated at the bedside.     Dispo: ICU    BP (!) 88/59   Pulse 87   Temp 97.3  F (36.3  C) (Axillary)   Resp 16   Ht 1.778 m (5' 10\")   Wt 100 kg (220 lb 7.4 oz)   SpO2 92%   BMI 31.63 kg/m      Resp: " 16      I/Os  -1.8 L since admit   But clinically with pulmonary edema and BLE edema     Physical Exam:  In bed   Awake and alert   Pupils 2 mm equal and reactive to light   Eyes open spontaneously. Follows commands in all 4 extremities. Oriented x3.   Chest rise equal bilaterally   CT with thin bloody output, no air leak, to suction   No burt   Abdomen soft, ND   Bilateral feet warm to the touch   Lower extremities 1+  edema     Labs: reviewed  WBC 45  Hgb 9.2  Plt 321    Imaging: reviewed  AM CXR reviewed - bilateral effusions     Past Medical/Surgical history   Atrial fibrillation   Gout   GERD   Gastritis   Bilateral PE in 2007   Myasthenia gravis   HTN   Meningioma   MARTHA   Spinal stenosis     Family history   Not discussed today     Social history   Not discussed today     Carla Dubois MD   SICU Fellow

## 2024-11-12 NOTE — PLAN OF CARE
Problem: Comorbidity Management  Goal: Blood Pressure in Desired Range  Outcome: Not Progressing  Intervention: Maintain Blood Pressure Management  Recent Flowsheet Documentation  Taken 11/12/2024 0400 by Darius Sloan RN  Medication Review/Management: medications reviewed  Taken 11/12/2024 0000 by Darius Sloan RN  Medication Review/Management: medications reviewed  Taken 11/11/2024 2000 by Darius Sloan RN  Medication Review/Management: medications reviewed     Problem: Cardiovascular Surgery  Goal: Improved Activity Tolerance  Outcome: Not Progressing  Intervention: Optimize Tolerance for Activity  Recent Flowsheet Documentation  Taken 11/12/2024 0400 by Darius Sloan RN  Environmental Support: calm environment promoted  Taken 11/12/2024 0000 by Darius Sloan RN  Environmental Support: calm environment promoted  Taken 11/11/2024 2000 by Darius Sloan RN  Environmental Support: calm environment promoted  Goal: Effective Urinary Elimination  Outcome: Not Progressing     Problem: Risk for Delirium  Goal: Optimal Coping  Outcome: Not Progressing  Intervention: Optimize Psychosocial Adjustment to Delirium  Recent Flowsheet Documentation  Taken 11/12/2024 0400 by Darius Sloan RN  Supportive Measures:   active listening utilized   positive reinforcement provided   verbalization of feelings encouraged  Taken 11/12/2024 0000 by Darius Sloan RN  Supportive Measures:   active listening utilized   positive reinforcement provided   verbalization of feelings encouraged  Taken 11/11/2024 2000 by Darius Sloan RN  Supportive Measures:   active listening utilized   positive reinforcement provided   verbalization of feelings encouraged  Goal: Improved Attention and Thought Clarity  Outcome: Not Progressing  Intervention: Maximize Cognitive Function  Recent Flowsheet Documentation  Taken 11/12/2024 0400 by Darius Sloan RN  Sensory Stimulation Regulation:   care  clustered   lighting decreased  Reorientation Measures:   clock in view   reorientation provided  Taken 11/12/2024 0000 by Darius Sloan, RN  Sensory Stimulation Regulation:   care clustered   lighting decreased  Reorientation Measures:   clock in view   reorientation provided  Taken 11/11/2024 2000 by Darius Sloan RN  Sensory Stimulation Regulation:   care clustered   lighting decreased  Reorientation Measures:   clock in view   reorientation provided   Goal Outcome Evaluation:

## 2024-11-12 NOTE — PROGRESS NOTES
CLINICAL NUTRITION SERVICES - REASSESSMENT NOTE      Malnutrition: 11/4  % Weight Loss:  None noted (weight up from admit)  % Intake:  </= 50% for >/= 5 days (severe malnutrition)  Subcutaneous Fat Loss:  None observed however may be masked by obesity   Muscle Loss:  Moderate (LE) per wife, difficult to assess with fluid up   Fluid Retention:  None noted     Malnutrition Diagnosis: Moderate malnutrition  In Context of:  Acute illness or injury       EVALUATION OF PROGRESS TOWARD GOALS   Diet:  NPO per SLP    Nutrition Support:  Patient continues on goal TF regimen as follows ~    Nutrition Support Enteral:  Type of Feeding Tube: Tip at LOT   Enteral Frequency:  Continuous  Enteral Regimen: Osmolite 1.5 at 65 mL/hr  Total Enteral Provisions: 2340 kcal, 98 g protein, 318 g CHO, 0 g fiber, 1189 mL H2O  Free Water Flush: None, provider cancelled   ProSource TF20 to 1 pkt BID = 160 kcal and 40 g protein   Total = 2500 kcal (31 kcal/kg and 103% needs), 138 g protein (1.7 g/kg)     Intake/Tolerance:    K/Mg normal  Phos 2.1 (L) - CRRT replacement protocol   -134   I/O 3588/4141, wt 100 kg (up overall but stable over the last week) - CRRT, IV Albumin   BM x 1 yesterday (getting Senna) - previously constipated       ASSESSED NUTRITION NEEDS:  Dosing Weight::    81 kg (adjusted)  Estimated Energy Needs: 5165-7258 kcals (25-30 Kcal/Kg)  Justification: obese   Estimated Protein Needs: 120-160 grams protein (1.5-2 g pro/Kg)  Justification: CRRT  Estimated Fluid Needs: Per provider   Justification: per provider pending fluid status      NEW FINDINGS:   11/12: SLP = NPO     Pressor x 2     Previous Goals (11/8):   Goal TF regimen will meet % needs   Evaluation: Met    Previous Nutrition Diagnosis (11/8):   Inadequate energy intake related to TF at goal, Propofol off with extubation as evidenced by meeting ~70% goal energy needs with current TF regimen   Evaluation: Resolved       CURRENT NUTRITION DIAGNOSIS  No  nutrition diagnosis identified at this time as TF meeting needs     INTERVENTIONS  Recommendations / Nutrition Prescription  Continue goal TF regimen as above     Implementation  Collaboration and Referral of Nutrition care: Patient discussed today during interdisciplinary bedside rounds    Goals  Patient will continue to meet % needs from TF regimen     MONITORING AND EVALUATION:  Progress towards goals will be monitored and evaluated per protocol and Practice Guidelines    Emily Copeland RD, LD, CNSC   Clinical Dietitian - [facility]

## 2024-11-12 NOTE — PROGRESS NOTES
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 Michael Mulvihill.    POD # 14 s/p return to OR for mediastinal re-exploration, washout    - CVS: Pre-op TTE with EF 35-40%. Postop TTE 10/30 with EF ~35% on high-dose inotropic support.  Gtt's: Epi 0.06 Norepi: 0.04 Vaso off 11/11: 2 -DBU stopped 11/9, swan removed 11/9, midodrine started 11/11/24  Postop mixed cardiogenic/vasoplegic/hypovolemic shock. Lactic acidosis cleared and SVO2 stabilized. CI goal >2.0 (calculated elaine), stopped DBU 11/4 no back on after dec in SVO2 with inc in lact back on at 2.5. NE off and vasopressin to MAP goal 65, wean as able.   Hypervolemic, volume management via CRRT, pull as able. CVP goal ~10-12. Now tolerating more aggressive removal on hold as becoming euvolemic. Albumin 25% push BID to pull volume intravascular.  Hypothermic since initiation on CRRT, Thermagaurd catheter placed 10/31, target 37 C.   Stress dose steroids started 10/31, off 11/02 PM.  History of paroxysmal atrial fibrillation, continues with intermittent afib and accelerated junctional at times. Previously being atrial paced at 100, now appears to have improved BP without pacing (intrinsic rate ~80 BP, sinus rhythm with intermittent atrial fibrillation). Amio discontinued with shock liver. Defer systemic anticoagulation at present (ok for citrate for CRRT machine if needed).   Absent right radial pulse (previous arterial line in this location). ICU MD did bedside US, ulnar artery patent. Right hand warm, good capillary refill. Increased duskiness of right hand noted after starting vasopressin, which  demonstrated patent arterial flow with slow biphasic flow at the distal radial artery (site of previous arterial line).   Aspirin 162 mg daily, defer statin with ALI.    Chest tubes: output 1.7L<840<860<750<975<970 mL, serosang, not blood, fluid overloaded-pleural effusions on CT, no air leak. TPW removed with mediastinal tubes 11/9  CT C/A/P w/contrast showing pleural effusions, benign a/p  Straight rate heparin gtt started 11/111/24 for a.fib, tolerating thus far    - Resp: Acute hypoxemic and hypercapnic respiratory failure, stable. Extubated pod#9    - Neuro: Sedated with propofol while intubated. Add fentanyl infusion and Versed PRN to improve vent compliance. History of myasthenia gravis. Purposeful upper extremity movements and spontaneous LE movement when sedation lightened.     - Renal: CKD stage 3, baseline Cr ~1.8. Postop oliguric/anuric JAVIER. Nephrology consulted, CRRT started 10/30. Avoid nephrotoxins.  Discuss transition to iHD with neph today  Recent Labs   Lab 11/12/24  0514 11/11/24  2213 11/11/24  1428   CR 1.41* 1.44* 1.47*       - GI: +BM, +flatus, continue bowel regimen. Shock liver, improving. Trend LFTs, avoid hepatotoxins. Recent admission for GIB 09/2024, increased pantoprazole to BID. Diarrhea with initiation of TF, rectal tube placed 11/05     - : bladder scan q shift. Anuric on CRRT    - Endo: Postop stress hyperglycemia, resolved. Insulin infusion transitioned to sliding scale insulin. Stress dose steroids 10/31-11/02 as above.   Hemoglobin A1C   Date Value Ref Range Status   10/09/2024 4.5 <5.7 % Final     Comment:     Normal <5.7%   Prediabetes 5.7-6.4%    Diabetes 6.5% or higher     Note: Adopted from ADA consensus guidelines.        - FEN: Replace electrolytes as needed. Initiated on trophic feeds via OG 11/1, NJ placed 11/2 after INR came down, TF increase to goal. SLP following along.     - ID: WBC chronically elevated and hypothermic on CRRT as above so difficult to assess for  "possible infection;  empiric Vanc/Zosyn 10/31-11/04. Blood, urine, respiratory cultures NGTD. WBC elevated.  Trend CBC and fever curve. 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 Acute blood loss anemia due to surgery. Postop coagulopathy, improved (required multiple blood transfusions and blood productions immediately postop). 1 unit RBCs 11/3. 1 unit RBC 11/5 Trend CBC, transfuse PRN. 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 Continue supplemental oxygen as needed  # Acute Hypercapnic Respiratory Failure: based on arterial blood gas results.  Continue supplemental oxygen and ventilatory support as indicated.  # Acute Hypercapnic Respiratory Failure: based on venous blood gas results.  Continue supplemental oxygen and ventilatory support as indicated.      #Acute blood loss anemia: Lowest Hgb this hospitalization: 6.6 g/dL. Will continue to monitor and treat/transfuse as appropriate.     # Obesity: Estimated body mass index is 31.63 kg/m  as calculated from the following:    Height as of this encounter: 1.778 m (5' 10\").    Weight as of this encounter: 100 " kg (220 lb 7.4 oz).   # Moderate Malnutrition: based on nutrition assessment     # History of CABG: noted on surgical history       - Dispo: ICU. Continue rehab, up to chair Discuss iHD with neph. Family updated multiple times at bedside. Medically Ready for Discharge: Anticipated in 5+ Days      Interval History  Sitting up in bed, slt more confused this am, breathing stable, tolerating tube feeds, denies pain, working with rehab    Medications  Current Facility-Administered Medications   Medication Dose Route Frequency Provider Last Rate Last Admin    albumin human 25 % injection 25 g  25 g Intravenous Q12H Arlin Hodge PA-C 100 mL/hr at 11/07/24 2012 25 g at 11/12/24 0815    aspirin (ASA) chewable tablet 162 mg  162 mg Oral or NG Tube Daily Clarence Bone MD   162 mg at 11/12/24 0815    midodrine (PROAMATINE) tablet 10 mg  10 mg Oral TID w/meals Opal Nguyen PA-C   10 mg at 11/12/24 0815    pantoprazole (PROTONIX) 2 mg/mL suspension 40 mg  40 mg Oral or NG Tube BID AC Arlin Hodge PA-C   40 mg at 11/12/24 0815    Or    pantoprazole (PROTONIX) EC tablet 40 mg  40 mg Oral BID AC Arlin Hodge PA-C        polyethylene glycol (MIRALAX) Packet 17 g  17 g Oral or Feeding Tube BID Tyson Kasper APRN CNP        Prosource TF20 ENfit Compatibl EN LIQD (PROSOURCE TF20) packet 60 mL  1 packet Per Feeding Tube BID Arlin Hodge PA-C   60 mL at 11/12/24 0816    senna-docusate (SENOKOT-S/PERICOLACE) 8.6-50 MG per tablet 2 tablet  2 tablet Oral or Feeding Tube BID Tyson Kasper APRN CNP   2 tablet at 11/12/24 0815    sodium chloride (PF) 0.9% PF flush 3 mL  3 mL Intracatheter Q8H Clarence Bone MD   3 mL at 11/11/24 2227     Current Facility-Administered Medications   Medication Dose Route Frequency Provider Last Rate Last Admin    acetaminophen (TYLENOL) tablet 650 mg  650 mg Oral Q4H PRN Clarence Bone MD   650 mg at 11/11/24 1352    bisacodyl (DULCOLAX) suppository 10 mg  10 mg  Rectal Daily PRN Clarence Bone MD   10 mg at 11/11/24 1130    calcium gluconate 2 g in  mL intermittent infusion  2 g Intravenous Q8H PRN Farhad Boland MD        calcium gluconate 4 g in sodium chloride 0.9 % 100 mL intermittent infusion  4 g Intravenous Q8H PRN Farhad Boland MD        dextrose 10% infusion   Intravenous Continuous PRN Arlin Hodge PA-C   Stopped at 11/05/24 0600    glucose gel 15-30 g  15-30 g Oral Q15 Min PRN Nasir Vallecillo MD        Or    dextrose 50 % injection 25-50 mL  25-50 mL Intravenous Q15 Min PRN Nasir Vallecillo MD        Or    glucagon injection 1 mg  1 mg Subcutaneous Q15 Min PRN Nasir Vallecillo MD        diphenhydrAMINE (BENADRYL) 25 mg, acetaminophen (TYLENOL) 500 mg alternative for Tylenol PM   Oral At Bedtime PRN Opla Nguyen PA-C   Given at 11/10/24 2109    EPINEPHrine (ADRENALIN) 5 mg in  mL infusion  0.01-0.1 mcg/kg/min Intravenous Continuous PRN Tyra Dubois MD 11.5 mL/hr at 11/12/24 0730 0.04 mcg/kg/min at 11/12/24 0730    heparin lock flush 10 unit/mL injection 3 mL  3 mL Intracatheter Q1H PRN Tyra Dubois MD        hydrALAZINE (APRESOLINE) injection 10 mg  10 mg Intravenous Q30 Min PRN Clarence Bone MD        HYDROmorphone (DILAUDID) injection 0.2 mg  0.2 mg Intravenous Q2H PRN Tyra Dubois MD        lidocaine (LMX4) cream   Topical Q1H PRN Clarence Bone MD        lidocaine 1 % 0.1-1 mL  0.1-1 mL Other Q1H PRN Clarence Bone MD        magnesium hydroxide (MILK OF MAGNESIA) suspension 30 mL  30 mL Oral Daily PRN Clarence Bone MD   30 mL at 11/10/24 1641    magnesium sulfate 2 g in 50 mL sterile water intermittent infusion  2 g Intravenous Q8H PRN Farhad Boland MD   2 g at 11/09/24 1344    melatonin liquid 5 mg  5 mg Oral At Bedtime PRN Opal Nguyen PA-C        methocarbamol (ROBAXIN) tablet 500 mg  500 mg Oral Q6H PRN Clarence Bone MD   500 mg  at 11/12/24 0815    naloxone (NARCAN) injection 0.2 mg  0.2 mg Intravenous Q2 Min PRN Mulvihill, Michael, MD        Or    naloxone (NARCAN) injection 0.4 mg  0.4 mg Intravenous Q2 Min PRN Mulvihill, Michael, MD        Or    naloxone (NARCAN) injection 0.2 mg  0.2 mg Intramuscular Q2 Min PRN Mulvihill, Michael, MD        Or    naloxone (NARCAN) injection 0.4 mg  0.4 mg Intramuscular Q2 Min PRN Mulvihill, Michael, MD        No heparin required   Does not apply Continuous PRN Bhanu Ritchie MD        No heparin required   Does not apply Continuous PRN Farhad Boland MD        ondansetron (ZOFRAN ODT) ODT tab 4 mg  4 mg Oral Q6H PRN Clarence Bone MD        Or    ondansetron (ZOFRAN) injection 4 mg  4 mg Intravenous Q6H PRN Clarence Bone MD        oxyCODONE IR (ROXICODONE) half-tab 2.5 mg  2.5 mg Oral Q4H PRN Clarence Bone MD   2.5 mg at 11/02/24 0214    Or    oxyCODONE (ROXICODONE) tablet 5 mg  5 mg Oral Q4H PRN Clarence Bone MD   5 mg at 11/12/24 0815    Patient may continue current oral medications   Does not apply Continuous PRN Bhanu Ness MD        potassium chloride 20 mEq in 50 mL intermittent infusion  20 mEq Intravenous Q8H PRN Farhad Boland MD 50 mL/hr at 11/04/24 1200 20 mEq at 11/04/24 1200    prochlorperazine (COMPAZINE) injection 5 mg  5 mg Intravenous Q6H PRN Clarence Bone MD        Or    prochlorperazine (COMPAZINE) tablet 5 mg  5 mg Oral Q6H PRN Clarence Bone MD        Reason beta blocker order not selected   Does not apply DOES NOT GO TO Clarence Olguin MD        sodium chloride (PF) 0.9% PF flush 3 mL  3 mL Intracatheter q1 min prn Clarence Bone MD        sodium chloride 0.9% BOLUS 1,000 mL  1,000 mL CRRT Q1H PRN Bhanu Ritchie MD        sodium chloride 0.9% BOLUS 1-250 mL  1-250 mL Intravenous Q1H PRN Carol Enrique MD        sodium chloride 0.9% BOLUS 1-250 mL  1-250 mL Intravenous Q1H PRN Miguel Floyd MD         "sodium phosphate 15 mmol in NS 250mL intermittent infusion  15 mmol Intravenous Q8H PRN Farhad Boland MD   15 mmol at 11/12/24 0659         Physical Exam  Vitals were reviewed  Blood pressure (!) 88/59, pulse 87, temperature 97.3  F (36.3  C), temperature source Axillary, resp. rate 16, height 1.778 m (5' 10\"), weight 100 kg (220 lb 7.4 oz), SpO2 92%.  Rhythm: intermittent NSR and atrial fibrillation    Lungs: diminished bases     Cardiovascular: irregular rate/rhythm, no m/r/g    Abdomen: soft, NT, ND, +BS    Extremeties: 3+ BLE/BUE edema    Incision: CDI    CT: pleural tubes serosang output 1.7<1.7L<840<1.1<860<750<975<1580 mL, no air leak    Weight:   Vitals:    11/08/24 0430 11/09/24 0630 11/10/24 0231 11/11/24 0600   Weight: 102.6 kg (226 lb 3.1 oz) 100 kg (220 lb 7.4 oz) 100.2 kg (220 lb 14.4 oz) 102.1 kg (225 lb 1.4 oz)    11/12/24 0500   Weight: 100 kg (220 lb 7.4 oz)         Data  Recent Labs   Lab 11/12/24  0514 11/11/24  2213 11/11/24  1428 11/11/24  0840 11/11/24  0637 11/10/24  0730 11/10/24  0459   WBC 45.0*  --  38.7*  --  42.3*   < > 50.4*   HGB 9.2*  --  8.5*  --  8.8*   < > 9.2*   MCV 86  --  87  --  87   < > 85     --  259  --  285   < > 288   INR 1.68*  --   --   --  1.68*  --  1.58*    140 138  --  137   < > 137   POTASSIUM 3.9 3.9 4.1  --  3.9   < > 3.9   CHLORIDE 103 104 103  --  101   < > 102   CO2 23 23 22  --  23   < > 23   BUN 35.1* 33.9* 34.9*  --  37.5*   < > 32.9*   CR 1.41* 1.44* 1.47*  --  1.55*   < > 1.46*   ANIONGAP 13 13 13  --  13   < > 12   TATE 9.3 9.6 9.6  --  9.5   < > 9.7   *  134* 124* 111*   < > 135*   < > 134*   ALBUMIN 3.8 3.8 3.7  --  4.0   < > 3.8   PROTTOTAL 5.5* 5.7* 5.5*  --  5.8*   < > 5.7*   BILITOTAL 1.5* 1.8* 1.6*  --  1.5*   < > 1.6*   ALKPHOS 144 145 133  --  142   < > 129   ALT 57 58 60  --  63   < > 66   AST 65* 67*  --   --  56*   < > 46*    < > = values in this interval not displayed.       Imaging:  Recent Results (from the past 24 " hours)   XR Chest Port 1 View    Narrative    CHEST PORTABLE ONE VIEW November 11, 2024 1:21 PM       INDICATION: Interval change.    COMPARISON: 11/9/2024.       Impression    IMPRESSION: Bilateral chest tubes. No pneumothorax. Mild bibasilar  atelectasis has improved. Cortez-Garrett catheter has been removed. Left IJ  and right subclavian central venous catheters present with tips in the  SVC. Feeding tube courses below the diaphragm. Sternotomy wires are  unchanged.    SALOMON HERRERA MD         SYSTEM ID:  [id number]               Patient seen and discussed with Dr. Mulvihill Erin Frendin, PAKarolinaC  Cardiothoracic Surgery  Available for paging [phone] (personal pager or CV Surgery Rounding Pager)  Personal Pager: [phone]  CV Surgery Rounding Pager: [phone]  After hours please page surgeon on-call

## 2024-11-12 NOTE — PLAN OF CARE
CV  Pressors (which pressors and any increase/decrease in pressor needs): Epi @ 0.04 Levo @ 0.06  HR range: 80s. Goal SBP > 100, <140  Chest tube output: 530 mL output over 12 hours.     Neuro  Orientation: A&Ox2. Disoriented to situation and time  Delirium present?(y/n): Yes. mild  Sleep: napped in the afternoon  Pain: moderate. Managed with Robaxin, Tylenol, Oxycodone, and IV dilaudid    GI/  BM? (y/n): Yes  Urine output: anuric. CRRT continued. Net negative  mL/hr. Tolerating well      Lines:  L internal jugular HD, R subclavian CVC, L art line    Dangled at bedside with PT in the morning and up to chair in the evening via the lift.

## 2024-11-12 NOTE — PROGRESS NOTES
"  SLP Bedside Swallow Evaluation  11/12/24 1932   Appointment Info   Signing Clinician's Name / Credentials (PT) Pita Barraza MA [facility] SLP   General Information   Onset of Illness/Injury or Date of Surgery 10/28/24   Referring Physician Dr. Dubois   Patient/Family Therapy Goal Statement (SLP) Pt was not able to state due to confusion/overall fatigue.   Pertinent History of Current Problem Per chart: \"Pt is a 88 year old M with a history of severe 3 vessel CAD. Status post sternotomy, CABGx3, modified MAZE procedure, PAYAM ligation on 10/28/24. Post operative hemorrhage with RTOR on POD0 for evacuation of mediastinal blood clot causing tamponade physiology. Course subsequently complicated by persistent shock (appears mixed cardigenic/vasoplegic), and ongoing respiratory failure.\"   10/28/25 Intubation - 11/7/25 Extubation   General Observations Pt was awake, but drowsy during the evaluation.  Overall fatigue and WOB impacted voicing and ability to complete multiple oral motor movements on command.  Voice quality was also hoarse.  Decreased speech intelligibility noted.   Type of Evaluation   Type of Evaluation Swallow Evaluation   Oral Motor   Oral Musculature   (Mild-mod decreased ROM and coordination at times, fatigue and WOB impacted ability to follow commands at times)   Dentition (Oral Motor)   Dentition (Oral Motor) adequate dentition   Cough/Swallow/Gag Reflex (Oral Motor)   Comment, Cough/Swallow/Gag Reflex (Oral Motor) Weak cough on command, pt was not able to dry swallow on command   Vocal Quality/Secretion Management (Oral Motor)   Comment, Vocal Quality/Secretion Management (Oral Motor) Hoarse, decreased intensity, WOB impacted voicing   General Swallowing Observations   Past History of Dysphagia None found per chart review   Respiratory Support oxygen mask  (7L)   Current Diet/Method of Nutritional Intake (General Swallowing Observations, NIS) NPO;nasogastric tube (NG)   Swallowing Evaluation Clinical " swallow evaluation   Clinical Swallow Evaluation   Feeding Assistance dependent   Clinical Swallow Evaluation Textures Trialed thin liquids   Clinical Swallow Eval: Thin Liquid Texture Trial   Mode of Presentation, Thin Liquids spoon  (ice chips x 2)   Oral Phase of Swallow effortful AP movement;delayed AP movement;premature pharyngeal entry   Pharyngeal Phase of Swallow reduction in laryngeal movement;repeated swallows  (absent swallow x 1)   Diagnostic Statement poor swallow awareness, very poor laryngeal elevation, slight throat clearing after trials - silent aspiration suspected   Swallowing Recommendations   Diet Consistency Recommendations NPO  (Frequent oral cares with cues to swallow hard)   Instrumental Assessment Recommendations VFSS (videofluoroscopic swallowing study)  (When pt able to swallow consistently/demonstrate improved swallow awareness)   Clinical Impression   Criteria for Skilled Therapeutic Interventions Met (SLP Eval) Yes, treatment indicated   SLP Diagnosis Moderate-severe oral-pharyngeal dysphagia; high silent aspiration risk   Risks & Benefits of therapy have been explained evaluation/treatment results reviewed;care plan/treatment goals reviewed;risks/benefits reviewed;current/potential barriers reviewed;participants included;patient   Clinical Impression Comments Moderate-severe oral-pharyngeal dysphagia was observed during today's SLP bedside swallow evaluation.  Deficits/risk factors include recent CABG and prolonged intubation, decreased swallow awareness and cognition, WOB, poor voicing and speech intelligibility, overall level of fatigue, inconsistent swallow response, poor laryngeal elevation and need for extra swallows, and slight throat clearing after 2 ice chip trials with silent aspiration suspected.  Recommend continued NPO status with frequent oral cares and cues to use hard swallows.  If increased congested coughing observed on secretions, suction/deep suction as needed.   Plan to continue swallow Tx to complete exercises and trial po intake if swallow function and awareness improve.   SLP Total Evaluation Time   Eval: oral/pharyngeal swallow function, clinical swallow Minutes (99682) 10   Interventions   Interventions Quick Adds Swallowing Dysfunction   Swallowing Intervention   Treatment of Swallowing Dysfunction &/or Oral Function for Feeding Minutes (80706) 12   Symptoms Noted During/After Treatment Fatigue   Treatment Detail/Skilled Intervention Educated pt and RN on pt's high risk for aspiration and recommendation for frequent oral cares and use of hard swallows.  RN verbalized understanding.  Overall pt fatigue and decreased cognition impacted pt learning.  Swallow Tx was provided with oral care/swabbing and mod-max cues to use hard swallows.  Pt was only able to produce a partial swallow over 5+ attempts.   SLP Discharge Planning   SLP Plan Exercises, po trials as indicated, ?VFSS when improved function   SLP Discharge Recommendation [facility]hab [facility]-Motivated patient will benefit from intensive, interdisciplinary therapy.  Anticipate will be able to tolerate 3 hours of therapy per day   SLP Rationale for DC Rec Swallow function, voice/speech, and cognition are below baseline   SLP Brief overview of current status  Moderate-severe oral-pharyngeal dysphagia, high risk for silent aspiration; Rec: continued NPO status with frequent oral cares and cues to use hard swallows. If increased congested coughing observed on secretions, suction/deep suction as needed.   SLP Time and Intention   Total Session Time (sum of timed and untimed services) 22

## 2024-11-13 ENCOUNTER — APPOINTMENT (OUTPATIENT)
Dept: PHYSICAL THERAPY | Facility: CLINIC | Age: 89
DRG: 233 | End: 2024-11-13
Attending: STUDENT IN AN ORGANIZED HEALTH CARE EDUCATION/TRAINING PROGRAM
Payer: MEDICARE

## 2024-11-13 ENCOUNTER — APPOINTMENT (OUTPATIENT)
Dept: SPEECH THERAPY | Facility: CLINIC | Age: 89
DRG: 233 | End: 2024-11-13
Attending: STUDENT IN AN ORGANIZED HEALTH CARE EDUCATION/TRAINING PROGRAM
Payer: MEDICARE

## 2024-11-13 ENCOUNTER — APPOINTMENT (OUTPATIENT)
Dept: OCCUPATIONAL THERAPY | Facility: CLINIC | Age: 89
DRG: 233 | End: 2024-11-13
Attending: STUDENT IN AN ORGANIZED HEALTH CARE EDUCATION/TRAINING PROGRAM
Payer: MEDICARE

## 2024-11-13 ENCOUNTER — APPOINTMENT (OUTPATIENT)
Dept: GENERAL RADIOLOGY | Facility: CLINIC | Age: 89
DRG: 233 | End: 2024-11-13
Attending: STUDENT IN AN ORGANIZED HEALTH CARE EDUCATION/TRAINING PROGRAM
Payer: MEDICARE

## 2024-11-13 LAB
ALBUMIN SERPL BCG-MCNC: 3.8 G/DL (ref 3.5–5.2)
ALBUMIN SERPL BCG-MCNC: 4 G/DL (ref 3.5–5.2)
ALBUMIN SERPL BCG-MCNC: 4 G/DL (ref 3.5–5.2)
ALLEN'S TEST: ABNORMAL
ALP SERPL-CCNC: 139 U/L (ref 40–150)
ALP SERPL-CCNC: 149 U/L (ref 40–150)
ALP SERPL-CCNC: 153 U/L (ref 40–150)
ALT SERPL W P-5'-P-CCNC: 51 U/L (ref 0–70)
ALT SERPL W P-5'-P-CCNC: 54 U/L (ref 0–70)
ALT SERPL W P-5'-P-CCNC: 58 U/L (ref 0–70)
ANION GAP SERPL CALCULATED.3IONS-SCNC: 11 MMOL/L (ref 7–15)
ANION GAP SERPL CALCULATED.3IONS-SCNC: 13 MMOL/L (ref 7–15)
ANION GAP SERPL CALCULATED.3IONS-SCNC: 13 MMOL/L (ref 7–15)
AST SERPL W P-5'-P-CCNC: 60 U/L (ref 0–45)
AST SERPL W P-5'-P-CCNC: 60 U/L (ref 0–45)
AST SERPL W P-5'-P-CCNC: 75 U/L (ref 0–45)
BACTERIA SPEC CULT: ABNORMAL
BACTERIA SPEC CULT: ABNORMAL
BASE EXCESS BLDA CALC-SCNC: 0 MMOL/L (ref -3–3)
BILIRUB SERPL-MCNC: 1.3 MG/DL
BILIRUB SERPL-MCNC: 1.3 MG/DL
BILIRUB SERPL-MCNC: 1.4 MG/DL
BUN SERPL-MCNC: 33.2 MG/DL (ref 8–23)
BUN SERPL-MCNC: 35.5 MG/DL (ref 8–23)
BUN SERPL-MCNC: 37.2 MG/DL (ref 8–23)
CA-I BLD-MCNC: 4.2 MG/DL (ref 4.4–5.2)
CA-I BLD-MCNC: 4.2 MG/DL (ref 4.4–5.2)
CA-I BLD-MCNC: 4.4 MG/DL (ref 4.4–5.2)
CALCIUM SERPL-MCNC: 8.2 MG/DL (ref 8.8–10.4)
CALCIUM SERPL-MCNC: 8.4 MG/DL (ref 8.8–10.4)
CALCIUM SERPL-MCNC: 8.4 MG/DL (ref 8.8–10.4)
CHLORIDE SERPL-SCNC: 101 MMOL/L (ref 98–107)
CHLORIDE SERPL-SCNC: 102 MMOL/L (ref 98–107)
CHLORIDE SERPL-SCNC: 105 MMOL/L (ref 98–107)
COHGB MFR BLD: 96 % (ref 95–96)
CREAT SERPL-MCNC: 1.27 MG/DL (ref 0.67–1.17)
CREAT SERPL-MCNC: 1.33 MG/DL (ref 0.67–1.17)
CREAT SERPL-MCNC: 1.41 MG/DL (ref 0.67–1.17)
EGFRCR SERPLBLD CKD-EPI 2021: 48 ML/MIN/1.73M2
EGFRCR SERPLBLD CKD-EPI 2021: 52 ML/MIN/1.73M2
EGFRCR SERPLBLD CKD-EPI 2021: 55 ML/MIN/1.73M2
ERYTHROCYTE [DISTWIDTH] IN BLOOD BY AUTOMATED COUNT: 19.3 % (ref 10–15)
ERYTHROCYTE [DISTWIDTH] IN BLOOD BY AUTOMATED COUNT: 19.4 % (ref 10–15)
ERYTHROCYTE [DISTWIDTH] IN BLOOD BY AUTOMATED COUNT: 19.7 % (ref 10–15)
GLUCOSE SERPL-MCNC: 101 MG/DL (ref 70–99)
GLUCOSE SERPL-MCNC: 112 MG/DL (ref 70–99)
GLUCOSE SERPL-MCNC: 112 MG/DL (ref 70–99)
GLUCOSE SERPL-MCNC: 126 MG/DL (ref 70–99)
HCO3 BLD-SCNC: 23 MMOL/L (ref 21–28)
HCO3 SERPL-SCNC: 21 MMOL/L (ref 22–29)
HCO3 SERPL-SCNC: 22 MMOL/L (ref 22–29)
HCO3 SERPL-SCNC: 22 MMOL/L (ref 22–29)
HCT VFR BLD AUTO: 25.3 % (ref 40–53)
HCT VFR BLD AUTO: 27.2 % (ref 40–53)
HCT VFR BLD AUTO: 28.9 % (ref 40–53)
HGB BLD-MCNC: 7.9 G/DL (ref 13.3–17.7)
HGB BLD-MCNC: 8.3 G/DL (ref 13.3–17.7)
HGB BLD-MCNC: 9 G/DL (ref 13.3–17.7)
INR PPP: 1.62 (ref 0.85–1.15)
LACTATE SERPL-SCNC: 2.2 MMOL/L (ref 0.7–2)
LACTATE SERPL-SCNC: 2.3 MMOL/L (ref 0.7–2)
MAGNESIUM SERPL-MCNC: 2.3 MG/DL (ref 1.7–2.3)
MAGNESIUM SERPL-MCNC: 2.4 MG/DL (ref 1.7–2.3)
MAGNESIUM SERPL-MCNC: 2.4 MG/DL (ref 1.7–2.3)
MCH RBC QN AUTO: 26.4 PG (ref 26.5–33)
MCH RBC QN AUTO: 26.9 PG (ref 26.5–33)
MCH RBC QN AUTO: 26.9 PG (ref 26.5–33)
MCHC RBC AUTO-ENTMCNC: 30.5 G/DL (ref 31.5–36.5)
MCHC RBC AUTO-ENTMCNC: 31.1 G/DL (ref 31.5–36.5)
MCHC RBC AUTO-ENTMCNC: 31.2 G/DL (ref 31.5–36.5)
MCV RBC AUTO: 86 FL (ref 78–100)
MCV RBC AUTO: 87 FL (ref 78–100)
MCV RBC AUTO: 87 FL (ref 78–100)
O2/TOTAL GAS SETTING VFR VENT: 40 %
PCO2 BLD: 32 MM HG (ref 35–45)
PH BLD: 7.47 [PH] (ref 7.35–7.45)
PHOSPHATE SERPL-MCNC: 2.9 MG/DL (ref 2.5–4.5)
PLATELET # BLD AUTO: 225 10E3/UL (ref 150–450)
PLATELET # BLD AUTO: 243 10E3/UL (ref 150–450)
PLATELET # BLD AUTO: 293 10E3/UL (ref 150–450)
PO2 BLD: 71 MM HG (ref 80–105)
POTASSIUM SERPL-SCNC: 4.4 MMOL/L (ref 3.4–5.3)
POTASSIUM SERPL-SCNC: 4.6 MMOL/L (ref 3.4–5.3)
POTASSIUM SERPL-SCNC: 4.7 MMOL/L (ref 3.4–5.3)
PROT SERPL-MCNC: 5.7 G/DL (ref 6.4–8.3)
PROT SERPL-MCNC: 5.7 G/DL (ref 6.4–8.3)
PROT SERPL-MCNC: 6 G/DL (ref 6.4–8.3)
RBC # BLD AUTO: 2.94 10E6/UL (ref 4.4–5.9)
RBC # BLD AUTO: 3.14 10E6/UL (ref 4.4–5.9)
RBC # BLD AUTO: 3.34 10E6/UL (ref 4.4–5.9)
SAO2 % BLDA: 93 % (ref 92–100)
SODIUM SERPL-SCNC: 135 MMOL/L (ref 135–145)
SODIUM SERPL-SCNC: 135 MMOL/L (ref 135–145)
SODIUM SERPL-SCNC: 140 MMOL/L (ref 135–145)
UFH PPP CHRO-ACNC: <0.1 IU/ML
WBC # BLD AUTO: 29.3 10E3/UL (ref 4–11)
WBC # BLD AUTO: 32.1 10E3/UL (ref 4–11)
WBC # BLD AUTO: 38.1 10E3/UL (ref 4–11)

## 2024-11-13 PROCEDURE — 97530 THERAPEUTIC ACTIVITIES: CPT | Mod: GO | Performed by: OCCUPATIONAL THERAPIST

## 2024-11-13 PROCEDURE — 250N000013 HC RX MED GY IP 250 OP 250 PS 637: Performed by: PHYSICIAN ASSISTANT

## 2024-11-13 PROCEDURE — 250N000011 HC RX IP 250 OP 636: Performed by: STUDENT IN AN ORGANIZED HEALTH CARE EDUCATION/TRAINING PROGRAM

## 2024-11-13 PROCEDURE — 83735 ASSAY OF MAGNESIUM: CPT | Performed by: PHYSICIAN ASSISTANT

## 2024-11-13 PROCEDURE — 90947 DIALYSIS REPEATED EVAL: CPT

## 2024-11-13 PROCEDURE — 92526 ORAL FUNCTION THERAPY: CPT | Mod: GN

## 2024-11-13 PROCEDURE — 85520 HEPARIN ASSAY: CPT | Performed by: PHYSICIAN ASSISTANT

## 2024-11-13 PROCEDURE — 99232 SBSQ HOSP IP/OBS MODERATE 35: CPT | Performed by: INTERNAL MEDICINE

## 2024-11-13 PROCEDURE — 250N000011 HC RX IP 250 OP 636: Performed by: INTERNAL MEDICINE

## 2024-11-13 PROCEDURE — 85610 PROTHROMBIN TIME: CPT | Performed by: PHYSICIAN ASSISTANT

## 2024-11-13 PROCEDURE — 97110 THERAPEUTIC EXERCISES: CPT | Mod: GP | Performed by: PHYSICAL THERAPIST

## 2024-11-13 PROCEDURE — 97530 THERAPEUTIC ACTIVITIES: CPT | Mod: GP | Performed by: PHYSICAL THERAPIST

## 2024-11-13 PROCEDURE — 258N000003 HC RX IP 258 OP 636: Performed by: STUDENT IN AN ORGANIZED HEALTH CARE EDUCATION/TRAINING PROGRAM

## 2024-11-13 PROCEDURE — 85027 COMPLETE CBC AUTOMATED: CPT | Performed by: PHYSICIAN ASSISTANT

## 2024-11-13 PROCEDURE — 250N000009 HC RX 250: Performed by: INTERNAL MEDICINE

## 2024-11-13 PROCEDURE — 250N000013 HC RX MED GY IP 250 OP 250 PS 637: Performed by: SURGERY

## 2024-11-13 PROCEDURE — 85014 HEMATOCRIT: CPT | Performed by: PHYSICIAN ASSISTANT

## 2024-11-13 PROCEDURE — 82040 ASSAY OF SERUM ALBUMIN: CPT | Performed by: PHYSICIAN ASSISTANT

## 2024-11-13 PROCEDURE — 71045 X-RAY EXAM CHEST 1 VIEW: CPT

## 2024-11-13 PROCEDURE — P9047 ALBUMIN (HUMAN), 25%, 50ML: HCPCS | Mod: JZ | Performed by: PHYSICIAN ASSISTANT

## 2024-11-13 PROCEDURE — 250N000011 HC RX IP 250 OP 636: Performed by: PHYSICIAN ASSISTANT

## 2024-11-13 PROCEDURE — 82330 ASSAY OF CALCIUM: CPT | Performed by: PHYSICIAN ASSISTANT

## 2024-11-13 PROCEDURE — 84155 ASSAY OF PROTEIN SERUM: CPT | Performed by: PHYSICIAN ASSISTANT

## 2024-11-13 PROCEDURE — 250N000011 HC RX IP 250 OP 636: Mod: JZ | Performed by: PHYSICIAN ASSISTANT

## 2024-11-13 PROCEDURE — 120N000004 HC R&B MS OVERFLOW

## 2024-11-13 PROCEDURE — 82805 BLOOD GASES W/O2 SATURATION: CPT | Performed by: INTERNAL MEDICINE

## 2024-11-13 PROCEDURE — 83605 ASSAY OF LACTIC ACID: CPT | Performed by: PHYSICIAN ASSISTANT

## 2024-11-13 PROCEDURE — 84100 ASSAY OF PHOSPHORUS: CPT | Performed by: INTERNAL MEDICINE

## 2024-11-13 RX ORDER — AMOXICILLIN 250 MG
2 CAPSULE ORAL
Status: DISCONTINUED | OUTPATIENT
Start: 2024-11-13 | End: 2024-11-15 | Stop reason: HOSPADM

## 2024-11-13 RX ORDER — MIDODRINE HYDROCHLORIDE 5 MG/1
20 TABLET ORAL
Status: DISCONTINUED | OUTPATIENT
Start: 2024-11-13 | End: 2024-11-15

## 2024-11-13 RX ORDER — ALLOPURINOL 100 MG/1
100 TABLET ORAL DAILY
Status: DISCONTINUED | OUTPATIENT
Start: 2024-11-13 | End: 2024-11-15 | Stop reason: HOSPADM

## 2024-11-13 RX ADMIN — MIDODRINE HYDROCHLORIDE 10 MG: 5 TABLET ORAL at 08:00

## 2024-11-13 RX ADMIN — HYDROMORPHONE HYDROCHLORIDE 0.2 MG: 0.2 INJECTION, SOLUTION INTRAMUSCULAR; INTRAVENOUS; SUBCUTANEOUS at 18:12

## 2024-11-13 RX ADMIN — ACETAMINOPHEN 650 MG: 325 TABLET, FILM COATED ORAL at 12:00

## 2024-11-13 RX ADMIN — CALCIUM CHLORIDE, MAGNESIUM CHLORIDE, SODIUM CHLORIDE, SODIUM BICARBONATE, POTASSIUM CHLORIDE AND SODIUM PHOSPHATE DIBASIC DIHYDRATE 5000 ML: 3.68; 3.05; 6.34; 3.09; .314; .187 INJECTION INTRAVENOUS at 05:51

## 2024-11-13 RX ADMIN — METHOCARBAMOL 500 MG: 500 TABLET ORAL at 08:00

## 2024-11-13 RX ADMIN — CALCIUM CHLORIDE, MAGNESIUM CHLORIDE, SODIUM CHLORIDE, SODIUM BICARBONATE, POTASSIUM CHLORIDE AND SODIUM PHOSPHATE DIBASIC DIHYDRATE 5000 ML: 3.68; 3.05; 6.34; 3.09; .314; .187 INJECTION INTRAVENOUS at 23:05

## 2024-11-13 RX ADMIN — ALLOPURINOL 100 MG: 100 TABLET ORAL at 14:30

## 2024-11-13 RX ADMIN — HYDROMORPHONE HYDROCHLORIDE 0.2 MG: 0.2 INJECTION, SOLUTION INTRAMUSCULAR; INTRAVENOUS; SUBCUTANEOUS at 03:08

## 2024-11-13 RX ADMIN — CALCIUM CHLORIDE, MAGNESIUM CHLORIDE, SODIUM CHLORIDE, SODIUM BICARBONATE, POTASSIUM CHLORIDE AND SODIUM PHOSPHATE DIBASIC DIHYDRATE: 3.68; 3.05; 6.34; 3.09; .314; .187 INJECTION INTRAVENOUS at 09:00

## 2024-11-13 RX ADMIN — HYDROMORPHONE HYDROCHLORIDE 0.2 MG: 0.2 INJECTION, SOLUTION INTRAMUSCULAR; INTRAVENOUS; SUBCUTANEOUS at 20:18

## 2024-11-13 RX ADMIN — CALCIUM CHLORIDE, MAGNESIUM CHLORIDE, SODIUM CHLORIDE, SODIUM BICARBONATE, POTASSIUM CHLORIDE AND SODIUM PHOSPHATE DIBASIC DIHYDRATE 5000 ML: 3.68; 3.05; 6.34; 3.09; .314; .187 INJECTION INTRAVENOUS at 20:05

## 2024-11-13 RX ADMIN — ALBUMIN HUMAN 25 G: 0.25 SOLUTION INTRAVENOUS at 08:00

## 2024-11-13 RX ADMIN — CALCIUM CHLORIDE, MAGNESIUM CHLORIDE, SODIUM CHLORIDE, SODIUM BICARBONATE, POTASSIUM CHLORIDE AND SODIUM PHOSPHATE DIBASIC DIHYDRATE: 3.68; 3.05; 6.34; 3.09; .314; .187 INJECTION INTRAVENOUS at 14:00

## 2024-11-13 RX ADMIN — ACETAMINOPHEN 650 MG: 325 TABLET, FILM COATED ORAL at 08:00

## 2024-11-13 RX ADMIN — ALBUMIN HUMAN 25 G: 0.25 SOLUTION INTRAVENOUS at 19:43

## 2024-11-13 RX ADMIN — CALCIUM CHLORIDE, MAGNESIUM CHLORIDE, SODIUM CHLORIDE, SODIUM BICARBONATE, POTASSIUM CHLORIDE AND SODIUM PHOSPHATE DIBASIC DIHYDRATE: 3.68; 3.05; 6.34; 3.09; .314; .187 INJECTION INTRAVENOUS at 12:00

## 2024-11-13 RX ADMIN — CALCIUM CHLORIDE, MAGNESIUM CHLORIDE, SODIUM CHLORIDE, SODIUM BICARBONATE, POTASSIUM CHLORIDE AND SODIUM PHOSPHATE DIBASIC DIHYDRATE 200 ML/HR: 3.68; 3.05; 6.34; 3.09; .314; .187 INJECTION INTRAVENOUS at 23:05

## 2024-11-13 RX ADMIN — CALCIUM CHLORIDE, MAGNESIUM CHLORIDE, SODIUM CHLORIDE, SODIUM BICARBONATE, POTASSIUM CHLORIDE AND SODIUM PHOSPHATE DIBASIC DIHYDRATE 5000 ML: 3.68; 3.05; 6.34; 3.09; .314; .187 INJECTION INTRAVENOUS at 02:10

## 2024-11-13 RX ADMIN — CALCIUM GLUCONATE 2 G: 20 INJECTION, SOLUTION INTRAVENOUS at 15:04

## 2024-11-13 RX ADMIN — CALCIUM CHLORIDE, MAGNESIUM CHLORIDE, SODIUM CHLORIDE, SODIUM BICARBONATE, POTASSIUM CHLORIDE AND SODIUM PHOSPHATE DIBASIC DIHYDRATE 5000 ML: 3.68; 3.05; 6.34; 3.09; .314; .187 INJECTION INTRAVENOUS at 04:08

## 2024-11-13 RX ADMIN — EPINEPHRINE 0.04 MCG/KG/MIN: 1 INJECTION INTRAMUSCULAR; INTRAVENOUS; SUBCUTANEOUS at 04:09

## 2024-11-13 RX ADMIN — MIDODRINE HYDROCHLORIDE 20 MG: 5 TABLET ORAL at 17:00

## 2024-11-13 RX ADMIN — Medication 60 ML: at 08:02

## 2024-11-13 RX ADMIN — Medication 60 ML: at 20:25

## 2024-11-13 RX ADMIN — ASPIRIN 81 MG CHEWABLE TABLET 162 MG: 81 TABLET CHEWABLE at 08:00

## 2024-11-13 RX ADMIN — Medication 40 MG: at 16:48

## 2024-11-13 RX ADMIN — Medication 40 MG: at 08:00

## 2024-11-13 RX ADMIN — METHOCARBAMOL 500 MG: 500 TABLET ORAL at 14:30

## 2024-11-13 RX ADMIN — CALCIUM CHLORIDE, MAGNESIUM CHLORIDE, SODIUM CHLORIDE, SODIUM BICARBONATE, POTASSIUM CHLORIDE AND SODIUM PHOSPHATE DIBASIC DIHYDRATE: 3.68; 3.05; 6.34; 3.09; .314; .187 INJECTION INTRAVENOUS at 17:00

## 2024-11-13 RX ADMIN — MIDODRINE HYDROCHLORIDE 20 MG: 5 TABLET ORAL at 12:00

## 2024-11-13 RX ADMIN — CALCIUM CHLORIDE, MAGNESIUM CHLORIDE, SODIUM CHLORIDE, SODIUM BICARBONATE, POTASSIUM CHLORIDE AND SODIUM PHOSPHATE DIBASIC DIHYDRATE 200 ML/HR: 3.68; 3.05; 6.34; 3.09; .314; .187 INJECTION INTRAVENOUS at 01:06

## 2024-11-13 RX ADMIN — CALCIUM CHLORIDE, MAGNESIUM CHLORIDE, SODIUM CHLORIDE, SODIUM BICARBONATE, POTASSIUM CHLORIDE AND SODIUM PHOSPHATE DIBASIC DIHYDRATE: 3.68; 3.05; 6.34; 3.09; .314; .187 INJECTION INTRAVENOUS at 16:30

## 2024-11-13 RX ADMIN — OXYCODONE HYDROCHLORIDE 5 MG: 5 TABLET ORAL at 10:02

## 2024-11-13 RX ADMIN — HEPARIN SODIUM 500 UNITS/HR: 10000 INJECTION, SOLUTION INTRAVENOUS at 15:03

## 2024-11-13 RX ADMIN — CALCIUM CHLORIDE, MAGNESIUM CHLORIDE, SODIUM CHLORIDE, SODIUM BICARBONATE, POTASSIUM CHLORIDE AND SODIUM PHOSPHATE DIBASIC DIHYDRATE 5000 ML: 3.68; 3.05; 6.34; 3.09; .314; .187 INJECTION INTRAVENOUS at 23:04

## 2024-11-13 RX ADMIN — CALCIUM CHLORIDE, MAGNESIUM CHLORIDE, SODIUM CHLORIDE, SODIUM BICARBONATE, POTASSIUM CHLORIDE AND SODIUM PHOSPHATE DIBASIC DIHYDRATE 200 ML/HR: 3.68; 3.05; 6.34; 3.09; .314; .187 INJECTION INTRAVENOUS at 10:30

## 2024-11-13 RX ADMIN — ACETAMINOPHEN 650 MG: 325 TABLET, FILM COATED ORAL at 16:48

## 2024-11-13 NOTE — PLAN OF CARE
Goal Outcome Evaluation:      Plan of Care Reviewed With: spouse, patient  Pressors: Epi @ 0.04.  Levo @ 0.09  CT output 110ml    Orientation: confused to time and situation.   Better sleep this shift.     Saturating in the mid 90's on 5L nc    Prn dilaudid given x2 and Oxy x 1 for pain

## 2024-11-13 NOTE — PROGRESS NOTES
[facility]    Nephrology Progress Note     Assessment & Plan     1. Oliguric JAVIER/CKD IIIb.       Pt remains on CRRT.    He is net + 10 mL/hour.  Weight up 1.9  kg.       Non citrate  Dialysate K 4 1500 mL/hour  Post Filter Replacement K 4 200 mL/hour  Pre Filter Replacement K 4 800 mL/hour     2.  Severe shock.  Pt remains on two pressors.  Overall, his pressors needs have decreased/increased over time. Started on midodrine.  Now still on NE + EPI.  A.  Wean pressors as able.     3.  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  CV Surg is managing the post-op care.  A.  Per CV Surg.     4.  FEN.  Phos better.  K, Na, Mg are ok.     5.  Code Status.  Pt is full code.  His prognosis remains guarded.        Plan:     Keep CRRT  for now.  UF  mL.hour as hemodynamics tolerate.        Bhanu Ritchie MD  [facility] - Nephrology  [phone]    Interval History     Tolerating some fluid removal.     mL/hour.  Confused.  He wants to get out of bed and go home.    Physical Exam   Temp: 98.5  F (36.9  C) Temp src: Axillary BP: 107/45 Pulse: 92   Resp: 22 SpO2: 98 % O2 Device: Nasal cannula Oxygen Delivery: 6 LPM  Vitals:    11/11/24 0600 11/12/24 0500 11/13/24 0500   Weight: 102.1 kg (225 lb 1.4 oz) 100 kg (220 lb 7.4 oz) 97.8 kg (215 lb 9.8 oz)     Vital Signs with Ranges  Temp:  [96  F (35.6  C)-98.5  F (36.9  C)] 98.5  F (36.9  C)  Pulse:  [74-97] 92  Resp:  [12-26] 22  BP: (107)/(45) 107/45  MAP:  [54 mmHg-84 mmHg] 77 mmHg  Arterial Line BP: ()/(38-61) 130/58  SpO2:  [75 %-100 %] 98 %  I/O last 3 completed shifts:  In: 3415.81 [I.V.:1340.81; NG/GT:580]  Out: 4183 [Other:3523; Chest Tube:660]    GENERAL APPEARANCE: awake and confused.   HEENT:  Eyes/ears/nose/neck grossly normal  RESP: lungs cta b c good efforts, no crackles, rhonchi or wheezes  CV: RRR, nl S1/S2, no m/r/g   ABDOMEN: o/s/nt/nd, bs present  EXTREMITIES/SKIN: no rashes/lesions; 1+ thigh edema    Medications   Current  Facility-Administered Medications   Medication Dose Route Frequency Provider Last Rate Last Admin    dextrose 10% infusion   Intravenous Continuous PRN Arlin Hodge PA-C   Stopped at 11/05/24 0600    dialysate solution (Phoxillum BK4/2.5)   CRRT Continuous Bhanu Ritchie MD   5,000 mL at 11/13/24 0551    EPINEPHrine (ADRENALIN) 5 mg in  mL infusion  0.01-0.1 mcg/kg/min Intravenous Continuous PRN Tyra Dubois MD 11.5 mL/hr at 11/13/24 0800 0.04 mcg/kg/min at 11/13/24 0800    heparin infusion 25,000 units in 0.45% NaCl 250 mL ANTICOAGULANT  500 Units/hr Intravenous Continuous Opal Nguyen PA-C 5 mL/hr at 11/13/24 0800 500 Units/hr at 11/13/24 0800    lactated ringers infusion   Intravenous Continuous Opal Nguyen PA-C 10 mL/hr at 11/12/24 2022 New Bag at 11/12/24 2022    No heparin required   Does not apply Continuous PRN Bhanu Ritchie MD        No heparin required   Does not apply Continuous PRN Bhanu Ritchie MD        No heparin required   Does not apply Continuous PRN Farhad Boland MD        norepinephrine (LEVOPHED) 16 mg in  mL infusion MAX CONC CENTRAL LINE  0.01-0.2 mcg/kg/min Intravenous Continuous Tyra Dubois MD 5.4 mL/hr at 11/13/24 1031 0.06 mcg/kg/min at 11/13/24 1031    Patient may continue current oral medications   Does not apply Continuous PRN Bhanu Ness MD        Post-Filter replacement solution (Phoxillum BK4/2.5)  200 mL/hr CRRT Continuous Bhanu Ritchie  mL/hr at 11/13/24 0106 200 mL/hr at 11/13/24 0106    Pre-Filter replacement solution (Phoxillum BK4/2.5)   CRRT Continuous Bhanu Ritchie MD   5,000 mL at 11/13/24 0408    Reason beta blocker order not selected   Does not apply DOES NOT GO TO Clarence Olguin MD        vasopressin 0.2 units/mL in NS (PITRESSIN) standard conc infusion  0.5-4 Units/hr Intravenous Continuous Tyra Dubois MD   Stopped at 11/11/24 1130     Current  Facility-Administered Medications   Medication Dose Route Frequency Provider Last Rate Last Admin    albumin human 25 % injection 25 g  25 g Intravenous Q12H Arlin Hodge PA-C 100 mL/hr at 11/07/24 2012 25 g at 11/13/24 0800    allopurinol (ZYLOPRIM) tablet 300 mg  300 mg Oral Daily Opal Nguyen PA-C        aspirin (ASA) chewable tablet 162 mg  162 mg Oral or NG Tube Daily Clarence Bone MD   162 mg at 11/13/24 0800    midodrine (PROAMATINE) tablet 20 mg  20 mg Oral TID w/meals Opal Nguyen PA-C        pantoprazole (PROTONIX) 2 mg/mL suspension 40 mg  40 mg Oral or NG Tube BID AC Arlin Hodge PA-C   40 mg at 11/13/24 0800    Or    pantoprazole (PROTONIX) EC tablet 40 mg  40 mg Oral BID AC Arlin Hodge PA-C        Prosource TF20 ENfit Compatibl EN LIQD (PROSOURCE TF20) packet 60 mL  1 packet Per Feeding Tube BID Arlin Hodge PA-C   60 mL at 11/13/24 0802    sodium chloride (PF) 0.9% PF flush 3 mL  3 mL Intracatheter Q8H Clarence Bone MD   3 mL at 11/13/24 0552       Data   BMP  Recent Labs   Lab 11/13/24  0518 11/12/24  2244 11/12/24  1424 11/12/24  1414 11/12/24  1031 11/12/24  0514    138  --  139  --  139   POTASSIUM 4.7 4.4  --  4.0  --  3.9   CHLORIDE 105 104  --  105  --  103   TATE 8.4* 8.5*  --  8.8  --  9.3   CO2 22 22  --  23  --  23   BUN 33.2* 33.5*  --  35.5*  --  35.1*   CR 1.27* 1.31*  --  1.39*  --  1.41*   *  112* 125* 124* 127*   < > 134*  134*    < > = values in this interval not displayed.     Phos@[email or web address](phos:4)  CBC)  Recent Labs   Lab 11/13/24  0518 11/12/24  1805 11/12/24  0514 11/11/24  1428   WBC 38.1* 42.6* 45.0* 38.7*   HGB 9.0* 9.0* 9.2* 8.5*   HCT 28.9* 28.5* 28.7* 27.3*   MCV 87 86 86 87    270 321 259     Recent Labs   Lab 11/13/24 0518   AST 75*   ALT 58   ALKPHOS 153*   BILITOTAL 1.3*     Recent Labs   Lab 11/13/24 0518   INR 1.62*       Attestation:   I have reviewed today's relevant vital signs, notes,  medications, labs and imaging.

## 2024-11-13 NOTE — PROGRESS NOTES
Care Management Follow Up    Length of Stay (days): 16    Expected Discharge Date: 11/14/2024     Concerns to be Addressed: other (see comments)     Patient plan of care discussed at interdisciplinary rounds: Yes    Anticipated Discharge Disposition: Skilled Nursing Facility              Anticipated Discharge Services:    Anticipated Discharge DME:      Patient/family educated on Medicare website which has current facility and service quality ratings:    Education Provided on the Discharge Plan:    Patient/Family in Agreement with the Plan: yes    Referrals Placed by CM/SW: Post Acute Facilities  Private pay costs discussed: Not applicable    Discussed  Partnership in Safe Discharge Planning  document with patient/family: No     Handoff Completed: Yes, [facility] PCP: Internal handoff referral completed    Additional Information:  PT/OT currently recommending TCU vs LTACH    Next Steps: SW/CC will continue to follow and assist with safe discharge plan.    Yuki Chahal RN Care Coordinator  [facility]  [phone]

## 2024-11-13 NOTE — PLAN OF CARE
Problem: Adult Inpatient Plan of Care  Goal: Readiness for Transition of Care  Outcome: Not Progressing  Flowsheets (Taken 11/13/2024 0546)  Anticipated Changes Related to Illness: inability to care for self  Intervention: Mutually Develop Transition Plan  Recent Flowsheet Documentation  Taken 11/13/2024 0546 by Darius Sloan RN  Anticipated Changes Related to Illness: inability to care for self     Problem: Comorbidity Management  Goal: Blood Pressure in Desired Range  Outcome: Not Progressing  Intervention: Maintain Blood Pressure Management  Recent Flowsheet Documentation  Taken 11/13/2024 0400 by Darius Sloan RN  Medication Review/Management: medications reviewed  Taken 11/13/2024 0000 by Darius Sloan RN  Medication Review/Management: medications reviewed  Taken 11/12/2024 2000 by Darius Sloan RN  Medication Review/Management: medications reviewed     Problem: Cardiovascular Surgery  Goal: Effective Urinary Elimination  Outcome: Not Progressing  Goal: Optimal Cerebral Tissue Perfusion  Outcome: Not Progressing  Intervention: Protect and Optimize Cerebral Perfusion  Recent Flowsheet Documentation  Taken 11/13/2024 0400 by Darius Sloan RN  Sensory Stimulation Regulation:   care clustered   lighting decreased  Glycemic Management: blood glucose monitored  Head of Bed (HOB) Positioning: HOB at 30 degrees  Taken 11/13/2024 0200 by Darius Sloan RN  Head of Bed (HOB) Positioning: HOB at 30 degrees  Taken 11/13/2024 0000 by Darius Sloan RN  Sensory Stimulation Regulation:   care clustered   lighting decreased  Glycemic Management: blood glucose monitored  Head of Bed (HOB) Positioning: HOB at 20-30 degrees  Taken 11/12/2024 2000 by Darius Sloan RN  Sensory Stimulation Regulation:   care clustered   lighting decreased  Glycemic Management: blood glucose monitored  Head of Bed (HOB) Positioning: HOB at 20-30 degrees     Problem: Skin Injury Risk Increased  Goal:  Skin Health and Integrity  Outcome: Not Progressing  Intervention: Plan: Nurse Driven Intervention: Moisture Management  Recent Flowsheet Documentation  Taken 11/13/2024 0400 by Darius Sloan RN  Moisture Interventions:   Incontinence pad   Perineal cleanser   Barrier ointment (CriticAid, Triad paste)  Bathing/Skin Care:   back care   bath, chlorhexidine wipes   dressed/undressed   electrode patches/site rotation   incontinence care   linen changed  Taken 11/13/2024 0000 by Darius Sloan RN  Moisture Interventions:   Incontinence pad   Perineal cleanser   Barrier ointment (CriticAid, Triad paste)  Bathing/Skin Care: back care  Taken 11/12/2024 2000 by Darius Sloan RN  Moisture Interventions:   Incontinence pad   Perineal cleanser   Barrier ointment (CriticAid, Triad paste)  Bathing/Skin Care: back care  Intervention: Plan: Nurse Driven Intervention: Friction and Shear  Recent Flowsheet Documentation  Taken 11/13/2024 0400 by Darius Sloan RN  Friction/Shear Interventions:   HOB 30 degrees or less   Silicone foam sacral dressing   Repositioning device (TAP system, etc.)  Taken 11/13/2024 0000 by Darius Sloan RN  Friction/Shear Interventions:   HOB 30 degrees or less   Silicone foam sacral dressing   Repositioning device (TAP system, etc.)  Taken 11/12/2024 2000 by Darius Sloan RN  Friction/Shear Interventions:   HOB 30 degrees or less   Silicone foam sacral dressing   Repositioning device (TAP system, etc.)  Intervention: Optimize Skin Protection  Recent Flowsheet Documentation  Taken 11/13/2024 0400 by Darius Sloan RN  Pressure Reduction Techniques:   frequent weight shift encouraged   heels elevated off bed   pressure points protected  Pressure Reduction Devices: pressure-redistributing mattress utilized  Skin Protection:   skin to skin areas padded   skin to device areas padded  Activity Management: activity adjusted per tolerance  Head of Bed (HOB) Positioning: HOB at  30 degrees  Taken 11/13/2024 0200 by Darius Sloan RN  Head of Bed (HOB) Positioning: HOB at 30 degrees  Taken 11/13/2024 0000 by Darius Sloan RN  Pressure Reduction Techniques:   frequent weight shift encouraged   heels elevated off bed   pressure points protected  Pressure Reduction Devices: pressure-redistributing mattress utilized  Skin Protection:   skin to skin areas padded   skin to device areas padded  Activity Management:   activity adjusted per tolerance   activity encouraged   bedrest  Head of Bed (HOB) Positioning: HOB at 20-30 degrees  Taken 11/12/2024 2100 by Darius Sloan RN  Pressure Reduction Techniques:   frequent weight shift encouraged   heels elevated off bed   positioned off wounds   pressure points protected   weight shift assistance provided  Pressure Reduction Devices:   heel offloading device utilized   positioning supports utilized   pressure-redistributing mattress utilized  Skin Protection:   adhesive use limited   incontinence pads utilized   pulse oximeter probe site changed   silicone foam dressing in place  Activity Management:   activity adjusted per tolerance   back to bed   bedrest  Taken 11/12/2024 2000 by Darius Sloan RN  Pressure Reduction Techniques:   frequent weight shift encouraged   heels elevated off bed   pressure points protected  Pressure Reduction Devices: pressure-redistributing mattress utilized  Skin Protection:   skin to skin areas padded   skin to device areas padded  Activity Management:   activity adjusted per tolerance   back to bed  Head of Bed (HOB) Positioning: HOB at 20-30 degrees     Problem: Risk for Delirium  Goal: Optimal Coping  Outcome: Not Progressing  Intervention: Optimize Psychosocial Adjustment to Delirium  Recent Flowsheet Documentation  Taken 11/13/2024 0400 by Darius Sloan RN  Supportive Measures:   active listening utilized   positive reinforcement provided   verbalization of feelings encouraged  Taken  11/13/2024 0000 by Darius Sloan RN  Supportive Measures:   active listening utilized   positive reinforcement provided   verbalization of feelings encouraged  Family/Support System Care: involvement promoted  Taken 11/12/2024 2000 by Darius Sloan RN  Supportive Measures:   active listening utilized   positive reinforcement provided   verbalization of feelings encouraged  Family/Support System Care: involvement promoted  Goal: Improved Attention and Thought Clarity  Outcome: Not Progressing  Intervention: Maximize Cognitive Function  Recent Flowsheet Documentation  Taken 11/13/2024 0400 by Darius lSoan RN  Sensory Stimulation Regulation:   care clustered   lighting decreased  Reorientation Measures:   clock in view   reorientation provided  Taken 11/13/2024 0000 by Darius Sloan RN  Sensory Stimulation Regulation:   care clustered   lighting decreased  Reorientation Measures:   clock in view   reorientation provided  Taken 11/12/2024 2000 by Darius Sloan RN  Sensory Stimulation Regulation:   care clustered   lighting decreased  Reorientation Measures:   clock in view   reorientation provided   Goal Outcome Evaluation:      Plan of Care Reviewed With: spouse, patient

## 2024-11-13 NOTE — PROGRESS NOTES
ICU Progress Note   Date of Service: 11/13/24     Assessment and Plan:  88 year old M with a history of severe 3 vessel CAD. Status post sternotomy, CABGx3, modified MAZE procedure, PAYAM ligation with Dr. Mulvihill 10/28/24. Post operative hemorrhage with RTOR on POD0 for evacuation of mediastinal blood clot causing tamponade physiology. Post operative course complicated by kidney failure requiring CRRT and persistent cardiogenic/vasoplegic shock.     Interval events:   POD16  No acute events  On 6 L NC    On 0.02 epi and 0.04 norepi     Today:   Increase midodrine per CVTS    Neuro:  Alert and oriented x3 this morning. Following commands in all 4 extremities.  Intermittently confused.      Pain control: PRN tylenol, robaxin, oxycodone, dilaudid     CV:  #Hx of atrial fibrillation and distant PE on Eliquis   #Hx NSTEMI   #Severe multivessel CAD   Status post sternotomy, CABGx3, modified MAZE procedure, PAYAM ligation with Dr. Mulvihill 10/28/24. Post operative hemorrhage with RTOR on POD0 for evacuation of mediastinal blood clot causing tamponade physiology. Postoperative course has been tenuous with mixed cardiogenic, vasoplegic shock and renal failure on CRRT.     Dobutamine stopped 10/9  Systolic goal >100 in an effort to wean pressors.     Pre-operative workup:   Angiogram showed an occluded RCA, ost LAD to mid LAD of 80% and mid circ to distal circ with 99%. Echocardiogram showed an EF of 35-40%. The RV systolic function was normal. There was also mild mitral regurg.    - Echo 10/30/24: Left ventricle is normal in size. Biplane LVEF 35%. There is moderate global hypokinesia of the left ventricle. Limited view of the RV.     Plan:   Systolic >100, wean pressors as able.   Cardiac meds per CVTS  Aspirin 162 mg   PTA atorvastatin - resume per CVTS   PTA torsemide - hold with hypotension   Heparin rate 500/hr per CVTS   Midodrine 10 mg TID started 11/11. Increased to 20 mg TID today.     Pulm:  # Acute hypoxemic  "respiratory failure  Exubated pod9    CXR - reviewed   Pulling fluid on CRRT, tolerating   Pulmonary hygiene with IS and flutter valve      GI:  #Hx upper GIB, gastritis   Recent admission discharged 9/16/2024. Endoscopy revealing gastritis.   PTA pantoprazole     #Shock liver   Improved     AST 60  ALT 54    NJ feeding tube - feeds at goal   Bowel regimen.  Speech consult - failed. Speech following. Continue tube feeds.     Renal:  Pre operative cr 1.8   Now 1.33   Anuric kidney failure, nephrology following. Remains anuric.   CRRT started 10/30/24. Continue.   Intermittent bladder scan, no burt.     #Acute kidney injury   #Hypophosphatemia   Replace   Nephrology consult   CRRT     ID:  WBC not reliable with CLL, 38  Afebrile (but on CRRT)   Not on antibiotics     Cultures:   Urine 10/30/24 no growth   Sputum 10/31/24 ordered. Will obtain today.   Blood 10/29/24 NGTD   Blood 10/31/24 NGTD   Respiratory cultures 11/1/24 - mixed paddy    Blood cultures 11/9/2024 - NGTD  Catheter tip [place] 11/10/24 - <15 CFU Staph epidermidis     Heme:  Low dose heparin gtt for atrial fibrillation - per CVTS  SCDs  Hgb 9.0   Plt 293    #Hx CLL   Follows with Dr. Raymundo hematology   Follow leukocytosis     MSK:   #Hx Myasthenia gravis     Endo:  No history of DM   A1C 4.5   Daily glc checks   No insulin     PPx:  1. DVT ppx: heparin gtt  2. VAP: HOB 30 degrees, chlorhexidine rinse  3. Stress Ulcer: PPI  4. Restraints: n/a   5. Wound care - per unit routine   6. Feeding - tube feeds, speech consult today.   7. Family updated at the bedside.     Dispo: ICU    /45 (BP Location: Left arm, Patient Position: Semi-Magaña's, Cuff Size: Adult Regular)   Pulse 86   Temp 97.8  F (36.6  C) (Axillary)   Resp 15   Ht 1.778 m (5' 10\")   Wt 97.8 kg (215 lb 9.8 oz)   SpO2 99%   BMI 30.94 kg/m      Resp: 15    I/Os  -7,000 L since admit   BLE edema improving     Physical Exam:  In bed   Awake and alert   Pupils 2 mm equal and reactive " to light   Eyes open spontaneously. Follows commands in all 4 extremities. Oriented x3.   Chest rise equal bilaterally   CT with thin bloody output, no air leak, to suction   No burt   Abdomen soft, ND   Bilateral feet warm to the touch   Lower extremities 1+  edema     Labs: reviewed  WBC 38   Hgb 9.0   Plt 293  Na 135  Cr 1.33   AG 11   Ionized calcium 4.2   Bili Total 1.4    Imaging: reviewed  AM CXR reviewed - stable     Past Medical/Surgical history   Atrial fibrillation   Gout   GERD   Gastritis   Bilateral PE in 2007   Myasthenia gravis   HTN   Meningioma   MARTHA   Spinal stenosis     Family history   Not discussed today     Social history   Not discussed today     Carla Dubois MD   SICU Fellow

## 2024-11-13 NOTE — PROGRESS NOTES
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 Michael Mulvihill.    POD # 15 s/p return to OR for mediastinal re-exploration, washout    - CVS: Pre-op TTE with EF 35-40%. Postop TTE 10/30 with EF ~35% on high-dose inotropic support.  Gtt's: Epi 0.06 Norepi: 0.09 Vaso off 11/11: 2 -DBU stopped 11/9, swan removed 11/9, midodrine started 11/11/24 inc 20 20 mg tid 11/14  Postop mixed cardiogenic/vasoplegic/hypovolemic shock. Lactic acidosis cleared and SVO2 stabilized. CI goal >2.0 (calculated elaine), stopped DBU 11/4 no back on after dec in SVO2 with inc in lact back on at 2.5. NE off and vasopressin to MAP goal 65, wean as able.   Hypervolemic, volume management via CRRT, pull as able. CVP goal ~10-12. Now tolerating more aggressive removal on hold as becoming euvolemic.   Hypothermic since initiation on CRRT, Thermagaurd catheter placed 10/31, target 37 C.   Stress dose steroids started 10/31, off 11/02 PM.  History of paroxysmal atrial fibrillation, continues with intermittent afib and accelerated junctional at times. Previously being atrial paced at 100, Amio discontinued with shock liver-resolved. Heparin gtt started 11/12 with straight rate 500U/hr.    Absent right radial pulse (previous arterial line in this location). ICU MD did bedside US, ulnar artery patent. Right hand warm, good capillary refill. Increased duskiness of right hand noted after starting vasopressin, which demonstrated patent arterial flow with slow biphasic flow at the distal radial artery (site of previous arterial line).   Aspirin 162 mg daily, defer statin with ALI.    Chest tubes: output 660<1.7L<840<860<750<975<970 mL, serosang, not blood,  fluid overloaded-pleural effusions on CT, no air leak. TPW removed with mediastinal tubes 11/9  CT C/A/P w/contrast showing pleural effusions, benign a/p    - Resp: Acute hypoxemic and hypercapnic respiratory failure, stable. Extubated pod#9    - Neuro: Grossly intact, pain controlled with current regimen. History of myasthenia gravis.      - Renal: CKD stage 3, baseline Cr ~1.8. Postop oliguric/anuric JAVIER. Nephrology consulted, CRRT started 10/30. Avoid nephrotoxins.  Discussed transitioning to iHD with neph but pressor still too high  Recent Labs   Lab 11/13/24  0518 11/12/24  2244 11/12/24  1414   CR 1.27* 1.31* 1.39*       - GI: +BM, +flatus, continue bowel regimen. Shock liver, improving. Trend LFTs, avoid hepatotoxins. Recent admission for GIB 09/2024, increased pantoprazole to BID. Diarrhea with initiation of TF, rectal tube placed 11/05     - : bladder scan q shift. Anuric on CRRT    - Endo: Postop stress hyperglycemia, resolved. Insulin infusion transitioned to sliding scale insulin. Stress dose steroids 10/31-11/02 as above.   Hemoglobin A1C   Date Value Ref Range Status   10/09/2024 4.5 <5.7 % Final     Comment:     Normal <5.7%   Prediabetes 5.7-6.4%    Diabetes 6.5% or higher     Note: Adopted from ADA consensus guidelines.        - FEN: Replace electrolytes as needed. Initiated on trophic feeds via OG 11/1, NJ placed 11/2 after INR came down, TF increase to goal. SLP following along-silent aspiration noted 11/12 with assessment.     - ID: WBC chronically elevated and hypothermic on CRRT as above so difficult to assess for possible infection;  empiric Vanc/Zosyn 10/31-11/04. Blood, urine, respiratory cultures NGTD. WBC elevated.  Trend CBC and fever curve. 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 Acute blood loss anemia due to surgery. Postop coagulopathy, improved (required multiple blood transfusions and blood productions immediately postop). 1 unit RBCs 11/3. 1 unit RBC 11/5 Trend CBC, transfuse PRN. 1U PRBC 11/7/24, 2U 11/9  Recent Labs   Lab 11/13/24  0518 11/12/24  1805 11/12/24  0514   HGB 9.0* 9.0* 9.2*    270 321       - Proph: SCD, subcutaneous heparin, PPI    - Other:  Clinically Significant Risk Factors           # Hypocalcemia: Lowest Ca = 8.4 mg/dL in last 2 days, will monitor and replace as appropriate     # Hypoalbuminemia: Lowest albumin = 2.7 g/dL at 11/2/2024  5:44 AM, will monitor as appropriate    # Coagulation Defect: INR = 1.62 (Ref range: 0.85 - 1.15) and/or PTT = 42 Seconds (Ref range: 22 - 38 Seconds), will monitor for bleeding    # Hypertension: Noted on problem list  # Chronic heart failure with reduced ejection fraction: last echo with EF <40%   # Acute Hypoxic Respiratory Failure: Documented O2 saturation < 90%. Continue supplemental oxygen as needed  # Acute Hypoxic Respiratory Failure: Documented O2 saturation < 90%. Continue supplemental oxygen as needed  # Acute Hypercapnic Respiratory Failure: based on arterial blood gas results.  Continue supplemental oxygen and ventilatory support as indicated.  # Acute Hypercapnic Respiratory Failure: based on venous blood gas results.  Continue supplemental oxygen and ventilatory support as indicated.      #Acute blood loss anemia: Lowest Hgb this hospitalization: 6.6 g/dL. Will continue to monitor and treat/transfuse as appropriate.     # Obesity: Estimated body mass index is 30.94 kg/m  as calculated from the following:    Height as of this encounter: 1.778 m (5' 10\").    Weight as of this encounter: 97.8 kg (215 lb 9.8 oz).   # Moderate Malnutrition: based on nutrition assessment     # History of CABG: noted on surgical history       - Dispo: ICU. Continue rehab, up to chair Discuss iHD with neph. " Family updated multiple times at bedside. Medically Ready for Discharge: Anticipated in 5+ Days      Interval History  Sitting up in bed, breathing stable, tolerating tube feeds, pain controlled    Medications  Current Facility-Administered Medications   Medication Dose Route Frequency Provider Last Rate Last Admin    albumin human 25 % injection 25 g  25 g Intravenous Q12H Arlin Hodge PA-C 100 mL/hr at 11/07/24 2012 25 g at 11/13/24 0800    aspirin (ASA) chewable tablet 162 mg  162 mg Oral or NG Tube Daily Clarence Bone MD   162 mg at 11/13/24 0800    midodrine (PROAMATINE) tablet 10 mg  10 mg Oral TID w/meals Opal Nguyen PA-C   10 mg at 11/13/24 0800    pantoprazole (PROTONIX) 2 mg/mL suspension 40 mg  40 mg Oral or NG Tube BID AC Arlin Hodge PA-C   40 mg at 11/13/24 0800    Or    pantoprazole (PROTONIX) EC tablet 40 mg  40 mg Oral BID AC Arlin Hodge PA-C        Prosource TF20 ENfit Compatibl EN LIQD (PROSOURCE TF20) packet 60 mL  1 packet Per Feeding Tube BID Arlin Hodge PA-C   60 mL at 11/13/24 0802    sodium chloride (PF) 0.9% PF flush 3 mL  3 mL Intracatheter Q8H Clarence Bone MD   3 mL at 11/13/24 0552     Current Facility-Administered Medications   Medication Dose Route Frequency Provider Last Rate Last Admin    acetaminophen (TYLENOL) tablet 650 mg  650 mg Oral Q4H PRN Clarence Bone MD   650 mg at 11/13/24 0800    bisacodyl (DULCOLAX) suppository 10 mg  10 mg Rectal Daily PRN Clarence Bone MD   10 mg at 11/11/24 1130    calcium gluconate 2 g in  mL intermittent infusion  2 g Intravenous Q8H PRN Farhad Boland MD        calcium gluconate 4 g in sodium chloride 0.9 % 100 mL intermittent infusion  4 g Intravenous Q8H PRN Farhad Boland MD        dextrose 10% infusion   Intravenous Continuous PRN Arlin Hodge PA-C   Stopped at 11/05/24 0600    glucose gel 15-30 g  15-30 g Oral Q15 Min PRN Nasir Vallecillo MD        Or    dextrose 50 %  injection 25-50 mL  25-50 mL Intravenous Q15 Min PRN Nasir Vallecillo MD        Or    glucagon injection 1 mg  1 mg Subcutaneous Q15 Min PRN Nasir Vallecillo MD        diphenhydrAMINE (BENADRYL) 25 mg, acetaminophen (TYLENOL) 500 mg alternative for Tylenol PM   Oral At Bedtime PRN Opal Nguyen PA-C   Given at 11/10/24 2109    EPINEPHrine (ADRENALIN) 5 mg in  mL infusion  0.01-0.1 mcg/kg/min Intravenous Continuous PRN Tyra Dubois MD 11.5 mL/hr at 11/13/24 0800 0.04 mcg/kg/min at 11/13/24 0800    heparin lock flush 10 unit/mL injection 3 mL  3 mL Intracatheter Q1H PRN Tyra Dubois MD        hydrALAZINE (APRESOLINE) injection 10 mg  10 mg Intravenous Q30 Min PRN Clarence Bone MD        HYDROmorphone (DILAUDID) injection 0.2 mg  0.2 mg Intravenous Q2H PRN Tyra Dubois MD   0.2 mg at 11/13/24 0308    lidocaine (LMX4) cream   Topical Q1H PRN Clarence Bone MD        lidocaine 1 % 0.1-1 mL  0.1-1 mL Other Q1H PRN Clarence Bone MD        magnesium hydroxide (MILK OF MAGNESIA) suspension 30 mL  30 mL Oral Daily PRN Clarence Bone MD   30 mL at 11/10/24 1641    magnesium sulfate 2 g in 50 mL sterile water intermittent infusion  2 g Intravenous Q8H PRN Farhad Boland MD   2 g at 11/09/24 1344    melatonin liquid 5 mg  5 mg Oral At Bedtime PRN Opal Nguyen PA-C        methocarbamol (ROBAXIN) tablet 500 mg  500 mg Oral Q6H PRN Clarence Bone MD   500 mg at 11/13/24 0800    naloxone (NARCAN) injection 0.2 mg  0.2 mg Intravenous Q2 Min PRN Mulvihill, Michael, MD        Or    naloxone (NARCAN) injection 0.4 mg  0.4 mg Intravenous Q2 Min PRN Mulvihill, Michael, MD        Or    naloxone (NARCAN) injection 0.2 mg  0.2 mg Intramuscular Q2 Min PRN Mulvihill, Michael, MD        Or    naloxone (NARCAN) injection 0.4 mg  0.4 mg Intramuscular Q2 Min PRN Mulvihill, Michael, MD        No heparin required   Does not apply Continuous PRN Chau  Bhanu COLE MD        No heparin required   Does not apply Continuous PRN Bhanu Ritchie MD        No heparin required   Does not apply Continuous PRN Farhad Boland MD        ondansetron (ZOFRAN ODT) ODT tab 4 mg  4 mg Oral Q6H PRN Clarence Bone MD        Or    ondansetron (ZOFRAN) injection 4 mg  4 mg Intravenous Q6H PRN Clarence Bone MD        oxyCODONE IR (ROXICODONE) half-tab 2.5 mg  2.5 mg Oral Q4H PRN Clarence Bone MD   2.5 mg at 11/02/24 0214    Or    oxyCODONE (ROXICODONE) tablet 5 mg  5 mg Oral Q4H PRN Clarence Bone MD   5 mg at 11/13/24 1002    Patient may continue current oral medications   Does not apply Continuous PRN Bhanu Ness MD        potassium chloride 20 mEq in 50 mL intermittent infusion  20 mEq Intravenous Q8H PRN Farhad Boland MD 50 mL/hr at 11/04/24 1200 20 mEq at 11/04/24 1200    prochlorperazine (COMPAZINE) injection 5 mg  5 mg Intravenous Q6H PRN Clarence Bone MD        Or    prochlorperazine (COMPAZINE) tablet 5 mg  5 mg Oral Q6H PRN Clarence Bone MD        Reason beta blocker order not selected   Does not apply DOES NOT GO TO Clarence Olguin MD        senna-docusate (SENOKOT-S/PERICOLACE) 8.6-50 MG per tablet 2 tablet  2 tablet Oral or Feeding Tube At Bedtime PRN Orion Lua PA-C        sodium chloride (PF) 0.9% PF flush 3 mL  3 mL Intracatheter q1 min prn Clarence Bone MD        sodium chloride 0.9% BOLUS 1,000 mL  1,000 mL CRRT Q1H PRN Bhanu Ritchie MD        sodium chloride 0.9% BOLUS 1-250 mL  1-250 mL Intravenous Q1H PRN Carol Enrique MD        sodium chloride 0.9% BOLUS 1-250 mL  1-250 mL Intravenous Q1H PRN Miguel Floyd MD        sodium phosphate 15 mmol in NS 250mL intermittent infusion  15 mmol Intravenous Q8H PRN Farhad Boland MD   15 mmol at 11/12/24 0659         Physical Exam  Vitals were reviewed  Blood pressure 107/45, pulse 92, temperature 98.5  F (36.9  C), temperature source  "Axillary, resp. rate 22, height 1.778 m (5' 10\"), weight 97.8 kg (215 lb 9.8 oz), SpO2 98%.  Rhythm: intermittent NSR and atrial fibrillation    Lungs: diminished bases     Cardiovascular: irregular rate/rhythm, no m/r/g    Abdomen: soft, NT, ND, +BS    Extremeties: 3+ BLE/BUE edema    Incision: CDI    CT: pleural tubes serosang output 660<1.7<1.7L<840<1.1<860<750<975<1580 mL, no air leak    Weight:   Vitals:    11/09/24 0630 11/10/24 0231 11/11/24 0600 11/12/24 0500   Weight: 100 kg (220 lb 7.4 oz) 100.2 kg (220 lb 14.4 oz) 102.1 kg (225 lb 1.4 oz) 100 kg (220 lb 7.4 oz)    11/13/24 0500   Weight: 97.8 kg (215 lb 9.8 oz)         Data  Recent Labs   Lab 11/13/24  0518 11/12/24  2244 11/12/24  1805 11/12/24  1424 11/12/24  1414 11/12/24  1031 11/12/24  0514 11/11/24  0840 11/11/24  0637   WBC 38.1*  --  42.6*  --   --   --  45.0*   < > 42.3*   HGB 9.0*  --  9.0*  --   --   --  9.2*   < > 8.8*   MCV 87  --  86  --   --   --  86   < > 87     --  270  --   --   --  321   < > 285   INR 1.62*  --   --   --   --   --  1.68*  --  1.68*    138  --   --  139  --  139   < > 137   POTASSIUM 4.7 4.4  --   --  4.0  --  3.9   < > 3.9   CHLORIDE 105 104  --   --  105  --  103   < > 101   CO2 22 22  --   --  23  --  23   < > 23   BUN 33.2* 33.5*  --   --  35.5*  --  35.1*   < > 37.5*   CR 1.27* 1.31*  --   --  1.39*  --  1.41*   < > 1.55*   ANIONGAP 13 12  --   --  11  --  13   < > 13   TATE 8.4* 8.5*  --   --  8.8  --  9.3   < > 9.5   *  112* 125*  --  124* 127*   < > 134*  134*   < > 135*   ALBUMIN 3.8 3.8  --   --  3.7  --  3.8   < > 4.0   PROTTOTAL 5.7* 5.6*  --   --  5.4*  --  5.5*   < > 5.8*   BILITOTAL 1.3* 1.4*  --   --  1.4*  --  1.5*   < > 1.5*   ALKPHOS 153* 143  --   --  139  --  144   < > 142   ALT 58 58  --   --  53  --  57   < > 63   AST 75* 82*  --   --  54*  --  65*   < > 56*    < > = values in this interval not displayed.       Imaging:  Recent Results (from the past 24 hours)   XR Chest Port 1 " View    Narrative    CHEST ONE VIEW  11/12/2024 10:46 AM     HISTORY: increased oxygen needs.    COMPARISON: Chest radiograph 11/11/2024      Impression    IMPRESSION: Similar bilateral central venous catheters, bilateral  chest tubes, median sternotomy, left atrial appendage clip, and  feeding tube coursing below the diaphragm.    No discernible pneumothorax. Mild increased small layering bilateral  pleural effusions and atelectasis.    COLLEEN JOHNSON MD         SYSTEM ID:  [id number]   XR Chest Port 1 View    Narrative    CHEST ONE VIEW  11/13/2024 8:55 AM     HISTORY: pulmonary edema, interval change    COMPARISON: Chest radiograph 11/12/2024      Impression    IMPRESSION: Feeding tube, chest tubes, left atrial appendage clip,  bilateral central venous catheters, and median sternotomy are similar  to prior.    No discernible pneumothorax. Worsening right mid/lower lung haziness  likely reflecting layering pleural fluid, atelectasis and/or  infection. Similar left lower lung opacity.    COLLEEN JOHNSON MD         SYSTEM ID:  [id number]               Patient seen and discussed with Dr. Alexy Nguyen PA-C  Cardiothoracic Surgery  Available for paging [phone] (personal pager or CV Surgery Rounding Pager)  Personal Pager: [phone]  CV Surgery Rounding Pager: [phone]  After hours please page surgeon on-call

## 2024-11-14 ENCOUNTER — APPOINTMENT (OUTPATIENT)
Dept: PHYSICAL THERAPY | Facility: CLINIC | Age: 89
DRG: 233 | End: 2024-11-14
Attending: STUDENT IN AN ORGANIZED HEALTH CARE EDUCATION/TRAINING PROGRAM
Payer: MEDICARE

## 2024-11-14 ENCOUNTER — APPOINTMENT (OUTPATIENT)
Dept: OCCUPATIONAL THERAPY | Facility: CLINIC | Age: 89
DRG: 233 | End: 2024-11-14
Attending: STUDENT IN AN ORGANIZED HEALTH CARE EDUCATION/TRAINING PROGRAM
Payer: MEDICARE

## 2024-11-14 ENCOUNTER — APPOINTMENT (OUTPATIENT)
Dept: SPEECH THERAPY | Facility: CLINIC | Age: 89
DRG: 233 | End: 2024-11-14
Attending: STUDENT IN AN ORGANIZED HEALTH CARE EDUCATION/TRAINING PROGRAM
Payer: MEDICARE

## 2024-11-14 LAB
ALBUMIN SERPL BCG-MCNC: 3.8 G/DL (ref 3.5–5.2)
ALLEN'S TEST: ABNORMAL
ALP SERPL-CCNC: 140 U/L (ref 40–150)
ALT SERPL W P-5'-P-CCNC: 50 U/L (ref 0–70)
ANION GAP SERPL CALCULATED.3IONS-SCNC: 12 MMOL/L (ref 7–15)
AST SERPL W P-5'-P-CCNC: 49 U/L (ref 0–45)
BACTERIA BLD CULT: NO GROWTH
BASE EXCESS BLDA CALC-SCNC: -1.4 MMOL/L (ref -3–3)
BILIRUB SERPL-MCNC: 1.2 MG/DL
BUN SERPL-MCNC: 34.7 MG/DL (ref 8–23)
CA-I BLD-MCNC: 4.2 MG/DL (ref 4.4–5.2)
CALCIUM SERPL-MCNC: 8.2 MG/DL (ref 8.8–10.4)
CHLORIDE SERPL-SCNC: 105 MMOL/L (ref 98–107)
COHGB MFR BLD: 98 % (ref 95–96)
CREAT SERPL-MCNC: 1.39 MG/DL (ref 0.67–1.17)
EGFRCR SERPLBLD CKD-EPI 2021: 49 ML/MIN/1.73M2
ERYTHROCYTE [DISTWIDTH] IN BLOOD BY AUTOMATED COUNT: 19.7 % (ref 10–15)
ERYTHROCYTE [DISTWIDTH] IN BLOOD BY AUTOMATED COUNT: 19.9 % (ref 10–15)
GLUCOSE BLDC GLUCOMTR-MCNC: 100 MG/DL (ref 70–99)
GLUCOSE BLDC GLUCOMTR-MCNC: 122 MG/DL (ref 70–99)
GLUCOSE SERPL-MCNC: 120 MG/DL (ref 70–99)
GLUCOSE SERPL-MCNC: 120 MG/DL (ref 70–99)
HCO3 BLD-SCNC: 22 MMOL/L (ref 21–28)
HCO3 SERPL-SCNC: 22 MMOL/L (ref 22–29)
HCT VFR BLD AUTO: 25.2 % (ref 40–53)
HCT VFR BLD AUTO: 25.9 % (ref 40–53)
HGB BLD-MCNC: 7.8 G/DL (ref 13.3–17.7)
HGB BLD-MCNC: 8 G/DL (ref 13.3–17.7)
INR PPP: 1.57 (ref 0.85–1.15)
LACTATE SERPL-SCNC: 2.5 MMOL/L (ref 0.7–2)
MAGNESIUM SERPL-MCNC: 2.4 MG/DL (ref 1.7–2.3)
MCH RBC QN AUTO: 26.6 PG (ref 26.5–33)
MCH RBC QN AUTO: 27 PG (ref 26.5–33)
MCHC RBC AUTO-ENTMCNC: 30.9 G/DL (ref 31.5–36.5)
MCHC RBC AUTO-ENTMCNC: 31 G/DL (ref 31.5–36.5)
MCV RBC AUTO: 86 FL (ref 78–100)
MCV RBC AUTO: 88 FL (ref 78–100)
O2/TOTAL GAS SETTING VFR VENT: 44 %
PCO2 BLD: 33 MM HG (ref 35–45)
PH BLD: 7.45 [PH] (ref 7.35–7.45)
PHOSPHATE SERPL-MCNC: 2.6 MG/DL (ref 2.5–4.5)
PLATELET # BLD AUTO: 202 10E3/UL (ref 150–450)
PLATELET # BLD AUTO: 223 10E3/UL (ref 150–450)
PO2 BLD: 84 MM HG (ref 80–105)
POTASSIUM SERPL-SCNC: 4.4 MMOL/L (ref 3.4–5.3)
PROT SERPL-MCNC: 5.6 G/DL (ref 6.4–8.3)
RBC # BLD AUTO: 2.93 10E6/UL (ref 4.4–5.9)
RBC # BLD AUTO: 2.96 10E6/UL (ref 4.4–5.9)
SAO2 % BLDA: 95 % (ref 92–100)
SODIUM SERPL-SCNC: 139 MMOL/L (ref 135–145)
UFH PPP CHRO-ACNC: <0.1 IU/ML
WBC # BLD AUTO: 26.3 10E3/UL (ref 4–11)
WBC # BLD AUTO: 29.5 10E3/UL (ref 4–11)

## 2024-11-14 PROCEDURE — 85014 HEMATOCRIT: CPT | Performed by: PHYSICIAN ASSISTANT

## 2024-11-14 PROCEDURE — 85610 PROTHROMBIN TIME: CPT | Performed by: PHYSICIAN ASSISTANT

## 2024-11-14 PROCEDURE — 97530 THERAPEUTIC ACTIVITIES: CPT | Mod: GO

## 2024-11-14 PROCEDURE — 82310 ASSAY OF CALCIUM: CPT | Performed by: PHYSICIAN ASSISTANT

## 2024-11-14 PROCEDURE — 250N000013 HC RX MED GY IP 250 OP 250 PS 637: Performed by: PHYSICIAN ASSISTANT

## 2024-11-14 PROCEDURE — 99233 SBSQ HOSP IP/OBS HIGH 50: CPT | Performed by: INTERNAL MEDICINE

## 2024-11-14 PROCEDURE — 84155 ASSAY OF PROTEIN SERUM: CPT | Performed by: PHYSICIAN ASSISTANT

## 2024-11-14 PROCEDURE — 250N000013 HC RX MED GY IP 250 OP 250 PS 637: Performed by: SURGERY

## 2024-11-14 PROCEDURE — 99291 CRITICAL CARE FIRST HOUR: CPT | Mod: 24 | Performed by: INTERNAL MEDICINE

## 2024-11-14 PROCEDURE — 83605 ASSAY OF LACTIC ACID: CPT | Performed by: PHYSICIAN ASSISTANT

## 2024-11-14 PROCEDURE — 92526 ORAL FUNCTION THERAPY: CPT | Mod: GN | Performed by: SPEECH-LANGUAGE PATHOLOGIST

## 2024-11-14 PROCEDURE — 85041 AUTOMATED RBC COUNT: CPT | Performed by: PHYSICIAN ASSISTANT

## 2024-11-14 PROCEDURE — 97530 THERAPEUTIC ACTIVITIES: CPT | Mod: GP

## 2024-11-14 PROCEDURE — 120N000004 HC R&B MS OVERFLOW

## 2024-11-14 PROCEDURE — 250N000009 HC RX 250: Performed by: INTERNAL MEDICINE

## 2024-11-14 PROCEDURE — 82330 ASSAY OF CALCIUM: CPT | Performed by: PHYSICIAN ASSISTANT

## 2024-11-14 PROCEDURE — 250N000011 HC RX IP 250 OP 636: Mod: JZ | Performed by: INTERNAL MEDICINE

## 2024-11-14 PROCEDURE — P9047 ALBUMIN (HUMAN), 25%, 50ML: HCPCS | Mod: JZ | Performed by: PHYSICIAN ASSISTANT

## 2024-11-14 PROCEDURE — 83735 ASSAY OF MAGNESIUM: CPT | Performed by: PHYSICIAN ASSISTANT

## 2024-11-14 PROCEDURE — 250N000011 HC RX IP 250 OP 636: Mod: JZ | Performed by: PHYSICIAN ASSISTANT

## 2024-11-14 PROCEDURE — 250N000013 HC RX MED GY IP 250 OP 250 PS 637: Performed by: STUDENT IN AN ORGANIZED HEALTH CARE EDUCATION/TRAINING PROGRAM

## 2024-11-14 PROCEDURE — 250N000011 HC RX IP 250 OP 636: Performed by: SURGERY

## 2024-11-14 PROCEDURE — 250N000011 HC RX IP 250 OP 636: Performed by: STUDENT IN AN ORGANIZED HEALTH CARE EDUCATION/TRAINING PROGRAM

## 2024-11-14 PROCEDURE — 85520 HEPARIN ASSAY: CPT | Performed by: STUDENT IN AN ORGANIZED HEALTH CARE EDUCATION/TRAINING PROGRAM

## 2024-11-14 PROCEDURE — 84100 ASSAY OF PHOSPHORUS: CPT | Performed by: INTERNAL MEDICINE

## 2024-11-14 PROCEDURE — 84295 ASSAY OF SERUM SODIUM: CPT | Performed by: PHYSICIAN ASSISTANT

## 2024-11-14 PROCEDURE — 82805 BLOOD GASES W/O2 SATURATION: CPT | Performed by: INTERNAL MEDICINE

## 2024-11-14 RX ORDER — QUETIAPINE FUMARATE 25 MG/1
25 TABLET, FILM COATED ORAL AT BEDTIME
Status: DISCONTINUED | OUTPATIENT
Start: 2024-11-14 | End: 2024-11-15 | Stop reason: HOSPADM

## 2024-11-14 RX ADMIN — ACETAMINOPHEN 650 MG: 325 TABLET, FILM COATED ORAL at 12:03

## 2024-11-14 RX ADMIN — QUETIAPINE FUMARATE 25 MG: 25 TABLET ORAL at 21:01

## 2024-11-14 RX ADMIN — Medication 40 MG: at 16:13

## 2024-11-14 RX ADMIN — CALCIUM GLUCONATE 2 G: 20 INJECTION, SOLUTION INTRAVENOUS at 05:27

## 2024-11-14 RX ADMIN — CALCIUM CHLORIDE, MAGNESIUM CHLORIDE, SODIUM CHLORIDE, SODIUM BICARBONATE, POTASSIUM CHLORIDE AND SODIUM PHOSPHATE DIBASIC DIHYDRATE 5000 ML: 3.68; 3.05; 6.34; 3.09; .314; .187 INJECTION INTRAVENOUS at 06:05

## 2024-11-14 RX ADMIN — CALCIUM GLUCONATE 2 G: 20 INJECTION, SOLUTION INTRAVENOUS at 00:13

## 2024-11-14 RX ADMIN — ACETAMINOPHEN 650 MG: 325 TABLET, FILM COATED ORAL at 07:45

## 2024-11-14 RX ADMIN — HYDROMORPHONE HYDROCHLORIDE 0.2 MG: 0.2 INJECTION, SOLUTION INTRAMUSCULAR; INTRAVENOUS; SUBCUTANEOUS at 00:07

## 2024-11-14 RX ADMIN — MIDODRINE HYDROCHLORIDE 20 MG: 5 TABLET ORAL at 12:03

## 2024-11-14 RX ADMIN — Medication 3 ML: at 11:19

## 2024-11-14 RX ADMIN — HYDROMORPHONE HYDROCHLORIDE 0.2 MG: 0.2 INJECTION, SOLUTION INTRAMUSCULAR; INTRAVENOUS; SUBCUTANEOUS at 19:36

## 2024-11-14 RX ADMIN — Medication 60 ML: at 08:00

## 2024-11-14 RX ADMIN — CALCIUM CHLORIDE, MAGNESIUM CHLORIDE, SODIUM CHLORIDE, SODIUM BICARBONATE, POTASSIUM CHLORIDE AND SODIUM PHOSPHATE DIBASIC DIHYDRATE 5000 ML: 3.68; 3.05; 6.34; 3.09; .314; .187 INJECTION INTRAVENOUS at 03:07

## 2024-11-14 RX ADMIN — HYDROMORPHONE HYDROCHLORIDE 0.2 MG: 0.2 INJECTION, SOLUTION INTRAMUSCULAR; INTRAVENOUS; SUBCUTANEOUS at 04:57

## 2024-11-14 RX ADMIN — ACETAMINOPHEN 650 MG: 325 TABLET, FILM COATED ORAL at 00:23

## 2024-11-14 RX ADMIN — Medication 60 ML: at 20:51

## 2024-11-14 RX ADMIN — CALCIUM CHLORIDE, MAGNESIUM CHLORIDE, SODIUM CHLORIDE, SODIUM BICARBONATE, POTASSIUM CHLORIDE AND SODIUM PHOSPHATE DIBASIC DIHYDRATE 5000 ML: 3.68; 3.05; 6.34; 3.09; .314; .187 INJECTION INTRAVENOUS at 06:27

## 2024-11-14 RX ADMIN — ALBUMIN HUMAN 25 G: 0.25 SOLUTION INTRAVENOUS at 07:45

## 2024-11-14 RX ADMIN — ALLOPURINOL 100 MG: 100 TABLET ORAL at 08:00

## 2024-11-14 RX ADMIN — ASPIRIN 81 MG CHEWABLE TABLET 162 MG: 81 TABLET CHEWABLE at 08:00

## 2024-11-14 RX ADMIN — METHOCARBAMOL 500 MG: 500 TABLET ORAL at 07:45

## 2024-11-14 RX ADMIN — ACETAMINOPHEN 650 MG: 325 TABLET, FILM COATED ORAL at 17:28

## 2024-11-14 RX ADMIN — METHOCARBAMOL 500 MG: 500 TABLET ORAL at 20:50

## 2024-11-14 RX ADMIN — Medication 40 MG: at 07:45

## 2024-11-14 RX ADMIN — OXYCODONE HYDROCHLORIDE 5 MG: 5 TABLET ORAL at 09:39

## 2024-11-14 RX ADMIN — MIDODRINE HYDROCHLORIDE 20 MG: 5 TABLET ORAL at 17:28

## 2024-11-14 RX ADMIN — MIDODRINE HYDROCHLORIDE 20 MG: 5 TABLET ORAL at 08:00

## 2024-11-14 RX ADMIN — OXYCODONE HYDROCHLORIDE 5 MG: 5 TABLET ORAL at 02:15

## 2024-11-14 RX ADMIN — ONDANSETRON 4 MG: 2 INJECTION INTRAMUSCULAR; INTRAVENOUS at 07:29

## 2024-11-14 NOTE — PROGRESS NOTES
CV  Pressors (which pressors and any increase/decrease in pressor needs): Levo 0.05  HR range: 70s-80s  Chest tube output: Minimal 0-5mL through shift. 6am turn w/ cleanup, CT Dumped Output 360mL as previously has w/ big turns.    Neuro  Orientation: Disoriented to situation, remains confused.  Delirium present?(y/n): Yes  Sleep: None  Pain: pt says acceptable pain level, Dilaudid, Oxycodone, Tylenol given.    GI/  BM? (y/n): Yes, 2 Large, Loose, BMs  Urine output: CRRT      Lines: L)internal jugular HD cath, L) Radial Art line, R) Subclavian TLC.

## 2024-11-14 NOTE — PLAN OF CARE
Goal Outcome Evaluation:      Plan of Care Reviewed With: patient    Overall Patient Progress: no changeOverall Patient Progress: no change    Outcome Evaluation:   - Pt remains alert but confused, Follows commands, Very weak movement in all extremites.  - Afib, SBP >100 (Goal) w/ Levo gtt.   - 6L NC, SpO2 >90%. LS: Diminished bases. Chest tubes minimal throughout shift until 6am turn to cleanup, Output 360mL, serosanguinous  (CV surg aware this occurs).   - TF @ goal 65mL/hr, fwf 60q8h. Abdomen active, soft. BM x2, Large, Loose, Liquid.   - Anuria on CRRT, Net Removal rate -50 to -100mL/hr.   - Generalized pain, Dilaudid 0.2mg IV, Oxycodone 5mg, Tylenol given.   - Skin see Wound charting.   - Heparin gtt @ 500u/hr.  - 2200: iCal 4.2- Replaced 2g Calcium gluc.   - 0500: iCal 4.2- Replaced 2g Calcium gluc. Hgb 8.0.        Problem: Adult Inpatient Plan of Care  Goal: Plan of Care Review  Problem: Cardiovascular Surgery  Goal: Effective Bowel Elimination  Outcome: Not Progressing  Intervention: Enhance Bowel Motility and Elimination  Recent Flowsheet Documentation  Taken 11/14/2024 0400 by Toy Sanz RN  Bowel Elimination Management: relaxation techniques promoted  Taken 11/14/2024 0000 by Toy Sanz RN  Bowel Elimination Management: relaxation techniques promoted  Taken 11/13/2024 2000 by Toy Sanz RN  Bowel Elimination Management: relaxation techniques promoted     Problem: Skin Injury Risk Increased  Goal: Skin Health and Integrity  Outcome: Not Progressing  Intervention: Plan: Nurse Driven Intervention: Positioning  Recent Flowsheet Documentation  Taken 11/14/2024 0400 by Toy Sanz RN  Plan: Positioning Interventions:   REPOSITION Left/Right (No supine) q2h   Use wedge positioners   HOB 30 degrees or less   OFF-LOAD HEELS with boots  Taken 11/14/2024 0000 by Toy Sanz RN  Plan: Positioning Interventions:   REPOSITION Left/Right (No supine) q2h   Use wedge positioners   HOB  30 degrees or less   OFF-LOAD HEELS with boots  Taken 11/13/2024 2000 by Toy Sanz RN  Plan: Positioning Interventions:   REPOSITION Left/Right (No supine) q2h   Use wedge positioners   HOB 30 degrees or less   OFF-LOAD HEELS with boots  Intervention: Plan: Nurse Driven Intervention: Moisture Management  Recent Flowsheet Documentation  Taken 11/14/2024 0600 by Toy Sanz RN  Bathing/Skin Care:   incontinence care   linen changed  Taken 11/14/2024 0400 by Toy Sanz RN  Moisture Interventions:   Incontinence pad   Perineal cleanser   Barrier ointment (CriticAid, Triad paste)   Wicking textile in skin fold (InterDry)  Taken 11/14/2024 0100 by Toy Sanz RN  Bathing/Skin Care:   bath, complete   wipes, CHG   dressed/undressed   electrode patches/site rotation   incontinence care   linen changed  Taken 11/14/2024 0000 by Toy Sanz RN  Moisture Interventions:   Incontinence pad   Perineal cleanser   Barrier ointment (CriticAid, Triad paste)   Wicking textile in skin fold (InterDry)  Taken 11/13/2024 2000 by Toy Sanz RN  Moisture Interventions:   Incontinence pad   Perineal cleanser   Barrier ointment (CriticAid, Triad paste)   Wicking textile in skin fold (InterDry)  Intervention: Plan: Nurse Driven Intervention: Friction and Shear  Recent Flowsheet Documentation  Taken 11/14/2024 0400 by Toy Sanz RN  Friction/Shear Interventions:   HOB 30 degrees or less   Silicone foam sacral dressing   Repositioning device (TAP system, etc.)   Assistive lifting device (portable/ceiling lift, etc.)  Taken 11/14/2024 0000 by Toy Snaz RN  Friction/Shear Interventions:   HOB 30 degrees or less   Silicone foam sacral dressing   Repositioning device (TAP system, etc.)   Assistive lifting device (portable/ceiling lift, etc.)  Taken 11/13/2024 2000 by Toy Sanz RN  Friction/Shear Interventions:   HOB 30 degrees or less   Silicone foam sacral dressing    Repositioning device (TAP system, etc.)   Assistive lifting device (portable/ceiling lift, etc.)  Intervention: Optimize Skin Protection  Recent Flowsheet Documentation  Taken 11/14/2024 0600 by Toy Sanz RN  Activity Management:   bedrest   activity adjusted per tolerance  Head of Bed (HOB) Positioning: HOB at 30 degrees  Taken 11/14/2024 0400 by Toy Sanz RN  Pressure Reduction Techniques:   frequent weight shift encouraged   heels elevated off bed   positioned off wounds   pressure points protected   weight shift assistance provided  Pressure Reduction Devices:   foam padding utilized   heel offloading device utilized   positioning supports utilized   pressure-redistributing mattress utilized  Skin Protection:   adhesive use limited   drying agents applied   incontinence pads utilized   protective footwear used   pulse oximeter probe site changed   silicone foam dressing in place   skin to device areas padded   transparent dressing maintained  Activity Management:   bedrest   activity adjusted per tolerance  Head of Bed (HOB) Positioning: HOB at 30 degrees  Taken 11/14/2024 0200 by Toy Sanz RN  Activity Management:   bedrest   activity adjusted per tolerance  Head of Bed (HOB) Positioning: HOB at 30 degrees  Taken 11/14/2024 0000 by Toy Sanz RN  Pressure Reduction Techniques:   frequent weight shift encouraged   heels elevated off bed   positioned off wounds   pressure points protected   weight shift assistance provided  Pressure Reduction Devices:   foam padding utilized   heel offloading device utilized   positioning supports utilized   pressure-redistributing mattress utilized  Skin Protection:   adhesive use limited   drying agents applied   incontinence pads utilized   protective footwear used   pulse oximeter probe site changed   silicone foam dressing in place   skin to device areas padded   transparent dressing maintained  Activity Management:   bedrest   activity  adjusted per tolerance  Head of Bed (HOB) Positioning: HOB at 30 degrees  Taken 11/13/2024 2200 by Toy aSnz RN  Activity Management:   bedrest   activity adjusted per tolerance  Head of Bed (HOB) Positioning: HOB at 30 degrees  Taken 11/13/2024 2000 by Toy Sanz, RN  Pressure Reduction Techniques:   frequent weight shift encouraged   heels elevated off bed   positioned off wounds   pressure points protected   weight shift assistance provided  Pressure Reduction Devices:   foam padding utilized   heel offloading device utilized   positioning supports utilized   pressure-redistributing mattress utilized  Skin Protection:   adhesive use limited   drying agents applied   incontinence pads utilized   protective footwear used   pulse oximeter probe site changed   silicone foam dressing in place   skin to device areas padded   transparent dressing maintained  Activity Management:   bedrest   activity adjusted per tolerance  Head of Bed (HOB) Positioning: HOB at 30 degrees     Problem: Risk for Delirium  Goal: Improved Sleep  Outcome: Not Progressing

## 2024-11-14 NOTE — PROGRESS NOTES
[facility]    Nephrology Progress Note     Assessment & Plan     1. Oliguric JAVIER/CKD IIIb.       Pt remains on CRRT.    He is tolerating some fluid removal.      Non citrate  Dialysate K 4 1500 mL/hour  Post Filter Replacement K 4 200 mL/hour  Pre Filter Replacement K 4 800 mL/hour     2.  Severe shock.  Improved.  On low dose NE.     3.  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  CV Surg is managing the post-op care.  A.  Per CV Surg.     4.  FEN.  Phos better.  K, Na, Mg are ok.     5.  Code Status.  Pt is full code.  His prognosis remains guarded.        Plan:     Stop CRRT  Plan IHD tomorrow.              Bhanu Ritchie MD  [facility] - Nephrology  [phone]    Interval History     Sitting up in chair.  NE dose down to   0.02 mcg/kg/min  He is still not  fully oriented.    Weight down to 95.7 kg.      Physical Exam   Temp: 96.9  F (36.1  C) Temp src: Axillary   Pulse: 84   Resp: 17 SpO2: 92 % O2 Device: Nasal cannula Oxygen Delivery: 4 LPM  Vitals:    11/12/24 0500 11/13/24 0500 11/14/24 0600   Weight: 100 kg (220 lb 7.4 oz) 97.8 kg (215 lb 9.8 oz) 95.7 kg (210 lb 15.7 oz)     Vital Signs with Ranges  Temp:  [96.5  F (35.8  C)-97.9  F (36.6  C)] 96.9  F (36.1  C)  Pulse:  [67-92] 84  Resp:  [13-37] 17  MAP:  [50 mmHg-76 mmHg] 66 mmHg  Arterial Line BP: ()/(36-56) 113/47  SpO2:  [88 %-100 %] 92 %  I/O last 3 completed shifts:  In: 3283.37 [I.V.:1123.37; NG/GT:600]  Out: 4626 [Other:4346; Chest Tube:280]    GENERAL APPEARANCE: pleasant, NAD, a & o  HEENT:  Eyes/ears/nose/neck grossly normal  RESP: lungs cta b c good efforts, no crackles, rhonchi or wheezes  CV: RRR, nl S1/S2, no m/r/g   ABDOMEN: o/s/nt/nd, bs present  EXTREMITIES/SKIN: no rashes/lesions; tr BLE edema    Medications   Current Facility-Administered Medications   Medication Dose Route Frequency Provider Last Rate Last Admin    dextrose 10% infusion   Intravenous Continuous PRN Arlin Hodge PA-C   Stopped at 11/05/24  0600    EPINEPHrine (ADRENALIN) 5 mg in  mL infusion  0.01-0.1 mcg/kg/min Intravenous Continuous PRN Tyra Dubois MD   Stopped at 11/13/24 1856    heparin infusion 25,000 units in 0.45% NaCl 250 mL ANTICOAGULANT  500 Units/hr Intravenous Continuous Opal Nguyen PA-C 5 mL/hr at 11/13/24 2000 500 Units/hr at 11/13/24 2000    lactated ringers infusion   Intravenous Continuous Opal Nguyen PA-C 10 mL/hr at 11/12/24 2022 New Bag at 11/12/24 2022    norepinephrine (LEVOPHED) 16 mg in  mL infusion MAX CONC CENTRAL LINE  0.01-0.2 mcg/kg/min Intravenous Continuous Tyra Dubois MD 1.8 mL/hr at 11/14/24 1230 0.02 mcg/kg/min at 11/14/24 1230    Patient may continue current oral medications   Does not apply Continuous PRN Bhanu Ness MD        Reason beta blocker order not selected   Does not apply DOES NOT GO TO Clarence Olguin MD        vasopressin 0.2 units/mL in NS (PITRESSIN) standard conc infusion  0.5-4 Units/hr Intravenous Continuous Tyra Dubois MD   Stopped at 11/11/24 1130     Current Facility-Administered Medications   Medication Dose Route Frequency Provider Last Rate Last Admin    allopurinol (ZYLOPRIM) tablet 100 mg  100 mg Oral Daily Opal Nguyen PA-C   100 mg at 11/14/24 0800    aspirin (ASA) chewable tablet 162 mg  162 mg Oral or NG Tube Daily Clarence Bone MD   162 mg at 11/14/24 0800    midodrine (PROAMATINE) tablet 20 mg  20 mg Oral TID w/meals Opal Nguyen PA-C   20 mg at 11/14/24 1203    pantoprazole (PROTONIX) 2 mg/mL suspension 40 mg  40 mg Oral or NG Tube BID AC Arlin Hodge PA-C   40 mg at 11/14/24 0745    Or    pantoprazole (PROTONIX) EC tablet 40 mg  40 mg Oral BID AC Arlin Hodge PA-C        Prosource TF20 ENfit Compatibl EN LIQD (PROSOURCE TF20) packet 60 mL  1 packet Per Feeding Tube BID Arlin Hodge PA-C   60 mL at 11/14/24 0800    QUEtiapine (SEROquel) tablet 25 mg  25 mg Oral At  Bedtime Tyra Dubois MD        sodium chloride (PF) 0.9% PF flush 3 mL  3 mL Intracatheter Q8H Clarence Bone MD   3 mL at 11/14/24 0507       Data   BMP  Recent Labs   Lab 11/14/24  1237 11/14/24  0505 11/13/24  2232 11/13/24  1438 11/13/24  0518   NA  --  139 135 135 140   POTASSIUM  --  4.4 4.4 4.6 4.7   CHLORIDE  --  105 101 102 105   TATE  --  8.2* 8.4* 8.2* 8.4*   CO2  --  22 21* 22 22   BUN  --  34.7* 37.2* 35.5* 33.2*   CR  --  1.39* 1.41* 1.33* 1.27*   * 120*  120* 101* 126* 112*  112*     Phos@[email or web address](phos:4)  CBC)  Recent Labs   Lab 11/14/24  0505 11/13/24 2232 11/13/24  1749 11/13/24  0518   WBC 26.3* 29.3* 32.1* 38.1*   HGB 8.0* 7.9* 8.3* 9.0*   HCT 25.9* 25.3* 27.2* 28.9*   MCV 88 86 87 87    225 243 293     Recent Labs   Lab 11/14/24  0505   AST 49*   ALT 50   ALKPHOS 140   BILITOTAL 1.2     Recent Labs   Lab 11/14/24  0505   INR 1.57*       Attestation:   I have reviewed today's relevant vital signs, notes, medications, labs and imaging.

## 2024-11-14 NOTE — PROGRESS NOTES
Care Management Follow Up    Length of Stay (days): 17    Expected Discharge Date: 11/16/2024     Concerns to be Addressed: other (see comments)     Patient plan of care discussed at interdisciplinary rounds: Yes    Anticipated Discharge Disposition: Skilled Nursing Facility              Anticipated Discharge Services:    Anticipated Discharge DME:      Patient/family educated on Medicare website which has current facility and service quality ratings:    Education Provided on the Discharge Plan:    Patient/Family in Agreement with the Plan: yes    Referrals Placed by CM/SW: Post Acute Facilities  Private pay costs discussed: Not applicable    Discussed  Partnership in Safe Discharge Planning  document with patient/family: No     Handoff Completed: Yes, [facility] PCP: Internal handoff referral completed    Additional Information:  Writer spoke with Radha at LTACH admissions regarding if patient would qualify for LTACH.  Radha requested formal referral and she would do a chart review and follow along to see if patient appropriate.     Next Steps: Care coordination team will continue to follow .    Yuki Chahal RN Care Coordinator  [facility]  [phone]

## 2024-11-14 NOTE — PROGRESS NOTES
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 Michael Mulvihill.    POD # 16 s/p return to OR for mediastinal re-exploration, washout    - CVS: Pre-op TTE with EF 35-40%. Postop TTE 10/30 with EF ~35% on high-dose inotropic support.  Gtt's: Norepi: 0.04 midodrine started 11/11/24 inc 20 mg tid 11/14  Postop mixed cardiogenic/vasoplegic/hypovolemic shock. Lactic acidosis cleared and SVO2 stabilized. CI goal >2.0 (calculated elaine), stopped DBU 11/4 no back on after dec in SVO2 with inc in lact back on at 2.5.   Hypervolemic, volume management via CRRT, pull as able. CVP goal ~10-12. Now tolerating more aggressive removal on hold as becoming euvolemic.   Hypothermic since initiation on CRRT, Thermagaurd catheter placed 10/31, target 37 C.   Stress dose steroids started 10/31, off 11/02 PM.  History of paroxysmal atrial fibrillation, continues with intermittent afib and accelerated junctional at times. Previously being atrial paced at 100, Amio discontinued with shock liver-resolved. Heparin gtt started 11/12 with straight rate 500U/hr.    Absent right radial pulse (previous arterial line in this location). ICU MD did bedside US, ulnar artery patent. Right hand warm, good capillary refill. Increased duskiness of right hand noted after starting vasopressin, which demonstrated patent arterial flow with slow biphasic flow at the distal radial artery (site of previous arterial line).   Aspirin 162 mg daily, defer statin with ALI.    Chest tubes: output 660<1.7L<840<860<750<975<970 mL, serosang, not blood, fluid overloaded-pleural effusions on CT, no air leak. TPW  removed with mediastinal tubes 11/9  CT C/A/P w/contrast showing pleural effusions, benign a/p    - Resp: Acute hypoxemic and hypercapnic respiratory failure, stable. Extubated pod#9    - Neuro: Grossly intact, pain controlled with current regimen. History of myasthenia gravis.      - Renal: CKD stage 3, baseline Cr ~1.8. Postop oliguric/anuric JAVIER. Nephrology consulted, CRRT started 10/30. Avoid nephrotoxins.  Discussed transitioning to iHD with neph but pressor still too high  Recent Labs   Lab 11/14/24  0505 11/13/24 2232 11/13/24  1438   CR 1.39* 1.41* 1.33*       - GI: +BM, +flatus, continue bowel regimen. Shock liver, improving. Trend LFTs, avoid hepatotoxins. Recent admission for GIB 09/2024, increased pantoprazole to BID. Diarrhea with initiation of TF, rectal tube placed 11/05     - : bladder scan q shift. Anuric on CRRT    - Endo: Postop stress hyperglycemia, resolved. Insulin infusion transitioned to sliding scale insulin. Stress dose steroids 10/31-11/02 as above.   Hemoglobin A1C   Date Value Ref Range Status   10/09/2024 4.5 <5.7 % Final     Comment:     Normal <5.7%   Prediabetes 5.7-6.4%    Diabetes 6.5% or higher     Note: Adopted from ADA consensus guidelines.        - FEN: Replace electrolytes as needed. Initiated on trophic feeds via OG 11/1, NJ placed 11/2 after INR came down, TF increase to goal. SLP following along-silent aspiration noted 11/12 with assessment.     - ID: WBC chronically elevated and hypothermic on CRRT as above so difficult to assess for possible infection;  empiric Vanc/Zosyn 10/31-11/04. Blood, urine, respiratory cultures NGTD. WBC elevated.  Trend CBC and fever curve. 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 Acute blood loss anemia due to  "surgery. Postop coagulopathy, improved (required multiple blood transfusions and blood productions immediately postop). 1 unit RBCs 11/3. 1 unit RBC 11/5 Trend CBC, transfuse PRN. 1U PRBC 11/7/24, 2U 11/9  Recent Labs   Lab 11/14/24  0505 11/13/24  2232 11/13/24  1749   HGB 8.0* 7.9* 8.3*    225 243       - Proph: SCD, subcutaneous heparin, PPI    - Other:  Clinically Significant Risk Factors           # Hypocalcemia: Lowest Ca = 8.2 mg/dL in last 2 days, will monitor and replace as appropriate     # Hypoalbuminemia: Lowest albumin = 2.7 g/dL at 11/2/2024  5:44 AM, will monitor as appropriate    # Coagulation Defect: INR = 1.57 (Ref range: 0.85 - 1.15) and/or PTT = 42 Seconds (Ref range: 22 - 38 Seconds), will monitor for bleeding    # Hypertension: Noted on problem list  # Chronic heart failure with reduced ejection fraction: last echo with EF <40%   # Acute Hypoxic Respiratory Failure: Documented O2 saturation < 90%. Continue supplemental oxygen as needed  # Acute Hypercapnic Respiratory Failure: based on arterial blood gas results.  Continue supplemental oxygen and ventilatory support as indicated.  # Acute Hypercapnic Respiratory Failure: based on venous blood gas results.  Continue supplemental oxygen and ventilatory support as indicated.      #Acute blood loss anemia: Lowest Hgb this hospitalization: 6.6 g/dL. Will continue to monitor and treat/transfuse as appropriate.     # Obesity: Estimated body mass index is 30.27 kg/m  as calculated from the following:    Height as of this encounter: 1.778 m (5' 10\").    Weight as of this encounter: 95.7 kg (210 lb 15.7 oz).   # Moderate Malnutrition: based on nutrition assessment     # History of CABG: noted on surgical history       - Dispo: ICU. Continue rehab, up to chair. Discuss iHD with neph. Seroquel started this evening to help with sleep. Wean norepinephrine as able. With separate chest tubes. Continues on midodrine 20 mg tid. Continue to monitor. "       Interval History  States that he continues to have pain. Breathing is ok. Did not sleep last night.    Medications  Current Facility-Administered Medications   Medication Dose Route Frequency Provider Last Rate Last Admin    albumin human 25 % injection 25 g  25 g Intravenous Q12H Arlin Hodge PA-C 100 mL/hr at 11/07/24 2012 25 g at 11/14/24 0745    allopurinol (ZYLOPRIM) tablet 100 mg  100 mg Oral Daily Opal Nguyen PA-C   100 mg at 11/14/24 0800    aspirin (ASA) chewable tablet 162 mg  162 mg Oral or NG Tube Daily Clarence Bone MD   162 mg at 11/14/24 0800    midodrine (PROAMATINE) tablet 20 mg  20 mg Oral TID w/meals Opal Nguyen PA-C   20 mg at 11/14/24 0800    pantoprazole (PROTONIX) 2 mg/mL suspension 40 mg  40 mg Oral or NG Tube BID AC Arlin Hodge PA-C   40 mg at 11/14/24 0745    Or    pantoprazole (PROTONIX) EC tablet 40 mg  40 mg Oral BID AC Arlin Hodge PA-C        Prosource TF20 ENfit Compatibl EN LIQD (PROSOURCE TF20) packet 60 mL  1 packet Per Feeding Tube BID Arlin Hodge PA-C   60 mL at 11/14/24 0800    QUEtiapine (SEROquel) tablet 25 mg  25 mg Oral At Bedtime Tyra Dubois MD        sodium chloride (PF) 0.9% PF flush 3 mL  3 mL Intracatheter Q8H Clarence Bone MD   3 mL at 11/14/24 0507     Current Facility-Administered Medications   Medication Dose Route Frequency Provider Last Rate Last Admin    acetaminophen (TYLENOL) tablet 650 mg  650 mg Oral Q4H PRN Clarence Bone MD   650 mg at 11/14/24 0745    bisacodyl (DULCOLAX) suppository 10 mg  10 mg Rectal Daily PRN Clarence Bone MD   10 mg at 11/11/24 1130    calcium gluconate 2 g in  mL intermittent infusion  2 g Intravenous Q8H PRN Farhad Boland MD   2 g at 11/14/24 0527    calcium gluconate 4 g in sodium chloride 0.9 % 100 mL intermittent infusion  4 g Intravenous Q8H PRN Farhad Boland MD        dextrose 10% infusion   Intravenous Continuous PRN Arlin Hodge  TERRELL   Stopped at 11/05/24 0600    glucose gel 15-30 g  15-30 g Oral Q15 Min PRN Nasir Vallecillo MD        Or    dextrose 50 % injection 25-50 mL  25-50 mL Intravenous Q15 Min PRN Nasir Vallecillo MD        Or    glucagon injection 1 mg  1 mg Subcutaneous Q15 Min PRN Nasir Vallecillo MD        diphenhydrAMINE (BENADRYL) 25 mg, acetaminophen (TYLENOL) 500 mg alternative for Tylenol PM   Oral At Bedtime PRN Opal Nguyen PA-C   Given at 11/10/24 2109    EPINEPHrine (ADRENALIN) 5 mg in  mL infusion  0.01-0.1 mcg/kg/min Intravenous Continuous PRN Tyra Dubois MD   Stopped at 11/13/24 1856    heparin lock flush 10 unit/mL injection 3 mL  3 mL Intracatheter Q1H PRN Tyra Dubois MD        hydrALAZINE (APRESOLINE) injection 10 mg  10 mg Intravenous Q30 Min PRN Clarence Bone MD        HYDROmorphone (DILAUDID) injection 0.2 mg  0.2 mg Intravenous Q2H PRN Tyra Dubois MD   0.2 mg at 11/14/24 0457    lidocaine (LMX4) cream   Topical Q1H PRN Clarence Bone MD        lidocaine 1 % 0.1-1 mL  0.1-1 mL Other Q1H PRN Clarence Bone MD        magnesium hydroxide (MILK OF MAGNESIA) suspension 30 mL  30 mL Oral Daily PRN Clarence Bone MD   30 mL at 11/10/24 1641    magnesium sulfate 2 g in 50 mL sterile water intermittent infusion  2 g Intravenous Q8H PRN Farhad Boland MD   2 g at 11/09/24 1344    melatonin liquid 5 mg  5 mg Oral At Bedtime PRN Opal Nguyen PA-C        methocarbamol (ROBAXIN) tablet 500 mg  500 mg Oral Q6H PRN Clarence Bone MD   500 mg at 11/14/24 0745    naloxone (NARCAN) injection 0.2 mg  0.2 mg Intravenous Q2 Min PRN Mulvihill, Michael, MD        Or    naloxone (NARCAN) injection 0.4 mg  0.4 mg Intravenous Q2 Min PRN Mulvihill, Michael, MD        Or    naloxone (NARCAN) injection 0.2 mg  0.2 mg Intramuscular Q2 Min PRN Mulvihill, Michael, MD        Or    naloxone (NARCAN) injection 0.4 mg  0.4 mg Intramuscular Q2  Min PRN Mulvihill, Michael, MD        No heparin required   Does not apply Continuous PRN Bhanu Ritchie MD        No heparin required   Does not apply Continuous PRN Bhanu Ritchie MD        No heparin required   Does not apply Continuous PRN Farhad Boland MD        ondansetron (ZOFRAN ODT) ODT tab 4 mg  4 mg Oral Q6H PRN Clarence Bone MD        Or    ondansetron (ZOFRAN) injection 4 mg  4 mg Intravenous Q6H PRN Clarence Bone MD   4 mg at 11/14/24 0729    oxyCODONE IR (ROXICODONE) half-tab 2.5 mg  2.5 mg Oral Q4H PRN Clarence Bone MD   2.5 mg at 11/02/24 0214    Or    oxyCODONE (ROXICODONE) tablet 5 mg  5 mg Oral Q4H PRN Clarence Bnoe MD   5 mg at 11/14/24 0215    Patient may continue current oral medications   Does not apply Continuous PRN Bhanu Ness MD        potassium chloride 20 mEq in 50 mL intermittent infusion  20 mEq Intravenous Q8H PRN Farhad Boland MD 50 mL/hr at 11/04/24 1200 20 mEq at 11/04/24 1200    prochlorperazine (COMPAZINE) injection 5 mg  5 mg Intravenous Q6H PRN Clarence Bone MD        Or    prochlorperazine (COMPAZINE) tablet 5 mg  5 mg Oral Q6H PRN Clarence Bone MD        Reason beta blocker order not selected   Does not apply DOES NOT GO TO Clarence Olguin MD        senna-docusate (SENOKOT-S/PERICOLACE) 8.6-50 MG per tablet 2 tablet  2 tablet Oral or Feeding Tube At Bedtime PRN Orion Lua PA-C        sodium chloride (PF) 0.9% PF flush 3 mL  3 mL Intracatheter q1 min prn Clarence Bone MD        sodium chloride 0.9% BOLUS 1,000 mL  1,000 mL CRRT Q1H PRN Bhanu Ritchie MD        sodium chloride 0.9% BOLUS 1-250 mL  1-250 mL Intravenous Q1H PRN Carol Enrique MD        sodium chloride 0.9% BOLUS 1-250 mL  1-250 mL Intravenous Q1H PRN Miguel Floyd MD        sodium phosphate 15 mmol in NS 250mL intermittent infusion  15 mmol Intravenous Q8H PRN Farhad Boland MD   15 mmol at 11/12/24 0628  "        Physical Exam  Vitals were reviewed  Blood pressure 107/45, pulse 80, temperature 97.6  F (36.4  C), temperature source Axillary, resp. rate 22, height 1.778 m (5' 10\"), weight 95.7 kg (210 lb 15.7 oz), SpO2 98%.  Gen: up in bed, comfortable, +O2    Lungs: diminished bases     Cardiovascular: RRR, telemetry SR 70s, -edema LE    Abdomen: soft, NT, ND, +BS    Incision: sternal incision D/I   R LE incisions D/I    CT: bilateral pleural tubes with serous output, -leak    Norepi 0.04 mcg/kg/min    +heparin gtt    TF 65 ml/hr    Weight:   Vitals:    11/10/24 0231 11/11/24 0600 11/12/24 0500 11/13/24 0500   Weight: 100.2 kg (220 lb 14.4 oz) 102.1 kg (225 lb 1.4 oz) 100 kg (220 lb 7.4 oz) 97.8 kg (215 lb 9.8 oz)    11/14/24 0600   Weight: 95.7 kg (210 lb 15.7 oz)         Data  Recent Labs   Lab 11/14/24  0505 11/13/24  2232 11/13/24  1749 11/13/24  1438 11/13/24  0518 11/12/24  1031 11/12/24  0514   WBC 26.3* 29.3* 32.1*  --  38.1*   < > 45.0*   HGB 8.0* 7.9* 8.3*  --  9.0*   < > 9.2*   MCV 88 86 87  --  87   < > 86    225 243  --  293   < > 321   INR 1.57*  --   --   --  1.62*  --  1.68*    135  --  135 140   < > 139   POTASSIUM 4.4 4.4  --  4.6 4.7   < > 3.9   CHLORIDE 105 101  --  102 105   < > 103   CO2 22 21*  --  22 22   < > 23   BUN 34.7* 37.2*  --  35.5* 33.2*   < > 35.1*   CR 1.39* 1.41*  --  1.33* 1.27*   < > 1.41*   ANIONGAP 12 13  --  11 13   < > 13   TATE 8.2* 8.4*  --  8.2* 8.4*   < > 9.3   *  120* 101*  --  126* 112*  112*   < > 134*  134*   ALBUMIN 3.8 4.0  --  4.0 3.8   < > 3.8   PROTTOTAL 5.6* 5.7*  --  6.0* 5.7*   < > 5.5*   BILITOTAL 1.2 1.3*  --  1.4* 1.3*   < > 1.5*   ALKPHOS 140 139  --  149 153*   < > 144   ALT 50 51  --  54 58   < > 57   AST 49* 60*  --  60* 75*   < > 65*    < > = values in this interval not displayed.       Imaging:  No results found for this or any previous visit (from the past 24 hours).      Patient seen and discussed with Dr. Mulvihill Dustin " TERRELL Lua  Cardiothoracic Surgery  Personal Pager: [phone]   normal...

## 2024-11-14 NOTE — PROGRESS NOTES
[id number]    CV  Pressors (which pressors and any increase/decrease in pressor needs): Levo @ 0.04  HR range: 80s. Goal SBP > 100, <140  Chest tube output:  0-20 mL/hr     Neuro  Orientation: A&Ox3. Disoriented to situation  Delirium present?(y/n): Yes. mild  Sleep: napped in the afternoon  Pain: moderate. Managed with Robaxin, Tylenol, Oxycodone, and IV dilaudid     GI/  BM? (y/n): Yes  Urine output: anuric. CRRT stopped. HD planned for tomorrow        Lines:  L internal jugular HD, R subclavian CVC, L art line     Dangled and max assist stood at bedside with PT in the afternoon and up to chair via the lift for 2 hours. Midodrine increased today. Family updated at bedside.

## 2024-11-14 NOTE — PROGRESS NOTES
ICU Progress Note   Date of Service: 11/14/24     Assessment and Plan:  88 year old M with a history of severe 3 vessel CAD. Status post sternotomy, CABGx3, modified MAZE procedure, PAYAM ligation with Dr. Mulvihill 10/28/24. Post operative hemorrhage with RTOR on POD0 for evacuation of mediastinal blood clot causing tamponade physiology. Post operative course complicated by kidney failure requiring CRRT and persistent cardiogenic/vasoplegic shock.     Interval events:   POD17  Did not sleep well   Still oriented x3 this morning when questioned. But moaning.   Epi weaned off. Levophed on 0.05.     Today:   Seroquel at bedtime   Wean levophed   Discuss HD with nephrology   Therapies     Neuro:  Alert and oriented x3 this morning. Following commands in all 4 extremities.  Intermittently confused.      Pain control: PRN tylenol, robaxin, oxycodone, dilaudid     #Delirium  #Insomnia   Delirium precautions   At bedtime melatonin and seroquel     CV:  #Hx of atrial fibrillation and distant PE on Eliquis   #Hx NSTEMI   #Severe multivessel CAD   Status post sternotomy, CABGx3, modified MAZE procedure, PAYAM ligation with Dr. Mulvihill 10/28/24. Post operative hemorrhage with RTOR on POD0 for evacuation of mediastinal blood clot causing tamponade physiology. Postoperative course has been tenuous with mixed cardiogenic, vasoplegic shock and renal failure on CRRT.     Dobutamine stopped 10/9  Systolic goal >100 in an effort to wean pressors.     Pre-operative workup:   Angiogram showed an occluded RCA, ost LAD to mid LAD of 80% and mid circ to distal circ with 99%. Echocardiogram showed an EF of 35-40%. The RV systolic function was normal. There was also mild mitral regurg.    - Echo 10/30/24: Left ventricle is normal in size. Biplane LVEF 35%. There is moderate global hypokinesia of the left ventricle. Limited view of the RV.     Plan:   Systolic >100, wean pressors as able.   Cardiac meds per CVTS  Aspirin 162 mg   PTA  "atorvastatin - resume per CVTS   PTA torsemide - hold with hypotension   Heparin rate 500/hr per CVTS   Midodrine 10 mg TID started 11/11. Increased to 20 mg TID 11/13/24.     Pulm:  # Acute hypoxemic respiratory failure  Exubated pod9    No AM CXR   Pulling fluid on CRRT, tolerating (-1.3 L yesterday)   Pulmonary hygiene with IS and flutter valve    Wean O2 as able     GI:  #Hx upper GIB, gastritis   Recent admission discharged 9/16/2024. Endoscopy revealing gastritis.   PTA pantoprazole     #Shock liver, improved     NJ feeding tube - feeds at goal   Bowel regimen.  Speech consult - failed. Speech following. Continue tube feeds.     Renal:  #Acute on chronic kidney injury   Pre operative cr 1.8   Now 1.39  Anuric kidney failure, nephrology following. Remains anuric.   CRRT started 10/30/24. Will discuss transition to HD.   Intermittent bladder scan, no burt.     ID:  WBC not reliable with CLL, 26  Afebrile (but on CRRT)   Not on antibiotics     Cultures:   Urine 10/30/24 no growth   Sputum 10/31/24 ordered. Will obtain today.   Blood 10/29/24 NGTD   Blood 10/31/24 NGTD   Respiratory cultures 11/1/24 - mixed paddy    Blood cultures 11/9/2024 - NGTD  Catheter tip Aylin 11/10/24 - <15 CFU Staph epidermidis     Heme:  Low dose heparin gtt for atrial fibrillation - per CVTS  SCDs  Hgb 8.0    Plt 202     #Hx CLL   Follows with Dr. Raymundo hematology   Follow leukocytosis     MSK:   #Hx Myasthenia gravis     Endo:  No history of DM   A1C 4.5   Daily glc checks   No insulin     PPx:  1. DVT ppx: heparin gtt  2. VAP: HOB 30 degrees, chlorhexidine rinse  3. Stress Ulcer: PPI  4. Restraints: n/a   5. Wound care - per unit routine   6. Feeding - tube feeds, speech consult today.   7. Family updated at the bedside.     Dispo: ICU    /45 (BP Location: Left arm, Patient Position: Semi-Magaña's, Cuff Size: Adult Regular)   Pulse 80   Temp 97.6  F (36.4  C) (Axillary)   Resp 22   Ht 1.778 m (5' 10\")   Wt 95.7 kg (210 lb " 15.7 oz)   SpO2 98%   BMI 30.27 kg/m      Resp: 22    I/Os  -1.3 L over 24 hours   BLE edema improving     Physical Exam:  In bed   Awake and alert   Pupils 2 mm equal and reactive to light   Eyes open spontaneously. Follows commands in all 4 extremities. Oriented x3.   Chest rise equal bilaterally   CT with thin serosanguinous output, no air leak, to suction   No burt   Abdomen soft, ND   Bilateral feet warm to the touch   Lower extremities 1+  edema     Labs: reviewed  All labs reviewed     Imaging: reviewed  No imaging     Past Medical/Surgical history   Atrial fibrillation   Gout   GERD   Gastritis   Bilateral PE in 2007   Myasthenia gravis   HTN   Meningioma   MARTHA   Spinal stenosis     Family history   Not discussed today     Social history   Not discussed today     Carla Dubois MD   SICU Fellow

## 2024-11-14 NOTE — PLAN OF CARE
.CV  Pressors (which pressors and any increase/decrease in pressor needs): Epi @ 0.02 Levo @ 0.04  HR range: 80s. Goal SBP > 100, <140  Chest tube output: 0 mL output over 12 hours.      Neuro  Orientation: A&Ox3. Disoriented to situation  Delirium present?(y/n): Yes. mild  Sleep: napped in the afternoon  Pain: moderate. Managed with Robaxin, Tylenol, Oxycodone, and IV dilaudid     GI/  BM? (y/n): Yes  Urine output: anuric. CRRT continued. Net negative  mL/hr. Tolerating well        Lines:  L internal jugular HD, R subclavian CVC, L art line     Dangled and max assist stood at bedside with PT in the afternoon and up to chair via the lift for 2 hours. Replaced Calcium per protocol. Midodrine increased today. Family updated at bedside.

## 2024-11-15 ENCOUNTER — ANESTHESIA EVENT (OUTPATIENT)
Dept: INTENSIVE CARE | Facility: CLINIC | Age: 89
End: 2024-11-15
Payer: MEDICARE

## 2024-11-15 ENCOUNTER — APPOINTMENT (OUTPATIENT)
Dept: GENERAL RADIOLOGY | Facility: CLINIC | Age: 89
DRG: 233 | End: 2024-11-15
Attending: STUDENT IN AN ORGANIZED HEALTH CARE EDUCATION/TRAINING PROGRAM
Payer: MEDICARE

## 2024-11-15 ENCOUNTER — ANESTHESIA (OUTPATIENT)
Dept: INTENSIVE CARE | Facility: CLINIC | Age: 89
End: 2024-11-15
Payer: MEDICARE

## 2024-11-15 VITALS
TEMPERATURE: 97.2 F | BODY MASS INDEX: 30.08 KG/M2 | OXYGEN SATURATION: 98 % | SYSTOLIC BLOOD PRESSURE: 110 MMHG | HEIGHT: 70 IN | WEIGHT: 210.1 LBS | DIASTOLIC BLOOD PRESSURE: 64 MMHG

## 2024-11-15 LAB
ALBUMIN SERPL BCG-MCNC: 3.7 G/DL (ref 3.5–5.2)
ALBUMIN SERPL BCG-MCNC: 3.8 G/DL (ref 3.5–5.2)
ALBUMIN SERPL BCG-MCNC: 3.8 G/DL (ref 3.5–5.2)
ALLEN'S TEST: ABNORMAL
ALP SERPL-CCNC: 190 U/L (ref 40–150)
ALP SERPL-CCNC: 192 U/L (ref 40–150)
ALT SERPL W P-5'-P-CCNC: 42 U/L (ref 0–70)
ALT SERPL W P-5'-P-CCNC: 44 U/L (ref 0–70)
ANION GAP SERPL CALCULATED.3IONS-SCNC: 21 MMOL/L (ref 7–15)
ANION GAP SERPL CALCULATED.3IONS-SCNC: 23 MMOL/L (ref 7–15)
ANION GAP SERPL CALCULATED.3IONS-SCNC: 23 MMOL/L (ref 7–15)
AST SERPL W P-5'-P-CCNC: 51 U/L (ref 0–45)
AST SERPL W P-5'-P-CCNC: 53 U/L (ref 0–45)
BASE EXCESS BLDA CALC-SCNC: -10.4 MMOL/L (ref -3–3)
BASE EXCESS BLDA CALC-SCNC: -4.4 MMOL/L (ref -3–3)
BASE EXCESS BLDA CALC-SCNC: -4.8 MMOL/L (ref -3–3)
BILIRUB DIRECT SERPL-MCNC: 0.79 MG/DL (ref 0–0.3)
BILIRUB SERPL-MCNC: 1.4 MG/DL
BILIRUB SERPL-MCNC: 1.4 MG/DL
BUN SERPL-MCNC: 52.7 MG/DL (ref 8–23)
BUN SERPL-MCNC: 56.7 MG/DL (ref 8–23)
BUN SERPL-MCNC: 57.8 MG/DL (ref 8–23)
C DIFF TOX B STL QL: NEGATIVE
CA-I BLD-MCNC: 4.1 MG/DL (ref 4.4–5.2)
CA-I BLD-MCNC: 4.1 MG/DL (ref 4.4–5.2)
CALCIUM SERPL-MCNC: 8.2 MG/DL (ref 8.8–10.4)
CALCIUM SERPL-MCNC: 8.5 MG/DL (ref 8.8–10.4)
CALCIUM SERPL-MCNC: 8.6 MG/DL (ref 8.8–10.4)
CHLORIDE SERPL-SCNC: 102 MMOL/L (ref 98–107)
COHGB MFR BLD: 89.1 % (ref 95–96)
COHGB MFR BLD: 96.5 % (ref 95–96)
COHGB MFR BLD: 99.8 % (ref 95–96)
CREAT SERPL-MCNC: 2.08 MG/DL (ref 0.67–1.17)
CREAT SERPL-MCNC: 2.23 MG/DL (ref 0.67–1.17)
CREAT SERPL-MCNC: 2.29 MG/DL (ref 0.67–1.17)
EGFRCR SERPLBLD CKD-EPI 2021: 27 ML/MIN/1.73M2
EGFRCR SERPLBLD CKD-EPI 2021: 28 ML/MIN/1.73M2
EGFRCR SERPLBLD CKD-EPI 2021: 30 ML/MIN/1.73M2
ERYTHROCYTE [DISTWIDTH] IN BLOOD BY AUTOMATED COUNT: 19.8 % (ref 10–15)
ERYTHROCYTE [DISTWIDTH] IN BLOOD BY AUTOMATED COUNT: 20.2 % (ref 10–15)
ERYTHROCYTE [DISTWIDTH] IN BLOOD BY AUTOMATED COUNT: 20.3 % (ref 10–15)
GLUCOSE BLDC GLUCOMTR-MCNC: 118 MG/DL (ref 70–99)
GLUCOSE BLDC GLUCOMTR-MCNC: 124 MG/DL (ref 70–99)
GLUCOSE BLDC GLUCOMTR-MCNC: 38 MG/DL (ref 70–99)
GLUCOSE BLDC GLUCOMTR-MCNC: 59 MG/DL (ref 70–99)
GLUCOSE BLDC GLUCOMTR-MCNC: 80 MG/DL (ref 70–99)
GLUCOSE SERPL-MCNC: 45 MG/DL (ref 70–99)
GLUCOSE SERPL-MCNC: 51 MG/DL (ref 70–99)
GLUCOSE SERPL-MCNC: 72 MG/DL (ref 70–99)
GLUCOSE SERPL-MCNC: 72 MG/DL (ref 70–99)
HCO3 BLD-SCNC: 14 MMOL/L (ref 21–28)
HCO3 BLD-SCNC: 20 MMOL/L (ref 21–28)
HCO3 BLD-SCNC: 20 MMOL/L (ref 21–28)
HCO3 SERPL-SCNC: 13 MMOL/L (ref 22–29)
HCO3 SERPL-SCNC: 18 MMOL/L (ref 22–29)
HCO3 SERPL-SCNC: 19 MMOL/L (ref 22–29)
HCT VFR BLD AUTO: 25.1 % (ref 40–53)
HCT VFR BLD AUTO: 25.4 % (ref 40–53)
HCT VFR BLD AUTO: 26.8 % (ref 40–53)
HGB BLD-MCNC: 7.5 G/DL (ref 13.3–17.7)
HGB BLD-MCNC: 7.6 G/DL (ref 13.3–17.7)
HGB BLD-MCNC: 8.2 G/DL (ref 13.3–17.7)
INR PPP: 1.6 (ref 0.85–1.15)
INR PPP: 1.61 (ref 0.85–1.15)
LACTATE SERPL-SCNC: 10 MMOL/L (ref 0.7–2)
LACTATE SERPL-SCNC: 8 MMOL/L (ref 0.7–2)
LACTATE SERPL-SCNC: 8.5 MMOL/L (ref 0.7–2)
MCH RBC QN AUTO: 26.5 PG (ref 26.5–33)
MCH RBC QN AUTO: 26.6 PG (ref 26.5–33)
MCH RBC QN AUTO: 26.8 PG (ref 26.5–33)
MCHC RBC AUTO-ENTMCNC: 29.5 G/DL (ref 31.5–36.5)
MCHC RBC AUTO-ENTMCNC: 30.3 G/DL (ref 31.5–36.5)
MCHC RBC AUTO-ENTMCNC: 30.6 G/DL (ref 31.5–36.5)
MCV RBC AUTO: 88 FL (ref 78–100)
MCV RBC AUTO: 88 FL (ref 78–100)
MCV RBC AUTO: 90 FL (ref 78–100)
O2/TOTAL GAS SETTING VFR VENT: 21 %
O2/TOTAL GAS SETTING VFR VENT: 80 %
O2/TOTAL GAS SETTING VFR VENT: 90 %
PCO2 BLD: 28 MM HG (ref 35–45)
PCO2 BLD: 34 MM HG (ref 35–45)
PCO2 BLD: 36 MM HG (ref 35–45)
PEEP: 10 CM H2O
PH BLD: 7.33 [PH] (ref 7.35–7.45)
PH BLD: 7.36 [PH] (ref 7.35–7.45)
PH BLD: 7.38 [PH] (ref 7.35–7.45)
PHOSPHATE SERPL-MCNC: 4.2 MG/DL (ref 2.5–4.5)
PLATELET # BLD AUTO: 227 10E3/UL (ref 150–450)
PLATELET # BLD AUTO: 237 10E3/UL (ref 150–450)
PLATELET # BLD AUTO: 259 10E3/UL (ref 150–450)
PO2 BLD: 130 MM HG (ref 80–105)
PO2 BLD: 56 MM HG (ref 80–105)
PO2 BLD: 80 MM HG (ref 80–105)
POTASSIUM SERPL-SCNC: 5.3 MMOL/L (ref 3.4–5.3)
POTASSIUM SERPL-SCNC: 5.7 MMOL/L (ref 3.4–5.3)
POTASSIUM SERPL-SCNC: 6.5 MMOL/L (ref 3.4–5.3)
PROT SERPL-MCNC: 5.5 G/DL (ref 6.4–8.3)
PROT SERPL-MCNC: 5.7 G/DL (ref 6.4–8.3)
RBC # BLD AUTO: 2.82 10E6/UL (ref 4.4–5.9)
RBC # BLD AUTO: 2.87 10E6/UL (ref 4.4–5.9)
RBC # BLD AUTO: 3.06 10E6/UL (ref 4.4–5.9)
SAO2 % BLDA: 86 % (ref 92–100)
SAO2 % BLDA: 94 % (ref 92–100)
SAO2 % BLDA: 97 % (ref 92–100)
SODIUM SERPL-SCNC: 138 MMOL/L (ref 135–145)
SODIUM SERPL-SCNC: 141 MMOL/L (ref 135–145)
SODIUM SERPL-SCNC: 144 MMOL/L (ref 135–145)
TROPONIN T SERPL HS-MCNC: 270 NG/L
TROPONIN T SERPL HS-MCNC: 275 NG/L
UFH PPP CHRO-ACNC: <0.1 IU/ML
WBC # BLD AUTO: 45.7 10E3/UL (ref 4–11)
WBC # BLD AUTO: 45.9 10E3/UL (ref 4–11)
WBC # BLD AUTO: 78.2 10E3/UL (ref 4–11)

## 2024-11-15 PROCEDURE — 250N000013 HC RX MED GY IP 250 OP 250 PS 637: Performed by: PHYSICIAN ASSISTANT

## 2024-11-15 PROCEDURE — 06HY33Z INSERTION OF INFUSION DEVICE INTO LOWER VEIN, PERCUTANEOUS APPROACH: ICD-10-PCS | Performed by: STUDENT IN AN ORGANIZED HEALTH CARE EDUCATION/TRAINING PROGRAM

## 2024-11-15 PROCEDURE — 85520 HEPARIN ASSAY: CPT | Performed by: STUDENT IN AN ORGANIZED HEALTH CARE EDUCATION/TRAINING PROGRAM

## 2024-11-15 PROCEDURE — 82248 BILIRUBIN DIRECT: CPT | Performed by: INTERNAL MEDICINE

## 2024-11-15 PROCEDURE — 250N000011 HC RX IP 250 OP 636: Performed by: INTERNAL MEDICINE

## 2024-11-15 PROCEDURE — 250N000011 HC RX IP 250 OP 636: Performed by: PHYSICIAN ASSISTANT

## 2024-11-15 PROCEDURE — 82805 BLOOD GASES W/O2 SATURATION: CPT | Performed by: STUDENT IN AN ORGANIZED HEALTH CARE EDUCATION/TRAINING PROGRAM

## 2024-11-15 PROCEDURE — 999N000157 HC STATISTIC RCP TIME EA 10 MIN

## 2024-11-15 PROCEDURE — 999N000065 XR ABDOMEN PORT 1 VIEW

## 2024-11-15 PROCEDURE — 80069 RENAL FUNCTION PANEL: CPT | Performed by: STUDENT IN AN ORGANIZED HEALTH CARE EDUCATION/TRAINING PROGRAM

## 2024-11-15 PROCEDURE — 250N000013 HC RX MED GY IP 250 OP 250 PS 637: Performed by: SURGERY

## 2024-11-15 PROCEDURE — 84484 ASSAY OF TROPONIN QUANT: CPT | Performed by: STUDENT IN AN ORGANIZED HEALTH CARE EDUCATION/TRAINING PROGRAM

## 2024-11-15 PROCEDURE — 71045 X-RAY EXAM CHEST 1 VIEW: CPT

## 2024-11-15 PROCEDURE — 80069 RENAL FUNCTION PANEL: CPT | Performed by: INTERNAL MEDICINE

## 2024-11-15 PROCEDURE — 93005 ELECTROCARDIOGRAM TRACING: CPT

## 2024-11-15 PROCEDURE — 250N000009 HC RX 250: Performed by: INTERNAL MEDICINE

## 2024-11-15 PROCEDURE — 258N000003 HC RX IP 258 OP 636: Performed by: STUDENT IN AN ORGANIZED HEALTH CARE EDUCATION/TRAINING PROGRAM

## 2024-11-15 PROCEDURE — 82310 ASSAY OF CALCIUM: CPT | Performed by: INTERNAL MEDICINE

## 2024-11-15 PROCEDURE — 250N000011 HC RX IP 250 OP 636: Performed by: STUDENT IN AN ORGANIZED HEALTH CARE EDUCATION/TRAINING PROGRAM

## 2024-11-15 PROCEDURE — 258N000003 HC RX IP 258 OP 636: Performed by: PHYSICIAN ASSISTANT

## 2024-11-15 PROCEDURE — 82947 ASSAY GLUCOSE BLOOD QUANT: CPT | Performed by: STUDENT IN AN ORGANIZED HEALTH CARE EDUCATION/TRAINING PROGRAM

## 2024-11-15 PROCEDURE — 94002 VENT MGMT INPAT INIT DAY: CPT

## 2024-11-15 PROCEDURE — 85610 PROTHROMBIN TIME: CPT | Performed by: INTERNAL MEDICINE

## 2024-11-15 PROCEDURE — 83605 ASSAY OF LACTIC ACID: CPT | Performed by: INTERNAL MEDICINE

## 2024-11-15 PROCEDURE — 82947 ASSAY GLUCOSE BLOOD QUANT: CPT | Performed by: INTERNAL MEDICINE

## 2024-11-15 PROCEDURE — 85049 AUTOMATED PLATELET COUNT: CPT | Performed by: STUDENT IN AN ORGANIZED HEALTH CARE EDUCATION/TRAINING PROGRAM

## 2024-11-15 PROCEDURE — 258N000001 HC RX 258: Performed by: INTERNAL MEDICINE

## 2024-11-15 PROCEDURE — 85014 HEMATOCRIT: CPT | Performed by: STUDENT IN AN ORGANIZED HEALTH CARE EDUCATION/TRAINING PROGRAM

## 2024-11-15 PROCEDURE — 250N000009 HC RX 250: Performed by: NURSE ANESTHETIST, CERTIFIED REGISTERED

## 2024-11-15 PROCEDURE — 250N000011 HC RX IP 250 OP 636: Mod: JZ | Performed by: STUDENT IN AN ORGANIZED HEALTH CARE EDUCATION/TRAINING PROGRAM

## 2024-11-15 PROCEDURE — 90947 DIALYSIS REPEATED EVAL: CPT

## 2024-11-15 PROCEDURE — P9047 ALBUMIN (HUMAN), 25%, 50ML: HCPCS | Mod: JZ | Performed by: STUDENT IN AN ORGANIZED HEALTH CARE EDUCATION/TRAINING PROGRAM

## 2024-11-15 PROCEDURE — 99239 HOSP IP/OBS DSCHRG MGMT >30: CPT | Mod: 24 | Performed by: INTERNAL MEDICINE

## 2024-11-15 PROCEDURE — 250N000009 HC RX 250: Performed by: STUDENT IN AN ORGANIZED HEALTH CARE EDUCATION/TRAINING PROGRAM

## 2024-11-15 PROCEDURE — 82330 ASSAY OF CALCIUM: CPT | Performed by: STUDENT IN AN ORGANIZED HEALTH CARE EDUCATION/TRAINING PROGRAM

## 2024-11-15 PROCEDURE — 82330 ASSAY OF CALCIUM: CPT | Performed by: INTERNAL MEDICINE

## 2024-11-15 PROCEDURE — 83605 ASSAY OF LACTIC ACID: CPT | Performed by: STUDENT IN AN ORGANIZED HEALTH CARE EDUCATION/TRAINING PROGRAM

## 2024-11-15 PROCEDURE — 85610 PROTHROMBIN TIME: CPT | Performed by: PHYSICIAN ASSISTANT

## 2024-11-15 PROCEDURE — 85014 HEMATOCRIT: CPT | Performed by: INTERNAL MEDICINE

## 2024-11-15 PROCEDURE — 258N000003 HC RX IP 258 OP 636: Performed by: INTERNAL MEDICINE

## 2024-11-15 PROCEDURE — 85018 HEMOGLOBIN: CPT | Performed by: PHYSICIAN ASSISTANT

## 2024-11-15 PROCEDURE — 82805 BLOOD GASES W/O2 SATURATION: CPT | Performed by: INTERNAL MEDICINE

## 2024-11-15 PROCEDURE — 87493 C DIFF AMPLIFIED PROBE: CPT | Performed by: STUDENT IN AN ORGANIZED HEALTH CARE EDUCATION/TRAINING PROGRAM

## 2024-11-15 RX ORDER — LIDOCAINE 40 MG/G
CREAM TOPICAL
Status: DISCONTINUED | OUTPATIENT
Start: 2024-11-15 | End: 2024-11-15 | Stop reason: HOSPADM

## 2024-11-15 RX ORDER — POTASSIUM CHLORIDE 29.8 MG/ML
20 INJECTION INTRAVENOUS EVERY 8 HOURS PRN
Status: DISCONTINUED | OUTPATIENT
Start: 2024-11-15 | End: 2024-11-15 | Stop reason: HOSPADM

## 2024-11-15 RX ORDER — PROPOFOL 10 MG/ML
5-75 INJECTION, EMULSION INTRAVENOUS CONTINUOUS
Status: DISCONTINUED | OUTPATIENT
Start: 2024-11-15 | End: 2024-11-15

## 2024-11-15 RX ORDER — HYDROCORTISONE SODIUM SUCCINATE 100 MG/2ML
50 INJECTION INTRAMUSCULAR; INTRAVENOUS EVERY 8 HOURS
Status: DISCONTINUED | OUTPATIENT
Start: 2024-11-15 | End: 2024-11-15 | Stop reason: HOSPADM

## 2024-11-15 RX ORDER — MAGNESIUM SULFATE HEPTAHYDRATE 40 MG/ML
2 INJECTION, SOLUTION INTRAVENOUS EVERY 8 HOURS PRN
Status: DISCONTINUED | OUTPATIENT
Start: 2024-11-15 | End: 2024-11-15 | Stop reason: HOSPADM

## 2024-11-15 RX ORDER — ONDANSETRON 2 MG/ML
4 INJECTION INTRAMUSCULAR; INTRAVENOUS EVERY 6 HOURS PRN
Status: DISCONTINUED | OUTPATIENT
Start: 2024-11-15 | End: 2024-11-15 | Stop reason: HOSPADM

## 2024-11-15 RX ORDER — GLYCOPYRROLATE 0.2 MG/ML
0.2 INJECTION, SOLUTION INTRAMUSCULAR; INTRAVENOUS ONCE
Status: COMPLETED | OUTPATIENT
Start: 2024-11-15 | End: 2024-11-15

## 2024-11-15 RX ORDER — ALBUMIN (HUMAN) 12.5 G/50ML
SOLUTION INTRAVENOUS
Status: COMPLETED
Start: 2024-11-15 | End: 2024-11-15

## 2024-11-15 RX ORDER — ONDANSETRON 4 MG/1
4 TABLET, ORALLY DISINTEGRATING ORAL EVERY 6 HOURS PRN
Status: DISCONTINUED | OUTPATIENT
Start: 2024-11-15 | End: 2024-11-15 | Stop reason: HOSPADM

## 2024-11-15 RX ORDER — CALCIUM CHLORIDE, MAGNESIUM CHLORIDE, DEXTROSE MONOHYDRATE, LACTIC ACID, SODIUM CHLORIDE, SODIUM BICARBONATE AND POTASSIUM CHLORIDE 5.15; 2.03; 22; 5.4; 6.46; 3.09; .157 G/L; G/L; G/L; G/L; G/L; G/L; G/L
INJECTION INTRAVENOUS CONTINUOUS
Status: DISCONTINUED | OUTPATIENT
Start: 2024-11-15 | End: 2024-11-15 | Stop reason: HOSPADM

## 2024-11-15 RX ORDER — CALCIUM GLUCONATE 20 MG/ML
2 INJECTION, SOLUTION INTRAVENOUS ONCE
Status: COMPLETED | OUTPATIENT
Start: 2024-11-15 | End: 2024-11-15

## 2024-11-15 RX ORDER — CALCIUM GLUCONATE 20 MG/ML
2 INJECTION, SOLUTION INTRAVENOUS EVERY 8 HOURS PRN
Status: DISCONTINUED | OUTPATIENT
Start: 2024-11-15 | End: 2024-11-15 | Stop reason: HOSPADM

## 2024-11-15 RX ORDER — PROPOFOL 10 MG/ML
10-30 INJECTION, EMULSION INTRAVENOUS CONTINUOUS
Status: DISCONTINUED | OUTPATIENT
Start: 2024-11-15 | End: 2024-11-15 | Stop reason: HOSPADM

## 2024-11-15 RX ORDER — PROPOFOL 10 MG/ML
INJECTION, EMULSION INTRAVENOUS
Status: DISCONTINUED
Start: 2024-11-15 | End: 2024-11-15 | Stop reason: HOSPADM

## 2024-11-15 RX ORDER — MIDAZOLAM HCL IN 0.9 % NACL/PF 1 MG/ML
1-8 PLASTIC BAG, INJECTION (ML) INTRAVENOUS CONTINUOUS
Status: DISCONTINUED | OUTPATIENT
Start: 2024-11-15 | End: 2024-11-15 | Stop reason: HOSPADM

## 2024-11-15 RX ORDER — GLYCOPYRROLATE 0.2 MG/ML
0.1 INJECTION, SOLUTION INTRAMUSCULAR; INTRAVENOUS EVERY 4 HOURS PRN
Status: DISCONTINUED | OUTPATIENT
Start: 2024-11-15 | End: 2024-11-15 | Stop reason: HOSPADM

## 2024-11-15 RX ORDER — PIPERACILLIN SODIUM, TAZOBACTAM SODIUM 3; .375 G/15ML; G/15ML
3.38 INJECTION, POWDER, LYOPHILIZED, FOR SOLUTION INTRAVENOUS EVERY 6 HOURS
Status: DISCONTINUED | OUTPATIENT
Start: 2024-11-15 | End: 2024-11-15 | Stop reason: HOSPADM

## 2024-11-15 RX ORDER — PROCHLORPERAZINE MALEATE 5 MG/1
5 TABLET ORAL EVERY 6 HOURS PRN
Status: DISCONTINUED | OUTPATIENT
Start: 2024-11-15 | End: 2024-11-15 | Stop reason: HOSPADM

## 2024-11-15 RX ORDER — ETOMIDATE 2 MG/ML
INJECTION INTRAVENOUS
Status: DISCONTINUED | OUTPATIENT
Start: 2024-11-15 | End: 2024-11-15

## 2024-11-15 RX ADMIN — PIPERACILLIN AND TAZOBACTAM 3.38 G: 3; .375 INJECTION, POWDER, FOR SOLUTION INTRAVENOUS at 14:30

## 2024-11-15 RX ADMIN — ANGIOTENSIN II 20 NG/KG/MIN: 2.5 INJECTION INTRAVENOUS at 08:17

## 2024-11-15 RX ADMIN — ASPIRIN 81 MG CHEWABLE TABLET 162 MG: 81 TABLET CHEWABLE at 09:46

## 2024-11-15 RX ADMIN — VASOPRESSIN 2.4 UNITS/HR: 20 INJECTION INTRAVENOUS at 08:54

## 2024-11-15 RX ADMIN — PROPOFOL 40 MCG/KG/MIN: 10 INJECTION, EMULSION INTRAVENOUS at 11:45

## 2024-11-15 RX ADMIN — SODIUM BICARBONATE: 84 INJECTION, SOLUTION INTRAVENOUS at 08:41

## 2024-11-15 RX ADMIN — CALCIUM CHLORIDE, MAGNESIUM CHLORIDE, DEXTROSE MONOHYDRATE, LACTIC ACID, SODIUM CHLORIDE, SODIUM BICARBONATE AND POTASSIUM CHLORIDE 5000 ML: 5.15; 2.03; 22; 5.4; 6.46; 3.09; .157 INJECTION INTRAVENOUS at 13:46

## 2024-11-15 RX ADMIN — Medication 50 MCG: at 16:15

## 2024-11-15 RX ADMIN — Medication 40 MG: at 09:46

## 2024-11-15 RX ADMIN — HYDROCORTISONE SODIUM SUCCINATE 50 MG: 100 INJECTION, POWDER, FOR SOLUTION INTRAMUSCULAR; INTRAVENOUS at 09:52

## 2024-11-15 RX ADMIN — Medication 10 MG: at 15:59

## 2024-11-15 RX ADMIN — CALCIUM CHLORIDE, MAGNESIUM CHLORIDE, DEXTROSE MONOHYDRATE, LACTIC ACID, SODIUM CHLORIDE, SODIUM BICARBONATE AND POTASSIUM CHLORIDE 5000 ML: 5.15; 2.03; 22; 5.4; 6.46; 3.09; .157 INJECTION INTRAVENOUS at 05:42

## 2024-11-15 RX ADMIN — EPINEPHRINE 0.18 MCG/KG/MIN: 1 INJECTION INTRAMUSCULAR; INTRAVENOUS; SUBCUTANEOUS at 13:54

## 2024-11-15 RX ADMIN — Medication 150 MEQ: at 05:08

## 2024-11-15 RX ADMIN — VANCOMYCIN HYDROCHLORIDE 2250 MG: 500 INJECTION, POWDER, LYOPHILIZED, FOR SOLUTION INTRAVENOUS at 09:47

## 2024-11-15 RX ADMIN — Medication 150 MCG/HR: at 15:37

## 2024-11-15 RX ADMIN — MIDAZOLAM 2 MG: 1 INJECTION INTRAMUSCULAR; INTRAVENOUS at 15:58

## 2024-11-15 RX ADMIN — ETOMIDATE 10 MG: 2 INJECTION, SOLUTION INTRAVENOUS at 08:04

## 2024-11-15 RX ADMIN — DEXTROSE MONOHYDRATE 50 ML: 25 INJECTION, SOLUTION INTRAVENOUS at 09:30

## 2024-11-15 RX ADMIN — CALCIUM CHLORIDE, MAGNESIUM CHLORIDE, DEXTROSE MONOHYDRATE, LACTIC ACID, SODIUM CHLORIDE, SODIUM BICARBONATE AND POTASSIUM CHLORIDE 5000 ML: 5.15; 2.03; 22; 5.4; 6.46; 3.09; .157 INJECTION INTRAVENOUS at 13:10

## 2024-11-15 RX ADMIN — CALCIUM GLUCONATE 2 G: 20 INJECTION, SOLUTION INTRAVENOUS at 09:51

## 2024-11-15 RX ADMIN — NOREPINEPHRINE BITARTRATE 0.12 MCG/KG/MIN: 0.06 INJECTION, SOLUTION INTRAVENOUS at 02:41

## 2024-11-15 RX ADMIN — DEXTROSE MONOHYDRATE 50 ML: 25 INJECTION, SOLUTION INTRAVENOUS at 03:00

## 2024-11-15 RX ADMIN — Medication 50 MEQ: at 08:18

## 2024-11-15 RX ADMIN — Medication 100 MCG/HR: at 09:04

## 2024-11-15 RX ADMIN — ALBUMIN HUMAN 25 G: 0.25 SOLUTION INTRAVENOUS at 07:34

## 2024-11-15 RX ADMIN — GLYCOPYRROLATE 0.2 MG: 0.2 INJECTION, SOLUTION INTRAMUSCULAR; INTRAVENOUS at 15:36

## 2024-11-15 RX ADMIN — PROPOFOL 30 MCG/KG/MIN: 10 INJECTION, EMULSION INTRAVENOUS at 16:01

## 2024-11-15 RX ADMIN — HEPARIN SODIUM 500 UNITS/HR: 10000 INJECTION, SOLUTION INTRAVENOUS at 11:44

## 2024-11-15 RX ADMIN — ALLOPURINOL 100 MG: 100 TABLET ORAL at 09:48

## 2024-11-15 RX ADMIN — CALCIUM CHLORIDE, MAGNESIUM CHLORIDE, DEXTROSE MONOHYDRATE, LACTIC ACID, SODIUM CHLORIDE, SODIUM BICARBONATE AND POTASSIUM CHLORIDE: 5.15; 2.03; 22; 5.4; 6.46; 3.09; .157 INJECTION INTRAVENOUS at 05:42

## 2024-11-15 RX ADMIN — PIPERACILLIN AND TAZOBACTAM 3.38 G: 3; .375 INJECTION, POWDER, FOR SOLUTION INTRAVENOUS at 09:43

## 2024-11-15 RX ADMIN — HYDROMORPHONE HYDROCHLORIDE 0.2 MG: 0.2 INJECTION, SOLUTION INTRAMUSCULAR; INTRAVENOUS; SUBCUTANEOUS at 01:07

## 2024-11-15 RX ADMIN — PROPOFOL 30 MCG/KG/MIN: 10 INJECTION, EMULSION INTRAVENOUS at 15:38

## 2024-11-15 RX ADMIN — VASOPRESSIN 2.4 UNITS/HR: 20 INJECTION INTRAVENOUS at 03:03

## 2024-11-15 RX ADMIN — SODIUM BICARBONATE: 84 INJECTION, SOLUTION INTRAVENOUS at 15:28

## 2024-11-15 RX ADMIN — SODIUM CHLORIDE 1000 ML: 9 INJECTION, SOLUTION INTRAVENOUS at 03:50

## 2024-11-15 RX ADMIN — SODIUM BICARBONATE 50 MEQ: 84 INJECTION, SOLUTION INTRAVENOUS at 08:18

## 2024-11-15 RX ADMIN — SODIUM BICARBONATE 150 MEQ: 84 INJECTION, SOLUTION INTRAVENOUS at 05:08

## 2024-11-15 RX ADMIN — Medication 100 MCG/HR: at 15:40

## 2024-11-15 RX ADMIN — Medication 100 MCG: at 16:00

## 2024-11-15 ASSESSMENT — ACTIVITIES OF DAILY LIVING (ADL)
ADLS_ACUITY_SCORE: 0

## 2024-11-15 NOTE — PLAN OF CARE
Assumed care at 7 am, while in report with the night shift nurse the patient was hypotensive SBP<80 MAP 50s  not responding to vasopressors. ICU MD and CV surgery at the bedside. Albumin bolus given with no BP response. Pt was complaining of increased pain in his abdomen. ABD firm to touch, had large amount of emesis and loose stool. NG placed obtained 1000 ml of gastric contents. Anesthesia at bedside intubated the patient for airway protection.  Sedation and pain gtt started. Angiotensisn 2 added as vasopressor for permissive hypotension. EPI 1 ml IV push , 4 amp of bicarb IV, 1 amp of calcium chloride IV given. SBP recovered  to 120s MAP <65. CRRT restarted. Family spoke to CV surgery and ICU MD decided to change code status to DNR with possible transition to comfort care this afternoon. Will continue monitoring pt

## 2024-11-15 NOTE — PROGRESS NOTES
Care Management Follow Up    Length of Stay (days): 18    Expected Discharge Date: 11/18/2024     Concerns to be Addressed: other (see comments)     Patient plan of care discussed at interdisciplinary rounds: Yes    Anticipated Discharge Disposition:Comfort Care         Anticipated Discharge Services:    Anticipated Discharge DME:      Patient/family educated on Medicare website which has current facility and service quality ratings:    Education Provided on the Discharge Plan:    Patient/Family in Agreement with the Plan: yes    Referrals Placed by CM/SW: Post Acute Facilities  Private pay costs discussed: Not applicable    Discussed  Partnership in Safe Discharge Planning  document with patient/family: Yes:     Handoff Completed: No, handoff not indicated or clinically appropriate    Additional Information:  Writer attended rounds and was told the family has elected to transition to comfort cares     Next Steps: No further care management intervention anticipated at this time.  Please re-consult if further needs arise.  Care management signing off.       BALJIT Dill

## 2024-11-15 NOTE — ANESTHESIA PROCEDURE NOTES
Airway       Patient location during procedure: ICU       Procedure Start/Stop Times: 11/15/2024 8:05 AM  Staff -        CRNA: Pippa Riggs APRN CRNA       Other Anesthesia Staff: Anabela Poole APRN CRNA       Performed By: CRNA  Consent for Airway        Urgency: emergent       Consent: No emergent situation. Verbal consent obtained.       Consent given by: spouse       Patient identity confirmed: arm band and hospital-assigned identification number  Report Obtained from Primary Care Team       History regarding most recent potassium obtained: Yes       History regarding presence/absence of renal failure obtained:Yes       History regarding stroke/CVA obtained:Yes       History regarding presence/absence of NM disorder: YesIndications and Patient Condition       Indications for airway management: respiratory insufficiency and hemodynamic instability       Mallampati: Not Assessed     Induction type:intravenous       Mask difficulty assessment: 1 - vent by mask    Final Airway Details       Final airway type: endotracheal airway       Successful airway: ETT - single and Single subglottic suction  Endotracheal Airway Details        ETT size (mm): 8.0       Cuffed: yes       Successful intubation technique: video laryngoscopy       VL Blade Size: Glidescope 4       Grade View of Cords: 1       Adjucts: stylet       Position: Right       Measured from: gums/teeth       Secured at (cm): 23       Bite block used: None    Post intubation assessment        ETT secured, Vent settings by primary/ICU team, Sedation to be ordered by primary/ICU team and No apparent complications       Placement verified by: capnometry and chest rise        Number of attempts at approach: 1       Secured with: commercial tube rico       Ease of procedure: easy       Dentition: Unchanged    Medication(s) Administered   etomidate (AMIDATE) injection - Intravenous   10 mg - 11/15/2024 8:04:00 AM  Medication Administration  Time: 11/15/2024 8:05 AM

## 2024-11-15 NOTE — PLAN OF CARE
Problem: Adult Inpatient Plan of Care  Goal: Absence of Hospital-Acquired Illness or Injury  Intervention: Identify and Manage Fall Risk  Recent Flowsheet Documentation  Taken 11/15/2024 0800 by Loraine Crain RN  Safety Promotion/Fall Prevention:   activity supervised   clutter free environment maintained   increased rounding and observation   increase visualization of patient   lighting adjusted   nonskid shoes/slippers when out of bed   patient and family education   room door open   room near nurse's station   room organization consistent   safety round/check completed   supervised activity   treat reversible contributory factors  Intervention: Prevent Skin Injury  Recent Flowsheet Documentation  Taken 11/15/2024 1000 by Loraine Crain RN  Body Position:   turned   heels elevated   upper extremity elevated  Taken 11/15/2024 0800 by Loraine Crain RN  Body Position:   turned   heels elevated   upper extremity elevated  Skin Protection:   adhesive use limited   drying agents applied   incontinence pads utilized   protective footwear used   pulse oximeter probe site changed   silicone foam dressing in place   skin to device areas padded   transparent dressing maintained  Intervention: Prevent and Manage VTE (Venous Thromboembolism) Risk  Recent Flowsheet Documentation  Taken 11/15/2024 0800 by Loraine Crain RN  VTE Prevention/Management: (Heparin gtt, BLE compression stockings)   SCDs off (sequential compression devices)   other (see comments)  Intervention: Prevent Infection  Recent Flowsheet Documentation  Taken 11/15/2024 0800 by Loraine Crain RN  Infection Prevention:   environmental surveillance performed   equipment surfaces disinfected   hand hygiene promoted   single patient room provided   rest/sleep promoted  Goal: Optimal Comfort and Wellbeing  Outcome: Unable to Meet  Intervention: Provide Person-Centered Care  Recent Flowsheet Documentation  Taken 11/15/2024 0800 by Paras  Loraine BASSETT RN  Trust Relationship/Rapport:   care explained   choices provided   questions answered   questions encouraged   reassurance provided   thoughts/feelings acknowledged   empathic listening provided  Goal: Readiness for Transition of Care  Outcome: Unable to Meet  Flowsheets (Taken 11/15/2024 1703)  Barriers to Discharge: Pt transitioned to comfort care  Goal: Plan of Care Review  Description: The Plan of Care Review/Shift note should be completed every shift.  The Outcome Evaluation is a brief statement about your assessment that the patient is improving, declining, or no change.  This information will be displayed automatically on your shift  note.  Outcome: Unable to Meet   Goal Outcome Evaluation:

## 2024-11-15 NOTE — PROGRESS NOTES
Patient in extremis this morning. Overnight re-started CRRT and now requiring 3 pressors. Increasing lactic acidosis and worsening acidemia. On my evaluation he is moaning, awake in bed. Delirious. But does know where he is. On 6 LPM oxymask. Significant abdominal tenderness, which is new. Large volume stool output requiring rectal tube placement overnight. He then had large volume emesis, concerning for aspiration. NGT was placed. Stress dose steroids, broad spectrum antibiotics were started. He was given amps of bicarbonate and a drip was started. He was intubated for airway protection. Albumin bolus was given for hypotension. CVTS was notified and was at the bedside. Giopreza was started. An emergent central line was placed for access. His family was notified and was shortly thereafter at the bedside. Per discussion with CVTS provider, he was made DNR. With intubation and Giopreza, he did stabilize on 4 pressors.     Cause of decompensation is not fully known. With severe abdominal pain, vomiting, lactic acidosis, bowel ischemia is on the differential. He is not stable to go to the CT scanner.     Multiple discussions were had with his wife and daughter. They are clear that they would like to keep him comfortable. He would not want any surgical intervention for the abdomen. Fentanyl gtt was added. They would like to eventually transition to comfort cares today with plan for compassionate extubation. Will do this after his son arrives around 3 pm. He is flying in from [place]. Will not escalate cares. Will not poke him. Continue CRRT and pressors for now. Bedside RN and CVTS updated.     Carla Dubois MD   SICU Fellow

## 2024-11-15 NOTE — PROGRESS NOTES
Intubation Note    Date of intubation: 11/15/2024  Time of intubation: 0805  Location of intubation: ICU  Intubated by: CRNA  Size of ETT: 7.5  Location (cm) at lip: 23    ETT placement confirmed by: [id number]

## 2024-11-15 NOTE — PLAN OF CARE
[facility]  Death Note         Nursing Notes:       Alessio Teixeira  MRN# [id number]   November 15, 2024        Death occurred at: Pronounced death at 1629    Events preceding: Comfort care started at 1530 Premedications for sedation and pain given (see MAR). Extubated 1610 with comfort propofol and fentanyl gtt infusing   Family / Significant Other: Family was present   Tissue / Organ Donation: [facility] contacted. Pt not a candidate for tissue and eye donation   Autopsy: Autopsy was discussed  Family declined autopsy   Belongings: Sent home clothing, insurance cards, hearing aids, and dentures     Recorded by Loraine Crain RN

## 2024-11-15 NOTE — ANESTHESIA CARE TRANSFER NOTE
Patient: Alessio Teixeira    Procedure: * No procedures listed *       Diagnosis: * No pre-op diagnosis entered *  Diagnosis Additional Information: No value filed.    Anesthesia Type:   No value filed.     Note:    Oropharynx: endotracheal tube in place  Level of Consciousness: iatrogenic sedation      Independent Airway: airway patency not satisfactory and stable  Dentition: dentition unchanged  Vital Signs Stable: post-procedure vital signs reviewed and stable  Report to RN Given: handoff report given  Patient transferred to: ICU  Comments: Diagnosis/Indication: Respiratory failure, Airway protection.  Procedure: Intubation.  Ordering Physician: Dr. Ndiaye.  Location: [facility], Bed 353.    Preoxygenated with 100% oxygen at 15 liters per minute via facemask, induced with 10 mg IV etomidate, Glidescope MAC 4 used to place 8.0 mm ID endotracheal tube, Cormack-Lehane grade 1 (full view of entire glottic aperture) view, cords open (abducted), depth 23 cm at the incisors, endotracheal tube position unchanged, patient placed on ICU ventilator by respiratory therapist, teeth and oral mucosa intact and unchanged at handoff of care. greater than 92% after intubation, clinical monitors and alarms on and functioning, report on patient's clinical status given to ICU RN, report on patient's clinical status given to intensivist, ICU staff questions answered. See EPIC anesthesia record for vital signs recorded during anesthesia care.  ICU Handoff: Call for PAUSE to initiate/utilize ICU HANDOFF, Identified Patient, Identified Responsible Provider, Reviewed the Pertinent Medical History, Discussed Surgical Course, Reviewed Intra-OP Anesthesia Management and Issues during Anesthesia, Set Expectations for Post Procedure Period and Allowed Opportunity for Questions and Acknowledgement of Understanding      Vitals:  Vitals Value Taken Time   BP     Temp     Pulse 105 11/15/24 0814   Resp 18 11/15/24 0814   SpO2 100  % 11/15/24 0814   Vitals shown include unfiled device data.    Electronically Signed By: ANNA Chang CRNA  November 15, 2024  8:15 AM

## 2024-11-15 NOTE — DISCHARGE SUMMARY
Physician Discharge Summary     Patient ID:  Alessio Teixeira  [id number]  88 year old  1935    Admit date: 10/28/2024    Discharge date and time: Dead 11/15/24      Admitting Physician: Michael Mulvihill, MD     Discharge Physician: Dr. Dubois    Admission Diagnoses: Coronary artery disease [I25.10]  Atrial fibrillation (H) [I48.91]    Discharge Diagnoses:   Profound shock - septic vs vasoplegic vs cardiac   CAD   Status post CABG  Anuric kidney failure on CRRT   Hypoxic respiratory failure   Abdominal pain   Lactic acidemia   Acidosis   Atrial fibrillation   Nausea/vomiting   Diarrhea   CKD3      Admission Condition: serious    Discharged Condition: Dead     Indication for Admission: Post op heart surgery     Hospital Course:   88 year old M with a history of severe 3 vessel CAD. Status post sternotomy, CABGx3, modified MAZE procedure, PAYAM ligation with Dr. Mulvihill 10/28/24. Post operative hemorrhage with RTOR on POD0 for evacuation of mediastinal blood clot causing tamponade physiology. Post operative course complicated by kidney failure requiring CRRT and persistent cardiogenic/vasoplegic shock. His prognosis was guarded but he slowly progressed and was extubated on POD9. He was eventually started on Midodrine to assist with pressor wean. Speech, physical and occupational therapy were working with him. Unfortunately 11/15/24 he significantly decompensated. See daily note for details. The family then opted to pursue compassionate extubation.    Consults:   CVTS   Nutrition   Nephrology   WOC   Social Work   Spiritual Health     Significant Diagnostic Studies: None pending       Discharge Exam:  Unresponsive to noxious stimuli, Spontaneous respirations absent, Breath sounds absent, Carotid pulse absent, Heart sounds absent, Pupillary light reflex absent, and Corneal blink reflex absent       Patient Instructions:   Current Discharge Medication List        CONTINUE these medications which have NOT CHANGED     Details   acetaminophen (TYLENOL) 500 MG tablet Take 500 mg by mouth at bedtime.      allopurinol (ZYLOPRIM) 300 MG tablet TAKE ONE TABLET BY MOUTH IN THE MORNING  Qty: 90 tablet, Refills: 1    Associated Diagnoses: Gout of big toe      apixaban ANTICOAGULANT (ELIQUIS) 2.5 MG tablet Take 1 tablet (2.5 mg) by mouth 2 times daily.  Qty: 60 tablet, Refills: 1    Comments: Future refills by PCP Dr. Jose Squires with phone number [phone].  Associated Diagnoses: Chronic atrial fibrillation (H)      atorvastatin (LIPITOR) 10 MG tablet Take 1 tablet (10 mg) by mouth every evening.  Qty: 30 tablet, Refills: 1    Associated Diagnoses: Coronary artery disease of native artery of native heart with stable angina pectoris (H)      clotrimazole-betamethasone (LOTRISONE) 1-0.05 % external cream Apply topically 2 times daily.  Qty: 45 g, Refills: 1    Associated Diagnoses: Tinea cruris      diclofenac (VOLTAREN) 1 % topical gel Apply 1 g topically in the morning and 1 g in the evening.  Qty: 150 g, Refills: 3    Associated Diagnoses: Other osteoarthritis of spine, cervical region      ferrous sulfate (FEROSUL) 325 (65 Fe) MG tablet Take 325 mg by mouth daily (with breakfast).      ipratropium (ATROVENT) 0.03 % nasal spray Spray 2 sprays into both nostrils every 12 hours.      magnesium 250 MG tablet Take 1 tablet by mouth daily.      Melatonin 10 MG TABS tablet Take 10 mg by mouth nightly as needed for sleep.      multivitamin w/minerals (MULTI-VITAMIN) tablet Take 1 tablet by mouth daily.      nitroGLYcerin (NITROSTAT) 0.4 MG sublingual tablet For chest pain place 1 tablet under the tongue every 5 minutes for 3 doses. If symptoms persist 5 minutes after 1st dose call 911.  Qty: 30 tablet, Refills: 0    Associated Diagnoses: Coronary artery disease of native artery of native heart with stable angina pectoris (H)      pantoprazole (PROTONIX) 40 MG EC tablet Take 1 tablet (40 mg) by mouth 2 times daily (before meals).  Qty: 60  tablet, Refills: 1    Associated Diagnoses: Anemia, unspecified type      torsemide (DEMADEX) 20 MG tablet Take 1 tablet (20 mg) by mouth daily.  Qty: 30 tablet, Refills: 1    Associated Diagnoses: Secondary cardiomyopathy (H)             Signed:  Tyra Dubois MD  11/15/2024  4:31 PM

## 2024-11-15 NOTE — PROCEDURES
MRN [id number]   Patient Alessio Teixeira   Date 11/15/24     Indication: severe shock, septic vs hypovolemic, vs vasoplegia     EBL: minimal     Consent: emergently placed during code     The left groin was prepped. A needle accessed the left femoral vein using anatomic landmarks. A wire was advanced into the needle using the Seldinger technique. The needle was removed. A dilator was advanced over the wire. The dilator was removed and the central catheter was placed. It flushed and aspirated without issue. Was dressed with biopatch and Tegaderm.     Carla Dubois MD   SICU Fellow

## 2024-11-15 NOTE — DEATH PRONOUNCEMENT
MD DEATH PRONOUNCEMENT    Called to pronounce Alessio Teixeira dead.    Physical Exam: Unresponsive to noxious stimuli, Spontaneous respirations absent, Breath sounds absent, Carotid pulse absent, Heart sounds absent, Pupillary light reflex absent, and Corneal blink reflex absent    Patient was pronounced dead at 4:29 PM, November 15, 2024.    Preliminary Cause of Death: Profound shock. Coronary artery disease.     Active Problems:    Coronary artery disease       Infectious disease present?: NO    Communicable disease present? (examples: HIV, chicken pox, TB, Ebola, CJD) :  NO    Multi-drug resistant organism present? (example: MRSA): NO    Discussed with family     Carla Dubois MD   SICU Fellow

## 2024-11-15 NOTE — PLAN OF CARE
Goal Outcome Evaluation:      Plan of Care Reviewed With: patient, spouse    Overall Patient Progress: decliningOverall Patient Progress: declining    Outcome Evaluation:   - Pt resting well early in shift after given Seroquel & Robaxin.   - 2146: Levo Restarted @ 0.02mcg/kg/min d/t SBP 80s-90s. Maintained SBP >100 (Goal).  ~0100: pt needing cleanup of large liquid stool, pt switched to Oxymask 10L d/t previous desat w/ clean ups. Rectal tube was inserted after MD approval d/t Multiple liquid stools. During cares- pt tolerating, post cares- pt. Hypotensive SBP 70s-80s w/o stabilizing BP as pt normally does. Remained Hypotensive despite increasing Levo gtt to 0.1mcg/kg/min. MD notified- verbal to Restart Vaso gtt, Epi gtt restarted d/t SBP 70s despite adding pressors.   - 1L NS Bolus given but no effectiveness.   Labs drawn:   - Hgb 8.2, LA 8.5  - Bicarb 14 - x3 Amps Bicarb given w/ little response.   - K+ 6.5 w/ ongoing hypotension, Dr. Beatty verbal to contact Nephrology to Restart CRRT.  - CRRT Restarted @ 0608, Net I=O.   - Levo 0.2 mcg/kg/min.   - Epi 0.04 mcg/kg/min.   - Vaso 2.4 u/hr.   - Plan to run CRRT until pt stabilizes for pan CT scan.

## 2024-11-15 NOTE — PROGRESS NOTES
ICU Staff    Worsening shock, lactic acidosis and hyperkalemia   On levophed, epi and vasopressin. Some response to fluid bolus   Discussed with nephrology and will restart CRRT tonight   Repeat shock labs at 6am   May need to consider advanced imaging if lactic acidosis worsens    Errol Beatty MD, MHA  Associate Professor of Medicine  Section of Interventional Pulmonology   Division of Pulmonary, Allergy, Critical Care and Sleep Medicine   [facility], [place] [facility]  Pager: [phone]   Office: [phone]  Email: [email or web address]

## 2024-11-15 NOTE — PROGRESS NOTES
Events noted.  Suspicion for ischemic bowel.  Waiting for family to gather.  Expect comfort cares soon.    Bhanu Ritchie MD

## 2024-11-15 NOTE — PROGRESS NOTES
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 Michael Mulvihill.    POD # 17 s/p return to OR for mediastinal re-exploration, washout    - CVS: Pre-op TTE with EF 35-40%. Postop TTE 10/30 with EF ~35% on high-dose inotropic support.  Gtt's: Epi 0.2, 0.2 NE, vaso 2.4  Postop mixed cardiogenic/vasoplegic/hypovolemic shock, worsening. Abrupt worsening of lactic acidosis and hypotension overnight. Patient reported worsening abdominal pain, large volume emesis, high suspicion for ischemic gut.  Resumed max dose pressors.   Hypervolemic, volume management via CRRT. Off CRRT for 12 hours, now resumed.  Hypothermic since initiation on CRRT, Thermagaurd catheter placed 10/31, target 37 C, removed .   Stress dose steroids started 10/31, off 11/02 PM.  History of paroxysmal atrial fibrillation, continues with intermittent afib and accelerated junctional at times. Previously being atrial paced at 100, Amio discontinued with shock liver-resolved. Heparin gtt started 11/12 with straight rate 500U/hr.    Absent right radial pulse (previous arterial line in this location). ICU MD did bedside US, ulnar artery patent. Right hand warm, good capillary refill. Increased duskiness of right hand noted after starting vasopressin, which demonstrated patent arterial flow with slow biphasic flow at the distal radial artery (site of previous arterial line).   Aspirin 162 mg daily, defer statin with ALI.    Chest tubes: output 660<1.7L<840<860<750<975<970 mL, serosang, not blood, fluid overloaded-pleural effusions on CT, no air leak. TPW removed with mediastinal tubes 11/9  CT C/A/P  w/contrast showing pleural effusions, benign a/p    - Resp: Acute hypoxemic and hypercapnic respiratory failure, worsening. Extubated pod#9. Reintubated this AM. Large volume emesis this morning with probable aspiration.     - Neuro: Grossly intact, resume fentanyl infusion. History of myasthenia gravis.      - Renal: CKD stage 3, baseline Cr ~1.8. Postop oliguric/anuric JAVIER. Nephrology consulted, CRRT started 10/30. Avoid nephrotoxins.    Recent Labs   Lab 11/15/24  0651 11/15/24  0254 11/14/24  0505   CR 2.23* 2.29* 1.39*       - GI: +BM, +flatus, continue bowel regimen. Shock liver, improving. Trend LFTs, avoid hepatotoxins. Recent admission for GIB 09/2024, increased pantoprazole to BID. Diarrhea with initiation of TF, rectal tube placed 11/05     - : Bladder scan q shift. Anuric on CRRT    - Endo: Postop stress hyperglycemia, resolved. Insulin infusion transitioned to sliding scale insulin. Stress dose steroids 10/31-11/02 as above.   Hemoglobin A1C   Date Value Ref Range Status   10/09/2024 4.5 <5.7 % Final     Comment:     Normal <5.7%   Prediabetes 5.7-6.4%    Diabetes 6.5% or higher     Note: Adopted from ADA consensus guidelines.        - FEN: Replace electrolytes as needed. Initiated on trophic feeds via OG 11/1, NJ placed 11/2 after INR came down, TF increase to goal. Stop TF with high suspicion of ischemic gut.     - ID: WBC chronically elevated and hypothermic on CRRT as above so difficult to assess for possible infection; empiric Vanc/Zosyn 10/31-11/04. Blood, urine, respiratory cultures NGTD. WBC chronically elevated.  Trend CBC and fever curve. Recultured 11/9-negative. Re-culture today and restarted broad spectrum antibiotics.  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 Acute blood loss anemia due  "to surgery. Postop coagulopathy, improved (required multiple blood transfusions and blood productions immediately postop). 1 unit RBCs 11/3. 1 unit RBC 11/5 Trend CBC, transfuse PRN. 1U PRBC 11/7/24, 2U 11/9  Recent Labs   Lab 11/15/24  0521 11/15/24  0254 11/14/24  1751   HGB 7.6* 8.2* 7.8*    259 223       - Proph: SCD, subcutaneous heparin, PPI    - Other:  Clinically Significant Risk Factors        # Hyperkalemia: Highest K = 6.5 mmol/L in last 2 days, will monitor as appropriate    # Hypocalcemia: Lowest Ca = 8.2 mg/dL in last 2 days, will monitor and replace as appropriate    # Anion Gap Metabolic Acidosis: Highest Anion Gap = 23 mmol/L in last 2 days, will monitor and treat as appropriate  # Hypoalbuminemia: Lowest albumin = 2.7 g/dL at 11/2/2024  5:44 AM, will monitor as appropriate    # Coagulation Defect: INR = 1.61 (Ref range: 0.85 - 1.15) and/or PTT = 42 Seconds (Ref range: 22 - 38 Seconds), will monitor for bleeding    # Hypertension: Noted on problem list  # Chronic heart failure with reduced ejection fraction: last echo with EF <40%   # Acute Hypoxic Respiratory Failure: Documented O2 saturation < 90%. Continue supplemental oxygen as needed  # Acute Hypoxic Respiratory Failure: Documented O2 saturation < 90%. Continue supplemental oxygen as needed  # Acute Hypercapnic Respiratory Failure: based on arterial blood gas results.  Continue supplemental oxygen and ventilatory support as indicated.  # Acute Hypercapnic Respiratory Failure: based on venous blood gas results.  Continue supplemental oxygen and ventilatory support as indicated.      #Acute blood loss anemia: Lowest Hgb this hospitalization: 6.6 g/dL. Will continue to monitor and treat/transfuse as appropriate.     # Obesity: Estimated body mass index is 30.15 kg/m  as calculated from the following:    Height as of this encounter: 1.778 m (5' 10\").    Weight as of this encounter: 95.3 kg (210 lb 1.6 oz).   # Moderate Malnutrition: based on " nutrition assessment     # History of CABG: noted on surgical history       - Dispo: Multiple discussions with family and surgeon at bedside regarding patient's abrupt clinical change and high suspicion for acute ischemic gut. We discussed ischemic gut would not be a survivable condition. Family is an agreement that focus should be made on making comfortable. They have made him DNR and do not want to escalate cares further. They are  hopeful for one more family to arrive and then proceed withdrawal of care.        Interval History  Abrupt increase in pressor needs, now on max dose pressors. New onset abdominal pain with copious bilious vomiting, concern for aspiration. Reintubated     Medications  Current Facility-Administered Medications   Medication Dose Route Frequency Provider Last Rate Last Admin    albumin human 5 % injection 25 g  25 g Intravenous Once Tyra Dubois MD        allopurinol (ZYLOPRIM) tablet 100 mg  100 mg Oral Daily Opal Nguyen PA-C   100 mg at 11/14/24 0800    aspirin (ASA) chewable tablet 162 mg  162 mg Oral or NG Tube Daily Clarence Bone MD   162 mg at 11/14/24 0800    calcium gluconate 2 g in  mL intermittent infusion  2 g Intravenous Once Tyra Dubois MD        fiber modular (BANATROL TF) packet 1 packet  1 packet Per Feeding Tube TID Arlin Hodge PA-C   1 packet at 11/14/24 2102    sodium chloride 0.9% DIALYSIS Cath LOCK - RED Lumen  10 mL Intracatheter Once in dialysis/CRRT Bony Higgins MD        Followed by    heparin 1000 unit/mL DIALYSIS Cath LOCK - RED Lumen  1.3-2.6 mL Intracatheter Once in dialysis/CRRT Bony Higgins MD        sodium chloride 0.9% DIALYSIS Cath LOCK - BLUE Lumen  10 mL Intracatheter Once in dialysis/CRRT Bony Higgins MD        Followed by    heparin 1000 unit/mL DIALYSIS Cath LOCK -BLUE Lumen  1.3-2.6 mL Intracatheter Once in dialysis/CRRT Bony Higgins MD         hydrocortisone sodium succinate PF (solu-CORTEF) injection 50 mg  50 mg Intravenous Q8H Tyra Dubois MD        pantoprazole (PROTONIX) 2 mg/mL suspension 40 mg  40 mg Oral or NG Tube BID AC Arlin Hodge PA-C   40 mg at 11/14/24 1613    Or    pantoprazole (PROTONIX) EC tablet 40 mg  40 mg Oral BID AC Arlin Hodge PA-C        piperacillin-tazobactam (ZOSYN) 3.375 g vial to attach to  mL bag  3.375 g Intravenous Q6H Tyra Dubois MD        propofol (DIPRIVAN) 1000 MG/100ML infusion             Prosource TF20 ENfit Compatibl EN LIQD (PROSOURCE TF20) packet 60 mL  1 packet Per Feeding Tube BID Arlin Hodge PA-C   60 mL at 11/14/24 2051    QUEtiapine (SEROquel) tablet 25 mg  25 mg Oral At Bedtime Tyra Dubois MD   25 mg at 11/14/24 2101    sodium chloride (PF) 0.9% PF flush 3 mL  3 mL Intracatheter Q8H Clarence Bone MD   3 mL at 11/15/24 0503    sodium chloride 0.9% BOLUS 500 mL  500 mL Hemodialysis Machine Once Bhanu Ritchie MD        vancomycin (VANCOCIN) 2,250 mg in sodium chloride 0.9 % 572.5 mL intermittent infusion  2,250 mg Intravenous Once Mulvihill, Michael, MD         Current Facility-Administered Medications   Medication Dose Route Frequency Provider Last Rate Last Admin    acetaminophen (TYLENOL) tablet 650 mg  650 mg Oral Q4H PRN Clarence Bone MD   650 mg at 11/14/24 1728    bisacodyl (DULCOLAX) suppository 10 mg  10 mg Rectal Daily PRN Clarence Bone MD   10 mg at 11/11/24 1130    calcium gluconate 2 g in  mL intermittent infusion  2 g Intravenous Q8H PRN Bony Higgins MD        calcium gluconate 4 g in sodium chloride 0.9 % 100 mL intermittent infusion  4 g Intravenous Q8H PRN Bony Higgins MD        dextrose 10% infusion   Intravenous Continuous PRN Arlin Hodge PA-C   Stopped at 11/05/24 0600    glucose gel 15-30 g  15-30 g Oral Q15 Min PRN Nasir Vallecillo MD        Or    dextrose 50 %  injection 25-50 mL  25-50 mL Intravenous Q15 Min PRN Nasir Vallecillo MD   50 mL at 11/15/24 0300    Or    glucagon injection 1 mg  1 mg Subcutaneous Q15 Min PRN Nasir Vallecillo MD        diphenhydrAMINE (BENADRYL) 25 mg, acetaminophen (TYLENOL) 500 mg alternative for Tylenol PM   Oral At Bedtime PRN Opal Nguyen PA-C   Given at 11/10/24 2109    EPINEPHrine (ADRENALIN) 5 mg in  mL infusion  0.01-0.1 mcg/kg/min Intravenous Continuous PRN Tyra Dubois MD 28.7 mL/hr at 11/15/24 0755 0.1 mcg/kg/min at 11/15/24 0755    fentaNYL (SUBLIMAZE) 50 mcg/mL bolus from pump  25 mcg Intravenous Q1H PRN Tyra Dubois MD        heparin lock flush 10 unit/mL injection 3 mL  3 mL Intracatheter Q1H PRN Tyra Dubois MD   3 mL at 11/14/24 1119    hydrALAZINE (APRESOLINE) injection 10 mg  10 mg Intravenous Q30 Min PRN Clarence Bone MD        HYDROmorphone (DILAUDID) injection 0.2 mg  0.2 mg Intravenous Q2H PRN Tyra Dubois MD   0.2 mg at 11/15/24 0107    lidocaine (LMX4) cream   Topical Q1H PRN Clarence Bone MD        lidocaine 1 % 0.1-1 mL  0.1-1 mL Other Q1H PRN Clarence Bone MD        magnesium hydroxide (MILK OF MAGNESIA) suspension 30 mL  30 mL Oral Daily PRN Clarence Bone MD   30 mL at 11/10/24 1641    magnesium sulfate 2 g in 50 mL sterile water intermittent infusion  2 g Intravenous Q8H PRN Bony Higgins MD        melatonin liquid 5 mg  5 mg Oral At Bedtime PRN Opal Nguyen PA-C        methocarbamol (ROBAXIN) tablet 500 mg  500 mg Oral Q6H PRN Clarence Bone MD   500 mg at 11/14/24 2050    naloxone (NARCAN) injection 0.2 mg  0.2 mg Intravenous Q2 Min PRN Mulvihill, Michael, MD        Or    naloxone (NARCAN) injection 0.4 mg  0.4 mg Intravenous Q2 Min PRN Mulvihill, Michael, MD        Or    naloxone (NARCAN) injection 0.2 mg  0.2 mg Intramuscular Q2 Min PRN Mulvihill, Michael, MD        Or    naloxone (NARCAN)  "injection 0.4 mg  0.4 mg Intramuscular Q2 Min PRN Mulvihill, Michael, MD        ondansetron (ZOFRAN ODT) ODT tab 4 mg  4 mg Oral Q6H PRN Clarence Bone MD        Or    ondansetron (ZOFRAN) injection 4 mg  4 mg Intravenous Q6H PRN Clarence Bone MD   4 mg at 11/14/24 0729    oxyCODONE IR (ROXICODONE) half-tab 2.5 mg  2.5 mg Oral Q4H PRN Clarence Bone MD   2.5 mg at 11/02/24 0214    Or    oxyCODONE (ROXICODONE) tablet 5 mg  5 mg Oral Q4H PRN Clarence Bone MD   5 mg at 11/14/24 0939    Patient may continue current oral medications   Does not apply Continuous PRN Bhanu Ness MD        potassium chloride 20 mEq in 50 mL intermittent infusion  20 mEq Intravenous Q8H PRN Bony Higgins MD        prochlorperazine (COMPAZINE) injection 5 mg  5 mg Intravenous Q6H PRN Clarence Bone MD        Or    prochlorperazine (COMPAZINE) tablet 5 mg  5 mg Oral Q6H PRN Clarence Bone MD        propofol (DIPRIVAN) 1000 MG/100ML infusion             QUEtiapine (SEROquel) half-tab 12.5 mg  12.5 mg Oral BID PRN Tyra Dubois MD        Reason beta blocker order not selected   Does not apply DOES NOT GO TO Clarence Olguin MD        senna-docusate (SENOKOT-S/PERICOLACE) 8.6-50 MG per tablet 2 tablet  2 tablet Oral or Feeding Tube At Bedtime PRN Orion Lua PA-C        sodium chloride (PF) 0.9% PF flush 3 mL  3 mL Intracatheter q1 min prn Clarence Bone MD        sodium chloride 0.9% BOLUS 1,000 mL  1,000 mL CRRT Q1H PRN Bhanu Ritchie MD        sodium chloride 0.9% BOLUS 500 mL  500 mL CRRT Q1H PRN Bony Higgins MD        sodium phosphate 15 mmol in NS 250mL intermittent infusion  15 mmol Intravenous Q8H PRN Tolins, Bony Johns MD             Physical Exam  Vitals were reviewed  Blood pressure 110/64, pulse 97, temperature 99.5  F (37.5  C), temperature source Axillary, resp. rate 22, height 1.778 m (5' 10\"), weight 95.3 kg (210 lb 1.6 oz), SpO2 " 92%.  Gen: acute distress    Lungs: diminished bases, labored breathing     Cardiovascular: RRR, telemetry SR 70s,     Abdomen: distended, tender    Incision: sternal incision D/I   R LE incisions D/I    CT: bilateral pleural tubes with serous output, -leak      Weight:   Vitals:    11/11/24 0600 11/12/24 0500 11/13/24 0500 11/14/24 0600   Weight: 102.1 kg (225 lb 1.4 oz) 100 kg (220 lb 7.4 oz) 97.8 kg (215 lb 9.8 oz) 95.7 kg (210 lb 15.7 oz)    11/15/24 0200   Weight: 95.3 kg (210 lb 1.6 oz)         Data  Recent Labs   Lab 11/15/24  0651 11/15/24  0521 11/15/24  0321 11/15/24  0258 11/15/24  0254 11/14/24  1751 11/14/24  1237 11/14/24  0505   WBC  --  45.9*  --   --  45.7* 29.5*  --  26.3*   HGB  --  7.6*  --   --  8.2* 7.8*  --  8.0*   MCV  --  88  --   --  88 86  --  88   PLT  --  227  --   --  259 223  --  202   INR 1.61*  --   --   --  1.60*  --   --  1.57*     --   --   --  138  --   --  139   POTASSIUM 5.7*  --   --   --  6.5*  --   --  4.4   CHLORIDE 102  --   --   --  102  --   --  105   CO2 18*  --   --   --  13*  --   --  22   BUN 57.8*  --   --   --  56.7*  --   --  34.7*   CR 2.23*  --   --   --  2.29*  --   --  1.39*   ANIONGAP 21*  --   --   --  23*  --   --  12   TATE 8.2*  --   --   --  8.5*  --   --  8.2*   GLC 51*  --  124* 59* 72  72  --    < > 120*  120*   ALBUMIN 3.8  --   --   --  3.8  --   --  3.8   PROTTOTAL 5.7*  --   --   --   --   --   --  5.6*   BILITOTAL 1.4*  --   --   --   --   --   --  1.2   ALKPHOS 192*  --   --   --   --   --   --  140   ALT 44  --   --   --   --   --   --  50   AST 51*  --   --   --   --   --   --  49*    < > = values in this interval not displayed.       Imaging:  Recent Results (from the past 24 hours)   XR Chest Port 1 View    Narrative    CHEST ONE VIEW  11/15/2024 7:50 AM     HISTORY: shock    COMPARISON: Chest radiograph 11/11/2013      Impression    IMPRESSION: Feeding tube, chest tubes, median sternotomy and left  atrial appendage clip appears  similar to prior. Right central venous  catheter tip near the mid SVC. Gastric tube courses below the  diaphragm, tip not visualized.    No convincing pneumothorax. Overall similar layering pleural effusions  and atelectasis. Superimposed infection would be difficult to exclude.  Similar enlarged cardiac silhouette.    COLLEEN JOHNSON MD         SYSTEM ID:  [id number]         Patient seen and discussed with Dr. Mulvihill and Dr. Tracey.      Arlin Hodge PA-C  Cardiothoracic Surgery  Available for paging [phone] (personal pager or CV Surgery Rounding Pager)  Personal Pager: [phone]  CV Surgery Rounding Pager: [phone]  After hours please page surgeon on-call

## 2024-11-16 LAB
ATRIAL RATE - MUSE: 101 BPM
DIASTOLIC BLOOD PRESSURE - MUSE: NORMAL MMHG
INTERPRETATION ECG - MUSE: NORMAL
P AXIS - MUSE: NORMAL DEGREES
PR INTERVAL - MUSE: NORMAL MS
QRS DURATION - MUSE: 88 MS
QT - MUSE: 472 MS
QTC - MUSE: 612 MS
R AXIS - MUSE: 14 DEGREES
SYSTOLIC BLOOD PRESSURE - MUSE: NORMAL MMHG
T AXIS - MUSE: -62 DEGREES
VENTRICULAR RATE- MUSE: 101 BPM

## 2024-11-17 LAB
BACTERIA SPEC CULT: ABNORMAL
BACTERIA SPEC CULT: ABNORMAL

## (undated) DEVICE — PACK SURGICAL PROCEDURE CUSTOM 1/2IN X 3/8IN BB11W18R1

## (undated) DEVICE — PUNCH AORTIC 4.0MMX8" RCB40

## (undated) DEVICE — CLIP HORIZON MULTI SM YELLOW 001204

## (undated) DEVICE — SU PLEDGET SOFT TFE 3/8"X3/26"X1/16" PCP40

## (undated) DEVICE — DRAPE SHEET REV FOLD 3/4 9349

## (undated) DEVICE — Device

## (undated) DEVICE — DECANTER BAG 2002S

## (undated) DEVICE — BONE CLEANING TIP INTERPULSE  0210-010-000

## (undated) DEVICE — GLOVE BIOGEL PI ULTRATOUCH SZ 7.5 41175

## (undated) DEVICE — LINEN TOWEL PACK X5 5464

## (undated) DEVICE — SYR 50ML LL W/O NDL 309653

## (undated) DEVICE — SU STEEL 6 CCS 4X18" M654G

## (undated) DEVICE — SPONGE LAP 18X18" X8435

## (undated) DEVICE — SU VICRYL 2-0 CT-1 27" UND J259H

## (undated) DEVICE — MANIFOLD NEPTUNE 4 PORT 700-20

## (undated) DEVICE — COVER TABLE POLY 65X90" 8186

## (undated) DEVICE — DRAIN CHEST TUBE RIGHT ANGLED 28FR 8128

## (undated) DEVICE — DRAIN CHEST TUBE 32FR STR 8032

## (undated) DEVICE — LINEN TOWEL PACK X30 5481

## (undated) DEVICE — CATH ANGIO JUDKINS JL4 6FRX100CM INFINITI 534620T

## (undated) DEVICE — LINEN LEG ROLL 5489

## (undated) DEVICE — DRAPE SLUSH/WARMER 66X44" ORS-320

## (undated) DEVICE — CANNULA PERFUSION AORTIC ROOT 22FR 20012

## (undated) DEVICE — SUCTION CANISTER MEDIVAC LINER 3000ML W/LID 65651-530

## (undated) DEVICE — SU PROLENE 4-0 RB-1DA 36" 8557H

## (undated) DEVICE — SU MONOCRYL 4-0 PS-1 27'  UND Y935H

## (undated) DEVICE — PACK OPEN HEART PV12OH524

## (undated) DEVICE — SU ETHIBOND 2-0 SHDA 30" X563H

## (undated) DEVICE — PUMP CP37 QUANTUM CENTRIFUGAL BLOOD CP37V-V0

## (undated) DEVICE — DEVICE ASSEMBLY SUCTION/ANTI COAG BTC93

## (undated) DEVICE — BLADE SAW SAGITTAL STRK MED WIDE 25X73X0.89MM 2108-105-000

## (undated) DEVICE — DRSG KERLIX 4 1/2"X4YDS ROLL 6715

## (undated) DEVICE — SUCTION DRY CHEST DRAIN OASIS 3600-100

## (undated) DEVICE — TONGUE DEPRESSOR STERILE 6023

## (undated) DEVICE — PATCH SURGICAL EVARREST FIBRIN SEALANT 4X2IN EVT5024

## (undated) DEVICE — DRSG ADAPTIC 3X8" 6113

## (undated) DEVICE — CONNECTOR PERFUSION STR 1/2X1/2" W/O LL 6025

## (undated) DEVICE — CABLE MYO/LEAD PACING BLUE DISP 019-535

## (undated) DEVICE — SUCTION IRR SYSTEM W/O TIP INTERPULSE HANDPIECE 0210-100-000

## (undated) DEVICE — MANIFOLD KIT ANGIO AUTOMATED 014613

## (undated) DEVICE — GLOVE PROTEXIS W/NEU-THERA 7.5  2D73TE75

## (undated) DEVICE — SOL NACL 0.9% IRRIG 3000ML BAG 2B7477

## (undated) DEVICE — GUIDEWIRE HI-TORQUE VERSACORE MOD J 1

## (undated) DEVICE — CATH DIAGNOSTIC RADIAL 5FR TIG 4.0

## (undated) DEVICE — WIPES FOLEY CARE SURESTEP PROVON DFC100

## (undated) DEVICE — SU PROLENE 7-0 BV175-8 24" M8747

## (undated) DEVICE — SOL WATER IRRIG 1000ML BOTTLE 2F7114

## (undated) DEVICE — GOWN IMPERVIOUS SPECIALTY XL/XLONG 39049

## (undated) DEVICE — CANNULA PERFUSION ARTERIAL 20FR 12" 77420

## (undated) DEVICE — SU PROLENE 5-0 RB-2DA 30" 8710H

## (undated) DEVICE — LEAD ELEC MYOCARDIO PACING TEMPORARY MEDTRONIC

## (undated) DEVICE — CABLE MYO/LEAD PACING WHITE DISP 019-530

## (undated) DEVICE — BONE CEMENT MIXEVAC III HI VAC KIT  0206-015-000

## (undated) DEVICE — DRAIN CHEST TUBE RIGHT ANGLED 32FR 8132

## (undated) DEVICE — DEVICE TISSUE STABILIZATION OCTOBASE 28707

## (undated) DEVICE — CATH ANGIO JUDKINS R4 6FRX100CM INFINITI 534621T

## (undated) DEVICE — GLOVE PROTEXIS BLUE W/NEU-THERA 8.0  2D73EB80

## (undated) DEVICE — SU ETHIBOND 3-0 BBDA 36" X588H

## (undated) DEVICE — GLOVE PROTEXIS W/NEU-THERA 8.0  2D73TE80

## (undated) DEVICE — SU PROLENE 7-0 BV-1DA 4X30" M8703

## (undated) DEVICE — BLADE KNIFE BEAVER MINI STR BEAVER6900

## (undated) DEVICE — SU MYOSTERNAL WIRE  046-267

## (undated) DEVICE — GUIDEWIRE VASC 40CM .018IN NT PLAT TI

## (undated) DEVICE — PREP CHLORAPREP 26ML TINTED ORANGE  260815

## (undated) DEVICE — PREP CHLORAPREP W/ORANGE TINT 10.5ML 930715

## (undated) DEVICE — CATH TRAY FOLEY COUDE SURESTEP 16FR W/URNE MTR STLK A304716A

## (undated) DEVICE — SOL NACL 0.9% IRRIG 1000ML BOTTLE 2F7124

## (undated) DEVICE — DRAIN CHEST TUBE 28FR STR 8028

## (undated) DEVICE — TOTE ANGIO CORP PC15AT SAN32CC83O

## (undated) DEVICE — SU PROLENE 4-0 SHDA 36" 8521H

## (undated) DEVICE — INTRO GLIDESHEATH SLENDER 6FR 10X45CM 60-1060

## (undated) DEVICE — SOMASENSOR CEREBRAL OXIMETER ADULT SAFB-SM

## (undated) DEVICE — ESU GROUND PAD UNIVERSAL W/O CORD

## (undated) DEVICE — SU VICRYL 0 CT-1 CR 8X18" J740D

## (undated) DEVICE — OXYGENATOR PERFUSION AFFINITY FUSION BB841

## (undated) DEVICE — PACK TOTAL KNEE SOP15TKFSD

## (undated) DEVICE — SU ETHIBOND 0 CT-1 CR 8X18" CX21D

## (undated) DEVICE — SU MONOCRYL 4-0 PS-2 18" UND Y496G

## (undated) DEVICE — SLEEVE TR BAND RADIAL COMPRESSION DEVICE 29CM XX-RF06L

## (undated) DEVICE — DEFIB PRO-PADZ LVP LQD GEL ADULT 8900-2105-01

## (undated) DEVICE — KIT WASH CELL SAVING ATL2001

## (undated) DEVICE — SHEATH GUIDING R2P DESTINATION 6FR 85CM GS-R6ST1C85W

## (undated) DEVICE — SU PROLENE 6-0 C-1DA 30" M8706

## (undated) DEVICE — CONNECTOR PERFUSION Y 3/8X3/8" W/O LL 6031

## (undated) DEVICE — BLADE SAW RECIP STRK LONG 70X12.5X0.9MM 0277-096-278

## (undated) DEVICE — KIT ENDO VASOVIEW HEMOPRO 2 VH-4000

## (undated) DEVICE — WIRE GUIDE 0.035"X260CM AMPTLAZ XSTIFF CVD THSCF-35-260-3-A

## (undated) DEVICE — SU VICRYL 0 CTX 36" J370H

## (undated) DEVICE — POSITIONER ASSIST ESSTECH 3S T401210S

## (undated) DEVICE — DRAPE NEW SLUSH/WARMER HUSHSLUSH 2.0 ESD340 ESD340

## (undated) DEVICE — SU SILK 1 TIE 10X30" SA87G

## (undated) DEVICE — CLIP HORIZON MULTI MED BLUE 002204

## (undated) DEVICE — IMM KNEE 20" 0814-2660

## (undated) DEVICE — BLADE SAW SAGITTAL STRK 19.5X90X1.27MM 2108-109-000S11

## (undated) DEVICE — CLAMP ATRICURE ENCOMPASS PARALLEL BIPLR DISP OLH

## (undated) DEVICE — LINEN TOWEL PACK X6 WHITE 5487

## (undated) DEVICE — WRAP EZY KNEE

## (undated) DEVICE — CAST PADDING 6" STERILE 9046S

## (undated) DEVICE — RX SURGIFLO HEMOSTATIC MATRIX W/THROMBIN 8ML 2994

## (undated) DEVICE — NDL PERC ENTRY 21GA 4CM G00280

## (undated) DEVICE — SPONGE RAY-TEC 4X8" 7318

## (undated) DEVICE — PROTECTOR ARM ONE-STEP TRENDELENBURG 40418

## (undated) DEVICE — NDL SPINAL 18GA 3.5" 405184

## (undated) DEVICE — KIT HAND CONTROL ANGIOTOUCH ACIST 65CM AT-P65

## (undated) DEVICE — RESERVOIR CELL SAVING BLOOD COLLECTION EL2120

## (undated) DEVICE — SOLUTION WOUND CLEANSING 3/4OZ 10% PVP EA-L3011FB-50

## (undated) DEVICE — CANNULA VENOUS 2 STAGE 32-40FR

## (undated) DEVICE — SYR EAR BULB 3OZ 0035830

## (undated) DEVICE — SOL NACL 0.9% IRRIG 1000ML BOTTLE 07138-09

## (undated) DEVICE — LEAD PACER MYOCARDIAL BIPOLAR TEMPORARY 53CM 6495F

## (undated) RX ORDER — VASOPRESSIN 20 U/ML
INJECTION PARENTERAL
Status: DISPENSED
Start: 2024-10-28

## (undated) RX ORDER — HYDROMORPHONE HYDROCHLORIDE 1 MG/ML
INJECTION, SOLUTION INTRAMUSCULAR; INTRAVENOUS; SUBCUTANEOUS
Status: DISPENSED
Start: 2019-01-28

## (undated) RX ORDER — PROPOFOL 10 MG/ML
INJECTION, EMULSION INTRAVENOUS
Status: DISPENSED
Start: 2019-01-28

## (undated) RX ORDER — HEPARIN SODIUM 1000 [USP'U]/ML
INJECTION, SOLUTION INTRAVENOUS; SUBCUTANEOUS
Status: DISPENSED
Start: 2024-10-28

## (undated) RX ORDER — HEPARIN SODIUM 1000 [USP'U]/ML
INJECTION, SOLUTION INTRAVENOUS; SUBCUTANEOUS
Status: DISPENSED

## (undated) RX ORDER — CHLORHEXIDINE GLUCONATE ORAL RINSE 1.2 MG/ML
SOLUTION DENTAL
Status: DISPENSED
Start: 2024-10-28

## (undated) RX ORDER — CEFAZOLIN SODIUM 1 G/3ML
INJECTION, POWDER, FOR SOLUTION INTRAMUSCULAR; INTRAVENOUS
Status: DISPENSED
Start: 2019-01-28

## (undated) RX ORDER — ONDANSETRON 2 MG/ML
INJECTION INTRAMUSCULAR; INTRAVENOUS
Status: DISPENSED
Start: 2019-01-28

## (undated) RX ORDER — PAPAVERINE HYDROCHLORIDE 30 MG/ML
INJECTION INTRAMUSCULAR; INTRAVENOUS
Status: DISPENSED
Start: 2024-10-28

## (undated) RX ORDER — VECURONIUM BROMIDE 1 MG/ML
INJECTION, POWDER, LYOPHILIZED, FOR SOLUTION INTRAVENOUS
Status: DISPENSED
Start: 2024-10-28

## (undated) RX ORDER — CEFAZOLIN SODIUM 2 G/100ML
INJECTION, SOLUTION INTRAVENOUS
Status: DISPENSED
Start: 2019-01-28

## (undated) RX ORDER — DEXAMETHASONE SODIUM PHOSPHATE 4 MG/ML
INJECTION, SOLUTION INTRA-ARTICULAR; INTRALESIONAL; INTRAMUSCULAR; INTRAVENOUS; SOFT TISSUE
Status: DISPENSED
Start: 2019-01-28

## (undated) RX ORDER — FENTANYL CITRATE 50 UG/ML
INJECTION, SOLUTION INTRAMUSCULAR; INTRAVENOUS
Status: DISPENSED
Start: 2019-05-10

## (undated) RX ORDER — PHENYLEPHRINE HCL IN 0.9% NACL 1 MG/10 ML
SYRINGE (ML) INTRAVENOUS
Status: DISPENSED

## (undated) RX ORDER — FENTANYL CITRATE 50 UG/ML
INJECTION, SOLUTION INTRAMUSCULAR; INTRAVENOUS
Status: DISPENSED
Start: 2019-01-28

## (undated) RX ORDER — FENTANYL CITRATE 0.05 MG/ML
INJECTION, SOLUTION INTRAMUSCULAR; INTRAVENOUS
Status: DISPENSED
Start: 2024-10-28

## (undated) RX ORDER — CEFAZOLIN SODIUM 1 G/3ML
INJECTION, POWDER, FOR SOLUTION INTRAMUSCULAR; INTRAVENOUS
Status: DISPENSED
Start: 2024-10-28

## (undated) RX ORDER — DEXAMETHASONE SODIUM PHOSPHATE 4 MG/ML
INJECTION, SOLUTION INTRA-ARTICULAR; INTRALESIONAL; INTRAMUSCULAR; INTRAVENOUS; SOFT TISSUE
Status: DISPENSED
Start: 2024-10-28

## (undated) RX ORDER — PROTAMINE SULFATE 10 MG/ML
INJECTION, SOLUTION INTRAVENOUS
Status: DISPENSED
Start: 2024-10-28

## (undated) RX ORDER — DEXMEDETOMIDINE HYDROCHLORIDE 4 UG/ML
INJECTION, SOLUTION INTRAVENOUS
Status: DISPENSED
Start: 2024-10-28

## (undated) RX ORDER — PROPOFOL 10 MG/ML
INJECTION, EMULSION INTRAVENOUS
Status: DISPENSED
Start: 2024-10-28

## (undated) RX ORDER — LIDOCAINE HYDROCHLORIDE 20 MG/ML
INJECTION, SOLUTION EPIDURAL; INFILTRATION; INTRACAUDAL; PERINEURAL
Status: DISPENSED
Start: 2019-01-28

## (undated) RX ORDER — ONDANSETRON 2 MG/ML
INJECTION INTRAMUSCULAR; INTRAVENOUS
Status: DISPENSED
Start: 2024-10-28

## (undated) RX ORDER — VANCOMYCIN HYDROCHLORIDE 500 MG/10ML
INJECTION, POWDER, LYOPHILIZED, FOR SOLUTION INTRAVENOUS
Status: DISPENSED
Start: 2024-10-28

## (undated) RX ORDER — FENTANYL CITRATE 50 UG/ML
INJECTION, SOLUTION INTRAMUSCULAR; INTRAVENOUS
Status: DISPENSED
Start: 2024-10-28

## (undated) RX ORDER — SODIUM CHLORIDE 9 MG/ML
INJECTION, SOLUTION INTRAVENOUS
Status: DISPENSED

## (undated) RX ORDER — ACETAMINOPHEN 500 MG
TABLET ORAL
Status: DISPENSED
Start: 2024-10-28

## (undated) RX ORDER — FENTANYL CITRATE 50 UG/ML
INJECTION, SOLUTION INTRAMUSCULAR; INTRAVENOUS
Status: DISPENSED
Start: 2018-12-22

## (undated) RX ORDER — HYDROMORPHONE HYDROCHLORIDE 1 MG/ML
INJECTION, SOLUTION INTRAMUSCULAR; INTRAVENOUS; SUBCUTANEOUS
Status: DISPENSED
Start: 2024-10-28

## (undated) RX ORDER — ASPIRIN 81 MG/1
TABLET, CHEWABLE ORAL
Status: DISPENSED
Start: 2024-10-28

## (undated) RX ORDER — MORPHINE SULFATE 1 MG/ML
INJECTION, SOLUTION EPIDURAL; INTRATHECAL; INTRAVENOUS
Status: DISPENSED
Start: 2019-01-28

## (undated) RX ORDER — CALCIUM CHLORIDE 100 MG/ML
INJECTION INTRAVENOUS; INTRAVENTRICULAR
Status: DISPENSED

## (undated) RX ORDER — CEFAZOLIN SODIUM/WATER 2 G/20 ML
SYRINGE (ML) INTRAVENOUS
Status: DISPENSED
Start: 2024-10-28

## (undated) RX ORDER — VANCOMYCIN HYDROCHLORIDE 1 G/20ML
INJECTION, POWDER, LYOPHILIZED, FOR SOLUTION INTRAVENOUS
Status: DISPENSED
Start: 2024-10-28

## (undated) RX ORDER — FAMOTIDINE 20 MG/1
TABLET, FILM COATED ORAL
Status: DISPENSED
Start: 2024-10-28

## (undated) RX ORDER — REGADENOSON 0.08 MG/ML
INJECTION, SOLUTION INTRAVENOUS
Status: DISPENSED
Start: 2019-07-22

## (undated) RX ORDER — FENTANYL CITRATE 50 UG/ML
INJECTION, SOLUTION INTRAMUSCULAR; INTRAVENOUS
Status: DISPENSED
Start: 2024-01-01

## (undated) RX ORDER — SODIUM CHLORIDE, SODIUM GLUCONATE, SODIUM ACETATE, POTASSIUM CHLORIDE AND MAGNESIUM CHLORIDE 526; 502; 368; 37; 30 MG/100ML; MG/100ML; MG/100ML; MG/100ML; MG/100ML
INJECTION, SOLUTION INTRAVENOUS
Status: DISPENSED